# Patient Record
Sex: FEMALE | Race: WHITE | NOT HISPANIC OR LATINO | Employment: OTHER | URBAN - METROPOLITAN AREA
[De-identification: names, ages, dates, MRNs, and addresses within clinical notes are randomized per-mention and may not be internally consistent; named-entity substitution may affect disease eponyms.]

---

## 2017-01-06 ENCOUNTER — ONCOLOGY VISIT (OUTPATIENT)
Dept: ONCOLOGY | Facility: CLINIC | Age: 67
End: 2017-01-06
Attending: PHYSICIAN ASSISTANT
Payer: COMMERCIAL

## 2017-01-06 VITALS
BODY MASS INDEX: 40.37 KG/M2 | HEIGHT: 67 IN | SYSTOLIC BLOOD PRESSURE: 132 MMHG | OXYGEN SATURATION: 95 % | HEART RATE: 74 BPM | WEIGHT: 257.2 LBS | TEMPERATURE: 99.1 F | DIASTOLIC BLOOD PRESSURE: 76 MMHG | RESPIRATION RATE: 20 BRPM

## 2017-01-06 DIAGNOSIS — C50.411 BILATERAL MALIGNANT NEOPLASM OF UPPER OUTER QUADRANT OF BREAST IN FEMALE (H): Primary | ICD-10-CM

## 2017-01-06 DIAGNOSIS — C50.412 BILATERAL MALIGNANT NEOPLASM OF UPPER OUTER QUADRANT OF BREAST IN FEMALE (H): Primary | ICD-10-CM

## 2017-01-06 DIAGNOSIS — D3A.00 CARCINOID TUMOR (H): ICD-10-CM

## 2017-01-06 PROCEDURE — 99211 OFF/OP EST MAY X REQ PHY/QHP: CPT

## 2017-01-06 PROCEDURE — 99214 OFFICE O/P EST MOD 30 MIN: CPT | Performed by: INTERNAL MEDICINE

## 2017-01-06 RX ORDER — EXEMESTANE 25 MG/1
25 TABLET ORAL DAILY
Qty: 90 TABLET | Refills: 1 | Status: SHIPPED | OUTPATIENT
Start: 2017-01-06 | End: 2017-07-19

## 2017-01-06 ASSESSMENT — PAIN SCALES - GENERAL: PAINLEVEL: NO PAIN (0)

## 2017-01-06 NOTE — MR AVS SNAPSHOT
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      Your next 10 appointments already scheduled     Jan 09, 2017  9:45 AM   New Visit with Zhao La MD   [facility] ([facility])    [address] [facility] [address]   [phone]            Jan 18, 2017 12:00 PM   New Visit with Danica Vasquez GC   [facility] ([facility])    [address] [place] [id number] [phone]            Apr 01, 2017 10:00 AM   LAB with [place] [facility]   [facility] ([facility])    [address] [place] [address]   [phone]           Patient must bring picture ID.  Patient should be prepared to give a urine specimen  Please do not eat 10-12 hours before your appointment if you are coming in fasting for labs on lipids, cholesterol, or glucose (sugar).  Pregnant women should follow their Care Team instructions. Water with medications is okay. Do not drink coffee or other fluids.   If you have concerns about taking  your medications, please ask at office or if scheduling via TickPickt, send a message by clicking on Secure Messaging, Message Your Care Team.            Apr 07, 2017 11:30 AM   Return Visit with Hosea King MD   [facility] ([facility])    [facility] [address] [address]  "  [phone]              Future tests that were ordered for you today     Open Future Orders        Priority Expected Expires Ordered    CBC with platelets differential Routine 2017    Comprehensive metabolic panel Routine 2017    Ca27.29  breast tumor marker Routine 2017            Who to contact     If you have questions or need follow up information about today's clinic visit or your schedule please contact [facility] directly at [phone].  Normal or non-critical lab and imaging results will be communicated to you by Datadecisionhart, letter or phone within 4 business days after the clinic has received the results. If you do not hear from us within 7 days, please contact the clinic through Datadecisionhart or phone. If you have a critical or abnormal lab result, we will notify you by phone as soon as possible.  Submit refill requests through Sinnet or call your pharmacy and they will forward the refill request to us. Please allow 3 business days for your refill to be completed.          Additional Information About Your Visit        MyChart Information     Sinnet lets you send messages to your doctor, view your test results, renew your prescriptions, schedule appointments and more. To sign up, go to [email or web address] . Click on \"Log in\" on the left side of the screen, which will take you to the Welcome page. Then click on \"Sign up Now\" on the right side of the page.     You will be asked to enter the access code listed below, as well as some personal information. Please follow the directions to create your username and password.     Your access code is: [email or web address]  Expires: 2017  1:05 PM     Your access code will  in 90 days. If you need help or a new code, please call your [facility] or [phone].        Care EveryWhere ID     This is your Care EveryWhere ID. This could be used by other organizations to access your " "[place] medical records  [id number]        Your Vitals Were     Pulse Temperature Respirations    74 99.1  F (37.3  C) (Tympanic) 20    Height BMI (Body Mass Index) Pulse Oximetry    1.708 m (5' 7.25\") 39.99 kg/m2 95%    Breastfeeding?          No         Blood Pressure from Last 3 Encounters:   01/06/17 132/76   11/28/16 121/86   11/02/16 154/90    Weight from Last 3 Encounters:   01/06/17 116.665 kg (257 lb 3.2 oz)   11/28/16 117.754 kg (259 lb 9.6 oz)   11/02/16 117.935 kg (260 lb)                 Where to get your medicines      These medications [facility] to [facility] [address] [place] [address] - [address] [facility]  [address] [facility] [address]     Phone:  [phone]    - exemestane 25 MG tablet       Primary Care Provider Office Phone # Fax #    Bernice Howard [phone]       [address] [address]        Thank you!     Thank you for choosing [facility]  for your care. Our goal is always to provide you with excellent care. Hearing back from our patients is one way we can continue to improve our services. Please take a few minutes to complete the written survey that you may receive in the mail after your visit with us. Thank you!             Your Updated Medication List - Protect others around you: Learn how to safely use, store and throw away your medicines at www.disposemymeds.org.          This list is accurate as of: 1/6/17 11:29 AM.  Always use your most recent med list.                   Brand Name Dispense Instructions for use    albuterol 108 (90 BASE) MCG/ACT Inhaler    PROAIR HFA/PROVENTIL HFA/VENTOLIN HFA    1 Inhaler    Inhale 2 puffs into the lungs every 6 hours as needed for shortness of breath / dyspnea or wheezing       calcium carbonate 600 MG tablet   Generic drug:  calcium carbonate      Take 1 tablet by mouth 2 times daily (with meals)       exemestane 25 MG tablet    AROMASIN    90 " tablet    Take 1 tablet (25 mg) by mouth daily       gabapentin 600 MG tablet    NEURONTIN    180 tablet    Take 1 tablet (600 mg) by mouth 3 times daily       hydrochlorothiazide 25 MG tablet    HYDRODIURIL    90 tablet    Take 1 tablet (25 mg) by mouth daily       HYDROcodone-acetaminophen 5-325 MG per tablet    NORCO    30 tablet    Take 1-2 tablets by mouth every 4 hours as needed for other (Moderate to Severe Pain)       lisinopril 10 MG tablet    PRINIVIL/ZESTRIL    90 tablet    Take 1 tablet (10 mg) by mouth daily       Vitamin D3 3000 UNITS Tabs      Take 1 tablet by mouth daily Dose is 2000 IU daily

## 2017-01-06 NOTE — PROGRESS NOTES
Hematology/ Oncology Follow-up Visit:  Jan 6, 2017    Reason for Visit:   Chief Complaint   Patient presents with     Oncology Clinic Visit     3 month follow up Breast CA.        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 There was also a focal asymmetry in the lower left breast. Subsequently the patient went on to have bilateral diagnostic mammography with ultrasound on March 28, 2016.. On the right breast there was microcalcification is the main grouping measures 1.6 cm x 1 cm bilateral 2.9 cm located 2.9 cm from the nipple. An additional tiny area about 0.2 cm seen at the anterolateral margin of this main grouping about 1 cm from the main grouping. The left breast shows no masses or significant abnormalities. Subsequently the patient went on to have breast needle biopsy on March 30, 2016. 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 Angiolymphatic invasion was not identified. 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 Microcalcifications was also associated with ductal carcinoma in situ. His surgeon receptor was positive. Progesterone receptor was negative. She underwent bilateral sentinel lymph node biopsy and bilateral lumpectomies with prior seed placement.  the surgical pathology Showing left breast lumpectomy was a tumor size of 13 mm grade 2 of 3, angiolymphatic invasion was not identified the tumor was unifocal associated with ductal carcinoma in situ high-grade. Surgical margins where negative. [place] lymph node was negative for metastatic tumor. Stage is T1cN0 M0 On the right breast and there was invasive ductal carcinoma with a tumor size of 3 mm grade 3 of 3. [place] lymph nodes were negative for metastatic disease. The tumor stage is T1aN0 M0. The tumor on the left  was positive for estrogen and progesterone receptor. It did show no HER-2/praveen amplification. The tumor on the right is positive for estrogen receptor and negative for progesterone receptor and shows no HER-2/praveen amplification. Subsequently the patient concluded the radiation therapy. She is currently on hormonal therapy with Arimidex. She developed a skin rash and Arimidex was switched to Aromasin.      Interval History:  Patient is attended today for follow-up. She was seen recently by primary care physician. She has a chest CT which showed incidental finding of leftt hydronephrosis. She will see urology next week. Otherwise she's been feeling well without any new pain other than back pain. She denies any weight loss or night sweats or fever or chills. She denies any nausea vomiting or diarrhea.   .  Review Of Systems:  Constitutional: Negative for fever, chills, and night sweats.  Skin: negative.  Eyes: negative.  Ears/Nose/Throat: negative.  Respiratory: No shortness of breath, dyspnea on exertion, cough, or hemoptysis.  Cardiovascular: negative.  Gastrointestinal: negative.  Genitourinary: negative.  Musculoskeletal: negative.  Neurologic: negative.  Psychiatric: negative.  Hematologic/Lymphatic/Immunologic: negative.  Endocrine: negative.    All other ROS negative unless mentioned in interval history.    Past medical, social, surgical, and family histories reviewed.    Allergies:  Allergies as of 01/06/2017 - reviewed 01/06/2017   Allergen Reaction Noted     Nkda [no known drug allergies]  10/30/2009       Current Medications:  Current Outpatient Prescriptions   Medication Sig Dispense Refill     albuterol (PROAIR HFA, PROVENTIL HFA, VENTOLIN HFA) 108 (90 BASE) MCG/ACT inhaler Inhale 2 puffs into the lungs every 6 hours as needed for shortness of breath / dyspnea or wheezing 1 Inhaler 1     hydrochlorothiazide (HYDRODIURIL) 25 MG tablet Take 1 tablet (25 mg) by mouth daily 90 tablet 3     lisinopril  "(PRINIVIL,ZESTRIL) 10 MG tablet Take 1 tablet (10 mg) by mouth daily 90 tablet 1     gabapentin (NEURONTIN) 600 MG tablet Take 1 tablet (600 mg) by mouth 3 times daily 180 tablet 6     Cholecalciferol (VITAMIN D3) 3000 UNITS TABS Take 1 tablet by mouth daily Dose is 2000 IU daily       calcium carbonate (CALCIUM CARBONATE) 600 MG tablet Take 1 tablet by mouth 2 times daily (with meals)       HYDROcodone-acetaminophen (NORCO) 5-325 MG per tablet Take 1-2 tablets by mouth every 4 hours as needed for other (Moderate to Severe Pain) 30 tablet 0     exemestane (AROMASIN) 25 MG tablet Take 1 tablet (25 mg) by mouth daily 90 tablet 1        Physical Exam:  /76 mmHg  Pulse 74  Temp(Src) 99.1  F (37.3  C) (Tympanic)  Resp 20  Ht 1.708 m (5' 7.25\")  Wt 116.665 kg (257 lb 3.2 oz)  BMI 39.99 kg/m2  SpO2 95%  Breastfeeding? No  Wt Readings from Last 12 Encounters:   01/06/17 116.665 kg (257 lb 3.2 oz)   11/28/16 117.754 kg (259 lb 9.6 oz)   11/02/16 117.935 kg (260 lb)   10/10/16 117.028 kg (258 lb)   09/28/16 116.529 kg (256 lb 14.4 oz)   09/28/16 116.484 kg (256 lb 12.8 oz)   09/19/16 116.348 kg (256 lb 8 oz)   08/31/16 115.486 kg (254 lb 9.6 oz)   08/25/16 116.121 kg (256 lb)   08/17/16 114.669 kg (252 lb 12.8 oz)   08/10/16 114.579 kg (252 lb 9.6 oz)   08/05/16 113.036 kg (249 lb 3.2 oz)     ECOG performance status: 0  GENERAL APPEARANCE: Healthy, alert and in no acute distress.  HEENT: Sclerae anicteric. PERRLA. Oropharynx without ulcers, lesions, or thrush.  NECK: Supple. No asymmetry or masses.  LYMPHATICS: No palpable cervical, supraclavicular, axillary, or inguinal lymphadenopathy.  RESP: Lungs clear to auscultation bilaterally without rales, rhonchi or wheezes.  CARDIOVASCULAR: Regular rate and rhythm. Normal S1, S2; no S3 or S4. No murmur, gallop, or rub.  ABDOMEN: Soft, nontender. Bowel sounds normal. No palpable organomegaly or masses.  MUSCULOSKELETAL: Extremities without gross deformities noted. No " edema of bilateral lower extremities.  SKIN: No suspicious lesions or rashes.  NEURO: Alert and oriented x 3. Cranial nerves II-XII grossly intact.  PSYCHIATRIC: Mentation and affect appear normal.    Laboratory/Imaging Studies:  No visits with results within 2 Week(s) from this visit.  Latest known visit with results is:    Radiant Appointment on 12/15/2016   Component Date Value Ref Range Status     Radiologist flags 12/15/2016 Left hydronephrosis*  Final        Recent Results (from the past 744 hour(s))   CT Chest w/o Contrast   Result Value    Radiologist flags Left hydronephrosis (Urgent)    Narrative    CT CHEST WITHOUT CONTRAST December 15, 2016 1:17 PM      HISTORY: Shortness of breath. Dyspnea.    TECHNIQUE:  CT chest without intravenous contrast. Radiation dose for  this scan was reduced using automated exposure control, adjustment of  the mA and/or kV according to patient size, or iterative  reconstruction technique.      COMPARISON: None.    FINDINGS: There is mild emphysematous disease in the upper lungs. Few  areas of peripheral scarring and fibrosis bilaterally. There is a 0.2  cm subpleural nodule in the left lower lobe laterally on series 3  image 32. Mild bronchiectasis in the left upper lobe and right middle  lobe. 0.4 cm right middle lobe nodule on series 3 image 40. There is  no mediastinal, hilar or axillary lymph node enlargement. There are  few coronary artery atherosclerotic calcifications. Heart size is  normal. No pleural effusion. Images through the upper abdomen are  remarkable for marked left hydronephrosis. This is a change from the  previous abdomen CT of 9/29/2015. Degenerative disease in the spine.      Impression    IMPRESSION:  1. No acute chest abnormality.  2. Mild emphysema.  3. Few small indeterminate lung nodules. Comparison to any old exam or  follow-up recommended.  4. Marked left hydronephrosis, new since 9/29/2015.    For an indeterminate lung nodule < or equal to 4 mm  :  Low risk patients: No follow-up needed.  High risk patients: Follow-up CT at 12 months; if unchanged, no  further follow-up.    - Low Risk: Minimal or absent history of smoking and of other known  risk factors.  - Nonsolid (ground glass) or partly solid nodules may require longer  follow-up to exclude indolent adenocarcinoma.  - Fleischner Society Recommendations, Radiology 2005.      [Access Center: Left hydronephrosis]    This report will be copied to the [facility] to ensure a  provider is contacted. [facility] is available Monday through  Friday 8am-3:30 pm.     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 She also will continue on calcium and vitamin D supplements. i will see her again in 3 months or sooner if there is new development or concerns. Patient will be  Seen by urology next week. We will continue to follow.    (D3A.00) Carcinoid tumor  There is no clear evidence of disease recurrence.    The patient is ready to learn, no apparent learning barriers were identified.  Diagnosis and treatment plans were explained to the patient. The patient expressed understanding of the content. The patient asked appropriate questions. The patient questions were answered to her satisfaction.    Chart documentation with Dragon Voice recognition Software. Although reviewed after completion, some words and grammatical errors may remain.

## 2017-01-06 NOTE — ASSESSMENT & PLAN NOTE
Marissa Guadarrama was found on routine mammogram a group of microcalcification is in the upper outer right breast. There was also a focal asymmetry in the lower left breast. Subsequently the patient went on to have bilateral diagnostic mammography with ultrasound on March 28, 2016.. On the right breast there was microcalcification is the main grouping measures 1.6 cm x 1 cm bilateral 2.9 cm located 2.9 cm from the nipple. An additional tiny area about 0.2 cm seen at the anterolateral margin of this main grouping about 1 cm from the main grouping. The left breast shows no masses or significant abnormalities. Subsequently the patient went on to have breast needle biopsy on March 30, 2016. 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 Angiolymphatic invasion was not identified. 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 Microcalcifications was also associated with ductal carcinoma in situ. His surgeon receptor was positive. Progesterone receptor was negative. She underwent bilateral sentinel lymph node biopsy and bilateral lumpectomies with prior seed placement.  the surgical pathology Showing left breast lumpectomy was a tumor size of 13 mm grade 2 of 3, angiolymphatic invasion was not identified the tumor was unifocal associated with ductal carcinoma in situ high-grade. Surgical margins where negative. [place] lymph node was negative for metastatic tumor. Stage is T1cN0 M0 On the right breast and there was invasive ductal carcinoma with a tumor size of 3 mm grade 3 of 3. [place] lymph nodes were negative for metastatic disease. The tumor stage is T1aN0 M0. The tumor on the left was positive for estrogen and progesterone receptor. It did show no HER-2/praveen amplification. The tumor on the right is positive for estrogen receptor and negative for progesterone receptor and shows no HER-2/praveen amplification. Subsequently the patient concluded the radiation  therapy. She is currently on hormonal therapy with Arimidex. She developed a skin rash and Arimidex was switched to Aromasin.

## 2017-01-06 NOTE — NURSING NOTE
"Marissa Guadarrama is a 66 year old female who presents for:  Chief Complaint   Patient presents with     Oncology Clinic Visit     3 month follow up Breast CA.         Initial Vitals:  /76 mmHg  Pulse 74  Temp(Src) 99.1  F (37.3  C) (Tympanic)  Resp 20  Ht 1.708 m (5' 7.25\")  Wt 116.665 kg (257 lb 3.2 oz)  BMI 39.99 kg/m2  SpO2 95%  Breastfeeding? No Estimated body mass index is 39.99 kg/(m^2) as calculated from the following:    Height as of this encounter: 1.708 m (5' 7.25\").    Weight as of this encounter: 116.665 kg (257 lb 3.2 oz).. Body surface area is 2.35 meters squared. BP completed using cuff size: regular  No Pain (0) No LMP recorded. Patient has had a hysterectomy. Allergies and medications reviewed.     Medications: Medication refills not needed today.  Pharmacy name entered into [employer]: [id number] [address]    Comments: 3 month follow up Breast CA.     PCP:  Patient reports these concerns today:  Nausea / Vomiting - no  Constipation - no  Diarrhea  - no  Pain - no  Fever / Chills - no  Cough - yes started after taking her medication (Lisinopril) about three months ago.   Rash - no  Fatigue - yes, daily 6-7 months following radiation.   Other - shortness of breath over the last several months. Patient has been using an inhaler. Worse when exerting herself.            10 minutes for nursing intake (face to face time)   Katie Mills [facility]        "

## 2017-01-09 ENCOUNTER — OFFICE VISIT (OUTPATIENT)
Dept: UROLOGY | Facility: CLINIC | Age: 67
End: 2017-01-09
Payer: COMMERCIAL

## 2017-01-09 VITALS — DIASTOLIC BLOOD PRESSURE: 93 MMHG | HEART RATE: 61 BPM | SYSTOLIC BLOOD PRESSURE: 140 MMHG | RESPIRATION RATE: 12 BRPM

## 2017-01-09 DIAGNOSIS — N13.39 OTHER HYDRONEPHROSIS: Primary | ICD-10-CM

## 2017-01-09 PROCEDURE — 51798 US URINE CAPACITY MEASURE: CPT | Performed by: UROLOGY

## 2017-01-09 PROCEDURE — 99203 OFFICE O/P NEW LOW 30 MIN: CPT | Mod: 25 | Performed by: UROLOGY

## 2017-01-09 RX ORDER — CHOLECALCIFEROL (VITAMIN D3) 25 MCG
1 CAPSULE ORAL
COMMUNITY
End: 2017-07-19

## 2017-01-09 NOTE — PROGRESS NOTES
REASON FOR VISIT TODAY:  Left hydronephrosis.      HISTORY:  Ms. Marissa Guadarrama is a 66-year-old woman who comes to see us for recently noted left-sided hydronephrosis obtained on a chest CT that was performed on 12/15/2016 for shortness of breath.  The patient had a previous CT scan in 09/2015 in which no hydronephrosis was noted.  Therefore, this appears new in the last year or so.  The patient has no flank pain.  She has had no blood in the urine and she has no history of kidney stones.  The patient has noted some worsening incontinence over the last couple of years for which she has gone to wearing a pad.  It appears her incontinence is largely mixed with urgency and urge incontinence as well as stress incontinence and the patient also notes that she feels leaking at other times fairly continuously and in fact notes that she wears a pad at night because in the mornings when she wakes up the pad is usually soaked.  The patient denies any history of constipation.  She does have a history of several different cancers including colon cancer, cervical cancer and breast cancer for which she has undergone various therapies.  None of the therapies have been within the last year with the exception of breast cancer radiotherapy.      PAST MEDICAL HISTORY:  Significant for again significant for colon cancer was apparently carcinoid back in 2006 or 2007.  She has a history of cervical cancer, status post hysterectomy in 2003 and recently diagnosed breast cancer bilaterally.  The patient had lumpectomies bilaterally for this.  The patient also has a history of polio, mixed incontinence as noted above, arthritis, trigeminal neuralgia.      PAST SURGICAL HISTORY:  Includes the colon resection in roughly 2003, subsequent hernia repair in 2005, hysterectomy in 2006 and then the lumpectomies on 06/01/16.      MEDICATIONS:  Vitamin D3, exemestane, albuterol, hydrochlorothiazide, lisinopril, Norco, Neurontin.      ALLERGIES:  No  known drug allergies.      FAMILY HISTORY:  Negative for urologic malignancies.      SOCIAL HISTORY:  The patient did smoke about a pack and a half a day on and off for most of her life, but quit 10 years ago.  Drinks alcohol rarely.      REVIEW OF SYSTEMS:  Negative for fevers, chills, sweats, nausea, vomiting or unexplained weight changes.      PHYSICAL EXAMINATION:   VITAL SIGNS:  On exam today, the patient's blood pressure is 140/93, pulse is 61.   GENERAL:  She is in no acute distress.   ABDOMEN:  Soft, nontender.  No flank tenderness to percussion.      LABORATORY AND IMAGING:  The patient's CT scan from 12/15/16 demonstrates the marked left-sided hydronephrosis new since 09/29/2015.  Some mild emphysema noted in the lungs and a few small indeterminate lung nodules noted.  The patient's creatinine from 12/26/16 was 1.66.  Creatinine in 5/25/2016 of 1.01 and 0.69 on 09/28/2015.  The patient voided 125 cc and a postvoid residual was measured at 84.      ASSESSMENT AND PLAN:  Over half of today's 30-minute visit was spent counseling the patient regarding her hydronephrosis.  I suggested to Ms. Guadarrama that at this point the etiology of the hydronephrosis is unclear.  Certainly there could be scarring in the retroperitoneum from some of her previous surgeries, although this does not seem likely given the timing of her surgeries and when the hydronephrosis has been seen.  The patient also theoretically could have hydronephrosis from incomplete bladder emptying and a poorly compliant bladder or certainly obviously some other potential causes.  Toward this end we will get a CT urogram to try to discern the level of obstruction and what the possible etiology may be.  Depending on what that shows, we will make further recommendations.  Certainly we would like to try to fix this underlying issue, but we also discussed the possibility of temporizing things with stenting.  Ms. Guadarrama is in agreement with the plan.          STEVE RUVALCABA MD             D: 2017 11:31   T: 2017 12:48   MT: [id number]      Name:     ROSALINDA LANCASTER   MRN:      [id number]        Account:      [id number]   :      1950           Visit Date:   2017      Document: [id number]

## 2017-01-09 NOTE — NURSING NOTE
"Chief Complaint   Patient presents with     Consult     Hydronephrosis seen on CT scan       Initial /93 mmHg  Pulse 61  Resp 12 Estimated body mass index is 39.99 kg/(m^2) as calculated from the following:    Height as of 1/6/17: 1.708 m (5' 7.25\").    Weight as of 1/6/17: 116.665 kg (257 lb 3.2 oz).  BP completed using cuff size: wrist cuff    Maricruz Jordan MA      "

## 2017-01-12 NOTE — PATIENT INSTRUCTIONS
We would like to see you back in clinic with Dr. King in 3 months with labs prior.  Your prescription (aromasin) has been sent to:   [facility] [address] [place] [address] - [address]   [address] [facility] [address]  Phone: [phone] Fax: [phone]  When you are in need of a refill, please call your pharmacy and they will send us a request.  Copy of appointments, and after visit summary (AVS) given to patient.  If you have any questions during business hours (M-F 8 AM- 4PM), please call Codi Diaz RN, BSN, OCN Oncology Hematology /Breast Cancer Navigator at [facility] [phone].   For questions after business hours, or on holidays/weekends, please call our after hours Nurse Triage line [phone]. Thank you.

## 2017-01-24 RX ORDER — IOPAMIDOL 755 MG/ML
100 INJECTION, SOLUTION INTRAVASCULAR ONCE
Status: COMPLETED | OUTPATIENT
Start: 2017-01-25 | End: 2017-01-25

## 2017-01-25 ENCOUNTER — HOSPITAL ENCOUNTER (OUTPATIENT)
Dept: CT IMAGING | Facility: CLINIC | Age: 67
Discharge: HOME | End: 2017-01-25
Attending: UROLOGY | Admitting: UROLOGY
Payer: MEDICARE

## 2017-01-25 ENCOUNTER — OFFICE VISIT (OUTPATIENT)
Dept: UROLOGY | Facility: CLINIC | Age: 67
End: 2017-01-25
Payer: COMMERCIAL

## 2017-01-25 VITALS — DIASTOLIC BLOOD PRESSURE: 108 MMHG | RESPIRATION RATE: 12 BRPM | SYSTOLIC BLOOD PRESSURE: 207 MMHG | HEART RATE: 70 BPM

## 2017-01-25 DIAGNOSIS — N13.39 OTHER HYDRONEPHROSIS: ICD-10-CM

## 2017-01-25 DIAGNOSIS — N39.0 URINARY TRACT INFECTION, SITE UNSPECIFIED: Primary | ICD-10-CM

## 2017-01-25 LAB
CREAT BLD-MCNC: 1.6 MG/DL (ref 0.52–1.04)
GFR SERPL CREATININE-BSD FRML MDRD: 32 ML/MIN/1.7M2

## 2017-01-25 PROCEDURE — 87086 URINE CULTURE/COLONY COUNT: CPT | Performed by: UROLOGY

## 2017-01-25 PROCEDURE — 74178 CT ABD&PLV WO CNTR FLWD CNTR: CPT

## 2017-01-25 PROCEDURE — 99214 OFFICE O/P EST MOD 30 MIN: CPT | Performed by: UROLOGY

## 2017-01-25 PROCEDURE — 25500064 ZZH RX 255 OP 636: Performed by: RADIOLOGY

## 2017-01-25 PROCEDURE — 82565 ASSAY OF CREATININE: CPT | Performed by: UROLOGY

## 2017-01-25 PROCEDURE — 25000128 H RX IP 250 OP 636: Performed by: UROLOGY

## 2017-01-25 PROCEDURE — 36415 COLL VENOUS BLD VENIPUNCTURE: CPT | Performed by: UROLOGY

## 2017-01-25 PROCEDURE — 25000125 ZZHC RX 250: Performed by: RADIOLOGY

## 2017-01-25 RX ORDER — SODIUM CHLORIDE 9 MG/ML
INJECTION, SOLUTION INTRAVENOUS ONCE
Status: COMPLETED | OUTPATIENT
Start: 2017-01-25 | End: 2017-01-25

## 2017-01-25 RX ADMIN — IOPAMIDOL 100 ML: 755 INJECTION, SOLUTION INTRAVENOUS at 11:20

## 2017-01-25 RX ADMIN — SODIUM CHLORIDE: 9 INJECTION, SOLUTION INTRAVENOUS at 10:40

## 2017-01-25 RX ADMIN — SODIUM CHLORIDE 80 ML: 9 INJECTION, SOLUTION INTRAVENOUS at 11:20

## 2017-01-25 NOTE — NURSING NOTE
"Chief Complaint   Patient presents with     CT Results     done today 1/025/17       Initial /108 mmHg  Pulse 70  Resp 12 Estimated body mass index is 39.99 kg/(m^2) as calculated from the following:    Height as of 1/6/17: 5' 7.25\" (1.708 m).    Weight as of 1/6/17: 257 lb 3.2 oz (116.665 kg).  BP completed using cuff size: wrist cuff    Maricruz Jordan MA      "

## 2017-01-25 NOTE — Clinical Note
SURGERYPLANNING/SCHEDULING WORKSHEET                              [facility]  [address] [facility] [facility] [address]  [phone]                          Marissa Guadarrama                :  1950  MRN:  [id number]  Phone: [phone] (home)    Same Day Surgery [phone]   Surgeon: Zhao La MD  Diagnosis:  Left hydronephrosis  Allergies:  Nkda   A preoperative evaluation by the primary care provider has been requested to summarize and modify, where possible, medical risks to the proposed surgery.  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)     Invasive ductal carcinoma of breast (H)     Urinary incontinence     Bone injury     Vaginal dysplasia     CKD (chronic kidney disease) stage 3, GFR 30-59 ml/min     Advanced directives, counseling/discussion     OA (osteoarthritis) of knee     Knee pain     Hypertension goal BP (blood pressure) < 140/90     HYPERLIPIDEMIA LDL GOAL <130     Post-polio syndrome     Arthritis     Cervical cancer (H)     Trigeminal neuralgia     Malignant neoplasm of cervix (H)     Cervical dysplasia     Carcinoid tumor       ====================================================  Surgical Procedure:  Cystoscopy, left retrograde pyelogram, left ureteroscopy, possible stent placement  Length of Procedure: 1 hr Type of anesthesia:  General  The proposed surgical procedure is considered LOW risk.  Date of Procedure: 17   Time: 2:45pm      Informed Consent Obtained and Signed:  NO  ====================================================  Instructions to Same Day Surgery Staff  Pneumoboots  Special Equipment: C-Arm  Preop Antibiotic:  Ancef 2 gm IV  pre-op within one hour prior to incision For > 80 kg  Preop Pain Meds:  None  PreOp Orders:  Routine Standing  Orders.  ====================================================  Instructions to the patient:  Preop physical exam scheduled (within 30 days or 7 days prior) with:   ____________________  Clinic:  ____________________                                         Date______________Time_________________________  Come to the hospital at: TBD, The OR will call with time  HOME PREPARATION:   Shower with Hibiclens the night before and the morning of surgery, gently cleaning skin from neck to feet  Bathe and brush teeth the morning of surgery.  Take medications with a sip of water the morning of surgery:   Check with  if taking insulin.  May have  a light meal, toast and clear liquids, up to 6 hrs before surgery  May have clear liquids (liquids one can read through) up to 2 hrs before surgery  NOTHING after 2 hrs before surgery  Stop aspirin 7-10 days before surgery  Stop NSAIDS (Ibuproven, Naproxen, etc) 5 days before surgery  Stop Plavix 7-10 days before surgery      Zhao La MD    1/25/2017

## 2017-01-26 NOTE — PROGRESS NOTES
REASON FOR VISIT TODAY:  Left-sided hydronephrosis.      BRIEF COURSE:  Mr. Marissa Guadarrama is a 66-year-old woman followed in our clinic for history of left-sided hydronephrosis since 2015.  This is of unclear etiology.  The patient recently underwent a CT urogram today to try to delineate the etiology of the hydronephrosis.      PHYSICAL EXAMINATION:   VITAL SIGNS:  On exam today blood pressure 207/108, pulse is 70, patient is receiving IV fluids currently but in no acute distress.      DIAGNOSTICS:  The patient's CT scan from today again shows the left hydronephrosis with a transition point in the distal ureter.  There is a question of a possible soft tissue mass near the transition point but this was also noted previously when there was no hydronephrosis and therefore this is less likely to be a cancerous etiology.  No other etiology was seen including no evidence of stones.      ASSESSMENT AND PLAN:  Over half of today's 25-minute visit was spent counseling the patient regarding her hydronephrosis.  I suggested to Ms. Guadarrama that at this point we see where the transition point is in the distal ureter but the etiology of the transition remains unclear.  Our next steps will be to bring the patient to the operating room for cystoscopy, left retrograde pyelogram, left ureteroscopy and possible stent placement on the left side.  If we are able to drive up to the area we will be able to get a better sense of what the possible cause is and certainly if there is a mass in this area we would biopsy.  Another etiology could be stricture.  I did discuss with the patient that ideally following our diagnostic efforts we would leave a stent in place to decompress the kidney in an effort to see how much function the kidney would recover assuming there was no cancer noted in the ureter.  Depending on how much kidney function is recovered after the stent is in place for a month we would determine what reconstructive options  would be available if that is appropriate.  We also discussed that we may not be able to get a stent in due to the tortuosity of ureter and also if there is stricture in the distal portion of the ureter.  In this case, a nephrostomy tube would be required for decompression.  We will plan on getting Ms. Guadarrama to the operating room in the near future.  She left us urine today which we will send for culture in preparation.  The patient will be going back to the radiology department after this appointment to continue with her IV infusion and her vitals will be rechecked at that time.         STEVE RUVALCABA MD             D: 2017 17:47   T: 2017 06:29   MT: SARTHAK#[id number]      Name:     ROSALINDA GUADARRAMA   MRN:      [id number]        Account:      [id number]   :      1950           Visit Date:   2017      Document: [id number]

## 2017-01-27 LAB
BACTERIA SPEC CULT: NORMAL
MICRO REPORT STATUS: NORMAL
SPECIMEN SOURCE: NORMAL

## 2017-01-30 ENCOUNTER — ANESTHESIA EVENT (OUTPATIENT)
Dept: SURGERY | Facility: CLINIC | Age: 67
End: 2017-01-30
Payer: MEDICARE

## 2017-02-01 ENCOUNTER — OFFICE VISIT (OUTPATIENT)
Dept: FAMILY MEDICINE | Facility: CLINIC | Age: 67
End: 2017-02-01
Payer: COMMERCIAL

## 2017-02-01 VITALS
TEMPERATURE: 98.5 F | DIASTOLIC BLOOD PRESSURE: 76 MMHG | BODY MASS INDEX: 40.46 KG/M2 | HEIGHT: 67 IN | SYSTOLIC BLOOD PRESSURE: 140 MMHG | WEIGHT: 257.8 LBS | HEART RATE: 72 BPM | RESPIRATION RATE: 20 BRPM | OXYGEN SATURATION: 94 %

## 2017-02-01 DIAGNOSIS — M17.11 OSTEOARTHRITIS OF RIGHT KNEE, UNSPECIFIED OSTEOARTHRITIS TYPE: ICD-10-CM

## 2017-02-01 DIAGNOSIS — N13.30 HYDRONEPHROSIS, UNSPECIFIED HYDRONEPHROSIS TYPE: ICD-10-CM

## 2017-02-01 DIAGNOSIS — Z01.818 PREOP GENERAL PHYSICAL EXAM: Primary | ICD-10-CM

## 2017-02-01 LAB — HGB BLD-MCNC: 14.2 G/DL (ref 11.7–15.7)

## 2017-02-01 PROCEDURE — 80048 BASIC METABOLIC PNL TOTAL CA: CPT | Performed by: FAMILY MEDICINE

## 2017-02-01 PROCEDURE — 99214 OFFICE O/P EST MOD 30 MIN: CPT | Performed by: FAMILY MEDICINE

## 2017-02-01 PROCEDURE — 85018 HEMOGLOBIN: CPT | Performed by: FAMILY MEDICINE

## 2017-02-01 PROCEDURE — 36415 COLL VENOUS BLD VENIPUNCTURE: CPT | Performed by: FAMILY MEDICINE

## 2017-02-01 NOTE — NURSING NOTE
"Chief Complaint   Patient presents with     Pre-Op Exam     DOS: 2/2/17       Initial /76 mmHg  Pulse 72  Temp(Src) 98.5  F (36.9  C) (Oral)  Resp 20  Ht 5' 7.25\" (1.708 m)  Wt 257 lb 12.8 oz (116.937 kg)  BMI 40.08 kg/m2  SpO2 94% Estimated body mass index is 40.08 kg/(m^2) as calculated from the following:    Height as of this encounter: 5' 7.25\" (1.708 m).    Weight as of this encounter: 257 lb 12.8 oz (116.937 kg).  BP completed using cuff size: large    "

## 2017-02-01 NOTE — PROGRESS NOTES
[address] [id number] [phone]  Dept: [phone]    PRE-OP EVALUATION:  Today's date: 2017    Marissa Guadarrama (: 1950) presents for pre-operative evaluation assessment as requested by Dr. Zhao La.  He requires evaluation and anesthesia risk assessment prior to undergoing Proposed procedure: Cystoscopy, Left retrograde pyelogram, Left ureteroscopy, possible stent placement    Date of Surgery/ Procedure: 17  Time of Surgery/ Procedure: 2:30pm  Hospital/Surgical Facility: [facility]  Fax number for surgical facility:  Available electronically  Primary Physician: Bernice Howard  Type of Anesthesia Anticipated: General    Patient has a Health Care Directive or Living Will:  NO    1. NO - Do you have a history of heart attack, stroke, stent, bypass or surgery on an artery in the head, neck, heart or legs?  2. NO - Do you ever have any pain or discomfort in your chest?  3. NO - Do you have a history of heart Failure?  4. YES - ARE YOUR TROUBLED BY SHORTNESS OF BREATH WHEN WALKING ON THE LEVEL, UP A SLIGHT HILL OR AT NIGHT?  sometimes  5. NO - Do you currently have a cold, bronchitis or other respiratory infection?  6. NO - Do you have a cough, shortness of breath or wheezing?  7. NO - Do you sometimes get pains in the calves of your legs when you walk?  8. NO - Do you or anyone in your family have previous history of blood clots?  9. NO - Do you or does anyone in your family have a serious bleeding problem such as prolonged bleeding following surgeries or cuts?  10. NO - Have you ever had problems with anemia or been told to take iron pills?  11. NO - Have you had any abnormal blood loss such as black, tarry or bloody stools, or abnormal vaginal bleeding?  12. NO - Have you ever had a blood transfusion?  13. NO - Have you or any of your relatives ever had problems with anesthesia?  14. NO - Do you have sleep apnea,  excessive snoring or daytime drowsiness?  15. NO - Do you have any prosthetic heart valves?  16. NO - Do you have prosthetic joints?  17. NO - Is there any chance that you may be pregnant?      HPI:                                                      Brief HPI related to upcoming procedure: Hydronephrosis noted on CT when evaluating for shortness of breath and high blood pressure.    She is having less urination and having increase bloating.   Had breast cancer treatment in the past year.  Extensive cancer history.      See problem list for active medical problems.  Problems all longstanding and stable, except as noted/documented.  See ROS for pertinent symptoms related to these conditions.                                                                                                  .    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) 06/28/2016     Priority: High     Rt upper outer, L lower outer ER+MD+ Her 2-       Invasive ductal carcinoma of breast (H) 04/07/2016     Priority: High     Urinary incontinence 09/12/2014     Priority: Medium     Bone injury 07/29/2014     Priority: Medium     Vaginal dysplasia 03/10/2014     Priority: Medium     CKD (chronic kidney disease) stage 3, GFR 30-59 ml/min 09/23/2012     Priority: Medium     OA (osteoarthritis) of knee 10/05/2011     Priority: Medium     Knee pain 08/16/2011     Priority: Medium     Hypertension goal BP (blood pressure) < 140/90 11/01/2010     Priority: Medium     HYPERLIPIDEMIA LDL GOAL <130 10/31/2010     Priority: Medium     Post-polio syndrome      Priority: Medium     Left knee, diagnosed by ortho in 1976, had left leg/toe surgeries as child  (Problem list name updated by automated process. Provider to review and confirm.)       Arthritis      Priority: Medium     Knees.  Benign tumor under  "right knee by MRI (Dr. Dyson, ortho)  Use vicodin intermittently.       Cervical cancer (H)      Priority: Medium     5/2007.  Dr. Alonso, [facility] gyn/onc,  Now needs routine paps, patient not always compliant  3/26/09 pap NIL  9/24/09 pap ASCUS  11/1/13 pap ASC-H. Plan-- colposcopy   12/23/13 colposcopy scheduled. Reminder placed.  colpscopy 12/23/2013-Vaginal cuff (submitted as \"cervix\"), biopsy:  - High grade squamous intraepithelial lesion (vaginal  intraepithelial neoplasia grade III, VaIN III, severe dysplasia and  carcinoma in situ).  - No invasive malignancy identified.  - Cervical transformation zone not identified in biopsies.  - Please see comment.  Referral back to gynecology/oncology  2/5/14 gyn/onc appt   3/13/14 colposcopy and CO2 laser vagina, path:VAIN 1/2.  3/19/14 follow up in 4 months  7/30/14 vaginal biopsy: VAIN 1. Plan: repeat colp in 6 months.(9/17/14)   9/17/14 colp. No staining seen. No biopsy taken. Pap- ASCUS + HR HPV. Plan- repeat pap at next visit.  2/9/15 colposcopy. bx- LSIL/koilocytosis. Pap- NIL/+ HR HPV 16.  Plan: 3/16/15 treatment planning.   3/16/15 repeat colposcopy in 4 months (due 7/16/15)  7/6/15 scheduled. Tracking.             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  Patient has not been complaint with follow up.       Advanced directives, counseling/discussion 09/19/2012     Priority: Low     Advance Care Planning:   ACP Review and Resources Provided:  Reviewed chart for advance care plan.  Marissa Guadarrama has no plan or code status on file. Discussed available resources and provided with information. Confirmed code status reflects current choices pending further ACP discussions.  Confirmed/documented designated decision maker(s). See " permanent comments section of demographics in clinical tab. Added by Tiff Askew on 2/6/2015  Discussed advance care planning with patient; however, patient declined at this time. 9/19/2012               Past Medical History   Diagnosis Date     Benign breast biopsy      benign     Arthritis      knee     HTN      Cervical cancer (H)      High cholesterol      Post-polio syndromes      Carcinoid tumor 12/2003     Trigeminal neuralgias      Pap smear of vagina with ASC-H 11/1/13     H/O colposcopy with cervical biopsy 12/23/13     vaginal cuff biopsy- VAIN III. referred back to gyn/onc     Past Surgical History   Procedure Laterality Date     Appendectomy  1983     Surgical history of -        ovarian cystectomy     Surgical history of -   2003     right colon resection secondary to carcinoid tumor     Cl aff surgical pathology       Herniorrhaphy incisional (location)       Biopsy node sentinel Bilateral 6/1/2016     Procedure: BIOPSY NODE SENTINEL;  Surgeon: Brent Arana MD;  Location: [facility] OR     Lumpectomy breast with seed localization Bilateral 6/1/2016     Procedure: LUMPECTOMY BREAST WITH SEED LOCALIZATION;  Surgeon: Brent Arana MD;  Location: [facility] OR     Tubal ligation       C total abdom hysterectomy  5/2007     C bso, omentectomy w/almita  5/2007     Exam under anesthesia pelvic  3/13/2014     Procedure: EXAM UNDER ANESTHESIA PELVIC;  Exam Under Anestheisa, Colposcopy, Vaginal Biopsies, Co2 Laser of the Upper Vagina;  Surgeon: Lara Pack MD;  Location: [facility] OR     Colposcopy, biopsy, combined  3/13/2014     Procedure: COMBINED COLPOSCOPY, BIOPSY;;  Surgeon: Lara Pack MD;  Location: [facility] OR     Laser co2 vagina  3/13/2014     VAIN 1/2     Current Outpatient Prescriptions   Medication Sig Dispense Refill     VITAMIN E NATURAL PO Take 100 Units by mouth daily       Cholecalciferol (VITAMIN D-3) 1000 UNITS CAPS Take 1 capsule by mouth       exemestane (AROMASIN) 25 MG tablet  Take 1 tablet (25 mg) by mouth daily 90 tablet 1     albuterol (PROAIR HFA, PROVENTIL HFA, VENTOLIN HFA) 108 (90 BASE) MCG/ACT inhaler Inhale 2 puffs into the lungs every 6 hours as needed for shortness of breath / dyspnea or wheezing 1 Inhaler 1     hydrochlorothiazide (HYDRODIURIL) 25 MG tablet Take 1 tablet (25 mg) by mouth daily 90 tablet 3     lisinopril (PRINIVIL,ZESTRIL) 10 MG tablet Take 1 tablet (10 mg) by mouth daily 90 tablet 1     HYDROcodone-acetaminophen (NORCO) 5-325 MG per tablet Take 1-2 tablets by mouth every 4 hours as needed for other (Moderate to Severe Pain) 30 tablet 0     gabapentin (NEURONTIN) 600 MG tablet Take 1 tablet (600 mg) by mouth 3 times daily 180 tablet 6     OTC products: None, except as noted above    Allergies   Allergen Reactions     Nkda [No Known Drug Allergies]       Latex Allergy: NO    Social History   Substance Use Topics     Smoking status: Former Smoker -- 2.00 packs/day for 29 years     Types: Cigarettes     Quit date: 10/30/2006     Smokeless tobacco: Never Used     Alcohol Use: No     History   Drug Use No       REVIEW OF SYSTEMS:                                                    C: NEGATIVE for fever, chills, change in weight  I: NEGATIVE for worrisome rashes, moles or lesions  E: NEGATIVE for vision changes or irritation  E/M: NEGATIVE for ear, mouth and throat problems  RESP:has had shortness of breath for the past few months, no changes.  Does feel that albuterol has helped at times.  During work up for shortness of breath she was noted to have hydronephrosis  CV: NEGATIVE for chest pain, palpitations or peripheral edema  GI: NEGATIVE for nausea, abdominal pain, heartburn, or change in bowel habits   female: decreased urinary stream  M: NEGATIVE for significant arthralgias or myalgia  N: NEGATIVE for weakness, dizziness or paresthesias  E: NEGATIVE for temperature intolerance, skin/hair changes  H: NEGATIVE for bleeding problems  P: NEGATIVE for changes in  "mood or affect    EXAM:                                                    /76 mmHg  Pulse 72  Temp(Src) 98.5  F (36.9  C) (Oral)  Resp 20  Ht 5' 7.25\" (1.708 m)  Wt 257 lb 12.8 oz (116.937 kg)  BMI 40.08 kg/m2  SpO2 94%    GENERAL APPEARANCE: healthy, alert and no distress.  Difficulty getting up onto exam table due to poor mobility, so stayed in chair     HENT: ear canals and TM's normal and nose and mouth without ulcers or lesions     NECK: no adenopathy, no asymmetry, masses, or scars and thyroid normal to palpation     RESP: lungs clear to auscultation - no rales, rhonchi or wheezes     CV: regular rates and rhythm, normal S1 S2, no S3 or S4 and no murmur, click or rub -     ABDOMEN:  soft, nontender, no HSM or masses and bowel sounds normal     MS: extremities normal- no gross deformities noted, no evidence of inflammation in joints,     SKIN: no suspicious lesions or rashes     NEURO: Normal strength and tone, sensory exam grossly normal, mentation intact and speech normal     PSYCH: mentation appears normal. and affect normal/bright    DIAGNOSTICS:                                                      EKG: Not indicated due to non-vascular surgery and last ekg on 11/2/16 (within 30 days for CAD history or last year for cardiac risk factors)    Labs Resulted Today:   Results for orders placed or performed in visit on 02/01/17   Hemoglobin   Result Value Ref Range    Hemoglobin 14.2 11.7 - 15.7 g/dL     Labs Drawn and in Process:   Unresulted Labs Ordered in the Past 30 Days of this Admission     Date and Time Order Name Status Description    2/1/2017 1250 BASIC METABOLIC PANEL In process           Recent Labs   Lab Test  12/26/16   1317  11/28/16   1201  11/02/16   1047   HGB  14.3   --   13.7   PLT  172   --   182   NA  144  143  142   POTASSIUM  4.4  3.9  3.4   CR  1.66*  1.60*  1.45*        IMPRESSION:                                                    Reason for surgery/procedure: " hydronephrosis found on CT when evaluating shortness of breath.  Has decreased urination, some bloating  Diagnosis/reason for consult:   hypertension and decreased GFR the past few months-likely connected to the hydronephrosis  Left sided trigeminal neuralgia-  She prefers no tubing or intervention in left nose during anesthesia  Chronic right knee pain-she wants to see ortho again      The proposed surgical procedure is considered INTERMEDIATE risk.    REVISED CARDIAC RISK INDEX  The patient has the following serious cardiovascular risks for perioperative complications such as (MI, PE, VFib and 3  AV Block):  No serious cardiac risks  INTERPRETATION: 0 risks: Class I (very low risk - 0.4% complication rate)    The patient has the following additional risks for perioperative complications:  No identified additional risks      ICD-10-CM    1. Preop general physical exam Z01.818 Basic metabolic panel     Hemoglobin   2. Hydronephrosis, unspecified hydronephrosis type N13.30    3. Osteoarthritis of right knee, unspecified osteoarthritis type M17.9 ORTHO  REFERRAL       RECOMMENDATIONS:                                                      --Consult hospital rounder / IM to assist post-op medical management    Cardiovascular Risk  Performs 4 METs exercise without symptoms (Light housework (dusting, washing dishes)) .   Due to chronic lower extremity poor mobility and knee pain, she has a hard time with being active and doesn't do stairs.      --Patient is to take all scheduled medications on the day of surgery EXCEPT for modifications listed below.    ACE Inhibitor or Angiotensin Receptor Blocker (ARB) Use  Ace inhibitor or Angiotensin Receptor Blocker (ARB) and should HOLD this medication for the 24 hours prior to surgery.      APPROVAL GIVEN to proceed with proposed procedure, without further diagnostic evaluation       Signed Electronically by: Bernice Howard MD    Copy of this evaluation report is  provided to requesting physician.    [facility] Preop Guidelines

## 2017-02-01 NOTE — MR AVS SNAPSHOT
After Visit Summary   2/1/2017    Marissa Guadarrama    MRN: [id number]           Patient Information     Date Of Birth          1950        Visit Information        Provider Department      2/1/2017 12:40 PM Bernice Howard MD [facility]        Today's Diagnoses     Preop general physical exam    -  1     Hydronephrosis, unspecified hydronephrosis type         Osteoarthritis of right knee, unspecified osteoarthritis type           Care Instructions      Before Your Surgery      Call your surgeon if there is any change in your health. This includes signs of a cold or flu (such as a sore throat, runny nose, cough, rash or fever).    Do not smoke, drink alcohol or take over the counter medicine (unless your surgeon or primary care doctor tells you to) for the 24 hours before and after surgery.    If you take prescribed drugs: Follow your doctor s orders about which medicines to take and which to stop until after surgery.    Eating and drinking prior to surgery: follow the instructions from your surgeon    Take a shower or bath the night before surgery. Use the soap your surgeon gave you to gently clean your skin. If you do not have soap from your surgeon, use your regular soap. Do not shave or scrub the surgery site.  Wear clean pajamas and have clean sheets on your bed.         Follow-ups after your visit        Additional Services     ORTHO  REFERRAL       [facility] is referring you to the Orthopedic  Services at [facility].       The  Representative will assist you in the coordination of your Orthopedic and Musculoskeletal Care as prescribed by your physician.    The  Representative will call you within 1 business day to help schedule your appointment, or you may contact the  Representative at:    All areas ~ [phone]     Type of Referral : Surgical / Specialist, Dr. Downey       Timeframe requested: Routine    Coverage of these services is subject to the terms and limitations of your health insurance plan.  Please call member services at your health plan with any benefit or coverage questions.      If X-rays, CT or MRI's have been performed, please contact the facility where they were done to arrange for , prior to your scheduled appointment.  Please bring this referral request to your appointment and present it to your specialist.                  Your next 10 appointments already scheduled     Feb 02, 2017   Procedure with Zhao La MD   [facility] Per[facility]OP Services (--)    [address] [address]   [phone]           The medical center is located at [address] (between [address] in [place], [place] north of [place]).            Apr 01, 2017 10:00 AM   LAB with [facility]    [address] [address]   [phone]           Patient must bring picture ID.  Patient should be prepared to give a urine specimen  Please do not eat 10-12 hours before your appointment if you are coming in fasting for labs on lipids, cholesterol, or glucose (sugar).  Pregnant women should follow their Care Team instructions. Water with medications is okay. Do not drink coffee or other fluids.   If you have concerns about taking  your medications, please ask at office or if scheduling via [employer], send a message by clicking on Secure Messaging, Message Your Care Team.            Apr 07, 2017 11:30 AM   Return Visit with Hosea King MD   [facility] ([facility])    [facility]  [address]  [facility] [place] [address]   [phone]              Who to contact     If you have questions or need follow up information about today's clinic visit or your schedule please contact [facility] directly  "at [phone].  Normal or non-critical lab and imaging results will be communicated to you by [employer], letter or phone within 4 business days after the clinic has received the results. If you do not hear from us within 7 days, please contact the clinic through [employer] or phone. If you have a critical or abnormal lab result, we will notify you by phone as soon as possible.  Submit refill requests through [employer] or call your pharmacy and they will forward the refill request to us. Please allow 3 business days for your refill to be completed.          Additional Information About Your Visit        [employer] Information     [employer] lets you send messages to your doctor, view your test results, renew your prescriptions, schedule appointments and more. To sign up, go to [email or web address] . Click on \"Log in\" on the left side of the screen, which will take you to the Welcome page. Then click on \"Sign up Now\" on the right side of the page.     You will be asked to enter the access code listed below, as well as some personal information. Please follow the directions to create your username and password.     Your access code is: [email or web address]  Expires: 2017 12:55 PM     Your access code will  in 90 days. If you need help or a new code, please call your [facility] clinic or [phone].        Care EveryWhere ID     This is your Care EveryWhere ID. This could be used by other organizations to access your [facility] medical records  [id number]        Your Vitals Were     Pulse Temperature Respirations Height BMI (Body Mass Index) Pulse Oximetry    72 98.5  F (36.9  C) (Oral) 20 5' 7.25\" (1.708 m) 40.08 kg/m2 94%       Blood Pressure from Last 3 Encounters:   17 140/76   17 207/108   17 140/93    Weight from Last 3 Encounters:   17 257 lb 12.8 oz (116.937 kg)   17 257 lb 3.2 oz (116.665 kg)   16 259 lb 9.6 oz (117.754 kg)              We Performed the Following     Basic " metabolic panel     Hemoglobin     ORTHO [facility] REFERRAL        Primary Care Provider Office Phone # Fax #    Bernice Howard [phone]       [facility] [address] [place] [address]        Thank you!     Thank you for choosing [facility]  for your care. Our goal is always to provide you with excellent care. Hearing back from our patients is one way we can continue to improve our services. Please take a few minutes to complete the written survey that you may receive in the mail after your visit with us. Thank you!             Your Updated Medication List - Protect others around you: Learn how to safely use, store and throw away your medicines at www.disposemymeds.org.          This list is accurate as of: 2/1/17 12:55 PM.  Always use your most recent med list.                   Brand Name Dispense Instructions for use    albuterol 108 (90 BASE) MCG/ACT Inhaler    PROAIR HFA/PROVENTIL HFA/VENTOLIN HFA    1 Inhaler    Inhale 2 puffs into the lungs every 6 hours as needed for shortness of breath / dyspnea or wheezing       exemestane 25 MG tablet    AROMASIN    90 tablet    Take 1 tablet (25 mg) by mouth daily       gabapentin 600 MG tablet    NEURONTIN    180 tablet    Take 1 tablet (600 mg) by mouth 3 times daily       hydrochlorothiazide 25 MG tablet    HYDRODIURIL    90 tablet    Take 1 tablet (25 mg) by mouth daily       HYDROcodone-acetaminophen 5-325 MG per tablet    NORCO    30 tablet    Take 1-2 tablets by mouth every 4 hours as needed for other (Moderate to Severe Pain)       lisinopril 10 MG tablet    PRINIVIL/ZESTRIL    90 tablet    Take 1 tablet (10 mg) by mouth daily       Vitamin D-3 1000 UNITS Caps      Take 1 capsule by mouth       VITAMIN E NATURAL PO      Take 100 Units by mouth daily

## 2017-02-02 ENCOUNTER — ANESTHESIA (OUTPATIENT)
Dept: SURGERY | Facility: CLINIC | Age: 67
End: 2017-02-02
Payer: MEDICARE

## 2017-02-02 ENCOUNTER — HOSPITAL ENCOUNTER (OUTPATIENT)
Facility: CLINIC | Age: 67
Discharge: HOME | End: 2017-02-02
Attending: UROLOGY | Admitting: UROLOGY
Payer: MEDICARE

## 2017-02-02 ENCOUNTER — APPOINTMENT (OUTPATIENT)
Dept: GENERAL RADIOLOGY | Facility: CLINIC | Age: 67
End: 2017-02-02
Attending: UROLOGY
Payer: MEDICARE

## 2017-02-02 VITALS
HEART RATE: 77 BPM | HEIGHT: 68 IN | DIASTOLIC BLOOD PRESSURE: 78 MMHG | RESPIRATION RATE: 16 BRPM | BODY MASS INDEX: 39.25 KG/M2 | OXYGEN SATURATION: 96 % | WEIGHT: 259 LBS | SYSTOLIC BLOOD PRESSURE: 122 MMHG | TEMPERATURE: 98.6 F

## 2017-02-02 DIAGNOSIS — N13.39 OTHER HYDRONEPHROSIS: Primary | ICD-10-CM

## 2017-02-02 LAB
ANION GAP SERPL CALCULATED.3IONS-SCNC: 5 MMOL/L (ref 3–14)
BUN SERPL-MCNC: 28 MG/DL (ref 7–30)
CALCIUM SERPL-MCNC: 9.8 MG/DL (ref 8.5–10.1)
CHLORIDE SERPL-SCNC: 104 MMOL/L (ref 94–109)
CO2 SERPL-SCNC: 35 MMOL/L (ref 20–32)
CREAT SERPL-MCNC: 1.81 MG/DL (ref 0.52–1.04)
GFR SERPL CREATININE-BSD FRML MDRD: 28 ML/MIN/1.7M2
GLUCOSE SERPL-MCNC: 84 MG/DL (ref 70–99)
POTASSIUM SERPL-SCNC: 4.6 MMOL/L (ref 3.4–5.3)
SODIUM SERPL-SCNC: 144 MMOL/L (ref 133–144)

## 2017-02-02 PROCEDURE — 88360 TUMOR IMMUNOHISTOCHEM/MANUAL: CPT | Performed by: UROLOGY

## 2017-02-02 PROCEDURE — 87086 URINE CULTURE/COLONY COUNT: CPT | Performed by: UROLOGY

## 2017-02-02 PROCEDURE — 88342 IMHCHEM/IMCYTCHM 1ST ANTB: CPT | Mod: 26 | Performed by: UROLOGY

## 2017-02-02 PROCEDURE — 37000008 ZZH ANESTHESIA TECHNICAL FEE, 1ST 30 MIN: Performed by: UROLOGY

## 2017-02-02 PROCEDURE — 25000564 ZZH DESFLURANE, EA 15 MIN: Performed by: UROLOGY

## 2017-02-02 PROCEDURE — 88360 TUMOR IMMUNOHISTOCHEM/MANUAL: CPT | Mod: 26 | Performed by: UROLOGY

## 2017-02-02 PROCEDURE — 36000058 ZZH SURGERY LEVEL 3 EA 15 ADDTL MIN: Performed by: UROLOGY

## 2017-02-02 PROCEDURE — 88341 IMHCHEM/IMCYTCHM EA ADD ANTB: CPT | Performed by: UROLOGY

## 2017-02-02 PROCEDURE — 40000277 XR SURGERY CARM FLUORO LESS THAN 5 MIN W STILLS: Mod: TC

## 2017-02-02 PROCEDURE — 88305 TISSUE EXAM BY PATHOLOGIST: CPT | Performed by: UROLOGY

## 2017-02-02 PROCEDURE — C1769 GUIDE WIRE: HCPCS | Performed by: UROLOGY

## 2017-02-02 PROCEDURE — 40000306 ZZH STATISTIC PRE PROC ASSESS II: Performed by: UROLOGY

## 2017-02-02 PROCEDURE — 25800025 ZZH RX 258: Performed by: NURSE ANESTHETIST, CERTIFIED REGISTERED

## 2017-02-02 PROCEDURE — 36000060 ZZH SURGERY LEVEL 3 W FLUORO 1ST 30 MIN: Performed by: UROLOGY

## 2017-02-02 PROCEDURE — 25000128 H RX IP 250 OP 636: Performed by: UROLOGY

## 2017-02-02 PROCEDURE — 88305 TISSUE EXAM BY PATHOLOGIST: CPT | Mod: 26 | Performed by: UROLOGY

## 2017-02-02 PROCEDURE — 25500064 ZZH RX 255 OP 636: Performed by: UROLOGY

## 2017-02-02 PROCEDURE — 88342 IMHCHEM/IMCYTCHM 1ST ANTB: CPT | Performed by: UROLOGY

## 2017-02-02 PROCEDURE — 71000014 ZZH RECOVERY PHASE 1 LEVEL 2 FIRST HR: Performed by: UROLOGY

## 2017-02-02 PROCEDURE — 88112 CYTOPATH CELL ENHANCE TECH: CPT | Mod: 26 | Performed by: UROLOGY

## 2017-02-02 PROCEDURE — 88112 CYTOPATH CELL ENHANCE TECH: CPT | Performed by: UROLOGY

## 2017-02-02 PROCEDURE — 25000125 ZZHC RX 250: Performed by: NURSE ANESTHETIST, CERTIFIED REGISTERED

## 2017-02-02 PROCEDURE — 37000009 ZZH ANESTHESIA TECHNICAL FEE, EACH ADDTL 15 MIN: Performed by: UROLOGY

## 2017-02-02 PROCEDURE — 71000027 ZZH RECOVERY PHASE 2 EACH 15 MINS: Performed by: UROLOGY

## 2017-02-02 PROCEDURE — 25800025 ZZH RX 258: Performed by: UROLOGY

## 2017-02-02 PROCEDURE — 27210794 ZZH OR GENERAL SUPPLY STERILE: Performed by: UROLOGY

## 2017-02-02 PROCEDURE — 88341 IMHCHEM/IMCYTCHM EA ADD ANTB: CPT | Mod: 26 | Performed by: UROLOGY

## 2017-02-02 PROCEDURE — 25000128 H RX IP 250 OP 636: Performed by: NURSE ANESTHETIST, CERTIFIED REGISTERED

## 2017-02-02 PROCEDURE — C2617 STENT, NON-COR, TEM W/O DEL: HCPCS | Performed by: UROLOGY

## 2017-02-02 DEVICE — STENT URETERAL DBL PIGTAIL INLAY 6FRX28CM 778628
Type: IMPLANTABLE DEVICE | Site: URETER | Status: NON-FUNCTIONAL
Removed: 2017-09-07

## 2017-02-02 RX ORDER — FENTANYL CITRATE 50 UG/ML
INJECTION, SOLUTION INTRAMUSCULAR; INTRAVENOUS PRN
Status: DISCONTINUED | OUTPATIENT
Start: 2017-02-02 | End: 2017-02-02

## 2017-02-02 RX ORDER — SODIUM CHLORIDE, SODIUM LACTATE, POTASSIUM CHLORIDE, CALCIUM CHLORIDE 600; 310; 30; 20 MG/100ML; MG/100ML; MG/100ML; MG/100ML
INJECTION, SOLUTION INTRAVENOUS CONTINUOUS
Status: DISCONTINUED | OUTPATIENT
Start: 2017-02-02 | End: 2017-02-02 | Stop reason: HOSPADM

## 2017-02-02 RX ORDER — IOPAMIDOL 612 MG/ML
INJECTION, SOLUTION INTRAVASCULAR PRN
Status: DISCONTINUED | OUTPATIENT
Start: 2017-02-02 | End: 2017-02-02 | Stop reason: HOSPADM

## 2017-02-02 RX ORDER — OXYCODONE HYDROCHLORIDE 5 MG/1
5 TABLET ORAL EVERY 6 HOURS PRN
Qty: 20 TABLET | Refills: 0 | Status: SHIPPED | OUTPATIENT
Start: 2017-02-02 | End: 2017-07-19

## 2017-02-02 RX ORDER — GLYCOPYRROLATE 0.2 MG/ML
INJECTION, SOLUTION INTRAMUSCULAR; INTRAVENOUS PRN
Status: DISCONTINUED | OUTPATIENT
Start: 2017-02-02 | End: 2017-02-02

## 2017-02-02 RX ORDER — MEPERIDINE HYDROCHLORIDE 25 MG/ML
12.5 INJECTION INTRAMUSCULAR; INTRAVENOUS; SUBCUTANEOUS
Status: DISCONTINUED | OUTPATIENT
Start: 2017-02-02 | End: 2017-02-02 | Stop reason: HOSPADM

## 2017-02-02 RX ORDER — FENTANYL CITRATE 50 UG/ML
25-50 INJECTION, SOLUTION INTRAMUSCULAR; INTRAVENOUS
Status: DISCONTINUED | OUTPATIENT
Start: 2017-02-02 | End: 2017-02-02 | Stop reason: HOSPADM

## 2017-02-02 RX ORDER — ALBUTEROL SULFATE 0.83 MG/ML
2.5 SOLUTION RESPIRATORY (INHALATION)
Status: DISCONTINUED | OUTPATIENT
Start: 2017-02-02 | End: 2017-02-02 | Stop reason: HOSPADM

## 2017-02-02 RX ORDER — CEFAZOLIN SODIUM 2 G/100ML
2 INJECTION, SOLUTION INTRAVENOUS
Status: COMPLETED | OUTPATIENT
Start: 2017-02-02 | End: 2017-02-02

## 2017-02-02 RX ORDER — ONDANSETRON 4 MG/1
4 TABLET, ORALLY DISINTEGRATING ORAL EVERY 30 MIN PRN
Status: DISCONTINUED | OUTPATIENT
Start: 2017-02-02 | End: 2017-02-02 | Stop reason: HOSPADM

## 2017-02-02 RX ORDER — PROPOFOL 10 MG/ML
INJECTION, EMULSION INTRAVENOUS PRN
Status: DISCONTINUED | OUTPATIENT
Start: 2017-02-02 | End: 2017-02-02

## 2017-02-02 RX ORDER — CEFAZOLIN SODIUM 1 G/3ML
1 INJECTION, POWDER, FOR SOLUTION INTRAMUSCULAR; INTRAVENOUS SEE ADMIN INSTRUCTIONS
Status: DISCONTINUED | OUTPATIENT
Start: 2017-02-02 | End: 2017-02-02 | Stop reason: HOSPADM

## 2017-02-02 RX ORDER — ONDANSETRON 2 MG/ML
4 INJECTION INTRAMUSCULAR; INTRAVENOUS EVERY 30 MIN PRN
Status: DISCONTINUED | OUTPATIENT
Start: 2017-02-02 | End: 2017-02-02 | Stop reason: HOSPADM

## 2017-02-02 RX ORDER — LIDOCAINE 40 MG/G
CREAM TOPICAL
Status: DISCONTINUED | OUTPATIENT
Start: 2017-02-02 | End: 2017-02-02 | Stop reason: HOSPADM

## 2017-02-02 RX ORDER — ONDANSETRON 2 MG/ML
INJECTION INTRAMUSCULAR; INTRAVENOUS PRN
Status: DISCONTINUED | OUTPATIENT
Start: 2017-02-02 | End: 2017-02-02

## 2017-02-02 RX ORDER — HYDROMORPHONE HYDROCHLORIDE 1 MG/ML
.3-.5 INJECTION, SOLUTION INTRAMUSCULAR; INTRAVENOUS; SUBCUTANEOUS EVERY 10 MIN PRN
Status: DISCONTINUED | OUTPATIENT
Start: 2017-02-02 | End: 2017-02-02 | Stop reason: HOSPADM

## 2017-02-02 RX ORDER — OXYBUTYNIN CHLORIDE 5 MG/1
5 TABLET ORAL 2 TIMES DAILY PRN
Qty: 20 TABLET | Refills: 0 | Status: SHIPPED | OUTPATIENT
Start: 2017-02-02 | End: 2017-07-19

## 2017-02-02 RX ORDER — SODIUM CHLORIDE, SODIUM LACTATE, POTASSIUM CHLORIDE, CALCIUM CHLORIDE 600; 310; 30; 20 MG/100ML; MG/100ML; MG/100ML; MG/100ML
1000 INJECTION, SOLUTION INTRAVENOUS CONTINUOUS
Status: DISCONTINUED | OUTPATIENT
Start: 2017-02-02 | End: 2017-02-02 | Stop reason: HOSPADM

## 2017-02-02 RX ORDER — NALOXONE HYDROCHLORIDE 0.4 MG/ML
.1-.4 INJECTION, SOLUTION INTRAMUSCULAR; INTRAVENOUS; SUBCUTANEOUS
Status: DISCONTINUED | OUTPATIENT
Start: 2017-02-02 | End: 2017-02-02 | Stop reason: HOSPADM

## 2017-02-02 RX ADMIN — PROPOFOL 150 MG: 10 INJECTION, EMULSION INTRAVENOUS at 15:24

## 2017-02-02 RX ADMIN — LIDOCAINE HYDROCHLORIDE 50 MG: 10 INJECTION, SOLUTION INFILTRATION; PERINEURAL at 15:24

## 2017-02-02 RX ADMIN — PHENYLEPHRINE HYDROCHLORIDE 100 MCG: 10 INJECTION, SOLUTION INTRAMUSCULAR; INTRAVENOUS; SUBCUTANEOUS at 15:39

## 2017-02-02 RX ADMIN — ROCURONIUM BROMIDE 25 MG: 10 INJECTION INTRAVENOUS at 15:24

## 2017-02-02 RX ADMIN — CEFAZOLIN SODIUM 2 G: 2 INJECTION, SOLUTION INTRAVENOUS at 15:17

## 2017-02-02 RX ADMIN — ONDANSETRON 4 MG: 2 INJECTION INTRAMUSCULAR; INTRAVENOUS at 15:24

## 2017-02-02 RX ADMIN — PHENYLEPHRINE HYDROCHLORIDE 100 MCG: 10 INJECTION, SOLUTION INTRAMUSCULAR; INTRAVENOUS; SUBCUTANEOUS at 15:50

## 2017-02-02 RX ADMIN — MIDAZOLAM HYDROCHLORIDE 2 MG: 1 INJECTION, SOLUTION INTRAMUSCULAR; INTRAVENOUS at 15:20

## 2017-02-02 RX ADMIN — MIDAZOLAM HYDROCHLORIDE 2 MG: 1 INJECTION, SOLUTION INTRAMUSCULAR; INTRAVENOUS at 15:17

## 2017-02-02 RX ADMIN — PHENYLEPHRINE HYDROCHLORIDE 100 MCG: 10 INJECTION, SOLUTION INTRAMUSCULAR; INTRAVENOUS; SUBCUTANEOUS at 15:52

## 2017-02-02 RX ADMIN — SODIUM CHLORIDE, POTASSIUM CHLORIDE, SODIUM LACTATE AND CALCIUM CHLORIDE: 600; 310; 30; 20 INJECTION, SOLUTION INTRAVENOUS at 15:00

## 2017-02-02 RX ADMIN — SODIUM CHLORIDE, POTASSIUM CHLORIDE, SODIUM LACTATE AND CALCIUM CHLORIDE 1000 ML: 600; 310; 30; 20 INJECTION, SOLUTION INTRAVENOUS at 14:24

## 2017-02-02 RX ADMIN — MIDAZOLAM HYDROCHLORIDE 1 MG: 1 INJECTION, SOLUTION INTRAMUSCULAR; INTRAVENOUS at 15:24

## 2017-02-02 RX ADMIN — LIDOCAINE HYDROCHLORIDE 1 ML: 10 INJECTION, SOLUTION INFILTRATION; PERINEURAL at 14:24

## 2017-02-02 RX ADMIN — FENTANYL CITRATE 100 MCG: 50 INJECTION, SOLUTION INTRAMUSCULAR; INTRAVENOUS at 16:13

## 2017-02-02 RX ADMIN — GLYCOPYRROLATE 0.2 MG: 0.2 INJECTION, SOLUTION INTRAMUSCULAR; INTRAVENOUS at 15:24

## 2017-02-02 RX ADMIN — FENTANYL CITRATE 100 MCG: 50 INJECTION, SOLUTION INTRAMUSCULAR; INTRAVENOUS at 15:24

## 2017-02-02 ASSESSMENT — LIFESTYLE VARIABLES: TOBACCO_USE: 1

## 2017-02-02 NOTE — BRIEF OP NOTE
Pre-op Dx: left hydronephrosis  Post-op Dx:same  Procedure:cystoscopy, left retrograde pyelogram, ureteral biopsy, stent placement  EBL:1 cc  Specimens: left ureteral biopsy, left ureteral washing, urine culture  Drains: left 6 x 28 JJ stent  No complications.

## 2017-02-02 NOTE — IP AVS SNAPSHOT
MRN:[id number]                      After Visit Summary   2/2/2017    Marissa Guadarrama    MRN: [id number]           Thank you!     Thank you for choosing [facility] for your care. Our goal is always to provide you with excellent care. Hearing back from our patients is one way we can continue to improve our services. Please take a few minutes to complete the written survey that you may receive in the mail after you visit with us. Thank you!        Patient Information     Date Of Birth          1950        About your hospital stay     You were admitted on:  February 2, 2017 You last received care in the:  [facility] PreOP/Phase II    You were discharged on:  February 2, 2017       Who to Call     For medical emergencies, please call 911.  For non-urgent questions about your medical care, please call your primary care provider or clinic, [phone]  For questions related to your surgery, please call your surgery clinic        Attending Provider     Provider    Zhao La MD       Primary Care Provider Office Phone # Fax #    Bernice Howard [phone]       [facility] [address] [address]        Your next 10 appointments already scheduled     Apr 01, 2017 10:00 AM   LAB with [facility] ([facility])    [address] [address] [phone]           Patient must bring picture ID.  Patient should be prepared to give a urine specimen  Please do not eat 10-12 hours before your appointment if you are coming in fasting for labs on lipids, cholesterol, or glucose (sugar).  Pregnant women should follow their Care Team instructions. Water with medications is okay. Do not drink coffee or other fluids.   If you have concerns about taking  your medications, please ask at office or if scheduling via [employer], send a message by clicking on Secure Messaging, Message  Your Care Team.            Apr 07, 2017 11:30 AM   Return Visit with Hosea King MD   [facility] ([facility])    [facility]  [address]  [facility] [address] [phone]              Further instructions from your care team                           Same Day Surgery Discharge Instructions  Special Precautions After Surgery - Adult    1. It is not unusual to feel lightheaded or faint, up to 24 hours after surgery or while taking pain medication.  If you have these symptoms; sit for a few minutes before standing and have someone assist you when getting up.  2. You should rest and relax for the next 24 hours and must have someone stay with you for at least 24 hours after your discharge.  3. DO NOT DRIVE any vehicle or operate mechanical equipment for 24 hours following the end of your surgery.  DO NOT DRIVE while taking narcotic pain medications that have been prescribed by your physician.  If you had a limb operated on, you must be able to use it fully to drive.  4. DO NOT drink alcoholic beverages for 24 hours following surgery or while taking prescription pain medication.  5. Drink clear liquids (apple juice, ginger ale, broth, 7-Up, etc.).  Progress to your regular diet as you feel able.  6. Any questions call your physician and do not make important decisions for 24 hours.    ACTIVITY  ? Rest today.  No activity or diet restrictions.  ? Resume activity as tolerated.     INCISIONAL CARE  ? May bathe / shower after 24 hours.  ? Keep incision dry for 24 hours.  ? Be alert for signs of infection:  redness, swelling, heat, drainage of pus, and/or elevated temperature.  Contact your doctor if these occur.        Call for an appointment to return to the clinic with dr haas in the specialty clinic next week.     Medications:  ? oxybutinin as directed  ? Roxicodone as needed for pain. Stay ahead of the pain and take as needed especially at bedtime.  "  ? Follow the instructions on the bottle.     Additional discharge instructions: call with any questions and or concerns  __________________________________________________________________________________________________________________________________  IMPORTANT NUMBERS:    [facility] [place]:  [phone], [phone]  Pharmacy:  [phone]  Same Day Surgery:  [phone], Monday - Friday until 8:30 p.m.  Urgent Care:  [phone]  Emergency Room:  [phone]      [facility]:  [phone]                                                                             [facility]:  [phone] option 1  [facility]:  [phone]     [facility] Clinic:  [phone]   Surgery Specialty Clinic:  [phone]   Home Medical Equipment: [phone]  [facility]:  [phone]      Notify physician if fever/chills/sweats.  You may see blood in the urine while the stent is in place.  Follow up next week in urology clinic for stent removal.    Pending Results     Date and Time Order Name Status Description    2/2/2017 1604 Urine Culture Aerobic Bacterial In process             Admission Information        Provider Department Dept Phone    2/2/2017 Zhao La MD [facility] Preop/Phase [phone]      Your Vitals Were     Blood Pressure Pulse Temperature    124/79 mmHg 77 98.6  F (37  C) (Oral)    Respirations Height Weight    16 1.727 m (5' 8\") 117.482 kg (259 lb)    BMI (Body Mass Index) Pulse Oximetry       39.39 kg/m2 97%       MyChart Information     MyChart lets you send messages to your doctor, view your test results, renew your prescriptions, schedule appointments and more. To sign up, go to [email or web address] . Click on \"Log in\" on the left side of the screen, which will take you to the Welcome page. Then click on \"Sign up Now\" on the right side of the page.     You will be asked to enter the access code listed below, as well as some personal " information. Please follow the directions to create your username and password.     Your access code is: [email or web address]  Expires: 2017 12:55 PM     Your access code will  in 90 days. If you need help or a new code, please call your [facility] clinic or [phone].        Care EveryWhere ID     This is your Care EveryWhere ID. This could be used by other organizations to access your [facility] medical records  [id number]           Review of your medicines      START taking        Dose / Directions    oxybutynin 5 MG tablet   Commonly known as:  DITROPAN   Used for:  Other hydronephrosis        Dose:  5 mg   Take 1 tablet (5 mg) by mouth 2 times daily as needed for bladder spasms   Quantity:  20 tablet   Refills:  0       oxyCODONE 5 MG IR tablet   Commonly known as:  ROXICODONE   Used for:  Other hydronephrosis        Dose:  5 mg   Take 1 tablet (5 mg) by mouth every 6 hours as needed for pain maximum 4 tablet(s) per day   Quantity:  20 tablet   Refills:  0         CONTINUE these medicines which have NOT CHANGED        Dose / Directions    albuterol 108 (90 BASE) MCG/ACT Inhaler   Commonly known as:  PROAIR HFA/PROVENTIL HFA/VENTOLIN HFA   Used for:  SOB (shortness of breath)        Dose:  2 puff   Inhale 2 puffs into the lungs every 6 hours as needed for shortness of breath / dyspnea or wheezing   Quantity:  1 Inhaler   Refills:  1       exemestane 25 MG tablet   Commonly known as:  AROMASIN   Used for:  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)        Dose:  25 mg   Take 1 tablet (25 mg) by mouth daily   Quantity:  90 tablet   Refills:  1       gabapentin 600 MG tablet   Commonly known as:  NEURONTIN   Used for:  Trigeminal neuralgia        Dose:  600 mg   Take 1 tablet (600 mg) by mouth 3 times daily   Quantity:  180 tablet   Refills:  6       hydrochlorothiazide 25 MG tablet   Commonly known as:  HYDRODIURIL   Used for:  Essential hypertension with goal blood pressure  less than 140/90        Dose:  25 mg   Take 1 tablet (25 mg) by mouth daily   Quantity:  90 tablet   Refills:  3       HYDROcodone-acetaminophen 5-325 MG per tablet   Commonly known as:  NORCO   Used for:  Osteoarthritis of right knee, unspecified osteoarthritis type        Dose:  1-2 tablet   Take 1-2 tablets by mouth every 4 hours as needed for other (Moderate to Severe Pain)   Quantity:  30 tablet   Refills:  0       lisinopril 10 MG tablet   Commonly known as:  PRINIVIL/ZESTRIL   Used for:  Essential hypertension with goal blood pressure less than 140/90        Dose:  10 mg   Take 1 tablet (10 mg) by mouth daily   Quantity:  90 tablet   Refills:  1       Vitamin D-3 1000 UNITS Caps        Dose:  1 capsule   Take 1 capsule by mouth   Refills:  0       VITAMIN E NATURAL PO        Dose:  100 Units   Take 100 Units by mouth daily   Refills:  0            Where to get your medicines      These medications were sent to [facility] [place], [place] - [address]  [address] [address]     Phone:  [phone]    - oxybutynin 5 MG tablet      Some of these will need a paper prescription and others can be bought over the counter. Ask your nurse if you have questions.     Bring a paper prescription for each of these medications    - oxyCODONE 5 MG IR tablet             Protect others around you: Learn how to safely use, store and throw away your medicines at www.disposemymeds.org.             Medication List: This is a list of all your medications and when to take them. Check marks below indicate your daily home schedule. Keep this list as a reference.      Medications           Morning Afternoon Evening Bedtime As Needed    albuterol 108 (90 BASE) MCG/ACT Inhaler   Commonly known as:  PROAIR HFA/PROVENTIL HFA/VENTOLIN HFA   Inhale 2 puffs into the lungs every 6 hours as needed for shortness of breath / dyspnea or wheezing                                exemestane 25 MG tablet   Commonly  known as:  AROMASIN   Take 1 tablet (25 mg) by mouth daily                                gabapentin 600 MG tablet   Commonly known as:  NEURONTIN   Take 1 tablet (600 mg) by mouth 3 times daily                                hydrochlorothiazide 25 MG tablet   Commonly known as:  HYDRODIURIL   Take 1 tablet (25 mg) by mouth daily                                HYDROcodone-acetaminophen 5-325 MG per tablet   Commonly known as:  NORCO   Take 1-2 tablets by mouth every 4 hours as needed for other (Moderate to Severe Pain)                                lisinopril 10 MG tablet   Commonly known as:  PRINIVIL/ZESTRIL   Take 1 tablet (10 mg) by mouth daily                                oxybutynin 5 MG tablet   Commonly known as:  DITROPAN   Take 1 tablet (5 mg) by mouth 2 times daily as needed for bladder spasms                                oxyCODONE 5 MG IR tablet   Commonly known as:  ROXICODONE   Take 1 tablet (5 mg) by mouth every 6 hours as needed for pain maximum 4 tablet(s) per day                                Vitamin D-3 1000 UNITS Caps   Take 1 capsule by mouth                                VITAMIN E NATURAL PO   Take 100 Units by mouth daily

## 2017-02-02 NOTE — IP AVS SNAPSHOT
[facility] PreOP/Phase II    [address] [address]    Phone:  [phone]    Fax:  [phone]                                       After Visit Summary   2/2/2017    Marissa Guadarrama    MRN: [id number]           After Visit Summary Signature Page     I have received my discharge instructions, and my questions have been answered. I have discussed any challenges I see with this plan with the nurse or doctor.    ..........................................................................................................................................  Patient/Patient Representative Signature      ..........................................................................................................................................  Patient Representative Print Name and Relationship to Patient    ..................................................               ................................................  Date                                            Time    ..........................................................................................................................................  Reviewed by Signature/Title    ...................................................              ..............................................  Date                                                            Time

## 2017-02-02 NOTE — OR NURSING
To sds. Drowsy. C/o need to urinate. Voids on bedpan and and light cherry urine was voided. mauro well. Vss. Doing well. No pain.

## 2017-02-02 NOTE — ANESTHESIA POSTPROCEDURE EVALUATION
Patient: Marissa Guadarrama    Procedure(s):  Cystoscopy & Left Retrograde Pyelogram & Left Ureteroscopy & Left Ureteral Stent Placement & Left Ureteral Biopsy - Wound Class: II-Clean Contaminated    Diagnosis:left hydronephrosis  Diagnosis Additional Information: No value filed.    Anesthesia Type:  General, ETT    Note:  Anesthesia Post Evaluation    Patient location during evaluation: Phase 2  Patient participation: Able to fully participate in evaluation  Level of consciousness: awake  Pain management: adequate  Airway patency: patent  Cardiovascular status: acceptable  Respiratory status: acceptable  Hydration status: stable  PONV: none     Anesthetic complications: None          Last vitals:  Filed Vitals:    02/02/17 1346 02/02/17 1722 02/02/17 1736   BP: 144/73 127/70 93/71   Pulse: 77     Temp: 37.2  C (99  F) 36  C (96.8  F)    Resp: 18 16 16   SpO2: 95% 96% 96%         Electronically Signed By: KAREN Zimmerman CRNA  February 2, 2017  5:39 PM

## 2017-02-02 NOTE — ANESTHESIA CARE TRANSFER NOTE
Patient: Marissa Guadarrama    Procedure(s):  Cystoscopy & Left Retrograde Pyelogram & Left Ureteroscopy & Left Ureteral Stent Placement & Left Ureteral Biopsy - Wound Class: II-Clean Contaminated    Diagnosis: left hydronephrosis  Diagnosis Additional Information: No value filed.    Anesthesia Type:   General, ETT     Note:  Airway :Room Air  Patient transferred to:PACU        Vitals: (Last set prior to Anesthesia Care Transfer)    CRNA VITALS  2/2/2017 1645 - 2/2/2017 1730      2/2/2017             Pulse: 83    SpO2: 99 %    Resp Rate (observed): (!) 5                Electronically Signed By: KAREN Zimmerman CRNA  February 2, 2017  5:30 PM

## 2017-02-02 NOTE — ANESTHESIA PREPROCEDURE EVALUATION
Anesthesia Evaluation     . Pt has had prior anesthetic. Type: General and MAC      ROS/MED HX    ENT/Pulmonary:     (+)tobacco use, Past use , . .    Neurologic:     (+)other neuro trigeminal neuralgia    Cardiovascular:     (+) Dyslipidemia, hypertension----. : . . . :. . Previous cardiac testing Echodate:results:Interpretation Summary                    [facility], Echocardiography Laboratory  [address] [place] [id number]        Name: ROSALINDA LANCASTER  MRN: [id number]  : 1950  Study Date: 2016 04:03 PM  Age: 66 yrs  Gender: Female  Patient Location: [facility]  Reason For Study: Shortness of breath  Ordering Physician: SARA COOPER  Referring Physician: SARA COOPER  Performed By: Davidson Finn RDCS     BSA: 2.3 m2  Height: 67 in  Weight: 260 lb  HR: 75  BP: 148/72 mmHg  ______________________________________________________________________________        Procedure  Echocardiogram with two-dimensional, color and spectral Doppler performed.  ______________________________________________________________________________     Interpretation Summary     Global and regional left ventricular function is normal with an EF of 60-65%.  Global right ventricular function is normal.  No significant valvular dysfunction present.  Pulmonary artery systolic pressure is normal.  The inferior vena cava was normal in size with preserved respiratory  variability.  No pericardial effusion is present.  ______________________________________________________________________________           Left Ventricle  Left ventricular size is normal. Left ventricular wall thickness is normal.  Global and regional left ventricular function is normal with an EF of 60-65%.  Normal left ventricular filling for age.     Right Ventricle  The right ventricle is normal size. Global right ventricular function is  normal.  Atria  Both atria appear normal.     Mitral Valve  The mitral valve is normal.  Trace to mild mitral insufficiency is present.     Aortic Valve  Aortic valve is normal in structure and function.     Tricuspid Valve  The tricuspid valve is normal. Mild tricuspid insufficiency is present.  Estimated pulmonary artery systolic pressure is 32 mmHg plus right atrial  pressure. Pulmonary artery systolic pressure is normal.     Pulmonic Valve  The pulmonic valve is normal. Trace to mild pulmonic insufficiency is  present.     Vessels  The aorta root is normal. The inferior vena cava was normal in size with  preserved respiratory variability.  Pericardium  No pericardial effusion is present.     ______________________________________________________________________________  MMode/2D Measurements & Calculations  IVSd: 0.62 cm  LVIDd: 5.0 cm  LVIDs: 2.6 cm  LVPWd: 0.63 cm  FS: 48.6 %  EDV(Teich): 118.5 ml  ESV(Teich): 24.0 ml  LV mass(C)d: 100.2 grams  Ao root diam: 3.0 cm  LA dimension: 3.9 cm  asc Aorta Diam: 2.9 cm  LA/Ao: 1.3  LVOT diam: 2.1 cm  LVOT area: 3.5 cm2  LA Volume (BP): 64.0 ml     LA Volume Index (BP): 28.3 ml/m2        date: results: date: results: date: results:          METS/Exercise Tolerance:  >4 METS   Hematologic:         Musculoskeletal:   (+) arthritis, , , -       GI/Hepatic:  - neg GI/hepatic ROS       Renal/Genitourinary:     (+) Nephrolithiasis ,       Endo:     (+) Other Endocrine Disorder carcinoid tumor.      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    ASA Status:  3 .    NPO Status:  > 8 hours    Plan for General and ETT  with Intravenous and Propofol induction. Maintenance will be Inhalation.    PONV prophylaxis:  Ondansetron (or other 5HT-3) and Dexamethasone or Solumedrol  Additional equipment: Videolaryngoscope      Postoperative Care  Postoperative pain management:  IV analgesics and Oral pain medications.      Consents  Anesthetic plan, risks, benefits and alternatives discussed with:  Patient..                          .

## 2017-02-03 NOTE — DISCHARGE INSTRUCTIONS
Same Day Surgery Discharge Instructions  Special Precautions After Surgery - Adult    1. It is not unusual to feel lightheaded or faint, up to 24 hours after surgery or while taking pain medication.  If you have these symptoms; sit for a few minutes before standing and have someone assist you when getting up.  2. You should rest and relax for the next 24 hours and must have someone stay with you for at least 24 hours after your discharge.  3. DO NOT DRIVE any vehicle or operate mechanical equipment for 24 hours following the end of your surgery.  DO NOT DRIVE while taking narcotic pain medications that have been prescribed by your physician.  If you had a limb operated on, you must be able to use it fully to drive.  4. DO NOT drink alcoholic beverages for 24 hours following surgery or while taking prescription pain medication.  5. Drink clear liquids (apple juice, ginger ale, broth, 7-Up, etc.).  Progress to your regular diet as you feel able.  6. Any questions call your physician and do not make important decisions for 24 hours.    ACTIVITY  ? Rest today.  No activity or diet restrictions.  ? Resume activity as tolerated.     INCISIONAL CARE  ? May bathe / shower after 24 hours.  ? Keep incision dry for 24 hours.  ? Be alert for signs of infection:  redness, swelling, heat, drainage of pus, and/or elevated temperature.  Contact your doctor if these occur.        Call for an appointment to return to the clinic with dr haas in the specialty clinic next week.     Medications:  ? oxybutinin as directed  ? Roxicodone as needed for pain. Stay ahead of the pain and take as needed especially at bedtime.   ? Follow the instructions on the bottle.     Additional discharge instructions: call with any questions and or concerns  __________________________________________________________________________________________________________________________________  IMPORTANT NUMBERS:    [facility] Main Number:   [phone], [phone]  Pharmacy:  [phone]  Same Day Surgery:  [phone], Monday - Friday until 8:30 p.m.  Urgent Care:  [phone]  Emergency Room:  [phone]      [facility]:  [phone]                                                                             [facility]:  [phone] option 1  [facility]:  [phone]     [facility] Clinic:  [phone]   Surgery Specialty Clinic:  [phone]   Home Medical Equipment: [phone]  [facility]:  [phone]      Notify physician if fever/chills/sweats.  You may see blood in the urine while the stent is in place.  Follow up next week in urology clinic for stent removal.

## 2017-02-03 NOTE — OR NURSING
Sits up to eat and drink . mauro well. Vss. Iv patent. Denies pain. freq urination. Light cherry colored. No clots. Doing well and will dc when ready after she eats. Family in to see pt postop and family and pt talked to dr white.

## 2017-02-03 NOTE — OR NURSING
Up to chair and voids. No clots just light cherry colored. Ready for dc. Pads given for pt. Doing well no further c/o.

## 2017-02-03 NOTE — OP NOTE
DATE OF SERVICE:  02/02/2017.      PREOPERATIVE DIAGNOSIS:  Left hydronephrosis.      POSTOPERATIVE DIAGNOSIS:  Left hydronephrosis.      PROCEDURES:  Cystoscopy, left retrograde pyelogram, left ureteral biopsy and washings, left stent placement, fluoroscopy and interpretation of fluoroscopic images.      INDICATIONS:  Ms. Marissa Guadarrama is a 66-year-old woman found to have chronic hydronephrosis on the left side which is new within the last roughly year.  The etiology of this is unclear.  Therefore, after discussion of risks, benefits and alternatives, the patient presented today for cystoscopy, left retrograde pyelogram, ureteroscopy and stent placement.      DESCRIPTION OF PROCEDURE:  After informed consent was obtained, the patient was brought to the operating room where she was administered general endotracheal tube  anesthesia.  After suitable level of anesthesia was attained, she was placed in lithotomy position with all pressure points padded.  She was administered preoperative antibiotics.  She was prepped and draped in standard sterile fashion.  Next, a 22-Turkish cystoscope was introduced into the patient's bladder without difficulty.  Inspection of the bladder revealed orthotopic UOs effluxing clear urine.  There were no mucosal lesions, stones or foreign objects noted.  Next, a cone-tip catheter was used to perform a retrograde pyelogram on the left.  This demonstrated normal caliber ureter for the distalmost 4-5 cm and then what appeared to be a cup-shaped filling defect noted in the distal ureter at that level.  Proximal to this, there was some hydronephrosis and some tortuosity of the ureter noted.  The renal pelvis was markedly hydronephrotic.  Next, a sensor guidewire was used to intubate the UO and this was advanced past the area of obstruction under fluoroscopic guidance.  This was advanced into the proximal ureter under fluoroscopic guidance.  However, we could not get the wire all the way up  into the renal pelvis due to the tortuosity.  We therefore used a 5-Setswana open-ended catheter to pass it over the wire and this allowed us to easily advance the wire then into the renal pelvis.  After placement of the wire past the obstruction, there was efflux of urine from the obstructed kidney.  We subsequently collected the urine specimen after the kidney had drained and sent this for urine culture.  The urine did not look turbid.  Next, we used a semirigid ureteroscope to perform ureteroscopy up to the level of the previous obstruction.  At this level, we noted what appeared to be very smooth, rounded soft tissue protruding up, filling most of the lumen of the ureter in this location.  The mucosa overlying this mound of tissue was normal in appearance.  There was some suggestion, however, perhaps of a little stricturing of the ureter at the proximal and distal ends of the obstruction.  We were able to manipulate the scope beyond this, however, and the ureter was normal-appearing above this.  We then used ureteral biopsy forceps to try to obtain tissue to send for biopsy.  The biopsy forceps had trouble gaining purchase on the normal-appearing mucosa.  Several extremely small fragments were obtained and these were sent for pathologic analysis.  We also kamla a cytology at the level of the obstruction through the scope.  Subsequently, we then placed a 6 x 28 double-J stent over the wire.  The proximal curl was confirmed by fluoroscopy, distal curl confirmed by fluoroscopy and direct vision.  The patient's bladder was then drained.  She was awakened and brought to recovery room in stable condition.      SURGEON:  Steve La MD      ESTIMATED BLOOD LOSS:  1 mL.      COMPLICATIONS:  There were none immediately noted.      DRAINS:  Include left 6 x 28 double-J stent.      SPECIMENS:  Include left ureteral biopsy and left ureteral washing.         STEVE LA MD             D: 02/02/2017 17:10   T:  2017 09:42   MT: [id number]      Name:     ROSALINDA LANCASTER   MRN:      [id number]        Account:        [id number]   :      1950           Procedure Date: 2017      Document: [id number]       cc: Zhao La MD

## 2017-02-04 LAB
BACTERIA SPEC CULT: NORMAL
MICRO REPORT STATUS: NORMAL
SPECIMEN SOURCE: NORMAL

## 2017-02-07 LAB — COPATH REPORT: NORMAL

## 2017-02-08 ENCOUNTER — TELEPHONE (OUTPATIENT)
Dept: FAMILY MEDICINE | Facility: CLINIC | Age: 67
End: 2017-02-08

## 2017-02-08 NOTE — TELEPHONE ENCOUNTER
Reviewed. She can cut the HCTZ in half for the rest of this week, we can check in on Friday with how she's feeling. We might actually just be able to stop that all together. She's wise to take her time and move slowly, rest, stay hydrated.     Thank you.  Eliazar Wade, CRYSTALS, PA-C

## 2017-02-08 NOTE — TELEPHONE ENCOUNTER
Called patient and provided message below as per Peña Wade PA-C.  Patient verbalized understanding.  Reviewed warning signs and when to seek urgent medical attention.    Will keep encounter open and postpone x 2 days.  Please call on Friday for update on symptoms and blood pressure.    (If continuing on half hctz dose please update med list at that time. Thanks)    Rafael Villanueva RN

## 2017-02-08 NOTE — TELEPHONE ENCOUNTER
Patient calling with low blood pressure concerns.  Post-op day 6 today: s/p Cystoscopy, left retrograde pyelogram, left ureteral biopsy and washings, left stent placement, fluoroscopy and interpretation of fluoroscopic images.   She was advised to check blood pressure following surgery as she was told that the stent could affect her blood pressure.  She is checking 1-2 times daily and has been averaging 110's/70's.  116/72 today    She reports recovering very well from surgery and is seeing urology tomorrow for follow up.  She does report feeling tired. She changes positions slowly as she fells a little lightheaded at times but denies feeling dizzy.    Medication reconciliation completed.  She is taking lisinopril and hydrochlorothiazide as prescribed.  She is not taking pain medication at this time.    Will forward to PCP/covering provider for review and recommendations.    Rafael Villanueva RN

## 2017-02-08 NOTE — TELEPHONE ENCOUNTER
Reason for call:  Patient reporting a symptom    Symptom or request: Blood pressure is low 115/73 average     Duration (how long have symptoms been present): about a week     Have you been treated for this before? Yes    Additional comments: Patient said that she would like a new medication over the phone     Phone Number patient can be reached at:  Cell number on file:    Telephone Information:   Mobile [phone]       Best Time:  anytime    Can we leave a detailed message on this number:  YES    Call taken on 2/8/2017 at 8:39 AM by Karly Carlos

## 2017-02-09 ENCOUNTER — OFFICE VISIT (OUTPATIENT)
Dept: UROLOGY | Facility: CLINIC | Age: 67
End: 2017-02-09
Payer: COMMERCIAL

## 2017-02-09 VITALS — SYSTOLIC BLOOD PRESSURE: 110 MMHG | HEART RATE: 72 BPM | DIASTOLIC BLOOD PRESSURE: 70 MMHG | RESPIRATION RATE: 18 BRPM

## 2017-02-09 DIAGNOSIS — N13.39 OTHER HYDRONEPHROSIS: Primary | ICD-10-CM

## 2017-02-09 PROCEDURE — 99214 OFFICE O/P EST MOD 30 MIN: CPT | Performed by: UROLOGY

## 2017-02-09 NOTE — NURSING NOTE
"Chief Complaint   Patient presents with     Surgical Followup     Cystoscopy & Left Retrograde Pyelogram & Left Ureteroscopy & Left Ureteral Stent Placement & Left Ureteral Biopsy       Initial /70 mmHg  Pulse 72  Resp 18 Estimated body mass index is 39.21 kg/(m^2) as calculated from the following:    Height as of 2/2/17: 5' 8\" (1.727 m).    Weight as of 2/1/17: 257 lb 12.8 oz (116.937 kg).  BP completed using cuff size: garfield Juares CMA     "

## 2017-02-09 NOTE — PROGRESS NOTES
REASON FOR VISIT TODAY:  Follow-up for left-sided hydronephrosis.      HISTORY OF PRESENT ILLNESS:  Ms. Marissa Guadarrama is a 66-year-old woman followed in our clinic for history of a left-sided hydronephrosis of unclear etiology which was new since 2015.  The patient underwent ureteroscopy recently with some biopsies of the mass that was seen filling much of the lumen of the ureter.  This mass was covered by smooth mucosa and did not appear to be a transitional cell carcinoma.  We attempted biopsies of the area but we were only able to get scant amounts of tissue.  We did place a stent on that side.  The patient notes that since her procedure, which was just about a week ago that she has had improved urination and feels in terms of emptying her bladder out more adequately and some of the incontinence she was having has essentially gone away.  The patient is quite pleased with the improvement in her urination.  She is otherwise tolerating the stent without difficulty.      PHYSICAL EXAMINATION:   VITAL SIGNS:  Blood pressure is 110/70, pulse is 72.     GENERAL:  She is in no acute distress.        DIANGOSTICS:  The patient's urine cytology from the OR on 2/2/17 was negative for high-grade urothelial carcinoma.  The patient's pathology report from that date is still pending.      ASSESSMENT AND PLAN:  Over half of today's 25-minute visit was spent counseling the patient regarding her left-sided hydronephrosis and her ureteral mass and voiding.  I suggested to Ms. Guadarrama that she may have some degree of dysfunctional voiding and improvement in her voiding is from the relative stretching of the urethra with relaxation of the pelvic floor muscles from our procedure.  It will be interesting to see how long lived this improvement in her voiding is.  We discussed we can certainly send the patient to physical therapy for pelvic floor relaxation exercises to provide long-term benefit if there patient's urinary symptoms  recur in the short-term.  We will discuss this further after we know what the status of her ureter and kidney are on the left side.      With regard to the patient's pathology report again the pathology report pending when the patient was here in clinic today.  I did speak with the pathologist, Dr. Doherty, who notes that the cells do appear to be somewhat atypical but the scant amount of tissue certainly precludes the definitive diagnosis.  Dr. Doherty is attempting some other immunostains to further characterize the tissue.  We will update Ms. Lancaster on the findings of her pathology report when they become available.      In addition, with the pathology report we will need to see what the function of her left kidney is since it has been obstructed for significant period of time and it appears thin on imaging.  We will plan on performing a renal scan approximately a month after the stent has been in place.  We will want to perform that renal scan with a Cano catheter in place to prevent any reflux urine, or tracer up the stent confounding the measurement of the split function of the kidneys.  Ms. Lancaster understands this and will be sure to advocate for a Cano if she is not offered 1 at the time of the procedure.  We would otherwise then plan on seeing Ms. Lancaster back after her renal scan and after we get the final word on the pathology to make further plans.         STEVE RUVALCABA MD             D: 2017 15:41   T: 2017 15:55   MT: [id number]#[id number]      Name:     ROSALINDA LANCASTER   MRN:      [id number]        Account:      [id number]   :      1950           Visit Date:   2017      Document: [id number]

## 2017-02-09 NOTE — MR AVS SNAPSHOT
After Visit Summary   2/9/2017    Marissa Guadarrama    MRN: [id number]           Patient Information     Date Of Birth          1950        Visit Information        Provider Department      2/9/2017 9:15 AM Zhao La MD [facility]        Care Instructions    Per Physician's instructions          Follow-ups after your visit        Your next 10 appointments already scheduled     Apr 01, 2017 10:00 AM   LAB with [facility] ([facility])    [address]  [facility] [address] [phone]           Patient must bring picture ID.  Patient should be prepared to give a urine specimen  Please do not eat 10-12 hours before your appointment if you are coming in fasting for labs on lipids, cholesterol, or glucose (sugar).  Pregnant women should follow their Care Team instructions. Water with medications is okay. Do not drink coffee or other fluids.   If you have concerns about taking  your medications, please ask at office or if scheduling via Siterra, send a message by clicking on Secure Messaging, Message Your Care Team.            Apr 07, 2017 11:30 AM   Return Visit with Hosea King MD   [facility] ([facility])    [facility]  [address]  [facility] [address] [phone]              Who to contact     If you have questions or need follow up information about today's clinic visit or your schedule please contact [facility] directly at [phone].  Normal or non-critical lab and imaging results will be communicated to you by MyChart, letter or phone within 4 business days after the clinic has received the results. If you do not hear from us within 7 days, please contact the clinic through TonZof[employer] or phone. If you have a critical or abnormal lab result, we will notify you by phone as soon as possible.  Submit refill requests through [employer]  "or call your pharmacy and they will forward the refill request to us. Please allow 3 business days for your refill to be completed.          Additional Information About Your Visit        MyChart Information     [email or web address] lets you send messages to your doctor, view your test results, renew your prescriptions, schedule appointments and more. To sign up, go to [email or web address] . Click on \"Log in\" on the left side of the screen, which will take you to the Welcome page. Then click on \"Sign up Now\" on the right side of the page.     You will be asked to enter the access code listed below, as well as some personal information. Please follow the directions to create your username and password.     Your access code is: [email or web address]  Expires: 2017 12:55 PM     Your access code will  in 90 days. If you need help or a new code, please call your [facility] clinic or [phone].        Care EveryWhere ID     This is your Care EveryWhere ID. This could be used by other organizations to access your [facility] medical records  [id number]        Your Vitals Were     Pulse Respirations                72 18           Blood Pressure from Last 3 Encounters:   17 110/70   17 122/78   17 140/76    Weight from Last 3 Encounters:   17 259 lb (117.482 kg)   17 257 lb 12.8 oz (116.937 kg)   17 257 lb 3.2 oz (116.665 kg)              Today, you had the following     No orders found for display       Primary Care Provider Office Phone # Fax #    Bernice Howard [phone]       [facility] [address] [place] [address]        Thank you!     Thank you for choosing [facility]  for your care. Our goal is always to provide you with excellent care. Hearing back from our patients is one way we can continue to improve our services. Please take a few minutes to complete the written survey that you may receive in the mail after your " visit with us. Thank you!             Your Updated Medication List - Protect others around you: Learn how to safely use, store and throw away your medicines at www.disposemymeds.org.          This list is accurate as of: 2/9/17  9:24 AM.  Always use your most recent med list.                   Brand Name Dispense Instructions for use    albuterol 108 (90 BASE) MCG/ACT Inhaler    PROAIR HFA/PROVENTIL HFA/VENTOLIN HFA    1 Inhaler    Inhale 2 puffs into the lungs every 6 hours as needed for shortness of breath / dyspnea or wheezing       exemestane 25 MG tablet    AROMASIN    90 tablet    Take 1 tablet (25 mg) by mouth daily       gabapentin 600 MG tablet    NEURONTIN    180 tablet    Take 1 tablet (600 mg) by mouth 3 times daily       hydrochlorothiazide 25 MG tablet    HYDRODIURIL    90 tablet    Take 1 tablet (25 mg) by mouth daily       HYDROcodone-acetaminophen 5-325 MG per tablet    NORCO    30 tablet    Take 1-2 tablets by mouth every 4 hours as needed for other (Moderate to Severe Pain)       lisinopril 10 MG tablet    PRINIVIL/ZESTRIL    90 tablet    Take 1 tablet (10 mg) by mouth daily       oxybutynin 5 MG tablet    DITROPAN    20 tablet    Take 1 tablet (5 mg) by mouth 2 times daily as needed for bladder spasms       oxyCODONE 5 MG IR tablet    ROXICODONE    20 tablet    Take 1 tablet (5 mg) by mouth every 6 hours as needed for pain maximum 4 tablet(s) per day       Vitamin D-3 1000 UNITS Caps      Take 1 capsule by mouth       VITAMIN E NATURAL PO      Take 100 Units by mouth daily

## 2017-02-10 LAB — COPATH REPORT: NORMAL

## 2017-02-10 NOTE — TELEPHONE ENCOUNTER
116/70 & is feeling good, she will stay on 1/2 hctz, urologist says she's progressing faster than normal but the pathologist wasn't sure if she has cancer & run some more tests & she's unsure when she will hear back. Needs another US on kidney soon & she will call to schedule.  Marisol Lal RN

## 2017-02-16 ENCOUNTER — TELEPHONE (OUTPATIENT)
Dept: UROLOGY | Facility: CLINIC | Age: 67
End: 2017-02-16

## 2017-02-16 NOTE — TELEPHONE ENCOUNTER
Reason for Call:  Request for results:    Name of test or procedure: biopsy    Date of test of procedure: 2/2    Location of the test or procedure: [place]    OK to leave the result message on voice mail or with a family member? YES    Phone number Patient can be reached at:  Home number on file [phone] (home)    Additional comments: pt calling for her biopsy results form 2/2    Call taken on 2/16/2017 at 11:30 AM by Yaima Agarwal

## 2017-02-23 ENCOUNTER — TELEPHONE (OUTPATIENT)
Dept: FAMILY MEDICINE | Facility: CLINIC | Age: 67
End: 2017-02-23

## 2017-02-23 DIAGNOSIS — I10 ESSENTIAL HYPERTENSION WITH GOAL BLOOD PRESSURE LESS THAN 140/90: ICD-10-CM

## 2017-02-23 NOTE — TELEPHONE ENCOUNTER
Contact patient to follow up on blood pressures since her surgery.  Make sure med rec accurate as last note states hctz 1/2 tablet, please review encounter.  I would like her to have kidney function retested, future order in place. Non fasting.  Any [facility] location.    Bernice Howard MD

## 2017-02-24 DIAGNOSIS — N13.30 HYDRONEPHROSIS, UNSPECIFIED HYDRONEPHROSIS TYPE: ICD-10-CM

## 2017-02-24 PROCEDURE — 36415 COLL VENOUS BLD VENIPUNCTURE: CPT | Performed by: FAMILY MEDICINE

## 2017-02-24 PROCEDURE — 80048 BASIC METABOLIC PNL TOTAL CA: CPT | Performed by: FAMILY MEDICINE

## 2017-02-24 RX ORDER — HYDROCHLOROTHIAZIDE 25 MG/1
12.5 TABLET ORAL EVERY MORNING
COMMUNITY
Start: 2017-02-24 | End: 2018-01-09

## 2017-02-24 NOTE — LETTER
[facility]  [address] [place] [address] [phone]      March 1, 2017      Marissa Guadarrama  [address] [place] [address]              Dear Marissa,    Your kidney function is slightly better than it was a few weeks ago.  It will be important to have a lab and blood pressure check every month to make sure things are stable.   Results for orders placed or performed in visit on 02/24/17   Basic metabolic panel   Result Value Ref Range    Sodium 143 133 - 144 mmol/L    Potassium 3.7 3.4 - 5.3 mmol/L    Chloride 103 94 - 109 mmol/L    Carbon Dioxide 35 (H) 20 - 32 mmol/L    Anion Gap 5 3 - 14 mmol/L    Glucose 142 (H) 70 - 99 mg/dL    Urea Nitrogen 27 7 - 30 mg/dL    Creatinine 1.66 (H) 0.52 - 1.04 mg/dL    GFR Estimate 31 (L) >60 mL/min/1.7m2    GFR Estimate If Black 37 (L) >60 mL/min/1.7m2    Calcium 9.9 8.5 - 10.1 mg/dL           Sincerely,    Bernice Howard MD/kj

## 2017-02-24 NOTE — TELEPHONE ENCOUNTER
Please review note from Dr. La telephone encounters and ask his team to contact patient.  Bernice Howard MD

## 2017-02-24 NOTE — TELEPHONE ENCOUNTER
Called and spoke to  Marissa. She is only taking 1/2 tablet daily. RN updated med list. States her home BPs have been consistently in the 120's/70's and she is less dizzy than before. She is concerned however because she has not heard back from the surgeon regarding her biopsy despite having called several times. Wonders if Dr. Howard can look at it? Results are in Epic but not yet reviewed so RN was unable to discuss them with her.     Lab appt scheduled for today at 3pm to recheck kidneys.     Codie Dixon RN   February 24, 2017 11:18 AM  [facility] Triage   [phone]

## 2017-02-25 LAB
ANION GAP SERPL CALCULATED.3IONS-SCNC: 5 MMOL/L (ref 3–14)
BUN SERPL-MCNC: 27 MG/DL (ref 7–30)
CALCIUM SERPL-MCNC: 9.9 MG/DL (ref 8.5–10.1)
CHLORIDE SERPL-SCNC: 103 MMOL/L (ref 94–109)
CO2 SERPL-SCNC: 35 MMOL/L (ref 20–32)
CREAT SERPL-MCNC: 1.66 MG/DL (ref 0.52–1.04)
GFR SERPL CREATININE-BSD FRML MDRD: 31 ML/MIN/1.7M2
GLUCOSE SERPL-MCNC: 142 MG/DL (ref 70–99)
POTASSIUM SERPL-SCNC: 3.7 MMOL/L (ref 3.4–5.3)
SODIUM SERPL-SCNC: 143 MMOL/L (ref 133–144)

## 2017-02-28 NOTE — PROGRESS NOTES
Send results letter.    Ashley Ann,     Your kidney function is slightly better than it was a few weeks ago.  It will be important to have a lab and blood pressure check every month to make sure things are stable.  Send results letter.  Bernice Howard MD

## 2017-03-02 NOTE — TELEPHONE ENCOUNTER
MD hasn't opened message. Reviewed chart- two RNs called patient on 2/16 & 3/1 & it doesn't appear that they have heard back.   Will send to urology team members listed in another encounter to try again when able.  Marisol Lal RN

## 2017-03-03 NOTE — TELEPHONE ENCOUNTER
Patient advised Dr. La wanted to wait on the results until after the scan was done as well as he wants to look at both results to determine a further plan. Patient expressed understanding and has the scan scheduled for 3/9/2017 and appointment with Dr. La on 3/16 to discuss the results of everything.    Bernice Bledsoe RN

## 2017-03-09 ENCOUNTER — HOSPITAL ENCOUNTER (OUTPATIENT)
Dept: NUCLEAR MEDICINE | Facility: CLINIC | Age: 67
Setting detail: NUCLEAR MEDICINE
Discharge: HOME | End: 2017-03-09
Attending: UROLOGY | Admitting: UROLOGY
Payer: MEDICARE

## 2017-03-09 DIAGNOSIS — N13.39 OTHER HYDRONEPHROSIS: ICD-10-CM

## 2017-03-09 PROCEDURE — 78708 K FLOW/FUNCT IMAGE W/DRUG: CPT

## 2017-03-09 PROCEDURE — A9562 TC99M MERTIATIDE: HCPCS | Performed by: UROLOGY

## 2017-03-09 PROCEDURE — 34300033 ZZH RX 343: Performed by: UROLOGY

## 2017-03-09 PROCEDURE — 25000128 H RX IP 250 OP 636: Performed by: UROLOGY

## 2017-03-09 RX ORDER — FUROSEMIDE 10 MG/ML
40 INJECTION INTRAMUSCULAR; INTRAVENOUS ONCE
Status: COMPLETED | OUTPATIENT
Start: 2017-03-09 | End: 2017-03-09

## 2017-03-09 RX ADMIN — FUROSEMIDE 40 MG: 10 INJECTION, SOLUTION INTRAVENOUS at 08:40

## 2017-03-09 RX ADMIN — Medication 10.2 MILLICURIE: at 08:25

## 2017-03-09 NOTE — PROGRESS NOTES
#16 indwelling norris catheter inserted for nuclear medicine renal scan. Returned pale yellow in color.  Kaushal Tobias RN

## 2017-03-16 ENCOUNTER — OFFICE VISIT (OUTPATIENT)
Dept: UROLOGY | Facility: CLINIC | Age: 67
End: 2017-03-16
Payer: COMMERCIAL

## 2017-03-16 VITALS — DIASTOLIC BLOOD PRESSURE: 68 MMHG | RESPIRATION RATE: 16 BRPM | SYSTOLIC BLOOD PRESSURE: 130 MMHG | HEART RATE: 81 BPM

## 2017-03-16 DIAGNOSIS — N13.39 OTHER HYDRONEPHROSIS: Primary | ICD-10-CM

## 2017-03-16 PROCEDURE — 99214 OFFICE O/P EST MOD 30 MIN: CPT | Performed by: UROLOGY

## 2017-03-16 NOTE — NURSING NOTE
"Initial /68 (BP Location: Right arm, Patient Position: Chair, Cuff Size: Adult Large)  Pulse 81  Resp 16 Estimated body mass index is 39.38 kg/(m^2) as calculated from the following:    Height as of 2/2/17: 5' 8\" (1.727 m).    Weight as of 2/2/17: 259 lb (117.5 kg). .    Bernarda Gunn    "

## 2017-03-16 NOTE — MR AVS SNAPSHOT
After Visit Summary   3/16/2017    Marissa Guadarrama    MRN: [id number]           Patient Information     Date Of Birth          1950        Visit Information        Provider Department      3/16/2017 9:00 AM Zhao La MD [facility]        Today's Diagnoses     Other hydronephrosis    -  1      Care Instructions    Per physician instructions        Follow-ups after your visit        Your next 10 appointments already scheduled     Apr 01, 2017 10:00 AM CDT   LAB with [facility] ([facility])    [address] [address]   [phone]           Patient must bring picture ID.  Patient should be prepared to give a urine specimen  Please do not eat 10-12 hours before your appointment if you are coming in fasting for labs on lipids, cholesterol, or glucose (sugar).  Pregnant women should follow their Care Team instructions. Water with medications is okay. Do not drink coffee or other fluids.   If you have concerns about taking  your medications, please ask at office or if scheduling via [employer], send a message by clicking on Secure Messaging, Message Your Care Team.            Apr 06, 2017 11:45 AM CDT   Return Visit with Zhao La MD   [facility] ([facility])    [address] [address]   [phone]            Apr 07, 2017 11:30 AM CDT   Return Visit with Hosea King MD   [facility] ([facility])    [facility]  [address]  [facility] [address]   [phone]              Who to contact     If you have questions or need follow up information about today's clinic visit or your schedule please contact [facility] directly at [phone].  Normal or non-critical lab and imaging results will be communicated to you by MyChart, letter or phone within 4 business days after  "the clinic has received the results. If you do not hear from us within 7 days, please contact the clinic through [employer] or phone. If you have a critical or abnormal lab result, we will notify you by phone as soon as possible.  Submit refill requests through [employer] or call your pharmacy and they will forward the refill request to us. Please allow 3 business days for your refill to be completed.          Additional Information About Your Visit        [facility] Information     [employer] lets you send messages to your doctor, view your test results, renew your prescriptions, schedule appointments and more. To sign up, go to [email or web address] . Click on \"Log in\" on the left side of the screen, which will take you to the Welcome page. Then click on \"Sign up Now\" on the right side of the page.     You will be asked to enter the access code listed below, as well as some personal information. Please follow the directions to create your username and password.     Your access code is: [email or web address]  Expires: 2017  1:55 PM     Your access code will  in 90 days. If you need help or a new code, please call your [place] clinic or [phone].        Care EveryWhere ID     This is your Care EveryWhere ID. This could be used by other organizations to access your [place] medical records  [id number]        Your Vitals Were     Pulse Respirations                81 16           Blood Pressure from Last 3 Encounters:   17 130/68   17 110/70   17 122/78    Weight from Last 3 Encounters:   17 117.5 kg (259 lb)   17 116.9 kg (257 lb 12.8 oz)   17 116.7 kg (257 lb 3.2 oz)              Today, you had the following     No orders found for display       Primary Care Provider Office Phone # Fax #    Bernice Howard [phone]       [address] [address]        Thank you!     Thank you for choosing [facility] " [place]  for your care. Our goal is always to provide you with excellent care. Hearing back from our patients is one way we can continue to improve our services. Please take a few minutes to complete the written survey that you may receive in the mail after your visit with us. Thank you!             Your Updated Medication List - Protect others around you: Learn how to safely use, store and throw away your medicines at www.disposemymeds.org.          This list is accurate as of: 3/16/17  4:38 PM.  Always use your most recent med list.                   Brand Name Dispense Instructions for use    albuterol 108 (90 BASE) MCG/ACT Inhaler    PROAIR HFA/PROVENTIL HFA/VENTOLIN HFA    1 Inhaler    Inhale 2 puffs into the lungs every 6 hours as needed for shortness of breath / dyspnea or wheezing       exemestane 25 MG tablet    AROMASIN    90 tablet    Take 1 tablet (25 mg) by mouth daily       gabapentin 600 MG tablet    NEURONTIN    180 tablet    Take 1 tablet (600 mg) by mouth 3 times daily       hydrochlorothiazide 25 MG tablet    HYDRODIURIL     Take 0.5 tablets (12.5 mg) by mouth daily       HYDROcodone-acetaminophen 5-325 MG per tablet    NORCO    30 tablet    Take 1-2 tablets by mouth every 4 hours as needed for other (Moderate to Severe Pain)       lisinopril 10 MG tablet    PRINIVIL/ZESTRIL    90 tablet    Take 1 tablet (10 mg) by mouth daily       oxybutynin 5 MG tablet    DITROPAN    20 tablet    Take 1 tablet (5 mg) by mouth 2 times daily as needed for bladder spasms       oxyCODONE 5 MG IR tablet    ROXICODONE    20 tablet    Take 1 tablet (5 mg) by mouth every 6 hours as needed for pain maximum 4 tablet(s) per day       Vitamin D-3 1000 UNITS Caps      Take 1 capsule by mouth       VITAMIN E NATURAL PO      Take 100 Units by mouth daily

## 2017-03-17 NOTE — PROGRESS NOTES
REASON FOR VISIT TODAY:  Left hydronephrosis.      HISTORY:  Ms. Marissa Guadarrama is a 66-year-old woman followed in our clinic for left-sided hydronephrosis.  The patient underwent a CT scan of the chest several months ago where she was incidentally found to have left-sided hydronephrosis.  Further evaluation with CT at that time demonstrated marked hydronephrosis and hydroureter down to a level of the distal ureter where the ureter appears to overlie a small mass.  The patient was brought to the operating room a few weeks ago for ureteroscopy, and the mucosa of the ureter appeared normal in appearance, though there did appear to be an extrinsic compression of the ureter based on the appearance.  A stent was left in place, and the patient underwent a renal scan to determine the function of that kidney after having the stent in place for a month.  The patient comes in today to go over those results.      OBJECTIVE:  On exam today, her blood pressure is 130/68, pulse is 81.  She is in no acute distress.      DATA:  The patient's renal scan from 03/09/2017, demonstrates 21% function on the left, 79% function on the right.      ASSESSMENT AND PLAN:  Over half of today's 15-minute visit was spent counseling the patient regarding her hydronephrosis.  I suggested to Ms. Guadarrama that the 21% function noted on her renal scan is really marginal function of the left kidney.  We generally consider 15% to be the absolute lower limit of kidney function that is worth preserving.  21%, while a little bit above, is not dramatically higher.  I therefore discussed with the patient that options at this point would include an exploration of her abdomen with attempt to address the level of obstruction and the small mass that is in that location.  We would need to determine if this was cancerous or if the ureter were involved with the mass.  Depending on what we find, we may be able to simply dissect the ureter off of this mass, versus  a segmental resection of the ureter along with this mass, and a ureteroureteral anastomosis, or possibly even reimplantation, although the level of obstruction might be a little high for this.  However, we did discuss with Ms. Guadarrama that given her numerous previous abdominal surgeries including an abdominal hernia repair with mesh, going after this lesion would not be a small feat.  We likely would not be able to complete this exploration robotically, given her numerous previous surgeries, and therefore we again would likely be talking about an open surgery for her.  Given these concerns, further bearing in mind that all of this effort on the one hand what might be to preserve the kidney, the function is only 21%.  I therefore counseled the patient that there are two issues at hand, the first being what this small mass is, and whether there is significant concern for cancer, given her history of carcinoid tumor, etc.; and secondly, preserving the function of the left kidney.  In terms of the small mass, we discussed options including continued observation versus attempts to biopsy the lesion, though there does not appear to be an obvious biopsy window that is safe, versus going in again and excising this lesion.  I counseled the patient that I will contact one of my Radiology colleagues and go over the films carefully with them, as well as discuss the case with some of my other colleagues and get a consensus about the best way forward for Ms. Guadarrama, given the complexity of the situation.  Furthermore, I have asked Ms. Guadarrama to think about what she would be willing to undergo in the future if there is minimal concern regarding the lesion.  Again, the kidney function is 21%, and this needs to be considered when we decide how aggressive we need to be, given the fact that the kidney only functions at 21%, and again of course the concern for cancer of this small mass obstructing the ureter, notwithstanding.   In any case, we will plan on seeing the patient back over the next couple of weeks to finalize our plans after we have gathered this additional information.      Subsequent to the patient's visit, I was able to speak with one of our Radiology colleagues, Dr. Sadler, who upon looking at the images, noted that the small mass that seems to be associated with the ureter at the level of obstruction, has been present on previous scans, but has increased in size from just over 1 cm to just over 2 cm since , raising concern for this being a possible neoplastic mass.  We will definitely take this into account when considering our options.         STEVE RUVALCABA MD             D: 2017 16:37   T: 2017 07:21   MT: EM#[id number]      Name:     ROSALINDA LANCASTER   MRN:      [id number]        Account:      [id number]   :      1950           Visit Date:   2017      Document: [id number]       cc: Calixto Howard MD

## 2017-04-01 DIAGNOSIS — C50.412 BILATERAL MALIGNANT NEOPLASM OF UPPER OUTER QUADRANT OF BREAST IN FEMALE (H): ICD-10-CM

## 2017-04-01 DIAGNOSIS — C50.411 BILATERAL MALIGNANT NEOPLASM OF UPPER OUTER QUADRANT OF BREAST IN FEMALE (H): ICD-10-CM

## 2017-04-01 LAB
ALBUMIN SERPL-MCNC: 3.7 G/DL (ref 3.4–5)
ALP SERPL-CCNC: 82 U/L (ref 40–150)
ALT SERPL W P-5'-P-CCNC: 18 U/L (ref 0–50)
ANION GAP SERPL CALCULATED.3IONS-SCNC: 5 MMOL/L (ref 3–14)
AST SERPL W P-5'-P-CCNC: 19 U/L (ref 0–45)
BASOPHILS # BLD AUTO: 0.1 10E9/L (ref 0–0.2)
BASOPHILS NFR BLD AUTO: 0.6 %
BILIRUB SERPL-MCNC: 0.8 MG/DL (ref 0.2–1.3)
BUN SERPL-MCNC: 22 MG/DL (ref 7–30)
CALCIUM SERPL-MCNC: 10.4 MG/DL (ref 8.5–10.1)
CANCER AG27-29 SERPL-ACNC: 29 U/ML (ref 0–39)
CHLORIDE SERPL-SCNC: 102 MMOL/L (ref 94–109)
CO2 SERPL-SCNC: 36 MMOL/L (ref 20–32)
CREAT SERPL-MCNC: 1.7 MG/DL (ref 0.52–1.04)
DIFFERENTIAL METHOD BLD: NORMAL
EOSINOPHIL # BLD AUTO: 0.5 10E9/L (ref 0–0.7)
EOSINOPHIL NFR BLD AUTO: 6.7 %
ERYTHROCYTE [DISTWIDTH] IN BLOOD BY AUTOMATED COUNT: 13.6 % (ref 10–15)
GFR SERPL CREATININE-BSD FRML MDRD: 30 ML/MIN/1.7M2
GLUCOSE SERPL-MCNC: 97 MG/DL (ref 70–99)
HCT VFR BLD AUTO: 44.3 % (ref 35–47)
HGB BLD-MCNC: 14.2 G/DL (ref 11.7–15.7)
IMM GRANULOCYTES # BLD: 0 10E9/L (ref 0–0.4)
IMM GRANULOCYTES NFR BLD: 0.2 %
LYMPHOCYTES # BLD AUTO: 1.8 10E9/L (ref 0.8–5.3)
LYMPHOCYTES NFR BLD AUTO: 22.4 %
MCH RBC QN AUTO: 29.9 PG (ref 26.5–33)
MCHC RBC AUTO-ENTMCNC: 32.1 G/DL (ref 31.5–36.5)
MCV RBC AUTO: 93 FL (ref 78–100)
MONOCYTES # BLD AUTO: 0.5 10E9/L (ref 0–1.3)
MONOCYTES NFR BLD AUTO: 6.2 %
NEUTROPHILS # BLD AUTO: 5.2 10E9/L (ref 1.6–8.3)
NEUTROPHILS NFR BLD AUTO: 63.9 %
PLATELET # BLD AUTO: 208 10E9/L (ref 150–450)
POTASSIUM SERPL-SCNC: 4.2 MMOL/L (ref 3.4–5.3)
PROT SERPL-MCNC: 7.4 G/DL (ref 6.8–8.8)
RBC # BLD AUTO: 4.75 10E12/L (ref 3.8–5.2)
SODIUM SERPL-SCNC: 143 MMOL/L (ref 133–144)
WBC # BLD AUTO: 8.1 10E9/L (ref 4–11)

## 2017-04-01 PROCEDURE — 36415 COLL VENOUS BLD VENIPUNCTURE: CPT | Performed by: INTERNAL MEDICINE

## 2017-04-01 PROCEDURE — 80053 COMPREHEN METABOLIC PANEL: CPT | Performed by: INTERNAL MEDICINE

## 2017-04-01 PROCEDURE — 86300 IMMUNOASSAY TUMOR CA 15-3: CPT | Performed by: INTERNAL MEDICINE

## 2017-04-01 PROCEDURE — 85025 COMPLETE CBC W/AUTO DIFF WBC: CPT | Performed by: INTERNAL MEDICINE

## 2017-04-06 ENCOUNTER — OFFICE VISIT (OUTPATIENT)
Dept: UROLOGY | Facility: CLINIC | Age: 67
End: 2017-04-06
Payer: COMMERCIAL

## 2017-04-06 VITALS
HEIGHT: 68 IN | SYSTOLIC BLOOD PRESSURE: 145 MMHG | RESPIRATION RATE: 16 BRPM | DIASTOLIC BLOOD PRESSURE: 79 MMHG | HEART RATE: 65 BPM | WEIGHT: 259 LBS | BODY MASS INDEX: 39.25 KG/M2

## 2017-04-06 DIAGNOSIS — Z85.9 HISTORY OF MALIGNANT CARCINOID TUMOR: Primary | ICD-10-CM

## 2017-04-06 PROCEDURE — 99214 OFFICE O/P EST MOD 30 MIN: CPT | Performed by: UROLOGY

## 2017-04-06 NOTE — MR AVS SNAPSHOT
"              After Visit Summary   4/6/2017    Marissa Guadarrama    MRN: [id number]           Patient Information     Date Of Birth          1950        Visit Information        Provider Department      4/6/2017 11:45 AM Zhao La MD [facility]        Today's Diagnoses     History of malignant carcinoid tumor    -  1      Care Instructions    Per Physician's instructions          Follow-ups after your visit        Your next 10 appointments already scheduled     Apr 07, 2017 11:30 AM CDT   Return Visit with Hosea King MD   [facility] ([facility])    [facility]  [address]  [place] [address]   [phone]              Future tests that were ordered for you today     Open Future Orders        Priority Expected Expires Ordered    NM Octreoscan Whole Body Routine  4/6/2018 4/6/2017            Who to contact     If you have questions or need follow up information about today's clinic visit or your schedule please contact [facility] directly at [phone].  Normal or non-critical lab and imaging results will be communicated to you by MyChart, letter or phone within 4 business days after the clinic has received the results. If you do not hear from us within 7 days, please contact the clinic through [employer] or phone. If you have a critical or abnormal lab result, we will notify you by phone as soon as possible.  Submit refill requests through [facility] or call your pharmacy and they will forward the refill request to us. Please allow 3 business days for your refill to be completed.          Additional Information About Your Visit        MyChart Information     [facility] lets you send messages to your doctor, view your test results, renew your prescriptions, schedule appointments and more. To sign up, go to [email or web address] . Click on \"Log in\" on the left side of the screen, which will take you " "to the Welcome page. Then click on \"Sign up Now\" on the right side of the page.     You will be asked to enter the access code listed below, as well as some personal information. Please follow the directions to create your username and password.     Your access code is: [email or web address]  Expires: 2017  1:55 PM     Your access code will  in 90 days. If you need help or a new code, please call your [place] clinic or [phone].        Care EveryWhere ID     This is your Care EveryWhere ID. This could be used by other organizations to access your [place] medical records  [id number]        Your Vitals Were     Pulse Respirations Height BMI (Body Mass Index)          65 16 1.727 m (5' 8\") 39.38 kg/m2         Blood Pressure from Last 3 Encounters:   17 145/79   17 130/68   17 110/70    Weight from Last 3 Encounters:   17 117.5 kg (259 lb)   17 117.5 kg (259 lb)   17 116.9 kg (257 lb 12.8 oz)               Primary Care Provider Office Phone # Fax #    Bernice Howard [phone]       [address] [address]        Thank you!     Thank you for choosing [facility]  for your care. Our goal is always to provide you with excellent care. Hearing back from our patients is one way we can continue to improve our services. Please take a few minutes to complete the written survey that you may receive in the mail after your visit with us. Thank you!             Your Updated Medication List - Protect others around you: Learn how to safely use, store and throw away your medicines at www.disposemymeds.org.          This list is accurate as of: 17  1:55 PM.  Always use your most recent med list.                   Brand Name Dispense Instructions for use    albuterol 108 (90 BASE) MCG/ACT Inhaler    PROAIR HFA/PROVENTIL HFA/VENTOLIN HFA    1 Inhaler    Inhale 2 puffs into the lungs every 6 hours as needed " for shortness of breath / dyspnea or wheezing       exemestane 25 MG tablet    AROMASIN    90 tablet    Take 1 tablet (25 mg) by mouth daily       gabapentin 600 MG tablet    NEURONTIN    180 tablet    Take 1 tablet (600 mg) by mouth 3 times daily       hydrochlorothiazide 25 MG tablet    HYDRODIURIL     Take 0.5 tablets (12.5 mg) by mouth daily       HYDROcodone-acetaminophen 5-325 MG per tablet    NORCO    30 tablet    Take 1-2 tablets by mouth every 4 hours as needed for other (Moderate to Severe Pain)       lisinopril 10 MG tablet    PRINIVIL/ZESTRIL    90 tablet    Take 1 tablet (10 mg) by mouth daily       oxybutynin 5 MG tablet    DITROPAN    20 tablet    Take 1 tablet (5 mg) by mouth 2 times daily as needed for bladder spasms       oxyCODONE 5 MG IR tablet    ROXICODONE    20 tablet    Take 1 tablet (5 mg) by mouth every 6 hours as needed for pain maximum 4 tablet(s) per day       Vitamin D-3 1000 UNITS Caps      Take 1 capsule by mouth       VITAMIN E NATURAL PO      Take 100 Units by mouth daily

## 2017-04-06 NOTE — NURSING NOTE
"Chief Complaint   Patient presents with     RECHECK     hydronephrosis        Initial /79 (BP Location: Right arm, Patient Position: Chair, Cuff Size: Adult Regular)  Pulse 65  Resp 16  Ht 5' 8\" (1.727 m)  Wt 259 lb (117.5 kg)  BMI 39.38 kg/m2 Estimated body mass index is 39.38 kg/(m^2) as calculated from the following:    Height as of this encounter: 5' 8\" (1.727 m).    Weight as of this encounter: 259 lb (117.5 kg).  BP completed using cuff size: garfield Juares CMA     "

## 2017-04-06 NOTE — PROGRESS NOTES
REASON FOR VISIT TODAY:  Left-sided hydronephrosis.      BRIEF COURSE:  Ms. Marissa Guadarrama is a 66-year-old woman followed in our clinic for history of left-sided hydro.  The patient was noted incidentally to have left-sided hydronephrosis which appeared to be secondary to a small mass located against the left ureter in the mid to proximal distal portion of the ureter.  A stent was placed following a ureteroscopy which suggested that the obstruction was due to extrinsic compression.  Following 1 month of stenting a renal scan was performed which showed only 21% function on the left.  Review of old imaging demonstrates that this mass was likely present back in 2014 and that it has gotten larger between 2014 and 2017.  Of note, the patient does have a history of carcinoid tumor and is status post colon resection as well as hysterectomy and bilateral salpingo-oophorectomy for cervical cancer.  The patient also has a recent diagnosis of breast cancer.  Also of note, the patient has undergone an abdominal hernia repair in the past as well with mesh according to the patient.  The patient comes back today in followup.      PHYSICAL EXAMINATION:  On exam today, blood pressure is 145/79, pulse 65.  She is in no acute distress.      ASSESSMENT AND PLAN:  Over half of today's 25-minute visit was spent counseling the patient regarding her left-sided hydronephrosis and periureteral mass.  I suggested to Ms. Guadarrama that after discussions with one of our radiologists that we do feel there is a significant chance that this could be a tumor and in fact may be carcinoid given the fact that the tumor has grown slowly over the last several years and that the patient has a history of carcinoid tumor.  We therefore suggested we get an octreotide scan to see if the tumor lights up confirming our suspicions.  It would be nice to know what we had going in before considering surgical excision of the lesion.  Further, we discussed that  if this ends up being a tumor that depending on how closely it is associated with the ureter we may need to consider segmental ureteral resection with either reimplantation of the ureter versus a ureteroureteral anastomosis versus even creation of a psoas hitch or a Boari flap.  Ms. Guadarrama understands these concerns.  We did discuss that again given the fact that the kidney on the left only functions 21% certainly if it requires heroic efforts to maintain the kidney on that side it is questionable whether it is worth preserving that limited function.  We also discussed different options in terms of incision sites, again depending on what we thought we might have to do.  We will ask the patient to provide the operative report from her hernia repair and the hysterectomy she has previously had to try to further make plans for how we might approach this operatively.  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  We will make plans to get the octreotide scan.  We will get the patient's operative notes and then make further plans.  We also discussed whether there is any rationale to repeating the renal scan to see if the kidney function has improved which if it has would only strengthen our resolve to want to do what it takes to salvage the kidney.  Ms. Guadarrama is in agreement with the plan and they will contact us after the octreotide scan so we can make further plans at that time.         STEVE RUVALCABA MD             D: 2017 13:55   T: 2017 14:34   MT:       Name:     ROSALINDA GUADARRAMA   MRN:      [id number]        Account:      [id number]   :      1950           Visit Date:   2017      Document: [id number]

## 2017-04-07 ENCOUNTER — ONCOLOGY VISIT (OUTPATIENT)
Dept: ONCOLOGY | Facility: CLINIC | Age: 67
End: 2017-04-07
Attending: INTERNAL MEDICINE
Payer: COMMERCIAL

## 2017-04-07 ENCOUNTER — HOSPITAL ENCOUNTER (OUTPATIENT)
Dept: LAB | Facility: CLINIC | Age: 67
Discharge: HOME | End: 2017-04-07
Attending: INTERNAL MEDICINE | Admitting: INTERNAL MEDICINE
Payer: MEDICARE

## 2017-04-07 VITALS
TEMPERATURE: 98.1 F | WEIGHT: 257.5 LBS | OXYGEN SATURATION: 95 % | BODY MASS INDEX: 39.03 KG/M2 | SYSTOLIC BLOOD PRESSURE: 149 MMHG | HEART RATE: 71 BPM | DIASTOLIC BLOOD PRESSURE: 88 MMHG | RESPIRATION RATE: 18 BRPM | HEIGHT: 68 IN

## 2017-04-07 DIAGNOSIS — I10 HYPERTENSION GOAL BP (BLOOD PRESSURE) < 140/90: ICD-10-CM

## 2017-04-07 DIAGNOSIS — E83.52 SERUM CALCIUM ELEVATED: ICD-10-CM

## 2017-04-07 DIAGNOSIS — D3A.00 CARCINOID TUMOR (H): ICD-10-CM

## 2017-04-07 DIAGNOSIS — C50.412 BILATERAL MALIGNANT NEOPLASM OF UPPER OUTER QUADRANT OF BREAST IN FEMALE (H): Primary | ICD-10-CM

## 2017-04-07 DIAGNOSIS — E83.52 SERUM CALCIUM ELEVATED: Primary | ICD-10-CM

## 2017-04-07 DIAGNOSIS — C50.911 INVASIVE DUCTAL CARCINOMA OF BREAST, RIGHT (H): ICD-10-CM

## 2017-04-07 DIAGNOSIS — C50.411 BILATERAL MALIGNANT NEOPLASM OF UPPER OUTER QUADRANT OF BREAST IN FEMALE (H): Primary | ICD-10-CM

## 2017-04-07 LAB
CALCIUM SERPL-MCNC: 10.1 MG/DL (ref 8.5–10.1)
PTH-INTACT SERPL-MCNC: 90 PG/ML (ref 12–72)

## 2017-04-07 PROCEDURE — 36415 COLL VENOUS BLD VENIPUNCTURE: CPT | Performed by: INTERNAL MEDICINE

## 2017-04-07 PROCEDURE — 99214 OFFICE O/P EST MOD 30 MIN: CPT | Performed by: INTERNAL MEDICINE

## 2017-04-07 PROCEDURE — 82310 ASSAY OF CALCIUM: CPT | Performed by: INTERNAL MEDICINE

## 2017-04-07 PROCEDURE — 99211 OFF/OP EST MAY X REQ PHY/QHP: CPT

## 2017-04-07 PROCEDURE — 83970 ASSAY OF PARATHORMONE: CPT | Performed by: INTERNAL MEDICINE

## 2017-04-07 ASSESSMENT — PAIN SCALES - GENERAL: PAINLEVEL: MILD PAIN (2)

## 2017-04-07 NOTE — PROGRESS NOTES
Results and recommendations reviewed with patient.  Is agreeable to holding her calcium and vitamin D supplements but would like her calcium redrawn in 1 month so she can restart them if needed.  Denies further questions or concerns. Direct line provided. Order placed and call transferred to schedulers to set up appointment.

## 2017-04-07 NOTE — ASSESSMENT & PLAN NOTE
Marissa Guadarrama was found on routine mammogram a group of microcalcification is in the upper outer right breast. There was also a focal asymmetry in the lower left breast. Subsequently the patient went on to have bilateral diagnostic mammography with ultrasound on March 28, 2016.. On the right breast there was microcalcification is the main grouping measures 1.6 cm x 1 cm bilateral 2.9 cm located 2.9 cm from the nipple. An additional tiny area about 0.2 cm seen at the anterolateral margin of this main grouping about 1 cm from the main grouping. The left breast shows no masses or significant abnormalities. Subsequently the patient went on to have breast needle biopsy on March 30, 2016. 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 Angiolymphatic invasion was not identified. 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 Microcalcifications was also associated with ductal carcinoma in situ. His surgeon receptor was positive. Progesterone receptor was negative. She underwent bilateral sentinel lymph node biopsy and bilateral lumpectomies with prior seed placement.  the surgical pathology Showing left breast lumpectomy was a tumor size of 13 mm grade 2 of 3, angiolymphatic invasion was not identified the tumor was unifocal associated with ductal carcinoma in situ high-grade. Surgical margins where negative. [place] lymph node was negative for metastatic tumor. Stage is T1cN0 M0 On the right breast and there was invasive ductal carcinoma with a tumor size of 3 mm grade 3 of 3. [place] lymph nodes were negative for metastatic disease. The tumor stage is T1aN0 M0. The tumor on the left was positive for estrogen and progesterone receptor. It did show no HER-2/praveen amplification. The tumor on the right is positive for estrogen receptor and negative for progesterone receptor and shows no HER-2/praveen amplification. Subsequently the patient concluded the radiation  therapy. She is currently on hormonal therapy with Arimidex. She developed a skin rash and Arimidex was switched to Aromasin.

## 2017-04-07 NOTE — PATIENT INSTRUCTIONS
We would like to see you back in clinic with Dr. King in 3 months with labs prior.  You will need to have labs drawn today.  We will call you with the results. You are due for your mammogram in June 2017, per your request due to surgery, we will schedule this prior to your follow up appointment with Dr. King in July 2017. Copy of appointments, and after visit summary (AVS) given to patient.  If you have any questions during business hours (M-F 8 AM- 4PM), please call Codi Daiz RN, BSN, OCN Oncology Hematology /Breast Cancer Navigator at [facility] [phone].   For questions after business hours, or on holidays/weekends, please call our after hours Nurse Triage line [phone]. Thank you.

## 2017-04-07 NOTE — PROGRESS NOTES
Hematology/ Oncology Follow-up Visit:  Apr 7, 2017    Reason for Visit:   Chief Complaint   Patient presents with     Oncology Clinic Visit     3 month recheck Breast CA,review Lab results       Oncologic History:  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)  Marissa Guadarrama was found on routine mammogram a group of microcalcification is in the upper outer right breast. There was also a focal asymmetry in the lower left breast. Subsequently the patient went on to have bilateral diagnostic mammography with ultrasound on March 28, 2016.. On the right breast there was microcalcification is the main grouping measures 1.6 cm x 1 cm bilateral 2.9 cm located 2.9 cm from the nipple. An additional tiny area about 0.2 cm seen at the anterolateral margin of this main grouping about 1 cm from the main grouping. The left breast shows no masses or significant abnormalities. Subsequently the patient went on to have breast needle biopsy on March 30, 2016. 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 Angiolymphatic invasion was not identified. 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 Microcalcifications was also associated with ductal carcinoma in situ. His surgeon receptor was positive. Progesterone receptor was negative. She underwent bilateral sentinel lymph node biopsy and bilateral lumpectomies with prior seed placement.  the surgical pathology Showing left breast lumpectomy was a tumor size of 13 mm grade 2 of 3, angiolymphatic invasion was not identified the tumor was unifocal associated with ductal carcinoma in situ high-grade. Surgical margins where negative. [place] lymph node was negative for metastatic tumor. Stage is T1cN0 M0 On the right breast and there was invasive ductal carcinoma with a tumor size of 3 mm grade 3 of 3. [place] lymph nodes were negative for metastatic disease. The tumor stage is T1aN0 M0. The  tumor on the left was positive for estrogen and progesterone receptor. It did show no HER-2/praveen amplification. The tumor on the right is positive for estrogen receptor and negative for progesterone receptor and shows no HER-2/praveen amplification. Subsequently the patient concluded the radiation therapy. She is currently on hormonal therapy with Arimidex. She developed a skin rash and Arimidex was switched to Aromasin.      Interval History:  Patient is returning today for follow-up visit.  She has been feeling well without any recent complaints. She denies any bony aches or pains. She denies any nausea vomiting or diarrhea. She's been on Aromasin without any significant side effects. She has been evaluated currently by urology.  She is going to have an octreotide scan to assess I lesion that is associated with left hydronephrosis.    Review Of Systems:  Constitutional: Negative for fever, chills, and night sweats.  Skin: negative.  Eyes: negative.  Ears/Nose/Throat: negative.  Respiratory: No shortness of breath, dyspnea on exertion, cough, or hemoptysis.  Cardiovascular: negative.  Gastrointestinal: negative.  Genitourinary: negative.  Musculoskeletal: negative.  Neurologic: negative.  Psychiatric: negative.  Hematologic/Lymphatic/Immunologic: negative.  Endocrine: negative.    All other ROS negative unless mentioned in interval history.    Past medical, social, surgical, and family histories reviewed.    Allergies:  Allergies as of 04/07/2017 - Cade as Reviewed 04/07/2017   Allergen Reaction Noted     Nkda [no known drug allergies]  10/30/2009       Current Medications:  Current Outpatient Prescriptions   Medication Sig Dispense Refill     hydrochlorothiazide (HYDRODIURIL) 25 MG tablet Take 0.5 tablets (12.5 mg) by mouth daily       oxyCODONE (ROXICODONE) 5 MG IR tablet Take 1 tablet (5 mg) by mouth every 6 hours as needed for pain maximum 4 tablet(s) per day (Patient not taking: Reported on 4/6/2017) 20 tablet 0  "    oxybutynin (DITROPAN) 5 MG tablet Take 1 tablet (5 mg) by mouth 2 times daily as needed for bladder spasms 20 tablet 0     VITAMIN E NATURAL PO Take 100 Units by mouth daily       Cholecalciferol (VITAMIN D-3) 1000 UNITS CAPS Take 1 capsule by mouth       exemestane (AROMASIN) 25 MG tablet Take 1 tablet (25 mg) by mouth daily 90 tablet 1     albuterol (PROAIR HFA, PROVENTIL HFA, VENTOLIN HFA) 108 (90 BASE) MCG/ACT inhaler Inhale 2 puffs into the lungs every 6 hours as needed for shortness of breath / dyspnea or wheezing 1 Inhaler 1     lisinopril (PRINIVIL,ZESTRIL) 10 MG tablet Take 1 tablet (10 mg) by mouth daily 90 tablet 1     HYDROcodone-acetaminophen (NORCO) 5-325 MG per tablet Take 1-2 tablets by mouth every 4 hours as needed for other (Moderate to Severe Pain) 30 tablet 0     gabapentin (NEURONTIN) 600 MG tablet Take 1 tablet (600 mg) by mouth 3 times daily 180 tablet 6        Physical Exam:  /88 (BP Location: Other (Comment), Patient Position: Chair, Cuff Size: Adult Regular)  Pulse 71  Temp 98.1  F (36.7  C) (Tympanic)  Resp 18  Ht 1.715 m (5' 7.5\")  Wt 116.8 kg (257 lb 8 oz)  SpO2 95%  Breastfeeding? No  BMI 39.73 kg/m2  Wt Readings from Last 12 Encounters:   04/07/17 116.8 kg (257 lb 8 oz)   04/06/17 117.5 kg (259 lb)   02/02/17 117.5 kg (259 lb)   02/01/17 116.9 kg (257 lb 12.8 oz)   01/06/17 116.7 kg (257 lb 3.2 oz)   11/28/16 117.8 kg (259 lb 9.6 oz)   11/02/16 117.9 kg (260 lb)   10/10/16 117 kg (258 lb)   09/28/16 116.5 kg (256 lb 14.4 oz)   09/28/16 116.5 kg (256 lb 12.8 oz)   09/19/16 116.3 kg (256 lb 8 oz)   08/31/16 115.5 kg (254 lb 9.6 oz)     ECOG performance status: 0  GENERAL APPEARANCE: Healthy, alert and in no acute distress.  HEENT: Sclerae anicteric. PERRLA. Oropharynx without ulcers, lesions, or thrush.  NECK: Supple. No asymmetry or masses.  LYMPHATICS: No palpable cervical, supraclavicular, axillary, or inguinal lymphadenopathy.  RESP: Lungs clear to auscultation " "bilaterally without rales, rhonchi or wheezes.  BREAST: There is no dominant masses or axillary adenopathy bilaterally. \" \"CARDIOVASCULAR: Regular rate and rhythm. Normal S1, S2; no S3 or S4. No murmur, gallop, or rub.  ABDOMEN: Soft, nontender. Bowel sounds normal. No palpable organomegaly or masses.  MUSCULOSKELETAL: Extremities without gross deformities noted. No edema of bilateral lower extremities.  SKIN: No suspicious lesions or rashes.  NEURO: Alert and oriented x 3. Cranial nerves II-XII grossly intact.  PSYCHIATRIC: Mentation and affect appear normal.    Laboratory/Imaging Studies:  Orders Only on 04/01/2017   Component Date Value Ref Range Status     WBC 04/01/2017 8.1  4.0 - 11.0 10e9/L Final     RBC Count 04/01/2017 4.75  3.8 - 5.2 10e12/L Final     Hemoglobin 04/01/2017 14.2  11.7 - 15.7 g/dL Final     Hematocrit 04/01/2017 44.3  35.0 - 47.0 % Final     MCV 04/01/2017 93  78 - 100 fl Final     MCH 04/01/2017 29.9  26.5 - 33.0 pg Final     MCHC 04/01/2017 32.1  31.5 - 36.5 g/dL Final     RDW 04/01/2017 13.6  10.0 - 15.0 % Final     Platelet Count 04/01/2017 208  150 - 450 10e9/L Final     Diff Method 04/01/2017 Automated Method   Final     % Neutrophils 04/01/2017 63.9  % Final     % Lymphocytes 04/01/2017 22.4  % Final     % Monocytes 04/01/2017 6.2  % Final     % Eosinophils 04/01/2017 6.7  % Final     % Basophils 04/01/2017 0.6  % Final     % Immature Granulocytes 04/01/2017 0.2  % Final     Absolute Neutrophil 04/01/2017 5.2  1.6 - 8.3 10e9/L Final     Absolute Lymphocytes 04/01/2017 1.8  0.8 - 5.3 10e9/L Final     Absolute Monocytes 04/01/2017 0.5  0.0 - 1.3 10e9/L Final     Absolute Eosinophils 04/01/2017 0.5  0.0 - 0.7 10e9/L Final     Absolute Basophils 04/01/2017 0.1  0.0 - 0.2 10e9/L Final     Abs Immature Granulocytes 04/01/2017 0.0  0 - 0.4 10e9/L Final     Sodium 04/01/2017 143  133 - 144 mmol/L Final     Potassium 04/01/2017 4.2  3.4 - 5.3 mmol/L Final     Chloride 04/01/2017 102  94 - 109 " mmol/L Final     Carbon Dioxide 04/01/2017 36* 20 - 32 mmol/L Final     Anion Gap 04/01/2017 5  3 - 14 mmol/L Final     Glucose 04/01/2017 97  70 - 99 mg/dL Final     Urea Nitrogen 04/01/2017 22  7 - 30 mg/dL Final     Creatinine 04/01/2017 1.70* 0.52 - 1.04 mg/dL Final     GFR Estimate 04/01/2017 30* >60 mL/min/1.7m2 Final    Non  GFR Calc     GFR Estimate If Black 04/01/2017 36* >60 mL/min/1.7m2 Final    African American GFR Calc     Calcium 04/01/2017 10.4* 8.5 - 10.1 mg/dL Final     Bilirubin Total 04/01/2017 0.8  0.2 - 1.3 mg/dL Final     Albumin 04/01/2017 3.7  3.4 - 5.0 g/dL Final     Protein Total 04/01/2017 7.4  6.8 - 8.8 g/dL Final     Alkaline Phosphatase 04/01/2017 82  40 - 150 U/L Final     ALT 04/01/2017 18  0 - 50 U/L Final     AST 04/01/2017 19  0 - 45 U/L Final     CA 27-29 04/01/2017 29  0 - 39 U/mL Final    Assay Method:  Chemiluminescence using Siemens Centaur XP        Recent Results (from the past 744 hour(s))   NM Lasix renogram    Narrative    NUCLEAR MEDICINE RENOGRAM WITH LASIX March 9, 2017 9:32 AM    HISTORY: Hydronephrosis.    DOSE: 10.2 mCi technetium MAG3, Lasix 40 mg IV at 16 minutes.    FINDINGS: There is prompt perfusion to the kidneys which appears to be  asymmetrically decreased on the left compared to the right.  Differential activity is 79% on the right and 21% on the left.   Initial excretion curve appears within normal limits on the right  although there is no further washout following Lasix administration.  Left renal excretion curve appears blunted with mild washout following  Lasix administration.       Impression    IMPRESSION: Left renal markedly decreased perfusion and function  compared to the right. Mild washout of activity is seen following  Lasix administration.      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 There is no clinical evidence of disease recurrence. Patient is due for her mammography at the end of April. We will arrange for bilateral diagnostic mammography. I will see the patient again in 3 months or sooner if there is new developments or concerns. The patient will continue on adjuvant endocrine therapy as Aromasin.    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      (I10) Hypertension goal BP (blood pressure) < 140/90  Blood pressure has been controlled on lisinopril.    (E83.52) Serum calcium elevated  i asked the patient to hold calcium and vitamin D.. We will monitor the calcium level periodically.    The patient is ready to learn, no apparent learning barriers were identified.  Diagnosis and treatment plans were explained to the patient. The patient expressed understanding of the content. The patient asked appropriate questions. The patient questions were answered to her satisfaction.    Chart documentation with Dragon Voice recognition Software. Although reviewed after completion, some words and grammatical errors may remain.

## 2017-04-07 NOTE — NURSING NOTE
"Marissa Guadarrama is a 66 year old female who presents for:  Chief Complaint   Patient presents with     Oncology Clinic Visit     3 month recheck Breast CA,review Lab results        Initial Vitals:  /88 (BP Location: Other (Comment), Patient Position: Chair, Cuff Size: Adult Regular)  Pulse 71  Temp 98.1  F (36.7  C) (Tympanic)  Resp 18  Ht 1.715 m (5' 7.5\")  Wt 116.8 kg (257 lb 8 oz)  SpO2 95%  Breastfeeding? No  BMI 39.73 kg/m2 Estimated body mass index is 39.73 kg/(m^2) as calculated from the following:    Height as of this encounter: 1.715 m (5' 7.5\").    Weight as of this encounter: 116.8 kg (257 lb 8 oz).. Body surface area is 2.36 meters squared. BP completed using cuff size: regular ( BP taken on right forearm )   Mild Pain (2) No LMP recorded. Patient has had a hysterectomy. Allergies and medications reviewed.     Medications: Medication refills not needed today.  Pharmacy name entered into [employer]: [facility] [id number]    Comments: 3 month recheck Breast CA,review Lab results. Patient saw Dr La yesterday, she will be having a whole body Octreoscan and surgery for a  regarding her left-sided hydronephrosis and periureteral mass. Neither are scheduled yet.     7  minutes for nursing intake (face to face time)   Citlaly Larry CMA        "

## 2017-04-07 NOTE — MR AVS SNAPSHOT
After Visit Summary   4/7/2017    Marissa Guadarrama    MRN: [id number]           Patient Information     Date Of Birth          1950        Visit Information        Provider Department      4/7/2017 11:30 AM Hosea King MD [facility] ONCOLOGY      Today's Diagnoses     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)    -  1       Follow-ups after your visit        Follow-up notes from your care team     Return in about 3 months (around 7/7/2017) for Blood work before next appointment, Imaging ordered today.      Your next 10 appointments already scheduled     Jul 05, 2017 12:30 PM CDT   MA DIAGNOSTIC DIGITAL BILATERAL with [address] [facility] ([facility])    [address] [address]   [phone]           Do not use any powder, lotion or deodorant under your arms or on your breast. If you do, we will ask you to remove it before your exam.  Wear comfortable, two-piece clothing.  If you have any allergies, tell your care team.  Bring any previous mammograms from other facilities or have them mailed to the breast center.            Jul 05, 2017 12:50 PM CDT   LAB with [facility] ([facility])    [address] [address]   [phone]           Patient must bring picture ID.  Patient should be prepared to give a urine specimen  Please do not eat 10-12 hours before your appointment if you are coming in fasting for labs on lipids, cholesterol, or glucose (sugar).  Pregnant women should follow their Care Team instructions. Water with medications is okay. Do not drink coffee or other fluids.   If you have concerns about taking  your medications, please ask at office or if scheduling via [employer], send a message by clicking on Secure Messaging, Message Your Care Team.            Jul 07, 2017 11:30 AM CDT   Return Visit with Hosea King MD  "  [facility] ([facility])    [facility]  [address]  [facility] [place] [facility] [address]   [phone]              Future tests that were ordered for you today     Open Future Orders        Priority Expected Expires Ordered    CBC with platelets differential Routine 7/7/2017 4/7/2018 4/7/2017    Ca27.29  breast tumor marker Routine 7/7/2017 4/7/2018 4/7/2017    Comprehensive metabolic panel Routine 7/7/2017 4/7/2018 4/7/2017    MA Diagnostic Digital Bilateral Routine 5/2/2017 4/7/2018 4/7/2017    Parathyroid Hormone Intact Routine 4/7/2017 4/7/2018 4/7/2017    Calcium Routine 4/7/2017 4/7/2018 4/7/2017    NM Octreoscan Whole Body Routine  4/6/2018 4/6/2017            Who to contact     If you have questions or need follow up information about today's clinic visit or your schedule please contact [facility] directly at [phone].  Normal or non-critical lab and imaging results will be communicated to you by Cl[employer], letter or phone within 4 business days after the clinic has received the results. If you do not hear from us within 7 days, please contact the clinic through [employer] or phone. If you have a critical or abnormal lab result, we will notify you by phone as soon as possible.  Submit refill requests through [employer] or call your pharmacy and they will forward the refill request to us. Please allow 3 business days for your refill to be completed.          Additional Information About Your Visit        [employer] Information     [employer] lets you send messages to your doctor, view your test results, renew your prescriptions, schedule appointments and more. To sign up, go to [email or web address] . Click on \"Log in\" on the left side of the screen, which will take you to the Welcome page. Then click on \"Sign up Now\" on the right side of the page.     You will be asked to enter the access code listed below, as well as some personal information. Please follow " "the directions to create your username and password.     Your access code is: [email or web address]  Expires: 2017  1:55 PM     Your access code will  in 90 days. If you need help or a new code, please call your [place] clinic or [phone].        Care EveryWhere ID     This is your Care EveryWhere ID. This could be used by other organizations to access your [place] medical records  [id number]        Your Vitals Were     Pulse Temperature Respirations Height Pulse Oximetry Breastfeeding?    71 98.1  F (36.7  C) (Tympanic) 18 1.715 m (5' 7.5\") 95% No    BMI (Body Mass Index)                   39.73 kg/m2            Blood Pressure from Last 3 Encounters:   17 149/88   17 145/79   17 130/68    Weight from Last 3 Encounters:   17 116.8 kg (257 lb 8 oz)   17 117.5 kg (259 lb)   17 117.5 kg (259 lb)               Primary Care Provider Office Phone # Fax #    Bernice Howard [phone]       [address] [address]        Thank you!     Thank you for choosing [facility]  for your care. Our goal is always to provide you with excellent care. Hearing back from our patients is one way we can continue to improve our services. Please take a few minutes to complete the written survey that you may receive in the mail after your visit with us. Thank you!             Your Updated Medication List - Protect others around you: Learn how to safely use, store and throw away your medicines at www.disposemymeds.org.          This list is accurate as of: 17 11:36 AM.  Always use your most recent med list.                   Brand Name Dispense Instructions for use    albuterol 108 (90 BASE) MCG/ACT Inhaler    PROAIR HFA/PROVENTIL HFA/VENTOLIN HFA    1 Inhaler    Inhale 2 puffs into the lungs every 6 hours as needed for shortness of breath / dyspnea or wheezing       exemestane 25 MG tablet    AROMASIN    90 tablet    " Take 1 tablet (25 mg) by mouth daily       gabapentin 600 MG tablet    NEURONTIN    180 tablet    Take 1 tablet (600 mg) by mouth 3 times daily       hydrochlorothiazide 25 MG tablet    HYDRODIURIL     Take 0.5 tablets (12.5 mg) by mouth daily       HYDROcodone-acetaminophen 5-325 MG per tablet    NORCO    30 tablet    Take 1-2 tablets by mouth every 4 hours as needed for other (Moderate to Severe Pain)       lisinopril 10 MG tablet    PRINIVIL/ZESTRIL    90 tablet    Take 1 tablet (10 mg) by mouth daily       oxybutynin 5 MG tablet    DITROPAN    20 tablet    Take 1 tablet (5 mg) by mouth 2 times daily as needed for bladder spasms       oxyCODONE 5 MG IR tablet    ROXICODONE    20 tablet    Take 1 tablet (5 mg) by mouth every 6 hours as needed for pain maximum 4 tablet(s) per day       Vitamin D-3 1000 UNITS Caps      Take 1 capsule by mouth       VITAMIN E NATURAL PO      Take 100 Units by mouth daily

## 2017-05-01 DIAGNOSIS — I10 ESSENTIAL HYPERTENSION WITH GOAL BLOOD PRESSURE LESS THAN 140/90: ICD-10-CM

## 2017-05-02 NOTE — TELEPHONE ENCOUNTER
Medication Detail      Disp Refills Start End INDRA   lisinopril (PRINIVIL,ZESTRIL) 10 MG tablet 90 tablet 1 11/2/2016  --   Sig: Take 1 tablet (10 mg) by mouth daily   Class: E-Prescribe   Route: Oral   Order: [id number]       Last Office Visit with [facility], ANTHONY or [facility] prescribing provider: 2/1/2017  Next 5 appointments (look out 90 days)     Jul 07, 2017 11:30 AM CDT   Return Visit with Hosae King MD   [facility] ([facility])    [facility]  [address]  [facility] [address]   [phone]                   Potassium   Date Value Ref Range Status   04/01/2017 4.2 3.4 - 5.3 mmol/L Final     Creatinine   Date Value Ref Range Status   04/01/2017 1.70 (H) 0.52 - 1.04 mg/dL Final     BP Readings from Last 3 Encounters:   04/07/17 149/88   04/06/17 145/79   03/16/17 130/68

## 2017-05-03 ENCOUNTER — HOSPITAL ENCOUNTER (OUTPATIENT)
Dept: NUCLEAR MEDICINE | Facility: CLINIC | Age: 67
Setting detail: NUCLEAR MEDICINE
Discharge: HOME | End: 2017-05-04
Attending: UROLOGY | Admitting: UROLOGY
Payer: MEDICARE

## 2017-05-03 DIAGNOSIS — Z85.9 HISTORY OF MALIGNANT CARCINOID TUMOR: ICD-10-CM

## 2017-05-03 PROCEDURE — 34300033 ZZH RX 343: Performed by: UROLOGY

## 2017-05-03 PROCEDURE — A9572 INDIUM IN-111 PENTETREOTIDE: HCPCS | Performed by: UROLOGY

## 2017-05-03 RX ADMIN — INDIUM IN -111 PENTETREOTIDE 6.5 MILLICURIE: KIT at 09:41

## 2017-05-04 ENCOUNTER — HOSPITAL ENCOUNTER (OUTPATIENT)
Dept: NUCLEAR MEDICINE | Facility: CLINIC | Age: 67
Setting detail: NUCLEAR MEDICINE
End: 2017-05-04
Attending: UROLOGY
Payer: MEDICARE

## 2017-05-04 PROCEDURE — 78999 UNLISTED MISC PX DX NUC MED: CPT

## 2017-05-04 RX ORDER — LISINOPRIL 10 MG/1
TABLET ORAL
Qty: 90 TABLET | Refills: 0 | Status: SHIPPED | OUTPATIENT
Start: 2017-05-04 | End: 2017-07-19

## 2017-05-04 NOTE — TELEPHONE ENCOUNTER
Prescription approved per [facility] Refill Protocol.     Codie Dixon RN

## 2017-05-05 DIAGNOSIS — C50.412 BILATERAL MALIGNANT NEOPLASM OF UPPER OUTER QUADRANT OF BREAST IN FEMALE (H): ICD-10-CM

## 2017-05-05 DIAGNOSIS — E83.52 SERUM CALCIUM ELEVATED: ICD-10-CM

## 2017-05-05 DIAGNOSIS — C50.411 BILATERAL MALIGNANT NEOPLASM OF UPPER OUTER QUADRANT OF BREAST IN FEMALE (H): ICD-10-CM

## 2017-05-05 LAB
ALBUMIN SERPL-MCNC: 3.5 G/DL (ref 3.4–5)
ALP SERPL-CCNC: 69 U/L (ref 40–150)
ALT SERPL W P-5'-P-CCNC: 19 U/L (ref 0–50)
ANION GAP SERPL CALCULATED.3IONS-SCNC: 6 MMOL/L (ref 3–14)
AST SERPL W P-5'-P-CCNC: 17 U/L (ref 0–45)
BASOPHILS # BLD AUTO: 0 10E9/L (ref 0–0.2)
BASOPHILS NFR BLD AUTO: 0.6 %
BILIRUB SERPL-MCNC: 0.5 MG/DL (ref 0.2–1.3)
BUN SERPL-MCNC: 23 MG/DL (ref 7–30)
CALCIUM SERPL-MCNC: 9.9 MG/DL (ref 8.5–10.1)
CANCER AG27-29 SERPL-ACNC: 19 U/ML (ref 0–39)
CHLORIDE SERPL-SCNC: 101 MMOL/L (ref 94–109)
CO2 SERPL-SCNC: 34 MMOL/L (ref 20–32)
CREAT SERPL-MCNC: 1.54 MG/DL (ref 0.52–1.04)
DIFFERENTIAL METHOD BLD: NORMAL
EOSINOPHIL # BLD AUTO: 0.4 10E9/L (ref 0–0.7)
EOSINOPHIL NFR BLD AUTO: 5.9 %
ERYTHROCYTE [DISTWIDTH] IN BLOOD BY AUTOMATED COUNT: 13.5 % (ref 10–15)
GFR SERPL CREATININE-BSD FRML MDRD: 33 ML/MIN/1.7M2
GLUCOSE SERPL-MCNC: 118 MG/DL (ref 70–99)
HCT VFR BLD AUTO: 43.1 % (ref 35–47)
HGB BLD-MCNC: 14.6 G/DL (ref 11.7–15.7)
LYMPHOCYTES # BLD AUTO: 1.4 10E9/L (ref 0.8–5.3)
LYMPHOCYTES NFR BLD AUTO: 22 %
MCH RBC QN AUTO: 32 PG (ref 26.5–33)
MCHC RBC AUTO-ENTMCNC: 33.9 G/DL (ref 31.5–36.5)
MCV RBC AUTO: 95 FL (ref 78–100)
MONOCYTES # BLD AUTO: 0.4 10E9/L (ref 0–1.3)
MONOCYTES NFR BLD AUTO: 6.6 %
NEUTROPHILS # BLD AUTO: 4.2 10E9/L (ref 1.6–8.3)
NEUTROPHILS NFR BLD AUTO: 64.9 %
PLATELET # BLD AUTO: 171 10E9/L (ref 150–450)
POTASSIUM SERPL-SCNC: 3.8 MMOL/L (ref 3.4–5.3)
PROT SERPL-MCNC: 7.1 G/DL (ref 6.8–8.8)
RBC # BLD AUTO: 4.56 10E12/L (ref 3.8–5.2)
SODIUM SERPL-SCNC: 141 MMOL/L (ref 133–144)
WBC # BLD AUTO: 6.4 10E9/L (ref 4–11)

## 2017-05-05 PROCEDURE — 36415 COLL VENOUS BLD VENIPUNCTURE: CPT | Performed by: INTERNAL MEDICINE

## 2017-05-05 PROCEDURE — 85025 COMPLETE CBC W/AUTO DIFF WBC: CPT | Performed by: INTERNAL MEDICINE

## 2017-05-05 PROCEDURE — 86300 IMMUNOASSAY TUMOR CA 15-3: CPT | Performed by: INTERNAL MEDICINE

## 2017-05-05 PROCEDURE — 80053 COMPREHEN METABOLIC PANEL: CPT | Performed by: INTERNAL MEDICINE

## 2017-05-15 ENCOUNTER — TELEPHONE (OUTPATIENT)
Dept: UROLOGY | Facility: CLINIC | Age: 67
End: 2017-05-15

## 2017-05-15 NOTE — TELEPHONE ENCOUNTER
Please see note below and call pt with plan and results.  Edwina HEADLEY RN BSN PHN  Specialty Clinics

## 2017-05-15 NOTE — TELEPHONE ENCOUNTER
Reason for Call:  Patient is calling in states that she had test completed at [facility] on 5-4-17 and would like to know results    Detailed comments: see above    Phone Number Patient can be reached at: Home number on file [phone] (home)    Best Time: any    Can we leave a detailed message on this number? YES    Call taken on 5/15/2017 at 8:20 AM by Jaki Rain

## 2017-05-25 NOTE — TELEPHONE ENCOUNTER
I spoke to Marissa today and let her know that Dr La said that there was an area of the scan that light up and he felt that this was indication of recurrence of carcinoid. Dr La is working on a plan for her and will call her back in the next week. She will wait for his call. Sari BRIGHT RN

## 2017-05-30 ENCOUNTER — TELEPHONE (OUTPATIENT)
Dept: UROLOGY | Facility: CLINIC | Age: 67
End: 2017-05-30

## 2017-05-30 NOTE — TELEPHONE ENCOUNTER
Spoke with patient regarding her Octeotide scan which shows uptake in the ketty-ureteral mass and some vague uptake at the level of the ileocolonic anastamosis.  After discussing the case with Dr. Greco from colorectal surgery, the patient will need a colonoscopy before final surgical planning.  Dr. Greco's office will contact the patient to arrange.  The patient is in agreement with the plan.

## 2017-06-01 DIAGNOSIS — D3A.029 CARCINOID TUMOR OF COLON (H): Primary | ICD-10-CM

## 2017-06-02 ENCOUNTER — TELEPHONE (OUTPATIENT)
Dept: GASTROENTEROLOGY | Facility: CLINIC | Age: 67
End: 2017-06-02

## 2017-06-07 ENCOUNTER — RADIANT APPOINTMENT (OUTPATIENT)
Dept: GENERAL RADIOLOGY | Facility: CLINIC | Age: 67
End: 2017-06-07
Attending: ORTHOPAEDIC SURGERY
Payer: COMMERCIAL

## 2017-06-07 ENCOUNTER — OFFICE VISIT (OUTPATIENT)
Dept: ORTHOPEDICS | Facility: CLINIC | Age: 67
End: 2017-06-07
Payer: COMMERCIAL

## 2017-06-07 VITALS
BODY MASS INDEX: 38.7 KG/M2 | WEIGHT: 250.8 LBS | DIASTOLIC BLOOD PRESSURE: 93 MMHG | SYSTOLIC BLOOD PRESSURE: 157 MMHG | HEART RATE: 71 BPM | OXYGEN SATURATION: 91 %

## 2017-06-07 DIAGNOSIS — M17.11 PRIMARY OSTEOARTHRITIS OF RIGHT KNEE: Primary | ICD-10-CM

## 2017-06-07 DIAGNOSIS — M25.561 RIGHT KNEE PAIN: ICD-10-CM

## 2017-06-07 DIAGNOSIS — M25.561 CHRONIC PAIN OF RIGHT KNEE: ICD-10-CM

## 2017-06-07 DIAGNOSIS — G89.29 CHRONIC PAIN OF RIGHT KNEE: ICD-10-CM

## 2017-06-07 PROCEDURE — 73562 X-RAY EXAM OF KNEE 3: CPT | Mod: RT

## 2017-06-07 PROCEDURE — 72170 X-RAY EXAM OF PELVIS: CPT

## 2017-06-07 PROCEDURE — 20610 DRAIN/INJ JOINT/BURSA W/O US: CPT | Mod: RT | Performed by: ORTHOPAEDIC SURGERY

## 2017-06-07 PROCEDURE — 99203 OFFICE O/P NEW LOW 30 MIN: CPT | Mod: 25 | Performed by: ORTHOPAEDIC SURGERY

## 2017-06-07 ASSESSMENT — PAIN SCALES - GENERAL: PAINLEVEL: WORST PAIN (10)

## 2017-06-07 NOTE — NURSING NOTE
"A steroid and or Hylan G - F 20 injection was performed on 6/7/2017 at 5:14 PM. Risks,benefits and complications of the injection were discussed with the patient and the patient has elected to proceed after verbal consent. Using sterile technique, the area was prepped with betadine . A 22 Gauge 1.5\" was used to inject the medication(s) listed below.This was well tolerated. No apparent complications. . Did also discuss that if diabetic, recommend close monitoring of blood sugars over the next week as cortisone injections can temporarily elevate blood sugar.    The following medication(s) was given:     MEDICATION: Depo Medrol 80mg per mL  ROUTE: intraarticular  SITE:Right Knee   : [employer] (Depo-Medrol)  DOSE: 1 ml  LOT #: [id number]  EXPIRATION DATE:  8/2019    MEDICATION: Lidocaine HCL  1% per mL  : [employer] (Marcaine,Lidoncaine)  DOSE: 4 ml  LOT #: [id number]  EXPIRATION DATE:  1 jan 2019    John DEL VALLE    "

## 2017-06-07 NOTE — LETTER
6/7/2017       RE: Marissa Guadarrama  [address] [place] [id number]           Dear Colleague,    Thank you for referring your patient, Marissa Guadarrama, to the [facility]. Please see a copy of my visit note below.    HISTORY OF PRESENT ILLNESS    Marissa Guadarrama is a 67 year old female who is seen as self referral for evaluation of  right knee pain that started over 1 month ago. I have seen her before for her right knee back in 2011. We diagnosed moderate OA in the patellofemoral joint. We also suggested an MRI scan due to a large enchondroma. In 2014 she fell and was seen in the ER. She was felt to have a sprain of her right knee. No known injury.     Present symptoms: numbness and tingling on the lateral aspect of the thigh but not pain, numbness in anterior thigh. No locking, catching, or giving out. Symptoms occur at night, twisting the knee.  The symptoms occur are constant.  Pain location: anterior distal thigh, medial and lateral knee     Treatments tried to this point: none.     Patient presents to the clinic with a walker.     Orthopedic PMH: History of herniated disc in the past. 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     Other PMH:  has a past medical history of Arthritis; Benign breast biopsy; Carcinoid tumor (12/2003); Cervical cancer (H); H/O colposcopy with cervical biopsy (12/23/13); High cholesterol; HTN; Pap smear of vagina with ASC-H (11/1/13); Post-polio syndromes; and Trigeminal neuralgias.    Surgical:  has a past surgical history that includes appendectomy (1983); surgical history of -; surgical history of - (2003); SURGICAL PATHOLOGY; Herniorrhaphy incisional (location); Biopsy node sentinel (Bilateral, 6/1/2016); Lumpectomy Breast with Seed Localization (Bilateral, 6/1/2016); tubal ligation; TOTAL ABDOM HYSTERECTOMY (5/2007); BSO, OMENTECTOMY W/OSMAN (5/2007); Exam under anesthesia pelvic (3/13/2014); Colposcopy, biopsy, combined (3/13/2014); Laser CO2 vagina  (3/13/2014); and Combined Cystoscopy, Retrogrades, Ureteroscopy, Insert Stent (Left, 2/2/2017).    Family Hx:  family history includes Breast Cancer in her maternal aunt, mother, paternal aunt, and sister; CANCER in her brother, maternal grandfather, maternal grandmother, mother, paternal grandfather, paternal grandmother, and sister; Colon Cancer in her paternal aunt; Pancreatic Cancer in her nephew; Rectal Cancer in her brother.    Social Hx:  reports that she quit smoking about 10 years ago. Her smoking use included Cigarettes. She has a 58.00 pack-year smoking history. She has never used smokeless tobacco. She reports that she does not drink alcohol or use illicit drugs.    REVIEW OF SYSTEMS:    CONSTITUTIONAL:  NEGATIVE for fever, chills, change in weight  INTEGUMENTARY/SKIN:  NEGATIVE for worrisome rashes, moles or lesions  EYES:  NEGATIVE for vision changes or irritation  ENT/MOUTH:  NEGATIVE for ear, mouth and throat problems  RESP:  NEGATIVE for significant cough or SOB  BREAST:  NEGATIVE for masses, tenderness or discharge  CV:  NEGATIVE for chest pain, palpitations or peripheral edema  GI:  NEGATIVE for nausea, abdominal pain, heartburn, or change in bowel habits  :  Negative   MUSCULOSKELETAL:  See HPI above  NEURO:  NEGATIVE for weakness, dizziness or paresthesias  ENDOCRINE:  NEGATIVE for temperature intolerance, skin/hair changes  HEME/ALLERGY/IMMUNE:  NEGATIVE for bleeding problems  PSYCHIATRIC:  NEGATIVE for changes in mood or affect    PHYSICAL EXAM:  BP (!) 157/93 (BP Location: Left arm, Patient Position: Chair, Cuff Size: Adult Regular)  Pulse 71  Wt 113.8 kg (250 lb 12.8 oz)  SpO2 91%  BMI 38.7 kg/m2  GENERAL APPEARANCE: healthy, alert and no distress   SKIN: no suspicious lesions or rashes  NEURO: Normal strength and tone, mentation intact and speech normal  VASCULAR: good pulses, and cappillary refill   LYMPH: no lymphadenopathy   PSYCH:  mentation appears normal and affect  normal/bright    KNEE EXAM:   Gait: unsteady gait due to significant weakness in her left leg .    ROM: 0-100* degrees  Effusion: mild  Tender: diffuse  Patellofemoral joint: mild crepitations in the patellofemoral joint.    Seated SLR: negative  Hip ROM causes pain in the knee   Diminished sensation extending over the lateral aspect of the thigh   Non-tender: greater trochanter     film from abdominal CT 1/25/17: mild arthritic changes in the hips.     X-rays: Obtained today of the RIGHT KNEE: 3-views, reviewed in the office with the patient by myself today and compared to x-ray 2011 and show significant loss in medial joint space, it is close to bone on bone. Moderate degenerative disease in the patellofemoral joint which is similar to previous x-rays.     Obtained today of the PELVIS: 1/2 view, reviewed in the office with the patient by myself today and are difficult to interpret, there seems to be minimal arthritic changes in the right hip. No fractures noted. The lower lumbar spine seems to have singifcant degenerative disc disease.     Impression:   1. OA of the right knee  2. Probable lumbar origin of her thigh pain     Plan:    I discussed the findings and diagnosis with the patient. We talked about the treatment options: non-invasive treatments and right knee corticosteroid injection. We offered her physical therapy for her back but she does not have time now due to multiple procedures expected for her carcinoid syndrome. I told her that total knee arthroplasty would be needed in the future and no other procedures would be appropriate.   All questions were answered. The patient understands and has elected to proceed with right knee corticosteroid injection. If the corticosteroid injection does not relieve most of her pain, possible it is lumbar mediated. Consider scan of lumbar spine if the pain not helped by the corticosteroid injection, or radicular symptoms worsen.     With the patient's consent,  right knee(s) injected intra-articularly with 80mg of Depomedrol and 4cc of local anesthetic after sterile prep.    Return to clinic PRN.     VERNA Downey MD  Dept. Orthopedic Surgery  [facility]     This document serves as a record of the services and decisions personally performed and made by Dr. VERNA Downey MD. It was created on his behalf by Keshia Smiley, a trained medical scribe. The creation of this record is based on the provider's personal observations and the statements of the patient. This document has been checked and approved by the attending provider.   Keshia Smiley June 7, 2017 5:01 PM    Again, thank you for allowing me to participate in the care of your patient.        Sincerely,              Allen Downey MD

## 2017-06-07 NOTE — PROGRESS NOTES
HISTORY OF PRESENT ILLNESS    Marissa Guadarrama is a 67 year old female who is seen as self referral for evaluation of  right knee pain that started over 1 month ago. I have seen her before for her right knee back in 2011. We diagnosed moderate OA in the patellofemoral joint. We also suggested an MRI scan due to a large enchondroma. In 2014 she fell and was seen in the ER. She was felt to have a sprain of her right knee. No known injury.     Present symptoms: numbness and tingling on the lateral aspect of the thigh but not pain, numbness in anterior thigh. No locking, catching, or giving out. Symptoms occur at night, twisting the knee.  The symptoms occur are constant.  Pain location: anterior distal thigh, medial and lateral knee     Treatments tried to this point: none.     Patient presents to the clinic with a walker.     Orthopedic PMH: History of herniated disc in the past. 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     Other PMH:  has a past medical history of Arthritis; Benign breast biopsy; Carcinoid tumor (12/2003); Cervical cancer (H); H/O colposcopy with cervical biopsy (12/23/13); High cholesterol; HTN; Pap smear of vagina with ASC-H (11/1/13); Post-polio syndromes; and Trigeminal neuralgias.    Surgical:  has a past surgical history that includes appendectomy (1983); surgical history of -; surgical history of - (2003); SURGICAL PATHOLOGY; Herniorrhaphy incisional (location); Biopsy node sentinel (Bilateral, 6/1/2016); Lumpectomy Breast with Seed Localization (Bilateral, 6/1/2016); tubal ligation; TOTAL ABDOM HYSTERECTOMY (5/2007); BSO, OMENTECTOMY W/OSMAN (5/2007); Exam under anesthesia pelvic (3/13/2014); Colposcopy, biopsy, combined (3/13/2014); Laser CO2 vagina (3/13/2014); and Combined Cystoscopy, Retrogrades, Ureteroscopy, Insert Stent (Left, 2/2/2017).    Family Hx:  family history includes Breast Cancer in her maternal aunt, mother, paternal aunt, and sister; CANCER in her brother, maternal  grandfather, maternal grandmother, mother, paternal grandfather, paternal grandmother, and sister; Colon Cancer in her paternal aunt; Pancreatic Cancer in her nephew; Rectal Cancer in her brother.    Social Hx:  reports that she quit smoking about 10 years ago. Her smoking use included Cigarettes. She has a 58.00 pack-year smoking history. She has never used smokeless tobacco. She reports that she does not drink alcohol or use illicit drugs.    REVIEW OF SYSTEMS:    CONSTITUTIONAL:  NEGATIVE for fever, chills, change in weight  INTEGUMENTARY/SKIN:  NEGATIVE for worrisome rashes, moles or lesions  EYES:  NEGATIVE for vision changes or irritation  ENT/MOUTH:  NEGATIVE for ear, mouth and throat problems  RESP:  NEGATIVE for significant cough or SOB  BREAST:  NEGATIVE for masses, tenderness or discharge  CV:  NEGATIVE for chest pain, palpitations or peripheral edema  GI:  NEGATIVE for nausea, abdominal pain, heartburn, or change in bowel habits  :  Negative   MUSCULOSKELETAL:  See HPI above  NEURO:  NEGATIVE for weakness, dizziness or paresthesias  ENDOCRINE:  NEGATIVE for temperature intolerance, skin/hair changes  HEME/ALLERGY/IMMUNE:  NEGATIVE for bleeding problems  PSYCHIATRIC:  NEGATIVE for changes in mood or affect    PHYSICAL EXAM:  BP (!) 157/93 (BP Location: Left arm, Patient Position: Chair, Cuff Size: Adult Regular)  Pulse 71  Wt 113.8 kg (250 lb 12.8 oz)  SpO2 91%  BMI 38.7 kg/m2  GENERAL APPEARANCE: healthy, alert and no distress   SKIN: no suspicious lesions or rashes  NEURO: Normal strength and tone, mentation intact and speech normal  VASCULAR: good pulses, and cappillary refill   LYMPH: no lymphadenopathy   PSYCH:  mentation appears normal and affect normal/bright    KNEE EXAM:   Gait: unsteady gait due to significant weakness in her left leg .    ROM: 0-100* degrees  Effusion: mild  Tender: diffuse  Patellofemoral joint: mild crepitations in the patellofemoral joint.    Seated SLR: negative  Hip  ROM causes pain in the knee   Diminished sensation extending over the lateral aspect of the thigh   Non-tender: greater trochanter     film from abdominal CT 1/25/17: mild arthritic changes in the hips.     X-rays: Obtained today of the RIGHT KNEE: 3-views, reviewed in the office with the patient by myself today and compared to x-ray 2011 and show significant loss in medial joint space, it is close to bone on bone. Moderate degenerative disease in the patellofemoral joint which is similar to previous x-rays.     Obtained today of the PELVIS: 1/2 view, reviewed in the office with the patient by myself today and are difficult to interpret, there seems to be minimal arthritic changes in the right hip. No fractures noted. The lower lumbar spine seems to have singifcant degenerative disc disease.     Impression:   1. OA of the right knee  2. Probable lumbar origin of her thigh pain     Plan:    I discussed the findings and diagnosis with the patient. We talked about the treatment options: non-invasive treatments and right knee corticosteroid injection. We offered her physical therapy for her back but she does not have time now due to multiple procedures expected for her carcinoid syndrome. I told her that total knee arthroplasty would be needed in the future and no other procedures would be appropriate.   All questions were answered. The patient understands and has elected to proceed with right knee corticosteroid injection. If the corticosteroid injection does not relieve most of her pain, possible it is lumbar mediated. Consider scan of lumbar spine if the pain not helped by the corticosteroid injection, or radicular symptoms worsen.     With the patient's consent, right knee(s) injected intra-articularly with 80mg of Depomedrol and 4cc of local anesthetic after sterile prep.    Return to clinic BRADY.     VERNA Downey MD  Dept. Orthopedic Surgery  [facility]     This document serves as a record  of the services and decisions personally performed and made by Dr. VERNA Downey MD. It was created on his behalf by Keshia Smiley, a trained medical scribe. The creation of this record is based on the provider's personal observations and the statements of the patient. This document has been checked and approved by the attending provider.   Keshia Smiley June 7, 2017 5:01 PM

## 2017-06-07 NOTE — NURSING NOTE
"Chief Complaint   Patient presents with     Musculoskeletal Problem     Right knee pain. Onset: 6+ months.       Initial BP (!) 157/93 (BP Location: Left arm, Patient Position: Chair, Cuff Size: Adult Regular)  Pulse 71  Wt 113.8 kg (250 lb 12.8 oz)  SpO2 91%  BMI 38.7 kg/m2 Estimated body mass index is 38.7 kg/(m^2) as calculated from the following:    Height as of 4/7/17: 1.715 m (5' 7.5\").    Weight as of this encounter: 113.8 kg (250 lb 12.8 oz).  Medication Reconciliation: complete   Kathy Alejandre LPN      "

## 2017-06-07 NOTE — PATIENT INSTRUCTIONS
Please remember to call and schedule a follow up appointment if one was recommended at your earliest convenience.   Orthopedics CLINIC HOURS TELEPHONE NUMBER   Allen Downey M.D.      Kathy Alejandre,  LPN Tuesday 8 am -5 pm    1st & 3rd Wednesday  1-4pm [place] 2nd & 4th Wednesday  8 am -11 pm / [place]  1-4pm / [place] Thursday 8 am -5 pm   Specialty schedulers:   [phone] to make an appointment with any Specialty Provider.   Main Clinic:   [phone] to make an appointment with your primary provider   Urgent Care locations:    [facility] Monday-Friday 5 pm - 9 pm  Saturday-Sunday 9 am -5pm      Monday-Friday 11 am - 9 pm  Saturday 9 am - 5 pm [phone]     If SURGERY has been recommended, please call our Specialty Schedulers at [phone] to schedule your procedure.    If you need a medication refill, please contact your pharmacy. Please allow 3 business days for your refill to be completed.  Use [employer] (secure e-mail communication and access to your chart) to send a message or to make an appointment. Please ask how you can sign up for [employer].

## 2017-06-08 ENCOUNTER — TELEPHONE (OUTPATIENT)
Dept: SURGERY | Facility: CLINIC | Age: 67
End: 2017-06-08

## 2017-06-08 NOTE — TELEPHONE ENCOUNTER
----- Message from Talisha Greco MD sent at 6/2/2017  3:50 PM CDT -----  Regarding: RE: Appointment  Contact: [phone]  6/23 anytime after 9:30 am works for me.    I would like to see her as an office visit.      [phone]  ----- Message -----     From: Estefania Bryant CNA     Sent: 6/2/2017   3:29 PM       To: Talisha Greco MD, Josie Huertas  Subject: RE: Appointment                                  Hi,    I did speak with Marissa again and she stated that she is also available 6/22 and 6/23. ([facility] is available both days)    Thanks!  ----- Message -----     From: Talisha Greco MD     Sent: 6/2/2017   1:44 PM       To: Estefania Antony CNA  Subject: RE: Appointment                                  Hi,  If someone can help me here... i think the message is that the patient doesn't want colonoscopy on Monday.  Please could someone get more information about when the patient could come for a colonoscopy- like which days in the next 4 weeks.  Then can someone compare with the [facility] schedule to see if there are slots open for the preferred time?    If this is too hard, then Josie, get all the dates she is free and then send them to me. I will send back what dates could work for me, and then Josie will need to coordinate with [facility].    thx  ----- Message -----     From: Josie Huertas     Sent: 6/2/2017  10:31 AM       To: Talisha Greco MD, Estefania Bryant CNA  Subject: RE: Appointment                                  Hi Estefania,    I feel pretty confident saying that next week will not work for this. I think we need to look at other options. Did she mean that all Mondays will not work?    Josie Wright  ----- Message -----     From: Estefania Bryant CNA     Sent: 6/1/2017  11:12 AM       To: Talisha Greco MD, Josie Huertas  Subject: Appointment                                      Hi,     Marissa states that she is unavailable to come in for colonoscopy on Monday's, she would not have  transportation. Pt would like to know if [facility] is available to do her procedure 6/8 or 6/9, that is when her  returns from out of town(she says he leaves every two weeks).    Thank you,    Estefania

## 2017-06-08 NOTE — TELEPHONE ENCOUNTER
I spoke to Dr. Greco about this patient in person to find out when on 06/23/2017 she would like me to set up a clinic appointment and she told me that she would try to speak to the patient the day of the colonoscopy before the patient is sedated. Dr. Greco told me that she would let me know if this is going to happen. I called Marissa today to let her know of this plan. She did not answer so I left a message for her with this information and my number.

## 2017-06-16 ENCOUNTER — TELEPHONE (OUTPATIENT)
Dept: GASTROENTEROLOGY | Facility: CLINIC | Age: 67
End: 2017-06-16

## 2017-06-16 DIAGNOSIS — Z12.11 ENCOUNTER FOR SCREENING COLONOSCOPY: Primary | ICD-10-CM

## 2017-06-16 NOTE — TELEPHONE ENCOUNTER
Patient scheduled for Colonoscopy    Indication for procedure. Carcinoid tumor of colon    Referring Provider. Talisha Greco MD    ? Not Needed    Arrival time verified? Yes, 0900    Facility location verified? Yes, [address]    Instructions given regarding prep and procedure    Prep Type Golytely    Are you taking any anticoagulants or blood thinners? No    Instructions given? N/A    Electronic implanted devices? No    Pre procedure teaching completed? Yes    Transportation from procedure?     H&P / Pre op physical completed? N/A

## 2017-06-23 ENCOUNTER — HOSPITAL ENCOUNTER (OUTPATIENT)
Facility: CLINIC | Age: 67
Discharge: HOME | End: 2017-06-23
Attending: COLON & RECTAL SURGERY | Admitting: COLON & RECTAL SURGERY
Payer: MEDICARE

## 2017-06-23 ENCOUNTER — SURGERY (OUTPATIENT)
Age: 67
End: 2017-06-23

## 2017-06-23 VITALS
BODY MASS INDEX: 37.89 KG/M2 | SYSTOLIC BLOOD PRESSURE: 156 MMHG | HEIGHT: 68 IN | WEIGHT: 250 LBS | DIASTOLIC BLOOD PRESSURE: 84 MMHG | OXYGEN SATURATION: 95 % | RESPIRATION RATE: 13 BRPM

## 2017-06-23 LAB — COLONOSCOPY: NORMAL

## 2017-06-23 PROCEDURE — 88342 IMHCHEM/IMCYTCHM 1ST ANTB: CPT | Performed by: COLON & RECTAL SURGERY

## 2017-06-23 PROCEDURE — 88305 TISSUE EXAM BY PATHOLOGIST: CPT | Performed by: COLON & RECTAL SURGERY

## 2017-06-23 PROCEDURE — 88341 IMHCHEM/IMCYTCHM EA ADD ANTB: CPT | Performed by: COLON & RECTAL SURGERY

## 2017-06-23 PROCEDURE — 45380 COLONOSCOPY AND BIOPSY: CPT | Performed by: COLON & RECTAL SURGERY

## 2017-06-23 PROCEDURE — G0500 MOD SEDAT ENDO SERVICE >5YRS: HCPCS | Performed by: COLON & RECTAL SURGERY

## 2017-06-23 PROCEDURE — 99153 MOD SED SAME PHYS/QHP EA: CPT | Performed by: COLON & RECTAL SURGERY

## 2017-06-23 PROCEDURE — 25000128 H RX IP 250 OP 636: Performed by: COLON & RECTAL SURGERY

## 2017-06-23 RX ORDER — FENTANYL CITRATE 50 UG/ML
INJECTION, SOLUTION INTRAMUSCULAR; INTRAVENOUS PRN
Status: DISCONTINUED | OUTPATIENT
Start: 2017-06-23 | End: 2017-06-23 | Stop reason: HOSPADM

## 2017-06-23 RX ADMIN — MIDAZOLAM HYDROCHLORIDE 1 MG: 1 INJECTION, SOLUTION INTRAMUSCULAR; INTRAVENOUS at 10:26

## 2017-06-23 RX ADMIN — FENTANYL CITRATE 50 MCG: 50 INJECTION, SOLUTION INTRAMUSCULAR; INTRAVENOUS at 10:19

## 2017-06-23 RX ADMIN — MIDAZOLAM HYDROCHLORIDE 2 MG: 1 INJECTION, SOLUTION INTRAMUSCULAR; INTRAVENOUS at 10:12

## 2017-06-23 RX ADMIN — FENTANYL CITRATE 100 MCG: 50 INJECTION, SOLUTION INTRAMUSCULAR; INTRAVENOUS at 10:12

## 2017-06-23 NOTE — LETTER
July 6, 2017    Rosalinda Lancaster  [address] [facility] [address]    Dear Rosalinda,    The pathology from your recent colonoscopy showed Tubular Adenoma and hyperplastic polyp, these are both benign (non-cancerous) tissue.  Adenomatous polyps are entirely benign (non-cancerous); however, patients who have developed these polyps are at an increased risk for developing additional polyps in the future. If these are not eventually removed, there is a risk of developing colon cancer. We will advise more frequent examinations with you because of the risk associated with this type of polyp.These polyps rarely become cancerous.    Dr. Greco recommends that you undergo a repeat colonoscopy in 5 year(s) for surveillance. We will enter you into a recall system so you receive a reminder closer to the time that you are due for repeat examination.     Please remember that this recommendation is made with the understanding that you are not experiencing persistent changes in bowel function, bleeding per rectum, and/or significant abdominal pain. If you experience these symptoms, please contact your primary care provider for a further evaluation.     If you have any questions or concerns about the results of your colonoscopy or the appropriate follow-up, please contact the [facility] at the number listed below. Thank you.    Sincerely,    Moises Franklin LPN  [facility]  [phone] Option 3      Resulted Orders   Surgical pathology exam   Result Value Ref Range    Copath Report       Patient Name: ROSALINDA LANCASTER  MR#: [id number]  Specimen #: [id number]  Collected: 6/23/2017  Received: 6/23/2017  Reported: 6/29/2017 12:38  Ordering Phy(s): MICHAEL GRECO    For improved result formatting, select 'View Enhanced Report Format'  under Linked Documents section.    SPECIMEN(S):  A: Ileum biopsy, ileocolonic anastomosis  B: Colon polyps, transverse  C: Sigmoid colon polyps, distal    FINAL  "DIAGNOSIS:  A. SMALL INTESTINE, ILEOCOLONIC ANASTOMOSIS, BIOPSY:  - Colonic mucosa  with mild chronic inflammation and mild crypt architectural distortion  - No evidence of recurrent carcinoid tumor    B. LARGE INTESTINE, TRANSVERSE COLON, POLYPS X 2, POLYPECTOMIES:  -  Tubular adenoma  - Two fragments of colonic mucosa with no significant histologic  abnormality  - No evidence of high-grade dysplasia or malignancy    C. LARGE INTESTINE, DISTAL SIGMOID COLON, POLYPS X 2, POLYPECTOMIES:  - Hyperplastic polyps    I have personally reviewed all specimens and or slides, including the  l isted special stains, and used them with my medical judgement to  determine the final diagnosis.    Electronically signed out by:    Olamide Crockett M.D., Projectioneering    CLINICAL HISTORY:  The patient is a 67-year-old s/p right colon resection (2003).  She has  a history of recurrent carcinoid tumor of the retroperitoneum and  positive octreotide scan at the ileocolonic anastomosis.  Colonoscopy:  Patent side-to-side ileocolonic anastomosis with healthy-appearing  mucosa; two 2-3 mm polyps in transverse colon; two 4-7 mm polyps in  sigmoid colon.    GROSS:  A: The specimen is received in formalin with proper patient  identification, labeled \"ileocolonic anastomosis biopsies\".  The  specimen consists of six pink-red soft tissue fragments 0.1-0.3 cm in  greatest dimension.  The specimen is wrapped and entirely submitted  (wrapped) in cassette A1.    B: The specimen is received in formalin with proper patient  identification, labeled \"large intestine, transverse two polyps\".  The  speci men consists of three pink-tan soft tissue fragments averaging 0.2  cm in greatest dimension.  The specimen is wrapped and entirely  submitted (wrapped) in cassette B1.    C: The specimen is received in formalin with proper patient  identification, labeled \"two distal sigmoid polyps\".  The specimen  consists of six pink-tan soft tissue fragments 0.2-0.4 cm " in greatest  dimension.  The specimen is wrapped and entirely submitted (wrapped) in  cassette C1. (Dictated by: Naima Granados [facility] 6/23/2017 03:44 PM)    MICROSCOPIC:  Microscopic examination was performed.  Chromogranin and synaptophysin  performed with appropriate controls on specimen A.  Findings support the  diagnosis.    CPT Codes:  A: 48190-AN9, 96437-LFP, 91062-MOI  B: 34995-HL0  C: 97485-XL2    TESTING LAB LOCATION:  [address]   [address]  [phone]    COLLECTION SITE:  Client: [facility]   Location: [facility])

## 2017-06-23 NOTE — IP AVS SNAPSHOT
MRN:[id number]                      After Visit Summary   6/23/2017    Marissa Guadarrama    MRN: [id number]           Thank you!     Thank you for choosing [facility] for your care. Our goal is always to provide you with excellent care. Hearing back from our patients is one way we can continue to improve our services. Please take a few minutes to complete the written survey that you may receive in the mail after you visit with us. Thank you!        Patient Information     Date Of Birth          1950        About your hospital stay     You were admitted on:  June 23, 2017 You last received care in the:  [facility], Endoscopy    You were discharged on:  June 23, 2017       Who to Call     For medical emergencies, please call 911.  For non-urgent questions about your medical care, please call your primary care provider or clinic, [phone]  For questions related to your surgery, please call your surgery clinic        Attending Provider     Provider Specialty    Talisha Greco MD Colon and Rectal Surgery       Primary Care Provider Office Phone # Fax #    Bernice Howard [phone]      Your next 10 appointments already scheduled     Jul 05, 2017 12:30 PM CDT   MA DIAGNOSTIC DIGITAL BILATERAL with [address] [facility] ([facility])    [address] [facility] [address] [phone]           Do not use any powder, lotion or deodorant under your arms or on your breast. If you do, we will ask you to remove it before your exam.  Wear comfortable, two-piece clothing.  If you have any allergies, tell your care team.  Bring any previous mammograms from other facilities or have them mailed to the breast center.            Jul 05, 2017 12:50 PM CDT   LAB with [facility] ([facility])    [address] [facility] [address]   [phone]           Patient must bring picture ID.  Patient  "should be prepared to give a urine specimen  Please do not eat 10-12 hours before your appointment if you are coming in fasting for labs on lipids, cholesterol, or glucose (sugar).  Pregnant women should follow their Care Team instructions. Water with medications is okay. Do not drink coffee or other fluids.   If you have concerns about taking  your medications, please ask at office or if scheduling via My [facility]Exactly, send a message by clicking on Secure Messaging, Message Your Care Team.            2017  2:30 PM CDT   Return Visit with Hosea King MD   [facility] ([facility])    [facility]  [address] [place] [address]  [facility] [address]   [phone]              Pending Results     No orders found from 2017 to 2017.            Admission Information     Date & Time Provider Department Dept. Phone    2017 Talisha Greco MD [facility], [place], Endoscopy [phone]      Your Vitals Were     Blood Pressure Respirations Height Weight Pulse Oximetry BMI (Body Mass Index)    159/97 27 1.715 m (5' 7.5\") 113.4 kg (250 lb) 99% 38.58 kg/m2      MyChart Information     My [facility]Exactly lets you send messages to your doctor, view your test results, renew your prescriptions, schedule appointments and more. To sign up, go to [email or web address] . Click on \"Log in\" on the left side of the screen, which will take you to the Welcome page. Then click on \"Sign up Now\" on the right side of the page.     You will be asked to enter the access code listed below, as well as some personal information. Please follow the directions to create your username and password.     Your access code is: [email or web address]  Expires: 2017  8:39 AM     Your access code will  in 90 days. If you need help or a new code, please call your [place] clinic or [phone].        Care EveryWhere ID     This is your Care EveryWhere ID. This could be used by other organizations to access " your [facility] medical records  [id number]        Equal Access to Services     TASIA RANGEL : Hadii aad ku hadminervajoi Lopez, wamalihada yusra, qafelixta karosaadelita lr, niesha kahnelishapancho rodrigues. So [facility] [phone].    ATENCIÓN: Si habla español, tiene a allan disposición servicios gratuitos de asistencia lingüística. Llame al [phone].    We comply with applicable federal civil rights laws and [place] laws. We do not discriminate on the basis of race, color, national origin, age, disability sex, sexual orientation or gender identity.               Review of your medicines      UNREVIEWED medicines. 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)        Dose:  25 mg   Take 1 tablet (25 mg) by mouth daily   Quantity:  90 tablet   Refills:  1       gabapentin 600 MG tablet   Commonly known as:  NEURONTIN   Used for:  Trigeminal neuralgia        Dose:  600 mg   Take 1 tablet (600 mg) by mouth 3 times daily   Quantity:  180 tablet   Refills:  6       hydrochlorothiazide 25 MG tablet   Commonly known as:  HYDRODIURIL   Used for:  Essential hypertension with goal blood pressure less than 140/90        Dose:  12.5 mg   Take 0.5 tablets (12.5 mg) by mouth daily   Refills:  0       HYDROcodone-acetaminophen 5-325 MG per tablet   Commonly known as:  NORCO   Used for:  Osteoarthritis of right knee, unspecified osteoarthritis type        Dose:  1-2 tablet   Take 1-2 tablets by mouth every 4 hours as needed for other (Moderate to Severe Pain)    Quantity:  30 tablet   Refills:  0       lisinopril 10 MG tablet   Commonly known as:  PRINIVIL/ZESTRIL   Used for:  Essential hypertension with goal blood pressure less than 140/90        TAKE ONE TABLET (10 MG) BY MOUTH ONCE DAILY   Quantity:  90 tablet   Refills:  0       oxybutynin 5 MG tablet   Commonly known as:  DITROPAN   Used for:  Other hydronephrosis        Dose:  5 mg   Take 1 tablet (5 mg) by mouth 2 times daily as needed for bladder spasms   Quantity:  20 tablet   Refills:  0       oxyCODONE 5 MG IR tablet   Commonly known as:  ROXICODONE   Used for:  Other hydronephrosis        Dose:  5 mg   Take 1 tablet (5 mg) by mouth every 6 hours as needed for pain maximum 4 tablet(s) per day   Quantity:  20 tablet   Refills:  0       Vitamin D-3 1000 UNITS Caps        Dose:  1 capsule   Take 1 capsule by mouth   Refills:  0       VITAMIN E NATURAL PO        Dose:  100 Units   Take 100 Units by mouth daily   Refills:  0                Protect others around you: Learn how to safely use, store and throw away your medicines at www.disposemymeds.org.             Medication List: This is a list of all your medications and when to take them. Check marks below indicate your daily home schedule. Keep this list as a reference.      Medications           Morning Afternoon Evening Bedtime As Needed    albuterol 108 (90 BASE) MCG/ACT Inhaler   Commonly known as:  PROAIR HFA/PROVENTIL HFA/VENTOLIN HFA   Inhale 2 puffs into the lungs every 6 hours as needed for shortness of breath / dyspnea or wheezing                                exemestane 25 MG tablet   Commonly known as:  AROMASIN   Take 1 tablet (25 mg) by mouth daily                                gabapentin 600 MG tablet   Commonly known as:  NEURONTIN   Take 1 tablet (600 mg) by mouth 3 times daily                                hydrochlorothiazide 25 MG tablet   Commonly known as:  HYDRODIURIL   Take 0.5 tablets (12.5 mg) by mouth daily                                 HYDROcodone-acetaminophen 5-325 MG per tablet   Commonly known as:  NORCO   Take 1-2 tablets by mouth every 4 hours as needed for other (Moderate to Severe Pain)                                lisinopril 10 MG tablet   Commonly known as:  PRINIVIL/ZESTRIL   TAKE ONE TABLET (10 MG) BY MOUTH ONCE DAILY                                oxybutynin 5 MG tablet   Commonly known as:  DITROPAN   Take 1 tablet (5 mg) by mouth 2 times daily as needed for bladder spasms                                oxyCODONE 5 MG IR tablet   Commonly known as:  ROXICODONE   Take 1 tablet (5 mg) by mouth every 6 hours as needed for pain maximum 4 tablet(s) per day                                Vitamin D-3 1000 UNITS Caps   Take 1 capsule by mouth                                VITAMIN E NATURAL PO   Take 100 Units by mouth daily

## 2017-06-29 LAB — COPATH REPORT: NORMAL

## 2017-07-05 ENCOUNTER — HOSPITAL ENCOUNTER (OUTPATIENT)
Dept: LAB | Facility: CLINIC | Age: 67
End: 2017-07-05
Attending: INTERNAL MEDICINE
Payer: MEDICARE

## 2017-07-05 ENCOUNTER — HOSPITAL ENCOUNTER (OUTPATIENT)
Dept: MAMMOGRAPHY | Facility: CLINIC | Age: 67
Discharge: HOME | End: 2017-07-05
Attending: INTERNAL MEDICINE | Admitting: INTERNAL MEDICINE
Payer: MEDICARE

## 2017-07-05 DIAGNOSIS — C50.911 INVASIVE DUCTAL CARCINOMA OF BREAST, RIGHT (H): Primary | ICD-10-CM

## 2017-07-05 DIAGNOSIS — C50.411 BILATERAL MALIGNANT NEOPLASM OF UPPER OUTER QUADRANT OF BREAST IN FEMALE (H): ICD-10-CM

## 2017-07-05 DIAGNOSIS — C50.412 BILATERAL MALIGNANT NEOPLASM OF UPPER OUTER QUADRANT OF BREAST IN FEMALE (H): ICD-10-CM

## 2017-07-05 LAB
ALBUMIN SERPL-MCNC: 3.4 G/DL (ref 3.4–5)
ALP SERPL-CCNC: 76 U/L (ref 40–150)
ALT SERPL W P-5'-P-CCNC: 18 U/L (ref 0–50)
ANION GAP SERPL CALCULATED.3IONS-SCNC: 2 MMOL/L (ref 3–14)
AST SERPL W P-5'-P-CCNC: 13 U/L (ref 0–45)
BASOPHILS # BLD AUTO: 0 10E9/L (ref 0–0.2)
BASOPHILS NFR BLD AUTO: 0.4 %
BILIRUB SERPL-MCNC: 0.6 MG/DL (ref 0.2–1.3)
BUN SERPL-MCNC: 34 MG/DL (ref 7–30)
CALCIUM SERPL-MCNC: 9.6 MG/DL (ref 8.5–10.1)
CANCER AG27-29 SERPL-ACNC: 26 U/ML (ref 0–39)
CHLORIDE SERPL-SCNC: 104 MMOL/L (ref 94–109)
CO2 SERPL-SCNC: 34 MMOL/L (ref 20–32)
CREAT SERPL-MCNC: 1.47 MG/DL (ref 0.52–1.04)
DIFFERENTIAL METHOD BLD: NORMAL
EOSINOPHIL # BLD AUTO: 0.3 10E9/L (ref 0–0.7)
EOSINOPHIL NFR BLD AUTO: 4.2 %
ERYTHROCYTE [DISTWIDTH] IN BLOOD BY AUTOMATED COUNT: 13.3 % (ref 10–15)
GFR SERPL CREATININE-BSD FRML MDRD: 35 ML/MIN/1.7M2
GLUCOSE SERPL-MCNC: 93 MG/DL (ref 70–99)
HCT VFR BLD AUTO: 43.3 % (ref 35–47)
HGB BLD-MCNC: 14 G/DL (ref 11.7–15.7)
IMM GRANULOCYTES # BLD: 0 10E9/L (ref 0–0.4)
IMM GRANULOCYTES NFR BLD: 0.1 %
LYMPHOCYTES # BLD AUTO: 1.4 10E9/L (ref 0.8–5.3)
LYMPHOCYTES NFR BLD AUTO: 18 %
MCH RBC QN AUTO: 30.4 PG (ref 26.5–33)
MCHC RBC AUTO-ENTMCNC: 32.3 G/DL (ref 31.5–36.5)
MCV RBC AUTO: 94 FL (ref 78–100)
MONOCYTES # BLD AUTO: 0.5 10E9/L (ref 0–1.3)
MONOCYTES NFR BLD AUTO: 5.7 %
NEUTROPHILS # BLD AUTO: 5.7 10E9/L (ref 1.6–8.3)
NEUTROPHILS NFR BLD AUTO: 71.6 %
PLATELET # BLD AUTO: 172 10E9/L (ref 150–450)
POTASSIUM SERPL-SCNC: 4.9 MMOL/L (ref 3.4–5.3)
PROT SERPL-MCNC: 7 G/DL (ref 6.8–8.8)
RBC # BLD AUTO: 4.6 10E12/L (ref 3.8–5.2)
SODIUM SERPL-SCNC: 140 MMOL/L (ref 133–144)
WBC # BLD AUTO: 7.9 10E9/L (ref 4–11)

## 2017-07-05 PROCEDURE — 85025 COMPLETE CBC W/AUTO DIFF WBC: CPT | Performed by: INTERNAL MEDICINE

## 2017-07-05 PROCEDURE — 36415 COLL VENOUS BLD VENIPUNCTURE: CPT | Performed by: INTERNAL MEDICINE

## 2017-07-05 PROCEDURE — 86300 IMMUNOASSAY TUMOR CA 15-3: CPT | Performed by: INTERNAL MEDICINE

## 2017-07-05 PROCEDURE — 80053 COMPREHEN METABOLIC PANEL: CPT | Performed by: INTERNAL MEDICINE

## 2017-07-05 PROCEDURE — G0204 DX MAMMO INCL CAD BI: HCPCS

## 2017-07-06 NOTE — ADDENDUM NOTE
Encounter addended by: Anais Franklin LPN on: 7/6/2017  3:46 PM<BR>     Actions taken: Results reviewed in IB, Letter status changed

## 2017-07-12 DIAGNOSIS — D3A.029 CARCINOID TUMOR OF COLON (H): Primary | ICD-10-CM

## 2017-07-12 RX ORDER — CEFOTETAN DISODIUM 2 G/20ML
2 INJECTION, POWDER, FOR SOLUTION INTRAMUSCULAR; INTRAVENOUS SEE ADMIN INSTRUCTIONS
Status: CANCELLED | OUTPATIENT
Start: 2017-07-12

## 2017-07-12 RX ORDER — NEOMYCIN SULFATE 500 MG/1
1000 TABLET ORAL 3 TIMES DAILY
Qty: 6 TABLET | Refills: 0 | Status: SHIPPED | OUTPATIENT
Start: 2017-07-12 | End: 2017-07-13

## 2017-07-12 RX ORDER — METRONIDAZOLE 500 MG/1
500 TABLET ORAL 3 TIMES DAILY
Qty: 3 TABLET | Refills: 0 | Status: SHIPPED | OUTPATIENT
Start: 2017-07-12 | End: 2017-07-13

## 2017-07-12 RX ORDER — ACETAMINOPHEN 325 MG/1
975 TABLET ORAL ONCE
Status: CANCELLED | OUTPATIENT
Start: 2017-07-12 | End: 2017-07-12

## 2017-07-12 RX ORDER — CEFOTETAN DISODIUM 2 G/20ML
2 INJECTION, POWDER, FOR SOLUTION INTRAMUSCULAR; INTRAVENOUS
Status: CANCELLED | OUTPATIENT
Start: 2017-07-12

## 2017-07-12 NOTE — PROGRESS NOTES
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  She subsequently had a hysterectomy with iliac lymph node dissection for cervical cancer and at that time an implant of carcinoid tumor was noted on the bladder.  She also has as a history of breast cancer most recently from 2016.  She has had two cancers- one in each breast, and has had radiation to each breast after lumpectomy.    She has seen Dr. Segundo for JAYDEN I at the vaginal cuff  Last biopsy 9/2016.  I don't have operative reports or pathology report from 2003 surgery. Reportedly s/p ventral hernia repair with mesh  Patient now has left hydronephrosis from a lesion obstructing the left ureter.  On Octreoscan, this lights up, and it is presumed to be metastatic/recurrent carcinoid tumor.  Also, without lesion, there is activity at the ileocolic anastomosis.  i have met her and her  in endoscopy.  I have performed a colonoscpy which shows a healed anastomosis.  I examined her abdomen at that time, which showed a well healed midline incision.  Biopsy shows   A. SMALL INTESTINE, ILEOCOLONIC ANASTOMOSIS, BIOPSY:  - Colonic mucosa   with mild chronic inflammation and mild crypt architectural distortion   - No evidence of recurrent carcinoid tumor     B. LARGE INTESTINE, TRANSVERSE COLON, POLYPS X 2, POLYPECTOMIES:  -   Tubular adenoma   - Two fragments of colonic mucosa with no significant histologic   abnormality   - No evidence of high-grade dysplasia or malignancy     C. LARGE INTESTINE, DISTAL SIGMOID COLON, POLYPS X 2, POLYPECTOMIES:   - Hyperplastic polyps     Over the phone today we reviewed the operative plan- exploratory laparotomy, Dr. La to perform his procedure, and then i will perform a lysis of adhesions and examine the ileocolic anastomosis.  If there is suspicion for lesions there, the anastomosis will be resected and revised.  The risk includes anastomotic leak, enterotomy during VICTOR M, mesh infection, wound  infection that may take months to years to heal and may require reoperation, and possible stoma, particularly if she develops a leak.  Risk of bleeding and blood transfusion also discussed.  Patient has never had a blood transfusion per her report.    She does not wish to come to clinic to discuss further because she has transportation problems.  She wishes to have her primary care physician perform the preop.  We will ask Dr. Segundo if he could perform the vaginal exam in the operating room on the day of surgery.  We discussed the bowel prep, and oral abx, and this was prescribed to her.  She will call our office if she has further questions.      Pathology from hysterectomy:  D) UTERUS, BILATERAL FALLOPIAN TUBES AND OVARIES, RADICAL HYSTERECTOMY  AND SALPINGO-OOPHORECTOMY:     1. Invasive moderately differentiated non-keratinizing squamous        cell carcinoma of the cervix        a. Tumor size estimated at 1.0 x 0.9 x 0.7 cm     2. Ectocervical margin clear by at least 2 cm     3. Lymphovascular invasion present     4. Benign endometrial polyp     5. Multiple benign leiomyomas     6. Serosal adenomyosis     7. One benign parametrial lymph node     8. Bilateral fallopian tubes and ovaries without diagnostic        alterations     9. Metastatic carcinoid tumor involving uterine serosa    E) BLADDER, EXCISION OF NODULE:     1. Metastatic carcinoid tumor involving serosa     2. Organizing fat necrosis    Breast cancer ER+ AK+ HER 2-:  Warren LNs negative  - Invasive ductal carcinoma, Luda grade 1 (score 5; tubules 2,   nuclear pleomorphism 2, mitoses 1).   - Focal ductal carcinoma in situ, intermediate grade, solid and   cribriform pattern.   - No angiolymphatic invasion identified.   - Focal microcalcifications identified in small ducts favored for   invasive tumor.

## 2017-07-16 DIAGNOSIS — I10 ESSENTIAL HYPERTENSION WITH GOAL BLOOD PRESSURE LESS THAN 140/90: ICD-10-CM

## 2017-07-17 NOTE — TELEPHONE ENCOUNTER
lisinopril (PRINIVIL/ZESTRIL) 10 MG tablet    0 ordered  Edit     Summary: TAKE ONE TABLET (10 MG) BY MOUTH ONCE DAILY, Disp-90 tablet, R-0, E-Prescribe   Start: 5/4/2017  Ord/Sold: 5/4/2017 (O)  Report  Taking:   Long-term:   Pharmacy: [facility] [address] [place] [address] Dose History       Patient Sig: TAKE ONE TABLET (10 MG) BY MOUTH ONCE DAILY       Ordered on: 5/4/2017       Authorized by: SARA COOPER       Dispense: 90 tablet          Last Office Visit with [facility], [facility] or [facility] prescribing provider: 2-1-2017  Next 5 appointments (look out 90 days)     Jul 19, 2017  2:30 PM CDT   Return Visit with Hosea King MD   [facility] ([facility])    [facility] [employer] [facility]  [address]  [facility] [address]   [phone]                   Potassium   Date Value Ref Range Status   07/05/2017 4.9 3.4 - 5.3 mmol/L Final     Creatinine   Date Value Ref Range Status   07/05/2017 1.47 (H) 0.52 - 1.04 mg/dL Final     BP Readings from Last 3 Encounters:   06/23/17 156/84   06/07/17 (!) 157/93   04/07/17 149/88

## 2017-07-18 NOTE — TELEPHONE ENCOUNTER
Routing refill request to provider for review/approval because:  Labs out of range:  Creatinine. BP above goal.    Stefano Means RN

## 2017-07-18 NOTE — TELEPHONE ENCOUNTER
Called and spoke to  Marissa. She states she does check her BP at home, it has been 120's/60's - always normal at home. She says she will be coming in soon for a preop - has to have surgery for return of cancer. They are coordinating with two different surgeons at once so she doesn't have a date yet but as soon as she does she will call us to schedule. Would like this refilled for now. Will route to provider to advise.      Codie Dixon RN

## 2017-07-18 NOTE — TELEPHONE ENCOUNTER
Please contact patient and find out if she does home blood pressure monitoring.  She has many appointments with specialists.  If blood pressure not controlled, I need to connect with her for blood pressure management.  I could adjust medication over the phone, and then ask that she have blood work and blood pressure rechecked after 2 weeks.    Let me know the plan.  Bernice Howard MD

## 2017-07-19 ENCOUNTER — ONCOLOGY VISIT (OUTPATIENT)
Dept: ONCOLOGY | Facility: CLINIC | Age: 67
End: 2017-07-19
Attending: INTERNAL MEDICINE
Payer: COMMERCIAL

## 2017-07-19 VITALS
SYSTOLIC BLOOD PRESSURE: 133 MMHG | DIASTOLIC BLOOD PRESSURE: 75 MMHG | OXYGEN SATURATION: 95 % | HEIGHT: 67 IN | HEART RATE: 71 BPM | RESPIRATION RATE: 18 BRPM | WEIGHT: 243.7 LBS | BODY MASS INDEX: 38.25 KG/M2 | TEMPERATURE: 98.6 F

## 2017-07-19 DIAGNOSIS — M19.90 ARTHRITIS: ICD-10-CM

## 2017-07-19 DIAGNOSIS — D3A.00 CARCINOID TUMOR (H): ICD-10-CM

## 2017-07-19 DIAGNOSIS — E78.5 HYPERLIPIDEMIA LDL GOAL <130: ICD-10-CM

## 2017-07-19 DIAGNOSIS — E83.52 SERUM CALCIUM ELEVATED: ICD-10-CM

## 2017-07-19 DIAGNOSIS — C50.411 MALIGNANT NEOPLASM OF UPPER-OUTER QUADRANT OF BOTH BREASTS IN FEMALE, ESTROGEN RECEPTOR POSITIVE (H): Primary | ICD-10-CM

## 2017-07-19 DIAGNOSIS — Z17.0 MALIGNANT NEOPLASM OF UPPER-OUTER QUADRANT OF BOTH BREASTS IN FEMALE, ESTROGEN RECEPTOR POSITIVE (H): Primary | ICD-10-CM

## 2017-07-19 DIAGNOSIS — C50.412 MALIGNANT NEOPLASM OF UPPER-OUTER QUADRANT OF BOTH BREASTS IN FEMALE, ESTROGEN RECEPTOR POSITIVE (H): Primary | ICD-10-CM

## 2017-07-19 DIAGNOSIS — I10 HYPERTENSION GOAL BP (BLOOD PRESSURE) < 140/90: ICD-10-CM

## 2017-07-19 PROCEDURE — 99215 OFFICE O/P EST HI 40 MIN: CPT | Performed by: INTERNAL MEDICINE

## 2017-07-19 PROCEDURE — 99211 OFF/OP EST MAY X REQ PHY/QHP: CPT

## 2017-07-19 RX ORDER — EXEMESTANE 25 MG/1
25 TABLET ORAL DAILY
Qty: 90 TABLET | Refills: 1 | Status: SHIPPED | OUTPATIENT
Start: 2017-07-19 | End: 2017-11-15

## 2017-07-19 RX ORDER — LISINOPRIL 10 MG/1
TABLET ORAL
Qty: 90 TABLET | Refills: 3 | Status: SHIPPED | OUTPATIENT
Start: 2017-07-19 | End: 2017-09-05

## 2017-07-19 ASSESSMENT — PAIN SCALES - GENERAL: PAINLEVEL: MILD PAIN (3)

## 2017-07-19 NOTE — MR AVS SNAPSHOT
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 King in 3 months with labs prior.  Your prescription (Aromasin) has been sent to:   [facility] [address] [place] [address] - [address]   [address] [facility] [address]  Phone: [phone] Fax: [phone]  When you are in need of a refill, please call your pharmacy and they will send us a request.  Copy of appointments, and after visit summary (AVS) given to patient.  If you have any questions during business hours (M-F 8 AM- 4PM), please call Codi Diaz RN, BSN, OCN Oncology Hematology /Breast Cancer Navigator at [facility] [phone].   For questions after business hours, or on holidays/weekends, please call our after hours Nurse Triage line [phone]. Thank you.            Follow-ups after your visit        Follow-up notes from your care team     Return in about 3 months (around 10/19/2017) for Blood work before next appointment.      Your next 10 appointments already scheduled     Oct 12, 2017 10:10 AM CDT   LAB with [facility] ([facility])    [address] [facility] [address]   [phone]           Patient must bring picture ID.  Patient should be prepared to give a urine specimen  Please do not eat 10-12 hours before your appointment if you are coming in fasting for labs on lipids,  "cholesterol, or glucose (sugar).  Pregnant women should follow their Care Team instructions. Water with medications is okay. Do not drink coffee or other fluids.   If you have concerns about taking  your medications, please ask at office or if scheduling via [employer], send a message by clicking on Secure Messaging, Message Your Care Team.            Oct 19, 2017 10:30 AM CDT   Return Visit with Hosea King MD   [facility] ([facility])    [facility]  [address] [facility] [address]  [place] [address] [phone]              Future tests that were ordered for you today     Open Future Orders        Priority Expected Expires Ordered    CBC with platelets differential Routine 10/19/2017 7/19/2018 7/19/2017    Comprehensive metabolic panel Routine 10/19/2017 7/19/2018 7/19/2017    Ca27.29  breast tumor marker Routine 10/19/2017 7/19/2018 7/19/2017            Who to contact     If you have questions or need follow up information about today's clinic visit or your schedule please contact [facility] directly at [phone].  Normal or non-critical lab and imaging results will be communicated to you by [employer], letter or phone within 4 business days after the clinic has received the results. If you do not hear from us within 7 days, please contact the clinic through [employer] or phone. If you have a critical or abnormal lab result, we will notify you by phone as soon as possible.  Submit refill requests through [employer] or call your pharmacy and they will forward the refill request to us. Please allow 3 business days for your refill to be completed.          Additional Information About Your Visit        [employer] Information     [employer] lets you send messages to your doctor, view your test results, renew your prescriptions, schedule appointments and more. To sign up, go to [email or web address] . Click on \"Log in\" on the left side of the screen, which will take " "you to the Welcome page. Then click on \"Sign up Now\" on the right side of the page.     You will be asked to enter the access code listed below, as well as some personal information. Please follow the directions to create your username and password.     Your access code is: [email or web address]  Expires: 2017  8:39 AM     Your access code will  in 90 days. If you need help or a new code, please call your [facility] clinic or [phone].        Care EveryWhere ID     This is your Care EveryWhere ID. This could be used by other organizations to access your [facility] medical records  [id number]        Your Vitals Were     Pulse Temperature Respirations Height Pulse Oximetry Breastfeeding?    71 98.6  F (37  C) (Tympanic) 18 1.708 m (5' 7.25\") 95% No    BMI (Body Mass Index)                   37.89 kg/m2            Blood Pressure from Last 3 Encounters:   17 133/75   17 156/84   17 (!) 157/93    Weight from Last 3 Encounters:   17 110.5 kg (243 lb 11.2 oz)   17 113.4 kg (250 lb)   17 113.8 kg (250 lb 12.8 oz)                 Where to get your medicines      These medications were sent to [facility] [address] [place] [address]  [address] [place] [address]     Phone:  [phone]     exemestane 25 MG tablet          Primary Care Provider Office Phone # Fax #    Bernice Howard [phone]       [address] [address]        Equal Access to Services     TASIA RANGEL : Hadii karina Lopez, wamalihada luqadaha, qaybta kaalmaniesha tripp. So [facility] [phone].    ATENCIÓN: Si habla español, tiene a allan disposición servicios gratuitos de asistencia lingüística. Llame al [phone].    We comply with applicable federal civil rights laws and [place] laws. We do not discriminate on the basis of race, color, national origin, age, " disability sex, sexual orientation or gender identity.            Thank you!     Thank you for choosing [facility]  for your care. Our goal is always to provide you with excellent care. Hearing back from our patients is one way we can continue to improve our services. Please take a few minutes to complete the written survey that you may receive in the mail after your visit with us. Thank you!             Your Updated Medication List - Protect others around you: Learn how to safely use, store and throw away your medicines at www.disposemymeds.org.          This list is accurate as of: 7/19/17  2:54 PM.  Always use your most recent med list.                   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, estrogen receptor positive (H)       gabapentin 600 MG tablet    NEURONTIN    180 tablet    Take 1 tablet (600 mg) by mouth 3 times daily    Trigeminal neuralgia       hydrochlorothiazide 25 MG tablet    HYDRODIURIL     Take 0.5 tablets (12.5 mg) by mouth daily    Essential hypertension with goal blood pressure less than 140/90       HYDROcodone-acetaminophen 5-325 MG per tablet    NORCO    30 tablet    Take 1-2 tablets by mouth every 4 hours as needed for other (Moderate to Severe Pain)    Osteoarthritis of right knee, unspecified osteoarthritis type

## 2017-07-19 NOTE — NURSING NOTE
"Oncology Rooming Note    July 19, 2017 2:26 PM   Marissa Guadarrama is a 67 year old female who presents for:    Chief Complaint   Patient presents with     Oncology Clinic Visit     3 month recheck  Bilateral malignant neoplasm of the breast, review labs     Initial Vitals: /75 (BP Location: Right arm, Patient Position: Sitting, Cuff Size: Adult Large)  Pulse 71  Temp 98.6  F (37  C) (Tympanic)  Resp 18  Ht 1.708 m (5' 7.25\")  Wt 110.5 kg (243 lb 11.2 oz)  SpO2 95%  Breastfeeding? No  BMI 37.89 kg/m2 Estimated body mass index is 37.89 kg/(m^2) as calculated from the following:    Height as of this encounter: 1.708 m (5' 7.25\").    Weight as of this encounter: 110.5 kg (243 lb 11.2 oz). Body surface area is 2.29 meters squared.  Mild Pain (3) Comment: Right   No LMP recorded. Patient has had a hysterectomy.  Allergies reviewed: Yes  Medications reviewed: Yes    Medications: Medication refills not needed today.  Pharmacy name entered into RABBL: [id number] [address] [facility]    Clinical concerns: 3 month recheck  Bilateral malignant neoplasm of the breast, review labs.     7  minutes for nursing intake (face to face time)     Citlaly Larry CMA              "

## 2017-07-19 NOTE — PATIENT INSTRUCTIONS
We would like to see you back in clinic with Dr. King in 3 months with labs prior.  Your prescription (Aromasin) has been sent to:   [facility] [address] [place] [address] - [address]   [address] [facility] [address]  Phone: [phone] Fax: [phone]  When you are in need of a refill, please call your pharmacy and they will send us a request.  Copy of appointments, and after visit summary (AVS) given to patient.  If you have any questions during business hours (M-F 8 AM- 4PM), please call Codi Diaz RN, BSN, OCN Oncology Hematology /Breast Cancer Navigator at [facility] [phone].   For questions after business hours, or on holidays/weekends, please call our after hours Nurse Triage line [phone]. Thank you.

## 2017-07-19 NOTE — ASSESSMENT & PLAN NOTE
Marissa Guadarrama was found on routine mammogram a group of microcalcification is in the upper outer right breast. There was also a focal asymmetry in the lower left breast. Subsequently the patient went on to have bilateral diagnostic mammography with ultrasound on March 28, 2016.. On the right breast there was microcalcification is the main grouping measures 1.6 cm x 1 cm bilateral 2.9 cm located 2.9 cm from the nipple. An additional tiny area about 0.2 cm seen at the anterolateral margin of this main grouping about 1 cm from the main grouping. The left breast shows no masses or significant abnormalities. Subsequently the patient went on to have breast needle biopsy on March 30, 2016. 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 Angiolymphatic invasion was not identified. 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 Microcalcifications was also associated with ductal carcinoma in situ. His surgeon receptor was positive. Progesterone receptor was negative. She underwent bilateral sentinel lymph node biopsy and bilateral lumpectomies with prior seed placement.  the surgical pathology Showing left breast lumpectomy was a tumor size of 13 mm grade 2 of 3, angiolymphatic invasion was not identified the tumor was unifocal associated with ductal carcinoma in situ high-grade. Surgical margins where negative. [place] lymph node was negative for metastatic tumor. Stage is T1cN0 M0 On the right breast and there was invasive ductal carcinoma with a tumor size of 3 mm grade 3 of 3. [place] lymph nodes were negative for metastatic disease. The tumor stage is T1aN0 M0. The tumor on the left was positive for estrogen and progesterone receptor. It did show no HER-2/praveen amplification. The tumor on the right is positive for estrogen receptor and negative for progesterone receptor and shows no HER-2/praveen amplification. Subsequently the patient concluded the radiation  therapy. She is currently on hormonal therapy with Arimidex. She developed a skin rash and Arimidex was switched to Aromasin.

## 2017-07-20 NOTE — PROGRESS NOTES
Hematology/ Oncology Follow-up Visit:  Jul 19, 2017    Reason for Visit:   Chief Complaint   Patient presents with     Oncology Clinic Visit     3 month recheck  Bilateral malignant neoplasm of the breast, review labs       Oncologic History:  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)  Marissa Guadarrama was found on routine mammogram a group of microcalcification is in the upper outer right breast. There was also a focal asymmetry in the lower left breast. Subsequently the patient went on to have bilateral diagnostic mammography with ultrasound on March 28, 2016.. On the right breast there was microcalcification is the main grouping measures 1.6 cm x 1 cm bilateral 2.9 cm located 2.9 cm from the nipple. An additional tiny area about 0.2 cm seen at the anterolateral margin of this main grouping about 1 cm from the main grouping. The left breast shows no masses or significant abnormalities. Subsequently the patient went on to have breast needle biopsy on March 30, 2016. 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 Angiolymphatic invasion was not identified. 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 Microcalcifications was also associated with ductal carcinoma in situ. His surgeon receptor was positive. Progesterone receptor was negative. She underwent bilateral sentinel lymph node biopsy and bilateral lumpectomies with prior seed placement.  the surgical pathology Showing left breast lumpectomy was a tumor size of 13 mm grade 2 of 3, angiolymphatic invasion was not identified the tumor was unifocal associated with ductal carcinoma in situ high-grade. Surgical margins where negative. [place] lymph node was negative for metastatic tumor. Stage is T1cN0 M0 On the right breast and there was invasive ductal carcinoma with a tumor size of 3 mm grade 3 of 3. [place] lymph nodes were negative for metastatic disease. The  tumor stage is T1aN0 M0. The tumor on the left was positive for estrogen and progesterone receptor. It did show no HER-2/praveen amplification. The tumor on the right is positive for estrogen receptor and negative for progesterone receptor and shows no HER-2/praveen amplification. Subsequently the patient concluded the radiation therapy. She is currently on hormonal therapy with Arimidex. She developed a skin rash and Arimidex was switched to Aromasin.      Interval History:  Patient is here today for follow-up for breast cancer. She is currently in adjuvant therapy with Aromasin. She has been tolerating the drug very well without significant side effects. Unfortunately, she was diagnosed with a recurrence/progression of carcinoid tumor. She is scheduled to proceed with surgery for resection.    Review Of Systems:  Constitutional: Negative for fever, chills, and night sweats.  Skin: negative.  Eyes: negative.  Ears/Nose/Throat: negative.  Respiratory: No shortness of breath, dyspnea on exertion, cough, or hemoptysis.  Cardiovascular: negative.  Gastrointestinal: negative.  Genitourinary: negative.  Musculoskeletal: negative.  Neurologic: negative.  Psychiatric: negative.  Hematologic/Lymphatic/Immunologic: negative.  Endocrine: negative.    All other ROS negative unless mentioned in interval history.    Past medical, social, surgical, and family histories reviewed.    Allergies:  Allergies as of 07/19/2017 - Cade as Reviewed 07/19/2017   Allergen Reaction Noted     Nkda [no known drug allergies]  10/30/2009       Current Medications:  Current Outpatient Prescriptions   Medication Sig Dispense Refill     exemestane (AROMASIN) 25 MG tablet Take 1 tablet (25 mg) by mouth daily 90 tablet 1     hydrochlorothiazide (HYDRODIURIL) 25 MG tablet Take 0.5 tablets (12.5 mg) by mouth daily       albuterol (PROAIR HFA, PROVENTIL HFA, VENTOLIN HFA) 108 (90 BASE) MCG/ACT inhaler Inhale 2 puffs into the lungs every 6 hours as needed for  "shortness of breath / dyspnea or wheezing 1 Inhaler 1     HYDROcodone-acetaminophen (NORCO) 5-325 MG per tablet Take 1-2 tablets by mouth every 4 hours as needed for other (Moderate to Severe Pain) 30 tablet 0     gabapentin (NEURONTIN) 600 MG tablet Take 1 tablet (600 mg) by mouth 3 times daily 180 tablet 6     lisinopril (PRINIVIL/ZESTRIL) 10 MG tablet TAKE ONE TABLET BY MOUTH ONCE DAILY 90 tablet 3        Physical Exam:  /75 (BP Location: Right arm, Patient Position: Sitting, Cuff Size: Adult Large)  Pulse 71  Temp 98.6  F (37  C) (Tympanic)  Resp 18  Ht 1.708 m (5' 7.25\")  Wt 110.5 kg (243 lb 11.2 oz)  SpO2 95%  Breastfeeding? No  BMI 37.89 kg/m2  Wt Readings from Last 12 Encounters:   07/19/17 110.5 kg (243 lb 11.2 oz)   06/23/17 113.4 kg (250 lb)   06/07/17 113.8 kg (250 lb 12.8 oz)   04/07/17 116.8 kg (257 lb 8 oz)   04/06/17 117.5 kg (259 lb)   02/02/17 117.5 kg (259 lb)   02/01/17 116.9 kg (257 lb 12.8 oz)   01/06/17 116.7 kg (257 lb 3.2 oz)   11/28/16 117.8 kg (259 lb 9.6 oz)   11/02/16 117.9 kg (260 lb)   10/10/16 117 kg (258 lb)   09/28/16 116.5 kg (256 lb 14.4 oz)     ECOG performance status: 0  GENERAL APPEARANCE: Healthy, alert and in no acute distress.  HEENT: Sclerae anicteric. PERRLA. Oropharynx without ulcers, lesions, or thrush.  NECK: Supple. No asymmetry or masses.  LYMPHATICS: No palpable cervical, supraclavicular, axillary, or inguinal lymphadenopathy.  RESP: Lungs clear to auscultation bilaterally without rales, rhonchi or wheezes.  BREAST: Not done today. \"CARDIOVASCULAR: Regular rate and rhythm. Normal S1, S2; no S3 or S4. No murmur, gallop, or rub.  ABDOMEN: Soft, nontender. Bowel sounds normal. No palpable organomegaly or masses.  MUSCULOSKELETAL: Extremities without gross deformities noted. No edema of bilateral lower extremities.  SKIN: No suspicious lesions or rashes.  NEURO: Alert and oriented x 3. Cranial nerves II-XII grossly intact.  PSYCHIATRIC: Mentation and affect " appear normal.    Laboratory/Imaging Studies:  No visits with results within 2 Week(s) from this visit.  Latest known visit with results is:    Orders Only on 07/05/2017   Component Date Value Ref Range Status     WBC 07/05/2017 7.9  4.0 - 11.0 10e9/L Final     RBC Count 07/05/2017 4.60  3.8 - 5.2 10e12/L Final     Hemoglobin 07/05/2017 14.0  11.7 - 15.7 g/dL Final     Hematocrit 07/05/2017 43.3  35.0 - 47.0 % Final     MCV 07/05/2017 94  78 - 100 fl Final     MCH 07/05/2017 30.4  26.5 - 33.0 pg Final     MCHC 07/05/2017 32.3  31.5 - 36.5 g/dL Final     RDW 07/05/2017 13.3  10.0 - 15.0 % Final     Platelet Count 07/05/2017 172  150 - 450 10e9/L Final     Diff Method 07/05/2017 Automated Method   Final     % Neutrophils 07/05/2017 71.6  % Final     % Lymphocytes 07/05/2017 18.0  % Final     % Monocytes 07/05/2017 5.7  % Final     % Eosinophils 07/05/2017 4.2  % Final     % Basophils 07/05/2017 0.4  % Final     % Immature Granulocytes 07/05/2017 0.1  % Final     Absolute Neutrophil 07/05/2017 5.7  1.6 - 8.3 10e9/L Final     Absolute Lymphocytes 07/05/2017 1.4  0.8 - 5.3 10e9/L Final     Absolute Monocytes 07/05/2017 0.5  0.0 - 1.3 10e9/L Final     Absolute Eosinophils 07/05/2017 0.3  0.0 - 0.7 10e9/L Final     Absolute Basophils 07/05/2017 0.0  0.0 - 0.2 10e9/L Final     Abs Immature Granulocytes 07/05/2017 0.0  0 - 0.4 10e9/L Final     Sodium 07/05/2017 140  133 - 144 mmol/L Final     Potassium 07/05/2017 4.9  3.4 - 5.3 mmol/L Final     Chloride 07/05/2017 104  94 - 109 mmol/L Final     Carbon Dioxide 07/05/2017 34* 20 - 32 mmol/L Final     Anion Gap 07/05/2017 2* 3 - 14 mmol/L Final     Glucose 07/05/2017 93  70 - 99 mg/dL Final     Urea Nitrogen 07/05/2017 34* 7 - 30 mg/dL Final     Creatinine 07/05/2017 1.47* 0.52 - 1.04 mg/dL Final     GFR Estimate 07/05/2017 35* >60 mL/min/1.7m2 Final    Non  GFR Calc     GFR Estimate If Black 07/05/2017 43* >60 mL/min/1.7m2 Final    African American GFR Calc      Calcium 07/05/2017 9.6  8.5 - 10.1 mg/dL Final     Bilirubin Total 07/05/2017 0.6  0.2 - 1.3 mg/dL Final     Albumin 07/05/2017 3.4  3.4 - 5.0 g/dL Final     Protein Total 07/05/2017 7.0  6.8 - 8.8 g/dL Final     Alkaline Phosphatase 07/05/2017 76  40 - 150 U/L Final     ALT 07/05/2017 18  0 - 50 U/L Final     AST 07/05/2017 13  0 - 45 U/L Final     CA 27-29 07/05/2017 26  0 - 39 U/mL Final    Assay Method:  Chemiluminescence using Siemens Centaur XP        Recent Results (from the past 744 hour(s))   MA Diagnostic Bilateral w/Herrera    Narrative    7/5/2017 12:46 PM    HISTORY:  Prior bilateral breast malignancies and lumpectomies. No  current complaints.    COMPARISON:  3/17/2016.    TECHNIQUE:  Bilateral digital mammography with CAD is performed as  well as DBT.    BREAST DENSITY: Heterogeneously dense.    COMMENTS: Mild distortion is seen at the surgical site in each breast,  right greater than left. This is consistent with postoperative change.  No suspicious findings of malignancy are seen.      Impression    IMPRESSION: BI-RADS CATEGORY: 2 - Benign Finding(s).    RECOMMENDED FOLLOW-UP: Annual Mammography.    JJ JACOBS MD       Assessment and plan:  (C50.411,  Z17.0,  C50.412) 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, estrogen receptor positive (H)  (primary encounter diagnosis)  I reviewed with the patient most recent laboratory results and mammographic images. She will continue adjuvant therapy with Aromasin 25 mg orally daily. We will see the patient again in 3 months or sooner if there is new developments or concerns.    Plan: exemestane (AROMASIN) 25 MG tablet, CBC with platelets differential, Comprehensive metabolic panel, Ca27.29  breast tumor marker before next appointment    (D3A.00) Carcinoid tumor  Unfortunately the patient was recently diagnosed with progression of her carcinoid tumor. She scheduled to have surgery for resection. I will see the patient again and review  surgical pathology reports and discuss with her further medical management of her disease.    (M19.90) Arthritis  Patient's is planning to proceed with left knee replacement surgery in the near future.    (I10) Hypertension goal BP (blood pressure) < 140/90  Blood pressure is well controlled on hydrochlorothiazide 12.5 mg orally daily.    (E83.52) Serum calcium elevated  Calcium level has normalized.    The patient is ready to learn, no apparent learning barriers were identified.  Diagnosis and treatment plans were explained to the patient. The patient expressed understanding of the content. The patient asked appropriate questions. The patient questions were answered to her satisfaction.    Chart documentation with Dragon Voice recognition Software. Although reviewed after completion, some words and grammatical errors may remain.

## 2017-07-24 ENCOUNTER — CARE COORDINATION (OUTPATIENT)
Dept: SURGERY | Facility: CLINIC | Age: 67
End: 2017-07-24

## 2017-07-26 DIAGNOSIS — D3A.029 CARCINOID TUMOR OF COLON (H): Primary | ICD-10-CM

## 2017-07-26 NOTE — PROGRESS NOTES
Spoke with Dr. La.  Spoke then with patient. Dr. La and I recommend that she see Dr. Elfego Lawrence here at [facility].   I don't think it is urgent, and encouraged her to take time to find the right time for the appt so her  could drive her down here. I indicated that it's possible that she doesn't need her carcinoid tumor recurrence removed, but Dr. La and I do not know what the appropriate follow up would be in this situation.    Patient agrees to see Dr. Lawrence.  We will see if we can get her in with him.  Patient is hoping to see Dr. La regarding urinary incontinence and stent exchange- she was counseled to call urology if she doesn't hear from them soon.  I reviewed her VAIN diagnosis and reviewed that she was overdue for follow up with Dr. Segundo. Patient indicates she doesn't know how to get in touch with GYN ONC clinic- I indicated that I would ask their clinic to contact her.  I emphasized that follow up for this premalignant diagnosis is extremely important.

## 2017-07-27 ENCOUNTER — TELEPHONE (OUTPATIENT)
Dept: SURGERY | Facility: CLINIC | Age: 67
End: 2017-07-27

## 2017-07-27 NOTE — TELEPHONE ENCOUNTER
----- Message from Talisha Greco MD sent at 7/21/2017  1:19 PM CDT -----  Hi,  She had surgery at [facility] in 2003.  It would be great to get an operative report and a pathology report.    Can you help me obtain those? If it is challenging keep me posted so I can call whoever you need me call to inquire.    I know I haven't seen her in clinic.  If you need her to come to clinic in order to obtain these records I can negotiate with her on that.    Thanks!  eleazar

## 2017-07-27 NOTE — TELEPHONE ENCOUNTER
Per task, I called Marissa to let her know that she is scheduled to see Dr. Lawrence on 10/02/2017. I confirmed time, date, location and provider with her. She mentioned that she has signed the HIPOLITO and mailed it out yesterday. I will let Dr. Greco know that she completed the HIPOLITO.

## 2017-08-04 ENCOUNTER — OFFICE VISIT (OUTPATIENT)
Dept: FAMILY MEDICINE | Facility: CLINIC | Age: 67
End: 2017-08-04
Payer: COMMERCIAL

## 2017-08-04 ENCOUNTER — DOCUMENTATION ONLY (OUTPATIENT)
Dept: SURGERY | Facility: CLINIC | Age: 67
End: 2017-08-04

## 2017-08-04 ENCOUNTER — TELEPHONE (OUTPATIENT)
Dept: UROLOGY | Facility: CLINIC | Age: 67
End: 2017-08-04

## 2017-08-04 ENCOUNTER — TELEPHONE (OUTPATIENT)
Dept: FAMILY MEDICINE | Facility: CLINIC | Age: 67
End: 2017-08-04

## 2017-08-04 VITALS
OXYGEN SATURATION: 91 % | DIASTOLIC BLOOD PRESSURE: 85 MMHG | HEART RATE: 88 BPM | TEMPERATURE: 98.6 F | WEIGHT: 246.6 LBS | SYSTOLIC BLOOD PRESSURE: 162 MMHG | BODY MASS INDEX: 38.34 KG/M2

## 2017-08-04 DIAGNOSIS — N30.01 ACUTE CYSTITIS WITH HEMATURIA: Primary | ICD-10-CM

## 2017-08-04 LAB
ALBUMIN UR-MCNC: 30 MG/DL
APPEARANCE UR: CLEAR
BACTERIA #/AREA URNS HPF: ABNORMAL /HPF
BILIRUB UR QL STRIP: NEGATIVE
COLOR UR AUTO: YELLOW
GLUCOSE UR STRIP-MCNC: NEGATIVE MG/DL
HGB UR QL STRIP: ABNORMAL
KETONES UR STRIP-MCNC: NEGATIVE MG/DL
LEUKOCYTE ESTERASE UR QL STRIP: ABNORMAL
NITRATE UR QL: NEGATIVE
NON-SQ EPI CELLS #/AREA URNS LPF: ABNORMAL /LPF
PH UR STRIP: 5.5 PH (ref 5–7)
RBC #/AREA URNS AUTO: ABNORMAL /HPF (ref 0–2)
SP GR UR STRIP: 1.01 (ref 1–1.03)
TRANS CELLS #/AREA URNS HPF: ABNORMAL /HPF
URN SPEC COLLECT METH UR: ABNORMAL
UROBILINOGEN UR STRIP-ACNC: 0.2 EU/DL (ref 0.2–1)
WBC #/AREA URNS AUTO: ABNORMAL /HPF (ref 0–2)

## 2017-08-04 PROCEDURE — 87088 URINE BACTERIA CULTURE: CPT | Performed by: NURSE PRACTITIONER

## 2017-08-04 PROCEDURE — 99213 OFFICE O/P EST LOW 20 MIN: CPT | Performed by: NURSE PRACTITIONER

## 2017-08-04 PROCEDURE — 81001 URINALYSIS AUTO W/SCOPE: CPT | Performed by: NURSE PRACTITIONER

## 2017-08-04 PROCEDURE — 87086 URINE CULTURE/COLONY COUNT: CPT | Performed by: NURSE PRACTITIONER

## 2017-08-04 PROCEDURE — 87186 SC STD MICRODIL/AGAR DIL: CPT | Performed by: NURSE PRACTITIONER

## 2017-08-04 RX ORDER — TAMSULOSIN HYDROCHLORIDE 0.4 MG/1
0.4 CAPSULE ORAL DAILY
Qty: 30 CAPSULE | Status: CANCELLED | OUTPATIENT
Start: 2017-08-04

## 2017-08-04 RX ORDER — SULFAMETHOXAZOLE/TRIMETHOPRIM 800-160 MG
1 TABLET ORAL 2 TIMES DAILY
Qty: 6 TABLET | Refills: 0 | Status: CANCELLED | OUTPATIENT
Start: 2017-08-04 | End: 2017-08-07

## 2017-08-04 RX ORDER — CIPROFLOXACIN 250 MG/1
250 TABLET, FILM COATED ORAL 2 TIMES DAILY
Qty: 14 TABLET | Refills: 0 | Status: SHIPPED | OUTPATIENT
Start: 2017-08-04 | End: 2017-08-11

## 2017-08-04 NOTE — PROGRESS NOTES
HIPOLITO and medical records request faxed to [facility] medical records department.

## 2017-08-04 NOTE — PROGRESS NOTES
SUBJECTIVE:                                                    Marissa Guadarrama is a 67 year old female who presents to clinic today for the following health issues:      Urinary SYMPTOMS     Onset: 3 weeks    Description:   Painful urination (Dysuria): yes  Blood in urine (Hematuria): YES  Frequency/Urgency: YES  Abdominal/Pelvic/Flank Pain : no     Other vaginal symptoms: none and puss, back pain, slight confusion    Progression of Symptoms:  worsening    History:   History of frequent UTI's: YES- similar to current symptoms     Therapies Tried and outcome: cranberry juice, increase fluids       Problem list and histories reviewed & adjusted, as indicated.  Additional history: as documented    Patient Active Problem List   Diagnosis     Post-polio syndrome     Carcinoid tumor     Arthritis     Cervical cancer (H)     Trigeminal neuralgia     HYPERLIPIDEMIA LDL GOAL <130     Hypertension goal BP (blood pressure) < 140/90     OA (osteoarthritis) of knee     Advanced directives, counseling/discussion     CKD (chronic kidney disease) stage 3, GFR 30-59 ml/min     Vaginal dysplasia     Urinary incontinence     Invasive ductal carcinoma of breast (H)     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)     Bone injury     Cervical dysplasia     Knee pain     Malignant neoplasm of cervix (H)     Serum calcium elevated     Past Surgical History:   Procedure Laterality Date     APPENDECTOMY  1983     BIOPSY NODE SENTINEL Bilateral 6/1/2016    Procedure: BIOPSY NODE SENTINEL;  Surgeon: Brent Arana MD;  Location: [facility] OR     C BSO, OMENTECTOMY W/OSMAN  5/2007     C TOTAL ABDOM HYSTERECTOMY  5/2007     CL AFF SURGICAL PATHOLOGY       COLONOSCOPY N/A 6/23/2017    Procedure: COMBINED COLONOSCOPY, SINGLE OR MULTIPLE BIOPSY/POLYPECTOMY BY BIOPSY;  Colonoscopy Dx:Carcinoid tumor of colon prep mailed golytely;  Surgeon: Talisha Greco MD;  Location:  GI     COLPOSCOPY, BIOPSY, COMBINED   3/13/2014    Procedure: COMBINED COLPOSCOPY, BIOPSY;;  Surgeon: Lara Pack MD;  Location: [facility]     COMBINED CYSTOSCOPY, RETROGRADES, URETEROSCOPY, INSERT STENT Left 2/2/2017    Procedure: COMBINED CYSTOSCOPY, RETROGRADES, URETEROSCOPY, INSERT STENT;  Surgeon: Zhao La MD;  Location: [facility] OR     EXAM UNDER ANESTHESIA PELVIC  3/13/2014    Procedure: EXAM UNDER ANESTHESIA PELVIC;  Exam Under Anestheisa, Colposcopy, Vaginal Biopsies, Co2 Laser of the Upper Vagina;  Surgeon: Lara Pack MD;  Location: [facility]     HERNIORRHAPHY INCISIONAL (LOCATION)       LASER CO2 VAGINA  3/13/2014    VAIN 1/2     LUMPECTOMY BREAST WITH SEED LOCALIZATION Bilateral 6/1/2016    Procedure: LUMPECTOMY BREAST WITH SEED LOCALIZATION;  Surgeon: Brent Arana MD;  Location: [facility] OR     SURGICAL HISTORY OF -       ovarian cystectomy     SURGICAL HISTORY OF -   2003    right colon resection secondary to carcinoid tumor     TUBAL LIGATION         Social History   Substance Use Topics     Smoking status: Former Smoker     Packs/day: 2.00     Years: 29.00     Types: Cigarettes     Quit date: 10/30/2006     Smokeless tobacco: Never Used     Alcohol use No     Family History   Problem Relation Age of Onset     CANCER Mother      bone / liver     Breast Cancer Mother      CANCER Maternal Grandmother      CANCER Maternal Grandfather      CANCER Paternal Grandmother      CANCER Paternal Grandfather      CANCER Sister      vulvar ca, cervical ca, squamous cell cancer     CANCER Brother      Rectal- Stage 4     Rectal Cancer Brother      Breast Cancer Paternal Aunt      Colon Cancer Paternal Aunt      Breast Cancer Maternal Aunt      Pancreatic Cancer Nephew      Breast Cancer Sister          Current Outpatient Prescriptions   Medication Sig Dispense Refill     ciprofloxacin (CIPRO) 250 MG tablet Take 1 tablet (250 mg) by mouth 2 times daily for 7 days 14 tablet 0     lisinopril (PRINIVIL/ZESTRIL) 10 MG tablet TAKE ONE  TABLET BY MOUTH ONCE DAILY 90 tablet 3     exemestane (AROMASIN) 25 MG tablet Take 1 tablet (25 mg) by mouth daily 90 tablet 1     hydrochlorothiazide (HYDRODIURIL) 25 MG tablet Take 0.5 tablets (12.5 mg) by mouth daily       albuterol (PROAIR HFA, PROVENTIL HFA, VENTOLIN HFA) 108 (90 BASE) MCG/ACT inhaler Inhale 2 puffs into the lungs every 6 hours as needed for shortness of breath / dyspnea or wheezing 1 Inhaler 1     HYDROcodone-acetaminophen (NORCO) 5-325 MG per tablet Take 1-2 tablets by mouth every 4 hours as needed for other (Moderate to Severe Pain) 30 tablet 0     gabapentin (NEURONTIN) 600 MG tablet Take 1 tablet (600 mg) by mouth 3 times daily 180 tablet 6     Allergies   Allergen Reactions     Nkda [No Known Drug Allergies]      BP Readings from Last 3 Encounters:   08/04/17 162/85   07/19/17 133/75   06/23/17 156/84    Wt Readings from Last 3 Encounters:   08/04/17 246 lb 9.6 oz (111.9 kg)   07/19/17 243 lb 11.2 oz (110.5 kg)   06/23/17 250 lb (113.4 kg)            Labs reviewed in EPIC    Reviewed and updated as needed this visit by clinical staff  Tobacco  Allergies  Meds  Med Hx  Surg Hx  Fam Hx  Soc Hx      Reviewed and updated as needed this visit by Provider         ROS:  Constitutional, HEENT, cardiovascular, pulmonary, GI, , musculoskeletal, neuro, skin, endocrine and psych systems are negative, except as otherwise noted.      OBJECTIVE:   /85  Pulse 88  Temp 98.6  F (37  C) (Oral)  Wt 246 lb 9.6 oz (111.9 kg)  SpO2 91%  BMI 38.34 kg/m2  Body mass index is 38.34 kg/(m^2).  GENERAL: healthy, alert and no distress  RESP: lungs clear to auscultation - no rales, rhonchi or wheezes  CV: regular rate and rhythm, normal S1 S2, no S3 or S4, no murmur, click or rub, no peripheral edema and peripheral pulses strong  ABDOMEN: soft, nontender, no hepatosplenomegaly, no masses and bowel sounds normal  MS: no gross musculoskeletal defects noted, no edema, NO CVA tenderness  SKIN: no  suspicious lesions or rashes  PSYCH: mentation appears normal, affect normal/bright    Diagnostic Test Results:  See orders    ASSESSMENT/PLAN:         ICD-10-CM    1. Acute cystitis with hematuria N30.01 UA reflex to Microscopic and Culture     ciprofloxacin (CIPRO) 250 MG tablet     Urine Microscopic     Urine Culture Aerobic Bacterial       See Patient Instructions: Follow up if your symptoms persist or worsen.  Take full course of antibiotics, unless we call you to change medication based on urine culture.     Sheryl Gandhi, P  [facility]

## 2017-08-04 NOTE — TELEPHONE ENCOUNTER
I spoke to Marissa today. She says that she has an acute bladder infection. She is running to the bathroom every 15-20 minutes and has burning with urination. She is also feeling run down and has a low grade fever. She does have a stent but the symptoms are more resent. I let her know that there is no urologist in but this is not something that she should wait for. I suggested that she see her primary physician today to rule out UTI. She agrees with the plan. She does have an appointment with Dr La on 8-16-17. Sari BRIGHT RN

## 2017-08-04 NOTE — NURSING NOTE
"Chief Complaint   Patient presents with     UTI       Initial /85  Pulse 88  Temp 98.6  F (37  C) (Oral)  Wt 246 lb 9.6 oz (111.9 kg)  SpO2 91%  BMI 38.34 kg/m2 Estimated body mass index is 38.34 kg/(m^2) as calculated from the following:    Height as of 7/19/17: 5' 7.25\" (1.708 m).    Weight as of this encounter: 246 lb 9.6 oz (111.9 kg).  Medication Reconciliation: complete     Galina Lopez MA  "

## 2017-08-04 NOTE — PATIENT INSTRUCTIONS
Follow up if your symptoms persist or worsen.  Take full course of antibiotics, unless we call you to change medication based on urine culture.       Understanding Urinary Tract Infections (UTIs)  Most UTIs are caused by bacteria, although they may also be caused by viruses or fungi. Bacteria from the bowel are the most common source of infection. The infection may start because of any of the following:    Sexual activity. During sex, bacteria can travel from the penis, vagina, or rectum into the urethra.     Bacteria on the skin outside the rectum may travel into the urethra. This is more common in women since the rectum and urethra are closer to each other than in men. Wiping from front to back after using the toilet and keeping the area clean can help prevent germs from getting to the urethra.    Blockage of urine flow through the urinary tract. If urine sits too long, germs may start to grow out of control.      Parts of the urinary tract  The infection can occur in any part of the urinary tract.    The kidneys collect and store urine.    The ureters carry urine from the kidneys to the bladder.    The bladder holds urine until you are ready to let it out.    The urethra carries urine from the bladder out of the body. It is shorter in women, so bacteria can move through it more easily. The urethra is longer in men, so a UTI is less likely to reach the bladder or kidneys in men.  Date Last Reviewed: 1/1/2017 2000-2017 [employer] [address] [place] [address]. All rights reserved. This information is not intended as a substitute for professional medical care. Always follow your healthcare professional's instructions.

## 2017-08-04 NOTE — TELEPHONE ENCOUNTER
Reason for Call:  Same Day Appointment, Requested Provider:  Bernice Howard MD    PCP: Bernice Howard    Reason for visit: UTI, painful urination    Duration of symptoms: couple of weeks    Have you been treated for this in the past? No    Additional comments: patient wanting to know if she can be seen today, or if meds can be sent into pharmacy, pended.  Please call to advise.    Can we leave a detailed message on this number? YES    Phone number patient can be reached at: Home number on file [phone] (home)    Best Time: any    Call taken on 8/4/2017 at 11:12 AM by Nova Rodriguez

## 2017-08-04 NOTE — MR AVS SNAPSHOT
After Visit Summary   8/4/2017    Marissa Guadarrama    MRN: [id number]           Patient Information     Date Of Birth          1950        Visit Information        Provider Department      8/4/2017 4:20 PM Sheryl Gandhi NP [facility]        Today's Diagnoses     Urinary symptom or sign    -  1    Acute cystitis with hematuria          Care Instructions    Follow up if your symptoms persist or worsen.  Take full course of antibiotics, unless we call you to change medication based on urine culture.       Understanding Urinary Tract Infections (UTIs)  Most UTIs are caused by bacteria, although they may also be caused by viruses or fungi. Bacteria from the bowel are the most common source of infection. The infection may start because of any of the following:    Sexual activity. During sex, bacteria can travel from the penis, vagina, or rectum into the urethra.     Bacteria on the skin outside the rectum may travel into the urethra. This is more common in women since the rectum and urethra are closer to each other than in men. Wiping from front to back after using the toilet and keeping the area clean can help prevent germs from getting to the urethra.    Blockage of urine flow through the urinary tract. If urine sits too long, germs may start to grow out of control.      Parts of the urinary tract  The infection can occur in any part of the urinary tract.    The kidneys collect and store urine.    The ureters carry urine from the kidneys to the bladder.    The bladder holds urine until you are ready to let it out.    The urethra carries urine from the bladder out of the body. It is shorter in women, so bacteria can move through it more easily. The urethra is longer in men, so a UTI is less likely to reach the bladder or kidneys in men.  Date Last Reviewed: 1/1/2017 2000-2017 [employer]. [address] [place] [address], [place] [address]. All rights reserved. This information  is not intended as a substitute for professional medical care. Always follow your healthcare professional's instructions.                Follow-ups after your visit        Follow-up notes from your care team     Return if symptoms worsen or fail to improve.      Your next 10 appointments already scheduled     Aug 16, 2017  2:45 PM CDT   [facility] Visit with Zhao La MD   [facility]    [address] [address]   [phone]            Oct 02, 2017  9:15 AM CDT   (Arrive by 9:00 AM)   New Patient Visit with Jovani Lawrence MD   [facility] ([facility])    [address]  2nd Floor  [facility] [id number] [phone]            Oct 12, 2017 10:10 AM CDT   LAB with [facility] ([facility])    [address] [address]   [phone]           Patient must bring picture ID. Patient should be prepared to give a urine specimen  Please do not eat 10-12 hours before your appointment if you are coming in fasting for labs on lipids, cholesterol, or glucose (sugar). Pregnant women should follow their Care Team instructions. Water with medications is okay. Do not drink coffee or other fluids. If you have concerns about taking  your medications, please ask at office or if scheduling via [employer], send a message by clicking on Secure Messaging, Message Your Care Team.            Oct 19, 2017 10:30 AM CDT   Return Visit with Hosea King MD   [facility] ([facility])    [facility]  [address]  [facility] [place] [place] [address]   [phone]              Who to contact     Normal or non-critical lab and imaging results will be communicated to you by MyChart, letter or phone within 4 business days after the clinic has received the results. If you do not hear from us within 7 days,  please contact the clinic through [employer] or phone. If you have a critical or abnormal lab result, we will notify you by phone as soon as possible.  Submit refill requests through [employer] or call your pharmacy and they will forward the refill request to us. Please allow 3 business days for your refill to be completed.          If you need to speak with a  for additional information , please call: [phone]             Additional Information About Your Visit        Care EveryWhere ID     This is your Care EveryWhere ID. This could be used by other organizations to access your [facility] medical records  [id number]        Your Vitals Were     Pulse Temperature Pulse Oximetry BMI (Body Mass Index)          88 98.6  F (37  C) (Oral) 91% 38.34 kg/m2         Blood Pressure from Last 3 Encounters:   08/04/17 162/85   07/19/17 133/75   06/23/17 156/84    Weight from Last 3 Encounters:   08/04/17 246 lb 9.6 oz (111.9 kg)   07/19/17 243 lb 11.2 oz (110.5 kg)   06/23/17 250 lb (113.4 kg)              We Performed the Following     UA reflex to Microscopic and Culture     Urine Microscopic          Today's Medication Changes          These changes are accurate as of: 8/4/17  4:57 PM.  If you have any questions, ask your nurse or doctor.               Start taking these medicines.        Dose/Directions    ciprofloxacin 250 MG tablet   Commonly known as:  CIPRO   Used for:  Acute cystitis with hematuria   Started by:  Sheryl Gandhi NP        Dose:  250 mg   Take 1 tablet (250 mg) by mouth 2 times daily for 7 days   Quantity:  14 tablet   Refills:  0            Where to get your medicines      These medications were sent to [facility] [address] [place] [address] - [address] [place] [address]  [address] [address]     Phone:  [phone]     ciprofloxacin 250 MG tablet                Primary Care Provider Office Phone # Fax #    Bernice Howard [phone]       [facility]  [address] [address]        Equal Access to Services     ESDRASTERESA JUAN CARLOS : Hadii karina cruz [place], waaxda luqadaha, qaybta kaalmaadelita lr, niesha rodrigues. So [place] -[phone].    ATENCIÓN: Si habla español, tiene a allan disposición servicios gratuitos de asistencia lingüística. Llame al [phone].    We comply with applicable federal civil rights laws and [place] laws. We do not discriminate on the basis of race, color, national origin, age, disability sex, sexual orientation or gender identity.            Thank you!     Thank you for choosing [facility]  for your care. Our goal is always to provide you with excellent care. Hearing back from our patients is one way we can continue to improve our services. Please take a few minutes to complete the written survey that you may receive in the mail after your visit with us. Thank you!             Your Updated Medication List - Protect others around you: Learn how to safely use, store and throw away your medicines at www.disposemymeds.org.          This list is accurate as of: 8/4/17  4:57 PM.  Always use your most recent med list.                   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, estrogen receptor positive (H)       gabapentin 600 MG tablet    NEURONTIN    180 tablet    Take 1 tablet (600 mg) by mouth 3 times daily     Trigeminal neuralgia       hydrochlorothiazide 25 MG tablet    HYDRODIURIL     Take 0.5 tablets (12.5 mg) by mouth daily    Essential hypertension with goal blood pressure less than 140/90       HYDROcodone-acetaminophen 5-325 MG per tablet    NORCO    30 tablet    Take 1-2 tablets by mouth every 4 hours as needed for other (Moderate to Severe Pain)    Osteoarthritis of right knee, unspecified osteoarthritis type       lisinopril 10 MG tablet    PRINIVIL/ZESTRIL    90 tablet    TAKE ONE TABLET BY MOUTH ONCE DAILY    Essential hypertension with goal blood pressure less than 140/90

## 2017-08-04 NOTE — TELEPHONE ENCOUNTER
Reason for Call:  Other appointment    Detailed comments: pt calling LM at 523 pm 8/3 stating she thinks she has an acute infection. She has been urinating carla 15 to 20 mins, feeling tired and run down. Would like to get in to be seen     Phone Number Patient can be reached at: Home number on file [phone] (home)    Best Time: any     Can we leave a detailed message on this number? YES    Call taken on 8/4/2017 at 8:44 AM by Yaima Agarwal

## 2017-08-06 LAB
BACTERIA SPEC CULT: ABNORMAL
MICRO REPORT STATUS: ABNORMAL
MICROORGANISM SPEC CULT: ABNORMAL
SPECIMEN SOURCE: ABNORMAL

## 2017-08-12 ENCOUNTER — HEALTH MAINTENANCE LETTER (OUTPATIENT)
Age: 67
End: 2017-08-12

## 2017-08-16 ENCOUNTER — OFFICE VISIT (OUTPATIENT)
Dept: UROLOGY | Facility: CLINIC | Age: 67
End: 2017-08-16
Payer: COMMERCIAL

## 2017-08-16 ENCOUNTER — RADIANT APPOINTMENT (OUTPATIENT)
Dept: GENERAL RADIOLOGY | Facility: CLINIC | Age: 67
End: 2017-08-16
Attending: UROLOGY
Payer: COMMERCIAL

## 2017-08-16 VITALS — SYSTOLIC BLOOD PRESSURE: 143 MMHG | HEART RATE: 66 BPM | RESPIRATION RATE: 16 BRPM | DIASTOLIC BLOOD PRESSURE: 82 MMHG

## 2017-08-16 DIAGNOSIS — N39.41 URGENCY INCONTINENCE: Primary | ICD-10-CM

## 2017-08-16 PROCEDURE — 74010 XR KUB: CPT | Mod: 52

## 2017-08-16 PROCEDURE — 51798 US URINE CAPACITY MEASURE: CPT | Performed by: UROLOGY

## 2017-08-16 PROCEDURE — 99215 OFFICE O/P EST HI 40 MIN: CPT | Mod: 25 | Performed by: UROLOGY

## 2017-08-16 RX ORDER — OXYBUTYNIN CHLORIDE 5 MG/1
5 TABLET ORAL 2 TIMES DAILY PRN
Qty: 60 TABLET | Refills: 11 | Status: SHIPPED | OUTPATIENT
Start: 2017-08-16 | End: 2017-12-11

## 2017-08-16 NOTE — MR AVS SNAPSHOT
After Visit Summary   8/16/2017    Marissa Guadarrama    MRN: [id number]           Patient Information     Date Of Birth          1950        Visit Information        Provider Department      8/16/2017 2:45 PM Zhao La MD [facility]        Today's Diagnoses     Urgency incontinence    -  1    History of ureter stent           Follow-ups after your visit        Your next 10 appointments already scheduled     Sep 12, 2017 10:00 AM CDT   (Arrive by 9:45 AM)   COLPOSCOPY with  GYN ONC COLPOSCOPY PROVIDER   [facility] ([facility])    [address] [place] [id number]   [phone]            Oct 02, 2017  9:15 AM CDT   (Arrive by 9:00 AM)   New Patient Visit with Jovani Lawrence MD   [facility] ([facility])    [address] [place] [id number]   [phone]            Oct 12, 2017 10:10 AM CDT   LAB with [facility] ([facility])    [address] [facility] [facility] [address]   [phone]           Patient must bring picture ID. Patient should be prepared to give a urine specimen  Please do not eat 10-12 hours before your appointment if you are coming in fasting for labs on lipids, cholesterol, or glucose (sugar). Pregnant women should follow their Care Team instructions. Water with medications is okay. Do not drink coffee or other fluids. If you have concerns about taking  your medications, please ask at office or if scheduling via [employer], send a message by clicking on Secure Messaging, Message Your Care Team.            Oct 19, 2017 10:30 AM CDT   Return Visit with Hosea King MD   [facility] ([facility])    [facility]  [address]  [facility] [address]   [phone]              Who to contact     If you  have questions or need follow up information about today's clinic visit or your schedule please contact [facility] directly at [phone].  Normal or non-critical lab and imaging results will be communicated to you by MyChart, letter or phone within 4 business days after the clinic has received the results. If you do not hear from us within 7 days, please contact the clinic through MyChart or phone. If you have a critical or abnormal lab result, we will notify you by phone as soon as possible.  Submit refill requests through [employer] or call your pharmacy and they will forward the refill request to us. Please allow 3 business days for your refill to be completed.          Additional Information About Your Visit        Care EveryWhere ID     This is your Care EveryWhere ID. This could be used by other organizations to access your [facility] medical records  [id number]        Your Vitals Were     Pulse Respirations                66 16           Blood Pressure from Last 3 Encounters:   08/16/17 143/82   08/04/17 162/85   07/19/17 133/75    Weight from Last 3 Encounters:   08/04/17 111.9 kg (246 lb 9.6 oz)   07/19/17 110.5 kg (243 lb 11.2 oz)   06/23/17 113.4 kg (250 lb)              We Performed the Following     MEASURE POST-VOID RESIDUAL URINE/BLADDER CAPACITY, US NON-IMAGING          Today's Medication Changes          These changes are accurate as of: 8/16/17  6:37 PM.  If you have any questions, ask your nurse or doctor.               Start taking these medicines.        Dose/Directions    oxybutynin 5 MG tablet   Commonly known as:  DITROPAN   Used for:  Urgency incontinence   Started by:  Zaho La MD        Dose:  5 mg   Take 1 tablet (5 mg) by mouth 2 times daily as needed for bladder spasms   Quantity:  60 tablet   Refills:  11            Where to get your medicines      These medications were sent to [facility] [address] [place] [address] [address] [place] [address]  [address] [place] [address]  [facility] [employer] [address]     Phone:  [phone]     oxybutynin 5 MG tablet                Primary Care Provider Office Phone # Fax #    Bernice Howard [phone]       [facility] [address]        Equal Access to Services     TASIA RANGEL : Bernardino collins ku hadasho Soomaali, waaxda luqadaha, qaybta kaalmada adeegyada, waxay ari khanelihsapancho rodrigues. So [facility] [phone].    ATENCIÓN: Si habla español, tiene a allan disposición servicios gratuitos de asistencia lingüística. [facility] [phone].    We comply with applicable federal civil rights laws and [place] laws. We do not discriminate on the basis of race, color, national origin, age, disability sex, sexual orientation or gender identity.            Thank you!     Thank you for choosing [facility]  for your care. Our goal is always to provide you with excellent care. Hearing back from our patients is one way we can continue to improve our services. Please take a few minutes to complete the written survey that you may receive in the mail after your visit with us. Thank you!             Your Updated Medication List - Protect others around you: Learn how to safely use, store and throw away your medicines at www.disposemymeds.org.          This list is accurate as of: 8/16/17  6:37 PM.  Always use your most recent med list.                   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, estrogen receptor positive (H)       gabapentin 600 MG tablet    NEURONTIN    180 tablet    Take 1 tablet (600 mg)  by mouth 3 times daily    Trigeminal neuralgia       hydrochlorothiazide 25 MG tablet    HYDRODIURIL     Take 0.5 tablets (12.5 mg) by mouth daily    Essential hypertension with goal blood pressure less than 140/90       HYDROcodone-acetaminophen 5-325 MG per tablet    NORCO    30 tablet    Take 1-2 tablets by mouth every 4 hours as needed for other (Moderate to Severe Pain)    Osteoarthritis of right knee, unspecified osteoarthritis type       lisinopril 10 MG tablet    PRINIVIL/ZESTRIL    90 tablet    TAKE ONE TABLET BY MOUTH ONCE DAILY    Essential hypertension with goal blood pressure less than 140/90       oxybutynin 5 MG tablet    DITROPAN    60 tablet    Take 1 tablet (5 mg) by mouth 2 times daily as needed for bladder spasms    Urgency incontinence

## 2017-08-16 NOTE — NURSING NOTE
"Chief Complaint   Patient presents with     RECHECK       Initial /82 (BP Location: Right arm, Patient Position: Chair, Cuff Size: Adult Regular)  Pulse 66  Resp 16 Estimated body mass index is 38.34 kg/(m^2) as calculated from the following:    Height as of 7/19/17: 1.708 m (5' 7.25\").    Weight as of 8/4/17: 111.9 kg (246 lb 9.6 oz).  Medication Reconciliation: complete.  irena storm LPN      "

## 2017-08-17 NOTE — PROGRESS NOTES
UROLOGY SPECIALTY CLINIC NOTE       REASON FOR VISIT:  Ureteral obstruction.      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  The left kidney was found to have only 21% function following 1 month of stenting, and thus she has marginal useful function.  The patient was being considered for surgery to remove the carcinoid tumor with possible resection of a section of the ureter with exploration of the colonic anastomosis from the original resection of the carcinoid tumor.  This surgery was to be performed by Dr. Greco of Colorectal Surgery.  However, the patient was presented at the Colorectal Multidisciplinary Oncology Conference, and it was felt that it may not be necessary to remove the carcinoid tumor due to its slow rate of growth over the last several years.  The patient is meeting with Dr. Lawrence in 10/2017 to discuss this option further.      With regard to the patient's ureteral obstruction she currently has a stent in place which has been there since January 2017.  I discussed with the patient that we would need to change the stent at some point in the near future, and we need to decide whether we want to replace the stent or whether we will simply leave the stent out.  The patient comes in today to discuss these options further.      PHYSICAL EXAMINATION:   VITAL:  Blood pressure 143/82, pulse 66.   GENERAL:  She is in no acute distress.      LABORATORY DATA:  Creatinine from 07/05/2017 was 1.47 compared to a value of 1.66 on 12/26/2016 which was pre-stenting.      DISCUSSION:  Over one-half of today's 45-minute visit was spent counseling the patient regarding her ureteral stent.  I suggested to Ms. Guadarrama that we consider it good news that we may not need to  remove the carcinoid tumor.  Given the patient's  previous surgeries this would not be a small feat.        However, we do need to decide in the meantime what we are going to do about her kidney.  We discussed options including removing the stent and abandoning the kidney.  As long as the patient did not become symptomatic in terms of flank pain from obstruction or have recurrent kidney infections, etc., this would be a reasonable option given the fact that the kidney function is only marginal at best even after 1 month of stenting.  Alternatively we discussed replacing the stent and continuing with intermittent stent changes indefinitely to protect the kidney, although again it only has marginal function at this time.        After a thorough discussion of the risks, benefits and alternatives of these various approaches at this time we have decided to bring the patient to the operating room and exchange the stent on this side until the patient has had a chance to meet with Dr. Lawrence and make sure that an observation approach is how she wishes to proceed with regard to the carcinoid tumor.  If the patient ultimately decides to simply abandon this kidney we can simply remove the stent at a later date.      In addition Ms. Lancaster complains of urinary urgency and urge incontinence.  She was therefore given a prescription for oxybutynin 5 mg p.o. b.i.d. p.r.n.  The patient did void 400 cc today in clinic, and a postvoid residual was measured at zero.  We will ask Ms. Lancaster to get a KUB x-ray prior to bringing her to the operating room for the stent exchange to make sure there is no significant encrustation of the stent.  The patient is in agreement with this plan.         STEVE RUVALCABA MD             D: 2017 18:34   T: 2017 23:32   MT: [id number]      Name:     ROSALINDA LANCASTER   MRN:      [id number]        Account:      [id number]   :      1950           Visit Date:   2017      Document: [id number]       cc: Bernice Howard  MD Jovani Greco MD

## 2017-08-31 ENCOUNTER — TELEPHONE (OUTPATIENT)
Dept: UROLOGY | Facility: CLINIC | Age: 67
End: 2017-08-31

## 2017-08-31 DIAGNOSIS — N39.0 URINARY TRACT INFECTION: Primary | ICD-10-CM

## 2017-09-05 ENCOUNTER — OFFICE VISIT (OUTPATIENT)
Dept: FAMILY MEDICINE | Facility: CLINIC | Age: 67
End: 2017-09-05
Payer: COMMERCIAL

## 2017-09-05 VITALS
SYSTOLIC BLOOD PRESSURE: 138 MMHG | DIASTOLIC BLOOD PRESSURE: 78 MMHG | BODY MASS INDEX: 38.92 KG/M2 | HEIGHT: 67 IN | HEART RATE: 72 BPM | TEMPERATURE: 98.3 F | WEIGHT: 248 LBS

## 2017-09-05 DIAGNOSIS — R32 URINARY INCONTINENCE, UNSPECIFIED TYPE: ICD-10-CM

## 2017-09-05 DIAGNOSIS — Z01.818 PREOP GENERAL PHYSICAL EXAM: Primary | ICD-10-CM

## 2017-09-05 DIAGNOSIS — D3A.00 CARCINOID TUMOR (H): ICD-10-CM

## 2017-09-05 DIAGNOSIS — R60.9 NON-PITTING EDEMA: ICD-10-CM

## 2017-09-05 DIAGNOSIS — N18.30 CKD (CHRONIC KIDNEY DISEASE) STAGE 3, GFR 30-59 ML/MIN (H): ICD-10-CM

## 2017-09-05 DIAGNOSIS — N39.0 URINARY TRACT INFECTION: ICD-10-CM

## 2017-09-05 LAB
ALBUMIN UR-MCNC: NEGATIVE MG/DL
APPEARANCE UR: CLEAR
BACTERIA #/AREA URNS HPF: ABNORMAL /HPF
BILIRUB UR QL STRIP: NEGATIVE
COLOR UR AUTO: YELLOW
GLUCOSE UR STRIP-MCNC: NEGATIVE MG/DL
HGB BLD-MCNC: 13.2 G/DL (ref 11.7–15.7)
HGB UR QL STRIP: NEGATIVE
KETONES UR STRIP-MCNC: NEGATIVE MG/DL
LEUKOCYTE ESTERASE UR QL STRIP: ABNORMAL
NITRATE UR QL: NEGATIVE
NON-SQ EPI CELLS #/AREA URNS LPF: ABNORMAL /LPF
PH UR STRIP: 5.5 PH (ref 5–7)
RBC #/AREA URNS AUTO: ABNORMAL /HPF
SOURCE: ABNORMAL
SP GR UR STRIP: 1.01 (ref 1–1.03)
UROBILINOGEN UR STRIP-ACNC: 0.2 EU/DL (ref 0.2–1)
WBC #/AREA URNS AUTO: ABNORMAL /HPF

## 2017-09-05 PROCEDURE — 93000 ELECTROCARDIOGRAM COMPLETE: CPT | Performed by: FAMILY MEDICINE

## 2017-09-05 PROCEDURE — 87086 URINE CULTURE/COLONY COUNT: CPT | Performed by: UROLOGY

## 2017-09-05 PROCEDURE — 99214 OFFICE O/P EST MOD 30 MIN: CPT | Performed by: FAMILY MEDICINE

## 2017-09-05 PROCEDURE — 80048 BASIC METABOLIC PNL TOTAL CA: CPT | Performed by: FAMILY MEDICINE

## 2017-09-05 PROCEDURE — 36415 COLL VENOUS BLD VENIPUNCTURE: CPT | Performed by: FAMILY MEDICINE

## 2017-09-05 PROCEDURE — 85018 HEMOGLOBIN: CPT | Performed by: FAMILY MEDICINE

## 2017-09-05 PROCEDURE — 81001 URINALYSIS AUTO W/SCOPE: CPT | Performed by: UROLOGY

## 2017-09-05 NOTE — LETTER
September 7, 2017      Marissa Guadarrama  [address] [place] [id number]        Dear Marissa,     Kidney function is stable.  Sodium is slightly elevated, which can be due to slight dehydration at the time of the lab visit.  This should be rechecked at a future lab appointment or office visit to ensure it has returned to normal.  Results for orders placed or performed in visit on 09/05/17   Basic metabolic panel   Result Value Ref Range    Sodium 146 (H) 133 - 144 mmol/L    Potassium 3.8 3.4 - 5.3 mmol/L    Chloride 106 94 - 109 mmol/L    Carbon Dioxide 33 (H) 20 - 32 mmol/L    Anion Gap 7 3 - 14 mmol/L    Glucose 76 70 - 99 mg/dL    Urea Nitrogen 21 7 - 30 mg/dL    Creatinine 1.43 (H) 0.52 - 1.04 mg/dL    GFR Estimate 36 (L) >60 mL/min/1.7m2    GFR Estimate If Black 44 (L) >60 mL/min/1.7m2    Calcium 9.3 8.5 - 10.1 mg/dL   Hemoglobin   Result Value Ref Range    Hemoglobin 13.2 11.7 - 15.7 g/dL           Sincerely,        Magaly Lovell MD/diego

## 2017-09-05 NOTE — NURSING NOTE
"Chief Complaint   Patient presents with     Pre-Op Exam     DOS: 9/7/2017       Initial /78  Pulse 72  Temp 98.3  F (36.8  C) (Oral)  Ht 5' 7.25\" (1.708 m)  Wt 248 lb (112.5 kg)  Breastfeeding? No  BMI 38.55 kg/m2 Estimated body mass index is 38.55 kg/(m^2) as calculated from the following:    Height as of this encounter: 5' 7.25\" (1.708 m).    Weight as of this encounter: 248 lb (112.5 kg).  Medication Reconciliation: complete    "

## 2017-09-05 NOTE — PATIENT INSTRUCTIONS
You can take all of your prescription medications as prescribed the day of surgery.      Before Your Surgery      Call your surgeon if there is any change in your health. This includes signs of a cold or flu (such as a sore throat, runny nose, cough, rash or fever).    Do not smoke, drink alcohol or take over the counter medicine (unless your surgeon or primary care doctor tells you to) for the 24 hours before and after surgery.    If you take prescribed drugs: Follow your doctor s orders about which medicines to take and which to stop until after surgery.    Eating and drinking prior to surgery: follow the instructions from your surgeon    Take a shower or bath the night before surgery. Use the soap your surgeon gave you to gently clean your skin. If you do not have soap from your surgeon, use your regular soap. Do not shave or scrub the surgery site.  Wear clean pajamas and have clean sheets on your bed.

## 2017-09-05 NOTE — PROGRESS NOTES
[address] [address]  [phone]  Dept: [phone]    PRE-OP EVALUATION:  Today's date: 2017    Marissa Guadarrama (: 1950) presents for pre-operative evaluation assessment as requested by Dr. La.  She requires evaluation and anesthesia risk assessment prior to undergoing surgery/procedure for treatment of ureteral stent.  Proposed procedure: combined cystoscopy with stent removal and replacement    Date of Surgery/ Procedure: 2017  Time of Surgery/ Procedure: 2 pm  Hospital/Surgical Facility: [facility]   Primary Physician: Bernice Howard  Type of Anesthesia Anticipated: General    Patient has a Health Care Directive or Living Will:  NO    Preop Questions 2017   1.  Do you have a history of heart attack, stroke, stent, bypass or surgery on an artery in the head, neck, heart or legs? No   2.  Do you ever have any pain or discomfort in your chest? No   3.  Do you have a history of  Heart Failure? No   4.   Are you troubled by shortness of breath when:  walking on a level surface, or up a slight hill, or at night? No   5.  Do you currently have a cold, bronchitis or other respiratory infection? No   6.  Do you have a cough, shortness of breath, or wheezing? No   7.  Do you sometimes get pains in the calves of your legs when you walk? No   8. Do you or anyone in your family have previous history of blood clots? No   9.  Do you or does anyone in your family have a serious bleeding problem such as prolonged bleeding following surgeries or cuts? No   10. Have you ever had problems with anemia or been told to take iron pills? No   11. Have you had any abnormal blood loss such as black, tarry or bloody stools, or abnormal vaginal bleeding? No   12. Have you ever had a blood transfusion? No   13. Have you or any of your relatives ever had problems with anesthesia? No   14. Do you have sleep apnea, excessive snoring or  daytime drowsiness? No   15. Do you have any prosthetic heart valves? No   16. Do you have prosthetic joints? No   17. Is there any chance that you may be pregnant? No           HPI:                                                      Brief HPI related to upcoming procedure:  Patient has a stent in her kidney that is due for replacement.  Office notes from Dr. La's recent visits reviewed.    Patient very rarely takes her albuterol inhaler for SOB. She was a former smoker, but does not have asthma.       See problem list for active medical problems.  Problems all longstanding and stable, except as noted/documented.  See ROS for pertinent symptoms related to these conditions.                                                                                                  .  HYPERTENSION - Patient has longstanding history of mod-severe HTN , currently denies any symptoms referable to elevated blood pressure. Specifically denies chest pain, palpitations, dyspnea, orthopnea, PND or peripheral edema. Blood pressure readings have been in normal range. Current medication regimen is as listed below. Patient denies any side effects of medication.                                                                                                                                                                                          .  RENAL INSUFFICIENCY - Patient has a longstanding history of chronic renal insufficiency of moderate severity.  Last Cr 1.47 in 7/17.      Was up to 1.8 in 2/17                                                                                                                                                                        .    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  Marissa Guadarrama has no plan or code status on file. Discussed available resources and provided with information. Confirmed code status reflects current choices pending further ACP discussions.  Confirmed/documented designated decision maker(s). See permanent comments section of demographics in clinical tab. Added by Tiff Askew on 2/6/2015  Discussed advance care planning with patient; however, patient declined at this time. 9/19/2012              OA (osteoarthritis) of knee 10/05/2011     Priority: Medium     Knee pain 08/16/2011     Priority: Medium     Hypertension goal BP (blood pressure) < 140/90 11/01/2010     Priority: Medium     HYPERLIPIDEMIA LDL GOAL <130 10/31/2010     Priority: Medium     Post-polio syndrome      Priority: Medium     Left knee, diagnosed by ortho in 1976, had left leg/toe surgeries as child  (Problem list name updated by automated process. Provider to review and confirm.)       Arthritis      Priority: Medium     Knees.  Benign tumor under right knee by MRI (Dr. Dyson, ortho)  Use vicodin intermittently.       Cervical cancer (H)      Priority: Medium     5/2007.  Dr. Alonso, [facility] gyn/onc,  Now needs routine paps, patient not always compliant  3/26/09 pap NIL  9/24/09 pap ASCUS  11/1/13 pap ASC-H. Plan-- colposcopy   12/23/13 colposcopy scheduled. Reminder placed.  colpscopy 12/23/2013-Vaginal  "cuff (submitted as \"cervix\"), biopsy:  - High grade squamous intraepithelial lesion (vaginal  intraepithelial neoplasia grade III, VaIN III, severe dysplasia and  carcinoma in situ).  - No invasive malignancy identified.  - Cervical transformation zone not identified in biopsies.  - Please see comment.  Referral back to gynecology/oncology  2/5/14 gyn/onc appt   3/13/14 colposcopy and CO2 laser vagina, path:VAIN 1/2.  3/19/14 follow up in 4 months  7/30/14 vaginal biopsy: VAIN 1. Plan: repeat colp in 6 months.(9/17/14)   9/17/14 colp. No staining seen. No biopsy taken. Pap- ASCUS + HR HPV. Plan- repeat pap at next visit.  2/9/15 colposcopy. bx- LSIL/koilocytosis. Pap- NIL/+ HR HPV 16.  Plan: 3/16/15 treatment planning.   3/16/15 repeat colposcopy in 4 months (due 7/16/15)  7/6/15 scheduled. Tracking.             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  Patient has not been complaint with follow up.        Past Medical History:   Diagnosis Date     Arthritis     knee     Benign breast biopsy     benign     Carcinoid tumor 12/2003     Cervical cancer (H)      H/O colposcopy with cervical biopsy 12/23/13    vaginal cuff biopsy- VAIN III. referred back to gyn/onc     High cholesterol      HTN      Pap smear of vagina with ASC-H 11/1/13     Post-polio syndromes      Trigeminal neuralgias      Past Surgical History:   Procedure Laterality Date     APPENDECTOMY  1983     BIOPSY NODE SENTINEL Bilateral 6/1/2016    Procedure: BIOPSY NODE SENTINEL;  Surgeon: Brent Arana MD;  Location: [facility] OR     C BSO, OMENTECTOMY W/OSMAN  5/2007     C TOTAL ABDOM HYSTERECTOMY  5/2007     CL AFF SURGICAL PATHOLOGY       COLONOSCOPY N/A 6/23/2017    Procedure: COMBINED COLONOSCOPY, SINGLE OR MULTIPLE " BIOPSY/POLYPECTOMY BY BIOPSY;  Colonoscopy Dx:Carcinoid tumor of colon prep mailed golytely;  Surgeon: Talisha Greco MD;  Location: [facility]     COLPOSCOPY, BIOPSY, COMBINED  3/13/2014    Procedure: COMBINED COLPOSCOPY, BIOPSY;;  Surgeon: Lara Pack MD;  Location: [facility]     COMBINED CYSTOSCOPY, RETROGRADES, URETEROSCOPY, INSERT STENT Left 2/2/2017    Procedure: COMBINED CYSTOSCOPY, RETROGRADES, URETEROSCOPY, INSERT STENT;  Surgeon: Zhao La MD;  Location: [facility] OR     EXAM UNDER ANESTHESIA PELVIC  3/13/2014    Procedure: EXAM UNDER ANESTHESIA PELVIC;  Exam Under Anestheisa, Colposcopy, Vaginal Biopsies, Co2 Laser of the Upper Vagina;  Surgeon: Lara Pack MD;  Location: [facility]     HERNIORRHAPHY INCISIONAL (LOCATION)       LASER CO2 VAGINA  3/13/2014    VAIN 1/2     LUMPECTOMY BREAST WITH SEED LOCALIZATION Bilateral 6/1/2016    Procedure: LUMPECTOMY BREAST WITH SEED LOCALIZATION;  Surgeon: Brent Arana MD;  Location: [facility] OR     SURGICAL HISTORY OF -       ovarian cystectomy     SURGICAL HISTORY OF -   2003    right colon resection secondary to carcinoid tumor     TUBAL LIGATION       Current Outpatient Prescriptions   Medication Sig Dispense Refill     oxybutynin (DITROPAN) 5 MG tablet Take 1 tablet (5 mg) by mouth 2 times daily as needed for bladder spasms 60 tablet 11     exemestane (AROMASIN) 25 MG tablet Take 1 tablet (25 mg) by mouth daily 90 tablet 1     hydrochlorothiazide (HYDRODIURIL) 25 MG tablet Take 0.5 tablets (12.5 mg) by mouth daily       albuterol (PROAIR HFA, PROVENTIL HFA, VENTOLIN HFA) 108 (90 BASE) MCG/ACT inhaler Inhale 2 puffs into the lungs every 6 hours as needed for shortness of breath / dyspnea or wheezing 1 Inhaler 1     HYDROcodone-acetaminophen (NORCO) 5-325 MG per tablet Take 1-2 tablets by mouth every 4 hours as needed for other (Moderate to Severe Pain) 30 tablet 0     gabapentin (NEURONTIN) 600 MG tablet Take 1 tablet (600 mg) by mouth 3 times  "daily 180 tablet 6     lisinopril (PRINIVIL/ZESTRIL) 10 MG tablet TAKE ONE TABLET BY MOUTH ONCE DAILY (Patient not taking: Reported on 9/5/2017) 90 tablet 3     OTC products: None, except as noted above    Allergies   Allergen Reactions     Nkda [No Known Drug Allergies]       Latex Allergy: NO    Social History   Substance Use Topics     Smoking status: Former Smoker     Packs/day: 2.00     Years: 29.00     Types: Cigarettes     Quit date: 10/30/2006     Smokeless tobacco: Never Used     Alcohol use No     History   Drug Use No       REVIEW OF SYSTEMS:                                                    Constitutional, neuro, ENT, endocrine, pulmonary, cardiac, gastrointestinal, genitourinary, musculoskeletal, integument and psychiatric systems are negative, except as otherwise noted.      EXAM:                                                    /78  Pulse 72  Temp 98.3  F (36.8  C) (Oral)  Ht 5' 7.25\" (1.708 m)  Wt 248 lb (112.5 kg)  Breastfeeding? No  BMI 38.55 kg/m2    GENERAL APPEARANCE: healthy, alert and no distress     EYES: EOMI, PERRL     HENT: ear canals and TM's normal and nose and mouth without ulcers or lesions     NECK: no adenopathy, no asymmetry, masses, or scars and thyroid normal to palpation     RESP: lungs clear to auscultation - no rales, rhonchi or wheezes     CV: regular rates and rhythm, normal S1 S2, no S3 or S4 and no murmur, click or rub     ABDOMEN:  soft, nontender, no HSM or masses and bowel sounds normal     MS: 1+ nonpitting edema of lower left leg. extremities normal- no gross deformities noted, no evidence of inflammation in joints, FROM in all extremities.     SKIN: no suspicious lesions or rashes     NEURO: Normal strength and tone, sensory exam grossly normal, mentation intact and speech normal     PSYCH: mentation appears normal. and affect normal/bright     LYMPHATICS: No axillary, cervical, or supraclavicular nodes    DIAGNOSTICS:                                    "                 EKG: appears normal, NSR, normal axis, normal intervals, no acute ST/T changes c/w ischemia, no LVH by voltage criteria, nonspecific ST-T changes, unchanged from previous tracings  Hemoglobin (indicated for history of anemia or procedure with significant blood loss such as tonsillectomy, major intraperitoneal surgery, vascular surgery, major spine surgery, total joint replacement)  Serum Potassium  Serum Creatinine    Recent Labs   Lab Test  07/05/17   1325  05/05/17   1015   HGB  14.0  14.6   PLT  172  171   NA  140  141   POTASSIUM  4.9  3.8   CR  1.47*  1.54*        IMPRESSION:                                                    Reason for surgery/procedure: stent replacement with combined cystoscopy       Diagnosis/reason for consult: ureteral stent replacement.    The proposed surgical procedure is considered INTERMEDIATE risk.    REVISED CARDIAC RISK INDEX  The patient has the following serious cardiovascular risks for perioperative complications such as (MI, PE, VFib and 3  AV Block):  No serious cardiac risks  INTERPRETATION: 0 risks: Class I (very low risk - 0.4% complication rate)    The patient has the following additional risks for perioperative complications:  No identified additional risks      ICD-10-CM    1. Screening for malignant neoplasm of cervix Z12.4 Pap imaged thin layer screen with HPV - recommended age 30 - 65 years (select HPV order below)     HPV High Risk Types DNA Cervical   2. Preop general physical exam Z01.818        RECOMMENDATIONS:                                                      --Consult hospital rounder / IM to assist post-op medical management    --Patient is to take all scheduled medications on the day of surgery EXCEPT for modifications listed below.    APPROVAL GIVEN to proceed with proposed procedure, without further diagnostic evaluation    Non-pitting lower extremity edema of left ankle  Seems more consistent with Joint OA or postpolio  symptoms.    (Z96.0) History of ureter stent  Comment:   Plan: due for removal and replacement.    (R32) Urinary incontinence, unspecified type  Comment: doing well on oxybutinin  Plan: Continue current medication      (D3A.00) Carcinoid tumor  Comment:   Plan: was reviewed with tumor team and will be meeting with Dr Lawrence    (N18.3) CKD (chronic kidney disease) stage 3, GFR 30-59 ml/min  Comment:   Plan: Basic metabolic panel, Hemoglobin                   Signed Electronically by: Magaly Lovell MD    Copy of this evaluation report is provided to requesting physician.    [facility] Preop Guidelines

## 2017-09-05 NOTE — MR AVS SNAPSHOT
After Visit Summary   9/5/2017    Marissa Guadarrama    MRN: [id number]           Patient Information     Date Of Birth          1950        Visit Information        Provider Department      9/5/2017 10:00 AM Magaly Lovell MD [facility]        Today's Diagnoses     Preop general physical exam    -  1    History of ureter stent        Urinary incontinence, unspecified type        Carcinoid tumor        CKD (chronic kidney disease) stage 3, GFR 30-59 ml/min          Care Instructions    You can take all of your prescription medications as prescribed the day of surgery.      Before Your Surgery      Call your surgeon if there is any change in your health. This includes signs of a cold or flu (such as a sore throat, runny nose, cough, rash or fever).    Do not smoke, drink alcohol or take over the counter medicine (unless your surgeon or primary care doctor tells you to) for the 24 hours before and after surgery.    If you take prescribed drugs: Follow your doctor s orders about which medicines to take and which to stop until after surgery.    Eating and drinking prior to surgery: follow the instructions from your surgeon    Take a shower or bath the night before surgery. Use the soap your surgeon gave you to gently clean your skin. If you do not have soap from your surgeon, use your regular soap. Do not shave or scrub the surgery site.  Wear clean pajamas and have clean sheets on your bed.           Follow-ups after your visit        Your next 10 appointments already scheduled     Sep 07, 2017   Procedure with Zhao La MD   [facility] (--)    [address] [place] [id number] [phone]           The medical center is located at [facility]. (between [address] in [place], [place] miles north of [place]).            Sep 25, 2017 10:40 AM CDT   (Arrive by 10:25 AM)   COLPOSCOPY with Nic Segundo  MD   [facility] ([facility])    [address]  [address]nd [address]  [facility] [address]   [phone]            Oct 02, 2017  9:15 AM CDT   (Arrive by 9:00 AM)   New Patient Visit with Jovani Lawrence MD   [facility] ([facility])    [address]  [address]  [place] [address]   [phone]            Oct 12, 2017 10:10 AM CDT   LAB with [facility] ([facility])    [address] [place] [address] [phone]           Patient must bring picture ID. Patient should be prepared to give a urine specimen  Please do not eat 10-12 hours before your appointment if you are coming in fasting for labs on lipids, cholesterol, or glucose (sugar). Pregnant women should follow their Care Team instructions. Water with medications is okay. Do not drink coffee or other fluids. If you have concerns about taking  your medications, please ask at office or if scheduling via [employer], send a message by clicking on Secure Messaging, Message Your Care Team.            Oct 19, 2017 10:30 AM CDT   Return Visit with Hosea King MD   [facility] ([facility])    [facility]  [address]  [facility] [id number] [phone]              Who to contact     If you have questions or need follow up information about today's clinic visit or your schedule please contact [facility] directly at [phone].  Normal or non-critical lab and imaging results will be communicated to you by MyChart, letter or phone within 4 business days after the clinic has received the results. If you do not hear from us within 7 days, please contact the clinic through MyChart or phone. If you have a critical or abnormal lab result, we will notify you by phone as soon as possible.  Submit refill requests through [employer]  "or call your pharmacy and they will forward the refill request to us. Please allow 3 business days for your refill to be completed.          Additional Information About Your Visit        Care EveryWhere ID     This is your Care EveryWhere ID. This could be used by other organizations to access your [place] medical records  [id number]        Your Vitals Were     Pulse Temperature Height Breastfeeding? BMI (Body Mass Index)       72 98.3  F (36.8  C) (Oral) 5' 7.25\" (1.708 m) No 38.55 kg/m2        Blood Pressure from Last 3 Encounters:   09/05/17 138/78   08/16/17 143/82   08/04/17 162/85    Weight from Last 3 Encounters:   09/05/17 248 lb (112.5 kg)   08/04/17 246 lb 9.6 oz (111.9 kg)   07/19/17 243 lb 11.2 oz (110.5 kg)              We Performed the Following     Basic metabolic panel     EKG 12-lead complete w/read - Clinics     Hemoglobin        Primary Care Provider Office Phone # Fax #    Bernice Howard [phone]       [address] [facility] [address]        Equal Access to Services     [facility] ServicesLORENA : Hadii aad ku hadasho Soomaali, waaxda luqadaha, qaybta kaalmada adeegyada, niesha campuzanon gopi tang . So [facility] [phone].    ATENCIÓN: Si habla español, tiene a allan disposición servicios gratuitos de asistencia lingüística. Llame al [phone].    We comply with applicable federal civil rights laws and [place] laws. We do not discriminate on the basis of race, color, national origin, age, disability sex, sexual orientation or gender identity.            Thank you!     Thank you for choosing [facility]  for your care. Our goal is always to provide you with excellent care. Hearing back from our patients is one way we can continue to improve our services. Please take a few minutes to complete the written survey that you may receive in the mail after your visit with us. Thank you!             Your Updated Medication List - Protect others " around you: Learn how to safely use, store and throw away your medicines at www.disposemymeds.org.          This list is accurate as of: 9/5/17 11:07 AM.  Always use your most recent med list.                   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, estrogen receptor positive (H)       gabapentin 600 MG tablet    NEURONTIN    180 tablet    Take 1 tablet (600 mg) by mouth 3 times daily    Trigeminal neuralgia       hydrochlorothiazide 25 MG tablet    HYDRODIURIL     Take 0.5 tablets (12.5 mg) by mouth daily    Essential hypertension with goal blood pressure less than 140/90       HYDROcodone-acetaminophen 5-325 MG per tablet    NORCO    30 tablet    Take 1-2 tablets by mouth every 4 hours as needed for other (Moderate to Severe Pain)    Osteoarthritis of right knee, unspecified osteoarthritis type       oxybutynin 5 MG tablet    DITROPAN    60 tablet    Take 1 tablet (5 mg) by mouth 2 times daily as needed for bladder spasms    Urgency incontinence

## 2017-09-06 ENCOUNTER — ANESTHESIA EVENT (OUTPATIENT)
Dept: SURGERY | Facility: CLINIC | Age: 67
End: 2017-09-06
Payer: MEDICARE

## 2017-09-06 LAB
ANION GAP SERPL CALCULATED.3IONS-SCNC: 7 MMOL/L (ref 3–14)
BACTERIA SPEC CULT: NORMAL
BUN SERPL-MCNC: 21 MG/DL (ref 7–30)
CALCIUM SERPL-MCNC: 9.3 MG/DL (ref 8.5–10.1)
CHLORIDE SERPL-SCNC: 106 MMOL/L (ref 94–109)
CO2 SERPL-SCNC: 33 MMOL/L (ref 20–32)
CREAT SERPL-MCNC: 1.43 MG/DL (ref 0.52–1.04)
GFR SERPL CREATININE-BSD FRML MDRD: 36 ML/MIN/1.7M2
GLUCOSE SERPL-MCNC: 76 MG/DL (ref 70–99)
POTASSIUM SERPL-SCNC: 3.8 MMOL/L (ref 3.4–5.3)
SODIUM SERPL-SCNC: 146 MMOL/L (ref 133–144)
SPECIMEN SOURCE: NORMAL

## 2017-09-07 ENCOUNTER — HOSPITAL ENCOUNTER (OUTPATIENT)
Facility: CLINIC | Age: 67
Discharge: HOME | End: 2017-09-07
Attending: UROLOGY | Admitting: UROLOGY
Payer: MEDICARE

## 2017-09-07 ENCOUNTER — APPOINTMENT (OUTPATIENT)
Dept: GENERAL RADIOLOGY | Facility: CLINIC | Age: 67
End: 2017-09-07
Attending: UROLOGY
Payer: MEDICARE

## 2017-09-07 ENCOUNTER — ANESTHESIA (OUTPATIENT)
Dept: SURGERY | Facility: CLINIC | Age: 67
End: 2017-09-07
Payer: MEDICARE

## 2017-09-07 VITALS
DIASTOLIC BLOOD PRESSURE: 65 MMHG | HEIGHT: 68 IN | SYSTOLIC BLOOD PRESSURE: 159 MMHG | HEART RATE: 59 BPM | OXYGEN SATURATION: 98 % | RESPIRATION RATE: 16 BRPM | TEMPERATURE: 98.1 F | BODY MASS INDEX: 37.59 KG/M2 | WEIGHT: 248 LBS

## 2017-09-07 DIAGNOSIS — N13.5 URETERAL OBSTRUCTION: Primary | ICD-10-CM

## 2017-09-07 PROCEDURE — C1769 GUIDE WIRE: HCPCS | Performed by: UROLOGY

## 2017-09-07 PROCEDURE — 71000027 ZZH RECOVERY PHASE 2 EACH 15 MINS: Performed by: UROLOGY

## 2017-09-07 PROCEDURE — 25000128 H RX IP 250 OP 636: Performed by: NURSE ANESTHETIST, CERTIFIED REGISTERED

## 2017-09-07 PROCEDURE — 37000008 ZZH ANESTHESIA TECHNICAL FEE, 1ST 30 MIN: Performed by: UROLOGY

## 2017-09-07 PROCEDURE — 37000009 ZZH ANESTHESIA TECHNICAL FEE, EACH ADDTL 15 MIN: Performed by: UROLOGY

## 2017-09-07 PROCEDURE — 52332 CYSTOSCOPY AND TREATMENT: CPT | Mod: LT | Performed by: UROLOGY

## 2017-09-07 PROCEDURE — 40000277 XR SURGERY CARM FLUORO LESS THAN 5 MIN W STILLS

## 2017-09-07 PROCEDURE — 25000125 ZZHC RX 250: Performed by: NURSE ANESTHETIST, CERTIFIED REGISTERED

## 2017-09-07 PROCEDURE — 40000306 ZZH STATISTIC PRE PROC ASSESS II: Performed by: UROLOGY

## 2017-09-07 PROCEDURE — 27210794 ZZH OR GENERAL SUPPLY STERILE: Performed by: UROLOGY

## 2017-09-07 PROCEDURE — C2617 STENT, NON-COR, TEM W/O DEL: HCPCS | Performed by: UROLOGY

## 2017-09-07 PROCEDURE — 25000128 H RX IP 250 OP 636: Performed by: UROLOGY

## 2017-09-07 PROCEDURE — 36000052 ZZH SURGERY LEVEL 2 EA 15 ADDTL MIN: Performed by: UROLOGY

## 2017-09-07 PROCEDURE — 36000054 ZZH SURGERY LEVEL 2 W FLUORO 1ST 30 MIN: Performed by: UROLOGY

## 2017-09-07 DEVICE — STENT URETERAL DBL PIGTAIL INLAY 6FRX26CM 778626
Type: IMPLANTABLE DEVICE | Site: URETER | Status: NON-FUNCTIONAL
Removed: 2017-12-12

## 2017-09-07 RX ORDER — CEFAZOLIN SODIUM 1 G/3ML
1 INJECTION, POWDER, FOR SOLUTION INTRAMUSCULAR; INTRAVENOUS SEE ADMIN INSTRUCTIONS
Status: DISCONTINUED | OUTPATIENT
Start: 2017-09-07 | End: 2017-09-07 | Stop reason: HOSPADM

## 2017-09-07 RX ORDER — KETAMINE HYDROCHLORIDE 50 MG/ML
INJECTION, SOLUTION INTRAMUSCULAR; INTRAVENOUS PRN
Status: DISCONTINUED | OUTPATIENT
Start: 2017-09-07 | End: 2017-09-07

## 2017-09-07 RX ORDER — DEXAMETHASONE SODIUM PHOSPHATE 4 MG/ML
INJECTION, SOLUTION INTRA-ARTICULAR; INTRALESIONAL; INTRAMUSCULAR; INTRAVENOUS; SOFT TISSUE PRN
Status: DISCONTINUED | OUTPATIENT
Start: 2017-09-07 | End: 2017-09-07

## 2017-09-07 RX ORDER — CEFAZOLIN SODIUM 2 G/100ML
2 INJECTION, SOLUTION INTRAVENOUS
Status: COMPLETED | OUTPATIENT
Start: 2017-09-07 | End: 2017-09-07

## 2017-09-07 RX ORDER — FENTANYL CITRATE 50 UG/ML
INJECTION, SOLUTION INTRAMUSCULAR; INTRAVENOUS PRN
Status: DISCONTINUED | OUTPATIENT
Start: 2017-09-07 | End: 2017-09-07

## 2017-09-07 RX ORDER — ONDANSETRON 2 MG/ML
INJECTION INTRAMUSCULAR; INTRAVENOUS PRN
Status: DISCONTINUED | OUTPATIENT
Start: 2017-09-07 | End: 2017-09-07

## 2017-09-07 RX ORDER — LIDOCAINE 40 MG/G
CREAM TOPICAL
Status: DISCONTINUED | OUTPATIENT
Start: 2017-09-07 | End: 2017-09-07 | Stop reason: HOSPADM

## 2017-09-07 RX ORDER — PROPOFOL 10 MG/ML
INJECTION, EMULSION INTRAVENOUS PRN
Status: DISCONTINUED | OUTPATIENT
Start: 2017-09-07 | End: 2017-09-07

## 2017-09-07 RX ORDER — CHLORHEXIDINE GLUCONATE 40 MG/ML
SOLUTION TOPICAL ONCE
Status: DISCONTINUED | OUTPATIENT
Start: 2017-09-07 | End: 2017-09-07 | Stop reason: HOSPADM

## 2017-09-07 RX ORDER — SODIUM CHLORIDE, SODIUM LACTATE, POTASSIUM CHLORIDE, CALCIUM CHLORIDE 600; 310; 30; 20 MG/100ML; MG/100ML; MG/100ML; MG/100ML
INJECTION, SOLUTION INTRAVENOUS CONTINUOUS
Status: DISCONTINUED | OUTPATIENT
Start: 2017-09-07 | End: 2017-09-07 | Stop reason: HOSPADM

## 2017-09-07 RX ORDER — PROPOFOL 10 MG/ML
INJECTION, EMULSION INTRAVENOUS CONTINUOUS PRN
Status: DISCONTINUED | OUTPATIENT
Start: 2017-09-07 | End: 2017-09-07

## 2017-09-07 RX ORDER — IOPAMIDOL 612 MG/ML
INJECTION, SOLUTION INTRAVASCULAR PRN
Status: DISCONTINUED | OUTPATIENT
Start: 2017-09-07 | End: 2017-09-07 | Stop reason: HOSPADM

## 2017-09-07 RX ADMIN — LIDOCAINE HYDROCHLORIDE 1 ML: 10 INJECTION, SOLUTION EPIDURAL; INFILTRATION; INTRACAUDAL; PERINEURAL at 13:59

## 2017-09-07 RX ADMIN — CEFAZOLIN SODIUM 2 G: 2 INJECTION, SOLUTION INTRAVENOUS at 15:41

## 2017-09-07 RX ADMIN — DEXAMETHASONE SODIUM PHOSPHATE 4 MG: 4 INJECTION, SOLUTION INTRA-ARTICULAR; INTRALESIONAL; INTRAMUSCULAR; INTRAVENOUS; SOFT TISSUE at 16:40

## 2017-09-07 RX ADMIN — PROPOFOL 25 MCG/KG/MIN: 10 INJECTION, EMULSION INTRAVENOUS at 16:10

## 2017-09-07 RX ADMIN — MIDAZOLAM HYDROCHLORIDE 2 MG: 1 INJECTION, SOLUTION INTRAMUSCULAR; INTRAVENOUS at 15:48

## 2017-09-07 RX ADMIN — KETAMINE HYDROCHLORIDE 25 MG: 50 INJECTION, SOLUTION INTRAMUSCULAR; INTRAVENOUS at 16:17

## 2017-09-07 RX ADMIN — PROPOFOL 50 MG: 10 INJECTION, EMULSION INTRAVENOUS at 16:17

## 2017-09-07 RX ADMIN — PROPOFOL 10 MG: 10 INJECTION, EMULSION INTRAVENOUS at 15:45

## 2017-09-07 RX ADMIN — KETAMINE HYDROCHLORIDE 25 MG: 50 INJECTION, SOLUTION INTRAMUSCULAR; INTRAVENOUS at 16:04

## 2017-09-07 RX ADMIN — SODIUM CHLORIDE, POTASSIUM CHLORIDE, SODIUM LACTATE AND CALCIUM CHLORIDE: 600; 310; 30; 20 INJECTION, SOLUTION INTRAVENOUS at 14:00

## 2017-09-07 RX ADMIN — FENTANYL CITRATE 100 MCG: 50 INJECTION, SOLUTION INTRAMUSCULAR; INTRAVENOUS at 15:42

## 2017-09-07 RX ADMIN — FENTANYL CITRATE 100 MCG: 50 INJECTION, SOLUTION INTRAMUSCULAR; INTRAVENOUS at 16:27

## 2017-09-07 RX ADMIN — PROPOFOL 50 MG: 10 INJECTION, EMULSION INTRAVENOUS at 15:53

## 2017-09-07 RX ADMIN — ONDANSETRON 4 MG: 2 INJECTION INTRAMUSCULAR; INTRAVENOUS at 16:40

## 2017-09-07 RX ADMIN — PROPOFOL 40 MG: 10 INJECTION, EMULSION INTRAVENOUS at 15:49

## 2017-09-07 RX ADMIN — PROPOFOL 50 MG: 10 INJECTION, EMULSION INTRAVENOUS at 16:01

## 2017-09-07 ASSESSMENT — COPD QUESTIONNAIRES: COPD: 1

## 2017-09-07 ASSESSMENT — LIFESTYLE VARIABLES: TOBACCO_USE: 1

## 2017-09-07 NOTE — DISCHARGE INSTRUCTIONS
Same Day Surgery Discharge Instructions  Special Precautions After Surgery - Adult    1. It is not unusual to feel lightheaded or faint, up to 24 hours after surgery or while taking pain medication.  If you have these symptoms; sit for a few minutes before standing and have someone assist you when getting up.  2. You should rest and relax for the next 24 hours and must have someone stay with you for at least 24 hours after your discharge.  3. DO NOT DRIVE any vehicle or operate mechanical equipment for 24 hours following the end of your surgery.  DO NOT DRIVE while taking narcotic pain medications that have been prescribed by your physician.  If you had a limb operated on, you must be able to use it fully to drive.  4. DO NOT drink alcoholic beverages for 24 hours following surgery or while taking prescription pain medication.  5. Drink clear liquids (apple juice, ginger ale, broth, 7-Up, etc.).  Progress to your regular diet as you feel able.  6. Any questions call your physician and do not make important decisions for 24 hours.    Surgery Specialty Clinic:  [phone]     Same Day Surgery [phone], Monday thru Friday 6am-9pm.    Break through Bleeding  As instructed per Surgeon or Nurse.    Post Op Infection  Be alert for signs of infection: redness, swelling, heat, drainage of pus, and/or elevated temperature.  Contact your surgeon if these occur.    Nausea   If post op nausea occurs, at first rest your stomach for a few hours by eating nothing solid and sipping only clear liquids.  Call your Surgeon if nausea does not resolve in 24 hours.      Follow up in urology clinic after you have been seen in oncology.  We will make further decisions about the ureter at that time.

## 2017-09-07 NOTE — IP AVS SNAPSHOT
[facility] PreOP/Phase II    [address] [facility] [address]    Phone:  [phone]    Fax:  [phone]                                       After Visit Summary   9/7/2017    Marissa Guadarrama    MRN: [id number]           After Visit Summary Signature Page     I have received my discharge instructions, and my questions have been answered. I have discussed any challenges I see with this plan with the nurse or doctor.    ..........................................................................................................................................  Patient/Patient Representative Signature      ..........................................................................................................................................  Patient Representative Print Name and Relationship to Patient    ..................................................               ................................................  Date                                            Time    ..........................................................................................................................................  Reviewed by Signature/Title    ...................................................              ..............................................  Date                                                            Time

## 2017-09-07 NOTE — H&P (VIEW-ONLY)
[address] [address]  [phone]  Dept: [phone]    PRE-OP EVALUATION:  Today's date: 2017    Marissa Guadarrama (: 1950) presents for pre-operative evaluation assessment as requested by Dr. La.  She requires evaluation and anesthesia risk assessment prior to undergoing surgery/procedure for treatment of ureteral stent.  Proposed procedure: combined cystoscopy with stent removal and replacement    Date of Surgery/ Procedure: 2017  Time of Surgery/ Procedure: 2 pm  Hospital/Surgical Facility: [facility]   Primary Physician: Bernice Howard  Type of Anesthesia Anticipated: General    Patient has a Health Care Directive or Living Will:  NO    Preop Questions 2017   1.  Do you have a history of heart attack, stroke, stent, bypass or surgery on an artery in the head, neck, heart or legs? No   2.  Do you ever have any pain or discomfort in your chest? No   3.  Do you have a history of  Heart Failure? No   4.   Are you troubled by shortness of breath when:  walking on a level surface, or up a slight hill, or at night? No   5.  Do you currently have a cold, bronchitis or other respiratory infection? No   6.  Do you have a cough, shortness of breath, or wheezing? No   7.  Do you sometimes get pains in the calves of your legs when you walk? No   8. Do you or anyone in your family have previous history of blood clots? No   9.  Do you or does anyone in your family have a serious bleeding problem such as prolonged bleeding following surgeries or cuts? No   10. Have you ever had problems with anemia or been told to take iron pills? No   11. Have you had any abnormal blood loss such as black, tarry or bloody stools, or abnormal vaginal bleeding? No   12. Have you ever had a blood transfusion? No   13. Have you or any of your relatives ever had problems with anesthesia? No   14. Do you have sleep apnea, excessive snoring or  CERVICAL SPINE EVALUATION:   Referring Physician: Dr. Shanice Garcia  Diagnosis: Cervical radiculopathy (M54.12)  Neck pain on left side (M54.2)  Cervical disc disease (M50.90)  Foraminal stenosis of cervical region (M99.81)  Cervical spinal stenosis (M48.02) Da C4-5 and C5-6. Neuroforaminal narrowing is essentially left-sided between C3-4 and C6-7   and is most advanced at C4-5.  The degree of central canal and neuroforaminal narrowing as well as progressed at C4-5 and C5-6 since prior exam from 2006.      FOTO ou daytime drowsiness? No   15. Do you have any prosthetic heart valves? No   16. Do you have prosthetic joints? No   17. Is there any chance that you may be pregnant? No           HPI:                                                      Brief HPI related to upcoming procedure:  Patient has a stent in her kidney that is due for replacement.  Office notes from Dr. La's recent visits reviewed.    Patient very rarely takes her albuterol inhaler for SOB. She was a former smoker, but does not have asthma.       See problem list for active medical problems.  Problems all longstanding and stable, except as noted/documented.  See ROS for pertinent symptoms related to these conditions.                                                                                                  .  HYPERTENSION - Patient has longstanding history of mod-severe HTN , currently denies any symptoms referable to elevated blood pressure. Specifically denies chest pain, palpitations, dyspnea, orthopnea, PND or peripheral edema. Blood pressure readings have been in normal range. Current medication regimen is as listed below. Patient denies any side effects of medication.                                                                                                                                                                                          .  RENAL INSUFFICIENCY - Patient has a longstanding history of chronic renal insufficiency of moderate severity.  Last Cr 1.47 in 7/17.      Was up to 1.8 in 2/17                                                                                                                                                                        .    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  Marissa Guadarrama has no plan or code status on file. Discussed available resources and provided with information. Confirmed code status reflects current choices pending further ACP discussions.  Confirmed/documented designated decision maker(s). See permanent comments section of demographics in clinical tab. Added by Tiff Askew on 2/6/2015  Discussed advance care planning with patient; however, patient declined at this time. 9/19/2012              OA (osteoarthritis) of knee 10/05/2011     Priority: Medium     Knee pain 08/16/2011     Priority: Medium     Hypertension goal BP (blood pressure) < 140/90 11/01/2010     Priority: Medium     HYPERLIPIDEMIA LDL GOAL <130 10/31/2010     Priority: Medium     Post-polio syndrome      Priority: Medium     Left knee, diagnosed by ortho in 1976, had left leg/toe surgeries as child  (Problem list name updated by automated process. Provider to review and confirm.)       Arthritis      Priority: Medium     Knees.  Benign tumor under right knee by MRI (Dr. Dyson, ortho)  Use vicodin intermittently.       Cervical cancer (H)      Priority: Medium     5/2007.  Dr. Alonso, [facility] gyn/onc,  Now needs routine paps, patient not always compliant  3/26/09 pap NIL  9/24/09 pap ASCUS  11/1/13 pap ASC-H. Plan-- colposcopy   12/23/13 colposcopy scheduled. Reminder placed.  colpscopy 12/23/2013-Vaginal  depression and other: osteoporosis    Skilled intervention will include: Soft tissue mobilization, stretching, strengthening, neuromuscular re-education, manual joint mobilization, self care, education, HEP and ultrasound    Reccommended frequency/duration "cuff (submitted as \"cervix\"), biopsy:  - High grade squamous intraepithelial lesion (vaginal  intraepithelial neoplasia grade III, VaIN III, severe dysplasia and  carcinoma in situ).  - No invasive malignancy identified.  - Cervical transformation zone not identified in biopsies.  - Please see comment.  Referral back to gynecology/oncology  2/5/14 gyn/onc appt   3/13/14 colposcopy and CO2 laser vagina, path:VAIN 1/2.  3/19/14 follow up in 4 months  7/30/14 vaginal biopsy: VAIN 1. Plan: repeat colp in 6 months.(9/17/14)   9/17/14 colp. No staining seen. No biopsy taken. Pap- ASCUS + HR HPV. Plan- repeat pap at next visit.  2/9/15 colposcopy. bx- LSIL/koilocytosis. Pap- NIL/+ HR HPV 16.  Plan: 3/16/15 treatment planning.   3/16/15 repeat colposcopy in 4 months (due 7/16/15)  7/6/15 scheduled. Tracking.             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  Patient has not been complaint with follow up.        Past Medical History:   Diagnosis Date     Arthritis     knee     Benign breast biopsy     benign     Carcinoid tumor 12/2003     Cervical cancer (H)      H/O colposcopy with cervical biopsy 12/23/13    vaginal cuff biopsy- VAIN III. referred back to gyn/onc     High cholesterol      HTN      Pap smear of vagina with ASC-H 11/1/13     Post-polio syndromes      Trigeminal neuralgias      Past Surgical History:   Procedure Laterality Date     APPENDECTOMY  1983     BIOPSY NODE SENTINEL Bilateral 6/1/2016    Procedure: BIOPSY NODE SENTINEL;  Surgeon: Brent Arana MD;  Location: [facility] OR     C BSO, OMENTECTOMY W/OSMAN  5/2007     C TOTAL ABDOM HYSTERECTOMY  5/2007     CL [facility]FF SURGICAL PATHOLOGY       COLONOSCOPY N/A 6/23/2017    Procedure: COMBINED COLONOSCOPY, SINGLE OR MULTIPLE " Sidebend 45 deg    L Sidebend 60 deg    R Rotation 65 deg    L Rotation 75 deg                Accessory Joint Mobility Testing:  OA: hypomobile and pain to palpation of sub-occipitals  Central Spinous Process C1-T3: hypomobile and pain to palpation, L radi BIOPSY/POLYPECTOMY BY BIOPSY;  Colonoscopy Dx:Carcinoid tumor of colon prep mailed golytely;  Surgeon: Talisha Greco MD;  Location: [facility]     COLPOSCOPY, BIOPSY, COMBINED  3/13/2014    Procedure: COMBINED COLPOSCOPY, BIOPSY;;  Surgeon: Lara Pack MD;  Location: [facility]     COMBINED CYSTOSCOPY, RETROGRADES, URETEROSCOPY, INSERT STENT Left 2/2/2017    Procedure: COMBINED CYSTOSCOPY, RETROGRADES, URETEROSCOPY, INSERT STENT;  Surgeon: Zhao La MD;  Location: [facility]     EXAM UNDER ANESTHESIA PELVIC  3/13/2014    Procedure: EXAM UNDER ANESTHESIA PELVIC;  Exam Under Anestheisa, Colposcopy, Vaginal Biopsies, Co2 Laser of the Upper Vagina;  Surgeon: Lara Pack MD;  Location: [facility]     HERNIORRHAPHY INCISIONAL (LOCATION)       LASER CO2 VAGINA  3/13/2014    VAIN 1/2     LUMPECTOMY BREAST WITH SEED LOCALIZATION Bilateral 6/1/2016    Procedure: LUMPECTOMY BREAST WITH SEED LOCALIZATION;  Surgeon: Brent Arana MD;  Location: [facility]     SURGICAL HISTORY OF -       ovarian cystectomy     SURGICAL HISTORY OF -   2003    right colon resection secondary to carcinoid tumor     TUBAL LIGATION       Current Outpatient Prescriptions   Medication Sig Dispense Refill     oxybutynin (DITROPAN) 5 MG tablet Take 1 tablet (5 mg) by mouth 2 times daily as needed for bladder spasms 60 tablet 11     exemestane (AROMASIN) 25 MG tablet Take 1 tablet (25 mg) by mouth daily 90 tablet 1     hydrochlorothiazide (HYDRODIURIL) 25 MG tablet Take 0.5 tablets (12.5 mg) by mouth daily       albuterol (PROAIR HFA, PROVENTIL HFA, VENTOLIN HFA) 108 (90 BASE) MCG/ACT inhaler Inhale 2 puffs into the lungs every 6 hours as needed for shortness of breath / dyspnea or wheezing 1 Inhaler 1     HYDROcodone-acetaminophen (NORCO) 5-325 MG per tablet Take 1-2 tablets by mouth every 4 hours as needed for other (Moderate to Severe Pain) 30 tablet 0     gabapentin (NEURONTIN) 600 MG tablet Take 1 tablet (600 mg) by mouth 3 times  co-sign or sign and return this letter via fax as soon as possible to [phone]. If you have any questions, please contact me at Dept: [phone]    Sincerely,  Electronically signed by:    Therapist: Elene Mortimer, PT, DPT, OCS  4/24/2017 5:05 PM "daily 180 tablet 6     lisinopril (PRINIVIL/ZESTRIL) 10 MG tablet TAKE ONE TABLET BY MOUTH ONCE DAILY (Patient not taking: Reported on 9/5/2017) 90 tablet 3     OTC products: None, except as noted above    Allergies   Allergen Reactions     Nkda [No Known Drug Allergies]       Latex Allergy: NO    Social History   Substance Use Topics     Smoking status: Former Smoker     Packs/day: 2.00     Years: 29.00     Types: Cigarettes     Quit date: 10/30/2006     Smokeless tobacco: Never Used     Alcohol use No     History   Drug Use No       REVIEW OF SYSTEMS:                                                    Constitutional, neuro, ENT, endocrine, pulmonary, cardiac, gastrointestinal, genitourinary, musculoskeletal, integument and psychiatric systems are negative, except as otherwise noted.      EXAM:                                                    /78  Pulse 72  Temp 98.3  F (36.8  C) (Oral)  Ht 5' 7.25\" (1.708 m)  Wt 248 lb (112.5 kg)  Breastfeeding? No  BMI 38.55 kg/m2    GENERAL APPEARANCE: healthy, alert and no distress     EYES: EOMI, PERRL     HENT: ear canals and TM's normal and nose and mouth without ulcers or lesions     NECK: no adenopathy, no asymmetry, masses, or scars and thyroid normal to palpation     RESP: lungs clear to auscultation - no rales, rhonchi or wheezes     CV: regular rates and rhythm, normal S1 S2, no S3 or S4 and no murmur, click or rub     ABDOMEN:  soft, nontender, no HSM or masses and bowel sounds normal     MS: 1+ nonpitting edema of lower left leg. extremities normal- no gross deformities noted, no evidence of inflammation in joints, FROM in all extremities.     SKIN: no suspicious lesions or rashes     NEURO: Normal strength and tone, sensory exam grossly normal, mentation intact and speech normal     PSYCH: mentation appears normal. and affect normal/bright     LYMPHATICS: No axillary, cervical, or supraclavicular nodes    DIAGNOSTICS:                                    "                 EKG: appears normal, NSR, normal axis, normal intervals, no acute ST/T changes c/w ischemia, no LVH by voltage criteria, nonspecific ST-T changes, unchanged from previous tracings  Hemoglobin (indicated for history of anemia or procedure with significant blood loss such as tonsillectomy, major intraperitoneal surgery, vascular surgery, major spine surgery, total joint replacement)  Serum Potassium  Serum Creatinine    Recent Labs   Lab Test  07/05/17   1325  05/05/17   1015   HGB  14.0  14.6   PLT  172  171   NA  140  141   POTASSIUM  4.9  3.8   CR  1.47*  1.54*        IMPRESSION:                                                    Reason for surgery/procedure: stent replacement with combined cystoscopy       Diagnosis/reason for consult: ureteral stent replacement.    The proposed surgical procedure is considered INTERMEDIATE risk.    REVISED CARDIAC RISK INDEX  The patient has the following serious cardiovascular risks for perioperative complications such as (MI, PE, VFib and 3  AV Block):  No serious cardiac risks  INTERPRETATION: 0 risks: Class I (very low risk - 0.4% complication rate)    The patient has the following additional risks for perioperative complications:  No identified additional risks      ICD-10-CM    1. Screening for malignant neoplasm of cervix Z12.4 Pap imaged thin layer screen with HPV - recommended age 30 - 65 years (select HPV order below)     HPV High Risk Types DNA Cervical   2. Preop general physical exam Z01.818        RECOMMENDATIONS:                                                      --Consult hospital rounder / IM to assist post-op medical management    --Patient is to take all scheduled medications on the day of surgery EXCEPT for modifications listed below.    APPROVAL GIVEN to proceed with proposed procedure, without further diagnostic evaluation    Non-pitting lower extremity edema of left ankle  Seems more consistent with Joint OA or postpolio  symptoms.    (Z96.0) History of ureter stent  Comment:   Plan: due for removal and replacement.    (R32) Urinary incontinence, unspecified type  Comment: doing well on oxybutinin  Plan: Continue current medication      (D3A.00) Carcinoid tumor  Comment:   Plan: was reviewed with tumor team and will be meeting with Dr Lawrence    (N18.3) CKD (chronic kidney disease) stage 3, GFR 30-59 ml/min  Comment:   Plan: Basic metabolic panel, Hemoglobin                   Signed Electronically by: Magaly Lovell MD    Copy of this evaluation report is provided to requesting physician.    [facility] Preop Guidelines

## 2017-09-07 NOTE — ANESTHESIA POSTPROCEDURE EVALUATION
Patient: Marissa Guadarrama    Procedure(s):  Cystoscopy,Left Stent Exchange - Wound Class: II-Clean Contaminated    Diagnosis:left ureteral obstruction  Diagnosis Additional Information: No value filed.    Anesthesia Type:  No value filed.    Note:  Anesthesia Post Evaluation    Patient location during evaluation: Bedside  Patient participation: Able to fully participate in evaluation  Level of consciousness: awake and alert  Pain management: adequate  Airway patency: patent  Cardiovascular status: acceptable  Respiratory status: acceptable  Hydration status: acceptable  PONV: none     Anesthetic complications: None          Last vitals:  Vitals:    09/07/17 1655 09/07/17 1711 09/07/17 1730   BP: 114/56 151/70 159/65   Pulse:      Resp: 16 16 16   Temp:      SpO2: 99% 98% 98%         Electronically Signed By: KAREN Emery CRNA  September 7, 2017  5:33 PM

## 2017-09-07 NOTE — BRIEF OP NOTE
Pre-op Dx:obstructed left ureter  Post-op Dx:same  Procedure:cystoscopy, left stent exchange  EBL: 1 cc  Specimens:none  Drains: left 6 x 26 JJ stent  No complications.

## 2017-09-07 NOTE — IP AVS SNAPSHOT
MRN:[id number]                      After Visit Summary   9/7/2017    Marissa Guadarrama    MRN: [id number]           Thank you!     Thank you for choosing [facility] for your care. Our goal is always to provide you with excellent care. Hearing back from our patients is one way we can continue to improve our services. Please take a few minutes to complete the written survey that you may receive in the mail after you visit with us. Thank you!        Patient Information     Date Of Birth          1950        About your hospital stay     You were admitted on:  September 7, 2017 You last received care in the:  [facility] PreOP/Phase II    You were discharged on:  September 7, 2017       Who to Call     For medical emergencies, please call 911.  For non-urgent questions about your medical care, please call your primary care provider or clinic, [phone]  For questions related to your surgery, please call your surgery clinic        Attending Provider     Provider Zhao Bailey MD Urology       Primary Care Provider Office Phone # Fax #    Bernice Howard [phone]      Your next 10 appointments already scheduled     Sep 25, 2017 10:40 AM CDT   (Arrive by 10:25 AM)   COLPOSCOPY with Nic Segundo MD   [facility] ([facility])    [address] [place] [id number] [phone]            Oct 02, 2017  9:15 AM CDT   (Arrive by 9:00 AM)   New Patient Visit with Jovani Lawrence MD   [facility] ([facility])    [address] [place] [id number] [phone]            Oct 12, 2017 10:10 AM CDT   LAB with [facility] ([facility])    [address] [facility] [address] [phone]           Patient must bring picture ID. Patient should be prepared  to give a urine specimen  Please do not eat 10-12 hours before your appointment if you are coming in fasting for labs on lipids, cholesterol, or glucose (sugar). Pregnant women should follow their Care Team instructions. Water with medications is okay. Do not drink coffee or other fluids. If you have concerns about taking  your medications, please ask at office or if scheduling via [employer], send a message by clicking on Secure Messaging, Message Your Care Team.            Oct 19, 2017 10:30 AM CDT   Return Visit with Hosea King MD   [facility] ([facility])    [facility]  [address]  [facility] [address]   [phone]              Future tests that were ordered for you     CBC with platelets       Last Lab Result: Hemoglobin (g/dL)       Date                     Value                 09/05/2017               13.2             ----------                  Further instructions from your care team                         Same Day Surgery Discharge Instructions  Special Precautions After Surgery - Adult    1. It is not unusual to feel lightheaded or faint, up to 24 hours after surgery or while taking pain medication.  If you have these symptoms; sit for a few minutes before standing and have someone assist you when getting up.  2. You should rest and relax for the next 24 hours and must have someone stay with you for at least 24 hours after your discharge.  3. DO NOT DRIVE any vehicle or operate mechanical equipment for 24 hours following the end of your surgery.  DO NOT DRIVE while taking narcotic pain medications that have been prescribed by your physician.  If you had a limb operated on, you must be able to use it fully to drive.  4. DO NOT drink alcoholic beverages for 24 hours following surgery or while taking prescription pain medication.  5. Drink clear liquids (apple juice, ginger ale, broth, 7-Up, etc.).  Progress to your regular diet as you  "feel able.  6. Any questions call your physician and do not make important decisions for 24 hours.    [facility]:  [phone]     Same Day Surgery [phone], Monday thru Friday 6am-9pm.    Break through Bleeding  As instructed per Surgeon or Nurse.    Post Op Infection  Be alert for signs of infection: redness, swelling, heat, drainage of pus, and/or elevated temperature.  Contact your surgeon if these occur.    Nausea   If post op nausea occurs, at first rest your stomach for a few hours by eating nothing solid and sipping only clear liquids.  Call your Surgeon if nausea does not resolve in 24 hours.      Follow up in urology clinic after you have been seen in oncology.  We will make further decisions about the ureter at that time.        Pending Results     Date and Time Order Name Status Description    9/7/2017 0515 XR Surgery SANDRA Fluoro L/T 5 Min w Stills In process             Admission Information     Date & Time Provider Department Dept. Phone    9/7/2017 Zhao La MD [facility] PreOP/Phase [phone]      Your Vitals Were     Blood Pressure Pulse Temperature Respirations Height Weight    151/70 59 98.1  F (36.7  C) (Oral) 16 1.715 m (5' 7.5\") 112.5 kg (248 lb)    Pulse Oximetry BMI (Body Mass Index)                98% 38.27 kg/m2          Care EveryWhere ID     This is your Care EveryWhere ID. This could be used by other organizations to access your [facility] medical records  [id number]        Equal Access to Services     TASIA RANGEL : Hadii karina dailyo Sopuneetali, waaxda luqadaha, qaybta kaalmada tikieglove, niesha tang . So [facility] [phone].    ATENCIÓN: Si habla español, tiene a allan disposición servicios gratuitos de asistencia lingüística. Llame al [phone].    We comply with applicable federal civil rights laws and [place] laws. We do not discriminate on the basis of race, color, national origin, age, disability sex, " sexual orientation or gender identity.               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, estrogen receptor positive (H)        Dose:  25 mg   Take 1 tablet (25 mg) by mouth daily   Quantity:  90 tablet   Refills:  1       gabapentin 600 MG tablet   Commonly known as:  NEURONTIN   Used for:  Trigeminal neuralgia        Dose:  600 mg   Take 1 tablet (600 mg) by mouth 3 times daily   Quantity:  180 tablet   Refills:  6       hydrochlorothiazide 25 MG tablet   Commonly known as:  HYDRODIURIL   Used for:  Essential hypertension with goal blood pressure less than 140/90        Dose:  12.5 mg   Take 0.5 tablets (12.5 mg) by mouth daily   Refills:  0       HYDROcodone-acetaminophen 5-325 MG per tablet   Commonly known as:  NORCO   Used for:  Osteoarthritis of right knee, unspecified osteoarthritis type        Dose:  1-2 tablet   Take 1-2 tablets by mouth every 4 hours as needed for other (Moderate to Severe Pain)   Quantity:  30 tablet   Refills:  0       oxybutynin 5 MG tablet   Commonly known as:  DITROPAN   Used for:  Urgency incontinence        Dose:  5 mg   Take 1 tablet (5 mg) by mouth 2 times daily as needed for bladder spasms   Quantity:  60 tablet   Refills:  11                Protect others around you: Learn how to safely use, store and throw away your medicines at www.disposemymeds.org.             Medication List: This is a list of all your medications and when to take them. Check marks below indicate your  daily home schedule. Keep this list as a reference.      Medications           Morning Afternoon Evening Bedtime As Needed    albuterol 108 (90 BASE) MCG/ACT Inhaler   Commonly known as:  PROAIR HFA/PROVENTIL HFA/VENTOLIN HFA   Inhale 2 puffs into the lungs every 6 hours as needed for shortness of breath / dyspnea or wheezing                                exemestane 25 MG tablet   Commonly known as:  AROMASIN   Take 1 tablet (25 mg) by mouth daily                                gabapentin 600 MG tablet   Commonly known as:  NEURONTIN   Take 1 tablet (600 mg) by mouth 3 times daily                                hydrochlorothiazide 25 MG tablet   Commonly known as:  HYDRODIURIL   Take 0.5 tablets (12.5 mg) by mouth daily                                HYDROcodone-acetaminophen 5-325 MG per tablet   Commonly known as:  NORCO   Take 1-2 tablets by mouth every 4 hours as needed for other (Moderate to Severe Pain)                                oxybutynin 5 MG tablet   Commonly known as:  DITROPAN   Take 1 tablet (5 mg) by mouth 2 times daily as needed for bladder spasms

## 2017-09-07 NOTE — ANESTHESIA PREPROCEDURE EVALUATION
Anesthesia Evaluation     . Pt has had prior anesthetic.     No history of anesthetic complications          ROS/MED HX    ENT/Pulmonary:     (+)tobacco use, Current use COPD, , . .    Neurologic:  - neg neurologic ROS     Cardiovascular:     (+) Dyslipidemia, hypertension----. : . . . :. .       METS/Exercise Tolerance:  >4 METS   Hematologic:         Musculoskeletal:   (+) arthritis, , , -       GI/Hepatic:         Renal/Genitourinary:     (+) chronic renal disease, type: CRI,       Endo:     (+) Obesity, .      Psychiatric:         Infectious Disease:         Malignancy:         Other:                     Physical Exam  Normal systems: cardiovascular, pulmonary and dental    Airway   Mallampati: I  TM distance: >3 FB  Neck ROM: full    Dental     Cardiovascular   Rhythm and rate: regular and normal      Pulmonary    breath sounds clear to auscultation                    Anesthesia Plan      History & Physical Review  History and physical reviewed and following examination; no interval change.    ASA Status:  3 .    NPO Status:  > 6 hours    Plan for MAC   PONV prophylaxis:  Ondansetron (or other 5HT-3) and Dexamethasone or Solumedrol       Postoperative Care      Consents  Anesthetic plan, risks, benefits and alternatives discussed with:  Patient..                          .

## 2017-09-08 NOTE — OP NOTE
PROCEDURE/SERVICE DATE:  09/07/2017.      PREOPERATIVE DIAGNOSES:     1.  Obstructed left ureter.   2.  History of carcinoid tumor.      POSTOPERATIVE DIAGNOSES:     1.  Obstructed left ureter.   2.  History of carcinoid tumor.      PROCEDURES:     1.  Cystoscopy.   2.  Left retrograde pyelogram.   3.  Left stent exchange.   4.  Fluoroscopy,   5.  Interpretation of fluoroscopic images.      SURGEON:  Zhao La MD.      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  The patient had a stent in place to help drain the urine past this obstruction.  The stent had been in for nearly 6 months.  She presented today for a stent exchange.      DESCRIPTION OF PROCEDURE:  After informed consent was obtained, the patient was brought to the operating room, where she was administered MAC anesthesia.  After suitable level of anesthesia was obtained, she was placed in a lithotomy position with all pressure points padded.  She was administered preoperative antibiotics.  She was prepped and draped in a standard sterile fashion.  Next, a 22-Qatari cystoscope was introduced into the patient's bladder without difficulty.  The stent was seen emanating from the left UO.  This was secured with a grasper and brought to the urethral meatus.  We then cannulated the stent with a sensor wire and advanced this to the kidney under fluoroscopic guidance, and the stent was removed.  We put a 5-Qatari open-ended catheter over this into the renal pelvis, removed the wire and shot a retrograde pyelogram to outline the renal pelvis, which showed minimal hydronephrosis.  We then replaced the wire, backloaded it through the scope and placed a 6x26 double-J stent over the wire.  The proximal curl was confirmed by fluoroscopy and the distal curl confirmed by direct vision.  The patient's bladder was drained.  She was awakened  and brought to the Recovery Room in stable condition.      ESTIMATED BLOOD LOSS:  1 cc.      COMPLICATIONS:  None.      DRAINS:  Left 6x26 double-J stent.      SURGEON:  Steve La MD      ESTIMATED BLOOD LOSS:  1 mL.      COMPLICATIONS:  There were no complications.      DRAINS:  Left 6 x 26 double-J stent.  [facility] [phone].         STEVE LA MD             D: 2017 16:41   T: 2017 11:07   MT: [id number]#[id number]      Name:     ROSALINDA LANCASTER   MRN:      [id number]        Account:        [id number]   :      1950           Procedure Date: 2017      Document: [id number]

## 2017-09-13 ENCOUNTER — TELEPHONE (OUTPATIENT)
Dept: UROLOGY | Facility: CLINIC | Age: 67
End: 2017-09-13

## 2017-09-13 NOTE — TELEPHONE ENCOUNTER
"Called pt and she reports that since she had new stent placed on 09-07-17 the first day after procedure went well and then everything changed on the 2nd day and continues.  She states,  \"I am peeing more now than I did before\".  Pt denies any change in the amount of fluid she is consuming.  She does not have any warning and cannot even feel herself passing the urine.  She reports she is over flowing depends left and right, \"I can't even go shopping for fear of peeing my pants\".  She has been taking the Ditropan bid and decreased to once daily hoping this would make a change for the better but has not.  She denies having a fever, urine is clear yellow, she denies any burning but cannot feel urgency or frequency.  Please call and advise.  Pt reports she never had any problems like this in the past with her stents.  Advised pt that Dr. La out of the office today and will return on Thursday.      Priscila Harper  [facility] RN  "

## 2017-09-13 NOTE — TELEPHONE ENCOUNTER
"Reason for Call:  problems with urination \"peeing all over the place\" worse than before    Detailed comments: see above and advise    Phone Number Patient can be reached at: Home number on file [phone] (home)    Best Time: any    Can we leave a detailed message on this number? YES    Call taken on 9/13/2017 at 10:34 AM by Jaki Rain    "

## 2017-09-14 NOTE — TELEPHONE ENCOUNTER
I spoke to Marissa. She has a ureteral stent that has likely slipped down into the urethral and is causing her to leak urine. She was asked if she bears down/strains when she has a bowel movement. She was unsure. I suggested that she keep the stool from becoming hard and avoid straining with both urination and defecation. I also said that she could likely push the stent back up by feeling above the vaginal opening to the urethra. She will possibly feel the stent sticking out. She can push the stent back up and see if this makes a difference. If not she will need to come to clinic at some point to return the stent to its original position with a cystoscope. She agrees with the plan. Sari BRIGHT RN

## 2017-09-15 ENCOUNTER — TELEPHONE (OUTPATIENT)
Dept: UROLOGY | Facility: CLINIC | Age: 67
End: 2017-09-15

## 2017-09-15 NOTE — TELEPHONE ENCOUNTER
Reason for Call:  Patient is calling in states that she pushed stent back in and seemed to be ok until this am when its back out even farther than before    Detailed comments: please advise above    Phone Number Patient can be reached at: Home number on file [phone] (home)    Best Time: any    Can we leave a detailed message on this number? YES    Call taken on 9/15/2017 at 8:03 AM by Jaki Rain

## 2017-09-15 NOTE — TELEPHONE ENCOUNTER
I spoke to Marissa today. She said that the she had pushed the stent back up yesterday and it was working good but this morning the stent is actually sticking out. I suggested that she try laying on her back , relaxing the pelvic floor and then push the stent back up. I reminded her again to avoid valsalva manuvers., She may need to do this more than once to avoid the incontinence. If this continues into next Wednesday, she should call us back and we should discuss this with Dr La. This may require a cystoscope to push the stent up further. Sari BRIGHT RN

## 2017-09-17 DIAGNOSIS — I10 ESSENTIAL HYPERTENSION WITH GOAL BLOOD PRESSURE LESS THAN 140/90: ICD-10-CM

## 2017-09-18 NOTE — TELEPHONE ENCOUNTER
Edit       Summary: TAKE ONE TABLET (10 MG) BY MOUTH ONCE DAILY, Disp-90 tablet, R-0, E-Prescribe   Start: 5/4/2017  End: 7/19/2017  Ord/Sold: 5/4/2017 (O)  Report  Long-term:   Pharmacy: [facility] [address] [place] [address] [address] [place] [address]  Med Dose History        Patient Sig: TAKE ONE TABLET (10 MG) BY MOUTH ONCE DAILY        Ordered on: 5/4/2017        Authorized by: SARA COOPER        Dispense: 90 tablet        Discontinued On: 7/19/2017        DC Reason: Discontinued by another Health Care Provider          Last Office Visit with [facility] or [facility] prescribing provider: 9-5-2017  Future Office visit:    Next 5 appointments (look out 90 days)     Oct 19, 2017 10:30 AM CDT   Return Visit with Hosea King MD   [facility] ([facility])    [facility]  [address]  [facility] [place] [address]   [phone]                   Routing refill request to provider for review/approval because:  Drug not active on patient's medication list

## 2017-09-19 RX ORDER — LISINOPRIL 10 MG/1
TABLET ORAL
Qty: 90 TABLET | Refills: 0 | OUTPATIENT
Start: 2017-09-19

## 2017-09-25 ENCOUNTER — OFFICE VISIT (OUTPATIENT)
Dept: ONCOLOGY | Facility: CLINIC | Age: 67
End: 2017-09-25
Attending: OBSTETRICS & GYNECOLOGY
Payer: MEDICARE

## 2017-09-25 VITALS
OXYGEN SATURATION: 100 % | HEIGHT: 68 IN | DIASTOLIC BLOOD PRESSURE: 85 MMHG | SYSTOLIC BLOOD PRESSURE: 144 MMHG | BODY MASS INDEX: 37.69 KG/M2 | TEMPERATURE: 98 F | WEIGHT: 248.7 LBS | HEART RATE: 59 BPM | RESPIRATION RATE: 16 BRPM

## 2017-09-25 DIAGNOSIS — N89.3 VAGINAL DYSPLASIA: Primary | ICD-10-CM

## 2017-09-25 PROCEDURE — 99213 OFFICE O/P EST LOW 20 MIN: CPT | Mod: 25 | Performed by: OBSTETRICS & GYNECOLOGY

## 2017-09-25 PROCEDURE — 99212 OFFICE O/P EST SF 10 MIN: CPT | Mod: 25

## 2017-09-25 PROCEDURE — 88305 TISSUE EXAM BY PATHOLOGIST: CPT | Performed by: OBSTETRICS & GYNECOLOGY

## 2017-09-25 PROCEDURE — 57421 EXAM/BIOPSY OF VAG W/SCOPE: CPT

## 2017-09-25 PROCEDURE — 57421 EXAM/BIOPSY OF VAG W/SCOPE: CPT | Performed by: OBSTETRICS & GYNECOLOGY

## 2017-09-25 ASSESSMENT — PAIN SCALES - GENERAL: PAINLEVEL: NO PAIN (0)

## 2017-09-25 NOTE — MR AVS SNAPSHOT
After Visit Summary   9/25/2017    Marissa Guadarrama    MRN: [id number]           Patient Information     Date Of Birth          1950        Visit Information        Provider Department      9/25/2017 10:40 AM Nic Segundo MD [facility]        Today's Diagnoses     Vaginal dysplasia    -  1       Follow-ups after your visit        Your next 10 appointments already scheduled     Oct 12, 2017 10:10 AM CDT   LAB with [facility] ([facility])    [address] [facility] [address]   [phone]           Patient must bring picture ID. Patient should be prepared to give a urine specimen  Please do not eat 10-12 hours before your appointment if you are coming in fasting for labs on lipids, cholesterol, or glucose (sugar). Pregnant women should follow their Care Team instructions. Water with medications is okay. Do not drink coffee or other fluids. If you have concerns about taking  your medications, please ask at office or if scheduling via [employer], send a message by clicking on Secure Messaging, Message Your Care Team.            Oct 16, 2017  2:30 PM CDT   Return Visit with Rk Bell MD   [facility] ([facility])    [address] [place] [id number] [phone]            Oct 19, 2017 10:30 AM CDT   Return Visit with Hosea King MD   [facility] ([facility])    [facility]  [address] [address]   [phone]            Oct 30, 2017  5:00 PM CDT   (Arrive by 4:45 PM)   New Patient Visit with Jovani Lawrence MD   [facility] ([facility])    [address]  2nd [address]  [facility] [address] [phone]              Who to contact     If you have questions or need follow up information about today's clinic visit or your schedule  "please contact [facility] directly at [phone].  Normal or non-critical lab and imaging results will be communicated to you by MyChart, letter or phone within 4 business days after the clinic has received the results. If you do not hear from us within 7 days, please contact the clinic through MyChart or phone. If you have a critical or abnormal lab result, we will notify you by phone as soon as possible.  Submit refill requests through [employer] or call your pharmacy and they will forward the refill request to us. Please allow 3 business days for your refill to be completed.          Additional Information About Your Visit        Care EveryWhere ID     This is your Care EveryWhere ID. This could be used by other organizations to access your [facility] medical records  [id number]        Your Vitals Were     Pulse Temperature Respirations Height Pulse Oximetry Breastfeeding?    59 98  F (36.7  C) (Oral) 16 1.715 m (5' 7.5\") 100% No    BMI (Body Mass Index)                   38.38 kg/m2            Blood Pressure from Last 3 Encounters:   09/25/17 144/85   09/07/17 159/65   09/05/17 138/78    Weight from Last 3 Encounters:   09/25/17 112.8 kg (248 lb 11.2 oz)   09/07/17 112.5 kg (248 lb)   09/05/17 112.5 kg (248 lb)              We Performed the Following     Surgical Pathology Exam        Primary Care Provider Office Phone # Fax #    Bernice Howard [phone]       [address] [place] [id number]        Equal Access to Services     TERESA RANGEL : Hadii karina ku hadasho Soomaali, waaxda luqadaha, qaybta kaalmada adeegyada, niesha rodrigues. So [facility] [phone].    ATENCIÓN: Si habla español, tiene a allan disposición servicios gratuitos de asistencia lingüística. Llame al [phone].    We comply with applicable federal civil rights laws and [place] laws. We do not discriminate on the basis of race, color, national origin, age, disability, " sex, sexual orientation, or gender identity.            Thank you!     Thank you for choosing [facility]  for your care. Our goal is always to provide you with excellent care. Hearing back from our patients is one way we can continue to improve our services. Please take a few minutes to complete the written survey that you may receive in the mail after your visit with us. Thank you!             Your Updated Medication List - Protect others around you: Learn how to safely use, store and throw away your medicines at www.disposemymeds.org.          This list is accurate as of: 9/25/17 11:59 PM.  Always use your most recent med list.                   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, estrogen receptor positive (H)       gabapentin 600 MG tablet    NEURONTIN    180 tablet    Take 1 tablet (600 mg) by mouth 3 times daily    Trigeminal neuralgia       hydrochlorothiazide 25 MG tablet    HYDRODIURIL     Take 0.5 tablets (12.5 mg) by mouth daily    Essential hypertension with goal blood pressure less than 140/90       HYDROcodone-acetaminophen 5-325 MG per tablet    NORCO    30 tablet    Take 1-2 tablets by mouth every 4 hours as needed for other (Moderate to Severe Pain)    Osteoarthritis of right knee, unspecified osteoarthritis type       oxybutynin 5 MG tablet    DITROPAN    60 tablet    Take 1 tablet (5 mg) by mouth 2 times daily as needed for bladder spasms    Urgency incontinence

## 2017-09-25 NOTE — PROGRESS NOTES
Follow Up Notes on Referred Patient    Date: 2017       Dr. Bernice Howard MD  [address] [place] [address]       RE: Marissa Guadarrama  : 1950  FRITZ: 2017    Dear Dr. Bernice Howard:    Marissa Guadarrama is a 67 year old woman with a diagnosis of VAIN 1.      The patient presents today for followup.  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 La.  That she did feel the stent protruding out of her urethra.  She has been leaking quite a bit of urine through that.  She has otherwise normal bowel function.  There is no vaginal bleeding.  She is also scheduled to see Dr. Lawrence for treatment planning for carcinoid tumor.           Past Medical History:    Past Medical History:   Diagnosis Date     Arthritis     knee     Benign breast biopsy     benign     Carcinoid tumor 2003     Cervical cancer (H)      H/O colposcopy with cervical biopsy 13    vaginal cuff biopsy- VAIN III. referred back to gyn/onc     High cholesterol      HTN      Pap smear of vagina with ASC-H 13     Post-polio syndromes      Trigeminal neuralgias          Past Surgical History:    Past Surgical History:   Procedure Laterality Date     APPENDECTOMY       BIOPSY NODE SENTINEL Bilateral 2016    Procedure: BIOPSY NODE SENTINEL;  Surgeon: Brent Arana MD;  Location: [facility]     C BSO, OMENTECTOMY W/OSMAN  2007     C TOTAL ABDOM HYSTERECTOMY  2007     CL AFF SURGICAL PATHOLOGY       COLONOSCOPY N/A 2017    Procedure: COMBINED COLONOSCOPY, SINGLE OR MULTIPLE BIOPSY/POLYPECTOMY BY BIOPSY;  Colonoscopy Dx:Carcinoid tumor of colon prep mailed golytely;  Surgeon: Talisha Greco MD;  [facility]     COLPOSCOPY, BIOPSY, COMBINED  3/13/2014    Procedure: COMBINED COLPOSCOPY, BIOPSY;;  Surgeon: aLra Pack MD;  [facility]     COMBINED CYSTOSCOPY, RETROGRADES, URETEROSCOPY, INSERT STENT Left  2/2/2017    Procedure: COMBINED CYSTOSCOPY, RETROGRADES, URETEROSCOPY, INSERT STENT;  Surgeon: Zhao La MD;  [facility]     CYSTOSCOPY, RETROGRADES, INSERT STENT URETER(S), COMBINED Left 9/7/2017    Procedure: COMBINED CYSTOSCOPY, RETROGRADES, INSERT STENT URETER(S);  Cystoscopy,Left Stent Exchange;  Surgeon: Zhao La MD;  [facility]     EXAM UNDER ANESTHESIA PELVIC  3/13/2014    Procedure: EXAM UNDER ANESTHESIA PELVIC;  Exam Under Anestheisa, Colposcopy, Vaginal Biopsies, Co2 Laser of the Upper Vagina;  Surgeon: Lara Pack MD;  [facility]     HERNIORRHAPHY INCISIONAL (LOCATION)       LASER CO2 VAGINA  3/13/2014    VAIN 1/2     LUMPECTOMY BREAST WITH SEED LOCALIZATION Bilateral 6/1/2016    Procedure: LUMPECTOMY BREAST WITH SEED LOCALIZATION;  Surgeon: Brent Arana MD;  [facility]     SURGICAL HISTORY OF -       ovarian cystectomy     SURGICAL HISTORY OF -   2003    right colon resection secondary to carcinoid tumor     TUBAL LIGATION           Health Maintenance Due   Topic Date Due     MICROALBUMIN Q1 YEAR  02/05/2017     LIPID MONITORING Q1 YEAR  03/16/2017     PAP Q6 MOS DIAGNOSTIC  03/19/2017     INFLUENZA VACCINE (SYSTEM ASSIGNED)  09/01/2017       Current Medications:     Current Outpatient Prescriptions   Medication Sig Dispense Refill     oxybutynin (DITROPAN) 5 MG tablet Take 1 tablet (5 mg) by mouth 2 times daily as needed for bladder spasms 60 tablet 11     exemestane (AROMASIN) 25 MG tablet Take 1 tablet (25 mg) by mouth daily 90 tablet 1     hydrochlorothiazide (HYDRODIURIL) 25 MG tablet Take 0.5 tablets (12.5 mg) by mouth daily       albuterol (PROAIR HFA, PROVENTIL HFA, VENTOLIN HFA) 108 (90 BASE) MCG/ACT inhaler Inhale 2 puffs into the lungs every 6 hours as needed for shortness of breath / dyspnea or wheezing 1 Inhaler 1     HYDROcodone-acetaminophen (NORCO) 5-325 MG per tablet Take 1-2 tablets by mouth every 4 hours as  "needed for other (Moderate to Severe Pain) 30 tablet 0     gabapentin (NEURONTIN) 600 MG tablet Take 1 tablet (600 mg) by mouth 3 times daily 180 tablet 6         Allergies:        Allergies   Allergen Reactions     Nkda [No Known Drug Allergies]         Social History:     Social History   Substance Use Topics     Smoking status: Former Smoker     Packs/day: 2.00     Years: 29.00     Types: Cigarettes     Quit date: 10/30/2006     Smokeless tobacco: Never Used     Alcohol use No       History   Drug Use No         Family History:       Family History   Problem Relation Age of Onset     CANCER Mother      bone / liver     Breast Cancer Mother      CANCER Maternal Grandmother      CANCER Maternal Grandfather      CANCER Paternal Grandmother      CANCER Paternal Grandfather      CANCER Sister      vulvar ca, cervical ca, squamous cell cancer     CANCER Brother      Rectal- Stage 4     Rectal Cancer Brother      Breast Cancer Paternal Aunt      Colon Cancer Paternal Aunt      Breast Cancer Maternal Aunt      Pancreatic Cancer Nephew      Breast Cancer Sister          Physical Exam:     /85  Pulse 59  Temp 98  F (36.7  C) (Oral)  Resp 16  Ht 1.715 m (5' 7.5\")  Wt 112.8 kg (248 lb 11.2 oz)  SpO2 100%  Breastfeeding? No  BMI 38.38 kg/m2  Body mass index is 38.38 kg/(m^2).    General Appearance: healthy and alert, no distress     PELVIC EXAMINATION:  Normal-appearing external genitalia.  No adnexal masses or tenderness.  Rectovaginal confirms.      PROCEDURE:  The patient was consented for colposcopy.  5% acetic acid was applied to the external genitalia as well as vagina.  There are no acetowhite stains on the external genitalia or perineum.  At the vaginal cuff, the patient still has about 2 cm acetowhite stains at the 9 o'clock position at the vaginal cuff.  There are some mild punctations visible.  This area was biopsied with a forceps biopsy.  Good hemostasis was obtained with silver nitrate.    "   Patient was stable at the end of the procedure.           Assessment:    Marissa Guadarrama is a 67 year old woman with a diagnosis of VAIN 1.     A total of 20 minutes was spent with the patient, 15 minutes of which were spent in counseling the patient and/or treatment planning. Does not include time for procedure.    1.  JAYDEN I.   2.  Carcinoid tumor with ureteral obstruction.       Discussed with the patient we will await biopsy results.  Again still hopefully it is low-grade.  If this is still low-grade, we will follow up in 3-4 months.  I have discussed options of treatment if it is higher grade, including Aldara, laser ablation versus surgical excision, which would be difficult given the location.  She will follow up with Dr. La for ureteral stent which is back now in her bladder.  We reviewed the signs of infection.  She will follow up with Dr. Lawrence for her treatment planning for carcinoid tumor.  She agrees with the plan, is very appreciative of her care.  All questions were answered.  If she has high-grade dysplasia on her biopsy, we will see her back in the next couple of weeks, otherwise, again, within 4 months if low-grade.         Nic Segundo MD, MS    Department of Obstetrics and Gynecology   Division of Gynecologic Oncology   [facility]  Phone: [phone]        CC  Patient Care Team:  Bernice Howard MD as PCP - Maria C Noguera MD as MD (OB/Gyn)  Mita Koehler MD as MD (Radiation Oncology)  Hosea King MD as MD (Hematology & Oncology)  Zhao aL MD as MD (Urology)  Talisha Greco MD as MD (Colon and Rectal Surgery)  Allen Downey MD as MD (Orthopedics)  BERNICE HOWARD

## 2017-09-25 NOTE — LETTER
2017       RE: Marissa Guadarrama  [address]   [place] [address]     Dear Colleague,    Thank you for referring your patient, Marissa Guadarrama, to the [facility]. Please see a copy of my visit note below.                Follow Up Notes on Referred Patient    Date: 2017       Dr. Bernice Howard MD  [address] [place] [address]       RE: Marissa Guadarrama  : 1950  FRITZ: 2017    Dear Dr. Bernice Howard:    Marissa Guadarrama is a 67 year old woman with a diagnosis of VAIN 1.      The patient presents today for followup.  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 La.  That she did feel the stent protruding out of her urethra.  She has been leaking quite a bit of urine through that.  She has otherwise normal bowel function.  There is no vaginal bleeding.  She is also scheduled to see Dr. Lawrence for treatment planning for carcinoid tumor.           Past Medical History:    Past Medical History:   Diagnosis Date     Arthritis     knee     Benign breast biopsy     benign     Carcinoid tumor 2003     Cervical cancer (H)      H/O colposcopy with cervical biopsy 13    vaginal cuff biopsy- VAIN III. referred back to gyn/onc     High cholesterol      HTN      Pap smear of vagina with ASC-H 13     Post-polio syndromes      Trigeminal neuralgias          Past Surgical History:    Past Surgical History:   Procedure Laterality Date     APPENDECTOMY       BIOPSY NODE SENTINEL Bilateral 2016    Procedure: BIOPSY NODE SENTINEL;  Surgeon: Brent Arana MD;  [facility]     C BSO, OMENTECTOMY W/OSMAN  2007     C TOTAL ABDOM HYSTERECTOMY  2007     CL AFF SURGICAL PATHOLOGY       COLONOSCOPY N/A 2017    Procedure: COMBINED COLONOSCOPY, SINGLE OR MULTIPLE BIOPSY/POLYPECTOMY BY BIOPSY;  Colonoscopy Dx:Carcinoid tumor of colon prep mailed malvin;  Surgeon: Angus  Talisha Martin MD;  [facility]     COLPOSCOPY, BIOPSY, COMBINED  3/13/2014    Procedure: COMBINED COLPOSCOPY, BIOPSY;;  Surgeon: Lara Pack MD;  [facility]     COMBINED CYSTOSCOPY, RETROGRADES, URETEROSCOPY, INSERT STENT Left 2/2/2017    Procedure: COMBINED CYSTOSCOPY, RETROGRADES, URETEROSCOPY, INSERT STENT;  Surgeon: Zhao La MD;  [facility]     CYSTOSCOPY, RETROGRADES, INSERT STENT URETER(S), COMBINED Left 9/7/2017    Procedure: COMBINED CYSTOSCOPY, RETROGRADES, INSERT STENT URETER(S);  Cystoscopy,Left Stent Exchange;  Surgeon: Zhao La MD;  [facility]     EXAM UNDER ANESTHESIA PELVIC  3/13/2014    Procedure: EXAM UNDER ANESTHESIA PELVIC;  Exam Under Anestheisa, Colposcopy, Vaginal Biopsies, Co2 Laser of the Upper Vagina;  Surgeon: Lara Pack MD;  [facility]     HERNIORRHAPHY INCISIONAL (LOCATION)       LASER CO2 VAGINA  3/13/2014    VAIN 1/2     LUMPECTOMY BREAST WITH SEED LOCALIZATION Bilateral 6/1/2016    Procedure: LUMPECTOMY BREAST WITH SEED LOCALIZATION;  Surgeon: Brent Arana MD;  [facility]     SURGICAL HISTORY OF -       ovarian cystectomy     SURGICAL HISTORY OF -   2003    right colon resection secondary to carcinoid tumor     TUBAL LIGATION           Health Maintenance Due   Topic Date Due     MICROALBUMIN Q1 YEAR  02/05/2017     LIPID MONITORING Q1 YEAR  03/16/2017     PAP Q6 MOS DIAGNOSTIC  03/19/2017     INFLUENZA VACCINE (SYSTEM ASSIGNED)  09/01/2017       Current Medications:     Current Outpatient Prescriptions   Medication Sig Dispense Refill     oxybutynin (DITROPAN) 5 MG tablet Take 1 tablet (5 mg) by mouth 2 times daily as needed for bladder spasms 60 tablet 11     exemestane (AROMASIN) 25 MG tablet Take 1 tablet (25 mg) by mouth daily 90 tablet 1     hydrochlorothiazide (HYDRODIURIL) 25 MG tablet Take 0.5 tablets (12.5 mg) by mouth daily       albuterol (PROAIR HFA, PROVENTIL HFA, VENTOLIN HFA) 108 (90  "BASE) MCG/ACT inhaler Inhale 2 puffs into the lungs every 6 hours as needed for shortness of breath / dyspnea or wheezing 1 Inhaler 1     HYDROcodone-acetaminophen (NORCO) 5-325 MG per tablet Take 1-2 tablets by mouth every 4 hours as needed for other (Moderate to Severe Pain) 30 tablet 0     gabapentin (NEURONTIN) 600 MG tablet Take 1 tablet (600 mg) by mouth 3 times daily 180 tablet 6         Allergies:        Allergies   Allergen Reactions     Nkda [No Known Drug Allergies]         Social History:     Social History   Substance Use Topics     Smoking status: Former Smoker     Packs/day: 2.00     Years: 29.00     Types: Cigarettes     Quit date: 10/30/2006     Smokeless tobacco: Never Used     Alcohol use No       History   Drug Use No         Family History:       Family History   Problem Relation Age of Onset     CANCER Mother      bone / liver     Breast Cancer Mother      CANCER Maternal Grandmother      CANCER Maternal Grandfather      CANCER Paternal Grandmother      CANCER Paternal Grandfather      CANCER Sister      vulvar ca, cervical ca, squamous cell cancer     CANCER Brother      Rectal- Stage 4     Rectal Cancer Brother      Breast Cancer Paternal Aunt      Colon Cancer Paternal Aunt      Breast Cancer Maternal Aunt      Pancreatic Cancer Nephew      Breast Cancer Sister          Physical Exam:     /85  Pulse 59  Temp 98  F (36.7  C) (Oral)  Resp 16  Ht 1.715 m (5' 7.5\")  Wt 112.8 kg (248 lb 11.2 oz)  SpO2 100%  Breastfeeding? No  BMI 38.38 kg/m2  Body mass index is 38.38 kg/(m^2).    General Appearance: healthy and alert, no distress     PELVIC EXAMINATION:  Normal-appearing external genitalia.  No adnexal masses or tenderness.  Rectovaginal confirms.      PROCEDURE:  The patient was consented for colposcopy.  5% acetic acid was applied to the external genitalia as well as vagina.  There are no acetowhite stains on the external genitalia or perineum.  At the vaginal cuff, the patient " still has about 2 cm acetowhite stains at the 9 o'clock position at the vaginal cuff.  There are some mild punctations visible.  This area was biopsied with a forceps biopsy.  Good hemostasis was obtained with silver nitrate.      Patient was stable at the end of the procedure.           Assessment:    Marissa Guadarrama is a 67 year old woman with a diagnosis of VAIN 1.     A total of 20 minutes was spent with the patient, 15 minutes of which were spent in counseling the patient and/or treatment planning. Does not include time for procedure.    1.  JAYDEN I.   2.  Carcinoid tumor with ureteral obstruction.       Discussed with the patient we will await biopsy results.  Again still hopefully it is low-grade.  If this is still low-grade, we will follow up in 3-4 months.  I have discussed options of treatment if it is higher grade, including Aldara, laser ablation versus surgical excision, which would be difficult given the location.  She will follow up with Dr. La for ureteral stent which is back now in her bladder.  We reviewed the signs of infection.  She will follow up with Dr. Lawrence for her treatment planning for carcinoid tumor.  She agrees with the plan, is very appreciative of her care.  All questions were answered.  If she has high-grade dysplasia on her biopsy, we will see her back in the next couple of weeks, otherwise, again, within 4 months if low-grade.       Nic Segundo MD, MS    Department of Obstetrics and Gynecology   Division of Gynecologic Oncology   [facility]  Phone: [phone]      CC  Patient Care Team:  Bernice Howard MD as PCP - Maria C Noguera MD as MD (OB/Gyn)  Mita Koehler MD as MD (Radiation Oncology)  Hosea King MD as MD (Hematology & Oncology)  Zhao La MD as MD (Urology)  Talisha Greco MD as MD (Colon and Rectal Surgery)  Allen Downey MD as MD (Orthopedics)

## 2017-09-25 NOTE — NURSING NOTE
"Oncology Rooming Note    September 25, 2017 10:52 AM   Marissa Guadarrama is a 67 year old female who presents for:    Chief Complaint   Patient presents with     Oncology Clinic Visit     return patient visit for colposcopy related to Cervical cancer (H)     Initial Vitals: /85  Pulse 59  Temp 98  F (36.7  C) (Oral)  Resp 16  Ht 1.715 m (5' 7.5\")  Wt 112.8 kg (248 lb 11.2 oz)  SpO2 100%  Breastfeeding? No  BMI 38.38 kg/m2 Estimated body mass index is 38.38 kg/(m^2) as calculated from the following:    Height as of this encounter: 1.715 m (5' 7.5\").    Weight as of this encounter: 112.8 kg (248 lb 11.2 oz). Body surface area is 2.32 meters squared.  No Pain (0) Comment: Data Unavailable   No LMP recorded. Patient has had a hysterectomy.  Allergies reviewed: Yes  Medications reviewed: Yes    Medications: Medication refills not needed today.  [id number] [address]    Clinical concerns: no concerns dr. solomon was notified.    6 minutes for nursing intake (face to face time)     Eliana Conley CMA              "

## 2017-09-25 NOTE — NURSING NOTE
Procedure discussed. Consent signed. Time out completed. Both forms placed into scanning.    Prior to the start of the procedure and with procedural staff participation, I verbally confirmed the patient s identity using two indicators, relevant allergies, that the procedure was appropriate and matched the consent or emergent situation, and that the correct equipment/implants were available. Immediately prior to starting the procedure I conducted the Time Out with the procedural staff and re-confirmed the patient s name, procedure, and site/side. (The Joint Commission universal protocol was followed.)  Yes    Sedation (Moderate or Deep): None    Nicole Chew RN

## 2017-09-27 LAB — COPATH REPORT: NORMAL

## 2017-10-13 DIAGNOSIS — G50.0 TRIGEMINAL NEURALGIA: ICD-10-CM

## 2017-10-13 RX ORDER — GABAPENTIN 600 MG/1
600 TABLET ORAL 3 TIMES DAILY
Qty: 180 TABLET | Refills: 6 | Status: SHIPPED | OUTPATIENT
Start: 2017-10-13 | End: 2017-10-16

## 2017-10-13 NOTE — TELEPHONE ENCOUNTER
gabapentin (NEURONTIN) 600 MG tablet      Last Written Prescription Date: 10/10/16  Last Quantity: 180, # refills: 6  Last Office Visit with [facility], [facility] or [facility] prescribing provider: 09/05/17    Next 5 appointments (look out 90 days)     Oct 16, 2017  2:30 PM CDT   Return Visit with Rk Bell MD   [facility] ([address] [address]   [phone]            Oct 19, 2017 10:30 AM CDT   Return Visit with Hosea King MD   [facility] ([facility])    [facility]  [address]  [facility] [address]   [phone]                   Creatinine   Date Value Ref Range Status   09/05/2017 1.43 (H) 0.52 - 1.04 mg/dL Final     Lab Results   Component Value Date    AST 13 07/05/2017     Lab Results   Component Value Date    ALT 18 07/05/2017     BP Readings from Last 3 Encounters:   09/25/17 144/85   09/07/17 159/65   09/05/17 138/78

## 2017-10-16 ENCOUNTER — ONCOLOGY VISIT (OUTPATIENT)
Dept: ONCOLOGY | Facility: CLINIC | Age: 67
End: 2017-10-16
Attending: OBSTETRICS & GYNECOLOGY
Payer: MEDICARE

## 2017-10-16 ENCOUNTER — OFFICE VISIT (OUTPATIENT)
Dept: OTOLARYNGOLOGY | Facility: CLINIC | Age: 67
End: 2017-10-16
Payer: COMMERCIAL

## 2017-10-16 VITALS — WEIGHT: 248 LBS | HEIGHT: 68 IN | RESPIRATION RATE: 16 BRPM | BODY MASS INDEX: 37.59 KG/M2

## 2017-10-16 VITALS
WEIGHT: 248 LBS | TEMPERATURE: 98 F | HEIGHT: 67 IN | RESPIRATION RATE: 16 BRPM | OXYGEN SATURATION: 100 % | BODY MASS INDEX: 38.92 KG/M2 | HEART RATE: 62 BPM

## 2017-10-16 DIAGNOSIS — G50.0 TRIGEMINAL NEURALGIA: Primary | ICD-10-CM

## 2017-10-16 DIAGNOSIS — D07.2 VAIN III (VAGINAL INTRAEPITHELIAL NEOPLASIA GRADE III): Primary | ICD-10-CM

## 2017-10-16 PROCEDURE — 99211 OFF/OP EST MAY X REQ PHY/QHP: CPT | Mod: ZF

## 2017-10-16 PROCEDURE — 99212 OFFICE O/P EST SF 10 MIN: CPT

## 2017-10-16 PROCEDURE — 99214 OFFICE O/P EST MOD 30 MIN: CPT | Performed by: OTOLARYNGOLOGY

## 2017-10-16 PROCEDURE — 99213 OFFICE O/P EST LOW 20 MIN: CPT | Mod: ZP | Performed by: OBSTETRICS & GYNECOLOGY

## 2017-10-16 RX ORDER — GABAPENTIN 600 MG/1
600 TABLET ORAL 3 TIMES DAILY
Qty: 180 TABLET | Refills: 6 | Status: SHIPPED | OUTPATIENT
Start: 2017-10-16 | End: 2018-11-10

## 2017-10-16 RX ORDER — IMIQUIMOD 12.5 MG/.25G
CREAM TOPICAL
Qty: 12 PACKET | Refills: 3 | Status: SHIPPED | OUTPATIENT
Start: 2017-10-16 | End: 2017-11-15

## 2017-10-16 ASSESSMENT — PAIN SCALES - GENERAL: PAINLEVEL: NO PAIN (0)

## 2017-10-16 NOTE — PROGRESS NOTES
Follow Up Notes on Referred Patient    Date: 10/16/2017       Dr. Bernice Howard MD  [address] [place] [address]       RE: Marissa Guadarrama  : 1950  FRITZ: 10/16/2017    Dear Dr. 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   She is here today for follow up after vaginal cuff biopsy.     She reports that she is doing well. No vaginal bleeding or abnormal discharge. She is currently being seen by urology and GI for carcinoid tumors       Past Medical History:    Past Medical History:   Diagnosis Date     Arthritis     knee     Benign breast biopsy     benign     Carcinoid tumor 2003     Cervical cancer (H)      H/O colposcopy with cervical biopsy 13    vaginal cuff biopsy- VAIN III. referred back to gyn/onc     High cholesterol      HTN      Pap smear of vagina with ASC-H 13     Post-polio syndromes      Trigeminal neuralgias          Past Surgical History:    Past Surgical History:   Procedure Laterality Date     APPENDECTOMY       BIOPSY NODE SENTINEL Bilateral 2016    Procedure: BIOPSY NODE SENTINEL;  Surgeon: Brent Arana MD;  Location: [facility]     C BSO, OMENTECTOMY W/OSMAN  2007     C TOTAL ABDOM HYSTERECTOMY  2007     CL AFF SURGICAL PATHOLOGY       COLONOSCOPY N/A 2017    Procedure: COMBINED COLONOSCOPY, SINGLE OR MULTIPLE BIOPSY/POLYPECTOMY BY BIOPSY;  Colonoscopy Dx:Carcinoid tumor of colon prep mailed golytely;  Surgeon: Talisha Greco MD;  Location:  GI     COLPOSCOPY, BIOPSY, COMBINED  3/13/2014    Procedure: COMBINED COLPOSCOPY, BIOPSY;;  Surgeon: Lara Pack MD;  Location:  OR     COMBINED CYSTOSCOPY, RETROGRADES, URETEROSCOPY, INSERT STENT Left 2017    Procedure: COMBINED CYSTOSCOPY, RETROGRADES, URETEROSCOPY, INSERT STENT;  Surgeon: Zhao La MD;  [facility]     CYSTOSCOPY, RETROGRADES, INSERT STENT URETER(S), COMBINED Left 2017     Procedure: COMBINED CYSTOSCOPY, RETROGRADES, INSERT STENT URETER(S);  Cystoscopy,Left Stent Exchange;  Surgeon: Zhao La MD;  [facility]     EXAM UNDER ANESTHESIA PELVIC  3/13/2014    Procedure: EXAM UNDER ANESTHESIA PELVIC;  Exam Under Anestheisa, Colposcopy, Vaginal Biopsies, Co2 Laser of the Upper Vagina;  Surgeon: Lara Pack MD;  [facility]     HERNIORRHAPHY INCISIONAL (LOCATION)       LASER CO2 VAGINA  3/13/2014    VAIN 1/2     LUMPECTOMY BREAST WITH SEED LOCALIZATION Bilateral 6/1/2016    Procedure: LUMPECTOMY BREAST WITH SEED LOCALIZATION;  Surgeon: Brent Arana MD;  [facility]     SURGICAL HISTORY OF -       ovarian cystectomy     SURGICAL HISTORY OF -   2003    right colon resection secondary to carcinoid tumor     TUBAL LIGATION           Health Maintenance Due   Topic Date Due     MICROALBUMIN Q1 YEAR  02/05/2017     LIPID MONITORING Q1 YEAR  03/16/2017     PAP Q6 MOS DIAGNOSTIC  03/19/2017     INFLUENZA VACCINE (SYSTEM ASSIGNED)  09/01/2017     FALL RISK ASSESSMENT  11/02/2017       Current Medications:     Current Outpatient Prescriptions   Medication Sig Dispense Refill     gabapentin (NEURONTIN) 600 MG tablet Take 1 tablet (600 mg) by mouth 3 times daily 180 tablet 6     imiquimod (ALDARA) 5 % cream Apply a small sized amount to vagina three times weekly at bedtime.   Wash off after 8 hours.   May use for up to 12 weeks. 12 packet 3     oxybutynin (DITROPAN) 5 MG tablet Take 1 tablet (5 mg) by mouth 2 times daily as needed for bladder spasms 60 tablet 11     exemestane (AROMASIN) 25 MG tablet Take 1 tablet (25 mg) by mouth daily 90 tablet 1     hydrochlorothiazide (HYDRODIURIL) 25 MG tablet Take 0.5 tablets (12.5 mg) by mouth daily       albuterol (PROAIR HFA, PROVENTIL HFA, VENTOLIN HFA) 108 (90 BASE) MCG/ACT inhaler Inhale 2 puffs into the lungs every 6 hours as needed for shortness of breath / dyspnea or wheezing 1 Inhaler 1      "HYDROcodone-acetaminophen (NORCO) 5-325 MG per tablet Take 1-2 tablets by mouth every 4 hours as needed for other (Moderate to Severe Pain) 30 tablet 0     [DISCONTINUED] gabapentin (NEURONTIN) 600 MG tablet Take 1 tablet (600 mg) by mouth 3 times daily 180 tablet 6         Allergies:        Allergies   Allergen Reactions     Nkda [No Known Drug Allergies]         Social History:     Social History   Substance Use Topics     Smoking status: Former Smoker     Packs/day: 2.00     Years: 29.00     Types: Cigarettes     Quit date: 10/30/2006     Smokeless tobacco: Never Used     Alcohol use No       History   Drug Use No         Family History:   Family History   Problem Relation Age of Onset     CANCER Mother      bone / liver     Breast Cancer Mother      CANCER Maternal Grandmother      CANCER Maternal Grandfather      CANCER Paternal Grandmother      CANCER Paternal Grandfather      CANCER Sister      vulvar ca, cervical ca, squamous cell cancer     CANCER Brother      Rectal- Stage 4     Rectal Cancer Brother      Breast Cancer Paternal Aunt      Colon Cancer Paternal Aunt      Breast Cancer Maternal Aunt      Pancreatic Cancer Nephew      Breast Cancer Sister          Physical Exam:     Pulse 62  Temp 98  F (36.7  C) (Oral)  Resp 16  Ht 1.714 m (5' 7.48\")  Wt 112.5 kg (248 lb)  SpO2 100%  BMI 38.29 kg/m2  Body mass index is 38.29 kg/(m^2).    General Appearance: healthy and alert, no distress            Assessment:    Marissa Guadarrama is a 67 year old woman with a diagnosis of VAIN 3.     A total of 20 minutes was spent with the patient, 15 minutes of which were spent in counseling the patient and/or treatment planning.      1.  JAYDEN 3.   2.  Ovarian carcinoid tumor.   3.  Ureteral obstruction with uterus stent.       I discussed with the patient the treatment options for JAYDEN 3, including Aldara versus laser ablation.  The patient would like to proceed with Aldara.  She will do that 2-3 times a week " vaginally for 12 weeks, followed by 4 weeks off treatment and repeat a colposcopy.  If this is not covered by her insurance, we will alternatively proceed with an exam under anesthesia and laser ablation in the operating room with a CO2 laser.  We will see her back then on 04/30 once she has seen Dr. Lawrence for further treatment planning for her recurrent carcinoid tumor, which most likely will require an exploratory laparotomy and surgical removal by Dr. Greco and Dr. La.  The patient as well as her  agree with this plan.  They are very appreciative of her care.  All questions were answered.       Nic Segundo MD, MS    Department of Obstetrics and Gynecology   Division of Gynecologic Oncology   [facility]  Phone: [phone]        CC  Patient Care Team:  Bernice Howard MD as PCP - Maria C Noguera MD as MD (OB/Gyn)  Mita Koehler MD as MD (Radiation Oncology)  Hosea King MD as MD (Hematology & Oncology)  Zhao La MD as MD (Urology)  Talisha Greco MD as MD (Colon and Rectal Surgery)  Allen Downey MD as MD (Orthopedics)  BERNICE HOWARD

## 2017-10-16 NOTE — MR AVS SNAPSHOT
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 This does not apply to pregnant women. Water, hot tea and black coffee (with nothing added) are okay. Do not drink other fluids, diet soda or chew gum.            Nov 15, 2017  2:00 PM CST   Return Visit with Hosea King MD   [facility] ([facility])    [facility]  [address]  [facility] [address]   [phone]            Dec 11, 2017 11:00 AM CST   (Arrive by 10:45 AM)   PAC EVALUATION with Uc Pac Tommy 7   [place] [facility] [place] ([facility])    [address] [place] [id number] [phone]            Dec 11, 2017 12:00 PM CST   (Arrive by 11:45 AM)   PAC RN ASSESSMENT with Tristin Pac Rn   [place] [facility] [place] ([facility])    [address] [place] [address] [phone]            Dec 11, 2017 12:40 PM CST   (Arrive by 12:25 PM)   PAC Anesthesia Consult with Tristin Pac Anesthesiologist   [place] [facility] [place] ([facility])    [address] [place] [id number] [phone]             "Dec 11, 2017  1:00 PM CST   [facility] Draw with  [facility] DRAW   [facility] ([facility])    [address]  2nd Floor  [facility] [id number] [phone]            Dec 12, 2017   Procedure with Nic Segundo MD   [facility], [place], Same Day Surgery (--)    [address]  [place] [address]   [phone]            Jan 08, 2018  5:00 PM CST   (Arrive by 4:45 PM)   Post-Op with Nic Segundo MD   [facility] ([facility])    [address]  2nd Floor  [facility] [id number] [phone]              Who to contact     If you have questions or need follow up information about today's clinic visit or your schedule please contact [facility] directly at [phone].  Normal or non-critical lab and imaging results will be communicated to you by Kliqedhart, letter or phone within 4 business days after the clinic has received the results. If you do not hear from us within 7 days, please contact the clinic through Kliqedhart or phone. If you have a critical or abnormal lab result, we will notify you by phone as soon as possible.  Submit refill requests through [email or web address] Application or call your pharmacy and they will forward the refill request to us. Please allow 3 business days for your refill to be completed.          Additional Information About Your Visit        KliqedharSnapYeti Information     [email or web address] Application lets you send messages to your doctor, view your test results, renew your prescriptions, schedule appointments and more. To sign up, go to [email or web address] . Click on \"Log in\" on the left side of the screen, which will take you to the Welcome page. Then click on \"Sign up Now\" on the right side of the page.     You will be asked to enter the access code listed below, as well as some personal information. Please follow the directions to create your username and password.     Your access code is: " "[email or web address]  Expires: 2018  1:30 PM     Your access code will  in 90 days. If you need help or a new code, please call your [facility] or [phone].        Care EveryWhere ID     This is your Care EveryWhere ID. This could be used by other organizations to access your [place] medical records  [id number]        Your Vitals Were     Pulse Temperature Respirations Height Pulse Oximetry BMI (Body Mass Index)    62 98  F (36.7  C) (Oral) 16 1.714 m (5' 7.48\") 100% 38.29 kg/m2       Blood Pressure from Last 3 Encounters:   10/30/17 149/75   10/30/17 149/85   17 144/85    Weight from Last 3 Encounters:   10/30/17 113.2 kg (249 lb 8.6 oz)   10/30/17 113.9 kg (251 lb)   10/16/17 112.5 kg (248 lb)              Today, you had the following     No orders found for display         Today's Medication Changes          These changes are accurate as of: 10/16/17 11:59 PM.  If you have any questions, ask your nurse or doctor.               Start taking these medicines.        Dose/Directions    imiquimod 5 % cream   Commonly known as:  ALDARA   Used for:  VAIN III (vaginal intraepithelial neoplasia grade III)   Started by:  Nic Segundo MD        Apply a small sized amount to vagina three times weekly at bedtime.   Wash off after 8 hours.   May use for up to 12 weeks.   Quantity:  12 packet   Refills:  3            Where to get your medicines      These medications were sent to [facility] [address] [place] [address]  [address] [facility] [address]     Phone:  [phone]     gabapentin 600 MG tablet    imiquimod 5 % cream                Primary Care Provider Office Phone # Fax #    Bernice Howard [phone]       [address] [place] [address]        Equal Access to Services     TASIA RANGEL AH: Hadii aad ku hadasho Soomaali, waaxda luqadaha, qaybta kaalmada adeegyada, niesha tang ah. So [facility] " [phone].    ATENCIÓN: Si ingrid bear, tiene a allan disposición servicios gratuitos de asistencia lingüística. Beba bullard [phone].    We comply with applicable federal civil rights laws and [place] laws. We do not discriminate on the basis of race, color, national origin, age, disability, sex, sexual orientation, or gender identity.            Thank you!     Thank you for choosing [facility]  for your care. Our goal is always to provide you with excellent care. Hearing back from our patients is one way we can continue to improve our services. Please take a few minutes to complete the written survey that you may receive in the mail after your visit with us. Thank you!             Your Updated Medication List - Protect others around you: Learn how to safely use, store and throw away your medicines at www.disposemymeds.org.          This list is accurate as of: 10/16/17 11:59 PM.  Always use your most recent med list.                   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, estrogen receptor positive (H)       gabapentin 600 MG tablet    NEURONTIN    180 tablet    Take 1 tablet (600 mg) by mouth 3 times daily    Trigeminal neuralgia       hydrochlorothiazide 25 MG tablet    HYDRODIURIL     Take 0.5 tablets (12.5 mg) by mouth daily    Essential hypertension with goal blood pressure less than 140/90       HYDROcodone-acetaminophen 5-325 MG per tablet    NORCO    30 tablet    Take 1-2 tablets by mouth every 4 hours as needed for other (Moderate to Severe Pain)    Osteoarthritis of right knee,  unspecified osteoarthritis type       imiquimod 5 % cream    ALDARA    12 packet    Apply a small sized amount to vagina three times weekly at bedtime.   Wash off after 8 hours.   May use for up to 12 weeks.    VAIN III (vaginal intraepithelial neoplasia grade III)       oxybutynin 5 MG tablet    DITROPAN    60 tablet    Take 1 tablet (5 mg) by mouth 2 times daily as needed for bladder spasms    Urgency incontinence

## 2017-10-16 NOTE — LETTER
10/16/2017       RE: Marissa Guadarrama  [address]   [place] [address]     Dear Colleague,    Thank you for referring your patient, Marissa Guadarrama, to the [facility]. Please see a copy of my visit note below.                Follow Up Notes on Referred Patient    Date: 10/16/2017       Dr. Bernice Howard MD  [address] [place] [address]       RE: Marissa Guadarrama  : 1950  FRITZ: 10/16/2017    Dear Dr. 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   She is here today for follow up after vaginal cuff biopsy.     She reports that she is doing well. No vaginal bleeding or abnormal discharge. She is currently being seen by urology and GI for carcinoid tumors       Past Medical History:    Past Medical History:   Diagnosis Date     Arthritis     knee     Benign breast biopsy     benign     Carcinoid tumor 2003     Cervical cancer (H)      H/O colposcopy with cervical biopsy 13    vaginal cuff biopsy- VAIN III. referred back to gyn/onc     High cholesterol      HTN      Pap smear of vagina with ASC-H 13     Post-polio syndromes      Trigeminal neuralgias          Past Surgical History:    Past Surgical History:   Procedure Laterality Date     APPENDECTOMY       BIOPSY NODE SENTINEL Bilateral 2016    Procedure: BIOPSY NODE SENTINEL;  Surgeon: Brent Arana MD;  [facility]: [facility]     C BSO, OMENTECTOMY W/OSMAN  2007     C TOTAL ABDOM HYSTERECTOMY  2007     CL AFF SURGICAL PATHOLOGY       COLONOSCOPY N/A 2017    Procedure: COMBINED COLONOSCOPY, SINGLE OR MULTIPLE BIOPSY/POLYPECTOMY BY BIOPSY;  Colonoscopy Dx:Carcinoid tumor of colon prep mailed golytely;  Surgeon: Talisha Greco MD;  [facility]     COLPOSCOPY, BIOPSY, COMBINED  3/13/2014    Procedure: COMBINED COLPOSCOPY, BIOPSY;;  Surgeon: Lara Pack MD;  [facility]     COMBINED CYSTOSCOPY, RETROGRADES, URETEROSCOPY,  INSERT STENT Left 2/2/2017    Procedure: COMBINED CYSTOSCOPY, RETROGRADES, URETEROSCOPY, INSERT STENT;  Surgeon: Zhao La MD;  [facility]     CYSTOSCOPY, RETROGRADES, INSERT STENT URETER(S), COMBINED Left 9/7/2017    Procedure: COMBINED CYSTOSCOPY, RETROGRADES, INSERT STENT URETER(S);  Cystoscopy,Left Stent Exchange;  Surgeon: Zhao La MD;  [facility]     EXAM UNDER ANESTHESIA PELVIC  3/13/2014    Procedure: EXAM UNDER ANESTHESIA PELVIC;  Exam Under Anestheisa, Colposcopy, Vaginal Biopsies, Co2 Laser of the Upper Vagina;  Surgeon: Lara Pack MD;  [facility]     HERNIORRHAPHY INCISIONAL (LOCATION)       LASER CO2 VAGINA  3/13/2014    VAIN 1/2     LUMPECTOMY BREAST WITH SEED LOCALIZATION Bilateral 6/1/2016    Procedure: LUMPECTOMY BREAST WITH SEED LOCALIZATION;  Surgeon: Brent Arana MD;  [facility]     SURGICAL HISTORY OF -       ovarian cystectomy     SURGICAL HISTORY OF -   2003    right colon resection secondary to carcinoid tumor     TUBAL LIGATION           Health Maintenance Due   Topic Date Due     MICROALBUMIN Q1 YEAR  02/05/2017     LIPID MONITORING Q1 YEAR  03/16/2017     PAP Q6 MOS DIAGNOSTIC  03/19/2017     INFLUENZA VACCINE (SYSTEM ASSIGNED)  09/01/2017     FALL RISK ASSESSMENT  11/02/2017       Current Medications:     Current Outpatient Prescriptions   Medication Sig Dispense Refill     gabapentin (NEURONTIN) 600 MG tablet Take 1 tablet (600 mg) by mouth 3 times daily 180 tablet 6     imiquimod (ALDARA) 5 % cream Apply a small sized amount to vagina three times weekly at bedtime.   Wash off after 8 hours.   May use for up to 12 weeks. 12 packet 3     oxybutynin (DITROPAN) 5 MG tablet Take 1 tablet (5 mg) by mouth 2 times daily as needed for bladder spasms 60 tablet 11     exemestane (AROMASIN) 25 MG tablet Take 1 tablet (25 mg) by mouth daily 90 tablet 1     hydrochlorothiazide (HYDRODIURIL) 25 MG tablet Take 0.5 tablets (12.5  "mg) by mouth daily       albuterol (PROAIR HFA, PROVENTIL HFA, VENTOLIN HFA) 108 (90 BASE) MCG/ACT inhaler Inhale 2 puffs into the lungs every 6 hours as needed for shortness of breath / dyspnea or wheezing 1 Inhaler 1     HYDROcodone-acetaminophen (NORCO) 5-325 MG per tablet Take 1-2 tablets by mouth every 4 hours as needed for other (Moderate to Severe Pain) 30 tablet 0     [DISCONTINUED] gabapentin (NEURONTIN) 600 MG tablet Take 1 tablet (600 mg) by mouth 3 times daily 180 tablet 6         Allergies:        Allergies   Allergen Reactions     Nkda [No Known Drug Allergies]         Social History:     Social History   Substance Use Topics     Smoking status: Former Smoker     Packs/day: 2.00     Years: 29.00     Types: Cigarettes     Quit date: 10/30/2006     Smokeless tobacco: Never Used     Alcohol use No       History   Drug Use No         Family History:   Family History   Problem Relation Age of Onset     CANCER Mother      bone / liver     Breast Cancer Mother      CANCER Maternal Grandmother      CANCER Maternal Grandfather      CANCER Paternal Grandmother      CANCER Paternal Grandfather      CANCER Sister      vulvar ca, cervical ca, squamous cell cancer     CANCER Brother      Rectal- Stage 4     Rectal Cancer Brother      Breast Cancer Paternal Aunt      Colon Cancer Paternal Aunt      Breast Cancer Maternal Aunt      Pancreatic Cancer Nephew      Breast Cancer Sister          Physical Exam:     Pulse 62  Temp 98  F (36.7  C) (Oral)  Resp 16  Ht 1.714 m (5' 7.48\")  Wt 112.5 kg (248 lb)  SpO2 100%  BMI 38.29 kg/m2  Body mass index is 38.29 kg/(m^2).    General Appearance: healthy and alert, no distress            Assessment:    Marissa Guadarrama is a 67 year old woman with a diagnosis of VAIN 3.     A total of 20 minutes was spent with the patient, 15 minutes of which were spent in counseling the patient and/or treatment planning.      1.  JAYDEN 3.   2.  Ovarian carcinoid tumor.   3.  Ureteral " obstruction with uterus stent.       I discussed with the patient the treatment options for JAYDEN 3, including Aldara versus laser ablation.  The patient would like to proceed with Aldara.  She will do that 2-3 times a week vaginally for 12 weeks, followed by 4 weeks off treatment and repeat a colposcopy.  If this is not covered by her insurance, we will alternatively proceed with an exam under anesthesia and laser ablation in the operating room with a CO2 laser.  We will see her back then on 04/30 once she has seen Dr. Lawrence for further treatment planning for her recurrent carcinoid tumor, which most likely will require an exploratory laparotomy and surgical removal by Dr. Greco and Dr. La.  The patient as well as her  agree with this plan.  They are very appreciative of her care.  All questions were answered.       Nic Segundo MD, MS    Department of Obstetrics and Gynecology   Division of Gynecologic Oncology   [facility]  Phone: [phone]        CC  Patient Care Team:  Bernice Howard MD as PCP - [facility]  Maria C Alexandre MD as MD (OB/Gyn)  Mita Koehler MD as MD (Radiation Oncology)  Hosea King MD as MD (Hematology & Oncology)  Zhao La MD as MD (Urology)  Talisha Greco MD as MD (Colon and Rectal Surgery)  Allen Downey MD as MD (Orthopedics)

## 2017-10-16 NOTE — PROGRESS NOTES
"History of Present Illness - Marissa Guadarrama is a 67 year old female here for continued follow up for her LEFT V2 trigeminal neuralgia.  I last saw her 10/10/2016.      To review, in 2012 she stopped the meds because the pain went away, then it came back with a vengeance.  I previously had her up to 900mg three times daily.  Of note, I have previously ordered a brain and skull base MRI, which was normal.    There have definitely been some ups and downs over the years.  Including back in early 2015 when she sent a message that she was starting to have LEFT ear pain, \"that was different\" and wanted to be seen.  This started in part when she changed her pharmacy in about December 2014, and the different generic immediately did not seem to be working and she had a return of her left facial pain and ear pain.  \"Shearing and stabbing pain in the face.\"  She was on 600mg twice daily, now she increased to 600mg QID.  She also is using leftover vicodin, and it was not helping.    She has had a difficult year in 2016, and was diagnosed with bilateral Breast CA in April of 2016, and had mastectomies and radiation therapy.  It was very difficult. Through it all, he neuralgia was fine.  She is still doing 600mg three times daily and is tolerating it fine.  Her cancer seems to be in remission.    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)     Bone injury     Cervical dysplasia     Knee pain     Malignant neoplasm " of cervix (H)     Serum calcium elevated       Current Medications -   Current Outpatient Prescriptions:      gabapentin (NEURONTIN) 600 MG tablet, Take 1 tablet (600 mg) by mouth 3 times daily, Disp: 180 tablet, Rfl: 6     oxybutynin (DITROPAN) 5 MG tablet, Take 1 tablet (5 mg) by mouth 2 times daily as needed for bladder spasms, Disp: 60 tablet, Rfl: 11     exemestane (AROMASIN) 25 MG tablet, Take 1 tablet (25 mg) by mouth daily, Disp: 90 tablet, Rfl: 1     hydrochlorothiazide (HYDRODIURIL) 25 MG tablet, Take 0.5 tablets (12.5 mg) by mouth daily, Disp: , Rfl:      albuterol (PROAIR HFA, PROVENTIL HFA, VENTOLIN HFA) 108 (90 BASE) MCG/ACT inhaler, Inhale 2 puffs into the lungs every 6 hours as needed for shortness of breath / dyspnea or wheezing, Disp: 1 Inhaler, Rfl: 1     HYDROcodone-acetaminophen (NORCO) 5-325 MG per tablet, Take 1-2 tablets by mouth every 4 hours as needed for other (Moderate to Severe Pain), Disp: 30 tablet, Rfl: 0    Allergies -   Allergies   Allergen Reactions     Nkda [No Known Drug Allergies]        Social History -   History     Social History     Marital Status:      Spouse Name: N/A     Number of Children: N/A     Years of Education: N/A     Social History Main Topics     Smoking status: Former Smoker     Quit date: 10/30/2006     Smokeless tobacco: Never Used     Alcohol Use: No     Drug Use: No     Sexual Activity:     Partners: Male     Other Topics Concern      Service No     Blood Transfusions No     Caffeine Concern Yes     occasional coffee     Occupational Exposure No     Hobby Hazards Yes     [place],     Sleep Concern Yes     not sleeping well     Stress Concern Yes     Grandson in the      Weight Concern Yes     Special Diet No     Back Care No     Exercise No     Seat Belt Yes     Self-Exams Yes     Parent/Sibling W/ Cabg, Mi Or Angioplasty Before 65f 55m? No     Social History Narrative       Family History -   Family History   Problem Relation Age of  Onset     CANCER Mother      bone / liver     Breast Cancer Mother      CANCER Maternal Grandmother      CANCER Maternal Grandfather      CANCER Paternal Grandmother      CANCER Paternal Grandfather      CANCER Sister      vulvar ca, cervical ca, squamous cell cancer     CANCER Brother      Rectal- Stage 4     Rectal Cancer Brother      Breast Cancer Paternal Aunt      Colon Cancer Paternal Aunt      Breast Cancer Maternal Aunt      Pancreatic Cancer Nephew      Breast Cancer Sister        Review of Systems - As per HPI and PMHx, otherwise 7 system review of the head and neck negative.    Physical Exam  There were no vitals taken for this visit.    General - The patient is well nourished and well developed, and appears to have good nutritional status.  Alert and oriented to person and place, answers questions and cooperates with examination appropriately.   Head and Face - Normocephalic and atraumatic, with no gross asymmetry noted of the contour of the facial features.  The facial nerve is intact, with strong symmetric movements.  Voice and Breathing - The patient was breathing comfortably without the use of accessory muscles. There was no wheezing, stridor, or stertor.  The patients voice was clear and strong, and had appropriate pitch and quality.  Ears - The tympanic membranes are normal in appearance, bony landmarks are intact.  No retraction, perforation, or masses.  Normal mobility on valsalva maneuver, with no reports of dizziness on insuflation.  No fluid or purulence was seen in the external canal or the middle ear. No evidence of infection of the middle ear or external canal, cerumen was normal in appearance.  Eyes - Extraocular movements intact, and the pupils were reactive to light.  Sclera were not icteric or injected, conjunctiva were pink and moist.  Mouth - Examination of the oral cavity showed pink, healthy oral mucosa. No lesions or ulcerations noted.  The tongue was mobile and midline, and the  dentition were in good condition.    Throat - The walls of the oropharynx were smooth, pink, moist, symmetric, and had no lesions or ulcerations.  The tonsillar pillars and soft palate were symmetric.  The uvula was midline on elevation.    Neck - Normal midline excursion of the laryngotracheal complex during swallowing.  Full range of motion on passive movement.  Palpation of the occipital, submental, submandibular, internal jugular chain, and supraclavicular nodes did not demonstrate any abnormal lymph nodes or masses.  The carotid pulse was palpable bilaterally.  Palpation of the thyroid was soft and smooth, with no nodules or goiter appreciated.  The trachea was mobile and midline.  Nose - External contour is symmetric, no gross deflection or scars.  Nasal mucosa is pink and moist with no abnormal mucus.  The septum was midline and non-obstructive, turbinates of normal size and position.  No polyps, masses, or purulence noted on examination.  Neurological - Cranial nerves 2 through 12 grossly intact.        A/P - Marissa Guadarrama is a 67 year old female  (G50.0) Trigeminal neuralgia  (primary encounter diagnosis)  Comment:   Her trigeminal neuralgia on the V2 LEFT is stable and responding perfectly to 600mg Neurontin three times daily.  We will of course maintain this.  Continue follow up on a 1 year basis, sooner if there are issues.

## 2017-10-16 NOTE — NURSING NOTE
"Oncology Rooming Note    October 16, 2017 5:12 PM   Marissa Guadarrama is a 67 year old female who presents for:    Chief Complaint   Patient presents with     Oncology Clinic Visit     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      Initial Vitals: Pulse 62  Temp 98  F (36.7  C) (Oral)  Resp 16  Ht 1.714 m (5' 7.48\")  Wt 112.5 kg (248 lb)  SpO2 100%  BMI 38.29 kg/m2 Estimated body mass index is 38.29 kg/(m^2) as calculated from the following:    Height as of this encounter: 1.714 m (5' 7.48\").    Weight as of this encounter: 112.5 kg (248 lb). Body surface area is 2.31 meters squared.  No Pain (0) Comment: Data Unavailable   No LMP recorded. Patient has had a hysterectomy.  Allergies reviewed: Yes  Medications reviewed: Yes    Medications: Medication refills not needed today.  Pharmacy name entered into [employer]: [facility] [address] [place], [place] - [address]     Clinical concerns:  No new concerns  Provider was notified.    5 minutes for nursing intake (face to face time)     Lety Ibanez MA              "

## 2017-10-16 NOTE — MR AVS SNAPSHOT
After Visit Summary   10/16/2017    Marissa Guadarrama    MRN: [id number]           Patient Information     Date Of Birth          1950        Visit Information        Provider Department      10/16/2017 2:30 PM Rk Bell MD [facility]        Today's Diagnoses     Trigeminal neuralgia    -  1       Follow-ups after your visit        Your next 10 appointments already scheduled     Oct 16, 2017  5:20 PM CDT   (Arrive by 5:05 PM)   Return Visit with Nci Segundo MD   [facility] ([facility]    [address] [place] [address]   [phone]            Oct 19, 2017 10:30 AM CDT   Return Visit with Hosea King MD   [facility] ([facility])    [facility]  [address] [address]   [phone]            Oct 30, 2017  5:00 PM CDT   (Arrive by 4:45 PM)   New Patient Visit with Jovani Lawrence MD   [facility] ([facility])    [address] [place] [address]   [phone]              Who to contact     If you have questions or need follow up information about today's clinic visit or your schedule please contact [facility] directly at [phone].  Normal or non-critical lab and imaging results will be communicated to you by MyChart, letter or phone within 4 business days after the clinic has received the results. If you do not hear from us within 7 days, please contact the clinic through MyChart or phone. If you have a critical or abnormal lab result, we will notify you by phone as soon as possible.  Submit refill requests through [employer] or call your pharmacy and they will forward the refill request to us. Please allow 3 business days for your refill to be completed.          Additional Information About Your Visit        Care  "EveryWhere ID     This is your Care EveryWhere ID. This could be used by other organizations to access your [facility] medical records  [id number]        Your Vitals Were     Respirations Height BMI (Body Mass Index)             16 1.715 m (5' 7.5\") 38.27 kg/m2          Blood Pressure from Last 3 Encounters:   09/25/17 144/85   09/07/17 159/65   09/05/17 138/78    Weight from Last 3 Encounters:   10/16/17 112.5 kg (248 lb)   09/25/17 112.8 kg (248 lb 11.2 oz)   09/07/17 112.5 kg (248 lb)              Today, you had the following     No orders found for display         Where to get your medicines      These medications were sent to [facility] [address] [place] [address] - [address]   [address] [facility] [address]     Phone:  [phone]     gabapentin 600 MG tablet          Primary Care Provider Office Phone # Fax #    Bernice Howard [phone]       [address] [place] [address]        Equal Access to Services     [facility]: Hadii karina ku hadasho Soomaali, waaxda luqadaha, qaybta kaalmada adeliz, niesha rodrigues. So [facility] [phone].    ATENCIÓN: Si habla español, tiene a allan disposición servicios gratuitos de asistencia lingüística. Beba al [phone].    We comply with applicable federal civil rights laws and [place] laws. We do not discriminate on the basis of race, color, national origin, age, disability, sex, sexual orientation, or gender identity.            Thank you!     Thank you for choosing [facility]  for your care. Our goal is always to provide you with excellent care. Hearing back from our patients is one way we can continue to improve our services. Please take a few minutes to complete the written survey that you may receive in the mail after your visit with us. Thank you!             Your Updated Medication List - Protect others around you: Learn how to safely use, store and throw away your " medicines at www.disposemymeds.org.          This list is accurate as of: 10/16/17  3:04 PM.  Always use your most recent med list.                   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, estrogen receptor positive (H)       gabapentin 600 MG tablet    NEURONTIN    180 tablet    Take 1 tablet (600 mg) by mouth 3 times daily    Trigeminal neuralgia       hydrochlorothiazide 25 MG tablet    HYDRODIURIL     Take 0.5 tablets (12.5 mg) by mouth daily    Essential hypertension with goal blood pressure less than 140/90       HYDROcodone-acetaminophen 5-325 MG per tablet    NORCO    30 tablet    Take 1-2 tablets by mouth every 4 hours as needed for other (Moderate to Severe Pain)    Osteoarthritis of right knee, unspecified osteoarthritis type       oxybutynin 5 MG tablet    DITROPAN    60 tablet    Take 1 tablet (5 mg) by mouth 2 times daily as needed for bladder spasms    Urgency incontinence

## 2017-10-30 ENCOUNTER — ONCOLOGY VISIT (OUTPATIENT)
Dept: ONCOLOGY | Facility: CLINIC | Age: 67
End: 2017-10-30
Attending: INTERNAL MEDICINE
Payer: COMMERCIAL

## 2017-10-30 ENCOUNTER — ONCOLOGY VISIT (OUTPATIENT)
Dept: ONCOLOGY | Facility: CLINIC | Age: 67
End: 2017-10-30
Attending: OBSTETRICS & GYNECOLOGY
Payer: MEDICARE

## 2017-10-30 VITALS
SYSTOLIC BLOOD PRESSURE: 149 MMHG | HEART RATE: 70 BPM | WEIGHT: 249.54 LBS | BODY MASS INDEX: 38.53 KG/M2 | TEMPERATURE: 98.5 F | DIASTOLIC BLOOD PRESSURE: 75 MMHG | OXYGEN SATURATION: 98 % | RESPIRATION RATE: 18 BRPM

## 2017-10-30 VITALS
OXYGEN SATURATION: 95 % | WEIGHT: 251 LBS | DIASTOLIC BLOOD PRESSURE: 85 MMHG | SYSTOLIC BLOOD PRESSURE: 149 MMHG | TEMPERATURE: 98.5 F | HEART RATE: 70 BPM | BODY MASS INDEX: 38.75 KG/M2

## 2017-10-30 DIAGNOSIS — D3A.026 RECTAL CARCINOID TUMOR (H): Primary | ICD-10-CM

## 2017-10-30 DIAGNOSIS — C78.6 PERITONEAL METASTASES: ICD-10-CM

## 2017-10-30 DIAGNOSIS — D07.2 VAIN III (VAGINAL INTRAEPITHELIAL NEOPLASIA III): Primary | ICD-10-CM

## 2017-10-30 PROCEDURE — 99212 OFFICE O/P EST SF 10 MIN: CPT | Mod: ZF

## 2017-10-30 PROCEDURE — 99213 OFFICE O/P EST LOW 20 MIN: CPT | Performed by: OBSTETRICS & GYNECOLOGY

## 2017-10-30 PROCEDURE — 99212 OFFICE O/P EST SF 10 MIN: CPT | Mod: 27

## 2017-10-30 PROCEDURE — 99204 OFFICE O/P NEW MOD 45 MIN: CPT | Mod: ZP | Performed by: INTERNAL MEDICINE

## 2017-10-30 RX ORDER — HEPARIN SODIUM 5000 [USP'U]/.5ML
5000 INJECTION, SOLUTION INTRAVENOUS; SUBCUTANEOUS
Status: CANCELLED | OUTPATIENT
Start: 2017-10-30

## 2017-10-30 ASSESSMENT — PAIN SCALES - GENERAL
PAINLEVEL: NO PAIN (0)
PAINLEVEL: NO PAIN (0)

## 2017-10-30 NOTE — MR AVS SNAPSHOT
After Visit Summary   10/30/2017    Marissa Guadarrama    MRN: [id number]           Patient Information     Date Of Birth          1950        Visit Information        Provider Department      10/30/2017 2:40 PM Nic Segundo MD [facility]        Today's Diagnoses     VAIN III (vaginal intraepithelial neoplasia III)    -  1       Follow-ups after your visit        Your next 10 appointments already scheduled     Nov 13, 2017  8:30 AM CST   LAB with [facility] ([facility])    [address] [facility] [address]   [phone]           Please do not eat 10-12 hours before your appointment if you are coming in fasting for labs on lipids, cholesterol, or glucose (sugar). This does not apply to pregnant women. Water, hot tea and black coffee (with nothing added) are okay. Do not drink other fluids, diet soda or chew gum.            Nov 15, 2017  2:00 PM CST   Return Visit with Hosea King MD   [facility] ([facility])    [facility]  [address]  [facility] [address]   [phone]            Dec 11, 2017 11:00 AM CST   (Arrive by 10:45 AM)   PAC EVALUATION with Uc Pac Tommy 7   [facility] [place] ([facility])    [address] [place] [address] [phone]            Dec 11, 2017 12:00 PM CST   (Arrive by 11:45 AM)   PAC RN ASSESSMENT with Tristin Pac Rn   [facility] [place] ([facility])    [address] [place] [address]   [phone]            Dec 11, 2017 12:40 PM CST   (Arrive by 12:25 PM)   PAC Anesthesia Consult with Tristin Pac Anesthesiologist   [facility] [place] ([facility])    [address] [place] [address] [phone]            Dec 11,  "2017  1:00 PM CST   [facility] Lab Draw with  [facility] LAB DRAW   [facility] ([facility])    [address]  2nd Floor  [facility] [id number] [phone]            Dec 12, 2017   Procedure with Nic Segundo MD   [facility] (--)    [address]  [place] [address]   [phone]            Jan 08, 2018  5:00 PM CST   (Arrive by 4:45 PM)   Post-Op with Nic Segundo MD   [facility] ([facility])    [address]  2nd Floor  [facility] [id number] [phone]              Who to contact     If you have questions or need follow up information about today's clinic visit or your schedule please contact [facility] directly at [phone].  Normal or non-critical lab and imaging results will be communicated to you by MyChart, letter or phone within 4 business days after the clinic has received the results. If you do not hear from us within 7 days, please contact the clinic through [employer] or phone. If you have a critical or abnormal lab result, we will notify you by phone as soon as possible.  Submit refill requests through [employer] or call your pharmacy and they will forward the refill request to us. Please allow 3 business days for your refill to be completed.          Additional Information About Your Visit        [employer] Information     [employer] lets you send messages to your doctor, view your test results, renew your prescriptions, schedule appointments and more. To sign up, go to [email or web address] . Click on \"Log in\" on the left side of the screen, which will take you to the Welcome page. Then click on \"Sign up Now\" on the right side of the page.     You will be asked to enter the access code listed below, as well as some personal information. Please follow the directions to create your username and password.     Your access code is: " [email or web address]  Expires: 2018 12:30 PM     Your access code will  in 90 days. If you need help or a new code, please call your [place] [facility] clinic or [phone].        Care EveryWhere ID     This is your Care EveryWhere ID. This could be used by other organizations to access your [place] medical records  [id number]        Your Vitals Were     Pulse Temperature Pulse Oximetry BMI (Body Mass Index)          70 98.5  F (36.9  C) (Oral) 95% 38.75 kg/m2         Blood Pressure from Last 3 Encounters:   No data found for BP    Weight from Last 3 Encounters:   No data found for Wt              Today, you had the following     No orders found for display         Today's Medication Changes          These changes are accurate as of: 10/30/17 11:59 PM.  If you have any questions, ask your nurse or doctor.               Stop taking these medicines if you haven't already. Please contact your care team if you have questions.     HYDROcodone-acetaminophen 5-325 MG per tablet   Commonly known as:  NORCO   Stopped by:  Jovani Lawrence MD                    Primary Care Provider Office Phone # Fax #    Bernice Howard [phone]       [address] [place] [address]        Equal Access to Services     [facility]: Hadii karina baltazar hadasho Sopuneetali, waaxda luqadaha, qaybta kaalmada adeegyada, niesha rodrigues. So [facility] [phone].    ATENCIÓN: Si habla español, tiene a allan disposición servicios gratuitos de asistencia lingüística. Llame al [phone].    We comply with applicable federal civil rights laws and [place] laws. We do not discriminate on the basis of race, color, national origin, age, disability, sex, sexual orientation, or gender identity.            Thank you!     Thank you for choosing [facility]  for your care. Our goal is always to provide you with excellent care. Hearing back from our patients is one way we can continue to  improve our services. Please take a few minutes to complete the written survey that you may receive in the mail after your visit with us. Thank you!             Your Updated Medication List - Protect others around you: Learn how to safely use, store and throw away your medicines at www.disposemymeds.org.          This list is accurate as of: 10/30/17 11:59 PM.  Always use your most recent med list.                   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   Wash off after 8 hours.   May use for up to 12 weeks.    VAIN III (vaginal intraepithelial neoplasia grade III)       oxybutynin 5 MG tablet    DITROPAN    60 tablet    Take 1 tablet (5 mg) by mouth 2 times daily as needed for bladder spasms    Urgency incontinence

## 2017-10-30 NOTE — MR AVS SNAPSHOT
After Visit Summary   10/30/2017    Marissa Guadarrama    MRN: [id number]           Patient Information     Date Of Birth          1950        Visit Information        Provider Department      10/30/2017 5:00 PM Jovani Lawrence MD [facility]        Today's Diagnoses     Rectal carcinoid tumor    -  1    Peritoneal metastases (H)           Follow-ups after your visit        Follow-up notes from your care team     Return if symptoms worsen or fail to improve.      Your next 10 appointments already scheduled     Nov 13, 2017  8:30 AM CST   LAB with [facility] ([facility])    [address]  [facility] [address]   [phone]           Please do not eat 10-12 hours before your appointment if you are coming in fasting for labs on lipids, cholesterol, or glucose (sugar). This does not apply to pregnant women. Water, hot tea and black coffee (with nothing added) are okay. Do not drink other fluids, diet soda or chew gum.            Nov 15, 2017  2:00 PM CST   Return Visit with Hosea King MD   [facility] ([facility])    [facility]  [address]  [facility] [address]   [phone]              Future tests that were ordered for you today     Open Future Orders        Priority Expected Expires Ordered    CBC with platelets differential Routine  10/30/2018 10/30/2017    CMP - Comprehensive Metabolic Panel Routine  10/30/2018 10/30/2017            Who to contact     If you have questions or need follow up information about today's clinic visit or your schedule please contact [facility] directly at [phone].  Normal or non-critical lab and imaging results will be communicated to you by MyChart, letter or phone within 4 business days after the clinic has received the results. If you do not hear from us within 7 days, please contact the  clinic through [employer] or phone. If you have a critical or abnormal lab result, we will notify you by phone as soon as possible.  Submit refill requests through [employer] or call your pharmacy and they will forward the refill request to us. Please allow 3 business days for your refill to be completed.          Additional Information About Your Visit        Care EveryWhere ID     This is your Care EveryWhere ID. This could be used by other organizations to access your [place] medical records  [id number]        Your Vitals Were     Pulse Temperature Respirations Pulse Oximetry BMI (Body Mass Index)       70 98.5  F (36.9  C) (Oral) 18 98% 38.53 kg/m2        Blood Pressure from Last 3 Encounters:   10/30/17 149/75   10/30/17 149/85   09/25/17 144/85    Weight from Last 3 Encounters:   10/30/17 113.2 kg (249 lb 8.6 oz)   10/30/17 113.9 kg (251 lb)   10/16/17 112.5 kg (248 lb)              Today, you had the following     No orders found for display         Today's Medication Changes          These changes are accurate as of: 10/30/17  7:20 PM.  If you have any questions, ask your nurse or doctor.               Stop taking these medicines if you haven't already. Please contact your care team if you have questions.     HYDROcodone-acetaminophen 5-325 MG per tablet   Commonly known as:  NORCO   Stopped by:  Jovani Lawrence MD                    Primary Care Provider Office Phone # Fax #    Bernice Howard [phone]       [facility] [address]        Equal Access to Services     [facility] HospitalLORENA : Hadii karina baltazar hadminervao Sopuneetali, waaxda luqadaha, qaybta kaalmada be, niesha rodrigues. So [facility] [phone].    ATENCIÓN: Si habla español, tiene a allan disposición servicios gratuitos de asistencia lingüística. Llame al [phone].    We comply with applicable federal civil rights laws and [place] laws. We do not discriminate on the basis of race,  color, national origin, age, disability, sex, sexual orientation, or gender identity.            Thank you!     Thank you for choosing [facility]  for your care. Our goal is always to provide you with excellent care. Hearing back from our patients is one way we can continue to improve our services. Please take a few minutes to complete the written survey that you may receive in the mail after your visit with us. Thank you!             Your Updated Medication List - Protect others around you: Learn how to safely use, store and throw away your medicines at www.disposemymeds.org.          This list is accurate as of: 10/30/17  7:20 PM.  Always use your most recent med list.                   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   Wash off after 8 hours.   May use for up to 12 weeks.    VAIN III (vaginal intraepithelial neoplasia grade III)       oxybutynin 5 MG tablet    DITROPAN    60 tablet    Take 1 tablet (5 mg) by mouth 2 times daily as needed for bladder spasms    Urgency incontinence

## 2017-10-30 NOTE — NURSING NOTE
"Oncology Rooming Note    October 30, 2017 4:31 PM   Marissa Guadarrama is a 67 year old female who presents for:    Chief Complaint   Patient presents with     Oncology Clinic Visit     New for Tumor of Colon      Initial Vitals: /75  Pulse 70  Temp 98.5  F (36.9  C) (Oral)  Resp 18  Wt 113.2 kg (249 lb 8.6 oz)  SpO2 98%  BMI 38.53 kg/m2 Estimated body mass index is 38.53 kg/(m^2) as calculated from the following:    Height as of 10/16/17: 1.714 m (5' 7.48\").    Weight as of this encounter: 113.2 kg (249 lb 8.6 oz). Body surface area is 2.32 meters squared.  No Pain (0) Comment: Data Unavailable   No LMP recorded. Patient has had a hysterectomy.  Allergies reviewed: Yes  Medications reviewed: Yes    Medications: Medication refills not needed today.  Pharmacy [facility] entered into [employer] [address] [facility] [address] [place] [address] AVENUE     Clinical concerns: no  Greeno  was notified.    6 minutes for nursing intake (face to face time)     Leticia Barnes MA              "

## 2017-10-30 NOTE — NURSING NOTE
"Oncology Rooming Note    October 30, 2017 2:22 PM   Marissa Guadarrama is a 67 year old female who presents for:    Chief Complaint   Patient presents with     Oncology Clinic Visit     Cervical CA / Surgery consent      Initial Vitals: /85  Pulse 70  Temp 98.5  F (36.9  C) (Oral)  Wt 113.9 kg (251 lb)  SpO2 95%  BMI 38.75 kg/m2 Estimated body mass index is 38.75 kg/(m^2) as calculated from the following:    Height as of 10/16/17: 1.714 m (5' 7.48\").    Weight as of this encounter: 113.9 kg (251 lb). Body surface area is 2.33 meters squared.  No Pain (0) Comment: Data Unavailable   No LMP recorded. Patient has had a hysterectomy.  Allergies reviewed: Yes  Medications reviewed: Yes    Medications: Medication refills not needed today.  Pharmacy name entered into [employer]: [facility] [address] [facility] [address]     Clinical concerns: no clinical concerns  rosemariehugo was NOT notified.    7 minutes for nursing intake (face to face time)     Briana Weber              "

## 2017-10-30 NOTE — NURSING NOTE
Pre Op Nurse Teaching Template    Relevant Diagnosis: Vaginal dysplasia     Teaching Topic: EUA, Laser ablation, possible wide local excision     Person(s) involved in teaching :  Patient  &  Spouse / Partner    Motivation Level:  Asks Questions:    Yes      Eager to Learn:     Yes     Cooperative:          Yes    Receptive (willing. Able to accept information):    Yes      Patient and those who are listed above demonstrates understanding of the following:   Reason for the appointment, diagnosis and treatment plan:   Yes   Knowledge of proper use of medications and conditions for which they are ordered (with special attention to potential side effects or drug interactions): Yes   Which situations necessitate calling provider and whom to contact: Yes         Proper use and care of (medical equip, care aids, etc.):   N/A  Nutritional needs and diet plan:  Yes      Pain management techniques:     Yes, Pain Scale   Patient instructed on hand hygiene:  N/A  How and/when to access community resources:  Yes    Diet:   Yes, [facility] Diet Instructions      Teaching Concerns addressed: Yes      Infection Prevention:  Patient and those who are listed above demonstrates understanding of the following:  Surgical procedure site care taught:   Yes   Signs and symptoms of infection taught: Yes       Instructional Materials Used/Given:  [facility] Before Your Surgery Booklet  [facility] Skin Prep      Home Care after Vaginal Surgery  Map  Accommodations Brochure  Phone numbers for [facility] and Station [facility] Given to Patient      Comments:  NA       Time spent teaching with patient:  20 minutes  Nicole Chew RN

## 2017-10-30 NOTE — LETTER
10/30/2017      RE: Marissa Guadarrama  [address] [place] [address]       Esperanza Guadarrama is a new patient is seen consultation by Dr. Talisha Norris with regards to metastatic carcinoid.    Ms. Guadarrama was originally diagnosed with her carcinoid back in about 2003. I don't have those records but she tells me it was resected from her colon. She had noted a metastatic carcinoid lesion on the surface of her bladder at the time of a hysterectomy for cervix cancer several years ago. She has subsequently been noted to have a small mass in the left pelvis adjacent to her left ureter causing obstruction. She has had the left ureter stented. She has had an Octreoscan showing the only site of tumor to be in the small mass adjacent to the ureter. I could elicit no history from the patient to have any carcinoid type symptoms. She has a very complicated history of cancers, the most important being bilateral breast cancer, her cervix cancer, and currently JAYDEN for which she is undergoing further therapy with GYN/Onc.    On exam she actually appears quite well. I did not otherwise examine her.    I reviewed with the patient and her  her various imaging studies which were consistent with the description above.    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 For only symptom from this is due to the obstruction of her ureter. If it were easy to resect I would recommend that, but given her multiple prior surgeries and the location of the tumor was suspect this would be difficult, and might leave her with scarring that would be equally problematic. We can consider focal therapies such as radiation but again we may not solve the problem of obstruction. We should consider systemic therapy with octreotide, however the patient tells me when that was tried before she developed profound fatigue sleeping 23 hours a day after her second monthly dose. I discussed with her that that would be an unusual  side effect and that we could probably give it a try again, using short acting subcutaneous injections to see if she had significant toxicity. She felt that was not a viable choice. I discussed with her that other systemic therapies were probably not worth the potential toxicities for the small benefit she might gain. I recommended that going forward she have periodic scans to assess the volume of the identified nodule and growth of any other peritoneal nodules, with consideration to therapy if she becomes more symptomatic or has more dramatic evidence of disease progression. She expressed a good understanding of this, and we'll continue to follow up with her other oncologists.    Visit time today was 45 minutes, all spent on the above discussion.    Jovani Lawrence MD

## 2017-10-30 NOTE — LETTER
10/30/2017       RE: Marissa Guadarrama  [address]   [place] [address]     Dear Colleague,    Thank you for referring your patient, Marissa Guadarrama, to the [facility]. Please see a copy of my visit note below.                Follow Up Notes on Referred Patient    Date: 10/30/2017       Dr. Bernice Howard MD  [address] [place] [address]       RE: Marissa Guadarrama  : 1950  FRITZ: 10/30/2017    Dear Dr. Bernice Howard:    Marissa Guadarrama is a 67 year old woman with a diagnosis of VAIN 3.    No new symptoms since the last time I have seen her.           Past Medical History:    Past Medical History:   Diagnosis Date     Arthritis     knee     Benign breast biopsy     benign     Carcinoid tumor 2003     Cervical cancer (H)      H/O colposcopy with cervical biopsy 13    vaginal cuff biopsy- VAIN III. referred back to gyn/onc     High cholesterol      HTN      Pap smear of vagina with ASC-H 13     Post-polio syndromes      Trigeminal neuralgias          Past Surgical History:    Past Surgical History:   Procedure Laterality Date     APPENDECTOMY       BIOPSY NODE SENTINEL Bilateral 2016    Procedure: BIOPSY NODE SENTINEL;  Surgeon: Brent Arana MD;  Location: [facility]     C BSO, OMENTECTOMY W/OSMAN  2007     C TOTAL ABDOM HYSTERECTOMY  2007     CL AFF SURGICAL PATHOLOGY       COLONOSCOPY N/A 2017    Procedure: COMBINED COLONOSCOPY, SINGLE OR MULTIPLE BIOPSY/POLYPECTOMY BY BIOPSY;  Colonoscopy Dx:Carcinoid tumor of colon prep mailed golytely;  Surgeon: Talisha Greco MD;  Location: [facility]     COLPOSCOPY, BIOPSY, COMBINED  3/13/2014    Procedure: COMBINED COLPOSCOPY, BIOPSY;;  Surgeon: Lara Pack MD;  Location: [facility]     COMBINED CYSTOSCOPY, RETROGRADES, URETEROSCOPY, INSERT STENT Left 2017    Procedure: COMBINED CYSTOSCOPY, RETROGRADES, URETEROSCOPY, INSERT STENT;  Surgeon: Zhao La MD;   Location: [facility]     CYSTOSCOPY, RETROGRADES, INSERT STENT URETER(S), COMBINED Left 9/7/2017    Procedure: COMBINED CYSTOSCOPY, RETROGRADES, INSERT STENT URETER(S);  Cystoscopy,Left Stent Exchange;  Surgeon: Zhao La MD;  Location: [facility]     EXAM UNDER ANESTHESIA PELVIC  3/13/2014    Procedure: EXAM UNDER ANESTHESIA PELVIC;  Exam Under Anestheisa, Colposcopy, Vaginal Biopsies, Co2 Laser of the Upper Vagina;  Surgeon: Lara Pack MD;  Location: [facility]     HERNIORRHAPHY INCISIONAL (LOCATION)       LASER CO2 VAGINA  3/13/2014    VAIN 1/2     LUMPECTOMY BREAST WITH SEED LOCALIZATION Bilateral 6/1/2016    Procedure: LUMPECTOMY BREAST WITH SEED LOCALIZATION;  Surgeon: Brent Arana MD;  Location: [facility]     SURGICAL HISTORY OF -       ovarian cystectomy     SURGICAL HISTORY OF -   2003    right colon resection secondary to carcinoid tumor     TUBAL LIGATION           Health Maintenance Due   Topic Date Due     MICROALBUMIN Q1 YEAR  02/05/2017     LIPID MONITORING Q1 YEAR  03/16/2017     PAP Q6 MOS DIAGNOSTIC  03/19/2017     INFLUENZA VACCINE (SYSTEM ASSIGNED)  09/01/2017     FALL RISK ASSESSMENT  11/02/2017       Current Medications:     Current Outpatient Prescriptions   Medication Sig Dispense Refill     gabapentin (NEURONTIN) 600 MG tablet Take 1 tablet (600 mg) by mouth 3 times daily 180 tablet 6     imiquimod (ALDARA) 5 % cream Apply a small sized amount to vagina three times weekly at bedtime.   Wash off after 8 hours.   May use for up to 12 weeks. 12 packet 3     oxybutynin (DITROPAN) 5 MG tablet Take 1 tablet (5 mg) by mouth 2 times daily as needed for bladder spasms 60 tablet 11     exemestane (AROMASIN) 25 MG tablet Take 1 tablet (25 mg) by mouth daily 90 tablet 1     hydrochlorothiazide (HYDRODIURIL) 25 MG tablet Take 0.5 tablets (12.5 mg) by mouth daily       albuterol (PROAIR HFA, PROVENTIL HFA, VENTOLIN HFA) 108 (90 BASE) MCG/ACT inhaler Inhale 2 puffs into the lungs every 6  hours as needed for shortness of breath / dyspnea or wheezing 1 Inhaler 1     HYDROcodone-acetaminophen (NORCO) 5-325 MG per tablet Take 1-2 tablets by mouth every 4 hours as needed for other (Moderate to Severe Pain) 30 tablet 0         Allergies:        Allergies   Allergen Reactions     Nkda [No Known Drug Allergies]         Social History:     Social History   Substance Use Topics     Smoking status: Former Smoker     Packs/day: 2.00     Years: 29.00     Types: Cigarettes     Quit date: 10/30/2006     Smokeless tobacco: Never Used     Alcohol use No       History   Drug Use No         Family History:       Family History   Problem Relation Age of Onset     CANCER Mother      bone / liver     Breast Cancer Mother      CANCER Maternal Grandmother      CANCER Maternal Grandfather      CANCER Paternal Grandmother      CANCER Paternal Grandfather      CANCER Sister      vulvar ca, cervical ca, squamous cell cancer     CANCER Brother      Rectal- Stage 4     Rectal Cancer Brother      Breast Cancer Paternal Aunt      Colon Cancer Paternal Aunt      Breast Cancer Maternal Aunt      Pancreatic Cancer Nephew      Breast Cancer Sister          Physical Exam:     /85  Pulse 70  Temp 98.5  F (36.9  C) (Oral)  Wt 113.9 kg (251 lb)  SpO2 95%  BMI 38.75 kg/m2  Body mass index is 38.75 kg/(m^2).    General Appearance: healthy and alert, no distress         Assessment:    Marissa Guadarrama is a 67 year old woman with a diagnosis of VAIN 3.     A total of 20 minutes was spent with the patient, 15 minutes of which were spent in counseling the patient and/or treatment planning.      1.  JAYDEN 3.   2.  Recurrent carcinoid tumor.      Discussed with the patient we will proceed with exam under anesthesia, laser ablation and possible wide local excision, will plan to do that in mid December.  She will see my colleagues in Anesthesia for preoperative clearance.  She and her  agree with this plan. She will meet with   Melinda to plan possible surgery for her recurrent carcinoid tumor.  She was very appreciative of her care.       Risks, benefits and alternatives to proceed discussed in detail with the patient. Risks include but are not limited to bleeding, infection, possible injury to surrounding organs including bowel, bladder, ureter, need for second procedure/surgery related to complications from first procedure, postoperative medical complications such as cardiopulmonary events, lymphedema, lymphocyst, thromboembolic events.  Consent for surgery, blood transfusion signed.  Will arrange appropriate preoperative blood work, CXR, EKG. Patient also advised on need for postoperative surveillance and/or adjuvant therapy. Questions answered.    Nic Segundo MD, MS    Department of Obstetrics and Gynecology   Division of Gynecologic Oncology   [facility]  Phone: [phone]      CC  Patient Care Team:  Bernice Howard MD as PCP - Maria C Noguera MD as MD (OB/Gyn)  Mita Koehler MD as MD (Radiation Oncology)  Hosea King MD as MD (Hematology & Oncology)  Zhao La MD as MD (Urology)  Talisha Greco MD as MD (Colon and Rectal Surgery)  Allen Downey MD as MD (Orthopedics)

## 2017-10-30 NOTE — PROGRESS NOTES
Follow Up Notes on Referred Patient    Date: 10/30/2017       Dr. Bernice Howard MD  [address] [place] [address]       RE: Marissa Guadarrama  : 1950  FRITZ: 10/30/2017    Dear Dr. Bernice Howard:    Marissa Guadarrama is a 67 year old woman with a diagnosis of VAIN 3.    No new symptoms since the last time I have seen her.           Past Medical History:    Past Medical History:   Diagnosis Date     Arthritis     knee     Benign breast biopsy     benign     Carcinoid tumor 2003     Cervical cancer (H)      H/O colposcopy with cervical biopsy 13    vaginal cuff biopsy- VAIN III. referred back to gyn/onc     High cholesterol      HTN      Pap smear of vagina with ASC-H 13     Post-polio syndromes      Trigeminal neuralgias          Past Surgical History:    Past Surgical History:   Procedure Laterality Date     APPENDECTOMY       BIOPSY NODE SENTINEL Bilateral 2016    Procedure: BIOPSY NODE SENTINEL;  Surgeon: Brent Arana MD;  Location: [facility]     C BSO, OMENTECTOMY W/OSMAN  2007     C TOTAL ABDOM HYSTERECTOMY  2007     CL AFF SURGICAL PATHOLOGY       COLONOSCOPY N/A 2017    Procedure: COMBINED COLONOSCOPY, SINGLE OR MULTIPLE BIOPSY/POLYPECTOMY BY BIOPSY;  Colonoscopy Dx:Carcinoid tumor of colon prep [facility];  Surgeon: Talisha Greco MD;  Location: [facility]     COLPOSCOPY, BIOPSY, COMBINED  3/13/2014    Procedure: COMBINED COLPOSCOPY, BIOPSY;;  Surgeon: Lara Pack MD;  [facility]     COMBINED CYSTOSCOPY, RETROGRADES, URETEROSCOPY, INSERT STENT Left 2017    Procedure: COMBINED CYSTOSCOPY, RETROGRADES, URETEROSCOPY, INSERT STENT;  Surgeon: Zhao La MD;  Location: [facility]     CYSTOSCOPY, RETROGRADES, INSERT STENT URETER(S), COMBINED Left 2017    Procedure: COMBINED CYSTOSCOPY, RETROGRADES, INSERT STENT URETER(S);  Cystoscopy,Left Stent Exchange;  Surgeon: Zhao La MD;   Location: [facility]     EXAM UNDER ANESTHESIA PELVIC  3/13/2014    Procedure: EXAM UNDER ANESTHESIA PELVIC;  Exam Under Anestheisa, Colposcopy, Vaginal Biopsies, Co2 Laser of the Upper Vagina;  Surgeon: Lara Pack MD;  [facility]     HERNIORRHAPHY INCISIONAL (LOCATION)       LASER CO2 VAGINA  3/13/2014    VAIN 1/2     LUMPECTOMY BREAST WITH SEED LOCALIZATION Bilateral 6/1/2016    Procedure: LUMPECTOMY BREAST WITH SEED LOCALIZATION;  Surgeon: Brent Arana MD;  Location: [facility]     SURGICAL HISTORY OF -       ovarian cystectomy     SURGICAL HISTORY OF -   2003    right colon resection secondary to carcinoid tumor     TUBAL LIGATION           Health Maintenance Due   Topic Date Due     MICROALBUMIN Q1 YEAR  02/05/2017     LIPID MONITORING Q1 YEAR  03/16/2017     PAP Q6 MOS DIAGNOSTIC  03/19/2017     INFLUENZA VACCINE (SYSTEM ASSIGNED)  09/01/2017     FALL RISK ASSESSMENT  11/02/2017       Current Medications:     Current Outpatient Prescriptions   Medication Sig Dispense Refill     gabapentin (NEURONTIN) 600 MG tablet Take 1 tablet (600 mg) by mouth 3 times daily 180 tablet 6     imiquimod (ALDARA) 5 % cream Apply a small sized amount to vagina three times weekly at bedtime.   Wash off after 8 hours.   May use for up to 12 weeks. 12 packet 3     oxybutynin (DITROPAN) 5 MG tablet Take 1 tablet (5 mg) by mouth 2 times daily as needed for bladder spasms 60 tablet 11     exemestane (AROMASIN) 25 MG tablet Take 1 tablet (25 mg) by mouth daily 90 tablet 1     hydrochlorothiazide (HYDRODIURIL) 25 MG tablet Take 0.5 tablets (12.5 mg) by mouth daily       albuterol (PROAIR HFA, PROVENTIL HFA, VENTOLIN HFA) 108 (90 BASE) MCG/ACT inhaler Inhale 2 puffs into the lungs every 6 hours as needed for shortness of breath / dyspnea or wheezing 1 Inhaler 1     HYDROcodone-acetaminophen (NORCO) 5-325 MG per tablet Take 1-2 tablets by mouth every 4 hours as needed for other (Moderate to Severe Pain) 30 tablet 0          Allergies:        Allergies   Allergen Reactions     Nkda [No Known Drug Allergies]         Social History:     Social History   Substance Use Topics     Smoking status: Former Smoker     Packs/day: 2.00     Years: 29.00     Types: Cigarettes     Quit date: 10/30/2006     Smokeless tobacco: Never Used     Alcohol use No       History   Drug Use No         Family History:       Family History   Problem Relation Age of Onset     CANCER Mother      bone / liver     Breast Cancer Mother      CANCER Maternal Grandmother      CANCER Maternal Grandfather      CANCER Paternal Grandmother      CANCER Paternal Grandfather      CANCER Sister      vulvar ca, cervical ca, squamous cell cancer     CANCER Brother      Rectal- Stage 4     Rectal Cancer Brother      Breast Cancer Paternal Aunt      Colon Cancer Paternal Aunt      Breast Cancer Maternal Aunt      Pancreatic Cancer Nephew      Breast Cancer Sister          Physical Exam:     /85  Pulse 70  Temp 98.5  F (36.9  C) (Oral)  Wt 113.9 kg (251 lb)  SpO2 95%  BMI 38.75 kg/m2  Body mass index is 38.75 kg/(m^2).    General Appearance: healthy and alert, no distress         Assessment:    Marissa Guadarrama is a 67 year old woman with a diagnosis of VAIN 3.     A total of 20 minutes was spent with the patient, 15 minutes of which were spent in counseling the patient and/or treatment planning.      1.  JAYDEN 3.   2.  Recurrent carcinoid tumor.      Discussed with the patient we will proceed with exam under anesthesia, laser ablation and possible wide local excision, will plan to do that in mid December.  She will see my colleagues in Anesthesia for preoperative clearance.  She and her  agree with this plan. She will meet with Dr. Lawrence to plan possible surgery for her recurrent carcinoid tumor.  She was very appreciative of her care.       Risks, benefits and alternatives to proceed discussed in detail with the patient. Risks include but are not limited  to bleeding, infection, possible injury to surrounding organs including bowel, bladder, ureter, need for second procedure/surgery related to complications from first procedure, postoperative medical complications such as cardiopulmonary events, lymphedema, lymphocyst, thromboembolic events.  Consent for surgery, blood transfusion signed.  Will arrange appropriate preoperative blood work, CXR, EKG. Patient also advised on need for postoperative surveillance and/or adjuvant therapy. Questions answered.    Nic Segundo MD, MS    Department of Obstetrics and Gynecology   Division of Gynecologic Oncology   [facility]  Phone: [phone]        CC  Patient Care Team:  Bernice Howard MD as PCP - [facility]  Maria C Alexandre MD as MD (OB/Gyn)  Mita Koehler MD as MD (Radiation Oncology)  Hosea King MD as MD (Hematology & Oncology)  Zhao aL MD as MD (Urology)  Talisha Greco MD as MD (Colon and Rectal Surgery)  Allen Downey MD as MD (Orthopedics)  BERNICE HOWARD

## 2017-10-31 NOTE — PROGRESS NOTES
Esperanza Guadarrama is a new patient is seen consultation by Dr. Talisha Norris with regards to metastatic carcinoid.    Ms. Guadarrama was originally diagnosed with her carcinoid back in about 2003. I don't have those records but she tells me it was resected from her colon. She had noted a metastatic carcinoid lesion on the surface of her bladder at the time of a hysterectomy for cervix cancer several years ago. She has subsequently been noted to have a small mass in the left pelvis adjacent to her left ureter causing obstruction. She has had the left ureter stented. She has had an Octreoscan showing the only site of tumor to be in the small mass adjacent to the ureter. I could elicit no history from the patient to have any carcinoid type symptoms. She has a very complicated history of cancers, the most important being bilateral breast cancer, her cervix cancer, and currently JAYDEN for which she is undergoing further therapy with GYN/Onc.    On exam she actually appears quite well. I did not otherwise examine her.    I reviewed with the patient and her  her various imaging studies which were consistent with the description above.    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 For only symptom from this is due to the obstruction of her ureter. If it were easy to resect I would recommend that, but given her multiple prior surgeries and the location of the tumor was suspect this would be difficult, and might leave her with scarring that would be equally problematic. We can consider focal therapies such as radiation but again we may not solve the problem of obstruction. We should consider systemic therapy with octreotide, however the patient tells me when that was tried before she developed profound fatigue sleeping 23 hours a day after her second monthly dose. I discussed with her that that would be an unusual side effect and that we could probably give it a try again, using short acting  subcutaneous injections to see if she had significant toxicity. She felt that was not a viable choice. I discussed with her that other systemic therapies were probably not worth the potential toxicities for the small benefit she might gain. I recommended that going forward she have periodic scans to assess the volume of the identified nodule and growth of any other peritoneal nodules, with consideration to therapy if she becomes more symptomatic or has more dramatic evidence of disease progression. She expressed a good understanding of this, and we'll continue to follow up with her other oncologists.    Visit time today was 45 minutes, all spent on the above discussion.

## 2017-11-13 ENCOUNTER — HOSPITAL ENCOUNTER (OUTPATIENT)
Dept: LAB | Facility: CLINIC | Age: 67
Discharge: HOME | End: 2017-11-13
Attending: INTERNAL MEDICINE | Admitting: INTERNAL MEDICINE
Payer: MEDICARE

## 2017-11-13 DIAGNOSIS — C50.412 MALIGNANT NEOPLASM OF UPPER-OUTER QUADRANT OF BOTH BREASTS IN FEMALE, ESTROGEN RECEPTOR POSITIVE (H): ICD-10-CM

## 2017-11-13 DIAGNOSIS — C50.411 MALIGNANT NEOPLASM OF UPPER-OUTER QUADRANT OF BOTH BREASTS IN FEMALE, ESTROGEN RECEPTOR POSITIVE (H): ICD-10-CM

## 2017-11-13 DIAGNOSIS — Z17.0 MALIGNANT NEOPLASM OF UPPER-OUTER QUADRANT OF BOTH BREASTS IN FEMALE, ESTROGEN RECEPTOR POSITIVE (H): ICD-10-CM

## 2017-11-13 LAB
ALBUMIN SERPL-MCNC: 3.2 G/DL (ref 3.4–5)
ALP SERPL-CCNC: 78 U/L (ref 40–150)
ALT SERPL W P-5'-P-CCNC: 16 U/L (ref 0–50)
ANION GAP SERPL CALCULATED.3IONS-SCNC: 6 MMOL/L (ref 3–14)
AST SERPL W P-5'-P-CCNC: 14 U/L (ref 0–45)
BASOPHILS # BLD AUTO: 0 10E9/L (ref 0–0.2)
BASOPHILS NFR BLD AUTO: 0.6 %
BILIRUB SERPL-MCNC: 0.4 MG/DL (ref 0.2–1.3)
BUN SERPL-MCNC: 24 MG/DL (ref 7–30)
CALCIUM SERPL-MCNC: 9.7 MG/DL (ref 8.5–10.1)
CANCER AG27-29 SERPL-ACNC: 23 U/ML (ref 0–39)
CHLORIDE SERPL-SCNC: 105 MMOL/L (ref 94–109)
CO2 SERPL-SCNC: 33 MMOL/L (ref 20–32)
CREAT SERPL-MCNC: 1.61 MG/DL (ref 0.52–1.04)
DIFFERENTIAL METHOD BLD: NORMAL
EOSINOPHIL # BLD AUTO: 0.5 10E9/L (ref 0–0.7)
EOSINOPHIL NFR BLD AUTO: 7.1 %
ERYTHROCYTE [DISTWIDTH] IN BLOOD BY AUTOMATED COUNT: 13.2 % (ref 10–15)
GFR SERPL CREATININE-BSD FRML MDRD: 32 ML/MIN/1.7M2
GLUCOSE SERPL-MCNC: 104 MG/DL (ref 70–99)
HCT VFR BLD AUTO: 43.4 % (ref 35–47)
HGB BLD-MCNC: 14 G/DL (ref 11.7–15.7)
IMM GRANULOCYTES # BLD: 0 10E9/L (ref 0–0.4)
IMM GRANULOCYTES NFR BLD: 0.3 %
LYMPHOCYTES # BLD AUTO: 1.8 10E9/L (ref 0.8–5.3)
LYMPHOCYTES NFR BLD AUTO: 25.3 %
MCH RBC QN AUTO: 30.3 PG (ref 26.5–33)
MCHC RBC AUTO-ENTMCNC: 32.3 G/DL (ref 31.5–36.5)
MCV RBC AUTO: 94 FL (ref 78–100)
MONOCYTES # BLD AUTO: 0.5 10E9/L (ref 0–1.3)
MONOCYTES NFR BLD AUTO: 6.5 %
NEUTROPHILS # BLD AUTO: 4.4 10E9/L (ref 1.6–8.3)
NEUTROPHILS NFR BLD AUTO: 60.2 %
PLATELET # BLD AUTO: 199 10E9/L (ref 150–450)
POTASSIUM SERPL-SCNC: 3.8 MMOL/L (ref 3.4–5.3)
PROT SERPL-MCNC: 7.3 G/DL (ref 6.8–8.8)
RBC # BLD AUTO: 4.62 10E12/L (ref 3.8–5.2)
SODIUM SERPL-SCNC: 144 MMOL/L (ref 133–144)
WBC # BLD AUTO: 7.2 10E9/L (ref 4–11)

## 2017-11-13 PROCEDURE — 36415 COLL VENOUS BLD VENIPUNCTURE: CPT | Performed by: INTERNAL MEDICINE

## 2017-11-13 PROCEDURE — 85025 COMPLETE CBC W/AUTO DIFF WBC: CPT | Performed by: INTERNAL MEDICINE

## 2017-11-13 PROCEDURE — 80053 COMPREHEN METABOLIC PANEL: CPT | Performed by: INTERNAL MEDICINE

## 2017-11-13 PROCEDURE — 86300 IMMUNOASSAY TUMOR CA 15-3: CPT | Performed by: INTERNAL MEDICINE

## 2017-11-15 ENCOUNTER — RADIANT APPOINTMENT (OUTPATIENT)
Dept: GENERAL RADIOLOGY | Facility: CLINIC | Age: 67
End: 2017-11-15
Attending: UROLOGY
Payer: COMMERCIAL

## 2017-11-15 ENCOUNTER — ONCOLOGY VISIT (OUTPATIENT)
Dept: ONCOLOGY | Facility: CLINIC | Age: 67
End: 2017-11-15
Attending: INTERNAL MEDICINE
Payer: COMMERCIAL

## 2017-11-15 ENCOUNTER — OFFICE VISIT (OUTPATIENT)
Dept: UROLOGY | Facility: CLINIC | Age: 67
End: 2017-11-15
Payer: COMMERCIAL

## 2017-11-15 VITALS
OXYGEN SATURATION: 96 % | TEMPERATURE: 98.6 F | DIASTOLIC BLOOD PRESSURE: 73 MMHG | HEART RATE: 71 BPM | BODY MASS INDEX: 39.26 KG/M2 | RESPIRATION RATE: 16 BRPM | WEIGHT: 254.3 LBS | SYSTOLIC BLOOD PRESSURE: 144 MMHG

## 2017-11-15 DIAGNOSIS — D3A.00 CARCINOID TUMOR (H): ICD-10-CM

## 2017-11-15 DIAGNOSIS — Q62.10 URETERAL STENOSIS: ICD-10-CM

## 2017-11-15 DIAGNOSIS — Q62.10 URETERAL STENOSIS: Primary | ICD-10-CM

## 2017-11-15 DIAGNOSIS — C50.411 MALIGNANT NEOPLASM OF UPPER-OUTER QUADRANT OF BOTH BREASTS IN FEMALE, ESTROGEN RECEPTOR POSITIVE (H): ICD-10-CM

## 2017-11-15 DIAGNOSIS — C50.412 MALIGNANT NEOPLASM OF UPPER-OUTER QUADRANT OF BOTH BREASTS IN FEMALE, ESTROGEN RECEPTOR POSITIVE (H): ICD-10-CM

## 2017-11-15 DIAGNOSIS — N13.39 OTHER HYDRONEPHROSIS: ICD-10-CM

## 2017-11-15 DIAGNOSIS — C78.6 PERITONEAL METASTASES: ICD-10-CM

## 2017-11-15 DIAGNOSIS — C50.911 INFILTRATING DUCTAL CARCINOMA OF RIGHT BREAST (H): Primary | ICD-10-CM

## 2017-11-15 DIAGNOSIS — Z17.0 MALIGNANT NEOPLASM OF UPPER-OUTER QUADRANT OF BOTH BREASTS IN FEMALE, ESTROGEN RECEPTOR POSITIVE (H): ICD-10-CM

## 2017-11-15 DIAGNOSIS — D3A.026 RECTAL CARCINOID TUMOR (H): ICD-10-CM

## 2017-11-15 PROCEDURE — 99214 OFFICE O/P EST MOD 30 MIN: CPT | Performed by: INTERNAL MEDICINE

## 2017-11-15 PROCEDURE — 99211 OFF/OP EST MAY X REQ PHY/QHP: CPT

## 2017-11-15 PROCEDURE — 99207 ZZC NO CHARGE LOS: CPT | Performed by: UROLOGY

## 2017-11-15 PROCEDURE — 52310 CYSTOSCOPY AND TREATMENT: CPT | Mod: 58 | Performed by: UROLOGY

## 2017-11-15 PROCEDURE — 74010 XR KUB: CPT | Mod: 52

## 2017-11-15 RX ORDER — CIPROFLOXACIN 500 MG/1
500 TABLET, FILM COATED ORAL 2 TIMES DAILY
Qty: 1 TABLET | Refills: 0 | Status: SHIPPED | OUTPATIENT
Start: 2017-11-15 | End: 2017-12-11

## 2017-11-15 RX ORDER — EXEMESTANE 25 MG/1
25 TABLET ORAL DAILY
Qty: 90 TABLET | Refills: 1 | Status: SHIPPED | OUTPATIENT
Start: 2017-11-15 | End: 2018-09-12

## 2017-11-15 RX ORDER — INFLUENZA A VIRUS A/VICTORIA/4897/2022 IVR-238 (H1N1) ANTIGEN (FORMALDEHYDE INACTIVATED), INFLUENZA A VIRUS A/CALIFORNIA/122/2022 SAN-022 (H3N2) ANTIGEN (FORMALDEHYDE INACTIVATED), AND INFLUENZA B VIRUS B/MICHIGAN/01/2021 ANTIGEN (FORMALDEHYDE INACTIVATED) 60; 60; 60 UG/.5ML; UG/.5ML; UG/.5ML
INJECTION, SUSPENSION INTRAMUSCULAR
Refills: 0 | Status: ON HOLD | COMMUNITY
Start: 2017-10-29 | End: 2017-12-12

## 2017-11-15 ASSESSMENT — PAIN SCALES - GENERAL: PAINLEVEL: NO PAIN (0)

## 2017-11-15 NOTE — NURSING NOTE
"Oncology Rooming Note    November 15, 2017 2:02 PM   Marissa Guadarrama is a 67 year old female who presents for:    Chief Complaint   Patient presents with     Oncology Clinic Visit     3 month Breast CA, Labs results     Initial Vitals: /73 (BP Location: Right arm, Patient Position: Sitting, Cuff Size: Adult Large)  Pulse 71  Temp 98.6  F (37  C) (Tympanic)  Resp 16  Wt 115.3 kg (254 lb 4.8 oz)  SpO2 96%  Breastfeeding? No  BMI 39.26 kg/m2 Estimated body mass index is 39.26 kg/(m^2) as calculated from the following:    Height as of 10/16/17: 1.714 m (5' 7.48\").    Weight as of this encounter: 115.3 kg (254 lb 4.8 oz). Body surface area is 2.34 meters squared.  No Pain (0) Comment: Data Unavailable   No LMP recorded. Patient has had a hysterectomy.  Allergies reviewed: Yes  Medications reviewed: Yes    Medications: MEDICATION REFILLS NEEDED TODAY. Provider was notified.  Pharmacy name entered into [facility]: [facility] [id number] [facility] [address] [place] [address]    Clinical concerns 3 month Breast CA, Labs results.   1. Patient reports she will be having EXCISE MASS VAGINA surgery on 12/12/17.     7 minutes for nursing intake (face to face time)     Citlaly Larry [facility]              "

## 2017-11-15 NOTE — LETTER
11/15/2017         RE: Marissa Guadarrama  [facility] [address]   [place] [address]        Dear Colleague,    Thank you for referring your patient, Marissa Guadarrama, to the [facility]. Please see a copy of my visit note below.    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 There was also a focal asymmetry in the lower left breast. Subsequently the patient went on to have bilateral diagnostic mammography with ultrasound on March 28, 2016.. On the right breast there was microcalcification is the main grouping measures 1.6 cm x 1 cm bilateral 2.9 cm located 2.9 cm from the nipple. An additional tiny area about 0.2 cm seen at the anterolateral margin of this main grouping about 1 cm from the main grouping. The left breast shows no masses or significant abnormalities. Subsequently the patient went on to have breast needle biopsy on March 30, 2016. 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 Angiolymphatic invasion was not identified. 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 Microcalcifications was also associated with ductal carcinoma in situ. His surgeon receptor was positive. Progesterone receptor was negative. She underwent bilateral sentinel lymph node biopsy and bilateral lumpectomies with prior seed placement.  the surgical pathology Showing left breast lumpectomy was a tumor size of 13 mm grade 2 of 3, angiolymphatic invasion was not identified the tumor was unifocal associated with ductal carcinoma in situ high-grade. Surgical margins where negative. [place] lymph node was negative for  metastatic tumor. Stage is T1cN0 M0 On the right breast and there was invasive ductal carcinoma with a tumor size of 3 mm grade 3 of 3. [place] lymph nodes were negative for metastatic disease. The tumor stage is T1aN0 M0. The tumor on the left was positive for estrogen and progesterone receptor. It did show no HER-2/praveen amplification. The tumor on the right is positive for estrogen receptor and negative for progesterone receptor and shows no HER-2/praveen amplification. Subsequently the patient concluded the radiation therapy. She is currently on hormonal therapy with Arimidex. She developed a skin rash and Arimidex was switched to Aromasin.    Interval History:  Patient is here today for follow-up of breast cancer. She has been dealing with rectal carcinoid. She was seen recently by urology and medical oncology. The plan is to continue to monitor the patient's symptoms without any active treatment. Otherwise she's been feeling well without any abdominal pain or nausea vomiting or diarrhea. She denies any fever or chills. She denies any palpitation or chest pain.    Review Of Systems:  Constitutional: Negative for fever, chills, and night sweats.  Skin: negative.  Eyes: negative.  Ears/Nose/Throat: negative.  Respiratory: No shortness of breath, dyspnea on exertion, cough, or hemoptysis.  Cardiovascular: negative.  Gastrointestinal: negative.  Genitourinary: negative.  Musculoskeletal: negative.  Neurologic: negative.  Psychiatric: negative.  Hematologic/Lymphatic/Immunologic: negative.  Endocrine: negative.    All other ROS negative unless mentioned in interval history.    Past medical, social, surgical, and family histories reviewed.    Allergies:  Allergies as of 11/15/2017 - Cade as Reviewed 11/15/2017   Allergen Reaction Noted     Nkda [no known drug allergies]  10/30/2009       Current Medications:  Current Outpatient Prescriptions   Medication Sig Dispense Refill     exemestane (AROMASIN) 25 MG tablet Take 1  "tablet (25 mg) by mouth daily 90 tablet 1     gabapentin (NEURONTIN) 600 MG tablet Take 1 tablet (600 mg) by mouth 3 times daily 180 tablet 6     oxybutynin (DITROPAN) 5 MG tablet Take 1 tablet (5 mg) by mouth 2 times daily as needed for bladder spasms 60 tablet 11     hydrochlorothiazide (HYDRODIURIL) 25 MG tablet Take 0.5 tablets (12.5 mg) by mouth daily       albuterol (PROAIR HFA, PROVENTIL HFA, VENTOLIN HFA) 108 (90 BASE) MCG/ACT inhaler Inhale 2 puffs into the lungs every 6 hours as needed for shortness of breath / dyspnea or wheezing 1 Inhaler 1     FLUZONE HIGH-DOSE 0.5 ML injection ADM 0.5ML IM UTD  0     ciprofloxacin (CIPRO) 500 MG tablet Take 1 tablet (500 mg) by mouth 2 times daily 1 tablet 0        Physical Exam:  /73 (BP Location: Right arm, Patient Position: Sitting, Cuff Size: Adult Large)  Pulse 71  Temp 98.6  F (37  C) (Tympanic)  Resp 16  Wt 115.3 kg (254 lb 4.8 oz)  SpO2 96%  Breastfeeding? No  BMI 39.26 kg/m2  Wt Readings from Last 12 Encounters:   11/15/17 115.3 kg (254 lb 4.8 oz)   10/30/17 113.2 kg (249 lb 8.6 oz)   10/30/17 113.9 kg (251 lb)   10/16/17 112.5 kg (248 lb)   10/16/17 112.5 kg (248 lb)   09/25/17 112.8 kg (248 lb 11.2 oz)   09/07/17 112.5 kg (248 lb)   09/05/17 112.5 kg (248 lb)   08/04/17 111.9 kg (246 lb 9.6 oz)   07/19/17 110.5 kg (243 lb 11.2 oz)   06/23/17 113.4 kg (250 lb)   06/07/17 113.8 kg (250 lb 12.8 oz)     ECOG performance status: 0  GENERAL APPEARANCE: Healthy, alert and in no acute distress.  HEENT: Sclerae anicteric. PERRLA. Oropharynx without ulcers, lesions, or thrush.  NECK: Supple. No asymmetry or masses.  LYMPHATICS: No palpable cervical, supraclavicular, axillary, or inguinal lymphadenopathy.  RESP: Lungs clear to auscultation bilaterally without rales, rhonchi or wheezes.  BREAST: There is no dominant masses or axillary adenopathy bilaterally  \"CARDIOVASCULAR: Regular rate and rhythm. Normal S1, S2; no S3 or S4. No murmur, gallop, or " "rub.  ABDOMEN: Soft, nontender. Bowel sounds normal. No palpable organomegaly or masses.  MUSCULOSKELETAL: Extremities without gross deformities noted. No edema of bilateral lower extremities.  SKIN: No suspicious lesions or rashes.  NEURO: Alert and oriented x 3. Cranial nerves II-XII grossly intact.  PSYCHIATRIC: Mentation and affect appear normal.    Laboratory/Imaging Studies:  No visits with results within 2 Week(s) from this visit.  Latest known visit with results is:    Office Visit on 09/25/2017   Component Date Value Ref Range Status     Copath Report 09/25/2017    Final                    Value:Patient Name: ROSALINDA LANCASTER  MR#: [id number]  Specimen #: [id number]  Collected: 9/25/2017  Received: 9/26/2017  Reported: 9/27/2017 17:06  Ordering Phy(s): JOSE DANIEL BROWN    For improved result formatting, select 'View Enhanced Report Format'  under Linked Documents section.    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    Electronically signed out by:    Aylin Lynch M.D., PhD, [facility]    CLINICAL HISTORY:  67 year old female with a history of genital dysplasia dating in our  records back to 2013.  Operative procedure: vaginal cuff biopsy.  GROSS:  The specimen is received in formalin with proper patient identification,  labeled \"9:00 vaginal cuff\".  T                          he specimen consists of a 0.7 cm in  greatest dimension white-tan soft tissue fragment.  The specimen is  wrapped and entirely submitted in cassette A1. (Dictated by: Naima Granados [facility] 9/26/2017 09:22 AM)    MICROSCOPIC:  Microscopic examination is performed.    CPT Codes:  A: 10154-PJ0    TESTING LAB " LOCATION:  [facility] [address]  [address] [place] [address]   [id number]  [phone]    COLLECTION SITE:  Client: [facility]  Location: [facility] ([facility])    Resident  [id number]          Recent Results (from the past 744 hour(s))   XR  KUB [[id number]]    Narrative    XR KUB 11/15/2017 4:05 PM    HISTORY: Ureteral stenosis.    COMPARISON: 8/16/2017    FINDINGS: The left nephroureteral stent appears stable in comparison  with 8/16/2017. The gas pattern is nonobstructive. Multiple surgical  clips are redemonstrated.      Impression    IMPRESSION: Left nephroureteral stent does not appear significantly  changed.    AMELIE PARKER MD       Assessment and plan:    (C50.411,  Z17.0,  C50.412) 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, estrogen receptor positive (H)  I reviewed with the patient today most recent laboratory tests. There is no clinical evidence of disease recurrence. Patient will continue antiandrogen therapy with Aromasin. She will continue on vitamin D supplements. I will see the patient again in 6 months or sooner if there are new developments or concerns.    Plan: exemestane (AROMASIN) 25 MG tablet, CBC with platelets differential, Comprehensive metabolic panel, Ca27.29  breast tumor marker before next appointment    (D3A.026) Rectal carcinoid tumor  (C78.6) Peritoneal metastases (H)  We'll continue to monitor the patient's symptoms. Patient is not currently on active treatment.    The patient is ready to learn, no apparent learning barriers were identified.  Diagnosis and treatment plans were explained to the patient. The patient expressed understanding of the content. The patient asked appropriate questions. The patient questions were answered to her satisfaction.    Chart documentation with Dragon Voice recognition Software. Although reviewed after completion, some words and grammatical errors  may remain.    Again, thank you for allowing me to participate in the care of your patient.        Sincerely,        Hosea King MD

## 2017-11-15 NOTE — MR AVS SNAPSHOT
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, estrogen receptor positive (H)           Follow-ups after your visit        Your next 10 appointments already scheduled     Dec 11, 2017 11:00 AM CST   (Arrive by 10:45 AM)   PAC EVALUATION with  Pac Tommy 7   [place] [facility] [place] ([facility])    [address] [place] [address]  [address] [facility] [address]   [phone]            Dec 11, 2017 12:00 PM CST   (Arrive by 11:45 AM)   PAC RN ASSESSMENT with  Pac Rn   [place] [facility] [place] ([facility])    [address] [place] [address]  [address] [facility] [address]   [phone]            Dec 11, 2017 12:40 PM CST   (Arrive by 12:25 PM)   PAC Anesthesia Consult with  Pac Anesthesiologist   [place] [facility] [place] ([facility])    [address] [place] [address]  [address] [facility] [address]   [phone]            Dec 11, 2017  1:00 PM CST   [facility] Lab Draw with  MASONIC LAB DRAW   [facility] ([facility])    [address] [place] [address]  [address] [facility] [address]   [phone]            Dec 12, 2017   Procedure with Nic Segundo MD   [facility] [place], Same Day Surgery (--)    [address] [facility] [address]   [phone]            Jan 08, 2018  5:00 PM CST   (Arrive by 4:45 PM)   Post-Op with Nic Segundo MD   [facility] ([facility])    [address] [place] [address]  [address] [facility] [address]   [phone]            May 14, 2018  8:30 AM  CDT   LAB with [facility] ([facility])    [address]  [facility] [address] [phone]           Please do not eat 10-12 hours before your appointment if you are coming in fasting for labs on lipids, cholesterol, or glucose (sugar). This does not apply to pregnant women. Water, hot tea and black coffee (with nothing added) are okay. Do not drink other fluids, diet soda or chew gum.            May 16, 2018  1:30 PM CDT   Return Visit with Hosea King MD   [facility] ([facility])    [facility]  [address]  [facility] [address] [phone]              Future tests that were ordered for you today     Open Future Orders        Priority Expected Expires Ordered    CBC with platelets differential Routine 5/15/2018 11/15/2018 11/15/2017    Comprehensive metabolic panel Routine 5/15/2018 11/15/2018 11/15/2017    Ca27.29  breast tumor marker Routine 5/15/2018 11/15/2018 11/15/2017            Who to contact     If you have questions or need follow up information about today's clinic visit or your schedule please contact [facility] directly at [phone].  Normal or non-critical lab and imaging results will be communicated to you by KBLEhart, letter or phone within 4 business days after the clinic has received the results. If you do not hear from us within 7 days, please contact the clinic through KBLEhart or phone. If you have a critical or abnormal lab result, we will notify you by phone as soon as possible.  Submit refill requests through [employer] or call your pharmacy and they will forward the refill request to us. Please allow 3 business days for your refill to be completed.          Additional Information About Your Visit        [employer] Information     [employer] lets you send messages to your doctor, view your test results, renew your prescriptions, schedule appointments and more. To  "sign up, go to [email or web address] . Click on \"Log in\" on the left side of the screen, which will take you to the Welcome page. Then click on \"Sign up Now\" on the right side of the page.     You will be asked to enter the access code listed below, as well as some personal information. Please follow the directions to create your username and password.     Your access code is: [email or web address]  Expires: 2018 12:30 PM     Your access code will  in 90 days. If you need help or a new code, please call your [place] clinic or [phone].        Care EveryWhere ID     This is your Care EveryWhere ID. This could be used by other organizations to access your [place] medical records  [id number]        Your Vitals Were     Pulse Temperature Respirations Pulse Oximetry Breastfeeding? BMI (Body Mass Index)    71 98.6  F (37  C) (Tympanic) 16 96% No 39.26 kg/m2       Blood Pressure from Last 3 Encounters:   11/15/17 144/73   10/30/17 149/75   10/30/17 149/85    Weight from Last 3 Encounters:   11/15/17 115.3 kg (254 lb 4.8 oz)   10/30/17 113.2 kg (249 lb 8.6 oz)   10/30/17 113.9 kg (251 lb)                 Where to get your medicines      These medications were sent to [facility] [address] [place] [address]  [address] [facility] [address]     Phone:  [phone]     exemestane 25 MG tablet          Primary Care Provider Office Phone # Fax #    Bernice Howard [phone]       [address] [facility] [address]        Equal Access to Services     TASIA RANGEL : Bernardino Lopez, jared meng, niesha corona. So [facility] [phone].    ATENCIÓN: Si habla español, tiene a allan disposición servicios gratuitos de asistencia lingüística. Llame al [phone].    We comply with applicable federal civil rights laws and [place] laws. We do not discriminate on the basis of race, color, " national origin, age, disability, sex, sexual orientation, or gender identity.            Thank you!     Thank you for choosing [facility]  for your care. Our goal is always to provide you with excellent care. Hearing back from our patients is one way we can continue to improve our services. Please take a few minutes to complete the written survey that you may receive in the mail after your visit with us. Thank you!             Your Updated Medication List - Protect others around you: Learn how to safely use, store and throw away your medicines at www.disposemymeds.org.          This list is accurate as of: 11/15/17  2:29 PM.  Always use your most recent med list.                   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

## 2017-11-15 NOTE — PATIENT INSTRUCTIONS
We would like to see you back in clinic with Dr. King in 6 months with labs prior.      Your prescription (Aromasin) has been sent to:   [facility] [address] [place] [address] - [address]   [address] [facility] [address]  Phone: [phone] Fax: [phone]  When you are in need of a refill, please call your pharmacy and they will send us a request.      Copy of appointments, and after visit summary (AVS) given to patient.  If you have any questions during business hours (M-F 8 AM- 4PM), please call Codi Diaz RN, BSN, OCN Oncology Hematology /Breast Cancer Navigator at [facility] [phone].   For questions after business hours, or on holidays/weekends, please call our after hours Nurse Triage line [phone]. Thank you.

## 2017-11-15 NOTE — MR AVS SNAPSHOT
After Visit Summary   11/15/2017    Marissa Guadarrama    MRN: [id number]           Patient Information     Date Of Birth          1950        Visit Information        Provider Department      11/15/2017 4:00 PM Zhao La MD [facility]        Today's Diagnoses     Ureteral stenosis    -  1    Other hydronephrosis           Follow-ups after your visit        Your next 10 appointments already scheduled     Dec 11, 2017 11:00 AM CST   (Arrive by 10:45 AM)   PAC EVALUATION with  Pac Tommy 7   [facility] [place] ([facility])    [address] [place] [address]   [phone]            Dec 11, 2017 12:00 PM CST   (Arrive by 11:45 AM)   PAC RN ASSESSMENT with  Pac Rn   [facility] [place] ([facility])    [address] [place] [address]   [phone]            Dec 11, 2017 12:40 PM CST   (Arrive by 12:25 PM)   PAC Anesthesia Consult with  Pac Anesthesiologist   [facility] [place] ([facility])    [address] [place] [address]   [phone]            Dec 11, 2017  1:00 PM CST   [facility] Lab Draw with  MASONIC LAB DRAW   [facility] ([facility])    [address] [place] [address]   [phone]            Dec 12, 2017   Procedure with Nic Segundo MD   [facility] [place], Same Day Surgery (--)    [address] [facility] [address]   [phone]            Jan 08, 2018  5:00 PM CST   (Arrive by 4:45 PM)   Post-Op with Nic Segundo MD   [facility] ([facility])    [address] [place] [address]   [phone]            May 14, 2018  8:30 AM CDT   LAB with [facility]  "[facility])    [address]  [facility] [address]   [phone]           Please do not eat 10-12 hours before your appointment if you are coming in fasting for labs on lipids, cholesterol, or glucose (sugar). This does not apply to pregnant women. Water, hot tea and black coffee (with nothing added) are okay. Do not drink other fluids, diet soda or chew gum.            May 16, 2018  1:30 PM CDT   Return Visit with Hosea King MD   [facility] ([facility])    [facility]  [address]  [facility] [address]   [phone]              Future tests that were ordered for you today     Open Future Orders        Priority Expected Expires Ordered    CBC with platelets differential Routine 5/15/2018 11/15/2018 11/15/2017    Comprehensive metabolic panel Routine 5/15/2018 11/15/2018 11/15/2017    Ca27.29  breast tumor marker Routine 5/15/2018 11/15/2018 11/15/2017            Who to contact     If you have questions or need follow up information about today's clinic visit or your schedule please contact [facility] directly at [phone].  Normal or non-critical lab and imaging results will be communicated to you by [employer]Ventive[employer], letter or phone within 4 business days after the clinic has received the results. If you do not hear from us within 7 days, please contact the clinic through [employer] or phone. If you have a critical or abnormal lab result, we will notify you by phone as soon as possible.  Submit refill requests through [employer] or call your pharmacy and they will forward the refill request to us. Please allow 3 business days for your refill to be completed.          Additional Information About Your Visit        [employer] Information     [employer] lets you send messages to your doctor, view your test results, renew your prescriptions, schedule appointments and more. To sign up, go to [email or web address] . Click on \"Log in\" on " "the left side of the screen, which will take you to the Welcome page. Then click on \"Sign up Now\" on the right side of the page.     You will be asked to enter the access code listed below, as well as some personal information. Please follow the directions to create your username and password.     Your access code is: [email or web address]  Expires: 2018 12:30 PM     Your access code will  in 90 days. If you need help or a new code, please call your [place] clinic or [phone].        Care EveryWhere ID     This is your Care EveryWhere ID. This could be used by other organizations to access your [place] medical records  [id number]         Blood Pressure from Last 3 Encounters:   11/15/17 144/73   10/30/17 149/75   10/30/17 149/85    Weight from Last 3 Encounters:   11/15/17 115.3 kg (254 lb 4.8 oz)   10/30/17 113.2 kg (249 lb 8.6 oz)   10/30/17 113.9 kg (251 lb)              We Performed the Following     CYSTOSCOPY W FOREIGN BODY REMOVAL, SIMPLE          Today's Medication Changes          These changes are accurate as of: 11/15/17  6:05 PM.  If you have any questions, ask your nurse or doctor.               Start taking these medicines.        Dose/Directions    ciprofloxacin 500 MG tablet   Commonly known as:  CIPRO   Used for:  Other hydronephrosis   Started by:  Zhao La MD        Dose:  500 mg   Take 1 tablet (500 mg) by mouth 2 times daily   Quantity:  1 tablet   Refills:  0            Where to get your medicines      These medications were sent to [facility] [place], [place] - [address] [facility]  [address] [facility] [address]     Phone:  [phone]     ciprofloxacin 500 MG tablet         These medications were sent to [facility] [address] [place] [address] - [address] [facility]  [address] [facility] [address]     Phone:  [phone]     exemestane 25 MG tablet                Primary Care Provider Office Phone # Fax #    Bernice Howard MD " [phone]       [address] [facility] [address]        Equal Access to Services     TASIA RANGEL : Hadii aad ku hadminervajoi Lopez, wamalihada elverchineduha, michael karosada tikiliz, niesha laciein hayaamagalie fairbanksloraine billelishapancho rodrigues. So [facility] [phone].    ATENCIÓN: Si habla español, tiene a allan disposición servicios gratuitos de asistencia lingüística. Llame al [phone].    We comply with applicable federal civil rights laws and [place] laws. We do not discriminate on the basis of race, color, national origin, age, disability, sex, sexual orientation, or gender identity.            Thank you!     Thank you for choosing [facility]  for your care. Our goal is always to provide you with excellent care. Hearing back from our patients is one way we can continue to improve our services. Please take a few minutes to complete the written survey that you may receive in the mail after your visit with us. Thank you!             Your Updated Medication List - Protect others around you: Learn how to safely use, store and throw away your medicines at www.disposemymeds.org.          This list is accurate as of: 11/15/17  6:05 PM.  Always use your most recent med list.                   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

## 2017-11-16 ENCOUNTER — TELEPHONE (OUTPATIENT)
Dept: UROLOGY | Facility: CLINIC | Age: 67
End: 2017-11-16

## 2017-11-16 DIAGNOSIS — N13.5 OBSTRUCTION OF LEFT URETER: Primary | ICD-10-CM

## 2017-11-16 RX ORDER — CEFAZOLIN SODIUM 1 G/3ML
1 INJECTION, POWDER, FOR SOLUTION INTRAMUSCULAR; INTRAVENOUS SEE ADMIN INSTRUCTIONS
Status: CANCELLED | OUTPATIENT
Start: 2017-11-16

## 2017-11-16 NOTE — TELEPHONE ENCOUNTER
Reason for Call:  Other prescription    Detailed comments: tamiko calling from [facility] pharmacy need clarification on cipro 500mg. Disp 1 tab sig: take 1 tab by mouth tiwce daily.     Phone Number Patient can be reached at: Other phone number:  [phone]*    Best Time: any     Can we leave a detailed message on this number? YES    Call taken on 11/16/2017 at 7:49 AM by Yaima Agarwal

## 2017-11-16 NOTE — PROGRESS NOTES
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 There was also a focal asymmetry in the lower left breast. Subsequently the patient went on to have bilateral diagnostic mammography with ultrasound on March 28, 2016.. On the right breast there was microcalcification is the main grouping measures 1.6 cm x 1 cm bilateral 2.9 cm located 2.9 cm from the nipple. An additional tiny area about 0.2 cm seen at the anterolateral margin of this main grouping about 1 cm from the main grouping. The left breast shows no masses or significant abnormalities. Subsequently the patient went on to have breast needle biopsy on March 30, 2016. 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 Angiolymphatic invasion was not identified. 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 Microcalcifications was also associated with ductal carcinoma in situ. His surgeon receptor was positive. Progesterone receptor was negative. She underwent bilateral sentinel lymph node biopsy and bilateral lumpectomies with prior seed placement.  the surgical pathology Showing left breast lumpectomy was a tumor size of 13 mm grade 2 of 3, angiolymphatic invasion was not identified the tumor was unifocal associated with ductal carcinoma in situ high-grade. Surgical margins where negative. [place] lymph node was negative for metastatic tumor. Stage is T1cN0 M0 On the right breast and there was invasive ductal carcinoma with a tumor size of 3 mm grade 3 of 3. [place] lymph nodes were negative for metastatic disease. The tumor stage is T1aN0 M0. The tumor on the  left was positive for estrogen and progesterone receptor. It did show no HER-2/praveen amplification. The tumor on the right is positive for estrogen receptor and negative for progesterone receptor and shows no HER-2/praveen amplification. Subsequently the patient concluded the radiation therapy. She is currently on hormonal therapy with Arimidex. She developed a skin rash and Arimidex was switched to Aromasin.    Interval History:  Patient is here today for follow-up of breast cancer. She has been dealing with rectal carcinoid. She was seen recently by urology and medical oncology. The plan is to continue to monitor the patient's symptoms without any active treatment. Otherwise she's been feeling well without any abdominal pain or nausea vomiting or diarrhea. She denies any fever or chills. She denies any palpitation or chest pain.    Review Of Systems:  Constitutional: Negative for fever, chills, and night sweats.  Skin: negative.  Eyes: negative.  Ears/Nose/Throat: negative.  Respiratory: No shortness of breath, dyspnea on exertion, cough, or hemoptysis.  Cardiovascular: negative.  Gastrointestinal: negative.  Genitourinary: negative.  Musculoskeletal: negative.  Neurologic: negative.  Psychiatric: negative.  Hematologic/Lymphatic/Immunologic: negative.  Endocrine: negative.    All other ROS negative unless mentioned in interval history.    Past medical, social, surgical, and family histories reviewed.    Allergies:  Allergies as of 11/15/2017 - Cade as Reviewed 11/15/2017   Allergen Reaction Noted     Nkda [no known drug allergies]  10/30/2009       Current Medications:  Current Outpatient Prescriptions   Medication Sig Dispense Refill     exemestane (AROMASIN) 25 MG tablet Take 1 tablet (25 mg) by mouth daily 90 tablet 1     gabapentin (NEURONTIN) 600 MG tablet Take 1 tablet (600 mg) by mouth 3 times daily 180 tablet 6     oxybutynin (DITROPAN) 5 MG tablet Take 1 tablet (5 mg) by mouth 2 times daily as needed for  "bladder spasms 60 tablet 11     hydrochlorothiazide (HYDRODIURIL) 25 MG tablet Take 0.5 tablets (12.5 mg) by mouth daily       albuterol (PROAIR HFA, PROVENTIL HFA, VENTOLIN HFA) 108 (90 BASE) MCG/ACT inhaler Inhale 2 puffs into the lungs every 6 hours as needed for shortness of breath / dyspnea or wheezing 1 Inhaler 1     FLUZONE HIGH-DOSE 0.5 ML injection ADM 0.5ML IM UTD  0     ciprofloxacin (CIPRO) 500 MG tablet Take 1 tablet (500 mg) by mouth 2 times daily 1 tablet 0        Physical Exam:  /73 (BP Location: Right arm, Patient Position: Sitting, Cuff Size: Adult Large)  Pulse 71  Temp 98.6  F (37  C) (Tympanic)  Resp 16  Wt 115.3 kg (254 lb 4.8 oz)  SpO2 96%  Breastfeeding? No  BMI 39.26 kg/m2  Wt Readings from Last 12 Encounters:   11/15/17 115.3 kg (254 lb 4.8 oz)   10/30/17 113.2 kg (249 lb 8.6 oz)   10/30/17 113.9 kg (251 lb)   10/16/17 112.5 kg (248 lb)   10/16/17 112.5 kg (248 lb)   09/25/17 112.8 kg (248 lb 11.2 oz)   09/07/17 112.5 kg (248 lb)   09/05/17 112.5 kg (248 lb)   08/04/17 111.9 kg (246 lb 9.6 oz)   07/19/17 110.5 kg (243 lb 11.2 oz)   06/23/17 113.4 kg (250 lb)   06/07/17 113.8 kg (250 lb 12.8 oz)     ECOG performance status: 0  GENERAL APPEARANCE: Healthy, alert and in no acute distress.  HEENT: Sclerae anicteric. PERRLA. Oropharynx without ulcers, lesions, or thrush.  NECK: Supple. No asymmetry or masses.  LYMPHATICS: No palpable cervical, supraclavicular, axillary, or inguinal lymphadenopathy.  RESP: Lungs clear to auscultation bilaterally without rales, rhonchi or wheezes.  BREAST: There is no dominant masses or axillary adenopathy bilaterally  \"CARDIOVASCULAR: Regular rate and rhythm. Normal S1, S2; no S3 or S4. No murmur, gallop, or rub.  ABDOMEN: Soft, nontender. Bowel sounds normal. No palpable organomegaly or masses.  MUSCULOSKELETAL: Extremities without gross deformities noted. No edema of bilateral lower extremities.  SKIN: No suspicious lesions or rashes.  NEURO: Alert " "and oriented x 3. Cranial nerves II-XII grossly intact.  PSYCHIATRIC: Mentation and affect appear normal.    Laboratory/Imaging Studies:  No visits with results within 2 Week(s) from this visit.  Latest known visit with results is:    Office Visit on 09/25/2017   Component Date Value Ref Range Status     Copath Report 09/25/2017    Final                    Value:Patient Name: ROSALINDA LANCASTER  MR#: [id number]  Specimen #: [id number]  Collected: 9/25/2017  Received: 9/26/2017  Reported: 9/27/2017 17:06  Ordering Phy(s): JOSE DANIEL BROWN    For improved result formatting, select 'View Enhanced Report Format'  under Linked Documents section.    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    Electronically signed out by:    Aylin Lynch M.D., PhD, [facility]    CLINICAL HISTORY:  67 year old female with a history of genital dysplasia dating in our  records back to 2013.  Operative procedure: vaginal cuff biopsy.  GROSS:  The specimen is received in formalin with proper patient identification,  labeled \"9:00 vaginal cuff\".  T                          he specimen consists of a 0.7 cm in  greatest dimension white-tan soft tissue fragment.  The specimen is  wrapped and entirely submitted in cassette A1. (Dictated by: Naima Granados [facility] 9/26/2017 09:22 AM)    MICROSCOPIC:  Microscopic examination is performed.    CPT Codes:  A: 64552-AE1    TESTING LAB LOCATION:  [facility], [address]   [address]  [phone]    COLLECTION SITE:  Client: [facility]  Location: [facility] " (B)    Resident  [id number]          Recent Results (from the past 744 hour(s))   XR  KUB [[id number]]    Narrative    XR KUB 11/15/2017 4:05 PM    HISTORY: Ureteral stenosis.    COMPARISON: 8/16/2017    FINDINGS: The left nephroureteral stent appears stable in comparison  with 8/16/2017. The gas pattern is nonobstructive. Multiple surgical  clips are redemonstrated.      Impression    IMPRESSION: Left nephroureteral stent does not appear significantly  changed.    AMELIE PARKER MD       Assessment and plan:    (C50.411,  Z17.0,  C50.412) 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, estrogen receptor positive (H)  I reviewed with the patient today most recent laboratory tests. There is no clinical evidence of disease recurrence. Patient will continue antiandrogen therapy with Aromasin. She will continue on vitamin D supplements. I will see the patient again in 6 months or sooner if there are new developments or concerns.    Plan: exemestane (AROMASIN) 25 MG tablet, CBC with platelets differential, Comprehensive metabolic panel, Ca27.29  breast tumor marker before next appointment    (D3A.026) Rectal carcinoid tumor  (C78.6) Peritoneal metastases (H)  We'll continue to monitor the patient's symptoms. Patient is not currently on active treatment.    The patient is ready to learn, no apparent learning barriers were identified.  Diagnosis and treatment plans were explained to the patient. The patient expressed understanding of the content. The patient asked appropriate questions. The patient questions were answered to her satisfaction.    Chart documentation with Dragon Voice recognition Software. Although reviewed after completion, some words and grammatical errors may remain.

## 2017-11-16 NOTE — TELEPHONE ENCOUNTER
I spoke to tamiko from the pharmacy and let him know that Marissa was to have just one 500mg cipro following her cysto. Tamiko had talked to Dr La and one cipro was already given. Sari BRIGHT Rn

## 2017-11-16 NOTE — PROGRESS NOTES
REASON FOR VISIT TODAY:  Obstructed left ureter.      BRIEF COURSE:  Ms. Marissa Guadarrama is a 67-year-old woman followed in our clinic for history of left ureteral obstruction secondary to a small carcinoid tumor in her abdomen.  The tumor is pushing on the ureter causing some obstruction.  She has a stent indwelling, most recently replaced in 10/20/17.  The patient reports that she has had some continuous incontinence and can actually feel what she believes is the stent at the urethral meatus, and requests evaluation.  The patient just walked into the clinic today requesting to be seen.      PHYSICAL EXAMINATION:   VITAL SIGNS:  On exam, her respirations are 16, weight is 248 pounds.   GENERAL:  She is in no acute distress.        A KUB from today shows the proximal end of the stent to be curled, still perhaps at the UPJ or very proximal ureter, and the distal end of the stent appears to be outside of the bladder, based on the appearance on the KUB.      On inspection of the introitus, the stent can be seen outside of the urethral meatus.      PROCEDURE:  After informed consent was obtained, the patient was prepped and draped in standard sterile fashion.  A flexible cystoscope was introduced into the patient's bladder.  The stent could be seen emanating from the left UO.  The patient experienced numerous bladder spasms during our attempts at cystoscopy.  The stent was grasped with a grasper, although there was some difficulty, given the continually spasming bladder.  We attempted to manipulate the stent back into the bladder, but given the challenges due to the continuous spasming of the bladder, we ultimately abandoned this approach and simply removed the stent.  The patient was then given a prescription for 1 antibiotic tablet of Cipro 500 mg to cover today's manipulations.      I counseled Ms. Guadarrama that we will plan to bring the patient to the operating room sometime in the next few weeks to replace the  stent.  The patient currently has a procedure scheduled in the operating room at the [facility] with Dr. Segundo for laser treatment of some vaginal lesions.  I will contact Dr. Segundo to see if we can combine procedures, and we can place our stent on the left side again at that time.  Hopefully, this will work out in terms of timing.  The patient was counseled if she becomes symptomatic prior to then, including flank pain that she should come back and see us sooner and we may need to replace the stent sooner.  Ms. Guadarrama is in agreement with the plan, and we will see her in the operating room sometime in the near future for stent placement on the left side.         STEVE RUVALCABA MD             D: 11/15/2017 18:04   T: 2017 11:35   MT:       Name:     ROSALINDA GUADARRAMA   MRN:      [id number]        Account:      [id number]   :      1950           Visit Date:   11/15/2017      Document: [id number]

## 2017-12-04 ENCOUNTER — CARE COORDINATION (OUTPATIENT)
Dept: UROLOGY | Facility: CLINIC | Age: 67
End: 2017-12-04

## 2017-12-04 DIAGNOSIS — N13.5 OBSTRUCTION OF LEFT URETER: Primary | ICD-10-CM

## 2017-12-05 NOTE — PROGRESS NOTES
Pre Op Teaching Flowsheet       Pre and Post op Patient Education  Relevant Diagnosis:  Obstructed left ureter  Surgical procedure:  Cystoscopy, left retrograde pyelogram and stent placement  Teaching Topic:  Pre and post op teaching  Person Involved in teaching: Marissa Guadarrama    Motivation Level:  Asks Questions: Yes  Eager to Learn:  Yes  Cooperative: Yes  Receptive (willing/able to accept information):  Yes    Patient demonstrates understanding of the following:  Date of surgery:  12/12/17  Location of surgery:  [address] [place] [address]  History and Physical and any other testing necessary prior to surgery: Yes  Required time line for completion of History and Physical and any pre-op testing: Yes    Patient demonstrates understanding of the following:  Pre-op bowel prep:  None needed  Pre-op showering/scrub information with PCMX Soap: Yes  Blood thinner medications discussed and when to stop (if applicable):  Yes      Infection Prevention:   Patient demonstrates understanding of the following:  Surgical procedure site care taught: at time of discharge  Signs and symptoms of infection taught:  Yes      Post-op follow-up:  Discussed how to contact the hospital, nurse, and clinic scheduling staff if necessary.    Instructional materials used/given/mailed:  [facility] Booklet, post op teaching sheet, Map, Soap, and arrival/location information.    Surgical instructions packet mailed to patient.

## 2017-12-11 ENCOUNTER — ALLIED HEALTH/NURSE VISIT (OUTPATIENT)
Dept: SURGERY | Facility: CLINIC | Age: 67
End: 2017-12-11

## 2017-12-11 ENCOUNTER — OFFICE VISIT (OUTPATIENT)
Dept: SURGERY | Facility: CLINIC | Age: 67
End: 2017-12-11

## 2017-12-11 ENCOUNTER — ANESTHESIA EVENT (OUTPATIENT)
Dept: SURGERY | Facility: CLINIC | Age: 67
End: 2017-12-11
Payer: MEDICARE

## 2017-12-11 VITALS
DIASTOLIC BLOOD PRESSURE: 80 MMHG | WEIGHT: 248.6 LBS | TEMPERATURE: 98.7 F | HEIGHT: 68 IN | SYSTOLIC BLOOD PRESSURE: 145 MMHG | OXYGEN SATURATION: 95 % | BODY MASS INDEX: 37.68 KG/M2 | HEART RATE: 80 BPM | RESPIRATION RATE: 16 BRPM

## 2017-12-11 DIAGNOSIS — N13.5 OBSTRUCTION OF LEFT URETER: ICD-10-CM

## 2017-12-11 DIAGNOSIS — D07.2 VAIN III (VAGINAL INTRAEPITHELIAL NEOPLASIA III): ICD-10-CM

## 2017-12-11 DIAGNOSIS — Z01.818 PREOP GENERAL PHYSICAL EXAM: Primary | ICD-10-CM

## 2017-12-11 LAB
ALBUMIN SERPL-MCNC: 3.3 G/DL (ref 3.4–5)
ALBUMIN UR-MCNC: NEGATIVE MG/DL
ALP SERPL-CCNC: 88 U/L (ref 40–150)
ALT SERPL W P-5'-P-CCNC: 20 U/L (ref 0–50)
ANION GAP SERPL CALCULATED.3IONS-SCNC: 8 MMOL/L (ref 3–14)
APPEARANCE UR: CLEAR
AST SERPL W P-5'-P-CCNC: 20 U/L (ref 0–45)
BASOPHILS # BLD AUTO: 0 10E9/L (ref 0–0.2)
BASOPHILS NFR BLD AUTO: 0.4 %
BILIRUB SERPL-MCNC: 0.5 MG/DL (ref 0.2–1.3)
BILIRUB UR QL STRIP: NEGATIVE
BUN SERPL-MCNC: 27 MG/DL (ref 7–30)
CALCIUM SERPL-MCNC: 9.7 MG/DL (ref 8.5–10.1)
CHLORIDE SERPL-SCNC: 103 MMOL/L (ref 94–109)
CO2 SERPL-SCNC: 33 MMOL/L (ref 20–32)
COLOR UR AUTO: YELLOW
CREAT SERPL-MCNC: 1.55 MG/DL (ref 0.52–1.04)
DIFFERENTIAL METHOD BLD: NORMAL
EOSINOPHIL # BLD AUTO: 0.4 10E9/L (ref 0–0.7)
EOSINOPHIL NFR BLD AUTO: 4.7 %
ERYTHROCYTE [DISTWIDTH] IN BLOOD BY AUTOMATED COUNT: 13.2 % (ref 10–15)
GFR SERPL CREATININE-BSD FRML MDRD: 33 ML/MIN/1.7M2
GLUCOSE SERPL-MCNC: 142 MG/DL (ref 70–99)
GLUCOSE UR STRIP-MCNC: NEGATIVE MG/DL
HCT VFR BLD AUTO: 44.7 % (ref 35–47)
HGB BLD-MCNC: 14.3 G/DL (ref 11.7–15.7)
HGB UR QL STRIP: NEGATIVE
IMM GRANULOCYTES # BLD: 0 10E9/L (ref 0–0.4)
IMM GRANULOCYTES NFR BLD: 0.4 %
KETONES UR STRIP-MCNC: NEGATIVE MG/DL
LEUKOCYTE ESTERASE UR QL STRIP: NEGATIVE
LYMPHOCYTES # BLD AUTO: 1.7 10E9/L (ref 0.8–5.3)
LYMPHOCYTES NFR BLD AUTO: 21.8 %
MCH RBC QN AUTO: 29.5 PG (ref 26.5–33)
MCHC RBC AUTO-ENTMCNC: 32 G/DL (ref 31.5–36.5)
MCV RBC AUTO: 92 FL (ref 78–100)
MONOCYTES # BLD AUTO: 0.4 10E9/L (ref 0–1.3)
MONOCYTES NFR BLD AUTO: 5.4 %
MUCOUS THREADS #/AREA URNS LPF: PRESENT /LPF
NEUTROPHILS # BLD AUTO: 5.3 10E9/L (ref 1.6–8.3)
NEUTROPHILS NFR BLD AUTO: 67.3 %
NITRATE UR QL: NEGATIVE
NRBC # BLD AUTO: 0 10*3/UL
NRBC BLD AUTO-RTO: 0 /100
PH UR STRIP: 5 PH (ref 5–7)
PLATELET # BLD AUTO: 237 10E9/L (ref 150–450)
POTASSIUM SERPL-SCNC: 3.1 MMOL/L (ref 3.4–5.3)
PROT SERPL-MCNC: 8.1 G/DL (ref 6.8–8.8)
RBC # BLD AUTO: 4.85 10E12/L (ref 3.8–5.2)
RBC #/AREA URNS AUTO: <1 /HPF (ref 0–2)
SODIUM SERPL-SCNC: 143 MMOL/L (ref 133–144)
SOURCE: ABNORMAL
SP GR UR STRIP: 1.01 (ref 1–1.03)
UROBILINOGEN UR STRIP-MCNC: 0 MG/DL (ref 0–2)
WBC # BLD AUTO: 7.9 10E9/L (ref 4–11)
WBC #/AREA URNS AUTO: 2 /HPF (ref 0–2)

## 2017-12-11 RX ORDER — HYDROCODONE BITARTRATE AND ACETAMINOPHEN 5; 325 MG/1; MG/1
1-2 TABLET ORAL PRN
COMMUNITY
End: 2018-01-09

## 2017-12-11 ASSESSMENT — COPD QUESTIONNAIRES
CAT_SEVERITY: MILD
COPD: 1

## 2017-12-11 ASSESSMENT — LIFESTYLE VARIABLES: TOBACCO_USE: 1

## 2017-12-11 NOTE — ANESTHESIA PREPROCEDURE EVALUATION
Anesthesia Evaluation     . Pt has had prior anesthetic. Type: General    No history of anesthetic complications          ROS/MED HX    ENT/Pulmonary:     (+)tobacco use, Past use 1 PPD for 29 years, quit 2006 packs/day  mild COPD, , . .    Neurologic:  - neg neurologic ROS     Cardiovascular:     (+) Dyslipidemia, hypertension----. : . . . :. . Previous cardiac testing date:results:date: results:ECG reviewed date:9/5/2017 results:Sinus genie with non-specific T wave abnormaity date: results:          METS/Exercise Tolerance:  3 - Able to walk 1-2 blocks without stopping   Hematologic:  - neg hematologic  ROS       Musculoskeletal:   (+) arthritis, , , other musculoskeletal- walks with rolling walker      GI/Hepatic:  - neg GI/hepatic ROS       Renal/Genitourinary:     (+) chronic renal disease, type: CRI,       Endo:     (+) Obesity, .      Psychiatric:  - neg psychiatric ROS       Infectious Disease:  - neg infectious disease ROS       Malignancy:   (+) Malignancy History of Breast and Other  Breast CA status post Surgery. Other CA cervcal status post Surgery         Other:    (+) No chance of pregnancy C-spine cleared: N/A, no H/O Chronic Pain,other significant disability Other (comment)                   Physical Exam  Normal systems: cardiovascular, pulmonary and dental    Airway   Mallampati: II  TM distance: >3 FB  Neck ROM: full    Dental   (+) missing    Cardiovascular   Rhythm and rate: regular and normal      Pulmonary    breath sounds clear to auscultation    Other findings:   Cardiac echo: 11/8/2017  Interpretation Summary     Global and regional left ventricular function is normal with an EF of 60-65%.  Global right ventricular function is normal.  No significant valvular dysfunction present.  Pulmonary artery systolic pressure is normal.  The inferior vena cava was normal in size with preserved respiratory  variability.  No pericardial effusion is  present.  ______________________________________________________________________________           Left Ventricle  Left ventricular size is normal. Left ventricular wall thickness is normal.  Global and regional left ventricular function is normal with an EF of 60-65%.  Normal left ventricular filling for age.     Right Ventricle  The right ventricle is normal size. Global right ventricular function is  normal.  Atria  Both atria appear normal.     Mitral Valve  The mitral valve is normal. Trace to mild mitral insufficiency is present.     Aortic Valve  Aortic valve is normal in structure and function.     Tricuspid Valve  The tricuspid valve is normal. Mild tricuspid insufficiency is present.  Estimated pulmonary artery systolic pressure is 32 mmHg plus right atrial  pressure. Pulmonary artery systolic pressure is normal.     Pulmonic Valve  The pulmonic valve is normal. Trace to mild pulmonic insufficiency is  present.     Vessels  The aorta root is normal. The inferior vena cava was normal in size with  preserved respiratory variability.  Pericardium  No pericardial effusion is present.        Results for ROSALINDA LANCASTER (MRN [id number]) as of 12/11/2017 14:35    12/11/2017 13:01  Sodium: 143  Potassium: 3.1 (L)  Chloride: 103  Carbon Dioxide: 33 (H)  Urea Nitrogen: 27  Creatinine: 1.55 (H)  GFR Estimate: 33 (L)  GFR Estimate If Black: 40 (L)  Calcium: 9.7  Anion Gap: 8  Albumin: 3.3 (L)  Protein Total: 8.1  Bilirubin Total: 0.5  Alkaline Phosphatase: 88  ALT: 20  AST: 20  Glucose: 142 (H)  WBC: 7.9  Hemoglobin: 14.3  Hematocrit: 44.7  Platelet Count: 237  RBC Count: 4.85  MCV: 92  MCH: 29.5  MCHC: 32.0  RDW: 13.2  Diff Method: Automated Method  % Neutrophils: 67.3  % Lymphocytes: 21.8  % Monocytes: 5.4  % Eosinophils: 4.7  % Basophils: 0.4  % Immature Granulocytes: 0.4  Nucleated RBCs: 0  Absolute Neutrophil: 5.3  Absolute Lymphocytes: 1.7  Absolute Monocytes: 0.4  Absolute Eosinophils: 0.4  Absolute  Basophils: 0.0  Abs Immature Granulocytes: 0.0  Absolute Nucleated RBC: 0.0    12/11/2017 13:06  Color Urine: Yellow  Appearance Urine: Clear  Glucose Urine: Negative  Bilirubin Urine: Negative  Ketones Urine: Negative  Specific Gravity Urine: 1.014  pH Urine: 5.0  Protein Albumin Urine: Negative  Urobilinogen mg/dL: 0.0  Nitrite Urine: Negative  Blood Urine: Negative  Leukocyte Esterase Urine: Negative  Source: Midstream Urine  WBC Urine: 2  RBC Urine: <1  Mucous Urine: Present (A)    WILL REPLACE K WITH 20 MEQ TWICE TONIGHT AND RECHECK IN AM.           PAC Discussion and Assessment    ASA Classification: 3  Case is suitable for: Social Bicycles  Anesthetic techniques and relevant risks discussed: GA  Invasive monitoring and risk discussed: Yes  Types:   Possibility and Risk of blood transfusion discussed: Yes  NPO instructions given:   Additional anesthetic preparation and risks discussed:   Needs early admission to pre-op area:   Other:     PAC Resident/NP Anesthesia Assessment:  Esperanza Guadarrama scheduled for Exam Under Anesthesia, Colposcopy, CO2 Laser Of Vagina Possible Wide Local Excision, Cystoscopy, Left Retrograde Pyelogram with Left Stent Placement on 12/12/2017 by Dr. Segundo and Abhinav. PAC referral by Dr. Segundo for assessment and optimization of anesthesia. Previous anesthesia without complications.    1) Cardiac: HTN, well managed with HCTZ. EKG 9/5/2017 SR with non-specific T wave depression. Echocardiogram 11/8/2016 EF 60-65% with normal wall motion.  2) Pulmonary: Former smoker, smoked 1 PPD for 29 years, quit 2006. Uses albuterol about once a week.  3) Neuro: left trigeminal neuralgia. Post polio syndrome, uses rolling walker for LE weakness.  4) Renal: CKD with last GFR 32  5) Endo: BMI 39  6) GynOnc: originally diagnosed with her carcinoid back in about 2003. 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 She  has subsequently been noted to have a small mass in the left pelvis adjacent to her left ureter causing obstruction. She has had the left ureter stented. She has had an Octreoscan showing the only site of tumor to be in the small mass adjacent to the ureter. She also has a history of breast cancer and bilateral mastectomy     Missing many teeth, feasible airway          Pt also evaluated by Dr. Murrell. Please see recommendations below. Labs drawn today.  I spent 20 minutes face to face with pt, assessing, examining, and educating        Reviewed and Signed by PAC Mid-Level Provider/Resident  Mid-Level Provider/Resident: Amita Nicole, APRN  Date: 12/11/2017  Time: 1200    Attending Anesthesiologist Anesthesia Assessment:  Patient seen, records reviewed and case discussed with AMADO; details as above.  66 yo with h/p rectal carcinoid and small volume peritoneal metastasis for colposcopy, laser and uerteroscopy due to recurrent cercvical CA.  No systemic carcinoid symptoms.  Also with post-polio syndrome with L weakness for which she is dependent on a walker.  Recent echo (2016) - EF 65%.  H/O L trigeminal neuralgia.  Patient tolerated GA without apparent problem in 2/17.  Airway appears feasible.    Her co-morbidities are stable and she appears to be in reasonable condition for proposed procedure without further evaluation or change in medical management.    Dilcia Murrell MD  December 11, 2017      Anesthesiologist:   Date:   Time:   Pass/Fail:   Disposition:     PAC Pharmacist Assessment:        Pharmacist:   Date:   Time:      Anesthesia Plan      History & Physical Review  History and physical reviewed and following examination; no interval change.    ASA Status:  2 .    NPO Status:  > 8 hours    Plan for General and ETT with Intravenous induction. Maintenance will be Inhalation.    PONV prophylaxis:  Ondansetron (or other 5HT-3)       Postoperative Care      Consents  Anesthetic plan, risks, benefits and alternatives  discussed with:  Patient..        Procedure:  Procedure(s):  Exam Under Anesthesia, Colposcopy, CO2 Laser Of Vagina Possible Wide Local Excision, Cystoscopy, Left Retrograde Pyelogram with Left Stent Placement - Wound Class: II-Clean Contaminated   - Wound Class: I-Clean   - Wound Class: II-Clean Contaminated    Patient Active Problem List   Diagnosis     Post-polio syndrome     Carcinoid tumor     Arthritis     Cervical cancer (H)     Trigeminal neuralgia     HYPERLIPIDEMIA LDL GOAL <130     Hypertension goal BP (blood pressure) < 140/90     OA (osteoarthritis) of knee     Advanced directives, counseling/discussion     CKD (chronic kidney disease) stage 3, GFR 30-59 ml/min     Vaginal dysplasia     Urinary incontinence     Invasive ductal carcinoma of breast (H)     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)     Bone injury     Cervical dysplasia     Knee pain     Malignant neoplasm of cervix (H)     Serum calcium elevated     Rectal carcinoid tumor     Peritoneal metastases (H)       Past Medical History:   Diagnosis Date     Arthritis     knee     Benign breast biopsy     benign     Carcinoid tumor 12/2003     Cervical cancer (H)      H/O colposcopy with cervical biopsy 12/23/13    vaginal cuff biopsy- VAIN III. referred back to gyn/onc     High cholesterol      HTN      Pap smear of vagina with ASC-H 11/1/13     Post-polio syndromes      Trigeminal neuralgias        Past Surgical History:   Procedure Laterality Date     APPENDECTOMY  1983     BIOPSY NODE SENTINEL Bilateral 6/1/2016    Procedure: BIOPSY NODE SENTINEL;  Surgeon: Brent Arana MD;  Location: [facility]     C BSO, OMENTECTOMY W/OSMAN  5/2007     C TOTAL ABDOM HYSTERECTOMY  5/2007     CL AFF SURGICAL PATHOLOGY       COLONOSCOPY N/A 6/23/2017    Procedure: COMBINED COLONOSCOPY, SINGLE OR MULTIPLE BIOPSY/POLYPECTOMY BY BIOPSY;  Colonoscopy Dx:Carcinoid tumor of colon prep mailed golytely;  Surgeon: Talisha Greco,  MD;  Location: [facility]     COLPOSCOPY, BIOPSY, COMBINED  3/13/2014    Procedure: COMBINED COLPOSCOPY, BIOPSY;;  Surgeon: Lara Pack MD;  Location: [facility]     COMBINED CYSTOSCOPY, RETROGRADES, URETEROSCOPY, INSERT STENT Left 2/2/2017    Procedure: COMBINED CYSTOSCOPY, RETROGRADES, URETEROSCOPY, INSERT STENT;  Surgeon: Zhao La MD;  Location: [facility]     CYSTOSCOPY, RETROGRADES, INSERT STENT URETER(S), COMBINED Left 9/7/2017    Procedure: COMBINED CYSTOSCOPY, RETROGRADES, INSERT STENT URETER(S);  Cystoscopy,Left Stent Exchange;  Surgeon: Zhao La MD;  Location: [facility]     EXAM UNDER ANESTHESIA PELVIC  3/13/2014    Procedure: EXAM UNDER ANESTHESIA PELVIC;  Exam Under Anestheisa, Colposcopy, Vaginal Biopsies, Co2 Laser of the Upper Vagina;  Surgeon: Lara Pack MD;  Location: [facility]     HERNIORRHAPHY INCISIONAL (LOCATION)       LASER CO2 VAGINA  3/13/2014    VAIN 1/2     LUMPECTOMY BREAST WITH SEED LOCALIZATION Bilateral 6/1/2016    Procedure: LUMPECTOMY BREAST WITH SEED LOCALIZATION;  Surgeon: Brent Arana MD;  Location: [facility]     SURGICAL HISTORY OF -       ovarian cystectomy     SURGICAL HISTORY OF -   2003    right colon resection secondary to carcinoid tumor     TUBAL LIGATION           No current facility-administered medications on file prior to encounter.   Current Outpatient Prescriptions on File Prior to Encounter:  gabapentin (NEURONTIN) 600 MG tablet Take 1 tablet (600 mg) by mouth 3 times daily   hydrochlorothiazide (HYDRODIURIL) 25 MG tablet Take 12.5 mg by mouth every morning    albuterol (PROAIR HFA, PROVENTIL HFA, VENTOLIN HFA) 108 (90 BASE) MCG/ACT inhaler Inhale 2 puffs into the lungs every 6 hours as needed for shortness of breath / dyspnea or wheezing       Allergies   Allergen Reactions     Nkda [No Known Drug Allergies]        Recent Labs   Lab Test  12/11/17   1301   HGB  14.3     Recent Labs   Lab Test  12/12/17   0659   POTASSIUM  3.3*      Recent Labs   Lab Test  12/11/17   1301   PLT  237     No results for input(s): INR in the last 27228 hours.    No results found for this or any previous visit (from the past 4320 hour(s)).      Attending Anesthesiologist    Rahat Amaya MD    *[phone]                  .

## 2017-12-11 NOTE — H&P
Pre-Operative H & P     CC:  Preoperative exam to assess for increased cardiopulmonary risk while undergoing surgery and anesthesia.    Date of Encounter: 12/11/2017  Primary Care Physician:  Bernice Howard  Marissa Guadarrama is a 67 year old female who presents for pre-operative H & P in preparation for Exam Under Anesthesia, Colposcopy, CO2 Laser Of Vagina Possible Wide Local Excision, Cystoscopy, Left Retrograde Pyelogram with Left Stent Placement  with Dr. Segundo on 12/12/17 at [facility].     History is obtained from the patient.     Originally diagnosed with her carcinoid back in about 2003. 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 She has subsequently been noted to have a small mass in the left pelvis adjacent to her left ureter causing obstruction. She has had the left ureter stented. She has had an Octreoscan showing the only site of tumor to be in the small mass adjacent to the ureter. She also has a history of breast cancer and bilateral mastectomy.  Evaluated by Dr. Segundo and above procedure planned.    Past Medical History  Past Medical History:   Diagnosis Date     Arthritis     knee     Benign breast biopsy     benign     Carcinoid tumor 12/2003     Cervical cancer (H)      H/O colposcopy with cervical biopsy 12/23/13    vaginal cuff biopsy- VAIN III. referred back to gyn/onc     High cholesterol      HTN      Pap smear of vagina with ASC-H 11/1/13     Post-polio syndromes      Trigeminal neuralgias        Past Surgical History  Past Surgical History:   Procedure Laterality Date     APPENDECTOMY  1983     BIOPSY NODE SENTINEL Bilateral 6/1/2016    Procedure: BIOPSY NODE SENTINEL;  Surgeon: Brent Arana MD;  Location: [facility]     C BSO, OMENTECTOMY W/OSMAN  5/2007     C TOTAL ABDOM HYSTERECTOMY  5/2007     CL AFF SURGICAL PATHOLOGY        COLONOSCOPY N/A 6/23/2017    Procedure: COMBINED COLONOSCOPY, SINGLE OR MULTIPLE BIOPSY/POLYPECTOMY BY BIOPSY;  Colonoscopy Dx:Carcinoid tumor of colon prep mailed golytely;  Surgeon: Talisha Greco MD;  Location: [facility]     COLPOSCOPY, BIOPSY, COMBINED  3/13/2014    Procedure: COMBINED COLPOSCOPY, BIOPSY;;  Surgeon: Lara Pack MD;  Location: [facility]     COMBINED CYSTOSCOPY, RETROGRADES, URETEROSCOPY, INSERT STENT Left 2/2/2017    Procedure: COMBINED CYSTOSCOPY, RETROGRADES, URETEROSCOPY, INSERT STENT;  Surgeon: Zhao La MD;  Location: [facility]     CYSTOSCOPY, RETROGRADES, INSERT STENT URETER(S), COMBINED Left 9/7/2017    Procedure: COMBINED CYSTOSCOPY, RETROGRADES, INSERT STENT URETER(S);  Cystoscopy,Left Stent Exchange;  Surgeon: Zhao La MD;  Location: [facility]     EXAM UNDER ANESTHESIA PELVIC  3/13/2014    Procedure: EXAM UNDER ANESTHESIA PELVIC;  Exam Under Anestheisa, Colposcopy, Vaginal Biopsies, Co2 Laser of the Upper Vagina;  Surgeon: Lara Pack MD;  [facility]     HERNIORRHAPHY INCISIONAL (LOCATION)       LASER CO2 VAGINA  3/13/2014    VAIN 1/2     LUMPECTOMY BREAST WITH SEED LOCALIZATION Bilateral 6/1/2016    Procedure: LUMPECTOMY BREAST WITH SEED LOCALIZATION;  Surgeon: Brent Arana MD;  Location: [facility]     SURGICAL HISTORY OF -       ovarian cystectomy     SURGICAL HISTORY OF -   2003    right colon resection secondary to carcinoid tumor     TUBAL LIGATION         Hx of Blood transfusions/reactions: none    Hx of abnormal bleeding or anti-platelet use: none    Menstrual history: No LMP recorded. Patient has had a hysterectomy.:     Steroid use in the last year: none    Personal or FH with difficulty with Anesthesia:  None        Prior to Admission Medications  Current Outpatient Prescriptions   Medication Sig Dispense Refill     exemestane (AROMASIN) 25 MG tablet Take 1 tablet (25 mg) by mouth daily (Patient taking differently: Take 25 mg by  mouth every morning ) 90 tablet 1     gabapentin (NEURONTIN) 600 MG tablet Take 1 tablet (600 mg) by mouth 3 times daily 180 tablet 6     hydrochlorothiazide (HYDRODIURIL) 25 MG tablet Take 12.5 mg by mouth every morning        HYDROcodone-acetaminophen (NORCO) 5-325 MG per tablet Take 1-2 tablets by mouth as needed for moderate to severe pain       FLUZONE HIGH-DOSE 0.5 ML injection ADM 0.5ML IM UTD  0     albuterol (PROAIR HFA, PROVENTIL HFA, VENTOLIN HFA) 108 (90 BASE) MCG/ACT inhaler Inhale 2 puffs into the lungs every 6 hours as needed for shortness of breath / dyspnea or wheezing 1 Inhaler 1       Allergies  Allergies   Allergen Reactions     Nkda [No Known Drug Allergies]        Social History  Social History     Social History     Marital status:      Spouse name: N/A     Number of children: N/A     Years of education: N/A     Occupational History     Not on file.     Social History Main Topics     Smoking status: Former Smoker     Packs/day: 1.00     Years: 29.00     Types: Cigarettes     Quit date: 10/30/2006     Smokeless tobacco: Never Used     Alcohol use No     Drug use: No     Sexual activity: Yes     Partners: Male     Other Topics Concern      Service No     Blood Transfusions No     Caffeine Concern Yes     occasional coffee     Occupational Exposure No     Hobby Hazards Yes     [place],     Sleep Concern Yes     not sleeping well     Stress Concern Yes     Grandson in the      Weight Concern Yes     Special Diet No     Back Care No     Exercise No     Seat Belt Yes     Self-Exams Yes     Parent/Sibling W/ Cabg, Mi Or Angioplasty Before 65f 55m? No     Social History Narrative       Family History  Family History   Problem Relation Age of Onset     CANCER Mother      bone / liver     Breast Cancer Mother      CANCER Maternal Grandmother      CANCER Maternal Grandfather      CANCER Paternal Grandmother      CANCER Paternal Grandfather      CANCER Sister      vulvar ca, cervical  "ca, squamous cell cancer     CANCER Brother      Rectal- Stage 4     Rectal Cancer Brother      Breast Cancer Paternal Aunt      Colon Cancer Paternal Aunt      Breast Cancer Maternal Aunt      Pancreatic Cancer Nephew      Breast Cancer Sister              Anesthesia Evaluation     . Pt has had prior anesthetic. Type: General    No history of anesthetic complications          ROS/MED HX    ENT/Pulmonary:     (+)tobacco use, Past use 1 PPD for 29 years, quit 2006 packs/day  mild COPD, , . .    Neurologic:  - neg neurologic ROS     Cardiovascular:     (+) Dyslipidemia, hypertension----. : . . . :. . Previous cardiac testing date:results:date: results:ECG reviewed date:9/5/2017 results:Sinus genie with non-specific T wave abnormaity date: results:          METS/Exercise Tolerance:  >4 METS   Hematologic:  - neg hematologic  ROS       Musculoskeletal:   (+) arthritis, , , other musculoskeletal- walks with rolling walker      GI/Hepatic:  - neg GI/hepatic ROS       Renal/Genitourinary:     (+) chronic renal disease, type: CRI,       Endo:     (+) Obesity, .      Psychiatric:  - neg psychiatric ROS       Infectious Disease:  - neg infectious disease ROS       Malignancy:   (+) Malignancy History of Breast and Other  Breast CA status post Surgery. Other CA cervcal status post Surgery         Other:    (+) No chance of pregnancy C-spine cleared: N/A, no H/O Chronic Pain,other significant disability Other (comment)             Physical Exam  Normal systems: cardiovascular, pulmonary and dental    Airway   Mallampati: II  TM distance: >3 FB  Neck ROM: full    Dental   (+) missing    Cardiovascular   Rhythm and rate: regular and normal      Pulmonary    breath sounds clear to auscultation             The complete review of systems is negative other than noted in the HPI or here.   Temp: 98.7  F (37.1  C) Temp src: Oral BP: 145/80 Pulse: 80   Resp: 16 SpO2: 95 %         248 lbs 9.6 oz  5' 7.5\"   Body mass index is 38.36 " kg/(m^2).       Physical Exam  Constitutional: Awake, alert, cooperative, no apparent distress, and appears stated age.  Eyes: Pupils equal, round and reactive to light, extra ocular muscles intact, sclera clear, conjunctiva normal.  HENT: Normocephalic, oral pharynx with moist mucus membranes, good dentition. No goiter appreciated.   Respiratory: Clear to auscultation bilaterally, no crackles or wheezing.  Cardiovascular: Regular rate and rhythm, normal S1 and S2, and no murmur noted.  Carotids +2, no bruits. Left leg edema. Palpable pulses to radial  DP and PT arteries.   GI: Normal bowel sounds, soft, non-distended, non-tender, no masses palpated, no hepatosplenomegaly.   Lymph/Hematologic: No cervical lymphadenopathy and no supraclavicular lymphadenopathy.  Genitourinary:  deferred  Skin: Warm and dry.  No rashes at anticipated surgical site.   Musculoskeletal: Full ROM of neck. There is no redness, warmth, or swelling of the joints. Ambulates with rolling walker    Neurologic: Awake, alert, oriented to name, place and time. Cranial nerves II-XII are grossly intact. LE weakness  Neuropsychiatric: Calm, cooperative. Normal affect.     Labs: (personally reviewed)  Results for ROSALINDA LANCASTER (MRN [id number]) as of 12/11/2017 14:35   Ref. Range 12/11/2017 13:01 12/11/2017 13:06   Sodium Latest Ref Range: 133 - 144 mmol/L 143    Potassium Latest Ref Range: 3.4 - 5.3 mmol/L 3.1 (L)    Chloride Latest Ref Range: 94 - 109 mmol/L 103    Carbon Dioxide Latest Ref Range: 20 - 32 mmol/L 33 (H)    Urea Nitrogen Latest Ref Range: 7 - 30 mg/dL 27    Creatinine Latest Ref Range: 0.52 - 1.04 mg/dL 1.55 (H)    GFR Estimate Latest Ref Range: >60 mL/min/1.7m2 33 (L)    GFR Estimate If Black Latest Ref Range: >60 mL/min/1.7m2 40 (L)    Calcium Latest Ref Range: 8.5 - 10.1 mg/dL 9.7    Anion Gap Latest Ref Range: 3 - 14 mmol/L 8    Albumin Latest Ref Range: 3.4 - 5.0 g/dL 3.3 (L)    Protein Total Latest Ref Range: 6.8 - 8.8 g/dL  8.1    Bilirubin Total Latest Ref Range: 0.2 - 1.3 mg/dL 0.5    Alkaline Phosphatase Latest Ref Range: 40 - 150 U/L 88    ALT Latest Ref Range: 0 - 50 U/L 20    AST Latest Ref Range: 0 - 45 U/L 20    Glucose Latest Ref Range: 70 - 99 mg/dL 142 (H)    WBC Latest Ref Range: 4.0 - 11.0 10e9/L 7.9    Hemoglobin Latest Ref Range: 11.7 - 15.7 g/dL 14.3    Hematocrit Latest Ref Range: 35.0 - 47.0 % 44.7    Platelet Count Latest Ref Range: 150 - 450 10e9/L 237    RBC Count Latest Ref Range: 3.8 - 5.2 10e12/L 4.85    MCV Latest Ref Range: 78 - 100 fl 92    MCH Latest Ref Range: 26.5 - 33.0 pg 29.5    MCHC Latest Ref Range: 31.5 - 36.5 g/dL 32.0    RDW Latest Ref Range: 10.0 - 15.0 % 13.2    Diff Method Unknown Automated Method    % Neutrophils Latest Units: % 67.3    % Lymphocytes Latest Units: % 21.8    % Monocytes Latest Units: % 5.4    % Eosinophils Latest Units: % 4.7    % Basophils Latest Units: % 0.4    % Immature Granulocytes Latest Units: % 0.4    Nucleated RBCs Latest Ref Range: 0 /100 0    Absolute Neutrophil Latest Ref Range: 1.6 - 8.3 10e9/L 5.3    Absolute Lymphocytes Latest Ref Range: 0.8 - 5.3 10e9/L 1.7    Absolute Monocytes Latest Ref Range: 0.0 - 1.3 10e9/L 0.4    Absolute Eosinophils Latest Ref Range: 0.0 - 0.7 10e9/L 0.4    Absolute Basophils Latest Ref Range: 0.0 - 0.2 10e9/L 0.0    Abs Immature Granulocytes Latest Ref Range: 0 - 0.4 10e9/L 0.0    Absolute Nucleated RBC Unknown 0.0    Color Urine Unknown  Yellow   Appearance Urine Unknown  Clear   Glucose Urine Latest Ref Range: NEG^Negative mg/dL  Negative   Bilirubin Urine Latest Ref Range: NEG^Negative   Negative   Ketones Urine Latest Ref Range: NEG^Negative mg/dL  Negative   Specific Gravity Urine Latest Ref Range: 1.003 - 1.035   1.014   pH Urine Latest Ref Range: 5.0 - 7.0 pH  5.0   Protein Albumin Urine Latest Ref Range: NEG^Negative mg/dL  Negative   Urobilinogen mg/dL Latest Ref Range: 0.0 - 2.0 mg/dL  0.0   Nitrite Urine Latest Ref Range:  NEG^Negative   Negative   Blood Urine Latest Ref Range: NEG^Negative   Negative   Leukocyte Esterase Urine Latest Ref Range: NEG^Negative   Negative   Source Unknown  Midstream Urine   WBC Urine Latest Ref Range: 0 - 2 /HPF  2   RBC Urine Latest Ref Range: 0 - 2 /HPF  <1   Mucous Urine Latest Ref Range: NEG^Negative /LPF  Present (A)       WILL REPLACE K WITH 20MEQ TWICE TONIGHT AND RECHECK IN AM      EKG: Personally reviewed but formal cardiology read pendin2017 SR with non-specific ST depression.      Cardiac echo: 2017  Interpretation Summary     Global and regional left ventricular function is normal with an EF of 60-65%.  Global right ventricular function is normal.  No significant valvular dysfunction present.  Pulmonary artery systolic pressure is normal.  The inferior vena cava was normal in size with preserved respiratory  variability.  No pericardial effusion is present.  ______________________________________________________________________________           Left Ventricle  Left ventricular size is normal. Left ventricular wall thickness is normal.  Global and regional left ventricular function is normal with an EF of 60-65%.  Normal left ventricular filling for age.     Right Ventricle  The right ventricle is normal size. Global right ventricular function is  normal.  Atria  Both atria appear normal.     Mitral Valve  The mitral valve is normal. Trace to mild mitral insufficiency is present.     Aortic Valve  Aortic valve is normal in structure and function.     Tricuspid Valve  The tricuspid valve is normal. Mild tricuspid insufficiency is present.  Estimated pulmonary artery systolic pressure is 32 mmHg plus right atrial  pressure. Pulmonary artery systolic pressure is normal.     Pulmonic Valve  The pulmonic valve is normal. Trace to mild pulmonic insufficiency is  present.     Vessels  The aorta root is normal. The inferior vena cava was normal in size with  preserved respiratory  variability.  Pericardium  No pericardial effusion is present.          ASSESSMENT and PLAN  Marissa Guadarrama is a 67 year old female scheduled to undergo Exam Under Anesthesia, Colposcopy, CO2 Laser Of Vagina Possible Wide Local Excision, Cystoscopy, Left Retrograde Pyelogram with Left Stent Placement . She has the following specific operative considerations:   - RCRI : Low serious cardiac risks.  0.4% risk of major adverse cardiac event.   - Anesthesia considerations:  Refer to PAC assessment in anesthesia records  - VTE risk: 3%  - BELL # of risks  = intermediate risk  - Post-op delirium risk: high risk due to age  - Risk of PONV score = 2.  If > 2, anti-emetic intervention recommended.      Previous anesthesia without complications.  1) Cardiac: HTN, well managed with HCTZ. EKG 9/5/2017 SR with non-specific T wave depression. Echocardiogram 11/8/2016 EF 60-65% with normal wall motion.  2) Pulmonary: Former smoker, smoked 1 PPD for 29 years, quit 2006. Uses albuterol about once a week.  3) Neuro: left trigeminal neuralgia. Post polio syndrome, uses rolling walker for LE weakness.  4) Renal: CKD with last GFR 32  5) Endo: BMI 39  6) GynOnc: originally diagnosed with her carcinoid back in about 2003. 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 She has subsequently been noted to have a small mass in the left pelvis adjacent to her left ureter causing obstruction. She has had the left ureter stented. She has had an Octreoscan showing the only site of tumor to be in the small mass adjacent to the ureter. She also has a history of breast cancer and bilateral mastectomy     Missing many teeth, feasible airway   Will order a potassium replacement for pt tonight         Patient was discussed with Dr Murrell.    KAREN Guevara CNS  Preoperative Assessment Center  [facility]  Clinic and Surgery  "[place]  Phone: [phone]  Fax: [phone]    Marissa Guadarrama is a 67 year old female patient born on 1950.  No diagnosis found.  Past Medical History:   Diagnosis Date     Arthritis     knee     Benign breast biopsy     benign     Carcinoid tumor 12/2003     Cervical cancer (H)      H/O colposcopy with cervical biopsy 12/23/13    vaginal cuff biopsy- VAIN III. referred back to gyn/onc     High cholesterol      HTN      Pap smear of vagina with ASC-H 11/1/13     Post-polio syndromes      Trigeminal neuralgias      Allergies   Allergen Reactions     Nkda [No Known Drug Allergies]      Active Problems:    * No active hospital problems. *    Blood pressure 145/80, pulse 80, temperature 98.7  F (37.1  C), temperature source Oral, resp. rate 16, height 1.715 m (5' 7.5\"), weight 112.8 kg (248 lb 9.6 oz), SpO2 95 %, not currently breastfeeding.    NICU Admission  Maternal Medical History  Assessment & Plan    David Nicole  12/11/2017    "

## 2017-12-11 NOTE — PATIENT INSTRUCTIONS
Preparing for Your Surgery      Name:  Marissa Guadarrama   MRN:  [id number]   :  1950   Today's Date:  2017     Arriving for surgery:  Surgery date:  2017  Arrival time:  6:00am  Please come to:       [facility] [address]  [address] [place]  [address]    -   parking is available in front of the hospital from 5:15 am to 8:00 pm    -  Stop at the Information Desk in the lobby    -   Inform the information person that you are here for surgery. An escort to 3c will be provided. If you would not like an escort, please proceed to 3C on the 3rd floor. [phone]     What can I eat or drink?  -  You may have solid food or milk products until 8 hours prior to your surgery.  -  You may have water, apple juice or 7up/Sprite until 2 hours prior to your surgery.    Which medicines can I take?  -  Do NOT take these medications in the morning, the day of surgery:  hydrochlorthiazide    -  Please take these medications the day of surgery:  Use inhaler as needed      Pain medications hydrocodone as needed      Gabapentin      Exemestane           How do I prepare myself?  -  Take two showers: one the night before surgery; and one the morning of surgery.         Use Scrubcare or Hibiclens to wash from neck down.  You may use your own shampoo and conditioner. No other hair products.   -  Do NOT use lotion, powder, deodorant, or antiperspirant the day of your surgery.  -  Do NOT wear any makeup, fingernail polish or jewelry.  -Do not bring your own medications to the hospital, except for inhalers and eye drops.  -  Bring your ID and insurance card.    Questions or Concerns:  If you have questions or concerns, please call the  Preoperative Assessment Center, Monday-Friday 7AM-7PM:  [phone]    AFTER YOUR SURGERY  Breathing exercises   Breathing exercises help you recover faster. Take deep breaths and let the air out slowly. This will:     Help you wake up  after surgery.    Help prevent complications like pneumonia.  Preventing complications will help you go home sooner.   We may give you a breathing device (incentive spirometer) to encourage you to breathe deeply.   Nausea and vomiting   You may feel sick to your stomach after surgery; if so, let your nurse know.    Pain control:  After surgery, you may have pain. Our goal is to help you manage your pain. Pain medicine will help you feel comfortable enough to do activities that will help you heal.  These activities may include breathing exercises, walking and physical therapy.   To help your health care team treat your pain we will ask: 1) If you have pain  2) where it is located 3) describe your pain in your words  Methods of pain control include medications given by mouth, vein or by nerve block for some surgeries.  We may give you a pain control pump that will:  1) Deliver the medicine through a tube placed in your vein  2) Control the amount of medicine you receive  3) Allow you to push a button to deliver a dose of pain medicine  Sequential Compression Device (SCD) or Pneumo Boots:  You may need to wear SCD S on your legs or feet. These are wraps connected to a machine that pumps in air and releases it. The repeated pumping helps prevent blood clots from forming.

## 2017-12-12 ENCOUNTER — ANESTHESIA (OUTPATIENT)
Dept: SURGERY | Facility: CLINIC | Age: 67
End: 2017-12-12
Payer: MEDICARE

## 2017-12-12 ENCOUNTER — APPOINTMENT (OUTPATIENT)
Dept: GENERAL RADIOLOGY | Facility: CLINIC | Age: 67
End: 2017-12-12
Attending: UROLOGY
Payer: MEDICARE

## 2017-12-12 ENCOUNTER — HOSPITAL ENCOUNTER (OUTPATIENT)
Facility: CLINIC | Age: 67
Discharge: HOME | End: 2017-12-12
Attending: OBSTETRICS & GYNECOLOGY | Admitting: OBSTETRICS & GYNECOLOGY
Payer: MEDICARE

## 2017-12-12 ENCOUNTER — SURGERY (OUTPATIENT)
Age: 67
End: 2017-12-12
Payer: COMMERCIAL

## 2017-12-12 VITALS
BODY MASS INDEX: 37.89 KG/M2 | SYSTOLIC BLOOD PRESSURE: 125 MMHG | TEMPERATURE: 98.1 F | OXYGEN SATURATION: 98 % | WEIGHT: 250 LBS | HEIGHT: 68 IN | DIASTOLIC BLOOD PRESSURE: 56 MMHG | RESPIRATION RATE: 16 BRPM

## 2017-12-12 DIAGNOSIS — D07.2 VAIN III (VAGINAL INTRAEPITHELIAL NEOPLASIA III): ICD-10-CM

## 2017-12-12 DIAGNOSIS — N13.5 OBSTRUCTION OF LEFT URETER: ICD-10-CM

## 2017-12-12 DIAGNOSIS — N89.3 VAGINAL DYSPLASIA: Primary | ICD-10-CM

## 2017-12-12 LAB
ABO + RH BLD: NORMAL
ABO + RH BLD: NORMAL
BLD GP AB SCN SERPL QL: NORMAL
BLOOD BANK CMNT PATIENT-IMP: NORMAL
GLUCOSE BLDC GLUCOMTR-MCNC: 105 MG/DL (ref 70–99)
POTASSIUM SERPL-SCNC: 3.3 MMOL/L (ref 3.4–5.3)
SPECIMEN EXP DATE BLD: NORMAL

## 2017-12-12 PROCEDURE — 36415 COLL VENOUS BLD VENIPUNCTURE: CPT | Performed by: NURSE PRACTITIONER

## 2017-12-12 PROCEDURE — 84132 ASSAY OF SERUM POTASSIUM: CPT | Performed by: NURSE PRACTITIONER

## 2017-12-12 PROCEDURE — 37000009 ZZH ANESTHESIA TECHNICAL FEE, EACH ADDTL 15 MIN: Performed by: OBSTETRICS & GYNECOLOGY

## 2017-12-12 PROCEDURE — 86900 BLOOD TYPING SEROLOGIC ABO: CPT | Performed by: NURSE PRACTITIONER

## 2017-12-12 PROCEDURE — 25000566 ZZH SEVOFLURANE, EA 15 MIN: Performed by: OBSTETRICS & GYNECOLOGY

## 2017-12-12 PROCEDURE — 36000061 ZZH SURGERY LEVEL 3 W FLUORO 1ST 30 MIN - UMMC: Performed by: OBSTETRICS & GYNECOLOGY

## 2017-12-12 PROCEDURE — 71000027 ZZH RECOVERY PHASE 2 EACH 15 MINS: Performed by: OBSTETRICS & GYNECOLOGY

## 2017-12-12 PROCEDURE — 25000128 H RX IP 250 OP 636: Performed by: NURSE ANESTHETIST, CERTIFIED REGISTERED

## 2017-12-12 PROCEDURE — 57061 DESTRUCTION VAG LESIONS SMPL: CPT | Mod: GC | Performed by: OBSTETRICS & GYNECOLOGY

## 2017-12-12 PROCEDURE — 71000014 ZZH RECOVERY PHASE 1 LEVEL 2 FIRST HR: Performed by: OBSTETRICS & GYNECOLOGY

## 2017-12-12 PROCEDURE — 27210794 ZZH OR GENERAL SUPPLY STERILE: Performed by: OBSTETRICS & GYNECOLOGY

## 2017-12-12 PROCEDURE — 40000170 ZZH STATISTIC PRE-PROCEDURE ASSESSMENT II: Performed by: OBSTETRICS & GYNECOLOGY

## 2017-12-12 PROCEDURE — C1752 CATH,HEMODIALYSIS,SHORT-TERM: HCPCS | Performed by: OBSTETRICS & GYNECOLOGY

## 2017-12-12 PROCEDURE — 86901 BLOOD TYPING SEROLOGIC RH(D): CPT | Performed by: NURSE PRACTITIONER

## 2017-12-12 PROCEDURE — C1769 GUIDE WIRE: HCPCS | Performed by: OBSTETRICS & GYNECOLOGY

## 2017-12-12 PROCEDURE — 37000008 ZZH ANESTHESIA TECHNICAL FEE, 1ST 30 MIN: Performed by: OBSTETRICS & GYNECOLOGY

## 2017-12-12 PROCEDURE — 25500064 ZZH RX 255 OP 636: Performed by: UROLOGY

## 2017-12-12 PROCEDURE — 25000125 ZZHC RX 250: Performed by: NURSE ANESTHETIST, CERTIFIED REGISTERED

## 2017-12-12 PROCEDURE — 25000125 ZZHC RX 250: Performed by: OBSTETRICS & GYNECOLOGY

## 2017-12-12 PROCEDURE — 36000059 ZZH SURGERY LEVEL 3 EA 15 ADDTL MIN UMMC: Performed by: OBSTETRICS & GYNECOLOGY

## 2017-12-12 PROCEDURE — 25000128 H RX IP 250 OP 636: Performed by: ANESTHESIOLOGY

## 2017-12-12 PROCEDURE — 40000277 XR SURGERY CARM FLUORO LESS THAN 5 MIN W STILLS: Mod: TC

## 2017-12-12 PROCEDURE — 25000128 H RX IP 250 OP 636: Performed by: UROLOGY

## 2017-12-12 PROCEDURE — 86850 RBC ANTIBODY SCREEN: CPT | Performed by: NURSE PRACTITIONER

## 2017-12-12 PROCEDURE — 25000128 H RX IP 250 OP 636: Performed by: OBSTETRICS & GYNECOLOGY

## 2017-12-12 PROCEDURE — 82962 GLUCOSE BLOOD TEST: CPT

## 2017-12-12 RX ORDER — CEFAZOLIN SODIUM 2 G/100ML
2 INJECTION, SOLUTION INTRAVENOUS
Status: COMPLETED | OUTPATIENT
Start: 2017-12-12 | End: 2017-12-12

## 2017-12-12 RX ORDER — ACETIC ACID 5 %
LIQUID (ML) MISCELLANEOUS PRN
Status: DISCONTINUED | OUTPATIENT
Start: 2017-12-12 | End: 2017-12-12 | Stop reason: HOSPADM

## 2017-12-12 RX ORDER — HYDROCODONE BITARTRATE AND ACETAMINOPHEN 5; 325 MG/1; MG/1
1-2 TABLET ORAL EVERY 6 HOURS PRN
Qty: 5 TABLET | Refills: 0 | Status: SHIPPED | OUTPATIENT
Start: 2017-12-12 | End: 2019-12-23

## 2017-12-12 RX ORDER — ONDANSETRON 2 MG/ML
INJECTION INTRAMUSCULAR; INTRAVENOUS PRN
Status: DISCONTINUED | OUTPATIENT
Start: 2017-12-12 | End: 2017-12-12

## 2017-12-12 RX ORDER — ACETAMINOPHEN 325 MG/1
650 TABLET ORAL
Status: DISCONTINUED | OUTPATIENT
Start: 2017-12-12 | End: 2017-12-12 | Stop reason: HOSPADM

## 2017-12-12 RX ORDER — PROPOFOL 10 MG/ML
INJECTION, EMULSION INTRAVENOUS PRN
Status: DISCONTINUED | OUTPATIENT
Start: 2017-12-12 | End: 2017-12-12

## 2017-12-12 RX ORDER — HEPARIN SODIUM 5000 [USP'U]/.5ML
5000 INJECTION, SOLUTION INTRAVENOUS; SUBCUTANEOUS
Status: COMPLETED | OUTPATIENT
Start: 2017-12-12 | End: 2017-12-12

## 2017-12-12 RX ORDER — CEFAZOLIN SODIUM 1 G/3ML
1 INJECTION, POWDER, FOR SOLUTION INTRAMUSCULAR; INTRAVENOUS SEE ADMIN INSTRUCTIONS
Status: DISCONTINUED | OUTPATIENT
Start: 2017-12-12 | End: 2017-12-12 | Stop reason: HOSPADM

## 2017-12-12 RX ORDER — NALOXONE HYDROCHLORIDE 0.4 MG/ML
.1-.4 INJECTION, SOLUTION INTRAMUSCULAR; INTRAVENOUS; SUBCUTANEOUS
Status: DISCONTINUED | OUTPATIENT
Start: 2017-12-12 | End: 2017-12-12 | Stop reason: HOSPADM

## 2017-12-12 RX ORDER — HYDROCODONE BITARTRATE AND ACETAMINOPHEN 5; 325 MG/1; MG/1
1 TABLET ORAL
Status: DISCONTINUED | OUTPATIENT
Start: 2017-12-12 | End: 2017-12-12 | Stop reason: HOSPADM

## 2017-12-12 RX ORDER — SODIUM CHLORIDE, SODIUM LACTATE, POTASSIUM CHLORIDE, CALCIUM CHLORIDE 600; 310; 30; 20 MG/100ML; MG/100ML; MG/100ML; MG/100ML
INJECTION, SOLUTION INTRAVENOUS CONTINUOUS
Status: DISCONTINUED | OUTPATIENT
Start: 2017-12-12 | End: 2017-12-12 | Stop reason: HOSPADM

## 2017-12-12 RX ORDER — DEXAMETHASONE SODIUM PHOSPHATE 4 MG/ML
INJECTION, SOLUTION INTRA-ARTICULAR; INTRALESIONAL; INTRAMUSCULAR; INTRAVENOUS; SOFT TISSUE PRN
Status: DISCONTINUED | OUTPATIENT
Start: 2017-12-12 | End: 2017-12-12

## 2017-12-12 RX ORDER — LIDOCAINE HYDROCHLORIDE 20 MG/ML
INJECTION, SOLUTION INFILTRATION; PERINEURAL PRN
Status: DISCONTINUED | OUTPATIENT
Start: 2017-12-12 | End: 2017-12-12

## 2017-12-12 RX ORDER — HYDROMORPHONE HYDROCHLORIDE 1 MG/ML
.3-.5 INJECTION, SOLUTION INTRAMUSCULAR; INTRAVENOUS; SUBCUTANEOUS EVERY 5 MIN PRN
Status: DISCONTINUED | OUTPATIENT
Start: 2017-12-12 | End: 2017-12-12 | Stop reason: HOSPADM

## 2017-12-12 RX ORDER — EPHEDRINE SULFATE 50 MG/ML
INJECTION, SOLUTION INTRAMUSCULAR; INTRAVENOUS; SUBCUTANEOUS PRN
Status: DISCONTINUED | OUTPATIENT
Start: 2017-12-12 | End: 2017-12-12

## 2017-12-12 RX ORDER — ONDANSETRON 4 MG/1
4 TABLET, ORALLY DISINTEGRATING ORAL EVERY 30 MIN PRN
Status: DISCONTINUED | OUTPATIENT
Start: 2017-12-12 | End: 2017-12-12 | Stop reason: HOSPADM

## 2017-12-12 RX ORDER — FENTANYL CITRATE 50 UG/ML
25-50 INJECTION, SOLUTION INTRAMUSCULAR; INTRAVENOUS
Status: DISCONTINUED | OUTPATIENT
Start: 2017-12-12 | End: 2017-12-12 | Stop reason: HOSPADM

## 2017-12-12 RX ORDER — CEFAZOLIN SODIUM 2 G/100ML
2 INJECTION, SOLUTION INTRAVENOUS
Status: DISCONTINUED | OUTPATIENT
Start: 2017-12-12 | End: 2017-12-12 | Stop reason: HOSPADM

## 2017-12-12 RX ORDER — SODIUM CHLORIDE, SODIUM LACTATE, POTASSIUM CHLORIDE, CALCIUM CHLORIDE 600; 310; 30; 20 MG/100ML; MG/100ML; MG/100ML; MG/100ML
INJECTION, SOLUTION INTRAVENOUS CONTINUOUS PRN
Status: DISCONTINUED | OUTPATIENT
Start: 2017-12-12 | End: 2017-12-12

## 2017-12-12 RX ORDER — FENTANYL CITRATE 50 UG/ML
INJECTION, SOLUTION INTRAMUSCULAR; INTRAVENOUS PRN
Status: DISCONTINUED | OUTPATIENT
Start: 2017-12-12 | End: 2017-12-12

## 2017-12-12 RX ORDER — ONDANSETRON 2 MG/ML
4 INJECTION INTRAMUSCULAR; INTRAVENOUS EVERY 30 MIN PRN
Status: DISCONTINUED | OUTPATIENT
Start: 2017-12-12 | End: 2017-12-12 | Stop reason: HOSPADM

## 2017-12-12 RX ADMIN — PHENYLEPHRINE HYDROCHLORIDE 100 MCG: 10 INJECTION, SOLUTION INTRAMUSCULAR; INTRAVENOUS; SUBCUTANEOUS at 08:43

## 2017-12-12 RX ADMIN — CEFAZOLIN SODIUM 2 G: 2 INJECTION, SOLUTION INTRAVENOUS at 08:38

## 2017-12-12 RX ADMIN — PHENYLEPHRINE HYDROCHLORIDE 100 MCG: 10 INJECTION, SOLUTION INTRAMUSCULAR; INTRAVENOUS; SUBCUTANEOUS at 09:17

## 2017-12-12 RX ADMIN — Medication 10 MG: at 08:43

## 2017-12-12 RX ADMIN — PROPOFOL 50 MG: 10 INJECTION, EMULSION INTRAVENOUS at 08:31

## 2017-12-12 RX ADMIN — Medication 60 ML: at 09:42

## 2017-12-12 RX ADMIN — FENTANYL CITRATE 100 MCG: 50 INJECTION, SOLUTION INTRAMUSCULAR; INTRAVENOUS at 08:25

## 2017-12-12 RX ADMIN — SODIUM CHLORIDE, POTASSIUM CHLORIDE, SODIUM LACTATE AND CALCIUM CHLORIDE: 600; 310; 30; 20 INJECTION, SOLUTION INTRAVENOUS at 07:45

## 2017-12-12 RX ADMIN — PROPOFOL 150 MG: 10 INJECTION, EMULSION INTRAVENOUS at 08:25

## 2017-12-12 RX ADMIN — ONDANSETRON 4 MG: 2 INJECTION INTRAMUSCULAR; INTRAVENOUS at 08:46

## 2017-12-12 RX ADMIN — PHENYLEPHRINE HYDROCHLORIDE 100 MCG: 10 INJECTION, SOLUTION INTRAMUSCULAR; INTRAVENOUS; SUBCUTANEOUS at 09:03

## 2017-12-12 RX ADMIN — FENTANYL CITRATE 50 MCG: 50 INJECTION, SOLUTION INTRAMUSCULAR; INTRAVENOUS at 09:30

## 2017-12-12 RX ADMIN — PHENYLEPHRINE HYDROCHLORIDE 100 MCG: 10 INJECTION, SOLUTION INTRAMUSCULAR; INTRAVENOUS; SUBCUTANEOUS at 09:11

## 2017-12-12 RX ADMIN — HEPARIN SODIUM 5000 UNITS: 5000 INJECTION, SOLUTION INTRAVENOUS; SUBCUTANEOUS at 07:42

## 2017-12-12 RX ADMIN — LIDOCAINE HYDROCHLORIDE 100 MG: 20 INJECTION, SOLUTION INFILTRATION; PERINEURAL at 08:25

## 2017-12-12 RX ADMIN — SODIUM CHLORIDE, POTASSIUM CHLORIDE, SODIUM LACTATE AND CALCIUM CHLORIDE: 600; 310; 30; 20 INJECTION, SOLUTION INTRAVENOUS at 07:50

## 2017-12-12 RX ADMIN — IOTHALAMATE MEGLUMINE 18 ML: 600 INJECTION INTRAVASCULAR at 08:49

## 2017-12-12 RX ADMIN — SODIUM CHLORIDE, POTASSIUM CHLORIDE, SODIUM LACTATE AND CALCIUM CHLORIDE: 600; 310; 30; 20 INJECTION, SOLUTION INTRAVENOUS at 09:18

## 2017-12-12 RX ADMIN — PHENYLEPHRINE HYDROCHLORIDE 100 MCG: 10 INJECTION, SOLUTION INTRAMUSCULAR; INTRAVENOUS; SUBCUTANEOUS at 08:33

## 2017-12-12 RX ADMIN — MIDAZOLAM 2 MG: 1 INJECTION INTRAMUSCULAR; INTRAVENOUS at 08:18

## 2017-12-12 RX ADMIN — PHENYLEPHRINE HYDROCHLORIDE 100 MCG: 10 INJECTION, SOLUTION INTRAMUSCULAR; INTRAVENOUS; SUBCUTANEOUS at 08:56

## 2017-12-12 RX ADMIN — DEXAMETHASONE SODIUM PHOSPHATE 4 MG: 4 INJECTION, SOLUTION INTRA-ARTICULAR; INTRALESIONAL; INTRAMUSCULAR; INTRAVENOUS; SOFT TISSUE at 08:46

## 2017-12-12 NOTE — OP NOTE
[facility]  Operative Note    Patient: Marissa Guadarrama  : 1950  MRN: [id number]    Date of Service: 2017    Pre-operative diagnosis:  1. VAIN III  2. Obstructed left ureter    Post-operative diagnosis:  1. Same    Procedure:   EUA, CO2 laser to vagina  Cystoscopy, left retrograde pyelogram, left stent placement (Per Urology)    Surgeon: Nic Segundo MD  Assistants: Alfreda Jim, PGY3    Anesthesia: General    EBL: 2 mL  Urine: not measured  Fluids: 1000 mL cystalloid    Specimens: None  Complications: none    Findings: EUA with intact vaginal cuff. External examination without acetowhite changes noted.  Speculum exam with acetic acid applied to vagina.  Area approximately 1cm x1 cm on vaginal cuff at 9 o'clock with evidence of prior biopsy, reactive changes vs acetowhite.    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 Discussed risks, benefits, and alternatives to the procedure including risk of infection, bleeding, recurrence. The patient's questions were answered, understanding confirmed, and the patient signed written informed consent.    Technique: The patient was taken to the OR where she was placed in the dorsal lithotomy position with feet in yellow fin stirrups. MAC anesthesia was administered. The patient was prepped and draped in the usual sterile fashion. Urology performed cystoscopy, left retrograde pyelogram and left stent placement (see separate urology operative note for details of the procedure). EUA was performed. Acetic acid soaked gauze was placed in the vagina and on the vulva for approximately 1 minute. A coated speculum was placed in the vagina and the vaginal cuff visualized with findings noted above.  The carbon dioxide laser was prepared. The power was set at 10 lees. The laser was activated and the 1x1 cm 9 o'clock  area was systematically vaporized. After thorough inspection of the vaporized area, the laser was turned off, speculum removed and patient cleaned.    The patient tolerated the procedure well, was awoken in the OR, and transferred to the PACU in stable condition.      Nic Segundo MD, MS    Department of Obstetrics and Gynecology   Division of Gynecologic Oncology   [facility]  Phone: [phone]

## 2017-12-12 NOTE — DISCHARGE INSTRUCTIONS
[facility]  Same-Day Surgery   Adult Discharge Orders & Instructions     For 24 hours after surgery    1. Get plenty of rest.  A responsible adult must stay with you for at least 24 hours after you leave the hospital.   2. Do not drive or use heavy equipment.  If you have weakness or tingling, don't drive or use heavy equipment until this feeling goes away.  3. Do not drink alcohol.  4. Avoid strenuous or risky activities.  Ask for help when climbing stairs.   5. You may feel lightheaded.  IF so, sit for a few minutes before standing.  Have someone help you get up.   6. If you have nausea (feel sick to your stomach): Drink only clear liquids such as apple juice, ginger ale, broth or 7-Up.  Rest may also help.  Be sure to drink enough fluids.  Move to a regular diet as you feel able.  7. You may have a slight fever. Call the doctor if your fever is over 100 F (37.7 C) (taken under the tongue) or lasts longer than 24 hours.  8. You may have a dry mouth, a sore throat, muscle aches or trouble sleeping.  These should go away after 24 hours.  9. Do not make important or legal decisions.   Call your doctor for any of the followin.  Signs of infection (fever, growing tenderness at the surgery site, a large amount of drainage or bleeding, severe pain, foul-smelling drainage, redness, swelling).    2. It has been over 8 to 10 hours since surgery and you are still not able to urinate (pass water).    3.  Headache for over 24 hours.    To contact a doctor, call Dr Segundo's office at [phone] or:        [phone] and ask for the resident on call for gynecology (answered 24 hours a day)      Emergency Department:    [facility]: [phone]       (TTY for hearing impaired: [phone])

## 2017-12-12 NOTE — PROGRESS NOTES
"Post-Op Check      Marissa Guadarrama is a 67 year old  F, POD#0 s/p EUA, CO2 laser to vagina, cystoscopy, and left retrograde pyelogram with left stent placement per urology     S: Pt doing well with minimal pain well controlled with tylenol and norco. She denies any nausea, shortness of breath and chest pain. Pt is ambulating and tolerating full diet.     O:    /84  Temp 98.1  F (36.7  C) (Oral)  Resp 16  Ht 1.715 m (5' 7.5\")  Wt 113.4 kg (250 lb)  SpO2 94%  BMI 38.58 kg/m2       General:  A&Ox3, NAD  CV:  RRR, no m/r/g  Pulm:  Breathing comfortably on room air, CTAB  Abd: soft, appropriately tender to palpation, nondistended.  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 Doing well and ready for discharge.    P:   Dz: Pre-operative diagnosis was VAIN III and obstructed left ureter.   FEN: tolerating regular diet  Pain: discharge home with norco  Heme: 14.3> EBL 2 mL  CV: No issues.  Pulm: No issues. Pt maintaining O2 sats >94% on RA and encouraged to use spirometry.   GI: tolerating regular diet without nausea or emesis and able to maintain hydration without IVF.   : No issues  ID: Afebrile   Endo: No issues  Psych/Neuro: No issues  PPX: ambulating without issues  Dispo: Discharge home today. Pt has postop follow up scheduled with Dr. Segundo on .    Marques Wing MD  OB/GYN Resident, PGY-2  2017 12:10 PM         "

## 2017-12-12 NOTE — BRIEF OP NOTE
Pre-op Dx: obstructed left ureter  Post-op Dx:same  Procedure:cystoscopy, left retrograde pyelogram, left stent placement  EBL:1cc  Specimens:none  Drains: left 6 x 26 JJ stent  Findings: tortuous left ureter, distal ureteral stricture, possible UPJ obstruction, possible proximal ureteral narrowing  No complications.

## 2017-12-12 NOTE — ANESTHESIA CARE TRANSFER NOTE
Patient: Marissa Guadarrama    Procedure(s):  Exam Under Anesthesia, CO2 Laser Ablation Of Vagina, Cystoscopy, Left Retrograde Pyelogram with Left Stent Placement - Wound Class: II-Clean Contaminated   - Wound Class: II-Clean Contaminated    Diagnosis: Vaginal Intraepithelial Neoplasia III, Obstructed Ureter  Diagnosis Additional Information: No value filed.    Anesthesia Type:   General, ETT     Note:  Airway :Face Mask  Patient transferred to:PACU  Comments: Anesthesia Care Transfer Note      Transfer to:  PACU    Patient Vital Signs:  Stable    Airway:  None    Patient transported to PACU with supplemental O2.  Patient alert and breathing comfortably.  VSS.  Care transferred with report to PACU JOAN.    Joseph Barry CRNAHandoff Report: Identifed the Patient, Identified the Reponsible Provider, Reviewed the pertinent medical history, Discussed the surgical course, Reviewed Intra-OP anesthesia mangement and issues during anesthesia, Set expectations for post-procedure period and Allowed opportunity for questions and acknowledgement of understanding      Vitals: (Last set prior to Anesthesia Care Transfer)    CRNA VITALS  12/12/2017 0911 - 12/12/2017 0946      12/12/2017             Pulse: 93    SpO2: 97 %                Electronically Signed By: KAREN Nicholson CRNA  December 12, 2017  9:46 AM

## 2017-12-12 NOTE — IP AVS SNAPSHOT
Same Day Surgery [address] [address]    Phone:  [phone]                                       After Visit Summary   12/12/2017    Marissa Guadarrama    MRN: [id number]           After Visit Summary Signature Page     I have received my discharge instructions, and my questions have been answered. I have discussed any challenges I see with this plan with the nurse or doctor.    ..........................................................................................................................................  Patient/Patient Representative Signature      ..........................................................................................................................................  Patient Representative Print Name and Relationship to Patient    ..................................................               ................................................  Date                                            Time    ..........................................................................................................................................  Reviewed by Signature/Title    ...................................................              ..............................................  Date                                                            Time

## 2017-12-12 NOTE — ANESTHESIA POSTPROCEDURE EVALUATION
Patient: Marissa Guadarrama    Procedure(s):  Exam Under Anesthesia, CO2 Laser Ablation Of Vagina, Cystoscopy, Left Retrograde Pyelogram with Left Stent Placement - Wound Class: II-Clean Contaminated   - Wound Class: II-Clean Contaminated    Diagnosis:Vaginal Intraepithelial Neoplasia III, Obstructed Ureter  Diagnosis Additional Information: No value filed.    Anesthesia Type:  General, ETT    Note:  Anesthesia Post Evaluation    Patient location during evaluation: PACU  Patient participation: Able to fully participate in evaluation  Level of consciousness: awake and alert  Pain management: adequate  Airway patency: patent  Cardiovascular status: acceptable  Respiratory status: acceptable  Hydration status: acceptable  PONV: none     Anesthetic complications: None          Last vitals:  Vitals:    12/12/17 0945 12/12/17 1000 12/12/17 1015   BP: 129/83 127/78 132/77   Resp: 18 16 16   Temp: 36.6  C (97.8  F)  36.6  C (97.9  F)   SpO2: 94% 94%          Electronically Signed By: Rahat Amaya MD  December 12, 2017  10:17 AM

## 2017-12-12 NOTE — OR NURSING
Limited movement in Bilateral legs L>R (Polio affecting left leg). 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

## 2017-12-12 NOTE — OP NOTE
DATE OF SERVICE:  12/12/2017      PREOPERATIVE DIAGNOSIS:  Obstructed left ureter.      POSTOPERATIVE DIAGNOSIS:  Obstructed left ureter.      PROCEDURE:  Cystoscopy, left retrograde pyelogram, left stent placement.      INDICATIONS:  Ms. 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  The patient has previously undergone stenting for renal obstruction, but the patient's stent ultimately migrated out of the urethral meatus and the stent was subsequently removed.  This had been a 6x26 Mozambican stent.  The patient presented today for a cystoscopy and replacement of the stent on the left side.  The patient was also undergoing an exam under anesthesia and a laser fulguration of vaginal lesions with Dr. Segundo, OB/GYN.      DESCRIPTION OF PROCEDURE:  After informed consent was obtained, the patient was brought to the operating room where she was administered general anesthesia with an LMA.  After suitable level of anesthesia was obtained, she was placed in lithotomy position with all pressure points padded.  She was administered preoperative antibiotics.  She was prepped and draped in standard sterile fashion.  Next, a 22-Mozambican cystoscope was introduced into the patient's bladder.  The UOs were noted to be orthotopic.  There were no obvious mucosal lesions, stones or foreign objects in the bladder.  Next, the left UO was identified and intubated with a 5-Mozambican open-ended catheter.  A retrograde pyelogram was performed which demonstrated an area of narrowing of the distal ureter, perhaps 2 cm in length consistent with the known level of the carcinoid tumor.  Contrast did get through this, however, and demonstrated a hydroureter up to the kidney, a markedly hydronephrotic kidney.  Of note, the ureter was tortuous, particularly at the proximal portion with a question of both a possible UPJ obstruction as well as a proximal ureteral  area of narrowing, although this could also again have been tortuosity of the ureter and the ureter moving out of frame of the C-arm.  In either case, a sensor guidewire was placed through the 5-Thai open-ended catheter and advanced up to the kidney under fluoroscopic guidance.  We then advanced the 5-Thai over the wire in an attempt to get the ureter to straighten out.  With some manipulation we ultimately did get the ureter to straighten.  We then placed a super-stiff guidewire through the 5-Thai open-ended catheter up to the kidney under fluoroscopic guidance.  We then placed a 6x26 double-J stent over the Super Stiff wire.  The proximal curl was confirmed by fluoroscopy, distal curl confirmed by direct vision.  The patient's bladder was drained and at this point Dr. Segundo came in the operating room for his part of the procedure.  For description of that, please see his separate dictated note.  Surgeon for the stent this is Steve La MD      ESTIMATED 1 mL      COMPLICATIONS:  None immediate noted.      DRAINS:  Left 6 x 26 double-J stent.         STEVE LA MD             D: 2017 09:12   T: 2017 09:28   MT: MILTON      Name:     ROSALINDA LANCASTER   MRN:      [id number]        Account:        [id number]   :      1950           Procedure Date: 2017      Document: [id number]

## 2017-12-12 NOTE — IP AVS SNAPSHOT
MRN:[id number]                      After Visit Summary   12/12/2017    Marissa Guadarrama    MRN: [id number]           Thank you!     Thank you for choosing [facility] for your care. Our goal is always to provide you with excellent care. Hearing back from our patients is one way we can continue to improve our services. Please take a few minutes to complete the written survey that you may receive in the mail after you visit with us. Thank you!        Patient Information     Date Of Birth          1950        About your hospital stay     You were admitted on:  December 12, 2017 You last received care in the:  Same Day Surgery [facility]    You were discharged on:  December 12, 2017       Who to Call     For medical emergencies, please call 911.  For non-urgent questions about your medical care, please call your primary care provider or clinic, [phone]  For questions related to your surgery, please call your surgery clinic        Attending Provider     Provider Specialty    Nic Segundo MD Obstetrics & Gynecology, Maternal & Fetal Medicine       Primary Care Provider Office Phone # Fax #    Bernice Christin Howard [phone]      After Care Instructions     Discharge Instructions       Resume pre procedure diet            Discharge Instructions       Pelvic Rest. No tampons, douching or intercourse for 2 weeks.            Discharge Instructions       Follow-up with Dr. Segundo as scheduled on 1/8/18            Discharge Instructions       Follow-up with Urology in 3 months for stent exchange.            No alcohol       NO ALCOHOL for 24 hours post procedure            No driving or operating machinery       No driving or operating machinery until day after procedure                  Your next 10 appointments already scheduled     Jan 08, 2018  5:00 PM CST   (Arrive by 4:45 PM)   Post-Op with Nic Segundo MD   [facility] ([facility]    [address]  2nd [address]  [facility] [address] [phone]            May 14, 2018  8:30 AM CDT   LAB with [facility] ([facility])    [address] [place]  [facility] [address]   [phone]           Please do not eat 10-12 hours before your appointment if you are coming in fasting for labs on lipids, cholesterol, or glucose (sugar). This does not apply to pregnant women. Water, hot tea and black coffee (with nothing added) are okay. Do not drink other fluids, diet soda or chew gum.            May 16, 2018  1:30 PM CDT   Return Visit with Hosea King MD   [facility] ([facility])    [facility]  [address]  [facility] [address]   [phone]              Further instructions from your care team       [facility]  Same-Day Surgery   Adult Discharge Orders & Instructions     For 24 hours after surgery    1. Get plenty of rest.  A responsible adult must stay with you for at least 24 hours after you leave the hospital.   2. Do not drive or use heavy equipment.  If you have weakness or tingling, don't drive or use heavy equipment until this feeling goes away.  3. Do not drink alcohol.  4. Avoid strenuous or risky activities.  Ask for help when climbing stairs.   5. You may feel lightheaded.  IF so, sit for a few minutes before standing.  Have someone help you get up.   6. If you have nausea (feel sick to your stomach): Drink only clear liquids such as apple juice, ginger ale, broth or 7-Up.  Rest may also help.  Be sure to drink enough fluids.  Move to a regular diet as you feel able.  7. You may have a slight fever. Call the doctor if your fever is over 100 F (37.7 C) (taken under the tongue) or lasts longer than 24 hours.  8. You may have a dry mouth, a sore throat, muscle aches or trouble sleeping.  These should go away  "after 24 hours.  9. Do not make important or legal decisions.   Call your doctor for any of the followin.  Signs of infection (fever, growing tenderness at the surgery site, a large amount of drainage or bleeding, severe pain, foul-smelling drainage, redness, swelling).    2. It has been over 8 to 10 hours since surgery and you are still not able to urinate (pass water).    3.  Headache for over 24 hours.    To contact a doctor, call Dr Seugndo's office at [phone] or:        [phone] and ask for the resident on call for gynecology (answered 24 hours a day)      Emergency Department:    [facility]: [phone]       (TTY for hearing impaired: [phone])            Pending Results     No orders found from 12/10/2017 to 2017.            Admission Information     Date & Time Provider Department Dept. Phone    2017 Nic Segundo MD Same Day Surgery [facility] [phone]      Your Vitals Were     Blood Pressure Temperature Respirations Height Weight Pulse Oximetry    123/79 98.1  F (36.7  C) (Oral) 16 1.715 m (5' 7.5\") 113.4 kg (250 lb) 93%    BMI (Body Mass Index)                   38.58 kg/m2           MyChart Information     [employer] lets you send messages to your doctor, view your test results, renew your prescriptions, schedule appointments and more. To sign up, go to [email or web address] . Click on \"Log in\" on the left side of the screen, which will take you to the Welcome page. Then click on \"Sign up Now\" on the right side of the page.     You will be asked to enter the access code listed below, as well as some personal information. Please follow the directions to create your username and password.     Your access code is: [email or web address]  Expires: 2018 12:30 PM     Your access code will  in 90 days. If you need help or a new code, please call your [place] clinic or [phone].        Care EveryWhere ID     This is your Care EveryWhere ID. This " could be used by other organizations to access your [facility] medical records  [id number]        Equal Access to Services     TASIA RANGEL : Hadii karina Lopez, wadestiney meng, qafelixta ismarosaadelita lr, niesha khanelishapancho rodrigues. So [facility] [phone].    ATENCIÓN: Si habla español, tiene a allan disposición servicios gratuitos de asistencia lingüística. Llame al [phone].    We comply with applicable federal civil rights laws and [place] laws. We do not discriminate on the basis of race, color, national origin, age, disability, sex, sexual orientation, or gender identity.               Review of your medicines      CONTINUE these medicines which may have CHANGED, or have new prescriptions. If we are uncertain of the size of tablets/capsules you have at home, strength may be listed as something that might have changed.        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 Make sure you understand how and when to take each.        Dose:  1-2 tablet   Take 1-2 tablets by mouth as needed for moderate to severe pain   Refills:  0       * HYDROcodone-acetaminophen 5-325 MG per tablet   Commonly known as:  NORCO   This may have changed:  You were already taking a medication with the same name, and this prescription was added. Make sure you understand how and when to take each.   Used for:  Vaginal dysplasia        Dose:  1-2 tablet   Take 1-2 tablets by mouth every 6 hours as needed for other (Moderate to Severe Pain)   Quantity:  5 tablet   Refills:  0        * Notice:  This list has 2 medication(s) that are the same as other medications prescribed for you. Read the directions carefully, and ask your doctor or other care provider to review them with you.      CONTINUE these medicines which have NOT CHANGED        Dose / Directions    albuterol 108 (90 BASE) MCG/ACT Inhaler   Commonly known as:  PROAIR HFA/PROVENTIL HFA/VENTOLIN HFA   Used for:  SOB (shortness of breath)        Dose:  2 puff   Inhale 2 puffs into the lungs every 6 hours as needed for shortness of breath / dyspnea or wheezing   Quantity:  1 Inhaler   Refills:  1       gabapentin 600 MG tablet   Commonly known as:  NEURONTIN   Used for:  Trigeminal neuralgia        Dose:  600 mg   Take 1 tablet (600 mg) by mouth 3 times daily   Quantity:  180 tablet   Refills:  6       hydrochlorothiazide 25 MG tablet   Commonly known as:  HYDRODIURIL   Used for:  Essential hypertension with goal blood pressure less than 140/90        Dose:  12.5 mg   Take 12.5 mg by mouth every morning   Refills:  0            Where to get your medicines      Some of these will need a paper prescription and others can be bought over the counter. Ask your nurse if you have questions.     Bring a paper prescription for each of these medications     HYDROcodone-acetaminophen 5-325 MG per tablet                Protect others around you: Learn how to safely use, store and throw away your medicines at www.disposemymeds.org.             Medication List: This is a list of all your medications and when to take them. Check marks below indicate your daily home schedule. Keep this list as a reference.      Medications           Morning Afternoon Evening Bedtime As Needed    albuterol 108 (90 BASE) MCG/ACT Inhaler   Commonly known as:  PROAIR HFA/PROVENTIL HFA/VENTOLIN HFA   Inhale 2 puffs into the lungs every 6 hours as needed for shortness of breath / dyspnea or wheezing                                exemestane 25 MG tablet   Commonly known as:   AROMASIN   Take 1 tablet (25 mg) by mouth daily                                gabapentin 600 MG tablet   Commonly known as:  NEURONTIN   Take 1 tablet (600 mg) by mouth 3 times daily                                hydrochlorothiazide 25 MG tablet   Commonly known as:  HYDRODIURIL   Take 12.5 mg by mouth every morning                                * HYDROcodone-acetaminophen 5-325 MG per tablet   Commonly known as:  NORCO   Take 1-2 tablets by mouth as needed for moderate to severe pain                                * HYDROcodone-acetaminophen 5-325 MG per tablet   Commonly known as:  NORCO   Take 1-2 tablets by mouth every 6 hours as needed for other (Moderate to Severe Pain)                                * Notice:  This list has 2 medication(s) that are the same as other medications prescribed for you. Read the directions carefully, and ask your doctor or other care provider to review them with you.

## 2017-12-12 NOTE — OR NURSING
Serum Potassium 3.3 and result called to Dr. Leonardo (anesthesiologist)- no intervention ordered.

## 2017-12-14 ENCOUNTER — CARE COORDINATION (OUTPATIENT)
Dept: ONCOLOGY | Facility: CLINIC | Age: 67
End: 2017-12-14

## 2017-12-14 NOTE — PROGRESS NOTES
RN attempted to reach patient for post hospital call. Patient unavailable. Voicemail and call back number left.     Nicole Chew RN

## 2018-01-08 ENCOUNTER — OFFICE VISIT (OUTPATIENT)
Dept: ONCOLOGY | Facility: CLINIC | Age: 68
End: 2018-01-08
Attending: OBSTETRICS & GYNECOLOGY
Payer: MEDICARE

## 2018-01-08 VITALS
BODY MASS INDEX: 37.59 KG/M2 | OXYGEN SATURATION: 93 % | DIASTOLIC BLOOD PRESSURE: 80 MMHG | WEIGHT: 248 LBS | HEIGHT: 68 IN | RESPIRATION RATE: 16 BRPM | HEART RATE: 72 BPM | SYSTOLIC BLOOD PRESSURE: 142 MMHG | TEMPERATURE: 99.5 F

## 2018-01-08 DIAGNOSIS — D07.1 VIN III (VULVAR INTRAEPITHELIAL NEOPLASIA III): Primary | ICD-10-CM

## 2018-01-08 PROCEDURE — G0463 HOSPITAL OUTPT CLINIC VISIT: HCPCS | Mod: ZF

## 2018-01-08 PROCEDURE — 99213 OFFICE O/P EST LOW 20 MIN: CPT | Mod: ZP | Performed by: OBSTETRICS & GYNECOLOGY

## 2018-01-08 ASSESSMENT — PAIN SCALES - GENERAL: PAINLEVEL: NO PAIN (0)

## 2018-01-08 NOTE — NURSING NOTE
"Oncology Rooming Note    January 8, 2018 4:32 PM   Marissa Guadarrama is a 67 year old female who presents for:    Chief Complaint   Patient presents with     Oncology Clinic Visit     Visit related to Post op     Initial Vitals: /80 (BP Location: Left arm, Patient Position: Sitting, Cuff Size: Adult Large)  Pulse 72  Temp 99.5  F (37.5  C) (Tympanic)  Resp 16  Ht 1.715 m (5' 7.52\")  Wt 112.5 kg (248 lb)  SpO2 93%  BMI 38.25 kg/m2 Estimated body mass index is 38.25 kg/(m^2) as calculated from the following:    Height as of this encounter: 1.715 m (5' 7.52\").    Weight as of this encounter: 112.5 kg (248 lb). Body surface area is 2.32 meters squared.  No Pain (0) Comment: Data Unavailable   No LMP recorded. Patient has had a hysterectomy.  Allergies reviewed: Yes  Medications reviewed: Yes    Medications: Medication refills not needed today.  [facility] [address] [facility]    Clinical concerns: No new concerns. Provider was notified.    10 minutes for nursing intake (face to face time)     Zraa Krishnamurthy LPN            "

## 2018-01-08 NOTE — LETTER
2018     RE: Marissa Guadarrama  [address] [facility] [facility]   [place] [address]     Dear Colleague,    Thank you for referring your patient, Marissa Guadarrama, to the [facility]. Please see a copy of my visit note below.                Follow Up Notes on Referred Patient    Date: 2018       Dr. Bernice Howard MD  [address] [place] [address]       RE: Marissa Guadarrama  : 1950  FRITZ: 2018    Dear Dr. 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      Patient presents today for followup.  She has been doing well.  No nausea, vomiting, fevers or chills. Normal urinary and bowel function. No B symptoms.           Past Medical History:    Past Medical History:   Diagnosis Date     Arthritis     knee     Benign breast biopsy     benign     Carcinoid tumor 2003     Cervical cancer (H)      H/O colposcopy with cervical biopsy 13    vaginal cuff biopsy- VAIN III. referred back to gyn/onc     High cholesterol      HTN      Pap smear of vagina with ASC-H 13     Post-polio syndromes      Trigeminal neuralgias          Past Surgical History:    Past Surgical History:   Procedure Laterality Date     APPENDECTOMY       BIOPSY NODE SENTINEL Bilateral 2016    Procedure: BIOPSY NODE SENTINEL;  Surgeon: Brent Arana MD;  [facility]     C BSO, OMENTECTOMY W/OSMAN  2007     C TOTAL ABDOM HYSTERECTOMY  2007     CL AFF SURGICAL PATHOLOGY       COLONOSCOPY N/A 2017    Procedure: COMBINED COLONOSCOPY, SINGLE OR MULTIPLE BIOPSY/POLYPECTOMY BY BIOPSY;  Colonoscopy Dx:Carcinoid tumor of colon prep mailed golytely;  Surgeon: Talisha Greco MD;  [facility]     COLPOSCOPY, BIOPSY, COMBINED  3/13/2014    Procedure: COMBINED COLPOSCOPY, BIOPSY;;  Surgeon: Lara Pack MD;  [facility]     COMBINED CYSTOSCOPY, RETROGRADES, URETEROSCOPY, INSERT STENT Left 2017    Procedure: COMBINED  CYSTOSCOPY, RETROGRADES, URETEROSCOPY, INSERT STENT;  Surgeon: Zhao La MD;  [facility]     CYSTOSCOPY, RETROGRADES, INSERT STENT URETER(S), COMBINED Left 9/7/2017    Procedure: COMBINED CYSTOSCOPY, RETROGRADES, INSERT STENT URETER(S);  Cystoscopy,Left Stent Exchange;  Surgeon: Zhao La MD;  [facility]     CYSTOSCOPY, RETROGRADES, INSERT STENT URETER(S), COMBINED  12/12/2017    Procedure: COMBINED CYSTOSCOPY, RETROGRADES, INSERT STENT URETER(S);;  Surgeon: Zhao La MD;  [facility]     EXAM UNDER ANESTHESIA PELVIC  3/13/2014    Procedure: EXAM UNDER ANESTHESIA PELVIC;  Exam Under Anestheisa, Colposcopy, Vaginal Biopsies, Co2 Laser of the Upper Vagina;  Surgeon: Lara Pack MD;  [facility]     HERNIORRHAPHY INCISIONAL (LOCATION)       LASER CO2 VAGINA  3/13/2014    VAIN 1/2     LASER CO2 VAGINA N/A 12/12/2017    Procedure: LASER CO2 VAGINA;  Exam Under Anesthesia, CO2 Laser Ablation Of Vagina, Cystoscopy, Left Retrograde Pyelogram with Left Stent Placement;  Surgeon: Nic Segundo MD;  [facility]     LUMPECTOMY BREAST WITH SEED LOCALIZATION Bilateral 6/1/2016    Procedure: LUMPECTOMY BREAST WITH SEED LOCALIZATION;  Surgeon: Brent Arana MD;  [facility]     SURGICAL HISTORY OF -       ovarian cystectomy     SURGICAL HISTORY OF -   2003    right colon resection secondary to carcinoid tumor     TUBAL LIGATION           Health Maintenance Due   Topic Date Due     MICROALBUMIN Q1 YEAR  02/05/2017     LIPID MONITORING Q1 YEAR  03/16/2017     PAP Q6 MOS DIAGNOSTIC  03/19/2017     FALL RISK ASSESSMENT  11/02/2017       Current Medications:     Current Outpatient Prescriptions   Medication Sig Dispense Refill     exemestane (AROMASIN) 25 MG tablet Take 1 tablet (25 mg) by mouth daily (Patient taking differently: Take 25 mg by mouth every morning ) 90 tablet 1     gabapentin (NEURONTIN) 600 MG tablet Take 1 tablet (600  "mg) by mouth 3 times daily 180 tablet 6     hydrochlorothiazide (HYDRODIURIL) 25 MG tablet Take 12.5 mg by mouth every morning        HYDROcodone-acetaminophen (NORCO) 5-325 MG per tablet Take 1-2 tablets by mouth every 6 hours as needed for other (Moderate to Severe Pain) (Patient not taking: Reported on 1/8/2018) 5 tablet 0     HYDROcodone-acetaminophen (NORCO) 5-325 MG per tablet Take 1-2 tablets by mouth as needed for moderate to severe pain       albuterol (PROAIR HFA, PROVENTIL HFA, VENTOLIN HFA) 108 (90 BASE) MCG/ACT inhaler Inhale 2 puffs into the lungs every 6 hours as needed for shortness of breath / dyspnea or wheezing (Patient not taking: Reported on 1/8/2018) 1 Inhaler 1         Allergies:        Allergies   Allergen Reactions     Nkda [No Known Drug Allergies]         Social History:     Social History   Substance Use Topics     Smoking status: Former Smoker     Packs/day: 1.00     Years: 29.00     Types: Cigarettes     Quit date: 10/30/2006     Smokeless tobacco: Never Used     Alcohol use No       History   Drug Use No         Family History:       Family History   Problem Relation Age of Onset     CANCER Mother      bone / liver     Breast Cancer Mother      CANCER Maternal Grandmother      CANCER Maternal Grandfather      CANCER Paternal Grandmother      CANCER Paternal Grandfather      CANCER Sister      vulvar ca, cervical ca, squamous cell cancer     CANCER Brother      Rectal- Stage 4     Rectal Cancer Brother      Breast Cancer Paternal Aunt      Colon Cancer Paternal Aunt      Breast Cancer Maternal Aunt      Pancreatic Cancer Nephew      Breast Cancer Sister          Physical Exam:     /80 (BP Location: Left arm, Patient Position: Sitting, Cuff Size: Adult Large)  Pulse 72  Temp 99.5  F (37.5  C) (Tympanic)  Resp 16  Ht 1.715 m (5' 7.52\")  Wt 112.5 kg (248 lb)  SpO2 93%  BMI 38.25 kg/m2  Body mass index is 38.25 kg/(m^2).    General Appearance: healthy and alert, no distress   "   PELVIC:  Normal external genitalia.  Well-healing laser sites in the vaginal mucosa.           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     A total of 20 minutes was spent with the patient, 15 minutes of which were spent in counseling the patient and/or treatment planning.      1.  JAYDEN 3.   2.  AIN 3.   3.  Carcinoid tumor.      Discussed with patient we will see her back in 3-4 months for another colposcopy for any evidence of dysplasia.  She is recovering well from surgery.  The patient agrees with the plan.  She is very appreciative of her care.  All questions were answered.       Nic Segundo MD, MS    Department of Obstetrics and Gynecology   Division of Gynecologic Oncology   [facility]  Phone: [phone]    CC  Patient Care Team:  Bernice Howard MD as PCP - Maria C Noguera MD as MD (OB/Gyn)  Mita Koehler MD as MD (Radiation Oncology)  Hosea King MD as MD (Hematology & Oncology)  Zhao La MD as MD (Urology)  Talisha Greco MD as MD (Colon and Rectal Surgery)  Allen Downey MD as MD (Orthopedics)  BERNICE HOWARD

## 2018-01-08 NOTE — MR AVS SNAPSHOT
After Visit Summary   1/8/2018    Marissa Guadarrama    MRN: [id number]           Patient Information     Date Of Birth          1950        Visit Information        Provider Department      1/8/2018 5:00 PM Nic Segundo MD [facility]         Follow-ups after your visit        Your next 10 appointments already scheduled     Jan 09, 2018 10:40 AM CST   SHORT with Zaida Saldana DO   [facility]    [address] [place] [id number] [phone]            Apr 03, 2018 10:00 AM CDT   (Arrive by 9:45 AM)   COLPOSCOPY with  GYN ONC COLPOSCOPY PROVIDER   [facility] ([facility])    [address] [place] [address]  [address]  [facility] [id number] [phone]            May 14, 2018  8:30 AM CDT   LAB with [facility] ([facility])    [address] [facility] [id number] [phone]           Please do not eat 10-12 hours before your appointment if you are coming in fasting for labs on lipids, cholesterol, or glucose (sugar). This does not apply to pregnant women. Water, hot tea and black coffee (with nothing added) are okay. Do not drink other fluids, diet soda or chew gum.            May 16, 2018  1:30 PM CDT   Return Visit with Hosea King MD   [facility] ([facility])    [facility]  [address] [facility] [address]  [facility] [address]   [phone]              Who to contact     If you have questions or need follow up information about today's clinic visit or your schedule please contact [facility] directly at [phone].  Normal or non-critical lab and imaging results will be communicated to you by MyChart, letter or phone within 4 business days after the clinic has received the results. If you do not hear from us within 7 days, please contact  "the clinic through Fusion-iot or phone. If you have a critical or abnormal lab result, we will notify you by phone as soon as possible.  Submit refill requests through Allmyapps or call your pharmacy and they will forward the refill request to us. Please allow 3 business days for your refill to be completed.          Additional Information About Your Visit        [employer] Information     Allmyapps lets you send messages to your doctor, view your test results, renew your prescriptions, schedule appointments and more. To sign up, go to [email or web address] . Click on \"Log in\" on the left side of the screen, which will take you to the Welcome page. Then click on \"Sign up Now\" on the right side of the page.     You will be asked to enter the access code listed below, as well as some personal information. Please follow the directions to create your username and password.     Your access code is: [email or web address]  Expires: 2018 12:30 PM     Your access code will  in 90 days. If you need help or a new code, please call your [place] clinic or [phone].        Care EveryWhere ID     This is your Care EveryWhere ID. This could be used by other organizations to access your [place] medical records  [id number]        Your Vitals Were     Pulse Temperature Respirations Height Pulse Oximetry BMI (Body Mass Index)    72 99.5  F (37.5  C) (Tympanic) 16 1.715 m (5' 7.52\") 93% 38.25 kg/m2       Blood Pressure from Last 3 Encounters:   18 142/80   17 125/56   17 145/80    Weight from Last 3 Encounters:   18 112.5 kg (248 lb)   17 113.4 kg (250 lb)   17 112.8 kg (248 lb 9.6 oz)              Today, you had the following     No orders found for display         Today's Medication Changes          These changes are accurate as of: 18  6:04 PM.  If you have any questions, ask your nurse or doctor.               These medicines have changed or have updated prescriptions.        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 So [facility] [phone].    ATENCIÓN: Si habla español, tiene a allan disposición servicios gratuitos de asistencia lingüística. [facility] [phone].    We comply with applicable federal civil rights laws and [place] laws. We do not discriminate on the basis of race, color, national origin, age, disability, sex, sexual orientation, or gender identity.            Thank you!     Thank you for choosing [facility]  for your care. Our goal is always to provide you with excellent care. Hearing back from our patients is one way we can continue to improve our services. Please take a few minutes to complete the written survey that you may receive in the mail after your visit with us. Thank you!             Your Updated Medication List - Protect others around you: Learn how to safely use, store and throw away your medicines at www.disposemymeds.org.          This list is accurate as of: 1/8/18  6:04 PM.  Always use your most recent med list.                   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, estrogen receptor positive (H)       gabapentin 600 MG tablet    NEURONTIN    180 tablet    Take 1 tablet (600 mg) by mouth 3 times daily    Trigeminal neuralgia       hydrochlorothiazide 25 MG tablet    HYDRODIURIL     Take 12.5 mg by mouth every morning    Essential hypertension with goal blood pressure less than 140/90       * HYDROcodone-acetaminophen 5-325 MG per tablet    NORCO     Take 1-2 tablets by mouth as needed for moderate to severe pain        * HYDROcodone-acetaminophen 5-325 MG per tablet    NORCO    5 tablet    Take 1-2 tablets by mouth every 6 hours as needed for other (Moderate to Severe Pain)    Vaginal dysplasia       * Notice:  This list has 2 medication(s) that are the same as other medications prescribed for you. Read the directions carefully, and ask your doctor or other care provider to review them with you.

## 2018-01-09 ENCOUNTER — OFFICE VISIT (OUTPATIENT)
Dept: FAMILY MEDICINE | Facility: CLINIC | Age: 68
End: 2018-01-09
Payer: COMMERCIAL

## 2018-01-09 VITALS
DIASTOLIC BLOOD PRESSURE: 72 MMHG | SYSTOLIC BLOOD PRESSURE: 134 MMHG | HEART RATE: 65 BPM | TEMPERATURE: 98 F | WEIGHT: 250 LBS | OXYGEN SATURATION: 96 % | HEIGHT: 68 IN | BODY MASS INDEX: 37.89 KG/M2

## 2018-01-09 DIAGNOSIS — C53.9 MALIGNANT NEOPLASM OF CERVIX, UNSPECIFIED SITE (H): ICD-10-CM

## 2018-01-09 DIAGNOSIS — C50.412 MALIGNANT NEOPLASM OF UPPER-OUTER QUADRANT OF BOTH BREASTS IN FEMALE, ESTROGEN RECEPTOR POSITIVE (H): ICD-10-CM

## 2018-01-09 DIAGNOSIS — G50.0 TRIGEMINAL NEURALGIA: ICD-10-CM

## 2018-01-09 DIAGNOSIS — N89.3 VAGINAL DYSPLASIA: ICD-10-CM

## 2018-01-09 DIAGNOSIS — M17.11 OSTEOARTHRITIS OF RIGHT KNEE, UNSPECIFIED OSTEOARTHRITIS TYPE: ICD-10-CM

## 2018-01-09 DIAGNOSIS — D3A.00 CARCINOID TUMOR (H): ICD-10-CM

## 2018-01-09 DIAGNOSIS — I10 ESSENTIAL HYPERTENSION WITH GOAL BLOOD PRESSURE LESS THAN 140/90: Primary | ICD-10-CM

## 2018-01-09 DIAGNOSIS — E78.5 HYPERLIPIDEMIA LDL GOAL <130: ICD-10-CM

## 2018-01-09 DIAGNOSIS — C50.411 MALIGNANT NEOPLASM OF UPPER-OUTER QUADRANT OF BOTH BREASTS IN FEMALE, ESTROGEN RECEPTOR POSITIVE (H): ICD-10-CM

## 2018-01-09 DIAGNOSIS — Z17.0 MALIGNANT NEOPLASM OF UPPER-OUTER QUADRANT OF BOTH BREASTS IN FEMALE, ESTROGEN RECEPTOR POSITIVE (H): ICD-10-CM

## 2018-01-09 PROBLEM — C78.6 PERITONEAL METASTASES: Status: ACTIVE | Noted: 2017-10-30

## 2018-01-09 PROBLEM — D3A.026 RECTAL CARCINOID TUMOR (H): Status: ACTIVE | Noted: 2017-10-30

## 2018-01-09 PROBLEM — D3A.026 RECTAL CARCINOID TUMOR (H): Status: RESOLVED | Noted: 2017-10-30 | Resolved: 2018-01-09

## 2018-01-09 PROCEDURE — 80061 LIPID PANEL: CPT | Performed by: FAMILY MEDICINE

## 2018-01-09 PROCEDURE — 99214 OFFICE O/P EST MOD 30 MIN: CPT | Performed by: FAMILY MEDICINE

## 2018-01-09 PROCEDURE — 36415 COLL VENOUS BLD VENIPUNCTURE: CPT | Performed by: FAMILY MEDICINE

## 2018-01-09 RX ORDER — HYDROCHLOROTHIAZIDE 25 MG/1
12.5 TABLET ORAL EVERY MORNING
Qty: 45 TABLET | Refills: 1 | Status: SHIPPED | OUTPATIENT
Start: 2018-01-09 | End: 2018-07-01

## 2018-01-09 NOTE — PROGRESS NOTES
Follow Up Notes on Referred Patient    Date: 2018       Dr. Bernice Howard MD  [address] [place] [address]       RE: Marissa Guadarrama  : 1950  FRITZ: 2018    Dear Dr. 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      Patient presents today for followup.  She has been doing well.  No nausea, vomiting, fevers or chills. Normal urinary and bowel function. No B symptoms.           Past Medical History:    Past Medical History:   Diagnosis Date     Arthritis     knee     Benign breast biopsy     benign     Carcinoid tumor 2003     Cervical cancer (H)      H/O colposcopy with cervical biopsy 13    vaginal cuff biopsy- VAIN III. referred back to gyn/onc     High cholesterol      HTN      Pap smear of vagina with ASC-H 13     Post-polio syndromes      Trigeminal neuralgias          Past Surgical History:    Past Surgical History:   Procedure Laterality Date     APPENDECTOMY       BIOPSY NODE SENTINEL Bilateral 2016    Procedure: BIOPSY NODE SENTINEL;  Surgeon: Brent Arana MD;  [facility]     C BSO, OMENTECTOMY W/OSMAN  2007     C TOTAL ABDOM HYSTERECTOMY  2007     CL AFF SURGICAL PATHOLOGY       COLONOSCOPY N/A 2017    Procedure: COMBINED COLONOSCOPY, SINGLE OR MULTIPLE BIOPSY/POLYPECTOMY BY BIOPSY;  Colonoscopy Dx:Carcinoid tumor of colon prep mailed golytely;  Surgeon: Talisha Greco MD;  [facility]     COLPOSCOPY, BIOPSY, COMBINED  3/13/2014    Procedure: COMBINED COLPOSCOPY, BIOPSY;;  Surgeon: Lara Pack MD;  [facility]     COMBINED CYSTOSCOPY, RETROGRADES, URETEROSCOPY, INSERT STENT Left 2017    Procedure: COMBINED CYSTOSCOPY, RETROGRADES, URETEROSCOPY, INSERT STENT;  Surgeon: Zhao La MD;  [facility]     CYSTOSCOPY, RETROGRADES, INSERT STENT URETER(S), COMBINED Left 2017    Procedure: COMBINED CYSTOSCOPY, RETROGRADES, INSERT  STENT URETER(S);  Cystoscopy,Left Stent Exchange;  Surgeon: Zhao La MD;  [facility]     CYSTOSCOPY, RETROGRADES, INSERT STENT URETER(S), COMBINED  12/12/2017    Procedure: COMBINED CYSTOSCOPY, RETROGRADES, INSERT STENT URETER(S);;  Surgeon: Zhao La MD;  [facility]     EXAM UNDER ANESTHESIA PELVIC  3/13/2014    Procedure: EXAM UNDER ANESTHESIA PELVIC;  Exam Under Anestheisa, Colposcopy, Vaginal Biopsies, Co2 Laser of the Upper Vagina;  Surgeon: Lara Pack MD;  [facility]     HERNIORRHAPHY INCISIONAL (LOCATION)       LASER CO2 VAGINA  3/13/2014    VAIN 1/2     LASER CO2 VAGINA N/A 12/12/2017    Procedure: LASER CO2 VAGINA;  Exam Under Anesthesia, CO2 Laser Ablation Of Vagina, Cystoscopy, Left Retrograde Pyelogram with Left Stent Placement;  Surgeon: Nic Segundo MD;  [facility]     LUMPECTOMY BREAST WITH SEED LOCALIZATION Bilateral 6/1/2016    Procedure: LUMPECTOMY BREAST WITH SEED LOCALIZATION;  Surgeon: Brent Arana MD;  [facility]     SURGICAL HISTORY OF -       ovarian cystectomy     SURGICAL HISTORY OF -   2003    right colon resection secondary to carcinoid tumor     TUBAL LIGATION           Health Maintenance Due   Topic Date Due     MICROALBUMIN Q1 YEAR  02/05/2017     LIPID MONITORING Q1 YEAR  03/16/2017     PAP Q6 MOS DIAGNOSTIC  03/19/2017     FALL RISK ASSESSMENT  11/02/2017       Current Medications:     Current Outpatient Prescriptions   Medication Sig Dispense Refill     exemestane (AROMASIN) 25 MG tablet Take 1 tablet (25 mg) by mouth daily (Patient taking differently: Take 25 mg by mouth every morning ) 90 tablet 1     gabapentin (NEURONTIN) 600 MG tablet Take 1 tablet (600 mg) by mouth 3 times daily 180 tablet 6     hydrochlorothiazide (HYDRODIURIL) 25 MG tablet Take 12.5 mg by mouth every morning        HYDROcodone-acetaminophen (NORCO) 5-325 MG per tablet Take 1-2 tablets by mouth every 6 hours as needed for  "other (Moderate to Severe Pain) (Patient not taking: Reported on 1/8/2018) 5 tablet 0     HYDROcodone-acetaminophen (NORCO) 5-325 MG per tablet Take 1-2 tablets by mouth as needed for moderate to severe pain       albuterol (PROAIR HFA, PROVENTIL HFA, VENTOLIN HFA) 108 (90 BASE) MCG/ACT inhaler Inhale 2 puffs into the lungs every 6 hours as needed for shortness of breath / dyspnea or wheezing (Patient not taking: Reported on 1/8/2018) 1 Inhaler 1         Allergies:        Allergies   Allergen Reactions     Nkda [No Known Drug Allergies]         Social History:     Social History   Substance Use Topics     Smoking status: Former Smoker     Packs/day: 1.00     Years: 29.00     Types: Cigarettes     Quit date: 10/30/2006     Smokeless tobacco: Never Used     Alcohol use No       History   Drug Use No         Family History:       Family History   Problem Relation Age of Onset     CANCER Mother      bone / liver     Breast Cancer Mother      CANCER Maternal Grandmother      CANCER Maternal Grandfather      CANCER Paternal Grandmother      CANCER Paternal Grandfather      CANCER Sister      vulvar ca, cervical ca, squamous cell cancer     CANCER Brother      Rectal- Stage 4     Rectal Cancer Brother      Breast Cancer Paternal Aunt      Colon Cancer Paternal Aunt      Breast Cancer Maternal Aunt      Pancreatic Cancer Nephew      Breast Cancer Sister          Physical Exam:     /80 (BP Location: Left arm, Patient Position: Sitting, Cuff Size: Adult Large)  Pulse 72  Temp 99.5  F (37.5  C) (Tympanic)  Resp 16  Ht 1.715 m (5' 7.52\")  Wt 112.5 kg (248 lb)  SpO2 93%  BMI 38.25 kg/m2  Body mass index is 38.25 kg/(m^2).    General Appearance: healthy and alert, no distress     PELVIC:  Normal external genitalia.  Well-healing laser sites in the vaginal mucosa.           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     A total of 20 minutes was spent with the patient, 15 " minutes of which were spent in counseling the patient and/or treatment planning.      1.  JAYDEN 3.   2.  AIN 3.   3.  Carcinoid tumor.      Discussed with patient we will see her back in 3-4 months for another colposcopy for any evidence of dysplasia.  She is recovering well from surgery.  The patient agrees with the plan.  She is very appreciative of her care.  All questions were answered.       Nic Segundo MD, MS    Department of Obstetrics and Gynecology   Division of Gynecologic Oncology   [facility]  Phone: [phone]        CC  Patient Care Team:  Bernice Howard MD as PCP - Maria C Noguera MD as MD (OB/Gyn)  Mita Koehler MD as MD (Radiation Oncology)  Hosea King MD as MD (Hematology & Oncology)  Zhao La MD as MD (Urology)  Talisha Greco MD as MD (Colon and Rectal Surgery)  Allen Downey MD as MD (Orthopedics)  BERNICE HOWARD

## 2018-01-09 NOTE — LETTER
[facility]  [address] [place]  [address]  [phone]        January 11, 2018    Marissa Guadarrama  [address] [place] [id number]        Dear Marissa,    Thank you for getting your labs done.  Your labs showed your triglycerides are still elevated.  You can take fish oil 2000 mg daily and reduce the sugar in your diet to reduce these.  Feel free to email or call if you have any questions.     Results for orders placed or performed in visit on 01/09/18   Lipid panel reflex to direct LDL Non-fasting   Result Value Ref Range    Cholesterol 191 <200 mg/dL    Triglycerides 226 (H) <150 mg/dL    HDL Cholesterol 53 >49 mg/dL    LDL Cholesterol Calculated 93 <100 mg/dL    Non HDL Cholesterol 138 (H) <130 mg/dL       If you have any questions please call the clinic at [phone].    Sincerely,    Zaida Saldana DO  SK[facility]

## 2018-01-09 NOTE — MR AVS SNAPSHOT
After Visit Summary   1/9/2018    Marissa Guadarrama    MRN: [id number]           Patient Information     Date Of Birth          1950        Visit Information        Provider Department      1/9/2018 10:40 AM Zaida Saldana DO [facility]        Today's Diagnoses     Essential hypertension with goal blood pressure less than 140/90    -  1    Osteoarthritis of right knee, unspecified osteoarthritis type        Hyperlipidemia LDL goal <130        Trigeminal neuralgia           Follow-ups after your visit        Your next 10 appointments already scheduled     Apr 03, 2018 10:00 AM CDT   (Arrive by 9:45 AM)   COLPOSCOPY with  GYN ONC COLPOSCOPY PROVIDER   [facility] ([facility])    [address]  [address]  [facility] [address] [phone]            May 14, 2018  8:30 AM CDT   LAB with [facility] ([facility])    [address]  [facility] [id number] [phone]           Please do not eat 10-12 hours before your appointment if you are coming in fasting for labs on lipids, cholesterol, or glucose (sugar). This does not apply to pregnant women. Water, hot tea and black coffee (with nothing added) are okay. Do not drink other fluids, diet soda or chew gum.            May 16, 2018  1:30 PM CDT   Return Visit with Hosea King MD   [facility] ([facility])    [facility]  [address]  [facility] [id number] [phone]              Who to contact     If you have questions or need follow up information about today's clinic visit or your schedule please contact [facility] directly at [phone].  Normal or non-critical lab and imaging results will be communicated to you by MyChart, letter or phone within 4 business days after the clinic has received the results. If you do not hear from us  "within 7 days, please contact the clinic through [employer]ched[employer] or phone. If you have a critical or abnormal lab result, we will notify you by phone as soon as possible.  Submit refill requests through [employer] or call your pharmacy and they will forward the refill request to us. Please allow 3 business days for your refill to be completed.          Additional Information About Your Visit        HitchedPic Information     HitchedPic lets you send messages to your doctor, view your test results, renew your prescriptions, schedule appointments and more. To sign up, go to [email or web address] . Click on \"Log in\" on the left side of the screen, which will take you to the Welcome page. Then click on \"Sign up Now\" on the right side of the page.     You will be asked to enter the access code listed below, as well as some personal information. Please follow the directions to create your username and password.     Your access code is: [email or web address]  Expires: 2018 12:30 PM     Your access code will  in 90 days. If you need help or a new code, please call your [facility] clinic or [phone].        Care EveryWhere ID     This is your Care EveryWhere ID. This could be used by other organizations to access your [facility] medical records  [id number]        Your Vitals Were     Pulse Temperature Height Pulse Oximetry BMI (Body Mass Index)       65 98  F (36.7  C) (Oral) 5' 7.52\" (1.715 m) 96% 38.55 kg/m2        Blood Pressure from Last 3 Encounters:   18 134/72   18 142/80   17 125/56    Weight from Last 3 Encounters:   18 250 lb (113.4 kg)   18 248 lb (112.5 kg)   17 250 lb (113.4 kg)              We Performed the Following     Lipid panel reflex to direct LDL Non-fasting          Today's Medication Changes          These changes are accurate as of: 18 11:28 AM.  If you have any questions, ask your nurse or doctor.               These medicines have changed or have updated prescriptions.  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 Continue taking this medication, and follow the directions you see here.   Used for:  Vaginal dysplasia        Dose:  1-2 tablet   Take 1-2 tablets by mouth every 6 hours as needed for other (Moderate to Severe Pain)   Quantity:  5 tablet   Refills:  0            Where to get your medicines      These medications were sent to [facility] [address] [place] [address] [address] [place] [address]  [address] [place] [address]     Phone:  [phone]     hydrochlorothiazide 25 MG tablet                Primary Care Provider Office Phone # Fax #    Zaida ShieldsDO crystal [phone]       [address] [place] [address]        Equal Access to Services     [facility] CenterLORENA : Hadii karina dailyo So[place], waaxda luqadaha, qaybta kaalmada adeegyaadelita, niesha tang . So [facility] [phone].    ATENCIÓN: Si habla español, tiene a allan disposición servicios gratuitos de asistencia lingüística. Llame al [phone].    We comply with applicable federal civil rights laws and [place] laws. We do not discriminate on the basis of race, color, national origin, age, disability, sex, sexual orientation, or gender identity.            Thank you!     Thank you for choosing [facility]  for your care. Our goal is always to provide you with excellent care. Hearing back from our patients is one way we can continue to improve our services. Please take a few minutes to complete the written survey  that you may receive in the mail after your visit with us. Thank you!             Your Updated Medication List - Protect others around you: Learn how to safely use, store and throw away your medicines at www.disposemymeds.org.          This list is accurate as of: 1/9/18 11:28 AM.  Always use your most recent med list.                   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

## 2018-01-09 NOTE — PROGRESS NOTES
SUBJECTIVE:   Marissa Guadarrama is a 67 year old female who presents to clinic today for the following health issues:      Hypertension Follow-up      Outpatient blood pressures are being checked at home.      Low Salt Diet: low salt    Hctz 12.5 mg daily         Amount of exercise or physical activity: None    Problems taking medications regularly: No    Medication side effects: none    Diet: low salt    She has had several cancers and tumors.  She is doing well.  She had a ureter procedure recently.  She is not diagnosed with a cancer syndrome.  She has not had the carcinoid tumor removed yet because it is slow growing.  It is close to her urethra.  Currently all the cancers are in remission.  She is not on chemo now.  Her oncologist has her on aromain for life.      She has trigeminal neuralgia.  She is taking neurontin.  She has to take name brand neurontin because of side effects with the gabapentin.  She is interested in the nerve block.      She has severe arthritis in her right knee.  She takes norco for this.  She had knee injections in the past and this was helpful.      She was a smoker and quit about 12 years ago.  She smoked about 30 plus years on and off.  She uses albuterol occasional for sob.      She has CKD and this is monitored closely by her nephrologist.   Her last creatinine was 1.55 on 12/11/17.    Problem list and histories reviewed & adjusted, as indicated.  Additional history: as documented    BP Readings from Last 3 Encounters:   01/09/18 134/72   01/08/18 142/80   12/12/17 125/56    Wt Readings from Last 3 Encounters:   01/09/18 250 lb (113.4 kg)   01/08/18 248 lb (112.5 kg)   12/12/17 250 lb (113.4 kg)                      Reviewed and updated as needed this visit by clinical staffTobacco  Allergies  Med Hx  Surg Hx  Fam Hx  Soc Hx      Reviewed and updated as needed this visit by Provider       ROS:  Constitutional, HEENT, cardiovascular, pulmonary, GI, , musculoskeletal,  "neuro, skin, endocrine and psych systems are negative, except as otherwise noted.      OBJECTIVE:   /72 (BP Location: Right arm, Cuff Size: Adult Large)  Pulse 65  Temp 98  F (36.7  C) (Oral)  Ht 5' 7.52\" (1.715 m)  Wt 250 lb (113.4 kg)  SpO2 96%  BMI 38.55 kg/m2  Body mass index is 38.55 kg/(m^2).  GENERAL: healthy, alert and no distress  EYES: Eyes grossly normal to inspection, PERRL and conjunctivae and sclerae normal  HENT: normal cephalic/atraumatic, oropharynx clear and oral mucous membranes moist  NECK: no adenopathy, no asymmetry, masses, or scars and thyroid normal to palpation  RESP: lungs clear to auscultation - no rales, rhonchi or wheezes  CV: regular rate and rhythm, normal S1 S2, no S3 or S4, no murmur, click or rub, no peripheral edema and peripheral pulses strong  MS: arthritis of the right knee with effusion    Diagnostic Test Results:  none     ASSESSMENT/PLAN:     Hypertension; controlled   Associated with the following complications:    None   Plan:  No changes in the patient's current treatment plan      ASSESSMENT/PLAN:      ICD-10-CM    1. Essential hypertension with goal blood pressure less than 140/90 I10 hydrochlorothiazide (HYDRODIURIL) 25 MG tablet   2. Osteoarthritis of right knee, unspecified osteoarthritis type M17.11    3. Hyperlipidemia LDL goal <130 E78.5 Lipid panel reflex to direct LDL Non-fasting   4. Trigeminal neuralgia G50.0    5. Malignant neoplasm of cervix, unspecified site (H) C53.9    6. Carcinoid tumor D3A.00    7. 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 Vaginal dysplasia N89.3      She was informed she could return for steroid injection for arthritis.  She is planning on getting a procedure for her trigeminal neuralgia.  Her cancers are followed closely by oncology     FUTURE APPOINTMENTS:       - Follow-up visit in 6 months for HTN sooner if knee injection "     Zaida Saldana,   [facility]

## 2018-01-10 LAB
CHOLEST SERPL-MCNC: 191 MG/DL
HDLC SERPL-MCNC: 53 MG/DL
LDLC SERPL CALC-MCNC: 93 MG/DL
NONHDLC SERPL-MCNC: 138 MG/DL
TRIGL SERPL-MCNC: 226 MG/DL

## 2018-01-25 ENCOUNTER — TELEPHONE (OUTPATIENT)
Dept: UROLOGY | Facility: CLINIC | Age: 68
End: 2018-01-25

## 2018-01-25 DIAGNOSIS — R30.0 DYSURIA: Primary | ICD-10-CM

## 2018-01-25 RX ORDER — SULFAMETHOXAZOLE/TRIMETHOPRIM 800-160 MG
1 TABLET ORAL 2 TIMES DAILY
Qty: 14 TABLET | Refills: 0 | Status: SHIPPED | OUTPATIENT
Start: 2018-01-25 | End: 2018-05-16

## 2018-01-25 NOTE — TELEPHONE ENCOUNTER
Reason for Call:  Other call back    Detailed comments: pt calling stating she has UTI symptoms, cloudy urine, pain w/ urination x 1 weeks. Would like to get a medicaiton or would she need to come in for an appt?    Phone Number Patient can be reached at: Home number on file [phone] (home)    Best Time: any     Can we leave a detailed message on this number? YES    Call taken on 1/25/2018 at 2:17 PM by Yaima Agarwal

## 2018-01-25 NOTE — TELEPHONE ENCOUNTER
Still has STENT from Dec Procedure.    We then placed a 6x26 double-J stent over the Super Stiff wire.  The proximal curl was confirmed by fluoroscopy, distal curl confirmed by direct vision.  The patient's bladder was drained and at this point Dr. Segundo came in the operating room for his part of the procedure.  For description of that, please see his separate dictated note.  Surgeon for the stent this is Zhao La MD       EUA was performed. Acetic acid soaked gauze was placed in the vagina and on the vulva for approximately 1 minute. A coated speculum was placed in the vagina and the vaginal cuff visualized with findings noted above.  The carbon dioxide laser was prepared. The power was set at 10 lees. The laser was activated and the 1x1 cm 9 o'clock area was systematically vaporized. After thorough inspection of the vaporized area, the laser was turned off, speculum removed and patient cleaned.    Dr aL,  OK for lab only Urine?    Would you like to consider starting treat right away after Urine left at lab?  Or await full CUlture result to verify this is indeed infection?    Pauline Farr RN  [facility]

## 2018-01-26 DIAGNOSIS — N13.5 URETERAL OBSTRUCTION: ICD-10-CM

## 2018-01-26 DIAGNOSIS — R30.0 DYSURIA: ICD-10-CM

## 2018-01-26 LAB
ALBUMIN UR-MCNC: ABNORMAL MG/DL
APPEARANCE UR: ABNORMAL
BACTERIA #/AREA URNS HPF: ABNORMAL /HPF
BILIRUB UR QL STRIP: NEGATIVE
COLOR UR AUTO: YELLOW
ERYTHROCYTE [DISTWIDTH] IN BLOOD BY AUTOMATED COUNT: 13.9 % (ref 10–15)
GLUCOSE UR STRIP-MCNC: NEGATIVE MG/DL
HCT VFR BLD AUTO: 46 % (ref 35–47)
HGB BLD-MCNC: 14.7 G/DL (ref 11.7–15.7)
HGB UR QL STRIP: ABNORMAL
KETONES UR STRIP-MCNC: NEGATIVE MG/DL
LEUKOCYTE ESTERASE UR QL STRIP: ABNORMAL
MCH RBC QN AUTO: 30.6 PG (ref 26.5–33)
MCHC RBC AUTO-ENTMCNC: 32 G/DL (ref 31.5–36.5)
MCV RBC AUTO: 96 FL (ref 78–100)
NITRATE UR QL: POSITIVE
PH UR STRIP: 6.5 PH (ref 5–7)
PLATELET # BLD AUTO: 192 10E9/L (ref 150–450)
RBC # BLD AUTO: 4.81 10E12/L (ref 3.8–5.2)
RBC #/AREA URNS AUTO: ABNORMAL /HPF
SOURCE: ABNORMAL
SP GR UR STRIP: 1.01 (ref 1–1.03)
UROBILINOGEN UR STRIP-ACNC: 0.2 EU/DL (ref 0.2–1)
WBC # BLD AUTO: 7.8 10E9/L (ref 4–11)
WBC #/AREA URNS AUTO: ABNORMAL /HPF

## 2018-01-26 PROCEDURE — 87186 SC STD MICRODIL/AGAR DIL: CPT | Performed by: UROLOGY

## 2018-01-26 PROCEDURE — 87088 URINE BACTERIA CULTURE: CPT | Performed by: UROLOGY

## 2018-01-26 PROCEDURE — 81001 URINALYSIS AUTO W/SCOPE: CPT | Performed by: UROLOGY

## 2018-01-26 PROCEDURE — 85027 COMPLETE CBC AUTOMATED: CPT | Performed by: UROLOGY

## 2018-01-26 PROCEDURE — 36415 COLL VENOUS BLD VENIPUNCTURE: CPT | Performed by: UROLOGY

## 2018-01-26 PROCEDURE — 87086 URINE CULTURE/COLONY COUNT: CPT | Performed by: UROLOGY

## 2018-01-28 LAB
BACTERIA SPEC CULT: ABNORMAL
Lab: ABNORMAL
SPECIMEN SOURCE: ABNORMAL

## 2018-02-18 ENCOUNTER — HEALTH MAINTENANCE LETTER (OUTPATIENT)
Age: 68
End: 2018-02-18

## 2018-04-03 ENCOUNTER — OFFICE VISIT (OUTPATIENT)
Dept: ONCOLOGY | Facility: CLINIC | Age: 68
End: 2018-04-03
Attending: OBSTETRICS & GYNECOLOGY
Payer: MEDICARE

## 2018-04-03 VITALS
OXYGEN SATURATION: 92 % | SYSTOLIC BLOOD PRESSURE: 168 MMHG | DIASTOLIC BLOOD PRESSURE: 80 MMHG | WEIGHT: 248.9 LBS | HEART RATE: 66 BPM | BODY MASS INDEX: 38.39 KG/M2 | RESPIRATION RATE: 18 BRPM | TEMPERATURE: 99 F

## 2018-04-03 DIAGNOSIS — N89.3 VAGINAL DYSPLASIA: Primary | ICD-10-CM

## 2018-04-03 PROCEDURE — 57420 EXAM OF VAGINA W/SCOPE: CPT | Mod: ZF

## 2018-04-03 PROCEDURE — 99213 OFFICE O/P EST LOW 20 MIN: CPT | Mod: 25 | Performed by: OBSTETRICS & GYNECOLOGY

## 2018-04-03 PROCEDURE — 57420 EXAM OF VAGINA W/SCOPE: CPT | Mod: ZP | Performed by: OBSTETRICS & GYNECOLOGY

## 2018-04-03 PROCEDURE — G0463 HOSPITAL OUTPT CLINIC VISIT: HCPCS | Mod: ZF

## 2018-04-03 ASSESSMENT — PAIN SCALES - GENERAL: PAINLEVEL: NO PAIN (0)

## 2018-04-03 NOTE — NURSING NOTE
"Oncology Rooming Note    April 3, 2018 9:54 AM   Marissa Guadarrama is a 67 year old female who presents for:    Chief Complaint   Patient presents with     Oncology Clinic Visit     Return for Colpo procedure      Initial Vitals: /80  Pulse 66  Temp 99  F (37.2  C) (Oral)  Resp 18  Wt 112.9 kg (248 lb 14.4 oz)  SpO2 92%  BMI 38.39 kg/m2 Estimated body mass index is 38.39 kg/(m^2) as calculated from the following:    Height as of 1/9/18: 1.715 m (5' 7.52\").    Weight as of this encounter: 112.9 kg (248 lb 14.4 oz). Body surface area is 2.32 meters squared.  No Pain (0) Comment: Data Unavailable   No LMP recorded. Patient has had a hysterectomy.  Allergies reviewed: Yes  Medications reviewed: Yes    Medications: Medication refills not needed today.  Pharmacy name entered into [employer]: [facility] [address]     Clinical concerns: results    was notified.    8 minutes for nursing intake (face to face time)     Leticia Barnes MA              "

## 2018-04-03 NOTE — LETTER
"4/3/2018       RE: Marissa Guadarrama  [address] [address]     Dear Colleague,    Thank you for referring your patient, Marissa Guadarrama, to the [facility]. Please see a copy of my visit note below.                Follow Up Notes on Referred Patient    Date: 4/3/2018     RE: Marissa Guadarrama  : 1950    Marissa Guadarrama is a 67 year old woman with a diagnosis of VAIN3.  Notably, she has history of cervical cancer treated in  at [facility], a history of colon cancer and recurrent carcinoid tumor and breast cancer.   She is here today for colposcopy.     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 Depth of invasion 7 mm out of 1.9 and horizontal spread of 1 cm. 48 negative nodes.   : ASCUS pap  13 Vaginal Pap returned ASC-H.   13 Colposcopy/Vaginal cuff (submitted as \"cervix\"), biopsy:VAIN III    14: Patient established care with Dr. Pack and desired surgical management    3/13/14: Colposcopy, Vaginal Biopsies, Co2 Laser of the Upper Vagina on , which showed low-grade squamous intraepithelial lesion (VAIN 1) at 12:00 and 6:00 and high-grade squamous intraepithelial lesion VAIN 29:00 vaginal cuff. Vaginal cuff, 3:00 (biopsy): Mostly denuded squamous mucosa with focal features suggestive of low-grade squamous intraepithelial lesion (VAIN 1)    14: biopsy of right upper vagina showed VAINI, negative p16    2/9/15: upper vaginal biopsy \"LSIL\"    17:  VAIN III    17: CO2 laser and biopsies of vaginal dysplasia    Today, she denies pelvic or abdominal pain, vaginal bleeding or discharge.  No vulvar symptoms. She denies weight loss, fevers, nightsweats.  She reports leaking urine related to a urethral stent, but otherwise has no complaints.      Past Medical History:    Past Medical History:   Diagnosis Date     Arthritis     knee     Benign breast biopsy  "    benign     Carcinoid tumor 12/2003     Cervical cancer (H)      H/O colposcopy with cervical biopsy 12/23/13    vaginal cuff biopsy- VAIN III. referred back to gyn/onc     High cholesterol      HTN      Pap smear of vagina with ASC-H 11/1/13     Post-polio syndromes      Trigeminal neuralgias      Past Surgical History:    Past Surgical History:   Procedure Laterality Date     APPENDECTOMY  1983     BIOPSY NODE SENTINEL Bilateral 6/1/2016    Procedure: BIOPSY NODE SENTINEL;  Surgeon: Brent Arana MD;  Location: [facility]     C BSO, OMENTECTOMY W/OSMAN  5/2007     C TOTAL ABDOM HYSTERECTOMY  5/2007     CL AFF SURGICAL PATHOLOGY       COLONOSCOPY N/A 6/23/2017    Procedure: COMBINED COLONOSCOPY, SINGLE OR MULTIPLE BIOPSY/POLYPECTOMY BY BIOPSY;  Colonoscopy Dx:Carcinoid tumor of colon prep mailed golytely;  Surgeon: Talisha Greco MD;  Location: [facility]     COLPOSCOPY, BIOPSY, COMBINED  3/13/2014    Procedure: COMBINED COLPOSCOPY, BIOPSY;;  Surgeon: Lara Pack MD;  Location: [facility]     COMBINED CYSTOSCOPY, RETROGRADES, URETEROSCOPY, INSERT STENT Left 2/2/2017    Procedure: COMBINED CYSTOSCOPY, RETROGRADES, URETEROSCOPY, INSERT STENT;  Surgeon: Zhao La MD;  Location: [facility]     CYSTOSCOPY, RETROGRADES, INSERT STENT URETER(S), COMBINED Left 9/7/2017    Procedure: COMBINED CYSTOSCOPY, RETROGRADES, INSERT STENT URETER(S);  Cystoscopy,Left Stent Exchange;  Surgeon: Zhao La MD;  Location: [facility]     CYSTOSCOPY, RETROGRADES, INSERT STENT URETER(S), COMBINED  12/12/2017    Procedure: COMBINED CYSTOSCOPY, RETROGRADES, INSERT STENT URETER(S);;  Surgeon: Zhao La MD;  [facility]     EXAM UNDER ANESTHESIA PELVIC  3/13/2014    Procedure: EXAM UNDER ANESTHESIA PELVIC;  Exam Under Anestheisa, Colposcopy, Vaginal Biopsies, Co2 Laser of the Upper Vagina;  Surgeon: Lara Pack MD;  Location: [facility]     HERNIORRHAPHY INCISIONAL (LOCATION)       LASER CO2  VAGINA  3/13/2014    VAIN 1/2     LASER CO2 VAGINA N/A 12/12/2017    Procedure: LASER CO2 VAGINA;  Exam Under Anesthesia, CO2 Laser Ablation Of Vagina, Cystoscopy, Left Retrograde Pyelogram with Left Stent Placement;  Surgeon: Nic Segundo MD;  Location: [facility]     LUMPECTOMY BREAST WITH SEED LOCALIZATION Bilateral 6/1/2016    Procedure: LUMPECTOMY BREAST WITH SEED LOCALIZATION;  Surgeon: Brent Arana MD;  Location: [facility]     SURGICAL HISTORY OF -       ovarian cystectomy     SURGICAL HISTORY OF -   2003    right colon resection secondary to carcinoid tumor     TUBAL LIGATION       Health Maintenance Due   Topic Date Due     MICROALBUMIN Q1 YEAR  02/05/2017     PAP Q6 MOS DIAGNOSTIC  03/19/2017     Current Medications:   Current Outpatient Prescriptions   Medication Sig Dispense Refill     hydrochlorothiazide (HYDRODIURIL) 25 MG tablet Take 0.5 tablets (12.5 mg) by mouth every morning 45 tablet 1     HYDROcodone-acetaminophen (NORCO) 5-325 MG per tablet Take 1-2 tablets by mouth every 6 hours as needed for other (Moderate to Severe Pain) 5 tablet 0     exemestane (AROMASIN) 25 MG tablet Take 1 tablet (25 mg) by mouth daily (Patient taking differently: Take 25 mg by mouth every morning ) 90 tablet 1     gabapentin (NEURONTIN) 600 MG tablet Take 1 tablet (600 mg) by mouth 3 times daily 180 tablet 6     albuterol (PROAIR HFA, PROVENTIL HFA, VENTOLIN HFA) 108 (90 BASE) MCG/ACT inhaler Inhale 2 puffs into the lungs every 6 hours as needed for shortness of breath / dyspnea or wheezing 1 Inhaler 1     sulfamethoxazole-trimethoprim (BACTRIM DS/SEPTRA DS) 800-160 MG per tablet Take 1 tablet by mouth 2 times daily (Patient not taking: Reported on 4/3/2018) 14 tablet 0     Allergies:    Allergies   Allergen Reactions     Nkda [No Known Drug Allergies]       Social History:  Social History   Substance Use Topics     Smoking status: Former Smoker     Packs/day: 1.00     Years: 29.00     Types: Cigarettes      Quit date: 10/30/2006     Smokeless tobacco: Never Used     Alcohol use No       History   Drug Use No     Family History:     The patient's family history is notable for a sister with vulvar and cervical cancer.    Family History   Problem Relation Age of Onset     CANCER Mother      bone / liver     Breast Cancer Mother      CANCER Maternal Grandmother      CANCER Maternal Grandfather      CANCER Paternal Grandmother      CANCER Paternal Grandfather      CANCER Sister      vulvar ca, cervical ca, squamous cell cancer     CANCER Brother      Rectal- Stage 4     Rectal Cancer Brother      Breast Cancer Paternal Aunt      Colon Cancer Paternal Aunt      Breast Cancer Maternal Aunt      Pancreatic Cancer Nephew      Breast Cancer Sister          Physical Exam:     /80  Pulse 66  Temp 99  F (37.2  C) (Oral)  Resp 18  Wt 112.9 kg (248 lb 14.4 oz)  SpO2 92%  BMI 38.39 kg/m2  Body mass index is 38.39 kg/(m^2).    General Appearance: healthy and alert, no distress       Cardiovascular: regular rate     Respiratory: Breathing comfortably on room air    Musculoskeletal: extremities non tender, bilateral lower    Skin: no lesions or rashes     Psychiatric: appropriate mood and affect             Genitourinary: External genitalia and urethral meatus appears normal.  Vagina is smooth without nodularity or masses.    Bimanual exam reveal no masses, nodularity or fullness. Vulvar irritation noted.      Colposcopy Note:    Written and verbal informed consent obtained.    Prior to the start of the procedure and with procedural staff participation, I verbally confirmed the patient s identity using two indicators, relevant allergies, that the procedure was appropriate and matched the consent or emergent situation, and that the correct equipment/implants were available. Immediately prior to starting the procedure I conducted the Time Out with the procedural staff and re-confirmed the patient s name, procedure, and  site/side. (The Joint Commission universal protocol was followed.)  Yes    Sedation (Moderate or Deep): None      Colposcopy of vagina:  The entire vaginal apex and vaginal walls were visualized.  Acetic acid solution was placed on the vagina.  Colposcopy of entire vagina and apex viewed under colposcopic enhancement. Scarring from prior procedure, no discrete lesions or abnormal vascularity.  No biopsies performed. Adequate visualization of vagina.  Patient tolerated well.     Assessment:    Marissa Guadarrama is a 67 year old woman with a diagnosis of persistent VAIN3.  She also has history of multiple cancers including metastatic carcinoid tumor and bilateral breast cancer.    A total of 40 minutes was spent with the patient, 15 minutes of which were spent in counseling the patient and/or treatment planning, 25 minutes procedure time.     Plan:   History of VAINIII:  Stable exam today.  Reviewed risk for recurrence, need for regular follow-up. Recommend exams every six months x five years, pap smears annually.  Return visit in six months for colposcopy and exam, pap.    Questions answered, patient expressed understanding of plan of care.    Karli Gao MD  Gynecologic Oncology  [facility]    CC  Patient Care Team:  Zaida Saldana DO as PCP - General (Family Practice)  Maria C Alexandre MD as MD (OB/Gyn)  Mita Koehler MD as MD (Radiation Oncology)  Hosea King MD as MD (Hematology & Oncology)  Zhao La MD as MD (Urology)  Talisha Greco MD as MD (Colon and Rectal Surgery)  Allen Downey MD as MD (Orthopedics)  SARA COOPER    Again, thank you for allowing me to participate in the care of your patient.      Sincerely,     GYN ONC Colposcopy Proivder

## 2018-04-03 NOTE — NURSING NOTE
Procedure discussed. Consent signed. Time out completed. Both forms placed into scanning.    Prior to the start of the procedure and with procedural staff participation, I verbally confirmed the patient s identity using two indicators, relevant allergies, that the procedure was appropriate and matched the consent or emergent situation, and that the correct equipment/implants were available. Immediately prior to starting the procedure I conducted the Time Out with the procedural staff and re-confirmed the patient s name, procedure, and site/side. (The Joint Commission universal protocol was followed.)  Yes    Sedation (Moderate or Deep): None    Post procedure pain: 0    Nicole Chew RN

## 2018-04-03 NOTE — PATIENT INSTRUCTIONS
Colposcopy, pap in six months.    Karli Gao MD  Gynecologic Oncology  [facility]

## 2018-04-03 NOTE — MR AVS SNAPSHOT
After Visit Summary   4/3/2018    Marissa Guadarrama    MRN: [id number]           Patient Information     Date Of Birth          1950        Visit Information        Provider Department      4/3/2018 10:00 AM  GYN ONC COLPOSCOPY PROVIDER [facility]        Today's Diagnoses     Vaginal dysplasia    -  1       Follow-ups after your visit        Your next 10 appointments already scheduled     May 14, 2018  8:30 AM CDT   LAB with [facility] ([facility])    [address]  [facility] [address]   [phone]           Please do not eat 10-12 hours before your appointment if you are coming in fasting for labs on lipids, cholesterol, or glucose (sugar). This does not apply to pregnant women. Water, hot tea and black coffee (with nothing added) are okay. Do not drink other fluids, diet soda or chew gum.            May 16, 2018  1:30 PM CDT   Return Visit with Hosea King MD   [facility] ([facility])    [facility]  [address]  [facility] [address]   [phone]            Oct 02, 2018 10:00 AM CDT   (Arrive by 9:45 AM)   COLPOSCOPY with  GYN ONC COLPOSCOPY PROVIDER   [facility] ([facility])    [address] [place] [address]  [address] [place] [address] [phone]              Who to contact     If you have questions or need follow up information about today's clinic visit or your schedule please contact [facility] directly at [phone].  Normal or non-critical lab and imaging results will be communicated to you by MyChart, letter or phone within 4 business days after the clinic has received the results. If you do not hear from us within 7 days, please contact the clinic through MyChart or phone. If you have a critical or abnormal lab result, we will notify you by phone as soon as  "possible.  Submit refill requests through Twiigg or call your pharmacy and they will forward the refill request to us. Please allow 3 business days for your refill to be completed.          Additional Information About Your Visit        Twiigg Information     Twiigg lets you send messages to your doctor, view your test results, renew your prescriptions, schedule appointments and more. To sign up, go to [email or web address] . Click on \"Log in\" on the left side of the screen, which will take you to the Welcome page. Then click on \"Sign up Now\" on the right side of the page.     You will be asked to enter the access code listed below, as well as some personal information. Please follow the directions to create your username and password.     Your access code is: [email or web address]  Expires: 2018  6:30 AM     Your access code will  in 90 days. If you need help or a new code, please call your [place] clinic or [phone].        Care EveryWhere ID     This is your Care EveryWhere ID. This could be used by other organizations to access your [place] medical records  [id number]        Your Vitals Were     Pulse Temperature Respirations Pulse Oximetry BMI (Body Mass Index)       66 99  F (37.2  C) (Oral) 18 92% 38.39 kg/m2        Blood Pressure from Last 3 Encounters:   18 168/80   18 134/72   18 142/80    Weight from Last 3 Encounters:   18 112.9 kg (248 lb 14.4 oz)   18 113.4 kg (250 lb)   18 112.5 kg (248 lb)              Today, you had the following     No orders found for display         Today's Medication Changes          These changes are accurate as of 4/3/18  3:23 PM.  If you have any questions, ask your nurse or doctor.               These medicines have changed or have updated prescriptions.        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 So [facility] [phone].    ATENCIÓN: Si habla esprenetta, tiene a allan disposición servicios gratuitos de asistencia lingüística. Llame al [phone].    We comply with applicable federal civil rights laws and [place] laws. We do not discriminate on the basis of race, color, national origin, age, disability, sex, sexual orientation, or gender identity.            Thank you!     Thank you for choosing [facility]  for your care. Our goal is always to provide you with excellent care. Hearing back from our patients is one way we can continue to improve our services. Please take a few minutes to complete the written survey that you may receive in the mail after your visit with us. Thank you!             Your Updated Medication List - Protect others around you: Learn how to safely use, store and throw away your medicines at www.disposemymeds.org.          This list is accurate as of 4/3/18  3:23 PM.  Always use your most recent med list.                   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, estrogen receptor positive (H)        gabapentin 600 MG tablet    NEURONTIN    180 tablet    Take 1 tablet (600 mg) by mouth 3 times daily    Trigeminal neuralgia       hydrochlorothiazide 25 MG tablet    HYDRODIURIL    45 tablet    Take 0.5 tablets (12.5 mg) by mouth every morning    Essential hypertension with goal blood pressure less than 140/90       HYDROcodone-acetaminophen 5-325 MG per tablet    NORCO    5 tablet    Take 1-2 tablets by mouth every 6 hours as needed for other (Moderate to Severe Pain)    Vaginal dysplasia       sulfamethoxazole-trimethoprim 800-160 MG per tablet    BACTRIM DS/SEPTRA DS    14 tablet    Take 1 tablet by mouth 2 times daily    Dysuria

## 2018-04-03 NOTE — PROGRESS NOTES
"            Follow Up Notes on Referred Patient    Date: 4/3/2018     RE: Marissa Guadarrama  : 1950    Marissa Guadarrama is a 67 year old woman with a diagnosis of VAIN3.  Notably, she has history of cervical cancer treated in  at [facility], a history of colon cancer and recurrent carcinoid tumor and breast cancer.   She is here today for colposcopy.     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 Depth of invasion 7 mm out of 1.9 and horizontal spread of 1 cm. 48 negative nodes.   : ASCUS pap  13 Vaginal Pap returned ASC-H.   13 Colposcopy/Vaginal cuff (submitted as \"cervix\"), biopsy:VAIN III    14: Patient established care with Dr. Pack and desired surgical management    3/13/14: Colposcopy, Vaginal Biopsies, Co2 Laser of the Upper Vagina on , which showed low-grade squamous intraepithelial lesion (VAIN 1) at 12:00 and 6:00 and high-grade squamous intraepithelial lesion VAIN 29:00 vaginal cuff. Vaginal cuff, 3:00 (biopsy): Mostly denuded squamous mucosa with focal features suggestive of low-grade squamous intraepithelial lesion (VAIN 1)    14: biopsy of right upper vagina showed VAINI, negative p16    2/9/15: upper vaginal biopsy \"LSIL\"    17:  VAIN III    17: CO2 laser and biopsies of vaginal dysplasia    Today, she denies pelvic or abdominal pain, vaginal bleeding or discharge.  No vulvar symptoms. She denies weight loss, fevers, nightsweats.  She reports leaking urine related to a urethral stent, but otherwise has no complaints.      Past Medical History:    Past Medical History:   Diagnosis Date     Arthritis     knee     Benign breast biopsy     benign     Carcinoid tumor 2003     Cervical cancer (H)      H/O colposcopy with cervical biopsy 13    vaginal cuff biopsy- VAIN III. referred back to gyn/onc     High cholesterol      HTN      Pap smear of vagina with ASC-H " 11/1/13     Post-polio syndromes      Trigeminal neuralgias      Past Surgical History:    Past Surgical History:   Procedure Laterality Date     APPENDECTOMY  1983     BIOPSY NODE SENTINEL Bilateral 6/1/2016    Procedure: BIOPSY NODE SENTINEL;  Surgeon: Brent Arana MD;  Location: [facility]     C BSO, OMENTECTOMY W/OSMAN  5/2007     C TOTAL ABDOM HYSTERECTOMY  5/2007     CL AFF SURGICAL PATHOLOGY       COLONOSCOPY N/A 6/23/2017    Procedure: COMBINED COLONOSCOPY, SINGLE OR MULTIPLE BIOPSY/POLYPECTOMY BY BIOPSY;  Colonoscopy Dx:Carcinoid tumor of colon prep mailed golytely;  Surgeon: Talisha Greco MD;  [facility]     COLPOSCOPY, BIOPSY, COMBINED  3/13/2014    Procedure: COMBINED COLPOSCOPY, BIOPSY;;  Surgeon: Lara Pack MD;  [facility]     COMBINED CYSTOSCOPY, RETROGRADES, URETEROSCOPY, INSERT STENT Left 2/2/2017    Procedure: COMBINED CYSTOSCOPY, RETROGRADES, URETEROSCOPY, INSERT STENT;  Surgeon: Zhao La MD;  Location: [facility]     CYSTOSCOPY, RETROGRADES, INSERT STENT URETER(S), COMBINED Left 9/7/2017    Procedure: COMBINED CYSTOSCOPY, RETROGRADES, INSERT STENT URETER(S);  Cystoscopy,Left Stent Exchange;  Surgeon: Zhao La MD;  Location: [facility]     CYSTOSCOPY, RETROGRADES, INSERT STENT URETER(S), COMBINED  12/12/2017    Procedure: COMBINED CYSTOSCOPY, RETROGRADES, INSERT STENT URETER(S);;  Surgeon: Zhao La MD;  [facility]     EXAM UNDER ANESTHESIA PELVIC  3/13/2014    Procedure: EXAM UNDER ANESTHESIA PELVIC;  Exam Under Anestheisa, Colposcopy, Vaginal Biopsies, Co2 Laser of the Upper Vagina;  Surgeon: Lara Pack MD;  [facility]     HERNIORRHAPHY INCISIONAL (LOCATION)       LASER CO2 VAGINA  3/13/2014    VAIN 1/2     LASER CO2 VAGINA N/A 12/12/2017    Procedure: LASER CO2 VAGINA;  Exam Under Anesthesia, CO2 Laser Ablation Of Vagina, Cystoscopy, Left Retrograde Pyelogram with Left Stent Placement;  Surgeon: Ratna  Nic SOTO MD;  [facility]     LUMPECTOMY BREAST WITH SEED LOCALIZATION Bilateral 6/1/2016    Procedure: LUMPECTOMY BREAST WITH SEED LOCALIZATION;  Surgeon: Brent Arana MD;  Location: [facility]     SURGICAL HISTORY OF -       ovarian cystectomy     SURGICAL HISTORY OF -   2003    right colon resection secondary to carcinoid tumor     TUBAL LIGATION       Health Maintenance Due   Topic Date Due     MICROALBUMIN Q1 YEAR  02/05/2017     PAP Q6 MOS DIAGNOSTIC  03/19/2017     Current Medications:   Current Outpatient Prescriptions   Medication Sig Dispense Refill     hydrochlorothiazide (HYDRODIURIL) 25 MG tablet Take 0.5 tablets (12.5 mg) by mouth every morning 45 tablet 1     HYDROcodone-acetaminophen (NORCO) 5-325 MG per tablet Take 1-2 tablets by mouth every 6 hours as needed for other (Moderate to Severe Pain) 5 tablet 0     exemestane (AROMASIN) 25 MG tablet Take 1 tablet (25 mg) by mouth daily (Patient taking differently: Take 25 mg by mouth every morning ) 90 tablet 1     gabapentin (NEURONTIN) 600 MG tablet Take 1 tablet (600 mg) by mouth 3 times daily 180 tablet 6     albuterol (PROAIR HFA, PROVENTIL HFA, VENTOLIN HFA) 108 (90 BASE) MCG/ACT inhaler Inhale 2 puffs into the lungs every 6 hours as needed for shortness of breath / dyspnea or wheezing 1 Inhaler 1     sulfamethoxazole-trimethoprim (BACTRIM DS/SEPTRA DS) 800-160 MG per tablet Take 1 tablet by mouth 2 times daily (Patient not taking: Reported on 4/3/2018) 14 tablet 0     Allergies:    Allergies   Allergen Reactions     Nkda [No Known Drug Allergies]       Social History:  Social History   Substance Use Topics     Smoking status: Former Smoker     Packs/day: 1.00     Years: 29.00     Types: Cigarettes     Quit date: 10/30/2006     Smokeless tobacco: Never Used     Alcohol use No       History   Drug Use No     Family History:     The patient's family history is notable for a sister with vulvar and cervical cancer.    Family History   Problem  Relation Age of Onset     CANCER Mother      bone / liver     Breast Cancer Mother      CANCER Maternal Grandmother      CANCER Maternal Grandfather      CANCER Paternal Grandmother      CANCER Paternal Grandfather      CANCER Sister      vulvar ca, cervical ca, squamous cell cancer     CANCER Brother      Rectal- Stage 4     Rectal Cancer Brother      Breast Cancer Paternal Aunt      Colon Cancer Paternal Aunt      Breast Cancer Maternal Aunt      Pancreatic Cancer Nephew      Breast Cancer Sister          Physical Exam:     /80  Pulse 66  Temp 99  F (37.2  C) (Oral)  Resp 18  Wt 112.9 kg (248 lb 14.4 oz)  SpO2 92%  BMI 38.39 kg/m2  Body mass index is 38.39 kg/(m^2).    General Appearance: healthy and alert, no distress       Cardiovascular: regular rate     Respiratory: Breathing comfortably on room air    Musculoskeletal: extremities non tender, bilateral lower    Skin: no lesions or rashes     Psychiatric: appropriate mood and affect             Genitourinary: External genitalia and urethral meatus appears normal.  Vagina is smooth without nodularity or masses.    Bimanual exam reveal no masses, nodularity or fullness. Vulvar irritation noted.      Colposcopy Note:    Written and verbal informed consent obtained.    Prior to the start of the procedure and with procedural staff participation, I verbally confirmed the patient s identity using two indicators, relevant allergies, that the procedure was appropriate and matched the consent or emergent situation, and that the correct equipment/implants were available. Immediately prior to starting the procedure I conducted the Time Out with the procedural staff and re-confirmed the patient s name, procedure, and site/side. (The Joint Commission universal protocol was followed.)  Yes    Sedation (Moderate or Deep): None      Colposcopy of vagina:  The entire vaginal apex and vaginal walls were visualized.  Acetic acid solution was placed on the vagina.   Colposcopy of entire vagina and apex viewed under colposcopic enhancement. Scarring from prior procedure, no discrete lesions or abnormal vascularity.  No biopsies performed. Adequate visualization of vagina.  Patient tolerated well.     Assessment:    Marissa Guadarrama is a 67 year old woman with a diagnosis of persistent VAIN3.  She also has history of multiple cancers including metastatic carcinoid tumor and bilateral breast cancer.    A total of 40 minutes was spent with the patient, 15 minutes of which were spent in counseling the patient and/or treatment planning, 25 minutes procedure time.     Plan:   History of VAINIII:  Stable exam today.  Reviewed risk for recurrence, need for regular follow-up. Recommend exams every six months x five years, pap smears annually.  Return visit in six months for colposcopy and exam, pap.    Questions answered, patient expressed understanding of plan of care.    Karli Gao MD  Gynecologic Oncology  [facility]    CC  Patient Care Team:  Zaida Saldana DO as PCP - General (Family Practice)  Maria C Alexandre MD as MD (OB/Gyn)  Mita Koehler MD as MD (Radiation Oncology)  Hosea King MD as MD (Hematology & Oncology)  Zaho La MD as MD (Urology)  Talisha Greco MD as MD (Colon and Rectal Surgery)  Allen Downey MD as MD (Orthopedics)  SARA COOPER

## 2018-05-14 ENCOUNTER — HOSPITAL ENCOUNTER (OUTPATIENT)
Dept: LAB | Facility: CLINIC | Age: 68
Discharge: HOME | End: 2018-05-14
Attending: INTERNAL MEDICINE | Admitting: INTERNAL MEDICINE
Payer: MEDICARE

## 2018-05-14 DIAGNOSIS — C50.411 MALIGNANT NEOPLASM OF UPPER-OUTER QUADRANT OF BOTH BREASTS IN FEMALE, ESTROGEN RECEPTOR POSITIVE (H): ICD-10-CM

## 2018-05-14 DIAGNOSIS — Z17.0 MALIGNANT NEOPLASM OF UPPER-OUTER QUADRANT OF BOTH BREASTS IN FEMALE, ESTROGEN RECEPTOR POSITIVE (H): ICD-10-CM

## 2018-05-14 DIAGNOSIS — C50.412 MALIGNANT NEOPLASM OF UPPER-OUTER QUADRANT OF BOTH BREASTS IN FEMALE, ESTROGEN RECEPTOR POSITIVE (H): ICD-10-CM

## 2018-05-14 LAB
ALBUMIN SERPL-MCNC: 3.4 G/DL (ref 3.4–5)
ALP SERPL-CCNC: 95 U/L (ref 40–150)
ALT SERPL W P-5'-P-CCNC: 17 U/L (ref 0–50)
ANION GAP SERPL CALCULATED.3IONS-SCNC: 6 MMOL/L (ref 3–14)
AST SERPL W P-5'-P-CCNC: 17 U/L (ref 0–45)
BASOPHILS # BLD AUTO: 0 10E9/L (ref 0–0.2)
BASOPHILS NFR BLD AUTO: 0.3 %
BILIRUB SERPL-MCNC: 0.7 MG/DL (ref 0.2–1.3)
BUN SERPL-MCNC: 20 MG/DL (ref 7–30)
CALCIUM SERPL-MCNC: 9.4 MG/DL (ref 8.5–10.1)
CANCER AG27-29 SERPL-ACNC: 23 U/ML (ref 0–39)
CHLORIDE SERPL-SCNC: 106 MMOL/L (ref 94–109)
CO2 SERPL-SCNC: 32 MMOL/L (ref 20–32)
CREAT SERPL-MCNC: 1.23 MG/DL (ref 0.52–1.04)
DIFFERENTIAL METHOD BLD: NORMAL
EOSINOPHIL # BLD AUTO: 0.4 10E9/L (ref 0–0.7)
EOSINOPHIL NFR BLD AUTO: 6.7 %
ERYTHROCYTE [DISTWIDTH] IN BLOOD BY AUTOMATED COUNT: 13.4 % (ref 10–15)
GFR SERPL CREATININE-BSD FRML MDRD: 43 ML/MIN/1.7M2
GLUCOSE SERPL-MCNC: 137 MG/DL (ref 70–99)
HCT VFR BLD AUTO: 42.9 % (ref 35–47)
HGB BLD-MCNC: 14.2 G/DL (ref 11.7–15.7)
IMM GRANULOCYTES # BLD: 0 10E9/L (ref 0–0.4)
IMM GRANULOCYTES NFR BLD: 0.2 %
LYMPHOCYTES # BLD AUTO: 1.6 10E9/L (ref 0.8–5.3)
LYMPHOCYTES NFR BLD AUTO: 25.1 %
MCH RBC QN AUTO: 30.3 PG (ref 26.5–33)
MCHC RBC AUTO-ENTMCNC: 33.1 G/DL (ref 31.5–36.5)
MCV RBC AUTO: 92 FL (ref 78–100)
MONOCYTES # BLD AUTO: 0.5 10E9/L (ref 0–1.3)
MONOCYTES NFR BLD AUTO: 6.9 %
NEUTROPHILS # BLD AUTO: 4 10E9/L (ref 1.6–8.3)
NEUTROPHILS NFR BLD AUTO: 60.8 %
PLATELET # BLD AUTO: 207 10E9/L (ref 150–450)
POTASSIUM SERPL-SCNC: 3.3 MMOL/L (ref 3.4–5.3)
PROT SERPL-MCNC: 7.5 G/DL (ref 6.8–8.8)
RBC # BLD AUTO: 4.68 10E12/L (ref 3.8–5.2)
SODIUM SERPL-SCNC: 144 MMOL/L (ref 133–144)
WBC # BLD AUTO: 6.5 10E9/L (ref 4–11)

## 2018-05-14 PROCEDURE — 80053 COMPREHEN METABOLIC PANEL: CPT | Performed by: INTERNAL MEDICINE

## 2018-05-14 PROCEDURE — 85025 COMPLETE CBC W/AUTO DIFF WBC: CPT | Performed by: INTERNAL MEDICINE

## 2018-05-14 PROCEDURE — 36415 COLL VENOUS BLD VENIPUNCTURE: CPT | Performed by: INTERNAL MEDICINE

## 2018-05-14 PROCEDURE — 86300 IMMUNOASSAY TUMOR CA 15-3: CPT | Performed by: INTERNAL MEDICINE

## 2018-05-16 ENCOUNTER — RADIANT APPOINTMENT (OUTPATIENT)
Dept: GENERAL RADIOLOGY | Facility: CLINIC | Age: 68
End: 2018-05-16
Attending: UROLOGY
Payer: COMMERCIAL

## 2018-05-16 ENCOUNTER — ONCOLOGY VISIT (OUTPATIENT)
Dept: ONCOLOGY | Facility: CLINIC | Age: 68
End: 2018-05-16
Attending: INTERNAL MEDICINE
Payer: COMMERCIAL

## 2018-05-16 ENCOUNTER — TELEPHONE (OUTPATIENT)
Dept: UROLOGY | Facility: CLINIC | Age: 68
End: 2018-05-16

## 2018-05-16 ENCOUNTER — OFFICE VISIT (OUTPATIENT)
Dept: UROLOGY | Facility: CLINIC | Age: 68
End: 2018-05-16
Payer: COMMERCIAL

## 2018-05-16 VITALS
SYSTOLIC BLOOD PRESSURE: 165 MMHG | TEMPERATURE: 99.1 F | OXYGEN SATURATION: 93 % | BODY MASS INDEX: 38.88 KG/M2 | HEART RATE: 76 BPM | WEIGHT: 247.7 LBS | HEIGHT: 67 IN | DIASTOLIC BLOOD PRESSURE: 72 MMHG

## 2018-05-16 VITALS
DIASTOLIC BLOOD PRESSURE: 78 MMHG | WEIGHT: 247 LBS | HEIGHT: 67 IN | BODY MASS INDEX: 38.77 KG/M2 | SYSTOLIC BLOOD PRESSURE: 169 MMHG | HEART RATE: 65 BPM | TEMPERATURE: 98 F

## 2018-05-16 DIAGNOSIS — Z17.0 MALIGNANT NEOPLASM OF UPPER-OUTER QUADRANT OF BOTH BREASTS IN FEMALE, ESTROGEN RECEPTOR POSITIVE (H): Primary | ICD-10-CM

## 2018-05-16 DIAGNOSIS — N18.30 CKD (CHRONIC KIDNEY DISEASE) STAGE 3, GFR 30-59 ML/MIN (H): ICD-10-CM

## 2018-05-16 DIAGNOSIS — E78.5 HYPERLIPIDEMIA LDL GOAL <130: ICD-10-CM

## 2018-05-16 DIAGNOSIS — C50.412 MALIGNANT NEOPLASM OF UPPER-OUTER QUADRANT OF BOTH BREASTS IN FEMALE, ESTROGEN RECEPTOR POSITIVE (H): Primary | ICD-10-CM

## 2018-05-16 DIAGNOSIS — C78.6 PERITONEAL METASTASES: ICD-10-CM

## 2018-05-16 DIAGNOSIS — I10 HYPERTENSION GOAL BP (BLOOD PRESSURE) < 140/90: ICD-10-CM

## 2018-05-16 DIAGNOSIS — C50.411 MALIGNANT NEOPLASM OF UPPER-OUTER QUADRANT OF BOTH BREASTS IN FEMALE, ESTROGEN RECEPTOR POSITIVE (H): Primary | ICD-10-CM

## 2018-05-16 DIAGNOSIS — D3A.00 CARCINOID TUMOR (H): Primary | ICD-10-CM

## 2018-05-16 PROCEDURE — 99215 OFFICE O/P EST HI 40 MIN: CPT | Performed by: UROLOGY

## 2018-05-16 PROCEDURE — G0463 HOSPITAL OUTPT CLINIC VISIT: HCPCS

## 2018-05-16 PROCEDURE — 99215 OFFICE O/P EST HI 40 MIN: CPT | Performed by: INTERNAL MEDICINE

## 2018-05-16 PROCEDURE — 74019 RADEX ABDOMEN 2 VIEWS: CPT | Mod: FY

## 2018-05-16 ASSESSMENT — PAIN SCALES - GENERAL: PAINLEVEL: NO PAIN (0)

## 2018-05-16 NOTE — NURSING NOTE
"Oncology Rooming Note    May 16, 2018 1:35 PM   Marissa Guadarrama is a 67 year old female who presents for:    Chief Complaint   Patient presents with     Oncology Clinic Visit     6 month recheck Breast CA, review labs     Initial Vitals: /72 (BP Location: Right arm, Patient Position: Sitting, Cuff Size: Adult Regular)  Pulse 76  Temp 99.1  F (37.3  C) (Tympanic)  Ht 1.708 m (5' 7.25\")  Wt 112.4 kg (247 lb 11.2 oz)  SpO2 93%  Breastfeeding? No  BMI 38.51 kg/m2 Estimated body mass index is 38.51 kg/(m^2) as calculated from the following:    Height as of this encounter: 1.708 m (5' 7.25\").    Weight as of this encounter: 112.4 kg (247 lb 11.2 oz). Body surface area is 2.31 meters squared.  No Pain (0) Comment: Data Unavailable   No LMP recorded. Patient has had a hysterectomy.  Allergies reviewed: Yes  Medications reviewed: Yes    Medications: Medication refills not needed today.  Pharmacy name entered into GoToTags: [facility] [address] [facility] [address] [facility]    Clinical concerns:  6 month recheck Breast CA, review labs.     7  minutes for nursing intake (face to face time)     Citlaly Larry CMA              "

## 2018-05-16 NOTE — PROGRESS NOTES
Visit Date:   05/16/2018      REASON FOR VISIT TODAY:  Left ureteral obstruction.      HISTORY OF PRESENT ILLNESS:  Ms. Guadarrama is a 67-year-old woman followed in our clinic for history of left ureteral obstruction secondary to a carcinoid tumor that is pushing on her mid ureter.  The patient has a history of rectal carcinoid and has evidence of an abdominal recurrence that again is causing some obstruction of the left ureter.  The kidney has been stented, with the last stent being placed roughly 5-6 months ago.  The patient comes in today for discussion of further management.  We had previously discussed options of including removing the carcinoid tumor, but the patient has had numerous previous abdominal surgeries and, thus, going after this small relatively slow growing carcinoid tumor would be a relatively large surgery and we have an alternative, including stent exchanges, which at least in the short intermediate term may provide a good option for the patient.  The patient does tolerate her stents without significant difficulty.  The patient comes in today noting no significant changes in her health.  She does also have a history of breast cancer and is currently undergoing additional workup for possible recurrence of the breast cancer as well.      PHYSICAL EXAMINATION:   VITAL SIGNS:  On exam today, the patient's blood pressure is 169/78, pulse 65.   GENERAL:  She is in no acute distress.      LABORATORY DATA:  The patient has a creatinine from 05/14/18 which is 1.23.  The patient's renal scan from 03/09/2017 showed 21% function on the left side.      ASSESSMENT AND PLAN:  Over half of today's 40-minute visit was spent counseling the patient regarding her obstructed left ureter.  I suggested to Ms. Guadarrama that, given the fact that the stent has been in 5-6 months, it is certainly time for stent exchange at this time.  We will ask the patient to go on and get a KUB today to see if we can detect if there  is significant encrustation of the stent or not.  Hopefully, there will not be and that we will simply be able to perform a simple stent exchange.  The patient and her  today report that they are thinking that they may want this carcinoid tumor resected once and for all, but are thinking about doing it next winter.  We again discussed that this would be a significant surgery and may also require segmental resection of the ureter with reconstruction.  Further, given the patient's previous abdominal surgeries, she may have significant adhesions which would require lysis of adhesions with risk of injury to the bowel, which could ultimately result in resection of bowel as well, again significantly complicating the procedure.  However, certainly if the patient's carcinoid tumor is continuing to grow and/or if it becomes increasingly difficult to change the patient's stents, then we may be forced to remove the tumor in any case.  We will go ahead and get a CT of the abdomen and pelvis with contrast here in the near future as it has been perhaps a year since the patient's last imaging and we will get her on the schedule for stent exchange in the meantime.  We did discuss that if the patient wanted to move forward with resecting the tumor and possibly the segment of the ureter, we would want to repeat a renal scan to make sure that the kidney continues to have function that is worth saving as it was marginal previously.  The patient is in agreement with the plan.  We will start with just a stent exchange and make further plans following that.         STEVE RUVALCABA MD             D: 2018   T: 2018   MT: NIA      Name:     ROSALINDA LANCASTER   MRN:      [id number]        Account:      [id number]   :      1950           Visit Date:   2018      Document: [id number]

## 2018-05-16 NOTE — ASSESSMENT & PLAN NOTE
Marissa Guadarrama was found on routine mammogram a group of microcalcification is in the upper outer right breast. There was also a focal asymmetry in the lower left breast. Subsequently the patient went on to have bilateral diagnostic mammography with ultrasound on March 28, 2016.. On the right breast there was microcalcification is the main grouping measures 1.6 cm x 1 cm bilateral 2.9 cm located 2.9 cm from the nipple. An additional tiny area about 0.2 cm seen at the anterolateral margin of this main grouping about 1 cm from the main grouping. The left breast shows no masses or significant abnormalities. Subsequently the patient went on to have breast needle biopsy on March 30, 2016. 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 Angiolymphatic invasion was not identified. 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 Microcalcifications was also associated with ductal carcinoma in situ. His surgeon receptor was positive. Progesterone receptor was negative. She underwent bilateral sentinel lymph node biopsy and bilateral lumpectomies with prior seed placement.  the surgical pathology Showing left breast lumpectomy was a tumor size of 13 mm grade 2 of 3, angiolymphatic invasion was not identified the tumor was unifocal associated with ductal carcinoma in situ high-grade. Surgical margins where negative. [place] lymph node was negative for metastatic tumor. Stage is T1cN0 M0 On the right breast and there was invasive ductal carcinoma with a tumor size of 3 mm grade 3 of 3. [place] lymph nodes were negative for metastatic disease. The tumor stage is T1aN0 M0. The tumor on the left was positive for estrogen and progesterone receptor. It did show no HER-2/praveen amplification. The tumor on the right is positive for estrogen receptor and negative for progesterone receptor and shows no HER-2/praveen amplification. Subsequently the patient concluded the radiation  therapy. She is currently on hormonal therapy with Arimidex. She developed a skin rash and Arimidex was switched to Aromasin.

## 2018-05-16 NOTE — MR AVS SNAPSHOT
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 King is recommending a mammogram and ultrasound of your right breast today if possible.  We will call you these results, though you may need to come to clinic to review them. Otherwise we would like to see you back in clinic with Dr. King in 6 months with labs prior.      When you are in need of a refill, please call your pharmacy and they will send us a request.      Copy of appointments, and after visit summary (AVS) given to patient.      If you have any questions during business hours (M-F 8 AM- 4PM), please call Codi Diaz RN, BSN, OCN Oncology Hematology /Breast Cancer Navigator at [facility] [phone].       For questions after business hours, or on holidays/weekends, please call our after hours Nurse Triage line [phone]. Thank you.            Follow-ups after your visit        Follow-up notes from your care team     Return in about 6 months (around 11/16/2018) for Imaging ordered today, Blood work before next appointment.      Your next 10 appointments already scheduled     May 16, 2018  3:15 PM CDT   [facility] Visit with Zhao La MD   [facility]    [address] [address]   [phone]            Jun 11, 2018  1:00 PM CDT   MA DIAGNOSTIC DIGITAL  "RIGHT with [id number]   [facility] ([facility])    [address] [address] [phone]           Do not use any powder, lotion or deodorant under your arms or on your breast. If you do, we will ask you to remove it before your exam.  Wear comfortable, two-piece clothing.  If you have any allergies, tell your care team.  Bring any previous mammograms from other facilities or have them mailed to the breast center.   Three-dimensional (3D) mammograms are available at [place] locations in [facility], [facility], and [place]. [facility] locations include [facility] and [facility] in [place]. Benefits of 3D mammograms include: - Improved rate of cancer detection - Decreases your chance of having to go back for more tests, which means fewer: - \"False-positive\" results (This means that there is an abnormal area but it isn't cancer.) - Invasive testing procedures, such as a biopsy or surgery - Can provide clearer images of the breast if you have dense breast tissue. 3D mammography is an optional exam that anyone can have with a 2D mammogram. It doesn't replace or take the place of a 2D mammogram. 2D mammograms remain an effective screening test for all women.  Not all insurance companies cover the cost of a 3D mammogram. Check with your insurance.            Jun 11, 2018  1:30 PM CDT   US BREAST RIGHT LIMITED 1-3 QUAD with WYUS1   [facility] ([facility])    [address] [address] [phone]           Please bring a list of your medicines (including vitamins, minerals and over-the-counter drugs). Also, tell your doctor about any allergies you may have. Wear comfortable clothes and leave your valuables at home.  You do not need to do anything special to prepare for your exam.  Please call the Imaging Department at your exam site with any questions.         "    Oct 02, 2018 10:00 AM CDT   (Arrive by 9:45 AM)   COLPOSCOPY with  GYN ONC COLPOSCOPY PROVIDER   [facility] ([facility])    [address]  [address]  [place] [id number] [phone]            Nov 12, 2018 11:10 AM CST   LAB with [facility] ([facility])    [address]  [facility] [place] [facility] [address]   [phone]           Please do not eat 10-12 hours before your appointment if you are coming in fasting for labs on lipids, cholesterol, or glucose (sugar). This does not apply to pregnant women. Water, hot tea and black coffee (with nothing added) are okay. Do not drink other fluids, diet soda or chew gum.            Nov 14, 2018 11:15 AM CST   Return Visit with Hosea King MD   [facility] ([facility])    [facility]  [address]  [place] [address]   [phone]              Future tests that were ordered for you today     Open Future Orders        Priority Expected Expires Ordered    CBC with platelets differential Routine 11/16/2018 5/16/2019 5/16/2018    Comprehensive metabolic panel Routine 11/16/2018 5/16/2019 5/16/2018    Ca27.29  breast tumor marker Routine 11/16/2018 5/16/2019 5/16/2018    MA Diagnostic Digital Right Routine  11/16/2018 5/16/2018    US Breast Right Limited 1-3 Quadrants Routine  6/30/2018 5/16/2018            Who to contact     If you have questions or need follow up information about today's clinic visit or your schedule please contact [facility] directly at [phone].  Normal or non-critical lab and imaging results will be communicated to you by MyChart, letter or phone within 4 business days after the clinic has received the results. If you do not hear from us within 7 days, please contact the clinic through MyChart or phone. If you have a critical or abnormal lab result, we will notify you by phone  "as soon as possible.  Submit refill requests through [employer] or call your pharmacy and they will forward the refill request to us. Please allow 3 business days for your refill to be completed.          Additional Information About Your Visit        [employer] Information     [employer] lets you send messages to your doctor, view your test results, renew your prescriptions, schedule appointments and more. To sign up, go to [email or web address] . Click on \"Log in\" on the left side of the screen, which will take you to the Welcome page. Then click on \"Sign up Now\" on the right side of the page.     You will be asked to enter the access code listed below, as well as some personal information. Please follow the directions to create your username and password.     Your access code is: [email or web address]  Expires: 2018  6:30 AM     Your access code will  in 90 days. If you need help or a new code, please call your [place] clinic or [phone].        Care EveryWhere ID     This is your Care EveryWhere ID. This could be used by other organizations to access your [place] medical records  [id number]        Your Vitals Were     Pulse Temperature Height Pulse Oximetry Breastfeeding? BMI (Body Mass Index)    76 99.1  F (37.3  C) (Tympanic) 1.708 m (5' 7.25\") 93% No 38.51 kg/m2       Blood Pressure from Last 3 Encounters:   18 165/72   18 168/80   18 134/72    Weight from Last 3 Encounters:   18 112.4 kg (247 lb 11.2 oz)   18 112.9 kg (248 lb 14.4 oz)   18 113.4 kg (250 lb)                 Today's Medication Changes          These changes are accurate as of 18  2:06 PM.  If you have any questions, ask your nurse or doctor.               These medicines have changed or have updated prescriptions.        Dose/Directions    exemestane 25 MG tablet   Commonly known as:  AROMASIN   This may have changed:  when to take this   Used for:  Malignant neoplasm of upper-outer quadrant of both " breasts in female, estrogen receptor positive (H)        Dose:  25 mg   Take 1 tablet (25 mg) by mouth daily   Quantity:  90 tablet   Refills:  1                Primary Care Provider Office Phone # Fax #    Zaida Saldana [phone] [address] [address]        Equal Access to Services     TASIA RANGEL : Hadii karina baltazar hadasho Soomaali, waaxda luqadaha, qaybta kaalmada adeliz, niesha khanelishapancho tang . So [facility] [phone].    ATENCIÓN: Si habla español, tiene a allan disposición servicios gratuitos de asistencia lingüística. Llame al [phone].    We comply with applicable federal civil rights laws and [place] laws. We do not discriminate on the basis of race, color, national origin, age, disability, sex, sexual orientation, or gender identity.            Thank you!     Thank you for choosing [facility]  for your care. Our goal is always to provide you with excellent care. Hearing back from our patients is one way we can continue to improve our services. Please take a few minutes to complete the written survey that you may receive in the mail after your visit with us. Thank you!             Your Updated Medication List - Protect others around you: Learn how to safely use, store and throw away your medicines at www.disposemymeds.org.          This list is accurate as of 5/16/18  2:06 PM.  Always use your most recent med list.                   Brand Name Dispense Instructions for use Diagnosis    albuterol 108 (90 Base) MCG/ACT Inhaler    PROAIR HFA/PROVENTIL HFA/VENTOLIN HFA    1 Inhaler    Inhale 2 puffs into the lungs every 6 hours as needed for shortness of breath / dyspnea or wheezing    SOB (shortness of breath)       [employer] DAILY MULTIPLE FOR WOMEN PO      Take by mouth daily        exemestane 25 MG tablet    AROMASIN    90 tablet    Take 1 tablet (25 mg) by mouth daily    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,  estrogen receptor positive (H)       gabapentin 600 MG tablet    NEURONTIN    180 tablet    Take 1 tablet (600 mg) by mouth 3 times daily    Trigeminal neuralgia       hydrochlorothiazide 25 MG tablet    HYDRODIURIL    45 tablet    Take 0.5 tablets (12.5 mg) by mouth every morning    Essential hypertension with goal blood pressure less than 140/90       HYDROcodone-acetaminophen 5-325 MG per tablet    NORCO    5 tablet    Take 1-2 tablets by mouth every 6 hours as needed for other (Moderate to Severe Pain)    Vaginal dysplasia

## 2018-05-16 NOTE — LETTER
5/16/2018         RE: Marissa Guadarrama  [address]   [place] [address]        Dear Colleague,    Thank you for referring your patient, Marissa Guadarrama, to the [facility]. Please see a copy of my visit note below.    Hematology/ Oncology Follow-up Visit:  May 16, 2018    Reason for Visit:   Chief Complaint   Patient presents with     Oncology Clinic Visit     6 month recheck Breast CA, review labs       Oncologic History:  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)  Marissa Guadarrama was found on routine mammogram a group of microcalcification is in the upper outer right breast. There was also a focal asymmetry in the lower left breast. Subsequently the patient went on to have bilateral diagnostic mammography with ultrasound on March 28, 2016.. On the right breast there was microcalcification is the main grouping measures 1.6 cm x 1 cm bilateral 2.9 cm located 2.9 cm from the nipple. An additional tiny area about 0.2 cm seen at the anterolateral margin of this main grouping about 1 cm from the main grouping. The left breast shows no masses or significant abnormalities. Subsequently the patient went on to have breast needle biopsy on March 30, 2016. 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 Angiolymphatic invasion was not identified. 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 Microcalcifications was also associated with ductal carcinoma in situ. His surgeon receptor was positive. Progesterone receptor was negative. She underwent bilateral sentinel lymph node biopsy and bilateral lumpectomies with prior seed placement.  the surgical pathology Showing left breast lumpectomy was a tumor size of 13 mm grade 2 of 3, angiolymphatic invasion was not identified the tumor was unifocal associated with ductal carcinoma in situ high-grade. Surgical margins where negative. [place] lymph node was negative  for metastatic tumor. Stage is T1cN0 M0 On the right breast and there was invasive ductal carcinoma with a tumor size of 3 mm grade 3 of 3. [place] lymph nodes were negative for metastatic disease. The tumor stage is T1aN0 M0. The tumor on the left was positive for estrogen and progesterone receptor. It did show no HER-2/praveen amplification. The tumor on the right is positive for estrogen receptor and negative for progesterone receptor and shows no HER-2/praveen amplification. Subsequently the patient concluded the radiation therapy. She is currently on hormonal therapy with Arimidex. She developed a skin rash and Arimidex was switched to Aromasin.      Interval History:  Patient returning today for follow-up visit.  She has been feeling well without any recent complaints of weight loss or night sweats fever or chills.  Denies any nausea vomiting or diarrhea.  The patient also has a history of low-grade carcinoid tumor she has been followed by surgery as well.  Patient currently on adjuvant endocrine therapy with Aromasin 25 mg orally daily.  She has been tolerating the drug very well without any complaints.    Review Of Systems:  Constitutional: Negative for fever, chills, and night sweats.  Skin: negative.  Eyes: negative.  Ears/Nose/Throat: negative.  Respiratory: No shortness of breath, dyspnea on exertion, cough, or hemoptysis.  Cardiovascular: negative.  Gastrointestinal: negative.  Genitourinary: negative.  Musculoskeletal: negative.  Neurologic: negative.  Psychiatric: negative.  Hematologic/Lymphatic/Immunologic: negative.  Endocrine: negative.    All other ROS negative unless mentioned in interval history.    Past medical, social, surgical, and family histories reviewed.    Allergies:  Allergies as of 05/16/2018 - [place] as Reviewed 05/16/2018   Allergen Reaction Noted     Nkda [no known drug allergies]  10/30/2009       Current Medications:  Current Outpatient Prescriptions   Medication Sig Dispense Refill      "albuterol (PROAIR HFA, PROVENTIL HFA, VENTOLIN HFA) 108 (90 BASE) MCG/ACT inhaler Inhale 2 puffs into the lungs every 6 hours as needed for shortness of breath / dyspnea or wheezing 1 Inhaler 1     exemestane (AROMASIN) 25 MG tablet Take 1 tablet (25 mg) by mouth daily (Patient taking differently: Take 25 mg by mouth every morning ) 90 tablet 1     gabapentin (NEURONTIN) 600 MG tablet Take 1 tablet (600 mg) by mouth 3 times daily 180 tablet 6     hydrochlorothiazide (HYDRODIURIL) 25 MG tablet Take 0.5 tablets (12.5 mg) by mouth every morning 45 tablet 1     HYDROcodone-acetaminophen (NORCO) 5-325 MG per tablet Take 1-2 tablets by mouth every 6 hours as needed for other (Moderate to Severe Pain) 5 tablet 0     Multiple Vitamins-Minerals (CVS DAILY MULTIPLE FOR WOMEN PO) Take by mouth daily          Physical Exam:  /72 (BP Location: Right arm, Patient Position: Sitting, Cuff Size: Adult Regular)  Pulse 76  Temp 99.1  F (37.3  C) (Tympanic)  Ht 1.708 m (5' 7.25\")  Wt 112.4 kg (247 lb 11.2 oz)  SpO2 93%  Breastfeeding? No  BMI 38.51 kg/m2  Wt Readings from Last 12 Encounters:   05/16/18 112.4 kg (247 lb 11.2 oz)   04/03/18 112.9 kg (248 lb 14.4 oz)   01/09/18 113.4 kg (250 lb)   01/08/18 112.5 kg (248 lb)   12/12/17 113.4 kg (250 lb)   12/11/17 112.8 kg (248 lb 9.6 oz)   11/15/17 115.3 kg (254 lb 4.8 oz)   10/30/17 113.2 kg (249 lb 8.6 oz)   10/30/17 113.9 kg (251 lb)   10/16/17 112.5 kg (248 lb)   10/16/17 112.5 kg (248 lb)   09/25/17 112.8 kg (248 lb 11.2 oz)     ECOG performance status: 0  GENERAL APPEARANCE: Healthy, alert and in no acute distress.  HEENT: Sclerae anicteric. PERRLA. Oropharynx without ulcers, lesions, or thrush.  NECK: Supple. No asymmetry or masses.  LYMPHATICS: No palpable cervical, supraclavicular, axillary, or inguinal lymphadenopathy.  RESP: Lungs clear to auscultation bilaterally without rales, rhonchi or wheezes.\",  BREAST: Right-there is 1 cm nodular lesion above the lumpectomy " "site on the right.  There is no axillary adenopathy.  Left- No mass, nipple discharge or axillary adenopathy \"CARDIOVASCULAR: Regular rate and rhythm. Normal S1, S2; no S3 or S4. No murmur, gallop, or rub.  ABDOMEN: Soft, nontender. Bowel sounds normal. No palpable organomegaly or masses.  MUSCULOSKELETAL: Extremities without gross deformities noted. No edema of bilateral lower extremities.  SKIN: No suspicious lesions or rashes.  NEURO: Alert and oriented x 3. Cranial nerves II-XII grossly intact.  PSYCHIATRIC: Mentation and affect appear normal.    Laboratory/Imaging Studies:  No visits with results within 2 Week(s) from this visit.  Latest known visit with results is:    Orders Only on 01/26/2018   Component Date Value Ref Range Status     Color Urine 01/26/2018 Yellow   Final     Appearance Urine 01/26/2018 Turbid   Final     Glucose Urine 01/26/2018 Negative  NEG^Negative mg/dL Final     Bilirubin Urine 01/26/2018 Negative  NEG^Negative Final     Ketones Urine 01/26/2018 Negative  NEG^Negative mg/dL Final     Specific Gravity Urine 01/26/2018 1.010  1.003 - 1.035 Final     pH Urine 01/26/2018 6.5  5.0 - 7.0 pH Final     Protein Albumin Urine 01/26/2018 Trace* NEG^Negative mg/dL Final     Urobilinogen Urine 01/26/2018 0.2  0.2 - 1.0 EU/dL Final     Nitrite Urine 01/26/2018 Positive* NEG^Negative Final     Blood Urine 01/26/2018 Trace* NEG^Negative Final     Leukocyte Esterase Urine 01/26/2018 Large* NEG^Negative Final     Source 01/26/2018 Midstream Urine   Final     WBC Urine 01/26/2018 25-50* OTO2^O - 2 /HPF Final     RBC Urine 01/26/2018 2-5* OTO2^O - 2 /HPF Final     Bacteria Urine 01/26/2018 Many* NEG^Negative /HPF Final     Specimen Description 01/26/2018 Midstream Urine   Final     Special Requests 01/26/2018 Specimen received in preservative   Final     Culture Micro 01/26/2018 *  Final                    Value:>100,000 colonies/mL  Escherichia coli       WBC 01/26/2018 7.8  4.0 - 11.0 10e9/L Final     " RBC Count 01/26/2018 4.81  3.8 - 5.2 10e12/L Final     Hemoglobin 01/26/2018 14.7  11.7 - 15.7 g/dL Final     Hematocrit 01/26/2018 46.0  35.0 - 47.0 % Final     MCV 01/26/2018 96  78 - 100 fl Final     MCH 01/26/2018 30.6  26.5 - 33.0 pg Final     MCHC 01/26/2018 32.0  31.5 - 36.5 g/dL Final     RDW 01/26/2018 13.9  10.0 - 15.0 % Final     Platelet Count 01/26/2018 192  150 - 450 10e9/L Final          Assessment and plan:    (C50.411,  Z17.0,  C50.412) 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  Clinical examination reveals 1 cm nodule at 2 o'clock position in the right breast.  I will arrange for diagnostic mammogram and ultrasound to evaluate.  I asked the patient to continue on adjuvant endocrine therapy with exemestane according to treatment plan.  We will also ask the patient to continue on calcium and vitamin D supplements.  I will update the patient with the results of the aging studies.  Otherwise we will continue to the patient every 6 months sooner if there are new developments or concerns.    Plan: MA Diagnostic Digital Right, US Breast Right Limited 1-3 Quadrants    (C78.6) Peritoneal metastases (H)  Patient currently is not symptomatic.    (N18.3) CKD (chronic kidney disease) stage 3, GFR 30-59 ml/min  Creatinine has been stable.    (I10) Hypertension goal BP (blood pressure) < 140/90  Blood pressure is currently well-controlled on hydrochlorothiazide 25 mg orally daily.    The patient is ready to learn, no apparent learning barriers were identified.  Diagnosis and treatment plans were explained to the patient. The patient expressed understanding of the content. The patient asked appropriate questions. The patient questions were answered to her satisfaction.    Chart documentation with Dragon Voice recognition Software. Although reviewed after  completion, some words and grammatical errors may remain.    Again, thank you for allowing me to participate in the care of your patient.        Sincerely,        Hosea King MD

## 2018-05-16 NOTE — NURSING NOTE
"Chief Complaint   Patient presents with     RECHECK     still has stent in and hurts in the front and back on the left side - and discuss surgery       Initial /78 (BP Location: Left arm, Patient Position: Chair, Cuff Size: Adult Large)  Pulse 65  Temp 98  F (36.7  C) (Tympanic)  Ht 1.708 m (5' 7.25\")  Wt 112 kg (247 lb)  BMI 38.4 kg/m2 Estimated body mass index is 38.4 kg/(m^2) as calculated from the following:    Height as of this encounter: 1.708 m (5' 7.25\").    Weight as of this encounter: 112 kg (247 lb).  Medications and allergies reviewed.    Bernarda HERRERA, CMA    "

## 2018-05-16 NOTE — PATIENT INSTRUCTIONS
Dr. King is recommending a mammogram and ultrasound of your right breast today if possible.  We will call you these results, though you may need to come to clinic to review them. Otherwise we would like to see you back in clinic with Dr. King in 6 months with labs prior.      When you are in need of a refill, please call your pharmacy and they will send us a request.      Copy of appointments, and after visit summary (AVS) given to patient.      If you have any questions during business hours (M-F 8 AM- 4PM), please call Codi Diaz RN, BSN, OCN Oncology Hematology /Breast Cancer Navigator at [facility] [phone].       For questions after business hours, or on holidays/weekends, please call our after hours Nurse Triage line [phone]. Thank you.

## 2018-05-16 NOTE — PROGRESS NOTES
Hematology/ Oncology Follow-up Visit:  May 16, 2018    Reason for Visit:   Chief Complaint   Patient presents with     Oncology Clinic Visit     6 month recheck Breast CA, review labs       Oncologic History:  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)  Marissa Guadarrama was found on routine mammogram a group of microcalcification is in the upper outer right breast. There was also a focal asymmetry in the lower left breast. Subsequently the patient went on to have bilateral diagnostic mammography with ultrasound on March 28, 2016.. On the right breast there was microcalcification is the main grouping measures 1.6 cm x 1 cm bilateral 2.9 cm located 2.9 cm from the nipple. An additional tiny area about 0.2 cm seen at the anterolateral margin of this main grouping about 1 cm from the main grouping. The left breast shows no masses or significant abnormalities. Subsequently the patient went on to have breast needle biopsy on March 30, 2016. 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 Angiolymphatic invasion was not identified. 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 Microcalcifications was also associated with ductal carcinoma in situ. His surgeon receptor was positive. Progesterone receptor was negative. She underwent bilateral sentinel lymph node biopsy and bilateral lumpectomies with prior seed placement.  the surgical pathology Showing left breast lumpectomy was a tumor size of 13 mm grade 2 of 3, angiolymphatic invasion was not identified the tumor was unifocal associated with ductal carcinoma in situ high-grade. Surgical margins where negative. Bruni lymph node was negative for metastatic tumor. Stage is T1cN0 M0 On the right breast and there was invasive ductal carcinoma with a tumor size of 3 mm grade 3 of 3. Bruni lymph nodes were negative for metastatic disease. The tumor stage is T1aN0 M0. The tumor  on the left was positive for estrogen and progesterone receptor. It did show no HER-2/praveen amplification. The tumor on the right is positive for estrogen receptor and negative for progesterone receptor and shows no HER-2/praveen amplification. Subsequently the patient concluded the radiation therapy. She is currently on hormonal therapy with Arimidex. She developed a skin rash and Arimidex was switched to Aromasin.      Interval History:  Patient returning today for follow-up visit.  She has been feeling well without any recent complaints of weight loss or night sweats fever or chills.  Denies any nausea vomiting or diarrhea.  The patient also has a history of low-grade carcinoid tumor she has been followed by surgery as well.  Patient currently on adjuvant endocrine therapy with Aromasin 25 mg orally daily.  She has been tolerating the drug very well without any complaints.    Review Of Systems:  Constitutional: Negative for fever, chills, and night sweats.  Skin: negative.  Eyes: negative.  Ears/Nose/Throat: negative.  Respiratory: No shortness of breath, dyspnea on exertion, cough, or hemoptysis.  Cardiovascular: negative.  Gastrointestinal: negative.  Genitourinary: negative.  Musculoskeletal: negative.  Neurologic: negative.  Psychiatric: negative.  Hematologic/Lymphatic/Immunologic: negative.  Endocrine: negative.    All other ROS negative unless mentioned in interval history.    Past medical, social, surgical, and family histories reviewed.    Allergies:  Allergies as of 05/16/2018 - Cade as Reviewed 05/16/2018   Allergen Reaction Noted     Nkda [no known drug allergies]  10/30/2009       Current Medications:  Current Outpatient Prescriptions   Medication Sig Dispense Refill     albuterol (PROAIR HFA, PROVENTIL HFA, VENTOLIN HFA) 108 (90 BASE) MCG/ACT inhaler Inhale 2 puffs into the lungs every 6 hours as needed for shortness of breath / dyspnea or wheezing 1 Inhaler 1     exemestane (AROMASIN) 25 MG tablet Take 1  "tablet (25 mg) by mouth daily (Patient taking differently: Take 25 mg by mouth every morning ) 90 tablet 1     gabapentin (NEURONTIN) 600 MG tablet Take 1 tablet (600 mg) by mouth 3 times daily 180 tablet 6     hydrochlorothiazide (HYDRODIURIL) 25 MG tablet Take 0.5 tablets (12.5 mg) by mouth every morning 45 tablet 1     HYDROcodone-acetaminophen (NORCO) 5-325 MG per tablet Take 1-2 tablets by mouth every 6 hours as needed for other (Moderate to Severe Pain) 5 tablet 0     Multiple Vitamins-Minerals (CVS DAILY MULTIPLE FOR WOMEN PO) Take by mouth daily          Physical Exam:  /72 (BP Location: Right arm, Patient Position: Sitting, Cuff Size: Adult Regular)  Pulse 76  Temp 99.1  F (37.3  C) (Tympanic)  Ht 1.708 m (5' 7.25\")  Wt 112.4 kg (247 lb 11.2 oz)  SpO2 93%  Breastfeeding? No  BMI 38.51 kg/m2  Wt Readings from Last 12 Encounters:   05/16/18 112.4 kg (247 lb 11.2 oz)   04/03/18 112.9 kg (248 lb 14.4 oz)   01/09/18 113.4 kg (250 lb)   01/08/18 112.5 kg (248 lb)   12/12/17 113.4 kg (250 lb)   12/11/17 112.8 kg (248 lb 9.6 oz)   11/15/17 115.3 kg (254 lb 4.8 oz)   10/30/17 113.2 kg (249 lb 8.6 oz)   10/30/17 113.9 kg (251 lb)   10/16/17 112.5 kg (248 lb)   10/16/17 112.5 kg (248 lb)   09/25/17 112.8 kg (248 lb 11.2 oz)     ECOG performance status: 0  GENERAL APPEARANCE: Healthy, alert and in no acute distress.  HEENT: Sclerae anicteric. PERRLA. Oropharynx without ulcers, lesions, or thrush.  NECK: Supple. No asymmetry or masses.  LYMPHATICS: No palpable cervical, supraclavicular, axillary, or inguinal lymphadenopathy.  RESP: Lungs clear to auscultation bilaterally without rales, rhonchi or wheezes.\",  BREAST: Right-there is 1 cm nodular lesion above the lumpectomy site on the right.  There is no axillary adenopathy.  Left- No mass, nipple discharge or axillary adenopathy \"CARDIOVASCULAR: Regular rate and rhythm. Normal S1, S2; no S3 or S4. No murmur, gallop, or rub.  ABDOMEN: Soft, nontender. Bowel " sounds normal. No palpable organomegaly or masses.  MUSCULOSKELETAL: Extremities without gross deformities noted. No edema of bilateral lower extremities.  SKIN: No suspicious lesions or rashes.  NEURO: Alert and oriented x 3. Cranial nerves II-XII grossly intact.  PSYCHIATRIC: Mentation and affect appear normal.    Laboratory/Imaging Studies:  No visits with results within 2 Week(s) from this visit.  Latest known visit with results is:    Orders Only on 01/26/2018   Component Date Value Ref Range Status     Color Urine 01/26/2018 Yellow   Final     Appearance Urine 01/26/2018 Turbid   Final     Glucose Urine 01/26/2018 Negative  NEG^Negative mg/dL Final     Bilirubin Urine 01/26/2018 Negative  NEG^Negative Final     Ketones Urine 01/26/2018 Negative  NEG^Negative mg/dL Final     Specific Gravity Urine 01/26/2018 1.010  1.003 - 1.035 Final     pH Urine 01/26/2018 6.5  5.0 - 7.0 pH Final     Protein Albumin Urine 01/26/2018 Trace* NEG^Negative mg/dL Final     Urobilinogen Urine 01/26/2018 0.2  0.2 - 1.0 EU/dL Final     Nitrite Urine 01/26/2018 Positive* NEG^Negative Final     Blood Urine 01/26/2018 Trace* NEG^Negative Final     Leukocyte Esterase Urine 01/26/2018 Large* NEG^Negative Final     Source 01/26/2018 Midstream Urine   Final     WBC Urine 01/26/2018 25-50* OTO2^O - 2 /HPF Final     RBC Urine 01/26/2018 2-5* OTO2^O - 2 /HPF Final     Bacteria Urine 01/26/2018 Many* NEG^Negative /HPF Final     Specimen Description 01/26/2018 Midstream Urine   Final     Special Requests 01/26/2018 Specimen received in preservative   Final     Culture Micro 01/26/2018 *  Final                    Value:>100,000 colonies/mL  Escherichia coli       WBC 01/26/2018 7.8  4.0 - 11.0 10e9/L Final     RBC Count 01/26/2018 4.81  3.8 - 5.2 10e12/L Final     Hemoglobin 01/26/2018 14.7  11.7 - 15.7 g/dL Final     Hematocrit 01/26/2018 46.0  35.0 - 47.0 % Final     MCV 01/26/2018 96  78 - 100 fl Final     MCH 01/26/2018 30.6  26.5 - 33.0 pg  Final     MCHC 01/26/2018 32.0  31.5 - 36.5 g/dL Final     RDW 01/26/2018 13.9  10.0 - 15.0 % Final     Platelet Count 01/26/2018 192  150 - 450 10e9/L Final          Assessment and plan:    (C50.411,  Z17.0,  C50.412) 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  Clinical examination reveals 1 cm nodule at 2 o'clock position in the right breast.  I will arrange for diagnostic mammogram and ultrasound to evaluate.  I asked the patient to continue on adjuvant endocrine therapy with exemestane according to treatment plan.  We will also ask the patient to continue on calcium and vitamin D supplements.  I will update the patient with the results of the aging studies.  Otherwise we will continue to the patient every 6 months sooner if there are new developments or concerns.    Plan: MA Diagnostic Digital Right, US Breast Right Limited 1-3 Quadrants    (C78.6) Peritoneal metastases (H)  Patient currently is not symptomatic.    (N18.3) CKD (chronic kidney disease) stage 3, GFR 30-59 ml/min  Creatinine has been stable.    (I10) Hypertension goal BP (blood pressure) < 140/90  Blood pressure is currently well-controlled on hydrochlorothiazide 25 mg orally daily.    The patient is ready to learn, no apparent learning barriers were identified.  Diagnosis and treatment plans were explained to the patient. The patient expressed understanding of the content. The patient asked appropriate questions. The patient questions were answered to her satisfaction.    Chart documentation with Dragon Voice recognition Software. Although reviewed after completion, some words and grammatical errors may remain.

## 2018-05-16 NOTE — MR AVS SNAPSHOT
"              After Visit Summary   5/16/2018    Marissa Guadarrama    MRN: [id number]           Patient Information     Date Of Birth          1950        Visit Information        Provider Department      5/16/2018 3:15 PM Zhao La MD [facility]        Today's Diagnoses     Carcinoid tumor    -  1       Follow-ups after your visit        Your next 10 appointments already scheduled     Jun 11, 2018  1:00 PM CDT   MA DIAGNOSTIC DIGITAL RIGHT with [id number]   [facility] ([facility])    [address] [address]   [phone]           Do not use any powder, lotion or deodorant under your arms or on your breast. If you do, we will ask you to remove it before your exam.  Wear comfortable, two-piece clothing.  If you have any allergies, tell your care team.  Bring any previous mammograms from other facilities or have them mailed to the breast center.   Three-dimensional (3D) mammograms are available at [place] locations in [facility], [facility], [facility], and [place]. [facility] locations include [place] and [facility] in [place]. Benefits of 3D mammograms include: - Improved rate of cancer detection - Decreases your chance of having to go back for more tests, which means fewer: - \"False-positive\" results (This means that there is an abnormal area but it isn't cancer.) - Invasive testing procedures, such as a biopsy or surgery - Can provide clearer images of the breast if you have dense breast tissue. 3D mammography is an optional exam that anyone can have with a 2D mammogram. It doesn't replace or take the place of a 2D mammogram. 2D mammograms remain an effective screening test for all women.  Not all insurance companies cover the cost of a 3D mammogram. Check with your insurance.            Jun 11, 2018  1:30 PM CDT   US BREAST RIGHT LIMITED 1-3 QUAD with [email or web address]   [facility] [place] [facility] ([facility])    [address] [address]   [phone]           Please bring a list of your medicines (including vitamins, minerals and over-the-counter drugs). Also, tell your doctor about any allergies you may have. Wear comfortable clothes and leave your valuables at home.  You do not need to do anything special to prepare for your exam.  Please call the Imaging Department at your exam site with any questions.            Oct 02, 2018 10:00 AM CDT   (Arrive by 9:45 AM)   COLPOSCOPY with  GYN ONC COLPOSCOPY PROVIDER   [facility] ([facility])    [address]  [address]  [facility] [address] [phone]            Nov 12, 2018 11:10 AM CST   LAB with [facility] ([facility])    [address] [address]   [phone]           Please do not eat 10-12 hours before your appointment if you are coming in fasting for labs on lipids, cholesterol, or glucose (sugar). This does not apply to pregnant women. Water, hot tea and black coffee (with nothing added) are okay. Do not drink other fluids, diet soda or chew gum.            Nov 14, 2018 11:15 AM CST   Return Visit with Hosea King MD   [facility] ([facility])    [facility]  [address]  [facility] [address]   [phone]              Future tests that were ordered for you today     Open Future Orders        Priority Expected Expires Ordered    CT Abdomen Pelvis w/Contrast Routine  5/16/2019 5/17/2018    CBC with platelets differential Routine 11/16/2018 5/16/2019 5/16/2018    Comprehensive metabolic panel Routine 11/16/2018 5/16/2019 5/16/2018    Ca27.29  breast tumor marker Routine 11/16/2018 5/16/2019 5/16/2018    MA Diagnostic Digital Right Routine  11/16/2018 5/16/2018    US Breast Right Limited 1-3 Quadrants Routine   "2018            Who to contact     If you have questions or need follow up information about today's clinic visit or your schedule please contact [facility] directly at [phone].  Normal or non-critical lab and imaging results will be communicated to you by MyChart, letter or phone within 4 business days after the clinic has received the results. If you do not hear from us within 7 days, please contact the clinic through MyChart or phone. If you have a critical or abnormal lab result, we will notify you by phone as soon as possible.  Submit refill requests through Gatheredtable or call your pharmacy and they will forward the refill request to us. Please allow 3 business days for your refill to be completed.          Additional Information About Your Visit        [employer] Information     Gatheredtable lets you send messages to your doctor, view your test results, renew your prescriptions, schedule appointments and more. To sign up, go to [email or web address] . Click on \"Log in\" on the left side of the screen, which will take you to the Welcome page. Then click on \"Sign up Now\" on the right side of the page.     You will be asked to enter the access code listed below, as well as some personal information. Please follow the directions to create your username and password.     Your access code is: [email or web address]  Expires: 2018  6:30 AM     Your access code will  in 90 days. If you need help or a new code, please call your [facility] or [phone].        Care EveryWhere ID     This is your Care EveryWhere ID. This could be used by other organizations to access your [facility] medical records  [id number]        Your Vitals Were     Pulse Temperature Height BMI (Body Mass Index)          65 98  F (36.7  C) (Tympanic) 1.708 m (5' 7.25\") 38.4 kg/m2         Blood Pressure from Last 3 Encounters:   18 169/78   18 165/72   18 168/80    Weight from Last 3 Encounters: "   05/16/18 112 kg (247 lb)   05/16/18 112.4 kg (247 lb 11.2 oz)   04/03/18 112.9 kg (248 lb 14.4 oz)              We Performed the Following     XR  KUB [[id number]]          Today's Medication Changes          These changes are accurate as of 5/16/18 11:59 PM.  If you have any questions, ask your nurse or doctor.               These medicines have changed or have updated prescriptions.        Dose/Directions    exemestane 25 MG tablet   Commonly known as:  AROMASIN   This may have changed:  when to take this   Used for:  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, estrogen receptor positive (H)        Dose:  25 mg   Take 1 tablet (25 mg) by mouth daily   Quantity:  90 tablet   Refills:  1                Primary Care Provider Office Phone # Fax #    Zaida Saldana [phone]       [address] [place] [address]        Equal Access to Services     TASIA RANGEL : Hadii karina cruz [place], waaxda luqadaha, qaybta kaalmada adeloraineyada, niesha tang . So [facility] [phone].    ATENCIÓN: Si habla español, tiene a allan disposición servicios gratuitos de asistencia lingüística. Llame al [phone].    We comply with applicable federal civil rights laws and [place] laws. We do not discriminate on the basis of race, color, national origin, age, disability, sex, sexual orientation, or gender identity.            Thank you!     Thank you for choosing [facility]  for your care. Our goal is always to provide you with excellent care. Hearing back from our patients is one way we can continue to improve our services. Please take a few minutes to complete the written survey that you may receive in the mail after your visit with us. Thank you!             Your Updated Medication List - Protect others around you: Learn how to safely use, store and throw away your medicines at www.disposemymeds.org.          This list is accurate as of  5/16/18 11:59 PM.  Always use your most recent med list.                   Brand Name Dispense Instructions for use Diagnosis    albuterol 108 (90 Base) MCG/ACT Inhaler    PROAIR HFA/PROVENTIL HFA/VENTOLIN HFA    1 Inhaler    Inhale 2 puffs into the lungs every 6 hours as needed for shortness of breath / dyspnea or wheezing    SOB (shortness of breath)       CVS DAILY MULTIPLE FOR WOMEN PO      Take by mouth daily        exemestane 25 MG tablet    AROMASIN    90 tablet    Take 1 tablet (25 mg) by mouth daily    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, estrogen receptor positive (H)       gabapentin 600 MG tablet    NEURONTIN    180 tablet    Take 1 tablet (600 mg) by mouth 3 times daily    Trigeminal neuralgia       hydrochlorothiazide 25 MG tablet    HYDRODIURIL    45 tablet    Take 0.5 tablets (12.5 mg) by mouth every morning    Essential hypertension with goal blood pressure less than 140/90       HYDROcodone-acetaminophen 5-325 MG per tablet    NORCO    5 tablet    Take 1-2 tablets by mouth every 6 hours as needed for other (Moderate to Severe Pain)    Vaginal dysplasia

## 2018-05-17 ENCOUNTER — TELEPHONE (OUTPATIENT)
Dept: ONCOLOGY | Facility: CLINIC | Age: 68
End: 2018-05-17

## 2018-05-17 DIAGNOSIS — N13.39 OTHER HYDRONEPHROSIS: Primary | ICD-10-CM

## 2018-05-17 NOTE — TELEPHONE ENCOUNTER
----- Message from Viki Bazzi sent at 5/17/2018  3:58 PM CDT -----  Regarding: RE: sooner appts  I spoke to patient today. She does not have transportation until 6/11;  when scheduled her  is out of town for 3 weeks.    She said that was soonest should could come for.    Viki  ----- Message -----     From: Codi Diaz RN     Sent: 5/17/2018  10:49 AM       To: Viki Bazzi, [facility] Oncology   Subject: sooner appts                                     Aleksey Drew,     This patient was seen by Dr. King in clinic yesterday.  He found a new nodule in her breast and recommended a mammogram and us soon.  This was scheduled for 06.11.18.    I do not know if this was patient preference or imaging availability.    Please call and try to get patient sooner (next week would be best).    Codi Wright

## 2018-05-18 ENCOUNTER — TELEPHONE (OUTPATIENT)
Dept: UROLOGY | Facility: CLINIC | Age: 68
End: 2018-05-18

## 2018-05-18 NOTE — TELEPHONE ENCOUNTER
Spoke with the pt today to try and assist her with scheduling her stent exchange with Dr. La.  The pt stated she didn't have her calender and that her  will be out of town for the next 3 weeks.  She stated she would call the clinic when more convenient to schedule.      Kaelyn MCBRIDE CMA

## 2018-05-22 NOTE — TELEPHONE ENCOUNTER
Pt calling stating she would like June 14th if possible.   Please call to schedule    Please call    Yaima Agarwal  Specialty CSS

## 2018-05-22 NOTE — TELEPHONE ENCOUNTER
Type of surgery: cystoscopy, left stent exchange  [place] [facility] [place]  Date and time of surgery: 7/5/18 @ 0900  Surgeon: Dr. La  Pre-Op Appt Date: patient will call to schedule  Post-Op Appt Date: patient will call to schedule   Packet sent out: Yes  Pre-cert/Authorization completed:  Not Applicable  Date: 5/22/18    Maricruz BRUNSON MA

## 2018-06-12 ENCOUNTER — OFFICE VISIT (OUTPATIENT)
Dept: FAMILY MEDICINE | Facility: CLINIC | Age: 68
End: 2018-06-12
Payer: COMMERCIAL

## 2018-06-12 VITALS
TEMPERATURE: 97.9 F | HEART RATE: 72 BPM | BODY MASS INDEX: 39.21 KG/M2 | OXYGEN SATURATION: 97 % | DIASTOLIC BLOOD PRESSURE: 72 MMHG | WEIGHT: 249.8 LBS | SYSTOLIC BLOOD PRESSURE: 132 MMHG | HEIGHT: 67 IN

## 2018-06-12 DIAGNOSIS — I10 HYPERTENSION GOAL BP (BLOOD PRESSURE) < 140/90: ICD-10-CM

## 2018-06-12 DIAGNOSIS — Z01.818 PREOP GENERAL PHYSICAL EXAM: Primary | ICD-10-CM

## 2018-06-12 DIAGNOSIS — G50.0 TRIGEMINAL NEURALGIA: ICD-10-CM

## 2018-06-12 DIAGNOSIS — D3A.00 CARCINOID TUMOR (H): ICD-10-CM

## 2018-06-12 DIAGNOSIS — N18.30 CKD (CHRONIC KIDNEY DISEASE) STAGE 3, GFR 30-59 ML/MIN (H): ICD-10-CM

## 2018-06-12 PROCEDURE — 99214 OFFICE O/P EST MOD 30 MIN: CPT | Performed by: FAMILY MEDICINE

## 2018-06-12 NOTE — PROGRESS NOTES
[address] [address] [phone]  Dept: [phone]    PRE-OP EVALUATION:  Today's date: 2018    Marissa Guadarrama (: 1950) presents for pre-operative evaluation assessment as requested by Dr. Zhao La.  He requires evaluation and anesthesia risk assessment prior to undergoing surgery/procedure for treatment of Cystoscopy, Left Stent Exchange .    Fax number for surgical facility:  Available Electronically  Primary Physician: Zaida Saldana  Type of Anesthesia Anticipated: to be determined- Monitor Anesthesia Care    Patient has a Health Care Directive or Living Will:  NO    Preop Questions 2018   Who is doing your surgery? dr La   What are you having done? stent replacement   Date of Surgery/Procedure: 18   Facility or Hospital where procedure/surgery will be performed: [facility]   1.  Do you have a history of Heart attack, stroke, stent, coronary bypass surgery, or other heart surgery? No- just bladder stent   2.  Do you ever have any pain or discomfort in your chest? No   3.  Do you have a history of  Heart Failure? No   4.   Are you troubled by shortness of breath when:  walking on a level surface, or up a slight hill, or at night? YES - Related to Knees and Polio   5.  Do you currently have a cold, bronchitis or other respiratory infection? No   6.  Do you have a cough, shortness of breath, or wheezing? No   7.  Do you sometimes get pains in the calves of your legs when you walk? YES -  Related to Polio   8. Do you or anyone in your family have previous history of blood clots? No   9.  Do you or does anyone in your family have a serious bleeding problem such as prolonged bleeding following surgeries or cuts? No   10. Have you ever had problems with anemia or been told to take iron pills? No   11. Have you had any abnormal blood loss such as black, tarry or bloody stools, or abnormal vaginal bleeding? No    12. Have you ever had a blood transfusion? No   13. Have you or any of your relatives ever had problems with anesthesia? No   14. Do you have sleep apnea, excessive snoring or daytime drowsiness? No   15. Do you have any prosthetic heart valves? No   16. Do you have prosthetic joints? No   17. Is there any chance that you may be pregnant? No         HPI:     HPI related to upcoming procedure:     NEW TO THIS provider  Ms. 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  The tumor is pushing on the ureter causing some obstruction.  She has a stent indwelling, she is having a stent removal and replacement  She has had these procedures every 6 months and has tolerated procedures well in the past  No URi sx, no recent fever, no chest pain, no sob    HYPERTENSION - Patient has longstanding history of mod-severe HTN , currently denies any symptoms referable to elevated blood pressure. Specifically denies chest pain, palpitations, dyspnea, orthopnea, PND or peripheral edema. Blood pressure readings have been in normal range. Current medication regimen is as listed below. Patient denies any side effects of medication.       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  Marissa Guadarrama has no plan or code status on file. Discussed available resources and provided with information. Confirmed code status reflects current choices pending further ACP discussions.  Confirmed/documented designated decision maker(s). See permanent comments section of demographics in clinical tab. Added by Tiff Askew on 2/6/2015  Discussed advance care planning with patient; however, patient declined at this time. 9/19/2012              OA (osteoarthritis) of knee 10/05/2011     Priority: Medium     Hypertension goal BP (blood pressure) < 140/90 11/01/2010     Priority: Medium     HYPERLIPIDEMIA LDL GOAL <130 10/31/2010     Priority: Medium     Post-polio syndrome      Priority: Medium     Left knee, diagnosed by ortho in 1976, had left leg/toe surgeries as child  (Problem list name updated by automated process. Provider to review and confirm.)       Cervical cancer (H)      Priority: Medium     5/2007.  Dr. Alonso, [facility] gyn/onc,  Now needs routine paps, patient not always compliant  3/26/09 pap NIL  9/24/09 pap ASCUS  11/1/13 pap ASC-H. Plan-- colposcopy   12/23/13 colposcopy scheduled. Reminder placed.  colpscopy 12/23/2013-Vaginal cuff (submitted as \"cervix\"), biopsy:  - High grade squamous intraepithelial lesion (vaginal  intraepithelial neoplasia grade III, VaIN III, severe dysplasia and  carcinoma in situ).  - No invasive malignancy identified.  - Cervical transformation zone not identified in biopsies.  - Please see comment.  Referral back to gynecology/oncology  2/5/14 gyn/onc appt   3/13/14 colposcopy and CO2 laser vagina, path:VAIN 1/2.  3/19/14 follow up in 4 months  7/30/14 vaginal biopsy: VAIN 1. Plan: repeat colp in 6 months.(9/17/14)   9/17/14 colp. No staining seen. No biopsy taken. Pap- ASCUS + HR HPV. Plan- repeat pap at next visit.  2/9/15 colposcopy. bx- LSIL/koilocytosis. Pap- NIL/+ HR HPV 16.  Plan: 3/16/15 treatment planning. "   3/16/15 repeat colposcopy in 4 months (due 7/16/15)  7/6/15 scheduled. Tracking.             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  Patient has not been complaint with follow up.        Past Medical History:   Diagnosis Date     Arthritis     knee     Benign breast biopsy     benign     Carcinoid tumor 12/2003     Cervical cancer (H)      H/O colposcopy with cervical biopsy 12/23/13    vaginal cuff biopsy- VAIN III. referred back to gyn/onc     High cholesterol      HTN      Pap smear of vagina with ASC-H 11/1/13     Post-polio syndromes      Trigeminal neuralgias      Past Surgical History:   Procedure Laterality Date     APPENDECTOMY  1983     BIOPSY NODE SENTINEL Bilateral 6/1/2016    Procedure: BIOPSY NODE SENTINEL;  Surgeon: Brent Arana MD;  Location: [facility]     C BSO, OMENTECTOMY W/OSMAN  5/2007     C TOTAL ABDOM HYSTERECTOMY  5/2007     CL AFF SURGICAL PATHOLOGY       COLONOSCOPY N/A 6/23/2017    Procedure: COMBINED COLONOSCOPY, SINGLE OR MULTIPLE BIOPSY/POLYPECTOMY BY BIOPSY;  Colonoscopy Dx:Carcinoid tumor of colon prep mailed golytely;  Surgeon: Talisha Greco MD;  [facility]     COLPOSCOPY, BIOPSY, COMBINED  3/13/2014    Procedure: COMBINED COLPOSCOPY, BIOPSY;;  Surgeon: Lara Pack MD;  [facility]     COMBINED CYSTOSCOPY, RETROGRADES, URETEROSCOPY, INSERT STENT Left 2/2/2017    Procedure: COMBINED CYSTOSCOPY, RETROGRADES, URETEROSCOPY, INSERT STENT;  Surgeon: Zhao La MD;  Location: [facility]     CYSTOSCOPY, RETROGRADES, INSERT STENT URETER(S), COMBINED Left 9/7/2017    Procedure: COMBINED CYSTOSCOPY, RETROGRADES, INSERT STENT URETER(S);  Cystoscopy,Left Stent Exchange;  Surgeon: Zhao La MD;  Location: [facility]     CYSTOSCOPY, RETROGRADES, INSERT STENT URETER(S), COMBINED  12/12/2017    Procedure: COMBINED CYSTOSCOPY,  RETROGRADES, INSERT STENT URETER(S);;  Surgeon: Zhao La MD;  [facility]     EXAM UNDER ANESTHESIA PELVIC  3/13/2014    Procedure: EXAM UNDER ANESTHESIA PELVIC;  Exam Under Anestheisa, Colposcopy, Vaginal Biopsies, Co2 Laser of the Upper Vagina;  Surgeon: Lara Pack MD;  [facility]     HERNIORRHAPHY INCISIONAL (LOCATION)       LASER CO2 VAGINA  3/13/2014    VAIN 1/2     LASER CO2 VAGINA N/A 12/12/2017    Procedure: LASER CO2 VAGINA;  Exam Under Anesthesia, CO2 Laser Ablation Of Vagina, Cystoscopy, Left Retrograde Pyelogram with Left Stent Placement;  Surgeon: Nic Segundo MD;  [facility]     LUMPECTOMY BREAST WITH SEED LOCALIZATION Bilateral 6/1/2016    Procedure: LUMPECTOMY BREAST WITH SEED LOCALIZATION;  Surgeon: Brent Arana MD;  Location: [facility]     SURGICAL HISTORY OF -       ovarian cystectomy     SURGICAL HISTORY OF -   2003    right colon resection secondary to carcinoid tumor     TUBAL LIGATION       Current Outpatient Prescriptions   Medication Sig Dispense Refill     albuterol (PROAIR HFA, PROVENTIL HFA, VENTOLIN HFA) 108 (90 BASE) MCG/ACT inhaler Inhale 2 puffs into the lungs every 6 hours as needed for shortness of breath / dyspnea or wheezing 1 Inhaler 1     exemestane (AROMASIN) 25 MG tablet Take 1 tablet (25 mg) by mouth daily (Patient taking differently: Take 25 mg by mouth every morning ) 90 tablet 1     gabapentin (NEURONTIN) 600 MG tablet Take 1 tablet (600 mg) by mouth 3 times daily 180 tablet 6     hydrochlorothiazide (HYDRODIURIL) 25 MG tablet Take 0.5 tablets (12.5 mg) by mouth every morning 45 tablet 1     HYDROcodone-acetaminophen (NORCO) 5-325 MG per tablet Take 1-2 tablets by mouth every 6 hours as needed for other (Moderate to Severe Pain) 5 tablet 0     Multiple Vitamins-Minerals ([facility] DAILY MULTIPLE FOR WOMEN PO) Take by mouth daily       OTC products: None, except as noted above    Allergies   Allergen Reactions     Nkda [No  "Known Drug Allergies]       Latex Allergy: NO    Social History   Substance Use Topics     Smoking status: Former Smoker     Packs/day: 1.00     Years: 29.00     Types: Cigarettes     Quit date: 10/30/2006     Smokeless tobacco: Never Used     Alcohol use No     History   Drug Use No       REVIEW OF SYSTEMS:   Constitutional, neuro, ENT, endocrine, pulmonary, cardiac, gastrointestinal, genitourinary, musculoskeletal, integument and psychiatric systems are negative, except as otherwise noted.    EXAM:   /72  Pulse 72  Temp 97.9  F (36.6  C) (Oral)  Ht 5' 7.25\" (1.708 m)  Wt 249 lb 12.8 oz (113.3 kg)  SpO2 97%  BMI 38.83 kg/m2    GENERAL APPEARANCE: healthy, alert and no distress     EYES: EOMI, PERRL     HENT: ear canals and TM's normal and nose and mouth without ulcers or lesions     RESP: lungs clear to auscultation - no rales, rhonchi or wheezes     CV: regular rates and rhythm, normal S1 S2, no S3 or S4 and no murmur, click or rub     ABDOMEN:  soft, nontender, no HSM or masses and bowel sounds normal     MS: extremities normal- no gross deformities noted, no evidence of inflammation in joints, FROM in all extremities.     NEURO: Normal strength and tone, sensory exam grossly normal, mentation intact and speech normal     PSYCH: mentation appears normal. and affect normal/bright    DIAGNOSTICS:   EKG: Not indicated due to non-vascular surgery and last ekg on 9/17 (within 30 days for CAD history or last year for cardiac risk factors)    Recent Labs   Lab Test  05/14/18   0856  01/26/18   0559  12/12/17   0659  12/11/17   1301   HGB  14.2  14.7   --   14.3   PLT  207  192   --   237   NA  144   --    --   143   POTASSIUM  3.3*   --   3.3*  3.1*   CR  1.23*   --    --   1.55*        IMPRESSION:   Reason for surgery/procedure: stent replacement  Diagnosis/reason for consult: preopeative visit      The proposed surgical procedure is considered INTERMEDIATE risk.    REVISED CARDIAC RISK INDEX  The patient " has the following serious cardiovascular risks for perioperative complications such as (MI, PE, VFib and 3  AV Block):  No serious cardiac risks  INTERPRETATION: 0 risks: Class I (very low risk - 0.4% complication rate)    The patient has the following additional risks for perioperative complications:  No identified additional risks      ICD-10-CM    1. Preop general physical exam Z01.818    2. Carcinoid tumor D3A.00    3. Hypertension goal BP (blood pressure) < 140/90 I10    4. CKD (chronic kidney disease) stage 3, GFR 30-59 ml/min N18.3    5. Trigeminal neuralgia G50.0    new to this provider-reviewed chart    (D3A.00) Carcinoid tumor  Comment:   Plan: was reviewed with tumor team and will be meeting with Dr Lawrence     (N18.3) CKD (chronic kidney disease) stage 3, GFR 30-59 ml/min  Comment:   Plan:stable    (Z96.0) History of ureter stent  Comment:   Plan: due for removal and replacement.     hypertension-stable, slightly low K, advised potasium rich foods and recheck with provider in 4 weeks    (R32) Urinary incontinence, unspecified type  Comment: doing well on oxybutinin  Plan: Continue current medication     trigeminal neuralgia-needs to see ENT to talk about surgical intervention that was mentioned to her at her last visit           RECOMMENDATIONS:     --Consult hospital rounder / IM to assist post-op medical management    --Patient is to take all scheduled medications on the day of surgery EXCEPT for modifications listed below.    APPROVAL GIVEN to proceed with proposed procedure, without further diagnostic evaluation       Signed Electronically by: Jeannette Durán DO    Copy of this evaluation report is provided to requesting physician.    [facility] Preop Guidelines    Revised Cardiac Risk Index

## 2018-06-12 NOTE — MR AVS SNAPSHOT
After Visit Summary   6/12/2018    Marissa Guadarrama    MRN: [id number]           Patient Information     Date Of Birth          1950        Visit Information        Provider Department      6/12/2018 9:40 AM Jeannette Durán DO [facility]        Today's Diagnoses     Preop general physical exam    -  1    Carcinoid tumor        Hypertension goal BP (blood pressure) < 140/90        CKD (chronic kidney disease) stage 3, GFR 30-59 ml/min        Trigeminal neuralgia          Care Instructions    Follow up with provider for recheck of bp and check and potasium  Follow up with all other specialist as planned  Let me know if you want me to contact DR nery han  Good luck with the procedure      Before Your Surgery      Call your surgeon if there is any change in your health. This includes signs of a cold or flu (such as a sore throat, runny nose, cough, rash or fever).    Do not smoke, drink alcohol or take over the counter medicine (unless your surgeon or primary care doctor tells you to) for the 24 hours before and after surgery.    If you take prescribed drugs: Follow your doctor s orders about which medicines to take and which to stop until after surgery.    Eating and drinking prior to surgery: follow the instructions from your surgeon    Take a shower or bath the night before surgery. Use the soap your surgeon gave you to gently clean your skin. If you do not have soap from your surgeon, use your regular soap. Do not shave or scrub the surgery site.  Wear clean pajamas and have clean sheets on your bed.           Follow-ups after your visit        Your next 10 appointments already scheduled     Jun 14, 2018  8:00 AM CDT   MA DIAGNOSTIC DIGITAL RIGHT with [email or web address]   [facility] ([facility])    [address] [address]   [phone]           Do not use any powder, lotion or deodorant under your arms or on your breast.  "If you do, we will ask you to remove it before your exam.  Wear comfortable, two-piece clothing.  If you have any allergies, tell your care team.  Bring any previous mammograms from other facilities or have them mailed to the breast center.   Three-dimensional (3D) mammograms are available at [place] locations in [facility], [facility], [facility], [place], [place], and [place]. [facility] locations include [place] and [facility] in [place]. Benefits of 3D mammograms include: - Improved rate of cancer detection - Decreases your chance of having to go back for more tests, which means fewer: - \"False-positive\" results (This means that there is an abnormal area but it isn't cancer.) - Invasive testing procedures, such as a biopsy or surgery - Can provide clearer images of the breast if you have dense breast tissue. 3D mammography is an optional exam that anyone can have with a 2D mammogram. It doesn't replace or take the place of a 2D mammogram. 2D mammograms remain an effective screening test for all women.  Not all insurance companies cover the cost of a 3D mammogram. Check with your insurance.            Jun 14, 2018  8:30 AM CDT   US BREAST RIGHT LIMITED 1-3 QUAD with [email or web address]   [facility] ([facility])    [address], [place] [address] [phone]           Please bring a list of your medicines (including vitamins, minerals and over-the-counter drugs). Also, tell your doctor about any allergies you may have. Wear comfortable clothes and leave your valuables at home.  You do not need to do anything special to prepare for your exam.  Please call the Imaging Department at your exam site with any questions.            Jul 05, 2018   Procedure with Zhao La MD   [facility] PeriOP Services (--)    [address] [address] [phone]           The medical center is located at [address] [place] [address] (between [address] in [place], [place] miles north of [place]).            Oct 02, 2018 10:00 AM CDT   (Arrive by 9:45 AM)   COLPOSCOPY with  GYN ONC COLPOSCOPY PROVIDER   [facility] ([facility])    [address]  [address]  [facility] [place] [address]   [phone]            Nov 12, 2018 11:10 AM CST   LAB with [facility] ([facility])    [address] [place] [address]   [phone]           Please do not eat 10-12 hours before your appointment if you are coming in fasting for labs on lipids, cholesterol, or glucose (sugar). This does not apply to pregnant women. Water, hot tea and black coffee (with nothing added) are okay. Do not drink other fluids, diet soda or chew gum.            Nov 14, 2018 11:15 AM CST   Return Visit with Hosea King MD   [facility] ([facility])    [facility]  [address]  [facility] [address]   [phone]              Who to contact     If you have questions or need follow up information about today's clinic visit or your schedule please contact [facility] directly at [phone].  Normal or non-critical lab and imaging results will be communicated to you by MyChart, letter or phone within 4 business days after the clinic has received the results. If you do not hear from us within 7 days, please contact the clinic through MyChart or phone. If you have a critical or abnormal lab result, we will notify you by phone as soon as possible.  Submit refill requests through [employer] or call your pharmacy and they will forward the refill request to us. Please allow 3 business days for your refill to be completed.          Additional Information About Your Visit        Care EveryWhere ID     This is your Care EveryWhere ID. This could be used by other organizations to access your  "[place] medical records  [id number]        Your Vitals Were     Pulse Temperature Height Pulse Oximetry BMI (Body Mass Index)       72 97.9  F (36.6  C) (Oral) 5' 7.25\" (1.708 m) 97% 38.83 kg/m2        Blood Pressure from Last 3 Encounters:   06/12/18 132/72   05/16/18 169/78   05/16/18 165/72    Weight from Last 3 Encounters:   06/12/18 249 lb 12.8 oz (113.3 kg)   05/16/18 247 lb (112 kg)   05/16/18 247 lb 11.2 oz (112.4 kg)              Today, you had the following     No orders found for display         Today's Medication Changes          These changes are accurate as of 6/12/18 10:33 AM.  If you have any questions, ask your nurse or doctor.               These medicines have changed or have updated prescriptions.        Dose/Directions    exemestane 25 MG tablet   Commonly known as:  AROMASIN   This may have changed:  when to take this   Used for:  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, estrogen receptor positive (H)        Dose:  25 mg   Take 1 tablet (25 mg) by mouth daily   Quantity:  90 tablet   Refills:  1                Primary Care Provider Office Phone # Fax #    Zaida ShieldsDO crystal [phone]       [facility] [facility] [address]        Equal Access to Services     TASIA RANGEL AH: Bernardino baltazar hadasho Soomaali, waaxda luqadaha, qaybta kaalmada adeegyada, niesha rodrigues. So [facility] [phone].    ATENCIÓN: Si habla español, tiene a allan disposición servicios gratuitos de asistencia lingüística. Llame al [phone].    We comply with applicable federal civil rights laws and [place] laws. We do not discriminate on the basis of race, color, national origin, age, disability, sex, sexual orientation, or gender identity.            Thank you!     Thank you for choosing [facility]  for your care. Our goal is always to provide you with excellent care. Hearing back from our patients is one way we can continue to " improve our services. Please take a few minutes to complete the written survey that you may receive in the mail after your visit with us. Thank you!             Your Updated Medication List - Protect others around you: Learn how to safely use, store and throw away your medicines at www.disposemymeds.org.          This list is accurate as of 6/12/18 10:33 AM.  Always use your most recent med list.                   Brand Name Dispense Instructions for use Diagnosis    albuterol 108 (90 Base) MCG/ACT Inhaler    PROAIR HFA/PROVENTIL HFA/VENTOLIN HFA    1 Inhaler    Inhale 2 puffs into the lungs every 6 hours as needed for shortness of breath / dyspnea or wheezing    SOB (shortness of breath)       CVS DAILY MULTIPLE FOR WOMEN PO      Take by mouth daily        exemestane 25 MG tablet    AROMASIN    90 tablet    Take 1 tablet (25 mg) by mouth daily    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, estrogen receptor positive (H)       gabapentin 600 MG tablet    NEURONTIN    180 tablet    Take 1 tablet (600 mg) by mouth 3 times daily    Trigeminal neuralgia       hydrochlorothiazide 25 MG tablet    HYDRODIURIL    45 tablet    Take 0.5 tablets (12.5 mg) by mouth every morning    Essential hypertension with goal blood pressure less than 140/90       HYDROcodone-acetaminophen 5-325 MG per tablet    NORCO    5 tablet    Take 1-2 tablets by mouth every 6 hours as needed for other (Moderate to Severe Pain)    Vaginal dysplasia

## 2018-06-12 NOTE — PATIENT INSTRUCTIONS
Follow up with provider for recheck of bp and check and potasium  Follow up with all other specialist as planned  Let me know if you want me to contact DR nery Ferris care  Good luck with the procedure      Before Your Surgery      Call your surgeon if there is any change in your health. This includes signs of a cold or flu (such as a sore throat, runny nose, cough, rash or fever).    Do not smoke, drink alcohol or take over the counter medicine (unless your surgeon or primary care doctor tells you to) for the 24 hours before and after surgery.    If you take prescribed drugs: Follow your doctor s orders about which medicines to take and which to stop until after surgery.    Eating and drinking prior to surgery: follow the instructions from your surgeon    Take a shower or bath the night before surgery. Use the soap your surgeon gave you to gently clean your skin. If you do not have soap from your surgeon, use your regular soap. Do not shave or scrub the surgery site.  Wear clean pajamas and have clean sheets on your bed.

## 2018-06-14 ENCOUNTER — HOSPITAL ENCOUNTER (OUTPATIENT)
Dept: MAMMOGRAPHY | Facility: CLINIC | Age: 68
Discharge: HOME | End: 2018-06-14
Attending: INTERNAL MEDICINE | Admitting: INTERNAL MEDICINE
Payer: MEDICARE

## 2018-06-14 DIAGNOSIS — Z17.0 MALIGNANT NEOPLASM OF UPPER-OUTER QUADRANT OF BOTH BREASTS IN FEMALE, ESTROGEN RECEPTOR POSITIVE (H): ICD-10-CM

## 2018-06-14 DIAGNOSIS — C50.411 MALIGNANT NEOPLASM OF UPPER-OUTER QUADRANT OF BOTH BREASTS IN FEMALE, ESTROGEN RECEPTOR POSITIVE (H): ICD-10-CM

## 2018-06-14 DIAGNOSIS — C50.412 MALIGNANT NEOPLASM OF UPPER-OUTER QUADRANT OF BOTH BREASTS IN FEMALE, ESTROGEN RECEPTOR POSITIVE (H): ICD-10-CM

## 2018-06-14 PROCEDURE — G0279 TOMOSYNTHESIS, MAMMO: HCPCS

## 2018-06-15 ENCOUNTER — TELEPHONE (OUTPATIENT)
Dept: ONCOLOGY | Facility: CLINIC | Age: 68
End: 2018-06-15

## 2018-06-15 NOTE — TELEPHONE ENCOUNTER
Called and reviewed Mammogram results with the pt. She verbalized understating and has a f/u set up with Dr. King in November.

## 2018-07-01 DIAGNOSIS — I10 ESSENTIAL HYPERTENSION WITH GOAL BLOOD PRESSURE LESS THAN 140/90: ICD-10-CM

## 2018-07-02 ENCOUNTER — ANESTHESIA EVENT (OUTPATIENT)
Dept: SURGERY | Facility: CLINIC | Age: 68
End: 2018-07-02
Payer: MEDICARE

## 2018-07-02 RX ORDER — HYDROCHLOROTHIAZIDE 25 MG/1
TABLET ORAL
Qty: 15 TABLET | Refills: 0 | Status: SHIPPED | OUTPATIENT
Start: 2018-07-02 | End: 2018-09-08

## 2018-07-02 NOTE — TELEPHONE ENCOUNTER
"Requested Prescriptions   Pending Prescriptions Disp Refills     hydrochlorothiazide (HYDRODIURIL) 25 MG tablet [Pharmacy Med Name: HYDROCHLOROT 25MG   TAB]  Last Written Prescription Date:  1/9/2018  Last Fill Quantity: 45 tablet,  # refills: 1   Last office visit: 6/12/2018 with prescribing provider:  DEANGELO Durán   Future Office Visit:     45 tablet 1     Sig: TAKE ONE-HALF TABLET (12.5MG) BY MOUTH ONCE DAILY IN THE MORNING    Diuretics (Including Combos) Protocol Failed    7/1/2018  8:05 AM       Failed - Normal serum creatinine on file in past 12 months    Recent Labs   Lab Test  05/14/18   0856   CR  1.23*          Failed - Normal serum potassium on file in past 12 months    Recent Labs   Lab Test  05/14/18   0856   POTASSIUM  3.3*          Passed - Blood pressure under 140/90 in past 12 months    BP Readings from Last 3 Encounters:   06/12/18 132/72   05/16/18 169/78   05/16/18 165/72          Passed - Recent (12 mo) or future (30 days) visit within the authorizing provider's specialty    Patient had office visit in the last 12 months or has a visit in the next 30 days with authorizing provider or within the authorizing provider's specialty.  See \"Patient Info\" tab in inbasket, or \"Choose Columns\" in Meds & Orders section of the refill encounter.           Passed - Patient is age 18 or older       Passed - No active pregancy on record       Passed - Normal serum sodium on file in past 12 months    Recent Labs   Lab Test  05/14/18   0856   NA  144             Passed - No positive pregnancy test in past 12 months          "

## 2018-07-02 NOTE — TELEPHONE ENCOUNTER
As her potassium was low and creatinine was elevated I would like to see her in the clinic for follow up.  Okay to refill 30 day.     Zaida Saldana D.O.

## 2018-07-03 ENCOUNTER — NURSE TRIAGE (OUTPATIENT)
Dept: NURSING | Facility: CLINIC | Age: 68
End: 2018-07-03

## 2018-07-03 DIAGNOSIS — N39.0 URINARY TRACT INFECTION: Primary | ICD-10-CM

## 2018-07-04 DIAGNOSIS — N39.0 URINARY TRACT INFECTION: ICD-10-CM

## 2018-07-04 LAB
ALBUMIN UR-MCNC: NEGATIVE MG/DL
APPEARANCE UR: CLEAR
BACTERIA #/AREA URNS HPF: ABNORMAL /HPF
BILIRUB UR QL STRIP: NEGATIVE
COLOR UR AUTO: YELLOW
GLUCOSE UR STRIP-MCNC: NEGATIVE MG/DL
HGB UR QL STRIP: NEGATIVE
KETONES UR STRIP-MCNC: NEGATIVE MG/DL
LEUKOCYTE ESTERASE UR QL STRIP: ABNORMAL
NITRATE UR QL: NEGATIVE
PH UR STRIP: 6 PH (ref 5–7)
RBC #/AREA URNS AUTO: ABNORMAL /HPF
SOURCE: ABNORMAL
SP GR UR STRIP: 1.01 (ref 1–1.03)
TRANS CELLS #/AREA URNS HPF: ABNORMAL /HPF
UROBILINOGEN UR STRIP-ACNC: 0.2 EU/DL (ref 0.2–1)
WBC #/AREA URNS AUTO: ABNORMAL /HPF

## 2018-07-04 PROCEDURE — 87086 URINE CULTURE/COLONY COUNT: CPT | Performed by: UROLOGY

## 2018-07-04 PROCEDURE — 81001 URINALYSIS AUTO W/SCOPE: CPT | Performed by: UROLOGY

## 2018-07-05 ENCOUNTER — APPOINTMENT (OUTPATIENT)
Dept: GENERAL RADIOLOGY | Facility: CLINIC | Age: 68
End: 2018-07-05
Attending: UROLOGY
Payer: MEDICARE

## 2018-07-05 ENCOUNTER — HOSPITAL ENCOUNTER (OUTPATIENT)
Facility: CLINIC | Age: 68
Discharge: HOME | End: 2018-07-05
Attending: UROLOGY | Admitting: UROLOGY
Payer: MEDICARE

## 2018-07-05 ENCOUNTER — ANESTHESIA (OUTPATIENT)
Dept: SURGERY | Facility: CLINIC | Age: 68
End: 2018-07-05
Payer: MEDICARE

## 2018-07-05 VITALS
BODY MASS INDEX: 39.08 KG/M2 | SYSTOLIC BLOOD PRESSURE: 180 MMHG | OXYGEN SATURATION: 95 % | RESPIRATION RATE: 16 BRPM | DIASTOLIC BLOOD PRESSURE: 71 MMHG | WEIGHT: 249 LBS | TEMPERATURE: 98.4 F | HEIGHT: 67 IN

## 2018-07-05 LAB
BACTERIA SPEC CULT: NORMAL
SPECIMEN SOURCE: NORMAL

## 2018-07-05 PROCEDURE — 37000009 ZZH ANESTHESIA TECHNICAL FEE, EACH ADDTL 15 MIN: Performed by: UROLOGY

## 2018-07-05 PROCEDURE — 25000128 H RX IP 250 OP 636: Performed by: UROLOGY

## 2018-07-05 PROCEDURE — 25000128 H RX IP 250 OP 636: Performed by: NURSE ANESTHETIST, CERTIFIED REGISTERED

## 2018-07-05 PROCEDURE — 25000125 ZZHC RX 250: Performed by: NURSE ANESTHETIST, CERTIFIED REGISTERED

## 2018-07-05 PROCEDURE — 71000027 ZZH RECOVERY PHASE 2 EACH 15 MINS: Performed by: UROLOGY

## 2018-07-05 PROCEDURE — 52332 CYSTOSCOPY AND TREATMENT: CPT | Mod: LT | Performed by: UROLOGY

## 2018-07-05 PROCEDURE — 37000008 ZZH ANESTHESIA TECHNICAL FEE, 1ST 30 MIN: Performed by: UROLOGY

## 2018-07-05 PROCEDURE — 40000306 ZZH STATISTIC PRE PROC ASSESS II: Performed by: UROLOGY

## 2018-07-05 PROCEDURE — C1769 GUIDE WIRE: HCPCS | Performed by: UROLOGY

## 2018-07-05 PROCEDURE — C1758 CATHETER, URETERAL: HCPCS | Performed by: UROLOGY

## 2018-07-05 PROCEDURE — 36000060 ZZH SURGERY LEVEL 3 W FLUORO 1ST 30 MIN: Performed by: UROLOGY

## 2018-07-05 PROCEDURE — 40000277 XR SURGERY CARM FLUORO LESS THAN 5 MIN W STILLS

## 2018-07-05 PROCEDURE — 36000058 ZZH SURGERY LEVEL 3 EA 15 ADDTL MIN: Performed by: UROLOGY

## 2018-07-05 PROCEDURE — 27210794 ZZH OR GENERAL SUPPLY STERILE: Performed by: UROLOGY

## 2018-07-05 PROCEDURE — C2617 STENT, NON-COR, TEM W/O DEL: HCPCS | Performed by: UROLOGY

## 2018-07-05 DEVICE — STENT URETERAL PERCUFLEX PLUS 6FRX24CM M0061752620
Type: IMPLANTABLE DEVICE | Site: URETER | Status: NON-FUNCTIONAL
Removed: 2019-02-07

## 2018-07-05 RX ORDER — ONDANSETRON 4 MG/1
4 TABLET, ORALLY DISINTEGRATING ORAL EVERY 30 MIN PRN
Status: DISCONTINUED | OUTPATIENT
Start: 2018-07-05 | End: 2018-07-05 | Stop reason: HOSPADM

## 2018-07-05 RX ORDER — LIDOCAINE HYDROCHLORIDE 10 MG/ML
INJECTION, SOLUTION INFILTRATION; PERINEURAL PRN
Status: DISCONTINUED | OUTPATIENT
Start: 2018-07-05 | End: 2018-07-05

## 2018-07-05 RX ORDER — SODIUM CHLORIDE, SODIUM LACTATE, POTASSIUM CHLORIDE, CALCIUM CHLORIDE 600; 310; 30; 20 MG/100ML; MG/100ML; MG/100ML; MG/100ML
INJECTION, SOLUTION INTRAVENOUS CONTINUOUS
Status: DISCONTINUED | OUTPATIENT
Start: 2018-07-05 | End: 2018-07-05 | Stop reason: HOSPADM

## 2018-07-05 RX ORDER — DEXAMETHASONE SODIUM PHOSPHATE 4 MG/ML
INJECTION, SOLUTION INTRA-ARTICULAR; INTRALESIONAL; INTRAMUSCULAR; INTRAVENOUS; SOFT TISSUE PRN
Status: DISCONTINUED | OUTPATIENT
Start: 2018-07-05 | End: 2018-07-05

## 2018-07-05 RX ORDER — NALOXONE HYDROCHLORIDE 0.4 MG/ML
.1-.4 INJECTION, SOLUTION INTRAMUSCULAR; INTRAVENOUS; SUBCUTANEOUS
Status: DISCONTINUED | OUTPATIENT
Start: 2018-07-05 | End: 2018-07-05 | Stop reason: HOSPADM

## 2018-07-05 RX ORDER — FENTANYL CITRATE 50 UG/ML
INJECTION, SOLUTION INTRAMUSCULAR; INTRAVENOUS PRN
Status: DISCONTINUED | OUTPATIENT
Start: 2018-07-05 | End: 2018-07-05

## 2018-07-05 RX ORDER — CEFAZOLIN SODIUM 2 G/100ML
2 INJECTION, SOLUTION INTRAVENOUS
Status: COMPLETED | OUTPATIENT
Start: 2018-07-05 | End: 2018-07-05

## 2018-07-05 RX ORDER — HYDROMORPHONE HYDROCHLORIDE 1 MG/ML
.3-.5 INJECTION, SOLUTION INTRAMUSCULAR; INTRAVENOUS; SUBCUTANEOUS EVERY 10 MIN PRN
Status: DISCONTINUED | OUTPATIENT
Start: 2018-07-05 | End: 2018-07-05 | Stop reason: HOSPADM

## 2018-07-05 RX ORDER — MEPERIDINE HYDROCHLORIDE 50 MG/ML
12.5 INJECTION INTRAMUSCULAR; INTRAVENOUS; SUBCUTANEOUS
Status: DISCONTINUED | OUTPATIENT
Start: 2018-07-05 | End: 2018-07-05 | Stop reason: HOSPADM

## 2018-07-05 RX ORDER — ONDANSETRON 2 MG/ML
4 INJECTION INTRAMUSCULAR; INTRAVENOUS EVERY 30 MIN PRN
Status: DISCONTINUED | OUTPATIENT
Start: 2018-07-05 | End: 2018-07-05 | Stop reason: HOSPADM

## 2018-07-05 RX ORDER — PROPOFOL 10 MG/ML
INJECTION, EMULSION INTRAVENOUS CONTINUOUS PRN
Status: DISCONTINUED | OUTPATIENT
Start: 2018-07-05 | End: 2018-07-05

## 2018-07-05 RX ORDER — LIDOCAINE 40 MG/G
CREAM TOPICAL
Status: DISCONTINUED | OUTPATIENT
Start: 2018-07-05 | End: 2018-07-05 | Stop reason: HOSPADM

## 2018-07-05 RX ORDER — FENTANYL CITRATE 50 UG/ML
25-50 INJECTION, SOLUTION INTRAMUSCULAR; INTRAVENOUS
Status: DISCONTINUED | OUTPATIENT
Start: 2018-07-05 | End: 2018-07-05 | Stop reason: HOSPADM

## 2018-07-05 RX ORDER — IOPAMIDOL 612 MG/ML
INJECTION, SOLUTION INTRAVASCULAR PRN
Status: DISCONTINUED | OUTPATIENT
Start: 2018-07-05 | End: 2018-07-05 | Stop reason: HOSPADM

## 2018-07-05 RX ORDER — CEFAZOLIN SODIUM 1 G/50ML
1 INJECTION, SOLUTION INTRAVENOUS SEE ADMIN INSTRUCTIONS
Status: DISCONTINUED | OUTPATIENT
Start: 2018-07-05 | End: 2018-07-05 | Stop reason: HOSPADM

## 2018-07-05 RX ORDER — ONDANSETRON 2 MG/ML
INJECTION INTRAMUSCULAR; INTRAVENOUS PRN
Status: DISCONTINUED | OUTPATIENT
Start: 2018-07-05 | End: 2018-07-05

## 2018-07-05 RX ORDER — FENTANYL CITRATE 50 UG/ML
25-50 INJECTION, SOLUTION INTRAMUSCULAR; INTRAVENOUS EVERY 5 MIN PRN
Status: DISCONTINUED | OUTPATIENT
Start: 2018-07-05 | End: 2018-07-05 | Stop reason: HOSPADM

## 2018-07-05 RX ADMIN — SODIUM CHLORIDE, POTASSIUM CHLORIDE, SODIUM LACTATE AND CALCIUM CHLORIDE: 600; 310; 30; 20 INJECTION, SOLUTION INTRAVENOUS at 07:41

## 2018-07-05 RX ADMIN — PROPOFOL 75 MCG/KG/MIN: 10 INJECTION, EMULSION INTRAVENOUS at 09:31

## 2018-07-05 RX ADMIN — LIDOCAINE HYDROCHLORIDE 1 ML: 10 INJECTION, SOLUTION EPIDURAL; INFILTRATION; INTRACAUDAL; PERINEURAL at 07:41

## 2018-07-05 RX ADMIN — FENTANYL CITRATE 25 MCG: 50 INJECTION, SOLUTION INTRAMUSCULAR; INTRAVENOUS at 10:17

## 2018-07-05 RX ADMIN — ONDANSETRON 4 MG: 2 INJECTION INTRAMUSCULAR; INTRAVENOUS at 09:32

## 2018-07-05 RX ADMIN — DEXAMETHASONE SODIUM PHOSPHATE 8 MG: 4 INJECTION, SOLUTION INTRA-ARTICULAR; INTRALESIONAL; INTRAMUSCULAR; INTRAVENOUS; SOFT TISSUE at 09:32

## 2018-07-05 RX ADMIN — CEFAZOLIN SODIUM 2 G: 2 INJECTION, SOLUTION INTRAVENOUS at 09:25

## 2018-07-05 RX ADMIN — LIDOCAINE HYDROCHLORIDE 50 MG: 10 INJECTION, SOLUTION INFILTRATION; PERINEURAL at 09:32

## 2018-07-05 RX ADMIN — FENTANYL CITRATE 25 MCG: 50 INJECTION, SOLUTION INTRAMUSCULAR; INTRAVENOUS at 09:38

## 2018-07-05 RX ADMIN — FENTANYL CITRATE 50 MCG: 50 INJECTION, SOLUTION INTRAMUSCULAR; INTRAVENOUS at 09:25

## 2018-07-05 RX ADMIN — MIDAZOLAM 1 MG: 1 INJECTION INTRAMUSCULAR; INTRAVENOUS at 09:25

## 2018-07-05 ASSESSMENT — LIFESTYLE VARIABLES: TOBACCO_USE: 1

## 2018-07-05 NOTE — ANESTHESIA CARE TRANSFER NOTE
Patient: Marissa Guadarrama    Procedure(s):  Cystoscopy, Left Stent Exchange - Wound Class: II-Clean Contaminated    Diagnosis: obstructed left ureter  Diagnosis Additional Information: No value filed.    Anesthesia Type:   MAC     Note:  Airway :Face Mask  Patient transferred to:Phase II  Comments: Patient's VSS. Spontaneous respirations. Patient awake and oriented. IV patent. Report to RN.Handoff Report: Identifed the Patient, Identified the Reponsible Provider, Reviewed the pertinent medical history, Discussed the surgical course, Reviewed Intra-OP anesthesia mangement and issues during anesthesia, Set expectations for post-procedure period and Allowed opportunity for questions and acknowledgement of understanding      Vitals: (Last set prior to Anesthesia Care Transfer)    CRNA VITALS  7/5/2018 0953 - 7/5/2018 1023      7/5/2018             Pulse: 68    SpO2: 99 %                Electronically Signed By: KAREN Ma CRNA  July 5, 2018  10:23 AM

## 2018-07-05 NOTE — H&P (VIEW-ONLY)
[address] [id number] [phone]  Dept: [phone]    PRE-OP EVALUATION:  Today's date: 2018    Marissa Guadarrama (: 1950) presents for pre-operative evaluation assessment as requested by Dr. Zhao La.  He requires evaluation and anesthesia risk assessment prior to undergoing surgery/procedure for treatment of Cystoscopy, Left Stent Exchange .    Fax number for surgical facility:  Available Electronically  Primary Physician: Zaida Saldana  Type of Anesthesia Anticipated: to be determined- Monitor Anesthesia Care    Patient has a Health Care Directive or Living Will:  NO    Preop Questions 2018   Who is doing your surgery? dr La   What are you having done? stent replacement   Date of Surgery/Procedure: 18   Facility or Hospital where procedure/surgery will be performed: [facility]   1.  Do you have a history of Heart attack, stroke, stent, coronary bypass surgery, or other heart surgery? No- just bladder stent   2.  Do you ever have any pain or discomfort in your chest? No   3.  Do you have a history of  Heart Failure? No   4.   Are you troubled by shortness of breath when:  walking on a level surface, or up a slight hill, or at night? YES - Related to Knees and Polio   5.  Do you currently have a cold, bronchitis or other respiratory infection? No   6.  Do you have a cough, shortness of breath, or wheezing? No   7.  Do you sometimes get pains in the calves of your legs when you walk? YES -  Related to Polio   8. Do you or anyone in your family have previous history of blood clots? No   9.  Do you or does anyone in your family have a serious bleeding problem such as prolonged bleeding following surgeries or cuts? No   10. Have you ever had problems with anemia or been told to take iron pills? No   11. Have you had any abnormal blood loss such as black, tarry or bloody stools, or abnormal vaginal bleeding? No    12. Have you ever had a blood transfusion? No   13. Have you or any of your relatives ever had problems with anesthesia? No   14. Do you have sleep apnea, excessive snoring or daytime drowsiness? No   15. Do you have any prosthetic heart valves? No   16. Do you have prosthetic joints? No   17. Is there any chance that you may be pregnant? No         HPI:     HPI related to upcoming procedure:     NEW TO THIS provider  Ms. 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  The tumor is pushing on the ureter causing some obstruction.  She has a stent indwelling, she is having a stent removal and replacement  She has had these procedures every 6 months and has tolerated procedures well in the past  No URi sx, no recent fever, no chest pain, no sob    HYPERTENSION - Patient has longstanding history of mod-severe HTN , currently denies any symptoms referable to elevated blood pressure. Specifically denies chest pain, palpitations, dyspnea, orthopnea, PND or peripheral edema. Blood pressure readings have been in normal range. Current medication regimen is as listed below. Patient denies any side effects of medication.       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  Marissa Guadarrama has no plan or code status on file. Discussed available resources and provided with information. Confirmed code status reflects current choices pending further ACP discussions.  Confirmed/documented designated decision maker(s). See permanent comments section of demographics in clinical tab. Added by Tiff Askew on 2/6/2015  Discussed advance care planning with patient; however, patient declined at this time. 9/19/2012              OA (osteoarthritis) of knee 10/05/2011     Priority: Medium     Hypertension goal BP (blood pressure) < 140/90 11/01/2010     Priority: Medium     HYPERLIPIDEMIA LDL GOAL <130 10/31/2010     Priority: Medium     Post-polio syndrome      Priority: Medium     Left knee, diagnosed by ortho in 1976, had left leg/toe surgeries as child  (Problem list name updated by automated process. Provider to review and confirm.)       Cervical cancer (H)      Priority: Medium     5/2007.  Dr. Alonso, [facility] gyn/onc,  Now needs routine paps, patient not always compliant  3/26/09 pap NIL  9/24/09 pap ASCUS  11/1/13 pap ASC-H. Plan-- colposcopy   12/23/13 colposcopy scheduled. Reminder placed.  colpscopy 12/23/2013-Vaginal cuff (submitted as \"cervix\"), biopsy:  - High grade squamous intraepithelial lesion (vaginal  intraepithelial neoplasia grade III, VaIN III, severe dysplasia and  carcinoma in situ).  - No invasive malignancy identified.  - Cervical transformation zone not identified in biopsies.  - Please see comment.  Referral back to gynecology/oncology  2/5/14 gyn/onc appt   3/13/14 colposcopy and CO2 laser vagina, path:VAIN 1/2.  3/19/14 follow up in 4 months  7/30/14 vaginal biopsy: VAIN 1. Plan: repeat colp in 6 months.(9/17/14)   9/17/14 colp. No staining seen. No biopsy taken. Pap- ASCUS + HR HPV. Plan- repeat pap at next visit.  2/9/15 colposcopy. bx- LSIL/koilocytosis. Pap- NIL/+ HR HPV 16.  Plan: 3/16/15 treatment planning. "   3/16/15 repeat colposcopy in 4 months (due 7/16/15)  7/6/15 scheduled. Tracking.             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  Patient has not been complaint with follow up.        Past Medical History:   Diagnosis Date     Arthritis     knee     Benign breast biopsy     benign     Carcinoid tumor 12/2003     Cervical cancer (H)      H/O colposcopy with cervical biopsy 12/23/13    vaginal cuff biopsy- VAIN III. referred back to gyn/onc     High cholesterol      HTN      Pap smear of vagina with ASC-H 11/1/13     Post-polio syndromes      Trigeminal neuralgias      Past Surgical History:   Procedure Laterality Date     APPENDECTOMY  1983     BIOPSY NODE SENTINEL Bilateral 6/1/2016    Procedure: BIOPSY NODE SENTINEL;  Surgeon: Brent Arana MD;  Location: [facility]     C BSO, OMENTECTOMY W/SOMAN  5/2007     C TOTAL ABDOM HYSTERECTOMY  5/2007     CL AFF SURGICAL PATHOLOGY       COLONOSCOPY N/A 6/23/2017    Procedure: COMBINED COLONOSCOPY, SINGLE OR MULTIPLE BIOPSY/POLYPECTOMY BY BIOPSY;  Colonoscopy Dx:Carcinoid tumor of colon prep mailed golytely;  Surgeon: Talisha Greco MD;  Location: [facility]     COLPOSCOPY, BIOPSY, COMBINED  3/13/2014    Procedure: COMBINED COLPOSCOPY, BIOPSY;;  Surgeon: Lara Pack MD;  Location: [facility]     COMBINED CYSTOSCOPY, RETROGRADES, URETEROSCOPY, INSERT STENT Left 2/2/2017    Procedure: COMBINED CYSTOSCOPY, RETROGRADES, URETEROSCOPY, INSERT STENT;  Surgeon: Zhao La MD;  Location: [facility]     CYSTOSCOPY, RETROGRADES, INSERT STENT URETER(S), COMBINED Left 9/7/2017    Procedure: COMBINED CYSTOSCOPY, RETROGRADES, INSERT STENT URETER(S);  Cystoscopy,Left Stent Exchange;  Surgeon: Zhao La MD;  Location: [facility]     CYSTOSCOPY, RETROGRADES, INSERT STENT URETER(S), COMBINED  12/12/2017    Procedure: COMBINED CYSTOSCOPY,  RETROGRADES, INSERT STENT URETER(S);;  Surgeon: Zhao La MD;  [facility]     EXAM UNDER ANESTHESIA PELVIC  3/13/2014    Procedure: EXAM UNDER ANESTHESIA PELVIC;  Exam Under Anestheisa, Colposcopy, Vaginal Biopsies, Co2 Laser of the Upper Vagina;  Surgeon: Lara Pack MD;  Location: [facility]     HERNIORRHAPHY INCISIONAL (LOCATION)       LASER CO2 VAGINA  3/13/2014    VAIN 1/2     LASER CO2 VAGINA N/A 12/12/2017    Procedure: LASER CO2 VAGINA;  Exam Under Anesthesia, CO2 Laser Ablation Of Vagina, Cystoscopy, Left Retrograde Pyelogram with Left Stent Placement;  Surgeon: Nic Segundo MD;  Location: [facility]     LUMPECTOMY BREAST WITH SEED LOCALIZATION Bilateral 6/1/2016    Procedure: LUMPECTOMY BREAST WITH SEED LOCALIZATION;  Surgeon: Brent Arana MD;  Location: [facility]     SURGICAL HISTORY OF -       ovarian cystectomy     SURGICAL HISTORY OF -   2003    right colon resection secondary to carcinoid tumor     TUBAL LIGATION       Current Outpatient Prescriptions   Medication Sig Dispense Refill     albuterol (PROAIR HFA, PROVENTIL HFA, VENTOLIN HFA) 108 (90 BASE) MCG/ACT inhaler Inhale 2 puffs into the lungs every 6 hours as needed for shortness of breath / dyspnea or wheezing 1 Inhaler 1     exemestane (AROMASIN) 25 MG tablet Take 1 tablet (25 mg) by mouth daily (Patient taking differently: Take 25 mg by mouth every morning ) 90 tablet 1     gabapentin (NEURONTIN) 600 MG tablet Take 1 tablet (600 mg) by mouth 3 times daily 180 tablet 6     hydrochlorothiazide (HYDRODIURIL) 25 MG tablet Take 0.5 tablets (12.5 mg) by mouth every morning 45 tablet 1     HYDROcodone-acetaminophen (NORCO) 5-325 MG per tablet Take 1-2 tablets by mouth every 6 hours as needed for other (Moderate to Severe Pain) 5 tablet 0     Multiple Vitamins-Minerals ([employer] DAILY MULTIPLE FOR WOMEN PO) Take by mouth daily       OTC products: None, except as noted above    Allergies   Allergen Reactions     Nkda [No  "Known Drug Allergies]       Latex Allergy: NO    Social History   Substance Use Topics     Smoking status: Former Smoker     Packs/day: 1.00     Years: 29.00     Types: Cigarettes     Quit date: 10/30/2006     Smokeless tobacco: Never Used     Alcohol use No     History   Drug Use No       REVIEW OF SYSTEMS:   Constitutional, neuro, ENT, endocrine, pulmonary, cardiac, gastrointestinal, genitourinary, musculoskeletal, integument and psychiatric systems are negative, except as otherwise noted.    EXAM:   /72  Pulse 72  Temp 97.9  F (36.6  C) (Oral)  Ht 5' 7.25\" (1.708 m)  Wt 249 lb 12.8 oz (113.3 kg)  SpO2 97%  BMI 38.83 kg/m2    GENERAL APPEARANCE: healthy, alert and no distress     EYES: EOMI, PERRL     HENT: ear canals and TM's normal and nose and mouth without ulcers or lesions     RESP: lungs clear to auscultation - no rales, rhonchi or wheezes     CV: regular rates and rhythm, normal S1 S2, no S3 or S4 and no murmur, click or rub     ABDOMEN:  soft, nontender, no HSM or masses and bowel sounds normal     MS: extremities normal- no gross deformities noted, no evidence of inflammation in joints, FROM in all extremities.     NEURO: Normal strength and tone, sensory exam grossly normal, mentation intact and speech normal     PSYCH: mentation appears normal. and affect normal/bright    DIAGNOSTICS:   EKG: Not indicated due to non-vascular surgery and last ekg on 9/17 (within 30 days for CAD history or last year for cardiac risk factors)    Recent Labs   Lab Test  05/14/18   0856  01/26/18   0559  12/12/17   0659  12/11/17   1301   HGB  14.2  14.7   --   14.3   PLT  207  192   --   237   NA  144   --    --   143   POTASSIUM  3.3*   --   3.3*  3.1*   CR  1.23*   --    --   1.55*        IMPRESSION:   Reason for surgery/procedure: stent replacement  Diagnosis/reason for consult: preopeative visit      The proposed surgical procedure is considered INTERMEDIATE risk.    REVISED CARDIAC RISK INDEX  The patient " has the following serious cardiovascular risks for perioperative complications such as (MI, PE, VFib and 3  AV Block):  No serious cardiac risks  INTERPRETATION: 0 risks: Class I (very low risk - 0.4% complication rate)    The patient has the following additional risks for perioperative complications:  No identified additional risks      ICD-10-CM    1. Preop general physical exam Z01.818    2. Carcinoid tumor D3A.00    3. Hypertension goal BP (blood pressure) < 140/90 I10    4. CKD (chronic kidney disease) stage 3, GFR 30-59 ml/min N18.3    5. Trigeminal neuralgia G50.0    new to this provider-reviewed chart    (D3A.00) Carcinoid tumor  Comment:   Plan: was reviewed with tumor team and will be meeting with Dr Lawrence     (N18.3) CKD (chronic kidney disease) stage 3, GFR 30-59 ml/min  Comment:   Plan:stable    (Z96.0) History of ureter stent  Comment:   Plan: due for removal and replacement.     hypertension-stable, slightly low K, advised potasium rich foods and recheck with provider in 4 weeks    (R32) Urinary incontinence, unspecified type  Comment: doing well on oxybutinin  Plan: Continue current medication     trigeminal neuralgia-needs to see ENT to talk about surgical intervention that was mentioned to her at her last visit           RECOMMENDATIONS:     --Consult hospital rounder / IM to assist post-op medical management    --Patient is to take all scheduled medications on the day of surgery EXCEPT for modifications listed below.    APPROVAL GIVEN to proceed with proposed procedure, without further diagnostic evaluation       Signed Electronically by: Jeannette Durán DO    Copy of this evaluation report is provided to requesting physician.    [facility] Preop Guidelines    Revised Cardiac Risk Index

## 2018-07-05 NOTE — BRIEF OP NOTE
Pre-op Dx: obstructed left ureter  Post-op Dx:same  Procedure:cysto, left stent exchange  EBL:1cc  Specimens:none  Drains: left 6 x 24 JJ stent  No complications.

## 2018-07-05 NOTE — ANESTHESIA POSTPROCEDURE EVALUATION
Patient: Marissa Guadarrama    Procedure(s):  Cystoscopy, Left Stent Exchange - Wound Class: II-Clean Contaminated    Diagnosis:obstructed left ureter  Diagnosis Additional Information: No value filed.    Anesthesia Type:  MAC    Note:  Anesthesia Post Evaluation    Patient location during evaluation: Phase 2 and Bedside  Patient participation: Able to fully participate in evaluation  Level of consciousness: awake and alert  Pain management: adequate  Airway patency: patent  Cardiovascular status: acceptable  Respiratory status: acceptable  Hydration status: acceptable  PONV: none     Anesthetic complications: None          Last vitals:  Vitals:    07/05/18 0715   BP: 165/71   Resp: 16   Temp: 36.9  C (98.4  F)   SpO2: 95%         Electronically Signed By: KAREN Ma CRNA  July 5, 2018  10:29 AM

## 2018-07-05 NOTE — ANESTHESIA PREPROCEDURE EVALUATION
Anesthesia Evaluation     . Pt has had prior anesthetic. Type: General, MAC and Regional    No history of anesthetic complications          ROS/MED HX    ENT/Pulmonary:     (+)tobacco use, Past use , . .    Neurologic: Comment: Post-polio syndromes  Trigeminal neuralgias       Cardiovascular:     (+) Dyslipidemia, hypertension----. : . . . :. . Previous cardiac testing Echodate:11/8/16results:Interpretation Summary     Global and regional left ventricular function is normal with an EF of 60-65%.  Global right ventricular function is normal.  No significant valvular dysfunction present.  Pulmonary artery systolic pressure is normal.  The inferior vena cava was normal in size with preserved respiratory  variability.  No pericardial effusion is present.  ______________________________________________________________________________           Left Ventricle  Left ventricular size is normal. Left ventricular wall thickness is normal.  Global and regional left ventricular function is normal with an EF of 60-65%.  Normal left ventricular filling for age.     Right Ventricle  The right ventricle is normal size. Global right ventricular function is  normal.  Atria  Both atria appear normal.     Mitral Valve  The mitral valve is normal. Trace to mild mitral insufficiency is present.     Aortic Valve  Aortic valve is normal in structure and function.     Tricuspid Valve  The tricuspid valve is normal. Mild tricuspid insufficiency is present.  Estimated pulmonary artery systolic pressure is 32 mmHg plus right atrial  pressure. Pulmonary artery systolic pressure is normal.     Pulmonic Valve  The pulmonic valve is normal. Trace to mild pulmonic insufficiency is  present.     Vessels  The aorta root is normal. The inferior vena cava was normal in size with  preserved respiratory variability.  Pericardium  No pericardial effusion is present.date: results:ECG reviewed date:9/5/17 results:Sinus Bradycardia    -Old anterior  infarct.    -Nonspecific ST depression date: results:          METS/Exercise Tolerance:  1 - Eating, dressing   Hematologic:  - neg hematologic  ROS       Musculoskeletal:   (+) arthritis, , , -       GI/Hepatic:  - neg GI/hepatic ROS       Renal/Genitourinary:     (+) chronic renal disease (stage 3), type: CRI,       Endo:  - neg endo ROS       Psychiatric:  - neg psychiatric ROS       Infectious Disease:  - neg infectious disease ROS       Malignancy:   (+) Malignancy History of Breast, GI and Other  Breast CA status post Surgery. GI CA status post Surgery, Other CA cervical status post Surgery Cecal carcinoid cancer; peritoneal metastasis         Other:                     Physical Exam  Normal systems: cardiovascular, pulmonary and dental    Airway   Mallampati: I  TM distance: >3 FB  Neck ROM: full    Dental     Cardiovascular       Pulmonary                     Anesthesia Plan      History & Physical Review  History and physical reviewed and following examination; no interval change.    ASA Status:  3 .    NPO Status:  > 8 hours    Plan for MAC Reason for MAC:  Deep or markedly invasive procedure (G8)  PONV prophylaxis:  Ondansetron (or other 5HT-3) and Dexamethasone or Solumedrol       Postoperative Care  Postoperative pain management:  IV analgesics and Oral pain medications.      Consents  Anesthetic plan, risks, benefits and alternatives discussed with:  Patient..                          .

## 2018-07-05 NOTE — OR NURSING
Up to bathroom with assist of walker.  Voided large amount of pink-tinged urine.  Denies pain or discomfort.

## 2018-07-05 NOTE — IP AVS SNAPSHOT
MRN:[id number]                      After Visit Summary   7/5/2018    Marissa Guadarrama    MRN: [id number]           Thank you!     Thank you for choosing [facility] for your care. Our goal is always to provide you with excellent care. Hearing back from our patients is one way we can continue to improve our services. Please take a few minutes to complete the written survey that you may receive in the mail after you visit with us. Thank you!        Patient Information     Date Of Birth          1950        About your hospital stay     You were admitted on:  July 5, 2018 You last received care in the:  [facility] PACU    You were discharged on:  July 5, 2018       Who to Call     For medical emergencies, please call 911.  For non-urgent questions about your medical care, please call your primary care provider or clinic, [phone]  For questions related to your surgery, please call your surgery clinic        Attending Provider     Provider Zhao Bailey MD Urology       Primary Care Provider Office Phone # Fax #    Zaida SaldanaDO [phone]      Your next 10 appointments already scheduled     Oct 02, 2018 10:00 AM CDT   (Arrive by 9:45 AM)   COLPOSCOPY with  GYN ONC COLPOSCOPY PROVIDER   [facility] ([facility])    [address] [place] [address]  [address] [place] [address] [phone]            Nov 12, 2018 11:10 AM CST   LAB with [facility] ([facility])    [address] [place] [id number] [phone]           Please do not eat 10-12 hours before your appointment if you are coming in fasting for labs on lipids, cholesterol, or glucose (sugar). This does not apply to pregnant women. Water, hot tea and black coffee (with nothing added) are okay. Do not drink other fluids, diet soda or chew gum.            Nov 14, 2018 11:15 AM CST   Return  Visit with Hosea King MD   [facility] ([facility])    [facility]  [address]  [place] [address]   [phone]            Nov 28, 2018  1:20 PM CST   New Visit with Paul Hamm MD   [facility] ([facility])    [address]  [place] [id number] [phone]              Further instructions from your care team                        Same Day Surgery Discharge Instructions  Special Precautions After Surgery - Adult    1. It is not unusual to feel lightheaded or faint, up to 24 hours after surgery or while taking pain medication.  If you have these symptoms; sit for a few minutes before standing and have someone assist you when getting up.  2. You should rest and relax for the next 24 hours and must have someone stay with you for at least 24 hours after your discharge.  3. DO NOT DRIVE any vehicle or operate mechanical equipment for 24 hours following the end of your surgery.  DO NOT DRIVE while taking narcotic pain medications that have been prescribed by your physician.  If you had a limb operated on, you must be able to use it fully to drive.  4. DO NOT drink alcoholic beverages for 24 hours following surgery or while taking prescription pain medication.  5. Drink clear liquids (apple juice, ginger ale, broth, 7-Up, etc.).  Progress to your regular diet as you feel able.  6. Any questions call your physician and do not make important decisions for 24 hours.    ____________________________________________________________________  IMPORTANT NUMBERS:    [facility] Main Number:  [phone], [phone]  Pharmacy:  [phone]  Same Day Surgery:  [phone], Monday - Friday until 8:30 p.m.  Urgent Care:  [phone]  Emergency Room:  [phone]      [facility]:  [phone]                                                                             [facility]:   [phone] option 1  [facility]:  [phone]     OB Clinic:  [phone]   Surgery Specialty Clinic:  [phone]   Home Medical Equipment: [phone]  [facility]:  [phone]    Nausea and Vomiting  What are nausea and vomiting?   Nausea is the queasy feeling you usually have before you vomit. Vomiting is the forceful emptying (throwing up) of the stomach's contents through the mouth.   What causes nausea and vomiting?   Nausea and vomiting are symptoms that may occur with many conditions, such as:   Anesthesia medications   side effect of narcotic medicines  exposure to unpleasant odors or sights   stress and anxiety     How is it treated?   At first you should rest your stomach for a few hours by eating nothing solid and sipping only clear liquids. A little later you can eat soft bland foods that are easy to digest.   It is important to drink small amounts (1 to 4 ounces) often so that you do not become dehydrated. Gradually drink larger amounts of the clear fluids. If you vomit, wait an hour, then start over with a smaller amount of fluid.   Eat slowly and avoid foods that are acidic, spicy, fatty, or fibrous (such as meats, coarse grains, and raw vegetables). Also avoid extremely hot or cold food. In addition, avoid dairy products if you have diarrhea. You may start eating your normal diet again in 3 days or so, when all signs of illness have passed.   Rest as much as possible. Sit or lie down with your head propped up. Do not lie flat for at least 2 hours after eating. Nausea and vomiting usually last only a short period of time. If you have cramping or pain in your belly you can try putting a heating pad set at low or a covered hot water bottle on your belly. Never set a heating pad on high because you could get burned.   If you have been vomiting for more than a day or have had diarrhea for over 3 days, you may need to have an exam by your provider, including a check for  "dehydration. If you are very dehydrated, you may need to be given fluids intravenously (IV). In children and older adults dehydration can quickly become life threatening.   When should I call my healthcare provider?   Talk with your provider if you are unable to keep fluids down for more than 12 hours or if you have any of the following symptoms with nausea and vomiting:   severe headache or neck ache, or stiff neck   severe abdominal pain   diarrhea and vomiting that last more than 24 hours   blood in the vomited material that may look red, brown, or black, or like coffee grounds   bloody diarrhea   very forceful vomiting   signs of dehydration such as dry mouth, excessive thirst, little or no urination, severe weakness, dizziness, or lightheadedness.   If you have nausea and pain in the jaw, arm, shoulder, chest, or back; sweating; shortness of breath; or lightheadedness; call 911 for emergency care.   Notify physician if fever/chills/sweats.  You may see blood in the urine while the stent is in place.  Follow up in September in urology clinic to discuss next steps.    Pending Results     Date and Time Order Name Status Description    7/5/2018 0215 XR Surgery SANDRA Fluoro L/T 5 Min w Stills In process     7/4/2018 1237 URINE CULTURE AEROBIC BACTERIAL In process             Admission Information     Date & Time Provider Department Dept. Phone    7/5/2018 Zhao La MD [facility] PACU [phone]      Your Vitals Were     Blood Pressure Temperature Respirations Height Weight Pulse Oximetry    132/59 (Cuff Size: Adult Large) 98.4  F (36.9  C) (Oral) 14 1.708 m (5' 7.25\") 112.9 kg (249 lb) 99%    BMI (Body Mass Index)                   38.71 kg/m2           Care EveryWhere ID     This is your Care EveryWhere ID. This could be used by other organizations to access your [facility] medical records  [id number]        Equal Access to Services     TASIA ROSE: jared Nielsen " yusra jerrytigist ashbywilliam tikiniesha hill ah. So [facility] [phone].    ATENCIÓN: Si ingrid bear, tiene a allan disposición servicios gratuitos de asistencia lingüística. Llhaydee al [phone].    We comply with applicable federal civil rights laws and [place] laws. We do not discriminate on the basis of race, color, national origin, age, disability, sex, sexual orientation, or gender identity.               Review of your medicines      CONTINUE these medicines which may have CHANGED, or have new prescriptions. If we are uncertain of the size of tablets/capsules you have at home, strength may be listed as something that might have changed.        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, estrogen receptor positive (H)        Dose:  25 mg   Take 1 tablet (25 mg) by mouth daily   Quantity:  90 tablet   Refills:  1         CONTINUE these medicines which have NOT CHANGED        Dose / Directions    albuterol 108 (90 Base) MCG/ACT Inhaler   Commonly known as:  PROAIR HFA/PROVENTIL HFA/VENTOLIN HFA   Used for:  SOB (shortness of breath)        Dose:  2 puff   Inhale 2 puffs into the lungs every 6 hours as needed for shortness of breath / dyspnea or wheezing   Quantity:  1 Inhaler   Refills:  1       [facility] DAILY MULTIPLE FOR WOMEN PO        Take by mouth daily   Refills:  0       gabapentin 600 MG tablet   Commonly known as:  NEURONTIN   Used for:  Trigeminal neuralgia        Dose:  600 mg   Take 1 tablet (600 mg) by mouth 3 times daily   Quantity:  180 tablet   Refills:  6       hydrochlorothiazide 25 MG tablet   Commonly known as:  HYDRODIURIL   Used for:  Essential hypertension with goal blood pressure less than 140/90        TAKE ONE-HALF TABLET (12.5MG) BY MOUTH ONCE DAILY IN THE MORNING   Quantity:  15 tablet   Refills:  0        HYDROcodone-acetaminophen 5-325 MG per tablet   Commonly known as:  NORCO   Used for:  Vaginal dysplasia        Dose:  1-2 tablet   Take 1-2 tablets by mouth every 6 hours as needed for other (Moderate to Severe Pain)   Quantity:  5 tablet   Refills:  0                Protect others around you: Learn how to safely use, store and throw away your medicines at www.disposemymeds.org.             Medication List: This is a list of all your medications and when to take them. Check marks below indicate your daily home schedule. Keep this list as a reference.      Medications           Morning Afternoon Evening Bedtime As Needed    albuterol 108 (90 Base) MCG/ACT Inhaler   Commonly known as:  PROAIR HFA/PROVENTIL HFA/VENTOLIN HFA   Inhale 2 puffs into the lungs every 6 hours as needed for shortness of breath / dyspnea or wheezing                                [facility]S DAILY MULTIPLE FOR WOMEN PO   Take by mouth daily                                exemestane 25 MG tablet   Commonly known as:  AROMASIN   Take 1 tablet (25 mg) by mouth daily                                gabapentin 600 MG tablet   Commonly known as:  NEURONTIN   Take 1 tablet (600 mg) by mouth 3 times daily                                hydrochlorothiazide 25 MG tablet   Commonly known as:  HYDRODIURIL   TAKE ONE-HALF TABLET (12.5MG) BY MOUTH ONCE DAILY IN THE MORNING                                HYDROcodone-acetaminophen 5-325 MG per tablet   Commonly known as:  NORCO   Take 1-2 tablets by mouth every 6 hours as needed for other (Moderate to Severe Pain)

## 2018-07-05 NOTE — DISCHARGE INSTRUCTIONS
Same Day Surgery Discharge Instructions  Special Precautions After Surgery - Adult    1. It is not unusual to feel lightheaded or faint, up to 24 hours after surgery or while taking pain medication.  If you have these symptoms; sit for a few minutes before standing and have someone assist you when getting up.  2. You should rest and relax for the next 24 hours and must have someone stay with you for at least 24 hours after your discharge.  3. DO NOT DRIVE any vehicle or operate mechanical equipment for 24 hours following the end of your surgery.  DO NOT DRIVE while taking narcotic pain medications that have been prescribed by your physician.  If you had a limb operated on, you must be able to use it fully to drive.  4. DO NOT drink alcoholic beverages for 24 hours following surgery or while taking prescription pain medication.  5. Drink clear liquids (apple juice, ginger ale, broth, 7-Up, etc.).  Progress to your regular diet as you feel able.  6. Any questions call your physician and do not make important decisions for 24 hours.    ____________________________________________________________________  IMPORTANT NUMBERS:    [facility] Main Number:  [phone], [phone]  Pharmacy:  [phone]  Same Day Surgery:  [phone], Monday - Friday until 8:30 p.m.  Urgent Care:  [phone]  Emergency Room:  [phone]      [facility]:  [phone]                                                                             [facility]:  [phone] option 1  [facility]:  [phone]     OB Clinic:  [phone]   Surgery Specialty Clinic:  [phone]   Home Medical Equipment: [phone]  [facility]:  [phone]    Nausea and Vomiting  What are nausea and vomiting?   Nausea is the queasy feeling you usually have before you vomit. Vomiting is the forceful emptying (throwing up) of the stomach's contents through the mouth.   What causes  nausea and vomiting?   Nausea and vomiting are symptoms that may occur with many conditions, such as:   Anesthesia medications   side effect of narcotic medicines  exposure to unpleasant odors or sights   stress and anxiety     How is it treated?   At first you should rest your stomach for a few hours by eating nothing solid and sipping only clear liquids. A little later you can eat soft bland foods that are easy to digest.   It is important to drink small amounts (1 to 4 ounces) often so that you do not become dehydrated. Gradually drink larger amounts of the clear fluids. If you vomit, wait an hour, then start over with a smaller amount of fluid.   Eat slowly and avoid foods that are acidic, spicy, fatty, or fibrous (such as meats, coarse grains, and raw vegetables). Also avoid extremely hot or cold food. In addition, avoid dairy products if you have diarrhea. You may start eating your normal diet again in 3 days or so, when all signs of illness have passed.   Rest as much as possible. Sit or lie down with your head propped up. Do not lie flat for at least 2 hours after eating. Nausea and vomiting usually last only a short period of time. If you have cramping or pain in your belly you can try putting a heating pad set at low or a covered hot water bottle on your belly. Never set a heating pad on high because you could get burned.   If you have been vomiting for more than a day or have had diarrhea for over 3 days, you may need to have an exam by your provider, including a check for dehydration. If you are very dehydrated, you may need to be given fluids intravenously (IV). In children and older adults dehydration can quickly become life threatening.   When should I call my healthcare provider?   Talk with your provider if you are unable to keep fluids down for more than 12 hours or if you have any of the following symptoms with nausea and vomiting:   severe headache or neck ache, or stiff neck   severe abdominal  pain   diarrhea and vomiting that last more than 24 hours   blood in the vomited material that may look red, brown, or black, or like coffee grounds   bloody diarrhea   very forceful vomiting   signs of dehydration such as dry mouth, excessive thirst, little or no urination, severe weakness, dizziness, or lightheadedness.   If you have nausea and pain in the jaw, arm, shoulder, chest, or back; sweating; shortness of breath; or lightheadedness; call 911 for emergency care.   Notify physician if fever/chills/sweats.  You may see blood in the urine while the stent is in place.  Follow up in September in urology clinic to discuss next steps.

## 2018-07-05 NOTE — OP NOTE
Procedure Date: 07/05/2018      DATE OF PROCEDURE:  07/05/2018      PREOPERATIVE DIAGNOSIS:  Obstructed left ureter.      POSTOPERATIVE DIAGNOSIS:  Obstructed left ureter.      PROCEDURE:  Cystoscopy, left retrograde pyelogram, left stent exchange, fluoroscopy, interpretation of fluoroscopic images.      INDICATIONS:  Ms. 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  The patient has had a chronic indwelling stent on the left side to improve drainage of the left kidney.  After discussion of risks, benefits and alternatives, the patient presented today for stent exchange.      DESCRIPTION OF PROCEDURE:  After informed consent was obtained, the patient was brought to the operating room where she was administered MAC anesthesia.  After suitable level of anesthesia was attained, she was placed in lithotomy position with all pressure points padded.  She was administered preoperative antibiotics.  She was prepped and draped in standard sterile fashion.  Next, a 22-Tanzanian cystoscope was introduced into the patient's bladder without difficulty.  Stent could be seen emanating from the left UO.  This was secured with a grasper and brought to the urethral meatus.  We then placed a sensor wire through the stent and advanced this up to the kidney under fluoroscopic guidance.  The stent was then removed.  We then used a dual-lumen catheter to shoot a retrograde pyelogram that showed persistence of some narrowing in the distal to mid ureter consistent with her known obstruction.  Next, a 6 x 24 double-J stent was then placed on the left side through the scope.  The proximal curl was confirmed by fluoroscopy, distal curl confirmed by direct vision.  The patient's bladder was drained.  She was awakened and brought to recovery room in stable condition.      SURGEON:  Zhao La MD      ESTIMATED BLOOD LOSS:  1 mL.      COMPLICATIONS:   There were no immediate complications noted.      DRAINS:  Include left 6 x 24 double-J stent.         STEVE RUVALCABA MD             D: 2018   T: 2018   MT: VIVIANA      Name:     ROSALINDA LANCASTER   MRN:      [id number]        Account:        [id number]   :      1950           Procedure Date: 2018      Document: [id number]

## 2018-07-05 NOTE — IP AVS SNAPSHOT
[facility]    [address] [address]    Phone:  [phone]                                       After Visit Summary   7/5/2018    Marissa Guadarrama    MRN: [id number]           After Visit Summary Signature Page     I have received my discharge instructions, and my questions have been answered. I have discussed any challenges I see with this plan with the nurse or doctor.    ..........................................................................................................................................  Patient/Patient Representative Signature      ..........................................................................................................................................  Patient Representative Print Name and Relationship to Patient    ..................................................               ................................................  Date                                            Time    ..........................................................................................................................................  Reviewed by Signature/Title    ...................................................              ..............................................  Date                                                            Time

## 2018-08-02 ENCOUNTER — OFFICE VISIT (OUTPATIENT)
Dept: OTOLARYNGOLOGY | Facility: CLINIC | Age: 68
End: 2018-08-02
Payer: COMMERCIAL

## 2018-08-02 VITALS
BODY MASS INDEX: 39.24 KG/M2 | HEIGHT: 67 IN | SYSTOLIC BLOOD PRESSURE: 153 MMHG | HEART RATE: 72 BPM | OXYGEN SATURATION: 94 % | WEIGHT: 250 LBS | RESPIRATION RATE: 12 BRPM | DIASTOLIC BLOOD PRESSURE: 87 MMHG

## 2018-08-02 DIAGNOSIS — G50.0 TRIGEMINAL NEURALGIA: Primary | ICD-10-CM

## 2018-08-02 PROCEDURE — 99214 OFFICE O/P EST MOD 30 MIN: CPT | Performed by: OTOLARYNGOLOGY

## 2018-08-02 NOTE — PATIENT INSTRUCTIONS
Scheduling Information  To schedule your CT/MRI scan, please contact [facility] at [phone] OR [facility] at [phone]    To schedule your Surgery, please contact our Specialty Schedulers at [phone]      ENT Clinic Locations Clinic Hours Telephone Number     [facility]  [address] [place] [address]   Monday:           1:00pm -- 5:00pm    Friday:              8:00am - 12:00pm   To schedule/reschedule an appointment with   Dr. Bell,   please contact our   Specialty Scheduling Department at:     [phone]       [facility]  [address] [place] [address] Tuesday:          8:00am -- 2:00pm         Urgent Care Locations Clinic Hours Telephone Numbers     [facility]  [address] [place] [address]     Monday-Friday:     11:00am - 9:00pm    Saturday-Sunday:  9:00am - 5:00pm   [phone]     [facility] [place]  [address] [place] [address]     Monday-Friday:      5:00pm - 9:00pm     Saturday-Sunday:  9:00am - 5:00pm   [phone]

## 2018-08-02 NOTE — MR AVS SNAPSHOT
After Visit Summary   8/2/2018    Marissa Guadarrama    MRN: [id number]           Patient Information     Date Of Birth          1950        Visit Information        Provider Department      8/2/2018 1:15 PM Rk Bell MD [facility]        Today's Diagnoses     Trigeminal neuralgia    -  1      Care Instructions    Scheduling Information  To schedule your CT/MRI scan, please contact [facility] at [phone] OR [facility] at [phone]    To schedule your Surgery, please contact our Specialty Schedulers at [phone]      ENT Clinic Locations Clinic Hours Telephone Number     [facility]  [address]  [place] [address]   Monday:           1:00pm -- 5:00pm    Friday:              8:00am - 12:00pm   To schedule/reschedule an appointment with   Dr. Bell,   please contact our   Specialty Scheduling Department at:     [phone]       [facility]  [address]  [place] [address] Tuesday:          8:00am -- 2:00pm         Urgent Care Locations Clinic Hours Telephone Numbers     [facility]  [address]  [place] [address]     Monday-Friday:     11:00am - 9:00pm    Saturday-Sunday:  9:00am - 5:00pm   [phone]     [facility]  [address] [place] [address]     Monday-Friday:      5:00pm - 9:00pm     Saturday-Sunday:  9:00am - 5:00pm   [phone]                 Follow-ups after your visit        Your next 10 appointments already scheduled     Sep 12, 2018  9:45 AM CDT   Return Visit with Zhao La MD   [facility]    [address] [address]   [phone]            Oct 02, 2018 10:00 AM CDT   (Arrive by 9:45 AM)   COLPOSCOPY with  GYN ONC COLPOSCOPY PROVIDER   [facility] ([facility])    [address] [place] [address]  [address] [place] [address]    [phone]            Nov 12, 2018 11:10 AM CST   LAB with [facility] ([facility])    [address] [place]  [facility] [address]   [phone]           Please do not eat 10-12 hours before your appointment if you are coming in fasting for labs on lipids, cholesterol, or glucose (sugar). This does not apply to pregnant women. Water, hot tea and black coffee (with nothing added) are okay. Do not drink other fluids, diet soda or chew gum.            Nov 14, 2018 11:15 AM CST   Return Visit with Hosea King MD   [facility] ([facility])    [facility]  [address] [place]  [facility] [address]   [phone]            Nov 28, 2018  1:20 PM CST   New Visit with Paul Hmam MD   [facility]    [address] [facility] [id number] [phone]              Who to contact     If you have questions or need follow up information about today's clinic visit or your schedule please contact [facility] directly at [phone].  Normal or non-critical lab and imaging results will be communicated to you by MyChart, letter or phone within 4 business days after the clinic has received the results. If you do not hear from us within 7 days, please contact the clinic through MyChart or phone. If you have a critical or abnormal lab result, we will notify you by phone as soon as possible.  Submit refill requests through [employer] or call your pharmacy and they will forward the refill request to us. Please allow 3 business days for your refill to be completed.          Additional Information About Your Visit        Care EveryWhere ID     This is your Care EveryWhere ID. This could be used by other organizations to access your [place] medical records  [id number]        Your Vitals Were     Pulse Respirations Height Pulse Oximetry BMI (Body Mass Index)       72 12  "1.702 m (5' 7\") 94% 39.16 kg/m2        Blood Pressure from Last 3 Encounters:   08/02/18 153/87   07/05/18 180/71   06/12/18 132/72    Weight from Last 3 Encounters:   08/02/18 113.4 kg (250 lb)   07/05/18 112.9 kg (249 lb)   06/12/18 113.3 kg (249 lb 12.8 oz)              Today, you had the following     No orders found for display         Today's Medication Changes          These changes are accurate as of 8/2/18  1:15 PM.  If you have any questions, ask your nurse or doctor.               These medicines have changed or have updated prescriptions.        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 [place] [id number]        Equal Access to Services     TERESA RANGEL AH: Hadii karina Lopez, wamalihada yusra, qaybta kaalmada adeliz, niesha tang . So [facility] [phone].    ATENCIÓN: Si habla español, tiene a allan disposición servicios gratuitos de asistencia lingüística. Llame al [phone].    We comply with applicable federal civil rights laws and [place] laws. We do not discriminate on the basis of race, color, national origin, age, disability, sex, sexual orientation, or gender identity.            Thank you!     Thank you for choosing [facility]  for your care. Our goal is always to provide you with excellent care. Hearing back from our patients is one way we can continue to improve our services. Please take a few minutes to complete the written survey that you may receive in the mail after your visit with us. Thank you!             Your Updated " Medication List - Protect others around you: Learn how to safely use, store and throw away your medicines at www.disposemymeds.org.          This list is accurate as of 8/2/18  1:15 PM.  Always use your most recent med list.                   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

## 2018-08-02 NOTE — LETTER
"    8/2/2018         RE: Marissa Guadarrama  [facility] [address] [address]        Dear Colleague,    Thank you for referring your patient, Marissa Guadarrama, to the [facility]. Please see a copy of my visit note below.    History of Present Illness - Marissa Guadarrama is a 67 year old female here for continued follow up for her LEFT V2 trigeminal neuralgia.  I last saw her 10/16/2017. She is here again with her  Gaudencio.    To review, in 2012 she stopped the meds because the pain went away, then it came back with a vengeance.  I previously had her up to 900mg three times daily.  Of note, I have previously ordered a brain and skull base MRI, which was normal.    There have definitely been some ups and downs over the years.  Including back in early 2015 when she sent a message that she was starting to have LEFT ear pain, \"that was different\" and wanted to be seen.  This started in part when she changed her pharmacy in about December 2014, and the different generic immediately did not seem to be working and she had a return of her left facial pain and ear pain.  \"Shearing and stabbing pain in the face.\"  She was on 600mg twice daily, now she increased to 600mg QID.  She also is using leftover vicodin, and it was not helping.    She has had a difficult year in 2016, and was diagnosed with bilateral Breast CA in April of 2016, and had mastectomies and radiation therapy.  It was very difficult. Through it all, he neuralgia was fine.  She is still doing 600mg three times daily and is tolerating it fine.  Her cancer seems to be in remission.    She also tells me that other than her annual check with me, she also wanted to discuss other options.    Past Medical History -   Patient Active Problem List   Diagnosis     Post-polio syndrome     Carcinoid tumor     Cervical cancer (H)     Trigeminal neuralgia     HYPERLIPIDEMIA LDL GOAL <130     Hypertension goal BP (blood pressure) < 140/90     OA " (osteoarthritis) of knee     Advanced directives, counseling/discussion     CKD (chronic kidney disease) stage 3, GFR 30-59 ml/min     Vaginal dysplasia     Urinary incontinence     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)     Serum calcium elevated     Peritoneal metastases (H)       Current Medications -   Current Outpatient Prescriptions:      albuterol (PROAIR HFA, PROVENTIL HFA, VENTOLIN HFA) 108 (90 BASE) MCG/ACT inhaler, Inhale 2 puffs into the lungs every 6 hours as needed for shortness of breath / dyspnea or wheezing, Disp: 1 Inhaler, Rfl: 1     exemestane (AROMASIN) 25 MG tablet, Take 1 tablet (25 mg) by mouth daily (Patient taking differently: Take 25 mg by mouth every morning ), Disp: 90 tablet, Rfl: 1     gabapentin (NEURONTIN) 600 MG tablet, Take 1 tablet (600 mg) by mouth 3 times daily, Disp: 180 tablet, Rfl: 6     hydrochlorothiazide (HYDRODIURIL) 25 MG tablet, TAKE ONE-HALF TABLET (12.5MG) BY MOUTH ONCE DAILY IN THE MORNING, Disp: 15 tablet, Rfl: 0     HYDROcodone-acetaminophen (NORCO) 5-325 MG per tablet, Take 1-2 tablets by mouth every 6 hours as needed for other (Moderate to Severe Pain), Disp: 5 tablet, Rfl: 0     Multiple Vitamins-Minerals (CVS DAILY MULTIPLE FOR WOMEN PO), Take by mouth daily, Disp: , Rfl:     Allergies -   Allergies   Allergen Reactions     Nkda [No Known Drug Allergies]        Social History -   History     Social History     Marital Status:      Spouse Name: N/A     Number of Children: N/A     Years of Education: N/A     Social History Main Topics     Smoking status: Former Smoker     Quit date: 10/30/2006     Smokeless tobacco: Never Used     Alcohol Use: No     Drug Use: No     Sexual Activity:     Partners: Male     Other Topics Concern      Service No     Blood Transfusions No     Caffeine Concern Yes     occasional coffee     Occupational Exposure No     Hobby Hazards Yes     [place],     Sleep Concern Yes     not  "sleeping well     Stress Concern Yes     Grandson in the      Weight Concern Yes     Special Diet No     Back Care No     Exercise No     Seat Belt Yes     Self-Exams Yes     Parent/Sibling W/ Cabg, Mi Or Angioplasty Before 65f 55m? No     Social History Narrative       Family History -   Family History   Problem Relation Age of Onset     Cancer Mother      bone / liver     Breast Cancer Mother      Cancer Maternal Grandmother      Cancer Maternal Grandfather      Cancer Paternal Grandmother      Cancer Paternal Grandfather      Cancer Sister      vulvar ca, cervical ca, squamous cell cancer     Cancer Brother      Rectal- Stage 4     Rectal Cancer Brother      Breast Cancer Paternal Aunt      Colon Cancer Paternal Aunt      Breast Cancer Maternal Aunt      Pancreatic Cancer Nephew      Breast Cancer Sister        Review of Systems - As per HPI and PMHx, otherwise 7 system review of the head and neck negative.    Physical Exam    /87  Pulse 72  Resp 12  Ht 1.702 m (5' 7\")  Wt 113.4 kg (250 lb)  SpO2 94%  BMI 39.16 kg/m2    General - The patient is well nourished and well developed, and appears to have good nutritional status.  Alert and oriented to person and place, answers questions and cooperates with examination appropriately.   Head and Face - Normocephalic and atraumatic, with no gross asymmetry noted of the contour of the facial features.  The facial nerve is intact, with strong symmetric movements.  Voice and Breathing - The patient was breathing comfortably without the use of accessory muscles. There was no wheezing, stridor, or stertor.  The patients voice was clear and strong, and had appropriate pitch and quality.  Ears - The tympanic membranes are normal in appearance, bony landmarks are intact.  No retraction, perforation, or masses.  Normal mobility on valsalva maneuver, with no reports of dizziness on insuflation.  No fluid or purulence was seen in the external canal or the middle " ear. No evidence of infection of the middle ear or external canal, cerumen was normal in appearance.  Eyes - Extraocular movements intact, and the pupils were reactive to light.  Sclera were not icteric or injected, conjunctiva were pink and moist.  Mouth - Examination of the oral cavity showed pink, healthy oral mucosa. No lesions or ulcerations noted.  The tongue was mobile and midline, and the dentition were in good condition.    Throat - The walls of the oropharynx were smooth, pink, moist, symmetric, and had no lesions or ulcerations.  The tonsillar pillars and soft palate were symmetric.  The uvula was midline on elevation.    Neck - Normal midline excursion of the laryngotracheal complex during swallowing.  Full range of motion on passive movement.  Palpation of the occipital, submental, submandibular, internal jugular chain, and supraclavicular nodes did not demonstrate any abnormal lymph nodes or masses.  The carotid pulse was palpable bilaterally.  Palpation of the thyroid was soft and smooth, with no nodules or goiter appreciated.  The trachea was mobile and midline.  Nose - External contour is symmetric, no gross deflection or scars.  Nasal mucosa is pink and moist with no abnormal mucus.  The septum was midline and non-obstructive, turbinates of normal size and position.  No polyps, masses, or purulence noted on examination.  Neurological - Cranial nerves 2 through 12 grossly intact.        A/P - Marissa Guadarrama is a 68 year old female  (G50.0) Trigeminal neuralgia  (primary encounter diagnosis)  Comment:   Her trigeminal neuralgia on the V2 LEFT is stable and responding perfectly to 600mg Neurontin three times daily.  Unfortunately the cost of the medication is incredible, sometimes as high as $900 per month!!    We discussed other options such as stereo tactical ablation, and I explained to her the basics of that.  They would like to think about it.  I am willing to refer to Neurosurgery for  consultation if she would like more information.  They will think about it, and let me know if they want that referral.    She is looking at having some dental implants done.  I would recommend 900mg three times daily for a week prior and for a week afterwards, then going back to 600mg three times daily after that.      Again, thank you for allowing me to participate in the care of your patient.        Sincerely,        Rk Bell MD

## 2018-08-02 NOTE — PROGRESS NOTES
"History of Present Illness - Marissa Guadarrama is a 67 year old female here for continued follow up for her LEFT V2 trigeminal neuralgia.  I last saw her 10/16/2017. She is here again with her  Gaudencio.    To review, in 2012 she stopped the meds because the pain went away, then it came back with a vengeance.  I previously had her up to 900mg three times daily.  Of note, I have previously ordered a brain and skull base MRI, which was normal.    There have definitely been some ups and downs over the years.  Including back in early 2015 when she sent a message that she was starting to have LEFT ear pain, \"that was different\" and wanted to be seen.  This started in part when she changed her pharmacy in about December 2014, and the different generic immediately did not seem to be working and she had a return of her left facial pain and ear pain.  \"Shearing and stabbing pain in the face.\"  She was on 600mg twice daily, now she increased to 600mg QID.  She also is using leftover vicodin, and it was not helping.    She has had a difficult year in 2016, and was diagnosed with bilateral Breast CA in April of 2016, and had mastectomies and radiation therapy.  It was very difficult. Through it all, he neuralgia was fine.  She is still doing 600mg three times daily and is tolerating it fine.  Her cancer seems to be in remission.    She also tells me that other than her annual check with me, she also wanted to discuss other options.    Past Medical History -   Patient Active Problem List   Diagnosis     Post-polio syndrome     Carcinoid tumor     Cervical cancer (H)     Trigeminal neuralgia     HYPERLIPIDEMIA LDL GOAL <130     Hypertension goal BP (blood pressure) < 140/90     OA (osteoarthritis) of knee     Advanced directives, counseling/discussion     CKD (chronic kidney disease) stage 3, GFR 30-59 ml/min     Vaginal dysplasia     Urinary incontinence     Bilateral malignant neoplasm of upper outer quadrant of breast in " female (H)     Serum calcium elevated     Peritoneal metastases (H)       Current Medications -   Current Outpatient Prescriptions:      albuterol (PROAIR HFA, PROVENTIL HFA, VENTOLIN HFA) 108 (90 BASE) MCG/ACT inhaler, Inhale 2 puffs into the lungs every 6 hours as needed for shortness of breath / dyspnea or wheezing, Disp: 1 Inhaler, Rfl: 1     exemestane (AROMASIN) 25 MG tablet, Take 1 tablet (25 mg) by mouth daily (Patient taking differently: Take 25 mg by mouth every morning ), Disp: 90 tablet, Rfl: 1     gabapentin (NEURONTIN) 600 MG tablet, Take 1 tablet (600 mg) by mouth 3 times daily, Disp: 180 tablet, Rfl: 6     hydrochlorothiazide (HYDRODIURIL) 25 MG tablet, TAKE ONE-HALF TABLET (12.5MG) BY MOUTH ONCE DAILY IN THE MORNING, Disp: 15 tablet, Rfl: 0     HYDROcodone-acetaminophen (NORCO) 5-325 MG per tablet, Take 1-2 tablets by mouth every 6 hours as needed for other (Moderate to Severe Pain), Disp: 5 tablet, Rfl: 0     Multiple Vitamins-Minerals (CVS DAILY MULTIPLE FOR WOMEN PO), Take by mouth daily, Disp: , Rfl:     Allergies -   Allergies   Allergen Reactions     Nkda [No Known Drug Allergies]        Social History -   History     Social History     Marital Status:      Spouse Name: N/A     Number of Children: N/A     Years of Education: N/A     Social History Main Topics     Smoking status: Former Smoker     Quit date: 10/30/2006     Smokeless tobacco: Never Used     Alcohol Use: No     Drug Use: No     Sexual Activity:     Partners: Male     Other Topics Concern      Service No     Blood Transfusions No     Caffeine Concern Yes     occasional coffee     Occupational Exposure No     Hobby Hazards Yes     [place],     Sleep Concern Yes     not sleeping well     Stress Concern Yes     Grandson in the      Weight Concern Yes     Special Diet No     Back Care No     Exercise No     Seat Belt Yes     Self-Exams Yes     Parent/Sibling W/ Cabg, Mi Or Angioplasty Before 65f 55m? No     Social  "History Narrative       Family History -   Family History   Problem Relation Age of Onset     Cancer Mother      bone / liver     Breast Cancer Mother      Cancer Maternal Grandmother      Cancer Maternal Grandfather      Cancer Paternal Grandmother      Cancer Paternal Grandfather      Cancer Sister      vulvar ca, cervical ca, squamous cell cancer     Cancer Brother      Rectal- Stage 4     Rectal Cancer Brother      Breast Cancer Paternal Aunt      Colon Cancer Paternal Aunt      Breast Cancer Maternal Aunt      Pancreatic Cancer Nephew      Breast Cancer Sister        Review of Systems - As per HPI and PMHx, otherwise 7 system review of the head and neck negative.    Physical Exam    /87  Pulse 72  Resp 12  Ht 1.702 m (5' 7\")  Wt 113.4 kg (250 lb)  SpO2 94%  BMI 39.16 kg/m2    General - The patient is well nourished and well developed, and appears to have good nutritional status.  Alert and oriented to person and place, answers questions and cooperates with examination appropriately.   Head and Face - Normocephalic and atraumatic, with no gross asymmetry noted of the contour of the facial features.  The facial nerve is intact, with strong symmetric movements.  Voice and Breathing - The patient was breathing comfortably without the use of accessory muscles. There was no wheezing, stridor, or stertor.  The patients voice was clear and strong, and had appropriate pitch and quality.  Ears - The tympanic membranes are normal in appearance, bony landmarks are intact.  No retraction, perforation, or masses.  Normal mobility on valsalva maneuver, with no reports of dizziness on insuflation.  No fluid or purulence was seen in the external canal or the middle ear. No evidence of infection of the middle ear or external canal, cerumen was normal in appearance.  Eyes - Extraocular movements intact, and the pupils were reactive to light.  Sclera were not icteric or injected, conjunctiva were pink and moist.  Mouth " - Examination of the oral cavity showed pink, healthy oral mucosa. No lesions or ulcerations noted.  The tongue was mobile and midline, and the dentition were in good condition.    Throat - The walls of the oropharynx were smooth, pink, moist, symmetric, and had no lesions or ulcerations.  The tonsillar pillars and soft palate were symmetric.  The uvula was midline on elevation.    Neck - Normal midline excursion of the laryngotracheal complex during swallowing.  Full range of motion on passive movement.  Palpation of the occipital, submental, submandibular, internal jugular chain, and supraclavicular nodes did not demonstrate any abnormal lymph nodes or masses.  The carotid pulse was palpable bilaterally.  Palpation of the thyroid was soft and smooth, with no nodules or goiter appreciated.  The trachea was mobile and midline.  Nose - External contour is symmetric, no gross deflection or scars.  Nasal mucosa is pink and moist with no abnormal mucus.  The septum was midline and non-obstructive, turbinates of normal size and position.  No polyps, masses, or purulence noted on examination.  Neurological - Cranial nerves 2 through 12 grossly intact.        A/P - Marissa Guadarrama is a 68 year old female  (G50.0) Trigeminal neuralgia  (primary encounter diagnosis)  Comment:   Her trigeminal neuralgia on the V2 LEFT is stable and responding perfectly to 600mg Neurontin three times daily.  Unfortunately the cost of the medication is incredible, sometimes as high as $900 per month!!    We discussed other options such as stereo tactical ablation, and I explained to her the basics of that.  They would like to think about it.  I am willing to refer to Neurosurgery for consultation if she would like more information.  They will think about it, and let me know if they want that referral.    She is looking at having some dental implants done.  I would recommend 900mg three times daily for a week prior and for a week afterwards,  then going back to 600mg three times daily after that.

## 2018-09-08 DIAGNOSIS — I10 ESSENTIAL HYPERTENSION WITH GOAL BLOOD PRESSURE LESS THAN 140/90: ICD-10-CM

## 2018-09-10 RX ORDER — HYDROCHLOROTHIAZIDE 25 MG/1
TABLET ORAL
Qty: 15 TABLET | Refills: 0 | Status: SHIPPED | OUTPATIENT
Start: 2018-09-10 | End: 2018-10-08

## 2018-09-10 NOTE — TELEPHONE ENCOUNTER
"Requested Prescriptions   Pending Prescriptions Disp Refills     hydrochlorothiazide (HYDRODIURIL) 25 MG tablet [Pharmacy Med Name: HYDROCHLOROT 25MG   TAB]  Last Written Prescription Date:  7/2/2018  Last Fill Quantity: 15 tablet,  # refills: 0   Last office visit: 1/9/2018 with prescribing provider:  NICOLE Saldana   Future Office Visit:   Next 5 appointments (look out 90 days)     Sep 12, 2018  9:45 AM CDT   [facility]   [facility]    [address] [facility] [address]   [phone]            Sep 28, 2018 10:40 AM CDT   [facility]    [address] [id number] [phone]            Nov 14, 2018 11:15 AM CST   Return Visit with Hosea King MD   [facility] ([facility])    [facility]  [address]  [facility] [address]   [phone]               15 tablet 0     Sig: TAKE 1/2 (ONE-HALF) TABLET BY MOUTH ONCE DAILY IN THE MORNING    Diuretics (Including Combos) Protocol Failed    9/8/2018  8:55 AM       Failed - Blood pressure under 140/90 in past 12 months    BP Readings from Last 3 Encounters:   08/02/18 153/87   07/05/18 180/71   06/12/18 132/72          Failed - Normal serum creatinine on file in past 12 months    Recent Labs   Lab Test  05/14/18   0856   CR  1.23*          Failed - Normal serum potassium on file in past 12 months    Recent Labs   Lab Test  05/14/18   0856   POTASSIUM  3.3*          Passed - Recent (12 mo) or future (30 days) visit within the authorizing provider's specialty    Patient had office visit in the last 12 months or has a visit in the next 30 days with authorizing provider or within the authorizing provider's specialty.  See \"Patient Info\" tab in inbasket, or \"Choose Columns\" in Meds & Orders section of the refill " encounter.           Passed - Patient is age 18 or older       Passed - No active pregancy on record       Passed - Normal serum sodium on file in past 12 months    Recent Labs   Lab Test  05/14/18   0856   NA  144          Passed - No positive pregnancy test in past 12 months

## 2018-09-12 ENCOUNTER — OFFICE VISIT (OUTPATIENT)
Dept: UROLOGY | Facility: CLINIC | Age: 68
End: 2018-09-12
Payer: COMMERCIAL

## 2018-09-12 VITALS — HEART RATE: 67 BPM | RESPIRATION RATE: 16 BRPM | DIASTOLIC BLOOD PRESSURE: 94 MMHG | SYSTOLIC BLOOD PRESSURE: 175 MMHG

## 2018-09-12 DIAGNOSIS — Z17.0 MALIGNANT NEOPLASM OF UPPER-OUTER QUADRANT OF BOTH BREASTS IN FEMALE, ESTROGEN RECEPTOR POSITIVE (H): ICD-10-CM

## 2018-09-12 DIAGNOSIS — C50.411 MALIGNANT NEOPLASM OF UPPER-OUTER QUADRANT OF BOTH BREASTS IN FEMALE, ESTROGEN RECEPTOR POSITIVE (H): ICD-10-CM

## 2018-09-12 DIAGNOSIS — N13.39 OTHER HYDRONEPHROSIS: Primary | ICD-10-CM

## 2018-09-12 DIAGNOSIS — C50.412 MALIGNANT NEOPLASM OF UPPER-OUTER QUADRANT OF BOTH BREASTS IN FEMALE, ESTROGEN RECEPTOR POSITIVE (H): ICD-10-CM

## 2018-09-12 PROCEDURE — 99213 OFFICE O/P EST LOW 20 MIN: CPT | Performed by: UROLOGY

## 2018-09-12 RX ORDER — EXEMESTANE 25 MG/1
25 TABLET ORAL EVERY MORNING
Qty: 90 TABLET | Refills: 3 | Status: SHIPPED | OUTPATIENT
Start: 2018-09-12 | End: 2019-05-15
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
## 2018-09-12 NOTE — MR AVS SNAPSHOT
After Visit Summary   9/12/2018    Marissa Guadarrama    MRN: [id number]           Patient Information     Date Of Birth          1950        Visit Information        Provider Department      9/12/2018 9:45 AM Zhao La MD [facility]        Today's Diagnoses     Other hydronephrosis    -  1       Follow-ups after your visit        Your next 10 appointments already scheduled     Nov 01, 2018  3:15 PM CDT   Return Visit with Zhao La MD   [facility]    [address] [address]   [phone]            Nov 12, 2018 11:10 AM CST   LAB with [facility] ([facility])    [address] [address]   [phone]           Please do not eat 10-12 hours before your appointment if you are coming in fasting for labs on lipids, cholesterol, or glucose (sugar). This does not apply to pregnant women. Water, hot tea and black coffee (with nothing added) are okay. Do not drink other fluids, diet soda or chew gum.            Nov 13, 2018 12:00 PM CST   [facility] with [facility] DO   [facility]    [address] [place] [id number] [phone]            Nov 14, 2018 11:20 AM CST   (Arrive by 11:15 AM)   Return Visit with Hosea King MD   [facility] ([facility])    [facility]  [address]  [facility] [address]   [phone]            Nov 28, 2018  1:20 PM CST   New Visit with Paul Hamm MD   [facility]    [address]  [place] [address]   [phone]            Apr 09, 2019  1:20 PM CDT   (Arrive by 1:05 PM)   COLPOSCOPY with  GYN ONC COLPOSCOPY PROVIDER   [facility] ([facility])    [address] [place] [address]    [address]  [facility] [address] [phone]              Who to contact     If you have questions or need follow up information about today's clinic visit or your schedule please contact [facility] directly at [phone].  Normal or non-critical lab and imaging results will be communicated to you by MyChart, letter or phone within 4 business days after the clinic has received the results. If you do not hear from us within 7 days, please contact the clinic through MyChart or phone. If you have a critical or abnormal lab result, we will notify you by phone as soon as possible.  Submit refill requests through [email or web address] or call your pharmacy and they will forward the refill request to us. Please allow 3 business days for your refill to be completed.          Additional Information About Your Visit        Care EveryWhere ID     This is your Care EveryWhere ID. This could be used by other organizations to access your [place] medical records  [id number]        Your Vitals Were     Pulse Respirations                67 16           Blood Pressure from Last 3 Encounters:   10/09/18 151/84   09/28/18 144/84   09/12/18 (!) 175/94    Weight from Last 3 Encounters:   10/09/18 112.5 kg (248 lb)   09/28/18 111.1 kg (245 lb)   08/02/18 113.4 kg (250 lb)              Today, you had the following     No orders found for display         Where to get your medicines      These medications were sent to [facility] [address] [place] [address] [address]  [address] [address]     Phone:  [phone]     exemestane 25 MG tablet          Primary Care Provider Office Phone # Fax #    Zaida Shieldscrystal  [phone]       [address] [facility] [address]        Equal Access to Services     TASIA RANGEL : Bernardino Lopez, jared meng, niesha corona. So [facility] [phone].    ATENCIÓN: Petr quintero  español, tiene a allan disposición servicios gratuitos de asistencia lingüística. Beba bullard [phone].    We comply with applicable federal civil rights laws and [place] laws. We do not discriminate on the basis of race, color, national origin, age, disability, sex, sexual orientation, or gender identity.            Thank you!     Thank you for choosing [facility]  for your care. Our goal is always to provide you with excellent care. Hearing back from our patients is one way we can continue to improve our services. Please take a few minutes to complete the written survey that you may receive in the mail after your visit with us. Thank you!             Your Updated Medication List - Protect others around you: Learn how to safely use, store and throw away your medicines at www.disposemymeds.org.          This list is accurate as of 9/12/18 11:59 PM.  Always use your most recent med list.                   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

## 2018-09-12 NOTE — PROGRESS NOTES
Patient walked into clinic. She said that she is unable to get refills.   It looks like the original order was for #90, 1 refill. Resent script for # 90 with 3 refills.  Veronica Giordano RN

## 2018-09-12 NOTE — NURSING NOTE
"Initial BP (!) 175/94 (BP Location: Right arm, Patient Position: Chair, Cuff Size: Adult Regular)  Pulse 67  Resp 16 Estimated body mass index is 39.16 kg/(m^2) as calculated from the following:    Height as of 8/2/18: 1.702 m (5' 7\").    Weight as of 8/2/18: 113.4 kg (250 lb). .    Patient is here for a follow up.  irena storm LPN    "

## 2018-09-28 ENCOUNTER — OFFICE VISIT (OUTPATIENT)
Dept: FAMILY MEDICINE | Facility: CLINIC | Age: 68
End: 2018-09-28
Payer: COMMERCIAL

## 2018-09-28 ENCOUNTER — HOSPITAL ENCOUNTER (OUTPATIENT)
Dept: CT IMAGING | Facility: CLINIC | Age: 68
Discharge: HOME | End: 2018-09-28
Attending: UROLOGY | Admitting: UROLOGY
Payer: MEDICARE

## 2018-09-28 VITALS
HEART RATE: 68 BPM | SYSTOLIC BLOOD PRESSURE: 144 MMHG | TEMPERATURE: 98.5 F | HEIGHT: 67 IN | BODY MASS INDEX: 38.45 KG/M2 | WEIGHT: 245 LBS | DIASTOLIC BLOOD PRESSURE: 84 MMHG

## 2018-09-28 DIAGNOSIS — M17.11 PRIMARY OSTEOARTHRITIS OF RIGHT KNEE: Primary | ICD-10-CM

## 2018-09-28 DIAGNOSIS — M65.30 TRIGGER FINGER, ACQUIRED: ICD-10-CM

## 2018-09-28 DIAGNOSIS — D3A.00 CARCINOID TUMOR (H): ICD-10-CM

## 2018-09-28 DIAGNOSIS — E66.01 MORBID OBESITY (H): ICD-10-CM

## 2018-09-28 LAB
CREAT BLD-MCNC: 1.5 MG/DL (ref 0.52–1.04)
GFR SERPL CREATININE-BSD FRML MDRD: 34 ML/MIN/1.7M2

## 2018-09-28 PROCEDURE — 82565 ASSAY OF CREATININE: CPT

## 2018-09-28 PROCEDURE — 74177 CT ABD & PELVIS W/CONTRAST: CPT

## 2018-09-28 PROCEDURE — 25000125 ZZHC RX 250: Performed by: RADIOLOGY

## 2018-09-28 PROCEDURE — 25000128 H RX IP 250 OP 636: Performed by: RADIOLOGY

## 2018-09-28 PROCEDURE — 20610 DRAIN/INJ JOINT/BURSA W/O US: CPT | Performed by: FAMILY MEDICINE

## 2018-09-28 PROCEDURE — 99213 OFFICE O/P EST LOW 20 MIN: CPT | Mod: 25 | Performed by: FAMILY MEDICINE

## 2018-09-28 RX ORDER — TRIAMCINOLONE ACETONIDE 40 MG/ML
40 INJECTION, SUSPENSION INTRA-ARTICULAR; INTRAMUSCULAR ONCE
Qty: 1 ML | Refills: 0 | OUTPATIENT
Start: 2018-09-28 | End: 2019-01-30

## 2018-09-28 RX ORDER — IOPAMIDOL 755 MG/ML
100 INJECTION, SOLUTION INTRAVASCULAR ONCE
Status: COMPLETED | OUTPATIENT
Start: 2018-09-28 | End: 2018-09-28

## 2018-09-28 RX ADMIN — IOPAMIDOL 100 ML: 755 INJECTION, SOLUTION INTRAVENOUS at 14:17

## 2018-09-28 RX ADMIN — SODIUM CHLORIDE 70 ML: 9 INJECTION, SOLUTION INTRAVENOUS at 14:17

## 2018-09-28 NOTE — MR AVS SNAPSHOT
After Visit Summary   9/28/2018    Marissa Guadarrama    MRN: [id number]           Patient Information     Date Of Birth          1950        Visit Information        Provider Department      9/28/2018 10:40 AM Zaida Saldana,  [facility]        Today's Diagnoses     Primary osteoarthritis of right knee    -  1    Trigger finger, acquired        Morbid obesity (H)           Follow-ups after your visit        Additional Services     RHEUMATOLOGY REFERRAL       Your provider has referred you to: [facility]: [facility] [phone]   [email or web address]    Please be aware that coverage of these services is subject to the terms and limitations of your health insurance plan.  Call member services at your health plan with any benefit or coverage questions.      Please bring the following with you to your appointment:    (1) Any X-Rays, CTs or MRIs which have been performed.  Contact the facility where they were done to arrange for  prior to your scheduled appointment.    (2) List of current medications   (3) This referral request   (4) Any documents/labs given to you for this referral                  Your next 10 appointments already scheduled     Sep 28, 2018  2:00 PM CDT   CT ABDOMEN PELVIS W CONTRAST with [facility]   [facility] CT ([facility])    [address] [address] [phone]           How do I prepare for my exam? (Food and drink instructions) To prepare: Do not eat or drink for 2 hours before your exam. If you need to take medicine, you may take it with small sips of water. (We may ask you to take liquid medicine as well.)  How do I prepare for my exam? (Other instructions) Please arrive 30 minutes early for your CT.  Once in the department you might be asked to drink water 15-20 minutes prior to your exam.  If indicated you may be asked to drink an oral contrast in advance of your CT.   If this is the case, the imaging team will let you know or be in contact with you prior to your appointment  Patients over 70 or patients with diabetes or kidney problems: If you haven t had a blood test (creatinine test) within the last 30 days, the Cardiologist/Radiologist may require you to get this test prior to your exam.  If you have diabetes:  Continue to take your metformin medication on the day of your exam  What should I wear: Please wear loose clothing, such as a sweat suit or jogging clothes. Avoid snaps, zippers and other metal. We may ask you to undress and put on a hospital gown.  How long does the exam take: Most scans take less than 20 minutes.  What should I bring: Please bring any scans or X-rays taken at other hospitals, if similar tests were done. Also bring a list of your medicines, including vitamins, minerals and over-the-counter drugs. It is safest to leave personal items at home.  Do I need a : No  is needed.  What do I need to tell my doctor? Be sure to tell your doctor: * If you have any allergies. * If there s any chance you are pregnant. * If you are breastfeeding.  What should I do after the exam: No restrictions, You may resume normal activities.  What is this test: A CT (computed tomography) scan is a series of pictures that allows us to look inside your body. The scanner creates images of the body in cross sections, much like slices of bread. This helps us see any problems more clearly. You may receive contrast (X-ray dye) before or during your scan. You will be asked to drink the contrast.  Who should I call with questions: If you have any questions, please call the Imaging Department where you will have your exam. Directions, parking instructions, and other information is available on our website, [email or web address].            Oct 09, 2018 10:00 AM CDT   (Arrive by 9:45 AM)   COLPOSCOPY with  GYN ONC COLPOSCOPY PROVIDER   [facility]    [address]  [address]  [facility] [address]   [phone]            Nov 01, 2018  1:00 PM CDT   Return Visit with Zhao La MD   [facility]    [address] [address]   [phone]            Nov 12, 2018 11:10 AM CST   LAB with [facility] ([facility])    [address] [address]   [phone]           Please do not eat 10-12 hours before your appointment if you are coming in fasting for labs on lipids, cholesterol, or glucose (sugar). This does not apply to pregnant women. Water, hot tea and black coffee (with nothing added) are okay. Do not drink other fluids, diet soda or chew gum.            Nov 14, 2018 11:15 AM CST   Return Visit with Hosea King MD   [facility])    [facility]  [address]  [facility] [address]   [phone]            Nov 28, 2018  1:20 PM CST   [facility]    [address] [place] [address] [phone]              Who to contact     If you have questions or need follow up information about today's clinic visit or your schedule please contact [facility] directly at [phone].  Normal or non-critical lab and imaging results will be communicated to you by MyChart, letter or phone within 4 business days after the clinic has received the results. If you do not hear from us within 7 days, please contact the clinic through MyChart or phone. If you have a critical or abnormal lab result, we will notify you by phone as soon as possible.  Submit refill requests through [employer] or call your pharmacy and they will forward the refill request to us. Please allow 3 business days for your refill to be completed.           "Additional Information About Your Visit        Care EveryWhere ID     This is your Care EveryWhere ID. This could be used by other organizations to access your [place] medical records  [id number]        Your Vitals Were     Pulse Temperature Height BMI (Body Mass Index)          68 98.5  F (36.9  C) (Oral) 5' 7\" (1.702 m) 38.37 kg/m2         Blood Pressure from Last 3 Encounters:   09/28/18 144/84   09/12/18 (!) 175/94   08/02/18 153/87    Weight from Last 3 Encounters:   09/28/18 245 lb (111.1 kg)   08/02/18 250 lb (113.4 kg)   07/05/18 249 lb (112.9 kg)              We Performed the Following     DRAIN/INJECT LARGE JOINT/BURSA     RHEUMATOLOGY REFERRAL          Today's Medication Changes          These changes are accurate as of 9/28/18 11:52 AM.  If you have any questions, ask your nurse or doctor.               Start taking these medicines.        Dose/Directions    triamcinolone acetonide 40 MG/ML injection   Commonly known as:  KENALOG-40   Used for:  Primary osteoarthritis of right knee   Started by:  Zaida Saldana DO        Dose:  40 mg   1 mL (40 mg) by INTRA-ARTICULAR route once for 1 dose   Quantity:  1 mL   Refills:  0            Where to get your medicines      Some of these will need a paper prescription and others can be bought over the counter.  Ask your nurse if you have questions.     You don't need a prescription for these medications     triamcinolone acetonide 40 MG/ML injection                Primary Care Provider Office Phone # Fax #    Zaida Saldana [phone]       [facility] [address]        Equal Access to Services     [facility] CenterLORENA AH: Hadii karina cruz [place], waaxda luqadaha, qaybta kaalmada niesha lr. So [facility] [phone].    ATENCIÓN: Si habla español, tiene a allan disposición servicios gratuitos de asistencia lingüística. Llame al [phone].    We comply with applicable federal civil rights laws " and [place] laws. We do not discriminate on the basis of race, color, national origin, age, disability, sex, sexual orientation, or gender identity.            Thank you!     Thank you for choosing [facility]  for your care. Our goal is always to provide you with excellent care. Hearing back from our patients is one way we can continue to improve our services. Please take a few minutes to complete the written survey that you may receive in the mail after your visit with us. Thank you!             Your Updated Medication List - Protect others around you: Learn how to safely use, store and throw away your medicines at www.disposemymeds.org.          This list is accurate as of 9/28/18 11:52 AM.  Always use your most recent med list.                   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, estrogen receptor positive (H)       gabapentin 600 MG tablet    NEURONTIN    180 tablet    Take 1 tablet (600 mg) by mouth 3 times daily    Trigeminal neuralgia       hydrochlorothiazide 25 MG tablet    HYDRODIURIL    15 tablet    TAKE 1/2 (ONE-HALF) TABLET BY MOUTH ONCE DAILY IN THE MORNING    Essential hypertension with goal blood pressure less than 140/90       HYDROcodone-acetaminophen 5-325 MG per tablet    NORCO    5 tablet    Take 1-2 tablets by mouth every 6 hours as needed for other (Moderate to Severe Pain)    Vaginal dysplasia       triamcinolone acetonide 40 MG/ML injection    KENALOG-40    1 mL    1 mL (40 mg) by  INTRA-ARTICULAR route once for 1 dose    Primary osteoarthritis of right knee

## 2018-09-28 NOTE — PROGRESS NOTES
"  SUBJECTIVE:   Marissa Guadarrama is a 68 year old female who presents to clinic today for the following health issues:      Patient is here for a cortisone injection in her right knee.  She has osteoarthritis.  She has had injections in the past.        Referral for Dr. Hamm in Rheumatology.  She is complaining of trigger finger in her thumbs and index fingers.  Her  saw Dr Hamm.  She is refusing to see an orthopedist.        Problem list and histories reviewed & adjusted, as indicated.  Additional history: as documented    BP Readings from Last 3 Encounters:   09/28/18 144/84   09/12/18 (!) 175/94   08/02/18 153/87    Wt Readings from Last 3 Encounters:   09/28/18 245 lb (111.1 kg)   08/02/18 250 lb (113.4 kg)   07/05/18 249 lb (112.9 kg)                    Reviewed and updated as needed this visit by clinical staff  Tobacco  Allergies  Meds  Med Hx  Surg Hx  Fam Hx  Soc Hx      Reviewed and updated as needed this visit by Provider         ROS:  Constitutional, HEENT, cardiovascular, pulmonary, gi and gu systems are negative, except as otherwise noted.    This document serves as a record of the services and decisions personally performed by LANEY NUNES. It was created on his/her behalf by Sneha Mittal, a trained medical scribe. The creation of this document is based on the provider's statements to the medical scribe. Sneha Mittal, September 28, 2018 2:03 PM    OBJECTIVE:     /84 (Cuff Size: Adult Large)  Pulse 68  Temp 98.5  F (36.9  C) (Oral)  Ht 5' 7\" (1.702 m)  Wt 245 lb (111.1 kg)  BMI 38.37 kg/m2  Body mass index is 38.37 kg/(m^2).  GENERAL: healthy, alert and no distress  MS: tenderness in palmar aspects of fingers.    SKIN: no suspicious lesions or rashes  NEURO: Normal strength and tone, mentation intact and speech normal  PSYCH: mentation appears normal, affect normal/bright    Procedure:  Aspiration/Intracortico-steroid injection right knee     Written consent    Sterile " technique, patient cleansed with alcohol     Lateral approach    0 Fluid removed    1 cc Kenalog 40 mg and 4 cc 1% Lidocaine injected into the joint space lot number     A band-aid was placed over the injection site    The patient tolerated the procedure well    Diagnostic Test Results:  none     ASSESSMENT/PLAN:         ICD-10-CM    1. Primary osteoarthritis of right knee M17.11 DRAIN/INJECT LARGE JOINT/BURSA     triamcinolone acetonide (KENALOG-40) 40 MG/ML injection   2. Trigger finger, acquired M65.30 RHEUMATOLOGY REFERRAL   3. Morbid obesity (H) E66.01      Patient received a steroid injection in their right knee in clinic today. They can follow up in 3 months for repeat injection if desired. I let the patient know that trigger finger is usually treated with an injection by the orthopedist. She is insistent that she would like to see rheumatology for this. I told her that she would most likely be referred to ortho by the rheumatologist, but she would still like to see them as she has an appointment.     There are no Patient Instructions on file for this visit.    Zaida Saldana, DO  [facility]     The information in this document, created by the medical scribe Sneha Mittal for me, accurately reflects the services I personally performed and the decisions made by me. I have reviewed and approved this document for accuracy prior to leaving the patient care area.

## 2018-10-08 DIAGNOSIS — I10 ESSENTIAL HYPERTENSION WITH GOAL BLOOD PRESSURE LESS THAN 140/90: ICD-10-CM

## 2018-10-08 NOTE — TELEPHONE ENCOUNTER
Routing refill request to provider for review/approval because:  Labs out of range:  See below    Stefano Means RN

## 2018-10-08 NOTE — TELEPHONE ENCOUNTER
"Requested Prescriptions   Pending Prescriptions Disp Refills     hydrochlorothiazide (HYDRODIURIL) 25 MG tablet  Last Written Prescription Date:  9/10/2018  Last Fill Quantity: 15 tabs,  # refills: 0   Last office visit: 9/28/2018 with prescribing provider:  Shana   Future Office Visit:   Next 5 appointments (look out 90 days)     Nov 01, 2018  1:00 PM CDT   [facility]   [facility]    [address]  [facility] [address]   [phone]            Nov 14, 2018 11:20 AM CST   (Arrive by 11:15 AM)   Return Visit with Hosea King MD   [facility] ([facility])    [facility]  [address]  [facility] [address]   [phone]                  15 tablet 0    Diuretics (Including Combos) Protocol Failed    10/8/2018  5:04 PM       Failed - Blood pressure under 140/90 in past 12 months    BP Readings from Last 3 Encounters:   09/28/18 144/84   09/12/18 (!) 175/94   08/02/18 153/87                Failed - Normal serum creatinine on file in past 12 months    Recent Labs   Lab Test  05/14/18   0856   CR  1.23*             Failed - Normal serum potassium on file in past 12 months    Recent Labs   Lab Test  05/14/18   0856   POTASSIUM  3.3*                   Passed - Recent (12 mo) or future (30 days) visit within the authorizing provider's specialty    Patient had office visit in the last 12 months or has a visit in the next 30 days with authorizing provider or within the authorizing provider's specialty.  See \"Patient Info\" tab in inbasket, or \"Choose Columns\" in Meds & Orders section of the refill encounter.           Passed - Patient is age 18 or older       Passed - No active pregancy on record       Passed - Normal serum sodium on file in past 12 months    Recent Labs   Lab Test  05/14/18   0856   NA  144             Passed - No positive pregnancy test in past 12 months    "

## 2018-10-09 ENCOUNTER — OFFICE VISIT (OUTPATIENT)
Dept: ONCOLOGY | Facility: CLINIC | Age: 68
End: 2018-10-09
Attending: OBSTETRICS & GYNECOLOGY
Payer: MEDICARE

## 2018-10-09 VITALS
OXYGEN SATURATION: 95 % | BODY MASS INDEX: 38.92 KG/M2 | HEART RATE: 60 BPM | HEIGHT: 67 IN | SYSTOLIC BLOOD PRESSURE: 151 MMHG | TEMPERATURE: 98 F | WEIGHT: 248 LBS | RESPIRATION RATE: 16 BRPM | DIASTOLIC BLOOD PRESSURE: 84 MMHG

## 2018-10-09 DIAGNOSIS — N89.3 VAGINAL DYSPLASIA: Primary | ICD-10-CM

## 2018-10-09 PROCEDURE — 88142 CYTOPATH C/V THIN LAYER: CPT | Performed by: OBSTETRICS & GYNECOLOGY

## 2018-10-09 PROCEDURE — G0476 HPV COMBO ASSAY CA SCREEN: HCPCS | Performed by: OBSTETRICS & GYNECOLOGY

## 2018-10-09 PROCEDURE — 88175 CYTOPATH C/V AUTO FLUID REDO: CPT | Performed by: OBSTETRICS & GYNECOLOGY

## 2018-10-09 PROCEDURE — G0463 HOSPITAL OUTPT CLINIC VISIT: HCPCS

## 2018-10-09 PROCEDURE — 87624 HPV HI-RISK TYP POOLED RSLT: CPT | Performed by: OBSTETRICS & GYNECOLOGY

## 2018-10-09 RX ORDER — HYDROCHLOROTHIAZIDE 25 MG/1
TABLET ORAL
Qty: 15 TABLET | Refills: 0 | Status: SHIPPED | OUTPATIENT
Start: 2018-10-09 | End: 2018-11-29

## 2018-10-09 ASSESSMENT — PAIN SCALES - GENERAL: PAINLEVEL: NO PAIN (0)

## 2018-10-09 NOTE — LETTER
10/9/2018       RE: Marissa Guadarrama  [address] [address]     Dear Colleague,    Thank you for referring your patient, Marissa Guadarrama, to the [facility]. Please see a copy of my visit note below.    No notes on file    Again, thank you for allowing me to participate in the care of your patient.      Sincerely,     GYN ONC Colposcopy Proivder

## 2018-10-09 NOTE — NURSING NOTE
Procedure discussed. Consent signed. Time out completed.     NO biopsies completed/needed.     Nicole Chew RN

## 2018-10-09 NOTE — LETTER
10/9/2018      RE: Marissa Guadarrama  [address] [place] [address]       No notes on file    [facility] ON[facility] Colposcopy Proivder

## 2018-10-09 NOTE — NURSING NOTE
"Oncology Rooming Note    October 9, 2018 9:56 AM   Marissa Guadarrama is a 68 year old female who presents for:    Chief Complaint   Patient presents with     Oncology Clinic Visit     Return Cervical Ca; COLPO     Initial Vitals: BP (!) 167/94  Pulse 60  Temp 98  F (36.7  C) (Oral)  Resp 16  Ht 1.702 m (5' 7\")  Wt 112.5 kg (248 lb)  SpO2 95%  BMI 38.84 kg/m2 Estimated body mass index is 38.84 kg/(m^2) as calculated from the following:    Height as of this encounter: 1.702 m (5' 7\").    Weight as of this encounter: 112.5 kg (248 lb). Body surface area is 2.31 meters squared.  No Pain (0) Comment: Data Unavailable   No LMP recorded. Patient has had a hysterectomy.  Allergies reviewed: Yes  Medications reviewed: Yes    Medications: Medication refills not needed today.  Pharmacy name entered into [employer]: [id number] - [place], [place] - [address] [place] [address]    Clinical concerns: Colpo     6 minutes for nursing intake (face to face time)     Megan Elias [facility]              "

## 2018-10-09 NOTE — TELEPHONE ENCOUNTER
Relayed to patient via phone and assisted with scheduling f/u for 11/13/18, which is soonest that would work for patient, as her  brings her and needs to work with his schedule.    Mikaela Marina RN

## 2018-10-12 LAB
COPATH REPORT: ABNORMAL
PAP: ABNORMAL

## 2018-10-15 LAB
FINAL DIAGNOSIS: ABNORMAL
HPV HR 12 DNA CVX QL NAA+PROBE: NEGATIVE
HPV16 DNA SPEC QL NAA+PROBE: POSITIVE
HPV18 DNA SPEC QL NAA+PROBE: NEGATIVE
SPECIMEN DESCRIPTION: ABNORMAL
SPECIMEN SOURCE CVX/VAG CYTO: ABNORMAL

## 2018-10-16 ENCOUNTER — TELEPHONE (OUTPATIENT)
Dept: ONCOLOGY | Facility: CLINIC | Age: 68
End: 2018-10-16

## 2018-10-16 NOTE — TELEPHONE ENCOUNTER
Patient called with PAP result and recommendation.   Per Dr Gao: Patient had a mildly abnormal pap smear, similar to prior.  She should be seen in six months for repeat pap and colpo.     Request for follow up sent.     Patient appreciative of the call.     Nicole Chew RN

## 2018-11-01 ENCOUNTER — OFFICE VISIT (OUTPATIENT)
Dept: UROLOGY | Facility: CLINIC | Age: 68
End: 2018-11-01
Payer: COMMERCIAL

## 2018-11-01 VITALS — HEART RATE: 60 BPM | DIASTOLIC BLOOD PRESSURE: 80 MMHG | RESPIRATION RATE: 18 BRPM | SYSTOLIC BLOOD PRESSURE: 143 MMHG

## 2018-11-01 DIAGNOSIS — R30.0 DYSURIA: Primary | ICD-10-CM

## 2018-11-01 PROCEDURE — 99214 OFFICE O/P EST MOD 30 MIN: CPT | Performed by: UROLOGY

## 2018-11-01 RX ORDER — CEFAZOLIN SODIUM 1 G/50ML
1 INJECTION, SOLUTION INTRAVENOUS SEE ADMIN INSTRUCTIONS
Status: CANCELLED | OUTPATIENT
Start: 2018-11-01

## 2018-11-01 RX ORDER — CEFAZOLIN SODIUM 2 G/100ML
2 INJECTION, SOLUTION INTRAVENOUS
Status: CANCELLED | OUTPATIENT
Start: 2018-11-01

## 2018-11-01 NOTE — NURSING NOTE
"Initial /80 (BP Location: Left arm, Patient Position: Chair, Cuff Size: Adult Regular)  Pulse 60  Resp 18 Estimated body mass index is 38.84 kg/(m^2) as calculated from the following:    Height as of 10/9/18: 1.702 m (5' 7\").    Weight as of 10/9/18: 112.5 kg (248 lb). .    Patient is here for  Results.  irena storm LPN    "

## 2018-11-01 NOTE — MR AVS SNAPSHOT
After Visit Summary   11/1/2018    Marissa Guadarrama    MRN: [id number]           Patient Information     Date Of Birth          1950        Visit Information        Provider Department      11/1/2018 3:15 PM Zhao La MD [facility]        Today's Diagnoses     Dysuria    -  1       Follow-ups after your visit        Your next 10 appointments already scheduled     Nov 28, 2018  1:20 PM CST   New Visit with Paul Hamm MD   [facility] ([facility]    [address] [address]   [phone]            Nov 29, 2018  8:40 AM CST   SHORT with Zaida Saldnaa DO   [facility]    [address] [address] [phone]            Dec 21, 2018 11:00 AM CST   Office Visit with Eliazar Menendez MD   [facility] ([facility])    [address] [address] [phone]           Bring a current list of meds and any records pertaining to this visit. For Physicals, please bring immunization records and any forms needing to be filled out. Please arrive 10 minutes early to complete paperwork.            Apr 09, 2019  1:20 PM CDT   (Arrive by 1:05 PM)   COLPOSCOPY with  GYN ONC COLPOSCOPY PROVIDER   [facility] ([facility])    [address] [place] [address]  [address] [place] [address] [phone]            May 13, 2019 10:50 AM CDT   LAB with [facility] ([facility])    [address] [address]   [phone]           Please do not eat 10-12 hours before your appointment if you are coming in fasting for labs on lipids, cholesterol, or glucose (sugar). This does not apply to pregnant women. Water, hot tea and black coffee (with nothing added) are okay. Do not drink other fluids, diet soda or chew gum.             May 15, 2019 11:00 AM CDT   Return Visit with Hosea King MD   [facility] ([facility])    [facility]  [address]  [facility] [address] [phone]              Future tests that were ordered for you today     Open Future Orders        Priority Expected Expires Ordered    CBC with platelets differential Routine 5/14/2019 11/14/2019 11/14/2018    Comprehensive metabolic panel Routine 5/14/2019 11/14/2019 11/14/2018    Ca27.29  breast tumor marker Routine 5/14/2019 11/14/2019 11/14/2018            Who to contact     If you have questions or need follow up information about today's clinic visit or your schedule please contact [facility] directly at [phone].  Normal or non-critical lab and imaging results will be communicated to you by MyChart, letter or phone within 4 business days after the clinic has received the results. If you do not hear from us within 7 days, please contact the clinic through MyChart or phone. If you have a critical or abnormal lab result, we will notify you by phone as soon as possible.  Submit refill requests through [employer] or call your pharmacy and they will forward the refill request to us. Please allow 3 business days for your refill to be completed.          Additional Information About Your Visit        Care EveryWhere ID     This is your Care EveryWhere ID. This could be used by other organizations to access your [facility] medical records  [id number]        Your Vitals Were     Pulse Respirations                60 18           Blood Pressure from Last 3 Encounters:   11/14/18 163/62   11/13/18 134/74   11/01/18 143/80    Weight from Last 3 Encounters:   11/14/18 112.5 kg (248 lb 1.6 oz)   11/13/18 112.9 kg (249 lb)   10/09/18 112.5 kg (248 lb)               Primary Care Provider Office Phone # Fax #    Zaida Saladna [phone] [address]  [id number]        Equal Access to Services     [facility] JUAN CARLOS : Hadii aad ku hadminervajoi Cyndiali, wamalihada lumoseschineduha, qafelixta ismarosaniesha tripp. So [facility] [phone].    ATENCIÓN: Si habla español, tiene a allan disposición servicios gratuitos de asistencia lingüística. Beba al [phone].    We comply with applicable federal civil rights laws and [place] laws. We do not discriminate on the basis of race, color, national origin, age, disability, sex, sexual orientation, or gender identity.            Thank you!     Thank you for choosing [facility]  for your care. Our goal is always to provide you with excellent care. Hearing back from our patients is one way we can continue to improve our services. Please take a few minutes to complete the written survey that you may receive in the mail after your visit with us. Thank you!             Your Updated Medication List - Protect others around you: Learn how to safely use, store and throw away your medicines at www.disposemymeds.org.          This list is accurate as of 11/1/18 11:59 PM.  Always use your most recent med list.                   

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
## 2018-11-10 DIAGNOSIS — G50.0 TRIGEMINAL NEURALGIA: ICD-10-CM

## 2018-11-10 NOTE — TELEPHONE ENCOUNTER
Requested Prescriptions   Pending Prescriptions Disp Refills     gabapentin (NEURONTIN) 600 MG tablet    Last Written Prescription Date:  10/16/17  Last Fill Quantity: 180,  # refills: 6   Last Office Visit with [facility], CARLIE or [facility] prescribing provider:  9/28/18   Future Office Visit:    Next 5 appointments (look out 90 days)     Nov 13, 2018 12:00 PM CST   SHORT with Zaida Saldana DO   [facility]    [address] [place] [address] [phone]            Nov 14, 2018 11:20 AM CST   (Arrive by 11:15 AM)   Return Visit with Hosea King MD   [facility] ([facility])    [facility] [place] [id number]  [facility] [address]   [phone]                  180 tablet 6     Sig: Take 1 tablet (600 mg) by mouth 3 times daily    There is no refill protocol information for this order

## 2018-11-11 DIAGNOSIS — I10 ESSENTIAL HYPERTENSION WITH GOAL BLOOD PRESSURE LESS THAN 140/90: ICD-10-CM

## 2018-11-12 ENCOUNTER — HOSPITAL ENCOUNTER (OUTPATIENT)
Dept: LAB | Facility: CLINIC | Age: 68
Discharge: HOME | End: 2018-11-12
Attending: INTERNAL MEDICINE | Admitting: INTERNAL MEDICINE
Payer: MEDICARE

## 2018-11-12 DIAGNOSIS — C50.411 MALIGNANT NEOPLASM OF UPPER-OUTER QUADRANT OF BOTH BREASTS IN FEMALE, ESTROGEN RECEPTOR POSITIVE (H): ICD-10-CM

## 2018-11-12 DIAGNOSIS — Z17.0 MALIGNANT NEOPLASM OF UPPER-OUTER QUADRANT OF BOTH BREASTS IN FEMALE, ESTROGEN RECEPTOR POSITIVE (H): ICD-10-CM

## 2018-11-12 DIAGNOSIS — C50.412 MALIGNANT NEOPLASM OF UPPER-OUTER QUADRANT OF BOTH BREASTS IN FEMALE, ESTROGEN RECEPTOR POSITIVE (H): ICD-10-CM

## 2018-11-12 LAB
ALBUMIN SERPL-MCNC: 3.2 G/DL (ref 3.4–5)
ALP SERPL-CCNC: 88 U/L (ref 40–150)
ALT SERPL W P-5'-P-CCNC: 18 U/L (ref 0–50)
ANION GAP SERPL CALCULATED.3IONS-SCNC: 5 MMOL/L (ref 3–14)
AST SERPL W P-5'-P-CCNC: 17 U/L (ref 0–45)
BASOPHILS # BLD AUTO: 0 10E9/L (ref 0–0.2)
BASOPHILS NFR BLD AUTO: 0.3 %
BILIRUB SERPL-MCNC: 0.5 MG/DL (ref 0.2–1.3)
BUN SERPL-MCNC: 23 MG/DL (ref 7–30)
CALCIUM SERPL-MCNC: 9.1 MG/DL (ref 8.5–10.1)
CHLORIDE SERPL-SCNC: 106 MMOL/L (ref 94–109)
CO2 SERPL-SCNC: 36 MMOL/L (ref 20–32)
CREAT SERPL-MCNC: 1.45 MG/DL (ref 0.52–1.04)
DIFFERENTIAL METHOD BLD: NORMAL
EOSINOPHIL # BLD AUTO: 0.4 10E9/L (ref 0–0.7)
EOSINOPHIL NFR BLD AUTO: 5.4 %
ERYTHROCYTE [DISTWIDTH] IN BLOOD BY AUTOMATED COUNT: 13.2 % (ref 10–15)
GFR SERPL CREATININE-BSD FRML MDRD: 36 ML/MIN/1.7M2
GLUCOSE SERPL-MCNC: 118 MG/DL (ref 70–99)
HCT VFR BLD AUTO: 44.9 % (ref 35–47)
HGB BLD-MCNC: 14.4 G/DL (ref 11.7–15.7)
IMM GRANULOCYTES # BLD: 0 10E9/L (ref 0–0.4)
IMM GRANULOCYTES NFR BLD: 0.4 %
LYMPHOCYTES # BLD AUTO: 1.5 10E9/L (ref 0.8–5.3)
LYMPHOCYTES NFR BLD AUTO: 20.9 %
MCH RBC QN AUTO: 31 PG (ref 26.5–33)
MCHC RBC AUTO-ENTMCNC: 32.1 G/DL (ref 31.5–36.5)
MCV RBC AUTO: 97 FL (ref 78–100)
MONOCYTES # BLD AUTO: 0.3 10E9/L (ref 0–1.3)
MONOCYTES NFR BLD AUTO: 4.7 %
NEUTROPHILS # BLD AUTO: 4.8 10E9/L (ref 1.6–8.3)
NEUTROPHILS NFR BLD AUTO: 68.3 %
NRBC # BLD AUTO: 0 10*3/UL
NRBC BLD AUTO-RTO: 0 /100
PLATELET # BLD AUTO: 193 10E9/L (ref 150–450)
POTASSIUM SERPL-SCNC: 3.5 MMOL/L (ref 3.4–5.3)
PROT SERPL-MCNC: 7.2 G/DL (ref 6.8–8.8)
RBC # BLD AUTO: 4.64 10E12/L (ref 3.8–5.2)
SODIUM SERPL-SCNC: 147 MMOL/L (ref 133–144)
WBC # BLD AUTO: 7 10E9/L (ref 4–11)

## 2018-11-12 PROCEDURE — 85025 COMPLETE CBC W/AUTO DIFF WBC: CPT | Performed by: INTERNAL MEDICINE

## 2018-11-12 PROCEDURE — 80053 COMPREHEN METABOLIC PANEL: CPT | Performed by: INTERNAL MEDICINE

## 2018-11-12 PROCEDURE — 86300 IMMUNOASSAY TUMOR CA 15-3: CPT | Performed by: INTERNAL MEDICINE

## 2018-11-12 PROCEDURE — 36415 COLL VENOUS BLD VENIPUNCTURE: CPT | Performed by: INTERNAL MEDICINE

## 2018-11-12 RX ORDER — GABAPENTIN 600 MG/1
600 TABLET ORAL 3 TIMES DAILY
Qty: 180 TABLET | Refills: 6 | Status: SHIPPED | OUTPATIENT
Start: 2018-11-12 | End: 2019-12-13

## 2018-11-12 NOTE — TELEPHONE ENCOUNTER
"Requested Prescriptions   Pending Prescriptions Disp Refills     hydrochlorothiazide (HYDRODIURIL) 25 MG tablet [Pharmacy Med Name: HydroCHLOROthiazide Oral Tablet 25 MG]  Last Written Prescription Date:  10/9/2018  Last Fill Quantity: 15 tablet,  # refills: 0   Last office visit: 9/28/2018 with prescribing provider:  NICOLE Saldana   Future Office Visit:   Next 5 appointments (look out 90 days)     Nov 13, 2018 12:00 PM CST   SHORT with Zaida Saldana DO   [facility]    [address] [address] [phone]            Nov 14, 2018 11:20 AM CST   (Arrive by 11:15 AM)   Return Visit with Hosea King MD   [facility] ([facility])    [facility]  [address]  [facility] [address]   [phone]               15 tablet 0     Sig: TAKE ONE-HALF TABLET BY MOUTH ONCE DAILY IN THE MORNING    Diuretics (Including Combos) Protocol Failed    11/11/2018  1:05 PM       Failed - Blood pressure under 140/90 in past 12 months    BP Readings from Last 3 Encounters:   11/01/18 143/80   10/09/18 151/84   09/28/18 144/84          Failed - Normal serum creatinine on file in past 12 months    Recent Labs   Lab Test  05/14/18   0856   CR  1.23*           Failed - Normal serum potassium on file in past 12 months    Recent Labs   Lab Test  05/14/18   0856   POTASSIUM  3.3*           Passed - Recent (12 mo) or future (30 days) visit within the authorizing provider's specialty    Patient had office visit in the last 12 months or has a visit in the next 30 days with authorizing provider or within the authorizing provider's specialty.  See \"Patient Info\" tab in inbasket, or \"Choose Columns\" in Meds & Orders section of the refill encounter.             Passed - Patient is age 18 or older       Passed - No active pregancy on record       Passed - Normal serum sodium on file in past 12 months    Recent Labs   Lab " Test  05/14/18   0856   NA  144             Passed - No positive pregnancy test in past 12 months

## 2018-11-13 ENCOUNTER — OFFICE VISIT (OUTPATIENT)
Dept: FAMILY MEDICINE | Facility: CLINIC | Age: 68
End: 2018-11-13
Payer: COMMERCIAL

## 2018-11-13 ENCOUNTER — PATIENT OUTREACH (OUTPATIENT)
Dept: CARE COORDINATION | Facility: CLINIC | Age: 68
End: 2018-11-13

## 2018-11-13 VITALS
HEART RATE: 76 BPM | DIASTOLIC BLOOD PRESSURE: 74 MMHG | HEIGHT: 67 IN | TEMPERATURE: 98.5 F | WEIGHT: 249 LBS | SYSTOLIC BLOOD PRESSURE: 134 MMHG | BODY MASS INDEX: 39.08 KG/M2

## 2018-11-13 DIAGNOSIS — D3A.00 CARCINOID TUMOR (H): ICD-10-CM

## 2018-11-13 DIAGNOSIS — N18.30 CKD (CHRONIC KIDNEY DISEASE) STAGE 3, GFR 30-59 ML/MIN (H): ICD-10-CM

## 2018-11-13 DIAGNOSIS — I10 HYPERTENSION GOAL BP (BLOOD PRESSURE) < 140/90: Primary | ICD-10-CM

## 2018-11-13 DIAGNOSIS — E87.0 SERUM SODIUM ELEVATED: ICD-10-CM

## 2018-11-13 LAB — CANCER AG27-29 SERPL-ACNC: 19 U/ML (ref 0–39)

## 2018-11-13 PROCEDURE — 99214 OFFICE O/P EST MOD 30 MIN: CPT | Performed by: FAMILY MEDICINE

## 2018-11-13 RX ORDER — METOPROLOL SUCCINATE 25 MG/1
12.5 TABLET, EXTENDED RELEASE ORAL DAILY
Qty: 15 TABLET | Refills: 1 | Status: SHIPPED | OUTPATIENT
Start: 2018-11-13 | End: 2018-12-21

## 2018-11-13 ASSESSMENT — ACTIVITIES OF DAILY LIVING (ADL): DEPENDENT_IADLS:: INDEPENDENT

## 2018-11-13 NOTE — LETTER
[facility] CARE COORDINATION    November 13, 2018    Marissa Guadarrama  [address] [place] [address] [place] [address]      Dear Marissa,    Thank you for meeting with me today.  I have found some information regarding your recommended 2 week clinic visit:    "[employer] is a low cost transportation resource.  [facility] is nearby, you could schedule an appointment for a lab draw only.  That clinic # is [phone].  "[employer] is a transit service but there is option for a private transport where the rider pays a mileage fee.  There is an option to schedule a phone visit with Dr. Saldana or to be seen at nearby clinic for an appointment.      The clinic care coordinator is a registered nurse and/or  who understand the health care system. The goal of clinic care coordination is to help you manage your health and improve access to the [facility] system in the most efficient manner. The registered nurse can assist you in meeting your health care goals by providing education, coordinating services, and strengthening the communication among your providers. The  can assist you with financial, behavioral, psychosocial, chemical dependency, counseling, and/or psychiatric resources.    Please feel free to contact me at [phone] with any questions or concerns. We at [facility] are focused on providing you with the highest-quality healthcare experience possible and that all starts with you.     Sincerely,     CHICHO Lee, MSW    Clinical Care Coordination  [facility]  [phone]    Enclosed: I have enclosed a copy of a 24 Hour Access Plan. This has helpful phone numbers for you to call when needed. Please keep this in an easy to access place to use as needed.
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
## 2018-11-13 NOTE — PROGRESS NOTES
"  SUBJECTIVE:   Marissa Guadarrama is a 68 year old female who presents to clinic today for the following health issues:      Hypertension Follow-up      Outpatient blood pressures are being checked at home. In 120's at home.     Low Salt Diet: no added salt    hydrochlorothiazide 12.5 mg daily      Amount of exercise or physical activity: None    Problems taking medications regularly: No    Medication side effects: none    Diet: low salt    Her kidney function is reduced.  She has a ureteral stent.  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   She has not had the surgery to remove this yet.  She is resistant due to having a lot of scar tissue from other surgeries.  She was on hydrochlorothiazide for swelling and BP.        Problem list and histories reviewed & adjusted, as indicated.  Additional history: as documented    BP Readings from Last 3 Encounters:   11/13/18 134/74   11/01/18 143/80   10/09/18 151/84    Wt Readings from Last 3 Encounters:   11/13/18 249 lb (112.9 kg)   10/09/18 248 lb (112.5 kg)   09/28/18 245 lb (111.1 kg)                    Reviewed and updated as needed this visit by clinical staff       Reviewed and updated as needed this visit by Provider         ROS:  Constitutional, HEENT, cardiovascular, pulmonary, GI, , musculoskeletal, neuro, skin, endocrine and psych systems are negative, except as otherwise noted.    OBJECTIVE:     /74  Pulse 76  Temp 98.5  F (36.9  C) (Oral)  Ht 5' 7\" (1.702 m)  Wt 249 lb (112.9 kg)  BMI 39 kg/m2  Body mass index is 39 kg/(m^2).  GENERAL: healthy, alert and no distress  HENT: normal cephalic/atraumatic, oropharynx clear and oral mucous membranes moist  NECK: no adenopathy, no asymmetry, masses, or scars and thyroid normal to palpation  RESP: lungs clear to auscultation - no rales, rhonchi or wheezes  CV: regular rate and rhythm, normal S1 S2, no S3 or S4, no murmur, click or rub, no peripheral edema and peripheral pulses " strong  MS: no gross musculoskeletal defects noted, no edema  PSYCH: mentation appears normal, affect normal/bright    Diagnostic Test Results:  none     ASSESSMENT/PLAN:     Hypertension; controlled   Associated with the following complications:    None   Plan:  No changes in the patient's current treatment plan      ASSESSMENT/PLAN:      ICD-10-CM    1. Hypertension goal BP (blood pressure) < 140/90 I10 metoprolol succinate (TOPROL-XL) 25 MG 24 hr tablet   2. CKD (chronic kidney disease) stage 3, GFR 30-59 ml/min (H) N18.3    3. Carcinoid tumor D3A.00      Will stop the hydrochlorothiazide due to her renal function and elevated sodium.  She is seeing a specialist for the carcinoid tumor    Patient Instructions   Stop the hydrochlorathiazide    Take metoprolol 12.5 mg instead     Follow up to see me in 2 weeks will do labs at that time     Zaida Saldana D.O.            Zaida Saldana DO  [facility]

## 2018-11-13 NOTE — PATIENT INSTRUCTIONS
Stop the hydrochlorathiazide    Take metoprolol 12.5 mg instead     Follow up to see me in 2 weeks will do labs at that time     Zaida Saldana D.O.

## 2018-11-13 NOTE — PROGRESS NOTES
Lab orders placed.   But I want to see her for BP recheck in a month.  She may want to establish care in [facility] as it is so much closer to her.     Zaida Saldana D.O.

## 2018-11-13 NOTE — PROGRESS NOTES
Her kidney function is reduced.  She has a ureteral stent.  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   She has not had the surgery to remove this yet.  She is resistant due to having a lot of scar tissue from other surgeries.  She was on hydrochlorothiazide for swelling and BP.

## 2018-11-13 NOTE — LETTER
Health Care Home - Access Care Plan    About Me  Patient Name:  Marissa Guadarrama    YOB: 1950  Age:                             68 year old   [place] MRN:            [id number] Telephone Information:     Home Phone [phone]   Mobile [phone]       Address:    [address] [place] [address]   [place] [address] Email address:  [email or web address]      Emergency Contact(s)  Name Relationship Lgl Grd Work Phone Home Phone Mobile Phone   1. ANISHA,ADAIR* Spouse  none [phone]   2. SHAHLA ROSE Son  melony [phone] none             Health Maintenance: Routine Health maintenance Reviewed: Due/Overdue  (Microalbumin, flu vaccine )    My Access Plan  Medical Emergency 911   Questions or concerns during clinic hours Primary Clinic Line, I will call the clinic directly: [facility] - [phone]   24 Hour Appointment Line [phone] or  [phone]) (toll free)   24 Hour Nurse Line [phone] (toll free)   Questions or concerns outside clinic hours 24 Hour Appointment Line, I will call the after-hours on-call line:   [facility] [phone] or [phone]) (toll-free)   Preferred Urgent Care Other   Preferred Hospital Other   Preferred Pharmacy [id number] - [place], [place] - [address] [place] [address]     Behavioral Health Crisis Line The National Suicide Prevention Lifeline at 1-151.886.3438 or 911     My Care Team Members  Patient Care Team       Relationship Specialty Notifications Start End    Zaida Saldana DO PCP - General Family Practice  1/9/18     Phone: [phone] Fax: [phone]         [address] [facility] [address]    Maria C Alexandre MD MD OB/Gyn  2/6/14     Comment:  Referring Physician    Phone: [phone] Fax: [phone] [address] [facility] [place] [place] [address]    Mita Koehler MD MD Radiation Oncology  6/20/16     Phone: [phone] Fax: [phone] [address] [facility]   [address] [facility] [address]    Hosea King MD MD Hematology & Oncology  6/20/16     Phone: [phone] Fax: [phone]         [address] [facility] [address]    Zhao La MD MD Urology  7/19/17     Phone: [phone] Fax: [phone]         [address] [facility] [address] [facility] [address]    Talisha Greco MD MD Colon and Rectal Surgery  7/19/17     Phone: [phone] Fax: [phone]         [address] [facility] [address] [facility] [address]    Allen Downey MD MD Orthopedics  7/19/17     Phone: [phone] Fax: [phone] [address] [facility] [address]    Carlene Dixon BSW Clinic Care Coordinator Primary Care - CC  11/13/18     Phone: [phone] Fax: [phone]               My Medical and Care Information  Problem List   Patient Active Problem List   Diagnosis     Post-polio syndrome     Carcinoid tumor     Cervical cancer (H)     Trigeminal neuralgia     HYPERLIPIDEMIA LDL GOAL <130     Hypertension goal BP (blood pressure) < 140/90     OA (osteoarthritis) of knee     Advanced directives, counseling/discussion     CKD (chronic kidney disease) stage 3, GFR 30-59 ml/min (H)     Vaginal dysplasia     Urinary incontinence     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

## 2018-11-14 ENCOUNTER — ONCOLOGY VISIT (OUTPATIENT)
Dept: ONCOLOGY | Facility: CLINIC | Age: 68
End: 2018-11-14
Attending: INTERNAL MEDICINE
Payer: COMMERCIAL

## 2018-11-14 VITALS
OXYGEN SATURATION: 95 % | HEIGHT: 68 IN | SYSTOLIC BLOOD PRESSURE: 163 MMHG | RESPIRATION RATE: 20 BRPM | HEART RATE: 61 BPM | TEMPERATURE: 97.6 F | WEIGHT: 248.1 LBS | BODY MASS INDEX: 37.6 KG/M2 | DIASTOLIC BLOOD PRESSURE: 62 MMHG

## 2018-11-14 DIAGNOSIS — I10 HYPERTENSION GOAL BP (BLOOD PRESSURE) < 140/90: ICD-10-CM

## 2018-11-14 DIAGNOSIS — E78.5 HYPERLIPIDEMIA LDL GOAL <130: ICD-10-CM

## 2018-11-14 DIAGNOSIS — N18.30 CKD (CHRONIC KIDNEY DISEASE) STAGE 3, GFR 30-59 ML/MIN (H): ICD-10-CM

## 2018-11-14 DIAGNOSIS — Z17.0 MALIGNANT NEOPLASM OF UPPER-OUTER QUADRANT OF BOTH BREASTS IN FEMALE, ESTROGEN RECEPTOR POSITIVE (H): Primary | ICD-10-CM

## 2018-11-14 DIAGNOSIS — C50.412 MALIGNANT NEOPLASM OF UPPER-OUTER QUADRANT OF BOTH BREASTS IN FEMALE, ESTROGEN RECEPTOR POSITIVE (H): Primary | ICD-10-CM

## 2018-11-14 DIAGNOSIS — D3A.00 CARCINOID TUMOR (H): ICD-10-CM

## 2018-11-14 DIAGNOSIS — C50.411 MALIGNANT NEOPLASM OF UPPER-OUTER QUADRANT OF BOTH BREASTS IN FEMALE, ESTROGEN RECEPTOR POSITIVE (H): Primary | ICD-10-CM

## 2018-11-14 PROCEDURE — 99214 OFFICE O/P EST MOD 30 MIN: CPT | Performed by: INTERNAL MEDICINE

## 2018-11-14 PROCEDURE — G0463 HOSPITAL OUTPT CLINIC VISIT: HCPCS

## 2018-11-14 ASSESSMENT — PAIN SCALES - GENERAL: PAINLEVEL: NO PAIN (0)

## 2018-11-14 NOTE — PATIENT INSTRUCTIONS
Dr. King would like to see you back in 6 months for a follow up appointment with labs prior.      When you are in need of a refill, please call your pharmacy and they will send us a request.      Copy of appointments, and after visit summary (AVS) given to patient.      If you have any questions during business hours (M-F 8 AM- 4PM), please call Veronica Giordano RN Oncology Hematology  at [facility] [phone].       For questions after business hours, or on holidays/weekends, please call our after hours Nurse Triage line [phone]. Thank you.

## 2018-11-14 NOTE — NURSING NOTE
"Oncology Rooming Note    November 14, 2018 11:25 AM   Marissa Guadarrama is a 68 year old female who presents for:    Chief Complaint   Patient presents with     Oncology Clinic Visit     6 mo f/up 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, estrogen receptor positive, review labs     Initial Vitals: /62 (BP Location: Right arm, Patient Position: Sitting, Cuff Size: Adult Large)  Pulse 61  Temp 97.6  F (36.4  C) (Tympanic)  Resp 20  Ht 1.715 m (5' 7.5\")  Wt 112.5 kg (248 lb 1.6 oz)  SpO2 95%  BMI 38.28 kg/m2 Estimated body mass index is 38.28 kg/(m^2) as calculated from the following:    Height as of this encounter: 1.715 m (5' 7.5\").    Weight as of this encounter: 112.5 kg (248 lb 1.6 oz). Body surface area is 2.31 meters squared.  No Pain (0) Comment: Data Unavailable   No LMP recorded. Patient has had a hysterectomy.  Allergies reviewed: Yes  Medications reviewed: Yes    Medications: Medication refills not needed today.  Pharmacy name entered into [employer]: [id number] - [address]    Clinical concerns: 6 mo f/up 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, estrogen receptor positive, review labs    7 minutes for nursing intake (face to face time)     Barbara Martinez LPN              "

## 2018-11-14 NOTE — TELEPHONE ENCOUNTER
Spoke with patient, she states that she is not interested in establishing care in [facility]. She will see Dr. Saldana on 11/29.    Thanks!  Stefano Corrigan

## 2018-11-14 NOTE — ASSESSMENT & PLAN NOTE
Marissa Guadarrama was found on routine mammogram a group of microcalcification is in the upper outer right breast. There was also a focal asymmetry in the lower left breast. Subsequently the patient went on to have bilateral diagnostic mammography with ultrasound on March 28, 2016.. On the right breast there was microcalcification is the main grouping measures 1.6 cm x 1 cm bilateral 2.9 cm located 2.9 cm from the nipple. An additional tiny area about 0.2 cm seen at the anterolateral margin of this main grouping about 1 cm from the main grouping. The left breast shows no masses or significant abnormalities. Subsequently the patient went on to have breast needle biopsy on March 30, 2016. 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 Angiolymphatic invasion was not identified. 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 Microcalcifications was also associated with ductal carcinoma in situ. His surgeon receptor was positive. Progesterone receptor was negative. She underwent bilateral sentinel lymph node biopsy and bilateral lumpectomies with prior seed placement.  the surgical pathology Showing left breast lumpectomy was a tumor size of 13 mm grade 2 of 3, angiolymphatic invasion was not identified the tumor was unifocal associated with ductal carcinoma in situ high-grade. Surgical margins where negative. [place] lymph node was negative for metastatic tumor. Stage is T1cN0 M0 On the right breast and there was invasive ductal carcinoma with a tumor size of 3 mm grade 3 of 3. [place] lymph nodes were negative for metastatic disease. The tumor stage is T1aN0 M0. The tumor on the left was positive for estrogen and progesterone receptor. It did show no HER-2/praveen amplification. The tumor on the right is positive for estrogen receptor and negative for progesterone receptor and shows no HER-2/praveen amplification. Subsequently the patient concluded the radiation  therapy. She is currently on hormonal therapy with Arimidex. She developed a skin rash and Arimidex was switched to Aromasin.

## 2018-11-14 NOTE — PROGRESS NOTES
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 There was also a focal asymmetry in the lower left breast. Subsequently the patient went on to have bilateral diagnostic mammography with ultrasound on March 28, 2016.. On the right breast there was microcalcification is the main grouping measures 1.6 cm x 1 cm bilateral 2.9 cm located 2.9 cm from the nipple. An additional tiny area about 0.2 cm seen at the anterolateral margin of this main grouping about 1 cm from the main grouping. The left breast shows no masses or significant abnormalities. Subsequently the patient went on to have breast needle biopsy on March 30, 2016. 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 Angiolymphatic invasion was not identified. 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 Microcalcifications was also associated with ductal carcinoma in situ. His surgeon receptor was positive. Progesterone receptor was negative. She underwent bilateral sentinel lymph node biopsy and bilateral lumpectomies with prior seed placement.  the surgical pathology Showing left breast lumpectomy was a tumor size of 13 mm grade 2 of 3, angiolymphatic invasion was not identified the tumor was unifocal associated with ductal carcinoma in situ high-grade. Surgical margins where negative. [place] lymph node was negative for metastatic tumor. Stage is T1cN0 M0 On the right breast and there was invasive ductal carcinoma with a tumor size of 3 mm grade 3 of 3. [place] lymph  nodes were negative for metastatic disease. The tumor stage is T1aN0 M0. The tumor on the left was positive for estrogen and progesterone receptor. It did show no HER-2/praveen amplification. The tumor on the right is positive for estrogen receptor and negative for progesterone receptor and shows no HER-2/praveen amplification. Subsequently the patient concluded the radiation therapy. She is currently on hormonal therapy with Arimidex. She developed a skin rash and Arimidex was switched to Aromasin.      Interval History:  She is seen today for follow-up.  She has been feeling well without any recent complaints of weight loss or night sweats or fever or chills.  She denies any nausea vomiting or diarrhea.  She denies any joint aches or pains.  She is currently on adjuvant endocrine therapy with Aromasin.  She has been tolerating treatment without significant side effects.    Review Of Systems:  Constitutional: Negative for fever, chills, and night sweats.  Skin: negative.  Eyes: negative.  Ears/Nose/Throat: negative.  Respiratory: No shortness of breath, dyspnea on exertion, cough, or hemoptysis.  Cardiovascular: negative.  Gastrointestinal: negative.  Genitourinary: negative.  Musculoskeletal: negative.  Neurologic: negative.  Psychiatric: negative.  Hematologic/Lymphatic/Immunologic: negative.  Endocrine: negative.    All other ROS negative unless mentioned in interval history.    Past medical, social, surgical, and family histories reviewed.    Allergies:  Allergies as of 11/14/2018 - Cdae as Reviewed 11/14/2018   Allergen Reaction Noted     Nkda [no known drug allergies]  10/30/2009       Current Medications:  Current Outpatient Prescriptions   Medication Sig Dispense Refill     albuterol (PROAIR HFA, PROVENTIL HFA, VENTOLIN HFA) 108 (90 BASE) MCG/ACT inhaler Inhale 2 puffs into the lungs every 6 hours as needed for shortness of breath / dyspnea or wheezing 1 Inhaler 1     exemestane (AROMASIN) 25 MG tablet Take 1  "tablet (25 mg) by mouth every morning 90 tablet 3     gabapentin (NEURONTIN) 600 MG tablet Take 1 tablet (600 mg) by mouth 3 times daily 180 tablet 6     HYDROcodone-acetaminophen (NORCO) 5-325 MG per tablet Take 1-2 tablets by mouth every 6 hours as needed for other (Moderate to Severe Pain) 5 tablet 0     hydrochlorothiazide (HYDRODIURIL) 25 MG tablet TAKE 1/2 (ONE-HALF) TABLET BY MOUTH ONCE DAILY IN THE MORNING 15 tablet 0     metoprolol succinate (TOPROL-XL) 25 MG 24 hr tablet Take 0.5 tablets (12.5 mg) by mouth daily 15 tablet 1        Physical Exam:  /62 (BP Location: Right arm, Patient Position: Sitting, Cuff Size: Adult Large)  Pulse 61  Temp 97.6  F (36.4  C) (Tympanic)  Resp 20  Ht 1.715 m (5' 7.5\")  Wt 112.5 kg (248 lb 1.6 oz)  SpO2 95%  BMI 38.28 kg/m2  Wt Readings from Last 12 Encounters:   11/14/18 112.5 kg (248 lb 1.6 oz)   11/13/18 112.9 kg (249 lb)   10/09/18 112.5 kg (248 lb)   09/28/18 111.1 kg (245 lb)   08/02/18 113.4 kg (250 lb)   07/05/18 112.9 kg (249 lb)   06/12/18 113.3 kg (249 lb 12.8 oz)   05/16/18 112 kg (247 lb)   05/16/18 112.4 kg (247 lb 11.2 oz)   04/03/18 112.9 kg (248 lb 14.4 oz)   01/09/18 113.4 kg (250 lb)   01/08/18 112.5 kg (248 lb)     ECOG performance status: 0  GENERAL APPEARANCE: Healthy, alert and in no acute distress.  HEENT: Sclerae anicteric. PERRLA. Oropharynx without ulcers, lesions, or thrush.  NECK: Supple. No asymmetry or masses.  LYMPHATICS: No palpable cervical, supraclavicular, axillary, or inguinal lymphadenopathy.  RESP: Lungs clear to auscultation bilaterally without rales, rhonchi or wheezes.  CARDIOVASCULAR: Regular rate and rhythm. Normal S1, S2; no S3 or S4. No murmur, gallop, or rub.  BREAST:   ABDOMEN: Soft, nontender. Bowel sounds normal. No palpable organomegaly or masses.  MUSCULOSKELETAL: Extremities without gross deformities noted. No edema of bilateral lower extremities.  SKIN: No suspicious lesions or rashes.  NEURO: Alert and " oriented x 3. Cranial nerves II-XII grossly intact.  PSYCHIATRIC: Mentation and affect appear normal.    Laboratory/Imaging Studies:  No visits with results within 2 Week(s) from this visit.  Latest known visit with results is:    Office Visit on 10/09/2018   Component Date Value Ref Range Status     HPV Source 10/09/2018 [facility]   Final     HPV 16 DNA 10/09/2018 Positive* NEG^Negative Final     HPV 18 DNA 10/09/2018 Negative  NEG^Negative Final     Other HR HPV 10/09/2018 Negative  NEG^Negative Final     Final Diagnosis 10/09/2018 This patient's sample is positive for HPV 16 DNA.   Final    Comment: This test was developed and its performance characteristics determined by the   [facility], [facility] Diagn[facility] Laboratory. It   has not been cleared or approved by the FDA. The laboratory is regulated under   CLIA as qualified to perform high-complexity testing. This test is used for   clinical purposes. It should not be regarded as investigational or for   research.  (Note)  METHODOLOGY:  The Roche cristiano 4800 system uses automated extraction,   simultaneous amplification of HPV (L1 region) and beta-globin,    followed by  real time detection of fluorescent labeled HPV and beta   globin using specific oligonucleotide probes . The test specifically   identifies types HPV 16 DNA and HPV 18 DNA while concurrently   detecting the rest of the high risk types (31, 33, 35, 39, 45, 51,   52, 56, 58, 59, 66 or 68).  COMMENTS:  This test is not intended for use as a screening device   for women under age 30 with normal cervical cytology.  Results should   be correl                           ated with cytologic and histologic findings. Close clinical   followup is recommended.       Specimen Description 10/09/2018 Cervical Cells   Final    C18 07469     PAP 10/09/2018 LSIL*  Final     Copath Report 10/09/2018    Final                    Value:  Patient Name: ROSALINDA LANCASTER  MR#:  [id number]  Specimen #: [id number]  Collected: 10/9/2018  Received: 10/10/2018  Reported: 10/12/2018 10:21  Ordering Phy(s): MAR ROSS    For improved result formatting, select 'View Enhanced Report Format' under   Linked Documents section.    SPECIMEN/STAIN PROCESS:  Pap thin layer prep diagnostic (Sure[facility])       Pap-Cyto x 1, HPV ordered x 1    SOURCE: Vaginal  ----------------------------------------------------------------   Pap thin layer prep diagnostic (SurePath)  SPECIMEN ADEQUACY:  Satisfactory for evaluation.  Specimen was unable to be imaged on the   [facility] Slide .  -Transformation zone component absent.    CYTOLOGIC INTERPRETATION:    Epithelial cell abnormality:  squamous cell:  low-grade squamous   intraepithelial lesion (LSIL)    I have personally reviewed all specimens and/or slides, including the   listed special stains, and used them  with my medical judgment to determine the final diagnosis.    Electronically                           signed out by:  Olamide Crockett M.D., Physicians    Processed and screened at [facility]    CLINICAL HISTORY:    Hysterectomy, Previous ASC-US  Date of Last Pap: 7/6/2015  Dysplasia: Vaginal dysplasia, Laser therapy,    Papanicolaou Test Limitations:  Cervical cytology is a screening test with   limited sensitivity; regular  screening is critical for cancer prevention; Pap tests are primarily   effective for the diagnosis/prevention of  squamous cell carcinoma, not adenocarcinomas or other cancers.    TESTING LAB LOCATION:  [facility], [address]  [address]  [phone]    COLLECTION SITE:  Client:  [facility]  Location: [facility] [place], [place] (CRIST[facility])              Assessment and plan:    (C50.411,  Z17.0,  C50.412) 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,  estrogen receptor positive (H)  (primary encounter diagnosis)  Patient will continue on adjuvant endocrine therapy with Aromasin 25 mg orally daily.  I discussed with the patient today most recent laboratory test and mammographic images.  There is no evidence of disease progression.  We will continue to monitor the patient.  I will see the patient again in 6 months or sooner if there are new developments or concerns.    (D3A.00) Carcinoid tumor  Most recent CT scan shows no evidence of disease progression.  Concern about liver metastases.  Biopsy of the liver lesion was discussed with the patient.    (N18.3) CKD (chronic kidney disease) stage 3, GFR 30-59 ml/min (H)  Creatinine has been stable at 1.45.    (I10) Hypertension goal BP (blood pressure) < 140/90  Patient currently on metoprolol 25 mg orally daily.    The patient is ready to learn, no apparent learning barriers were identified.  Diagnosis and treatment plans were explained to the patient. The patient expressed understanding of the content. The patient asked appropriate questions. The patient questions were answered to her satisfaction.    Chart documentation with Dragon Voice recognition Software. Although reviewed after completion, some words and grammatical errors may remain.

## 2018-11-14 NOTE — LETTER
11/14/2018         RE: Marissa Guadarrama  [address] [place] [address]   [place] [address]        Dear Colleague,    Thank you for referring your patient, Marissa Guadarrama, to the [facility]. Please see a copy of my visit note below.    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 There was also a focal asymmetry in the lower left breast. Subsequently the patient went on to have bilateral diagnostic mammography with ultrasound on March 28, 2016.. On the right breast there was microcalcification is the main grouping measures 1.6 cm x 1 cm bilateral 2.9 cm located 2.9 cm from the nipple. An additional tiny area about 0.2 cm seen at the anterolateral margin of this main grouping about 1 cm from the main grouping. The left breast shows no masses or significant abnormalities. Subsequently the patient went on to have breast needle biopsy on March 30, 2016. 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 Angiolymphatic invasion was not identified. 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 Microcalcifications was also associated with ductal carcinoma in situ. His surgeon receptor was positive. Progesterone receptor was negative. She underwent bilateral sentinel lymph node biopsy and bilateral lumpectomies with prior seed placement.  the surgical pathology Showing left breast lumpectomy was a tumor size of 13 mm grade 2 of 3, angiolymphatic invasion was not identified the tumor was unifocal associated with ductal carcinoma in  situ high-grade. Surgical margins where negative. [place] lymph node was negative for metastatic tumor. Stage is T1cN0 M0 On the right breast and there was invasive ductal carcinoma with a tumor size of 3 mm grade 3 of 3. [place] lymph nodes were negative for metastatic disease. The tumor stage is T1aN0 M0. The tumor on the left was positive for estrogen and progesterone receptor. It did show no HER-2/praveen amplification. The tumor on the right is positive for estrogen receptor and negative for progesterone receptor and shows no HER-2/praveen amplification. Subsequently the patient concluded the radiation therapy. She is currently on hormonal therapy with Arimidex. She developed a skin rash and Arimidex was switched to Aromasin.      Interval History:  She is seen today for follow-up.  She has been feeling well without any recent complaints of weight loss or night sweats or fever or chills.  She denies any nausea vomiting or diarrhea.  She denies any joint aches or pains.  She is currently on adjuvant endocrine therapy with Aromasin.  She has been tolerating treatment without significant side effects.    Review Of Systems:  Constitutional: Negative for fever, chills, and night sweats.  Skin: negative.  Eyes: negative.  Ears/Nose/Throat: negative.  Respiratory: No shortness of breath, dyspnea on exertion, cough, or hemoptysis.  Cardiovascular: negative.  Gastrointestinal: negative.  Genitourinary: negative.  Musculoskeletal: negative.  Neurologic: negative.  Psychiatric: negative.  Hematologic/Lymphatic/Immunologic: negative.  Endocrine: negative.    All other ROS negative unless mentioned in interval history.    Past medical, social, surgical, and family histories reviewed.    Allergies:  Allergies as of 11/14/2018 - Cade as Reviewed 11/14/2018   Allergen Reaction Noted     Nkda [no known drug allergies]  10/30/2009       Current Medications:  Current Outpatient Prescriptions   Medication Sig Dispense Refill      "albuterol (PROAIR HFA, PROVENTIL HFA, VENTOLIN HFA) 108 (90 BASE) MCG/ACT inhaler Inhale 2 puffs into the lungs every 6 hours as needed for shortness of breath / dyspnea or wheezing 1 Inhaler 1     exemestane (AROMASIN) 25 MG tablet Take 1 tablet (25 mg) by mouth every morning 90 tablet 3     gabapentin (NEURONTIN) 600 MG tablet Take 1 tablet (600 mg) by mouth 3 times daily 180 tablet 6     HYDROcodone-acetaminophen (NORCO) 5-325 MG per tablet Take 1-2 tablets by mouth every 6 hours as needed for other (Moderate to Severe Pain) 5 tablet 0     hydrochlorothiazide (HYDRODIURIL) 25 MG tablet TAKE 1/2 (ONE-HALF) TABLET BY MOUTH ONCE DAILY IN THE MORNING 15 tablet 0     metoprolol succinate (TOPROL-XL) 25 MG 24 hr tablet Take 0.5 tablets (12.5 mg) by mouth daily 15 tablet 1        Physical Exam:  /62 (BP Location: Right arm, Patient Position: Sitting, Cuff Size: Adult Large)  Pulse 61  Temp 97.6  F (36.4  C) (Tympanic)  Resp 20  Ht 1.715 m (5' 7.5\")  Wt 112.5 kg (248 lb 1.6 oz)  SpO2 95%  BMI 38.28 kg/m2  Wt Readings from Last 12 Encounters:   11/14/18 112.5 kg (248 lb 1.6 oz)   11/13/18 112.9 kg (249 lb)   10/09/18 112.5 kg (248 lb)   09/28/18 111.1 kg (245 lb)   08/02/18 113.4 kg (250 lb)   07/05/18 112.9 kg (249 lb)   06/12/18 113.3 kg (249 lb 12.8 oz)   05/16/18 112 kg (247 lb)   05/16/18 112.4 kg (247 lb 11.2 oz)   04/03/18 112.9 kg (248 lb 14.4 oz)   01/09/18 113.4 kg (250 lb)   01/08/18 112.5 kg (248 lb)     ECOG performance status: 0  GENERAL APPEARANCE: Healthy, alert and in no acute distress.  HEENT: Sclerae anicteric. PERRLA. Oropharynx without ulcers, lesions, or thrush.  NECK: Supple. No asymmetry or masses.  LYMPHATICS: No palpable cervical, supraclavicular, axillary, or inguinal lymphadenopathy.  RESP: Lungs clear to auscultation bilaterally without rales, rhonchi or wheezes.  CARDIOVASCULAR: Regular rate and rhythm. Normal S1, S2; no S3 or S4. No murmur, gallop, or rub.  BREAST:   ABDOMEN: " Soft, nontender. Bowel sounds normal. No palpable organomegaly or masses.  MUSCULOSKELETAL: Extremities without gross deformities noted. No edema of bilateral lower extremities.  SKIN: No suspicious lesions or rashes.  NEURO: Alert and oriented x 3. Cranial nerves II-XII grossly intact.  PSYCHIATRIC: Mentation and affect appear normal.    Laboratory/Imaging Studies:  No visits with results within 2 Week(s) from this visit.  Latest known visit with results is:    Office Visit on 10/09/2018   Component Date Value Ref Range Status     HPV Source 10/09/2018 [facility]   Final     HPV 16 DNA 10/09/2018 Positive* NEG^Negative Final     HPV 18 DNA 10/09/2018 Negative  NEG^Negative Final     Other HR HPV 10/09/2018 Negative  NEG^Negative Final     Final Diagnosis 10/09/2018 This patient's sample is positive for HPV 16 DNA.   Final    Comment: This test was developed and its performance characteristics determined by the   [facility], [facility] Diagnostics Laboratory. It   has not been cleared or approved by the FDA. The laboratory is regulated under   CLIA as qualified to perform high-complexity testing. This test is used for   clinical purposes. It should not be regarded as investigational or for   research.  (Note)  METHODOLOGY:  The Roche cristiano 4800 system uses automated extraction,   simultaneous amplification of HPV (L1 region) and beta-globin,    followed by  real time detection of fluorescent labeled HPV and beta   globin using specific oligonucleotide probes . The test specifically   identifies types HPV 16 DNA and HPV 18 DNA while concurrently   detecting the rest of the high risk types (31, 33, 35, 39, 45, 51,   52, 56, 58, 59, 66 or 68).  COMMENTS:  This test is not intended for use as a screening device   for women under age 30 with normal cervical cytology.  Results should   be correl                           ated with cytologic and histologic findings. Close clinical   followup is  recommended.       Specimen Description 10/09/2018 Cervical Cells   Final    C18 [id number]     PAP 10/09/2018 LSIL*  Final     Copath Report 10/09/2018    Final                    Value:  Patient Name: ROSALINDA LANCASTER  MR#: [id number]  Specimen #: [id number]  Collected: 10/9/2018  Received: 10/10/2018  Reported: 10/12/2018 10:21  Ordering Phy(s): MAR ROSS    For improved result formatting, select 'View Enhanced Report Format' under   Linked Documents section.    SPECIMEN/STAIN PROCESS:  Pap thin layer prep diagnostic (Sure[facility])       Pap-Cyto x 1, HPV ordered x 1    SOURCE: Vaginal  ----------------------------------------------------------------   Pap thin layer prep diagnostic (SurePath)  SPECIMEN ADEQUACY:  Satisfactory for evaluation.  Specimen was unable to be imaged on the   [facility] Slide .  -Transformation zone component absent.    CYTOLOGIC INTERPRETATION:    Epithelial cell abnormality:  squamous cell:  low-grade squamous   intraepithelial lesion (LSIL)    I have personally reviewed all specimens and/or slides, including the   listed special stains, and used them  with my medical judgment to determine the final diagnosis.    Electronically                           signed out by:  Olamide Crockett M.D., Physicians    Processed and screened at [facility]    CLINICAL HISTORY:    Hysterectomy, Previous ASC-US  Date of Last Pap: 7/6/2015  Dysplasia: Vaginal dysplasia, Laser therapy,    Papanicolaou Test Limitations:  Cervical cytology is a screening test with   limited sensitivity; regular  screening is critical for cancer prevention; Pap tests are primarily   effective for the diagnosis/prevention of  squamous cell carcinoma, not adenocarcinomas or other cancers.    TESTING LAB LOCATION:  [facility], [address]  [address] [phone]    COLLECTION  SITE:  Client:  [facility] [place]  Location: [place] (CR[facility])              Assessment and plan:    (C50.411,  Z17.0,  C50.412) 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, estrogen receptor positive (H)  (primary encounter diagnosis)  Patient will continue on adjuvant endocrine therapy with Aromasin 25 mg orally daily.  I discussed with the patient today most recent laboratory test and mammographic images.  There is no evidence of disease progression.  We will continue to monitor the patient.  I will see the patient again in 6 months or sooner if there are new developments or concerns.    (D3A.00) Carcinoid tumor  Most recent CT scan shows no evidence of disease progression.  Concern about liver metastases.  Biopsy of the liver lesion was discussed with the patient.    (N18.3) CKD (chronic kidney disease) stage 3, GFR 30-59 ml/min (H)  Creatinine has been stable at 1.45.    (I10) Hypertension goal BP (blood pressure) < 140/90  Patient currently on metoprolol 25 mg orally daily.    The patient is ready to learn, no apparent learning barriers were identified.  Diagnosis and treatment plans were explained to the patient. The patient expressed understanding of the content. The patient asked appropriate questions. The patient questions were answered to her satisfaction.    Chart documentation with Dragon Voice recognition Software. Although reviewed after completion, some words and grammatical errors may remain.    Again, thank you for allowing me to participate in the care of your patient.        Sincerely,        Hosea King MD

## 2018-11-14 NOTE — MR AVS SNAPSHOT
After Visit Summary   11/14/2018    Marissa Guadarrama    MRN: [id number]           Patient Information     Date Of Birth          1950        Visit Information        Provider Department      11/14/2018 11:20 AM Hosea King MD [facility] ONCOLOGY      Today's Diagnoses     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, estrogen receptor positive (H)    -  1    Carcinoid tumor        CKD (chronic kidney disease) stage 3, GFR 30-59 ml/min (H)        Hyperlipidemia LDL goal <130        Hypertension goal BP (blood pressure) < 140/90          Care Instructions    Dr. King would like to see you back in 6 months for a follow up appointment with labs prior.      When you are in need of a refill, please call your pharmacy and they will send us a request.      Copy of appointments, and after visit summary (AVS) given to patient.      If you have any questions during business hours (M-F 8 AM- 4PM), please call Veronica Giordano RN Oncology Hematology  at [facility] [phone].       For questions after business hours, or on holidays/weekends, please call our after hours Nurse Triage line [phone]. Thank you.            Follow-ups after your visit        Follow-up notes from your care team     Return in about 6 months (around 5/14/2019) for Blood work before next appointment.      Your next 10 appointments already scheduled     Nov 28, 2018  1:20 PM CST   New Visit with Paul Hamm MD   [facility] ([facility])    [address] [place] [address]  [place] [address]   [phone]            Nov 29, 2018  8:40 AM CST   SHORT with Zaida Saldana DO   [facility] ([facility])    [address] [address] [phone]            Apr 09, 2019  1:20 PM CDT   (Arrive by 1:05 PM)   COLPOSCOPY with  GYN ONC COLPOSCOPY PROVIDER   [facility] ([facility])    [address]  [address]  [facility] [address] [phone]            May 13, 2019 10:50 AM CDT   LAB with [facility] ([facility])    [address]  [facility] [place] [address]   [phone]           Please do not eat 10-12 hours before your appointment if you are coming in fasting for labs on lipids, cholesterol, or glucose (sugar). This does not apply to pregnant women. Water, hot tea and black coffee (with nothing added) are okay. Do not drink other fluids, diet soda or chew gum.            May 15, 2019 11:00 AM CDT   Return Visit with Hosea King MD   [facility] ([facility])    [facility]  [address]  [facility] [place] [facility] [place] [address]   [phone]              Future tests that were ordered for you today     Open Future Orders        Priority Expected Expires Ordered    CBC with platelets differential Routine 5/14/2019 11/14/2019 11/14/2018    Comprehensive metabolic panel Routine 5/14/2019 11/14/2019 11/14/2018    Ca27.29  breast tumor marker Routine 5/14/2019 11/14/2019 11/14/2018            Who to contact     If you have questions or need follow up information about today's clinic visit or your schedule please contact [facility] directly at [phone].  Normal or non-critical lab and imaging results will be communicated to you by MyChart, letter or phone within 4 business days after the clinic has received the results. If you do not hear from us within 7 days, please contact the clinic through MyChart or phone. If you have a critical or abnormal lab result, we will notify you by phone as soon as possible.  Submit refill requests through [employer] or call your pharmacy and they will forward the refill request to us. Please allow 3 business days for your refill to be completed.          Additional Information About Your  "Visit        Care EveryWhere ID     This is your Care EveryWhere ID. This could be used by other organizations to access your [place] medical records  [id number]        Your Vitals Were     Pulse Temperature Respirations Height Pulse Oximetry BMI (Body Mass Index)    61 97.6  F (36.4  C) (Tympanic) 20 1.715 m (5' 7.5\") 95% 38.28 kg/m2       Blood Pressure from Last 3 Encounters:   11/14/18 163/62   11/13/18 134/74   11/01/18 143/80    Weight from Last 3 Encounters:   11/14/18 112.5 kg (248 lb 1.6 oz)   11/13/18 112.9 kg (249 lb)   10/09/18 112.5 kg (248 lb)               Primary Care Provider Office Phone # Fax #    Zaida Saldana  [phone] [address] [address]        Equal Access to Services     TASIA RANGEL : Hadii karina dailyo [place], waaxda luqadaha, qaybta kaalmada adeloraineyada, niesha tang . So [facility] [phone].    ATENCIÓN: Si ingrid bear, tiene a allan disposición servicios gratuitos de asistencia lingüística. Llame al [phone].    We comply with applicable federal civil rights laws and [place] laws. We do not discriminate on the basis of race, color, national origin, age, disability, sex, sexual orientation, or gender identity.            Thank you!     Thank you for choosing [facility]  for your care. Our goal is always to provide you with excellent care. Hearing back from our patients is one way we can continue to improve our services. Please take a few minutes to complete the written survey that you may receive in the mail after your visit with us. Thank you!             Your Updated Medication List - Protect others around you: Learn how to safely use, store and throw away your medicines at www.disposemymeds.org.          This list is accurate as of 11/14/18 11:47 AM.  Always use your most recent med list.                   Brand Name Dispense Instructions for use Diagnosis    albuterol 108 (90 Base) MCG/ACT " inhaler    PROAIR HFA/PROVENTIL HFA/VENTOLIN HFA    1 Inhaler    Inhale 2 puffs into the lungs every 6 hours as needed for shortness of breath / dyspnea or wheezing    SOB (shortness of breath)       exemestane 25 MG tablet    AROMASIN    90 tablet    Take 1 tablet (25 mg) by mouth every morning    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, estrogen receptor positive (H)       gabapentin 600 MG tablet    NEURONTIN    180 tablet    Take 1 tablet (600 mg) by mouth 3 times daily    Trigeminal neuralgia       hydrochlorothiazide 25 MG tablet    HYDRODIURIL    15 tablet    TAKE 1/2 (ONE-HALF) TABLET BY MOUTH ONCE DAILY IN THE MORNING    Essential hypertension with goal blood pressure less than 140/90       HYDROcodone-acetaminophen 5-325 MG per tablet    NORCO    5 tablet    Take 1-2 tablets by mouth every 6 hours as needed for other (Moderate to Severe Pain)    Vaginal dysplasia       metoprolol succinate 25 MG 24 hr tablet    TOPROL-XL    15 tablet    Take 0.5 tablets (12.5 mg) by mouth daily    Hypertension goal BP (blood pressure) < 140/90

## 2018-11-15 DIAGNOSIS — N13.5 URETERAL OBSTRUCTION: Primary | ICD-10-CM

## 2018-11-15 RX ORDER — CEFAZOLIN SODIUM 1 G/50ML
1 INJECTION, SOLUTION INTRAVENOUS SEE ADMIN INSTRUCTIONS
Status: CANCELLED | OUTPATIENT
Start: 2018-11-15

## 2018-11-15 RX ORDER — CEFAZOLIN SODIUM 2 G/50ML
2 SOLUTION INTRAVENOUS
Status: CANCELLED | OUTPATIENT
Start: 2018-11-15

## 2018-11-20 ENCOUNTER — TELEPHONE (OUTPATIENT)
Dept: UROLOGY | Facility: CLINIC | Age: 68
End: 2018-11-20

## 2018-11-20 ENCOUNTER — TELEPHONE (OUTPATIENT)
Dept: NUTRITION | Facility: CLINIC | Age: 68
End: 2018-11-20

## 2018-11-20 DIAGNOSIS — N39.0 URINARY TRACT INFECTION: Primary | ICD-10-CM

## 2018-11-20 PROBLEM — N13.5 URETERAL OBSTRUCTION: Status: ACTIVE | Noted: 2018-11-20

## 2018-11-20 NOTE — TELEPHONE ENCOUNTER
Pre Op Teaching Flowsheet       Pre and Post op Patient Education  Relevant Diagnosis:  Stent exchange  Teaching Topic:  Pre and post op teaching  Person Involved in teaching:  pt    Motivation Level:  Asks Questions: Yes  Eager to Learn:  Yes  Cooperative: Yes  Receptive (willing/able to accept information):  Yes  Patient demonstrates understanding of the following:  Date and time of surgery:  1/3/19  [place] of [place]  History and Physical and any other testing necessary prior to surgery: Yes  Required time line for completion of History and Physical and any pre-op testing: Yes    NPO Guidelines: NPO after midnight    Patient demonstrates understanding of the following:  Pre-op bowel prep:  N/A  Pre-op showering/scrub information with PCMX Soap: Yes  Medications to take the day of surgery:  Per PCP  Blood thinner medications discussed and when to stop (if applicable):  Yes  Diabetes medication management (if applicable):  Patient to discuss with Primary Care Provider  Discussed pain control after surgery: pain scale  Infection Prevention: Patient demonstrates understanding of the following:  Patient instructed on hand hygiene:  Yes  Surgical procedure site care taught: Yes  Signs and symptoms of infection taught:  Yes  Wound care will be taught at the time of discharge.  Central venous catheter care will be taught at the time of discharge (if applicable).    Post-op follow-up:  Discussed how to contact the hospital, nurse, and clinic scheduling staff if necessary.    Instructional materials used/given/mailed:  [facility] Booklet, post op teaching sheet, Map, Soap, and arrival/location information.    Surgical instructions mailed to patient Yes.I also spoke to patient on the phone. irena storm LPN

## 2018-11-20 NOTE — TELEPHONE ENCOUNTER
Patient was contacted by phone due to a positive screen on the Oncology Distress Screening tool. Spoke with patient regarding her concerns about her weight. Registered Dietitian contact information provided to patient if further questions arise.    Toya Oh RD,LD  Clinical Dietitian

## 2018-11-26 RX ORDER — HYDROCHLOROTHIAZIDE 25 MG/1
TABLET ORAL
Qty: 15 TABLET | Refills: 0 | OUTPATIENT
Start: 2018-11-26

## 2018-11-26 NOTE — TELEPHONE ENCOUNTER
"Patient was seen 11/13/18, advised to stop the hydrochlorothiazide and started metoprolol.    She is scheduled for follow up as advised.    \"Refusal\" routed back to pharmacy with note.    Megan Talamantes RN  [facility]      "

## 2018-11-28 ENCOUNTER — RADIANT APPOINTMENT (OUTPATIENT)
Dept: GENERAL RADIOLOGY | Facility: CLINIC | Age: 68
End: 2018-11-28
Attending: INTERNAL MEDICINE
Payer: MEDICARE

## 2018-11-28 ENCOUNTER — OFFICE VISIT (OUTPATIENT)
Dept: RHEUMATOLOGY | Facility: CLINIC | Age: 68
End: 2018-11-28
Payer: COMMERCIAL

## 2018-11-28 VITALS
OXYGEN SATURATION: 93 % | BODY MASS INDEX: 38.73 KG/M2 | DIASTOLIC BLOOD PRESSURE: 72 MMHG | WEIGHT: 251 LBS | SYSTOLIC BLOOD PRESSURE: 178 MMHG | HEART RATE: 59 BPM

## 2018-11-28 DIAGNOSIS — M65.311 TRIGGER FINGER OF RIGHT THUMB: Primary | ICD-10-CM

## 2018-11-28 DIAGNOSIS — M25.50 MULTIPLE JOINT PAIN: ICD-10-CM

## 2018-11-28 DIAGNOSIS — M18.0 PRIMARY OSTEOARTHRITIS OF BOTH FIRST CARPOMETACARPAL JOINTS: ICD-10-CM

## 2018-11-28 PROCEDURE — 99203 OFFICE O/P NEW LOW 30 MIN: CPT | Performed by: INTERNAL MEDICINE

## 2018-11-28 PROCEDURE — 86431 RHEUMATOID FACTOR QUANT: CPT | Performed by: INTERNAL MEDICINE

## 2018-11-28 PROCEDURE — 86200 CCP ANTIBODY: CPT | Performed by: INTERNAL MEDICINE

## 2018-11-28 PROCEDURE — 36415 COLL VENOUS BLD VENIPUNCTURE: CPT | Performed by: INTERNAL MEDICINE

## 2018-11-28 PROCEDURE — 73130 X-RAY EXAM OF HAND: CPT | Mod: LT

## 2018-11-28 NOTE — PROGRESS NOTES
Rheumatology Clinic Visit      Marissa Guadarrama MRN# [id number]   YOB: 1950 Age: 68 year old      Date of visit: 11/28/18   Referring provider: Dr. Zaida Saldana  PCP: Dr. Zaida Saldana    Chief Complaint   Patient presents with:  Joint Swelling      Assessment and Plan     1. 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  CCP unknown.  No synovitis on exam. History consistent with OA and trigger finger, not an inflammatory arthritis.  We discussed the significance of the positive RF.  Will check CCP and hand x-rays for further evaluation.  Regarding the trigger finger and 1st CMC OA, I recommended hand therapy and if no improvement then to consider steroid injection; she prefers to monitor for now without further intervention. If studies are suggestive of another etiology than what we discussed then will notify patient.    Addendum: CCP negative. RF positive.  Hand x-rays with degenerative changes seen; no joint space narrowing at the PIPs or MCPs; no clear periarticular osteoporosis; and no erosions. Therefore, no indication for immunosuppressive/immunomodulatory tx at this time.    2. Elevated blood pressure:  Patient to follow up with Primary Care provider regarding elevated blood pressure.     Ms. Guadarrama verbalized agreement with and understanding of the rational for the diagnosis and treatment plan.  All questions were answered to best of my ability and the patient's satisfaction. Ms. Guadarrama was advised to contact the clinic with any questions that may arise after the clinic visit.      Thank you for involving me in the care of the patient    Return to clinic: no return at this time; may changed based on studies      HPI   Marissa Guadarrama is a 68 year old female with a past medical history significant for hyperlipidemia, history of ureteral obstruction, peritoneal metastasis, carcinoid tumor, cervical cancer, post polio syndrome,  hypertension, knee osteoarthritis, chronic kidney disease who is seen in consultation at the request of Dr. Zaida Saldana for evaluation of trigger finger.    Today, Ms. Guadarrama reports right thumb trigger finger.  Morning stiffness for <20 minutes, but only present if significant activity on the day before.  Bony hypertrophy at the PIPs and DIPs.      Denies fevers, chills, nausea, vomiting, constipation, diarrhea. No abdominal pain. No chest pain/pressure, palpitations, or shortness of breath. No LE swelling. No neck pain. No oral or nasal sores.  No rash. No sicca symptoms.     Tobacco: quit smoking in 2006  EtOH: none  Drugs: none  Occupation: none    ROS   GEN: No fevers or chills  SKIN: No itching, rashes, sores  HEENT:  No oral or nasal ulcers.  CV: No chest pain, pressure, palpitations, or dyspnea on exertion.  PULM: No SOB, wheeze, cough.  GI: No nausea, vomiting, constipation, diarrhea. No blood in stool. No abdominal pain.  : No blood in urine.  MSK: See HPI.  NEURO: No weakness.  ENDO: No heat/cold intolerance.  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 referred back to gyn/onc     High cholesterol      HTN      Pap smear of vagina with ASC-H 11/1/13     Post-polio syndromes      Trigeminal neuralgias      Past Surgical History     Past Surgical History:   Procedure Laterality Date     APPENDECTOMY  1983     BIOPSY NODE SENTINEL Bilateral 6/1/2016    Procedure: BIOPSY NODE SENTINEL;  Surgeon: Brent Arana MD;  Location: [facility]     C BSO, OMENTECTOMY W/OSMAN  5/2007     C TOTAL ABDOM HYSTERECTOMY  5/2007     CL AFF SURGICAL PATHOLOGY       COLONOSCOPY N/A 6/23/2017    Procedure: COMBINED COLONOSCOPY, SINGLE OR MULTIPLE BIOPSY/POLYPECTOMY BY BIOPSY;  Colonoscopy Dx:Carcinoid tumor of colon prep mailed golytely;  Surgeon: Talisha Greco MD;  [facility]: [facility]     COLPOSCOPY, BIOPSY, COMBINED  3/13/2014    Procedure: COMBINED COLPOSCOPY, BIOPSY;;  Surgeon: Lara Pack MD;  [facility]: [facility]     COMBINED CYSTOSCOPY, RETROGRADES, URETEROSCOPY, INSERT STENT Left 2/2/2017    Procedure: COMBINED CYSTOSCOPY, RETROGRADES, URETEROSCOPY, INSERT STENT;  Surgeon: Zhao La MD;  Location: [facility]     CYSTOSCOPY, RETROGRADES, INSERT STENT URETER(S), COMBINED Left 9/7/2017    Procedure: COMBINED CYSTOSCOPY, RETROGRADES, INSERT STENT URETER(S);  Cystoscopy,Left Stent Exchange;  Surgeon: Zhao La MD;  Location: [facility]     CYSTOSCOPY, RETROGRADES, INSERT STENT URETER(S), COMBINED  12/12/2017    Procedure: COMBINED CYSTOSCOPY, RETROGRADES, INSERT STENT URETER(S);;  Surgeon: Zhao La MD;  [facility]     CYSTOSCOPY, RETROGRADES, INSERT STENT URETER(S), COMBINED Left 7/5/2018    Procedure: COMBINED CYSTOSCOPY, RETROGRADES, INSERT STENT URETER(S);  Cystoscopy, Left Stent Exchange;  Surgeon: Zhao La MD;  Location: [facility]     EXAM UNDER ANESTHESIA PELVIC  3/13/2014    Procedure: EXAM UNDER ANESTHESIA PELVIC;  Exam Under Anestheisa, Colposcopy, Vaginal Biopsies,  Co2 Laser of the Upper Vagina;  Surgeon: Lara Pack MD;  [facility]: [facility]     HERNIORRHAPHY INCISIONAL (LOCATION)       LASER CO2 VAGINA  3/13/2014    VAIN 1/2     LASER CO2 VAGINA N/A 12/12/2017    Procedure: LASER CO2 VAGINA;  Exam Under Anesthesia, CO2 Laser Ablation Of Vagina, Cystoscopy, Left Retrograde Pyelogram with Left Stent Placement;  Surgeon: Nic Segundo MD;  Location: [facility]     LUMPECTOMY BREAST WITH SEED LOCALIZATION Bilateral 6/1/2016    Procedure: LUMPECTOMY BREAST WITH SEED LOCALIZATION;  Surgeon: Brent Arana MD;  Location: [facility]     SURGICAL HISTORY OF -       ovarian cystectomy     SURGICAL HISTORY OF -   2003    right colon resection secondary to carcinoid tumor     TUBAL LIGATION       Allergy     Allergies   Allergen Reactions     Nkda [No Known Drug Allergies]      Current Medication List     Current Outpatient Prescriptions   Medication Sig     albuterol (PROAIR HFA, PROVENTIL HFA, VENTOLIN HFA) 108 (90 BASE) MCG/ACT inhaler Inhale 2 puffs into the lungs every 6 hours as needed for shortness of breath / dyspnea or wheezing     exemestane (AROMASIN) 25 MG tablet Take 1 tablet (25 mg) by mouth every morning     gabapentin (NEURONTIN) 600 MG tablet Take 1 tablet (600 mg) by mouth 3 times daily     hydrochlorothiazide (HYDRODIURIL) 25 MG tablet TAKE 1/2 (ONE-HALF) TABLET BY MOUTH ONCE DAILY IN THE MORNING     HYDROcodone-acetaminophen (NORCO) 5-325 MG per tablet Take 1-2 tablets by mouth every 6 hours as needed for other (Moderate to Severe Pain)     metoprolol succinate (TOPROL-XL) 25 MG 24 hr tablet Take 0.5 tablets (12.5 mg) by mouth daily     No current facility-administered medications for this visit.          Social History   See HPI    Family History     Family History   Problem Relation Age of Onset     Cancer Mother      bone / liver     Breast Cancer Mother      Cancer Maternal Grandmother      Cancer Maternal Grandfather      Cancer Paternal Grandmother       "Cancer Paternal Grandfather      Cancer Sister      vulvar ca, cervical ca, squamous cell cancer     Cancer Brother      Rectal- Stage 4     Rectal Cancer Brother      Breast Cancer Paternal Aunt      Colon Cancer Paternal Aunt      Breast Cancer Maternal Aunt      Pancreatic Cancer Nephew      Breast Cancer Sister      Physical Exam     Temp Readings from Last 3 Encounters:   11/14/18 97.6  F (36.4  C) (Tympanic)   11/13/18 98.5  F (36.9  C) (Oral)   10/09/18 98  F (36.7  C) (Oral)     BP Readings from Last 5 Encounters:   11/28/18 (!) 196/91   11/14/18 163/62   11/13/18 134/74   11/01/18 143/80   10/09/18 151/84     Pulse Readings from Last 1 Encounters:   11/28/18 59     Resp Readings from Last 1 Encounters:   11/14/18 20     Estimated body mass index is 38.73 kg/(m^2) as calculated from the following:    Height as of 11/14/18: 1.715 m (5' 7.5\").    Weight as of this encounter: 113.9 kg (251 lb).    GEN: NAD  HEENT: MMM. No oral lesions.  Anicteric, noninjected sclera  CV: S1, S2. RRR. No m/r/g.  PULM: CTA bilaterally. No w/c.  ABD: +BS.   MSK: Right thumb trigger finger with palpable bump near the A1 pulley of the right thumb.  Heberden's and Nannette's nodes.  No synovial swelling or tenderness to palpation at the MCPs or PIPs. Squaring and tenderness to palpation at the bilateral 1st CMC joints. Wrists, elbows, shoulders, knees, ankles, and MTPs without swelling or tenderness to palpation.  Hips nontender to palpation.     NEURO: UE and LE strengths 5/5 and equal bilaterally.   SKIN: No rash  EXT: No LE edema  PSYCH: Alert. Appropriate.    Labs / Imaging (select studies)     CBC  Recent Labs   Lab Test  11/12/18   1126  05/14/18   0856  01/26/18   0559  12/11/17   1301   WBC  7.0  6.5  7.8  7.9   RBC  4.64  4.68  4.81  4.85   HGB  14.4  14.2  14.7  14.3   HCT  44.9  42.9  46.0  44.7   MCV  97  92  96  92   RDW  13.2  13.4  13.9  13.2   PLT  193  207  192  237   MCH  31.0  30.3  30.6  29.5   MCHC  32.1  33.1  " 32.0  32.0   NEUTROPHIL  68.3  60.8   --   67.3   LYMPH  20.9  25.1   --   21.8   MONOCYTE  4.7  6.9   --   5.4   EOSINOPHIL  5.4  6.7   --   4.7   BASOPHIL  0.3  0.3   --   0.4   ANEU  4.8  4.0   --   5.3   ALYM  1.5  1.6   --   1.7   JONO  0.3  0.5   --   0.4   AEOS  0.4  0.4   --   0.4   ABAS  0.0  0.0   --   0.0     CMP  Recent Labs   Lab Test  11/12/18   1126  09/28/18   1416  05/14/18   0856  12/12/17   0659  12/11/17   1301  11/13/17   0843  09/05/17   1109  07/05/17   1325   NA  147*   --   144   --   143  144  146*  140   POTASSIUM  3.5   --   3.3*  3.3*  3.1*  3.8  3.8  4.9   CHLORIDE  106   --   106   --   103  105  106  104   CO2  36*   --   32   --   33*  33*  33*  34*   ANIONGAP  5   --   6   --   8  6  7  2*   GLC  118*   --   137*   --   142*  104*  76  93   BUN  23   --   20   --   27  24  21  34*   CR  1.45*   --   1.23*   --   1.55*  1.61*  1.43*  1.47*   GFRESTIMATED  36*  34*  43*   --   33*  32*  36*  35*   GFRESTBLACK  43*  42*  52*   --   40*  38*  44*  43*   MARILIA  9.1   --   9.4   --   9.7  9.7  9.3  9.6   BILITOTAL  0.5   --   0.7   --   0.5  0.4   --   0.6   ALBUMIN  3.2*   --   3.4   --   3.3*  3.2*   --   3.4   PROTTOTAL  7.2   --   7.5   --   8.1  7.3   --   7.0   ALKPHOS  88   --   95   --   88  78   --   76   AST  17   --   17   --   20  14   --   13   ALT  18   --   17   --   20  16   --   18     Calcium/VitaminD  Recent Labs   Lab Test  11/12/18   1126  05/14/18   0856  12/11/17   1301   MARILIA  9.1  9.4  9.7     TSH/T4  Recent Labs   Lab Test  09/28/15   1735   TSH  2.54     Hepatitis C  Recent Labs   Lab Test  11/02/16   1047   HCVAB  Nonreactive   Assay performance characteristics have not been established for newborns,   infants, and children         Immunization History     Immunization History   Administered Date(s) Administered     Influenza (High Dose) 3 valent vaccine 09/28/2015, 11/02/2016, 10/29/2017, 10/09/2018     Influenza (IIV3) PF 10/24/2008, 10/30/2009, 10/08/2010,  10/05/2011, 09/19/2012     Influenza Vaccine IM 3yrs+ 4 Valent IIV4 11/01/2013, 09/16/2014     Pneumo Conj 13-V (2010&after) 02/06/2015     Pneumococcal 23 valent 11/01/2013     TD (ADULT, 7+) 01/01/2003     TDAP Vaccine (Boostrix) 11/01/2013          Chart documentation done in part with Dragon Voice recognition Software. Although reviewed after completion, some word and grammatical error may remain.    Paul Hamm MD

## 2018-11-28 NOTE — PROGRESS NOTES
"Chief Complaint   Patient presents with     Joint Swelling       Initial BP (!) 196/91  Pulse 59  Wt 113.9 kg (251 lb)  SpO2 93%  BMI 38.73 kg/m2 Estimated body mass index is 38.73 kg/(m^2) as calculated from the following:    Height as of 11/14/18: 1.715 m (5' 7.5\").    Weight as of this encounter: 113.9 kg (251 lb).  BP completed using cuff size: regular         RAPID3 (0-30) Cumulative Score  12.7          RAPID3 Weighted Score (divide #4 by 3 and that is the weighted score)  4.23         "

## 2018-11-28 NOTE — MR AVS SNAPSHOT
After Visit Summary   11/28/2018    Marissa Guadarrama    MRN: [id number]           Patient Information     Date Of Birth          1950        Visit Information        Provider Department      11/28/2018 1:20 PM Paul Hamm MD [facility]        Today's Diagnoses     Trigger finger of right thumb    -  1    Primary osteoarthritis of both first carpometacarpal joints        Multiple joint pain           Follow-ups after your visit        Your next 10 appointments already scheduled     Nov 29, 2018  8:40 AM CST   SHORT with Zaida Saldana DO   [facility] ([facility])    [address] [place] [id number]   [phone]            Dec 21, 2018 11:00 AM CST   Office Visit with Eliazar Menendez MD   [facility] ([facility])    [address] [place] [id number] [phone]           Bring a current list of meds and any records pertaining to this visit. For Physicals, please bring immunization records and any forms needing to be filled out. Please arrive 10 minutes early to complete paperwork.            Dec 26, 2018 12:20 PM CST   Pre-Op physical with Jeannette Durán DO   [facility] ([facility])    [address] [place] [address]   [phone]            Jan 03, 2019   Procedure with Zhao La MD   [facility] (--)    [address] [place] [id number] [phone]           The medical center is located at [address] [place] [address]. (between [address] in [place], [place] north of [place]).            Apr 09, 2019  1:20 PM CDT   (Arrive by 1:05 PM)   COLPOSCOPY with  GYN ONC COLPOSCOPY PROVIDER   [facility] ([facility])    [address]  [facility] [id number] [phone]            May 13, 2019 10:50 AM  CDT   LAB with [facility] ([facility])    [address]  [facility] [address] [phone]           Please do not eat 10-12 hours before your appointment if you are coming in fasting for labs on lipids, cholesterol, or glucose (sugar). This does not apply to pregnant women. Water, hot tea and black coffee (with nothing added) are okay. Do not drink other fluids, diet soda or chew gum.            May 15, 2019 11:00 AM CDT   Return Visit with Hosea King MD   [facility] ([facility])    [facility]  [address]  [facility] [address] [phone]              Future tests that were ordered for you today     Open Future Orders        Priority Expected Expires Ordered    XR Hand Bilateral G/E 3 Views Routine 11/28/2018 11/28/2019 11/28/2018            Who to contact     If you have questions or need follow up information about today's clinic visit or your schedule please contact [facility] directly at [phone].  Normal or non-critical lab and imaging results will be communicated to you by MyChart, letter or phone within 4 business days after the clinic has received the results. If you do not hear from us within 7 days, please contact the clinic through MyChart or phone. If you have a critical or abnormal lab result, we will notify you by phone as soon as possible.  Submit refill requests through [employer] or call your pharmacy and they will forward the refill request to us. Please allow 3 business days for your refill to be completed.          Additional Information About Your Visit        Care EveryWhere ID     This is your Care EveryWhere ID. This could be used by other organizations to access your [place] medical records  [id number]        Your Vitals Were     Pulse Pulse Oximetry BMI (Body Mass Index)             59 93% 38.73 kg/m2          Blood Pressure from Last 3  Encounters:   11/28/18 178/72   11/14/18 163/62   11/13/18 134/74    Weight from Last 3 Encounters:   11/28/18 113.9 kg (251 lb)   11/14/18 112.5 kg (248 lb 1.6 oz)   11/13/18 112.9 kg (249 lb)              We Performed the Following     Cyclic Citrullinated Peptide Antibody IgG     Rheumatoid factor        Primary Care Provider Office Phone # Fax #    Zaida Saldana [phone]       [facility] [address]        Equal Access to Services     TERESA RANGEL : Hadii karina cruz [place], waaxda luqadaha, qaybta kaalmada be, niesha tang . So [facility] [phone].    ATENCIÓN: Si habla español, tiene a allan disposición servicios gratuitos de asistencia lingüística. [facility] [phone].    We comply with applicable federal civil rights laws and [place] laws. We do not discriminate on the basis of race, color, national origin, age, disability, sex, sexual orientation, or gender identity.            Thank you!     Thank you for choosing [facility]  for your care. Our goal is always to provide you with excellent care. Hearing back from our patients is one way we can continue to improve our services. Please take a few minutes to complete the written survey that you may receive in the mail after your visit with us. Thank you!             Your Updated Medication List - Protect others around you: Learn how to safely use, store and throw away your medicines at www.disposemymeds.org.          This list is accurate as of 11/28/18  1:55 PM.  Always use your most recent med list.                   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, estrogen receptor positive (H)       gabapentin 600 MG tablet    NEURONTIN    180 tablet    Take 1 tablet (600 mg) by mouth 3 times daily    Trigeminal neuralgia       hydrochlorothiazide 25 MG tablet    HYDRODIURIL    15 tablet    TAKE 1/2 (ONE-HALF) TABLET BY MOUTH ONCE DAILY IN THE MORNING    Essential hypertension with goal blood pressure less than 140/90       HYDROcodone-acetaminophen 5-325 MG tablet    NORCO    5 tablet    Take 1-2 tablets by mouth every 6 hours as needed for other (Moderate to Severe Pain)    Vaginal dysplasia       metoprolol succinate 25 MG 24 hr tablet    TOPROL-XL    15 tablet    Take 0.5 tablets (12.5 mg) by mouth daily    Hypertension goal BP (blood pressure) < 140/90

## 2018-11-29 ENCOUNTER — OFFICE VISIT (OUTPATIENT)
Dept: FAMILY MEDICINE | Facility: CLINIC | Age: 68
End: 2018-11-29
Payer: COMMERCIAL

## 2018-11-29 VITALS
BODY MASS INDEX: 38.19 KG/M2 | DIASTOLIC BLOOD PRESSURE: 84 MMHG | HEART RATE: 56 BPM | TEMPERATURE: 98.3 F | WEIGHT: 252 LBS | SYSTOLIC BLOOD PRESSURE: 164 MMHG | HEIGHT: 68 IN

## 2018-11-29 DIAGNOSIS — N18.30 CKD (CHRONIC KIDNEY DISEASE) STAGE 3, GFR 30-59 ML/MIN (H): ICD-10-CM

## 2018-11-29 DIAGNOSIS — I10 ESSENTIAL HYPERTENSION WITH GOAL BLOOD PRESSURE LESS THAN 140/90: ICD-10-CM

## 2018-11-29 DIAGNOSIS — D3A.00 CARCINOID TUMOR (H): ICD-10-CM

## 2018-11-29 DIAGNOSIS — N13.5 URETERAL OBSTRUCTION: ICD-10-CM

## 2018-11-29 DIAGNOSIS — I10 HYPERTENSION GOAL BP (BLOOD PRESSURE) < 140/90: Primary | ICD-10-CM

## 2018-11-29 LAB
CCP AB SER IA-ACNC: 1 U/ML
RHEUMATOID FACT SER NEPH-ACNC: 74 IU/ML (ref 0–20)

## 2018-11-29 PROCEDURE — 36415 COLL VENOUS BLD VENIPUNCTURE: CPT | Performed by: FAMILY MEDICINE

## 2018-11-29 PROCEDURE — 80048 BASIC METABOLIC PNL TOTAL CA: CPT | Performed by: FAMILY MEDICINE

## 2018-11-29 PROCEDURE — 99214 OFFICE O/P EST MOD 30 MIN: CPT | Performed by: FAMILY MEDICINE

## 2018-11-29 RX ORDER — HYDROCHLOROTHIAZIDE 25 MG/1
TABLET ORAL
Qty: 15 TABLET | Refills: 0 | Status: SHIPPED | OUTPATIENT
Start: 2018-11-29 | End: 2018-12-22

## 2018-11-29 NOTE — PROGRESS NOTES
"  SUBJECTIVE:   Marissa Guadarrama is a 68 year old female who presents to clinic today for the following health issues:      Hypertension Follow-up      Outpatient blood pressures are being checked at home.  Results are 123 for top number a couple of days ago. 123-130 top number on average using a wrist cuff.    Low Salt Diet: low salt    Patient was seen 2 weeks ago and at that time her hydrochlorothiazide was stopped at that time due to her sodium level of 147. Metoprolol 12.5 was started. Patient reports that she has had increased swelling.     Additional Comments:  Patient was diagnosed with rheumatoid arthritis.       Amount of exercise or physical activity: very little    Problems taking medications regularly: No    Medication side effects: none    Diet: regular (no restrictions)            Problem list and histories reviewed & adjusted, as indicated.  Additional history: as documented    BP Readings from Last 3 Encounters:   11/29/18 164/84   11/28/18 178/72   11/14/18 163/62    Wt Readings from Last 3 Encounters:   11/29/18 252 lb (114.3 kg)   11/28/18 251 lb (113.9 kg)   11/14/18 248 lb 1.6 oz (112.5 kg)                    Reviewed and updated as needed this visit by clinical staff  Tobacco  Allergies  Meds  Med Hx  Surg Hx  Fam Hx  Soc Hx      Reviewed and updated as needed this visit by Provider         ROS:  Constitutional, HEENT, cardiovascular, pulmonary, gi and gu systems are negative, except as otherwise noted.    This document serves as a record of the services and decisions personally performed by LANEY NUNES. It was created on his/her behalf by Sneha Mittal, a trained medical scribe. The creation of this document is based on the provider's statements to the medical scribe. Sneha Mittal, November 29, 2018 8:59 AM    OBJECTIVE:     /84 (Cuff Size: Adult Large)  Pulse 56  Temp 98.3  F (36.8  C) (Oral)  Ht 5' 7.5\" (1.715 m)  Wt 252 lb (114.3 kg)  BMI 38.89 kg/m2  Body mass index is " 38.89 kg/(m^2).  GENERAL: healthy, alert and no distress  HENT: ear canals and TM's normal, nose and mouth without ulcers or lesions  NECK: no adenopathy, no asymmetry, masses, or scars and thyroid normal to palpation  RESP: lungs clear to auscultation - no rales, rhonchi or wheezes  CV: 1+ peripheral edema. regular rate and rhythm, normal S1 S2, no S3 or S4, no murmur, click or rub, and peripheral pulses strong  PSYCH: mentation appears normal, affect normal/bright    Diagnostic Test Results:  Results for orders placed or performed in visit on 11/28/18 (from the past 24 hour(s))   XR Hand Bilateral G/E 3 Views    Narrative    HAND BILATERAL THREE OR MORE VIEWS  11/28/2018 2:46 PM     HISTORY:  Multiple joint pain.    COMPARISON: None.      Impression    IMPRESSION:   1. Left: Probable mild DIP degenerative changes. Mild to moderate  first carpometacarpal degenerative changes. PIP joint spaces and MCP  joint spaces appear fairly well maintained. Intercarpal joint spaces  and radiocarpal joint space is well maintained. No definite soft  tissue swelling, erosions, or significant periarticular osteoporosis.  2. Right: Probable mild DIP degenerative changes. Mild to moderate  first carpometacarpal degenerative changes. PIP joint spaces and MCP  joint spaces appear well maintained. No intercarpal or radiocarpal  joint space narrowing. No erosive changes. No soft tissue swelling or  definite periarticular osteoporosis.    AMELIE MCKAY MD       ASSESSMENT/PLAN:     Hypertension; slightly worsened   Associated with the following complications:    CKD   Plan:  Medications:     Beta-blocker - Continue metoprolol 12.5 mg  Diuretic - Restart hydrochlorothiazide 12.5 mg         ICD-10-CM    1. Hypertension goal BP (blood pressure) < 140/90 I10    2. Ureteral obstruction N13.5    3. Carcinoid tumor D3A.00    4. Essential hypertension with goal blood pressure less than 140/90 I10 Basic metabolic panel  (Ca, Cl, CO2, Creat, Gluc,  K, Na, BUN)     NEPHROLOGY ADULT REFERRAL     hydrochlorothiazide (HYDRODIURIL) 25 MG tablet   5. CKD (chronic kidney disease) stage 3, GFR 30-59 ml/min (H) N18.3 Albumin Random Urine Quantitative with Creat Ratio     Basic metabolic panel  (Ca, Cl, CO2, Creat, Gluc, K, Na, BUN)     NEPHROLOGY ADULT REFERRAL     We will recheck the patient's sodium today. I restarted the patient's hydrochlorothiazide 12.5 mg daily to control her BP and peripheral edema. She will continue metoprolol 12.5 mg.I advised her to continue to monitor her BP at home. I referred the patient to nephrology for HTN. Patient will follow up in 1 month for BP recheck.     Patient Instructions   See the nephrologist.     Restart hydrochlorothiazide 12.5 mg.     Continue metoprolol 12.5 mg.     Continue to wear compression stockings.     Follow up with Dr. Durán in 1 month     If you can, it is helpful if you fill out a survey about your clinic visit if it is mailed to your house in a few weeks.      Zaida Saldana,   [facility]    The information in this document, created by the medical scribe Sneha Mittal for me, accurately reflects the services I personally performed and the decisions made by me. I have reviewed and approved this document for accuracy prior to leaving the patient care area.

## 2018-11-29 NOTE — PATIENT INSTRUCTIONS
See the nephrologist.     Restart hydrochlorothiazide 12.5 mg.     Continue metoprolol 12.5 mg.     Continue to wear compression stockings.     Follow up with Dr. Durán in 1 month     If you can, it is helpful if you fill out a survey about your clinic visit if it is mailed to your house in a few weeks.

## 2018-11-30 ENCOUNTER — TELEPHONE (OUTPATIENT)
Dept: FAMILY MEDICINE | Facility: CLINIC | Age: 68
End: 2018-11-30

## 2018-11-30 LAB
ANION GAP SERPL CALCULATED.3IONS-SCNC: 5 MMOL/L (ref 3–14)
BUN SERPL-MCNC: 20 MG/DL (ref 7–30)
CALCIUM SERPL-MCNC: 9.3 MG/DL (ref 8.5–10.1)
CHLORIDE SERPL-SCNC: 109 MMOL/L (ref 94–109)
CO2 SERPL-SCNC: 29 MMOL/L (ref 20–32)
CREAT SERPL-MCNC: 1.36 MG/DL (ref 0.52–1.04)
GFR SERPL CREATININE-BSD FRML MDRD: 38 ML/MIN/1.7M2
GLUCOSE SERPL-MCNC: 89 MG/DL (ref 70–99)
POTASSIUM SERPL-SCNC: 3.9 MMOL/L (ref 3.4–5.3)
SODIUM SERPL-SCNC: 143 MMOL/L (ref 133–144)

## 2018-11-30 NOTE — TELEPHONE ENCOUNTER
Please let this patient know that her kidney function is somewhat improved.  But I still want her to see the nephrologist like we discussed.     Zaida Saldana D.O.

## 2018-12-07 ENCOUNTER — PATIENT OUTREACH (OUTPATIENT)
Dept: CARE COORDINATION | Facility: CLINIC | Age: 68
End: 2018-12-07

## 2018-12-07 NOTE — PROGRESS NOTES
Clinic Care Coordination Contact--Social Work Chart Review     Situation: Patient chart reviewed by care coordinator.    Background: PCP request for SW to assist Patient with transportation to future appointment, recommended to be seen in 2 weeks.  Patient is to have labs at that visit.  Patient's spouse usually transports but will not be in town.  TIMI introduced self and clinic role to Patient.  Patient reports she works on her spouse's schedule and he transports to appointments.  She reports he is a  and he will not be home until the end of the month.  Patient states this is their only income, she reported also the recent concerns (and likely expenses) in their home due to a roof leak and now, mold.  [employer] is not appropriate as Patient lives in [place] and it is unlikely they will travel that far.  SW discussed transportation, however this may be extremely expensive from [place].  SW discussed other options for medical care closer to Patient's home, Patient states there is a hospital in [place] and one other clinic, but they do not accept her insurance.  Patient stated she cannot attend in 2 weeks due to the expense, Patient is willing to be seen when her  is able to bring her, but must discuss his schedule with him to coordinate the appointment.  Patient does not qualify for home (lab) services as is not home bound.  TIMI discussed with PCP.    Assessment: Per chart review, Patient is not interested in being seen at other clinic, she prefers to stay with PCP.  Patient was seen 11/29/2018 for HTN follow up.  Per that office visit note, Patient was seen 2 weeks ago and at that time her hydrochlorothiazide was stopped at that time due to her sodium level of 147. Metoprolol 12.5 was started. Patient reports that she has had increased swelling.  Patient was diagnosed with rheumatoid arthritis.  Hydrochlorothiazide restarted.  Referral to nephrology for HTN.  Patient will follow up in 1 month for  BP recheck. Pateint is to wear compression stockings.  Patient is scheduled to se other provider 12/26/2018 for pre-op for stent replacement.      Plan/Recommendations:  SW will call Patient in 2 weeks for update and needs assessment     CHICHO Lee, MSW   [facility]   [phone]  12/7/2018 4:05 PM

## 2018-12-21 ENCOUNTER — OFFICE VISIT (OUTPATIENT)
Dept: FAMILY MEDICINE | Facility: CLINIC | Age: 68
End: 2018-12-21
Payer: COMMERCIAL

## 2018-12-21 ENCOUNTER — PATIENT OUTREACH (OUTPATIENT)
Dept: CARE COORDINATION | Facility: CLINIC | Age: 68
End: 2018-12-21

## 2018-12-21 VITALS
OXYGEN SATURATION: 95 % | HEIGHT: 68 IN | TEMPERATURE: 98.1 F | WEIGHT: 244 LBS | RESPIRATION RATE: 12 BRPM | SYSTOLIC BLOOD PRESSURE: 124 MMHG | HEART RATE: 69 BPM | DIASTOLIC BLOOD PRESSURE: 80 MMHG | BODY MASS INDEX: 36.98 KG/M2

## 2018-12-21 DIAGNOSIS — N18.30 CKD (CHRONIC KIDNEY DISEASE) STAGE 3, GFR 30-59 ML/MIN (H): ICD-10-CM

## 2018-12-21 DIAGNOSIS — E28.39 OVARIAN FAILURE: ICD-10-CM

## 2018-12-21 DIAGNOSIS — Z91.89 AT RISK FOR BONE DENSITY LOSS: ICD-10-CM

## 2018-12-21 DIAGNOSIS — M05.9 RHEUMATOID ARTHRITIS WITH POSITIVE RHEUMATOID FACTOR, INVOLVING UNSPECIFIED SITE (H): Primary | ICD-10-CM

## 2018-12-21 PROCEDURE — 99213 OFFICE O/P EST LOW 20 MIN: CPT | Performed by: FAMILY MEDICINE

## 2018-12-21 ASSESSMENT — MIFFLIN-ST. JEOR: SCORE: 1667.34

## 2018-12-21 NOTE — PATIENT INSTRUCTIONS
Follow up in the future for an annual wellness visit.    I will refill your hydrochlorothiazide when you need it refilled.    Please get a bone density scan done.      Thank you for choosing [facility].  You may be receiving a survey in the mail from Lola Santiago regarding your visit today.  Please take a few minutes to complete and return the survey to let us know how we are doing.      If you have questions or concerns, please contact us via [email or web address] or you can contact your care team at [phone].    Our Clinic hours are:  Monday 6:40 am  to 7:00 pm  Tuesday -Friday 6:40 am to 5:00 pm    The [place] outpatient lab hours are:  Monday - Friday 6:10 am to 4:45 pm  Saturdays 7:00 am to 11:00 am  Appointments are required, call [phone]    If you have clinical questions after hours or would like to schedule an appointment,  call the clinic at [phone].

## 2018-12-21 NOTE — PROGRESS NOTES
"  SUBJECTIVE:   Marissa Guadarrama is a 68 year old female who presents to clinic today for the following health issues:      Chief Complaint   Patient presents with     Establish Care     Pt is here to discuss establishing care. No specific questions or concerns. Her previous PCP left and she is in need of a new primary care provider.         She want to establish care and discussed her medical problems.  Needs no meds at this time.    She does want to have dexa scan done due to potential bone loss.      Problem list and histories reviewed & adjusted, as indicated.  Additional history: as documented        Reviewed and updated as needed this visit by clinical staff       Reviewed and updated as needed this visit by Provider         ROS:  CONSTITUTIONAL:NEGATIVE for fever, chills, change in weight  INTEGUMENTARY/SKIN: NEGATIVE for worrisome rashes, moles or lesions    OBJECTIVE:                                                    /80   Pulse 69   Temp 98.1  F (36.7  C) (Tympanic)   Resp 12   Ht 1.715 m (5' 7.5\")   Wt 110.7 kg (244 lb)   SpO2 95%   BMI 37.65 kg/m     Body mass index is 37.65 kg/m .  GENERAL APPEARANCE: alert, no distress and cooperative  MS: extremities normal- no gross deformities noted  SKIN: no suspicious lesions or rashes  NEURO: Normal strength and tone, mentation intact and speech normal  PSYCH: mentation appears normal and affect normal/bright         ASSESSMENT/PLAN:                                                    1. Rheumatoid arthritis with positive rheumatoid factor, involving unspecified site (H)  Noted she has this diagnosis and under specialist care    2. CKD (chronic kidney disease) stage 3, GFR 30-59 ml/min (H)  Will continue to monitor    3. At risk for bone density loss  Will get scan  - DX Hip/Pelvis/Spine; Future    4. Ovarian failure  noted  - DX Hip/Pelvis/Spine; Future      See Patient Instructions    Eliazar Menendez MD  [facility]  "

## 2018-12-21 NOTE — PROGRESS NOTES
Clinic Care Coordination Contact--Social Work Follow Up Call/Assessment  UTC/Voicemail    Clinical Data: Care Coordinator Outreach.  PCP request for SW to assist Patient with transportation to future appointment, we discussed potential transfer to clinic closer to her home.      Outreach attempted x 1.  Left message on voicemail with call back information and requested return call.    Plan: Care Coordinator will call Patient in 2 weeks for update and needs assessment, SW will be out of the office until first week of January     CHICHO Lee, MALISSA   [facility]   [phone]  12/21/2018 11:08 AM    Clinic Care Coordination Contact--Social Work Follow Up Call/Assessment  UTC/Voicemail    Clinical Data: Care Coordinator Outreach.  PCP request for SW to assist Patient with transportation to future appointment, we discussed potential transfer to clinic closer to her home.  Per chart review, Patient attempts to schedule appointments when spouse in town.  He works as a .      Outreach attempted x 2.  Left message on voicemail with call back information and requested return call.    Plan: No further contact will be made to reach Patient, will close to Care Coordination and update PCP    CHICHO Lee, MALISSA   [facility]   [phone]  1/28/2019 3:15 PM

## 2018-12-22 DIAGNOSIS — I10 ESSENTIAL HYPERTENSION WITH GOAL BLOOD PRESSURE LESS THAN 140/90: ICD-10-CM

## 2018-12-24 RX ORDER — HYDROCHLOROTHIAZIDE 25 MG/1
TABLET ORAL
Qty: 15 TABLET | Refills: 0 | Status: SHIPPED | OUTPATIENT
Start: 2018-12-24 | End: 2019-01-15

## 2018-12-24 NOTE — TELEPHONE ENCOUNTER
Reviewed 11/29 OV. BP on 12/21 was 124/80, 69.  Scheduled 12/26 with Dr. Julio Cesar singh. Creatinine is baseline. Patient is not yet scheduled with nephrology.   Marisol Lal RN

## 2018-12-24 NOTE — TELEPHONE ENCOUNTER
"Requested Prescriptions   Pending Prescriptions Disp Refills     hydrochlorothiazide (HYDRODIURIL) 25 MG tablet [Pharmacy Med Name: HydroCHLOROthiazide Oral Tablet 25 MG]  Last Written Prescription Date:  11/29/2018  Last Fill Quantity: 15 tablet,  # refills: 0   Last office visit: 11/29/2018 with prescribing provider:  11/29/2018   Future Office Visit:   Next 5 appointments (look out 90 days)    Dec 26, 2018 12:20 PM CST  Pre-Op physical with Jeannette Durán DO  [facility] [address] [place] [id number] [phone]          15 tablet 0     Sig: TAKE ONE-HALF TABLET BY MOUTH ONCE DAILY IN THE MORNING    Diuretics (Including Combos) Protocol Failed - 12/22/2018  9:20 AM       Failed - Normal serum creatinine on file in past 12 months    Recent Labs   Lab Test 11/29/18  0931   CR 1.36*             Passed - Blood pressure under 140/90 in past 12 months    BP Readings from Last 3 Encounters:   12/21/18 124/80   11/29/18 164/84   11/28/18 178/72          Passed - Recent (12 mo) or future (30 days) visit within the authorizing provider's specialty    Patient had office visit in the last 12 months or has a visit in the next 30 days with authorizing provider or within the authorizing provider's specialty.  See \"Patient Info\" tab in inbasket, or \"Choose Columns\" in Meds & Orders section of the refill encounter.           Passed - Patient is age 18 or older       Passed - No active pregancy on record       Passed - Normal serum potassium on file in past 12 months    Recent Labs   Lab Test 11/29/18  0931   POTASSIUM 3.9           Passed - Normal serum sodium on file in past 12 months    Recent Labs   Lab Test 11/29/18  0931                Passed - No positive pregnancy test in past 12 months          "

## 2018-12-26 ENCOUNTER — TELEPHONE (OUTPATIENT)
Dept: UROLOGY | Facility: CLINIC | Age: 68
End: 2018-12-26

## 2018-12-26 ENCOUNTER — OFFICE VISIT (OUTPATIENT)
Dept: FAMILY MEDICINE | Facility: CLINIC | Age: 68
End: 2018-12-26
Payer: COMMERCIAL

## 2018-12-26 VITALS
OXYGEN SATURATION: 93 % | TEMPERATURE: 97.6 F | HEIGHT: 68 IN | DIASTOLIC BLOOD PRESSURE: 82 MMHG | HEART RATE: 72 BPM | BODY MASS INDEX: 37.89 KG/M2 | WEIGHT: 250 LBS | SYSTOLIC BLOOD PRESSURE: 130 MMHG

## 2018-12-26 DIAGNOSIS — I10 ESSENTIAL HYPERTENSION WITH GOAL BLOOD PRESSURE LESS THAN 140/90: ICD-10-CM

## 2018-12-26 DIAGNOSIS — R82.90 ABNORMAL FINDING IN URINE: Primary | ICD-10-CM

## 2018-12-26 DIAGNOSIS — R82.81 PYURIA: ICD-10-CM

## 2018-12-26 DIAGNOSIS — D3A.00 CARCINOID TUMOR (H): ICD-10-CM

## 2018-12-26 DIAGNOSIS — Z01.818 PREOP GENERAL PHYSICAL EXAM: ICD-10-CM

## 2018-12-26 DIAGNOSIS — I10 HYPERTENSION GOAL BP (BLOOD PRESSURE) < 140/90: ICD-10-CM

## 2018-12-26 DIAGNOSIS — N13.5 URETERAL OBSTRUCTION: ICD-10-CM

## 2018-12-26 LAB
ALBUMIN UR-MCNC: ABNORMAL MG/DL
APPEARANCE UR: CLEAR
BACTERIA #/AREA URNS HPF: ABNORMAL /HPF
BILIRUB UR QL STRIP: NEGATIVE
COLOR UR AUTO: YELLOW
GLUCOSE UR STRIP-MCNC: NEGATIVE MG/DL
HGB UR QL STRIP: ABNORMAL
KETONES UR STRIP-MCNC: NEGATIVE MG/DL
LEUKOCYTE ESTERASE UR QL STRIP: ABNORMAL
NITRATE UR QL: NEGATIVE
NON-SQ EPI CELLS #/AREA URNS LPF: ABNORMAL /LPF
PH UR STRIP: 6 PH (ref 5–7)
RBC #/AREA URNS AUTO: ABNORMAL /HPF
SOURCE: ABNORMAL
SP GR UR STRIP: 1.01 (ref 1–1.03)
UROBILINOGEN UR STRIP-ACNC: 0.2 EU/DL (ref 0.2–1)
WBC #/AREA URNS AUTO: ABNORMAL /HPF

## 2018-12-26 PROCEDURE — 87086 URINE CULTURE/COLONY COUNT: CPT | Performed by: FAMILY MEDICINE

## 2018-12-26 PROCEDURE — 93000 ELECTROCARDIOGRAM COMPLETE: CPT | Performed by: FAMILY MEDICINE

## 2018-12-26 PROCEDURE — 99215 OFFICE O/P EST HI 40 MIN: CPT | Performed by: FAMILY MEDICINE

## 2018-12-26 PROCEDURE — 82043 UR ALBUMIN QUANTITATIVE: CPT | Performed by: FAMILY MEDICINE

## 2018-12-26 PROCEDURE — 81001 URINALYSIS AUTO W/SCOPE: CPT | Performed by: FAMILY MEDICINE

## 2018-12-26 RX ORDER — HYDROCHLOROTHIAZIDE 25 MG/1
TABLET ORAL
Qty: 15 TABLET | Refills: 0 | Status: CANCELLED | OUTPATIENT
Start: 2018-12-26

## 2018-12-26 RX ORDER — CIPROFLOXACIN 250 MG/1
250 TABLET, FILM COATED ORAL 2 TIMES DAILY
Qty: 14 TABLET | Refills: 0 | Status: SHIPPED | OUTPATIENT
Start: 2018-12-26 | End: 2019-01-30

## 2018-12-26 ASSESSMENT — MIFFLIN-ST. JEOR: SCORE: 1694.55

## 2018-12-26 NOTE — TELEPHONE ENCOUNTER
Pt advised to begin the Abx and to just be aware of sx to report if begins to have them.  Advised that due to the need to be 3 weeks without infection.  (therefore any chance of continue surgery as planned, is dependent upon her starting Abx right away and if it is correct Abx on sensitivities.   Pt will take Abx and await further instructions.  Pauline Farr RN  [facility]

## 2018-12-26 NOTE — TELEPHONE ENCOUNTER
"Reason for Call:  Other call back    Detailed comments: Surgery for stent replacement scheduled 1-3-19.  Pre-op found out she has a Bacterial infection in urine.  RX given but pt not sure it is one that she should be on or not - Ciprofloxacin  mg.  Side effects - \"I'm not real wild about taking this\" Wondering if there is something \"less damaging\"  Doesn't like the side effects listed.    [facility]: [facility]    Phone Number Patient can be reached at: Home number on file [phone] (home)    Best Time: any    Can we leave a detailed message on this number? YES    Call taken on 12/26/2018 at 4:12 PM by Shahrzad Quintana      "

## 2018-12-26 NOTE — PATIENT INSTRUCTIONS
Contact your surgeon regarding UTI. I will notify you of final culture results when received.   Continue with current medications as directed.   Start antibiotics as directed.     Before Your Surgery      Call your surgeon if there is any change in your health. This includes signs of a cold or flu (such as a sore throat, runny nose, cough, rash or fever).    Do not smoke, drink alcohol or take over the counter medicine (unless your surgeon or primary care doctor tells you to) for the 24 hours before and after surgery.    If you take prescribed drugs: Follow your doctor s orders about which medicines to take and which to stop until after surgery.    Eating and drinking prior to surgery: follow the instructions from your surgeon    Take a shower or bath the night before surgery. Use the soap your surgeon gave you to gently clean your skin. If you do not have soap from your surgeon, use your regular soap. Do not shave or scrub the surgery site.  Wear clean pajamas and have clean sheets on your bed.

## 2018-12-26 NOTE — PROGRESS NOTES
[address] [address]  [phone]  Dept: [phone]    PRE-OP EVALUATION:  Today's date: 2018    Marissa Guadarrama (: 1950) presents for pre-operative evaluation assessment as requested by Dr. La.  She requires evaluation and anesthesia risk assessment prior to undergoing surgery/procedure for treatment of stent ureter .    Fax number for surgical facility: [facility]  Primary Physician: Zaida Saldana  Type of Anesthesia Anticipated: General    Patient has a Health Care Directive or Living Will:  YES     Preop Questions 2018   Who is doing your surgery? Travis   What are you having done? Stent replacement   Date of Surgery/Procedure: 2019   Facility or Hospital where procedure/surgery will be performed: [place]   1.  Do you have a history of Heart attack, stroke, stent, coronary bypass surgery, or other heart surgery? No   2.  Do you ever have any pain or discomfort in your chest? No   3.  Do you have a history of  Heart Failure? No   4.   Are you troubled by shortness of breath when:  walking on a level surface, or up a slight hill, or at night? No   5.  Do you currently have a cold, bronchitis or other respiratory infection? No   6.  Do you have a cough, shortness of breath, or wheezing? No   7.  Do you sometimes get pains in the calves of your legs when you walk? No   8. Do you or anyone in your family have previous history of blood clots? No   9.  Do you or does anyone in your family have a serious bleeding problem such as prolonged bleeding following surgeries or cuts? No   10. Have you ever had problems with anemia or been told to take iron pills? No   11. Have you had any abnormal blood loss such as black, tarry or bloody stools, or abnormal vaginal bleeding? No   12. Have you ever had a blood transfusion? No   13. Have you or any of your relatives ever had problems with anesthesia? No   14. Do you have  sleep apnea, excessive snoring or daytime drowsiness? No   15. Do you have any prosthetic heart valves? No   16. Do you have prosthetic joints? No   17. Is there any chance that you may be pregnant? 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       She denies urinary symptoms or UTI symptoms.   Denies chest pain or dizziness. Denies cold like symptoms or recent illness. Denies abdominal pain, nausea, or emesis.       See problem list for active medical problems.  Problems all longstanding and stable, except as noted/documented.  See ROS for pertinent symptoms related to these conditions.                                                                                                                                                          .    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  Marissa Guadarrama has no plan or code status on file. Discussed available  "resources and provided with information. Confirmed code status reflects current choices pending further ACP discussions.  Confirmed/documented designated decision maker(s). See permanent comments section of demographics in clinical tab. Added by Tiff Askew on 2/6/2015  Discussed advance care planning with patient; however, patient declined at this time. 9/19/2012              OA (osteoarthritis) of knee 10/05/2011     Priority: Medium     Hypertension goal BP (blood pressure) < 140/90 11/01/2010     Priority: Medium     HYPERLIPIDEMIA LDL GOAL <130 10/31/2010     Priority: Medium     Post-polio syndrome      Priority: Medium     Left knee, diagnosed by ortho in 1976, had left leg/toe surgeries as child  (Problem list name updated by automated process. Provider to review and confirm.)       Cervical cancer (H)      Priority: Medium     5/2007.  Dr. Alonso, [facility] gyn/onc,  Now needs routine paps, patient not always compliant  3/26/09 pap NIL  9/24/09 pap ASCUS  11/1/13 pap ASC-H. Plan-- colposcopy   12/23/13 colposcopy scheduled. Reminder placed.  colpscopy 12/23/2013-Vaginal cuff (submitted as \"cervix\"), biopsy:  - High grade squamous intraepithelial lesion (vaginal  intraepithelial neoplasia grade III, VaIN III, severe dysplasia and  carcinoma in situ).  - No invasive malignancy identified.  - Cervical transformation zone not identified in biopsies.  - Please see comment.  Referral back to gynecology/oncology  2/5/14 gyn/onc appt   3/13/14 colposcopy and CO2 laser vagina, path:VAIN 1/2.  3/19/14 follow up in 4 months  7/30/14 vaginal biopsy: VAIN 1. Plan: repeat colp in 6 months.(9/17/14)   9/17/14 colp. No staining seen. No biopsy taken. Pap- ASCUS + HR HPV. Plan- repeat pap at next visit.  2/9/15 colposcopy. bx- LSIL/koilocytosis. Pap- NIL/+ HR HPV 16.  Plan: 3/16/15 treatment planning.   3/16/15 repeat colposcopy in 4 months (due 7/16/15)  7/6/15 scheduled. Tracking.             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  Patient has not been complaint with follow up.        Past Medical History:   Diagnosis Date     Arthritis     knee     Benign breast biopsy     benign     Carcinoid tumor 12/2003     Cervical cancer (H)      H/O colposcopy with cervical biopsy 12/23/13    vaginal cuff biopsy- VAIN III. referred back to gyn/onc     High cholesterol      HTN      Pap smear of vagina with ASC-H 11/1/13     Post-polio syndromes      Trigeminal neuralgias      Past Surgical History:   Procedure Laterality Date     APPENDECTOMY  1983     BIOPSY NODE SENTINEL Bilateral 6/1/2016    Procedure: BIOPSY NODE SENTINEL;  Surgeon: Brent Arana MD;  Location: [facility]     C BSO, OMENTECTOMY W/OSMAN  5/2007     C TOTAL ABDOM HYSTERECTOMY  5/2007     CL AFF SURGICAL PATHOLOGY       COLONOSCOPY N/A 6/23/2017    Procedure: COMBINED COLONOSCOPY, SINGLE OR MULTIPLE BIOPSY/POLYPECTOMY BY BIOPSY;  Colonoscopy Dx:Carcinoid tumor of colon prep mailed golytely;  Surgeon: Talisha Greco MD;  [facility]     COLPOSCOPY, BIOPSY, COMBINED  3/13/2014    Procedure: COMBINED COLPOSCOPY, BIOPSY;;  Surgeon: Lara Pack MD;  [facility]     COMBINED CYSTOSCOPY, RETROGRADES, URETEROSCOPY, INSERT STENT Left 2/2/2017    Procedure: COMBINED CYSTOSCOPY, RETROGRADES, URETEROSCOPY, INSERT STENT;  Surgeon: Zhao La MD;  Location: [facility]     CYSTOSCOPY, RETROGRADES, INSERT STENT URETER(S), COMBINED Left 9/7/2017    Procedure: COMBINED CYSTOSCOPY, RETROGRADES, INSERT STENT URETER(S);  Cystoscopy,Left Stent Exchange;  Surgeon: Zhao La MD;  Location: [facility]     CYSTOSCOPY, RETROGRADES, INSERT STENT URETER(S), COMBINED  12/12/2017    Procedure: COMBINED CYSTOSCOPY, RETROGRADES, INSERT STENT URETER(S);;  Surgeon: Zhao La MD;  [facility]     CYSTOSCOPY, RETROGRADES,  INSERT STENT URETER(S), COMBINED Left 7/5/2018    Procedure: COMBINED CYSTOSCOPY, RETROGRADES, INSERT STENT URETER(S);  Cystoscopy, Left Stent Exchange;  Surgeon: Zhao La MD;  Location: [facility]     EXAM UNDER ANESTHESIA PELVIC  3/13/2014    Procedure: EXAM UNDER ANESTHESIA PELVIC;  Exam Under Anestheisa, Colposcopy, Vaginal Biopsies, Co2 Laser of the Upper Vagina;  Surgeon: Lara Pack MD;  [facility]     HERNIORRHAPHY INCISIONAL (LOCATION)       LASER CO2 VAGINA  3/13/2014    VAIN 1/2     LASER CO2 VAGINA N/A 12/12/2017    Procedure: LASER CO2 VAGINA;  Exam Under Anesthesia, CO2 Laser Ablation Of Vagina, Cystoscopy, Left Retrograde Pyelogram with Left Stent Placement;  Surgeon: Nic Segundo MD;  [facility]     LUMPECTOMY BREAST WITH SEED LOCALIZATION Bilateral 6/1/2016    Procedure: LUMPECTOMY BREAST WITH SEED LOCALIZATION;  Surgeon: Brent Arana MD;  Location: [facility]     SURGICAL HISTORY OF -       ovarian cystectomy     SURGICAL HISTORY OF -   2003    right colon resection secondary to carcinoid tumor     TUBAL LIGATION       Current Outpatient Medications   Medication Sig Dispense Refill     albuterol (PROAIR HFA, PROVENTIL HFA, VENTOLIN HFA) 108 (90 BASE) MCG/ACT inhaler Inhale 2 puffs into the lungs every 6 hours as needed for shortness of breath / dyspnea or wheezing 1 Inhaler 1     ciprofloxacin (CIPRO) 250 MG tablet Take 1 tablet (250 mg) by mouth 2 times daily for 7 days 14 tablet 0     exemestane (AROMASIN) 25 MG tablet Take 1 tablet (25 mg) by mouth every morning 90 tablet 3     gabapentin (NEURONTIN) 600 MG tablet Take 1 tablet (600 mg) by mouth 3 times daily 180 tablet 6     hydrochlorothiazide (HYDRODIURIL) 25 MG tablet TAKE ONE-HALF TABLET BY MOUTH ONCE DAILY IN THE MORNING 15 tablet 0     HYDROcodone-acetaminophen (NORCO) 5-325 MG per tablet Take 1-2 tablets by mouth every 6 hours as needed for other (Moderate to Severe Pain) 5 tablet 0     OTC  "products: None, except as noted above    Allergies   Allergen Reactions     Nkda [No Known Drug Allergies]       Latex Allergy: NO    Social History     Tobacco Use     Smoking status: Former Smoker     Packs/day: 1.00     Years: 29.00     Pack years: 29.00     Types: Cigarettes     Last attempt to quit: 10/30/2006     Years since quittin.1     Smokeless tobacco: Never Used   Substance Use Topics     Alcohol use: No     Alcohol/week: 0.0 oz     History   Drug Use No       REVIEW OF SYSTEMS:   10 point ROS of systems including Constitutional, Eyes, Respiratory, Cardiovascular, Gastroenterology, Genitourinary, Integumentary, Muscularskeletal, Psychiatric were all negative except for pertinent positives noted in my HPI.    Hypertension   Reports that blood pressure are always elevated when using a blood pressure cuff. She reports that she monitors blood pressure at home regularly and highest it has been was 129. She takes hydrochlorothiazide 25mg.       This document serves as a record of the services and decisions personally performed and made by Jeannette Durán D.O. It was created on her behalf by Carlyle Daily, a trained medical scribe. The creation of this document is based on the provider's statements to the medical scribe.  Carlyle Daily 2018 12:37 PM     EXAM:   /82 (BP Location: Right arm, Patient Position: Sitting)   Pulse 72   Temp 97.6  F (36.4  C) (Oral)   Ht 1.715 m (5' 7.5\")   Wt 113.4 kg (250 lb)   SpO2 93%   BMI 38.58 kg/m      GENERAL APPEARANCE: alert, active, no distress and obese     EYES: EOMI, PERRL     HENT: ear canals and TM's normal and nose and mouth without ulcers or lesions     NECK: no adenopathy, no asymmetry, masses, or scars and thyroid normal to palpation     RESP: lungs clear to auscultation - no rales, rhonchi or wheezes     CV: regular rates and rhythm, normal S1 S2, no S3 or S4 and no murmur, click or rub     ABDOMEN:  soft, nontender, no HSM or masses " and bowel sounds normal     MS: extremities normal- no gross deformities noted, no evidence of inflammation in joints, FROM in all extremities.     SKIN: no suspicious lesions or rashes     NEURO: Normal strength and tone, sensory exam grossly normal, mentation intact and speech normal     PSYCH: mentation appears normal. and affect normal/bright     LYMPHATICS: No cervical adenopathy    DIAGNOSTICS:     EKG: appears normal, NSR, Normal Sinus Rhythm, normal axis, normal intervals, no acute ST/T changes c/w ischemia, no LVH by voltage criteria, nonspecific ST-T changes, unchanged from previous tracings  Labs Resulted Today:   Results for orders placed or performed in visit on 12/26/18   *UA reflex to Microscopic   Result Value Ref Range    Color Urine Yellow     Appearance Urine Clear     Glucose Urine Negative NEG^Negative mg/dL    Bilirubin Urine Negative NEG^Negative    Ketones Urine Negative NEG^Negative mg/dL    Specific Gravity Urine 1.015 1.003 - 1.035    Blood Urine Moderate (A) NEG^Negative    pH Urine 6.0 5.0 - 7.0 pH    Protein Albumin Urine Trace (A) NEG^Negative mg/dL    Urobilinogen Urine 0.2 0.2 - 1.0 EU/dL    Nitrite Urine Negative NEG^Negative    Leukocyte Esterase Urine Moderate (A) NEG^Negative    Source Midstream Urine    Urine Microscopic   Result Value Ref Range    WBC Urine 10-25 (A) OTO5^0 - 5 /HPF    RBC Urine 2-5 (A) OTO2^O - 2 /HPF    Squamous Epithelial /LPF Urine Few FEW^Few /LPF    Bacteria Urine Moderate (A) NEG^Negative /HPF       Recent Labs   Lab Test 11/29/18  0931 11/12/18  1126 05/14/18  0856   HGB  --  14.4 14.2   PLT  --  193 207    147* 144   POTASSIUM 3.9 3.5 3.3*   CR 1.36* 1.45* 1.23*        IMPRESSION:   Reason for surgery/procedure: Ureteral obstruction    Diagnosis/reason for consult: procedure clearance     The proposed surgical procedure is considered LOW risk.    REVISED CARDIAC RISK INDEX  The patient has the following serious cardiovascular risks for  perioperative complications such as (MI, PE, VFib and 3  AV Block):  No serious cardiac risks  INTERPRETATION: 0 risks: Class I (very low risk - 0.4% complication rate)    The patient has the following additional risks for perioperative complications:  The 10-year ASCVD risk score (Carlo CAMP Jr., et al., 2013) is: 9.7%    Values used to calculate the score:      Age: 68 years      Sex: Female      Is Non- : No      Diabetic: No      Tobacco smoker: No      Systolic Blood Pressure: 130 mmHg      Is BP treated: No      HDL Cholesterol: 53 mg/dL      Total Cholesterol: 191 mg/dL      ICD-10-CM    1. Abnormal finding in urine R82.90 Urine Culture Aerobic Bacterial   2. Preop general physical exam Z01.818 EKG 12-lead complete w/read - Clinics     *UA reflex to Microscopic     Urine Microscopic   3. Essential hypertension with goal blood pressure less than 140/90 I10 Albumin Random Urine Quantitative with Creat Ratio   4. Hypertension goal BP (blood pressure) < 140/90 I10    5. Carcinoid tumor D3A.00    6. Ureteral obstruction N13.5    7. Pyuria N39.0 ciprofloxacin (CIPRO) 250 MG tablet     CANCELED: Urine Culture Aerobic Bacterial     Proceed with surgery as planned. 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    Surgeon required urine labs prior to procedure.     Hypertension-CKD stable- at goal with hydrochlorothiazide 25mg, she will continue taking as directed.   She will start antibiotics for UTI. Awaiting culture results.         RECOMMENDATIONS:       Cardiovascular Risk  Performs 4 METs exercise without symptoms (Light housework (dusting, washing dishes) and Climb a flight of stairs) .       --Patient is to take all scheduled medications on the day of surgery EXCEPT for modifications listed below.    APPROVAL GIVEN to proceed with proposed procedure, labs show that patient has a UTI.  She is given cipro today.  She is to notify her surgeon. Awaiting final urine  culture results.      The information in this document, created by the medical scribe for me, accurately reflects the services I personally performed and the decisions made by me. I have reviewed and approved this document for accuracy prior to leaving the patient care area.  December 26, 2018 1:21 PM   Signed Electronically by: Jeannette Durán DO    Copy of this evaluation report is provided to requesting physician.    [facility] Preop Guidelines    Revised Cardiac Risk Index

## 2018-12-27 LAB
BACTERIA SPEC CULT: NORMAL
CREAT UR-MCNC: 82 MG/DL
MICROALBUMIN UR-MCNC: 52 MG/L
MICROALBUMIN/CREAT UR: 64.29 MG/G CR (ref 0–25)
SPECIMEN SOURCE: NORMAL

## 2018-12-27 NOTE — TELEPHONE ENCOUNTER
UC came back    Culture Micro 12/26/2018  1:11    >100,000 colonies/mL   mixed urogenital escobar   Susceptibility testing not routinely done      Dr. Lazo nurse notified and pt to continue medication.  Edwina HEADLEY RN BSN PHN  [facility]

## 2018-12-27 NOTE — RESULT ENCOUNTER NOTE
Your urine culture did not show any single bacteria.  Please contact your surgery team and take antibiotics for now.  Take care,  Jeannette Durán D.O.

## 2018-12-28 ENCOUNTER — TELEPHONE (OUTPATIENT)
Dept: UROLOGY | Facility: CLINIC | Age: 68
End: 2018-12-28

## 2018-12-28 NOTE — TELEPHONE ENCOUNTER
Called and left detailed voicemail for provider.    RN previously discussed with Pt when the rx was prescribed that ALL Abx have similar precautions and she had agreed at that time to take the Cipro in order to not Delay Her Surgery.    Dr La, please advise because the UC returned as Mixed Sanjuana.    Pauline Farr RN  [facility] [place]

## 2018-12-28 NOTE — TELEPHONE ENCOUNTER
Reason for Call:  Other prescription    Detailed comments: pt calling stating she has UTI and is to be taking Cipro, she will not take this medication due to the side effects that she read. Would like to know if there is something else she can take? She is scheduled for surgery on 1/3    Phone Number Patient can be reached at: Home number on file [phone] (home)    Best Time: any     Can we leave a detailed message on this number? YES    Call taken on 12/28/2018 at 3:38 PM by Yaima Agarwal

## 2018-12-31 ENCOUNTER — TELEPHONE (OUTPATIENT)
Dept: UROLOGY | Facility: CLINIC | Age: 68
End: 2018-12-31

## 2018-12-31 DIAGNOSIS — N39.0 URINARY TRACT INFECTION: Primary | ICD-10-CM

## 2018-12-31 PROBLEM — M05.9 RHEUMATOID ARTHRITIS WITH POSITIVE RHEUMATOID FACTOR, INVOLVING UNSPECIFIED SITE (H): Status: ACTIVE | Noted: 2018-12-31

## 2018-12-31 NOTE — TELEPHONE ENCOUNTER
Talisha from same day surgery states pt surgery for stent is canceled , please call pt to reschedule at [phone]

## 2019-01-02 NOTE — TELEPHONE ENCOUNTER
Pt's surgery canceled per POLLY Donahue; stated pt called SDS.  Will need to call pt to reschedule surgery.    Kaelyn MCBRIDE, CMA

## 2019-01-15 ENCOUNTER — TELEPHONE (OUTPATIENT)
Dept: FAMILY MEDICINE | Facility: CLINIC | Age: 69
End: 2019-01-15

## 2019-01-15 DIAGNOSIS — I10 ESSENTIAL HYPERTENSION WITH GOAL BLOOD PRESSURE LESS THAN 140/90: ICD-10-CM

## 2019-01-15 NOTE — TELEPHONE ENCOUNTER
"Requested Prescriptions   Pending Prescriptions Disp Refills     hydrochlorothiazide (HYDRODIURIL) 25 MG tablet [Pharmacy Med Name: HydroCHLOROthiazide Oral Tablet 25 MG]  Last Written Prescription Date:  12/24/2018  Last Fill Quantity: 15 tablet,  # refills: 0   Last office visit: 11/29/2018 with prescribing provider:  NICOLE Saldana   Future Office Visit:     15 tablet 0     Sig: TAKE ONE-HALF TABLET BY MOUTH ONCE DAILY IN THE MORNING    Diuretics (Including Combos) Protocol Failed - 1/15/2019 11:19 AM       Failed - Normal serum creatinine on file in past 12 months    Recent Labs   Lab Test 11/29/18  0931   CR 1.36*             Passed - Blood pressure under 140/90 in past 12 months    BP Readings from Last 3 Encounters:   12/26/18 130/82   12/21/18 124/80   11/29/18 164/84          Passed - Recent (12 mo) or future (30 days) visit within the authorizing provider's specialty    Patient had office visit in the last 12 months or has a visit in the next 30 days with authorizing provider or within the authorizing provider's specialty.  See \"Patient Info\" tab in inbasket, or \"Choose Columns\" in Meds & Orders section of the refill encounter.           Passed - Medication is active on med list       Passed - Patient is age 18 or older       Passed - No active pregancy on record       Passed - Normal serum potassium on file in past 12 months    Recent Labs   Lab Test 11/29/18  0931   POTASSIUM 3.9           Passed - Normal serum sodium on file in past 12 months    Recent Labs   Lab Test 11/29/18  0931                Passed - No positive pregnancy test in past 12 months          "

## 2019-01-16 ENCOUNTER — TELEPHONE (OUTPATIENT)
Dept: UROLOGY | Facility: CLINIC | Age: 69
End: 2019-01-16

## 2019-01-16 NOTE — TELEPHONE ENCOUNTER
Routing refill request to provider for review/approval because:  Labs out of range:  Creatinine    Rafael Villanueva RN

## 2019-01-16 NOTE — TELEPHONE ENCOUNTER
Pt is getting Preop Px (expected sometime the week of 1/28)    Please ensure we have Ua UC result (and if needs Abx Treat) by Feb 1st.    Pt procedure scheduled for 2/7/19.    Pauline Farr RN  [facility] - [place]

## 2019-01-17 ENCOUNTER — HOSPITAL ENCOUNTER (OUTPATIENT)
Dept: BONE DENSITY | Facility: CLINIC | Age: 69
Discharge: HOME | End: 2019-01-17
Attending: FAMILY MEDICINE | Admitting: FAMILY MEDICINE
Payer: MEDICARE

## 2019-01-17 DIAGNOSIS — Z91.89 AT RISK FOR BONE DENSITY LOSS: ICD-10-CM

## 2019-01-17 DIAGNOSIS — E28.39 OVARIAN FAILURE: ICD-10-CM

## 2019-01-17 PROCEDURE — 77080 DXA BONE DENSITY AXIAL: CPT

## 2019-01-17 RX ORDER — HYDROCHLOROTHIAZIDE 25 MG/1
TABLET ORAL
Qty: 45 TABLET | Refills: 1 | Status: SHIPPED | OUTPATIENT
Start: 2019-01-17 | End: 2019-08-10

## 2019-01-17 RX ORDER — HYDROCHLOROTHIAZIDE 25 MG/1
TABLET ORAL
Qty: 15 TABLET | Refills: 0 | Status: SHIPPED | OUTPATIENT
Start: 2019-01-17 | End: 2019-01-17

## 2019-01-17 NOTE — TELEPHONE ENCOUNTER
I sent in a 90 days supply.  Is she going to see me as her pcp or shawn?      Zaida Saldana D.O.

## 2019-01-18 NOTE — TELEPHONE ENCOUNTER
We can call and ask, but before doing that, does she need to follow up in 90 days? I want to be able to give her some sort of direction after letting her know that we refilled 90 days.     Stefano Means RN

## 2019-01-18 NOTE — TELEPHONE ENCOUNTER
Patient/family was instructed to return call to [facility] RN directly on the RN Call back line at [phone].     Stefano Means RN

## 2019-01-25 NOTE — TELEPHONE ENCOUNTER
Noted Preop is 1/30/19 @  11:00.  Called pt and discussed if wait until 30th for Urine and it does need a culture- that can take 48-72 hours and might miss the treatment window.  Pt will be here with Spouse Monday or Tuesday  And states she will leave the Urine on either of those two days.    Please watch urine result to see if needs Abx by Feb 1st.  Pauline Farr RN  [place] [facility]

## 2019-01-28 DIAGNOSIS — N39.0 URINARY TRACT INFECTION: ICD-10-CM

## 2019-01-28 LAB
ALBUMIN UR-MCNC: NEGATIVE MG/DL
APPEARANCE UR: ABNORMAL
BILIRUB UR QL STRIP: NEGATIVE
COLOR UR AUTO: YELLOW
GLUCOSE UR STRIP-MCNC: NEGATIVE MG/DL
HGB UR QL STRIP: ABNORMAL
KETONES UR STRIP-MCNC: NEGATIVE MG/DL
LEUKOCYTE ESTERASE UR QL STRIP: ABNORMAL
NITRATE UR QL: NEGATIVE
NON-SQ EPI CELLS #/AREA URNS LPF: ABNORMAL /LPF
PH UR STRIP: 6 PH (ref 5–7)
RBC #/AREA URNS AUTO: ABNORMAL /HPF
SOURCE: ABNORMAL
SP GR UR STRIP: 1.02 (ref 1–1.03)
UROBILINOGEN UR STRIP-ACNC: 0.2 EU/DL (ref 0.2–1)
WBC #/AREA URNS AUTO: ABNORMAL /HPF

## 2019-01-28 PROCEDURE — 87086 URINE CULTURE/COLONY COUNT: CPT | Performed by: UROLOGY

## 2019-01-28 PROCEDURE — 81001 URINALYSIS AUTO W/SCOPE: CPT | Performed by: UROLOGY

## 2019-01-29 LAB
BACTERIA SPEC CULT: NORMAL
Lab: NORMAL
SPECIMEN SOURCE: NORMAL

## 2019-01-30 ENCOUNTER — OFFICE VISIT (OUTPATIENT)
Dept: FAMILY MEDICINE | Facility: CLINIC | Age: 69
End: 2019-01-30
Payer: COMMERCIAL

## 2019-01-30 VITALS
TEMPERATURE: 99.4 F | BODY MASS INDEX: 37.44 KG/M2 | RESPIRATION RATE: 14 BRPM | SYSTOLIC BLOOD PRESSURE: 128 MMHG | DIASTOLIC BLOOD PRESSURE: 80 MMHG | OXYGEN SATURATION: 95 % | HEIGHT: 68 IN | WEIGHT: 247 LBS | HEART RATE: 66 BPM

## 2019-01-30 DIAGNOSIS — Z01.818 PREOP GENERAL PHYSICAL EXAM: Primary | ICD-10-CM

## 2019-01-30 DIAGNOSIS — E66.01 MORBID OBESITY (H): ICD-10-CM

## 2019-01-30 DIAGNOSIS — N13.5 URETERAL OBSTRUCTION: ICD-10-CM

## 2019-01-30 DIAGNOSIS — D3A.00 CARCINOID TUMOR (H): ICD-10-CM

## 2019-01-30 DIAGNOSIS — I10 HYPERTENSION GOAL BP (BLOOD PRESSURE) < 140/90: ICD-10-CM

## 2019-01-30 PROBLEM — C18.2 MALIGNANT NEOPLASM OF ASCENDING COLON (H): Status: ACTIVE | Noted: 2019-01-30

## 2019-01-30 LAB
ANION GAP SERPL CALCULATED.3IONS-SCNC: 3 MMOL/L (ref 3–14)
BUN SERPL-MCNC: 26 MG/DL (ref 7–30)
CALCIUM SERPL-MCNC: 10.5 MG/DL (ref 8.5–10.1)
CHLORIDE SERPL-SCNC: 100 MMOL/L (ref 94–109)
CO2 SERPL-SCNC: 34 MMOL/L (ref 20–32)
CREAT SERPL-MCNC: 1.24 MG/DL (ref 0.52–1.04)
ERYTHROCYTE [DISTWIDTH] IN BLOOD BY AUTOMATED COUNT: 13.1 % (ref 10–15)
GFR SERPL CREATININE-BSD FRML MDRD: 44 ML/MIN/{1.73_M2}
GLUCOSE SERPL-MCNC: 101 MG/DL (ref 70–99)
HCT VFR BLD AUTO: 44.5 % (ref 35–47)
HGB BLD-MCNC: 14.7 G/DL (ref 11.7–15.7)
MCH RBC QN AUTO: 30.4 PG (ref 26.5–33)
MCHC RBC AUTO-ENTMCNC: 33 G/DL (ref 31.5–36.5)
MCV RBC AUTO: 92 FL (ref 78–100)
PLATELET # BLD AUTO: 174 10E9/L (ref 150–450)
POTASSIUM SERPL-SCNC: 3.7 MMOL/L (ref 3.4–5.3)
RBC # BLD AUTO: 4.83 10E12/L (ref 3.8–5.2)
SODIUM SERPL-SCNC: 137 MMOL/L (ref 133–144)
WBC # BLD AUTO: 8.1 10E9/L (ref 4–11)

## 2019-01-30 PROCEDURE — 80048 BASIC METABOLIC PNL TOTAL CA: CPT | Performed by: NURSE PRACTITIONER

## 2019-01-30 PROCEDURE — 36415 COLL VENOUS BLD VENIPUNCTURE: CPT | Performed by: NURSE PRACTITIONER

## 2019-01-30 PROCEDURE — 85027 COMPLETE CBC AUTOMATED: CPT | Performed by: NURSE PRACTITIONER

## 2019-01-30 PROCEDURE — 99214 OFFICE O/P EST MOD 30 MIN: CPT | Performed by: NURSE PRACTITIONER

## 2019-01-30 ASSESSMENT — MIFFLIN-ST. JEOR: SCORE: 1680.94

## 2019-01-30 NOTE — TELEPHONE ENCOUNTER
Pt UC shows Mixed and no sensitivities done.    Please ensure Dr La is advised and see if wishes to treat despite non specific UC.  Due to Pt has upcoming procedure. 2/7/19.    Pauline Farr RN  [facility] [place]

## 2019-01-30 NOTE — LETTER
January 31, 2019      Marissa Guadarrama  [address] [address]        Dear ,    We are writing to inform you of your test results.  Pre-op labs - at baseline.  Resulted Orders   CBC with platelets   Result Value Ref Range    WBC 8.1 4.0 - 11.0 10e9/L    RBC Count 4.83 3.8 - 5.2 10e12/L    Hemoglobin 14.7 11.7 - 15.7 g/dL    Hematocrit 44.5 35.0 - 47.0 %    MCV 92 78 - 100 fl    MCH 30.4 26.5 - 33.0 pg    MCHC 33.0 31.5 - 36.5 g/dL    RDW 13.1 10.0 - 15.0 %    Platelet Count 174 150 - 450 10e9/L   Basic metabolic panel   Result Value Ref Range    Sodium 137 133 - 144 mmol/L    Potassium 3.7 3.4 - 5.3 mmol/L    Chloride 100 94 - 109 mmol/L    Carbon Dioxide 34 (H) 20 - 32 mmol/L    Anion Gap 3 3 - 14 mmol/L    Glucose 101 (H) 70 - 99 mg/dL    Urea Nitrogen 26 7 - 30 mg/dL    Creatinine 1.24 (H) 0.52 - 1.04 mg/dL    GFR Estimate 44 (L) >60 mL/min/[1.73_m2]      Comment:      Non  GFR Calc  Starting 12/18/2018, serum creatinine based estimated GFR (eGFR) will be   calculated using the Chronic Kidney Disease Epidemiology Collaboration   (CKD-EPI) equation.      GFR Estimate If Black 51 (L) >60 mL/min/[1.73_m2]      Comment:       GFR Calc  Starting 12/18/2018, serum creatinine based estimated GFR (eGFR) will be   calculated using the Chronic Kidney Disease Epidemiology Collaboration   (CKD-EPI) equation.      Calcium 10.5 (H) 8.5 - 10.1 mg/dL       If you have any questions or concerns, please call the clinic at the number listed above.       Sincerely,        KAREN Choi CNP

## 2019-01-30 NOTE — H&P (VIEW-ONLY)
[facility]  [address] [address]  [phone]  Dept: [phone]    PRE-OP EVALUATION:  Today's date: 2019    Marissa Guadarrama (: 1950) presents for pre-operative evaluation assessment as requested by Dr. La.  She requires evaluation and anesthesia risk assessment prior to undergoing surgery/procedure for treatment of Ureteral obstruction - exchange stent.    Proposed Surgery/ Procedure: COMBINED CYSTOSCOPY, RETROGRADES, EXCHANGE STENT URETER(S)  Date of Surgery/ Procedure: 2019  Time of Surgery/ Procedure: 9:30am  Hospital/Surgical Facility: [facility]    Primary Physician: Zaida Saldana  Type of Anesthesia Anticipated: General    Patient has a Health Care Directive or Living Will:  NO    1. NO - Do you have a history of heart attack, stroke, stent, bypass or surgery on an artery in the head, neck, heart or legs?  2. NO - Do you ever have any pain or discomfort in your chest?  3. NO - Do you have a history of  Heart Failure?  4. NO - Are you troubled by shortness of breath when: walking on the level, up a slight hill or at night?  5. NO - Do you currently have a cold, bronchitis or other respiratory infection?  6. NO - Do you have a cough, shortness of breath or wheezing?  7. YES - DO YOU SOMETIMES GET PAINS IN THE CALVES OF YOUR LEGS WHEN YOU WALK? Related to h/o polio  8. NO - Do you or anyone in your family have previous history of blood clots?  9. NO - Do you or does anyone in your family have a serious bleeding problem such as prolonged bleeding following surgeries or cuts?  10. NO - Have you ever had problems with anemia or been told to take iron pills?  11. NO - Have you had any abnormal blood loss such as black, tarry or bloody stools, or abnormal vaginal bleeding?  12. NO - Have you ever had a blood transfusion?  13. NO - Have you or any of your relatives ever had problems with anesthesia?  14. NO - Do you have sleep apnea, excessive snoring or  daytime drowsiness?  15. NO - Do you have any prosthetic heart valves?  16. NO - Do you have prosthetic joints?  17. NO - Is there any chance that you may be pregnant?      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 Specifically denies chest pain, palpitations, dyspnea, orthopnea, PND or peripheral edema. Blood pressure readings have been in normal range. Current medication regimen is as listed below. Patient denies any side effects of medication.                                                                                                                                                                                          .    MEDICAL HISTORY:     Patient Active Problem List    Diagnosis Date Noted     Malignant neoplasm of ascending colon (H) 01/30/2019     Priority: Medium     2003       Rheumatoid arthritis with positive rheumatoid factor, involving unspecified site (H) 12/31/2018     Priority: Medium     Ureteral obstruction 11/20/2018     Priority: Medium     Added automatically from request for surgery [id number]       Obesity (BMI 35.0-39.9) with comorbidity (H) 09/28/2018     Priority: Medium     Peritoneal metastases (H) 10/30/2017     Priority: Medium     Serum calcium elevated 04/07/2017     Priority: Medium     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  Marissa Guadarrama has no plan or code status on file. Discussed available resources and provided with information. Confirmed code status reflects current choices pending further ACP discussions.  Confirmed/documented designated decision maker(s). See permanent comments section of demographics in clinical tab. Added by Tiff Askew on 2/6/2015  Discussed advance care planning with patient; however, patient declined at this time. 9/19/2012              OA (osteoarthritis) of knee 10/05/2011     Priority: Medium     Hypertension goal BP (blood pressure) < 140/90 11/01/2010     Priority: Medium     HYPERLIPIDEMIA LDL GOAL <130 10/31/2010     Priority: Medium     Post-polio syndrome      Priority: Medium     Left knee, diagnosed by ortho in 1976, had left leg/toe surgeries as child  (Problem list name updated by automated process. Provider to review and confirm.)       Cervical cancer (H)      Priority: Medium     5/2007.  Dr. Alonso, [facility] gyn/onc,  Now needs routine paps, patient not always compliant  3/26/09 pap NIL  9/24/09 pap ASCUS  11/1/13 pap ASC-H. Plan-- colposcopy   12/23/13 colposcopy scheduled. Reminder placed.  colpscopy 12/23/2013-Vaginal cuff (submitted as \"cervix\"), biopsy:  - High grade squamous intraepithelial lesion (vaginal  intraepithelial neoplasia grade III, VaIN III, severe dysplasia and  carcinoma in situ).  - No invasive malignancy identified.  - Cervical transformation zone not identified in biopsies.  - Please see comment.  Referral back to gynecology/oncology  2/5/14 gyn/onc appt   3/13/14 colposcopy and CO2 laser vagina, path:VAIN 1/2.  3/19/14 follow up in 4 months  7/30/14 vaginal biopsy: VAIN 1. Plan: repeat colp in 6 months.(9/17/14)   9/17/14 colp. No staining seen. No biopsy taken. Pap- ASCUS + HR HPV. Plan- repeat pap at next visit.  2/9/15 colposcopy. bx- LSIL/koilocytosis. Pap- NIL/+ HR HPV 16.  Plan: 3/16/15 treatment planning. "   3/16/15 repeat colposcopy in 4 months (due 7/16/15)  7/6/15 scheduled. Tracking.             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  Patient has not been complaint with follow up.        Past Medical History:   Diagnosis Date     Arthritis     knee     Benign breast biopsy     benign     Carcinoid tumor 12/2003     Cervical cancer (H)      H/O colposcopy with cervical biopsy 12/23/13    vaginal cuff biopsy- VAIN III. referred back to gyn/onc     High cholesterol      HTN      Pap smear of vagina with ASC-H 11/1/13     Post-polio syndromes      Trigeminal neuralgias      Past Surgical History:   Procedure Laterality Date     APPENDECTOMY  1983     BIOPSY NODE SENTINEL Bilateral 6/1/2016    Procedure: BIOPSY NODE SENTINEL;  Surgeon: Brent Arana MD;  Location: [facility]     C BSO, OMENTECTOMY W/OSMAN  5/2007     C TOTAL ABDOM HYSTERECTOMY  5/2007     CL AFF SURGICAL PATHOLOGY       COLONOSCOPY N/A 6/23/2017    Procedure: COMBINED COLONOSCOPY, SINGLE OR MULTIPLE BIOPSY/POLYPECTOMY BY BIOPSY;  Colonoscopy Dx:Carcinoid tumor of colon prep mailed golytely;  Surgeon: Talisha Greco MD;  Location: [facility]     COLPOSCOPY, BIOPSY, COMBINED  3/13/2014    Procedure: COMBINED COLPOSCOPY, BIOPSY;;  Surgeon: Lara Pack MD;  Location: [facility]     COMBINED CYSTOSCOPY, RETROGRADES, URETEROSCOPY, INSERT STENT Left 2/2/2017    Procedure: COMBINED CYSTOSCOPY, RETROGRADES, URETEROSCOPY, INSERT STENT;  Surgeon: Zhao La MD;  Location: [facility]     CYSTOSCOPY, RETROGRADES, INSERT STENT URETER(S), COMBINED Left 9/7/2017    Procedure: COMBINED CYSTOSCOPY, RETROGRADES, INSERT STENT URETER(S);  Cystoscopy,Left Stent Exchange;  Surgeon: Zhao La MD;  Location: [facility]     CYSTOSCOPY, RETROGRADES, INSERT STENT URETER(S), COMBINED  12/12/2017    Procedure: COMBINED CYSTOSCOPY,  RETROGRADES, INSERT STENT URETER(S);;  Surgeon: Zhao La MD;  [facility]     CYSTOSCOPY, RETROGRADES, INSERT STENT URETER(S), COMBINED Left 7/5/2018    Procedure: COMBINED CYSTOSCOPY, RETROGRADES, INSERT STENT URETER(S);  Cystoscopy, Left Stent Exchange;  Surgeon: Zhao La MD;  Location: [facility]     EXAM UNDER ANESTHESIA PELVIC  3/13/2014    Procedure: EXAM UNDER ANESTHESIA PELVIC;  Exam Under Anestheisa, Colposcopy, Vaginal Biopsies, Co2 Laser of the Upper Vagina;  Surgeon: Lara Pack MD;  [facility]     HERNIORRHAPHY INCISIONAL (LOCATION)       LASER CO2 VAGINA  3/13/2014    VAIN 1/2     LASER CO2 VAGINA N/A 12/12/2017    Procedure: LASER CO2 VAGINA;  Exam Under Anesthesia, CO2 Laser Ablation Of Vagina, Cystoscopy, Left Retrograde Pyelogram with Left Stent Placement;  Surgeon: Nic Segundo MD;  [facility]     LUMPECTOMY BREAST WITH SEED LOCALIZATION Bilateral 6/1/2016    Procedure: LUMPECTOMY BREAST WITH SEED LOCALIZATION;  Surgeon: Brent Arana MD;  Location: [facility]     SURGICAL HISTORY OF -       ovarian cystectomy     SURGICAL HISTORY OF -   2003    right colon resection secondary to carcinoid tumor     TUBAL LIGATION       Current Outpatient Medications   Medication Sig Dispense Refill     albuterol (PROAIR HFA, PROVENTIL HFA, VENTOLIN HFA) 108 (90 BASE) MCG/ACT inhaler Inhale 2 puffs into the lungs every 6 hours as needed for shortness of breath / dyspnea or wheezing 1 Inhaler 1     exemestane (AROMASIN) 25 MG tablet Take 1 tablet (25 mg) by mouth every morning 90 tablet 3     gabapentin (NEURONTIN) 600 MG tablet Take 1 tablet (600 mg) by mouth 3 times daily 180 tablet 6     hydrochlorothiazide (HYDRODIURIL) 25 MG tablet TAKE ONE-HALF TABLET BY MOUTH ONCE DAILY IN THE MORNING 45 tablet 1     HYDROcodone-acetaminophen (NORCO) 5-325 MG per tablet Take 1-2 tablets by mouth every 6 hours as needed for other (Moderate to Severe Pain) 5  "tablet 0     OTC products: None, except as noted above    Allergies   Allergen Reactions     Nkda [No Known Drug Allergies]       Latex Allergy: NO    Social History     Tobacco Use     Smoking status: Former Smoker     Packs/day: 1.00     Years: 29.00     Pack years: 29.00     Types: Cigarettes     Last attempt to quit: 10/30/2006     Years since quittin.2     Smokeless tobacco: Never Used   Substance Use Topics     Alcohol use: No     Alcohol/week: 0.0 oz     History   Drug Use No       REVIEW OF SYSTEMS:   Constitutional, neuro, ENT, endocrine, pulmonary, cardiac, gastrointestinal, genitourinary, musculoskeletal, integument and psychiatric systems are negative, except as otherwise noted.    EXAM:   /80   Pulse 66   Temp 99.4  F (37.4  C) (Tympanic)   Resp 14   Ht 1.715 m (5' 7.5\")   Wt 112 kg (247 lb)   SpO2 95%   Breastfeeding? No   BMI 38.11 kg/m      GENERAL APPEARANCE: healthy, alert and no distress     EYES: EOMI, PERRL     HENT: ear canals and TM's normal and nose and mouth without ulcers or lesions     NECK: no adenopathy, no asymmetry, masses, or scars and thyroid normal to palpation     RESP: lungs clear to auscultation - no rales, rhonchi or wheezes     CV: regular rates and rhythm, normal S1 S2, no S3 or S4 and no murmur, click or rub     ABDOMEN:  soft, nontender, no HSM or masses and bowel sounds normal     SKIN: no suspicious lesions or rashes     PSYCH: mentation appears normal. and affect normal/bright     LYMPHATICS: No cervical adenopathy    DIAGNOSTICS:     Labs Resulted Today:   Results for orders placed or performed in visit on 19   CBC with platelets   Result Value Ref Range    WBC 8.1 4.0 - 11.0 10e9/L    RBC Count 4.83 3.8 - 5.2 10e12/L    Hemoglobin 14.7 11.7 - 15.7 g/dL    Hematocrit 44.5 35.0 - 47.0 %    MCV 92 78 - 100 fl    MCH 30.4 26.5 - 33.0 pg    MCHC 33.0 31.5 - 36.5 g/dL    RDW 13.1 10.0 - 15.0 %    Platelet Count 174 150 - 450 10e9/L   Basic metabolic " panel   Result Value Ref Range    Sodium 137 133 - 144 mmol/L    Potassium 3.7 3.4 - 5.3 mmol/L    Chloride 100 94 - 109 mmol/L    Carbon Dioxide 34 (H) 20 - 32 mmol/L    Anion Gap 3 3 - 14 mmol/L    Glucose 101 (H) 70 - 99 mg/dL    Urea Nitrogen 26 7 - 30 mg/dL    Creatinine 1.24 (H) 0.52 - 1.04 mg/dL    GFR Estimate 44 (L) >60 mL/min/[1.73_m2]    GFR Estimate If Black 51 (L) >60 mL/min/[1.73_m2]    Calcium 10.5 (H) 8.5 - 10.1 mg/dL       Recent Labs   Lab Test 11/29/18  0931 11/12/18  1126 05/14/18  0856   HGB  --  14.4 14.2   PLT  --  193 207    147* 144   POTASSIUM 3.9 3.5 3.3*   CR 1.36* 1.45* 1.23*     Recent EKG 12/2018     IMPRESSION:   Reason for surgery/procedure: 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    REVISED CARDIAC RISK INDEX  The patient has the following serious cardiovascular risks for perioperative complications such as (MI, PE, VFib and 3  AV Block):  No serious cardiac risks  INTERPRETATION: 0 risks: Class I (very low risk - 0.4% complication rate)    The patient has the following additional risks for perioperative complications:  Morbid obesity  HTN      ICD-10-CM    1. Preop general physical exam Z01.818 CBC with platelets     Basic metabolic panel   2. Ureteral obstruction N13.5 CBC with platelets     Basic metabolic panel   3. Carcinoid tumor D3A.00 CBC with platelets     Basic metabolic panel   4. Hypertension goal BP (blood pressure) < 140/90 I10 CBC with platelets     Basic metabolic panel   5. Morbid obesity (H) E66.01        RECOMMENDATIONS:       Cardiovascular Risk  Performs 4 METs exercise without symptoms (Light housework (dusting, washing dishes), Climb a flight of stairs, Walk on level ground at 15 minutes per mile (4 miles/hour) and Bicycling at 8.5 minutes per mile (7 miles/hour)) .       --Patient is to take all scheduled medications on the day  of surgery       APPROVAL GIVEN to proceed with proposed procedure, without further diagnostic evaluation       Signed Electronically by: KAREN Choi CNP    Copy of this evaluation report is provided to requesting physician.    [facility] Preop Guidelines    Revised Cardiac Risk Index

## 2019-01-30 NOTE — PATIENT INSTRUCTIONS
Before Your Surgery      Call your surgeon if there is any change in your health. This includes signs of a cold or flu (such as a sore throat, runny nose, cough, rash or fever).    Do not smoke, drink alcohol or take over the counter medicine (unless your surgeon or primary care doctor tells you to) for the 24 hours before and after surgery.    If you take prescribed drugs: Follow your doctor s orders about which medicines to take and which to stop until after surgery.    Eating and drinking prior to surgery: follow the instructions from your surgeon    Take a shower or bath the night before surgery. Use the soap your surgeon gave you to gently clean your skin. If you do not have soap from your surgeon, use your regular soap. Do not shave or scrub the surgery site.  Wear clean pajamas and have clean sheets on your bed.    PROCEDURE NOTE: Eylea 2mg (2 of 2) #13 OS. Diagnosis: Neovascular AMD with Active CNV. Prior to injection, risks/benefits/alternatives discussed including corneal abrasion, infection, loss of vision, hemorrhage, cataract, glaucoma, retinal tears or detachment. A written consent is on file, and the need for today’s injection was discussed and the patient is understanding and wishes to proceed. The entire vial of Eylea was drawn up into a syringe. The opened vial, remaining drug, and filtered needle were disposed of in a certified biohazard container. Betadine prep was performed. Topical anesthesia was induced with Alcaine. 4% lidocaine pledge. A lid speculum was used. A short 30g needle on a 1cc syringe was used with product that that had previously been prepared under sterile conditions. Injection site: 3-4 mm from the limbus. The used syringe/needle was transferred to a biohazard container. Lid speculum removed. Mask worn during procedure. Patient tolerated procedure well. Count fingers vision was verified. There were no complications. Patient was given the standard instruction sheet. Patient given office phone number/answering service number and advised to call immediately should there be loss of vision or pain, or should they have any other questions or concerns. Stephanie Witt

## 2019-01-30 NOTE — PROGRESS NOTES
[facility]  [address] [facility] [address]  [phone]  Dept: [phone]    PRE-OP EVALUATION:  Today's date: 2019    Marissa Guadarrama (: 1950) presents for pre-operative evaluation assessment as requested by Dr. La.  She requires evaluation and anesthesia risk assessment prior to undergoing surgery/procedure for treatment of Ureteral obstruction - exchange stent.    Proposed Surgery/ Procedure: COMBINED CYSTOSCOPY, RETROGRADES, EXCHANGE STENT URETER(S)  Date of Surgery/ Procedure: 2019  Time of Surgery/ Procedure: 9:30am  Hospital/Surgical Facility: [facility]    Primary Physician: Zaida Saldana  Type of Anesthesia Anticipated: General    Patient has a Health Care Directive or Living Will:  NO    1. NO - Do you have a history of heart attack, stroke, stent, bypass or surgery on an artery in the head, neck, heart or legs?  2. NO - Do you ever have any pain or discomfort in your chest?  3. NO - Do you have a history of  Heart Failure?  4. NO - Are you troubled by shortness of breath when: walking on the level, up a slight hill or at night?  5. NO - Do you currently have a cold, bronchitis or other respiratory infection?  6. NO - Do you have a cough, shortness of breath or wheezing?  7. YES - DO YOU SOMETIMES GET PAINS IN THE CALVES OF YOUR LEGS WHEN YOU WALK? Related to h/o polio  8. NO - Do you or anyone in your family have previous history of blood clots?  9. NO - Do you or does anyone in your family have a serious bleeding problem such as prolonged bleeding following surgeries or cuts?  10. NO - Have you ever had problems with anemia or been told to take iron pills?  11. NO - Have you had any abnormal blood loss such as black, tarry or bloody stools, or abnormal vaginal bleeding?  12. NO - Have you ever had a blood transfusion?  13. NO - Have you or any of your relatives ever had problems with anesthesia?  14. NO - Do you have sleep apnea, excessive snoring or  daytime drowsiness?  15. NO - Do you have any prosthetic heart valves?  16. NO - Do you have prosthetic joints?  17. NO - Is there any chance that you may be pregnant?      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 Specifically denies chest pain, palpitations, dyspnea, orthopnea, PND or peripheral edema. Blood pressure readings have been in normal range. Current medication regimen is as listed below. Patient denies any side effects of medication.                                                                                                                                                                                          .    MEDICAL HISTORY:     Patient Active Problem List    Diagnosis Date Noted     Malignant neoplasm of ascending colon (H) 01/30/2019     Priority: Medium     2003       Rheumatoid arthritis with positive rheumatoid factor, involving unspecified site (H) 12/31/2018     Priority: Medium     Ureteral obstruction 11/20/2018     Priority: Medium     Added automatically from request for surgery [id number]       Obesity (BMI 35.0-39.9) with comorbidity (H) 09/28/2018     Priority: Medium     Peritoneal metastases (H) 10/30/2017     Priority: Medium     Serum calcium elevated 04/07/2017     Priority: Medium     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  Marissa Guadarrama has no plan or code status on file. Discussed available resources and provided with information. Confirmed code status reflects current choices pending further ACP discussions.  Confirmed/documented designated decision maker(s). See permanent comments section of demographics in clinical tab. Added by Tiff Askew on 2/6/2015  Discussed advance care planning with patient; however, patient declined at this time. 9/19/2012              OA (osteoarthritis) of knee 10/05/2011     Priority: Medium     Hypertension goal BP (blood pressure) < 140/90 11/01/2010     Priority: Medium     HYPERLIPIDEMIA LDL GOAL <130 10/31/2010     Priority: Medium     Post-polio syndrome      Priority: Medium     Left knee, diagnosed by ortho in 1976, had left leg/toe surgeries as child  (Problem list name updated by automated process. Provider to review and confirm.)       Cervical cancer (H)      Priority: Medium     5/2007.  Dr. Alonso, [facility] gyn/onc,  Now needs routine paps, patient not always compliant  3/26/09 pap NIL  9/24/09 pap ASCUS  11/1/13 pap ASC-H. Plan-- colposcopy   12/23/13 colposcopy scheduled. Reminder placed.  colpscopy 12/23/2013-Vaginal cuff (submitted as \"cervix\"), biopsy:  - High grade squamous intraepithelial lesion (vaginal  intraepithelial neoplasia grade III, VaIN III, severe dysplasia and  carcinoma in situ).  - No invasive malignancy identified.  - Cervical transformation zone not identified in biopsies.  - Please see comment.  Referral back to gynecology/oncology  2/5/14 gyn/onc appt   3/13/14 colposcopy and CO2 laser vagina, path:VAIN 1/2.  3/19/14 follow up in 4 months  7/30/14 vaginal biopsy: VAIN 1. Plan: repeat colp in 6 months.(9/17/14)   9/17/14 colp. No staining seen. No biopsy taken. Pap- ASCUS + HR HPV. Plan- repeat pap at next visit.  2/9/15 colposcopy. bx- LSIL/koilocytosis. Pap- NIL/+ HR HPV 16.  Plan: 3/16/15 treatment planning. "   3/16/15 repeat colposcopy in 4 months (due 7/16/15)  7/6/15 scheduled. Tracking.             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  Patient has not been complaint with follow up.        Past Medical History:   Diagnosis Date     Arthritis     knee     Benign breast biopsy     benign     Carcinoid tumor 12/2003     Cervical cancer (H)      H/O colposcopy with cervical biopsy 12/23/13    vaginal cuff biopsy- VAIN III. referred back to gyn/onc     High cholesterol      HTN      Pap smear of vagina with ASC-H 11/1/13     Post-polio syndromes      Trigeminal neuralgias      Past Surgical History:   Procedure Laterality Date     APPENDECTOMY  1983     BIOPSY NODE SENTINEL Bilateral 6/1/2016    Procedure: BIOPSY NODE SENTINEL;  Surgeon: Brent Arana MD;  Location: [facility]     C BSO, OMENTECTOMY W/OSMAN  5/2007     C TOTAL ABDOM HYSTERECTOMY  5/2007     CL AFF SURGICAL PATHOLOGY       COLONOSCOPY N/A 6/23/2017    Procedure: COMBINED COLONOSCOPY, SINGLE OR MULTIPLE BIOPSY/POLYPECTOMY BY BIOPSY;  Colonoscopy Dx:Carcinoid tumor of colon prep mailed golytely;  Surgeon: Talisha Greco MD;  [facility]     COLPOSCOPY, BIOPSY, COMBINED  3/13/2014    Procedure: COMBINED COLPOSCOPY, BIOPSY;;  Surgeon: Lara Pack MD;  [facility]     COMBINED CYSTOSCOPY, RETROGRADES, URETEROSCOPY, INSERT STENT Left 2/2/2017    Procedure: COMBINED CYSTOSCOPY, RETROGRADES, URETEROSCOPY, INSERT STENT;  Surgeon: Zhao La MD;  Location: [facility]     CYSTOSCOPY, RETROGRADES, INSERT STENT URETER(S), COMBINED Left 9/7/2017    Procedure: COMBINED CYSTOSCOPY, RETROGRADES, INSERT STENT URETER(S);  Cystoscopy,Left Stent Exchange;  Surgeon: Zhao La MD;  Location: [facility]     CYSTOSCOPY, RETROGRADES, INSERT STENT URETER(S), COMBINED  12/12/2017    Procedure: COMBINED CYSTOSCOPY,  RETROGRADES, INSERT STENT URETER(S);;  Surgeon: Zhao La MD;  [facility]     CYSTOSCOPY, RETROGRADES, INSERT STENT URETER(S), COMBINED Left 7/5/2018    Procedure: COMBINED CYSTOSCOPY, RETROGRADES, INSERT STENT URETER(S);  Cystoscopy, Left Stent Exchange;  Surgeon: Zhao La MD;  Location: [facility]     EXAM UNDER ANESTHESIA PELVIC  3/13/2014    Procedure: EXAM UNDER ANESTHESIA PELVIC;  Exam Under Anestheisa, Colposcopy, Vaginal Biopsies, Co2 Laser of the Upper Vagina;  Surgeon: Lara Pack MD;  [facility]     HERNIORRHAPHY INCISIONAL (LOCATION)       LASER CO2 VAGINA  3/13/2014    VAIN 1/2     LASER CO2 VAGINA N/A 12/12/2017    Procedure: LASER CO2 VAGINA;  Exam Under Anesthesia, CO2 Laser Ablation Of Vagina, Cystoscopy, Left Retrograde Pyelogram with Left Stent Placement;  Surgeon: Nic Segundo MD;  [facility]     LUMPECTOMY BREAST WITH SEED LOCALIZATION Bilateral 6/1/2016    Procedure: LUMPECTOMY BREAST WITH SEED LOCALIZATION;  Surgeon: Brent Arana MD;  Location: [facility]     SURGICAL HISTORY OF -       ovarian cystectomy     SURGICAL HISTORY OF -   2003    right colon resection secondary to carcinoid tumor     TUBAL LIGATION       Current Outpatient Medications   Medication Sig Dispense Refill     albuterol (PROAIR HFA, PROVENTIL HFA, VENTOLIN HFA) 108 (90 BASE) MCG/ACT inhaler Inhale 2 puffs into the lungs every 6 hours as needed for shortness of breath / dyspnea or wheezing 1 Inhaler 1     exemestane (AROMASIN) 25 MG tablet Take 1 tablet (25 mg) by mouth every morning 90 tablet 3     gabapentin (NEURONTIN) 600 MG tablet Take 1 tablet (600 mg) by mouth 3 times daily 180 tablet 6     hydrochlorothiazide (HYDRODIURIL) 25 MG tablet TAKE ONE-HALF TABLET BY MOUTH ONCE DAILY IN THE MORNING 45 tablet 1     HYDROcodone-acetaminophen (NORCO) 5-325 MG per tablet Take 1-2 tablets by mouth every 6 hours as needed for other (Moderate to Severe Pain) 5  "tablet 0     OTC products: None, except as noted above    Allergies   Allergen Reactions     Nkda [No Known Drug Allergies]       Latex Allergy: NO    Social History     Tobacco Use     Smoking status: Former Smoker     Packs/day: 1.00     Years: 29.00     Pack years: 29.00     Types: Cigarettes     Last attempt to quit: 10/30/2006     Years since quittin.2     Smokeless tobacco: Never Used   Substance Use Topics     Alcohol use: No     Alcohol/week: 0.0 oz     History   Drug Use No       REVIEW OF SYSTEMS:   Constitutional, neuro, ENT, endocrine, pulmonary, cardiac, gastrointestinal, genitourinary, musculoskeletal, integument and psychiatric systems are negative, except as otherwise noted.    EXAM:   /80   Pulse 66   Temp 99.4  F (37.4  C) (Tympanic)   Resp 14   Ht 1.715 m (5' 7.5\")   Wt 112 kg (247 lb)   SpO2 95%   Breastfeeding? No   BMI 38.11 kg/m      GENERAL APPEARANCE: healthy, alert and no distress     EYES: EOMI, PERRL     HENT: ear canals and TM's normal and nose and mouth without ulcers or lesions     NECK: no adenopathy, no asymmetry, masses, or scars and thyroid normal to palpation     RESP: lungs clear to auscultation - no rales, rhonchi or wheezes     CV: regular rates and rhythm, normal S1 S2, no S3 or S4 and no murmur, click or rub     ABDOMEN:  soft, nontender, no HSM or masses and bowel sounds normal     SKIN: no suspicious lesions or rashes     PSYCH: mentation appears normal. and affect normal/bright     LYMPHATICS: No cervical adenopathy    DIAGNOSTICS:     Labs Resulted Today:   Results for orders placed or performed in visit on 19   CBC with platelets   Result Value Ref Range    WBC 8.1 4.0 - 11.0 10e9/L    RBC Count 4.83 3.8 - 5.2 10e12/L    Hemoglobin 14.7 11.7 - 15.7 g/dL    Hematocrit 44.5 35.0 - 47.0 %    MCV 92 78 - 100 fl    MCH 30.4 26.5 - 33.0 pg    MCHC 33.0 31.5 - 36.5 g/dL    RDW 13.1 10.0 - 15.0 %    Platelet Count 174 150 - 450 10e9/L   Basic metabolic " panel   Result Value Ref Range    Sodium 137 133 - 144 mmol/L    Potassium 3.7 3.4 - 5.3 mmol/L    Chloride 100 94 - 109 mmol/L    Carbon Dioxide 34 (H) 20 - 32 mmol/L    Anion Gap 3 3 - 14 mmol/L    Glucose 101 (H) 70 - 99 mg/dL    Urea Nitrogen 26 7 - 30 mg/dL    Creatinine 1.24 (H) 0.52 - 1.04 mg/dL    GFR Estimate 44 (L) >60 mL/min/[1.73_m2]    GFR Estimate If Black 51 (L) >60 mL/min/[1.73_m2]    Calcium 10.5 (H) 8.5 - 10.1 mg/dL       Recent Labs   Lab Test 11/29/18  0931 11/12/18  1126 05/14/18  0856   HGB  --  14.4 14.2   PLT  --  193 207    147* 144   POTASSIUM 3.9 3.5 3.3*   CR 1.36* 1.45* 1.23*     Recent EKG 12/2018     IMPRESSION:   Reason for surgery/procedure: 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    REVISED CARDIAC RISK INDEX  The patient has the following serious cardiovascular risks for perioperative complications such as (MI, PE, VFib and 3  AV Block):  No serious cardiac risks  INTERPRETATION: 0 risks: Class I (very low risk - 0.4% complication rate)    The patient has the following additional risks for perioperative complications:  Morbid obesity  HTN      ICD-10-CM    1. Preop general physical exam Z01.818 CBC with platelets     Basic metabolic panel   2. Ureteral obstruction N13.5 CBC with platelets     Basic metabolic panel   3. Carcinoid tumor D3A.00 CBC with platelets     Basic metabolic panel   4. Hypertension goal BP (blood pressure) < 140/90 I10 CBC with platelets     Basic metabolic panel   5. Morbid obesity (H) E66.01        RECOMMENDATIONS:       Cardiovascular Risk  Performs 4 METs exercise without symptoms (Light housework (dusting, washing dishes), Climb a flight of stairs, Walk on level ground at 15 minutes per mile (4 miles/hour) and Bicycling at 8.5 minutes per mile (7 miles/hour)) .       --Patient is to take all scheduled medications on the day  of surgery       APPROVAL GIVEN to proceed with proposed procedure, without further diagnostic evaluation       Signed Electronically by: KAREN Choi CNP    Copy of this evaluation report is provided to requesting physician.    [facility] Preop Guidelines    Revised Cardiac Risk Index

## 2019-01-31 ENCOUNTER — ANESTHESIA EVENT (OUTPATIENT)
Dept: SURGERY | Facility: CLINIC | Age: 69
End: 2019-01-31
Payer: MEDICARE

## 2019-01-31 NOTE — TELEPHONE ENCOUNTER
LUIS ALFREDO La:  No need to treat  The Mixed UC result.    Pt advised.  Pauline Farr RN  [facility]

## 2019-02-05 NOTE — TELEPHONE ENCOUNTER
Called patient.  Left message to return phone call to triage [phone].  Li Sage RN [facility] Triage.

## 2019-02-07 ENCOUNTER — HOSPITAL ENCOUNTER (OUTPATIENT)
Facility: CLINIC | Age: 69
Discharge: HOME | End: 2019-02-07
Attending: UROLOGY | Admitting: UROLOGY
Payer: MEDICARE

## 2019-02-07 ENCOUNTER — APPOINTMENT (OUTPATIENT)
Dept: GENERAL RADIOLOGY | Facility: CLINIC | Age: 69
End: 2019-02-07
Attending: UROLOGY
Payer: MEDICARE

## 2019-02-07 ENCOUNTER — ANESTHESIA (OUTPATIENT)
Dept: SURGERY | Facility: CLINIC | Age: 69
End: 2019-02-07
Payer: MEDICARE

## 2019-02-07 VITALS
DIASTOLIC BLOOD PRESSURE: 70 MMHG | WEIGHT: 247 LBS | OXYGEN SATURATION: 95 % | SYSTOLIC BLOOD PRESSURE: 115 MMHG | BODY MASS INDEX: 37.44 KG/M2 | RESPIRATION RATE: 14 BRPM | TEMPERATURE: 97.8 F | HEIGHT: 68 IN

## 2019-02-07 DIAGNOSIS — N13.5 URETERAL OBSTRUCTION: ICD-10-CM

## 2019-02-07 DIAGNOSIS — K76.9 LIVER LESION: Primary | ICD-10-CM

## 2019-02-07 PROCEDURE — 27210794 ZZH OR GENERAL SUPPLY STERILE: Performed by: UROLOGY

## 2019-02-07 PROCEDURE — 36000060 ZZH SURGERY LEVEL 3 W FLUORO 1ST 30 MIN: Performed by: UROLOGY

## 2019-02-07 PROCEDURE — 37000008 ZZH ANESTHESIA TECHNICAL FEE, 1ST 30 MIN: Performed by: UROLOGY

## 2019-02-07 PROCEDURE — 71000027 ZZH RECOVERY PHASE 2 EACH 15 MINS: Performed by: UROLOGY

## 2019-02-07 PROCEDURE — 71000012 ZZH RECOVERY PHASE 1 LEVEL 1 FIRST HR: Performed by: UROLOGY

## 2019-02-07 PROCEDURE — 25000125 ZZHC RX 250: Performed by: NURSE ANESTHETIST, CERTIFIED REGISTERED

## 2019-02-07 PROCEDURE — 25000128 H RX IP 250 OP 636: Performed by: NURSE ANESTHETIST, CERTIFIED REGISTERED

## 2019-02-07 PROCEDURE — 40000277 XR SURGERY CARM FLUORO LESS THAN 5 MIN W STILLS

## 2019-02-07 PROCEDURE — 52332 CYSTOSCOPY AND TREATMENT: CPT | Mod: LT | Performed by: UROLOGY

## 2019-02-07 PROCEDURE — 37000009 ZZH ANESTHESIA TECHNICAL FEE, EACH ADDTL 15 MIN: Performed by: UROLOGY

## 2019-02-07 PROCEDURE — 25000128 H RX IP 250 OP 636: Performed by: UROLOGY

## 2019-02-07 PROCEDURE — C1769 GUIDE WIRE: HCPCS | Performed by: UROLOGY

## 2019-02-07 PROCEDURE — 40000306 ZZH STATISTIC PRE PROC ASSESS II: Performed by: UROLOGY

## 2019-02-07 PROCEDURE — C1758 CATHETER, URETERAL: HCPCS | Performed by: UROLOGY

## 2019-02-07 PROCEDURE — 74420 UROGRAPHY RTRGR +-KUB: CPT | Mod: 26 | Performed by: UROLOGY

## 2019-02-07 PROCEDURE — C2617 STENT, NON-COR, TEM W/O DEL: HCPCS | Performed by: UROLOGY

## 2019-02-07 PROCEDURE — 36000058 ZZH SURGERY LEVEL 3 EA 15 ADDTL MIN: Performed by: UROLOGY

## 2019-02-07 PROCEDURE — 25500064 ZZH RX 255 OP 636: Performed by: UROLOGY

## 2019-02-07 DEVICE — STENT URETERAL PERCUFLEX PLUS 6FRX24CM M0061752620
Type: IMPLANTABLE DEVICE | Site: URETER | Status: NON-FUNCTIONAL
Removed: 2020-02-20

## 2019-02-07 RX ORDER — ALBUTEROL SULFATE 0.83 MG/ML
2.5 SOLUTION RESPIRATORY (INHALATION) EVERY 4 HOURS PRN
Status: DISCONTINUED | OUTPATIENT
Start: 2019-02-07 | End: 2019-02-07 | Stop reason: HOSPADM

## 2019-02-07 RX ORDER — IOPAMIDOL 612 MG/ML
INJECTION, SOLUTION INTRAVASCULAR PRN
Status: DISCONTINUED | OUTPATIENT
Start: 2019-02-07 | End: 2019-02-07 | Stop reason: HOSPADM

## 2019-02-07 RX ORDER — LIDOCAINE HYDROCHLORIDE 10 MG/ML
INJECTION, SOLUTION INFILTRATION; PERINEURAL PRN
Status: DISCONTINUED | OUTPATIENT
Start: 2019-02-07 | End: 2019-02-07

## 2019-02-07 RX ORDER — MEPERIDINE HYDROCHLORIDE 25 MG/ML
12.5 INJECTION INTRAMUSCULAR; INTRAVENOUS; SUBCUTANEOUS
Status: DISCONTINUED | OUTPATIENT
Start: 2019-02-07 | End: 2019-02-07 | Stop reason: HOSPADM

## 2019-02-07 RX ORDER — LIDOCAINE 40 MG/G
CREAM TOPICAL
Status: DISCONTINUED | OUTPATIENT
Start: 2019-02-07 | End: 2019-02-07 | Stop reason: HOSPADM

## 2019-02-07 RX ORDER — SODIUM CHLORIDE, SODIUM LACTATE, POTASSIUM CHLORIDE, CALCIUM CHLORIDE 600; 310; 30; 20 MG/100ML; MG/100ML; MG/100ML; MG/100ML
INJECTION, SOLUTION INTRAVENOUS CONTINUOUS
Status: DISCONTINUED | OUTPATIENT
Start: 2019-02-07 | End: 2019-02-07 | Stop reason: HOSPADM

## 2019-02-07 RX ORDER — HYDROMORPHONE HYDROCHLORIDE 1 MG/ML
.3-.5 INJECTION, SOLUTION INTRAMUSCULAR; INTRAVENOUS; SUBCUTANEOUS EVERY 10 MIN PRN
Status: DISCONTINUED | OUTPATIENT
Start: 2019-02-07 | End: 2019-02-07 | Stop reason: HOSPADM

## 2019-02-07 RX ORDER — ONDANSETRON 4 MG/1
4 TABLET, ORALLY DISINTEGRATING ORAL EVERY 30 MIN PRN
Status: DISCONTINUED | OUTPATIENT
Start: 2019-02-07 | End: 2019-02-07 | Stop reason: HOSPADM

## 2019-02-07 RX ORDER — PROPOFOL 10 MG/ML
INJECTION, EMULSION INTRAVENOUS PRN
Status: DISCONTINUED | OUTPATIENT
Start: 2019-02-07 | End: 2019-02-07

## 2019-02-07 RX ORDER — FENTANYL CITRATE 50 UG/ML
25-50 INJECTION, SOLUTION INTRAMUSCULAR; INTRAVENOUS
Status: CANCELLED | OUTPATIENT
Start: 2019-02-07

## 2019-02-07 RX ORDER — FENTANYL CITRATE 50 UG/ML
25-50 INJECTION, SOLUTION INTRAMUSCULAR; INTRAVENOUS
Status: DISCONTINUED | OUTPATIENT
Start: 2019-02-07 | End: 2019-02-07 | Stop reason: HOSPADM

## 2019-02-07 RX ORDER — ONDANSETRON 2 MG/ML
4 INJECTION INTRAMUSCULAR; INTRAVENOUS EVERY 30 MIN PRN
Status: DISCONTINUED | OUTPATIENT
Start: 2019-02-07 | End: 2019-02-07 | Stop reason: HOSPADM

## 2019-02-07 RX ORDER — CEFAZOLIN SODIUM 2 G/100ML
2 INJECTION, SOLUTION INTRAVENOUS
Status: COMPLETED | OUTPATIENT
Start: 2019-02-07 | End: 2019-02-07

## 2019-02-07 RX ORDER — NALOXONE HYDROCHLORIDE 0.4 MG/ML
.1-.4 INJECTION, SOLUTION INTRAMUSCULAR; INTRAVENOUS; SUBCUTANEOUS
Status: DISCONTINUED | OUTPATIENT
Start: 2019-02-07 | End: 2019-02-07 | Stop reason: HOSPADM

## 2019-02-07 RX ORDER — FENTANYL CITRATE 50 UG/ML
INJECTION, SOLUTION INTRAMUSCULAR; INTRAVENOUS PRN
Status: DISCONTINUED | OUTPATIENT
Start: 2019-02-07 | End: 2019-02-07

## 2019-02-07 RX ORDER — CEFAZOLIN SODIUM 1 G/50ML
1 INJECTION, SOLUTION INTRAVENOUS SEE ADMIN INSTRUCTIONS
Status: DISCONTINUED | OUTPATIENT
Start: 2019-02-07 | End: 2019-02-07 | Stop reason: HOSPADM

## 2019-02-07 RX ADMIN — FENTANYL CITRATE 50 MCG: 50 INJECTION, SOLUTION INTRAMUSCULAR; INTRAVENOUS at 10:31

## 2019-02-07 RX ADMIN — Medication 1 ML: at 09:22

## 2019-02-07 RX ADMIN — FENTANYL CITRATE 25 MCG: 50 INJECTION, SOLUTION INTRAMUSCULAR; INTRAVENOUS at 10:29

## 2019-02-07 RX ADMIN — CEFAZOLIN SODIUM 2 G: 2 INJECTION, SOLUTION INTRAVENOUS at 09:46

## 2019-02-07 RX ADMIN — LIDOCAINE HYDROCHLORIDE 50 MG: 10 INJECTION, SOLUTION INFILTRATION; PERINEURAL at 09:51

## 2019-02-07 RX ADMIN — FENTANYL CITRATE 50 MCG: 50 INJECTION, SOLUTION INTRAMUSCULAR; INTRAVENOUS at 09:46

## 2019-02-07 RX ADMIN — PROPOFOL 200 MG: 10 INJECTION, EMULSION INTRAVENOUS at 09:51

## 2019-02-07 RX ADMIN — FENTANYL CITRATE 50 MCG: 50 INJECTION, SOLUTION INTRAMUSCULAR; INTRAVENOUS at 10:18

## 2019-02-07 RX ADMIN — SODIUM CHLORIDE, POTASSIUM CHLORIDE, SODIUM LACTATE AND CALCIUM CHLORIDE 1000 ML: 600; 310; 30; 20 INJECTION, SOLUTION INTRAVENOUS at 09:21

## 2019-02-07 RX ADMIN — MIDAZOLAM 2 MG: 1 INJECTION INTRAMUSCULAR; INTRAVENOUS at 09:46

## 2019-02-07 RX ADMIN — FENTANYL CITRATE 25 MCG: 50 INJECTION, SOLUTION INTRAMUSCULAR; INTRAVENOUS at 10:28

## 2019-02-07 RX ADMIN — FENTANYL CITRATE 50 MCG: 50 INJECTION, SOLUTION INTRAMUSCULAR; INTRAVENOUS at 10:32

## 2019-02-07 ASSESSMENT — MIFFLIN-ST. JEOR: SCORE: 1680.94

## 2019-02-07 ASSESSMENT — LIFESTYLE VARIABLES: TOBACCO_USE: 1

## 2019-02-07 NOTE — OP NOTE
Procedure Date: 02/07/2019      PREOPERATIVE DIAGNOSIS:  Obstructed left ureter secondary to carcinoid tumor.      POSTOPERATIVE DIAGNOSIS:  Obstructed left ureter secondary to carcinoid tumor.      PROCEDURE:  Cystoscopy, left retrograde pyelogram, left stent exchange.      INDICATIONS:  Ms. 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  The patient is currently on observation for this.  She has been managing with chronic left stent exchanges.  The patient presents today for stent exchange again.      DESCRIPTION OF PROCEDURE:  After informed consent was obtained, the patient was brought to the operating room where she was administered general anesthesia with an LMA.  After suitable level of anesthesia was attained, she was placed in the lithotomy position with all pressure points padded.  She was administered preoperative antibiotics.  She was prepped and draped in standard sterile fashion.        Next, a 22-Spanish cystoscope was introduced into the patient's bladder.  Inspection of the bladder revealed orthotopic UOs effluxing clear urine.  The stent could be seen emanating from the left UO.  This was secured with a grasper and brought to the urethral meatus.  We then threaded a sensor wire through the stent under fluoroscopic guidance and advanced to the kidney.  We then removed the stent without difficulty.  We then used a dual lumen catheter to shoot a retrograde pyelogram.  From the distal ureter it was difficult to get any opacification of the ureter, again from the distal ureter.  We therefore advanced the dual lumen over the catheter up to the very proximal ureter under fluoroscopic guidance.  We shot a retrograde from this location which showed a collecting system without defect.  There did appear to be, however, some obstruction at the level of the UPJ consistent with likely  edema in this location presumably.  The calices were otherwise reasonably sharp and again without filling defect.  As we pulled the dual lumen catheter back through the ureter, the proximal and mid ureter were of normal to mildly hydronephrotic caliber consistent with chronic stent.  Upon getting down to the distal ureter, however, again we had a hard time opacifying the distal ureter which was in the general location of the carcinoid tumor.  However, of note, the catheter did pass through this area without difficulty and pulled back in an antegrade fashion past this area without difficulty as well.  Next, the scope was backloaded over the remaining wire.  A 6 x 24 double-J stent was then advanced through the scope under fluoroscopic guidance to the kidney.  The proximal curl was confirmed by fluoroscopy, distal curl confirmed by direct vision.  The efflux of contrast was seen emanating from the stent freely.  The patient's bladder was drained.  She was awakened and brought to recovery room in stable condition.      SURGEON:  Steve La MD.      ESTIMATED BLOOD LOSS:  1 mL.      COMPLICATIONS:  There were no immediate complications noted.      DRAINS:  Include left 6 x 24 double-J stent.         STEVE LA MD             D: 2019   T: 2019   MT: ZAY      Name:     ROSALINDA LANCASTER   MRN:      [id number]        Account:        [id number]   :      1950           Procedure Date: 2019      Document: [id number]

## 2019-02-07 NOTE — ANESTHESIA CARE TRANSFER NOTE
Patient: Marissa Guadarrama    Procedure(s):  COMBINED CYSTOSCOPY, RETROGRADES, EXCHANGE STENT URETER--left    Diagnosis: ureteral obstruction  Diagnosis Additional Information: No value filed.    Anesthesia Type:   General, LMA     Note:  Airway :Nasal Cannula  Patient transferred to:PACU  Handoff Report: Identifed the Patient, Identified the Reponsible Provider, Reviewed the pertinent medical history, Discussed the surgical course, Reviewed Intra-OP anesthesia mangement and issues during anesthesia, Set expectations for post-procedure period and Allowed opportunity for questions and acknowledgement of understanding      Vitals: (Last set prior to Anesthesia Care Transfer)    CRNA VITALS  2/7/2019 1009 - 2/7/2019 1039      2/7/2019             Pulse:  74    SpO2:  100 %                Electronically Signed By: Ayaka Kay CRNA, APRN CRNA  February 7, 2019  10:39 AM

## 2019-02-07 NOTE — DISCHARGE INSTRUCTIONS
Notify physician if fever/chills/sweats.  You may see blood in the urine while the stent is in place.  Follow up in April or May in urology clinic for discussion of next steps.  May take Tylenol for pain.                        Same Day Surgery Discharge Instructions  Special Precautions After Surgery - Adult    1. It is not unusual to feel lightheaded or faint, up to 24 hours after surgery or while taking pain medication.  If you have these symptoms; sit for a few minutes before standing and have someone assist you when getting up.  2. You should rest and relax for the next 24 hours and must have someone stay with you for at least 24 hours after your discharge.  3. DO NOT DRIVE any vehicle or operate mechanical equipment for 24 hours following the end of your surgery.  DO NOT DRIVE while taking narcotic pain medications that have been prescribed by your physician.  If you had a limb operated on, you must be able to use it fully to drive.  4. DO NOT drink alcoholic beverages for 24 hours following surgery or while taking prescription pain medication.  5. Drink clear liquids (apple juice, ginger ale, broth, 7-Up, etc.).  Progress to your regular diet as you feel able.  6. Any questions call your physician and do not make important decisions for 24 hours.    ACTIVITY  ? Rest today.  No activity or diet restrictions.  ? Resume activity as tolerated.     INCISIONAL CARE  ? Watch for fever or any concerns     You have a  appointment to return on April 25th at 11:15. Please have your MRI done before-hand , at your convience    Medications:  ? Acetaminophen (Tylenol):  Next dose: as needed.  ? Follow the instructions on the bottle.    Additional discharge instructions: None  __________________________________________________________________________________________________________________________________  IMPORTANT NUMBERS:    [facility] Main Number:  [phone], [phone]  Pharmacy:  [phone]  Same Day Surgery:   [phone], Monday - Friday until 8:30 p.m.  Urgent Care:  [phone]  Emergency Room:  [phone]      Surgery Specialty Clinic:  [phone]

## 2019-02-07 NOTE — BRIEF OP NOTE
[facility] Brief Operative Note    Pre-operative diagnosis: ureteral obstruction   Post-operative diagnosis same   Procedure: Procedure(s):  COMBINED CYSTOSCOPY, RETROGRADES, EXCHANGE STENT URETER--left   Surgeon(s): Surgeon(s) and Role:     * Zhao La MD - Primary   Estimated blood loss: 1 cc    Specimens: None   Findings: Distal left ureteral obstruction, narrowing at UPJ.

## 2019-02-07 NOTE — ANESTHESIA POSTPROCEDURE EVALUATION
Patient: Marissa Guadarrama    Procedure(s):  COMBINED CYSTOSCOPY, RETROGRADES, EXCHANGE STENT URETER--left    Diagnosis:ureteral obstruction  Diagnosis Additional Information: No value filed.    Anesthesia Type:  General, LMA    Note:  Anesthesia Post Evaluation    Patient location during evaluation: Bedside  Patient participation: Able to fully participate in evaluation  Level of consciousness: awake and alert  Pain management: adequate  Airway patency: patent  Cardiovascular status: acceptable  Respiratory status: acceptable  Hydration status: acceptable  PONV: none     Anesthetic complications: None          Last vitals:  Vitals:    02/07/19 0840 02/07/19 1038   BP: 168/73 125/72   Resp: 20 16   Temp: 37.1  C (98.8  F) 36.6  C (97.8  F)   SpO2: 94% 100%         Electronically Signed By: Ayaka Kay CRNA, APRN CRNA  February 7, 2019  10:40 AM

## 2019-02-07 NOTE — ANESTHESIA PREPROCEDURE EVALUATION
Anesthesia Pre-Procedure Evaluation    Patient: Marissa Guadarrama   MRN: [id number] : 1950          Preoperative Diagnosis: ureteral obstruction    Procedure(s):  COMBINED CYSTOSCOPY, RETROGRADES, EXCHANGE STENT URETER(S)    Past Medical History:   Diagnosis Date     Arthritis     knee     Benign breast biopsy     benign     Carcinoid tumor 2003     Cervical cancer (H)      H/O colposcopy with cervical biopsy 13    vaginal cuff biopsy- VAIN III. referred back to gyn/onc     High cholesterol      HTN      Pap smear of vagina with ASC-H 13     Post-polio syndromes      Trigeminal neuralgias      Past Surgical History:   Procedure Laterality Date     APPENDECTOMY       BIOPSY NODE SENTINEL Bilateral 2016    Procedure: BIOPSY NODE SENTINEL;  Surgeon: Brent Arana MD;  [facility]     C BSO, OMENTECTOMY W/OSMAN  2007     C TOTAL ABDOM HYSTERECTOMY  2007     CL AFF SURGICAL PATHOLOGY       COLONOSCOPY N/A 2017    Procedure: COMBINED COLONOSCOPY, SINGLE OR MULTIPLE BIOPSY/POLYPECTOMY BY BIOPSY;  Colonoscopy Dx:Carcinoid tumor of colon prep mailed [facility];  Surgeon: Talisha Greco MD;  [facility]     COLPOSCOPY, BIOPSY, COMBINED  3/13/2014    Procedure: COMBINED COLPOSCOPY, BIOPSY;;  Surgeon: Lara Pack MD;  [facility]     COMBINED CYSTOSCOPY, RETROGRADES, URETEROSCOPY, INSERT STENT Left 2017    Procedure: COMBINED CYSTOSCOPY, RETROGRADES, URETEROSCOPY, INSERT STENT;  Surgeon: Zhao La MD;  [facility]     CYSTOSCOPY, RETROGRADES, INSERT STENT URETER(S), COMBINED Left 2017    Procedure: COMBINED CYSTOSCOPY, RETROGRADES, INSERT STENT URETER(S);  Cystoscopy,Left Stent Exchange;  Surgeon: Zhao La MD;  [facility]     CYSTOSCOPY, RETROGRADES, INSERT STENT URETER(S), COMBINED  2017    Procedure: COMBINED CYSTOSCOPY, RETROGRADES, INSERT STENT URETER(S);;  Surgeon: Zhao La MD;   [facility]     CYSTOSCOPY, RETROGRADES, INSERT STENT URETER(S), COMBINED Left 7/5/2018    Procedure: COMBINED CYSTOSCOPY, RETROGRADES, INSERT STENT URETER(S);  Cystoscopy, Left Stent Exchange;  Surgeon: Zaho La MD;  [facility]     EXAM UNDER ANESTHESIA PELVIC  3/13/2014    Procedure: EXAM UNDER ANESTHESIA PELVIC;  Exam Under Anestheisa, Colposcopy, Vaginal Biopsies, Co2 Laser of the Upper Vagina;  Surgeon: Lara Pack MD;  [facility]     HERNIORRHAPHY INCISIONAL (LOCATION)       LASER CO2 VAGINA  3/13/2014    VAIN 1/2     LASER CO2 VAGINA N/A 12/12/2017    Procedure: LASER CO2 VAGINA;  Exam Under Anesthesia, CO2 Laser Ablation Of Vagina, Cystoscopy, Left Retrograde Pyelogram with Left Stent Placement;  Surgeon: Nic Segundo MD;  [facility]     LUMPECTOMY BREAST WITH SEED LOCALIZATION Bilateral 6/1/2016    Procedure: LUMPECTOMY BREAST WITH SEED LOCALIZATION;  Surgeon: Brent Arana MD;  [facility]     SURGICAL HISTORY OF -       ovarian cystectomy     SURGICAL HISTORY OF -   2003    right colon resection secondary to carcinoid tumor     TUBAL LIGATION         Anesthesia Evaluation     . Pt has had prior anesthetic.            ROS/MED HX    ENT/Pulmonary:     (+)tobacco use, , . .    Neurologic:       Cardiovascular:     (+) Dyslipidemia, hypertension----. : . . . :. .       METS/Exercise Tolerance:     Hematologic:         Musculoskeletal:   (+) arthritis, , , -       GI/Hepatic:         Renal/Genitourinary:     (+) chronic renal disease,       Endo:     (+) Obesity, .      Psychiatric:         Infectious Disease:         Malignancy:   (+) Malignancy History of Other          Other:                          Physical Exam  Normal systems: cardiovascular, pulmonary and dental    Airway   Mallampati: II  TM distance: >3 FB  Neck ROM: full    Dental     Cardiovascular       Pulmonary             Lab Results   Component Value Date    WBC 8.1 01/30/2019     "HGB 14.7 01/30/2019    HCT 44.5 01/30/2019     01/30/2019     01/30/2019    POTASSIUM 3.7 01/30/2019    CHLORIDE 100 01/30/2019    CO2 34 (H) 01/30/2019    BUN 26 01/30/2019    CR 1.24 (H) 01/30/2019     (H) 01/30/2019    MARILIA 10.5 (H) 01/30/2019    ALBUMIN 3.2 (L) 11/12/2018    PROTTOTAL 7.2 11/12/2018    ALT 18 11/12/2018    AST 17 11/12/2018    ALKPHOS 88 11/12/2018    BILITOTAL 0.5 11/12/2018    TSH 2.54 09/28/2015       Preop Vitals  BP Readings from Last 3 Encounters:   02/07/19 168/73   01/30/19 128/80   12/26/18 130/82    Pulse Readings from Last 3 Encounters:   01/30/19 66   12/26/18 72   12/21/18 69      Resp Readings from Last 3 Encounters:   02/07/19 20   01/30/19 14   12/21/18 12    SpO2 Readings from Last 3 Encounters:   02/07/19 94%   01/30/19 95%   12/26/18 93%      Temp Readings from Last 1 Encounters:   02/07/19 37.1  C (98.8  F) (Oral)    Ht Readings from Last 1 Encounters:   02/07/19 1.715 m (5' 7.5\")      Wt Readings from Last 1 Encounters:   02/07/19 112 kg (247 lb)    Estimated body mass index is 38.11 kg/m  as calculated from the following:    Height as of this encounter: 1.715 m (5' 7.5\").    Weight as of this encounter: 112 kg (247 lb).       Anesthesia Plan      History & Physical Review  History and physical reviewed and following examination; no interval change.    ASA Status:  3 .    NPO Status:  > 6 hours    Plan for General and LMA with Intravenous induction.          Postoperative Care      Consents  Anesthetic plan, risks, benefits and alternatives discussed with:  Patient..                 Ayaka Kay CRNA, APRN CRNA  "

## 2019-02-07 NOTE — TELEPHONE ENCOUNTER
Closing encounter unable to reach after trying for 2 weeks.  Elina Herrera,Clinic Rn  [facility]ros

## 2019-02-07 NOTE — TELEPHONE ENCOUNTER
is caller. He is with the patient and she is aware that he is placing the call. Said patient is having surgery done on 7-5-18. The nurse from Dr La's office called her today to say that she needs to have a UA done before the surgery. Caller asks where they should go to have this done tomorrow. Was told that the [facility] at [place], [facility] and [place] are open tomorrow and she should go to one of those facilities. Was told that Dr La's order for the UA is in the patient's record.   Dilcia Pang RN/FNA     154.94

## 2019-02-07 NOTE — TELEPHONE ENCOUNTER
Patient/family was instructed to return call to [facility] RN directly on the RN Call back line at [phone].     Stefano Means RN

## 2019-04-09 ENCOUNTER — OFFICE VISIT (OUTPATIENT)
Dept: ONCOLOGY | Facility: CLINIC | Age: 69
End: 2019-04-09
Attending: OBSTETRICS & GYNECOLOGY
Payer: MEDICARE

## 2019-04-09 VITALS
RESPIRATION RATE: 16 BRPM | WEIGHT: 244 LBS | SYSTOLIC BLOOD PRESSURE: 135 MMHG | DIASTOLIC BLOOD PRESSURE: 72 MMHG | TEMPERATURE: 98.7 F | HEART RATE: 74 BPM | OXYGEN SATURATION: 91 % | BODY MASS INDEX: 36.98 KG/M2 | HEIGHT: 68 IN

## 2019-04-09 DIAGNOSIS — N89.3 VAGINAL DYSPLASIA: Primary | ICD-10-CM

## 2019-04-09 PROCEDURE — G0463 HOSPITAL OUTPT CLINIC VISIT: HCPCS | Mod: ZF

## 2019-04-09 PROCEDURE — 87624 HPV HI-RISK TYP POOLED RSLT: CPT | Performed by: OBSTETRICS & GYNECOLOGY

## 2019-04-09 PROCEDURE — 99207 ZZC NO DOCUMENTATION ON VISIT: CPT | Mod: ZP

## 2019-04-09 PROCEDURE — G0476 HPV COMBO ASSAY CA SCREEN: HCPCS | Performed by: OBSTETRICS & GYNECOLOGY

## 2019-04-09 PROCEDURE — 88175 CYTOPATH C/V AUTO FLUID REDO: CPT | Performed by: OBSTETRICS & GYNECOLOGY

## 2019-04-09 ASSESSMENT — MIFFLIN-ST. JEOR: SCORE: 1667.34

## 2019-04-09 ASSESSMENT — PAIN SCALES - GENERAL: PAINLEVEL: NO PAIN (0)

## 2019-04-09 NOTE — LETTER
4/9/2019       RE: Marissa Guadarrama  [address] [place] [phone]     Dear Colleague,    Thank you for referring your patient, Marissa Guadarrama, to the [facility] at [facility]. Please see a copy of my visit note below.    .    Again, thank you for allowing me to participate in the care of your patient.      Sincerely,     GYN ONC Colposcopy Proivder

## 2019-04-09 NOTE — LETTER
4/9/2019       RE: Marissa Guadarrama  [address] [place] [phone]     Dear Colleague,    Thank you for referring your patient, Marissa Guadarrama, to the [facility]. Please see a copy of my visit note below.    .    Again, thank you for allowing me to participate in the care of your patient.      Sincerely,     GYN ONC Colposcopy Proivder

## 2019-04-09 NOTE — LETTER
4/9/2019      RE: Marissa Guadarrama  [address] [place] [phone]       .     GYN ONC Colposcopy Proivder

## 2019-04-09 NOTE — NURSING NOTE
"Oncology Rooming Note    April 9, 2019 1:13 PM   Marissa Guadarrama is a 68 year old female who presents for:    Chief Complaint   Patient presents with     Oncology Clinic Visit     Return Cervical Ca; Colpo     Initial Vitals: /72   Pulse 74   Temp 98.7  F (37.1  C) (Oral)   Resp 16   Ht 1.715 m (5' 7.5\")   Wt 110.7 kg (244 lb)   SpO2 91%   BMI 37.65 kg/m   Estimated body mass index is 37.65 kg/m  as calculated from the following:    Height as of this encounter: 1.715 m (5' 7.5\").    Weight as of this encounter: 110.7 kg (244 lb). Body surface area is 2.3 meters squared.  No Pain (0) Comment: Data Unavailable   No LMP recorded. Patient has had a hysterectomy.  Allergies reviewed: Yes  Medications reviewed: Yes    Medications: Medication refills not needed today.  Pharmacy name entered into [facility]: [id number] - [place], [place] - [address] [place] [address]    Clinical concerns: Colpo       Megan Elias CMA              "

## 2019-04-09 NOTE — PATIENT INSTRUCTIONS
Pap smear done, you will be contacted with results and recs for follow-up    Karli Gao MD  Gynecologic Oncology  [facility]

## 2019-04-10 ENCOUNTER — HOSPITAL ENCOUNTER (OUTPATIENT)
Dept: MRI IMAGING | Facility: CLINIC | Age: 69
Discharge: HOME | End: 2019-04-10
Attending: UROLOGY | Admitting: UROLOGY
Payer: MEDICARE

## 2019-04-10 DIAGNOSIS — K76.9 LIVER LESION: ICD-10-CM

## 2019-04-10 LAB
CREAT BLD-MCNC: 1.4 MG/DL (ref 0.52–1.04)
GFR SERPL CREATININE-BSD FRML MDRD: 37 ML/MIN/{1.73_M2}

## 2019-04-10 PROCEDURE — 74183 MRI ABD W/O CNTR FLWD CNTR: CPT

## 2019-04-10 PROCEDURE — 25800030 ZZH RX IP 258 OP 636: Performed by: UROLOGY

## 2019-04-10 PROCEDURE — 82565 ASSAY OF CREATININE: CPT

## 2019-04-10 PROCEDURE — A9585 GADOBUTROL INJECTION: HCPCS | Performed by: UROLOGY

## 2019-04-10 PROCEDURE — 25500064 ZZH RX 255 OP 636: Performed by: UROLOGY

## 2019-04-10 RX ORDER — SODIUM CHLORIDE 9 MG/ML
60 INJECTION, SOLUTION INTRAVENOUS ONCE
Status: COMPLETED | OUTPATIENT
Start: 2019-04-10 | End: 2019-04-10

## 2019-04-10 RX ORDER — GADOBUTROL 604.72 MG/ML
10 INJECTION INTRAVENOUS ONCE
Status: COMPLETED | OUTPATIENT
Start: 2019-04-10 | End: 2019-04-10

## 2019-04-10 RX ADMIN — GADOBUTROL 10 ML: 604.72 INJECTION INTRAVENOUS at 09:38

## 2019-04-10 RX ADMIN — SODIUM CHLORIDE 50 ML: 9 INJECTION, SOLUTION INTRAVENOUS at 09:38

## 2019-04-12 LAB
COPATH REPORT: ABNORMAL
PAP: ABNORMAL

## 2019-04-25 ENCOUNTER — OFFICE VISIT (OUTPATIENT)
Dept: UROLOGY | Facility: CLINIC | Age: 69
End: 2019-04-25
Payer: COMMERCIAL

## 2019-04-25 VITALS — RESPIRATION RATE: 16 BRPM | HEART RATE: 58 BPM | DIASTOLIC BLOOD PRESSURE: 79 MMHG | SYSTOLIC BLOOD PRESSURE: 144 MMHG

## 2019-04-25 DIAGNOSIS — N39.498 OTHER URINARY INCONTINENCE: ICD-10-CM

## 2019-04-25 DIAGNOSIS — K76.9 LIVER LESION: Primary | ICD-10-CM

## 2019-04-25 PROCEDURE — 99215 OFFICE O/P EST HI 40 MIN: CPT | Mod: 25 | Performed by: UROLOGY

## 2019-04-25 PROCEDURE — 51798 US URINE CAPACITY MEASURE: CPT | Performed by: UROLOGY

## 2019-04-25 NOTE — NURSING NOTE
"Initial /79 (BP Location: Right arm, Patient Position: Chair, Cuff Size: Adult Regular)   Pulse 58   Resp 16  Estimated body mass index is 37.65 kg/m  as calculated from the following:    Height as of 4/9/19: 1.715 m (5' 7.5\").    Weight as of 4/9/19: 110.7 kg (244 lb). .    Patient is here for a discussion for kidney cancer.  irena storm LPN    "

## 2019-04-26 NOTE — PROGRESS NOTES
Visit Date:   04/25/2019      REASON FOR VISIT TODAY:  History of obstructed left ureter.      CLINICAL HISTORY:  Ms. 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  The patient has been undergoing stent exchanges for the last year or so to deal with the obstructed ureter.  The kidney on the left side is known to be atrophic with a previously measured function of approximately 20% on renal scan.  The patient's carcinoid recurrence measured approximately 2 cm and has been perhaps slowly increasing over time.  The patient also is known to have a 4.2 x 2.1 lesion in the liver which is stable on retrospect perhaps back to 2016.  The patient has been counseled previously on options for managing the retroperitoneal carcinoid disease with resection of the mass and resection of the ureter in this location.  It would then require some form of reconstruction including possibly a psoas hitch and/or Boari flap.  The patient has had previous abdominal surgeries with histories of scarring; thus, an open abdominal procedure would be a big operation.  We have previously discussed risks and benefits of proceeding with such a procedure, but the patient is currently strongly considering doing so.      PHYSICAL EXAMINATION:   EXAM:  On exam, her blood pressure is 144/79, pulse 58.   GENERAL:  She is in no acute distress.      DIAGNOSTICS:  The patient's MRI from 04/10/2019 demonstrates the 3.7 x 2.2 cm mass in the lesion that seems largely stable compared to the scan on 09/28/2018.      The patient was able to void 250 mL today with a postvoid residual of 50 mL.      ASSESSMENT AND PLAN:  Over half of today's 45-minute visit was spent counseling the patient and her  regarding her carcinoid tumor.  I suggested to Ms. Guadarrama that again, going after the carcinoid tumor in the area of obstruction in the ureter  would be a big operation.  We again discussed options of continuing with stent changes versus consideration of abandonment of the kidney to avoid the stent exchanges given that there has been marginal function of the kidney in the past.  Further, we discussed that the etiology of the liver lesion is unclear, but this may need to be clarified prior to moving forward.  Therefore, since the MRI the patient has of the liver lesion was not definitive, we will go ahead and arrange a biopsy of this lesion in the near future.  In addition, we will ask the patient to repeat a Lasix renal scan with a Cano catheter in place to measure the split function of the kidneys currently as we certainly would not want to consider a big operation if the function of the kidney is only marginal.  We will then reconvene after these 2 pieces of information has been gathered and start to make further decisions.         STEVE RUVALCABA MD             D: 2019   T: 2019   MT:       Name:     ROSALINDA LANCASTER   MRN:      [id number]        Account:      [id number]   :      1950           Visit Date:   2019      Document: [id number]

## 2019-05-09 ENCOUNTER — TELEPHONE (OUTPATIENT)
Dept: ONCOLOGY | Facility: CLINIC | Age: 69
End: 2019-05-09

## 2019-05-09 NOTE — PROGRESS NOTES
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     Her history is as follows:  Notably, she has history of cervical cancer treated in 2007 at [facility], a history of colon cancer and recurrent carcinoid tumor and breast cancer.        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 Depth of invasion 7 mm out of 1.9 and horizontal spread of 1 cm. 48 negative nodes.   2009: ASCUS pap  11/1/13 Vaginal Pap returned ASC-H.   12/23/13 Colposcopy/Vaginal cuff (submitted as \"cervix\"), biopsy:VAIN III    2/5/14: Patient established care with Dr. Pack and desired surgical management     3/13/14: Colposcopy, Vaginal Biopsies, Co2 Laser of the Upper Vagina on , which showed low-grade squamous intraepithelial lesion (VAIN 1) at 12:00 and 6:00 and high-grade squamous intraepithelial lesion VAIN 29:00 vaginal cuff. Vaginal cuff, 3:00 (biopsy): Mostly denuded squamous mucosa with focal features suggestive of low-grade squamous intraepithelial lesion (VAIN 1)     7/30/14: biopsy of right upper vagina showed VAINI, negative p16     2/9/15: upper vaginal biopsy \"LSIL\"     9/25/17:  VAIN III     12/12/17: CO2 laser and biopsies of vaginal dysplasia     4/3/18:  Stable exam.     10/9/18:  Colpo, no biopsies. Pap LSIL, HR HPV 16+      The patient returns today for follow-up.  She denies any new symptoms. No vaginal bleeding         Past Medical History:    Past Medical History:   Diagnosis Date     Arthritis     knee     Benign breast biopsy     benign     Carcinoid tumor 12/2003     Cervical cancer (H) 2007     H/O colposcopy with cervical biopsy 12/23/13    vaginal cuff biopsy- VAIN III. referred back to gyn/onc     HTN      Hyperlipidemia      Pap smear of vagina with ASC-H 11/1/13     Post-polio syndromes      Trigeminal neuralgias          Past " Surgical History:    Past Surgical History:   Procedure Laterality Date     APPENDECTOMY  1983     BIOPSY NODE SENTINEL Bilateral 6/1/2016    Procedure: BIOPSY NODE SENTINEL;  Surgeon: Brent Arana MD;  [facility]     C BSO, OMENTECTOMY W/OSMAN  5/2007     C TOTAL ABDOM HYSTERECTOMY  5/2007     CL AFF SURGICAL PATHOLOGY       COLONOSCOPY N/A 6/23/2017    Procedure: COMBINED COLONOSCOPY, SINGLE OR MULTIPLE BIOPSY/POLYPECTOMY BY BIOPSY;  Colonoscopy Dx:Carcinoid tumor of colon prep mailed [facility];  Surgeon: Talisha Greco MD;  [facility]     COLPOSCOPY, BIOPSY, COMBINED  3/13/2014    Procedure: COMBINED COLPOSCOPY, BIOPSY;;  Surgeon: Lara Pack MD;  [facility]     COMBINED CYSTOSCOPY, RETROGRADES, EXCHANGE STENT URETER(S) Left 2/7/2019    Procedure: COMBINED CYSTOSCOPY, RETROGRADES, EXCHANGE STENT URETER--left;  Surgeon: Zhao La MD;  [facility]     COMBINED CYSTOSCOPY, RETROGRADES, EXCHANGE STENT URETER(S) Left 2/20/2020    Procedure: CYSTOSCOPY, WITH RETROGRADE PYELOGRAM AND Left URETERAL STENT exchange;  Surgeon: Zhao La MD;  [facility]     COMBINED CYSTOSCOPY, RETROGRADES, URETEROSCOPY, INSERT STENT Left 2/2/2017    Procedure: COMBINED CYSTOSCOPY, RETROGRADES, URETEROSCOPY, INSERT STENT;  Surgeon: Zhao La MD;  [facility]     CYSTOSCOPY, RETROGRADES, INSERT STENT URETER(S), COMBINED Left 9/7/2017    Procedure: COMBINED CYSTOSCOPY, RETROGRADES, INSERT STENT URETER(S);  Cystoscopy,Left Stent Exchange;  Surgeon: Zhao La MD;  [facility]     CYSTOSCOPY, RETROGRADES, INSERT STENT URETER(S), COMBINED  12/12/2017    Procedure: COMBINED CYSTOSCOPY, RETROGRADES, INSERT STENT URETER(S);;  Surgeon: Zhao La MD;  [facility]     CYSTOSCOPY, RETROGRADES, INSERT STENT URETER(S), COMBINED Left 7/5/2018    Procedure: COMBINED CYSTOSCOPY, RETROGRADES, INSERT STENT URETER(S);   Cystoscopy, Left Stent Exchange;  Surgeon: Zhao La MD;  [facility]     EXAM UNDER ANESTHESIA PELVIC  3/13/2014    Procedure: EXAM UNDER ANESTHESIA PELVIC;  Exam Under Anestheisa, Colposcopy, Vaginal Biopsies, Co2 Laser of the Upper Vagina;  Surgeon: Lara Pack MD;  [facility]     HERNIORRHAPHY INCISIONAL (LOCATION)       LASER CO2 VAGINA  3/13/2014    VAIN 1/2     LASER CO2 VAGINA N/A 2017    Procedure: LASER CO2 VAGINA;  Exam Under Anesthesia, CO2 Laser Ablation Of Vagina, Cystoscopy, Left Retrograde Pyelogram with Left Stent Placement;  Surgeon: Nic Segundo MD;  [facility]     LUMPECTOMY BREAST WITH SEED LOCALIZATION Bilateral 2016    Procedure: LUMPECTOMY BREAST WITH SEED LOCALIZATION;  Surgeon: Brent Arana MD;  [facility]     SURGICAL HISTORY OF -       ovarian cystectomy     SURGICAL HISTORY OF -       right colon resection secondary to carcinoid tumor     TUBAL LIGATION           Health Maintenance:  Health Maintenance Due   Topic Date Due     ZOSTER IMMUNIZATION (1 of 2) 1998     LIPID  2019     MICROALBUMIN  2019         Current Medications:     has a current medication list which includes the following prescription(s): albuterol, amoxicillin, carbamazepine, exemestane, gabapentin, gabapentin, hydrochlorothiazide, and potassium chloride er.       Allergies:     [unfilled]        Social History:     Social History     Tobacco Use     Smoking status: Former Smoker     Packs/day: 1.00     Years: 29.00     Pack years: 29.00     Types: Cigarettes     Last attempt to quit: 10/30/2006     Years since quittin.5     Smokeless tobacco: Never Used   Substance Use Topics     Alcohol use: No     Alcohol/week: 0.0 standard drinks       History   Drug Use No           Family History:     The patient's family history is notable for:    Family History   Problem Relation Age of Onset     Cancer Mother         bone / liver  "    Breast Cancer Mother      Cancer Maternal Grandmother      Cancer Maternal Grandfather      Cancer Paternal Grandmother      Cancer Paternal Grandfather      Cancer Sister         vulvar ca, cervical ca, squamous cell cancer     Cancer Brother         Rectal- Stage 4     Rectal Cancer Brother      Breast Cancer Paternal Aunt      Colon Cancer Paternal Aunt      Breast Cancer Maternal Aunt      Pancreatic Cancer Nephew      Breast Cancer Sister          Physical Exam:     /72   Pulse 74   Temp 98.7  F (37.1  C) (Oral)   Resp 16   Ht 1.715 m (5' 7.5\")   Wt 110.7 kg (244 lb)   SpO2 91%   BMI 37.65 kg/m    Body mass index is 37.65 kg/m .    General Appearance: healthy and alert, no distress       Musculoskeletal: extremities non tender and without edema    Skin: no lesions or rashes     Neurological: normal gait, no gross defects     Psychiatric: appropriate mood and affect                               Hematological: normal cervical, supraclavicular and inguinal lymph nodes     Gastrointestinal:       abdomen soft, non-tender, non-distended, no organomegaly or masses     :  External genitalia normal, BUS negative.  Vagina pale, atropic.  [place] with mild scarring.  Moderate discomfort to patient during exam.  No gross lesions.  Pap smear obtained of apex.     Assessment:     Marissa Guadarrama is a 68 year old woman with a diagnosis of HR HPV, persistent abnromal pap and VAIN3.       A total of 30 minutes was spent with the patient, 25 minutes of which were spent in counseling the patient and/or treatment planning.      Plan:   History of VAINIII:  Pap smear obtained.  She will be contacted with results and recs for follow-up. Reviewed risk for recurrence, need for regular follow-up. She will be contacted with results and recs for follow-up.       Questions answered, patient expressed understanding of plan of care.     Karli Gao MD  Gynecologic Oncology  [facility]    CC  Patient Care Team:  Eliazar Menendez MD as PCP - General (Family Practice)  Hosea King MD as MD (Hematology & Oncology)  Zhao La MD as MD (Urology)  Talisha Greco MD as MD (Colon and Rectal Surgery)  Allen Downey MD as MD (Orthopedics)  Eliazar Menendez MD as Assigned PCP  LANEY NUNES  .

## 2019-05-09 NOTE — TELEPHONE ENCOUNTER
RN reached out to patient for PAP results and plan.     Patient will return to clinic with Dr Gao on May 14, 2019    Patient appreciative of the call.    Nicole Chew RN

## 2019-05-13 ENCOUNTER — HOSPITAL ENCOUNTER (OUTPATIENT)
Dept: LAB | Facility: CLINIC | Age: 69
Discharge: HOME | End: 2019-05-13
Attending: INTERNAL MEDICINE | Admitting: INTERNAL MEDICINE
Payer: MEDICARE

## 2019-05-13 DIAGNOSIS — C50.412 MALIGNANT NEOPLASM OF UPPER-OUTER QUADRANT OF BOTH BREASTS IN FEMALE, ESTROGEN RECEPTOR POSITIVE (H): ICD-10-CM

## 2019-05-13 DIAGNOSIS — C50.411 MALIGNANT NEOPLASM OF UPPER-OUTER QUADRANT OF BOTH BREASTS IN FEMALE, ESTROGEN RECEPTOR POSITIVE (H): ICD-10-CM

## 2019-05-13 DIAGNOSIS — Z17.0 MALIGNANT NEOPLASM OF UPPER-OUTER QUADRANT OF BOTH BREASTS IN FEMALE, ESTROGEN RECEPTOR POSITIVE (H): ICD-10-CM

## 2019-05-13 LAB
ALBUMIN SERPL-MCNC: 3.4 G/DL (ref 3.4–5)
ALP SERPL-CCNC: 81 U/L (ref 40–150)
ALT SERPL W P-5'-P-CCNC: 19 U/L (ref 0–50)
ANION GAP SERPL CALCULATED.3IONS-SCNC: <1 MMOL/L (ref 3–14)
AST SERPL W P-5'-P-CCNC: 14 U/L (ref 0–45)
BASOPHILS # BLD AUTO: 0 10E9/L (ref 0–0.2)
BASOPHILS NFR BLD AUTO: 0.4 %
BILIRUB SERPL-MCNC: 0.4 MG/DL (ref 0.2–1.3)
BUN SERPL-MCNC: 23 MG/DL (ref 7–30)
CALCIUM SERPL-MCNC: 9.8 MG/DL (ref 8.5–10.1)
CANCER AG27-29 SERPL-ACNC: 27 U/ML (ref 0–39)
CHLORIDE SERPL-SCNC: 107 MMOL/L (ref 94–109)
CO2 SERPL-SCNC: 36 MMOL/L (ref 20–32)
CREAT SERPL-MCNC: 1.34 MG/DL (ref 0.52–1.04)
DIFFERENTIAL METHOD BLD: ABNORMAL
EOSINOPHIL # BLD AUTO: 0.4 10E9/L (ref 0–0.7)
EOSINOPHIL NFR BLD AUTO: 6 %
ERYTHROCYTE [DISTWIDTH] IN BLOOD BY AUTOMATED COUNT: 13.1 % (ref 10–15)
GFR SERPL CREATININE-BSD FRML MDRD: 40 ML/MIN/{1.73_M2}
GLUCOSE SERPL-MCNC: 159 MG/DL (ref 70–99)
HCT VFR BLD AUTO: 45.6 % (ref 35–47)
HGB BLD-MCNC: 14.2 G/DL (ref 11.7–15.7)
IMM GRANULOCYTES # BLD: 0 10E9/L (ref 0–0.4)
IMM GRANULOCYTES NFR BLD: 0.4 %
LYMPHOCYTES # BLD AUTO: 1.4 10E9/L (ref 0.8–5.3)
LYMPHOCYTES NFR BLD AUTO: 21 %
MCH RBC QN AUTO: 29.7 PG (ref 26.5–33)
MCHC RBC AUTO-ENTMCNC: 31.1 G/DL (ref 31.5–36.5)
MCV RBC AUTO: 95 FL (ref 78–100)
MONOCYTES # BLD AUTO: 0.4 10E9/L (ref 0–1.3)
MONOCYTES NFR BLD AUTO: 5.8 %
NEUTROPHILS # BLD AUTO: 4.4 10E9/L (ref 1.6–8.3)
NEUTROPHILS NFR BLD AUTO: 66.4 %
NRBC # BLD AUTO: 0 10*3/UL
NRBC BLD AUTO-RTO: 0 /100
PLATELET # BLD AUTO: 188 10E9/L (ref 150–450)
POTASSIUM SERPL-SCNC: 3.6 MMOL/L (ref 3.4–5.3)
PROT SERPL-MCNC: 7.2 G/DL (ref 6.8–8.8)
RBC # BLD AUTO: 4.78 10E12/L (ref 3.8–5.2)
SODIUM SERPL-SCNC: 143 MMOL/L (ref 133–144)
WBC # BLD AUTO: 6.7 10E9/L (ref 4–11)

## 2019-05-13 PROCEDURE — 86300 IMMUNOASSAY TUMOR CA 15-3: CPT | Performed by: INTERNAL MEDICINE

## 2019-05-13 PROCEDURE — 80053 COMPREHEN METABOLIC PANEL: CPT | Performed by: INTERNAL MEDICINE

## 2019-05-13 PROCEDURE — 85025 COMPLETE CBC W/AUTO DIFF WBC: CPT | Performed by: INTERNAL MEDICINE

## 2019-05-13 PROCEDURE — 36415 COLL VENOUS BLD VENIPUNCTURE: CPT | Performed by: INTERNAL MEDICINE

## 2019-05-14 ENCOUNTER — OFFICE VISIT (OUTPATIENT)
Dept: ONCOLOGY | Facility: CLINIC | Age: 69
End: 2019-05-14
Attending: OBSTETRICS & GYNECOLOGY
Payer: MEDICARE

## 2019-05-14 ENCOUNTER — DOCUMENTATION ONLY (OUTPATIENT)
Dept: CARE COORDINATION | Facility: CLINIC | Age: 69
End: 2019-05-14

## 2019-05-14 VITALS
SYSTOLIC BLOOD PRESSURE: 153 MMHG | WEIGHT: 249.6 LBS | DIASTOLIC BLOOD PRESSURE: 84 MMHG | TEMPERATURE: 98.1 F | HEART RATE: 62 BPM | BODY MASS INDEX: 38.52 KG/M2 | OXYGEN SATURATION: 94 %

## 2019-05-14 DIAGNOSIS — N89.3 VAGINAL DYSPLASIA: Primary | ICD-10-CM

## 2019-05-14 PROCEDURE — 57100 BIOPSY VAGINAL MUCOSA SIMPLE: CPT | Mod: ZP

## 2019-05-14 PROCEDURE — 57454 BX/CURETT OF CERVIX W/SCOPE: CPT

## 2019-05-14 PROCEDURE — 99214 OFFICE O/P EST MOD 30 MIN: CPT | Mod: 25

## 2019-05-14 PROCEDURE — 88305 TISSUE EXAM BY PATHOLOGIST: CPT | Performed by: OBSTETRICS & GYNECOLOGY

## 2019-05-14 PROCEDURE — G0463 HOSPITAL OUTPT CLINIC VISIT: HCPCS | Mod: ZF

## 2019-05-14 RX ORDER — ACETAMINOPHEN 325 MG/1
325-650 TABLET ORAL EVERY 6 HOURS PRN
COMMUNITY
End: 2020-01-09

## 2019-05-14 ASSESSMENT — PAIN SCALES - GENERAL: PAINLEVEL: SEVERE PAIN (7)

## 2019-05-14 NOTE — PROGRESS NOTES
"[facility]  Colposcopy Procedure Note    68 year old  female presents for colposcopy due to pap smear on 4/9/19 showing ASC-H and HPV HR positive.     Today, she has no complaints. She has not had any vaginal bleeding. No dysuria, but has urinary incontinence at baseline. No changes in bowel function. She has no changes to PMH, SurgHx. No chest pain, SOB.    Pap Smear History:  Lab Results   Component Value Date    PAP ASC-H 04/09/2019    PAP LSIL 10/09/2018    PAP ASC-US 07/06/2015     Last   Lab Results   Component Value Date    HPV16 Positive 04/09/2019     Last   Lab Results   Component Value Date    HPV18 Negative 04/09/2019     Last   Lab Results   Component Value Date    HRHPV Negative 04/09/2019       /84   Pulse 62   Temp 98.1  F (36.7  C) (Oral)   Wt 113.2 kg (249 lb 9.6 oz)   SpO2 94%   BMI 38.52 kg/m      General: pleasant older woman, comfortably seated on exam bed     Musculoskeletal: extremities non tender and without edema    Neurological: normal gait, no gross defects     Psychiatric: appropriate mood and affect                               Hematological: normal cervical, supraclavicular and inguinal lymph nodes     Gastrointestinal: abdomen soft, non-tender, non-distended, no organomegaly or masses    Genitourinary: External genitalia and urethral meatus appears normal.  Vagina atropic, scarred at apex    Time Out - \"Pause for the Cause\"  Just before the procedure began the following was verified:    Initials   Patient Name       Patient Date of Birth    Procedure to be performed     Procedure for colposcopy and biopsy has been explained to the patient. Consent:  Written consent signed and scanned into medical record.    Procedure:  Speculum placed in vagina and excellent visualization vagina and apex obtained. 5% acetic acid solution applied entire vagina and viewed under colposcopic enhancement.  No gross lesions.  Mild acetowhite enhancement right vaginal apex which was " biopsied.  Patient tolerated procedure.  Minimal bleeding.      A total of 45 minutes was spent with the patient, 20 minutes of which were spent in counseling the patient and/or treatment planning, 25 minutes procedure time.     Assessment/Plan:   Ms. Marissa Guadarrama is a 68 year old  with PAP with ASC-H and HPV HR positive on 19 who presents for colposcopy.     -Specimens sent to pathology  -Will base further treatment on pathology findings.  -Post biopsy instructions given to patient    Karli Gao MD  Gynecologic Oncology  [facility]

## 2019-05-14 NOTE — NURSING NOTE
Prior to the start of the procedure and with procedural staff participation, I verbally confirmed the patient s identity using two indicators, relevant allergies, that the procedure was appropriate and matched the consent or emergent situation, and that the correct equipment/implants were available. Immediately prior to starting the procedure I conducted the Time Out with the procedural staff and re-confirmed the patient s name, procedure, and site/side. (The Joint Commission universal protocol was followed.)  Yes    Sedation (Moderate or Deep): None    Post procedure pain: 0    Nicole Chew RN

## 2019-05-14 NOTE — NURSING NOTE
"Oncology Rooming Note    May 14, 2019 10:16 AM   Marissa Guadarrama is a 68 year old female who presents for:    Chief Complaint   Patient presents with     Oncology Clinic Visit     Return; Colposcopy procedure     Initial Vitals: /84   Pulse 62   Temp 98.1  F (36.7  C) (Oral)   Wt 113.2 kg (249 lb 9.6 oz)   SpO2 94%   BMI 38.52 kg/m   Estimated body mass index is 38.52 kg/m  as calculated from the following:    Height as of 4/9/19: 1.715 m (5' 7.5\").    Weight as of this encounter: 113.2 kg (249 lb 9.6 oz). Body surface area is 2.32 meters squared.  Severe Pain (7) Comment: Data Unavailable   No LMP recorded. Patient has had a hysterectomy.  Allergies reviewed: Yes  Medications reviewed: Yes    Medications: Medication refills not needed today.  Pharmacy name entered into [employer]: [id number] - [place], [place] - [address] [place] [address]    Clinical concerns: No Concerns [place]] was NOT notified.      Alanna Rogel CMA              "

## 2019-05-14 NOTE — LETTER
"5/14/2019       RE: Marissa Guadarrama  [address] [place] [phone]     Dear Colleague,    Thank you for referring your patient, Marissa Guadarrama, to the [facility]. Please see a copy of my visit note below.    [facility]  Colposcopy Procedure Note    68 year old  female presents for colposcopy due to pap smear on 4/9/19 showing ASC-H and HPV HR positive.     Today, she has no complaints. She has not had any vaginal bleeding. No dysuria, but has urinary incontinence at baseline. No changes in bowel function. She has no changes to PMH, SurgHx. No chest pain, SOB.    Pap Smear History:  Lab Results   Component Value Date    PAP ASC-H 04/09/2019    PAP LSIL 10/09/2018    PAP ASC-US 07/06/2015     Last   Lab Results   Component Value Date    HPV16 Positive 04/09/2019     Last   Lab Results   Component Value Date    HPV18 Negative 04/09/2019     Last   Lab Results   Component Value Date    HRHPV Negative 04/09/2019       /84   Pulse 62   Temp 98.1  F (36.7  C) (Oral)   Wt 113.2 kg (249 lb 9.6 oz)   SpO2 94%   BMI 38.52 kg/m       General: pleasant older woman, comfortably seated on exam bed     Musculoskeletal: extremities non tender and without edema    Neurological: normal gait, no gross defects     Psychiatric: appropriate mood and affect                               Hematological: normal cervical, supraclavicular and inguinal lymph nodes     Gastrointestinal: abdomen soft, non-tender, non-distended, no organomegaly or masses    Genitourinary: External genitalia and urethral meatus appears normal.  Vagina atropic, scarred at apex    Time Out - \"Pause for the Cause\"  Just before the procedure began the following was verified:    Initials   Patient Name       Patient Date of Birth    Procedure to be performed     Procedure for colposcopy and biopsy has been explained to the patient. Consent:  Written consent signed and scanned into medical " record.    Procedure:  Speculum placed in vagina and excellent visualization vagina and apex obtained. 5% acetic acid solution applied entire vagina and viewed under colposcopic enhancement.  No gross lesions.  Mild acetowhite enhancement right vaginal apex which was biopsied.  Patient tolerated procedure.  Minimal bleeding.      A total of 45 minutes was spent with the patient, 20 minutes of which were spent in counseling the patient and/or treatment planning, 25 minutes procedure time.     Assessment/Plan:   Ms. Marissa Guadarrama is a 68 year old  with PAP with ASC-H and HPV HR positive on 19 who presents for colposcopy.     -Specimens sent to pathology  -Will base further treatment on pathology findings.  -Post biopsy instructions given to patient    Karli Gao MD  Gynecologic Oncology  [facility]      Again, thank you for allowing me to participate in the care of your patient.      Sincerely,     GYN ONC Colposcopy Proivder

## 2019-05-14 NOTE — PATIENT INSTRUCTIONS
Vaginal biopsy.  You will be contacted with results and recs for follow-up    Karli Gao MD  Gynecologic Oncology  [facility]

## 2019-05-15 ENCOUNTER — ONCOLOGY VISIT (OUTPATIENT)
Dept: ONCOLOGY | Facility: CLINIC | Age: 69
End: 2019-05-15
Attending: INTERNAL MEDICINE
Payer: COMMERCIAL

## 2019-05-15 VITALS
HEART RATE: 64 BPM | TEMPERATURE: 97.8 F | HEIGHT: 68 IN | SYSTOLIC BLOOD PRESSURE: 145 MMHG | WEIGHT: 248.9 LBS | OXYGEN SATURATION: 93 % | DIASTOLIC BLOOD PRESSURE: 71 MMHG | BODY MASS INDEX: 37.72 KG/M2 | RESPIRATION RATE: 16 BRPM

## 2019-05-15 DIAGNOSIS — M05.9 RHEUMATOID ARTHRITIS WITH POSITIVE RHEUMATOID FACTOR, INVOLVING UNSPECIFIED SITE (H): ICD-10-CM

## 2019-05-15 DIAGNOSIS — Z12.31 VISIT FOR SCREENING MAMMOGRAM: ICD-10-CM

## 2019-05-15 DIAGNOSIS — C50.411 MALIGNANT NEOPLASM OF UPPER-OUTER QUADRANT OF BOTH BREASTS IN FEMALE, ESTROGEN RECEPTOR POSITIVE (H): Primary | ICD-10-CM

## 2019-05-15 DIAGNOSIS — E78.5 HYPERLIPIDEMIA LDL GOAL <130: ICD-10-CM

## 2019-05-15 DIAGNOSIS — C50.412 MALIGNANT NEOPLASM OF UPPER-OUTER QUADRANT OF BOTH BREASTS IN FEMALE, ESTROGEN RECEPTOR POSITIVE (H): Primary | ICD-10-CM

## 2019-05-15 DIAGNOSIS — D3A.00 CARCINOID TUMOR (H): ICD-10-CM

## 2019-05-15 DIAGNOSIS — C18.2 MALIGNANT NEOPLASM OF ASCENDING COLON (H): ICD-10-CM

## 2019-05-15 DIAGNOSIS — I10 HYPERTENSION GOAL BP (BLOOD PRESSURE) < 140/90: ICD-10-CM

## 2019-05-15 DIAGNOSIS — Z79.899 NEED FOR PROPHYLACTIC CHEMOTHERAPY: ICD-10-CM

## 2019-05-15 DIAGNOSIS — M85.852 OSTEOPENIA OF LEFT HIP: ICD-10-CM

## 2019-05-15 DIAGNOSIS — Z17.0 MALIGNANT NEOPLASM OF UPPER-OUTER QUADRANT OF BOTH BREASTS IN FEMALE, ESTROGEN RECEPTOR POSITIVE (H): Primary | ICD-10-CM

## 2019-05-15 DIAGNOSIS — C78.6 PERITONEAL METASTASES: ICD-10-CM

## 2019-05-15 PROBLEM — M85.859 OSTEOPENIA OF HIP: Status: ACTIVE | Noted: 2019-05-15

## 2019-05-15 PROBLEM — Z79.69 NEED FOR PROPHYLACTIC CHEMOTHERAPY: Status: ACTIVE | Noted: 2019-05-15

## 2019-05-15 PROCEDURE — G0463 HOSPITAL OUTPT CLINIC VISIT: HCPCS

## 2019-05-15 PROCEDURE — 99214 OFFICE O/P EST MOD 30 MIN: CPT | Performed by: INTERNAL MEDICINE

## 2019-05-15 RX ORDER — EXEMESTANE 25 MG/1
25 TABLET ORAL EVERY MORNING
Qty: 90 TABLET | Refills: 3 | Status: SHIPPED | OUTPATIENT
Start: 2019-05-15 | End: 2020-06-01

## 2019-05-15 ASSESSMENT — PAIN SCALES - GENERAL: PAINLEVEL: NO PAIN (0)

## 2019-05-15 ASSESSMENT — MIFFLIN-ST. JEOR: SCORE: 1689.56

## 2019-05-15 NOTE — PROGRESS NOTES
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    Weight as of this encounter: 112.9 kg (248 lb 14.4 oz). Body surface area is 2.32 meters squared.  No Pain (0) Comment: Data Unavailable   No LMP recorded. Patient has had a hysterectomy.  Allergies reviewed: Yes  Medications reviewed: Yes    Medications: MEDICATION REFILLS NEEDED TODAY. Provider was notified.  Pharmacy name entered into [employer]: [facility][id number] - [place], [place] - [address] [place] [address]    Clinical concerns: 6 month recheck 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, estrogen receptor positive, labs & Mammogram      Citlaly Larry CMA              "

## 2019-05-15 NOTE — ASSESSMENT & PLAN NOTE
Marissa Guadarrama was found on routine mammogram a group of microcalcification is in the upper outer right breast. There was also a focal asymmetry in the lower left breast. Subsequently the patient went on to have bilateral diagnostic mammography with ultrasound on March 28, 2016.. On the right breast there was microcalcification is the main grouping measures 1.6 cm x 1 cm bilateral 2.9 cm located 2.9 cm from the nipple. An additional tiny area about 0.2 cm seen at the anterolateral margin of this main grouping about 1 cm from the main grouping. The left breast shows no masses or significant abnormalities. Subsequently the patient went on to have breast needle biopsy on March 30, 2016. 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 Angiolymphatic invasion was not identified. 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 Microcalcifications was also associated with ductal carcinoma in situ. His surgeon receptor was positive. Progesterone receptor was negative. She underwent bilateral sentinel lymph node biopsy and bilateral lumpectomies with prior seed placement.  the surgical pathology Showing left breast lumpectomy was a tumor size of 13 mm grade 2 of 3, angiolymphatic invasion was not identified the tumor was unifocal associated with ductal carcinoma in situ high-grade. Surgical margins where negative. [place] lymph node was negative for metastatic tumor. Stage is T1cN0 M0 On the right breast and there was invasive ductal carcinoma with a tumor size of 3 mm grade 3 of 3. [place] lymph nodes were negative for metastatic disease. The tumor stage is T1aN0 M0. The tumor on the left was positive for estrogen and progesterone receptor. It did show no HER-2/praveen amplification. The tumor on the right is positive for estrogen receptor and negative for progesterone receptor and shows no HER-2/praveen amplification. Subsequently the patient concluded the radiation  therapy. She is currently on hormonal therapy with Arimidex. She developed a skin rash and Arimidex was switched to Aromasin.

## 2019-05-15 NOTE — PATIENT INSTRUCTIONS
Information about Prolia and start Prolia if approved  Annual screening mammogram in June 2019  Follow-up in 6 months with a repeat laboratory tests.

## 2019-05-15 NOTE — LETTER
"    5/15/2019         RE: Marissa Guadarrama  [address] [place] [phone]        Dear Colleague,    Thank you for referring your patient, Marissa Guadarrama, to the [facility]. Please see a copy of my visit note below.    Oncology Rooming Note    May 15, 2019 10:55 AM   Marissa Guadarrama is a 68 year old female who presents for:    Chief Complaint   Patient presents with     Oncology Clinic Visit     6 month recheck 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, estrogen receptor positive, labs & Mammogram     Initial Vitals: /71 (BP Location: Right arm, Patient Position: Sitting, Cuff Size: Adult Large)   Pulse 64   Temp 97.8  F (36.6  C) (Tympanic)   Resp 16   Ht 1.715 m (5' 7.5\")   Wt 112.9 kg (248 lb 14.4 oz)   SpO2 93%   BMI 38.41 kg/m    Estimated body mass index is 38.41 kg/m  as calculated from the following:    Height as of this encounter: 1.715 m (5' 7.5\").    Weight as of this encounter: 112.9 kg (248 lb 14.4 oz). Body surface area is 2.32 meters squared.  No Pain (0) Comment: Data Unavailable   No LMP recorded. Patient has had a hysterectomy.  Allergies reviewed: Yes  Medications reviewed: Yes    Medications: MEDICATION REFILLS NEEDED TODAY. Provider was notified.  Pharmacy name entered into [employer]: [facility] #[id number] - [address]    Clinical concerns: 6 month recheck 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, estrogen receptor positive, labs & Mammogram      Citlaly Larry CMA                Hematology/ Oncology Follow-up Visit:  May 15, 2019    Reason for Visit:   Chief Complaint   Patient presents with     Oncology Clinic Visit     6 month recheck 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, estrogen receptor positive, labs & Mammogram       Oncologic History:    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)  Marissa Guadarrama was found on " routine mammogram a group of microcalcification is in the upper outer right breast. There was also a focal asymmetry in the lower left breast. Subsequently the patient went on to have bilateral diagnostic mammography with ultrasound on March 28, 2016.. On the right breast there was microcalcification is the main grouping measures 1.6 cm x 1 cm bilateral 2.9 cm located 2.9 cm from the nipple. An additional tiny area about 0.2 cm seen at the anterolateral margin of this main grouping about 1 cm from the main grouping. The left breast shows no masses or significant abnormalities. Subsequently the patient went on to have breast needle biopsy on March 30, 2016. 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 Angiolymphatic invasion was not identified. 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 Microcalcifications was also associated with ductal carcinoma in situ. His surgeon receptor was positive. Progesterone receptor was negative. She underwent bilateral sentinel lymph node biopsy and bilateral lumpectomies with prior seed placement.  the surgical pathology Showing left breast lumpectomy was a tumor size of 13 mm grade 2 of 3, angiolymphatic invasion was not identified the tumor was unifocal associated with ductal carcinoma in situ high-grade. Surgical margins where negative. [place] lymph node was negative for metastatic tumor. Stage is T1cN0 M0 On the right breast and there was invasive ductal carcinoma with a tumor size of 3 mm grade 3 of 3. [place] lymph nodes were negative for metastatic disease. The tumor stage is T1aN0 M0. The tumor on the left was positive for estrogen and progesterone receptor. It did show no HER-2/praveen amplification. The tumor on the right is positive for estrogen receptor and negative for progesterone receptor and shows no HER-2/praveen amplification. Subsequently the patient concluded the radiation therapy. She is currently on  hormonal therapy with Arimidex. She developed a skin rash and Arimidex was switched to Aromasin.      Interval History:  Patient returning today for follow-up.  She denies any nausea or vomiting or diarrhea or fever or chills.  She noted some tenderness in the right breast around the scar area.  She is currently on endocrine therapy with exemestane 25 mg orally daily.  She has been also followed by urology and she is scheduled for liver biopsy for her carcinoid lesions.    Review Of Systems:  Constitutional: Negative for fever, chills, and night sweats.  Skin: negative.  Eyes: negative.  Ears/Nose/Throat: negative.  Respiratory: No shortness of breath, dyspnea on exertion, cough, or hemoptysis.  Cardiovascular: negative.  Gastrointestinal: negative.  Genitourinary: negative.  Musculoskeletal: negative.  Neurologic: negative.  Psychiatric: negative.  Hematologic/Lymphatic/Immunologic: negative.  Endocrine: negative.    All other ROS negative unless mentioned in interval history.    Past medical, social, surgical, and family histories reviewed.    Allergies:  Allergies as of 05/15/2019     (No Known Allergies)       Current Medications:  Current Outpatient Medications   Medication Sig Dispense Refill     acetaminophen (TYLENOL) 325 MG tablet Take 325-650 mg by mouth every 6 hours as needed for mild pain       albuterol (PROAIR HFA, PROVENTIL HFA, VENTOLIN HFA) 108 (90 BASE) MCG/ACT inhaler Inhale 2 puffs into the lungs every 6 hours as needed for shortness of breath / dyspnea or wheezing 1 Inhaler 1     exemestane (AROMASIN) 25 MG tablet Take 1 tablet (25 mg) by mouth every morning 90 tablet 3     gabapentin (NEURONTIN) 600 MG tablet Take 1 tablet (600 mg) by mouth 3 times daily 180 tablet 6     hydrochlorothiazide (HYDRODIURIL) 25 MG tablet TAKE ONE-HALF TABLET BY MOUTH ONCE DAILY IN THE MORNING 45 tablet 1     HYDROcodone-acetaminophen (NORCO) 5-325 MG per tablet Take 1-2 tablets by mouth every 6 hours as needed for  "other (Moderate to Severe Pain) 5 tablet 0        Physical Exam:  /71 (BP Location: Right arm, Patient Position: Sitting, Cuff Size: Adult Large)   Pulse 64   Temp 97.8  F (36.6  C) (Tympanic)   Resp 16   Ht 1.715 m (5' 7.5\")   Wt 112.9 kg (248 lb 14.4 oz)   SpO2 93%   BMI 38.41 kg/m     Wt Readings from Last 12 Encounters:   05/15/19 112.9 kg (248 lb 14.4 oz)   05/14/19 113.2 kg (249 lb 9.6 oz)   04/09/19 110.7 kg (244 lb)   02/07/19 112 kg (247 lb)   01/30/19 112 kg (247 lb)   12/26/18 113.4 kg (250 lb)   12/21/18 110.7 kg (244 lb)   11/29/18 114.3 kg (252 lb)   11/28/18 113.9 kg (251 lb)   11/14/18 112.5 kg (248 lb 1.6 oz)   11/13/18 112.9 kg (249 lb)   10/09/18 112.5 kg (248 lb)     GENERAL APPEARANCE: Healthy, alert and in no acute distress.  HEENT: Sclerae anicteric. PERRLA. Oropharynx without ulcers, lesions, or thrush.  NECK: Supple. No asymmetry or masses.  LYMPHATICS: No palpable cervical, supraclavicular, axillary, or inguinal lymphadenopathy.  RESP: Lungs clear to auscultation bilaterally without rales, rhonchi or wheezes.\",BREAST: Right- No mass, nipple discharge or axillary adenopathy. Left- No mass, nipple discharge or axillary adenopathy \"CARDIOVASCULAR: Regular rate and rhythm. Normal S1, S2; no S3 or S4. No murmur, gallop, or rub.  ABDOMEN: Soft, nontender. Bowel sounds normal. No palpable organomegaly or masses.  MUSCULOSKELETAL: Extremities without gross deformities noted. No edema of bilateral lower extremities.  SKIN: No suspicious lesions or rashes.  NEURO: Alert and oriented x 3. Cranial nerves II-XII grossly intact.  PSYCHIATRIC: Mentation and affect appear normal.    Laboratory/Imaging Studies:  No visits with results within 2 Week(s) from this visit.   Latest known visit with results is:   Office Visit on 04/09/2019   Component Date Value Ref Range Status     PAP 04/09/2019 ASC-H*  Final     Copath Report 04/09/2019    Final                    Value:  Patient Name: ANISHA, " ROSALINDA KUMAR  MR#: [id number]  Specimen #: [id number]  Collected: 4/9/2019  Received: 4/10/2019  Reported: 4/12/2019 16:43  Ordering Phy(s): MAR ROSS    For improved result formatting, select 'View Enhanced Report Format' under   Linked Documents section.    SPECIMEN/STAIN PROCESS:  Pap Imaged thin layer prep diagnostic (SurePath, FocalPoint with guided   screening)       Pap-Cyto x 1, HPV ordered x 1    SOURCE: Vaginal  ----------------------------------------------------------------   Pap Imaged thin layer prep diagnostic (SurePath, FocalPoint with guided   screening)  SPECIMEN ADEQUACY:  Satisfactory for evaluation.  -Transitional zone component could not be determined due to atrophy.    CYTOLOGIC INTERPRETATION:    Epithelial cell abnormality:  squamous cell: atypical squamous   cells-cannot exclude  high-grade squamous  intraepithelial lesion.    I have personally reviewed all specimens and/or slides, including the   listed special stains, and used them                            with my medical judgment to determine the final diagnosis.    Electronically signed out by:    Nevaeh Lockhart M.D., [facility]    Processed and screened at [facility]    CLINICAL HISTORY:    Hysterectomy, Previous LGSIL  Date of Last Pap: 10/9/2018  Dysplasia: vaginal,    Papanicolaou Test Limitations:  Cervical cytology is a screening test with   limited sensitivity; regular  screening is critical for cancer prevention; Pap tests are primarily   effective for the diagnosis/prevention of  squamous cell carcinoma, not adenocarcinomas or other cancers.    TESTING LAB LOCATION:  [address]  [id number] [phone]    COLLECTION SITE:  Client:  [facility]  Location: [facility] ([facility])         HPV Source 04/09/2019 SurePath   Final     HPV 16 DNA 04/09/2019  Positive* NEG^Negative Final     HPV 18 DNA 04/09/2019 Negative  NEG^Negative Final     Other HR HPV 04/09/2019 Negative  NEG^Negative Final     Final Diagnosis 04/09/2019 This patient's sample is positive for HPV 16 DNA.   Final    Comment: This test was developed and its performance characteristics determined by the   [facility], [facility]. It   has not been cleared or approved by the FDA. The laboratory is regulated under   CLIA as qualified to perform high-complexity testing. This test is used for   clinical purposes. It should not be regarded as investigational or for   research.  (Note)  METHODOLOGY:  The Roche cristiano 4800 system uses automated extraction,   simultaneous amplification of HPV (L1 region) and beta-globin,    followed by  real time detection of fluorescent labeled HPV and beta   globin using specific oligonucleotide probes . The test specifically   identifies types HPV 16 DNA and HPV 18 DNA while concurrently   detecting the rest of the high risk types (31, 33, 35, 39, 45, 51,   52, 56, 58, 59, 66 or 68).  COMMENTS:  This test is not intended for use as a screening device   for women under age 30 with normal cervical cytology.  Results should   be correl                           ated with cytologic and histologic findings. Close clinical   followup is recommended.       Specimen Description 04/09/2019 Cervical Cells   Final    [id number]        Assessment and plan:    (C50.411,  Z17.0,  C50.412) 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, estrogen receptor positive (H)  (primary encounter diagnosis)  I reviewed with the patient today most recent laboratory tests.  There is no clinical evidence of breast cancer recurrence.  She will continue on adjuvant endocrine therapy with exemestane 25 mg orally daily.  We will schedule the patient for her annual mammogram next month.  I will see the patient again in 6 months time or  sooner if there are new developments or concerns.    (D3A.00) Carcinoid tumor  (C78.6) Peritoneal metastases (H)  Patient currently scheduled for liver biopsy.    (I10) Hypertension goal BP (blood pressure) < 140/90  Patient currently on hydrochlorothiazide 25 mg orally daily.    (M05.9) Rheumatoid arthritis with positive rheumatoid factor, involving unspecified site (H)  Patient rheumatoid has been in remission    (Z12.31) Visit for screening mammogram  Plan: [place] Screen Bilateral w/Herrera    (M85.852) Osteopenia of left hip  Reviewed with the patient today the results from bone density scan.  I would recommend at this time to proceed with Prolia every 6 months.    The patient is ready to learn, no apparent learning barriers were identified.  Diagnosis and treatment plans were explained to the patient. The patient expressed understanding of the content. The patient asked appropriate questions. The patient questions were answered to her satisfaction.    Chart documentation with Dragon Voice recognition Software. Although reviewed after completion, some words and grammatical errors may remain.    Again, thank you for allowing me to participate in the care of your patient.        Sincerely,        Hosea King MD

## 2019-05-16 ENCOUNTER — HOSPITAL ENCOUNTER (OUTPATIENT)
Dept: GENERAL RADIOLOGY | Facility: CLINIC | Age: 69
Discharge: HOME | End: 2019-05-16
Attending: UROLOGY | Admitting: UROLOGY
Payer: MEDICARE

## 2019-05-16 ENCOUNTER — HOSPITAL ENCOUNTER (OUTPATIENT)
Dept: NUCLEAR MEDICINE | Facility: CLINIC | Age: 69
Setting detail: NUCLEAR MEDICINE
Discharge: HOME | End: 2019-05-16
Attending: UROLOGY | Admitting: UROLOGY
Payer: MEDICARE

## 2019-05-16 DIAGNOSIS — K76.9 LIVER LESION: ICD-10-CM

## 2019-05-16 PROCEDURE — 34300033 ZZH RX 343: Performed by: UROLOGY

## 2019-05-16 PROCEDURE — 78708 K FLOW/FUNCT IMAGE W/DRUG: CPT

## 2019-05-16 PROCEDURE — 25000128 H RX IP 250 OP 636: Performed by: UROLOGY

## 2019-05-16 PROCEDURE — A9562 TC99M MERTIATIDE: HCPCS | Performed by: UROLOGY

## 2019-05-16 RX ORDER — FUROSEMIDE 10 MG/ML
40 INJECTION INTRAMUSCULAR; INTRAVENOUS ONCE
Status: COMPLETED | OUTPATIENT
Start: 2019-05-16 | End: 2019-05-16

## 2019-05-16 RX ADMIN — Medication 10.4 MILLICURIE: at 08:30

## 2019-05-16 RX ADMIN — FUROSEMIDE 40 MG: 10 INJECTION, SOLUTION INTRAMUSCULAR; INTRAVENOUS at 08:44

## 2019-05-16 NOTE — PROGRESS NOTES
Hematology/ Oncology Follow-up Visit:  May 15, 2019    Reason for Visit:   Chief Complaint   Patient presents with     Oncology Clinic Visit     6 month recheck 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, estrogen receptor positive, labs & Mammogram       Oncologic History:    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)  Marissa Guadarrama was found on routine mammogram a group of microcalcification is in the upper outer right breast. There was also a focal asymmetry in the lower left breast. Subsequently the patient went on to have bilateral diagnostic mammography with ultrasound on March 28, 2016.. On the right breast there was microcalcification is the main grouping measures 1.6 cm x 1 cm bilateral 2.9 cm located 2.9 cm from the nipple. An additional tiny area about 0.2 cm seen at the anterolateral margin of this main grouping about 1 cm from the main grouping. The left breast shows no masses or significant abnormalities. Subsequently the patient went on to have breast needle biopsy on March 30, 2016. 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 Angiolymphatic invasion was not identified. 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 Microcalcifications was also associated with ductal carcinoma in situ. His surgeon receptor was positive. Progesterone receptor was negative. She underwent bilateral sentinel lymph node biopsy and bilateral lumpectomies with prior seed placement.  the surgical pathology Showing left breast lumpectomy was a tumor size of 13 mm grade 2 of 3, angiolymphatic invasion was not identified the tumor was unifocal associated with ductal carcinoma in situ high-grade. Surgical margins where negative. [place] lymph node was negative for metastatic tumor. Stage is T1cN0 M0 On the right breast and there was invasive ductal carcinoma with a tumor size of 3 mm grade 3 of 3.  [place] lymph nodes were negative for metastatic disease. The tumor stage is T1aN0 M0. The tumor on the left was positive for estrogen and progesterone receptor. It did show no HER-2/praveen amplification. The tumor on the right is positive for estrogen receptor and negative for progesterone receptor and shows no HER-2/praveen amplification. Subsequently the patient concluded the radiation therapy. She is currently on hormonal therapy with Arimidex. She developed a skin rash and Arimidex was switched to Aromasin.      Interval History:  Patient returning today for follow-up.  She denies any nausea or vomiting or diarrhea or fever or chills.  She noted some tenderness in the right breast around the scar area.  She is currently on endocrine therapy with exemestane 25 mg orally daily.  She has been also followed by urology and she is scheduled for liver biopsy for her carcinoid lesions.    Review Of Systems:  Constitutional: Negative for fever, chills, and night sweats.  Skin: negative.  Eyes: negative.  Ears/Nose/Throat: negative.  Respiratory: No shortness of breath, dyspnea on exertion, cough, or hemoptysis.  Cardiovascular: negative.  Gastrointestinal: negative.  Genitourinary: negative.  Musculoskeletal: negative.  Neurologic: negative.  Psychiatric: negative.  Hematologic/Lymphatic/Immunologic: negative.  Endocrine: negative.    All other ROS negative unless mentioned in interval history.    Past medical, social, surgical, and family histories reviewed.    Allergies:  Allergies as of 05/15/2019     (No Known Allergies)       Current Medications:  Current Outpatient Medications   Medication Sig Dispense Refill     acetaminophen (TYLENOL) 325 MG tablet Take 325-650 mg by mouth every 6 hours as needed for mild pain       albuterol (PROAIR HFA, PROVENTIL HFA, VENTOLIN HFA) 108 (90 BASE) MCG/ACT inhaler Inhale 2 puffs into the lungs every 6 hours as needed for shortness of breath / dyspnea or wheezing 1 Inhaler 1      "exemestane (AROMASIN) 25 MG tablet Take 1 tablet (25 mg) by mouth every morning 90 tablet 3     gabapentin (NEURONTIN) 600 MG tablet Take 1 tablet (600 mg) by mouth 3 times daily 180 tablet 6     hydrochlorothiazide (HYDRODIURIL) 25 MG tablet TAKE ONE-HALF TABLET BY MOUTH ONCE DAILY IN THE MORNING 45 tablet 1     HYDROcodone-acetaminophen (NORCO) 5-325 MG per tablet Take 1-2 tablets by mouth every 6 hours as needed for other (Moderate to Severe Pain) 5 tablet 0        Physical Exam:  /71 (BP Location: Right arm, Patient Position: Sitting, Cuff Size: Adult Large)   Pulse 64   Temp 97.8  F (36.6  C) (Tympanic)   Resp 16   Ht 1.715 m (5' 7.5\")   Wt 112.9 kg (248 lb 14.4 oz)   SpO2 93%   BMI 38.41 kg/m    Wt Readings from Last 12 Encounters:   05/15/19 112.9 kg (248 lb 14.4 oz)   05/14/19 113.2 kg (249 lb 9.6 oz)   04/09/19 110.7 kg (244 lb)   02/07/19 112 kg (247 lb)   01/30/19 112 kg (247 lb)   12/26/18 113.4 kg (250 lb)   12/21/18 110.7 kg (244 lb)   11/29/18 114.3 kg (252 lb)   11/28/18 113.9 kg (251 lb)   11/14/18 112.5 kg (248 lb 1.6 oz)   11/13/18 112.9 kg (249 lb)   10/09/18 112.5 kg (248 lb)     GENERAL APPEARANCE: Healthy, alert and in no acute distress.  HEENT: Sclerae anicteric. PERRLA. Oropharynx without ulcers, lesions, or thrush.  NECK: Supple. No asymmetry or masses.  LYMPHATICS: No palpable cervical, supraclavicular, axillary, or inguinal lymphadenopathy.  RESP: Lungs clear to auscultation bilaterally without rales, rhonchi or wheezes.\",BREAST: Right- No mass, nipple discharge or axillary adenopathy. Left- No mass, nipple discharge or axillary adenopathy \"CARDIOVASCULAR: Regular rate and rhythm. Normal S1, S2; no S3 or S4. No murmur, gallop, or rub.  ABDOMEN: Soft, nontender. Bowel sounds normal. No palpable organomegaly or masses.  MUSCULOSKELETAL: Extremities without gross deformities noted. No edema of bilateral lower extremities.  SKIN: No suspicious lesions or rashes.  NEURO: Alert and " oriented x 3. Cranial nerves II-XII grossly intact.  PSYCHIATRIC: Mentation and affect appear normal.    Laboratory/Imaging Studies:  No visits with results within 2 Week(s) from this visit.   Latest known visit with results is:   Office Visit on 04/09/2019   Component Date Value Ref Range Status     PAP 04/09/2019 [facility]*  Final     Copath Report 04/09/2019    Final                    Value:  Patient Name: ROSALINDA LANCASTER  MR#: [id number]  Specimen #: [id number]  Collected: 4/9/2019  Received: 4/10/2019  Reported: 4/12/2019 16:43  Ordering Phy(s): MAR ROSS    For improved result formatting, select 'View Enhanced Report Format' under   Linked Documents section.    SPECIMEN/STAIN PROCESS:  Pap Imaged thin layer prep diagnostic (SurePath, FocalPoint with guided   screening)       Pap-Cyto x 1, HPV ordered x 1    SOURCE: Vaginal  ----------------------------------------------------------------   Pap Imaged thin layer prep diagnostic (SurePath, FocalPoint with guided   screening)  SPECIMEN ADEQUACY:  Satisfactory for evaluation.  -Transitional zone component could not be determined due to atrophy.    CYTOLOGIC INTERPRETATION:    Epithelial cell abnormality:  squamous cell: atypical squamous   cells-cannot exclude  high-grade squamous  intraepithelial lesion.    I have personally reviewed all specimens and/or slides, including the   listed special stains, and used them                            with my medical judgment to determine the final diagnosis.    Electronically signed out by:    Nevaeh Lockhart M.D., [facility]    Processed and screened at [facility]    CLINICAL HISTORY:    Hysterectomy, Previous LGSIL  Date of Last Pap: 10/9/2018  Dysplasia: vaginal,    Papanicolaou Test Limitations:  Cervical cytology is a screening test with   limited sensitivity; regular  screening is critical for cancer prevention; Pap tests are primarily   effective for the  diagnosis/prevention of  squamous cell carcinoma, not adenocarcinomas or other cancers.    TESTING LAB LOCATION:  [facility] [address] [place] [address]  [id number] [phone]    COLLECTION SITE:  Client:  [facility]  Location: [facility] (B)         HPV Source 04/09/2019 [facility]   Final     HPV 16 DNA 04/09/2019 Positive* NEG^Negative Final     HPV 18 DNA 04/09/2019 Negative  NEG^Negative Final     Other HR HPV 04/09/2019 Negative  NEG^Negative Final     Final Diagnosis 04/09/2019 This patient's sample is positive for HPV 16 DNA.   Final    Comment: This test was developed and its performance characteristics determined by the   [facility]. It   has not been cleared or approved by the FDA. The laboratory is regulated under   CLIA as qualified to perform high-complexity testing. This test is used for   clinical purposes. It should not be regarded as investigational or for   research.  (Note)  METHODOLOGY:  The Roche cristiano 4800 system uses automated extraction,   simultaneous amplification of HPV (L1 region) and beta-globin,    followed by  real time detection of fluorescent labeled HPV and beta   globin using specific oligonucleotide probes . The test specifically   identifies types HPV 16 DNA and HPV 18 DNA while concurrently   detecting the rest of the high risk types (31, 33, 35, 39, 45, 51,   52, 56, 58, 59, 66 or 68).  COMMENTS:  This test is not intended for use as a screening device   for women under age 30 with normal cervical cytology.  Results should   be correl                           ated with cytologic and histologic findings. Close clinical   followup is recommended.       Specimen Description 04/09/2019 Cervical Cells   Final    [id number]        Assessment and plan:    (C50.411,  Z17.0,  C50.412) Malignant neoplasm of upper-outer  quadrant of both breasts in female, estrogen receptor positive (H)  (primary encounter diagnosis)  I reviewed with the patient today most recent laboratory tests.  There is no clinical evidence of breast cancer recurrence.  She will continue on adjuvant endocrine therapy with exemestane 25 mg orally daily.  We will schedule the patient for her annual mammogram next month.  I will see the patient again in 6 months time or sooner if there are new developments or concerns.    (D3A.00) Carcinoid tumor  (C78.6) Peritoneal metastases (H)  Patient currently scheduled for liver biopsy.    (I10) Hypertension goal BP (blood pressure) < 140/90  Patient currently on hydrochlorothiazide 25 mg orally daily.    (M05.9) Rheumatoid arthritis with positive rheumatoid factor, involving unspecified site (H)  Patient rheumatoid has been in remission    (Z12.31) Visit for screening mammogram  Plan: MA Screen Bilateral w/Herrera    (M85.852) Osteopenia of left hip  Reviewed with the patient today the results from bone density scan.  I would recommend at this time to proceed with Prolia every 6 months.    The patient is ready to learn, no apparent learning barriers were identified.  Diagnosis and treatment plans were explained to the patient. The patient expressed understanding of the content. The patient asked appropriate questions. The patient questions were answered to her satisfaction.    Chart documentation with Dragon Voice recognition Software. Although reviewed after completion, some words and grammatical errors may remain.

## 2019-05-21 LAB — COPATH REPORT: NORMAL

## 2019-05-28 ENCOUNTER — OFFICE VISIT (OUTPATIENT)
Dept: INTERVENTIONAL RADIOLOGY/VASCULAR | Facility: CLINIC | Age: 69
End: 2019-05-28
Attending: UROLOGY
Payer: COMMERCIAL

## 2019-05-28 VITALS
HEART RATE: 68 BPM | BODY MASS INDEX: 38.33 KG/M2 | DIASTOLIC BLOOD PRESSURE: 65 MMHG | SYSTOLIC BLOOD PRESSURE: 139 MMHG | WEIGHT: 248.4 LBS | OXYGEN SATURATION: 95 %

## 2019-05-28 DIAGNOSIS — K76.9 LIVER LESION: Primary | ICD-10-CM

## 2019-05-28 DIAGNOSIS — Z17.0 MALIGNANT NEOPLASM OF UPPER-OUTER QUADRANT OF BOTH BREASTS IN FEMALE, ESTROGEN RECEPTOR POSITIVE (H): ICD-10-CM

## 2019-05-28 DIAGNOSIS — M85.852 OSTEOPENIA OF LEFT HIP: ICD-10-CM

## 2019-05-28 DIAGNOSIS — C50.412 MALIGNANT NEOPLASM OF UPPER-OUTER QUADRANT OF BOTH BREASTS IN FEMALE, ESTROGEN RECEPTOR POSITIVE (H): ICD-10-CM

## 2019-05-28 DIAGNOSIS — C50.411 MALIGNANT NEOPLASM OF UPPER-OUTER QUADRANT OF BOTH BREASTS IN FEMALE, ESTROGEN RECEPTOR POSITIVE (H): ICD-10-CM

## 2019-05-28 DIAGNOSIS — Z79.899 NEED FOR PROPHYLACTIC CHEMOTHERAPY: ICD-10-CM

## 2019-05-28 RX ORDER — EPINEPHRINE 0.3 MG/.3ML
0.3 INJECTION SUBCUTANEOUS EVERY 5 MIN PRN
Status: CANCELLED | OUTPATIENT
Start: 2019-05-28

## 2019-05-28 RX ORDER — METHYLPREDNISOLONE SODIUM SUCCINATE 125 MG/2ML
125 INJECTION, POWDER, LYOPHILIZED, FOR SOLUTION INTRAMUSCULAR; INTRAVENOUS
Status: CANCELLED
Start: 2019-05-28

## 2019-05-28 RX ORDER — DIPHENHYDRAMINE HYDROCHLORIDE 50 MG/ML
50 INJECTION INTRAMUSCULAR; INTRAVENOUS
Status: CANCELLED
Start: 2019-05-28

## 2019-05-28 RX ORDER — MEPERIDINE HYDROCHLORIDE 25 MG/ML
25 INJECTION INTRAMUSCULAR; INTRAVENOUS; SUBCUTANEOUS EVERY 30 MIN PRN
Status: CANCELLED | OUTPATIENT
Start: 2019-05-28

## 2019-05-28 RX ORDER — ALBUTEROL SULFATE 0.83 MG/ML
2.5 SOLUTION RESPIRATORY (INHALATION)
Status: CANCELLED | OUTPATIENT
Start: 2019-05-28

## 2019-05-28 RX ORDER — EPINEPHRINE 1 MG/ML
0.3 INJECTION, SOLUTION, CONCENTRATE INTRAVENOUS EVERY 5 MIN PRN
Status: CANCELLED | OUTPATIENT
Start: 2019-05-28

## 2019-05-28 RX ORDER — ALBUTEROL SULFATE 90 UG/1
1-2 AEROSOL, METERED RESPIRATORY (INHALATION)
Status: CANCELLED
Start: 2019-05-28

## 2019-05-28 RX ORDER — SODIUM CHLORIDE 9 MG/ML
1000 INJECTION, SOLUTION INTRAVENOUS CONTINUOUS PRN
Status: CANCELLED
Start: 2019-05-28

## 2019-05-28 ASSESSMENT — ENCOUNTER SYMPTOMS
ARTHRALGIAS: 1
MUSCLE CRAMPS: 1
SLEEP DISTURBANCES DUE TO BREATHING: 0
NECK PAIN: 0
HYPOTENSION: 0
EYE IRRITATION: 1
LIGHT-HEADEDNESS: 0
SINUS CONGESTION: 0
STIFFNESS: 1
NECK MASS: 0
SMELL DISTURBANCE: 0
JOINT SWELLING: 1
MYALGIAS: 1
HYPERTENSION: 0
EYE WATERING: 1
DYSURIA: 0
POOR WOUND HEALING: 0
BACK PAIN: 1
SORE THROAT: 0
NAIL CHANGES: 0
EXERCISE INTOLERANCE: 1
DOUBLE VISION: 0
DIFFICULTY URINATING: 0
TROUBLE SWALLOWING: 0
EYE REDNESS: 0
FLANK PAIN: 0
PALPITATIONS: 0
SKIN CHANGES: 0
EYE PAIN: 0
ORTHOPNEA: 0
HEMATURIA: 0
MUSCLE WEAKNESS: 1
LEG PAIN: 1
TASTE DISTURBANCE: 0
HOARSE VOICE: 0
SINUS PAIN: 0
SYNCOPE: 0

## 2019-05-28 NOTE — PROGRESS NOTES
First Name: Marissa   Age: 69 year old   Referring Physician: Dr. La   REASON FOR REFERRAL: Consult for liver lesion biopsy    Assessment:  Esperanza is a 69 year old with a PMH of cecal carcinoid tumor diagnosed in 2003, s/p surgical resection.  Cervical cancer in 2007 and breast cancer in 2016.  In February 2017 she had left ureteral stent placed for a left hydro, due to a tumor pushing on the left distal ureter.  When working the patient up for possible surgery for removal of the peritoneal tumor, the decision was made the indeterminate liver tumor should be biopsied first to determine what it is.      Plan:  Image guided liver lesion biopsy  Will need an INR on the day of the biopsy    HPI: This is is a patient with a PMH of a cecal carcinoid tumor removal in 2003.  She also has a hx of cervical cancer diagnosed in 2007 and was diagnosed with bilateral breast cancer in 2016, she underwent bilateral lumpectomies and sentinel lymph node biopsies.  Right side is invasive ductal carcinoma, ER+,DE-,HER2 negative.  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  She has been on Aromasin.  She received radiation therapy from June to August 2016.  In December 2016 the patient developed SOB and had a CT scan of the chest and was incidentally found to have left sided hydronephrosis.  The patient was referred to Dr. La, who did further work-up as well as placing a left ureteral stent in February 2017.  There is a mass pushing on the left distal ureter.  A renogram in March 2017 showed that only 21% of the function was coming from the left kidney.  Review of old imaging demonstrates that this mass was likely present back in 2014 and that it has gotten larger between 2014 and 2017. Because of the patient significant past surgical history, Dr. La has had to review the past surgical reports, she has had a hernia repair with mesh and has counseled Esperanza and her  extensively about  the risks of surgical removal of the tumor near the left ureter. The stent is changed on a regular basis.   In May 2018 they discussed removing the tumor in the winter of 2019, so a CT scan was performed in September 2018.  There is subcapsular mass involving the posterior segment of the right lobe of the liver on image #23. This causes lobulation of  the outer margin of the liver. This measures 4.2 x 2.1 cm. This is better seen today but shows little change or is only slightly larger compared to prior studies dating back to 12/15/2016.  An MRI was performed which and the liver lesion remained indeterminate.  Dr. La is requesting a biopsy prior to proceeding with his planned surgery.  Dr. Liu and Dr. Barnett from Interventional Radiology both reviewed the imaging and approved the biopsy.  This will be a higher risk biopsy because of the location.  This patient also has a PMH of hypertension, hyperlipidemia, obesity, and arthritis    PAST MEDICAL HISTORY:   Past Medical History:   Diagnosis Date     Arthritis     knee     Benign breast biopsy     benign     Carcinoid tumor 12/2003     Cervical cancer (H) 2007     H/O colposcopy with cervical biopsy 12/23/13    vaginal cuff biopsy- VAIN III. referred back to gyn/onc     HTN      Hyperlipidemia      Pap smear of vagina with ASC-H 11/1/13     Post-polio syndromes      Trigeminal neuralgias      PAST SURGICAL HISTORY:   Past Surgical History:   Procedure Laterality Date     APPENDECTOMY  1983     BIOPSY NODE SENTINEL Bilateral 6/1/2016    Procedure: BIOPSY NODE SENTINEL;  Surgeon: Brent Arana MD;  Location: [facility]     C BSO, OMENTECTOMY W/OSMAN  5/2007     C TOTAL ABDOM HYSTERECTOMY  5/2007     CL AFF SURGICAL PATHOLOGY       COLONOSCOPY N/A 6/23/2017    Procedure: COMBINED COLONOSCOPY, SINGLE OR MULTIPLE BIOPSY/POLYPECTOMY BY BIOPSY;  Colonoscopy Dx:Carcinoid tumor of colon prep mailed golytely;  Surgeon: Talisha Greco MD;  Location:  GI      COLPOSCOPY, BIOPSY, COMBINED  3/13/2014    Procedure: COMBINED COLPOSCOPY, BIOPSY;;  Surgeon: Lara Pack MD;  Location: [facility]     COMBINED CYSTOSCOPY, RETROGRADES, EXCHANGE STENT URETER(S) Left 2/7/2019    Procedure: COMBINED CYSTOSCOPY, RETROGRADES, EXCHANGE STENT URETER--left;  Surgeon: Zhao La MD;  Location: [facility]     COMBINED CYSTOSCOPY, RETROGRADES, URETEROSCOPY, INSERT STENT Left 2/2/2017    Procedure: COMBINED CYSTOSCOPY, RETROGRADES, URETEROSCOPY, INSERT STENT;  Surgeon: Zhao La MD;  Location: [facility]     CYSTOSCOPY, RETROGRADES, INSERT STENT URETER(S), COMBINED Left 9/7/2017    Procedure: COMBINED CYSTOSCOPY, RETROGRADES, INSERT STENT URETER(S);  Cystoscopy,Left Stent Exchange;  Surgeon: Zhao La MD;  Location: [facility]     CYSTOSCOPY, RETROGRADES, INSERT STENT URETER(S), COMBINED  12/12/2017    Procedure: COMBINED CYSTOSCOPY, RETROGRADES, INSERT STENT URETER(S);;  Surgeon: Zhao La MD;  [facility]     CYSTOSCOPY, RETROGRADES, INSERT STENT URETER(S), COMBINED Left 7/5/2018    Procedure: COMBINED CYSTOSCOPY, RETROGRADES, INSERT STENT URETER(S);  Cystoscopy, Left Stent Exchange;  Surgeon: Zhao La MD;  Location: [facility]     EXAM UNDER ANESTHESIA PELVIC  3/13/2014    Procedure: EXAM UNDER ANESTHESIA PELVIC;  Exam Under Anestheisa, Colposcopy, Vaginal Biopsies, Co2 Laser of the Upper Vagina;  Surgeon: Lara Pack MD;  [facility]     HERNIORRHAPHY INCISIONAL (LOCATION)       LASER CO2 VAGINA  3/13/2014    VAIN 1/2     LASER CO2 VAGINA N/A 12/12/2017    Procedure: LASER CO2 VAGINA;  Exam Under Anesthesia, CO2 Laser Ablation Of Vagina, Cystoscopy, Left Retrograde Pyelogram with Left Stent Placement;  Surgeon: Nic Segundo MD;  Location: [facility]     LUMPECTOMY BREAST WITH SEED LOCALIZATION Bilateral 6/1/2016    Procedure: LUMPECTOMY BREAST WITH SEED LOCALIZATION;  Surgeon: Brent Arana MD;   Location: [facility]     SURGICAL HISTORY OF -       ovarian cystectomy     SURGICAL HISTORY OF -       right colon resection secondary to carcinoid tumor     TUBAL LIGATION       FAMILY HISTORY:   Family History   Problem Relation Age of Onset     Cancer Mother         bone / liver     Breast Cancer Mother      Cancer Maternal Grandmother      Cancer Maternal Grandfather      Cancer Paternal Grandmother      Cancer Paternal Grandfather      Cancer Sister         vulvar ca, cervical ca, squamous cell cancer     Cancer Brother         Rectal- Stage 4     Rectal Cancer Brother      Breast Cancer Paternal Aunt      Colon Cancer Paternal Aunt      Breast Cancer Maternal Aunt      Pancreatic Cancer Nephew      Breast Cancer Sister      SOCIAL HISTORY:   Social History     Tobacco Use     Smoking status: Former Smoker     Packs/day: 1.00     Years: 29.00     Pack years: 29.00     Types: Cigarettes     Last attempt to quit: 10/30/2006     Years since quittin.6     Smokeless tobacco: Never Used   Substance Use Topics     Alcohol use: No     Alcohol/week: 0.0 oz     PROBLEM LIST:   Patient Active Problem List    Diagnosis Date Noted     Osteopenia of hip 05/15/2019     Priority: Medium     Need for prophylactic chemotherapy 05/15/2019     Priority: Medium     Rheumatoid arthritis with positive rheumatoid factor, involving unspecified site (H) 2018     Priority: Medium     Ureteral obstruction 2018     Priority: Medium     Added automatically from request for surgery [id number]       Obesity (BMI 35.0-39.9) with comorbidity (H) 2018     Priority: Medium     Peritoneal metastases (H) 10/30/2017     Priority: Medium     Serum calcium elevated 2017     Priority: Medium     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) 2016     Priority: Medium     Rt upper outer, L lower outer ER+NE+ Her 2-       Urinary incontinence 2014     Priority: Medium     Vaginal  "dysplasia 03/10/2014     Priority: Medium     CKD (chronic kidney disease) stage 3, GFR 30-59 ml/min (H) 09/23/2012     Priority: Medium     Advanced directives, counseling/discussion 09/19/2012     Priority: Medium     Advance Care Planning:   ACP Review and Resources Provided:  Reviewed chart for advance care plan.  Marissa Guadarrama has no plan or code status on file. Discussed available resources and provided with information. Confirmed code status reflects current choices pending further ACP discussions.  Confirmed/documented designated decision maker(s). See permanent comments section of demographics in clinical tab. Added by Tiff Askew on 2/6/2015  Discussed advance care planning with patient; however, patient declined at this time. 9/19/2012              OA (osteoarthritis) of knee 10/05/2011     Priority: Medium     Hypertension goal BP (blood pressure) < 140/90 11/01/2010     Priority: Medium     HYPERLIPIDEMIA LDL GOAL <130 10/31/2010     Priority: Medium     Post-polio syndrome      Priority: Medium     Left knee, diagnosed by ortho in 1976, had left leg/toe surgeries as child  (Problem list name updated by automated process. Provider to review and confirm.)       Cervical cancer (H)      Priority: Medium     5/2007.  Dr. Alonso, [facility] gyn/onc,  Now needs routine paps, patient not always compliant  3/26/09 pap NIL  9/24/09 pap ASCUS  11/1/13 pap ASC-H. Plan-- colposcopy   12/23/13 colposcopy scheduled. Reminder placed.  colpscopy 12/23/2013-Vaginal cuff (submitted as \"cervix\"), biopsy:  - High grade squamous intraepithelial lesion (vaginal  intraepithelial neoplasia grade III, VaIN III, severe dysplasia and  carcinoma in situ).  - No invasive malignancy identified.  - Cervical transformation zone not identified in biopsies.  - Please see comment.  Referral back to gynecology/oncology  2/5/14 gyn/onc appt   3/13/14 colposcopy and CO2 laser vagina, path:VAIN 1/2.  3/19/14 follow up in 4 " months  7/30/14 vaginal biopsy: VAIN 1. Plan: repeat colp in 6 months.(9/17/14)   9/17/14 colp. No staining seen. No biopsy taken. Pap- ASCUS + HR HPV. Plan- repeat pap at next visit.  2/9/15 colposcopy. bx- LSIL/koilocytosis. Pap- NIL/+ HR HPV 16.  Plan: 3/16/15 treatment planning.   3/16/15 repeat colposcopy in 4 months (due 7/16/15)  7/6/15 scheduled. Tracking.             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  Patient has not been complaint with follow up.       MEDICATIONS:   Prescription Medications as of 6/4/2019       Rx Number Disp Refills Start End Last Dispensed Date Next Fill Date Owning Pharmacy    acetaminophen (TYLENOL) 325 MG tablet        [facility] [address] [place] [address], [place] [address]    Sig: Take 325-650 mg by mouth every 6 hours as needed for mild pain    Class: Historical    Route: Oral    albuterol (PROAIR HFA, PROVENTIL HFA, VENTOLIN HFA) 108 (90 BASE) MCG/ACT inhaler  1 Inhaler 1 11/28/2016    [facility] [address] [facility] [address]     Sig: Inhale 2 puffs into the lungs every 6 hours as needed for shortness of breath / dyspnea or wheezing    Class: E-Prescribe    Route: Inhalation    exemestane (AROMASIN) 25 MG tablet  90 tablet 3 5/15/2019    [facility] [address] [place] [address] [address]    Sig: Take 1 tablet (25 mg) by mouth every morning    Class: E-Prescribe    Route: Oral    gabapentin (NEURONTIN) 600 MG tablet  180 tablet 6 11/12/2018    [facility] [address] [place] [address], [place] [address]    Sig: Take 1 tablet (600 mg) by mouth 3 times daily    Class: E-Prescribe    Route: Oral    hydrochlorothiazide (HYDRODIURIL) 25 MG tablet  45 tablet 1 1/17/2019    [facility] [address] [place] [address], [place] - [address]    Sig: TAKE ONE-HALF TABLET BY MOUTH ONCE DAILY IN THE MORNING    Class: E-Prescribe     HYDROcodone-acetaminophen (NORCO) 5-325 MG per tablet  5 tablet 0 12/12/2017    [facility] [place], [place] - [address]    Sig: Take 1-2 tablets by mouth every 6 hours as needed for other (Moderate to Severe Pain)    Class: Local Print    Earliest Fill Date: 12/12/2017    Route: Oral        ALLERGIES: Patient has no known allergies.  VITALS: /65   Pulse 68   Wt 112.7 kg (248 lb 6.4 oz)   SpO2 95%   BMI 38.33 kg/m      ROS:  Answers for HPI/ROS submitted by the patient on 5/28/2019   General Symptoms: No  Skin Symptoms: Yes  HENT Symptoms: Yes  EYE SYMPTOMS: Yes  HEART SYMPTOMS: Yes  LUNG SYMPTOMS: No  INTESTINAL SYMPTOMS: No  URINARY SYMPTOMS: Yes  GYNECOLOGIC SYMPTOMS: No  BREAST SYMPTOMS: No  SKELETAL SYMPTOMS: Yes  BLOOD SYMPTOMS: No  NERVOUS SYSTEM SYMPTOMS: No  MENTAL HEALTH SYMPTOMS: No  Changes in hair: Yes  Changes in moles/birth marks: No  Itching: No  Rashes: No  Changes in nails: No  Acne: No  Hair in places you don't want it: No  Change in facial hair: No  Warts: No  Non-healing sores: No  Scarring: No  Flaking of skin: No  Color changes of hands/feet in cold : Yes  Sun sensitivity: Yes  Skin thickening: No  Ear pain: No  Ear discharge: No  Hearing loss: No  Tinnitus: Yes  Nosebleeds: No  Congestion: No  Sinus pain: No  Trouble swallowing: No   Voice hoarseness: No  Mouth sores: No  Sore throat: No  Tooth pain: No  Gum tenderness: No  Bleeding gums: No  Change in taste: No  Change in sense of smell: No  Dry mouth: Yes  Hearing aid used: No  Neck lump: No  Eye pain: No  Vision loss: No  Dry eyes: Yes  Watery eyes: Yes  Eye bulging: No  Double vision: No  Flashing of lights: No  Spots: No  Floaters: No  Redness: No  Crossed eyes: No  Tunnel Vision: No  Yellowing of eyes: No  Eye irritation: Yes  Chest pain or pressure: No  Fast or irregular heartbeat: No  Pain in legs with walking: Yes  Trouble breathing while lying down: No  Fingers or toes appear blue: No  High  blood pressure: No  Low blood pressure: No  Fainting: No  Murmurs: No  Pacemaker: No  Varicose veins: Yes  Edema or swelling: Yes  Wake up at night with shortness of breath: No  Light-headedness: No  Exercise intolerance: Yes  Trouble holding urine or incontinence: Yes  Pain or burning: No  Trouble starting or stopping: Yes  Increased frequency of urination: No  Blood in urine: No  Decreased frequency of urination: No  Frequent nighttime urination: No  Flank pain: No  Difficulty emptying bladder: No  Back pain: Yes  Muscle aches: Yes  Neck pain: No  Swollen joints: Yes  Joint pain: Yes  Bone pain: Yes  Muscle cramps: Yes  Muscle weakness: Yes  Joint stiffness: Yes  Bone fracture: No    Physical Examination: Vital signs are reviewed and they are stable  Constitutional: Pleasant, older woman, in no acute physical distress, came with her , Check to the appt  Cardiovascular: negative, RRR  Respiratory: negative findings: lungs clear to auscultation  Musculoskeletal: extremities normal- no gross deformities noted, gait normal and normal muscle tone  Neurologic: negative  Psychiatric: affect normal/bright and mentation appears normal.    BMP RESULTS:  Lab Results   Component Value Date     05/13/2019    POTASSIUM 3.6 05/13/2019    CHLORIDE 107 05/13/2019    CO2 36 (H) 05/13/2019    ANIONGAP <1 (L) 05/13/2019     (H) 05/13/2019    BUN 23 05/13/2019    CR 1.25 (H) 05/29/2019    GFRESTIMATED 43 (L) 05/29/2019    GFRESTBLACK 50 (L) 05/29/2019    MARILIA 10.3 (H) 05/29/2019        CBC RESULTS:  Lab Results   Component Value Date    WBC 6.7 05/13/2019    RBC 4.78 05/13/2019    HGB 14.2 05/13/2019    HCT 45.6 05/13/2019    MCV 95 05/13/2019    MCH 29.7 05/13/2019    MCHC 31.1 (L) 05/13/2019    RDW 13.1 05/13/2019     05/13/2019       INR/PTT:  No results found for: INR, PTT    Diagnostic studies: see MRI     PROVIDER NOTE:  I explained the procedure to Esperanza and her , Gaudencio.  This included:   Preparing for the procedure, the actual procedure and recovery.  I explained the risks of the liver biopsy:  Bleeding, infection, potential for puncturing the lung, and death related to the procedure.  I explained that usually the results return after three to four business days.    I explained that he/she would be contacted by someone from Dr. La's  office following this to determine a future plan.  Thank you for involving us in the care of your patient.    30 minutes was spent with Esperanza and her , Gaudencio.  25 minutes was spent in counseling.  Yanely Mendenhall MS, APRN, CNS, CRN  Clinical Nurse Specialist  Interventional Radiology  [phone] (voice mail)  [phone] (pager)    CC  Patient Care Team:  Christin Menendez, KAREN CNP as PCP - General (Nurse Practitioner)  Hosea King MD as MD (Hematology & Oncology)  Zhao La MD as MD (Urology)  Talisha Greco MD as MD (Colon and Rectal Surgery)  Allen Downey MD as MD (Orthopedics)  Zaida Saldana DO as Assigned PCP  ZHAO LA

## 2019-05-28 NOTE — LETTER
5/28/2019       RE: Marissa Guadarrama  [address] [place] [id number]     Dear Colleague,    Thank you for referring your patient, Marissa Guadarrama, to the [facility] INTERVENTIONAL RADIOLOGY at [facility]. Please see a copy of my visit note below.    First Name: Marissa   Age: 69 year old   Referring Physician: Dr. La   REASON FOR REFERRAL: Consult for liver lesion biopsy    Assessment:  Esperanza is a 69 year old with a PMH of cecal carcinoid tumor diagnosed in 2003, s/p surgical resection.  Cervical cancer in 2007 and breast cancer in 2016.  In February 2017 she had left ureteral stent placed for a left hydro, due to a tumor pushing on the left distal ureter.  When working the patient up for possible surgery for removal of the peritoneal tumor, the decision was made the indeterminate liver tumor should be biopsied first to determine what it is.      Plan:  Image guided liver lesion biopsy  Will need an INR on the day of the biopsy    HPI: This is is a patient with a PMH of a cecal carcinoid tumor removal in 2003.  She also has a hx of cervical cancer diagnosed in 2007 and was diagnosed with bilateral breast cancer in 2016, she underwent bilateral lumpectomies and sentinel lymph node biopsies.  Right side is invasive ductal carcinoma, ER+,AR-,HER2 negative.  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  She has been on Aromasin.  She received radiation therapy from June to August 2016.  In December 2016 the patient developed SOB and had a CT scan of the chest and was incidentally found to have left sided hydronephrosis.  The patient was referred to Dr. La, who did further work-up as well as placing a left ureteral stent in February 2017.  There is a mass pushing on the left distal ureter.  A renogram in March 2017 showed that only 21% of the function was coming from the left kidney.  Review of old imaging demonstrates that this mass was  likely present back in 2014 and that it has gotten larger between 2014 and 2017. Because of the patient significant past surgical history, Dr. La has had to review the past surgical reports, she has had a hernia repair with mesh and has counseled Esperanza and her  extensively about the risks of surgical removal of the tumor near the left ureter. The stent is changed on a regular basis.   In May 2018 they discussed removing the tumor in the winter of 2019, so a CT scan was performed in September 2018.  There is subcapsular mass involving the posterior segment of the right lobe of the liver on image #23. This causes lobulation of  the outer margin of the liver. This measures 4.2 x 2.1 cm. This is better seen today but shows little change or is only slightly larger compared to prior studies dating back to 12/15/2016.  An MRI was performed which and the liver lesion remained indeterminate.  Dr. La is requesting a biopsy prior to proceeding with his planned surgery.  Dr. Liu and Dr. Barnett from Interventional Radiology both reviewed the imaging and approved the biopsy.  This will be a higher risk biopsy because of the location.  This patient also has a PMH of hypertension, hyperlipidemia, obesity, and arthritis    PAST MEDICAL HISTORY:   Past Medical History:   Diagnosis Date     Arthritis     knee     Benign breast biopsy     benign     Carcinoid tumor 12/2003     Cervical cancer (H) 2007     H/O colposcopy with cervical biopsy 12/23/13    vaginal cuff biopsy- VAIN III. referred back to gyn/onc     HTN      Hyperlipidemia      Pap smear of vagina with ASC-H 11/1/13     Post-polio syndromes      Trigeminal neuralgias      PAST SURGICAL HISTORY:   Past Surgical History:   Procedure Laterality Date     APPENDECTOMY  1983     BIOPSY NODE SENTINEL Bilateral 6/1/2016    Procedure: BIOPSY NODE SENTINEL;  Surgeon: Brent Arana MD;  Location: [facility]     C BSO, OMENTECTOMY W/OSMAN  5/2007     C  TOTAL ABDOM HYSTERECTOMY  5/2007     CL AFF SURGICAL PATHOLOGY       COLONOSCOPY N/A 6/23/2017    Procedure: COMBINED COLONOSCOPY, SINGLE OR MULTIPLE BIOPSY/POLYPECTOMY BY BIOPSY;  Colonoscopy Dx:Carcinoid tumor of colon prep mailed golytely;  Surgeon: Talisha Greco MD;  Location: [facility]     COLPOSCOPY, BIOPSY, COMBINED  3/13/2014    Procedure: COMBINED COLPOSCOPY, BIOPSY;;  Surgeon: Lara Pack MD;  [facility]     COMBINED CYSTOSCOPY, RETROGRADES, EXCHANGE STENT URETER(S) Left 2/7/2019    Procedure: COMBINED CYSTOSCOPY, RETROGRADES, EXCHANGE STENT URETER--left;  Surgeon: Zhao La MD;  Location: [facility]     COMBINED CYSTOSCOPY, RETROGRADES, URETEROSCOPY, INSERT STENT Left 2/2/2017    Procedure: COMBINED CYSTOSCOPY, RETROGRADES, URETEROSCOPY, INSERT STENT;  Surgeon: Zhao La MD;  Location: [facility]     CYSTOSCOPY, RETROGRADES, INSERT STENT URETER(S), COMBINED Left 9/7/2017    Procedure: COMBINED CYSTOSCOPY, RETROGRADES, INSERT STENT URETER(S);  Cystoscopy,Left Stent Exchange;  Surgeon: Zhao La MD;  Location: [facility]     CYSTOSCOPY, RETROGRADES, INSERT STENT URETER(S), COMBINED  12/12/2017    Procedure: COMBINED CYSTOSCOPY, RETROGRADES, INSERT STENT URETER(S);;  Surgeon: Zhao La MD;  [facility]     CYSTOSCOPY, RETROGRADES, INSERT STENT URETER(S), COMBINED Left 7/5/2018    Procedure: COMBINED CYSTOSCOPY, RETROGRADES, INSERT STENT URETER(S);  Cystoscopy, Left Stent Exchange;  Surgeon: Zhao La MD;  Location: [facility]     EXAM UNDER ANESTHESIA PELVIC  3/13/2014    Procedure: EXAM UNDER ANESTHESIA PELVIC;  Exam Under Anestheisa, Colposcopy, Vaginal Biopsies, Co2 Laser of the Upper Vagina;  Surgeon: Lara Pack MD;  [facility]     HERNIORRHAPHY INCISIONAL (LOCATION)       LASER CO2 VAGINA  3/13/2014    VAIN 1/2     LASER CO2 VAGINA N/A 12/12/2017    Procedure: LASER CO2 VAGINA;  Exam Under Anesthesia, CO2  Laser Ablation Of Vagina, Cystoscopy, Left Retrograde Pyelogram with Left Stent Placement;  Surgeon: Nic Segundo MD;  [facility]     LUMPECTOMY BREAST WITH SEED LOCALIZATION Bilateral 2016    Procedure: LUMPECTOMY BREAST WITH SEED LOCALIZATION;  Surgeon: Brent Arana MD;  Location: [facility]     SURGICAL HISTORY OF -       ovarian cystectomy     SURGICAL HISTORY OF -       right colon resection secondary to carcinoid tumor     TUBAL LIGATION       FAMILY HISTORY:   Family History   Problem Relation Age of Onset     Cancer Mother         bone / liver     Breast Cancer Mother      Cancer Maternal Grandmother      Cancer Maternal Grandfather      Cancer Paternal Grandmother      Cancer Paternal Grandfather      Cancer Sister         vulvar ca, cervical ca, squamous cell cancer     Cancer Brother         Rectal- Stage 4     Rectal Cancer Brother      Breast Cancer Paternal Aunt      Colon Cancer Paternal Aunt      Breast Cancer Maternal Aunt      Pancreatic Cancer Nephew      Breast Cancer Sister      SOCIAL HISTORY:   Social History     Tobacco Use     Smoking status: Former Smoker     Packs/day: 1.00     Years: 29.00     Pack years: 29.00     Types: Cigarettes     Last attempt to quit: 10/30/2006     Years since quittin.6     Smokeless tobacco: Never Used   Substance Use Topics     Alcohol use: No     Alcohol/week: 0.0 oz     PROBLEM LIST:   Patient Active Problem List    Diagnosis Date Noted     Osteopenia of hip 05/15/2019     Priority: Medium     Need for prophylactic chemotherapy 05/15/2019     Priority: Medium     Rheumatoid arthritis with positive rheumatoid factor, involving unspecified site (H) 2018     Priority: Medium     Ureteral obstruction 2018     Priority: Medium     Added automatically from request for surgery [id number]       Obesity (BMI 35.0-39.9) with comorbidity (H) 2018     Priority: Medium     Peritoneal metastases (H) 10/30/2017     Priority:  "Medium     Serum calcium elevated 04/07/2017     Priority: Medium     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) 06/28/2016     Priority: Medium     Rt upper outer, L lower outer ER+NM+ Her 2-       Urinary incontinence 09/12/2014     Priority: Medium     Vaginal dysplasia 03/10/2014     Priority: Medium     CKD (chronic kidney disease) stage 3, GFR 30-59 ml/min (H) 09/23/2012     Priority: Medium     Advanced directives, counseling/discussion 09/19/2012     Priority: Medium     Advance Care Planning:   ACP Review and Resources Provided:  Reviewed chart for advance care plan.  Marissa Guadarrama has no plan or code status on file. Discussed available resources and provided with information. Confirmed code status reflects current choices pending further ACP discussions.  Confirmed/documented designated decision maker(s). See permanent comments section of demographics in clinical tab. Added by Tiff Askew on 2/6/2015  Discussed advance care planning with patient; however, patient declined at this time. 9/19/2012              OA (osteoarthritis) of knee 10/05/2011     Priority: Medium     Hypertension goal BP (blood pressure) < 140/90 11/01/2010     Priority: Medium     HYPERLIPIDEMIA LDL GOAL <130 10/31/2010     Priority: Medium     Post-polio syndrome      Priority: Medium     Left knee, diagnosed by ortho in 1976, had left leg/toe surgeries as child  (Problem list name updated by automated process. Provider to review and confirm.)       Cervical cancer (H)      Priority: Medium     5/2007.  Dr. Alonso, [facility] gyn/onc,  Now needs routine paps, patient not always compliant  3/26/09 pap NIL  9/24/09 pap ASCUS  11/1/13 pap ASC-H. Plan-- colposcopy   12/23/13 colposcopy scheduled. Reminder placed.  colpscopy 12/23/2013-Vaginal cuff (submitted as \"cervix\"), biopsy:  - High grade squamous intraepithelial lesion (vaginal  intraepithelial neoplasia grade III, VaIN III, severe " dysplasia and  carcinoma in situ).  - No invasive malignancy identified.  - Cervical transformation zone not identified in biopsies.  - Please see comment.  Referral back to gynecology/oncology  2/5/14 gyn/onc appt   3/13/14 colposcopy and CO2 laser vagina, path:VAIN 1/2.  3/19/14 follow up in 4 months  7/30/14 vaginal biopsy: VAIN 1. Plan: repeat colp in 6 months.(9/17/14)   9/17/14 colp. No staining seen. No biopsy taken. Pap- ASCUS + HR HPV. Plan- repeat pap at next visit.  2/9/15 colposcopy. bx- LSIL/koilocytosis. Pap- NIL/+ HR HPV 16.  Plan: 3/16/15 treatment planning.   3/16/15 repeat colposcopy in 4 months (due 7/16/15)  7/6/15 scheduled. Tracking.             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  Patient has not been complaint with follow up.       MEDICATIONS:   Prescription Medications as of 6/4/2019       Rx Number Disp Refills Start End Last Dispensed Date Next Fill Date Owning Pharmacy    acetaminophen (TYLENOL) 325 MG tablet        [facility] [address] [place] [address]    Sig: Take 325-650 mg by mouth every 6 hours as needed for mild pain    Class: Historical    Route: Oral    albuterol (PROAIR HFA, PROVENTIL HFA, VENTOLIN HFA) 108 (90 BASE) MCG/ACT inhaler  1 Inhaler 1 11/28/2016    [facility] [address] [facility] [address] [place] [address]    Sig: Inhale 2 puffs into the lungs every 6 hours as needed for shortness of breath / dyspnea or wheezing    Class: E-Prescribe    Route: Inhalation    exemestane (AROMASIN) 25 MG tablet  90 tablet 3 5/15/2019    [facility] [address] [place] [address] [place]    Sig: Take 1 tablet (25 mg) by mouth every morning    Class: E-Prescribe    Route: Oral    gabapentin (NEURONTIN) 600 MG tablet  180 tablet 6 11/12/2018    [facility] [address] [place] [address] [address]    Sig: Take 1 tablet (600 mg)  by mouth 3 times daily    Class: E-Prescribe    Route: Oral    hydrochlorothiazide (HYDRODIURIL) 25 MG tablet  45 tablet 1 1/17/2019    [id number] - [place], [place] - [address]    Sig: TAKE ONE-HALF TABLET BY MOUTH ONCE DAILY IN THE MORNING    Class: E-Prescribe    HYDROcodone-acetaminophen (NORCO) 5-325 MG per tablet  5 tablet 0 12/12/2017    [facility] - [place], [place] - [address]     Sig: Take 1-2 tablets by mouth every 6 hours as needed for other (Moderate to Severe Pain)    Class: Local Print    Earliest Fill Date: 12/12/2017    Route: Oral        ALLERGIES: Patient has no known allergies.  VITALS: /65   Pulse 68   Wt 112.7 kg (248 lb 6.4 oz)   SpO2 95%   BMI 38.33 kg/m       ROS:  Answers for HPI/ROS submitted by the patient on 5/28/2019   General Symptoms: No  Skin Symptoms: Yes  HENT Symptoms: Yes  EYE SYMPTOMS: Yes  HEART SYMPTOMS: Yes  LUNG SYMPTOMS: No  INTESTINAL SYMPTOMS: No  URINARY SYMPTOMS: Yes  GYNECOLOGIC SYMPTOMS: No  BREAST SYMPTOMS: No  SKELETAL SYMPTOMS: Yes  BLOOD SYMPTOMS: No  NERVOUS SYSTEM SYMPTOMS: No  MENTAL HEALTH SYMPTOMS: No  Changes in hair: Yes  Changes in moles/birth marks: No  Itching: No  Rashes: No  Changes in nails: No  Acne: No  Hair in places you don't want it: No  Change in facial hair: No  Warts: No  Non-healing sores: No  Scarring: No  Flaking of skin: No  Color changes of hands/feet in cold : Yes  Sun sensitivity: Yes  Skin thickening: No  Ear pain: No  Ear discharge: No  Hearing loss: No  Tinnitus: Yes  Nosebleeds: No  Congestion: No  Sinus pain: No  Trouble swallowing: No   Voice hoarseness: No  Mouth sores: No  Sore throat: No  Tooth pain: No  Gum tenderness: No  Bleeding gums: No  Change in taste: No  Change in sense of smell: No  Dry mouth: Yes  Hearing aid used: No  Neck lump: No  Eye pain: No  Vision loss: No  Dry eyes: Yes  Watery eyes: Yes  Eye bulging: No  Double vision: No  Flashing of lights: No  Spots:  No  Floaters: No  Redness: No  Crossed eyes: No  Tunnel Vision: No  Yellowing of eyes: No  Eye irritation: Yes  Chest pain or pressure: No  Fast or irregular heartbeat: No  Pain in legs with walking: Yes  Trouble breathing while lying down: No  Fingers or toes appear blue: No  High blood pressure: No  Low blood pressure: No  Fainting: No  Murmurs: No  Pacemaker: No  Varicose veins: Yes  Edema or swelling: Yes  Wake up at night with shortness of breath: No  Light-headedness: No  Exercise intolerance: Yes  Trouble holding urine or incontinence: Yes  Pain or burning: No  Trouble starting or stopping: Yes  Increased frequency of urination: No  Blood in urine: No  Decreased frequency of urination: No  Frequent nighttime urination: No  Flank pain: No  Difficulty emptying bladder: No  Back pain: Yes  Muscle aches: Yes  Neck pain: No  Swollen joints: Yes  Joint pain: Yes  Bone pain: Yes  Muscle cramps: Yes  Muscle weakness: Yes  Joint stiffness: Yes  Bone fracture: No    Physical Examination: Vital signs are reviewed and they are stable  Constitutional: Pleasant, older woman, in no acute physical distress, came with her , Check to the appt  Cardiovascular: negative, RRR  Respiratory: negative findings: lungs clear to auscultation  Musculoskeletal: extremities normal- no gross deformities noted, gait normal and normal muscle tone  Neurologic: negative  Psychiatric: affect normal/bright and mentation appears normal.    BMP RESULTS:  Lab Results   Component Value Date     05/13/2019    POTASSIUM 3.6 05/13/2019    CHLORIDE 107 05/13/2019    CO2 36 (H) 05/13/2019    ANIONGAP <1 (L) 05/13/2019     (H) 05/13/2019    BUN 23 05/13/2019    CR 1.25 (H) 05/29/2019    GFRESTIMATED 43 (L) 05/29/2019    GFRESTBLACK 50 (L) 05/29/2019    MARILIA 10.3 (H) 05/29/2019        CBC RESULTS:  Lab Results   Component Value Date    WBC 6.7 05/13/2019    RBC 4.78 05/13/2019    HGB 14.2 05/13/2019    HCT 45.6 05/13/2019    MCV 95  05/13/2019    MCH 29.7 05/13/2019    MCHC 31.1 (L) 05/13/2019    RDW 13.1 05/13/2019     05/13/2019       INR/PTT:  No results found for: INR, PTT    Diagnostic studies: see MRI     PROVIDER NOTE:  I explained the procedure to Esperanza and her , Gaudencio.  This included:  Preparing for the procedure, the actual procedure and recovery.  I explained the risks of the liver biopsy:  Bleeding, infection, potential for puncturing the lung, and death related to the procedure.  I explained that usually the results return after three to four business days.    I explained that he/she would be contacted by someone from Dr. La's  office following this to determine a future plan.  Thank you for involving us in the care of your patient.    30 minutes was spent with Esperanza and her , Gaudencio.  25 minutes was spent in counseling.  Yanely Mendenhall MS, APRN, CNS, CRN  Clinical Nurse Specialist  Interventional Radiology  [phone] (voice mail)  [phone] (pager)    CC  Patient Care Team:  Christin Menendez APRN CNP as PCP - General (Nurse Practitioner)  Hosea King MD as MD (Hematology & Oncology)  Zhao La MD as MD (Urology)  Talisha Greco MD as MD (Colon and Rectal Surgery)  Allen Downey MD as MD (Orthopedics)  Zaida Saldana DO as Assigned PCP  [facility] [place]    Again, thank you for allowing me to participate in the care of your patient.      Sincerely,    KAREN Lara CNS

## 2019-05-28 NOTE — LETTER
5/28/2019      RE: Marissa KUMAR [address] [address]       First Name: Marissa   Age: 69 year old   Referring Physician: Dr. La   REASON FOR REFERRAL: Consult for liver lesion biopsy    Assessment:  Esperanza is a 69 year old with a PMH of cecal carcinoid tumor diagnosed in 2003, s/p surgical resection.  Cervical cancer in 2007 and breast cancer in 2016.  In February 2017 she had left ureteral stent placed for a left hydro, due to a tumor pushing on the left distal ureter.  When working the patient up for possible surgery for removal of the peritoneal tumor, the decision was made the indeterminate liver tumor should be biopsied first to determine what it is.      Plan:  Image guided liver lesion biopsy  Will need an INR on the day of the biopsy    HPI: This is is a patient with a PMH of a cecal carcinoid tumor removal in 2003.  She also has a hx of cervical cancer diagnosed in 2007 and was diagnosed with bilateral breast cancer in 2016, she underwent bilateral lumpectomies and sentinel lymph node biopsies.  Right side is invasive ductal carcinoma, ER+,TX-,HER2 negative.  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  She has been on Aromasin.  She received radiation therapy from June to August 2016.  In December 2016 the patient developed SOB and had a CT scan of the chest and was incidentally found to have left sided hydronephrosis.  The patient was referred to Dr. La, who did further work-up as well as placing a left ureteral stent in February 2017.  There is a mass pushing on the left distal ureter.  A renogram in March 2017 showed that only 21% of the function was coming from the left kidney.  Review of old imaging demonstrates that this mass was likely present back in 2014 and that it has gotten larger between 2014 and 2017. Because of the patient significant past surgical history, Dr. La has had to review the past surgical reports, she has had a  hernia repair with mesh and has counseled Esperanza and her  extensively about the risks of surgical removal of the tumor near the left ureter. The stent is changed on a regular basis.   In May 2018 they discussed removing the tumor in the winter of 2019, so a CT scan was performed in September 2018.  There is subcapsular mass involving the posterior segment of the right lobe of the liver on image #23. This causes lobulation of  the outer margin of the liver. This measures 4.2 x 2.1 cm. This is better seen today but shows little change or is only slightly larger compared to prior studies dating back to 12/15/2016.  An MRI was performed which and the liver lesion remained indeterminate.  Dr. La is requesting a biopsy prior to proceeding with his planned surgery.  Dr. Liu and Dr. Barnett from Interventional Radiology both reviewed the imaging and approved the biopsy.  This will be a higher risk biopsy because of the location.  This patient also has a PMH of hypertension, hyperlipidemia, obesity, and arthritis    PAST MEDICAL HISTORY:   Past Medical History:   Diagnosis Date     Arthritis     knee     Benign breast biopsy     benign     Carcinoid tumor 12/2003     Cervical cancer (H) 2007     H/O colposcopy with cervical biopsy 12/23/13    vaginal cuff biopsy- VAIN III. referred back to gyn/onc     HTN      Hyperlipidemia      Pap smear of vagina with ASC-H 11/1/13     Post-polio syndromes      Trigeminal neuralgias      PAST SURGICAL HISTORY:   Past Surgical History:   Procedure Laterality Date     APPENDECTOMY  1983     BIOPSY NODE SENTINEL Bilateral 6/1/2016    Procedure: BIOPSY NODE SENTINEL;  Surgeon: Brent Arana MD;  Location: [facility]     C BSO, OMENTECTOMY W/OSMAN  5/2007     C TOTAL ABDOM HYSTERECTOMY  5/2007     CL AFF SURGICAL PATHOLOGY       COLONOSCOPY N/A 6/23/2017    Procedure: COMBINED COLONOSCOPY, SINGLE OR MULTIPLE BIOPSY/POLYPECTOMY BY BIOPSY;  Colonoscopy Dx:Carcinoid tumor of  colon prep mailed golytely;  Surgeon: Talisha Greco MD;  [facility]     COLPOSCOPY, BIOPSY, COMBINED  3/13/2014    Procedure: COMBINED COLPOSCOPY, BIOPSY;;  Surgeon: Lara Pack MD;  [facility]     COMBINED CYSTOSCOPY, RETROGRADES, EXCHANGE STENT URETER(S) Left 2/7/2019    Procedure: COMBINED CYSTOSCOPY, RETROGRADES, EXCHANGE STENT URETER--left;  Surgeon: Zhao La MD;  Location: [facility]     COMBINED CYSTOSCOPY, RETROGRADES, URETEROSCOPY, INSERT STENT Left 2/2/2017    Procedure: COMBINED CYSTOSCOPY, RETROGRADES, URETEROSCOPY, INSERT STENT;  Surgeon: Zhao La MD;  Location: [facility]     CYSTOSCOPY, RETROGRADES, INSERT STENT URETER(S), COMBINED Left 9/7/2017    Procedure: COMBINED CYSTOSCOPY, RETROGRADES, INSERT STENT URETER(S);  Cystoscopy,Left Stent Exchange;  Surgeon: Zhao La MD;  Location: [facility]     CYSTOSCOPY, RETROGRADES, INSERT STENT URETER(S), COMBINED  12/12/2017    Procedure: COMBINED CYSTOSCOPY, RETROGRADES, INSERT STENT URETER(S);;  Surgeon: Zhao La MD;  [facility]     CYSTOSCOPY, RETROGRADES, INSERT STENT URETER(S), COMBINED Left 7/5/2018    Procedure: COMBINED CYSTOSCOPY, RETROGRADES, INSERT STENT URETER(S);  Cystoscopy, Left Stent Exchange;  Surgeon: Zhao La MD;  Location: [facility]     EXAM UNDER ANESTHESIA PELVIC  3/13/2014    Procedure: EXAM UNDER ANESTHESIA PELVIC;  Exam Under Anestheisa, Colposcopy, Vaginal Biopsies, Co2 Laser of the Upper Vagina;  Surgeon: Lara Pack MD;  [facility]     HERNIORRHAPHY INCISIONAL (LOCATION)       LASER CO2 VAGINA  3/13/2014    VAIN 1/2     LASER CO2 VAGINA N/A 12/12/2017    Procedure: LASER CO2 VAGINA;  Exam Under Anesthesia, CO2 Laser Ablation Of Vagina, Cystoscopy, Left Retrograde Pyelogram with Left Stent Placement;  Surgeon: Nic Segundo MD;  [facility]     LUMPECTOMY BREAST WITH SEED LOCALIZATION Bilateral 6/1/2016     Procedure: LUMPECTOMY BREAST WITH SEED LOCALIZATION;  Surgeon: Brent Arana MD;  Location: [facility]     SURGICAL HISTORY OF -       ovarian cystectomy     SURGICAL HISTORY OF -       right colon resection secondary to carcinoid tumor     TUBAL LIGATION       FAMILY HISTORY:   Family History   Problem Relation Age of Onset     Cancer Mother         bone / liver     Breast Cancer Mother      Cancer Maternal Grandmother      Cancer Maternal Grandfather      Cancer Paternal Grandmother      Cancer Paternal Grandfather      Cancer Sister         vulvar ca, cervical ca, squamous cell cancer     Cancer Brother         Rectal- Stage 4     Rectal Cancer Brother      Breast Cancer Paternal Aunt      Colon Cancer Paternal Aunt      Breast Cancer Maternal Aunt      Pancreatic Cancer Nephew      Breast Cancer Sister      SOCIAL HISTORY:   Social History     Tobacco Use     Smoking status: Former Smoker     Packs/day: 1.00     Years: 29.00     Pack years: 29.00     Types: Cigarettes     Last attempt to quit: 10/30/2006     Years since quittin.6     Smokeless tobacco: Never Used   Substance Use Topics     Alcohol use: No     Alcohol/week: 0.0 oz     PROBLEM LIST:   Patient Active Problem List    Diagnosis Date Noted     Osteopenia of hip 05/15/2019     Priority: Medium     Need for prophylactic chemotherapy 05/15/2019     Priority: Medium     Rheumatoid arthritis with positive rheumatoid factor, involving unspecified site (H) 2018     Priority: Medium     Ureteral obstruction 2018     Priority: Medium     Added automatically from request for surgery [id number]       Obesity (BMI 35.0-39.9) with comorbidity (H) 2018     Priority: Medium     Peritoneal metastases (H) 10/30/2017     Priority: Medium     Serum calcium elevated 2017     Priority: Medium     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) 2016     Priority: Medium     Rt upper outer, L lower outer  "ER+TX+ Her 2-       Urinary incontinence 09/12/2014     Priority: Medium     Vaginal dysplasia 03/10/2014     Priority: Medium     CKD (chronic kidney disease) stage 3, GFR 30-59 ml/min (H) 09/23/2012     Priority: Medium     Advanced directives, counseling/discussion 09/19/2012     Priority: Medium     Advance Care Planning:   ACP Review and Resources Provided:  Reviewed chart for advance care plan.  Marissa Guadarrama has no plan or code status on file. Discussed available resources and provided with information. Confirmed code status reflects current choices pending further ACP discussions.  Confirmed/documented designated decision maker(s). See permanent comments section of demographics in clinical tab. Added by Tiff Askew on 2/6/2015  Discussed advance care planning with patient; however, patient declined at this time. 9/19/2012              OA (osteoarthritis) of knee 10/05/2011     Priority: Medium     Hypertension goal BP (blood pressure) < 140/90 11/01/2010     Priority: Medium     HYPERLIPIDEMIA LDL GOAL <130 10/31/2010     Priority: Medium     Post-polio syndrome      Priority: Medium     Left knee, diagnosed by ortho in 1976, had left leg/toe surgeries as child  (Problem list name updated by automated process. Provider to review and confirm.)       Cervical cancer (H)      Priority: Medium     5/2007.  Dr. Alonso, [facility] gyn/onc,  Now needs routine paps, patient not always compliant  3/26/09 pap NIL  9/24/09 pap ASCUS  11/1/13 pap ASC-H. Plan-- colposcopy   12/23/13 colposcopy scheduled. Reminder placed.  colpscopy 12/23/2013-Vaginal cuff (submitted as \"cervix\"), biopsy:  - High grade squamous intraepithelial lesion (vaginal  intraepithelial neoplasia grade III, VaIN III, severe dysplasia and  carcinoma in situ).  - No invasive malignancy identified.  - Cervical transformation zone not identified in biopsies.  - Please see comment.  Referral back to gynecology/oncology  2/5/14 gyn/onc appt "   3/13/14 colposcopy and CO2 laser vagina, path:VAIN 1/2.  3/19/14 follow up in 4 months  7/30/14 vaginal biopsy: VAIN 1. Plan: repeat colp in 6 months.(9/17/14)   9/17/14 colp. No staining seen. No biopsy taken. Pap- ASCUS + HR HPV. Plan- repeat pap at next visit.  2/9/15 colposcopy. bx- LSIL/koilocytosis. Pap- NIL/+ HR HPV 16.  Plan: 3/16/15 treatment planning.   3/16/15 repeat colposcopy in 4 months (due 7/16/15)  7/6/15 scheduled. Tracking.             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  Patient has not been complaint with follow up.       MEDICATIONS:   Prescription Medications as of 6/4/2019       Rx Number Disp Refills Start End Last Dispensed Date Next Fill Date Owning Pharmacy    acetaminophen (TYLENOL) 325 MG tablet        [facility] [address] [place] [address] [address]    Sig: Take 325-650 mg by mouth every 6 hours as needed for mild pain    Class: Historical    Route: Oral    albuterol (PROAIR HFA, PROVENTIL HFA, VENTOLIN HFA) 108 (90 BASE) MCG/ACT inhaler  1 Inhaler 1 11/28/2016    [facility] [address] [place] [address] [address] [place] [address] [address]    Sig: Inhale 2 puffs into the lungs every 6 hours as needed for shortness of breath / dyspnea or wheezing    Class: E-Prescribe    Route: Inhalation    exemestane (AROMASIN) 25 MG tablet  90 tablet 3 5/15/2019    [facility] [address] [place] [address] [address]    Sig: Take 1 tablet (25 mg) by mouth every morning    Class: E-Prescribe    Route: Oral    gabapentin (NEURONTIN) 600 MG tablet  180 tablet 6 11/12/2018    [facility] [address] [place] [address] [address]    Sig: Take 1 tablet (600 mg) by mouth 3 times daily    Class: E-Prescribe    Route: Oral    hydrochlorothiazide (HYDRODIURIL) 25 MG tablet  45 tablet 1 1/17/2019    [facility] [address] [place] [address] [address]    Sig: TAKE  ONE-HALF TABLET BY MOUTH ONCE DAILY IN THE MORNING    Class: E-Prescribe    HYDROcodone-acetaminophen (NORCO) 5-325 MG per tablet  5 tablet 0 12/12/2017    [facility] [place], [place] - [address] [facility]    Sig: Take 1-2 tablets by mouth every 6 hours as needed for other (Moderate to Severe Pain)    Class: Local Print    Earliest Fill Date: 12/12/2017    Route: Oral        ALLERGIES: Patient has no known allergies.  VITALS: /65   Pulse 68   Wt 112.7 kg (248 lb 6.4 oz)   SpO2 95%   BMI 38.33 kg/m       ROS:  Answers for HPI/ROS submitted by the patient on 5/28/2019   General Symptoms: No  Skin Symptoms: Yes  HENT Symptoms: Yes  EYE SYMPTOMS: Yes  HEART SYMPTOMS: Yes  LUNG SYMPTOMS: No  INTESTINAL SYMPTOMS: No  URINARY SYMPTOMS: Yes  GYNECOLOGIC SYMPTOMS: No  BREAST SYMPTOMS: No  SKELETAL SYMPTOMS: Yes  BLOOD SYMPTOMS: No  NERVOUS SYSTEM SYMPTOMS: No  MENTAL HEALTH SYMPTOMS: No  Changes in hair: Yes  Changes in moles/birth marks: No  Itching: No  Rashes: No  Changes in nails: No  Acne: No  Hair in places you don't want it: No  Change in facial hair: No  Warts: No  Non-healing sores: No  Scarring: No  Flaking of skin: No  Color changes of hands/feet in cold : Yes  Sun sensitivity: Yes  Skin thickening: No  Ear pain: No  Ear discharge: No  Hearing loss: No  Tinnitus: Yes  Nosebleeds: No  Congestion: No  Sinus pain: No  Trouble swallowing: No   Voice hoarseness: No  Mouth sores: No  Sore throat: No  Tooth pain: No  Gum tenderness: No  Bleeding gums: No  Change in taste: No  Change in sense of smell: No  Dry mouth: Yes  Hearing aid used: No  Neck lump: No  Eye pain: No  Vision loss: No  Dry eyes: Yes  Watery eyes: Yes  Eye bulging: No  Double vision: No  Flashing of lights: No  Spots: No  Floaters: No  Redness: No  Crossed eyes: No  Tunnel Vision: No  Yellowing of eyes: No  Eye irritation: Yes  Chest pain or pressure: No  Fast or irregular heartbeat: No  Pain in legs with walking:  Yes  Trouble breathing while lying down: No  Fingers or toes appear blue: No  High blood pressure: No  Low blood pressure: No  Fainting: No  Murmurs: No  Pacemaker: No  Varicose veins: Yes  Edema or swelling: Yes  Wake up at night with shortness of breath: No  Light-headedness: No  Exercise intolerance: Yes  Trouble holding urine or incontinence: Yes  Pain or burning: No  Trouble starting or stopping: Yes  Increased frequency of urination: No  Blood in urine: No  Decreased frequency of urination: No  Frequent nighttime urination: No  Flank pain: No  Difficulty emptying bladder: No  Back pain: Yes  Muscle aches: Yes  Neck pain: No  Swollen joints: Yes  Joint pain: Yes  Bone pain: Yes  Muscle cramps: Yes  Muscle weakness: Yes  Joint stiffness: Yes  Bone fracture: No    Physical Examination: Vital signs are reviewed and they are stable  Constitutional: Pleasant, older woman, in no acute physical distress, came with her , Check to the appt  Cardiovascular: negative, RRR  Respiratory: negative findings: lungs clear to auscultation  Musculoskeletal: extremities normal- no gross deformities noted, gait normal and normal muscle tone  Neurologic: negative  Psychiatric: affect normal/bright and mentation appears normal.    BMP RESULTS:  Lab Results   Component Value Date     05/13/2019    POTASSIUM 3.6 05/13/2019    CHLORIDE 107 05/13/2019    CO2 36 (H) 05/13/2019    ANIONGAP <1 (L) 05/13/2019     (H) 05/13/2019    BUN 23 05/13/2019    CR 1.25 (H) 05/29/2019    GFRESTIMATED 43 (L) 05/29/2019    GFRESTBLACK 50 (L) 05/29/2019    MARILIA 10.3 (H) 05/29/2019        CBC RESULTS:  Lab Results   Component Value Date    WBC 6.7 05/13/2019    RBC 4.78 05/13/2019    HGB 14.2 05/13/2019    HCT 45.6 05/13/2019    MCV 95 05/13/2019    MCH 29.7 05/13/2019    MCHC 31.1 (L) 05/13/2019    RDW 13.1 05/13/2019     05/13/2019       INR/PTT:  No results found for: INR, PTT    Diagnostic studies: see MRI     PROVIDER  NOTE:  I explained the procedure to Esperanza and her , Gaudencio.  This included:  Preparing for the procedure, the actual procedure and recovery.  I explained the risks of the liver biopsy:  Bleeding, infection, potential for puncturing the lung, and death related to the procedure.  I explained that usually the results return after three to four business days.    I explained that he/she would be contacted by someone from Dr. La's  office following this to determine a future plan.  Thank you for involving us in the care of your patient.    30 minutes was spent with Esperanza and her , Gaudencio.  25 minutes was spent in counseling.  Yanely Mendenhall MS, APRN, CNS, CRN  Clinical Nurse Specialist  Interventional Radiology  [phone] (voice mail)  [phone] (pager)    CC  Patient Care Team:  Christin Menendez, KAREN CNP as PCP - General (Nurse Practitioner)  Hosea King MD as MD (Hematology & Oncology)  Zhao La MD as MD (Urology)  Talisha Greco MD as MD (Colon and Rectal Surgery)  Allen Downey MD as MD (Orthopedics)  Zaida Saldana DO as Assigned PCP  ZHAO LA APRN CNS

## 2019-05-29 ENCOUNTER — HOSPITAL ENCOUNTER (OUTPATIENT)
Dept: LAB | Facility: CLINIC | Age: 69
Discharge: HOME | End: 2019-05-29
Attending: INTERNAL MEDICINE | Admitting: INTERNAL MEDICINE
Payer: MEDICARE

## 2019-05-29 ENCOUNTER — INFUSION THERAPY VISIT (OUTPATIENT)
Dept: INFUSION THERAPY | Facility: CLINIC | Age: 69
End: 2019-05-29
Attending: INTERNAL MEDICINE
Payer: MEDICARE

## 2019-05-29 ENCOUNTER — TELEPHONE (OUTPATIENT)
Dept: ONCOLOGY | Facility: CLINIC | Age: 69
End: 2019-05-29

## 2019-05-29 DIAGNOSIS — M85.852 OSTEOPENIA OF LEFT HIP: Primary | ICD-10-CM

## 2019-05-29 DIAGNOSIS — C50.412 MALIGNANT NEOPLASM OF UPPER-OUTER QUADRANT OF BOTH BREASTS IN FEMALE, ESTROGEN RECEPTOR POSITIVE (H): ICD-10-CM

## 2019-05-29 DIAGNOSIS — Z17.0 MALIGNANT NEOPLASM OF UPPER-OUTER QUADRANT OF BOTH BREASTS IN FEMALE, ESTROGEN RECEPTOR POSITIVE (H): ICD-10-CM

## 2019-05-29 DIAGNOSIS — Z79.899 NEED FOR PROPHYLACTIC CHEMOTHERAPY: ICD-10-CM

## 2019-05-29 DIAGNOSIS — C50.411 MALIGNANT NEOPLASM OF UPPER-OUTER QUADRANT OF BOTH BREASTS IN FEMALE, ESTROGEN RECEPTOR POSITIVE (H): ICD-10-CM

## 2019-05-29 LAB
CALCIUM SERPL-MCNC: 10.3 MG/DL (ref 8.5–10.1)
CREAT SERPL-MCNC: 1.25 MG/DL (ref 0.52–1.04)
GFR SERPL CREATININE-BSD FRML MDRD: 43 ML/MIN/{1.73_M2}

## 2019-05-29 PROCEDURE — 96372 THER/PROPH/DIAG INJ SC/IM: CPT

## 2019-05-29 PROCEDURE — 36415 COLL VENOUS BLD VENIPUNCTURE: CPT | Performed by: INTERNAL MEDICINE

## 2019-05-29 PROCEDURE — 82565 ASSAY OF CREATININE: CPT | Performed by: INTERNAL MEDICINE

## 2019-05-29 PROCEDURE — 82310 ASSAY OF CALCIUM: CPT | Performed by: INTERNAL MEDICINE

## 2019-05-29 PROCEDURE — 25000128 H RX IP 250 OP 636: Performed by: INTERNAL MEDICINE

## 2019-05-29 RX ADMIN — DENOSUMAB 60 MG: 60 INJECTION SUBCUTANEOUS at 11:49

## 2019-05-29 NOTE — TELEPHONE ENCOUNTER
Patient called result and follow up plan.     Repeat Colposcopy in 6 months.     Patient unavailable. Voicemail left.    Nicole Chew RN

## 2019-05-29 NOTE — PROGRESS NOTES
Infusion Nursing Note:  Marissa Guadrarama presents today for Prolia injection.    Patient seen by provider today: No   present during visit today: Not Applicable.    Note: N/A.    Intravenous Access:  No Intravenous access at this visit.    Treatment Conditions:  Results reviewed, labs MET treatment parameters, ok to proceed with treatment.      Post Infusion Assessment:  Patient tolerated injection without incident.   Written information on the potential medication side effects provided to patient.    Discharge Plan:   Patient discharged in stable condition accompanied by: self.  Departure Mode: Ambulatory.    Nicole Franco RN

## 2019-05-30 ENCOUNTER — DOCUMENTATION ONLY (OUTPATIENT)
Dept: ONCOLOGY | Facility: CLINIC | Age: 69
End: 2019-05-30

## 2019-05-30 NOTE — PROGRESS NOTES
Status Check:    Pt is a 69 year old female, recently in clinic for Prolia injection 05.29.19.  Call placed for status check.    Primary care provider is: Christin Menendez    Diagnosis: Breast cancer with osteopenia     Oncology provider is:  Dr. KIM King    How are you doing/feeling?: Fine. No problems at all.  Any further issues?: none  Next Follow up/Recommendations: Advised patient to utilize PRN medications as needed, eat nutritious meals, drink plenty of fluids, and keep scheduled appointments. If symptoms or other concerns develop after hours, encouraged patient to call our after hours number: [phone].  Patient instructed to call with any questions or concerns.  Patient states understanding and is in agreement with this plan.    Patient instructed to call with any questions or concerns.  Patient states understanding and is in agreement with this plan.    Approximately 4 minutes spent on telephone with patient reviewing assessment and symptom(s) and providing symptom management.    Codi Diaz, RN, BSN, OCN  Oncology Hematology   Breast Cancer Navigator  [facility]  [email or web address]  [phone]

## 2019-06-04 NOTE — PATIENT INSTRUCTIONS
You are scheduled for your biopsy on Wednesday, June 12, 2019  Report to the [facility] Waiting room at 9:30 AM  The [facility] Waiting Room is located on the 2nd floor (street level) of the [address]  Your procedure is scheduled to start at approximately 11:00 AM    No solid foods or milk products for 6 hours prior on the day of the procedure 5:00 AM  You may have clear liquids until 2 hours prior on the day of the procedure.(water, apple juice, broth, coffee or tea without milk or sugar, jell-o, white grape juice)  9:00 AM    You may take your usual morning medications    You will need a     If you have any questions you may call the Radiology Nurse Line [phone]

## 2019-06-10 ENCOUNTER — TELEPHONE (OUTPATIENT)
Dept: INTERVENTIONAL RADIOLOGY/VASCULAR | Facility: CLINIC | Age: 69
End: 2019-06-10

## 2019-06-12 ENCOUNTER — APPOINTMENT (OUTPATIENT)
Dept: MEDSURG UNIT | Facility: CLINIC | Age: 69
End: 2019-06-12
Attending: RADIOLOGY
Payer: MEDICARE

## 2019-06-12 ENCOUNTER — HOSPITAL ENCOUNTER (OUTPATIENT)
Facility: CLINIC | Age: 69
Discharge: HOME | End: 2019-06-12
Attending: RADIOLOGY | Admitting: RADIOLOGY
Payer: MEDICARE

## 2019-06-12 ENCOUNTER — APPOINTMENT (OUTPATIENT)
Dept: INTERVENTIONAL RADIOLOGY/VASCULAR | Facility: CLINIC | Age: 69
End: 2019-06-12
Attending: CLINICAL NURSE SPECIALIST
Payer: MEDICARE

## 2019-06-12 VITALS
DIASTOLIC BLOOD PRESSURE: 78 MMHG | BODY MASS INDEX: 38.3 KG/M2 | WEIGHT: 244 LBS | OXYGEN SATURATION: 94 % | SYSTOLIC BLOOD PRESSURE: 141 MMHG | HEART RATE: 61 BPM | HEIGHT: 67 IN | RESPIRATION RATE: 16 BRPM

## 2019-06-12 DIAGNOSIS — K76.9 LIVER LESION: ICD-10-CM

## 2019-06-12 LAB — INR PPP: 1.05 (ref 0.86–1.14)

## 2019-06-12 PROCEDURE — 88307 TISSUE EXAM BY PATHOLOGIST: CPT | Performed by: UROLOGY

## 2019-06-12 PROCEDURE — 99153 MOD SED SAME PHYS/QHP EA: CPT

## 2019-06-12 PROCEDURE — 27210909 ZZH NEEDLE CR5

## 2019-06-12 PROCEDURE — 88342 IMHCHEM/IMCYTCHM 1ST ANTB: CPT | Performed by: UROLOGY

## 2019-06-12 PROCEDURE — 85610 PROTHROMBIN TIME: CPT | Performed by: RADIOLOGY

## 2019-06-12 PROCEDURE — 25800030 ZZH RX IP 258 OP 636: Performed by: PHYSICIAN ASSISTANT

## 2019-06-12 PROCEDURE — 27210903 ZZH KIT CR5

## 2019-06-12 PROCEDURE — 25000128 H RX IP 250 OP 636

## 2019-06-12 PROCEDURE — 25000125 ZZHC RX 250

## 2019-06-12 PROCEDURE — 88333 PATH CONSLTJ SURG CYTO XM 1: CPT | Performed by: UROLOGY

## 2019-06-12 PROCEDURE — 40000168 ZZH STATISTIC PP CARE STAGE 3

## 2019-06-12 PROCEDURE — 77012 CT SCAN FOR NEEDLE BIOPSY: CPT

## 2019-06-12 PROCEDURE — 88341 IMHCHEM/IMCYTCHM EA ADD ANTB: CPT | Performed by: UROLOGY

## 2019-06-12 RX ORDER — SODIUM CHLORIDE 9 MG/ML
INJECTION, SOLUTION INTRAVENOUS CONTINUOUS
Status: DISCONTINUED | OUTPATIENT
Start: 2019-06-12 | End: 2019-06-12 | Stop reason: HOSPADM

## 2019-06-12 RX ORDER — LIDOCAINE 40 MG/G
CREAM TOPICAL
Status: DISCONTINUED | OUTPATIENT
Start: 2019-06-12 | End: 2019-06-12 | Stop reason: HOSPADM

## 2019-06-12 RX ORDER — NICOTINE POLACRILEX 4 MG
15-30 LOZENGE BUCCAL
Status: DISCONTINUED | OUTPATIENT
Start: 2019-06-12 | End: 2019-06-12 | Stop reason: HOSPADM

## 2019-06-12 RX ORDER — FLUMAZENIL 0.1 MG/ML
0.2 INJECTION, SOLUTION INTRAVENOUS
Status: DISCONTINUED | OUTPATIENT
Start: 2019-06-12 | End: 2019-06-12 | Stop reason: HOSPADM

## 2019-06-12 RX ORDER — NALOXONE HYDROCHLORIDE 0.4 MG/ML
.1-.4 INJECTION, SOLUTION INTRAMUSCULAR; INTRAVENOUS; SUBCUTANEOUS
Status: DISCONTINUED | OUTPATIENT
Start: 2019-06-12 | End: 2019-06-12 | Stop reason: HOSPADM

## 2019-06-12 RX ORDER — DEXTROSE MONOHYDRATE 25 G/50ML
25-50 INJECTION, SOLUTION INTRAVENOUS
Status: DISCONTINUED | OUTPATIENT
Start: 2019-06-12 | End: 2019-06-12 | Stop reason: HOSPADM

## 2019-06-12 RX ORDER — FENTANYL CITRATE 50 UG/ML
25-50 INJECTION, SOLUTION INTRAMUSCULAR; INTRAVENOUS EVERY 5 MIN PRN
Status: DISCONTINUED | OUTPATIENT
Start: 2019-06-12 | End: 2019-06-12 | Stop reason: HOSPADM

## 2019-06-12 RX ADMIN — FENTANYL CITRATE 100 MCG: 50 INJECTION, SOLUTION INTRAMUSCULAR; INTRAVENOUS at 12:16

## 2019-06-12 RX ADMIN — SODIUM CHLORIDE: 9 INJECTION, SOLUTION INTRAVENOUS at 09:59

## 2019-06-12 RX ADMIN — MIDAZOLAM 2 MG: 1 INJECTION INTRAMUSCULAR; INTRAVENOUS at 12:17

## 2019-06-12 RX ADMIN — LIDOCAINE HYDROCHLORIDE 10 ML: 10 INJECTION, SOLUTION EPIDURAL; INFILTRATION; INTRACAUDAL; PERINEURAL at 12:17

## 2019-06-12 ASSESSMENT — MIFFLIN-ST. JEOR: SCORE: 1654.41

## 2019-06-12 NOTE — DISCHARGE INSTRUCTIONS
[facility]    Interventional Radiology  Patient Instructions Following Liver Biopsy    AFTER YOU GO HOME  ? If you were given sedation DO NOT drive or operate machinery at home or at work for at least 24 hours  ? DO relax and take it easy for 48 hours, no strenuous activity for 24 hours  ? DO drink plenty of fluids  ? DO resume your regular diet, unless otherwise instructed by your Primary Physician  ? Keep the dressing dry and in place for 24 hours.  ? DO NOT SMOKE FOR AT LEAST 24 HOURS, if you have been given any medications that were to help you relax or sedate you during your procedure  ? DO NOT drink alcoholic beverages the day of your procedure  ? DO NOT do any strenuous exercise or lifting (> 10 lbs) for at least 7 days following your procedure  ? DO NOT take a bath or shower for at least 12 hours following your procedure  ? Remove dressing after shower the next day. Replace with Band aid for 2 days.  Never leave a wet dressing in place.  ? DO NOT make any important or legal decisions for 24 hours following your procedure  ? There should be minimum drainage from the biopsy site    CALL THE PHYSICIAN IF:  ? You start bleeding from the procedure site.  If you do start to bleed from that site, lie down flat and hold pressure on the site for a minimum of 10 minutes.  Your physician will tell you if you need to return to the hospital  ? You develop nausea or vomiting  ? You have excessive swelling, redness, or tenderness at the site  ? You have drainage that looks like it is infected.  ? You experience severe pain  ? You develop hives or a rash or unexplained itching  ? You develop shortness of breath  ? You develop a temperature of 101 degrees F or greater  ? You develop bloody clots or red urine after you are discharged  ? You develop chest pain or cough up blood, lightheadedness or fainting    [facility] INTERVENTIONAL RADIOLOGY DEPARTMENT  Procedure Physician:          Dr. Ball        Date of  procedure: June 12, 2019  Telephone Numbers: [phone] Monday-Friday 8:00 am to 4:30 pm  [phone] After 4:30 pm Monday-Friday, Weekends & Holidays.   Ask for the Interventional Radiologist on call.  Someone is on call 24 hrs/day  [facility] toll free number: [phone] Monday-Friday 8:00 am to 4:30 pm  [facility] Emergency Dept: [phone]

## 2019-06-12 NOTE — IP AVS SNAPSHOT
MRN:[id number]                      After Visit Summary   6/12/2019    Marissa Guadarrama    MRN: [id number]           Visit Information        Department      6/12/2019  8:48 AM [address] [facility]          Review of your medicines      UNREVIEWED medicines. Ask your doctor about these medicines       Dose / Directions   acetaminophen 325 MG tablet  Commonly known as:  TYLENOL      Dose:  325-650 mg  Take 325-650 mg by mouth every 6 hours as needed for mild pain  Refills:  0     albuterol 108 (90 Base) MCG/ACT inhaler  Commonly known as:  PROAIR HFA/PROVENTIL HFA/VENTOLIN HFA  Used for:  SOB (shortness of breath)      Dose:  2 puff  Inhale 2 puffs into the lungs every 6 hours as needed for shortness of breath / dyspnea or wheezing  Quantity:  1 Inhaler  Refills:  1     CALCIUM 1200 PO      Refills:  0     exemestane 25 MG tablet  Commonly known as:  AROMASIN  Used for:  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, estrogen receptor positive (H)      Dose:  25 mg  Take 1 tablet (25 mg) by mouth every morning  Quantity:  90 tablet  Refills:  3     gabapentin 600 MG tablet  Commonly known as:  NEURONTIN  Used for:  Trigeminal neuralgia      Dose:  600 mg  Take 1 tablet (600 mg) by mouth 3 times daily  Quantity:  180 tablet  Refills:  6     hydrochlorothiazide 25 MG tablet  Commonly known as:  HYDRODIURIL  Used for:  Essential hypertension with goal blood pressure less than 140/90      TAKE ONE-HALF TABLET BY MOUTH ONCE DAILY IN THE MORNING  Quantity:  45 tablet  Refills:  1     HYDROcodone-acetaminophen 5-325 MG tablet  Commonly known as:  NORCO  Used for:  Vaginal dysplasia      Dose:  1-2 tablet  Take 1-2 tablets by mouth every 6 hours as needed for other (Moderate to Severe Pain)  Quantity:  5 tablet  Refills:  0              Protect others around you: Learn how to safely use, store and throw away your medicines at www.disposemymeds.org.       Follow-ups after your  "visit       Your next 10 appointments already scheduled    Jun 17, 2019 10:15 AM CDT  MA SCREENING BILATERAL W/ [place] with [facility]  [facility] ([facility]) [address]  [facility] [address]  [phone]   How do I prepare for my exam? (Food and drink instructions)  No Food and Drink Restrictions.    How do I prepare for my exam? (Other instructions)  Do not use any powder, lotion or deodorant under your arms or on your breast. If you do, we will ask you to remove it before your exam.    What should I wear: Wear comfortable, two-piece clothing.    How long does the exam take: Most scans will take 15 minutes.    What should I bring: Bring any previous mammograms from other facilities or have them mailed to the breast center.    Do I need a : No  is needed.    What do I need to tell my doctor: If you have any allergies, tell your care team.    What should I do after the exam: No restrictions, you may resume normal activities.    What is this test: This test is an x-ray of the breast to look for breast disease. The breast is pressed between two plates to flatten and spread the tissue. An X-ray is taken of the breast from different angles.    Who should I call with questions: If you have any questions, please call the Imaging Department where you will have your exam. Directions, parking instructions, and other information are available on our website, [email or web address].    Other information about my exam  Three-dimensional (3D) mammograms are available at [facility] locations in [facility], [place], [place], [facility], [place], [facility] and [place].  Health locations include [place] and the [facility] [place] in [place].    Benefits of 3D mammograms include:  * Improved rate of cancer detection  * Decreases your chance of having to go back for more tests, which means fewer:  * \"False-positive\" results (This means that there is an " abnormal area but it isn't cancer.)  * Invasive testing procedures, such as a biopsy or surgery  * Can provide clearer images of the breast if you have dense breast tissue.    *3D mammography is an optional exam that anyone can have with a 2D mammogram. It doesn't replace or take the place of a 2D mammogram. 2D mammograms remain an effective screening test for all women. Not all insurance companies cover the cost of a 3D mammogram. Check with your insurance.     Nov 12, 2019 12:40 PM CST  (Arrive by 12:25 PM)  COLPOSCOPY with  GYN ONC COLPOSCOPY PROVIDER  [facility] [place] ([facility] [place]) [address]  [address]  [place] [address] [phone]      Nov 18, 2019 11:00 AM CST  LAB with [facility] ([facility]) [address] [address]  [phone]   Please do not eat 10-12 hours before your appointment if you are coming in fasting for labs on lipids, cholesterol, or glucose (sugar). Does not apply to pregnant women. Water, tea and black coffee (with nothing added) is okay. Do not drink other fluids, diet soda or gum. If you have concerns about taking your medications, please send a message by clicking on Secure Messaging, Message Your Care Team.     Nov 20, 2019 11:00 AM CST  Return Visit with Hosea King MD  [facility] ([facility]) [facility]  [address]  [facility] [address]  [phone]         Care Instructions       Further instructions from your care team       [facility]    Interventional Radiology  Patient Instructions Following Liver Biopsy    AFTER YOU GO HOME  ? If you were given sedation DO NOT drive or operate machinery at home or at work for at least 24 hours  ? DO relax and take it easy for 48 hours, no strenuous activity for 24 hours  ? DO drink plenty of fluids  ? DO resume your regular diet, unless  otherwise instructed by your Primary Physician  ? Keep the dressing dry and in place for 24 hours.  ? DO NOT SMOKE FOR AT LEAST 24 HOURS, if you have been given any medications that were to help you relax or sedate you during your procedure  ? DO NOT drink alcoholic beverages the day of your procedure  ? DO NOT do any strenuous exercise or lifting (> 10 lbs) for at least 7 days following your procedure  ? DO NOT take a bath or shower for at least 12 hours following your procedure  ? Remove dressing after shower the next day. Replace with Band aid for 2 days.  Never leave a wet dressing in place.  ? DO NOT make any important or legal decisions for 24 hours following your procedure  ? There should be minimum drainage from the biopsy site    CALL THE PHYSICIAN IF:  ? You start bleeding from the procedure site.  If you do start to bleed from that site, lie down flat and hold pressure on the site for a minimum of 10 minutes.  Your physician will tell you if you need to return to the hospital  ? You develop nausea or vomiting  ? You have excessive swelling, redness, or tenderness at the site  ? You have drainage that looks like it is infected.  ? You experience severe pain  ? You develop hives or a rash or unexplained itching  ? You develop shortness of breath  ? You develop a temperature of 101 degrees F or greater  ? You develop bloody clots or red urine after you are discharged  ? You develop chest pain or cough up blood, lightheadedness or fainting    [facility] INTERVENTIONAL RADIOLOGY DEPARTMENT  Procedure Physician:          Dr. Ball        Date of procedure: June 12, 2019  Telephone Numbers: [phone] Monday-Friday 8:00 am to 4:30 pm  [phone] After 4:30 pm Monday-Friday, Weekends & Holidays.   Ask for the Interventional Radiologist on call.  Someone is on call 24 hrs/day  [facility] toll free number: [phone] Monday-Friday 8:00 am to 4:30 pm  [facility] Emergency Dept: [phone]          Additional Information  "About Your Visit       Care EveryWhere ID    This is your Care EveryWhere ID. This could be used by other organizations to access your [facility] medical records  [id number]       Your Vitals Were     Blood Pressure   166/86 (BP Location: Right arm)          Pulse   61          Respirations   16          Height   1.702 m (5' 7\")          Weight   110.7 kg (244 lb)             Pulse Oximetry   95%    BMI (Body Mass Index)   38.22 kg/m           Primary Care Provider Office Phone # Fax #    Christin GUZMAN Menendez, APRN [phone]      Equal Access to Services    [facility]: Hadii aad ku hadasho Soomaali, waaxda luqadaha, qaybta kaalmada adeegyada, niesha tang . So [facility] [phone].    ATENCIÓN: Si habla español, tiene a allan disposición servicios gratuitos de asistencia lingüística. [facility] [phone].    We comply with applicable federal civil rights laws and [place] laws. We do not discriminate on the basis of race, color, national origin, age, disability, sex, sexual orientation, or gender identity.           Thank you!    Thank you for choosing [place] for your care. Our goal is always to provide you with excellent care. Hearing back from our patients is one way we can continue to improve our services. Please take a few minutes to complete the written survey that you may receive in the mail after you visit with us. Thank you!            Medication List      ASK your doctor about these medications          Morning Afternoon Evening Bedtime As Needed    acetaminophen 325 MG tablet  Also known as:  TYLENOL  INSTRUCTIONS:  Take 325-650 mg by mouth every 6 hours as needed for mild pain                     albuterol 108 (90 Base) MCG/ACT inhaler  Also known as:  PROAIR HFA/PROVENTIL HFA/VENTOLIN HFA  INSTRUCTIONS:  Inhale 2 puffs into the lungs every 6 hours as needed for shortness of breath / dyspnea or wheezing                     CALCIUM 1200 PO                     " exemestane 25 MG tablet  Also known as:  AROMASIN  INSTRUCTIONS:  Take 1 tablet (25 mg) by mouth every morning                     gabapentin 600 MG tablet  Also known as:  NEURONTIN  INSTRUCTIONS:  Take 1 tablet (600 mg) by mouth 3 times daily                     hydrochlorothiazide 25 MG tablet  Also known as:  HYDRODIURIL  INSTRUCTIONS:  TAKE ONE-HALF TABLET BY MOUTH ONCE DAILY IN THE MORNING                     HYDROcodone-acetaminophen 5-325 MG tablet  Also known as:  NORCO  INSTRUCTIONS:  Take 1-2 tablets by mouth every 6 hours as needed for other (Moderate to Severe Pain)

## 2019-06-12 NOTE — PROGRESS NOTES
Patient Name: Marissa Guadarrama  Medical Record Number: [id number]  Today's Date: 6/12/2019    Procedure: Image guided liver biopsy.   Proceduralist: MD Quinn.    Sedation start time: 1144  Sedation end time: 1208  Sedation medications administered: Fentanyl:100 mcg Versed:2mg   Total sedation time: 24 minutes       Procedure start time: 1144  Puncture time: 1148  Procedure end time: 1208    Report given to: JOAN Nuñez  : N/A    Other Notes: Pt arrived to IR room CT 2 from . Consent reviewed. Pt denies any questions or concerns regarding procedure. Pt positioned sidelying and monitored per protocol. Pathology present and specimens sent to lab. Pt tolerated procedure without any noted complications. Pt transferred back to .    Nova Dick RN

## 2019-06-12 NOTE — PROGRESS NOTES
This RN went through discharge instructions at this time with both pt and pt's  with all questions answered. Pt taken via wheelchair to lobby. Right back site appears dry, clean, soft, and non-tender.

## 2019-06-12 NOTE — PROCEDURES
Interventional Radiology Pre-Procedure Sedation Assessment   Time of Assessment: 9:33 AM    Expected Level: Moderate Sedation    Indication: Sedation is required for the following type of Procedure: Biopsy    Sedation and procedural consent: Risks, benefits and alternatives were discussed with Patient    PO Intake: Appropriately NPO for procedure    ASA Class: Class 2 - MILD SYSTEMIC DISEASE, NO ACUTE PROBLEMS, NO FUNCTIONAL LIMITATIONS.    Mallampati: Grade 2:  Soft palate, base of uvula, tonsillar pillars, and portion of posterior pharyngeal wall visible    Lungs: Lungs Clear with good breath sounds bilaterally    Heart: Normal heart sounds and rate    History and physical reviewed and no updates needed. I have reviewed the lab findings, diagnostic data, medications, and the plan for sedation. I have determined this patient to be an appropriate candidate for the planned sedation and procedure and have reassessed the patient IMMEDIATELY PRIOR to sedation and procedure.    Diogenes Johnson MD

## 2019-06-12 NOTE — IP AVS SNAPSHOT
[address] [address]                                    After Visit Summary   6/12/2019    Marissa Guadarrama    MRN: [id number]           After Visit Summary Signature Page    I have received my discharge instructions, and my questions have been answered. I have discussed any challenges I see with this plan with the nurse or doctor.    ..........................................................................................................................................  Patient/Patient Representative Signature      ..........................................................................................................................................  Patient Representative Print Name and Relationship to Patient    ..................................................               ................................................  Date                                   Time    ..........................................................................................................................................  Reviewed by Signature/Title    ...................................................              ..............................................  Date                                               Time          22EPIC Rev 08/18

## 2019-06-12 NOTE — PROGRESS NOTES
Pt ambulated well with walker and to bathroom and voided. Pt steady on feet and denies pain. Pt takes PO food and water well. Right back site appears dry, clean, soft, and non-tender.

## 2019-06-12 NOTE — PROGRESS NOTES
Pre-procedure complete for liver biopsy. Consent signed with  at bedside, all questions answered. IV in place, INR pending.

## 2019-06-12 NOTE — PROCEDURES
Interventional Radiology Brief Post Procedure Note    Procedure: CT LIVER BIOPSY    Proceduralist: Thuan Ball MD    Assistant: None    Time Out: Prior to the start of the procedure and with procedural staff participation, I verbally confirmed the patient s identity using two indicators, relevant allergies, that the procedure was appropriate and matched the consent or emergent situation, and that the correct equipment/implants were available. Immediately prior to starting the procedure I conducted the Time Out with the procedural staff and re-confirmed the patient s name, procedure, and site/side. (The Joint Commission universal protocol was followed.)  Yes    Medications   Medication Event Details Admin User Admin Time       Sedation: IR Nurse Monitored Care   Post Procedure Summary:  Prior to the start of the procedure and with procedural staff participation, I verbally confirmed the patient s identity using two indicators, relevant allergies, that the procedure was appropriate and matched the consent or emergent situation, and that the correct equipment/implants were available. Immediately prior to starting the procedure I conducted the Time Out with the procedural staff and re-confirmed the patient s name, procedure, and site/side. (The Joint Commission universal protocol was followed.)  Yes       Sedatives: Fentanyl and Midazolam (Versed)    Vital signs, airway and pulse oximetry were monitored and remained stable throughout the procedure and sedation was maintained until the procedure was complete.  The patient was monitored by staff until sedation discharge criteria were met.    Patient tolerance: Patient tolerated the procedure well with no immediate complications.    Time of sedation in minutes: 30 Minutes minutes from beginning to end of physician one to one monitoring.          Findings: Routine CT guided bx of liver mass.     Estimated Blood Loss: None    Fluoroscopy Time:  minute(s)    SPECIMENS: Core  needle biopsy specimens sent for pathological analysis    Complications: 1. None     Condition: Stable    Plan:   -To 2A for 4 hour monitoring post procedure.       Comments: See dictated procedure note for full details.    Diogenes Johnson MD

## 2019-06-14 LAB — COPATH REPORT: NORMAL

## 2019-06-16 NOTE — TELEPHONE ENCOUNTER
Okay for samantha to 9/28 appt? 7/1 encounter says patient needs f/u and 15 tablets were given, but patient wasn't called to schedule.   Marisol Lal RN    English

## 2019-07-05 ENCOUNTER — HOSPITAL ENCOUNTER (OUTPATIENT)
Dept: MAMMOGRAPHY | Facility: CLINIC | Age: 69
Discharge: HOME | End: 2019-07-05
Attending: INTERNAL MEDICINE | Admitting: INTERNAL MEDICINE
Payer: MEDICARE

## 2019-07-05 DIAGNOSIS — Z12.31 VISIT FOR SCREENING MAMMOGRAM: ICD-10-CM

## 2019-07-05 PROCEDURE — 77063 BREAST TOMOSYNTHESIS BI: CPT

## 2019-07-21 ENCOUNTER — NURSE TRIAGE (OUTPATIENT)
Dept: NURSING | Facility: CLINIC | Age: 69
End: 2019-07-21

## 2019-07-21 NOTE — TELEPHONE ENCOUNTER
Wife has increased facial pain and  is out of town. Just wanting pain management over the phone. Informed I would need to speak with her to be able to triage her needs. On call providers don't manage pain over the phone. I did offer to page who ever is on call for Dr Bell as he is the one who has her on the gabapentin. Thinks increased pain may be dental cause. Advised to have her seen in UC/ED today for full evaluation and tx as needed.     Princess Ramirez RN, [facility]      Reason for Disposition    Caller has URGENT medication question about med that PCP prescribed and triager unable to answer question    Additional Information    Negative: Drug overdose and nurse unable to answer question    Negative: Caller requesting information not related to medicine    Negative: Caller requesting a prescription for Strep throat and has a positive culture result    Negative: Rash while taking a medication or within 3 days of stopping it    Negative: Immunization reaction suspected    Negative: [1] Asthma and [2] having symptoms of asthma (cough, wheezing, etc)    Negative: MORE THAN A DOUBLE DOSE of a prescription or over-the-counter (OTC) drug    Negative: [1] DOUBLE DOSE (an extra dose or lesser amount) of over-the-counter (OTC) drug AND [2] any symptoms (e.g., dizziness, nausea, pain, sleepiness)    Negative: [1] DOUBLE DOSE (an extra dose or lesser amount) of prescription drug AND [2] any symptoms (e.g., dizziness, nausea, pain, sleepiness)    Negative: Took another person's prescription drug    Negative: Pharmacy calling with prescription questions and triager unable to answer question    Protocols used: MEDICATION QUESTION CALL-A-

## 2019-07-22 ENCOUNTER — TELEPHONE (OUTPATIENT)
Dept: OTOLARYNGOLOGY | Facility: CLINIC | Age: 69
End: 2019-07-22

## 2019-07-22 NOTE — TELEPHONE ENCOUNTER
Spouse calling states patient is having acute facial pain, has been seen for this before. Wondering if Dr Bell would work her in. Offered opening today in [place] but not able to go today. Please call to discuss.

## 2019-07-22 NOTE — TELEPHONE ENCOUNTER
Rk Bell MD Marxer, Renee, RN   Caller: Unspecified (Today,  8:08 AM)             She can come in early before clinic starts, 7:30. Please let the staff at [place] know so that there is an MA here for room her, thanks     DY      Routing to Medical Staff to ensure they can provide coverage and schedule if able.    Pauline Farr RN  [place] [facility]

## 2019-07-22 NOTE — TELEPHONE ENCOUNTER
Pt's  notified pt scheduled for 07/26 @ 7:30 am ([facility] staff notified).    Denise Behrendt  Specialty CSS

## 2019-07-22 NOTE — TELEPHONE ENCOUNTER
Pt spouse is calling, wants pt to be seen on Friday 07/26 for severe facial pain, please call [phone] or call spouse at [phone]

## 2019-07-26 ENCOUNTER — OFFICE VISIT (OUTPATIENT)
Dept: OTOLARYNGOLOGY | Facility: CLINIC | Age: 69
End: 2019-07-26
Payer: COMMERCIAL

## 2019-07-26 VITALS
DIASTOLIC BLOOD PRESSURE: 84 MMHG | RESPIRATION RATE: 20 BRPM | SYSTOLIC BLOOD PRESSURE: 144 MMHG | TEMPERATURE: 97.4 F | HEART RATE: 61 BPM

## 2019-07-26 DIAGNOSIS — G50.0 TRIGEMINAL NEURALGIA: Primary | ICD-10-CM

## 2019-07-26 PROCEDURE — 99214 OFFICE O/P EST MOD 30 MIN: CPT | Performed by: OTOLARYNGOLOGY

## 2019-07-26 RX ORDER — BACLOFEN 10 MG/1
TABLET ORAL
Qty: 108 TABLET | Refills: 0 | Status: SHIPPED | OUTPATIENT
Start: 2019-07-26 | End: 2020-02-17

## 2019-07-26 RX ORDER — LIDOCAINE HYDROCHLORIDE 20 MG/ML
15 SOLUTION OROPHARYNGEAL
Qty: 300 ML | Refills: 3 | Status: SHIPPED | OUTPATIENT
Start: 2019-07-26 | End: 2019-12-23

## 2019-07-26 NOTE — PROGRESS NOTES
"History of Present Illness - Marissa Guadarrama is a 67 year old female here for continued follow up for her LEFT V2 trigeminal neuralgia.  I last saw her 10/16/2017. She is here again with her  Gaudencio.    To review, in 2012 she stopped the meds because the pain went away, then it came back with a vengeance.  I previously had her up to 900mg three times daily.  Of note, I have previously ordered a brain and skull base MRI, which was normal.    There have definitely been some ups and downs over the years.  Including back in early 2015 when she sent a message that she was starting to have LEFT ear pain, \"that was different\" and wanted to be seen.  This started in part when she changed her pharmacy in about December 2014, and the different generic immediately did not seem to be working and she had a return of her left facial pain and ear pain.  \"Shearing and stabbing pain in the face.\"  She was on 600mg twice daily, now she increased to 600mg QID.  She also is using leftover vicodin, and it was not helping.    She has had a difficult year in 2016, and was diagnosed with bilateral Breast CA in April of 2016, and had mastectomies and radiation therapy.  It was very difficult. Through it all, he neuralgia was fine.  And at her most recent follow up on 8/2/2018 she was being maintained on Neurontin 600mg three times daily     She called and asked to be added on urgently due to acute flare up of neuropathic pain.  She tells me that the only change has been that her teeth have been in particularly bad condition.  Likely due to her history of radiation therapy that stopped in 2015.  She actually has a dental appointment coming up August 8. Her usual sites are \"in burning sharp pain.\"  The LEFT cheek under the eye and on to the side of the LEFT nostril and side of the nose. So she increased her Neurontin up to 1200mg three times daily and she reports that is not really helping.    Past Medical History -   Patient Active " Problem List   Diagnosis     Post-polio syndrome     Carcinoid tumor of cecum     Cervical cancer (H)     Trigeminal neuralgia     HYPERLIPIDEMIA LDL GOAL <130     Hypertension goal BP (blood pressure) < 140/90     OA (osteoarthritis) of knee     Advanced directives, counseling/discussion     CKD (chronic kidney disease) stage 3, GFR 30-59 ml/min (H)     Vaginal dysplasia     Urinary incontinence     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     Ureteral obstruction     Rheumatoid arthritis with positive rheumatoid factor, involving unspecified site (H)     Osteopenia of hip     Need for prophylactic chemotherapy       Current Medications -   Current Outpatient Medications:      acetaminophen (TYLENOL) 325 MG tablet, Take 325-650 mg by mouth every 6 hours as needed for mild pain, Disp: , Rfl:      albuterol (PROAIR HFA, PROVENTIL HFA, VENTOLIN HFA) 108 (90 BASE) MCG/ACT inhaler, Inhale 2 puffs into the lungs every 6 hours as needed for shortness of breath / dyspnea or wheezing, Disp: 1 Inhaler, Rfl: 1     Calcium Carbonate-Vit D-Min (CALCIUM 1200 PO), , Disp: , Rfl:      exemestane (AROMASIN) 25 MG tablet, Take 1 tablet (25 mg) by mouth every morning, Disp: 90 tablet, Rfl: 3     gabapentin (NEURONTIN) 600 MG tablet, Take 1 tablet (600 mg) by mouth 3 times daily, Disp: 180 tablet, Rfl: 6     hydrochlorothiazide (HYDRODIURIL) 25 MG tablet, TAKE ONE-HALF TABLET BY MOUTH ONCE DAILY IN THE MORNING, Disp: 45 tablet, Rfl: 1     HYDROcodone-acetaminophen (NORCO) 5-325 MG per tablet, Take 1-2 tablets by mouth every 6 hours as needed for other (Moderate to Severe Pain), Disp: 5 tablet, Rfl: 0    Allergies -   No Known Allergies    Social History -   History     Social History     Marital Status:      Spouse Name: N/A     Number of Children: N/A     Years of Education: N/A      Social History Main Topics     Smoking status: Former Smoker     Quit date: 10/30/2006     Smokeless tobacco: Never Used     Alcohol Use: No     Drug Use: No     Sexual Activity:     Partners: Male     Other Topics Concern      Service No     Blood Transfusions No     Caffeine Concern Yes     occasional coffee     Occupational Exposure No     Hobby Hazards Yes     [place],     Sleep Concern Yes     not sleeping well     Stress Concern Yes     Grandson in the      Weight Concern Yes     Special Diet No     Back Care No     Exercise No     Seat Belt Yes     Self-Exams Yes     Parent/Sibling W/ Cabg, Mi Or Angioplasty Before 65f 55m? No     Social History Narrative       Family History -   Family History   Problem Relation Age of Onset     Cancer Mother         bone / liver     Breast Cancer Mother      Cancer Maternal Grandmother      Cancer Maternal Grandfather      Cancer Paternal Grandmother      Cancer Paternal Grandfather      Cancer Sister         vulvar ca, cervical ca, squamous cell cancer     Cancer Brother         Rectal- Stage 4     Rectal Cancer Brother      Breast Cancer Paternal Aunt      Colon Cancer Paternal Aunt      Breast Cancer Maternal Aunt      Pancreatic Cancer Nephew      Breast Cancer Sister        Review of Systems - As per HPI and PMHx, otherwise 7 system review of the head and neck negative.    Physical Exam  /84 (BP Location: Right arm, Patient Position: Chair, Cuff Size: Adult Large)   Pulse 61   Temp 97.4  F (36.3  C) (Tympanic)   Resp 20     General - The patient is well nourished and well developed, and appears to have good nutritional status.  Alert and oriented to person and place, answers questions and cooperates with examination appropriately.   Head and Face - Normocephalic and atraumatic, with no gross asymmetry noted of the contour of the facial features.  The facial nerve is intact, with strong symmetric movements.  Voice and Breathing - The patient  was breathing comfortably without the use of accessory muscles. There was no wheezing, stridor, or stertor.  The patients voice was clear and strong, and had appropriate pitch and quality.  Ears - The tympanic membranes are normal in appearance, bony landmarks are intact.  No retraction, perforation, or masses.  Normal mobility on valsalva maneuver, with no reports of dizziness on insuflation.  No fluid or purulence was seen in the external canal or the middle ear. No evidence of infection of the middle ear or external canal, cerumen was normal in appearance.  Eyes - Extraocular movements intact, and the pupils were reactive to light.  Sclera were not icteric or injected, conjunctiva were pink and moist.  Mouth - Examination of the oral cavity showed pink, healthy oral mucosa. No lesions or ulcerations noted.  The tongue was mobile and midline, and the dentition were in good condition.    Throat - The walls of the oropharynx were smooth, pink, moist, symmetric, and had no lesions or ulcerations.  The tonsillar pillars and soft palate were symmetric.  The uvula was midline on elevation.    Neck - Normal midline excursion of the laryngotracheal complex during swallowing.  Full range of motion on passive movement.  Palpation of the occipital, submental, submandibular, internal jugular chain, and supraclavicular nodes did not demonstrate any abnormal lymph nodes or masses.  The carotid pulse was palpable bilaterally.  Palpation of the thyroid was soft and smooth, with no nodules or goiter appreciated.  The trachea was mobile and midline.  Nose - External contour is symmetric, no gross deflection or scars.  Nasal mucosa is pink and moist with no abnormal mucus.  The septum was midline and non-obstructive, turbinates of normal size and position.  No polyps, masses, or purulence noted on examination.  Neurological - Cranial nerves 2 through 12 grossly intact.        A/P - Marissa Guadarrama is a 69 year old female  (G50.0)  Trigeminal neuralgia  (primary encounter diagnosis)  Comment:     Her trigeminal neuralgia has acutely flared, and I will treat with 3 measures.  I will have to decrease her Neuontin 900-900-900.  I will also add Baclofen Titration, starting at 5mg three times daily, working up to 15mg three times daily for 30 days, then follow up.  IN addition, I will try topical 2% viscous lidocaine applied with a cotton tipped applicator to the area of pain, as that can sometimes help, and headache little to no chance of any side effects and is worth a try.    Follow up in one month.

## 2019-07-26 NOTE — PATIENT INSTRUCTIONS
Per physician instructions.    If you have questions or concerns on any instructions given to you by your provider today or if you need to schedule an appointment, you can reach us at [phone].    Thank you!

## 2019-07-26 NOTE — LETTER
"    7/26/2019         RE: Marissa Guadarrama  [address] [place] [id number]        Dear Colleague,    Thank you for referring your patient, Marissa Guadarrama, to the [facility]. Please see a copy of my visit note below.    History of Present Illness - Marissa Guadarrama is a 67 year old female here for continued follow up for her LEFT V2 trigeminal neuralgia.  I last saw her 10/16/2017. She is here again with her  aGudencio.    To review, in 2012 she stopped the meds because the pain went away, then it came back with a vengeance.  I previously had her up to 900mg three times daily.  Of note, I have previously ordered a brain and skull base MRI, which was normal.    There have definitely been some ups and downs over the years.  Including back in early 2015 when she sent a message that she was starting to have LEFT ear pain, \"that was different\" and wanted to be seen.  This started in part when she changed her pharmacy in about December 2014, and the different generic immediately did not seem to be working and she had a return of her left facial pain and ear pain.  \"Shearing and stabbing pain in the face.\"  She was on 600mg twice daily, now she increased to 600mg QID.  She also is using leftover vicodin, and it was not helping.    She has had a difficult year in 2016, and was diagnosed with bilateral Breast CA in April of 2016, and had mastectomies and radiation therapy.  It was very difficult. Through it all, he neuralgia was fine.  And at her most recent follow up on 8/2/2018 she was being maintained on Neurontin 600mg three times daily     She called and asked to be added on urgently due to acute flare up of neuropathic pain.  She tells me that the only change has been that her teeth have been in particularly bad condition.  Likely due to her history of radiation therapy that stopped in 2015.  She actually has a dental appointment coming up August 8. Her usual sites are \"in burning sharp " "pain.\"  The LEFT cheek under the eye and on to the side of the LEFT nostril and side of the nose. So she increased her Neurontin up to 1200mg three times daily and she reports that is not really helping.    Past Medical History -   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     Ureteral obstruction     Rheumatoid arthritis with positive rheumatoid factor, involving unspecified site (H)     Osteopenia of hip     Need for prophylactic chemotherapy       Current Medications -   Current Outpatient Medications:      acetaminophen (TYLENOL) 325 MG tablet, Take 325-650 mg by mouth every 6 hours as needed for mild pain, Disp: , Rfl:      albuterol (PROAIR HFA, PROVENTIL HFA, VENTOLIN HFA) 108 (90 BASE) MCG/ACT inhaler, Inhale 2 puffs into the lungs every 6 hours as needed for shortness of breath / dyspnea or wheezing, Disp: 1 Inhaler, Rfl: 1     Calcium Carbonate-Vit D-Min (CALCIUM 1200 PO), , Disp: , Rfl:      exemestane (AROMASIN) 25 MG tablet, Take 1 tablet (25 mg) by mouth every morning, Disp: 90 tablet, Rfl: 3     gabapentin (NEURONTIN) 600 MG tablet, Take 1 tablet (600 mg) by mouth 3 times daily, Disp: 180 tablet, Rfl: 6     hydrochlorothiazide (HYDRODIURIL) 25 MG tablet, TAKE ONE-HALF TABLET BY MOUTH ONCE DAILY IN THE MORNING, Disp: 45 tablet, Rfl: 1     HYDROcodone-acetaminophen (NORCO) 5-325 MG per tablet, Take 1-2 tablets by mouth every 6 hours as needed for other " (Moderate to Severe Pain), Disp: 5 tablet, Rfl: 0    Allergies -   No Known Allergies    Social History -   History     Social History     Marital Status:      Spouse Name: N/A     Number of Children: N/A     Years of Education: N/A     Social History Main Topics     Smoking status: Former Smoker     Quit date: 10/30/2006     Smokeless tobacco: Never Used     Alcohol Use: No     Drug Use: No     Sexual Activity:     Partners: Male     Other Topics Concern      Service No     Blood Transfusions No     Caffeine Concern Yes     occasional coffee     Occupational Exposure No     Hobby Hazards Yes     [place],     Sleep Concern Yes     not sleeping well     Stress Concern Yes     Grandson in the      Weight Concern Yes     Special Diet No     Back Care No     Exercise No     Seat Belt Yes     Self-Exams Yes     Parent/Sibling W/ Cabg, Mi Or Angioplasty Before 65f 55m? No     Social History Narrative       Family History -   Family History   Problem Relation Age of Onset     Cancer Mother         bone / liver     Breast Cancer Mother      Cancer Maternal Grandmother      Cancer Maternal Grandfather      Cancer Paternal Grandmother      Cancer Paternal Grandfather      Cancer Sister         vulvar ca, cervical ca, squamous cell cancer     Cancer Brother         Rectal- Stage 4     Rectal Cancer Brother      Breast Cancer Paternal Aunt      Colon Cancer Paternal Aunt      Breast Cancer Maternal Aunt      Pancreatic Cancer Nephew      Breast Cancer Sister        Review of Systems - As per HPI and PMHx, otherwise 7 system review of the head and neck negative.    Physical Exam  /84 (BP Location: Right arm, Patient Position: Chair, Cuff Size: Adult Large)   Pulse 61   Temp 97.4  F (36.3  C) (Tympanic)   Resp 20     General - The patient is well nourished and well developed, and appears to have good nutritional status.  Alert and oriented to person and place, answers questions and cooperates with  examination appropriately.   Head and Face - Normocephalic and atraumatic, with no gross asymmetry noted of the contour of the facial features.  The facial nerve is intact, with strong symmetric movements.  Voice and Breathing - The patient was breathing comfortably without the use of accessory muscles. There was no wheezing, stridor, or stertor.  The patients voice was clear and strong, and had appropriate pitch and quality.  Ears - The tympanic membranes are normal in appearance, bony landmarks are intact.  No retraction, perforation, or masses.  Normal mobility on valsalva maneuver, with no reports of dizziness on insuflation.  No fluid or purulence was seen in the external canal or the middle ear. No evidence of infection of the middle ear or external canal, cerumen was normal in appearance.  Eyes - Extraocular movements intact, and the pupils were reactive to light.  Sclera were not icteric or injected, conjunctiva were pink and moist.  Mouth - Examination of the oral cavity showed pink, healthy oral mucosa. No lesions or ulcerations noted.  The tongue was mobile and midline, and the dentition were in good condition.    Throat - The walls of the oropharynx were smooth, pink, moist, symmetric, and had no lesions or ulcerations.  The tonsillar pillars and soft palate were symmetric.  The uvula was midline on elevation.    Neck - Normal midline excursion of the laryngotracheal complex during swallowing.  Full range of motion on passive movement.  Palpation of the occipital, submental, submandibular, internal jugular chain, and supraclavicular nodes did not demonstrate any abnormal lymph nodes or masses.  The carotid pulse was palpable bilaterally.  Palpation of the thyroid was soft and smooth, with no nodules or goiter appreciated.  The trachea was mobile and midline.  Nose - External contour is symmetric, no gross deflection or scars.  Nasal mucosa is pink and moist with no abnormal mucus.  The septum was midline  and non-obstructive, turbinates of normal size and position.  No polyps, masses, or purulence noted on examination.  Neurological - Cranial nerves 2 through 12 grossly intact.        A/P - Marissa Guadarrama is a 69 year old female  (G50.0) Trigeminal neuralgia  (primary encounter diagnosis)  Comment:     Her trigeminal neuralgia has acutely flared, and I will treat with 3 measures.  I will have to decrease her Neuontin 900-900-900.  I will also add Baclofen Titration, starting at 5mg three times daily, working up to 15mg three times daily for 30 days, then follow up.  IN addition, I will try topical 2% viscous lidocaine applied with a cotton tipped applicator to the area of pain, as that can sometimes help, and headache little to no chance of any side effects and is worth a try.    Follow up in one month.      Again, thank you for allowing me to participate in the care of your patient.        Sincerely,        Rk Bell MD

## 2019-07-26 NOTE — NURSING NOTE
"Chief Complaint   Patient presents with     RECHECK     Trigeminal neuralgia       Initial /84 (BP Location: Right arm, Patient Position: Chair, Cuff Size: Adult Large)   Pulse 61   Temp 97.4  F (36.3  C) (Tympanic)   Resp 20  Estimated body mass index is 38.22 kg/m  as calculated from the following:    Height as of 6/12/19: 1.702 m (5' 7\").    Weight as of 6/12/19: 110.7 kg (244 lb).  BP completed using cuff size: large   Medications and allergies reviewed.      Kaelyn MCBRIDE CMA     "

## 2019-08-10 ENCOUNTER — TELEPHONE (OUTPATIENT)
Dept: FAMILY MEDICINE | Facility: CLINIC | Age: 69
End: 2019-08-10

## 2019-08-10 DIAGNOSIS — I10 ESSENTIAL HYPERTENSION WITH GOAL BLOOD PRESSURE LESS THAN 140/90: ICD-10-CM

## 2019-08-10 NOTE — LETTER
[address] [address]  Phone: [phone]        August 21, 2019      Marissa Guadarrama                                                                                                           [address] [place] [phone]        Ashley Ann,        We have attempted to reach you regarding your refill request, but have been unsuccessful.    We have received a refill request for one of your medications. We have sent a refill of your medication to your pharmacy, however your provider has noted that you will need to be seen for a Physical/ Blood Pressure check in order to continue this medication.    Please contact us at [phone] to schedule an appointment with your provider before your next refill is due.      Thank you,      Your Health Care Team at the [facility]

## 2019-08-12 NOTE — TELEPHONE ENCOUNTER
"Requested Prescriptions   Pending Prescriptions Disp Refills     hydrochlorothiazide (HYDRODIURIL) 25 MG tablet [Pharmacy Med Name: hydroCHLOROthiazide Oral Tablet 25 MG]  Last Written Prescription Date:  1/17/2019  Last Fill Quantity: 45 tablet,  # refills: 1   Last office visit: 11/29/2018 with prescribing provider:  NICOLE Saldana   Future Office Visit:   Next 5 appointments (look out 90 days)    Oct 25, 2019  8:45 AM CDT  [facility] Visit with Rk Bell MD  [facility] [address] [facility] [address]  [phone]          45 tablet 0     Sig: TAKE A HALF TABLET BY MOUTH ONCE DAILY IN THE MORNING       Diuretics (Including Combos) Protocol Failed - 8/10/2019 11:43 AM        Failed - Blood pressure under 140/90 in past 12 months     BP Readings from Last 3 Encounters:   07/26/19 144/84   06/12/19 141/78   05/28/19 139/65             Failed - Normal serum creatinine on file in past 12 months     Recent Labs   Lab Test 05/29/19  1054   CR 1.25*              Passed - Recent (12 mo) or future (30 days) visit within the authorizing provider's specialty     Patient had office visit in the last 12 months or has a visit in the next 30 days with authorizing provider or within the authorizing provider's specialty.  See \"Patient Info\" tab in inbasket, or \"Choose Columns\" in Meds & Orders section of the refill encounter.              Passed - Medication is active on med list        Passed - Patient is age 18 or older        Passed - No active pregancy on record        Passed - Normal serum potassium on file in past 12 months     Recent Labs   Lab Test 05/13/19  1128   POTASSIUM 3.6              Passed - Normal serum sodium on file in past 12 months     Recent Labs   Lab Test 05/13/19  1128                 Passed - No positive pregnancy test in past 12 months          "

## 2019-08-14 RX ORDER — HYDROCHLOROTHIAZIDE 25 MG/1
TABLET ORAL
Qty: 45 TABLET | Refills: 0 | Status: SHIPPED | OUTPATIENT
Start: 2019-08-14 | End: 2019-10-22

## 2019-08-14 NOTE — TELEPHONE ENCOUNTER
Refill provided.  Please reach out to patient to schedule Physical/BP check with PCP.    Emma Vang, ALICE, APRN, CNP

## 2019-08-16 NOTE — TELEPHONE ENCOUNTER
"Spoke to patient in regards to msg from provider, all patient could say was \"I don't know what to do, can you call my ?\"    Reached out to  Gaudencio per patient's request and left voicemail in regards to msg from provider.    Thank you,  Elida LINCOLN    [facility] Team [place]     "

## 2019-08-23 ENCOUNTER — ALLIED HEALTH/NURSE VISIT (OUTPATIENT)
Dept: FAMILY MEDICINE | Facility: CLINIC | Age: 69
End: 2019-08-23
Payer: COMMERCIAL

## 2019-08-23 ENCOUNTER — OFFICE VISIT (OUTPATIENT)
Dept: OTOLARYNGOLOGY | Facility: CLINIC | Age: 69
End: 2019-08-23
Payer: COMMERCIAL

## 2019-08-23 VITALS
HEART RATE: 56 BPM | SYSTOLIC BLOOD PRESSURE: 146 MMHG | HEIGHT: 67 IN | BODY MASS INDEX: 38.3 KG/M2 | RESPIRATION RATE: 16 BRPM | WEIGHT: 244 LBS | DIASTOLIC BLOOD PRESSURE: 77 MMHG

## 2019-08-23 DIAGNOSIS — G50.0 TRIGEMINAL NEURALGIA: Primary | ICD-10-CM

## 2019-08-23 DIAGNOSIS — Z23 NEED FOR VACCINATION: Primary | ICD-10-CM

## 2019-08-23 PROCEDURE — 99207 ZZC NO CHARGE NURSE ONLY: CPT

## 2019-08-23 PROCEDURE — 99214 OFFICE O/P EST MOD 30 MIN: CPT | Performed by: OTOLARYNGOLOGY

## 2019-08-23 PROCEDURE — 90471 IMMUNIZATION ADMIN: CPT

## 2019-08-23 PROCEDURE — 90632 HEPA VACCINE ADULT IM: CPT

## 2019-08-23 RX ORDER — BACLOFEN 5 MG/1
TABLET ORAL
Qty: 45 TABLET | Refills: 0 | Status: SHIPPED | OUTPATIENT
Start: 2019-08-23 | End: 2020-02-17

## 2019-08-23 ASSESSMENT — MIFFLIN-ST. JEOR: SCORE: 1654.41

## 2019-08-23 NOTE — NURSING NOTE
"Chief Complaint   Patient presents with     RECHECK     trigeminal neuralgia       Initial BP (!) 146/77 (BP Location: Right arm, Patient Position: Chair, Cuff Size: Adult Regular)   Pulse 56   Resp 16   Ht 1.702 m (5' 7\")   Wt 110.7 kg (244 lb)   BMI 38.22 kg/m   Estimated body mass index is 38.22 kg/m  as calculated from the following:    Height as of this encounter: 1.702 m (5' 7\").    Weight as of this encounter: 110.7 kg (244 lb).  BP completed using cuff size: regular  Medications and allergies reviewed.      Maricruz BRUNSON MA    "

## 2019-08-23 NOTE — PROGRESS NOTES
"History of Present Illness - Marissa Guadarrama is a 67 year old female here for continued follow up for her LEFT V2 trigeminal neuralgia. She is here in close follow up from 7/26/19. She is here again with her  Gaudencio.    To review, in 2012 she stopped the meds because the pain went away, then it came back with a vengeance.  I previously had her up to 900mg three times daily.  Of note, I have previously ordered a brain and skull base MRI, which was normal.    There have definitely been some ups and downs over the years.  Including back in early 2015 when she sent a message that she was starting to have LEFT ear pain, \"that was different\" and wanted to be seen.  This started in part when she changed her pharmacy in about December 2014, and the different generic immediately did not seem to be working and she had a return of her left facial pain and ear pain.  \"Shearing and stabbing pain in the face.\"  She was on 600mg twice daily, now she increased to 600mg QID.  She also is using leftover vicodin, and it was not helping.    She has had a difficult year in 2016, and was diagnosed with bilateral Breast CA in April of 2016, and had mastectomies and radiation therapy.  It was very difficult. Through it all, he neuralgia was fine.  And at her most recent follow up on 8/2/2018 she was being maintained on Neurontin 600mg three times daily     Most recently on 7/26/19, she called and asked to be added on urgently due to acute flare up of neuropathic pain.  She tells me that the only change has been that her teeth have been in particularly bad condition.  Likely due to her history of radiation therapy that stopped in 2015.  She actually has a dental appointment coming up August 8. Her usual sites are \"in burning sharp pain.\"  The LEFT cheek under the eye and on to the side of the LEFT nostril and side of the nose. So she increased her Neurontin up to 1200mg three times daily and she reports that is not really helping. " At the end of that visit, to help this acute flare, I started a baclofen Titration, starting at 5mg three times daily, working up to 15mg three times daily for 30 days, and in addition, I did a topical 2% viscous lidocaine applied with a cotton tipped applicator to the area of pain.    She is happy to tell me that she is doing great, no pain at all anymore.  But her memory is not doing well.    Past Medical History -   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     Ureteral obstruction     Rheumatoid arthritis with positive rheumatoid factor, involving unspecified site (H)     Osteopenia of hip     Need for prophylactic chemotherapy       Current Medications -   Current Outpatient Medications:      acetaminophen (TYLENOL) 325 MG tablet, Take 325-650 mg by mouth every 6 hours as needed for mild pain, Disp: , Rfl:      albuterol (PROAIR HFA, PROVENTIL HFA, VENTOLIN HFA) 108 (90 BASE) MCG/ACT inhaler, Inhale 2 puffs into the lungs every 6 hours as needed for shortness of breath / dyspnea or wheezing, Disp: 1 Inhaler, Rfl: 1     baclofen (LIORESAL) 10 MG tablet, Take 0.5 tablets (5 mg) by mouth 3 times daily for 7 days, THEN 1 tablet (10 mg) 3 times daily for 7 days, THEN 1.5 tablets (15 mg) 3 times daily., Disp: 108 tablet, Rfl: 0     Calcium Carbonate-Vit D-Min (CALCIUM 1200 PO), , Disp: , Rfl:      exemestane (AROMASIN) 25 MG tablet, Take 1 tablet (25 mg) by mouth  every morning, Disp: 90 tablet, Rfl: 3     gabapentin (NEURONTIN) 600 MG tablet, Take 1 tablet (600 mg) by mouth 3 times daily, Disp: 180 tablet, Rfl: 6     hydrochlorothiazide (HYDRODIURIL) 25 MG tablet, TAKE A HALF TABLET BY MOUTH ONCE DAILY IN THE MORNING, Disp: 45 tablet, Rfl: 0     HYDROcodone-acetaminophen (NORCO) 5-325 MG per tablet, Take 1-2 tablets by mouth every 6 hours as needed for other (Moderate to Severe Pain), Disp: 5 tablet, Rfl: 0     lidocaine HCl (XYLOCAINE) 2 % solution, Swish and spit 15 mLs in mouth every 3 hours as needed for moderate pain ; Max 8 doses/24 hour period., Disp: 300 mL, Rfl: 3    Allergies -   No Known Allergies    Social History -   History     Social History     Marital Status:      Spouse Name: N/A     Number of Children: N/A     Years of Education: N/A     Social History Main Topics     Smoking status: Former Smoker     Quit date: 10/30/2006     Smokeless tobacco: Never Used     Alcohol Use: No     Drug Use: No     Sexual Activity:     Partners: Male     Other Topics Concern      Service No     Blood Transfusions No     Caffeine Concern Yes     occasional coffee     Occupational Exposure No     Hobby Hazards Yes     [place],     Sleep Concern Yes     not sleeping well     Stress Concern Yes     Grandson in the      Weight Concern Yes     Special Diet No     Back Care No     Exercise No     Seat Belt Yes     Self-Exams Yes     Parent/Sibling W/ Cabg, Mi Or Angioplasty Before 65f 55m? No     Social History Narrative       Family History -   Family History   Problem Relation Age of Onset     Cancer Mother         bone / liver     Breast Cancer Mother      Cancer Maternal Grandmother      Cancer Maternal Grandfather      Cancer Paternal Grandmother      Cancer Paternal Grandfather      Cancer Sister         vulvar ca, cervical ca, squamous cell cancer     Cancer Brother         Rectal- Stage 4     Rectal Cancer Brother      Breast Cancer Paternal Aunt       "Colon Cancer Paternal Aunt      Breast Cancer Maternal Aunt      Pancreatic Cancer Nephew      Breast Cancer Sister        Review of Systems - As per HPI and PMHx, otherwise 7 system review of the head and neck negative.    Physical Exam  BP (!) 146/77 (BP Location: Right arm, Patient Position: Chair, Cuff Size: Adult Regular)   Pulse 56   Resp 16   Ht 1.702 m (5' 7\")   Wt 110.7 kg (244 lb)   BMI 38.22 kg/m      General - The patient is well nourished and well developed, and appears to have good nutritional status.  Alert and oriented to person and place, answers questions and cooperates with examination appropriately.   Head and Face - Normocephalic and atraumatic, with no gross asymmetry noted of the contour of the facial features.  The facial nerve is intact, with strong symmetric movements.  Voice and Breathing - The patient was breathing comfortably without the use of accessory muscles. There was no wheezing, stridor, or stertor.  The patients voice was clear and strong, and had appropriate pitch and quality.  Ears - The tympanic membranes are normal in appearance, bony landmarks are intact.  No retraction, perforation, or masses.  Normal mobility on valsalva maneuver, with no reports of dizziness on insuflation.  No fluid or purulence was seen in the external canal or the middle ear. No evidence of infection of the middle ear or external canal, cerumen was normal in appearance.  Eyes - Extraocular movements intact, and the pupils were reactive to light.  Sclera were not icteric or injected, conjunctiva were pink and moist.  Mouth - Examination of the oral cavity showed pink, healthy oral mucosa. No lesions or ulcerations noted.  The tongue was mobile and midline, and the dentition were in good condition.          A/P - Marissa Guadarrama is a 69 year old female  (G50.0) Trigeminal neuralgia  (primary encounter diagnosis)  Comment:     Cut down the Baclofen to 15 mg twice daily for 5 days, then 15mg daily " for 5 days, then off.    She is already on her original dose of Neurontin of 600mg three times daily, and stay on that.    If everything settles back to normal then follow up in  One year as normal.

## 2019-08-23 NOTE — PROGRESS NOTES
Screening Questionnaire for Adult Immunization    Are you sick today?   No   Do you have allergies to medications, food, a vaccine component or latex?   No   Have you ever had a serious reaction after receiving a vaccination?   No   Do you have a long-term health problem with heart disease, lung disease, asthma, kidney disease, metabolic disease (e.g. diabetes), anemia, or other blood disorder?   No   Do you have cancer, leukemia, HIV/AIDS, or any other immune system problem?   Yes   In the past 3 months, have you taken medications that affect  your immune system, such as prednisone, other steroids, or anticancer drugs; drugs for the treatment of rheumatoid arthritis, Crohn s disease, or psoriasis; or have you had radiation treatments?   Yes   Have you had a seizure, or a brain or other nervous system problem?   No   During the past year, have you received a transfusion of blood or blood     products, or been given immune (gamma) globulin or antiviral drug?   No   For women: Are you pregnant or is there a chance you could become        pregnant during the next month?   No   Have you received any vaccinations in the past 4 weeks?   No     Immunization questionnaire was positive for at least one answer.  Notified Provider Sheryl Zhang. Because immunization was not a live virus it was ok to give.        Per orders of Sheryl Zhang NP, injection of Hepatitis A given by Cyndy Suggs CMA. Patient instructed to remain in clinic for 15 minutes afterwards, and to report any adverse reaction to me immediately.       Screening performed by Cyndy Suggs CMA on 8/23/2019 at 10:10 AM.

## 2019-08-23 NOTE — LETTER
"    8/23/2019         RE: Marissa Guadarrama  [address] [place] [id number]        Dear Colleague,    Thank you for referring your patient, Marissa Guadarrama, to the [facility]. Please see a copy of my visit note below.    History of Present Illness - Marissa Guadarrama is a 67 year old female here for continued follow up for her LEFT V2 trigeminal neuralgia. She is here in close follow up from 7/26/19. She is here again with her  Gaudencio.    To review, in 2012 she stopped the meds because the pain went away, then it came back with a vengeance.  I previously had her up to 900mg three times daily.  Of note, I have previously ordered a brain and skull base MRI, which was normal.    There have definitely been some ups and downs over the years.  Including back in early 2015 when she sent a message that she was starting to have LEFT ear pain, \"that was different\" and wanted to be seen.  This started in part when she changed her pharmacy in about December 2014, and the different generic immediately did not seem to be working and she had a return of her left facial pain and ear pain.  \"Shearing and stabbing pain in the face.\"  She was on 600mg twice daily, now she increased to 600mg QID.  She also is using leftover vicodin, and it was not helping.    She has had a difficult year in 2016, and was diagnosed with bilateral Breast CA in April of 2016, and had mastectomies and radiation therapy.  It was very difficult. Through it all, he neuralgia was fine.  And at her most recent follow up on 8/2/2018 she was being maintained on Neurontin 600mg three times daily     Most recently on 7/26/19, she called and asked to be added on urgently due to acute flare up of neuropathic pain.  She tells me that the only change has been that her teeth have been in particularly bad condition.  Likely due to her history of radiation therapy that stopped in 2015.  She actually has a dental appointment coming up August 8. " "Her usual sites are \"in burning sharp pain.\"  The LEFT cheek under the eye and on to the side of the LEFT nostril and side of the nose. So she increased her Neurontin up to 1200mg three times daily and she reports that is not really helping. At the end of that visit, to help this acute flare, I started a baclofen Titration, starting at 5mg three times daily, working up to 15mg three times daily for 30 days, and in addition, I did a topical 2% viscous lidocaine applied with a cotton tipped applicator to the area of pain.    She is happy to tell me that she is doing great, no pain at all anymore.  But her memory is not doing well.    Past Medical History -   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     Ureteral obstruction     Rheumatoid arthritis with positive rheumatoid factor, involving unspecified site (H)     Osteopenia of hip     Need for prophylactic chemotherapy       Current Medications -   Current Outpatient Medications:      acetaminophen (TYLENOL) 325 MG tablet, Take 325-650 mg by mouth every 6 hours as needed for mild pain, Disp: , Rfl:      albuterol (PROAIR HFA, PROVENTIL HFA, VENTOLIN HFA) 108 (90 BASE) MCG/ACT inhaler, Inhale 2 puffs into the lungs every 6 hours as needed for shortness of breath / dyspnea or wheezing, Disp: 1 Inhaler, Rfl: 1     baclofen (LIORESAL) 10 MG tablet, Take 0.5 tablets (5 mg) by mouth 3 times daily for 7 days, THEN 1 tablet " (10 mg) 3 times daily for 7 days, THEN 1.5 tablets (15 mg) 3 times daily., Disp: 108 tablet, Rfl: 0     Calcium Carbonate-Vit D-Min (CALCIUM 1200 PO), , Disp: , Rfl:      exemestane (AROMASIN) 25 MG tablet, Take 1 tablet (25 mg) by mouth every morning, Disp: 90 tablet, Rfl: 3     gabapentin (NEURONTIN) 600 MG tablet, Take 1 tablet (600 mg) by mouth 3 times daily, Disp: 180 tablet, Rfl: 6     hydrochlorothiazide (HYDRODIURIL) 25 MG tablet, TAKE A HALF TABLET BY MOUTH ONCE DAILY IN THE MORNING, Disp: 45 tablet, Rfl: 0     HYDROcodone-acetaminophen (NORCO) 5-325 MG per tablet, Take 1-2 tablets by mouth every 6 hours as needed for other (Moderate to Severe Pain), Disp: 5 tablet, Rfl: 0     lidocaine HCl (XYLOCAINE) 2 % solution, Swish and spit 15 mLs in mouth every 3 hours as needed for moderate pain ; Max 8 doses/24 hour period., Disp: 300 mL, Rfl: 3    Allergies -   No Known Allergies    Social History -   History     Social History     Marital Status:      Spouse Name: N/A     Number of Children: N/A     Years of Education: N/A     Social History Main Topics     Smoking status: Former Smoker     Quit date: 10/30/2006     Smokeless tobacco: Never Used     Alcohol Use: No     Drug Use: No     Sexual Activity:     Partners: Male     Other Topics Concern      Service No     Blood Transfusions No     Caffeine Concern Yes     occasional coffee     Occupational Exposure No     Hobby Hazards Yes     [place],     Sleep Concern Yes     not sleeping well     Stress Concern Yes     Grandson in the      Weight Concern Yes     Special Diet No     Back Care No     Exercise No     Seat Belt Yes     Self-Exams Yes     Parent/Sibling W/ Cabg, Mi Or Angioplasty Before 65f 55m? No     Social History Narrative       Family History -   Family History   Problem Relation Age of Onset     Cancer Mother         bone / liver     Breast Cancer Mother      Cancer Maternal Grandmother      Cancer Maternal Grandfather       "Cancer Paternal Grandmother      Cancer Paternal Grandfather      Cancer Sister         vulvar ca, cervical ca, squamous cell cancer     Cancer Brother         Rectal- Stage 4     Rectal Cancer Brother      Breast Cancer Paternal Aunt      Colon Cancer Paternal Aunt      Breast Cancer Maternal Aunt      Pancreatic Cancer Nephew      Breast Cancer Sister        Review of Systems - As per HPI and PMHx, otherwise 7 system review of the head and neck negative.    Physical Exam  BP (!) 146/77 (BP Location: Right arm, Patient Position: Chair, Cuff Size: Adult Regular)   Pulse 56   Resp 16   Ht 1.702 m (5' 7\")   Wt 110.7 kg (244 lb)   BMI 38.22 kg/m       General - The patient is well nourished and well developed, and appears to have good nutritional status.  Alert and oriented to person and place, answers questions and cooperates with examination appropriately.   Head and Face - Normocephalic and atraumatic, with no gross asymmetry noted of the contour of the facial features.  The facial nerve is intact, with strong symmetric movements.  Voice and Breathing - The patient was breathing comfortably without the use of accessory muscles. There was no wheezing, stridor, or stertor.  The patients voice was clear and strong, and had appropriate pitch and quality.  Ears - The tympanic membranes are normal in appearance, bony landmarks are intact.  No retraction, perforation, or masses.  Normal mobility on valsalva maneuver, with no reports of dizziness on insuflation.  No fluid or purulence was seen in the external canal or the middle ear. No evidence of infection of the middle ear or external canal, cerumen was normal in appearance.  Eyes - Extraocular movements intact, and the pupils were reactive to light.  Sclera were not icteric or injected, conjunctiva were pink and moist.  Mouth - Examination of the oral cavity showed pink, healthy oral mucosa. No lesions or ulcerations noted.  The tongue was mobile and midline, and " the dentition were in good condition.          A/P - Marissa Guadarrama is a 69 year old female  (G50.0) Trigeminal neuralgia  (primary encounter diagnosis)  Comment:     Cut down the Baclofen to 15 mg twice daily for 5 days, then 15mg daily for 5 days, then off.    She is already on her original dose of Neurontin of 600mg three times daily, and stay on that.    If everything settles back to normal then follow up in  One year as normal.      Again, thank you for allowing me to participate in the care of your patient.        Sincerely,        Rk Bell MD

## 2019-10-17 DIAGNOSIS — I10 ESSENTIAL HYPERTENSION WITH GOAL BLOOD PRESSURE LESS THAN 140/90: ICD-10-CM

## 2019-10-17 NOTE — TELEPHONE ENCOUNTER
"Requested Prescriptions   Pending Prescriptions Disp Refills     hydrochlorothiazide (HYDRODIURIL) 25 MG tablet  Last Written Prescription Date:  08/14/19  Last Fill Quantity: 45,  # refills: 0   Last office visit: 8/23/2019 with prescribing provider:  Eliazar Menendez   Future Office Visit:   Next 5 appointments (look out 90 days)    Oct 25, 2019  8:45 AM CDT  [facility] Rk Bell [facility]  [facility] [address] [facility] [address]  [phone]   Oct 31, 2019  2:45 PM CDT  Return Visit with Zhao La MD  [facility] [address] [address]  [phone]   Nov 20, 2019 11:00 AM CST  Return Visit with Hosea King MD  [facility] ([facility]  [address]  [facility] [address]  [phone]          45 tablet 0     Sig: TAKE A HALF TABLET BY MOUTH ONCE DAILY IN THE MORNING       Diuretics (Including Combos) Protocol Failed - 10/17/2019  3:43 PM        Failed - Blood pressure under 140/90 in past 12 months     BP Readings from Last 3 Encounters:   08/23/19 (!) 146/77   07/26/19 144/84   06/12/19 141/78           Failed - Normal serum creatinine on file in past 12 months     Recent Labs   Lab Test 05/29/19  1054   CR 1.25*           Passed - Recent (12 mo) or future (30 days) visit within the authorizing provider's specialty     Patient has had an office visit with the authorizing provider or a provider within the authorizing providers department within the previous 12 mos or has a future within next 30 days. See \"Patient Info\" tab in inbasket, or \"Choose Columns\" in Meds & Orders section of the refill encounter.          Passed - Medication is active on med list        Passed - Patient is age 18 or older        Passed - No active pregancy on record        Passed - Normal serum potassium on file " in past 12 months     Recent Labs   Lab Test 05/13/19  1128   POTASSIUM 3.6            Passed - Normal serum sodium on file in past 12 months     Recent Labs   Lab Test 05/13/19  1128              Passed - No positive pregnancy test in past 12 months

## 2019-10-18 NOTE — TELEPHONE ENCOUNTER
S:  Refill request for hydrochlorothiazide 25mg tab take 1/2 tab daily     B:  LOV 1/30/19  Hydrochlorothiazide 25mg tab take 1/2 tab daily daily last written 8/14/19 for 90 day supply    A:  Fails [facility] refill protocol due to recent BP and recent creatinine lab:   BP Readings from Last 3 Encounters:   08/23/19 (!) 146/77   07/26/19 144/84   06/12/19 141/78        Lab Test 05/29/19  1054   CR 1.25*     R:  Routed to provider:  Can patient have refill of medication as pended?    David Marina RN

## 2019-10-22 RX ORDER — HYDROCHLOROTHIAZIDE 25 MG/1
TABLET ORAL
Qty: 45 TABLET | Refills: 0 | Status: SHIPPED | OUTPATIENT
Start: 2019-10-22 | End: 2019-12-23

## 2019-10-25 ENCOUNTER — OFFICE VISIT (OUTPATIENT)
Dept: OTOLARYNGOLOGY | Facility: CLINIC | Age: 69
End: 2019-10-25
Payer: COMMERCIAL

## 2019-10-25 VITALS
WEIGHT: 245 LBS | BODY MASS INDEX: 38.45 KG/M2 | DIASTOLIC BLOOD PRESSURE: 89 MMHG | SYSTOLIC BLOOD PRESSURE: 149 MMHG | HEIGHT: 67 IN | TEMPERATURE: 97.4 F | HEART RATE: 64 BPM

## 2019-10-25 DIAGNOSIS — G50.0 TRIGEMINAL NEURALGIA: Primary | ICD-10-CM

## 2019-10-25 PROCEDURE — 99214 OFFICE O/P EST MOD 30 MIN: CPT | Performed by: OTOLARYNGOLOGY

## 2019-10-25 ASSESSMENT — MIFFLIN-ST. JEOR: SCORE: 1658.94

## 2019-10-25 NOTE — NURSING NOTE
"Initial BP (!) 149/89 (BP Location: Right arm, Patient Position: Sitting, Cuff Size: Adult Large)   Pulse 64   Temp 97.4  F (36.3  C) (Tympanic)   Ht 1.702 m (5' 7\")   Wt 111.1 kg (245 lb)   BMI 38.37 kg/m   Estimated body mass index is 38.37 kg/m  as calculated from the following:    Height as of this encounter: 1.702 m (5' 7\").    Weight as of this encounter: 111.1 kg (245 lb). .    Cherise Bedoya MA    "

## 2019-10-25 NOTE — PROGRESS NOTES
"History of Present Illness - Marissa Guadarrama is a 67 year old female here for continued follow up for her LEFT V2 trigeminal neuralgia. She is here in close follow up from 8/23/19. She is here again with her  Gaudencio.    To review, in 2012 she stopped the meds because the pain went away, then it came back with a vengeance.  I previously had her up to 900mg three times daily.  Of note, I have previously ordered a brain and skull base MRI, which was normal.    There have definitely been some ups and downs over the years.  Including back in early 2015 when she sent a message that she was starting to have LEFT ear pain, \"that was different\" and wanted to be seen.  This started in part when she changed her pharmacy in about December 2014, and the different generic immediately did not seem to be working and she had a return of her left facial pain and ear pain.  \"Shearing and stabbing pain in the face.\"  She was on 600mg twice daily, now she increased to 600mg QID.  She also is using leftover vicodin, and it was not helping.    She has had a difficult year in 2016, and was diagnosed with bilateral Breast CA in April of 2016, and had mastectomies and radiation therapy.  It was very difficult. Through it all, he neuralgia was fine.  And at her most recent follow up on 8/2/2018 she was being maintained on Neurontin 600mg three times daily     Most recently on 7/26/19, she called and asked to be added on urgently due to acute flare up of neuropathic pain.  She tells me that the only change has been that her teeth have been in particularly bad condition.  Likely due to her history of radiation therapy that stopped in 2015.  She actually has a dental appointment coming up August 8. Her usual sites are \"in burning sharp pain.\"  The LEFT cheek under the eye and on to the side of the LEFT nostril and side of the nose. So she increased her Neurontin up to 1200mg three times daily and she reports that is not really helping. " At the end of that visit, to help this acute flare, I started a baclofen Titration, starting at 5mg three times daily, working up to 15mg three times daily for 30 days, and in addition, I did a topical 2% viscous lidocaine applied with a cotton tipped applicator to the area of pain.    At the 8/23/19 visit, she was happy to tell me that she is doing great, no pain at all anymore.  But unfortunately there had been some cognitive changes with the changes in dosing, and her memory seemed effected.  We changed her back to her previous long term dosing and took and watchful waiting approach to see if things would normalize and she she is back for close follow up.    Past Medical History -   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     Ureteral obstruction     Rheumatoid arthritis with positive rheumatoid factor, involving unspecified site (H)     Osteopenia of hip     Need for prophylactic chemotherapy       Current Medications -   Current Outpatient Medications:      acetaminophen (TYLENOL) 325 MG tablet, Take 325-650 mg by mouth every 6 hours as needed for mild pain, Disp: , Rfl:      albuterol (PROAIR HFA, PROVENTIL HFA, VENTOLIN HFA) 108 (90 BASE) MCG/ACT inhaler, Inhale 2 puffs into the lungs every 6 hours as needed for shortness of breath / dyspnea or wheezing, Disp: 1 Inhaler, Rfl: 1     Calcium Carbonate-Vit D-Min (CALCIUM 1200 PO), , Disp: , Rfl:      exemestane  (AROMASIN) 25 MG tablet, Take 1 tablet (25 mg) by mouth every morning, Disp: 90 tablet, Rfl: 3     gabapentin (NEURONTIN) 600 MG tablet, Take 1 tablet (600 mg) by mouth 3 times daily, Disp: 180 tablet, Rfl: 6     hydrochlorothiazide (HYDRODIURIL) 25 MG tablet, TAKE A HALF TABLET BY MOUTH ONCE DAILY IN THE MORNING, Disp: 45 tablet, Rfl: 0     HYDROcodone-acetaminophen (NORCO) 5-325 MG per tablet, Take 1-2 tablets by mouth every 6 hours as needed for other (Moderate to Severe Pain), Disp: 5 tablet, Rfl: 0     lidocaine HCl (XYLOCAINE) 2 % solution, Swish and spit 15 mLs in mouth every 3 hours as needed for moderate pain ; Max 8 doses/24 hour period., Disp: 300 mL, Rfl: 3    Allergies -   No Known Allergies    Social History -   History     Social History     Marital Status:      Spouse Name: N/A     Number of Children: N/A     Years of Education: N/A     Social History Main Topics     Smoking status: Former Smoker     Quit date: 10/30/2006     Smokeless tobacco: Never Used     Alcohol Use: No     Drug Use: No     Sexual Activity:     Partners: Male     Other Topics Concern      Service No     Blood Transfusions No     Caffeine Concern Yes     occasional coffee     Occupational Exposure No     Hobby Hazards Yes     [place],     Sleep Concern Yes     not sleeping well     Stress Concern Yes     Grandson in the      Weight Concern Yes     Special Diet No     Back Care No     Exercise No     Seat Belt Yes     Self-Exams Yes     Parent/Sibling W/ Cabg, Mi Or Angioplasty Before 65f 55m? No     Social History Narrative       Family History -   Family History   Problem Relation Age of Onset     Cancer Mother         bone / liver     Breast Cancer Mother      Cancer Maternal Grandmother      Cancer Maternal Grandfather      Cancer Paternal Grandmother      Cancer Paternal Grandfather      Cancer Sister         vulvar ca, cervical ca, squamous cell cancer     Cancer Brother         Rectal- Stage 4      Rectal Cancer Brother      Breast Cancer Paternal Aunt      Colon Cancer Paternal Aunt      Breast Cancer Maternal Aunt      Pancreatic Cancer Nephew      Breast Cancer Sister        Review of Systems - As per HPI and PMHx, otherwise 7 system review of the head and neck negative.    Physical Exam  There were no vitals taken for this visit.    General - The patient is well nourished and well developed, and appears to have good nutritional status.  Alert and oriented to person and place, answers questions and cooperates with examination appropriately.   Head and Face - Normocephalic and atraumatic, with no gross asymmetry noted of the contour of the facial features.  The facial nerve is intact, with strong symmetric movements.  Voice and Breathing - The patient was breathing comfortably without the use of accessory muscles. There was no wheezing, stridor, or stertor.  The patients voice was clear and strong, and had appropriate pitch and quality.  Ears - The tympanic membranes are normal in appearance, bony landmarks are intact.  No retraction, perforation, or masses.  Normal mobility on valsalva maneuver, with no reports of dizziness on insuflation.  No fluid or purulence was seen in the external canal or the middle ear. No evidence of infection of the middle ear or external canal, cerumen was normal in appearance.  Eyes - Extraocular movements intact, and the pupils were reactive to light.  Sclera were not icteric or injected, conjunctiva were pink and moist.  Mouth - Examination of the oral cavity showed pink, healthy oral mucosa. No lesions or ulcerations noted.  The tongue was mobile and midline, and the dentition were in good condition.          A/P - Marissa Guadarrama is a 69 year old female  (G50.0) Trigeminal neuralgia  (primary encounter diagnosis)  Comment:     Thankfully everything is back to normal.  Continue the 600mg dosing and if nothing else changes, follow up in one year.

## 2019-10-25 NOTE — LETTER
"    10/25/2019         RE: Marissa Guadarrama  [address] [place] [id number]        Dear Colleague,    Thank you for referring your patient, Marissa Guadarrama, to the [facility]. Please see a copy of my visit note below.    History of Present Illness - Marissa Guadarrama is a 67 year old female here for continued follow up for her LEFT V2 trigeminal neuralgia. She is here in close follow up from 8/23/19. She is here again with her  Gaudencio.    To review, in 2012 she stopped the meds because the pain went away, then it came back with a vengeance.  I previously had her up to 900mg three times daily.  Of note, I have previously ordered a brain and skull base MRI, which was normal.    There have definitely been some ups and downs over the years.  Including back in early 2015 when she sent a message that she was starting to have LEFT ear pain, \"that was different\" and wanted to be seen.  This started in part when she changed her pharmacy in about December 2014, and the different generic immediately did not seem to be working and she had a return of her left facial pain and ear pain.  \"Shearing and stabbing pain in the face.\"  She was on 600mg twice daily, now she increased to 600mg QID.  She also is using leftover vicodin, and it was not helping.    She has had a difficult year in 2016, and was diagnosed with bilateral Breast CA in April of 2016, and had mastectomies and radiation therapy.  It was very difficult. Through it all, he neuralgia was fine.  And at her most recent follow up on 8/2/2018 she was being maintained on Neurontin 600mg three times daily     Most recently on 7/26/19, she called and asked to be added on urgently due to acute flare up of neuropathic pain.  She tells me that the only change has been that her teeth have been in particularly bad condition.  Likely due to her history of radiation therapy that stopped in 2015.  She actually has a dental appointment coming up August " "8. Her usual sites are \"in burning sharp pain.\"  The LEFT cheek under the eye and on to the side of the LEFT nostril and side of the nose. So she increased her Neurontin up to 1200mg three times daily and she reports that is not really helping. At the end of that visit, to help this acute flare, I started a baclofen Titration, starting at 5mg three times daily, working up to 15mg three times daily for 30 days, and in addition, I did a topical 2% viscous lidocaine applied with a cotton tipped applicator to the area of pain.    At the 8/23/19 visit, she was happy to tell me that she is doing great, no pain at all anymore.  But unfortunately there had been some cognitive changes with the changes in dosing, and her memory seemed effected.  We changed her back to her previous long term dosing and took and watchful waiting approach to see if things would normalize and she she is back for close follow up.    Past Medical History -   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     Ureteral obstruction     Rheumatoid arthritis with positive rheumatoid factor, involving unspecified site (H)     Osteopenia of hip     Need for prophylactic chemotherapy       Current Medications -   Current Outpatient Medications:      acetaminophen (TYLENOL) 325 MG tablet, Take 325-650 mg by mouth every 6 hours as needed for mild pain, Disp: , Rfl:      albuterol (PROAIR HFA, PROVENTIL HFA, " VENTOLIN HFA) 108 (90 BASE) MCG/ACT inhaler, Inhale 2 puffs into the lungs every 6 hours as needed for shortness of breath / dyspnea or wheezing, Disp: 1 Inhaler, Rfl: 1     Calcium Carbonate-Vit D-Min (CALCIUM 1200 PO), , Disp: , Rfl:      exemestane (AROMASIN) 25 MG tablet, Take 1 tablet (25 mg) by mouth every morning, Disp: 90 tablet, Rfl: 3     gabapentin (NEURONTIN) 600 MG tablet, Take 1 tablet (600 mg) by mouth 3 times daily, Disp: 180 tablet, Rfl: 6     hydrochlorothiazide (HYDRODIURIL) 25 MG tablet, TAKE A HALF TABLET BY MOUTH ONCE DAILY IN THE MORNING, Disp: 45 tablet, Rfl: 0     HYDROcodone-acetaminophen (NORCO) 5-325 MG per tablet, Take 1-2 tablets by mouth every 6 hours as needed for other (Moderate to Severe Pain), Disp: 5 tablet, Rfl: 0     lidocaine HCl (XYLOCAINE) 2 % solution, Swish and spit 15 mLs in mouth every 3 hours as needed for moderate pain ; Max 8 doses/24 hour period., Disp: 300 mL, Rfl: 3    Allergies -   No Known Allergies    Social History -   History     Social History     Marital Status:      Spouse Name: N/A     Number of Children: N/A     Years of Education: N/A     Social History Main Topics     Smoking status: Former Smoker     Quit date: 10/30/2006     Smokeless tobacco: Never Used     Alcohol Use: No     Drug Use: No     Sexual Activity:     Partners: Male     Other Topics Concern      Service No     Blood Transfusions No     Caffeine Concern Yes     occasional coffee     Occupational Exposure No     Hobby Hazards Yes     [place],     Sleep Concern Yes     not sleeping well     Stress Concern Yes     Grandson in the      Weight Concern Yes     Special Diet No     Back Care No     Exercise No     Seat Belt Yes     Self-Exams Yes     Parent/Sibling W/ Cabg, Mi Or Angioplasty Before 65f 55m? No     Social History Narrative       Family History -   Family History   Problem Relation Age of Onset     Cancer Mother         bone / liver     Breast Cancer Mother       Cancer Maternal Grandmother      Cancer Maternal Grandfather      Cancer Paternal Grandmother      Cancer Paternal Grandfather      Cancer Sister         vulvar ca, cervical ca, squamous cell cancer     Cancer Brother         Rectal- Stage 4     Rectal Cancer Brother      Breast Cancer Paternal Aunt      Colon Cancer Paternal Aunt      Breast Cancer Maternal Aunt      Pancreatic Cancer Nephew      Breast Cancer Sister        Review of Systems - As per HPI and PMHx, otherwise 7 system review of the head and neck negative.    Physical Exam  There were no vitals taken for this visit.    General - The patient is well nourished and well developed, and appears to have good nutritional status.  Alert and oriented to person and place, answers questions and cooperates with examination appropriately.   Head and Face - Normocephalic and atraumatic, with no gross asymmetry noted of the contour of the facial features.  The facial nerve is intact, with strong symmetric movements.  Voice and Breathing - The patient was breathing comfortably without the use of accessory muscles. There was no wheezing, stridor, or stertor.  The patients voice was clear and strong, and had appropriate pitch and quality.  Ears - The tympanic membranes are normal in appearance, bony landmarks are intact.  No retraction, perforation, or masses.  Normal mobility on valsalva maneuver, with no reports of dizziness on insuflation.  No fluid or purulence was seen in the external canal or the middle ear. No evidence of infection of the middle ear or external canal, cerumen was normal in appearance.  Eyes - Extraocular movements intact, and the pupils were reactive to light.  Sclera were not icteric or injected, conjunctiva were pink and moist.  Mouth - Examination of the oral cavity showed pink, healthy oral mucosa. No lesions or ulcerations noted.  The tongue was mobile and midline, and the dentition were in good condition.          A/P - Marissa Guadarrama  is a 69 year old female  (G50.0) Trigeminal neuralgia  (primary encounter diagnosis)  Comment:     Thankfully everything is back to normal.  Continue the 600mg dosing and if nothing else changes, follow up in one year.      Again, thank you for allowing me to participate in the care of your patient.        Sincerely,        Rk Bell MD

## 2019-10-30 ENCOUNTER — TELEPHONE (OUTPATIENT)
Dept: OTOLARYNGOLOGY | Facility: CLINIC | Age: 69
End: 2019-10-30

## 2019-10-30 NOTE — TELEPHONE ENCOUNTER
Reason for Call:  Other call back    Detailed comments: pt  calling stating pt has been having facial pain for the last week or so. Would like to know if she can get something for the pain.     Phone Number Patient can be reached at: Other phone number:  [phone]*    Best Time: any     Can we leave a detailed message on this number? YES    Call taken on 10/30/2019 at 9:54 AM by Yaima Agarwal

## 2019-10-30 NOTE — TELEPHONE ENCOUNTER
"Called  and reviewed sx.  States slight swelling and face area is hot to touch.  Advised to seek treatment today if infection suspected.    Reviewed possible adverse reportable symptoms of Post PROLIA Injections (\"jaw Pain and possible infection\") and suggested they alert Dr. King of the symptoms as well as I will route update to Dr Bell to consider if this is another break through flare of Trigeminal Neuralgia and let Provider know they are also ready to consider the surgery to treat it.    Pauline LINCOLN   [facility] RN    "

## 2019-10-31 ENCOUNTER — OFFICE VISIT (OUTPATIENT)
Dept: UROLOGY | Facility: CLINIC | Age: 69
End: 2019-10-31
Payer: COMMERCIAL

## 2019-10-31 VITALS — HEART RATE: 67 BPM | DIASTOLIC BLOOD PRESSURE: 83 MMHG | SYSTOLIC BLOOD PRESSURE: 169 MMHG | RESPIRATION RATE: 16 BRPM

## 2019-10-31 DIAGNOSIS — N13.5 OBSTRUCTION OF LEFT URETER: Primary | ICD-10-CM

## 2019-10-31 PROCEDURE — 99214 OFFICE O/P EST MOD 30 MIN: CPT | Performed by: UROLOGY

## 2019-10-31 NOTE — NURSING NOTE
"Initial BP (!) 169/83 (BP Location: Right arm, Patient Position: Chair, Cuff Size: Adult Regular)   Pulse 67   Resp 16  Estimated body mass index is 38.37 kg/m  as calculated from the following:    Height as of 10/25/19: 1.702 m (5' 7\").    Weight as of 10/25/19: 111.1 kg (245 lb). .    Patient is here to schedule  Surg.  irena storm LPN    "

## 2019-11-01 NOTE — PROGRESS NOTES
Visit Date:   10/31/2019      REASON FOR VISIT TODAY:  Obstructed left ureter and carcinoid tumor.      HISTORY:  Ms. 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  The kidney function on that side was measured to be 20% previously.  The patient is also known to have a lesion on her liver of approximately 4 cm that in retrospect was stable since 2016.  The patient did undergo biopsy of this lesion earlier this year but has been lost to followup.  She also underwent a repeat Lasix renal scan in the interim as well.  Otherwise, the patient notes that she is currently dealing with trigeminal neuralgia, but otherwise no major changes in her health.      PHYSICAL EXAMINATION:   VITAL:  On exam, her blood pressure is 169/83, pulse 67.   GENERAL:  She is in no acute distress.      LABORATORY DATA:  The patient's liver biopsy from 06/12/2019 shows metastatic low-grade neuroendocrine tumor.      The patient's renal scan from 05/16/2019 shows split function of 17% on the left and 83% on the right.      ASSESSMENT AND PLAN:  Over half of today's 25-minute visit was spent counseling the patient regarding her carcinoid tumor and her obstructed left ureter.  I suggested to Ms. Guadarrama that while the lesion in the liver is metastatic carcinoid tumor, that it does appear to be largely stable since 2016.  However, this means the patient does have at least 2 locations of tumor at this time.  I therefore suggested that the patient follow up again with Dr. Elfego Lawrence, who had seen her previously for her carcinoid tumor to determine if the presence of the liver lesions changes anything in terms of wanting to be more aggressive with treatment.  Given the fact that the patient is doing well and that the lesion in the liver as well as in the retroperitoneum have been enlarging very, very slowly thus far, I  suspect that continued observation may be warranted, but this will be a decision for the patient and Dr. Lawrence.  With regard to the obstructed left ureter, I did  the patient that given the minimal function on this left side of only 17% on the most recent renal scan that it may be reasonable to consider abandoning the kidney and taking the stent out that is currently in place.  If, however, the plan is to move forward with chemo for the patient's carcinoid tumor and in particular if the chemo would be renal toxic, then we would plan on exchanging the patient's stent and keeping it in place during treatment, but again if the plan is to observe things then we will likely remove the stent and abandon the kidney as long as the patient remains asymptomatic.  The patient will give us a call after she has seen Dr. Lawrence and they determine a plan in terms of the carcinoid and then again we will either make plans to take the stent out in clinic or to exchange in the operating room.  Ms. Lancaster understands the plan and is in agreement.         STEVE RUVALCABA MD             D: 10/31/2019   T: 10/31/2019   MT: JOSE      Name:     ROSALINDA LANCASTER   MRN:      [id number]        Account:      [id number]   :      1950           Visit Date:   10/31/2019      Document: [id number]

## 2019-11-07 NOTE — TELEPHONE ENCOUNTER
Called and managed to speak with Marissa.  For no apparent reason the LEFT trigeminal area flared several times last week, but now has spontaneously calmed down and everything back to normal.    I have counseled her to maintain her usual doses, avoid stimulation such as cold and wind, and maintain good hydration and nutrition.    Call with questions.

## 2019-11-12 ENCOUNTER — OFFICE VISIT (OUTPATIENT)
Dept: ONCOLOGY | Facility: CLINIC | Age: 69
End: 2019-11-12
Attending: OBSTETRICS & GYNECOLOGY
Payer: MEDICARE

## 2019-11-12 VITALS
DIASTOLIC BLOOD PRESSURE: 82 MMHG | SYSTOLIC BLOOD PRESSURE: 143 MMHG | BODY MASS INDEX: 38.69 KG/M2 | HEART RATE: 65 BPM | OXYGEN SATURATION: 95 % | RESPIRATION RATE: 18 BRPM | WEIGHT: 247 LBS | TEMPERATURE: 98.4 F

## 2019-11-12 DIAGNOSIS — N89.3 VAGINAL DYSPLASIA: Primary | ICD-10-CM

## 2019-11-12 PROCEDURE — G0463 HOSPITAL OUTPT CLINIC VISIT: HCPCS | Mod: ZF

## 2019-11-12 PROCEDURE — 87624 HPV HI-RISK TYP POOLED RSLT: CPT | Performed by: OBSTETRICS & GYNECOLOGY

## 2019-11-12 PROCEDURE — 99214 OFFICE O/P EST MOD 30 MIN: CPT | Mod: ZP

## 2019-11-12 PROCEDURE — 88175 CYTOPATH C/V AUTO FLUID REDO: CPT | Performed by: OBSTETRICS & GYNECOLOGY

## 2019-11-12 PROCEDURE — G0476 HPV COMBO ASSAY CA SCREEN: HCPCS | Performed by: OBSTETRICS & GYNECOLOGY

## 2019-11-12 ASSESSMENT — PAIN SCALES - GENERAL: PAINLEVEL: NO PAIN (0)

## 2019-11-12 NOTE — LETTER
"11/12/2019       RE: Marissa Guadarrama  [address] [place] [id number]     Dear Colleague,    Thank you for referring your patient, Marissa Guadarrama, to the [facility]. Please see a copy of my visit note below.                            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    Her history is as follows:  Notably, she has history of cervical cancer treated in 2007 at [facility], a history of colon cancer and recurrent carcinoid tumor and breast cancer.        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 Depth of invasion 7 mm out of 1.9 and horizontal spread of 1 cm. 48 negative nodes.   2009: ASCUS pap  11/1/13 Vaginal Pap returned ASC-H.   12/23/13 Colposcopy/Vaginal cuff (submitted as \"cervix\"), biopsy:VAIN III    2/5/14: Patient established care with Dr. Pack and desired surgical management     3/13/14: Colposcopy, Vaginal Biopsies, Co2 Laser of the Upper Vagina on , which showed low-grade squamous intraepithelial lesion (VAIN 1) at 12:00 and 6:00 and high-grade squamous intraepithelial lesion VAIN 29:00 vaginal cuff. Vaginal cuff, 3:00 (biopsy): Mostly denuded squamous mucosa with focal features suggestive of low-grade squamous intraepithelial lesion (VAIN 1)     7/30/14: biopsy of right upper vagina showed VAINI, negative p16     2/9/15: upper vaginal biopsy \"LSIL\"     9/25/17:  VAIN III     12/12/17: CO2 laser and biopsies of vaginal dysplasia    4/3/18:  Stable exam.    10/9/18:  Colpo, no biopsies. Pap LSIL, HR HPV 16+    4/9/19:  Pap ASC-H, HR HPV 16+    5/14/19:  Colpo with biopsies.  Biopsies benign.    11/12/19:  Pap HSIL, HR HPV+    The patient returns today for follow-up.  She continues to undergo evaluation related to her ureteral obstruction and carcinoid tumor.  She is awaiting consultation with " Dr. Lawrence.  She denies any new symptoms.       Past Medical History:    Past Medical History:   Diagnosis Date     Arthritis     knee     Benign breast biopsy     benign     Carcinoid tumor 12/2003     Cervical cancer (H) 2007     H/O colposcopy with cervical biopsy 12/23/13    vaginal cuff biopsy- VAIN III. referred back to gyn/onc     HTN      Hyperlipidemia      Pap smear of vagina with ASC-H 11/1/13     Post-polio syndromes      Trigeminal neuralgias          Past Surgical History:    Past Surgical History:   Procedure Laterality Date     APPENDECTOMY  1983     BIOPSY NODE SENTINEL Bilateral 6/1/2016    Procedure: BIOPSY NODE SENTINEL;  Surgeon: Brent Arana MD;  Location: [facility]     C BSO, OMENTECTOMY W/OSMAN  5/2007     C TOTAL ABDOM HYSTERECTOMY  5/2007     CL AFF SURGICAL PATHOLOGY       COLONOSCOPY N/A 6/23/2017    Procedure: COMBINED COLONOSCOPY, SINGLE OR MULTIPLE BIOPSY/POLYPECTOMY BY BIOPSY;  Colonoscopy Dx:Carcinoid tumor of colon prep mailed golytely;  Surgeon: Talisha Greco MD;  [facility]     COLPOSCOPY, BIOPSY, COMBINED  3/13/2014    Procedure: COMBINED COLPOSCOPY, BIOPSY;;  Surgeon: Lara Pack MD;  [facility]     COMBINED CYSTOSCOPY, RETROGRADES, EXCHANGE STENT URETER(S) Left 2/7/2019    Procedure: COMBINED CYSTOSCOPY, RETROGRADES, EXCHANGE STENT URETER--left;  Surgeon: Zhao La MD;  Location: [facility]     COMBINED CYSTOSCOPY, RETROGRADES, URETEROSCOPY, INSERT STENT Left 2/2/2017    Procedure: COMBINED CYSTOSCOPY, RETROGRADES, URETEROSCOPY, INSERT STENT;  Surgeon: Zhao La MD;  Location: [facility]     CYSTOSCOPY, RETROGRADES, INSERT STENT URETER(S), COMBINED Left 9/7/2017    Procedure: COMBINED CYSTOSCOPY, RETROGRADES, INSERT STENT URETER(S);  Cystoscopy,Left Stent Exchange;  Surgeon: Zhao La MD;  Location: [facility]     CYSTOSCOPY, RETROGRADES, INSERT STENT URETER(S), COMBINED  12/12/2017    Procedure: COMBINED  CYSTOSCOPY, RETROGRADES, INSERT STENT URETER(S);;  Surgeon: Zhao La MD;  [facility]     CYSTOSCOPY, RETROGRADES, INSERT STENT URETER(S), COMBINED Left 7/5/2018    Procedure: COMBINED CYSTOSCOPY, RETROGRADES, INSERT STENT URETER(S);  Cystoscopy, Left Stent Exchange;  Surgeon: Zhao La MD;  Location: [facility]     EXAM UNDER ANESTHESIA PELVIC  3/13/2014    Procedure: EXAM UNDER ANESTHESIA PELVIC;  Exam Under Anestheisa, Colposcopy, Vaginal Biopsies, Co2 Laser of the Upper Vagina;  Surgeon: Lara Pack MD;  [facility]     HERNIORRHAPHY INCISIONAL (LOCATION)       LASER CO2 VAGINA  3/13/2014    VAIN 1/2     LASER CO2 VAGINA N/A 12/12/2017    Procedure: LASER CO2 VAGINA;  Exam Under Anesthesia, CO2 Laser Ablation Of Vagina, Cystoscopy, Left Retrograde Pyelogram with Left Stent Placement;  Surgeon: Nic Segundo MD;  [facility]     LUMPECTOMY BREAST WITH SEED LOCALIZATION Bilateral 6/1/2016    Procedure: LUMPECTOMY BREAST WITH SEED LOCALIZATION;  Surgeon: Brent Arana MD;  Location: [facility]     SURGICAL HISTORY OF -       ovarian cystectomy     SURGICAL HISTORY OF -   2003    right colon resection secondary to carcinoid tumor     TUBAL LIGATION           Health Maintenance:  Health Maintenance Due   Topic Date Due     ZOSTER IMMUNIZATION (1 of 2) 05/17/1998     MEDICARE ANNUAL WELLNESS VISIT  11/02/2017     PHQ-2  01/01/2019     FALL RISK ASSESSMENT  01/08/2019     LIPID  01/09/2019     MICROALBUMIN  12/26/2019       Current Medications:     has a current medication list which includes the following prescription(s): acetaminophen, exemestane, gabapentin, hydrochlorothiazide, albuterol, calcium carbonate-vit d-min, hydrocodone-acetaminophen, and lidocaine.       Allergies:     [unfilled]        Social History:     Social History     Tobacco Use     Smoking status: Former Smoker     Packs/day: 1.00     Years: 29.00     Pack years: 29.00     Types:  Cigarettes     Last attempt to quit: 10/30/2006     Years since quittin.1     Smokeless tobacco: Never Used   Substance Use Topics     Alcohol use: No     Alcohol/week: 0.0 standard drinks       History   Drug Use No           Family History:     The patient's family history is notable for:    Family History   Problem Relation Age of Onset     Cancer Mother         bone / liver     Breast Cancer Mother      Cancer Maternal Grandmother      Cancer Maternal Grandfather      Cancer Paternal Grandmother      Cancer Paternal Grandfather      Cancer Sister         vulvar ca, cervical ca, squamous cell cancer     Cancer Brother         Rectal- Stage 4     Rectal Cancer Brother      Breast Cancer Paternal Aunt      Colon Cancer Paternal Aunt      Breast Cancer Maternal Aunt      Pancreatic Cancer Nephew      Breast Cancer Sister          Physical Exam:     BP (!) 143/82   Pulse 65   Temp 98.4  F (36.9  C) (Oral)   Resp 18   Wt 112 kg (247 lb)   SpO2 95%   BMI 38.69 kg/m     Body mass index is 38.69 kg/m .    General Appearance: healthy and alert, no distress     Musculoskeletal: extremities non tender and without edema    Skin: no lesions or rashes     Neurological: normal gait, no gross defects     Psychiatric: appropriate mood and affect                               Hematological: normal cervical, supraclavicular and inguinal lymph nodes     Gastrointestinal:       abdomen soft, non-tender, non-distended, no organomegaly or masses    :  External genitalia normal, BUS negative.  Vagina pale, atropic.  [place] with mild scarring.  Moderate discomfort to patient during exam.  No gross lesions.  Pap smear obtained of apex.    Assessment:     Marissa Guadarrama is a 69 year old woman with a diagnosis of HR HPV, persistent abnromal pap and VAIN3.  She also has history of multiple cancers including metastatic carcinoid tumor and bilateral breast cancer.     A total of  30 minutes was spent with the patient,  25  minutes of which were spent in counseling the patient and/or treatment planning.      Plan:   History of VAINIII:   Pap smear obtained.  She will be contacted with results and recs for follow-up. Will need further biopsies if still high grade.  Reviewed risk for recurrence, need for regular follow-up. Recommend exams every six months x five years, pap smears annually.      Questions answered, patient expressed understanding of plan of care.     Karli Gao MD  Gynecologic Oncology  [facility]     CC  Patient Care Team:  Eliazar Menendez MD as PCP - General (Family Practice)  Hosea King MD as MD (Hematology & Oncology)  Zhao La MD as MD (Urology)  Talisha Greco MD as MD (Colon and Rectal Surgery)  Allen Downey MD as MD (Orthopedics)

## 2019-11-12 NOTE — NURSING NOTE
"Oncology Rooming Note    November 12, 2019 12:31 PM   Marissa Guadarrama is a 69 year old female who presents for:    Chief Complaint   Patient presents with     Oncology Clinic Visit     Colpo clinic     Initial Vitals: Resp 18   Wt 112 kg (247 lb)   BMI 38.69 kg/m   Estimated body mass index is 38.69 kg/m  as calculated from the following:    Height as of 10/25/19: 1.702 m (5' 7\").    Weight as of this encounter: 112 kg (247 lb). Body surface area is 2.3 meters squared.  No Pain (0) Comment: Data Unavailable   No LMP recorded. Patient has had a hysterectomy.  Allergies reviewed: Yes  Medications reviewed: Yes    Medications: Medication refills not needed today.  Pharmacy name entered into [employer]: [facility] #[id number] [address]    Clinical concerns: Patient denies pelvic and vaginal pain at today's visit.         Kathy Angel CMA              "

## 2019-11-18 ENCOUNTER — HOSPITAL ENCOUNTER (OUTPATIENT)
Dept: LAB | Facility: CLINIC | Age: 69
Discharge: HOME | End: 2019-11-18
Attending: INTERNAL MEDICINE | Admitting: INTERNAL MEDICINE
Payer: MEDICARE

## 2019-11-18 DIAGNOSIS — C50.412 MALIGNANT NEOPLASM OF UPPER-OUTER QUADRANT OF BOTH BREASTS IN FEMALE, ESTROGEN RECEPTOR POSITIVE (H): ICD-10-CM

## 2019-11-18 DIAGNOSIS — C50.411 MALIGNANT NEOPLASM OF UPPER-OUTER QUADRANT OF BOTH BREASTS IN FEMALE, ESTROGEN RECEPTOR POSITIVE (H): ICD-10-CM

## 2019-11-18 DIAGNOSIS — Z17.0 MALIGNANT NEOPLASM OF UPPER-OUTER QUADRANT OF BOTH BREASTS IN FEMALE, ESTROGEN RECEPTOR POSITIVE (H): ICD-10-CM

## 2019-11-18 LAB
ALBUMIN SERPL-MCNC: 3.2 G/DL (ref 3.4–5)
ALP SERPL-CCNC: 68 U/L (ref 40–150)
ALT SERPL W P-5'-P-CCNC: 21 U/L (ref 0–50)
ANION GAP SERPL CALCULATED.3IONS-SCNC: 3 MMOL/L (ref 3–14)
AST SERPL W P-5'-P-CCNC: 17 U/L (ref 0–45)
BASOPHILS # BLD AUTO: 0 10E9/L (ref 0–0.2)
BASOPHILS NFR BLD AUTO: 0.4 %
BILIRUB SERPL-MCNC: 0.5 MG/DL (ref 0.2–1.3)
BUN SERPL-MCNC: 25 MG/DL (ref 7–30)
CALCIUM SERPL-MCNC: 9.7 MG/DL (ref 8.5–10.1)
CANCER AG27-29 SERPL-ACNC: 25 U/ML (ref 0–39)
CHLORIDE SERPL-SCNC: 106 MMOL/L (ref 94–109)
CO2 SERPL-SCNC: 33 MMOL/L (ref 20–32)
CREAT SERPL-MCNC: 1.21 MG/DL (ref 0.52–1.04)
DIFFERENTIAL METHOD BLD: NORMAL
EOSINOPHIL # BLD AUTO: 0.4 10E9/L (ref 0–0.7)
EOSINOPHIL NFR BLD AUTO: 4.8 %
ERYTHROCYTE [DISTWIDTH] IN BLOOD BY AUTOMATED COUNT: 13.1 % (ref 10–15)
GFR SERPL CREATININE-BSD FRML MDRD: 45 ML/MIN/{1.73_M2}
GLUCOSE SERPL-MCNC: 112 MG/DL (ref 70–99)
HCT VFR BLD AUTO: 45.5 % (ref 35–47)
HGB BLD-MCNC: 14.6 G/DL (ref 11.7–15.7)
IMM GRANULOCYTES # BLD: 0 10E9/L (ref 0–0.4)
IMM GRANULOCYTES NFR BLD: 0.3 %
LYMPHOCYTES # BLD AUTO: 1.6 10E9/L (ref 0.8–5.3)
LYMPHOCYTES NFR BLD AUTO: 22.6 %
MCH RBC QN AUTO: 29.7 PG (ref 26.5–33)
MCHC RBC AUTO-ENTMCNC: 32.1 G/DL (ref 31.5–36.5)
MCV RBC AUTO: 93 FL (ref 78–100)
MONOCYTES # BLD AUTO: 0.4 10E9/L (ref 0–1.3)
MONOCYTES NFR BLD AUTO: 5.9 %
NEUTROPHILS # BLD AUTO: 4.8 10E9/L (ref 1.6–8.3)
NEUTROPHILS NFR BLD AUTO: 66 %
NRBC # BLD AUTO: 0 10*3/UL
NRBC BLD AUTO-RTO: 0 /100
PLATELET # BLD AUTO: 190 10E9/L (ref 150–450)
POTASSIUM SERPL-SCNC: 3.5 MMOL/L (ref 3.4–5.3)
PROT SERPL-MCNC: 7.3 G/DL (ref 6.8–8.8)
RBC # BLD AUTO: 4.91 10E12/L (ref 3.8–5.2)
SODIUM SERPL-SCNC: 142 MMOL/L (ref 133–144)
WBC # BLD AUTO: 7.3 10E9/L (ref 4–11)

## 2019-11-18 PROCEDURE — 86300 IMMUNOASSAY TUMOR CA 15-3: CPT | Performed by: INTERNAL MEDICINE

## 2019-11-18 PROCEDURE — 85025 COMPLETE CBC W/AUTO DIFF WBC: CPT | Performed by: INTERNAL MEDICINE

## 2019-11-18 PROCEDURE — 36415 COLL VENOUS BLD VENIPUNCTURE: CPT | Performed by: INTERNAL MEDICINE

## 2019-11-18 PROCEDURE — 80053 COMPREHEN METABOLIC PANEL: CPT | Performed by: INTERNAL MEDICINE

## 2019-11-19 LAB
COPATH REPORT: ABNORMAL
PAP: ABNORMAL

## 2019-11-20 ENCOUNTER — ONCOLOGY VISIT (OUTPATIENT)
Dept: ONCOLOGY | Facility: CLINIC | Age: 69
End: 2019-11-20
Attending: INTERNAL MEDICINE
Payer: COMMERCIAL

## 2019-11-20 VITALS
RESPIRATION RATE: 18 BRPM | SYSTOLIC BLOOD PRESSURE: 169 MMHG | DIASTOLIC BLOOD PRESSURE: 70 MMHG | TEMPERATURE: 98.1 F | OXYGEN SATURATION: 94 % | HEIGHT: 67 IN | HEART RATE: 61 BPM | WEIGHT: 252.4 LBS | BODY MASS INDEX: 39.62 KG/M2

## 2019-11-20 DIAGNOSIS — C50.411 MALIGNANT NEOPLASM OF UPPER-OUTER QUADRANT OF BOTH BREASTS IN FEMALE, ESTROGEN RECEPTOR POSITIVE (H): Primary | ICD-10-CM

## 2019-11-20 DIAGNOSIS — E78.5 HYPERLIPIDEMIA LDL GOAL <130: ICD-10-CM

## 2019-11-20 DIAGNOSIS — C50.412 MALIGNANT NEOPLASM OF UPPER-OUTER QUADRANT OF BOTH BREASTS IN FEMALE, ESTROGEN RECEPTOR POSITIVE (H): Primary | ICD-10-CM

## 2019-11-20 DIAGNOSIS — I10 HYPERTENSION GOAL BP (BLOOD PRESSURE) < 140/90: ICD-10-CM

## 2019-11-20 DIAGNOSIS — Z17.0 MALIGNANT NEOPLASM OF UPPER-OUTER QUADRANT OF BOTH BREASTS IN FEMALE, ESTROGEN RECEPTOR POSITIVE (H): Primary | ICD-10-CM

## 2019-11-20 DIAGNOSIS — C7A.021 MALIGNANT CARCINOID TUMOR OF CECUM (H): ICD-10-CM

## 2019-11-20 PROCEDURE — 99214 OFFICE O/P EST MOD 30 MIN: CPT | Performed by: INTERNAL MEDICINE

## 2019-11-20 PROCEDURE — G0463 HOSPITAL OUTPT CLINIC VISIT: HCPCS

## 2019-11-20 ASSESSMENT — PAIN SCALES - GENERAL: PAINLEVEL: MODERATE PAIN (4)

## 2019-11-20 ASSESSMENT — MIFFLIN-ST. JEOR: SCORE: 1692.51

## 2019-11-20 NOTE — LETTER
"    11/20/2019         RE: Marissa Guadarrama  [address] [place] [id number]        Dear Colleague,    Thank you for referring your patient, Marissa Guadarrama, to the [facility]. Please see a copy of my visit note below.    Oncology Rooming Note    November 20, 2019 11:15 AM   Marissa Guadarrama is a 69 year old female who presents for:    Chief Complaint   Patient presents with     Oncology Clinic Visit     6 month recheck Breast CA, review Labs      Initial Vitals: BP (!) 169/70 (BP Location: Right arm, Patient Position: Sitting, Cuff Size: Adult Large)   Pulse 61   Temp 98.1  F (36.7  C) (Tympanic)   Resp 18   Ht 1.702 m (5' 7\")   Wt 114.5 kg (252 lb 6.4 oz)   SpO2 94%   Breastfeeding No   BMI 39.53 kg/m    Estimated body mass index is 39.53 kg/m  as calculated from the following:    Height as of this encounter: 1.702 m (5' 7\").    Weight as of this encounter: 114.5 kg (252 lb 6.4 oz). Body surface area is 2.33 meters squared.  Moderate Pain (4) Comment: Data Unavailable   No LMP recorded. Patient has had a hysterectomy.  Allergies reviewed: Yes  Medications reviewed: Yes    Medications: Medication refills not needed today.  Pharmacy name entered into [employer]: [facility] #[id number] - [place], [place] - [address] [place] [address]    Clinical concerns: 6 month recheck Breast CA, review Labs. C/o 4/10 right foot pains.       Katie Mills CMA              Per radiology, for breast lesion, a diagnostic mammogram must be ordered in addition to the US. Codi Diaz RN on 11/20/2019 at 12:00 PM      Hematology/ Oncology Follow-up Visit:  Nov 20, 2019    Reason for Visit:   Chief Complaint   Patient presents with     Oncology Clinic Visit     6 month recheck Breast CA, review Labs        Oncologic History:    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)  Marissa Guadarrama was found on routine mammogram a group of microcalcification is in the upper outer right breast. " There was also a focal asymmetry in the lower left breast. Subsequently the patient went on to have bilateral diagnostic mammography with ultrasound on March 28, 2016.. On the right breast there was microcalcification is the main grouping measures 1.6 cm x 1 cm bilateral 2.9 cm located 2.9 cm from the nipple. An additional tiny area about 0.2 cm seen at the anterolateral margin of this main grouping about 1 cm from the main grouping. The left breast shows no masses or significant abnormalities. Subsequently the patient went on to have breast needle biopsy on March 30, 2016. 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 Angiolymphatic invasion was not identified. 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 Microcalcifications was also associated with ductal carcinoma in situ. His surgeon receptor was positive. Progesterone receptor was negative. She underwent bilateral sentinel lymph node biopsy and bilateral lumpectomies with prior seed placement.  the surgical pathology Showing left breast lumpectomy was a tumor size of 13 mm grade 2 of 3, angiolymphatic invasion was not identified the tumor was unifocal associated with ductal carcinoma in situ high-grade. Surgical margins where negative. [place] lymph node was negative for metastatic tumor. Stage is T1cN0 M0 On the right breast and there was invasive ductal carcinoma with a tumor size of 3 mm grade 3 of 3. [place] lymph nodes were negative for metastatic disease. The tumor stage is T1aN0 M0. The tumor on the left was positive for estrogen and progesterone receptor. It did show no HER-2/praveen amplification. The tumor on the right is positive for estrogen receptor and negative for progesterone receptor and shows no HER-2/praveen amplification. Subsequently the patient concluded the radiation therapy. She is currently on hormonal therapy with Arimidex. She developed a skin rash and Arimidex was switched to  Aromasin.      Interval History:  She is returning today for follow-up visit.  She has been feeling well without recent new complaints.  She is currently on Aromasin without significant side effects.  Most recent mammogram shows no abnormalities.  She has a liver biopsy done in June which came back positive for malignant carcinoid.  Patient is scheduled to see Dr. Lawrence in January for further discussion and management.    Review Of Systems:  Constitutional: Negative for fever, chills, and night sweats.  Skin: negative.  Eyes: negative.  Ears/Nose/Throat: negative.  Respiratory: No shortness of breath, dyspnea on exertion, cough, or hemoptysis.  Cardiovascular: negative.  Gastrointestinal: negative.  Genitourinary: negative.  Musculoskeletal: negative.  Neurologic: negative.  Psychiatric: negative.  Hematologic/Lymphatic/Immunologic: negative.  Endocrine: negative.    All other ROS negative unless mentioned in interval history.    Past medical, social, surgical, and family histories reviewed.    Allergies:  Allergies as of 11/20/2019     (No Known Allergies)       Current Medications:  Current Outpatient Medications   Medication Sig Dispense Refill     exemestane (AROMASIN) 25 MG tablet Take 1 tablet (25 mg) by mouth every morning 90 tablet 3     gabapentin (NEURONTIN) 600 MG tablet Take 1 tablet (600 mg) by mouth 3 times daily 180 tablet 6     hydrochlorothiazide (HYDRODIURIL) 25 MG tablet TAKE A HALF TABLET BY MOUTH ONCE DAILY IN THE MORNING 45 tablet 0     acetaminophen (TYLENOL) 325 MG tablet Take 325-650 mg by mouth every 6 hours as needed for mild pain       albuterol (PROAIR HFA, PROVENTIL HFA, VENTOLIN HFA) 108 (90 BASE) MCG/ACT inhaler Inhale 2 puffs into the lungs every 6 hours as needed for shortness of breath / dyspnea or wheezing (Patient not taking: Reported on 11/12/2019) 1 Inhaler 1     Calcium Carbonate-Vit D-Min (CALCIUM 1200 PO)        HYDROcodone-acetaminophen (NORCO) 5-325 MG per tablet Take 1-2  "tablets by mouth every 6 hours as needed for other (Moderate to Severe Pain) (Patient not taking: Reported on 11/12/2019) 5 tablet 0     lidocaine HCl (XYLOCAINE) 2 % solution Swish and spit 15 mLs in mouth every 3 hours as needed for moderate pain ; Max 8 doses/24 hour period. (Patient not taking: Reported on 11/12/2019) 300 mL 3        Physical Exam:  BP (!) 169/70 (BP Location: Right arm, Patient Position: Sitting, Cuff Size: Adult Large)   Pulse 61   Temp 98.1  F (36.7  C) (Tympanic)   Resp 18   Ht 1.702 m (5' 7\")   Wt 114.5 kg (252 lb 6.4 oz)   SpO2 94%   Breastfeeding No   BMI 39.53 kg/m     Wt Readings from Last 12 Encounters:   11/20/19 114.5 kg (252 lb 6.4 oz)   11/12/19 112 kg (247 lb)   10/25/19 111.1 kg (245 lb)   08/23/19 110.7 kg (244 lb)   06/12/19 110.7 kg (244 lb)   05/28/19 112.7 kg (248 lb 6.4 oz)   05/15/19 112.9 kg (248 lb 14.4 oz)   05/14/19 113.2 kg (249 lb 9.6 oz)   04/09/19 110.7 kg (244 lb)   02/07/19 112 kg (247 lb)   01/30/19 112 kg (247 lb)   12/26/18 113.4 kg (250 lb)     ECOG performance status:0  GENERAL APPEARANCE: Healthy, alert and in no acute distress.  HEENT: Sclerae anicteric. PERRLA. Oropharynx without ulcers, lesions, or thrush.  NECK: Supple. No asymmetry or masses.  LYMPHATICS: No palpable cervical, supraclavicular, axillary, or inguinal lymphadenopathy.  RESP: Lungs clear to auscultation bilaterally without rales, rhonchi or wheezes.\", 0 BREAST: Right-there is tender nodular area deep to the lumpectomy scar, there is no nipple discharge or axillary adenopathy. Left- No mass, nipple discharge or axillary adenopathy \"CARDIOVASCULAR: Regular rate and rhythm. Normal S1, S2; no S3 or S4. No murmur, gallop, or rub.  ABDOMEN: Soft, nontender. Bowel sounds normal. No palpable organomegaly or masses.  MUSCULOSKELETAL: Extremities without gross deformities noted. No edema of bilateral lower extremities.  SKIN: No suspicious lesions or rashes.  NEURO: Alert and oriented x 3. " Cranial nerves II-XII grossly intact.  PSYCHIATRIC: Mentation and affect appear normal.    Laboratory/Imaging Studies:  Office Visit on 11/12/2019   Component Date Value Ref Range Status     PAP 11/12/2019 HSIL*  Final     Copath Report 11/12/2019    Final                    Value:  Patient Name: ROSALINDA LANCASTER  MR#: [id number]  Specimen #: [id number]  Collected: 11/12/2019  Received: 11/13/2019  Reported: 11/19/2019 14:28  Ordering Phy(s): MAR ROSS    For improved result formatting, select 'View Enhanced Report Format' under   Linked Documents section.    SPECIMEN/STAIN PROCESS:  Pap Imaged thin layer prep diagnostic (SurePath, FocalPoint with guided   screening)       Pap-Cyto x 1, HPV ordered x 1    SOURCE: Vaginal  ----------------------------------------------------------------   Pap Imaged thin layer prep diagnostic (SurePath, FocalPoint with guided   screening)  SPECIMEN ADEQUACY:  Satisfactory for evaluation.  -Transformation zone component absent.    CYTOLOGIC INTERPRETATION:    Epithelial cell abnormality:  Squamous Cell:  High-grade squamous   intraepithelial lesion (HSIL)    COMMENT:  This case was seen in consultation with Dr. BELA Lockhart who concurs with the   diagnosis.    I have personally reviewed all specimens and/or slides, including the                             listed special stains, and used them  with my medical judgment to determine the final diagnosis.    Electronically signed out by:  Olamide Crockett M.D., [facility]    CLINICAL HISTORY:    History of vaginal dysplasia.    Hysterectomy  Exam Note:: vain, Previous ASC-H  Date of Last Pap: 04/09/2019  Dysplasia: genital, History of positive HPV,    Papanicolaou Test Limitations:  Cervical cytology is a screening test with   limited sensitivity; regular  screening is critical for cancer prevention; Pap tests are primarily   effective for the diagnosis/prevention of  squamous cell carcinoma, not adenocarcinomas or other  cancers.    The technical component of this testing was completed at the [facility], with the professional component performed   at the [facility], [address] [place] [address],   [place] [address] ([phone])    COLLECTION SITE:  Client[facility]  [facility]  [facility]    Resident  [id number]       HPV Source 11/12/2019 SurePath   Final     HPV 16 DNA 11/12/2019 Positive* NEG^Negative Final     HPV 18 DNA 11/12/2019 Negative  NEG^Negative Final     Other HR HPV 11/12/2019 Negative  NEG^Negative Final     Final Diagnosis 11/12/2019 This patient's sample is positive for HPV 16 DNA.   Final    Comment: This test was developed and its performance characteristics determined by the   [facility]. It   has not been cleared or approved by the FDA. The laboratory is regulated under   CLIA as qualified to perform high-complexity testing. This test is used for   clinical purposes. It should not be regarded as investigational or for   research.  (Note)  METHODOLOGY:  The Roche cristiano 4800 system uses automated extraction,   simultaneous amplification of HPV (L1 region) and beta-globin,    followed by  real time detection of fluorescent labeled HPV and beta   globin using specific oligonucleotide probes . The test specifically   identifies types HPV 16 DNA and HPV 18 DNA while concurrently   detecting the rest of the high risk types (31, 33, 35, 39, 45, 51,   52, 56, 58, 59, 66 or 68).  COMMENTS:  This test is not intended for use as a screening device   for women under age 30 with normal cervical cytology.  Results should   be correl                           ated with cytologic and histologic findings. Close clinical   followup is recommended.       Specimen Description 11/12/2019 Cervical  Cells   Final    C19 10423        No results found for this or any previous visit (from the past 744 hour(s)).    Assessment and plan:  (C50.411,  Z17.0,  C50.412) 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, estrogen receptor positive (H)  (primary encounter diagnosis)  Because of the new finding on breast examination I will arrange for diagnostic mammography as well as breast ultrasound to rule out disease recurrence.  Meanwhile the patient will continue on Aromasin 25 mg orally daily.  The patient is not on calcium and vitamin D supplements because of renal impairment..    (C7A.021) Malignant carcinoid tumor of cecum (H)  Patient has been followed by urology.  She is due to see Dr. Lawrence to discuss further management plan    (I10) Hypertension goal BP (blood pressure) < 140/90  Blood pressure is currently well controlled.  She is currently on hydrochlorothiazide 25 mg orally daily.    The patient is ready to learn, no apparent learning barriers were identified.  Diagnosis and treatment plans were explained to the patient. The patient expressed understanding of the content. The patient asked appropriate questions. The patient questions were answered to her satisfaction.    Chart documentation with Dragon Voice recognition Software. Although reviewed after completion, some words and grammatical errors may remain.      Again, thank you for allowing me to participate in the care of your patient.        Sincerely,        Hosea King MD

## 2019-11-20 NOTE — PROGRESS NOTES
"Oncology Rooming Note    November 20, 2019 11:15 AM   Marissa Guadarrama is a 69 year old female who presents for:    Chief Complaint   Patient presents with     Oncology Clinic Visit     6 month recheck Breast CA, review Labs      Initial Vitals: BP (!) 169/70 (BP Location: Right arm, Patient Position: Sitting, Cuff Size: Adult Large)   Pulse 61   Temp 98.1  F (36.7  C) (Tympanic)   Resp 18   Ht 1.702 m (5' 7\")   Wt 114.5 kg (252 lb 6.4 oz)   SpO2 94%   Breastfeeding No   BMI 39.53 kg/m   Estimated body mass index is 39.53 kg/m  as calculated from the following:    Height as of this encounter: 1.702 m (5' 7\").    Weight as of this encounter: 114.5 kg (252 lb 6.4 oz). Body surface area is 2.33 meters squared.  Moderate Pain (4) Comment: Data Unavailable   No LMP recorded. Patient has had a hysterectomy.  Allergies reviewed: Yes  Medications reviewed: Yes    Medications: Medication refills not needed today.  Pharmacy name entered into [employer]: [facility][id number] - [place], [place] - [address] [place] [address]    Clinical concerns: 6 month recheck Breast CA, review Labs. C/o 4/10 right foot pains.       Katie Mills, [facility]            "

## 2019-11-20 NOTE — PATIENT INSTRUCTIONS
Arrange for ultrasound of the right breast reference tender lesion deep to lumpectomy scar.  Follow-up in 6 months with a repeat laboratory tests.

## 2019-11-21 NOTE — ASSESSMENT & PLAN NOTE
Marissa Guadarrama was found on routine mammogram a group of microcalcification is in the upper outer right breast. There was also a focal asymmetry in the lower left breast. Subsequently the patient went on to have bilateral diagnostic mammography with ultrasound on March 28, 2016.. On the right breast there was microcalcification is the main grouping measures 1.6 cm x 1 cm bilateral 2.9 cm located 2.9 cm from the nipple. An additional tiny area about 0.2 cm seen at the anterolateral margin of this main grouping about 1 cm from the main grouping. The left breast shows no masses or significant abnormalities. Subsequently the patient went on to have breast needle biopsy on March 30, 2016. 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 Angiolymphatic invasion was not identified. 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 Microcalcifications was also associated with ductal carcinoma in situ. His surgeon receptor was positive. Progesterone receptor was negative. She underwent bilateral sentinel lymph node biopsy and bilateral lumpectomies with prior seed placement.  the surgical pathology Showing left breast lumpectomy was a tumor size of 13 mm grade 2 of 3, angiolymphatic invasion was not identified the tumor was unifocal associated with ductal carcinoma in situ high-grade. Surgical margins where negative. [place] lymph node was negative for metastatic tumor. Stage is T1cN0 M0 On the right breast and there was invasive ductal carcinoma with a tumor size of 3 mm grade 3 of 3. [place] lymph nodes were negative for metastatic disease. The tumor stage is T1aN0 M0. The tumor on the left was positive for estrogen and progesterone receptor. It did show no HER-2/praveen amplification. The tumor on the right is positive for estrogen receptor and negative for progesterone receptor and shows no HER-2/praveen amplification. Subsequently the patient concluded the radiation  therapy. She is currently on hormonal therapy with Arimidex. She developed a skin rash and Arimidex was switched to Aromasin.

## 2019-11-21 NOTE — PROGRESS NOTES
Hematology/ Oncology Follow-up Visit:  Nov 20, 2019    Reason for Visit:   Chief Complaint   Patient presents with     Oncology Clinic Visit     6 month recheck Breast CA, review Labs        Oncologic History:    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)  Marissa Guadarrama was found on routine mammogram a group of microcalcification is in the upper outer right breast. There was also a focal asymmetry in the lower left breast. Subsequently the patient went on to have bilateral diagnostic mammography with ultrasound on March 28, 2016.. On the right breast there was microcalcification is the main grouping measures 1.6 cm x 1 cm bilateral 2.9 cm located 2.9 cm from the nipple. An additional tiny area about 0.2 cm seen at the anterolateral margin of this main grouping about 1 cm from the main grouping. The left breast shows no masses or significant abnormalities. Subsequently the patient went on to have breast needle biopsy on March 30, 2016. 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 Angiolymphatic invasion was not identified. 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 Microcalcifications was also associated with ductal carcinoma in situ. His surgeon receptor was positive. Progesterone receptor was negative. She underwent bilateral sentinel lymph node biopsy and bilateral lumpectomies with prior seed placement.  the surgical pathology Showing left breast lumpectomy was a tumor size of 13 mm grade 2 of 3, angiolymphatic invasion was not identified the tumor was unifocal associated with ductal carcinoma in situ high-grade. Surgical margins where negative. [place] lymph node was negative for metastatic tumor. Stage is T1cN0 M0 On the right breast and there was invasive ductal carcinoma with a tumor size of 3 mm grade 3 of 3. [place] lymph nodes were negative for metastatic disease. The tumor stage is T1aN0 M0. The  tumor on the left was positive for estrogen and progesterone receptor. It did show no HER-2/praveen amplification. The tumor on the right is positive for estrogen receptor and negative for progesterone receptor and shows no HER-2/praveen amplification. Subsequently the patient concluded the radiation therapy. She is currently on hormonal therapy with Arimidex. She developed a skin rash and Arimidex was switched to Aromasin.      Interval History:  She is returning today for follow-up visit.  She has been feeling well without recent new complaints.  She is currently on Aromasin without significant side effects.  Most recent mammogram shows no abnormalities.  She has a liver biopsy done in June which came back positive for malignant carcinoid.  Patient is scheduled to see Dr. Lawrence in January for further discussion and management.    Review Of Systems:  Constitutional: Negative for fever, chills, and night sweats.  Skin: negative.  Eyes: negative.  Ears/Nose/Throat: negative.  Respiratory: No shortness of breath, dyspnea on exertion, cough, or hemoptysis.  Cardiovascular: negative.  Gastrointestinal: negative.  Genitourinary: negative.  Musculoskeletal: negative.  Neurologic: negative.  Psychiatric: negative.  Hematologic/Lymphatic/Immunologic: negative.  Endocrine: negative.    All other ROS negative unless mentioned in interval history.    Past medical, social, surgical, and family histories reviewed.    Allergies:  Allergies as of 11/20/2019     (No Known Allergies)       Current Medications:  Current Outpatient Medications   Medication Sig Dispense Refill     exemestane (AROMASIN) 25 MG tablet Take 1 tablet (25 mg) by mouth every morning 90 tablet 3     gabapentin (NEURONTIN) 600 MG tablet Take 1 tablet (600 mg) by mouth 3 times daily 180 tablet 6     hydrochlorothiazide (HYDRODIURIL) 25 MG tablet TAKE A HALF TABLET BY MOUTH ONCE DAILY IN THE MORNING 45 tablet 0     acetaminophen (TYLENOL) 325 MG tablet Take 325-650 mg  "by mouth every 6 hours as needed for mild pain       albuterol (PROAIR HFA, PROVENTIL HFA, VENTOLIN HFA) 108 (90 BASE) MCG/ACT inhaler Inhale 2 puffs into the lungs every 6 hours as needed for shortness of breath / dyspnea or wheezing (Patient not taking: Reported on 11/12/2019) 1 Inhaler 1     Calcium Carbonate-Vit D-Min (CALCIUM 1200 PO)        HYDROcodone-acetaminophen (NORCO) 5-325 MG per tablet Take 1-2 tablets by mouth every 6 hours as needed for other (Moderate to Severe Pain) (Patient not taking: Reported on 11/12/2019) 5 tablet 0     lidocaine HCl (XYLOCAINE) 2 % solution Swish and spit 15 mLs in mouth every 3 hours as needed for moderate pain ; Max 8 doses/24 hour period. (Patient not taking: Reported on 11/12/2019) 300 mL 3        Physical Exam:  BP (!) 169/70 (BP Location: Right arm, Patient Position: Sitting, Cuff Size: Adult Large)   Pulse 61   Temp 98.1  F (36.7  C) (Tympanic)   Resp 18   Ht 1.702 m (5' 7\")   Wt 114.5 kg (252 lb 6.4 oz)   SpO2 94%   Breastfeeding No   BMI 39.53 kg/m    Wt Readings from Last 12 Encounters:   11/20/19 114.5 kg (252 lb 6.4 oz)   11/12/19 112 kg (247 lb)   10/25/19 111.1 kg (245 lb)   08/23/19 110.7 kg (244 lb)   06/12/19 110.7 kg (244 lb)   05/28/19 112.7 kg (248 lb 6.4 oz)   05/15/19 112.9 kg (248 lb 14.4 oz)   05/14/19 113.2 kg (249 lb 9.6 oz)   04/09/19 110.7 kg (244 lb)   02/07/19 112 kg (247 lb)   01/30/19 112 kg (247 lb)   12/26/18 113.4 kg (250 lb)     ECOG performance status:0  GENERAL APPEARANCE: Healthy, alert and in no acute distress.  HEENT: Sclerae anicteric. PERRLA. Oropharynx without ulcers, lesions, or thrush.  NECK: Supple. No asymmetry or masses.  LYMPHATICS: No palpable cervical, supraclavicular, axillary, or inguinal lymphadenopathy.  RESP: Lungs clear to auscultation bilaterally without rales, rhonchi or wheezes.\", 0 BREAST: Right-there is tender nodular area deep to the lumpectomy scar, there is no nipple discharge or axillary adenopathy. " "Left- No mass, nipple discharge or axillary adenopathy \"CARDIOVASCULAR: Regular rate and rhythm. Normal S1, S2; no S3 or S4. No murmur, gallop, or rub.  ABDOMEN: Soft, nontender. Bowel sounds normal. No palpable organomegaly or masses.  MUSCULOSKELETAL: Extremities without gross deformities noted. No edema of bilateral lower extremities.  SKIN: No suspicious lesions or rashes.  NEURO: Alert and oriented x 3. Cranial nerves II-XII grossly intact.  PSYCHIATRIC: Mentation and affect appear normal.    Laboratory/Imaging Studies:  Office Visit on 11/12/2019   Component Date Value Ref Range Status     PAP 11/12/2019 HSIL*  Final     Copath Report 11/12/2019    Final                    Value:  Patient Name: ROSALINDA LANCASTER  MR#: [id number]  Specimen #: [id number]  Collected: 11/12/2019  Received: 11/13/2019  Reported: 11/19/2019 14:28  Ordering Phy(s): MAR ROSS    For improved result formatting, select 'View Enhanced Report Format' under   Linked Documents section.    SPECIMEN/STAIN PROCESS:  Pap Imaged thin layer prep diagnostic (SurePath, FocalPoint with guided   screening)       Pap-Cyto x 1, HPV ordered x 1    SOURCE: Vaginal  ----------------------------------------------------------------   Pap Imaged thin layer prep diagnostic (SurePath, FocalPoint with guided   screening)  SPECIMEN ADEQUACY:  Satisfactory for evaluation.  -Transformation zone component absent.    CYTOLOGIC INTERPRETATION:    Epithelial cell abnormality:  Squamous Cell:  High-grade squamous   intraepithelial lesion (HSIL)    COMMENT:  This case was seen in consultation with Dr. BELA Lockhart who concurs with the   diagnosis.    I have personally reviewed all specimens and/or slides, including the                             listed special stains, and used them  with my medical judgment to determine the final diagnosis.    Electronically signed out by:  Olamide Crockett M.D., [facility]    CLINICAL HISTORY:    History of vaginal " dysplasia.    Hysterectomy  Exam Note:: vain, Previous ASC-H  Date of Last Pap: 04/09/2019  Dysplasia: genital, History of positive HPV,    Papanicolaou Test Limitations:  Cervical cytology is a screening test with   limited sensitivity; regular  screening is critical for cancer prevention; Pap tests are primarily   effective for the diagnosis/prevention of  squamous cell carcinoma, not adenocarcinomas or other cancers.    The technical component of this testing was completed at the [facility], with the professional component performed   at the [facility], [address] [place] [phone] ([phone])    COLLECTION SITE:  Client[facility]  [facility]  [facility]    Resident  [id number]       HPV Source 11/12/2019 SurePath   Final     HPV 16 DNA 11/12/2019 Positive* NEG^Negative Final     HPV 18 DNA 11/12/2019 Negative  NEG^Negative Final     Other HR HPV 11/12/2019 Negative  NEG^Negative Final     Final Diagnosis 11/12/2019 This patient's sample is positive for HPV 16 DNA.   Final    Comment: This test was developed and its performance characteristics determined by the   [facility], [facility]. It   has not been cleared or approved by the FDA. The laboratory is regulated under   CLIA as qualified to perform high-complexity testing. This test is used for   clinical purposes. It should not be regarded as investigational or for   research.  (Note)  METHODOLOGY:  The Roche cristiano 4800 system uses automated extraction,   simultaneous amplification of HPV (L1 region) and beta-globin,    followed by  real time detection of fluorescent labeled HPV and beta   globin using specific oligonucleotide probes . The test specifically   identifies types HPV 16 DNA and HPV 18 DNA while concurrently    detecting the rest of the high risk types (31, 33, 35, 39, 45, 51,   52, 56, 58, 59, 66 or 68).  COMMENTS:  This test is not intended for use as a screening device   for women under age 30 with normal cervical cytology.  Results should   be correl                           ated with cytologic and histologic findings. Close clinical   followup is recommended.       Specimen Description 11/12/2019 Cervical Cells   Final    [id number]        No results found for this or any previous visit (from the past 744 hour(s)).    Assessment and plan:  (C50.411,  Z17.0,  C50.412) 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, estrogen receptor positive (H)  (primary encounter diagnosis)  Because of the new finding on breast examination I will arrange for diagnostic mammography as well as breast ultrasound to rule out disease recurrence.  Meanwhile the patient will continue on Aromasin 25 mg orally daily.  The patient is not on calcium and vitamin D supplements because of renal impairment..    (C7A.021) Malignant carcinoid tumor of cecum (H)  Patient has been followed by urology.  She is due to see Dr. Lawrence to discuss further management plan    (I10) Hypertension goal BP (blood pressure) < 140/90  Blood pressure is currently well controlled.  She is currently on hydrochlorothiazide 25 mg orally daily.    The patient is ready to learn, no apparent learning barriers were identified.  Diagnosis and treatment plans were explained to the patient. The patient expressed understanding of the content. The patient asked appropriate questions. The patient questions were answered to her satisfaction.    Chart documentation with Dragon Voice recognition Software. Although reviewed after completion, some words and grammatical errors may remain.

## 2019-12-10 ENCOUNTER — OFFICE VISIT (OUTPATIENT)
Dept: ONCOLOGY | Facility: CLINIC | Age: 69
End: 2019-12-10
Attending: OBSTETRICS & GYNECOLOGY
Payer: MEDICARE

## 2019-12-10 VITALS
BODY MASS INDEX: 38.96 KG/M2 | HEART RATE: 68 BPM | OXYGEN SATURATION: 95 % | WEIGHT: 248.2 LBS | SYSTOLIC BLOOD PRESSURE: 136 MMHG | RESPIRATION RATE: 14 BRPM | HEIGHT: 67 IN | TEMPERATURE: 97.9 F | DIASTOLIC BLOOD PRESSURE: 82 MMHG

## 2019-12-10 DIAGNOSIS — R87.613 HSIL (HIGH GRADE SQUAMOUS INTRAEPITHELIAL LESION) ON PAP SMEAR OF CERVIX: Primary | ICD-10-CM

## 2019-12-10 PROCEDURE — 88305 TISSUE EXAM BY PATHOLOGIST: CPT | Performed by: OBSTETRICS & GYNECOLOGY

## 2019-12-10 PROCEDURE — 57421 EXAM/BIOPSY OF VAG W/SCOPE: CPT

## 2019-12-10 PROCEDURE — G0463 HOSPITAL OUTPT CLINIC VISIT: HCPCS | Mod: ZF

## 2019-12-10 PROCEDURE — 99214 OFFICE O/P EST MOD 30 MIN: CPT | Mod: 25

## 2019-12-10 PROCEDURE — 88341 IMHCHEM/IMCYTCHM EA ADD ANTB: CPT | Performed by: OBSTETRICS & GYNECOLOGY

## 2019-12-10 PROCEDURE — 88342 IMHCHEM/IMCYTCHM 1ST ANTB: CPT | Performed by: OBSTETRICS & GYNECOLOGY

## 2019-12-10 ASSESSMENT — PAIN SCALES - GENERAL: PAINLEVEL: NO PAIN (0)

## 2019-12-10 ASSESSMENT — MIFFLIN-ST. JEOR: SCORE: 1673.58

## 2019-12-10 NOTE — LETTER
"12/10/2019       RE: Marissa Guadarrama  [address] [place] [id number]     Dear Colleague,    Thank you for referring your patient, Marissa Guadarrama, to the [facility]. Please see a copy of my visit note below.       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     Her history is as follows:  Notably, she has history of cervical cancer treated in 2007 at [facility], a history of colon cancer and recurrent carcinoid tumor and breast cancer.        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 Depth of invasion 7 mm out of 1.9 and horizontal spread of 1 cm. 48 negative nodes.   2009: ASCUS pap  11/1/13 Vaginal Pap returned ASC-H.   12/23/13 Colposcopy/Vaginal cuff (submitted as \"cervix\"), biopsy:VAIN III    2/5/14: Patient established care with Dr. Pack and desired surgical management     3/13/14: Colposcopy, Vaginal Biopsies, Co2 Laser of the Upper Vagina on , which showed low-grade squamous intraepithelial lesion (VAIN 1) at 12:00 and 6:00 and high-grade squamous intraepithelial lesion VAIN 29:00 vaginal cuff. Vaginal cuff, 3:00 (biopsy): Mostly denuded squamous mucosa with focal features suggestive of low-grade squamous intraepithelial lesion (VAIN 1)     7/30/14: biopsy of right upper vagina showed VAINI, negative p16     2/9/15: upper vaginal biopsy \"LSIL\"     9/25/17:  VAIN III     12/12/17: CO2 laser and biopsies of vaginal dysplasia     4/3/18:  Stable exam.     10/9/18:  Colpo, no biopsies. Pap LSIL, HR HPV 16+     4/9/19:  Pap ASC-H, HR HPV 16+     5/14/19:  Colpo with biopsies.  Biopsies benign.     11/12/19:  Pap HSIL, HR HPV+     The patient returns today for follow-up.  She continues to undergo evaluation related to her ureteral obstruction and carcinoid tumor.  She is awaiting consultation with Dr. Lawrence.  She denies any new symptoms.        Again, thank you for " allowing me to participate in the care of your patient.      Sincerely,     GYN ONC Colposcopy Provider

## 2019-12-10 NOTE — PATIENT INSTRUCTIONS
Vaginal biopsy.  You will be contacted with results and recs for follow-up    Karli Gao MD  Gynecologic Oncology  [facility]

## 2019-12-12 NOTE — PROGRESS NOTES
"                        Consult Notes on Referred Patient    Date: 11/12/2019       69 year old  female for follow-up related to persistent HPV and VAIN 3.    Her history is as follows:  Notably, she has history of cervical cancer treated in 2007 at [facility], a history of colon cancer and recurrent carcinoid tumor and breast cancer.        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 Depth of invasion 7 mm out of 1.9 and horizontal spread of 1 cm. 48 negative nodes.   2009: ASCUS pap  11/1/13 Vaginal Pap returned ASC-H.   12/23/13 Colposcopy/Vaginal cuff (submitted as \"cervix\"), biopsy:VAIN III    2/5/14: Patient established care with Dr. Pack and desired surgical management     3/13/14: Colposcopy, Vaginal Biopsies, Co2 Laser of the Upper Vagina on , which showed low-grade squamous intraepithelial lesion (VAIN 1) at 12:00 and 6:00 and high-grade squamous intraepithelial lesion VAIN 29:00 vaginal cuff. Vaginal cuff, 3:00 (biopsy): Mostly denuded squamous mucosa with focal features suggestive of low-grade squamous intraepithelial lesion (VAIN 1)     7/30/14: biopsy of right upper vagina showed VAINI, negative p16     2/9/15: upper vaginal biopsy \"LSIL\"     9/25/17:  VAIN III     12/12/17: CO2 laser and biopsies of vaginal dysplasia    4/3/18:  Stable exam.    10/9/18:  Colpo, no biopsies. Pap LSIL, HR HPV 16+    4/9/19:  Pap ASC-H, HR HPV 16+    5/14/19:  Colpo with biopsies.  Biopsies benign.    11/12/19:  Pap HSIL, HR HPV+    The patient returns today for follow-up.  She continues to undergo evaluation related to her ureteral obstruction and carcinoid tumor.  She is awaiting consultation with Dr. Lawrence.  She denies any new symptoms.       Past Medical History:    Past Medical History:   Diagnosis Date     Arthritis     knee     Benign breast biopsy     benign     Carcinoid tumor 12/2003     Cervical cancer (H) 2007     H/O " colposcopy with cervical biopsy 12/23/13    vaginal cuff biopsy- VAIN III. referred back to gyn/onc     HTN      Hyperlipidemia      Pap smear of vagina with ASC-H 11/1/13     Post-polio syndromes      Trigeminal neuralgias          Past Surgical History:    Past Surgical History:   Procedure Laterality Date     APPENDECTOMY  1983     BIOPSY NODE SENTINEL Bilateral 6/1/2016    Procedure: BIOPSY NODE SENTINEL;  Surgeon: Brent Arana MD;  Location: [facility]     C BSO, OMENTECTOMY W/OSMAN  5/2007     C TOTAL ABDOM HYSTERECTOMY  5/2007     CL AFF SURGICAL PATHOLOGY       COLONOSCOPY N/A 6/23/2017    Procedure: COMBINED COLONOSCOPY, SINGLE OR MULTIPLE BIOPSY/POLYPECTOMY BY BIOPSY;  Colonoscopy [facility];  Surgeon: Talisha Greco MD;  [facility]: [facility]     COLPOSCOPY, BIOPSY, COMBINED  3/13/2014    Procedure: COMBINED COLPOSCOPY, BIOPSY;;  Surgeon: Lara Pack MD;  [facility]     COMBINED CYSTOSCOPY, RETROGRADES, EXCHANGE STENT URETER(S) Left 2/7/2019    Procedure: COMBINED CYSTOSCOPY, RETROGRADES, EXCHANGE STENT URETER--left;  Surgeon: Zhao La MD;  Location: [facility]     COMBINED CYSTOSCOPY, RETROGRADES, URETEROSCOPY, INSERT STENT Left 2/2/2017    Procedure: COMBINED CYSTOSCOPY, RETROGRADES, URETEROSCOPY, INSERT STENT;  Surgeon: Zhao La MD;  Location: [facility]     CYSTOSCOPY, RETROGRADES, INSERT STENT URETER(S), COMBINED Left 9/7/2017    Procedure: COMBINED CYSTOSCOPY, RETROGRADES, INSERT STENT URETER(S);  Cystoscopy,Left Stent Exchange;  Surgeon: Zhao La MD;  Location: [facility]     CYSTOSCOPY, RETROGRADES, INSERT STENT URETER(S), COMBINED  12/12/2017    Procedure: COMBINED CYSTOSCOPY, RETROGRADES, INSERT STENT URETER(S);;  Surgeon: Zhao La MD;  [facility]     CYSTOSCOPY, RETROGRADES, INSERT STENT URETER(S), COMBINED Left 7/5/2018    Procedure: COMBINED CYSTOSCOPY, RETROGRADES, INSERT STENT  URETER(S);  Cystoscopy, Left Stent Exchange;  Surgeon: Zhao aL MD;  Location: [facility]     EXAM UNDER ANESTHESIA PELVIC  3/13/2014    Procedure: EXAM UNDER ANESTHESIA PELVIC;  Exam Under Anestheisa, Colposcopy, Vaginal Biopsies, Co2 Laser of the Upper Vagina;  Surgeon: Lara Pack MD;  [facility]     HERNIORRHAPHY INCISIONAL (LOCATION)       LASER CO2 VAGINA  3/13/2014    VAIN 1/2     LASER CO2 VAGINA N/A 2017    Procedure: LASER CO2 VAGINA;  Exam Under Anesthesia, CO2 Laser Ablation Of Vagina, Cystoscopy, Left Retrograde Pyelogram with Left Stent Placement;  Surgeon: Nic Segundo MD;  [facility]     LUMPECTOMY BREAST WITH SEED LOCALIZATION Bilateral 2016    Procedure: LUMPECTOMY BREAST WITH SEED LOCALIZATION;  Surgeon: Brent Arana MD;  Location: [facility]     SURGICAL HISTORY OF -       ovarian cystectomy     SURGICAL HISTORY OF -       right colon resection secondary to carcinoid tumor     TUBAL LIGATION           Health Maintenance:  Health Maintenance Due   Topic Date Due     ZOSTER IMMUNIZATION (1 of 2) 1998     MEDICARE ANNUAL WELLNESS VISIT  2017     PHQ-2  2019     FALL RISK ASSESSMENT  2019     LIPID  2019     MICROALBUMIN  2019       Current Medications:     has a current medication list which includes the following prescription(s): acetaminophen, exemestane, gabapentin, hydrochlorothiazide, albuterol, calcium carbonate-vit d-min, hydrocodone-acetaminophen, and lidocaine.       Allergies:     [unfilled]        Social History:     Social History     Tobacco Use     Smoking status: Former Smoker     Packs/day: 1.00     Years: 29.00     Pack years: 29.00     Types: Cigarettes     Last attempt to quit: 10/30/2006     Years since quittin.1     Smokeless tobacco: Never Used   Substance Use Topics     Alcohol use: No     Alcohol/week: 0.0 standard drinks       History   Drug Use No           Family History:      The patient's family history is notable for:    Family History   Problem Relation Age of Onset     Cancer Mother         bone / liver     Breast Cancer Mother      Cancer Maternal Grandmother      Cancer Maternal Grandfather      Cancer Paternal Grandmother      Cancer Paternal Grandfather      Cancer Sister         vulvar ca, cervical ca, squamous cell cancer     Cancer Brother         Rectal- Stage 4     Rectal Cancer Brother      Breast Cancer Paternal Aunt      Colon Cancer Paternal Aunt      Breast Cancer Maternal Aunt      Pancreatic Cancer Nephew      Breast Cancer Sister          Physical Exam:     BP (!) 143/82   Pulse 65   Temp 98.4  F (36.9  C) (Oral)   Resp 18   Wt 112 kg (247 lb)   SpO2 95%   BMI 38.69 kg/m    Body mass index is 38.69 kg/m .    General Appearance: healthy and alert, no distress     Musculoskeletal: extremities non tender and without edema    Skin: no lesions or rashes     Neurological: normal gait, no gross defects     Psychiatric: appropriate mood and affect                               Hematological: normal cervical, supraclavicular and inguinal lymph nodes     Gastrointestinal:       abdomen soft, non-tender, non-distended, no organomegaly or masses    :  External genitalia normal, BUS negative.  Vagina pale, atropic.  [place] with mild scarring.  Moderate discomfort to patient during exam.  No gross lesions.  Pap smear obtained of apex.    Assessment:     Marissa Guadarrama is a 69 year old woman with a diagnosis of HR HPV, persistent abnromal pap and VAIN3.  She also has history of multiple cancers including metastatic carcinoid tumor and bilateral breast cancer.     A total of 30 minutes was spent with the patient, 25 minutes of which were spent in counseling the patient and/or treatment planning.      Plan:   History of VAINIII:  Pap smear obtained.  She will be contacted with results and recs for follow-up. Will need further biopsies if still high grade.  Reviewed risk  for recurrence, need for regular follow-up. Recommend exams every six months x five years, pap smears annually.      Questions answered, patient expressed understanding of plan of care.     Karli Gao MD  Gynecologic Oncology  [facility]    CC  Patient Care Team:  Eliazar Menendez MD as PCP - General (Family Practice)  Hosea King MD as MD (Hematology & Oncology)  Zhao La MD as MD (Urology)  Talisha Greco MD as MD (Colon and Rectal Surgery)  Allen Downey MD as MD (Orthopedics)  Eliazar Menendez MD as Assigned PCP  SELF, REFERRED

## 2019-12-13 ENCOUNTER — TELEPHONE (OUTPATIENT)
Dept: OTOLARYNGOLOGY | Facility: CLINIC | Age: 69
End: 2019-12-13

## 2019-12-13 ENCOUNTER — HOSPITAL ENCOUNTER (OUTPATIENT)
Dept: MAMMOGRAPHY | Facility: CLINIC | Age: 69
Discharge: HOME | End: 2019-12-13
Attending: INTERNAL MEDICINE | Admitting: INTERNAL MEDICINE
Payer: MEDICARE

## 2019-12-13 DIAGNOSIS — Z17.0 MALIGNANT NEOPLASM OF UPPER-OUTER QUADRANT OF BOTH BREASTS IN FEMALE, ESTROGEN RECEPTOR POSITIVE (H): ICD-10-CM

## 2019-12-13 DIAGNOSIS — G50.0 TRIGEMINAL NEURALGIA: ICD-10-CM

## 2019-12-13 DIAGNOSIS — C50.412 MALIGNANT NEOPLASM OF UPPER-OUTER QUADRANT OF BOTH BREASTS IN FEMALE, ESTROGEN RECEPTOR POSITIVE (H): ICD-10-CM

## 2019-12-13 DIAGNOSIS — C50.411 MALIGNANT NEOPLASM OF UPPER-OUTER QUADRANT OF BOTH BREASTS IN FEMALE, ESTROGEN RECEPTOR POSITIVE (H): ICD-10-CM

## 2019-12-13 PROCEDURE — G0279 TOMOSYNTHESIS, MAMMO: HCPCS

## 2019-12-13 RX ORDER — GABAPENTIN 600 MG/1
600 TABLET ORAL 3 TIMES DAILY
Qty: 180 TABLET | Refills: 6 | Status: SHIPPED | OUTPATIENT
Start: 2019-12-13 | End: 2020-06-01

## 2019-12-13 NOTE — PROGRESS NOTES
Mammogram results reviewed with patient. Denies questions or concerns. No change to plan of care, patient will keep appts as scheduled in May 2020. Codi Diaz RN, BSN, OCN on 12/13/2019 at 4:43 PM

## 2019-12-13 NOTE — TELEPHONE ENCOUNTER
Called pharmacy and they stated sending requests in E-scribe to Chase Bell.  (advised no requests are visible in the system as them going thru.)  They stated they even hand faxed a request and confirmed to them,  it is not a [place] fax #.    Dr Bell:  Please consider request.    Pauline LINCOLN   [facility]

## 2019-12-13 NOTE — TELEPHONE ENCOUNTER
[place] patient from 10/25/19 needs refill asap as she is leaving town tomorrow and apparently has sent in numerous requests

## 2019-12-13 NOTE — TELEPHONE ENCOUNTER
Prior Authorization Approval    Authorization Effective Date: 1/1/2020  Authorization Expiration Date: 1/1/2021  Medication: gabapentin (NEURONTIN) 600 MG tablet  Approved Dose/Quantity:    Reference #:     Insurance Company: [employer] [place] - Phone [phone] Fax [phone]  Expected CoPay:       CoPay Card Available:      Foundation Assistance Needed:    Which Pharmacy is filling the prescription (Not needed for infusion/clinic administered): [id number] - [place], [place] - [address] [place] [address]  Pharmacy Notified: Yes  Patient Notified: Yes

## 2019-12-13 NOTE — TELEPHONE ENCOUNTER
Central Prior Authorization Team   Phone: [phone]      PA Initiation    Medication: gabapentin (NEURONTIN) 600 MG tablet  Insurance Company: [employer] [place] - Phone [phone] Fax [phone]  Pharmacy Filling the Rx: [id number] - [place], [place] - [address]  Filling Pharmacy Phone: [phone]  Filling Pharmacy Fax:    Start Date: 12/13/2019

## 2019-12-13 NOTE — TELEPHONE ENCOUNTER
Reason for Call:  Other prescription    Detailed comments: Called in for prescription refill to pharmacy 4 days ago and has not been addressed yet.  Upset as is leaving to go out of town tomorrow and will be all out of medication.  States the pharmacy has contacted our clinic 3 times - I do not see any pending requests.  Refill request is for Neurontin 600 mg to be sent to [facility] in [place].    Phone Number Patient can be reached at: Home number on file [phone] pt's  Teres[facility] - RAOC on file.    Best Time: soon    Can we leave a detailed message on this number? YES    Call taken on 12/13/2019 at 10:19 AM by Shahrzad Quintana

## 2019-12-13 NOTE — TELEPHONE ENCOUNTER
Received letter stating PA is going to  2020    Prior Authorization Retail Medication Request    Medication/Dose: gabapentin (NEURONTIN) 600 MG tablet  ICD code (if different than what is on RX):  Trigeminal neuralgia [G50.0]  Previously Tried and Failed:    Rationale:      Insurance Name:  [facility]  Insurance ID:  [id number]       Pharmacy Information (if different than what is on RX)  Name:  [facility]  Phone:

## 2019-12-17 LAB — COPATH REPORT: NORMAL

## 2019-12-23 ENCOUNTER — OFFICE VISIT (OUTPATIENT)
Dept: FAMILY MEDICINE | Facility: CLINIC | Age: 69
End: 2019-12-23
Payer: COMMERCIAL

## 2019-12-23 VITALS
SYSTOLIC BLOOD PRESSURE: 136 MMHG | DIASTOLIC BLOOD PRESSURE: 70 MMHG | TEMPERATURE: 98.2 F | WEIGHT: 247 LBS | OXYGEN SATURATION: 95 % | BODY MASS INDEX: 37.44 KG/M2 | RESPIRATION RATE: 12 BRPM | HEIGHT: 68 IN | HEART RATE: 61 BPM

## 2019-12-23 DIAGNOSIS — R60.0 PEDAL EDEMA: ICD-10-CM

## 2019-12-23 DIAGNOSIS — G14 POST-POLIO SYNDROME (H): ICD-10-CM

## 2019-12-23 DIAGNOSIS — R06.02 SOB (SHORTNESS OF BREATH): ICD-10-CM

## 2019-12-23 DIAGNOSIS — C78.6 PERITONEAL METASTASES: ICD-10-CM

## 2019-12-23 DIAGNOSIS — E78.1 HYPERTRIGLYCERIDEMIA: ICD-10-CM

## 2019-12-23 DIAGNOSIS — G50.0 TRIGEMINAL NEURALGIA: ICD-10-CM

## 2019-12-23 DIAGNOSIS — I10 ESSENTIAL HYPERTENSION WITH GOAL BLOOD PRESSURE LESS THAN 140/90: ICD-10-CM

## 2019-12-23 DIAGNOSIS — C50.411 BILATERAL MALIGNANT NEOPLASM OF UPPER OUTER QUADRANT OF BREAST IN FEMALE, UNSPECIFIED ESTROGEN RECEPTOR STATUS (H): ICD-10-CM

## 2019-12-23 DIAGNOSIS — Z00.00 ENCOUNTER FOR MEDICARE ANNUAL WELLNESS EXAM: Primary | ICD-10-CM

## 2019-12-23 DIAGNOSIS — N18.30 CKD (CHRONIC KIDNEY DISEASE) STAGE 3, GFR 30-59 ML/MIN (H): ICD-10-CM

## 2019-12-23 DIAGNOSIS — C50.412 BILATERAL MALIGNANT NEOPLASM OF UPPER OUTER QUADRANT OF BREAST IN FEMALE, UNSPECIFIED ESTROGEN RECEPTOR STATUS (H): ICD-10-CM

## 2019-12-23 DIAGNOSIS — C53.9 MALIGNANT NEOPLASM OF CERVIX, UNSPECIFIED SITE (H): ICD-10-CM

## 2019-12-23 PROCEDURE — 99213 OFFICE O/P EST LOW 20 MIN: CPT | Mod: 25 | Performed by: FAMILY MEDICINE

## 2019-12-23 PROCEDURE — 99397 PER PM REEVAL EST PAT 65+ YR: CPT | Performed by: FAMILY MEDICINE

## 2019-12-23 RX ORDER — ALBUTEROL SULFATE 90 UG/1
2 AEROSOL, METERED RESPIRATORY (INHALATION) EVERY 6 HOURS PRN
Qty: 1 INHALER | Refills: 1 | Status: SHIPPED | OUTPATIENT
Start: 2019-12-23 | End: 2021-09-14

## 2019-12-23 RX ORDER — HYDROCHLOROTHIAZIDE 25 MG/1
TABLET ORAL
Qty: 45 TABLET | Refills: 3 | Status: SHIPPED | OUTPATIENT
Start: 2019-12-23 | End: 2021-01-14

## 2019-12-23 ASSESSMENT — MIFFLIN-ST. JEOR: SCORE: 1675.94

## 2019-12-23 NOTE — PATIENT INSTRUCTIONS
I complete your parking sticker, please take this to the license bureau.    I did refill your albuterol inhaler incase you need it.    I did refill your hydrochlorothiazide medication.    For your edema wear support stockings and also elevate your legs when at home or at night.  I do think that not walking a lot and using the muscles is contributing to the swelling. Gravity is bringing the fluid down.    I would consider rechecking your triglycerides in the future.            Patient Education   Personalized Prevention Plan  You are due for the preventive services outlined below.  Your care team is available to assist you in scheduling these services.  If you have already completed any of these items, please share that information with your care team to update in your medical record.  Health Maintenance Due   Topic Date Due     Zoster (Shingles) Vaccine (1 of 2) 05/17/1998     Cholesterol Lab  01/09/2019     Kidney Microalbumin Urine Test  12/26/2019     Your Health Risk Assessment indicates you feel you are not in good health    A healthy lifestyle helps keep the body fit and the mind alert. It helps protect you from disease, helps you fight disease, and helps prevent chronic disease (disease that doesn't go away) from getting worse. This is important as you get older and begin to notice twinges in muscles and joints and a decline in the strength and stamina you once took for granted. A healthy lifestyle includes good healthcare, good nutrition, weight control, recreation, and regular exercise. Avoid harmful substances and do what you can to keep safe. Another part of a healthy lifestyle is stay mentally active and socially involved.    Good healthcare     Have a wellness visit every year.     If you have new symptoms, let us know right away. Don't wait until the next checkup.     Take medicines exactly as prescribed and keep your medicines in a safe place. Tell us if your medicine causes problems.   Healthy diet  and weight control     Eat 3 or 4 small, nutritious, low-fat, high-fiber meals a day. Include a variety of fruits, vegetables, and whole-grain foods.     Make sure you get enough calcium in your diet. Calcium, vitamin D, and exercise help prevent osteoporosis (bone thinning).     If you live alone, try eating with others when you can. That way you get a good meal and have company while you eat it.     Try to keep a healthy weight. If you eat more calories than your body uses for energy, it will be stored as fat and you will gain weight.     Recreation   Recreation is not limited to sports and team events. It includes any activity that provides relaxation, interest, enjoyment, and exercise. Recreation provides an outlet for physical, mental, and social energy. It can give a sense of worth and achievement. It can help you stay healthy.    Mental Exercise and Social Involvement  Mental and emotional health is as important as physical health. Keep in touch with friends and family. Stay as active as possible. Continue to learn and challenge yourself.   Things you can do to stay mentally active are:    Learn something new, like a foreign language or musical instrument.     Play SCRABBLE or do crossword puzzles. If you cannot find people to play these games with you at home, you can play them with others on your computer through the Internet.     Join a games club--anything from card games to chess or checkers or lawn bowling.     Start a new hobby.     Go back to school.     Volunteer.     Read.   Keep up with world events.    Exercise for a Healthier Heart     Exercise with a friend. When activity is fun, you're more likely to stick with it.   You may wonder how you can improve the health of your heart. If you re thinking about exercise, you re on the right track. You don t need to become an athlete, but you do need a certain amount of brisk exercise to help strengthen your heart. If you have been diagnosed with a heart  condition, your doctor may recommend exercise to help stabilize your condition. To help make exercise a habit, choose safe, fun activities.  Be sure to check with your healthcare provider before starting an exercise program.   Why exercise?  Exercising regularly offers many healthy rewards. It can help you do all of the following:    Improve your blood cholesterol level to help prevent further heart trouble    Lower your blood pressure to help prevent a stroke or heart attack    Control diabetes, or reduce your risk of getting this disease    Improve your heart and lung function    Reach and maintain a healthy weight    Make your muscles stronger and more limber so you can stay active    Prevent falls and fractures by slowing the loss of bone mass (osteoporosis)    Manage stress better    Reduce your blood pressure    Improve your sense of self and your body image  Exercise tips  Ease into your routine. Set small goals. Then build on them.  Exercise on most days. Aim for a total of 150 or more minutes of moderate to  vigorous intensity activity each week. Consider 40 minutes, 3 to 4 times a week. For best results, activity should last for 40 minutes on average. It is OK to work up to the 40 minute period over time. Examples of moderate-intensity activity is walking 1 mile in 15 minutes or 30 to 45 minutes of yard work.  Step up your daily activity level. Along with your exercise program, try being more active throughout the day. Walk instead of drive. Do more household tasks or yard work.  Choose one or more activities you enjoy. Walking is one of the easiest things you can do. You can also try swimming, riding a bike, dancing, or taking an exercise class.  Stop exercising and call your doctor if you:    Have chest pain or feel dizzy or lightheaded    Feel burning, tightness, pressure, or heaviness in your chest, neck, shoulders, back, or arms    Have unusual shortness of breath    Have increased joint or muscle  pain    Have palpitations or an irregular heartbeat   Date Last Reviewed: 5/1/2016 2000-2018 [employer]. [address] [place] [address], [place] [address]. All rights reserved. This information is not intended as a substitute for professional medical care. Always follow your healthcare professional's instructions.

## 2019-12-23 NOTE — PROGRESS NOTES
"SUBJECTIVE:   Marissa Guadarrama is a 69 year old female who presents for Preventive Visit.      Are you in the first 12 months of your Medicare coverage?  No    Healthy Habits:    In general, how would you rate your overall health?  Fair    Frequency of exercise:  None    Do you usually eat at least 4 servings of fruit and vegetables a day, include whole grains    & fiber and avoid regularly eating high fat or \"junk\" foods?  Yes    Taking medications regularly:  Yes    Barriers to taking medications:  None    Medication side effects:  None    Home Safety:  Lack of grab bars in the bathroom    Hearing Impairment:  No hearing concerns    In the past 6 months, have you been bothered by leaking of urine?  No    In general, how would you rate your overall mental or emotional health?  Very good      PHQ-2 Total Score:    Additional concerns today:  Yes    Do you feel safe in your environment? Yes    Have you ever done Advance Care Planning? (For example, a Health Directive, POLST, or a discussion with a medical provider or your loved ones about your wishes): No, advance care planning information given to patient to review.  Patient plans to discuss their wishes with loved ones or provider.      She needs to have medications refilled.  She is using the albuterol rarely.  She does want to keep it on hand.  She uses a wheel walker, scooter to get around.  She has post polio syndrome. It has affected her left lower extremity.  She has some edema, gets better by the morning after being in bed.  Has some support stockings.  Is currently on mild water pill.  She also has to be careful of her kidney function.  Specialists are looking at letting one kidney die due to complications per her report.  She would then be left with one kidney.      She has multiple cancers on going.  She is seeing her specialist.  The latest is likely mets to her liver. Seeing a specialist next month.      She needs parking sticker.  Rarely drives but " "can.  Has license. Reason for post polio syndrome affecting her mobility.       Fall risk  Fallen 2 or more times in the past year?: No  Any fall with injury in the past year?: No    Cognitive Screening   1) Repeat 3 items (Leader, Season, Table)    2) Clock draw: NORMAL  3) 3 item recall: Recalls 2 objects   Results: NORMAL clock, 1-2 items recalled: COGNITIVE IMPAIRMENT LESS LIKELY    Mini-CogTM Copyright CATRACHITO Dowd. Licensed by the author for use in [facility]; reprinted with permission (carlos@[facility]). All rights reserved.      Do you have sleep apnea, excessive snoring or daytime drowsiness?: no    Reviewed and updated as needed this visit by clinical staff  Tobacco  Allergies  Meds  Med Hx  Surg Hx  Fam Hx  Soc Hx        Reviewed and updated as needed this visit by Provider        Social History     Tobacco Use     Smoking status: Former Smoker     Packs/day: 1.00     Years: 29.00     Pack years: 29.00     Types: Cigarettes     Last attempt to quit: 10/30/2006     Years since quittin.1     Smokeless tobacco: Never Used   Substance Use Topics     Alcohol use: No     Alcohol/week: 0.0 standard drinks     If you drink alcohol do you typically have >3 drinks per day or >7 drinks per week? No    Alcohol Use 2019   Prescreen: >3 drinks/day or >7 drinks/week? No           Hyperlipidemia Follow-Up      Are you regularly taking any medication or supplement to lower your cholesterol?   No    Are you having muscle aches or other side effects that you think could be caused by your cholesterol lowering medication?  No    Hypertension Follow-up      Do you check your blood pressure regularly outside of the clinic? Yes     Are you following a low salt diet? Yes    Are your blood pressures ever more than 140 on the top number (systolic) OR more   than 90 on the bottom number (diastolic), for example 140/90? No, runs 135/90's (\"low 90's\")    C/O leg edema \"for a while\" which has been worsening for " "over a year.    Wants renewal on [employer] Parking pass      Current providers sharing in care for this patient include:   Patient Care Team:  Eliazar Menendez MD as PCP - General (Family Practice)  Hosea King MD as MD (Hematology & Oncology)  Zhao La MD as MD (Urology)  Talisha Greco MD as MD (Colon and Rectal Surgery)  Allen Downey MD as MD (Orthopedics)  Eliazar Menendez MD as Assigned PCP    The following health maintenance items are reviewed in Epic and correct as of today:  Health Maintenance   Topic Date Due     ZOSTER IMMUNIZATION (1 of 2) 05/17/1998     LIPID  01/09/2019     MICROALBUMIN  12/26/2019     ADVANCE CARE PLANNING  02/06/2020     BMP  05/18/2020     MAMMO SCREENING  12/13/2020     MEDICARE ANNUAL WELLNESS VISIT  12/23/2020     FALL RISK ASSESSMENT  12/23/2020     COLONOSCOPY  06/23/2022     DTAP/TDAP/TD IMMUNIZATION (4 - Td) 11/01/2023     DEXA  Completed     HEPATITIS C SCREENING  Completed     PHQ-2  Completed     INFLUENZA VACCINE  Completed     PNEUMOCOCCAL IMMUNIZATION 65+ HIGH/HIGHEST RISK  Completed     IPV IMMUNIZATION  Aged Out     MENINGITIS IMMUNIZATION  Aged Out           Review of Systems  Review Of Systems  Skin: negative  Eyes: negative  Ears/Nose/Throat: negative  Respiratory: No shortness of breath, dyspnea on exertion, cough, or hemoptysis  Cardiovascular: negative  Gastrointestinal: negative  Genitourinary: negative  Musculoskeletal: dealing with post polio sydrome  Neurologic: has some neuro issue and taking the Neurontin medication  Psychiatric: negative  Hematologic/Lymphatic/Immunologic: negative  Endocrine: negative      OBJECTIVE:   /70   Pulse 61   Temp 98.2  F (36.8  C) (Tympanic)   Resp 12   Ht 1.715 m (5' 7.5\")   Wt 112 kg (247 lb)   SpO2 95%   BMI 38.11 kg/m   Estimated body mass index is 38.11 kg/m  as calculated from the following:    Height as of this encounter: 1.715 m (5' 7.5\").    Weight as of this " encounter: 112 kg (247 lb).  Physical Exam  GENERAL APPEARANCE: alert, no distress, cooperative and sitting in a chair.  Cannot get to the exam table.    HENT: ear canals and TM's normal and nose and mouth without ulcers or lesions  NECK: no adenopathy, no asymmetry, masses, or scars and thyroid normal to palpation  RESP: lungs clear to auscultation - no rales, rhonchi or wheezes  CV: regular rates and rhythm, normal S1 S2, no S3 or S4 and no murmur, click or rub  ABDOMEN: soft abdomen  MS: left leg is smaller, trace edema, right is normal and no edema  SKIN: no suspicious lesions or rashes  NEURO: has generalized weakness, mentation intact and speech normal  PSYCH: mentation appears normal and affect normal/bright        ASSESSMENT / PLAN:   (Z00.00) Encounter for Medicare annual wellness exam  (primary encounter diagnosis)  Comment: Discussed healthy lifestyle and preventative cares.    Plan:     (N18.3) CKD (chronic kidney disease) stage 3, GFR 30-59 ml/min (H)  Comment: continue to monitor  Plan: OFFICE/OUTPT VISIT,EST,LEVL III            (R60.0) Pedal edema  Comment: discussed elevation. Recommend to avoid meds at this time.  Discussed support willard  Plan: OFFICE/OUTPT VISIT,EST,LEVL III            (E78.1) Hypertriglyceridemia  Comment: will get a future lab  Plan: Lipid panel reflex to direct LDL Fasting,         OFFICE/OUTPT VISIT,EST,LEVL III            (R06.02) SOB (shortness of breath)  Comment: refilled albuterol incase she needs it  Plan: albuterol (PROAIR HFA/PROVENTIL HFA/VENTOLIN         HFA) 108 (90 Base) MCG/ACT inhaler,         OFFICE/OUTPT VISIT,EST,LEVL III            (I10) Essential hypertension with goal blood pressure less than 140/90  Comment: controlled  Plan: hydrochlorothiazide (HYDRODIURIL) 25 MG tablet,        OFFICE/OUTPT VISIT,EST,LEVL III            (G14) Post-polio syndrome  Comment: noted and completed parking sticker information  Plan: OFFICE/OUTPT VISIT,EST,LEVL III         "    (G50.0) Trigeminal neuralgia  Comment: seeing specialist  Plan:     (C78.6) Peritoneal metastases (H)  Comment: seeing specialist  Plan:     (C53.9) Malignant neoplasm of cervix, unspecified site (H)  Comment: seeing specialist  Plan:     (C50.411,  C50.412) 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, unspecified estrogen receptor status (H)  Comment: seeing specialist  Plan:     COUNSELING:  Reviewed preventive health counseling, as reflected in patient instructions       Healthy diet/nutrition       Vision screening       Hearing screening       Dental care    Estimated body mass index is 38.11 kg/m  as calculated from the following:    Height as of this encounter: 1.715 m (5' 7.5\").    Weight as of this encounter: 112 kg (247 lb).    Weight management plan: diet     reports that she quit smoking about 13 years ago. Her smoking use included cigarettes. She has a 29.00 pack-year smoking history. She has never used smokeless tobacco.      Appropriate preventive services were discussed with this patient, including applicable screening as appropriate for cardiovascular disease, diabetes, osteopenia/osteoporosis, and glaucoma.  As appropriate for age/gender, discussed screening for colorectal cancer, prostate cancer, breast cancer, and cervical cancer. Checklist reviewing preventive services available has been given to the patient.    Reviewed patients plan of care and provided an AVS. The Basic Care Plan (routine screening as documented in Health Maintenance) for Marissa meets the Care Plan requirement. This Care Plan has been established and reviewed with the Patient.    Counseling Resources:  ATP IV Guidelines  Pooled Cohorts Equation Calculator  Breast Cancer Risk Calculator  FRAX Risk Assessment  ICSI Preventive Guidelines  Dietary Guidelines for Americans, 2010  USDA's MyPlate  ASA Prophylaxis  Lung CA Screening    Eliazar Menendez MD  [facility]    Identified " Health Risks:    The patient was provided with suggestions to help her develop a healthy physical lifestyle.  She is at risk for lack of exercise and has been provided with information to increase physical activity for the benefit of her well-being.

## 2020-01-07 ENCOUNTER — PRE VISIT (OUTPATIENT)
Dept: NEUROLOGY | Facility: CLINIC | Age: 70
End: 2020-01-07

## 2020-01-07 NOTE — TELEPHONE ENCOUNTER
FUTURE VISIT INFORMATION        FUTURE VISIT INFORMATION:    Date: 1/9/20    Time:  1000    Location: [facility]   REFERRAL INFORMATION:    Referring provider:  self    Referring providers clinic:      Reason for visit/diagnosis:  Trigeminal Neuralgia        NOTES (FOR ALL VISITS) STATUS DETAILS   OFFICE NOTE from referring provider     OFFICE NOTE from other specialist Printed  Dr. Bell (ENT) 8/25/10, 9/22/10, 10/27/10, 12/10/10, 5/11/11, 2/20/12, 9/18/12, 2/25/13, 4/22/13, 8/4/14, 3/16/15, 10/10/16, 10/16/17, 8/2/18, 7/26/19, 8/23/19, 10/25/19  Dr. Fang ([facility]) 3/13/15   DISCHARGE SUMMARY from hospital      DISCHARGE REPORT from the ER     MEDICATION LIST In Epic     IMAGING  (FOR ALL VISITS)       EMG      EEG      MRI (HEAD, NECK, SPINE)    MRI brain 9/30/10 (requested images from [facility])   CARDIAC STUDIES      FORMAL COGNITIVE TESTING      OTHER

## 2020-01-09 ENCOUNTER — OFFICE VISIT (OUTPATIENT)
Dept: NEUROLOGY | Facility: CLINIC | Age: 70
End: 2020-01-09
Payer: COMMERCIAL

## 2020-01-09 ENCOUNTER — TELEPHONE (OUTPATIENT)
Dept: ONCOLOGY | Facility: CLINIC | Age: 70
End: 2020-01-09

## 2020-01-09 VITALS
RESPIRATION RATE: 16 BRPM | HEART RATE: 64 BPM | SYSTOLIC BLOOD PRESSURE: 134 MMHG | TEMPERATURE: 98.4 F | DIASTOLIC BLOOD PRESSURE: 87 MMHG

## 2020-01-09 DIAGNOSIS — G50.0 TRIGEMINAL NEURALGIA: Primary | ICD-10-CM

## 2020-01-09 PROCEDURE — 99204 OFFICE O/P NEW MOD 45 MIN: CPT | Performed by: PSYCHIATRY & NEUROLOGY

## 2020-01-09 NOTE — PROGRESS NOTES
"INITIAL NEUROLOGY CONSULTATION    DATE OF VISIT: 1/9/2020  MRN: [id number]  PATIENT NAME: Marissa Guadarrama  YOB: 1950    REFERRING PROVIDER: Referred Self    Chief Complaint   Patient presents with     New Patient     Trigeminal Neuralgia.  Self-referral.       SUBJECTIVE:                                                      HPI:   Marissa Guadarrama is a 69 year old female self-referred for Trigeminal Neuralgia. She has been following with Dr. Bell in ENT for the same since at least 2010, per chart review. His notes indicate the patient's symptoms have fluctuated over the years. She has Left V2 TN. Brain and skull base MRI was normal. They have tried increasing doses of Neurontin without great relief over the past several months. They also added baclofen with a titrating dose and topical lidocaine. She reported pain improvement with these measures in August of last year but she noticed some memory problems with the increased med dosing. She is currently taking gabapentin only, 600mg TID.    The patient tells me that the facial pain started around 3744-1811. Wind blowing on her face and \"twinging\" pain were what she noted initially and then symptoms flared from there. She describes stabbing, severe pain with flares. As reflected in Arabella Jeronimo's recent notes, she says that she has been feeling good on the gabapentin at the current dose 600mg TID. She mentions that she is sensitive to changes in formulation; She has occasional flares when the pharmacy makes changes, the most recent being perhaps a couple of months ago. She denies Right-sided facial pain. No facial muscle weakness.  mentions that the baclofen made her \"wacky.\" She does not think she tried Tegretol in the past and I do not see mention of this in the ENT notes. It seems that she for the most part has responded to the Gabapentin. It looks like perhaps briefly she tried Norco too, through family practice.     She is interested in " "potentially having surgery done. I am not able to find discussion of this in the ENT notes. Patient says that Dr. Bell told her the next step would be to consult with Neurology about this, but I suspect he told her Neurosurgery instead.     The patient has additional history of LLE weakness due to post-polio syndrome, breast cancer, HLD, HTN and carcinoid tumor for which she also sees Dr. La because of the need for stenting due to ureteral obstruction. She endorses some urinary incontinence as a result. She also reports Right knee pain but says she \"will not\" have the recommended surgery. She ambulates with a walker.     Past Medical History:   Diagnosis Date     Arthritis     knee     Benign breast biopsy     benign     Carcinoid tumor 12/2003     Cervical cancer (H) 2007     H/O colposcopy with cervical biopsy 12/23/13    vaginal cuff biopsy- VAIN III. referred back to gyn/onc     HTN      Hyperlipidemia      Pap smear of vagina with ASC-H 11/1/13     Post-polio syndromes      Trigeminal neuralgias      Past Surgical History:   Procedure Laterality Date     APPENDECTOMY  1983     BIOPSY NODE SENTINEL Bilateral 6/1/2016    Procedure: BIOPSY NODE SENTINEL;  Surgeon: Brent Arana MD;  Location: [facility]     C BSO, OMENTECTOMY W/OSMAN  5/2007     C TOTAL ABDOM HYSTERECTOMY  5/2007     CL AFF SURGICAL PATHOLOGY       COLONOSCOPY N/A 6/23/2017    Procedure: COMBINED COLONOSCOPY, SINGLE OR MULTIPLE BIOPSY/POLYPECTOMY BY BIOPSY;  Colonoscopy Dx:Carcinoid tumor of colon prep mailed golytely;  Surgeon: Talisha Greco MD;  Location:  GI     COLPOSCOPY, BIOPSY, COMBINED  3/13/2014    Procedure: COMBINED COLPOSCOPY, BIOPSY;;  Surgeon: Lara Pack MD;  Location:  OR     COMBINED CYSTOSCOPY, RETROGRADES, EXCHANGE STENT URETER(S) Left 2/7/2019    Procedure: COMBINED CYSTOSCOPY, RETROGRADES, EXCHANGE STENT URETER--left;  Surgeon: Zhao La MD;  Location: [facility] OR     COMBINED CYSTOSCOPY, " RETROGRADES, URETEROSCOPY, INSERT STENT Left 2/2/2017    Procedure: COMBINED CYSTOSCOPY, RETROGRADES, URETEROSCOPY, INSERT STENT;  Surgeon: Zhao La MD;  Location: [facility] OR     CYSTOSCOPY, RETROGRADES, INSERT STENT URETER(S), COMBINED Left 9/7/2017    Procedure: COMBINED CYSTOSCOPY, RETROGRADES, INSERT STENT URETER(S);  Cystoscopy,Left Stent Exchange;  Surgeon: Zhao La MD;  Location: [facility]     CYSTOSCOPY, RETROGRADES, INSERT STENT URETER(S), COMBINED  12/12/2017    Procedure: COMBINED CYSTOSCOPY, RETROGRADES, INSERT STENT URETER(S);;  Surgeon: Zhao La MD;  [facility]: [facility]     CYSTOSCOPY, RETROGRADES, INSERT STENT URETER(S), COMBINED Left 7/5/2018    Procedure: COMBINED CYSTOSCOPY, RETROGRADES, INSERT STENT URETER(S);  Cystoscopy, Left Stent Exchange;  Surgeon: Zhao La MD;  Location: [facility]     EXAM UNDER ANESTHESIA PELVIC  3/13/2014    Procedure: EXAM UNDER ANESTHESIA PELVIC;  Exam Under Anestheisa, Colposcopy, Vaginal Biopsies, Co2 Laser of the Upper Vagina;  Surgeon: Lara Pack MD;  [facility]: [facility]     HERNIORRHAPHY INCISIONAL (LOCATION)       LASER CO2 VAGINA  3/13/2014    VAIN 1/2     LASER CO2 VAGINA N/A 12/12/2017    Procedure: LASER CO2 VAGINA;  Exam Under Anesthesia, CO2 Laser Ablation Of Vagina, Cystoscopy, Left Retrograde Pyelogram with Left Stent Placement;  Surgeon: Nic Segundo MD;  Location: [facility]     LUMPECTOMY BREAST WITH SEED LOCALIZATION Bilateral 6/1/2016    Procedure: LUMPECTOMY BREAST WITH SEED LOCALIZATION;  Surgeon: Brent Arana MD;  Location: [facility]     SURGICAL HISTORY OF -       ovarian cystectomy     SURGICAL HISTORY OF -   2003    right colon resection secondary to carcinoid tumor     TUBAL LIGATION         albuterol (PROAIR HFA/PROVENTIL HFA/VENTOLIN HFA) 108 (90 Base) MCG/ACT inhaler, Inhale 2 puffs into the lungs every 6 hours as needed for shortness of breath / dyspnea or wheezing Hold  on file until needed.  exemestane (AROMASIN) 25 MG tablet, Take 1 tablet (25 mg) by mouth every morning  gabapentin (NEURONTIN) 600 MG tablet, Take 1 tablet (600 mg) by mouth 3 times daily  hydrochlorothiazide (HYDRODIURIL) 25 MG tablet, TAKE A HALF TABLET BY MOUTH ONCE DAILY IN THE MORNING. Hold on file until needed.  [] baclofen (LIORESAL) 10 MG tablet, Take 0.5 tablets (5 mg) by mouth 3 times daily for 7 days, THEN 1 tablet (10 mg) 3 times daily for 7 days, THEN 1.5 tablets (15 mg) 3 times daily. (Patient not taking: Reported on 2019)  [] Baclofen 5 MG TABS, Take 15 mg by mouth 2 times daily for 5 days, THEN 15 mg daily for 5 days. (Patient not taking: Reported on 2019)    No current facility-administered medications on file prior to visit.     No Known Allergies     Problem (# of Occurrences) Relation (Name,Age of Onset)    Breast Cancer (4) Mother, Paternal Aunt, Maternal Aunt, Sister    Cancer (7) Mother: bone / liver, Maternal Grandmother, Maternal Grandfather, Paternal Grandmother, Paternal Grandfather, Sister (Keenan Netta): vulvar ca, cervical ca, squamous cell cancer, Brother (Chris Todd): Rectal- Stage 4    Colon Cancer (1) Paternal Aunt    Pancreatic Cancer (1) Nephew    Rectal Cancer (1) Brother (Chris Todd)        Social History     Tobacco Use     Smoking status: Former Smoker     Packs/day: 1.00     Years: 29.00     Pack years: 29.00     Types: Cigarettes     Last attempt to quit: 10/30/2006     Years since quittin.2     Smokeless tobacco: Never Used   Substance Use Topics     Alcohol use: No     Alcohol/week: 0.0 standard drinks     Drug use: No       REVIEW OF SYSTEMS:                                                      10-point review of systems is negative except as mentioned above in HPI.     EXAM:                                                      Physical Exam:   Vitals: /87 (BP Location: Right arm, Patient Position: Sitting, Cuff Size: Adult Large)    Pulse 64   Temp 98.4  F (36.9  C) (Tympanic)   Resp 16   BMI= There is no height or weight on file to calculate BMI.  GENERAL: NAD.  HEENT: NC/AT. No TTP of cheeks, face. Tympanic membranes appear normal.   CV: Distant heart sounds. RRR. S1, S2.   NECK: No bruits.  Neurologic:  MENTAL STATUS: Alert, attentive. Speech is fluent. Normal comprehension. Normal concentration. Adequate fund of knowledge.   CRANIAL NERVES: Discs technically difficult to visualize. Visual fields intact to confrontation. Pupils equally, round and reactive to light. Facial sensation and movement normal. EOM full. Hearing intact to conversation. Trapezius strength intact. Palate moves symmetrically. Tongue midline.  MOTOR: Weakness: 3-/5 in multiple proximal and distal muscle groups of the LLE. Otherwise strength is 5/5 in proximal and distal muscle groups of upper and lower extremities. Tone and bulk normal.   DTRs: Intact and symmetric in UEs. Patient unable to tolerate patellar reflexes. Cannot elicit ankle jerks (edema).  Babinski equivocal bilaterally.   SENSATION: Normal light touch and pinprick. Intact proprioception. Vibration: Diminished at both ankles.   COORDINATION: Normal finger nose finger. Finger tapping normal.  Knee heel shin normal (though requires great effort on Left - no clear dysmetria.  STATION AND GAIT: Romberg: Not tested for safety. Gait is antalgic, favors Right leg, requires assistance. Tandem deferred for safety.     ASSESSMENT and PLAN:                                                      Assessment and Plan:   No diagnosis found.     Ms. Guadarrama is a pleasant 70 yo woman with several year history of Trigeminal Neuralgia, managed well by Dr. Bell, who is here as a self-referral for Neurosurgery consult. Her TN symptoms are under great control currently but the patient and her  have concerns about the ongoing cost of the medication which they tell me is costing them >$600 per month. She wants to look  into surgical options.    I explained to the patient that I do not perform surgeries, and referred her on to Neurosurgery clinic. They may want updated imaging, but will defer this to them so that the correct studies are ordered. I am not certain that they will be inclined to do decompression/rhizotomy given her other medical comorbidities, but will leave that for her to discuss with the experts. In the meantime, I do not think that Dr. Bell is aware about the patient's concern about the medication cost, so I will copy my note to him. Tegretol could be considered as an alternative, but I would be hesitant to make any medication changes since she is doing so well right now and generally gabapentin is easier to tolerate.     Follow-up in Neurology clinic as needed, if new concerns arise.      Patient Instructions:  Continue the current Gabapentin dosing as prescribed by Dr. Bell.  Referral to neurosurgery clinic for surgical evaluation.   Follow-up with ENT as needed for additional medication management.     Total Time: 45 minutes were spent with the patient and in chart review/documentation. More than 50% of the time spent on counseling (as described above in Assessment and Plan/Instructions) /coordinating the care.    Delmy Dodd MD  Neurology    CC: Rk Bell MD

## 2020-01-09 NOTE — NURSING NOTE
"Initial /87 (BP Location: Right arm, Patient Position: Sitting, Cuff Size: Adult Large)   Pulse 64   Temp 98.4  F (36.9  C) (Tympanic)   Resp 16  Estimated body mass index is 38.11 kg/m  as calculated from the following:    Height as of 12/23/19: 1.715 m (5' 7.5\").    Weight as of 12/23/19: 112 kg (247 lb). .    For communications regarding this visit patient prefers to be contacted by: [phone]  Okay to leave detailed message on voicemail:   yes    Kim HANSENJOSÉ      "

## 2020-01-09 NOTE — PROGRESS NOTES
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     Her history is as follows:  Notably, she has history of cervical cancer treated in 2007 at [facility], a history of colon cancer and recurrent carcinoid tumor and breast cancer.        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 Depth of invasion 7 mm out of 1.9 and horizontal spread of 1 cm. 48 negative nodes.   2009: ASCUS pap  11/1/13 Vaginal Pap returned ASC-H.   12/23/13 Colposcopy/Vaginal cuff (submitted as \"cervix\"), biopsy:VAIN III    2/5/14: Patient established care with Dr. Pack and desired surgical management     3/13/14: Colposcopy, Vaginal Biopsies, Co2 Laser of the Upper Vagina on , which showed low-grade squamous intraepithelial lesion (VAIN 1) at 12:00 and 6:00 and high-grade squamous intraepithelial lesion VAIN 29:00 vaginal cuff. Vaginal cuff, 3:00 (biopsy): Mostly denuded squamous mucosa with focal features suggestive of low-grade squamous intraepithelial lesion (VAIN 1)     7/30/14: biopsy of right upper vagina showed VAINI, negative p16     2/9/15: upper vaginal biopsy \"LSIL\"     9/25/17:  VAIN III     12/12/17: CO2 laser and biopsies of vaginal dysplasia     4/3/18:  Stable exam.     10/9/18:  Colpo, no biopsies. Pap LSIL, HR HPV 16+     4/9/19:  Pap ASC-H, HR HPV 16+     5/14/19:  Colpo with biopsies.  Biopsies benign.     11/12/19:  Pap HSIL, HR HPV+     The patient returns today for follow-up.  She continues to undergo evaluation related to her ureteral obstruction and carcinoid tumor.  She is awaiting consultation with Dr. Lawrence.  She denies any new symptoms.                                Past Medical History:    Past Medical History:   Diagnosis Date     Arthritis     knee     Benign breast biopsy     benign     Carcinoid tumor 12/2003     Cervical cancer (H) 2007     H/O colposcopy with cervical " biopsy 12/23/13    vaginal cuff biopsy- VAIN III. referred back to gyn/onc     HTN      Hyperlipidemia      Pap smear of vagina with ASC-H 11/1/13     Post-polio syndromes      Trigeminal neuralgias          Past Surgical History:    Past Surgical History:   Procedure Laterality Date     APPENDECTOMY  1983     BIOPSY NODE SENTINEL Bilateral 6/1/2016    Procedure: BIOPSY NODE SENTINEL;  Surgeon: Brent Arana MD;  Location: [facility]     C BSO, OMENTECTOMY W/OSMAN  5/2007     C TOTAL ABDOM HYSTERECTOMY  5/2007     CL [facility]FF SURGICAL PATHOLOGY       COLONOSCOPY N/A 6/23/2017    Procedure: COMBINED COLONOSCOPY, SINGLE OR MULTIPLE BIOPSY/POLYPECTOMY BY BIOPSY;  Colonoscopy Dx:Carcinoid tumor of colon prep mailed golytely;  Surgeon: Talisha Greco MD;  [facility]     COLPOSCOPY, BIOPSY, COMBINED  3/13/2014    Procedure: COMBINED COLPOSCOPY, BIOPSY;;  Surgeon: Lara Pack MD;  [facility]     COMBINED CYSTOSCOPY, RETROGRADES, EXCHANGE STENT URETER(S) Left 2/7/2019    Procedure: COMBINED CYSTOSCOPY, RETROGRADES, EXCHANGE STENT URETER--left;  Surgeon: Zhao La MD;  Location: [facility]     COMBINED CYSTOSCOPY, RETROGRADES, URETEROSCOPY, INSERT STENT Left 2/2/2017    Procedure: COMBINED CYSTOSCOPY, RETROGRADES, URETEROSCOPY, INSERT STENT;  Surgeon: Zhao La MD;  [facility]     CYSTOSCOPY, RETROGRADES, INSERT STENT URETER(S), COMBINED Left 9/7/2017    Procedure: COMBINED CYSTOSCOPY, RETROGRADES, INSERT STENT URETER(S);  Cystoscopy,Left Stent Exchange;  Surgeon: Zhao La MD;  [facility]     CYSTOSCOPY, RETROGRADES, INSERT STENT URETER(S), COMBINED  12/12/2017    Procedure: COMBINED CYSTOSCOPY, RETROGRADES, INSERT STENT URETER(S);;  Surgeon: Zhao La MD;  [facility]     CYSTOSCOPY, RETROGRADES, INSERT STENT URETER(S), COMBINED Left 7/5/2018    Procedure: COMBINED CYSTOSCOPY, RETROGRADES, INSERT STENT URETER(S);  Cystoscopy,  Left Stent Exchange;  Surgeon: Zhao La MD;  [facility]     EXAM UNDER ANESTHESIA PELVIC  3/13/2014    Procedure: EXAM UNDER ANESTHESIA PELVIC;  Exam Under Anestheisa, Colposcopy, Vaginal Biopsies, Co2 Laser of the Upper Vagina;  Surgeon: Lara Pack MD;  [facility]     HERNIORRHAPHY INCISIONAL (LOCATION)       LASER CO2 VAGINA  3/13/2014    VAIN 1/2     LASER CO2 VAGINA N/A 2017    Procedure: LASER CO2 VAGINA;  Exam Under Anesthesia, CO2 Laser Ablation Of Vagina, Cystoscopy, Left Retrograde Pyelogram with Left Stent Placement;  Surgeon: Nic Segundo MD;  [facility]     LUMPECTOMY BREAST WITH SEED LOCALIZATION Bilateral 2016    Procedure: LUMPECTOMY BREAST WITH SEED LOCALIZATION;  Surgeon: Brent Arana MD;  Location: [facility]     SURGICAL HISTORY OF -       ovarian cystectomy     SURGICAL HISTORY OF -       right colon resection secondary to carcinoid tumor     TUBAL LIGATION           Health Maintenance:  Health Maintenance Due   Topic Date Due     ZOSTER IMMUNIZATION (1 of 2) 1998     LIPID  2019     MICROALBUMIN  2019     PHQ-2  2020         Current Medications:     has a current medication list which includes the following prescription(s): exemestane, albuterol, gabapentin, and hydrochlorothiazide.       Allergies:     [unfilled]        Social History:     Social History     Tobacco Use     Smoking status: Former Smoker     Packs/day: 1.00     Years: 29.00     Pack years: 29.00     Types: Cigarettes     Last attempt to quit: 10/30/2006     Years since quittin.2     Smokeless tobacco: Never Used   Substance Use Topics     Alcohol use: No     Alcohol/week: 0.0 standard drinks       History   Drug Use No           Family History:     The patient's family history is notable for :    Family History   Problem Relation Age of Onset     Cancer Mother         bone / liver     Breast Cancer Mother      Cancer Maternal  "Grandmother      Cancer Maternal Grandfather      Cancer Paternal Grandmother      Cancer Paternal Grandfather      Cancer Sister         vulvar ca, cervical ca, squamous cell cancer     Cancer Brother         Rectal- Stage 4     Rectal Cancer Brother      Breast Cancer Paternal Aunt      Colon Cancer Paternal Aunt      Breast Cancer Maternal Aunt      Pancreatic Cancer Nephew      Breast Cancer Sister          Physical Exam:     /82   Pulse 68   Temp 97.9  F (36.6  C) (Oral)   Resp 14   Ht 1.702 m (5' 7.01\")   Wt 112.6 kg (248 lb 3.2 oz)   SpO2 95%   BMI 38.86 kg/m    Body mass index is 38.86 kg/m .    General Appearance: healthy and alert, no distress     Musculoskeletal: extremities non tender and without edema    Skin: no lesions or rashes     Neurological: normal gait, no gross defects     Psychiatric: appropriate mood and affect                               Hematological: normal cervical, supraclavicular and inguinal lymph nodes     Gastrointestinal:       abdomen soft, non-tender, non-distended, no organomegaly or masses    Colposcopy Note:    Written and verbal informed consent obtained.    Prior to the start of the procedure and with procedural staff participation, I verbally confirmed the patient s identity using two indicators, relevant allergies, that the procedure was appropriate and matched the consent or emergent situation, and that the correct equipment/implants were available. Immediately prior to starting the procedure I conducted the Time Out with the procedural staff and re-confirmed the patient s name, procedure, and site/side. (The Joint Commission universal protocol was followed.)  Yes    Sedation (Moderate or Deep): None    Genitourinary: External genitalia and urethral meatus appears normal, no gross lesions.  Upon placement of speculum and full visualization of vagina and apex, vagina noted to be shortened and scarred.  [place] with stable scarring, no gross lesions.Vagain " cleaned with saline and green filter applied with no abnormal vascularity noted.  5% acetic acid solution applied to entire vagina and visualized under colposcopic enhancement.  Mild acetowhite changes throughout, nothing severe, biopsy of vaginal cuff taken.  Hemostasis with silver nitrate.    Assessment:     Marissa Guadarrama is a 69 year old woman with a diagnosis of HR HPV, persistent abnromal pap and VAIN3.  She also has history of multiple cancers including metastatic carcinoid tumor and bilateral breast cancer.     A total of 30 minutes was spent with the patient, 25 minutes of which were spent in counseling the patient and/or treatment planning.      Plan:   History of VAINIII with recent HSIL pap:  Biopsy obtained today, I see nothing to suggest high grade dysplasia on exam but she continues to have high grade paps.  Will continue to follow closely.  She will be contacted with results and recs for follow-up.  Recommend exams every six months x five years, pap smears annually.       Questions answered, patient expressed understanding of plan of care.     Karli Gao MD  Gynecologic Oncology  [facility]     CC  Patient Care Team:  Eliazar Menendez MD as PCP - General (Family Practice)  Hosea King MD as MD (Hematology & Oncology)  Zhao La MD as MD (Urology)  Talisha Greco MD as MD (Colon and Rectal Surgery)  Allen Downey MD as MD (Orthopedics)  Phyllis Prince APRN CNP as Assigned PCP  SELF, REFERRED

## 2020-01-09 NOTE — TELEPHONE ENCOUNTER
Patient called with result and follow up plan.     Per MD: biopsy is negative and I need to see her in six months for colpo     Scheduling request sent.     Patient appreciative of the RN time    Nicole Chew RN

## 2020-01-09 NOTE — PATIENT INSTRUCTIONS
Plan:    Continue the current Gabapentin dosing as prescribed by Dr. Bell.  Referral to neurosurgery clinic for surgical evaluation.   Follow-up with ENT as needed for additional medication management.

## 2020-01-09 NOTE — LETTER
"    1/9/2020         RE: Marissa Guadarrama  [address] [place] [id number]        Dear Colleague,    Thank you for referring your patient, Marissa Guadarrama, to the [facility]. Please see a copy of my visit note below.    INITIAL NEUROLOGY CONSULTATION    DATE OF VISIT: 1/9/2020  MRN: [id number]  PATIENT NAME: Marissa Guadarrama  YOB: 1950    REFERRING PROVIDER: Referred Self    Chief Complaint   Patient presents with     New Patient     Trigeminal Neuralgia.  Self-referral.       SUBJECTIVE:                                                      HPI:   Marissa Guadarrama is a 69 year old female self-referred for Trigeminal Neuralgia. She has been following with Dr. Bell in ENT for the same since at least 2010, per chart review. His notes indicate the patient's symptoms have fluctuated over the years. She has Left V2 TN. Brain and skull base MRI was normal. They have tried increasing doses of Neurontin without great relief over the past several months. They also added baclofen with a titrating dose and topical lidocaine. She reported pain improvement with these measures in August of last year but she noticed some memory problems with the increased med dosing. She is currently taking gabapentin only, 600mg TID.    The patient tells me that the facial pain started around 4627-1306. Wind blowing on her face and \"twinging\" pain were what she noted initially and then symptoms flared from there. She describes stabbing, severe pain with flares. As reflected in Arabella Jeronimo's recent notes, she says that she has been feeling good on the gabapentin at the current dose 600mg TID. She mentions that she is sensitive to changes in formulation; She has occasional flares when the pharmacy makes changes, the most recent being perhaps a couple of months ago. She denies Right-sided facial pain. No facial muscle weakness.  mentions that the baclofen made her \"wacky.\" She does not think she tried " "Tegretol in the past and I do not see mention of this in the ENT notes. It seems that she for the most part has responded to the Gabapentin. It looks like perhaps briefly she tried Norco too, through family practice.     She is interested in potentially having surgery done. I am not able to find discussion of this in the ENT notes. Patient says that Dr. Bell told her the next step would be to consult with Neurology about this, but I suspect he told her Neurosurgery instead.     The patient has additional history of LLE weakness due to post-polio syndrome, breast cancer, HLD, HTN and carcinoid tumor for which she also sees Dr. La because of the need for stenting due to ureteral obstruction. She endorses some urinary incontinence as a result. She also reports Right knee pain but says she \"will not\" have the recommended surgery. She ambulates with a walker.     Past Medical History:   Diagnosis Date     Arthritis     knee     Benign breast biopsy     benign     Carcinoid tumor 12/2003     Cervical cancer (H) 2007     H/O colposcopy with cervical biopsy 12/23/13    vaginal cuff biopsy- VAIN III. referred back to gyn/onc     HTN      Hyperlipidemia      Pap smear of vagina with ASC-H 11/1/13     Post-polio syndromes      Trigeminal neuralgias      Past Surgical History:   Procedure Laterality Date     APPENDECTOMY  1983     BIOPSY NODE SENTINEL Bilateral 6/1/2016    Procedure: BIOPSY NODE SENTINEL;  Surgeon: Brent Arana MD;  Location: [facility]      BSO, OMENTECTOMY W/OSMAN  5/2007     C TOTAL ABDOM HYSTERECTOMY  5/2007     CL AFF SURGICAL PATHOLOGY       COLONOSCOPY N/A 6/23/2017    Procedure: COMBINED COLONOSCOPY, SINGLE OR MULTIPLE BIOPSY/POLYPECTOMY BY BIOPSY;  Colonoscopy Dx:Carcinoid tumor of colon prep mailed golytely;  Surgeon: Talisha Greco MD;  Location:  GI     COLPOSCOPY, BIOPSY, COMBINED  3/13/2014    Procedure: COMBINED COLPOSCOPY, BIOPSY;;  Surgeon: Lara Pack MD;  Location:  OR "     COMBINED CYSTOSCOPY, RETROGRADES, EXCHANGE STENT URETER(S) Left 2/7/2019    Procedure: COMBINED CYSTOSCOPY, RETROGRADES, EXCHANGE STENT URETER--left;  Surgeon: Zhao La MD;  Location: [facility]     COMBINED CYSTOSCOPY, RETROGRADES, URETEROSCOPY, INSERT STENT Left 2/2/2017    Procedure: COMBINED CYSTOSCOPY, RETROGRADES, URETEROSCOPY, INSERT STENT;  Surgeon: Zhao La MD;  [facility]     CYSTOSCOPY, RETROGRADES, INSERT STENT URETER(S), COMBINED Left 9/7/2017    Procedure: COMBINED CYSTOSCOPY, RETROGRADES, INSERT STENT URETER(S);  Cystoscopy,Left Stent Exchange;  Surgeon: Zhao La MD;  [facility]: [facility]     CYSTOSCOPY, RETROGRADES, INSERT STENT URETER(S), COMBINED  12/12/2017    Procedure: COMBINED CYSTOSCOPY, RETROGRADES, INSERT STENT URETER(S);;  Surgeon: Zhao La MD;  [facility]     CYSTOSCOPY, RETROGRADES, INSERT STENT URETER(S), COMBINED Left 7/5/2018    Procedure: COMBINED CYSTOSCOPY, RETROGRADES, INSERT STENT URETER(S);  Cystoscopy, Left Stent Exchange;  Surgeon: Zhao La MD;  Location: [facility]     EXAM UNDER ANESTHESIA PELVIC  3/13/2014    Procedure: EXAM UNDER ANESTHESIA PELVIC;  Exam Under Anestheisa, Colposcopy, Vaginal Biopsies, Co2 Laser of the Upper Vagina;  Surgeon: Lara Pack MD;  [facility]     HERNIORRHAPHY INCISIONAL (LOCATION)       LASER CO2 VAGINA  3/13/2014    VAIN 1/2     LASER CO2 VAGINA N/A 12/12/2017    Procedure: LASER CO2 VAGINA;  Exam Under Anesthesia, CO2 Laser Ablation Of Vagina, Cystoscopy, Left Retrograde Pyelogram with Left Stent Placement;  Surgeon: Nic Segundo MD;  [facility]     LUMPECTOMY BREAST WITH SEED LOCALIZATION Bilateral 6/1/2016    Procedure: LUMPECTOMY BREAST WITH SEED LOCALIZATION;  Surgeon: Brent Arana MD;  Location: [facility]     SURGICAL HISTORY OF -       ovarian cystectomy     SURGICAL HISTORY OF -   2003    right colon resection  secondary to carcinoid tumor     TUBAL LIGATION         albuterol (PROAIR HFA/PROVENTIL HFA/VENTOLIN HFA) 108 (90 Base) MCG/ACT inhaler, Inhale 2 puffs into the lungs every 6 hours as needed for shortness of breath / dyspnea or wheezing Hold on file until needed.  exemestane (AROMASIN) 25 MG tablet, Take 1 tablet (25 mg) by mouth every morning  gabapentin (NEURONTIN) 600 MG tablet, Take 1 tablet (600 mg) by mouth 3 times daily  hydrochlorothiazide (HYDRODIURIL) 25 MG tablet, TAKE A HALF TABLET BY MOUTH ONCE DAILY IN THE MORNING. Hold on file until needed.  [] baclofen (LIORESAL) 10 MG tablet, Take 0.5 tablets (5 mg) by mouth 3 times daily for 7 days, THEN 1 tablet (10 mg) 3 times daily for 7 days, THEN 1.5 tablets (15 mg) 3 times daily. (Patient not taking: Reported on 2019)  [] Baclofen 5 MG TABS, Take 15 mg by mouth 2 times daily for 5 days, THEN 15 mg daily for 5 days. (Patient not taking: Reported on 2019)    No current facility-administered medications on file prior to visit.     No Known Allergies     Problem (# of Occurrences) Relation (Name,Age of Onset)    Breast Cancer (4) Mother, Paternal Aunt, Maternal Aunt, Sister    Cancer (7) Mother: bone / liver, Maternal Grandmother, Maternal Grandfather, Paternal Grandmother, Paternal Grandfather, Sister (Keenan Chadwick): vulvar ca, cervical ca, squamous cell cancer, Brother (Chris Todd): Rectal- Stage 4    Colon Cancer (1) Paternal Aunt    Pancreatic Cancer (1) Nephew    Rectal Cancer (1) Brother (Chris Todd)        Social History     Tobacco Use     Smoking status: Former Smoker     Packs/day: 1.00     Years: 29.00     Pack years: 29.00     Types: Cigarettes     Last attempt to quit: 10/30/2006     Years since quittin.2     Smokeless tobacco: Never Used   Substance Use Topics     Alcohol use: No     Alcohol/week: 0.0 standard drinks     Drug use: No       REVIEW OF SYSTEMS:                                                      10-point  review of systems is negative except as mentioned above in HPI.     EXAM:                                                      Physical Exam:   Vitals: /87 (BP Location: Right arm, Patient Position: Sitting, Cuff Size: Adult Large)   Pulse 64   Temp 98.4  F (36.9  C) (Tympanic)   Resp 16   BMI= There is no height or weight on file to calculate BMI.  GENERAL: NAD.  HEENT: NC/AT. No TTP of cheeks, face. Tympanic membranes appear normal.   CV: Distant heart sounds. RRR. S1, S2.   NECK: No bruits.  Neurologic:  MENTAL STATUS: Alert, attentive. Speech is fluent. Normal comprehension. Normal concentration. Adequate fund of knowledge.   CRANIAL NERVES: Discs technically difficult to visualize. Visual fields intact to confrontation. Pupils equally, round and reactive to light. Facial sensation and movement normal. EOM full. Hearing intact to conversation. Trapezius strength intact. Palate moves symmetrically. Tongue midline.  MOTOR: Weakness: 3-/5 in multiple proximal and distal muscle groups of the LLE. Otherwise strength is 5/5 in proximal and distal muscle groups of upper and lower extremities. Tone and bulk normal.   DTRs: Intact and symmetric in UEs. Patient unable to tolerate patellar reflexes. Cannot elicit ankle jerks (edema).  Babinski equivocal bilaterally.   SENSATION: Normal light touch and pinprick. Intact proprioception. Vibration: Diminished at both ankles.   COORDINATION: Normal finger nose finger. Finger tapping normal.  Knee heel shin normal (though requires great effort on Left - no clear dysmetria.  STATION AND GAIT: Romberg: Not tested for safety. Gait is antalgic, favors Right leg, requires assistance. Tandem deferred for safety.     ASSESSMENT and PLAN:                                                      Assessment and Plan:   No diagnosis found.     Ms. Guadarrama is a pleasant 70 yo woman with several year history of Trigeminal Neuralgia, managed well by Dr. Bell, who is here as a  self-referral for Neurosurgery consult. Her TN symptoms are under great control currently but the patient and her  have concerns about the ongoing cost of the medication which they tell me is costing them >$600 per month. She wants to look into surgical options.    I explained to the patient that I do not perform surgeries, and referred her on to Neurosurgery clinic. They may want updated imaging, but will defer this to them so that the correct studies are ordered. I am not certain that they will be inclined to do decompression/rhizotomy given her other medical comorbidities, but will leave that for her to discuss with the experts. In the meantime, I do not think that Dr. Bell is aware about the patient's concern about the medication cost, so I will copy my note to him. Tegretol could be considered as an alternative, but I would be hesitant to make any medication changes since she is doing so well right now and generally gabapentin is easier to tolerate.     Follow-up in Neurology clinic as needed, if new concerns arise.      Patient Instructions:  Continue the current Gabapentin dosing as prescribed by Dr. Bell.  Referral to neurosurgery clinic for surgical evaluation.   Follow-up with ENT as needed for additional medication management.     Total Time: 45 minutes were spent with the patient and in chart review/documentation. More than 50% of the time spent on counseling (as described above in Assessment and Plan/Instructions) /coordinating the care.    Delmy Dodd MD  Neurology    CC: Rk Bell MD      Again, thank you for allowing me to participate in the care of your patient.        Sincerely,        Delmy Dodd MD

## 2020-02-03 ENCOUNTER — ONCOLOGY VISIT (OUTPATIENT)
Dept: ONCOLOGY | Facility: CLINIC | Age: 70
End: 2020-02-03
Attending: INTERNAL MEDICINE
Payer: COMMERCIAL

## 2020-02-03 VITALS
WEIGHT: 249.7 LBS | HEART RATE: 65 BPM | BODY MASS INDEX: 37.84 KG/M2 | RESPIRATION RATE: 18 BRPM | TEMPERATURE: 98.1 F | HEIGHT: 68 IN | SYSTOLIC BLOOD PRESSURE: 168 MMHG | OXYGEN SATURATION: 94 % | DIASTOLIC BLOOD PRESSURE: 85 MMHG

## 2020-02-03 DIAGNOSIS — C7A.021 MALIGNANT CARCINOID TUMOR OF CECUM (H): Primary | ICD-10-CM

## 2020-02-03 PROCEDURE — G0463 HOSPITAL OUTPT CLINIC VISIT: HCPCS | Mod: ZF

## 2020-02-03 PROCEDURE — 99215 OFFICE O/P EST HI 40 MIN: CPT | Mod: ZP | Performed by: INTERNAL MEDICINE

## 2020-02-03 ASSESSMENT — MIFFLIN-ST. JEOR: SCORE: 1688.19

## 2020-02-03 ASSESSMENT — PAIN SCALES - GENERAL: PAINLEVEL: NO PAIN (0)

## 2020-02-03 NOTE — PROGRESS NOTES
Esperanza Guadarrama is here today in follow-up of a metastatic carcinoid.    She is 69 years old and had a resected cecal carcinoma several years ago and has evidence of peritoneal metastases obstructing her left ureter.  She has been getting regular stent exchanges to manage the obstructed ureter as she was not felt to be a good candidate for surgical correction.  She also has had bilateral early stage breast cancer for which she is on hormonal therapy.  This summer she had a biopsy of a longstanding liver lesion confirming that that represents her carcinoid as well.  She is here today to discuss whether or not she requires further treatment.  She tells me overall she is feeling fairly well.  She is having some trouble with new pain in her joints of her hands.  Her trigeminal neuralgia is not bothering her actively currently.  Her chronic leg weakness from her post polio syndrome is stable.  She has no problems with diarrhea or flushing.  She has no respiratory symptoms.  She is noted no adenopathy.  The remainder of a 10 point complete review of systems is otherwise negative.    On physical exam Ms. Guadarrama appears well with unremarkable vital signs.  She has no icterus.  She has no palpable adenopathy in her neck or supraclavicular spaces.  Her thyroid is unremarkable.  Her lungs are clear to auscultation without dullness to percussion.  Her heart rate and rhythm are regular without murmur gallop.  Her abdomen is obese without palpable mass organomegaly.  She has an atrophic left leg with mild edema and a trace of edema on the right.  She has no cyanosis or clubbing of her digits.  Her speech is fluent and her cranial nerves are grossly intact.  She has her usual gait abnormality related to her markedly diminished strength in the left leg.    I reviewed with her her imaging studies over the last couple of years.  She has about a 4 cm lesion in the right lobe of her liver which has not changed in size over several  years.  The nodularity in the left retroperitoneum associated with her ureter has been stable over the same timeframe.  Her most recent imaging was in April.    Assessment/plan:  1.  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  At present her only known sites of disease are minimal retroperitoneal disease and a large liver metastasis.  Her disease is stable over the last several years and asymptomatic other than from the obstruction of her ureter.  At present I do think she requires any treatment for this.  In the future if she does have symptomatic progression we could consider another attempt at treatment with a somatostatin analog.  Given her history of having had severe fatigue from Sandostatin (which would be unusual) if we were trying to treat her I would use short acting octreotide as an initial step.  I discussed with her that there are numerous other treatments we could try in the future but there is no reason to pursue those for now as long as her disease was stable and asymptomatic.  2.  Ureteral obstruction.  She wanted to discuss at length today whether or not she should continue having that stented.  Her most recent evaluation shows that only about 15 to 20% of her kidney function is coming from the left kidney and so allowing that to be completely obstructed and losing its function would not be a great loss.  On the other hand she does not find the periodic stent exchanges to be much of a burden at all and did not see any reason to discontinue them.  From the perspective of treating her cancer in the future, I do not believe there is much impact one way or the other in terms of preserving her left kidney.  3.  Bilateral early stage breast cancer currently on hormonal therapy.  She will continue to follow-up with Dr. Hicks.    I told her with regards her neuroendocrine carcinoma she just needs imaging every 1 to 2 years and on an as-needed basis if she has symptoms.  I do  not think she needs routine follow-up with me here as she will have frequent visits with her oncologist in [place].  I went ahead and ordered a CT scan and chromogranin A level to correspond with her next planned visit up there at the end of May.    Total visit time today was approximately 40 minutes, all spent on the above discussions.

## 2020-02-03 NOTE — NURSING NOTE
"Oncology Rooming Note    February 3, 2020 9:53 AM   Marissa Guadarrama is a 69 year old female who presents for:    Chief Complaint   Patient presents with     RECHECK     Return: 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     Initial Vitals: BP (!) 168/85 (BP Location: Right arm, Patient Position: Sitting, Cuff Size: Adult Regular)   Pulse 65   Temp 98.1  F (36.7  C) (Oral)   Resp 18   Ht 1.715 m (5' 7.5\")   Wt 113.3 kg (249 lb 11.2 oz)   SpO2 94%   BMI 38.53 kg/m   Estimated body mass index is 38.53 kg/m  as calculated from the following:    Height as of this encounter: 1.715 m (5' 7.5\").    Weight as of this encounter: 113.3 kg (249 lb 11.2 oz). Body surface area is 2.32 meters squared.  No Pain (0) Comment: Data Unavailable   No LMP recorded. Patient has had a hysterectomy.  Allergies reviewed: Yes  Medications reviewed: Yes    Medications: Medication refills not needed today.  Pharmacy name entered into [employer]: [id number] - [place], [place] - [address] [place] [address]    Clinical concerns: MAGGIE Sagastume CMA              "

## 2020-02-03 NOTE — LETTER
2/3/2020      RE: Marissa Guadarrama  [address] [place] [phone]       Esperanza Guadarrama is here today in follow-up of a metastatic carcinoid.    She is 69 years old and had a resected cecal carcinoma several years ago and has evidence of peritoneal metastases obstructing her left ureter.  She has been getting regular stent exchanges to manage the obstructed ureter as she was not felt to be a good candidate for surgical correction.  She also has had bilateral early stage breast cancer for which she is on hormonal therapy.  This summer she had a biopsy of a longstanding liver lesion confirming that that represents her carcinoid as well.  She is here today to discuss whether or not she requires further treatment.  She tells me overall she is feeling fairly well.  She is having some trouble with new pain in her joints of her hands.  Her trigeminal neuralgia is not bothering her actively currently.  Her chronic leg weakness from her post polio syndrome is stable.  She has no problems with diarrhea or flushing.  She has no respiratory symptoms.  She is noted no adenopathy.  The remainder of a 10 point complete review of systems is otherwise negative.    On physical exam Ms. Guadarrama appears well with unremarkable vital signs.  She has no icterus.  She has no palpable adenopathy in her neck or supraclavicular spaces.  Her thyroid is unremarkable.  Her lungs are clear to auscultation without dullness to percussion.  Her heart rate and rhythm are regular without murmur gallop.  Her abdomen is obese without palpable mass organomegaly.  She has an atrophic left leg with mild edema and a trace of edema on the right.  She has no cyanosis or clubbing of her digits.  Her speech is fluent and her cranial nerves are grossly intact.  She has her usual gait abnormality related to her markedly diminished strength in the left leg.    I reviewed with her her imaging studies over the last couple of years.  She has about a 4 cm  lesion in the right lobe of her liver which has not changed in size over several years.  The nodularity in the left retroperitoneum associated with her ureter has been stable over the same timeframe.  Her most recent imaging was in April.    Assessment/plan:  1.  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  At present her only known sites of disease are minimal retroperitoneal disease and a large liver metastasis.  Her disease is stable over the last several years and asymptomatic other than from the obstruction of her ureter.  At present I do think she requires any treatment for this.  In the future if she does have symptomatic progression we could consider another attempt at treatment with a somatostatin analog.  Given her history of having had severe fatigue from Sandostatin (which would be unusual) if we were trying to treat her I would use short acting octreotide as an initial step.  I discussed with her that there are numerous other treatments we could try in the future but there is no reason to pursue those for now as long as her disease was stable and asymptomatic.  2.  Ureteral obstruction.  She wanted to discuss at length today whether or not she should continue having that stented.  Her most recent evaluation shows that only about 15 to 20% of her kidney function is coming from the left kidney and so allowing that to be completely obstructed and losing its function would not be a great loss.  On the other hand she does not find the periodic stent exchanges to be much of a burden at all and did not see any reason to discontinue them.  From the perspective of treating her cancer in the future, I do not believe there is much impact one way or the other in terms of preserving her left kidney.  3.  Bilateral early stage breast cancer currently on hormonal therapy.  She will continue to follow-up with Dr. Hicks.    I told her with regards her neuroendocrine carcinoma she just needs  imaging every 1 to 2 years and on an as-needed basis if she has symptoms.  I do not think she needs routine follow-up with me here as she will have frequent visits with her oncologist in [place].  I went ahead and ordered a CT scan and chromogranin A level to correspond with her next planned visit up there at the end of May.    Total visit time today was approximately 40 minutes, all spent on the above discussions.    Jovani Lawrence MD

## 2020-02-06 ENCOUNTER — PREP FOR PROCEDURE (OUTPATIENT)
Dept: UROLOGY | Facility: CLINIC | Age: 70
End: 2020-02-06

## 2020-02-06 DIAGNOSIS — N13.5 OBSTRUCTION OF LEFT URETER: Primary | ICD-10-CM

## 2020-02-06 RX ORDER — CEFAZOLIN SODIUM 2 G/50ML
2 SOLUTION INTRAVENOUS
Status: CANCELLED | OUTPATIENT
Start: 2020-02-06

## 2020-02-06 RX ORDER — CEFAZOLIN SODIUM 1 G/50ML
1 INJECTION, SOLUTION INTRAVENOUS SEE ADMIN INSTRUCTIONS
Status: CANCELLED | OUTPATIENT
Start: 2020-02-06

## 2020-02-11 ENCOUNTER — TELEPHONE (OUTPATIENT)
Dept: UROLOGY | Facility: CLINIC | Age: 70
End: 2020-02-11

## 2020-02-11 DIAGNOSIS — N39.0 URINARY TRACT INFECTION: Primary | ICD-10-CM

## 2020-02-11 PROBLEM — N13.5 OBSTRUCTION OF LEFT URETER: Status: ACTIVE | Noted: 2020-02-11

## 2020-02-11 NOTE — TELEPHONE ENCOUNTER
Pre Op Teaching Flowsheet       Pre and Post op Patient Education  Relevant Diagnosis:  Obstructed ureter  Teaching Topic:  Pre and post op teaching  Person Involved in teaching:  pt    Motivation Level:  Asks Questions: Yes  Eager to Learn:  Yes  Cooperative: Yes  Receptive (willing/able to accept information):  Yes  Patient demonstrates understanding of the following:  Date and time of surgery:  2/20/20 @ 0950  Location of surgery:  [place]  History and Physical and any other testing necessary prior to surgery: Yes  Required time line for completion of History and Physical and any pre-op testing: Yes    NPO Guidelines: NPO after midnight    Patient demonstrates understanding of the following:  Pre-op bowel prep:  N/A      Pre-op showering/scrub information with PCMX Soap: Yes  Medications to take the day of surgery:  Per PCP  Blood thinner medications discussed and when to stop (if applicable):  Yes  Diabetes medication management (if applicable):  Patient to discuss with Primary Care Provider  Discussed pain control after surgery: pain scale  Infection Prevention: Patient demonstrates understanding of the following:  Patient instructed on hand hygiene:  Yes  Surgical procedure site care taught: Yes  Signs and symptoms of infection taught:  Yes  Wound care will be taught at the time of discharge.  Central venous catheter care will be taught at the time of discharge (if applicable).    Post-op follow-up:  Discussed how to contact the hospital, nurse, and clinic scheduling staff if necessary.    Instructional materials used/given/mailed:  [facility] Booklet, post op teaching sheet, Map, Soap, and arrival/location information.    Surgical instructions mailed to patient Yes.I spoke to patient on the phone.  irena storm LPN

## 2020-02-17 ENCOUNTER — OFFICE VISIT (OUTPATIENT)
Dept: FAMILY MEDICINE | Facility: CLINIC | Age: 70
End: 2020-02-17
Payer: COMMERCIAL

## 2020-02-17 VITALS
BODY MASS INDEX: 37.44 KG/M2 | HEART RATE: 69 BPM | SYSTOLIC BLOOD PRESSURE: 128 MMHG | RESPIRATION RATE: 16 BRPM | OXYGEN SATURATION: 95 % | DIASTOLIC BLOOD PRESSURE: 82 MMHG | WEIGHT: 247 LBS | HEIGHT: 68 IN | TEMPERATURE: 98.3 F

## 2020-02-17 DIAGNOSIS — I10 BENIGN ESSENTIAL HYPERTENSION: ICD-10-CM

## 2020-02-17 DIAGNOSIS — Z01.818 PREOP GENERAL PHYSICAL EXAM: Primary | ICD-10-CM

## 2020-02-17 DIAGNOSIS — E78.2 MIXED HYPERLIPIDEMIA: ICD-10-CM

## 2020-02-17 DIAGNOSIS — E87.6 HYPOKALEMIA: ICD-10-CM

## 2020-02-17 DIAGNOSIS — N13.5 OBSTRUCTION OF LEFT URETER: ICD-10-CM

## 2020-02-17 DIAGNOSIS — N39.0 URINARY TRACT INFECTION WITHOUT HEMATURIA, SITE UNSPECIFIED: ICD-10-CM

## 2020-02-17 DIAGNOSIS — N18.30 CKD (CHRONIC KIDNEY DISEASE) STAGE 3, GFR 30-59 ML/MIN (H): ICD-10-CM

## 2020-02-17 LAB
ALBUMIN UR-MCNC: NEGATIVE MG/DL
ANION GAP SERPL CALCULATED.3IONS-SCNC: <1 MMOL/L (ref 3–14)
APPEARANCE UR: ABNORMAL
BACTERIA #/AREA URNS HPF: ABNORMAL /HPF
BILIRUB UR QL STRIP: NEGATIVE
BUN SERPL-MCNC: 24 MG/DL (ref 7–30)
CALCIUM SERPL-MCNC: 10.1 MG/DL (ref 8.5–10.1)
CHLORIDE SERPL-SCNC: 102 MMOL/L (ref 94–109)
CO2 SERPL-SCNC: 37 MMOL/L (ref 20–32)
COLOR UR AUTO: YELLOW
CREAT SERPL-MCNC: 1.42 MG/DL (ref 0.52–1.04)
GFR SERPL CREATININE-BSD FRML MDRD: 37 ML/MIN/{1.73_M2}
GLUCOSE SERPL-MCNC: 96 MG/DL (ref 70–99)
GLUCOSE UR STRIP-MCNC: NEGATIVE MG/DL
HGB UR QL STRIP: ABNORMAL
KETONES UR STRIP-MCNC: NEGATIVE MG/DL
LEUKOCYTE ESTERASE UR QL STRIP: ABNORMAL
NITRATE UR QL: POSITIVE
NON-SQ EPI CELLS #/AREA URNS LPF: ABNORMAL /LPF
PH UR STRIP: 6.5 PH (ref 5–7)
POTASSIUM SERPL-SCNC: 3.2 MMOL/L (ref 3.4–5.3)
RBC #/AREA URNS AUTO: ABNORMAL /HPF
SODIUM SERPL-SCNC: 139 MMOL/L (ref 133–144)
SOURCE: ABNORMAL
SP GR UR STRIP: 1.01 (ref 1–1.03)
UROBILINOGEN UR STRIP-ACNC: 0.2 EU/DL (ref 0.2–1)
WBC #/AREA URNS AUTO: >100 /HPF
WBC CLUMPS #/AREA URNS HPF: PRESENT /HPF

## 2020-02-17 PROCEDURE — 87086 URINE CULTURE/COLONY COUNT: CPT | Performed by: UROLOGY

## 2020-02-17 PROCEDURE — 87088 URINE BACTERIA CULTURE: CPT | Performed by: FAMILY MEDICINE

## 2020-02-17 PROCEDURE — 81001 URINALYSIS AUTO W/SCOPE: CPT | Performed by: UROLOGY

## 2020-02-17 PROCEDURE — 36415 COLL VENOUS BLD VENIPUNCTURE: CPT | Performed by: FAMILY MEDICINE

## 2020-02-17 PROCEDURE — 99214 OFFICE O/P EST MOD 30 MIN: CPT | Performed by: FAMILY MEDICINE

## 2020-02-17 PROCEDURE — 93000 ELECTROCARDIOGRAM COMPLETE: CPT | Performed by: FAMILY MEDICINE

## 2020-02-17 PROCEDURE — 80048 BASIC METABOLIC PNL TOTAL CA: CPT | Performed by: FAMILY MEDICINE

## 2020-02-17 RX ORDER — CIPROFLOXACIN 500 MG/1
500 TABLET, FILM COATED ORAL 2 TIMES DAILY
Qty: 14 TABLET | Refills: 0 | Status: SHIPPED | OUTPATIENT
Start: 2020-02-17 | End: 2020-06-01

## 2020-02-17 RX ORDER — POTASSIUM CHLORIDE 1500 MG/1
20 TABLET, EXTENDED RELEASE ORAL DAILY
Qty: 7 TABLET | Refills: 0 | Status: SHIPPED | OUTPATIENT
Start: 2020-02-17 | End: 2020-06-01

## 2020-02-17 RX ORDER — POTASSIUM CHLORIDE 1500 MG/1
20 TABLET, EXTENDED RELEASE ORAL 2 TIMES DAILY
Qty: 7 TABLET | Refills: 0 | Status: SHIPPED | OUTPATIENT
Start: 2020-02-17 | End: 2020-02-17

## 2020-02-17 ASSESSMENT — MIFFLIN-ST. JEOR: SCORE: 1675.94

## 2020-02-17 NOTE — PROGRESS NOTES
[facility]  [address] [address]  [phone]  Dept: [phone]    PRE-OP EVALUATION:  Today's date: 2020    Marissa Guadarrama (: 1950) presents for pre-operative evaluation assessment as requested by Dr. La.  She requires evaluation and anesthesia risk assessment prior to undergoing surgery/procedure for treatment of stent replacement in bladder. .    Proposed Surgery/ Procedure: CYSTOSCOPY, WITH RETROGRADE PYELOGRAM AND URETERAL STENT REPLACEMENT  Date of Surgery/ Procedure: 20  Time of Surgery/ Procedure: 10:50  Hospital/Surgical Facility: [facility]  Fax number for surgical facility: [facility]  Primary Physician: Eliazar Menendez  Type of Anesthesia Anticipated: Monitor Anesthesia Care    Patient has a Health Care Directive or Living Will:  NO    1. NO - Do you have a history of heart attack, stroke, stent, bypass or surgery on an artery in the head, neck, heart or legs?  2. NO - Do you ever have any pain or discomfort in your chest?  3. NO - Do you have a history of  Heart Failure?  4. NO - Are you troubled by shortness of breath when: walking on the level, up a slight hill or at night?  5. NO - Do you currently have a cold, bronchitis or other respiratory infection?  6. NO - Do you have a cough, shortness of breath or wheezing?  7. NO - Do you sometimes get pains in the calves of your legs when you walk?  8. NO - Do you or anyone in your family have previous history of blood clots?  9. NO - Do you or does anyone in your family have a serious bleeding problem such as prolonged bleeding following surgeries or cuts?  10. NO - Have you ever had problems with anemia or been told to take iron pills?  11. NO - Have you had any abnormal blood loss such as black, tarry or bloody stools, or abnormal vaginal bleeding?  12. NO - Have you ever had a blood transfusion?  13. NO - Have you or any of your relatives ever had problems with anesthesia?  14. NO -  Do you have sleep apnea, excessive snoring or daytime drowsiness?  15. NO - Do you have any prosthetic heart valves?  16. NO - Do you have prosthetic joints?  17. NO - Is there any chance that you may be pregnant?      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 Hence, planned surgery. Reviewed above with patient.      See problem list for active medical problems.  Problems all longstanding and stable, except as noted/documented.  See ROS for pertinent symptoms related to these conditions.    HYPERLIPIDEMIA - Patient has a long history of significant Hyperlipidemia requiring medication for treatment with recent good control. Patient reports no problems or side effects with the medication.     HYPERTENSION - Patient has longstanding history of HTN , currently denies any symptoms referable to elevated blood pressure. Specifically denies chest pain, palpitations, dyspnea, orthopnea, PND or peripheral edema. Blood pressure readings have been in normal range. Current medication regimen is as listed below. Patient denies any side effects of medication.     RENAL INSUFFICIENCY - Patient has a longstanding history of moderate-severe chronic renal insufficiency. Last Cr within baseline.       MEDICAL HISTORY:     Patient Active Problem List    Diagnosis Date Noted     Obstruction of left ureter 02/11/2020     Priority: Medium     Added automatically from request for surgery [id number]       Osteopenia of hip 05/15/2019     Priority: Medium     Need for prophylactic chemotherapy 05/15/2019     Priority: Medium     Rheumatoid arthritis with positive rheumatoid factor, involving unspecified site (H) 12/31/2018     Priority: Medium     Ureteral obstruction 11/20/2018     Priority: Medium     Added automatically from request for surgery [id number]       Obesity (BMI 35.0-39.9) with comorbidity (H) 09/28/2018     Priority: Medium     Peritoneal  "metastases (H) 10/30/2017     Priority: Medium     Serum calcium elevated 04/07/2017     Priority: Medium     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  Marissa Guadarrama has no plan or code status on file. Discussed available resources and provided with information. Confirmed code status reflects current choices pending further ACP discussions.  Confirmed/documented designated decision maker(s). See permanent comments section of demographics in clinical tab. Added by Tiff Askew on 2/6/2015  Discussed advance care planning with patient; however, patient declined at this time. 9/19/2012              OA (osteoarthritis) of knee 10/05/2011     Priority: Medium     Hypertension goal BP (blood pressure) < 140/90 11/01/2010     Priority: Medium     HYPERLIPIDEMIA LDL GOAL <130 10/31/2010     Priority: Medium     Post-polio syndrome      Priority: Medium     Left knee, diagnosed by ortho in 1976, had left leg/toe surgeries as child  (Problem list name updated by automated process. Provider to review and confirm.)       Cervical cancer (H)      Priority: Medium     5/2007.  Dr. Alonso, [facility] gyn/onc,  Now needs routine paps, patient not always compliant  3/26/09 pap NIL  9/24/09 pap ASCUS  11/1/13 pap ASC-H. Plan-- colposcopy   12/23/13 colposcopy scheduled. Reminder placed.  colpscopy 12/23/2013-Vaginal cuff (submitted as \"cervix\"), biopsy:  - High grade squamous intraepithelial lesion (vaginal  intraepithelial " neoplasia grade III, VaIN III, severe dysplasia and  carcinoma in situ).  - No invasive malignancy identified.  - Cervical transformation zone not identified in biopsies.  - Please see comment.  Referral back to gynecology/oncology  2/5/14 gyn/onc appt   3/13/14 colposcopy and CO2 laser vagina, path:VAIN 1/2.  3/19/14 follow up in 4 months  7/30/14 vaginal biopsy: VAIN 1. Plan: repeat colp in 6 months.(9/17/14)   9/17/14 colp. No staining seen. No biopsy taken. Pap- ASCUS + HR HPV. Plan- repeat pap at next visit.  2/9/15 colposcopy. bx- LSIL/koilocytosis. Pap- NIL/+ HR HPV 16.  Plan: 3/16/15 treatment planning.   3/16/15 repeat colposcopy in 4 months (due 7/16/15)  7/6/15 scheduled. Tracking.             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  Patient has not been complaint with follow up.        Past Medical History:   Diagnosis Date     Arthritis     knee     Benign breast biopsy     benign     Carcinoid tumor 12/2003     Cervical cancer (H) 2007     H/O colposcopy with cervical biopsy 12/23/13    vaginal cuff biopsy- VAIN III. referred back to gyn/onc     HTN      Hyperlipidemia      Pap smear of vagina with ASC-H 11/1/13     Post-polio syndromes      Trigeminal neuralgias      Past Surgical History:   Procedure Laterality Date     APPENDECTOMY  1983     BIOPSY NODE SENTINEL Bilateral 6/1/2016    Procedure: BIOPSY NODE SENTINEL;  Surgeon: Brent Arana MD;  Location: [facility]     C BSO, OMENTECTOMY W/OSMAN  5/2007     C TOTAL ABDOM HYSTERECTOMY  5/2007     CL AFF SURGICAL PATHOLOGY       COLONOSCOPY N/A 6/23/2017    Procedure: COMBINED COLONOSCOPY, SINGLE OR MULTIPLE BIOPSY/POLYPECTOMY BY BIOPSY;  Colonoscopy Dx:Carcinoid tumor of colon prep mailed golytely;  Surgeon: Talisha Greco MD;  Location:  GI     COLPOSCOPY, BIOPSY, COMBINED  3/13/2014    Procedure: COMBINED COLPOSCOPY,  BIOPSY;;  Surgeon: Lara Pack MD;  Location: [facility]     COMBINED CYSTOSCOPY, RETROGRADES, EXCHANGE STENT URETER(S) Left 2/7/2019    Procedure: COMBINED CYSTOSCOPY, RETROGRADES, EXCHANGE STENT URETER--left;  Surgeon: Zhao La MD;  Location: [facility]     COMBINED CYSTOSCOPY, RETROGRADES, URETEROSCOPY, INSERT STENT Left 2/2/2017    Procedure: COMBINED CYSTOSCOPY, RETROGRADES, URETEROSCOPY, INSERT STENT;  Surgeon: Zhao La MD;  Location: [facility]     CYSTOSCOPY, RETROGRADES, INSERT STENT URETER(S), COMBINED Left 9/7/2017    Procedure: COMBINED CYSTOSCOPY, RETROGRADES, INSERT STENT URETER(S);  Cystoscopy,Left Stent Exchange;  Surgeon: Zhao La MD;  Location: [facility]     CYSTOSCOPY, RETROGRADES, INSERT STENT URETER(S), COMBINED  12/12/2017    Procedure: COMBINED CYSTOSCOPY, RETROGRADES, INSERT STENT URETER(S);;  Surgeon: Zhao La MD;  [facility]     CYSTOSCOPY, RETROGRADES, INSERT STENT URETER(S), COMBINED Left 7/5/2018    Procedure: COMBINED CYSTOSCOPY, RETROGRADES, INSERT STENT URETER(S);  Cystoscopy, Left Stent Exchange;  Surgeon: Zhao La MD;  Location: [facility]     EXAM UNDER ANESTHESIA PELVIC  3/13/2014    Procedure: EXAM UNDER ANESTHESIA PELVIC;  Exam Under Anestheisa, Colposcopy, Vaginal Biopsies, Co2 Laser of the Upper Vagina;  Surgeon: Lara Pack MD;  [facility]     HERNIORRHAPHY INCISIONAL (LOCATION)       LASER CO2 VAGINA  3/13/2014    VAIN 1/2     LASER CO2 VAGINA N/A 12/12/2017    Procedure: LASER CO2 VAGINA;  Exam Under Anesthesia, CO2 Laser Ablation Of Vagina, Cystoscopy, Left Retrograde Pyelogram with Left Stent Placement;  Surgeon: Nic Segundo MD;  Location: [facility]     LUMPECTOMY BREAST WITH SEED LOCALIZATION Bilateral 6/1/2016    Procedure: LUMPECTOMY BREAST WITH SEED LOCALIZATION;  Surgeon: Brent Arana MD;  Location: [facility]     SURGICAL HISTORY OF -       ovarian cystectomy      SURGICAL HISTORY OF -       right colon resection secondary to carcinoid tumor     TUBAL LIGATION       Current Outpatient Medications   Medication Sig Dispense Refill     albuterol (PROAIR HFA/PROVENTIL HFA/VENTOLIN HFA) 108 (90 Base) MCG/ACT inhaler Inhale 2 puffs into the lungs every 6 hours as needed for shortness of breath / dyspnea or wheezing Hold on file until needed. 1 Inhaler 1     ciprofloxacin (CIPRO) 500 MG tablet Take 1 tablet (500 mg) by mouth 2 times daily for 7 days 14 tablet 0     exemestane (AROMASIN) 25 MG tablet Take 1 tablet (25 mg) by mouth every morning 90 tablet 3     gabapentin (NEURONTIN) 600 MG tablet Take 1 tablet (600 mg) by mouth 3 times daily 180 tablet 6     hydrochlorothiazide (HYDRODIURIL) 25 MG tablet TAKE A HALF TABLET BY MOUTH ONCE DAILY IN THE MORNING. Hold on file until needed. 45 tablet 3     potassium chloride ER (KLOR-CON M) 20 MEQ CR tablet Take 1 tablet (20 mEq) by mouth daily 7 tablet 0     OTC products: none    No Known Allergies   Latex Allergy: NO    Social History     Tobacco Use     Smoking status: Former Smoker     Packs/day: 1.00     Years: 29.00     Pack years: 29.00     Types: Cigarettes     Last attempt to quit: 10/30/2006     Years since quittin.3     Smokeless tobacco: Never Used   Substance Use Topics     Alcohol use: No     Alcohol/week: 0.0 standard drinks     History   Drug Use No       REVIEW OF SYSTEMS:   CONSTITUTIONAL: NEGATIVE for fever, chills, change in weight  INTEGUMENTARY/SKIN: NEGATIVE for worrisome rashes, moles or lesions  EYES: NEGATIVE for vision changes or irritation  ENT/MOUTH: NEGATIVE for ear, mouth and throat problems  RESP: NEGATIVE for significant cough or SOB  CV: NEGATIVE for chest pain, palpitations or peripheral edema  GI: NEGATIVE for nausea, abdominal pain, heartburn, or change in bowel habits  : NEGATIVE for frequency, dysuria, or hematuria  MUSCULOSKELETAL: NEGATIVE for significant arthralgias or myalgia  NEURO:  "NEGATIVE for weakness, dizziness or paresthesias  ENDOCRINE: NEGATIVE for temperature intolerance, skin/hair changes  HEME: NEGATIVE for bleeding problems  PSYCHIATRIC: NEGATIVE for changes in mood or affect    EXAM:   /82   Pulse 69   Temp 98.3  F (36.8  C) (Tympanic)   Resp 16   Ht 1.715 m (5' 7.5\")   Wt 112 kg (247 lb)   SpO2 95%   BMI 38.11 kg/m    GENERAL APPEARANCE:  alert and no distress; ambulatory w/o assist  EYES: pink conj, no icterus, PERRL, EOMI  HENT: ear canals and TM's normal, nose and mouth without ulcers or lesions, oropharynx clear and oral mucous membranes moist  NECK: no adenopathy, no asymmetry, masses, or scars and thyroid normal to palpation  RESP: lungs clear to auscultation - no rales, rhonchi or wheezes  CV: regular rates and rhythm, normal S1 S2, no S3 or S4, no murmur, click or rub, no peripheral edema and peripheral pulses strong  ABDOMEN: soft, nontender, no hepatosplenomegaly, no masses and bowel sounds normal  MS: no musculoskeletal defects are noted and gait is age appropriate without ataxia  SKIN: good turgor, no rash/jaundice/ecchymosis  NEURO: Normal strength and tone, sensory exam grossly normal, mentation intact and speech normal    DIAGNOSTICS:   EKG: appears normal, NSR, normal axis, normal intervals, no acute ST/T changes c/w ischemia, no LVH by voltage criteria, unchanged from previous tracings  Labs Resulted Today:   Results for orders placed or performed in visit on 02/17/20   *UA reflex to Microscopic     Status: Abnormal   Result Value Ref Range    Color Urine Yellow     Appearance Urine Cloudy     Glucose Urine Negative NEG^Negative mg/dL    Bilirubin Urine Negative NEG^Negative    Ketones Urine Negative NEG^Negative mg/dL    Specific Gravity Urine 1.015 1.003 - 1.035    Blood Urine Moderate (A) NEG^Negative    pH Urine 6.5 5.0 - 7.0 pH    Protein Albumin Urine Negative NEG^Negative mg/dL    Urobilinogen Urine 0.2 0.2 - 1.0 EU/dL    Nitrite Urine Positive " (A) NEG^Negative    Leukocyte Esterase Urine Large (A) NEG^Negative    Source Midstream Urine    Basic metabolic panel     Status: Abnormal   Result Value Ref Range    Sodium 139 133 - 144 mmol/L    Potassium 3.2 (L) 3.4 - 5.3 mmol/L    Chloride 102 94 - 109 mmol/L    Carbon Dioxide 37 (H) 20 - 32 mmol/L    Anion Gap <1 (L) 3 - 14 mmol/L    Glucose 96 70 - 99 mg/dL    Urea Nitrogen 24 7 - 30 mg/dL    Creatinine 1.42 (H) 0.52 - 1.04 mg/dL    GFR Estimate 37 (L) >60 mL/min/[1.73_m2]    GFR Estimate If Black 43 (L) >60 mL/min/[1.73_m2]    Calcium 10.1 8.5 - 10.1 mg/dL   Urine Microscopic     Status: Abnormal   Result Value Ref Range    WBC Urine >100 (A) OTO5^0 - 5 /HPF    RBC Urine 10-25 (A) OTO2^O - 2 /HPF    WBC Clumps Present (A) NEG^Negative /HPF    Squamous Epithelial /LPF Urine Few FEW^Few /LPF    Bacteria Urine Few (A) NEG^Negative /HPF       Recent Labs   Lab Test 11/18/19  1051 06/12/19  0929 05/29/19  1054 05/13/19  1128   HGB 14.6  --   --  14.2     --   --  188   INR  --  1.05  --   --      --   --  143   POTASSIUM 3.5  --   --  3.6   CR 1.21*  --  1.25* 1.34*        IMPRESSION:   Reason for surgery/procedure: ureteral obstruction  Diagnosis/reason for consult: preop evaluation; multiple stable medical conditions.    The proposed surgical procedure is considered INTERMEDIATE risk.    REVISED CARDIAC RISK INDEX  The patient has the following serious cardiovascular risks for perioperative complications such as (MI, PE, VFib and 3  AV Block):  No serious cardiac risks  INTERPRETATION: 0 risks: Class I (very low risk - 0.4% complication rate)    The patient has the following additional risks for perioperative complications:  The 10-year ASCVD risk score (Carlo CAMP Jr., et al., 2013) is: 10.5%    Values used to calculate the score:      Age: 69 years      Sex: Female      Is Non- : No      Diabetic: No      Tobacco smoker: No      Systolic Blood Pressure: 128 mmHg      Is BP  treated: No      HDL Cholesterol: 53 mg/dL      Total Cholesterol: 191 mg/dL      ICD-10-CM    1. Preop general physical exam Z01.818 Urine Culture Aerobic Bacterial     *UA reflex to Microscopic     EKG 12-lead complete w/read - Clinics     Basic metabolic panel     Urine Microscopic   2. Obstruction of left ureter N13.5 CANCELED: UA with Microscopic ([place]; [facility])   3. Benign essential hypertension I10 EKG 12-lead complete w/read - Clinics     Basic metabolic panel   4. CKD (chronic kidney disease) stage 3, GFR 30-59 ml/min (H) N18.3 EKG 12-lead complete w/read - Clinics     Basic metabolic panel   5. Mixed hyperlipidemia E78.2 EKG 12-lead complete w/read - Clinics   6. Urinary tract infection without hematuria, site unspecified N39.0 ciprofloxacin (CIPRO) 500 MG tablet   7. Hypokalemia E87.6 potassium chloride ER (KLOR-CON M) 20 MEQ CR tablet     DISCONTINUED: potassium chloride ER (KLOR-CON M) 20 MEQ CR tablet       RECOMMENDATIONS:     --Consult hospital rounder / IM to assist post-op medical management as appropriate  Routine DVT precautions recommended.    Cardiovascular Risk  Performs 4 METs exercise without symptoms (Light housework (dusting, washing dishes) and Walk on level ground at a good pace (patient has post-polio synddrome) .   EKG obtained as last EKG was >1 yr ago. Unchanged compared to previous. Patient will continue hydrochlorothiazide.        --Patient is to take all scheduled medications on the day of surgery EXCEPT for modifications listed below.    Take hydrochlorothiazide on day of surgery.  All other scheduled meds may be held on day of surgery and resumed when patient is allowed to eat.    Anticoagulant or Antiplatelet Medication Use  NSAIDS: avoid use      Start Ciprofloxacin for UTI based on today's urinalysis. Urine culture pending.  Start potassium replacement. Recheck potassium after a week.      APPROVAL GIVEN to proceed with proposed procedure, without further  diagnostic evaluation, as long as her urinary infection has been treated and has been verified resolved, and her potassium level has returned to normal or that it has not decreased further.             Signed Electronically by: Arnoldo Solis MD    Copy of this evaluation report is provided to requesting physician.    [facility] Preop Guidelines    Revised Cardiac Risk Index

## 2020-02-17 NOTE — PATIENT INSTRUCTIONS
You may take hydrochlorothiazide  on morning of surgery if they are scheduled to be taken then. Take only with a small sip of water.  All other scheduled medication may be held on morning of surgery, and resumed when you are allowed to eat.     Do not take Ibuprofen, Aspirin or Naproxen from now until after procedure.  If you need to take something for pain, take Acetaminophen 325 mg orally 1-2 tabs every 4-6 hrs as needed for pain    Before Your Surgery      Call your surgeon if there is any change in your health. This includes signs of a cold or flu (such as a sore throat, runny nose, cough, rash or fever).    Do not smoke, drink alcohol or take over the counter medicine (unless your surgeon or primary care doctor tells you to) for the 24 hours before and after surgery.    If you take prescribed drugs: Follow your doctor s orders about which medicines to take and which to stop until after surgery.    Eating and drinking prior to surgery: follow the instructions from your surgeon    Take a shower or bath the night before surgery. Use the soap your surgeon gave you to gently clean your skin. If you do not have soap from your surgeon, use your regular soap. Do not shave or scrub the surgery site.  Wear clean pajamas and have clean sheets on your bed.

## 2020-02-17 NOTE — H&P (VIEW-ONLY)
[facility]  [address] [address]  [phone]  Dept: [phone]    PRE-OP EVALUATION:  Today's date: 2020    Marissa Guadarrama (: 1950) presents for pre-operative evaluation assessment as requested by Dr. La.  She requires evaluation and anesthesia risk assessment prior to undergoing surgery/procedure for treatment of stent replacement in bladder. .    Proposed Surgery/ Procedure: CYSTOSCOPY, WITH RETROGRADE PYELOGRAM AND URETERAL STENT REPLACEMENT  Date of Surgery/ Procedure: 20  Time of Surgery/ Procedure: 10:50  Hospital/Surgical Facility: [facility]  Fax number for surgical facility: [facility]  Primary Physician: Eliazar Menendez  Type of Anesthesia Anticipated: Monitor Anesthesia Care    Patient has a Health Care Directive or Living Will:  NO    1. NO - Do you have a history of heart attack, stroke, stent, bypass or surgery on an artery in the head, neck, heart or legs?  2. NO - Do you ever have any pain or discomfort in your chest?  3. NO - Do you have a history of  Heart Failure?  4. NO - Are you troubled by shortness of breath when: walking on the level, up a slight hill or at night?  5. NO - Do you currently have a cold, bronchitis or other respiratory infection?  6. NO - Do you have a cough, shortness of breath or wheezing?  7. NO - Do you sometimes get pains in the calves of your legs when you walk?  8. NO - Do you or anyone in your family have previous history of blood clots?  9. NO - Do you or does anyone in your family have a serious bleeding problem such as prolonged bleeding following surgeries or cuts?  10. NO - Have you ever had problems with anemia or been told to take iron pills?  11. NO - Have you had any abnormal blood loss such as black, tarry or bloody stools, or abnormal vaginal bleeding?  12. NO - Have you ever had a blood transfusion?  13. NO - Have you or any of your relatives ever had problems with anesthesia?  14. NO -  Do you have sleep apnea, excessive snoring or daytime drowsiness?  15. NO - Do you have any prosthetic heart valves?  16. NO - Do you have prosthetic joints?  17. NO - Is there any chance that you may be pregnant?      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 Hence, planned surgery. Reviewed above with patient.      See problem list for active medical problems.  Problems all longstanding and stable, except as noted/documented.  See ROS for pertinent symptoms related to these conditions.    HYPERLIPIDEMIA - Patient has a long history of significant Hyperlipidemia requiring medication for treatment with recent good control. Patient reports no problems or side effects with the medication.     HYPERTENSION - Patient has longstanding history of HTN , currently denies any symptoms referable to elevated blood pressure. Specifically denies chest pain, palpitations, dyspnea, orthopnea, PND or peripheral edema. Blood pressure readings have been in normal range. Current medication regimen is as listed below. Patient denies any side effects of medication.     RENAL INSUFFICIENCY - Patient has a longstanding history of moderate-severe chronic renal insufficiency. Last Cr within baseline.       MEDICAL HISTORY:     Patient Active Problem List    Diagnosis Date Noted     Obstruction of left ureter 02/11/2020     Priority: Medium     Added automatically from request for surgery [id number]       Osteopenia of hip 05/15/2019     Priority: Medium     Need for prophylactic chemotherapy 05/15/2019     Priority: Medium     Rheumatoid arthritis with positive rheumatoid factor, involving unspecified site (H) 12/31/2018     Priority: Medium     Ureteral obstruction 11/20/2018     Priority: Medium     Added automatically from request for surgery [id number]       Obesity (BMI 35.0-39.9) with comorbidity (H) 09/28/2018     Priority: Medium     Peritoneal  "metastases (H) 10/30/2017     Priority: Medium     Serum calcium elevated 04/07/2017     Priority: Medium     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  Marissa Guadarrama has no plan or code status on file. Discussed available resources and provided with information. Confirmed code status reflects current choices pending further ACP discussions.  Confirmed/documented designated decision maker(s). See permanent comments section of demographics in clinical tab. Added by Tiff Askew on 2/6/2015  Discussed advance care planning with patient; however, patient declined at this time. 9/19/2012              OA (osteoarthritis) of knee 10/05/2011     Priority: Medium     Hypertension goal BP (blood pressure) < 140/90 11/01/2010     Priority: Medium     HYPERLIPIDEMIA LDL GOAL <130 10/31/2010     Priority: Medium     Post-polio syndrome      Priority: Medium     Left knee, diagnosed by ortho in 1976, had left leg/toe surgeries as child  (Problem list name updated by automated process. Provider to review and confirm.)       Cervical cancer (H)      Priority: Medium     5/2007.  Dr. Alonso, [facility] gyn/onc,  Now needs routine paps, patient not always compliant  3/26/09 pap NIL  9/24/09 pap ASCUS  11/1/13 pap ASC-H. Plan-- colposcopy   12/23/13 colposcopy scheduled. Reminder placed.  colpscopy 12/23/2013-Vaginal cuff (submitted as \"cervix\"), biopsy:  - High grade squamous intraepithelial lesion (vaginal  intraepithelial " neoplasia grade III, VaIN III, severe dysplasia and  carcinoma in situ).  - No invasive malignancy identified.  - Cervical transformation zone not identified in biopsies.  - Please see comment.  Referral back to gynecology/oncology  2/5/14 gyn/onc appt   3/13/14 colposcopy and CO2 laser vagina, path:VAIN 1/2.  3/19/14 follow up in 4 months  7/30/14 vaginal biopsy: VAIN 1. Plan: repeat colp in 6 months.(9/17/14)   9/17/14 colp. No staining seen. No biopsy taken. Pap- ASCUS + HR HPV. Plan- repeat pap at next visit.  2/9/15 colposcopy. bx- LSIL/koilocytosis. Pap- NIL/+ HR HPV 16.  Plan: 3/16/15 treatment planning.   3/16/15 repeat colposcopy in 4 months (due 7/16/15)  7/6/15 scheduled. Tracking.             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  Patient has not been complaint with follow up.        Past Medical History:   Diagnosis Date     Arthritis     knee     Benign breast biopsy     benign     Carcinoid tumor 12/2003     Cervical cancer (H) 2007     H/O colposcopy with cervical biopsy 12/23/13    vaginal cuff biopsy- VAIN III. referred back to gyn/onc     HTN      Hyperlipidemia      Pap smear of vagina with ASC-H 11/1/13     Post-polio syndromes      Trigeminal neuralgias      Past Surgical History:   Procedure Laterality Date     APPENDECTOMY  1983     BIOPSY NODE SENTINEL Bilateral 6/1/2016    Procedure: BIOPSY NODE SENTINEL;  Surgeon: Brent Arana MD;  Location: [facility]     C BSO, OMENTECTOMY W/OSMAN  5/2007     C TOTAL ABDOM HYSTERECTOMY  5/2007     CL AFF SURGICAL PATHOLOGY       COLONOSCOPY N/A 6/23/2017    Procedure: COMBINED COLONOSCOPY, SINGLE OR MULTIPLE BIOPSY/POLYPECTOMY BY BIOPSY;  Colonoscopy Dx:Carcinoid tumor of colon prep mailed golytely;  Surgeon: Talisha Greco MD;  Location:  GI     COLPOSCOPY, BIOPSY, COMBINED  3/13/2014    Procedure: COMBINED COLPOSCOPY,  BIOPSY;;  Surgeon: Lara Pack MD;  Location: [facility]     COMBINED CYSTOSCOPY, RETROGRADES, EXCHANGE STENT URETER(S) Left 2/7/2019    Procedure: COMBINED CYSTOSCOPY, RETROGRADES, EXCHANGE STENT URETER--left;  Surgeon: Zhao La MD;  Location: [facility]     COMBINED CYSTOSCOPY, RETROGRADES, URETEROSCOPY, INSERT STENT Left 2/2/2017    Procedure: COMBINED CYSTOSCOPY, RETROGRADES, URETEROSCOPY, INSERT STENT;  Surgeon: hZao La MD;  Location: [facility]     CYSTOSCOPY, RETROGRADES, INSERT STENT URETER(S), COMBINED Left 9/7/2017    Procedure: COMBINED CYSTOSCOPY, RETROGRADES, INSERT STENT URETER(S);  Cystoscopy,Left Stent Exchange;  Surgeon: Zhao La MD;  Location: [facility]     CYSTOSCOPY, RETROGRADES, INSERT STENT URETER(S), COMBINED  12/12/2017    Procedure: COMBINED CYSTOSCOPY, RETROGRADES, INSERT STENT URETER(S);;  Surgeon: Zhao La MD;  [facility]     CYSTOSCOPY, RETROGRADES, INSERT STENT URETER(S), COMBINED Left 7/5/2018    Procedure: COMBINED CYSTOSCOPY, RETROGRADES, INSERT STENT URETER(S);  Cystoscopy, Left Stent Exchange;  Surgeon: Zhao La MD;  Location: [facility]     EXAM UNDER ANESTHESIA PELVIC  3/13/2014    Procedure: EXAM UNDER ANESTHESIA PELVIC;  Exam Under Anestheisa, Colposcopy, Vaginal Biopsies, Co2 Laser of the Upper Vagina;  Surgeon: Lara Pack MD;  [facility]     HERNIORRHAPHY INCISIONAL (LOCATION)       LASER CO2 VAGINA  3/13/2014    VAIN 1/2     LASER CO2 VAGINA N/A 12/12/2017    Procedure: LASER CO2 VAGINA;  Exam Under Anesthesia, CO2 Laser Ablation Of Vagina, Cystoscopy, Left Retrograde Pyelogram with Left Stent Placement;  Surgeon: Nic Segundo MD;  Location: [facility]     LUMPECTOMY BREAST WITH SEED LOCALIZATION Bilateral 6/1/2016    Procedure: LUMPECTOMY BREAST WITH SEED LOCALIZATION;  Surgeon: Brent Arana MD;  Location: [facility]     SURGICAL HISTORY OF -       ovarian cystectomy      SURGICAL HISTORY OF -       right colon resection secondary to carcinoid tumor     TUBAL LIGATION       Current Outpatient Medications   Medication Sig Dispense Refill     albuterol (PROAIR HFA/PROVENTIL HFA/VENTOLIN HFA) 108 (90 Base) MCG/ACT inhaler Inhale 2 puffs into the lungs every 6 hours as needed for shortness of breath / dyspnea or wheezing Hold on file until needed. 1 Inhaler 1     ciprofloxacin (CIPRO) 500 MG tablet Take 1 tablet (500 mg) by mouth 2 times daily for 7 days 14 tablet 0     exemestane (AROMASIN) 25 MG tablet Take 1 tablet (25 mg) by mouth every morning 90 tablet 3     gabapentin (NEURONTIN) 600 MG tablet Take 1 tablet (600 mg) by mouth 3 times daily 180 tablet 6     hydrochlorothiazide (HYDRODIURIL) 25 MG tablet TAKE A HALF TABLET BY MOUTH ONCE DAILY IN THE MORNING. Hold on file until needed. 45 tablet 3     potassium chloride ER (KLOR-CON M) 20 MEQ CR tablet Take 1 tablet (20 mEq) by mouth daily 7 tablet 0     OTC products: none    No Known Allergies   Latex Allergy: NO    Social History     Tobacco Use     Smoking status: Former Smoker     Packs/day: 1.00     Years: 29.00     Pack years: 29.00     Types: Cigarettes     Last attempt to quit: 10/30/2006     Years since quittin.3     Smokeless tobacco: Never Used   Substance Use Topics     Alcohol use: No     Alcohol/week: 0.0 standard drinks     History   Drug Use No       REVIEW OF SYSTEMS:   CONSTITUTIONAL: NEGATIVE for fever, chills, change in weight  INTEGUMENTARY/SKIN: NEGATIVE for worrisome rashes, moles or lesions  EYES: NEGATIVE for vision changes or irritation  ENT/MOUTH: NEGATIVE for ear, mouth and throat problems  RESP: NEGATIVE for significant cough or SOB  CV: NEGATIVE for chest pain, palpitations or peripheral edema  GI: NEGATIVE for nausea, abdominal pain, heartburn, or change in bowel habits  : NEGATIVE for frequency, dysuria, or hematuria  MUSCULOSKELETAL: NEGATIVE for significant arthralgias or myalgia  NEURO:  "NEGATIVE for weakness, dizziness or paresthesias  ENDOCRINE: NEGATIVE for temperature intolerance, skin/hair changes  HEME: NEGATIVE for bleeding problems  PSYCHIATRIC: NEGATIVE for changes in mood or affect    EXAM:   /82   Pulse 69   Temp 98.3  F (36.8  C) (Tympanic)   Resp 16   Ht 1.715 m (5' 7.5\")   Wt 112 kg (247 lb)   SpO2 95%   BMI 38.11 kg/m    GENERAL APPEARANCE:  alert and no distress; ambulatory w/o assist  EYES: pink conj, no icterus, PERRL, EOMI  HENT: ear canals and TM's normal, nose and mouth without ulcers or lesions, oropharynx clear and oral mucous membranes moist  NECK: no adenopathy, no asymmetry, masses, or scars and thyroid normal to palpation  RESP: lungs clear to auscultation - no rales, rhonchi or wheezes  CV: regular rates and rhythm, normal S1 S2, no S3 or S4, no murmur, click or rub, no peripheral edema and peripheral pulses strong  ABDOMEN: soft, nontender, no hepatosplenomegaly, no masses and bowel sounds normal  MS: no musculoskeletal defects are noted and gait is age appropriate without ataxia  SKIN: good turgor, no rash/jaundice/ecchymosis  NEURO: Normal strength and tone, sensory exam grossly normal, mentation intact and speech normal    DIAGNOSTICS:   EKG: appears normal, NSR, normal axis, normal intervals, no acute ST/T changes c/w ischemia, no LVH by voltage criteria, unchanged from previous tracings  Labs Resulted Today:   Results for orders placed or performed in visit on 02/17/20   *UA reflex to Microscopic     Status: Abnormal   Result Value Ref Range    Color Urine Yellow     Appearance Urine Cloudy     Glucose Urine Negative NEG^Negative mg/dL    Bilirubin Urine Negative NEG^Negative    Ketones Urine Negative NEG^Negative mg/dL    Specific Gravity Urine 1.015 1.003 - 1.035    Blood Urine Moderate (A) NEG^Negative    pH Urine 6.5 5.0 - 7.0 pH    Protein Albumin Urine Negative NEG^Negative mg/dL    Urobilinogen Urine 0.2 0.2 - 1.0 EU/dL    Nitrite Urine Positive " (A) NEG^Negative    Leukocyte Esterase Urine Large (A) NEG^Negative    Source Midstream Urine    Basic metabolic panel     Status: Abnormal   Result Value Ref Range    Sodium 139 133 - 144 mmol/L    Potassium 3.2 (L) 3.4 - 5.3 mmol/L    Chloride 102 94 - 109 mmol/L    Carbon Dioxide 37 (H) 20 - 32 mmol/L    Anion Gap <1 (L) 3 - 14 mmol/L    Glucose 96 70 - 99 mg/dL    Urea Nitrogen 24 7 - 30 mg/dL    Creatinine 1.42 (H) 0.52 - 1.04 mg/dL    GFR Estimate 37 (L) >60 mL/min/[1.73_m2]    GFR Estimate If Black 43 (L) >60 mL/min/[1.73_m2]    Calcium 10.1 8.5 - 10.1 mg/dL   Urine Microscopic     Status: Abnormal   Result Value Ref Range    WBC Urine >100 (A) OTO5^0 - 5 /HPF    RBC Urine 10-25 (A) OTO2^O - 2 /HPF    WBC Clumps Present (A) NEG^Negative /HPF    Squamous Epithelial /LPF Urine Few FEW^Few /LPF    Bacteria Urine Few (A) NEG^Negative /HPF       Recent Labs   Lab Test 11/18/19  1051 06/12/19  0929 05/29/19  1054 05/13/19  1128   HGB 14.6  --   --  14.2     --   --  188   INR  --  1.05  --   --      --   --  143   POTASSIUM 3.5  --   --  3.6   CR 1.21*  --  1.25* 1.34*        IMPRESSION:   Reason for surgery/procedure: ureteral obstruction  Diagnosis/reason for consult: preop evaluation; multiple stable medical conditions.    The proposed surgical procedure is considered INTERMEDIATE risk.    REVISED CARDIAC RISK INDEX  The patient has the following serious cardiovascular risks for perioperative complications such as (MI, PE, VFib and 3  AV Block):  No serious cardiac risks  INTERPRETATION: 0 risks: Class I (very low risk - 0.4% complication rate)    The patient has the following additional risks for perioperative complications:  The 10-year ASCVD risk score (Carlo CAMP Jr., et al., 2013) is: 10.5%    Values used to calculate the score:      Age: 69 years      Sex: Female      Is Non- : No      Diabetic: No      Tobacco smoker: No      Systolic Blood Pressure: 128 mmHg      Is BP  treated: No      HDL Cholesterol: 53 mg/dL      Total Cholesterol: 191 mg/dL      ICD-10-CM    1. Preop general physical exam Z01.818 Urine Culture Aerobic Bacterial     *UA reflex to Microscopic     EKG 12-lead complete w/read - Clinics     Basic metabolic panel     Urine Microscopic   2. Obstruction of left ureter N13.5 CANCELED: UA with Microscopic ([place]; [facility])   3. Benign essential hypertension I10 EKG 12-lead complete w/read - Clinics     Basic metabolic panel   4. CKD (chronic kidney disease) stage 3, GFR 30-59 ml/min (H) N18.3 EKG 12-lead complete w/read - Clinics     Basic metabolic panel   5. Mixed hyperlipidemia E78.2 EKG 12-lead complete w/read - Clinics   6. Urinary tract infection without hematuria, site unspecified N39.0 ciprofloxacin (CIPRO) 500 MG tablet   7. Hypokalemia E87.6 potassium chloride ER (KLOR-CON M) 20 MEQ CR tablet     DISCONTINUED: potassium chloride ER (KLOR-CON M) 20 MEQ CR tablet       RECOMMENDATIONS:     --Consult hospital rounder / IM to assist post-op medical management as appropriate  Routine DVT precautions recommended.    Cardiovascular Risk  Performs 4 METs exercise without symptoms (Light housework (dusting, washing dishes) and Walk on level ground at a good pace (patient has post-polio synddrome) .   EKG obtained as last EKG was >1 yr ago. Unchanged compared to previous. Patient will continue hydrochlorothiazide.        --Patient is to take all scheduled medications on the day of surgery EXCEPT for modifications listed below.    Take hydrochlorothiazide on day of surgery.  All other scheduled meds may be held on day of surgery and resumed when patient is allowed to eat.    Anticoagulant or Antiplatelet Medication Use  NSAIDS: avoid use      Start Ciprofloxacin for UTI based on today's urinalysis. Urine culture pending.  Start potassium replacement. Recheck potassium after a week.      APPROVAL GIVEN to proceed with proposed procedure, without further  diagnostic evaluation, as long as her urinary infection has been treated and has been verified resolved, and her potassium level has returned to normal or that it has not decreased further.             Signed Electronically by: Arnoldo Solis MD    Copy of this evaluation report is provided to requesting physician.    [facility] Preop Guidelines    Revised Cardiac Risk Index

## 2020-02-18 ENCOUNTER — ANESTHESIA EVENT (OUTPATIENT)
Dept: SURGERY | Facility: CLINIC | Age: 70
End: 2020-02-18
Payer: MEDICARE

## 2020-02-18 LAB
BACTERIA SPEC CULT: ABNORMAL
Lab: ABNORMAL
SPECIMEN SOURCE: ABNORMAL

## 2020-02-19 ENCOUNTER — TELEPHONE (OUTPATIENT)
Dept: UROLOGY | Facility: CLINIC | Age: 70
End: 2020-02-19

## 2020-02-19 DIAGNOSIS — N39.0 URINARY TRACT INFECTION: Primary | ICD-10-CM

## 2020-02-19 RX ORDER — AMOXICILLIN 500 MG/1
500 CAPSULE ORAL 2 TIMES DAILY
Qty: 10 CAPSULE | Refills: 0 | Status: SHIPPED | OUTPATIENT
Start: 2020-02-19 | End: 2020-06-01

## 2020-02-19 ASSESSMENT — LIFESTYLE VARIABLES: TOBACCO_USE: 1

## 2020-02-19 NOTE — TELEPHONE ENCOUNTER
LUIS ALFREDO to order Amoxicillin 500 mg BID for 5 days per Dr. La. Sent to [facility] pharmacy in [place].  Edwina HEADLEY RN BSN PHN  Specialty Clinics

## 2020-02-19 NOTE — ANESTHESIA PREPROCEDURE EVALUATION
Anesthesia Pre-Procedure Evaluation    Patient: Marissa Guadarrama   MRN: [id number] : 1950          Preoperative Diagnosis: Obstruction of left ureter [N13.5]    Procedure(s):  CYSTOSCOPY, WITH RETROGRADE PYELOGRAM AND URETERAL STENT REPLACEMENT    Past Medical History:   Diagnosis Date     Arthritis     knee     Benign breast biopsy     benign     Carcinoid tumor 2003     Cervical cancer (H)      H/O colposcopy with cervical biopsy 13    vaginal cuff biopsy- VAIN III. referred back to gyn/onc     HTN      Hyperlipidemia      Pap smear of vagina with ASC-H 13     Post-polio syndromes      Trigeminal neuralgias      Past Surgical History:   Procedure Laterality Date     APPENDECTOMY       BIOPSY NODE SENTINEL Bilateral 2016    Procedure: BIOPSY NODE SENTINEL;  Surgeon: Brent Arana MD;  Location: [facility]     C BSO, OMENTECTOMY W/OSMAN  2007     C TOTAL ABDOM HYSTERECTOMY  2007     CL AFF SURGICAL PATHOLOGY       COLONOSCOPY N/A 2017    Procedure: COMBINED COLONOSCOPY, SINGLE OR MULTIPLE BIOPSY/POLYPECTOMY BY BIOPSY;  Colonoscopy Dx:Carcinoid tumor of colon prep mailed [facility];  Surgeon: Talisha Greco MD;  [facility]: [facility]     COLPOSCOPY, BIOPSY, COMBINED  3/13/2014    Procedure: COMBINED COLPOSCOPY, BIOPSY;;  Surgeon: Lara Pack MD;  [facility]     COMBINED CYSTOSCOPY, RETROGRADES, EXCHANGE STENT URETER(S) Left 2019    Procedure: COMBINED CYSTOSCOPY, RETROGRADES, EXCHANGE STENT URETER--left;  Surgeon: Zhao La MD;  Location: [facility]     COMBINED CYSTOSCOPY, RETROGRADES, URETEROSCOPY, INSERT STENT Left 2017    Procedure: COMBINED CYSTOSCOPY, RETROGRADES, URETEROSCOPY, INSERT STENT;  Surgeon: Zhao La MD;  Location: [facility]     CYSTOSCOPY, RETROGRADES, INSERT STENT URETER(S), COMBINED Left 2017    Procedure: COMBINED CYSTOSCOPY, RETROGRADES, INSERT STENT URETER(S);  Cystoscopy,Left Stent Exchange;  Surgeon:  Zhao La MD;  Location: [facility]     CYSTOSCOPY, RETROGRADES, INSERT STENT URETER(S), COMBINED  12/12/2017    Procedure: COMBINED CYSTOSCOPY, RETROGRADES, INSERT STENT URETER(S);;  Surgeon: Zhao La MD;  [facility]     CYSTOSCOPY, RETROGRADES, INSERT STENT URETER(S), COMBINED Left 7/5/2018    Procedure: COMBINED CYSTOSCOPY, RETROGRADES, INSERT STENT URETER(S);  Cystoscopy, Left Stent Exchange;  Surgeon: Zhao La MD;  Location: [facility]     EXAM UNDER ANESTHESIA PELVIC  3/13/2014    Procedure: EXAM UNDER ANESTHESIA PELVIC;  Exam Under Anestheisa, Colposcopy, Vaginal Biopsies, Co2 Laser of the Upper Vagina;  Surgeon: Lara Pack MD;  [facility]     HERNIORRHAPHY INCISIONAL (LOCATION)       LASER CO2 VAGINA  3/13/2014    VAIN 1/2     LASER CO2 VAGINA N/A 12/12/2017    Procedure: LASER CO2 VAGINA;  Exam Under Anesthesia, CO2 Laser Ablation Of Vagina, Cystoscopy, Left Retrograde Pyelogram with Left Stent Placement;  Surgeon: Nic Segundo MD;  [facility]     LUMPECTOMY BREAST WITH SEED LOCALIZATION Bilateral 6/1/2016    Procedure: LUMPECTOMY BREAST WITH SEED LOCALIZATION;  Surgeon: Brent Arana MD;  Location: [facility]     SURGICAL HISTORY OF -       ovarian cystectomy     SURGICAL HISTORY OF -   2003    right colon resection secondary to carcinoid tumor     TUBAL LIGATION         Anesthesia Evaluation     . Pt has had prior anesthetic. Type: General           ROS/MED HX    ENT/Pulmonary:     (+)tobacco use, Past use , . .    Neurologic:     (+)other neuro trigeminal neuralgia    Cardiovascular:     (+) Dyslipidemia, hypertension----. : . . . :. . Previous cardiac testing date:results:date: results:ECG reviewed date:2/17/20 results:SR date: results:          METS/Exercise Tolerance:  >4 METS   Hematologic:  - neg hematologic  ROS       Musculoskeletal: Comment: Post-polio syndrome  (+) arthritis,  -       GI/Hepatic:  - neg GI/hepatic  "ROS       Renal/Genitourinary: Comment: Urinary incontinence    (+) chronic renal disease, Other Renal/ Genitourinary, left ureteral obstruction      Endo:     (+) Obesity, .      Psychiatric:  - neg psychiatric ROS       Infectious Disease:  - neg infectious disease ROS       Malignancy:   (+) Malignancy History of Other and Breast  Other CA cervical status post Peritoneal mets        Other:    - neg other ROS                      Physical Exam  Normal systems: cardiovascular, pulmonary and dental    Airway   Mallampati: II  TM distance: >3 FB  Neck ROM: full    Dental     Cardiovascular       Pulmonary             Lab Results   Component Value Date    WBC 7.3 11/18/2019    HGB 14.6 11/18/2019    HCT 45.5 11/18/2019     11/18/2019     02/17/2020    POTASSIUM 3.2 (L) 02/17/2020    CHLORIDE 102 02/17/2020    CO2 37 (H) 02/17/2020    BUN 24 02/17/2020    CR 1.42 (H) 02/17/2020    GLC 96 02/17/2020    MARILIA 10.1 02/17/2020    ALBUMIN 3.2 (L) 11/18/2019    PROTTOTAL 7.3 11/18/2019    ALT 21 11/18/2019    AST 17 11/18/2019    ALKPHOS 68 11/18/2019    BILITOTAL 0.5 11/18/2019    INR 1.05 06/12/2019    TSH 2.54 09/28/2015       Preop Vitals  BP Readings from Last 3 Encounters:   02/17/20 128/82   02/03/20 (!) 168/85   01/09/20 134/87    Pulse Readings from Last 3 Encounters:   02/17/20 69   02/03/20 65   01/09/20 64      Resp Readings from Last 3 Encounters:   02/17/20 16   02/03/20 18   01/09/20 16    SpO2 Readings from Last 3 Encounters:   02/17/20 95%   02/03/20 94%   12/23/19 95%      Temp Readings from Last 1 Encounters:   02/17/20 36.8  C (98.3  F) (Tympanic)    Ht Readings from Last 1 Encounters:   02/17/20 1.715 m (5' 7.5\")      Wt Readings from Last 1 Encounters:   02/17/20 112 kg (247 lb)    Estimated body mass index is 38.11 kg/m  as calculated from the following:    Height as of 2/17/20: 1.715 m (5' 7.5\").    Weight as of 2/17/20: 112 kg (247 lb).       Anesthesia Plan      History & Physical " Review  History and physical reviewed and following examination; no interval change.    ASA Status:  2 .    NPO Status:  > 8 hours    Plan for General, MAC, ETT and LMA with Intravenous and Propofol induction. Maintenance will be Balanced.  Reason for MAC:  Other - see comments  PONV prophylaxis:  Ondansetron (or other 5HT-3) and Dexamethasone or Solumedrol       Postoperative Care  Postoperative pain management:  IV analgesics and Oral pain medications.      Consents  Anesthetic plan, risks, benefits and alternatives discussed with:  Patient..                 KAREN Dozier CRNA

## 2020-02-20 ENCOUNTER — HOSPITAL ENCOUNTER (OUTPATIENT)
Facility: CLINIC | Age: 70
Discharge: HOME | End: 2020-02-20
Attending: UROLOGY | Admitting: UROLOGY
Payer: MEDICARE

## 2020-02-20 ENCOUNTER — APPOINTMENT (OUTPATIENT)
Dept: GENERAL RADIOLOGY | Facility: CLINIC | Age: 70
End: 2020-02-20
Attending: UROLOGY
Payer: MEDICARE

## 2020-02-20 ENCOUNTER — ANESTHESIA (OUTPATIENT)
Dept: SURGERY | Facility: CLINIC | Age: 70
End: 2020-02-20
Payer: MEDICARE

## 2020-02-20 VITALS
DIASTOLIC BLOOD PRESSURE: 87 MMHG | WEIGHT: 250 LBS | SYSTOLIC BLOOD PRESSURE: 160 MMHG | BODY MASS INDEX: 39.24 KG/M2 | TEMPERATURE: 98.2 F | HEIGHT: 67 IN | RESPIRATION RATE: 16 BRPM | OXYGEN SATURATION: 96 %

## 2020-02-20 DIAGNOSIS — E87.6 LOW BLOOD POTASSIUM: Primary | ICD-10-CM

## 2020-02-20 DIAGNOSIS — N13.5 OBSTRUCTION OF LEFT URETER: ICD-10-CM

## 2020-02-20 LAB — POTASSIUM SERPL-SCNC: 3.7 MMOL/L (ref 3.4–5.3)

## 2020-02-20 PROCEDURE — 37000008 ZZH ANESTHESIA TECHNICAL FEE, 1ST 30 MIN: Performed by: UROLOGY

## 2020-02-20 PROCEDURE — 84132 ASSAY OF SERUM POTASSIUM: CPT | Performed by: UROLOGY

## 2020-02-20 PROCEDURE — 25000125 ZZHC RX 250: Performed by: NURSE ANESTHETIST, CERTIFIED REGISTERED

## 2020-02-20 PROCEDURE — 36000058 ZZH SURGERY LEVEL 3 EA 15 ADDTL MIN: Performed by: UROLOGY

## 2020-02-20 PROCEDURE — 25000128 H RX IP 250 OP 636: Performed by: UROLOGY

## 2020-02-20 PROCEDURE — 52332 CYSTOSCOPY AND TREATMENT: CPT | Mod: LT | Performed by: UROLOGY

## 2020-02-20 PROCEDURE — 40000277 XR SURGERY CARM FLUORO LESS THAN 5 MIN W STILLS: Mod: TC

## 2020-02-20 PROCEDURE — 36000060 ZZH SURGERY LEVEL 3 W FLUORO 1ST 30 MIN: Performed by: UROLOGY

## 2020-02-20 PROCEDURE — 71000027 ZZH RECOVERY PHASE 2 EACH 15 MINS: Performed by: UROLOGY

## 2020-02-20 PROCEDURE — 25800030 ZZH RX IP 258 OP 636: Performed by: NURSE ANESTHETIST, CERTIFIED REGISTERED

## 2020-02-20 PROCEDURE — 36415 COLL VENOUS BLD VENIPUNCTURE: CPT | Performed by: UROLOGY

## 2020-02-20 PROCEDURE — 25000128 H RX IP 250 OP 636: Performed by: NURSE ANESTHETIST, CERTIFIED REGISTERED

## 2020-02-20 PROCEDURE — C2617 STENT, NON-COR, TEM W/O DEL: HCPCS | Performed by: UROLOGY

## 2020-02-20 PROCEDURE — 37000009 ZZH ANESTHESIA TECHNICAL FEE, EACH ADDTL 15 MIN: Performed by: UROLOGY

## 2020-02-20 PROCEDURE — 25000132 ZZH RX MED GY IP 250 OP 250 PS 637: Mod: GY | Performed by: NURSE ANESTHETIST, CERTIFIED REGISTERED

## 2020-02-20 PROCEDURE — 74420 UROGRAPHY RTRGR +-KUB: CPT | Mod: 26 | Performed by: UROLOGY

## 2020-02-20 PROCEDURE — 27210794 ZZH OR GENERAL SUPPLY STERILE: Performed by: UROLOGY

## 2020-02-20 PROCEDURE — 40000305 ZZH STATISTIC PRE PROC ASSESS I: Performed by: UROLOGY

## 2020-02-20 DEVICE — STENT URETERAL PERCUFLEX PLUS 6FRX24CM M0061752620
Type: IMPLANTABLE DEVICE | Site: URETER | Status: NON-FUNCTIONAL
Removed: 2021-09-16

## 2020-02-20 RX ORDER — FENTANYL CITRATE 50 UG/ML
25-50 INJECTION, SOLUTION INTRAMUSCULAR; INTRAVENOUS
Status: DISCONTINUED | OUTPATIENT
Start: 2020-02-20 | End: 2020-02-20 | Stop reason: HOSPADM

## 2020-02-20 RX ORDER — HYDROXYZINE HYDROCHLORIDE 25 MG/1
25 TABLET, FILM COATED ORAL EVERY 6 HOURS PRN
Status: DISCONTINUED | OUTPATIENT
Start: 2020-02-20 | End: 2020-02-20 | Stop reason: HOSPADM

## 2020-02-20 RX ORDER — HYDROXYZINE HYDROCHLORIDE 50 MG/1
50 TABLET, FILM COATED ORAL EVERY 6 HOURS PRN
Status: DISCONTINUED | OUTPATIENT
Start: 2020-02-20 | End: 2020-02-20 | Stop reason: HOSPADM

## 2020-02-20 RX ORDER — HYDROMORPHONE HYDROCHLORIDE 1 MG/ML
.3-.5 INJECTION, SOLUTION INTRAMUSCULAR; INTRAVENOUS; SUBCUTANEOUS EVERY 10 MIN PRN
Status: DISCONTINUED | OUTPATIENT
Start: 2020-02-20 | End: 2020-02-20 | Stop reason: HOSPADM

## 2020-02-20 RX ORDER — SODIUM CHLORIDE, SODIUM LACTATE, POTASSIUM CHLORIDE, CALCIUM CHLORIDE 600; 310; 30; 20 MG/100ML; MG/100ML; MG/100ML; MG/100ML
INJECTION, SOLUTION INTRAVENOUS CONTINUOUS
Status: DISCONTINUED | OUTPATIENT
Start: 2020-02-20 | End: 2020-02-20 | Stop reason: HOSPADM

## 2020-02-20 RX ORDER — PROPOFOL 10 MG/ML
INJECTION, EMULSION INTRAVENOUS PRN
Status: DISCONTINUED | OUTPATIENT
Start: 2020-02-20 | End: 2020-02-20

## 2020-02-20 RX ORDER — DEXAMETHASONE SODIUM PHOSPHATE 4 MG/ML
4 INJECTION, SOLUTION INTRA-ARTICULAR; INTRALESIONAL; INTRAMUSCULAR; INTRAVENOUS; SOFT TISSUE EVERY 10 MIN PRN
Status: DISCONTINUED | OUTPATIENT
Start: 2020-02-20 | End: 2020-02-20 | Stop reason: HOSPADM

## 2020-02-20 RX ORDER — ACETAMINOPHEN 325 MG/1
975 TABLET ORAL ONCE
Status: COMPLETED | OUTPATIENT
Start: 2020-02-20 | End: 2020-02-20

## 2020-02-20 RX ORDER — ONDANSETRON 4 MG/1
4 TABLET, ORALLY DISINTEGRATING ORAL EVERY 30 MIN PRN
Status: DISCONTINUED | OUTPATIENT
Start: 2020-02-20 | End: 2020-02-20 | Stop reason: HOSPADM

## 2020-02-20 RX ORDER — LIDOCAINE 40 MG/G
CREAM TOPICAL
Status: DISCONTINUED | OUTPATIENT
Start: 2020-02-20 | End: 2020-02-20 | Stop reason: HOSPADM

## 2020-02-20 RX ORDER — ONDANSETRON 2 MG/ML
4 INJECTION INTRAMUSCULAR; INTRAVENOUS EVERY 30 MIN PRN
Status: DISCONTINUED | OUTPATIENT
Start: 2020-02-20 | End: 2020-02-20 | Stop reason: HOSPADM

## 2020-02-20 RX ORDER — PROPOFOL 10 MG/ML
INJECTION, EMULSION INTRAVENOUS CONTINUOUS PRN
Status: DISCONTINUED | OUTPATIENT
Start: 2020-02-20 | End: 2020-02-20

## 2020-02-20 RX ORDER — CEFAZOLIN SODIUM 2 G/100ML
2 INJECTION, SOLUTION INTRAVENOUS
Status: COMPLETED | OUTPATIENT
Start: 2020-02-20 | End: 2020-02-20

## 2020-02-20 RX ORDER — CEFAZOLIN SODIUM 1 G/50ML
1 INJECTION, SOLUTION INTRAVENOUS SEE ADMIN INSTRUCTIONS
Status: DISCONTINUED | OUTPATIENT
Start: 2020-02-20 | End: 2020-02-20 | Stop reason: HOSPADM

## 2020-02-20 RX ORDER — METOPROLOL TARTRATE 1 MG/ML
1-2 INJECTION, SOLUTION INTRAVENOUS EVERY 5 MIN PRN
Status: DISCONTINUED | OUTPATIENT
Start: 2020-02-20 | End: 2020-02-20 | Stop reason: HOSPADM

## 2020-02-20 RX ORDER — LIDOCAINE HYDROCHLORIDE 10 MG/ML
INJECTION, SOLUTION INFILTRATION; PERINEURAL PRN
Status: DISCONTINUED | OUTPATIENT
Start: 2020-02-20 | End: 2020-02-20

## 2020-02-20 RX ORDER — GABAPENTIN 300 MG/1
300 CAPSULE ORAL ONCE
Status: DISCONTINUED | OUTPATIENT
Start: 2020-02-20 | End: 2020-02-20 | Stop reason: HOSPADM

## 2020-02-20 RX ORDER — NALOXONE HYDROCHLORIDE 0.4 MG/ML
.1-.4 INJECTION, SOLUTION INTRAMUSCULAR; INTRAVENOUS; SUBCUTANEOUS
Status: DISCONTINUED | OUTPATIENT
Start: 2020-02-20 | End: 2020-02-20 | Stop reason: HOSPADM

## 2020-02-20 RX ORDER — KETAMINE HYDROCHLORIDE 10 MG/ML
INJECTION, SOLUTION INTRAMUSCULAR; INTRAVENOUS PRN
Status: DISCONTINUED | OUTPATIENT
Start: 2020-02-20 | End: 2020-02-20

## 2020-02-20 RX ORDER — ALBUTEROL SULFATE 0.83 MG/ML
2.5 SOLUTION RESPIRATORY (INHALATION) EVERY 4 HOURS PRN
Status: DISCONTINUED | OUTPATIENT
Start: 2020-02-20 | End: 2020-02-20 | Stop reason: HOSPADM

## 2020-02-20 RX ADMIN — CEFAZOLIN SODIUM 2 G: 2 INJECTION, SOLUTION INTRAVENOUS at 12:30

## 2020-02-20 RX ADMIN — PROPOFOL 75 MCG/KG/MIN: 10 INJECTION, EMULSION INTRAVENOUS at 12:34

## 2020-02-20 RX ADMIN — SODIUM CHLORIDE, POTASSIUM CHLORIDE, SODIUM LACTATE AND CALCIUM CHLORIDE: 600; 310; 30; 20 INJECTION, SOLUTION INTRAVENOUS at 11:00

## 2020-02-20 RX ADMIN — ACETAMINOPHEN 975 MG: 325 TABLET, FILM COATED ORAL at 10:26

## 2020-02-20 RX ADMIN — PROPOFOL 30 MG: 10 INJECTION, EMULSION INTRAVENOUS at 12:34

## 2020-02-20 RX ADMIN — LIDOCAINE HYDROCHLORIDE 50 MG: 10 INJECTION, SOLUTION INFILTRATION; PERINEURAL at 12:30

## 2020-02-20 RX ADMIN — LIDOCAINE HYDROCHLORIDE 0.1 ML: 10 INJECTION, SOLUTION EPIDURAL; INFILTRATION; INTRACAUDAL; PERINEURAL at 11:00

## 2020-02-20 RX ADMIN — PROPOFOL 50 MG: 10 INJECTION, EMULSION INTRAVENOUS at 12:30

## 2020-02-20 RX ADMIN — KETAMINE HYDROCHLORIDE 20 MG: 10 INJECTION INTRAMUSCULAR; INTRAVENOUS at 12:31

## 2020-02-20 RX ADMIN — KETAMINE HYDROCHLORIDE 10 MG: 10 INJECTION INTRAMUSCULAR; INTRAVENOUS at 12:47

## 2020-02-20 RX ADMIN — KETAMINE HYDROCHLORIDE 20 MG: 10 INJECTION INTRAMUSCULAR; INTRAVENOUS at 12:35

## 2020-02-20 ASSESSMENT — MIFFLIN-ST. JEOR: SCORE: 1681.62

## 2020-02-20 NOTE — ANESTHESIA CARE TRANSFER NOTE
Patient: Marissa Guadarrama    Procedure(s):  CYSTOSCOPY, WITH RETROGRADE PYELOGRAM AND Left URETERAL STENT exchange    Diagnosis: Obstruction of left ureter [N13.5]  Diagnosis Additional Information: No value filed.    Anesthesia Type:   General, MAC, ETT, LMA     Note:  Airway :Room Air  Patient transferred to:Phase II  Handoff Report: Identifed the Patient, Identified the Reponsible Provider, Reviewed the pertinent medical history, Discussed the surgical course, Reviewed Intra-OP anesthesia mangement and issues during anesthesia, Set expectations for post-procedure period and Allowed opportunity for questions and acknowledgement of understanding      Vitals: (Last set prior to Anesthesia Care Transfer)    CRNA VITALS  2/20/2020 1245 - 2/20/2020 1321      2/20/2020             EKG:  NSR                Electronically Signed By: KAREN Dozier CRNA  February 20, 2020  1:21 PM

## 2020-02-20 NOTE — PROGRESS NOTES
Pt incontinent of urine in the bed, ambulated to the BR and voided only 50mls more. Sitting up in the chair.  at bedside.

## 2020-02-20 NOTE — OP NOTE
Procedure Date: 02/20/2020      PREOPERATIVE DIAGNOSIS:  Obstructed left ureter.      POSTOPERATIVE DIAGNOSIS:  Obstructed left ureter.      PROCEDURE:  Cystoscopy, left retrograde pyelogram, left stent exchange.      INDICATIONS:  Ms. 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  The patient has been on chronic stent exchanges for this for several years.  She presents today for her next stent exchange.      DESCRIPTION OF PROCEDURE:  After informed consent was obtained, the patient was brought to the operating room where she was administered MAC anesthesia.  After suitable level of anesthesia was attained, she was placed in lithotomy position with all pressure points padded.  She was administered preoperative antibiotics.  She was prepped and draped in standard sterile fashion.  Next, a 22-Danish cystoscope was introduced into the patient's bladder without difficulty.  The stent could be seen emanating from the left UO.  It had minimal encrustation on it.  We used a sensor guidewire and intubated the left UO alongside the stent.  We were only able to pass the wire a distance of 3-4 cm before resistance was felt, consistent with the level of the known obstruction.  A 5-Danish open-ended catheter was placed over the wire up to this level and again, we were unable to advance the wire further.  We shot a retrograde pyelogram from this location, showing a sharp cutoff of the ureter in this location.  At this point, the stent was grasped with a grasper and brought to the urethral meatus under fluoroscopic guidance.  The proximal curl uncurled nicely.  We then intubated the stent with a sensor wire and advanced this up to the kidney under fluoroscopic guidance.  We then passed a 5-Danish open-ended catheter over the wire, which went through the area of obstruction quite easily up into the renal pelvis.  We shot a  retrograde pyelogram to confirm the location of the renal pelvis.  We then backloaded the sensor wire through the scope, and a 6 x 24 Uzbek double-J stent was then placed over the wire.  The proximal curl was confirmed by fluoroscopy, distal curl confirmed by direct vision.  The patient's bladder was drained.  She was awakened and brought to recovery room in stable condition.      SURGEON:  Steve La MD      ESTIMATED BLOOD LOSS:  1 mL      COMPLICATIONS:  There were no immediate complications noted.      DRAINS:  Left 6 x 24 double-J stents.      COMPLICATIONS:  None.         STEVE LA MD             D: 2020   T: 2020   MT:       Name:     ROSALINDA LANCASTER   MRN:      [id number]        Account:        [id number]   :      1950           Procedure Date: 2020      Document: [id number]

## 2020-02-20 NOTE — OR NURSING
Pt ambulated to the BR and had a BM and also voided in the toilet. Unable to measure. Pt states no discomfort.

## 2020-02-20 NOTE — DISCHARGE INSTRUCTIONS
Same Day Surgery Discharge Instructions  Special Precautions After Surgery - Adult    1. It is not unusual to feel lightheaded or faint, up to 24 hours after surgery or while taking pain medication.  If you have these symptoms; sit for a few minutes before standing and have someone assist you when getting up.  2. You should rest and relax for the next 24 hours and must have someone stay with you for at least 24 hours after your discharge.  3. DO NOT DRIVE any vehicle or operate mechanical equipment for 24 hours following the end of your surgery.  DO NOT DRIVE while taking narcotic pain medications that have been prescribed by your physician.  If you had a limb operated on, you must be able to use it fully to drive.  4. DO NOT drink alcoholic beverages for 24 hours following surgery or while taking prescription pain medication.  5. Drink clear liquids (apple juice, ginger ale, broth, 7-Up, etc.).  Progress to your regular diet as you feel able.  6. Any questions call your physician and do not make important decisions for 24 hours.    __________________________________________________________________________________________________________________________________  IMPORTANT NUMBERS:    [facility] Main Number:  [phone], [phone]  Pharmacy:  [phone]  Same Day Surgery:  [phone], Monday - Friday until 8:30 p.m.  Urgent Care:  [phone]  Emergency Room:  [phone]                                                                              [facility]:  [phone]       Notify physician if fever/chills/sweats.  You may see blood in the urine while the stent is in place.  Follow up in 6 months to discuss next stent exchange.  May use tylenol or ibuprofen for pain

## 2020-02-20 NOTE — ANESTHESIA POSTPROCEDURE EVALUATION
Patient: Marissa Guadarrama    Procedure(s):  CYSTOSCOPY, WITH RETROGRADE PYELOGRAM AND Left URETERAL STENT exchange    Diagnosis:Obstruction of left ureter [N13.5]  Diagnosis Additional Information: No value filed.    Anesthesia Type:  General, MAC, ETT, LMA    Note:  Anesthesia Post Evaluation    Patient location during evaluation: Bedside  Patient participation: Able to fully participate in evaluation  Level of consciousness: awake  Pain management: adequate  Airway patency: patent  Cardiovascular status: stable  Respiratory status: room air and spontaneous ventilation  Hydration status: stable  PONV: none     Anesthetic complications: None          Last vitals:  Vitals:    02/20/20 0953   BP: (!) 135/99   Resp: 18   Temp: 36.8  C (98.2  F)   SpO2: 95%         Electronically Signed By: KAREN Dozier CRNA  February 20, 2020  1:21 PM

## 2020-02-20 NOTE — BRIEF OP NOTE
[facility] Brief Operative Note    Pre-operative diagnosis: Obstruction of left ureter [N13.5]   Post-operative diagnosis same   Procedure: Procedure(s):  CYSTOSCOPY, WITH RETROGRADE PYELOGRAM AND Left URETERAL STENT exchange   Surgeon(s): Surgeon(s) and Role:     * Zaho aL MD - Primary   Estimated blood loss: 1 cc    Specimens: none   Findings:  Drains:     No complications Minimal encrustation despite stent being in approx a year  left 6 x 24 JJ stent

## 2020-02-21 ENCOUNTER — TELEPHONE (OUTPATIENT)
Dept: OTOLARYNGOLOGY | Facility: CLINIC | Age: 70
End: 2020-02-21

## 2020-02-21 DIAGNOSIS — G50.0 TRIGEMINAL NEURALGIA: Primary | ICD-10-CM

## 2020-02-21 RX ORDER — GABAPENTIN 600 MG/1
600 TABLET ORAL 3 TIMES DAILY
Qty: 90 TABLET | Refills: 3 | Status: SHIPPED | OUTPATIENT
Start: 2020-02-21 | End: 2020-07-16

## 2020-02-21 NOTE — TELEPHONE ENCOUNTER
Reason for Call:  Other prescription    Detailed comments: pt  calling stating pharmacy cannot get NEURONTIN from anywhere. Would like to know what other medication she can go on in the meantime. She only has enough for 1 week and is looking to get something filled today has  is leaving out of town.     Phone Number Patient can be reached at: Other phone number:  [phone]*    Best Time: any     Can we leave a detailed message on this number? YES    Call taken on 2/21/2020 at 10:34 AM by Yaima Agarwal

## 2020-02-21 NOTE — TELEPHONE ENCOUNTER
Prior Authorization Not Needed per Insurance    Medication: neurontin 600  Insurance Company: [employer] [place] - Phone [phone] Fax [phone]  Expected CoPay:      Pharmacy Filling the Rx: [facility] - [phone]  Pharmacy Notified: Yes  Patient Notified: Yes    PA was already approved in encounter from 12/13/2019. Called pharmacy and they received rejection that medication is too soon to be filled until 2/24/20. They will fill the medication that day and notify patient when it is ready.

## 2020-02-21 NOTE — TELEPHONE ENCOUNTER
Prior Authorization Retail Medication Request    Medication/Dose: neurontin 600  ICD code (if different than what is on RX):  G50.0  Previously Tried and Failed:  N      Insurance Name: [facility]  Insurance ID:[id number]  Phone number:[phone]      Pharmacy Information (if different than what is on RX)  Name: [facility]  Phone: [phone]    Patient is requesting to do the PA on the brand name since the generic doesn't work for her.    Thank you   Brigitte Truong. Pharmacy Technician   [facility]

## 2020-03-16 NOTE — NURSING NOTE
"Chief Complaint   Patient presents with     RECHECK     One year follow up facial neurologia       Initial Resp 16  Ht 1.715 m (5' 7.5\")  Wt 112.5 kg (248 lb)  BMI 38.27 kg/m2 Estimated body mass index is 38.27 kg/(m^2) as calculated from the following:    Height as of this encounter: 1.715 m (5' 7.5\").    Weight as of this encounter: 112.5 kg (248 lb).  Medication Reconciliation: complete   Liliana Orourke CMA 10/16/2017 2:37 PM      "
no

## 2020-03-19 ENCOUNTER — PRE VISIT (OUTPATIENT)
Dept: NEUROSURGERY | Facility: CLINIC | Age: 70
End: 2020-03-19

## 2020-03-19 ENCOUNTER — TELEPHONE (OUTPATIENT)
Dept: NEUROSURGERY | Facility: CLINIC | Age: 70
End: 2020-03-19

## 2020-03-19 NOTE — TELEPHONE ENCOUNTER
Left detailed message:  We are taking every precaution to prevent the spread of COVID -19.  Out top priority is to protect and care for our patients.  After discussing your medical history with your provider he has recommended that we reschedule your visit as a Telephone visit, Dr Angulo will call you around 10am on 3/24. Please do not come into clinic.    If you have questions between now and when we connect with you, pleade do not hesitate to call.  [phone]

## 2020-03-19 NOTE — TELEPHONE ENCOUNTER
FUTURE VISIT INFORMATION      FUTURE VISIT INFORMATION:    Date: 3/24/2020    Time: 10AM     Location: [facility]  REFERRAL INFORMATION:    Referring provider:  Dr. Bushra Dodd     Referring providers clinic:  [facility]     Reason for visit/diagnosis  Trigeminal Neuralgia     RECORDS REQUESTED FROM:       Clinic name Comments Records Status Imaging Status   Internal Dr. Malgorzata Dodd-1/9/2020 [facility] N/A

## 2020-03-24 ENCOUNTER — VIRTUAL VISIT (OUTPATIENT)
Dept: NEUROSURGERY | Facility: CLINIC | Age: 70
End: 2020-03-24
Payer: COMMERCIAL

## 2020-03-24 DIAGNOSIS — G50.0 TRIGEMINAL NEURALGIA: Primary | ICD-10-CM

## 2020-03-24 RX ORDER — CARBAMAZEPINE 100 MG/1
100 TABLET, EXTENDED RELEASE ORAL 2 TIMES DAILY
Qty: 60 TABLET | Refills: 2 | Status: SHIPPED | OUTPATIENT
Start: 2020-03-24 | End: 2020-06-01 | Stop reason: ALTCHOICE

## 2020-03-24 ASSESSMENT — PAIN SCALES - GENERAL: PAINLEVEL: NO PAIN (0)

## 2020-03-24 NOTE — PROGRESS NOTES
"Marissa Guadarrama is a 69 year old female who is being evaluated via a billable telephone visit.      The patient has been notified of following:     \"This telephone visit will be conducted via a call between you and your physician/provider. We have found that certain health care needs can be provided without the need for a physical exam.  This service lets us provide the care you need with a short phone conversation.  If a prescription is necessary we can send it directly to your pharmacy.  If lab work is needed we can place an order for that and you can then stop by our lab to have the test done at a later time.    If during the course of the call the physician/provider feels a telephone visit is not appropriate, you will not be charged for this service.\"     Marissa Guadarrama complains of    Chief Complaint   Patient presents with     Consult     [facility] TELEPHONE VISIT        I have reviewed and updated the patient's allergies and Medication List.    ALLERGIES  Patient has no known allergies.      Phone call duration: [phone] minutes    Kee Vizcarra, EMT     "

## 2020-03-24 NOTE — PATIENT INSTRUCTIONS
Start Tegretol 100mg BID  Continue Gabapentin 600mg BID alternating with the Tegretol   Call Christin FRANCO one week after starting new medication     Follow in 3 months with Dr Angulo.with MRI Brain Thin cut at [facility]    Call Christin FRANCO for questions/concerns     Thank you for using [employer]

## 2020-03-25 NOTE — PROGRESS NOTES
TELEPHONE NOTE    March 24, 2020    I had the pleasure to speak with Marissa today over the phone to talk to her about trigeminal neuralgia.    Briefly, Marissa is a 69-year-old woman who has got facial pain.  She has had this now for several years and has been diagnosed as trigeminal neuralgia.  This is being medicated with gabapentin with mild to moderate relief of pain.    She is referred for further evaluation for possible surgical intervention.  It sounds like she has got V2 facial pain.  It is a classic trigeminal type pain.    She is only trialed gabapentin and Neurontin in the past.  Interestingly enough, the gabapentin did not have an impact, but when she tried specifically Neurontin, not the generic form, that actually did have an impact on her pain.  She has not trialed any other medications.    I think it would be good to trial Tegretol and I am going to put her on Tegretol, in addition to the gabapentin, before considering any surgical intervention.    She does not have an MRI of the brain.  I am also going to order a thin cut MRI of the brain to look for any vascular conflict.  This will help me as we move forward and discuss different surgical options in the future.    On the telephone today, I also did describe to her different surgical interventions including the microvascular decompression and radiofrequency rhizotomy.  I described the steps of each of these procedures and then the pros and cons of them as well.    Overall, I spent approximately 20 minutes on the phone with Marissa, with the majority of this time spent in consultation and developing a treatment plan.  At the moment, we will proceed with starting her on Tegretol and then we will also get 3 Martina high-resolution MRI and then I will see her back in 3 months.      Juan Angulo MD

## 2020-05-18 ENCOUNTER — HOSPITAL ENCOUNTER (OUTPATIENT)
Dept: LAB | Facility: CLINIC | Age: 70
Discharge: HOME | End: 2020-05-18
Attending: INTERNAL MEDICINE | Admitting: INTERNAL MEDICINE
Payer: MEDICARE

## 2020-05-18 DIAGNOSIS — C50.412 MALIGNANT NEOPLASM OF UPPER-OUTER QUADRANT OF BOTH BREASTS IN FEMALE, ESTROGEN RECEPTOR POSITIVE (H): ICD-10-CM

## 2020-05-18 DIAGNOSIS — C50.411 MALIGNANT NEOPLASM OF UPPER-OUTER QUADRANT OF BOTH BREASTS IN FEMALE, ESTROGEN RECEPTOR POSITIVE (H): ICD-10-CM

## 2020-05-18 DIAGNOSIS — Z17.0 MALIGNANT NEOPLASM OF UPPER-OUTER QUADRANT OF BOTH BREASTS IN FEMALE, ESTROGEN RECEPTOR POSITIVE (H): ICD-10-CM

## 2020-05-18 LAB
ALBUMIN SERPL-MCNC: 3.4 G/DL (ref 3.4–5)
ALP SERPL-CCNC: 100 U/L (ref 40–150)
ALT SERPL W P-5'-P-CCNC: 38 U/L (ref 0–50)
ANION GAP SERPL CALCULATED.3IONS-SCNC: 6 MMOL/L (ref 3–14)
AST SERPL W P-5'-P-CCNC: 25 U/L (ref 0–45)
BASOPHILS # BLD AUTO: 0 10E9/L (ref 0–0.2)
BASOPHILS NFR BLD AUTO: 0.4 %
BILIRUB SERPL-MCNC: 0.3 MG/DL (ref 0.2–1.3)
BUN SERPL-MCNC: 27 MG/DL (ref 7–30)
CALCIUM SERPL-MCNC: 10 MG/DL (ref 8.5–10.1)
CANCER AG27-29 SERPL-ACNC: 34 U/ML (ref 0–39)
CHLORIDE SERPL-SCNC: 104 MMOL/L (ref 94–109)
CO2 SERPL-SCNC: 30 MMOL/L (ref 20–32)
CREAT SERPL-MCNC: 1.24 MG/DL (ref 0.52–1.04)
DIFFERENTIAL METHOD BLD: NORMAL
EOSINOPHIL # BLD AUTO: 0.5 10E9/L (ref 0–0.7)
EOSINOPHIL NFR BLD AUTO: 6.5 %
ERYTHROCYTE [DISTWIDTH] IN BLOOD BY AUTOMATED COUNT: 13.2 % (ref 10–15)
GFR SERPL CREATININE-BSD FRML MDRD: 43 ML/MIN/{1.73_M2}
GLUCOSE SERPL-MCNC: 98 MG/DL (ref 70–99)
HCT VFR BLD AUTO: 46.4 % (ref 35–47)
HGB BLD-MCNC: 15 G/DL (ref 11.7–15.7)
IMM GRANULOCYTES # BLD: 0 10E9/L (ref 0–0.4)
IMM GRANULOCYTES NFR BLD: 0.3 %
LYMPHOCYTES # BLD AUTO: 1.9 10E9/L (ref 0.8–5.3)
LYMPHOCYTES NFR BLD AUTO: 27.4 %
MCH RBC QN AUTO: 29.7 PG (ref 26.5–33)
MCHC RBC AUTO-ENTMCNC: 32.3 G/DL (ref 31.5–36.5)
MCV RBC AUTO: 92 FL (ref 78–100)
MONOCYTES # BLD AUTO: 0.4 10E9/L (ref 0–1.3)
MONOCYTES NFR BLD AUTO: 5.6 %
NEUTROPHILS # BLD AUTO: 4.1 10E9/L (ref 1.6–8.3)
NEUTROPHILS NFR BLD AUTO: 59.8 %
NRBC # BLD AUTO: 0 10*3/UL
NRBC BLD AUTO-RTO: 0 /100
PLATELET # BLD AUTO: 176 10E9/L (ref 150–450)
POTASSIUM SERPL-SCNC: 4.6 MMOL/L (ref 3.4–5.3)
PROT SERPL-MCNC: 7.6 G/DL (ref 6.8–8.8)
RBC # BLD AUTO: 5.05 10E12/L (ref 3.8–5.2)
SODIUM SERPL-SCNC: 140 MMOL/L (ref 133–144)
WBC # BLD AUTO: 6.9 10E9/L (ref 4–11)

## 2020-05-18 PROCEDURE — 85025 COMPLETE CBC W/AUTO DIFF WBC: CPT | Performed by: INTERNAL MEDICINE

## 2020-05-18 PROCEDURE — 80053 COMPREHEN METABOLIC PANEL: CPT | Performed by: INTERNAL MEDICINE

## 2020-05-18 PROCEDURE — 86300 IMMUNOASSAY TUMOR CA 15-3: CPT | Performed by: INTERNAL MEDICINE

## 2020-05-18 PROCEDURE — 36415 COLL VENOUS BLD VENIPUNCTURE: CPT | Performed by: INTERNAL MEDICINE

## 2020-06-01 ENCOUNTER — OFFICE VISIT (OUTPATIENT)
Dept: ONCOLOGY | Facility: CLINIC | Age: 70
End: 2020-06-01
Attending: OBSTETRICS & GYNECOLOGY
Payer: MEDICARE

## 2020-06-01 VITALS
DIASTOLIC BLOOD PRESSURE: 83 MMHG | BODY MASS INDEX: 38.22 KG/M2 | HEART RATE: 64 BPM | WEIGHT: 244 LBS | RESPIRATION RATE: 14 BRPM | OXYGEN SATURATION: 95 % | TEMPERATURE: 98.3 F | SYSTOLIC BLOOD PRESSURE: 134 MMHG

## 2020-06-01 DIAGNOSIS — R87.613 HSIL (HIGH GRADE SQUAMOUS INTRAEPITHELIAL LESION) ON PAP SMEAR OF CERVIX: Primary | ICD-10-CM

## 2020-06-01 DIAGNOSIS — C50.411 MALIGNANT NEOPLASM OF UPPER-OUTER QUADRANT OF BOTH BREASTS IN FEMALE, ESTROGEN RECEPTOR POSITIVE (H): ICD-10-CM

## 2020-06-01 DIAGNOSIS — Z17.0 MALIGNANT NEOPLASM OF UPPER-OUTER QUADRANT OF BOTH BREASTS IN FEMALE, ESTROGEN RECEPTOR POSITIVE (H): ICD-10-CM

## 2020-06-01 DIAGNOSIS — C50.412 MALIGNANT NEOPLASM OF UPPER-OUTER QUADRANT OF BOTH BREASTS IN FEMALE, ESTROGEN RECEPTOR POSITIVE (H): ICD-10-CM

## 2020-06-01 PROCEDURE — 88175 CYTOPATH C/V AUTO FLUID REDO: CPT | Performed by: OBSTETRICS & GYNECOLOGY

## 2020-06-01 PROCEDURE — G0476 HPV COMBO ASSAY CA SCREEN: HCPCS | Performed by: OBSTETRICS & GYNECOLOGY

## 2020-06-01 PROCEDURE — G0463 HOSPITAL OUTPT CLINIC VISIT: HCPCS | Mod: ZF

## 2020-06-01 PROCEDURE — 99214 OFFICE O/P EST MOD 30 MIN: CPT | Mod: ZP | Performed by: OBSTETRICS & GYNECOLOGY

## 2020-06-01 PROCEDURE — 87624 HPV HI-RISK TYP POOLED RSLT: CPT | Performed by: OBSTETRICS & GYNECOLOGY

## 2020-06-01 RX ORDER — EXEMESTANE 25 MG/1
25 TABLET ORAL EVERY MORNING
Qty: 90 TABLET | Refills: 3 | Status: SHIPPED | OUTPATIENT
Start: 2020-06-01 | End: 2021-06-14

## 2020-06-01 ASSESSMENT — PAIN SCALES - GENERAL: PAINLEVEL: NO PAIN (0)

## 2020-06-01 NOTE — PROGRESS NOTES
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     Her history is as follows:  Notably, she has history of cervical cancer treated in 2007 at [facility], a history of colon cancer and recurrent carcinoid tumor and breast cancer.        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 Depth of invasion 7 mm out of 1.9 and horizontal spread of 1 cm. 48 negative nodes.   2009: ASCUS pap  11/1/13 Vaginal Pap returned ASC-H.   12/23/13 Colposcopy/Vaginal cuff (submitted as \"cervix\"), biopsy:VAIN III    2/5/14: Patient established care with Dr. Pack and desired surgical management     3/13/14: Colposcopy, Vaginal Biopsies, Co2 Laser of the Upper Vagina on , which showed low-grade squamous intraepithelial lesion (VAIN 1) at 12:00 and 6:00 and high-grade squamous intraepithelial lesion VAIN 29:00 vaginal cuff. Vaginal cuff, 3:00 (biopsy): Mostly denuded squamous mucosa with focal features suggestive of low-grade squamous intraepithelial lesion (VAIN 1)     7/30/14: biopsy of right upper vagina showed VAINI, negative p16     2/9/15: upper vaginal biopsy \"LSIL\"     9/25/17:  VAIN III     12/12/17: CO2 laser and biopsies of vaginal dysplasia     4/3/18:  Stable exam.     10/9/18:  Colpo, no biopsies. Pap LSIL, HR HPV 16+     4/9/19:  Pap ASC-H, HR HPV 16+     5/14/19:  Colpo with biopsies.  Biopsies benign.     11/12/19:  Pap HSIL, HR HPV+     12/10/19:  Colpo with biopsies: Benign     Patient returns today for follow-up.  She is overall doing well. She continues to follow-up with Dr. Lawrence and Dr. La related to her metastatic carcinoid. No specific complaints.           Past Medical History:    Past Medical History:   Diagnosis Date     Arthritis     knee     Benign breast biopsy     benign     Carcinoid tumor 12/2003     Cervical " cancer (H) 2007     H/O colposcopy with cervical biopsy 12/23/13    vaginal cuff biopsy- VAIN III. referred back to gyn/onc     HTN      Hyperlipidemia      Pap smear of vagina with ASC-H 11/1/13     Post-polio syndromes      Trigeminal neuralgias          Past Surgical History:    Past Surgical History:   Procedure Laterality Date     APPENDECTOMY  1983     BIOPSY NODE SENTINEL Bilateral 6/1/2016    Procedure: BIOPSY NODE SENTINEL;  Surgeon: Brent Arana MD;  [facility]     C BSO, OMENTECTOMY W/OSMAN  5/2007     C TOTAL ABDOM HYSTERECTOMY  5/2007     CL AFF SURGICAL PATHOLOGY       COLONOSCOPY N/A 6/23/2017    Procedure: COMBINED COLONOSCOPY, SINGLE OR MULTIPLE BIOPSY/POLYPECTOMY BY BIOPSY;  Colonoscopy Dx:Carcinoid tumor of colon prep [facility];  Surgeon: Talisha Greco MD;  [facility]     COLPOSCOPY, BIOPSY, COMBINED  3/13/2014    Procedure: COMBINED COLPOSCOPY, BIOPSY;;  Surgeon: Lara Pack MD;  [facility]     COMBINED CYSTOSCOPY, RETROGRADES, EXCHANGE STENT URETER(S) Left 2/7/2019    Procedure: COMBINED CYSTOSCOPY, RETROGRADES, EXCHANGE STENT URETER--left;  Surgeon: Zhao La MD;  [facility]     COMBINED CYSTOSCOPY, RETROGRADES, EXCHANGE STENT URETER(S) Left 2/20/2020    Procedure: CYSTOSCOPY, WITH RETROGRADE PYELOGRAM AND Left URETERAL STENT exchange;  Surgeon: Zhao La MD;  [facility]     COMBINED CYSTOSCOPY, RETROGRADES, URETEROSCOPY, INSERT STENT Left 2/2/2017    Procedure: COMBINED CYSTOSCOPY, RETROGRADES, URETEROSCOPY, INSERT STENT;  Surgeon: Zhao La MD;  [facility]     CYSTOSCOPY, RETROGRADES, INSERT STENT URETER(S), COMBINED Left 9/7/2017    Procedure: COMBINED CYSTOSCOPY, RETROGRADES, INSERT STENT URETER(S);  Cystoscopy,Left Stent Exchange;  Surgeon: Zhao La MD;  [facility]     CYSTOSCOPY, RETROGRADES, INSERT STENT URETER(S), COMBINED  12/12/2017    Procedure: COMBINED  CYSTOSCOPY, RETROGRADES, INSERT STENT URETER(S);;  Surgeon: Zhao La MD;  [facility]     CYSTOSCOPY, RETROGRADES, INSERT STENT URETER(S), COMBINED Left 2018    Procedure: COMBINED CYSTOSCOPY, RETROGRADES, INSERT STENT URETER(S);  Cystoscopy, Left Stent Exchange;  Surgeon: Zhao La MD;  [facility]     EXAM UNDER ANESTHESIA PELVIC  3/13/2014    Procedure: EXAM UNDER ANESTHESIA PELVIC;  Exam Under Anestheisa, Colposcopy, Vaginal Biopsies, Co2 Laser of the Upper Vagina;  Surgeon: Lara Pack MD;  [facility]     HERNIORRHAPHY INCISIONAL (LOCATION)       LASER CO2 VAGINA  3/13/2014    VAIN 1/2     LASER CO2 VAGINA N/A 2017    Procedure: LASER CO2 VAGINA;  Exam Under Anesthesia, CO2 Laser Ablation Of Vagina, Cystoscopy, Left Retrograde Pyelogram with Left Stent Placement;  Surgeon: Nic Segundo MD;  [facility]     LUMPECTOMY BREAST WITH SEED LOCALIZATION Bilateral 2016    Procedure: LUMPECTOMY BREAST WITH SEED LOCALIZATION;  Surgeon: Brent Arana MD;  [facility]     SURGICAL HISTORY OF -       ovarian cystectomy     SURGICAL HISTORY OF -       right colon resection secondary to carcinoid tumor     TUBAL LIGATION           Health Maintenance:  Health Maintenance Due   Topic Date Due     ZOSTER IMMUNIZATION (1 of 2) 1998     LIPID  2019     MICROALBUMIN  2019       Current Medications:     has a current medication list which includes the following prescription(s): albuterol, exemestane, gabapentin, and hydrochlorothiazide.       Allergies:     [unfilled]        Social History:     Social History     Tobacco Use     Smoking status: Former Smoker     Packs/day: 1.00     Years: 29.00     Pack years: 29.00     Types: Cigarettes     Last attempt to quit: 10/30/2006     Years since quittin.5     Smokeless tobacco: Never Used   Substance Use Topics     Alcohol use: No     Alcohol/week: 0.0 standard drinks        History   Drug Use No           Family History:     The patient's family history is notable for :    Family History   Problem Relation Age of Onset     Cancer Mother         bone / liver     Breast Cancer Mother      Cancer Maternal Grandmother      Cancer Maternal Grandfather      Cancer Paternal Grandmother      Cancer Paternal Grandfather      Cancer Sister         vulvar ca, cervical ca, squamous cell cancer     Cancer Brother         Rectal- Stage 4     Rectal Cancer Brother      Breast Cancer Paternal Aunt      Colon Cancer Paternal Aunt      Breast Cancer Maternal Aunt      Pancreatic Cancer Nephew      Breast Cancer Sister          Physical Exam:     /83   Pulse 64   Temp 98.3  F (36.8  C) (Oral)   Resp 14   Wt 110.7 kg (244 lb)   SpO2 95%   BMI 38.22 kg/m    Body mass index is 38.22 kg/m .    General Appearance: healthy and alert, no distress     Musculoskeletal:         extremities non tender and without edema     Skin:    no lesions or rashes      Neurological:   normal gait, no gross defects                Psychiatric:      appropriate mood and affect                               Hematological:            normal cervical, supraclavicular and inguinal lymph nodes                Gastrointestinal:          abdomen soft, non-tender, non-distended, no organomegaly or masses      Genitourinary: External genitalia and urethral meatus appears normal, no gross lesions.  Upon placement of speculum and full visualization of vagina and apex, vagina noted to be shortened and scarred.  Pinch with stable scarring, no gross lesions. Pap smear obtained.     Assessment:     Marissa Guadarrama is a 69 year old woman with a diagnosis of HR HPV, persistent abnromal pap and VAIN3.  She also has history of multiple cancers including metastatic carcinoid tumor and bilateral breast cancer.     A total of 30 minutes was spent with the patient, 25 minutes of which were spent in counseling the patient and/or  treatment planning.      Plan:     History of VAINIII with recent HSIL pap in 11/2019.  Colpo with biopsies negative. Pap smear pending today.  Suspect she will need EUA and biopsies given persistent high grade pap smears with normal biopsies.  She is challenging to examine in the office and perform biopsies due to discomfort.    I will contact patient with results and recs for follow-up.    Questions answered, patient expressed understanding of plan of care.     Karli Gao MD  Gynecologic Oncology  [facility]    CC  Patient Care Team:  Eliazar Menendez MD as PCP - General (Family Practice)  Hosea King MD as MD (Hematology & Oncology)  Zhao La MD as MD (Urology)  Talisha Greco MD as MD (Colon and Rectal Surgery)  Allen Downey MD as MD (Orthopedics)  Eliazar Menendez MD as Assigned PCP  SELF, REFERRED

## 2020-06-01 NOTE — LETTER
"    6/1/2020         RE: Marissa Guadarrama  [address] [place] [id number]        Dear Colleague,    Thank you for referring your patient, Marissa Guadarrama, to the [facility]. Please see a copy of my visit note below.                            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     Her history is as follows:  Notably, she has history of cervical cancer treated in 2007 at [facility], a history of colon cancer and recurrent carcinoid tumor and breast cancer.        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 Depth of invasion 7 mm out of 1.9 and horizontal spread of 1 cm. 48 negative nodes.   2009: ASCUS pap  11/1/13 Vaginal Pap returned ASC-H.   12/23/13 Colposcopy/Vaginal cuff (submitted as \"cervix\"), biopsy:VAIN III    2/5/14: Patient established care with Dr. Pack and desired surgical management     3/13/14: Colposcopy, Vaginal Biopsies, Co2 Laser of the Upper Vagina on , which showed low-grade squamous intraepithelial lesion (VAIN 1) at 12:00 and 6:00 and high-grade squamous intraepithelial lesion VAIN 29:00 vaginal cuff. Vaginal cuff, 3:00 (biopsy): Mostly denuded squamous mucosa with focal features suggestive of low-grade squamous intraepithelial lesion (VAIN 1)     7/30/14: biopsy of right upper vagina showed VAINI, negative p16     2/9/15: upper vaginal biopsy \"LSIL\"     9/25/17:  VAIN III     12/12/17: CO2 laser and biopsies of vaginal dysplasia     4/3/18:  Stable exam.     10/9/18:  Colpo, no biopsies. Pap LSIL, HR HPV 16+     4/9/19:  Pap ASC-H, HR HPV 16+     5/14/19:  Colpo with biopsies.  Biopsies benign.     11/12/19:  Pap HSIL, HR HPV+     12/10/19:  Colpo with biopsies: Benign     Patient returns today for follow-up.  She is overall doing well. She continues to follow-up with Dr. Lawrence and " Dr. La related to her metastatic carcinoid. No specific complaints.           Past Medical History:    Past Medical History:   Diagnosis Date     Arthritis     knee     Benign breast biopsy     benign     Carcinoid tumor 12/2003     Cervical cancer (H) 2007     H/O colposcopy with cervical biopsy 12/23/13    vaginal cuff biopsy- VAIN III. referred back to gyn/onc     HTN      Hyperlipidemia      Pap smear of vagina with ASC-H 11/1/13     Post-polio syndromes      Trigeminal neuralgias          Past Surgical History:    Past Surgical History:   Procedure Laterality Date     APPENDECTOMY  1983     BIOPSY NODE SENTINEL Bilateral 6/1/2016    Procedure: BIOPSY NODE SENTINEL;  Surgeon: Brent Arana MD;  [facility]     C BSO, OMENTECTOMY W/OSMAN  5/2007     C TOTAL ABDOM HYSTERECTOMY  5/2007     CL AFF SURGICAL PATHOLOGY       COLONOSCOPY N/A 6/23/2017    Procedure: COMBINED COLONOSCOPY, SINGLE OR MULTIPLE BIOPSY/POLYPECTOMY BY BIOPSY;  Colonoscopy Dx:Carcinoid tumor of colon prep mailed [facility]ely;  Surgeon: Talisha Greco MD;  [facility]     COLPOSCOPY, BIOPSY, COMBINED  3/13/2014    Procedure: COMBINED COLPOSCOPY, BIOPSY;;  Surgeon: Lara Pack MD;  [facility]     COMBINED CYSTOSCOPY, RETROGRADES, EXCHANGE STENT URETER(S) Left 2/7/2019    Procedure: COMBINED CYSTOSCOPY, RETROGRADES, EXCHANGE STENT URETER--left;  Surgeon: Zhao La MD;  [facility]     COMBINED CYSTOSCOPY, RETROGRADES, EXCHANGE STENT URETER(S) Left 2/20/2020    Procedure: CYSTOSCOPY, WITH RETROGRADE PYELOGRAM AND Left URETERAL STENT exchange;  Surgeon: Zhao La MD;  [facility]     COMBINED CYSTOSCOPY, RETROGRADES, URETEROSCOPY, INSERT STENT Left 2/2/2017    Procedure: COMBINED CYSTOSCOPY, RETROGRADES, URETEROSCOPY, INSERT STENT;  Surgeon: Zhao La MD;  [facility]     CYSTOSCOPY, RETROGRADES, INSERT STENT URETER(S), COMBINED Left 9/7/2017    Procedure:  COMBINED CYSTOSCOPY, RETROGRADES, INSERT STENT URETER(S);  Cystoscopy,Left Stent Exchange;  Surgeon: Zhao La MD;  [facility]     CYSTOSCOPY, RETROGRADES, INSERT STENT URETER(S), COMBINED  12/12/2017    Procedure: COMBINED CYSTOSCOPY, RETROGRADES, INSERT STENT URETER(S);;  Surgeon: Zhao La MD;  [facility]     CYSTOSCOPY, RETROGRADES, INSERT STENT URETER(S), COMBINED Left 7/5/2018    Procedure: COMBINED CYSTOSCOPY, RETROGRADES, INSERT STENT URETER(S);  Cystoscopy, Left Stent Exchange;  Surgeon: Zhao La MD;  [facility]     EXAM UNDER ANESTHESIA PELVIC  3/13/2014    Procedure: EXAM UNDER ANESTHESIA PELVIC;  Exam Under Anestheisa, Colposcopy, Vaginal Biopsies, Co2 Laser of the Upper Vagina;  Surgeon: Lara Pack MD;  [facility]     HERNIORRHAPHY INCISIONAL (LOCATION)       LASER CO2 VAGINA  3/13/2014    VAIN 1/2     LASER CO2 VAGINA N/A 12/12/2017    Procedure: LASER CO2 VAGINA;  Exam Under Anesthesia, CO2 Laser Ablation Of Vagina, Cystoscopy, Left Retrograde Pyelogram with Left Stent Placement;  Surgeon: Nic Segundo MD;  [facility]     LUMPECTOMY BREAST WITH SEED LOCALIZATION Bilateral 6/1/2016    Procedure: LUMPECTOMY BREAST WITH SEED LOCALIZATION;  Surgeon: Brent Arana MD;  [facility]     SURGICAL HISTORY OF -       ovarian cystectomy     SURGICAL HISTORY OF -   2003    right colon resection secondary to carcinoid tumor     TUBAL LIGATION           Health Maintenance:  Health Maintenance Due   Topic Date Due     ZOSTER IMMUNIZATION (1 of 2) 05/17/1998     LIPID  01/09/2019     MICROALBUMIN  12/26/2019       Current Medications:     has a current medication list which includes the following prescription(s): albuterol, exemestane, gabapentin, and hydrochlorothiazide.       Allergies:     [unfilled]        Social History:     Social History     Tobacco Use     Smoking status: Former Smoker     Packs/day:  1.00     Years: 29.00     Pack years: 29.00     Types: Cigarettes     Last attempt to quit: 10/30/2006     Years since quittin.5     Smokeless tobacco: Never Used   Substance Use Topics     Alcohol use: No     Alcohol/week: 0.0 standard drinks       History   Drug Use No           Family History:     The patient's family history is notable for :    Family History   Problem Relation Age of Onset     Cancer Mother         bone / liver     Breast Cancer Mother      Cancer Maternal Grandmother      Cancer Maternal Grandfather      Cancer Paternal Grandmother      Cancer Paternal Grandfather      Cancer Sister         vulvar ca, cervical ca, squamous cell cancer     Cancer Brother         Rectal- Stage 4     Rectal Cancer Brother      Breast Cancer Paternal Aunt      Colon Cancer Paternal Aunt      Breast Cancer Maternal Aunt      Pancreatic Cancer Nephew      Breast Cancer Sister          Physical Exam:     /83   Pulse 64   Temp 98.3  F (36.8  C) (Oral)   Resp 14   Wt 110.7 kg (244 lb)   SpO2 95%   BMI 38.22 kg/m    Body mass index is 38.22 kg/m .    General Appearance: healthy and alert, no distress     Musculoskeletal:         extremities non tender and without edema     Skin:    no lesions or rashes      Neurological:   normal gait, no gross defects                Psychiatric:      appropriate mood and affect                               Hematological:            normal cervical, supraclavicular and inguinal lymph nodes                Gastrointestinal:          abdomen soft, non-tender, non-distended, no organomegaly or masses      Genitourinary: External genitalia and urethral meatus appears normal, no gross lesions.  Upon placement of speculum and full visualization of vagina and apex, vagina noted to be shortened and scarred.  Sarepta with stable scarring, no gross lesions. Pap smear obtained.     Assessment:     Marissa Guadarrama is a 69 year old woman with a diagnosis of HR HPV, persistent  abnromal pap and VAIN3.  She also has history of multiple cancers including metastatic carcinoid tumor and bilateral breast cancer.     A total of 30 minutes was spent with the patient, 25 minutes of which were spent in counseling the patient and/or treatment planning.      Plan:     History of VAINIII with recent HSIL pap in 11/2019.  Colpo with biopsies negative. Pap smear pending today.  Suspect she will need EUA and biopsies given persistent high grade pap smears with normal biopsies.  She is challenging to examine in the office and perform biopsies due to discomfort.    I will contact patient with results and recs for follow-up.    Questions answered, patient expressed understanding of plan of care.     Karli Gao MD  Gynecologic Oncology  [facility]    CC  Patient Care Team:  Eliazar Menendez MD as PCP - General (Family Practice)  Hosea King MD as MD (Hematology & Oncology)  Zhao La MD as MD (Urology)  Talisha Greco MD as MD (Colon and Rectal Surgery)  Allen Downey MD as MD (Orthopedics)

## 2020-06-01 NOTE — PATIENT INSTRUCTIONS
Pap smear obtained.  You will be contacted with results and recs for follow-up      Karli Gao MD  Gynecologic Oncology  [facility]

## 2020-06-01 NOTE — NURSING NOTE
"Oncology Rooming Note    June 1, 2020 11:05 AM   Marissa Guadarrama is a 70 year old female who presents for:    Chief Complaint   Patient presents with     Oncology Clinic Visit     Return Cervical cancer     Initial Vitals: /83   Pulse 64   Temp 98.3  F (36.8  C) (Oral)   Resp 14   Wt 110.7 kg (244 lb)   SpO2 95%   BMI 38.22 kg/m   Estimated body mass index is 38.22 kg/m  as calculated from the following:    Height as of 2/20/20: 1.702 m (5' 7\").    Weight as of this encounter: 110.7 kg (244 lb). Body surface area is 2.29 meters squared.  No Pain (0) Comment: Data Unavailable   No LMP recorded. Patient has had a hysterectomy.  Allergies reviewed: Yes  Medications reviewed: Yes    Medications: Medication refills not needed today.  Pharmacy name entered into [facility]: [id number] - [place], [place] - [address] [place] [address]    Clinical concerns: Colpo today       Megan Elias CMA              "

## 2020-06-05 LAB
COPATH REPORT: ABNORMAL
PAP: ABNORMAL

## 2020-06-09 ENCOUNTER — PATIENT OUTREACH (OUTPATIENT)
Dept: ONCOLOGY | Facility: CLINIC | Age: 70
End: 2020-06-09

## 2020-06-09 DIAGNOSIS — R87.613 HSIL (HIGH GRADE SQUAMOUS INTRAEPITHELIAL LESION) ON PAP SMEAR OF CERVIX: Primary | ICD-10-CM

## 2020-06-15 ENCOUNTER — TELEPHONE (OUTPATIENT)
Dept: ONCOLOGY | Facility: CLINIC | Age: 70
End: 2020-06-15

## 2020-06-15 ENCOUNTER — PREP FOR PROCEDURE (OUTPATIENT)
Dept: ONCOLOGY | Facility: CLINIC | Age: 70
End: 2020-06-15

## 2020-06-15 DIAGNOSIS — R87.613 HSIL (HIGH GRADE SQUAMOUS INTRAEPITHELIAL LESION) ON PAP SMEAR OF CERVIX: Primary | ICD-10-CM

## 2020-06-15 DIAGNOSIS — Z11.59 ENCOUNTER FOR SCREENING FOR OTHER VIRAL DISEASES: Primary | ICD-10-CM

## 2020-06-15 NOTE — TELEPHONE ENCOUNTER
FUTURE VISIT INFORMATION        SURGERY INFORMATION:    Date: 20    [facility]    Surgeon:  aKrli Gao MD     Anesthesia Type:  choice    Procedure: Examination under anesthesia, vaginal biopsies     Consult: ov      RECORDS REQUESTED FROM:         [facility]     Pertinent Medical History: hypertension     Most recent EKG+ Tracin20     Most recent ECHO: 16

## 2020-06-15 NOTE — TELEPHONE ENCOUNTER
Surgery is scheduled with Dr. Gao on 6/22 at [facility].  Scheduled per MD.    H&P: to be completed by PAC.  PAC: 6/17  POST-OP: 7/28        Pt is aware that they will be contacted to schedule a COVID-19 test within 3 days of surgery.    They are aware that they will get their finalized times at their appointment with PAC.    I contacted the patient and left a voicemail to confirm the scheduled dates.

## 2020-06-15 NOTE — PROGRESS NOTES
Orders placed for [place] [facility] [place]    Procedure: Examination under anesthesia, vaginal biopsies    Indication: High grade dysplasia    Needs PAC, COVID testing, consent day of surgery    Karli Gao MD  Gynecologic Oncology  [facility]

## 2020-06-15 NOTE — TELEPHONE ENCOUNTER
FUTURE VISIT INFORMATION      SURGERY INFORMATION:    Date: 20    [facility]    Surgeon:  Karli Gao MD     Anesthesia Type:  choice    Procedure: Examination under anesthesia, vaginal biopsies     Consult: ov     RECORDS REQUESTED FROM:       [facility]    Pertinent Medical History: hypertension    Most recent EKG+ Tracin20    Most recent ECHO: 16

## 2020-06-15 NOTE — TELEPHONE ENCOUNTER
Received a staff message that pt cannot make PAC appointment on 6/17 and is asking about scheduling 7/1.    I called back and left a detailed VM asking whether she wants the PAC appointment on 7/1 or the surgery on 7/1.    I explained that if she wants surgery on 6/22, we will need to get her into PAC this week. I stated that if she wants surgery 7/1, we need to get her into PAC next week. Explained that we schedule PAC 1 week prior to surgery.    I asked her to call me and leave me a detailed VM with what dates work for her as I have to work with Dr. Gao as well.    I canceled PAC.      Called patient and she asked for surgery between 6/30-7/4. Updated team.

## 2020-06-17 ENCOUNTER — PRE VISIT (OUTPATIENT)
Dept: SURGERY | Facility: CLINIC | Age: 70
End: 2020-06-17

## 2020-06-17 ENCOUNTER — TELEPHONE (OUTPATIENT)
Dept: ONCOLOGY | Facility: CLINIC | Age: 70
End: 2020-06-17

## 2020-06-17 NOTE — TELEPHONE ENCOUNTER
Surgery is scheduled with Dr. Gao on 7/1 at the [facility].  Scheduled per patient.    H&P: to be completed by PAC, approved to be virtual by Dr. Harding.  PAC: 6/24  POST-OP: 7/28    The RN completed the education regarding the surgery.     The surgery packet was provided that contains surgical instructions and a map.     Pt is aware that they will be contacted to schedule a COVID-19 test within 3 days of surgery.    They are aware that they will get their finalized times at their appointment with PAC.    I contacted the patient and was able to confirm the scheduled dates.

## 2020-06-18 NOTE — TELEPHONE ENCOUNTER
FUTURE VISIT INFORMATION        SURGERY INFORMATION:    Date: 20    [facility]    Surgeon:  Karli Gao MD     Anesthesia Type:  choice    Procedure: Examination under anesthesia, vaginal biopsies     Consult: ov      RECORDS REQUESTED FROM:         [facility]     Pertinent Medical History: hypertension     Most recent EKG+ Tracin20     Most recent ECHO: 16

## 2020-06-24 ENCOUNTER — PRE VISIT (OUTPATIENT)
Dept: SURGERY | Facility: CLINIC | Age: 70
End: 2020-06-24

## 2020-06-24 ENCOUNTER — TELEPHONE (OUTPATIENT)
Dept: SURGERY | Facility: CLINIC | Age: 70
End: 2020-06-24

## 2020-06-24 ENCOUNTER — ANESTHESIA EVENT (OUTPATIENT)
Dept: SURGERY | Facility: AMBULATORY SURGERY CENTER | Age: 70
End: 2020-06-24

## 2020-06-24 ENCOUNTER — VIRTUAL VISIT (OUTPATIENT)
Dept: SURGERY | Facility: CLINIC | Age: 70
End: 2020-06-24
Attending: OBSTETRICS & GYNECOLOGY
Payer: COMMERCIAL

## 2020-06-24 VITALS — BODY MASS INDEX: 37.13 KG/M2 | WEIGHT: 245 LBS | HEIGHT: 68 IN

## 2020-06-24 DIAGNOSIS — Z01.818 PRE-OP EVALUATION: Primary | ICD-10-CM

## 2020-06-24 DIAGNOSIS — M79.89 LEFT LEG SWELLING: ICD-10-CM

## 2020-06-24 DIAGNOSIS — R87.613 HSIL (HIGH GRADE SQUAMOUS INTRAEPITHELIAL LESION) ON PAP SMEAR OF CERVIX: ICD-10-CM

## 2020-06-24 ASSESSMENT — PAIN SCALES - GENERAL: PAINLEVEL: NO PAIN (0)

## 2020-06-24 ASSESSMENT — MIFFLIN-ST. JEOR: SCORE: 1661.87

## 2020-06-24 ASSESSMENT — LIFESTYLE VARIABLES: TOBACCO_USE: 1

## 2020-06-24 NOTE — ANESTHESIA PREPROCEDURE EVALUATION
"Anesthesia Pre-Procedure Evaluation    Patient: Marissa Guadarrama   MRN:     [id number] Gender:   female   Age:    70 year old :      1950        Preoperative Diagnosis: HSIL (high grade squamous intraepithelial lesion) on Pap smear of cervix [R87.613]   Procedure(s):  Examination under anesthesia, vaginal biopsies     LABS:  CBC:   Lab Results   Component Value Date    WBC 6.9 2020    WBC 7.3 2019    HGB 15.0 2020    HGB 14.6 2019    HCT 46.4 2020    HCT 45.5 2019     2020     2019     BMP:   Lab Results   Component Value Date     2020     2020    POTASSIUM 4.6 2020    POTASSIUM 3.7 2020    CHLORIDE 104 2020    CHLORIDE 102 2020    CO2 30 2020    CO2 37 (H) 2020    BUN 27 2020    BUN 24 2020    CR 1.24 (H) 2020    CR 1.42 (H) 2020    GLC 98 2020    GLC 96 2020     COAGS:   Lab Results   Component Value Date    INR 1.05 2019     POC:   Lab Results   Component Value Date     (H) 2017     OTHER:   Lab Results   Component Value Date    MARILIA 10.0 2020    ALBUMIN 3.4 2020    PROTTOTAL 7.6 2020    ALT 38 2020    AST 25 2020    ALKPHOS 100 2020    BILITOTAL 0.3 2020    TSH 2.54 2015        Preop Vitals    BP Readings from Last 3 Encounters:   20 134/83   20 (!) 160/87   20 128/82    Pulse Readings from Last 3 Encounters:   20 64   20 69   20 65      Resp Readings from Last 3 Encounters:   20 14   20 16   20 16    SpO2 Readings from Last 3 Encounters:   20 95%   20 96%   20 95%      Temp Readings from Last 1 Encounters:   20 98.3  F (36.8  C) (Oral)    Ht Readings from Last 1 Encounters:   20 1.702 m (5' 7\")      Wt Readings from Last 1 Encounters:   20 110.7 kg (244 lb)    Estimated body mass index is " "38.22 kg/m  as calculated from the following:    Height as of 2/20/20: 1.702 m (5' 7\").    Weight as of 6/1/20: 110.7 kg (244 lb).     LDA:  Peripheral IV 02/20/20 Right Wrist (Active)   Number of days: 125       Ureteral Drain/Stent Left ureter 6 fr (Active)   Number of days: 125        Past Medical History:   Diagnosis Date     Arthritis     knee     Benign breast biopsy     benign     Carcinoid tumor 12/2003     Cervical cancer (H) 2007     H/O colposcopy with cervical biopsy 12/23/13    vaginal cuff biopsy- VAIN III. referred back to gyn/onc     HTN      Hyperlipidemia      Pap smear of vagina with ASC-H 11/1/13     Post-polio syndromes      Trigeminal neuralgias       Past Surgical History:   Procedure Laterality Date     APPENDECTOMY  1983     BIOPSY NODE SENTINEL Bilateral 6/1/2016    Procedure: BIOPSY NODE SENTINEL;  Surgeon: Brent Arana MD;  Location: [facility]     C BSO, OMENTECTOMY W/OSMAN  5/2007     C TOTAL ABDOM HYSTERECTOMY  5/2007     CL AFF SURGICAL PATHOLOGY       COLONOSCOPY N/A 6/23/2017    Procedure: COMBINED COLONOSCOPY, SINGLE OR MULTIPLE BIOPSY/POLYPECTOMY BY BIOPSY;  Colonoscopy Dx:Carcinoid tumor of colon prep mailed golytely;  Surgeon: Talisha Greco MD;  Location: [facility]     COLPOSCOPY, BIOPSY, COMBINED  3/13/2014    Procedure: COMBINED COLPOSCOPY, BIOPSY;;  Surgeon: Lara Pack MD;  Location: [facility]     COMBINED CYSTOSCOPY, RETROGRADES, EXCHANGE STENT URETER(S) Left 2/7/2019    Procedure: COMBINED CYSTOSCOPY, RETROGRADES, EXCHANGE STENT URETER--left;  Surgeon: Zhao La MD;  Location: [facility]     COMBINED CYSTOSCOPY, RETROGRADES, EXCHANGE STENT URETER(S) Left 2/20/2020    Procedure: CYSTOSCOPY, WITH RETROGRADE PYELOGRAM AND Left URETERAL STENT exchange;  Surgeon: Zhao La MD;  Location: [facility]     COMBINED CYSTOSCOPY, RETROGRADES, URETEROSCOPY, INSERT STENT Left 2/2/2017    Procedure: COMBINED CYSTOSCOPY, RETROGRADES, URETEROSCOPY, INSERT " STENT;  Surgeon: Zhao La MD;  Location: [facility]     CYSTOSCOPY, RETROGRADES, INSERT STENT URETER(S), COMBINED Left 9/7/2017    Procedure: COMBINED CYSTOSCOPY, RETROGRADES, INSERT STENT URETER(S);  Cystoscopy,Left Stent Exchange;  Surgeon: Zhao La MD;  Location: [facility]     CYSTOSCOPY, RETROGRADES, INSERT STENT URETER(S), COMBINED  12/12/2017    Procedure: COMBINED CYSTOSCOPY, RETROGRADES, INSERT STENT URETER(S);;  Surgeon: Zhao La MD;  [facility]: [facility]     CYSTOSCOPY, RETROGRADES, INSERT STENT URETER(S), COMBINED Left 7/5/2018    Procedure: COMBINED CYSTOSCOPY, RETROGRADES, INSERT STENT URETER(S);  Cystoscopy, Left Stent Exchange;  Surgeon: Zhao La MD;  Location: [facility]     EXAM UNDER ANESTHESIA PELVIC  3/13/2014    Procedure: EXAM UNDER ANESTHESIA PELVIC;  Exam Under Anestheisa, Colposcopy, Vaginal Biopsies, Co2 Laser of the Upper Vagina;  Surgeon: Lara Pack MD;  Location: [facility]     HERNIORRHAPHY INCISIONAL (LOCATION)       LASER CO2 VAGINA  3/13/2014    VAIN 1/2     LASER CO2 VAGINA N/A 12/12/2017    Procedure: LASER CO2 VAGINA;  Exam Under Anesthesia, CO2 Laser Ablation Of Vagina, Cystoscopy, Left Retrograde Pyelogram with Left Stent Placement;  Surgeon: Nic Segundo MD;  Location: [facility]     LUMPECTOMY BREAST WITH SEED LOCALIZATION Bilateral 6/1/2016    Procedure: LUMPECTOMY BREAST WITH SEED LOCALIZATION;  Surgeon: Brent Arana MD;  Location: [facility]     SURGICAL HISTORY OF -       ovarian cystectomy     SURGICAL HISTORY OF -   2003    right colon resection secondary to carcinoid tumor     TUBAL LIGATION        No Known Allergies     Anesthesia Evaluation     . Pt has had prior anesthetic.     No history of anesthetic complications          ROS/MED HX    ENT/Pulmonary:  - neg pulmonary ROS   (+)tobacco use (distant use), Past use , . .    Neurologic: Comment: Trigeminal neuralgia      Cardiovascular:     (+)  Dyslipidemia, hypertension----. : . . . :. . Previous cardiac testing Echodate:11/2016results:Interpretation Summary  Global and regional left ventricular function is normal with an EF of 60-65%.  Global right ventricular function is normal.  No significant valvular dysfunction present.  Pulmonary artery systolic pressure is normal.  The inferior vena cava was normal in size with preserved respiratory  variability.  No pericardial effusion is present  date: results:ECG reviewed date:2/17/20 results:NSR, normal EKG date: results:         (-) taking anticoagulants/antiplatelets   METS/Exercise Tolerance: Comment: [place], does housework. H/o polio, so limited activity due to left leg weakness.     Hematologic:  - neg hematologic  ROS      (-) History of Transfusion   Musculoskeletal:   (+) arthritis,  -       GI/Hepatic:  - neg GI/hepatic ROS       Renal/Genitourinary:     (+) chronic renal disease, type: CRI,       Endo:     (+) Obesity, .      Psychiatric:  - neg psychiatric ROS       Infectious Disease:  - neg infectious disease ROS       Malignancy:   (+) Malignancy   Carcinoid tumor of cecum- stable.  Recent cystoscopy with replacement of ureteral stent.    Early stage breast cancer on exemestane    H/o cervical cancer in 2007; current HSIL        Other:                     [facility] AN PHYSICAL EXAM    Assessment:   ASA SCORE: 3    H&P: History and physical reviewed and following examination; no interval change.   Smoking Status:  Non-Smoker/Unknown   NPO Status: NPO Appropriate     Plan:   Anes. Type:  MAC   Pre-Medication: None   Induction:  N/a   Airway: Native Airway   Access/Monitoring: PIV   Maintenance: N/a     Postop Plan:   Postop Pain: None  Postop Sedation/Airway: Not planned     PONV Management:   Adult Risk Factors: Female, Non-Smoker   Prevention:, No Volatiles     CONSENT: Direct conversation   Plan and risks discussed with: Patient                   PAC Discussion and Assessment    ASA  Classification: 3  Case is suitable for: ASC  Anesthetic techniques and relevant risks discussed: PAC Recommendations anesthetic techniques: choice.  Invasive monitoring and risk discussed:   Types:   Possibility and Risk of blood transfusion discussed:   NPO instructions given:   Additional anesthetic preparation and risks discussed:   Needs early admission to pre-op area:   Other:     PAC Resident/NP Anesthesia Assessment:  Marissa Guadarrama is a 70 year old female scheduled for Examination under anesthesia, vaginal biopsies on 7/1/20 by Dr. Gao in treatment of HSIL.  PAC referral for risk assessment and optimization for anesthesia with comorbid conditions of hypertension, dyslipidemia, trigeminal neuralgia, obesity, carcinoid tumor of the cecum, breast cancer, h/o cervical cancer:    Pre-operative considerations:  1.  Cardiac:  Functional status- METS 3. No reported cardiac history. Denies cardiac symptoms.  Hypertension using hydrochlorothiazide- reports she monitors BP at home- reports 123/ 60s today. low risk surgery with 0.4% (RCRI #) risk of major adverse cardiac event.   2.  Pulm:  Airway feasible.  BELL risk: intermediate. Denies pulmonary symptoms. COVID testing ordered per surgery team.  3.  GI:  Risk of PONV score = 2.  If > 2, anti-emetic intervention recommended.  4.  Neuro: trigeminal neuralgia- currently using gabapentin TID and undergoing workup for surgery.   5. Heme: Patient reports swelling of her left lower leg over the past month.  Denies pain, redness or warmth of the leg. No history of DVT. She reports with her history of polio, she has had swelling of the left leg in the past and gone through her PCP to get her leg wrapped with success.  I will order US to rule out DVT- patient reports she does not have transportation to complete this until next Monday.  Warning signs were explained and she agreed to be seen prior to Monday if she develops worsening swelling, redness, warmth or pain. I  will watch for US results.  She will also follow up with PCP for referral to lymphedema clinic closer to home.   6. 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 Early stage breast cancer using exemestane- followed by Dr. King.  H/o cervical cancer in 2007.  Current HSIL on PAP with above procedure planned.   7. Access: difficult IV access      VTE risk: 3%    Patient is optimized and is acceptable candidate for the proposed procedure    **Physical exam and vital signs not completed today as this visit was scheduled as a virtual visit during Covid 19 pandemic. Physical exam should be completed the DOS in pre-op**      Patient discussed with Dr. Harding    Addendum: LE US completed today- IMPRESSION: No evidence of deep venous thrombosis.    **For further details of assessment and testing please see H and P completed on same date.      Lisa Rose PA-C        Mid-Level Provider/Resident:   Date:   Time:     Attending Anesthesiologist Anesthesia Assessment:        Anesthesiologist:   Date:   Time:   Pass/Fail:   Disposition:     PAC Pharmacist Assessment:        Pharmacist:   Date:   Time:    Lisa Rose PA-C

## 2020-06-24 NOTE — PROGRESS NOTES
"Marissa Gaudarrama is a 70 year old female who is being evaluated via a billable video visit.      The patient has been notified of following:     \"This video visit will be conducted via a call between you and your physician/provider. We have found that certain health care needs can be provided without the need for an in-person physical exam.  This service lets us provide the care you need with a video conversation.  If a prescription is necessary we can send it directly to your pharmacy.  If lab work is needed we can place an order for that and you can then stop by our lab to have the test done at a later time.    Video visits are billed at different rates depending on your insurance coverage.  Please reach out to your insurance provider with any questions.    If during the course of the call the physician/provider feels a video visit is not appropriate, you will not be charged for this service.\"    Patient has given verbal consent for Video visit? Yes    How would you like to obtain your AVS? Mail a copy  Patient would like the video invitation sent by: Text to cell phone: [phone]    Will anyone else be joining your video visit? No        Guanako Garcia      "

## 2020-06-24 NOTE — H&P
Pre-Operative H & P         Video-Visit Details    Type of service:  Video Visit- converted to telephone visit due technical difficulty    Patient verbally consented to telephone service today: YES      Video Start Time: 0851  Video End Time (time video stopped): 0916    Originating Location (pt. Location): Home    Distant Location (provider location):  [facility] PREOPERATIVE [facility]     Mode of Communication:  Video Conference via [employer]        CC:  Preoperative exam to assess for increased cardiopulmonary risk while undergoing surgery and anesthesia.    Date of Encounter: 6/24/2020  Primary Care Physician:  Eliazar Menendez  Associated diagnosis: HSIL on PAP of cervix    HPI  Marissa Guadarrama is a 70 year old female who presents for pre-operative H & P in preparation for Examination under anesthesia, vaginal biopsies with Dr. Gao on 7/1/20 at [facility]. Patient is being evaluated for comorbid conditions of hypertension, dyslipidemia, trigeminal neuralgia, obesity, arthritis, carcinoid tumor, h/o cervical cancer     Ms. Guadarrmaa has a history of cervical cancer treated in 2007.  She additionally has a history of carcinoid tumor followed by Dr. La and Dr. Lawrence- most recent cystoscopy with left stent exchange 2/20/20 without complication. Early stage breast cancer using exemestane- followed by Dr. Hicks. She was seen by Dr. Gao 6/1/20 for VAINIII and HSIL pap 11/2019.  Above procedure is planned.    History is obtained from the patient and chart review.      Past Medical History  Past Medical History:   Diagnosis Date     Arthritis     knee     Benign breast biopsy     benign     Carcinoid tumor 12/2003     Cervical cancer (H) 2007     H/O colposcopy with cervical biopsy 12/23/13    vaginal cuff biopsy- VAIN III. referred back to gyn/onc     HTN      Hyperlipidemia      Pap smear of vagina with ASC-H 11/1/13     Post-polio syndromes      Trigeminal  neuralgias        Past Surgical History  Past Surgical History:   Procedure Laterality Date     APPENDECTOMY  1983     BIOPSY NODE SENTINEL Bilateral 6/1/2016    Procedure: BIOPSY NODE SENTINEL;  Surgeon: Brent Arana MD;  Location: [facility]     C BSO, OMENTECTOMY W/OSMAN  5/2007     C TOTAL ABDOM HYSTERECTOMY  5/2007     CL AFF SURGICAL PATHOLOGY       COLONOSCOPY N/A 6/23/2017    Procedure: COMBINED COLONOSCOPY, SINGLE OR MULTIPLE BIOPSY/POLYPECTOMY BY BIOPSY;  Colonoscopy Dx:Carcinoid tumor of colon prep mailed golytely;  Surgeon: Talisha Greco MD;  [facility]     COLPOSCOPY, BIOPSY, COMBINED  3/13/2014    Procedure: COMBINED COLPOSCOPY, BIOPSY;;  Surgeon: Lara Pack MD;  [facility]     COMBINED CYSTOSCOPY, RETROGRADES, EXCHANGE STENT URETER(S) Left 2/7/2019    Procedure: COMBINED CYSTOSCOPY, RETROGRADES, EXCHANGE STENT URETER--left;  Surgeon: Zhao La MD;  Location: [facility]     COMBINED CYSTOSCOPY, RETROGRADES, EXCHANGE STENT URETER(S) Left 2/20/2020    Procedure: CYSTOSCOPY, WITH RETROGRADE PYELOGRAM AND Left URETERAL STENT exchange;  Surgeon: Zhao La MD;  Location: [facility]     COMBINED CYSTOSCOPY, RETROGRADES, URETEROSCOPY, INSERT STENT Left 2/2/2017    Procedure: COMBINED CYSTOSCOPY, RETROGRADES, URETEROSCOPY, INSERT STENT;  Surgeon: Zhao La MD;  Location: [facility]     CYSTOSCOPY, RETROGRADES, INSERT STENT URETER(S), COMBINED Left 9/7/2017    Procedure: COMBINED CYSTOSCOPY, RETROGRADES, INSERT STENT URETER(S);  Cystoscopy,Left Stent Exchange;  Surgeon: Zhao La MD;  Location: [facility]     CYSTOSCOPY, RETROGRADES, INSERT STENT URETER(S), COMBINED  12/12/2017    Procedure: COMBINED CYSTOSCOPY, RETROGRADES, INSERT STENT URETER(S);;  Surgeon: Zhao La MD;  [facility]     CYSTOSCOPY, RETROGRADES, INSERT STENT URETER(S), COMBINED Left 7/5/2018    Procedure: COMBINED CYSTOSCOPY, RETROGRADES, INSERT  STENT URETER(S);  Cystoscopy, Left Stent Exchange;  Surgeon: Zhao La MD;  Location: [facility]     EXAM UNDER ANESTHESIA PELVIC  3/13/2014    Procedure: EXAM UNDER ANESTHESIA PELVIC;  Exam Under Anestheisa, Colposcopy, Vaginal Biopsies, Co2 Laser of the Upper Vagina;  Surgeon: Lara Pack MD;  [facility]     HERNIORRHAPHY INCISIONAL (LOCATION)       LASER CO2 VAGINA  3/13/2014    VAIN 1/2     LASER CO2 VAGINA N/A 12/12/2017    Procedure: LASER CO2 VAGINA;  Exam Under Anesthesia, CO2 Laser Ablation Of Vagina, Cystoscopy, Left Retrograde Pyelogram with Left Stent Placement;  Surgeon: Nic Segundo MD;  [facility]     LUMPECTOMY BREAST WITH SEED LOCALIZATION Bilateral 6/1/2016    Procedure: LUMPECTOMY BREAST WITH SEED LOCALIZATION;  Surgeon: Brent Arana MD;  Location: [facility]     SURGICAL HISTORY OF -       ovarian cystectomy     SURGICAL HISTORY OF -   2003    right colon resection secondary to carcinoid tumor     TUBAL LIGATION         Hx of Blood transfusions/reactions: denies     Hx of abnormal bleeding or anti-platelet use: denies    Menstrual history: No LMP recorded. Patient has had a hysterectomy.    Steroid use in the last year: denies    Personal or FH with difficulty with Anesthesia:  denies    Prior to Admission Medications  Current Outpatient Medications   Medication Sig Dispense Refill     exemestane (AROMASIN) 25 MG tablet Take 1 tablet (25 mg) by mouth every morning 90 tablet 3     gabapentin 600 MG PO tablet Take 1 tablet (600 mg) by mouth 3 times daily 90 tablet 3     hydrochlorothiazide (HYDRODIURIL) 25 MG tablet TAKE A HALF TABLET BY MOUTH ONCE DAILY IN THE MORNING. Hold on file until needed. (Patient taking differently: Take 12.5 mg by mouth every morning TAKE A HALF TABLET BY MOUTH ONCE DAILY IN THE MORNING. Hold on file until needed.) 45 tablet 3     albuterol (PROAIR HFA/PROVENTIL HFA/VENTOLIN HFA) 108 (90 Base) MCG/ACT inhaler Inhale 2 puffs into  the lungs every 6 hours as needed for shortness of breath / dyspnea or wheezing Hold on file until needed. 1 Inhaler 1       Allergies  No Known Allergies    Social History  Social History     Socioeconomic History     Marital status:      Spouse name: Not on file     Number of children: Not on file     Years of education: Not on file     Highest education level: Not on file   Occupational History     Not on file   Social Needs     Financial resource strain: Not on file     Food insecurity     Worry: Not on file     Inability: Not on file     Transportation needs     Medical: Not on file     Non-medical: Not on file   Tobacco Use     Smoking status: Former Smoker     Packs/day: 1.00     Years: 29.00     Pack years: 29.00     Types: Cigarettes     Last attempt to quit: 10/30/2006     Years since quittin.6     Smokeless tobacco: Never Used   Substance and Sexual Activity     Alcohol use: No     Alcohol/week: 0.0 standard drinks     Comment: 1 drink per year     Drug use: No     Sexual activity: Yes     Partners: Male   Lifestyle     Physical activity     Days per week: Not on file     Minutes per session: Not on file     Stress: Not on file   Relationships     Social connections     Talks on phone: Not on file     Gets together: Not on file     Attends Hoahaoism service: Not on file     Active member of club or organization: Not on file     Attends meetings of clubs or organizations: Not on file     Relationship status: Not on file     Intimate partner violence     Fear of current or ex partner: Not on file     Emotionally abused: Not on file     Physically abused: Not on file     Forced sexual activity: Not on file   Other Topics Concern      Service No     Blood Transfusions No     Caffeine Concern Yes     Comment: occasional coffee     Occupational Exposure No     Hobby Hazards Yes     Comment: [place],     Sleep Concern Yes     Comment: not sleeping well     Stress Concern Yes     Comment:  Grandson in the      Weight Concern Yes     Special Diet No     Back Care No     Exercise No     Bike Helmet Not Asked     Seat Belt Yes     Self-Exams Yes     Parent/sibling w/ CABG, MI or angioplasty before 65F 55M? No   Social History Narrative     Not on file       Family History  Family History   Problem Relation Age of Onset     Cancer Mother         bone / liver     Breast Cancer Mother      Cancer Maternal Grandmother      Cancer Maternal Grandfather      Cancer Paternal Grandmother      Cancer Paternal Grandfather      Cancer Sister         vulvar ca, cervical ca, squamous cell cancer     Cancer Brother         Rectal- Stage 4     Rectal Cancer Brother      Breast Cancer Paternal Aunt      Colon Cancer Paternal Aunt      Breast Cancer Maternal Aunt      Pancreatic Cancer Nephew      Breast Cancer Sister      Anesthesia Reaction No family hx of        ROS/MED HX    ENT/Pulmonary:  - neg pulmonary ROS   (+)tobacco use (distant use), Past use , . .    Neurologic: Comment: Trigeminal neuralgia      Cardiovascular:     (+) Dyslipidemia, hypertension----. : . . . :. . Previous cardiac testing Echodate:11/2016results:Interpretation Summary  Global and regional left ventricular function is normal with an EF of 60-65%.  Global right ventricular function is normal.  No significant valvular dysfunction present.  Pulmonary artery systolic pressure is normal.  The inferior vena cava was normal in size with preserved respiratory  variability.  No pericardial effusion is present  date: results:ECG reviewed date:2/17/20 results:NSR, normal EKG date: results:         (-) taking anticoagulants/antiplatelets   METS/Exercise Tolerance: Comment: [place], does housework. H/o polio, so limited activity due to left leg weakness.     Hematologic:  - neg hematologic  ROS      (-) History of Transfusion   Musculoskeletal:   (+) arthritis,  -       GI/Hepatic:  - neg GI/hepatic ROS       Renal/Genitourinary:     (+) chronic  "renal disease, type: CRI,       Endo:     (+) Obesity, .      Psychiatric:  - neg psychiatric ROS       Infectious Disease:  - neg infectious disease ROS       Malignancy:   (+) Malignancy   Carcinoid tumor of cecum- stable.  Recent cystoscopy with replacement of ureteral stent.    Early stage breast cancer on exemestane    H/o cervical cancer in 2007; current HSIL        Other:             The complete review of systems is negative other than noted in the HPI or here.      245 lbs 0 oz  5' 7.5\"   Body mass index is 37.81 kg/m .       Physical Exam    Please refer to the physical examination documented by the anesthesiologist in the anesthesia record on the day of surgery    Labs: (personally reviewed)  Component      Latest Ref Rng & Units 5/18/2020   WBC      4.0 - 11.0 10e9/L 6.9   RBC Count      3.8 - 5.2 10e12/L 5.05   Hemoglobin      11.7 - 15.7 g/dL 15.0   Hematocrit      35.0 - 47.0 % 46.4   MCV      78 - 100 fl 92   MCH      26.5 - 33.0 pg 29.7   MCHC      31.5 - 36.5 g/dL 32.3   RDW      10.0 - 15.0 % 13.2   Platelet Count      150 - 450 10e9/L 176   Diff Method       Automated Method   % Neutrophils      % 59.8   % Lymphocytes      % 27.4   % Monocytes      % 5.6   % Eosinophils      % 6.5   % Basophils      % 0.4   % Immature Granulocytes      % 0.3   Nucleated RBCs      0 /100 0   Absolute Neutrophil      1.6 - 8.3 10e9/L 4.1   Absolute Lymphocytes      0.8 - 5.3 10e9/L 1.9   Absolute Monocytes      0.0 - 1.3 10e9/L 0.4   Absolute Eosinophils      0.0 - 0.7 10e9/L 0.5   Absolute Basophils      0.0 - 0.2 10e9/L 0.0   Abs Immature Granulocytes      0 - 0.4 10e9/L 0.0   Absolute Nucleated RBC       0.0   Sodium      133 - 144 mmol/L 140   Potassium      3.4 - 5.3 mmol/L 4.6   Chloride      94 - 109 mmol/L 104   Carbon Dioxide      20 - 32 mmol/L 30   Anion Gap      3 - 14 mmol/L 6   Glucose      70 - 99 mg/dL 98   Urea Nitrogen      7 - 30 mg/dL 27   Creatinine      0.52 - 1.04 mg/dL 1.24 (H)   GFR " Estimate      >60 mL/min/1.73:m2 43 (L)   GFR Estimate If Black      >60 mL/min/1.73:m2 50 (L)   Calcium      8.5 - 10.1 mg/dL 10.0   Bilirubin Total      0.2 - 1.3 mg/dL 0.3   Albumin      3.4 - 5.0 g/dL 3.4   Protein Total      6.8 - 8.8 g/dL 7.6   Alkaline Phosphatase      40 - 150 U/L 100   ALT      0 - 50 U/L 38   AST      0 - 45 U/L 25     EKG 2/17/20  NSR, normal     ECHO 11/2016  Interpretation Summary  Global and regional left ventricular function is normal with an EF of 60-65%.  Global right ventricular function is normal.  No significant valvular dysfunction present.  Pulmonary artery systolic pressure is normal.  The inferior vena cava was normal in size with preserved respiratory  variability.  No pericardial effusion is present    Outside records reviewed from: care everywhere    ASSESSMENT and PLAN  Marissa Guadarrama is a 70 year old female scheduled for Examination under anesthesia, vaginal biopsies on 7/1/20 by Dr. Gao in treatment of HSIL.  PAC referral for risk assessment and optimization for anesthesia with comorbid conditions of hypertension, dyslipidemia, trigeminal neuralgia, obesity, carcinoid tumor of the cecum, breast cancer, h/o cervical cancer:    Pre-operative considerations:  1.  Cardiac:  Functional status- METS 3. No reported cardiac history. Denies cardiac symptoms.  Hypertension using hydrochlorothiazide- reports she monitors BP at home- reports 123/ 60s today. low risk surgery with 0.4% (RCRI #) risk of major adverse cardiac event.   2.  Pulm:  Airway feasible.  BELL risk: intermediate. Denies pulmonary symptoms. COVID testing ordered per surgery team.  3.  GI:  Risk of PONV score = 2.  If > 2, anti-emetic intervention recommended.  4.  Neuro: trigeminal neuralgia- currently using gabapentin TID and undergoing workup for surgery.   5. Heme: Patient reports swelling of her left lower leg over the past month.  Denies pain, redness or warmth of the leg. No history of DVT. She  reports with her history of polio, she has had swelling of the left leg in the past and gone through her PCP to get her leg wrapped with success.  I will order US to rule out DVT- patient reports she does not have transportation to complete this until next Monday.  Warning signs were explained and she agreed to be seen prior to Monday if she develops worsening swelling, redness, warmth or pain. I will watch for US results.  She will also follow up with PCP for referral to lymphedema clinic closer to home.   6. 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 Early stage breast cancer using exemestane- followed by Dr. King.  H/o cervical cancer in 2007.  Current HSIL on PAP with above procedure planned.   7. Access: difficult IV access      VTE risk: 3%    Patient is optimized and is acceptable candidate for the proposed procedure    **Physical exam and vital signs not completed today as this visit was scheduled as a virtual visit during Covid 19 pandemic. Physical exam should be completed the DOS in pre-op**      Patient discussed with Dr. Harding    Addendum: LE US completed today- IMPRESSION: No evidence of deep venous thrombosis.        Patient discussed with Dr. Mehdi Rose PA-C  Preoperative Assessment Center  [facility]  Phone: [phone]  Fax: [phone]

## 2020-06-24 NOTE — PATIENT INSTRUCTIONS
Preparing for Your Surgery      Name:  Marissa Guadarrama   MRN:  [id number]   :  1950   Today's Date:  2020     Arriving for surgery:  Surgery date: 2020   Arrival time:  5:45 am    Restrictions due to COVID 19:  No visitors at the Surgery Center   parking is not available     Please come to:  [facility]  [address] [place] [id number]     Parking has been suspended due to the Covid 19 Crisis    Please arrive at the [facility] Entrance  And follow instructions for entering the building  You will be escorted to the 5th floor by a staff member     What can I eat or drink?  -  You may have solid food or milk products until 8 hours prior to your surgery.(20 at 11 pm)  -  You may have water, apple juice or 7up/Sprite until 4 hours prior to your surgery.(3 am)    Which medicines can I take?        Hold Aspirin, vitamins and supplements one week prior to surgery.      Hold Ibuprofen for 24 hours and/or Naproxen for 48 hours prior to surgery.     -  Do NOT take these medications in the morning, the day of surgery:  Hydrochlorothiazide       -  Please take these medications the day of surgery:  Exemestane (Aromasin)  Gabapentin  Inhaler as usual and bring to surgery center       How do I prepare myself?  -  Take two showers: one the night before surgery; and one the morning of surgery.         Use Scrubcare or Hibiclens to wash from neck down, leave soap on your skin for up to one minute.  Do not get soap in your eyes or ears.  You may use your own shampoo and conditioner; no other hair products.   -  Do NOT use lotion, powder, deodorant, or antiperspirant the day of your surgery.  -  Do NOT wear any makeup, fingernail polish or jewelry.  - Do not bring your own medications to the hospital, except for inhalers and eye   drops.  -  Bring your ID and insurance card.    -Please schedule your ultrasound at the [facility]--[phone]      -If you are scheduled to go home the Same Day as surgery you must have a responsible adult as a  and to stay with you overnight the first 24 hours after surgery.     Questions or Concerns:     -Please contact the Ambulatory Surgery Center at [phone].    -If you have health changes between today and your surgery please call your surgeon.     For questions after surgery please call your surgeons office.

## 2020-06-28 DIAGNOSIS — Z11.59 ENCOUNTER FOR SCREENING FOR OTHER VIRAL DISEASES: ICD-10-CM

## 2020-06-28 PROCEDURE — U0003 INFECTIOUS AGENT DETECTION BY NUCLEIC ACID (DNA OR RNA); SEVERE ACUTE RESPIRATORY SYNDROME CORONAVIRUS 2 (SARS-COV-2) (CORONAVIRUS DISEASE [COVID-19]), AMPLIFIED PROBE TECHNIQUE, MAKING USE OF HIGH THROUGHPUT TECHNOLOGIES AS DESCRIBED BY CMS-2020-01-R: HCPCS | Performed by: OBSTETRICS & GYNECOLOGY

## 2020-06-29 ENCOUNTER — HOSPITAL ENCOUNTER (OUTPATIENT)
Dept: ULTRASOUND IMAGING | Facility: CLINIC | Age: 70
End: 2020-06-29
Attending: PHYSICIAN ASSISTANT
Payer: MEDICARE

## 2020-06-29 ENCOUNTER — HOSPITAL ENCOUNTER (OUTPATIENT)
Dept: MRI IMAGING | Facility: CLINIC | Age: 70
End: 2020-06-29
Attending: NEUROLOGICAL SURGERY
Payer: MEDICARE

## 2020-06-29 DIAGNOSIS — G50.0 TRIGEMINAL NEURALGIA: ICD-10-CM

## 2020-06-29 DIAGNOSIS — Z01.818 PRE-OP EVALUATION: ICD-10-CM

## 2020-06-29 DIAGNOSIS — M79.89 LEFT LEG SWELLING: ICD-10-CM

## 2020-06-29 LAB
CREAT BLD-MCNC: 1.3 MG/DL (ref 0.52–1.04)
GFR SERPL CREATININE-BSD FRML MDRD: 40 ML/MIN/{1.73_M2}
SARS-COV-2 RNA SPEC QL NAA+PROBE: NOT DETECTED
SPECIMEN SOURCE: NORMAL

## 2020-06-29 PROCEDURE — 93971 EXTREMITY STUDY: CPT | Mod: LT

## 2020-06-29 PROCEDURE — A9585 GADOBUTROL INJECTION: HCPCS | Performed by: NEUROLOGICAL SURGERY

## 2020-06-29 PROCEDURE — 70553 MRI BRAIN STEM W/O & W/DYE: CPT

## 2020-06-29 PROCEDURE — 25500064 ZZH RX 255 OP 636: Performed by: NEUROLOGICAL SURGERY

## 2020-06-29 PROCEDURE — 82565 ASSAY OF CREATININE: CPT

## 2020-06-29 RX ORDER — GADOBUTROL 604.72 MG/ML
10 INJECTION INTRAVENOUS ONCE
Status: COMPLETED | OUTPATIENT
Start: 2020-06-29 | End: 2020-06-29

## 2020-06-29 RX ADMIN — GADOBUTROL 10 ML: 604.72 INJECTION INTRAVENOUS at 11:49

## 2020-07-01 ENCOUNTER — HOSPITAL ENCOUNTER (OUTPATIENT)
Facility: AMBULATORY SURGERY CENTER | Age: 70
End: 2020-07-01
Attending: OBSTETRICS & GYNECOLOGY | Admitting: OBSTETRICS & GYNECOLOGY
Payer: COMMERCIAL

## 2020-07-01 ENCOUNTER — ANESTHESIA (OUTPATIENT)
Dept: SURGERY | Facility: AMBULATORY SURGERY CENTER | Age: 70
End: 2020-07-01

## 2020-07-01 VITALS
BODY MASS INDEX: 36.98 KG/M2 | SYSTOLIC BLOOD PRESSURE: 141 MMHG | TEMPERATURE: 97.2 F | HEIGHT: 68 IN | RESPIRATION RATE: 16 BRPM | HEART RATE: 52 BPM | OXYGEN SATURATION: 97 % | WEIGHT: 244 LBS | DIASTOLIC BLOOD PRESSURE: 70 MMHG

## 2020-07-01 DIAGNOSIS — R87.613 HSIL (HIGH GRADE SQUAMOUS INTRAEPITHELIAL LESION) ON PAP SMEAR OF CERVIX: ICD-10-CM

## 2020-07-01 RX ORDER — FENTANYL CITRATE 50 UG/ML
25-50 INJECTION, SOLUTION INTRAMUSCULAR; INTRAVENOUS
Status: DISCONTINUED | OUTPATIENT
Start: 2020-07-01 | End: 2020-07-01 | Stop reason: HOSPADM

## 2020-07-01 RX ORDER — ONDANSETRON 4 MG/1
4 TABLET, ORALLY DISINTEGRATING ORAL EVERY 30 MIN PRN
Status: DISCONTINUED | OUTPATIENT
Start: 2020-07-01 | End: 2020-07-02 | Stop reason: HOSPADM

## 2020-07-01 RX ORDER — LIDOCAINE HYDROCHLORIDE 20 MG/ML
INJECTION, SOLUTION INFILTRATION; PERINEURAL PRN
Status: DISCONTINUED | OUTPATIENT
Start: 2020-07-01 | End: 2020-07-01

## 2020-07-01 RX ORDER — ONDANSETRON 2 MG/ML
INJECTION INTRAMUSCULAR; INTRAVENOUS PRN
Status: DISCONTINUED | OUTPATIENT
Start: 2020-07-01 | End: 2020-07-01

## 2020-07-01 RX ORDER — ACETAMINOPHEN 325 MG/1
975 TABLET ORAL ONCE
Status: COMPLETED | OUTPATIENT
Start: 2020-07-01 | End: 2020-07-01

## 2020-07-01 RX ORDER — FERRIC SUBSULFATE 0.21 G/G
LIQUID TOPICAL PRN
Status: DISCONTINUED | OUTPATIENT
Start: 2020-07-01 | End: 2020-07-01 | Stop reason: HOSPADM

## 2020-07-01 RX ORDER — SODIUM CHLORIDE, SODIUM LACTATE, POTASSIUM CHLORIDE, CALCIUM CHLORIDE 600; 310; 30; 20 MG/100ML; MG/100ML; MG/100ML; MG/100ML
INJECTION, SOLUTION INTRAVENOUS CONTINUOUS
Status: DISCONTINUED | OUTPATIENT
Start: 2020-07-01 | End: 2020-07-02 | Stop reason: HOSPADM

## 2020-07-01 RX ORDER — GABAPENTIN 100 MG/1
100 CAPSULE ORAL ONCE
Status: DISCONTINUED | OUTPATIENT
Start: 2020-07-01 | End: 2020-07-01 | Stop reason: HOSPADM

## 2020-07-01 RX ORDER — SODIUM CHLORIDE, SODIUM LACTATE, POTASSIUM CHLORIDE, CALCIUM CHLORIDE 600; 310; 30; 20 MG/100ML; MG/100ML; MG/100ML; MG/100ML
INJECTION, SOLUTION INTRAVENOUS CONTINUOUS
Status: DISCONTINUED | OUTPATIENT
Start: 2020-07-01 | End: 2020-07-01 | Stop reason: HOSPADM

## 2020-07-01 RX ORDER — NALOXONE HYDROCHLORIDE 0.4 MG/ML
.1-.4 INJECTION, SOLUTION INTRAMUSCULAR; INTRAVENOUS; SUBCUTANEOUS
Status: DISCONTINUED | OUTPATIENT
Start: 2020-07-01 | End: 2020-07-02 | Stop reason: HOSPADM

## 2020-07-01 RX ORDER — FENTANYL CITRATE 50 UG/ML
INJECTION, SOLUTION INTRAMUSCULAR; INTRAVENOUS PRN
Status: DISCONTINUED | OUTPATIENT
Start: 2020-07-01 | End: 2020-07-01

## 2020-07-01 RX ORDER — PROPOFOL 10 MG/ML
INJECTION, EMULSION INTRAVENOUS PRN
Status: DISCONTINUED | OUTPATIENT
Start: 2020-07-01 | End: 2020-07-01

## 2020-07-01 RX ORDER — LIDOCAINE 40 MG/G
CREAM TOPICAL
Status: DISCONTINUED | OUTPATIENT
Start: 2020-07-01 | End: 2020-07-01 | Stop reason: HOSPADM

## 2020-07-01 RX ORDER — MEPERIDINE HYDROCHLORIDE 25 MG/ML
12.5 INJECTION INTRAMUSCULAR; INTRAVENOUS; SUBCUTANEOUS
Status: DISCONTINUED | OUTPATIENT
Start: 2020-07-01 | End: 2020-07-02 | Stop reason: HOSPADM

## 2020-07-01 RX ORDER — ONDANSETRON 2 MG/ML
4 INJECTION INTRAMUSCULAR; INTRAVENOUS EVERY 30 MIN PRN
Status: DISCONTINUED | OUTPATIENT
Start: 2020-07-01 | End: 2020-07-02 | Stop reason: HOSPADM

## 2020-07-01 RX ORDER — DEXAMETHASONE SODIUM PHOSPHATE 4 MG/ML
INJECTION, SOLUTION INTRA-ARTICULAR; INTRALESIONAL; INTRAMUSCULAR; INTRAVENOUS; SOFT TISSUE PRN
Status: DISCONTINUED | OUTPATIENT
Start: 2020-07-01 | End: 2020-07-01

## 2020-07-01 RX ORDER — OXYCODONE HYDROCHLORIDE 5 MG/1
5 TABLET ORAL EVERY 4 HOURS PRN
Status: DISCONTINUED | OUTPATIENT
Start: 2020-07-01 | End: 2020-07-02 | Stop reason: HOSPADM

## 2020-07-01 RX ORDER — PROPOFOL 10 MG/ML
INJECTION, EMULSION INTRAVENOUS CONTINUOUS PRN
Status: DISCONTINUED | OUTPATIENT
Start: 2020-07-01 | End: 2020-07-01

## 2020-07-01 RX ORDER — ACETAMINOPHEN 325 MG/1
325-650 TABLET ORAL EVERY 6 HOURS PRN
COMMUNITY
Start: 2020-07-01 | End: 2020-08-12

## 2020-07-01 RX ADMIN — ONDANSETRON 4 MG: 2 INJECTION INTRAMUSCULAR; INTRAVENOUS at 07:09

## 2020-07-01 RX ADMIN — PROPOFOL 100 MCG/KG/MIN: 10 INJECTION, EMULSION INTRAVENOUS at 07:18

## 2020-07-01 RX ADMIN — ACETAMINOPHEN 975 MG: 325 TABLET ORAL at 06:16

## 2020-07-01 RX ADMIN — FENTANYL CITRATE 50 MCG: 50 INJECTION, SOLUTION INTRAMUSCULAR; INTRAVENOUS at 07:15

## 2020-07-01 RX ADMIN — DEXAMETHASONE SODIUM PHOSPHATE 4 MG: 4 INJECTION, SOLUTION INTRA-ARTICULAR; INTRALESIONAL; INTRAMUSCULAR; INTRAVENOUS; SOFT TISSUE at 07:15

## 2020-07-01 RX ADMIN — SODIUM CHLORIDE, SODIUM LACTATE, POTASSIUM CHLORIDE, CALCIUM CHLORIDE 25 ML/HR: 600; 310; 30; 20 INJECTION, SOLUTION INTRAVENOUS at 06:52

## 2020-07-01 RX ADMIN — PROPOFOL 20 MG: 10 INJECTION, EMULSION INTRAVENOUS at 07:23

## 2020-07-01 RX ADMIN — LIDOCAINE HYDROCHLORIDE 40 MG: 20 INJECTION, SOLUTION INFILTRATION; PERINEURAL at 07:18

## 2020-07-01 ASSESSMENT — MIFFLIN-ST. JEOR: SCORE: 1657.34

## 2020-07-01 NOTE — ANESTHESIA CARE TRANSFER NOTE
Patient: Marissa Guadarrama    Procedure(s):  Examination under anesthesia, vaginal biopsies    Diagnosis: HSIL (high grade squamous intraepithelial lesion) on Pap smear of cervix [R87.613]  Diagnosis Additional Information: No value filed.    Anesthesia Type:   MAC     Note:  Airway :Room Air  Patient transferred to:Phase II  Comments: Uneventful transport - phase 2 - room air  Report to RN - Hiliary  Exchanging well; color natl  Pt responds appropriately to command  IV patent  Lips/teeth/dentition as preop status  Questions answered  /63  HR 64 nsr  TAT 97.1  RR 16  Sat 96%  Handoff Report: Identifed the Patient, Identified the Reponsible Provider, Reviewed the pertinent medical history, Discussed the surgical course, Reviewed Intra-OP anesthesia mangement and issues during anesthesia, Set expectations for post-procedure period and Allowed opportunity for questions and acknowledgement of understanding      Vitals: (Last set prior to Anesthesia Care Transfer)    CRNA VITALS  7/1/2020 0715 - 7/1/2020 0750      7/1/2020             Resp Rate (set):  10                Electronically Signed By: KAREN PEARSON CRNA  July 1, 2020  7:50 AM

## 2020-07-01 NOTE — ANESTHESIA POSTPROCEDURE EVALUATION
Anesthesia POST Procedure Evaluation    Patient: Marissa Guadarrama   MRN:     [id number] Gender:   female   Age:    70 year old :      1950        Preoperative Diagnosis: HSIL (high grade squamous intraepithelial lesion) on Pap smear of cervix [R87.613]   Procedure(s):  Examination under anesthesia, vaginal biopsies   Postop Comments: No value filed.     Anesthesia Type: MAC       Disposition: Outpatient   Postop Pain Control: Uneventful            Sign Out: Well controlled pain   PONV: No   Neuro/Psych: Uneventful            Sign Out: Acceptable/Baseline neuro status   Airway/Respiratory: Uneventful            Sign Out: Acceptable/Baseline resp. status   CV/Hemodynamics: Uneventful            Sign Out: Acceptable CV status   Other NRE: NONE   DID A NON-ROUTINE EVENT OCCUR? No         Last Anesthesia Record Vitals:  CRNA VITALS  2020 0715 - 2020 0815      2020             Resp Rate (observed):  16    Resp Rate (set):  10          Last PACU Vitals:  Vitals Value Taken Time   /62 2020  7:47 AM   Temp 36.2  C (97.1  F) 2020  7:47 AM   Pulse 65 2020  7:47 AM   Resp 16 2020  7:47 AM   SpO2 96 % 2020  7:47 AM   Temp src     NIBP 91/46 2020  7:40 AM   Pulse 67 2020  7:44 AM   SpO2 99 % 2020  7:44 AM   Resp     Temp 33.5  C (92.3  F) 2020  7:43 AM   Ht Rate 68 2020  7:43 AM   Temp 2 0  C (32  F) 2020  7:43 AM         Electronically Signed By: Robert Duarte MD, 2020, 11:50 AM

## 2020-07-01 NOTE — DISCHARGE INSTRUCTIONS
[facility]  Home Care Following Anesthesia  For 24 hours after surgery:  1. Get plenty of rest.  A responsible adult must stay with you for at least 24 hours after you leave the surgery center.  2. Do not drive or use heavy equipment.  If you have weakness or tingling, don't drive or use heavy equipment until this feeling goes away.   3. Do not drink alcohol.   4. Avoid strenuous or risky activities.  Ask for help when climbing stairs.  5. You may feel lightheaded.  IF so, sit for a few minutes before standing.  Have someone help you get up.   6. If you have nausea (feel sick to your stomach): Drink only clear liquids such as apple juice, ginger ale, broth or 7-Up.  Rest may also help.  Be sure to drink enough fluids.  Move to a regular diet as you feel able.   7. You may have a slight fever.  Call the doctor if your fever is over 100 F (37.7 C) (taken under the tongue) or lasts longer than 24 hours.  8. You may have a dry mouth, a sore throat, muscle aches or trouble sleeping. These should go away after 24 hours.  9. Do not make important or legal decisions.               Tips for taking pain medications  To get the best pain relief possible, remember these points:    Take pain medications as directed, before pain becomes severe.    Pain medication can upset your stomach: taking it with food may help.    Constipation is a common side effect of pain medication. Drink plenty of  fluids.    Eat foods high in fiber. Take a stool softener if recommended by your doctor or pharmacist.    Do not drink alcohol, drive or operate machinery while taking pain medications.    Ask about other ways to control pain, such as with heat, ice or relaxation.    Tylenol/Acetaminophen Consumption  To help encourage the safe use of acetaminophen, the makers of TYLENOL  have lowered the maximum daily dose for single-ingredient Extra Strength TYLENOL  (acetaminophen) products sold in the [place] from 8  pills per day (4,000 mg) to 6 pills per day (3,000 mg). The dosing interval has also changed from 2 pills every 4-6 hours to 2 pills every 6 hours.    If you feel your pain relief is insufficient, you may take Tylenol/Acetaminophen in addition to your narcotic pain medication.     Be careful not to exceed 3,000 mg of Tylenol/Acetaminophen in a 24 hour period from all sources.    If you are taking extra strength Tylenol/acetaminophen (500 mg), the maximum dose is 6 tablets in 24 hours.    If you are taking regular strength acetaminophen (325 mg), the maximum dose is 9 tablets in 24 hours.    **You received 975 mg of Tylenol at 6:15 AM. Okay to take at 12:15 PM, and follow dosing instructions on bottle.**    Call a doctor for any of the followin. Signs of infection (fever, growing tenderness at the surgery site, a large amount of drainage or bleeding, severe pain, foul-smelling drainage, redness, swelling).  2. It has been over 8 to 10 hours since surgery and you are still not able to urinate (pass water).  3. Headache for over 24 hours.  4. Signs of Covid-19 infection (temperature over 100 degrees, shortness of breath, cough, loss of taste/smell, generalized body aches, persistent headache, chills, sore throat, nausea/vomiting/diarrhea)    Your doctor is:     Dr. Karli Gao, Gynecologic Oncology: [phone]               Or dial [phone] and ask for the resident on call for:  Gynecologic Oncology  For emergency care, call the:  [facility]:  [phone] (TTY for hearing impaired: [phone])

## 2020-07-01 NOTE — BRIEF OP NOTE
[facility]    Brief Operative Note    Pre-operative diagnosis: HSIL (high grade squamous intraepithelial lesion) on Pap smear of cervix [R87.613]  Post-operative diagnosis Same as pre-operative diagnosis    Procedure: Procedure(s):  Examination under anesthesia, vaginal biopsies  Surgeon: Surgeon(s) and Role:     * Karli Gao MD - Primary  Anesthesia: Choice   Estimated blood loss: minimal  Drains: None  Specimens:   ID Type Source Tests Collected by Time Destination   A : Right vaginal biopsy Tissue Vagina SURGICAL PATHOLOGY EXAM Karli Gao MD 7/1/2020  7:30 AM    B : Left vaginal biopsy Tissue Vagina SURGICAL PATHOLOGY EXAM Karli Gao MD 7/1/2020  7:31 AM    C : Right vaginal apex Tissue Vagina SURGICAL PATHOLOGY EXAM Karli Gao MD 7/1/2020  7:32 AM    D : Left vaginal apex Tissue Vagina SURGICAL PATHOLOGY EXAM Karli Gao MD 7/1/2020  7:39 AM      Findings:   EUA revealed normal external genitalia, scarred vaginal apex with no apparent lesions..  Complications: none.  Implants: * No implants in log *    Sarita Hernandez MD PGY1

## 2020-07-08 LAB — COPATH REPORT: NORMAL

## 2020-07-16 DIAGNOSIS — G50.0 TRIGEMINAL NEURALGIA: ICD-10-CM

## 2020-07-16 RX ORDER — GABAPENTIN 600 MG/1
600 TABLET ORAL 3 TIMES DAILY
Qty: 90 TABLET | Refills: 3 | Status: SHIPPED | OUTPATIENT
Start: 2020-07-16 | End: 2020-12-29

## 2020-07-16 NOTE — TELEPHONE ENCOUNTER
Regulated Med not under RN refill protocol.  Will forward to MD for refill consideration.  Pauline Farr RN

## 2020-07-16 NOTE — TELEPHONE ENCOUNTER
Last Written Prescription Date:  2/21/202  Last Fill Quantity :90# refills: 3    Gabapentin 600 mg TID    Last office visit: 10/25/2019 with prescribing provider: Dr. Bell  Future Office Visit:  Currently not scheduled.    Requested Prescriptions   Pending Prescriptions Disp Refills     gabapentin (NEURONTIN) 600 MG tablet 90 tablet 3     Sig: Take 1 tablet (600 mg) by mouth 3 times daily       There is no refill protocol information for this order        Lyly Lee   [facility]  RN

## 2020-07-16 NOTE — TELEPHONE ENCOUNTER
Reason for Call:  Other     Detailed comments: Pt's , Gaudencio, calling (CTC on file):  Pt needs Neurontin refilled. Asks this be addressed today as he is leaving town tomorrow.     PHARMACY:  [facility]     Phone Number Patient can be reached at: Home number on file [phone] (home)    Best Time: Any    Can we leave a detailed message on this number? YES    Call taken on 7/16/2020 at 8:32 AM by Denise Behrendt

## 2020-07-27 ENCOUNTER — VIRTUAL VISIT (OUTPATIENT)
Dept: NEUROSURGERY | Facility: CLINIC | Age: 70
End: 2020-07-27
Payer: COMMERCIAL

## 2020-07-27 DIAGNOSIS — G50.0 TRIGEMINAL NEURALGIA: Primary | ICD-10-CM

## 2020-07-27 NOTE — PATIENT INSTRUCTIONS
Schedule Left Sided Radiofrequency Rhizotomy for Trigeminal Neuralgia V2 pain.    Pre anesthesia appointment to be scheduled along with Covid Testing prior to procedure    Call Christin RN for questions/concerns     Thank you for using [facility].

## 2020-07-27 NOTE — LETTER
2020       RE: Marissa Guadarrama  [address] [place] [id number]     Dear Colleague,    Thank you for referring your patient, Marissa Guadarrama, to the [facility] NEUROSURGERY at [facility]. Please see a copy of my visit note below.    Service Date: 2020      Eliazar Menendez MD   [facility]   [address] [place]  [address]      RE: Marissa Guadarrama   MRN: [id number]   : 1950      Dear Dr. Menendez:      I had the pleasure to visit with Marissa during a phone visit today in my Neurosurgical Clinic.      Briefly, Marissa is a 70-year-old woman who has a history of left-sided V2 trigeminal neuralgia.  I have been following her now for about 3 months.  She is on Neurontin.  We had trialed her on Tegretol.  However, she had side effects of sleepiness and discontinued that.  She went back on Neurontin.  Neurontin does seem to be treating her pain pretty well.  She does not like being on Neurontin and would much rather have some surgical intervention to treat her trigeminal neuralgia.      Today I did review her MRI, and I do see compression of the trigeminal nerve at the root entry junction by the superior cerebellar artery.  With this in mind, I did talk to her about a microvascular decompression versus a percutaneous radiofrequency rhizotomy.  She understands the pros and cons now of both of these procedures and ultimately would like to proceed with a radiofrequency rhizotomy.  She understands the expectation that there will be permanent numbness following this procedure.  With this in mind, she does want to proceed as soon as possible.  We will set her up for a left-sided radiofrequency rhizotomy in the near future to target the left V2 distribution.      Thank you for your trust and opportunity to participate in Marissa's care.  If you have any further questions or concerns, please feel free to contact me on my cell phone at  "[phone].      With kindest personal regards,      Cas Callaway MD      Overall, I spent approximately 20 minutes on the phone with Rosalinda with the majority of this time spent in consultation and developing a treatment plan.         CAS CALLAWAY MD             D: 2020   T: 2020   MT: annel      Name:     ROSALINDA GUADARRAMA   MRN:      [id number]        Account:      [id number]   :      1950           Service Date: 2020      Document: [id number]      Rosalinda Guadarrama is a 70 year old female who is being evaluated via a billable telephone visit.      The patient has been notified of following:     \"This telephone visit will be conducted via a call between you and your physician/provider. We have found that certain health care needs can be provided without the need for a physical exam.  This service lets us provide the care you need with a short phone conversation.  If a prescription is necessary we can send it directly to your pharmacy.  If lab work is needed we can place an order for that and you can then stop by our lab to have the test done at a later time.    Telephone visits are billed at different rates depending on your insurance coverage. During this emergency period, for some insurers they may be billed the same as an in-person visit.  Please reach out to your insurance provider with any questions.    If during the course of the call the physician/provider feels a telephone visit is not appropriate, you will not be charged for this service.\"    Patient has given verbal consent for Telephone visit?  yes  What phone number would you like to be contacted at? [phone]    How would you like to obtain your AVS? Mail a copy          "

## 2020-07-27 NOTE — PROGRESS NOTES
"Marissa Guadarrama is a 70 year old female who is being evaluated via a billable telephone visit.      The patient has been notified of following:     \"This telephone visit will be conducted via a call between you and your physician/provider. We have found that certain health care needs can be provided without the need for a physical exam.  This service lets us provide the care you need with a short phone conversation.  If a prescription is necessary we can send it directly to your pharmacy.  If lab work is needed we can place an order for that and you can then stop by our lab to have the test done at a later time.    Telephone visits are billed at different rates depending on your insurance coverage. During this emergency period, for some insurers they may be billed the same as an in-person visit.  Please reach out to your insurance provider with any questions.    If during the course of the call the physician/provider feels a telephone visit is not appropriate, you will not be charged for this service.\"    Patient has given verbal consent for Telephone visit?  yes  What phone number would you like to be contacted at? [phone]    How would you like to obtain your AVS? Mail a copy      "

## 2020-07-27 NOTE — PROGRESS NOTES
Service Date: 2020      Eliazar Menendez MD   [facility]   [address] [place]  [address]      RE: Marissa Guadarrama   MRN: [id number]   : 1950      Dear Dr. Menendez:      I had the pleasure to visit with Marissa during a phone visit today in my Neurosurgical Clinic.      Briefly, Marissa is a 70-year-old woman who has a history of left-sided V2 trigeminal neuralgia.  I have been following her now for about 3 months.  She is on Neurontin.  We had trialed her on Tegretol.  However, she had side effects of sleepiness and discontinued that.  She went back on Neurontin.  Neurontin does seem to be treating her pain pretty well.  She does not like being on Neurontin and would much rather have some surgical intervention to treat her trigeminal neuralgia.      Today I did review her MRI, and I do see compression of the trigeminal nerve at the root entry junction by the superior cerebellar artery.  With this in mind, I did talk to her about a microvascular decompression versus a percutaneous radiofrequency rhizotomy.  She understands the pros and cons now of both of these procedures and ultimately would like to proceed with a radiofrequency rhizotomy.  She understands the expectation that there will be permanent numbness following this procedure.  With this in mind, she does want to proceed as soon as possible.  We will set her up for a left-sided radiofrequency rhizotomy in the near future to target the left V2 distribution.      Thank you for your trust and opportunity to participate in Marissa's care.  If you have any further questions or concerns, please feel free to contact me on my cell phone at [phone].      With kindest personal regards,      Cas Callaway MD      Overall, I spent approximately 20 minutes on the phone with Marissa with the majority of this time spent in consultation and developing a treatment plan.         CAS CALLAWAY MD             D:  2020   T: 2020   MT: annel      Name:     ROSALINDA LANCASTER   MRN:      [id number]        Account:      [id number]   :      1950           Service Date: 2020      Document: [id number]

## 2020-07-28 ENCOUNTER — TELEPHONE (OUTPATIENT)
Dept: NEUROSURGERY | Facility: CLINIC | Age: 70
End: 2020-07-28

## 2020-07-28 ENCOUNTER — VIRTUAL VISIT (OUTPATIENT)
Dept: ONCOLOGY | Facility: CLINIC | Age: 70
End: 2020-07-28
Attending: OBSTETRICS & GYNECOLOGY
Payer: COMMERCIAL

## 2020-07-28 DIAGNOSIS — Z11.59 ENCOUNTER FOR SCREENING FOR OTHER VIRAL DISEASES: Primary | ICD-10-CM

## 2020-07-28 DIAGNOSIS — R87.613 HSIL (HIGH GRADE SQUAMOUS INTRAEPITHELIAL LESION) ON PAP SMEAR OF CERVIX: Primary | ICD-10-CM

## 2020-07-28 PROCEDURE — 40001009 ZZH VIDEO/TELEPHONE VISIT; NO CHARGE

## 2020-07-28 PROCEDURE — 99214 OFFICE O/P EST MOD 30 MIN: CPT | Mod: 95 | Performed by: OBSTETRICS & GYNECOLOGY

## 2020-07-28 NOTE — PROGRESS NOTES
"Marissa Guadarrama is a 70 year old female who is being evaluated via a billable video visit.      The patient has been notified of following:     \"This video visit will be conducted via a call between you and your physician/provider. We have found that certain health care needs can be provided without the need for an in-person physical exam.  This service lets us provide the care you need with a video conversation.  If a prescription is necessary we can send it directly to your pharmacy.  If lab work is needed we can place an order for that and you can then stop by our lab to have the test done at a later time.    Video visits are billed at different rates depending on your insurance coverage.  Please reach out to your insurance provider with any questions.    If during the course of the call the physician/provider feels a video visit is not appropriate, you will not be charged for this service.\"    Patient has given verbal consent for Video visit? Yes    How would you like to obtain your AVS? Mail a copy     If you are dropped from the video visit, the video invite should be resent to: Text to cell phone: [phone]     Will anyone else be joining your video visit? 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     Her history is as follows:  Notably, she has history of cervical cancer treated in 2007 at [facility], a history of colon cancer and recurrent carcinoid tumor and breast cancer.        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 Depth of invasion 7 mm out of 1.9 and horizontal spread of 1 cm. " "48 negative nodes.   2009: ASCUS pap  11/1/13 Vaginal Pap returned ASC-H.   12/23/13 Colposcopy/Vaginal cuff (submitted as \"cervix\"), biopsy:VAIN III    2/5/14: Patient established care with Dr. Pack and desired surgical management     3/13/14: Colposcopy, Vaginal Biopsies, Co2 Laser of the Upper Vagina on , which showed low-grade squamous intraepithelial lesion (VAIN 1) at 12:00 and 6:00 and high-grade squamous intraepithelial lesion VAIN 29:00 vaginal cuff. Vaginal cuff, 3:00 (biopsy): Mostly denuded squamous mucosa with focal features suggestive of low-grade squamous intraepithelial lesion (VAIN 1)     7/30/14: biopsy of right upper vagina showed VAINI, negative p16     2/9/15: upper vaginal biopsy \"LSIL\"     9/25/17:  VAIN III     12/12/17: CO2 laser and biopsies of vaginal dysplasia     4/3/18:  Stable exam.     10/9/18:  Colpo, no biopsies. Pap LSIL, HR HPV 16+     4/9/19:  Pap ASC-H, HR HPV 16+     5/14/19:  Colpo with biopsies.  Biopsies benign.     11/12/19:  Pap HSIL, HR HPV+     12/10/19:  Colpo with biopsies: Benign     6/1/20:   Pap HSIL, HR HPV 16+.  Recommend EUA and biopsies    7/1/20:  EUA, vaginal biopsies.  All biopsies negative for dysplasia    Patient see on video for follow-up.  She is doing well and has no complaints after biopsy.  Slight bleeding that has now resolved.       Past Medical History:    Past Medical History:   Diagnosis Date     Arthritis     knee     Benign breast biopsy     benign     Carcinoid tumor 12/2003     Cervical cancer (H) 2007     H/O colposcopy with cervical biopsy 12/23/13    vaginal cuff biopsy- VAIN III. referred back to gyn/onc     HTN      Hyperlipidemia      Obesity      Pap smear of vagina with ASC-H 11/1/13     Post-polio syndromes      Trigeminal neuralgias          Past Surgical History:    Past Surgical History:   Procedure Laterality Date     APPENDECTOMY  1983     BIOPSY NODE SENTINEL Bilateral 6/1/2016    Procedure: BIOPSY NODE SENTINEL;  Surgeon: " Brent Arana MD;  Location: [facility]     C BSO, OMENTECTOMY W/OSMAN  5/2007     C TOTAL ABDOM HYSTERECTOMY  5/2007     CL AFF SURGICAL PATHOLOGY       COLONOSCOPY N/A 6/23/2017    Procedure: COMBINED COLONOSCOPY, SINGLE OR MULTIPLE BIOPSY/POLYPECTOMY BY BIOPSY;  Colonoscopy Dx:Carcinoid tumor of colon prep mailed analy;  Surgeon: Talisha Greco MD;  [facility]     COLPOSCOPY, BIOPSY, COMBINED  3/13/2014    Procedure: COMBINED COLPOSCOPY, BIOPSY;;  Surgeon: Lara Pack MD;  [facility]     COMBINED CYSTOSCOPY, RETROGRADES, EXCHANGE STENT URETER(S) Left 2/7/2019    Procedure: COMBINED CYSTOSCOPY, RETROGRADES, EXCHANGE STENT URETER--left;  Surgeon: Zhao La MD;  Location: [facility]     COMBINED CYSTOSCOPY, RETROGRADES, EXCHANGE STENT URETER(S) Left 2/20/2020    Procedure: CYSTOSCOPY, WITH RETROGRADE PYELOGRAM AND Left URETERAL STENT exchange;  Surgeon: Zhao La MD;  Location: [facility]     COMBINED CYSTOSCOPY, RETROGRADES, URETEROSCOPY, INSERT STENT Left 2/2/2017    Procedure: COMBINED CYSTOSCOPY, RETROGRADES, URETEROSCOPY, INSERT STENT;  Surgeon: Zhao La MD;  Location: [facility]     CYSTOSCOPY, RETROGRADES, INSERT STENT URETER(S), COMBINED Left 9/7/2017    Procedure: COMBINED CYSTOSCOPY, RETROGRADES, INSERT STENT URETER(S);  Cystoscopy,Left Stent Exchange;  Surgeon: Zhao La MD;  Location: [facility]     CYSTOSCOPY, RETROGRADES, INSERT STENT URETER(S), COMBINED  12/12/2017    Procedure: COMBINED CYSTOSCOPY, RETROGRADES, INSERT STENT URETER(S);;  Surgeon: Zhao La MD;  [facility]     CYSTOSCOPY, RETROGRADES, INSERT STENT URETER(S), COMBINED Left 7/5/2018    Procedure: COMBINED CYSTOSCOPY, RETROGRADES, INSERT STENT URETER(S);  Cystoscopy, Left Stent Exchange;  Surgeon: Zhao La MD;  Location: [facility]     EXAM UNDER ANESTHESIA PELVIC  3/13/2014    Procedure: EXAM UNDER ANESTHESIA PELVIC;  Exam  Under Anestheisa, Colposcopy, Vaginal Biopsies, Co2 Laser of the Upper Vagina;  Surgeon: Lara Pack MD;  [facility]     EXAM UNDER ANESTHESIA PELVIC N/A 2020    Procedure: Examination under anesthesia, vaginal biopsies;  Surgeon: Karli Gao MD;  [facility]     HERNIORRHAPHY INCISIONAL (LOCATION)       IR RHIZOTOMY  2020     LASER CO2 VAGINA  3/13/2014    VAIN /2     LASER CO2 VAGINA N/A 2017    Procedure: LASER CO2 VAGINA;  Exam Under Anesthesia, CO2 Laser Ablation Of Vagina, Cystoscopy, Left Retrograde Pyelogram with Left Stent Placement;  Surgeon: Nic Segundo MD;  [facility]     LUMPECTOMY BREAST WITH SEED LOCALIZATION Bilateral 2016    Procedure: LUMPECTOMY BREAST WITH SEED LOCALIZATION;  Surgeon: Brent Arana MD;  Location: [facility]     SURGICAL HISTORY OF -       ovarian cystectomy     SURGICAL HISTORY OF -       right colon resection secondary to carcinoid tumor     TUBAL LIGATION           Health Maintenance:  Health Maintenance Due   Topic Date Due     ZOSTER IMMUNIZATION (1 of 2) 1998     LIPID  2019     MICROALBUMIN  2019     INFLUENZA VACCINE (1) 2020         Current Medications:     has a current medication list which includes the following prescription(s): albuterol, exemestane, gabapentin, hydrochlorothiazide, clear eyes for dry eyes, and ibuprofen.       Allergies:     [unfilled]        Social History:     Social History     Tobacco Use     Smoking status: Former Smoker     Packs/day: 1.00     Years: 29.00     Pack years: 29.00     Types: Cigarettes     Last attempt to quit: 10/30/2006     Years since quittin.8     Smokeless tobacco: Never Used   Substance Use Topics     Alcohol use: No     Alcohol/week: 0.0 standard drinks     Comment: 1 drink per year       History   Drug Use No           Family History:     The patient's family history is notable for :    Family History   Problem Relation Age of Onset      Cancer Mother         bone / liver     Breast Cancer Mother      Cancer Maternal Grandmother      Cancer Maternal Grandfather      Cancer Paternal Grandmother      Cancer Paternal Grandfather      Cancer Sister         vulvar ca, cervical ca, squamous cell cancer     Cancer Brother         Rectal- Stage 4     Rectal Cancer Brother      Breast Cancer Paternal Aunt      Colon Cancer Paternal Aunt      Breast Cancer Maternal Aunt      Pancreatic Cancer Nephew      Breast Cancer Sister      Anesthesia Reaction No family hx of          Physical Exam:     There were no vitals taken for this visit.  There is no height or weight on file to calculate BMI.    GENERAL: Healthy, alert and no distress  EYES: Eyes grossly normal to inspection.  No discharge or erythema, or obvious scleral/conjunctival abnormalities.  RESP: No audible wheeze, cough, or visible cyanosis.  No visible retractions or increased work of breathing.    SKIN: Visible skin clear. No significant rash, abnormal pigmentation or lesions.  NEURO: Cranial nerves grossly intact.  Mentation and speech appropriate for age.  PSYCH: Mentation appears normal, affect normal/bright, judgement and insight intact, normal speech and appearance well-groomed.       The rest of a comprehensive physical examination is deferred due to Public Health Emergency telephone visit restrictions       Assessment:     Marissa Guadarrama is a 70 year old woman with a diagnosis of HR HPV, persistent abnromal pap and VAIN3.  She also has history of multiple cancers including metastatic carcinoid tumor and bilateral breast cancer.     A total of 30 minutes was spent with the patient on video, 25 minutes of which were spent in counseling the patient and/or treatment planning.      Plan:      History of VAINIII with recent HSIL pap in 11/2019.  Colpo with biopsies negative. She then had a repeat HSIL pap in 6/2019. Therefore, underwent EUA and biopsies due to difficulty with examine in the  office.  All biopsies are negative.  Continue to follow closely.    Return in six months.    Questions answered, patient expressed understanding of plan of care.     Karli Gao MD  Gynecologic Oncology  [facility]         CC  Patient Care Team:  Eliazar Menendez MD as PCP - General (Family Practice)  Hosea King MD as MD (Hematology & Oncology)  Zhao La MD as MD (Urology)  Talisha Greco MD as MD (Colon and Rectal Surgery)  Allen Downey MD as MD (Orthopedics)  Eliazar Menendez MD as Assigned PCP  KARLI GAO        Video-Visit Details    Type of service:  Video Visit    Originating Location (pt. Location): Home    Distant Location (provider location):  [facility]     Platform used for Video Visit: Tallyfy

## 2020-07-28 NOTE — LETTER
"7/28/2020      RE: Marissa Guadarrama  [address] [place] [phone]       Marissa Guadarrama is a 70 year old female who is being evaluated via a billable video visit.      The patient has been notified of following:     \"This video visit will be conducted via a call between you and your physician/provider. We have found that certain health care needs can be provided without the need for an in-person physical exam.  This service lets us provide the care you need with a video conversation.  If a prescription is necessary we can send it directly to your pharmacy.  If lab work is needed we can place an order for that and you can then stop by our lab to have the test done at a later time.    Video visits are billed at different rates depending on your insurance coverage.  Please reach out to your insurance provider with any questions.    If during the course of the call the physician/provider feels a video visit is not appropriate, you will not be charged for this service.\"    Patient has given verbal consent for Video visit? Yes    How would you like to obtain your AVS? Mail a copy     If you are dropped from the video visit, the video invite should be resent to: Text to cell phone: [phone]     Will anyone else be joining your video visit? No  {If patient encounters technical issues they should call [phone] :[id number]}     Vitals - Patient Reported  Weight (Patient Reported): 110.7 kg (244 lb)  Height (Patient Reported): 171.5 cm (5' 7.52\")  BMI (Based on Pt Reported Ht/Wt): 37.63  Pain Score: No Pain (0)    Frank rTuong LPN      Video-Visit Details    Type of service:  Video Visit    Video Start Time: {video visit start/end time:152948}  Video End Time: {video visit start/end time:152948}    Originating Location (pt. Location): {video visit patient location:325869::\"Home\"}    Distant Location (provider location):  [facility]     Platform used for Video Visit: {Virtual Visit " "Platforms:608496::\"Abdirizak\"}    {signature options:317304}        Karli Gao MD    "

## 2020-07-28 NOTE — Clinical Note
7/28/2020         RE: Marissa Guadarrama  [address] [place] [id number]        Dear Colleague,    Thank you for referring your patient, Marissa Guadarrama, to the [facility]. Please see a copy of my visit note below.    No notes on file    Again, thank you for allowing me to participate in the care of your patient.        Sincerely,        Karli Gao MD

## 2020-07-28 NOTE — TELEPHONE ENCOUNTER
Called and left a voicemail regarding surgery orders. Left my direct number for a call back. Surgery date of 08/14/20 at [facility]. Asked for a call back to confirm this date works. [phone]

## 2020-07-28 NOTE — TELEPHONE ENCOUNTER
Patient is scheduled for surgery with Dr. Angulo      Spoke or left message with: Patient    Date of Surgery: 08/14/20    [facility]    H&P: Scheduled with 8/11/20    Post op: 08/31/20    Additional imaging/appointments: COVID 19 test    Surgery packet sent: the nurse will mail out     Additional comments: The patient is aware they need a PRE-OP/PAC appt at least a couple of weeks before surgery date. We went over the patient needing a  and someone to stay with them for 24 hours after the surgery. The patient was given my direct number for any questions [phone]

## 2020-07-29 NOTE — TELEPHONE ENCOUNTER
FUTURE VISIT INFORMATION      SURGERY INFORMATION:    Date: 20    [facility]    Surgeon:  Juan Angulo MD     Anesthesia Type:  Combined MAC with Local     Procedure: ANESTHESIA OUT OF OR Left Radio Frequency Rhizotomy @0730     Consult: virtual visit     RECORDS REQUESTED FROM:       Primary Care Provider: Eliazar Menendez MD- [facility]    Pertinent Medical History: hypertension    Most recent EKG+ Tracin20    Most recent ECHO: 16

## 2020-07-30 ENCOUNTER — DOCUMENTATION ONLY (OUTPATIENT)
Dept: NEUROSURGERY | Facility: CLINIC | Age: 70
End: 2020-07-30

## 2020-08-11 ENCOUNTER — PRE VISIT (OUTPATIENT)
Dept: SURGERY | Facility: CLINIC | Age: 70
End: 2020-08-11

## 2020-08-11 DIAGNOSIS — Z11.59 ENCOUNTER FOR SCREENING FOR OTHER VIRAL DISEASES: ICD-10-CM

## 2020-08-11 DIAGNOSIS — Z01.818 PREOP EXAMINATION: Primary | ICD-10-CM

## 2020-08-11 DIAGNOSIS — Z01.818 PREOP EXAMINATION: ICD-10-CM

## 2020-08-11 LAB
ANION GAP SERPL CALCULATED.3IONS-SCNC: 3 MMOL/L (ref 3–14)
BUN SERPL-MCNC: 30 MG/DL (ref 7–30)
CALCIUM SERPL-MCNC: 9.8 MG/DL (ref 8.5–10.1)
CHLORIDE SERPL-SCNC: 104 MMOL/L (ref 94–109)
CO2 SERPL-SCNC: 34 MMOL/L (ref 20–32)
CREAT SERPL-MCNC: 1.13 MG/DL (ref 0.52–1.04)
ERYTHROCYTE [DISTWIDTH] IN BLOOD BY AUTOMATED COUNT: 13.1 % (ref 10–15)
GFR SERPL CREATININE-BSD FRML MDRD: 48 ML/MIN/{1.73_M2}
GLUCOSE SERPL-MCNC: 100 MG/DL (ref 70–99)
HCT VFR BLD AUTO: 47 % (ref 35–47)
HGB BLD-MCNC: 15.3 G/DL (ref 11.7–15.7)
INR PPP: 1.02 (ref 0.86–1.14)
MCH RBC QN AUTO: 30.2 PG (ref 26.5–33)
MCHC RBC AUTO-ENTMCNC: 32.6 G/DL (ref 31.5–36.5)
MCV RBC AUTO: 93 FL (ref 78–100)
PLATELET # BLD AUTO: 195 10E9/L (ref 150–450)
POTASSIUM SERPL-SCNC: 3.5 MMOL/L (ref 3.4–5.3)
RBC # BLD AUTO: 5.07 10E12/L (ref 3.8–5.2)
SODIUM SERPL-SCNC: 141 MMOL/L (ref 133–144)
WBC # BLD AUTO: 7.3 10E9/L (ref 4–11)

## 2020-08-11 NOTE — RESULT ENCOUNTER NOTE
Labs routed to PASTORA Watson PA-C who will be seeing the patient in PAC tomorrow.        Maricruz Diaz DNP, RN, ANP-C

## 2020-08-12 ENCOUNTER — ANESTHESIA EVENT (OUTPATIENT)
Dept: SURGERY | Facility: CLINIC | Age: 70
End: 2020-08-12
Payer: MEDICARE

## 2020-08-12 ENCOUNTER — OFFICE VISIT (OUTPATIENT)
Dept: SURGERY | Facility: CLINIC | Age: 70
End: 2020-08-12
Payer: COMMERCIAL

## 2020-08-12 ENCOUNTER — PRE VISIT (OUTPATIENT)
Dept: SURGERY | Facility: CLINIC | Age: 70
End: 2020-08-12

## 2020-08-12 VITALS
SYSTOLIC BLOOD PRESSURE: 137 MMHG | WEIGHT: 238 LBS | HEIGHT: 68 IN | OXYGEN SATURATION: 97 % | RESPIRATION RATE: 16 BRPM | DIASTOLIC BLOOD PRESSURE: 99 MMHG | BODY MASS INDEX: 36.07 KG/M2 | TEMPERATURE: 97.8 F | HEART RATE: 64 BPM

## 2020-08-12 DIAGNOSIS — Z01.818 PREOP EXAMINATION: Primary | ICD-10-CM

## 2020-08-12 LAB
SARS-COV-2 RNA SPEC QL NAA+PROBE: NOT DETECTED
SPECIMEN SOURCE: NORMAL

## 2020-08-12 RX ORDER — IBUPROFEN 200 MG
200 TABLET ORAL EVERY 4 HOURS PRN
Status: ON HOLD | COMMUNITY
End: 2021-05-15

## 2020-08-12 ASSESSMENT — PAIN SCALES - GENERAL: PAINLEVEL: NO PAIN (0)

## 2020-08-12 ASSESSMENT — MIFFLIN-ST. JEOR: SCORE: 1630.12

## 2020-08-12 ASSESSMENT — LIFESTYLE VARIABLES: TOBACCO_USE: 1

## 2020-08-12 NOTE — TELEPHONE ENCOUNTER
FUTURE VISIT INFORMATION        SURGERY INFORMATION:    Date: 20    [facility]    Surgeon:  Juan Angulo MD     Anesthesia Type:  Combined MAC with Local     Procedure: ANESTHESIA OUT OF OR Left Radio Frequency Rhizotomy @0730     Consult: virtual visit      RECORDS REQUESTED FROM:         Primary Care Provider: Eliazar Menendez MD- [place]     Pertinent Medical History: hypertension     Most recent EKG+ Tracin20     Most recent ECHO: 16

## 2020-08-12 NOTE — PATIENT INSTRUCTIONS
Preparing for Your Surgery      Name:  Marissa Guadarrama   MRN:  [id number]   :  1950   Today's Date:  2020       Arriving for surgery:  Surgery date:  20  Arrival time:  5:30 am    Restrictions due to COVID 19:  Patients are allowed one visitor in the pre-op period  All visitors must wear a mask  No visitors under 18  No ill visitors   parking is not available     Please come to:  [facility]   [address] [place]  [address]    -    Please proceed to the Surgery Lounge on the 3rd floor. [phone]?     - ?If you are in need of directions, wheelchair or escort please stop at the Information Desk in the lobby.  Inform the information person that you are here for surgery; a wheelchair and escort will be provided to the Surgery Lounge .?     What can I eat or drink?  -  You may eat and drink normally for up to 8 hours before your surgery. (Until 11:30 pm 20)  -  You may have clear liquids until 2 hours before surgery. (Until 5:30 am)  Examples of clear liquids:  Water  Clear broth  Juices (apple, white grape, white cranberry  and cider) without pulp  Noncarbonated, powder based beverages  (lemonade and Haris-Aid)  Sodas (Sprite, 7-Up, ginger ale and seltzer)  Coffee or tea (without milk or cream)  Gatorade    -  No Alcohol for at least 24 hours before surgery     Which medicines can I take?  Hold Aspirin for 7 days before surgery.   Hold Multivitamins for 7 days before surgery.  Hold Supplements for 7 days before surgery.  *Hold Ibuprofen (Advil, Motrin) for 1 day before surgery--unless otherwise directed by surgeon.  Hold Naproxen (Aleve) for 4 days before surgery.    -  PLEASE TAKE these medications the day of surgery:  Exemestane (Aromasin), Gabapentin, Hydrochlorothiazide  Albuterol inhaler if needed  Acetaminophen (Tylenol) if needed    How do I prepare myself?  -  Bring Albuterol inhaler the day of surgery.  - Please take 2 showers  before surgery using Scrubcare or Hibiclens soap.    Use this soap only from the neck to your toes.     Leave the soap on your skin for one minute--then rinse thoroughly.      You may use your own shampoo and conditioner; no other hair products.   - Please remove all jewelry and body piercings.  - No lotions, deodorants or fragrance.  - No makeup or fingernail polish.   - Bring your ID and insurance card.    - All patients are required to have a Covid-19 test within 4 days of surgery/procedure.      -Patients will be contacted by the [facility] scheduling team within 1 week of surgery to make an appointment.      - Patients may call the Scheduling team at [phone] if they have not been scheduled within 4 days of  surgery.      ALL PATIENTS GOING HOME THE SAME DAY OF SURGERY ARE REQUIRED TO HAVE A RESPONSIBLE ADULT TO DRIVE AND BE IN ATTENDANCE WITH THEM FOR 24 HOURS FOLLOWING SURGERY     Questions or Concerns:    - For any questions regarding the day of surgery or your hospital stay, please contact the Pre Admission Nursing Office at [phone].       - If you have health changes between today and your surgery please call your surgeon.       For questions after surgery please call your surgeons office.     AFTER YOUR SURGERY  Breathing exercises   Breathing exercises help you recover faster. Take deep breaths and let the air out slowly. This will:     Help you wake up after surgery.    Help prevent complications like pneumonia.  Preventing complications will help you go home sooner.   Nausea and vomiting   You may feel sick to your stomach after surgery; if so, let your nurse know.    Pain control:  After surgery, you may have pain. Our goal is to help you manage your pain. Pain medicine will help you feel comfortable enough to do activities that will help you heal.  These activities may include breathing exercises, walking and physical therapy.   To help your health care team treat your pain we will  ask: 1) If you have pain  2) where it is located 3) describe your pain in your words  Methods of pain control include medications given by mouth, vein or by nerve block for some surgeries.  Sequential Compression Device (SCD):  You may need to wear SCD S (also called pneumo boots)on your legs or feet. These are wraps connected to a machine that pumps in air and releases it. The repeated pumping helps prevent blood clots from forming.

## 2020-08-12 NOTE — H&P
Pre-Operative H & P     CC:  Preoperative exam to assess for increased cardiopulmonary risk while undergoing surgery and anesthesia.    Date of Encounter: 8/12/2020  Primary Care Physician:  Eliazar Menendez  Associated Diagnosis: left side trigeminal neuralgia    HPI  Marissa Guadarrama is a 70 year old female who presents for pre-operative H & P in preparation for ANESTHESIA OUT OF OR Left Radio Frequency Rhizotomy with Dr. Angulo on 8/14/2020 at [facility]. MAC with local anesthesia.    Patient is a 70 year old female with PMH significant for hypertension, hyperlipidemia, obesity, CRI, bilateral breast cancer s/p mastectomies and radiation 2016, cervical cancer 2007 s/p OSMAN BSO now on exemestane (followed by Dr. 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 Arango), and post polio syndrome.  She has had trigeminal neuralgia for many years and tried many treatments to include gabapentin, tegretol, muscle relaxers, topical analgesics.  Her pain is left sided and shearing/stabbing in nature.  It is triggered by chewing, talking and even light wind.  She wishes to proceed with a more definitive treatment for her symptoms and has been counseled on the above procedure.      History is obtained from the patient and chart review.     Past Medical History  Past Medical History:   Diagnosis Date     Arthritis     knee     Benign breast biopsy     benign     Carcinoid tumor 12/2003     Cervical cancer (H) 2007     H/O colposcopy with cervical biopsy 12/23/13    vaginal cuff biopsy- VAIN III. referred back to gyn/onc     HTN      Hyperlipidemia      Obesity      Pap smear of vagina with ASC-H 11/1/13     Post-polio syndromes      Trigeminal neuralgias        Past Surgical History  Past Surgical History:   Procedure Laterality Date     APPENDECTOMY  1983     BIOPSY NODE SENTINEL Bilateral 6/1/2016    Procedure: BIOPSY NODE SENTINEL;   Surgeon: Brent Arana MD;  Location: [facility]     C BSO, OMENTECTOMY W/OSMAN  5/2007     C TOTAL ABDOM HYSTERECTOMY  5/2007     CL AFF SURGICAL PATHOLOGY       COLONOSCOPY N/A 6/23/2017    Procedure: COMBINED COLONOSCOPY, SINGLE OR MULTIPLE BIOPSY/POLYPECTOMY BY BIOPSY;  Colonoscopy Dx:Carcinoid tumor of colon prep mailed golytely;  Surgeon: Talisha Greco MD;  [facility]     COLPOSCOPY, BIOPSY, COMBINED  3/13/2014    Procedure: COMBINED COLPOSCOPY, BIOPSY;;  Surgeon: Lara Pack MD;  [facility]     COMBINED CYSTOSCOPY, RETROGRADES, EXCHANGE STENT URETER(S) Left 2/7/2019    Procedure: COMBINED CYSTOSCOPY, RETROGRADES, EXCHANGE STENT URETER--left;  Surgeon: Zhao La MD;  Location: [facility]     COMBINED CYSTOSCOPY, RETROGRADES, EXCHANGE STENT URETER(S) Left 2/20/2020    Procedure: CYSTOSCOPY, WITH RETROGRADE PYELOGRAM AND Left URETERAL STENT exchange;  Surgeon: Zhao La MD;  Location: [facility]     COMBINED CYSTOSCOPY, RETROGRADES, URETEROSCOPY, INSERT STENT Left 2/2/2017    Procedure: COMBINED CYSTOSCOPY, RETROGRADES, URETEROSCOPY, INSERT STENT;  Surgeon: Zhao La MD;  [facility]     CYSTOSCOPY, RETROGRADES, INSERT STENT URETER(S), COMBINED Left 9/7/2017    Procedure: COMBINED CYSTOSCOPY, RETROGRADES, INSERT STENT URETER(S);  Cystoscopy,Left Stent Exchange;  Surgeon: Zhao La MD;  [facility]     CYSTOSCOPY, RETROGRADES, INSERT STENT URETER(S), COMBINED  12/12/2017    Procedure: COMBINED CYSTOSCOPY, RETROGRADES, INSERT STENT URETER(S);;  Surgeon: Zhao La MD;  [facility]     CYSTOSCOPY, RETROGRADES, INSERT STENT URETER(S), COMBINED Left 7/5/2018    Procedure: COMBINED CYSTOSCOPY, RETROGRADES, INSERT STENT URETER(S);  Cystoscopy, Left Stent Exchange;  Surgeon: Zhao La MD;  [facility]: [facility]     EXAM UNDER ANESTHESIA PELVIC  3/13/2014    Procedure: EXAM UNDER ANESTHESIA  PELVIC;  Exam Under Anestheisa, Colposcopy, Vaginal Biopsies, Co2 Laser of the Upper Vagina;  Surgeon: Lara Pack MD;  [facility]     EXAM UNDER ANESTHESIA PELVIC N/A 7/1/2020    Procedure: Examination under anesthesia, vaginal biopsies;  Surgeon: Karli Gao MD;  [facility]     HERNIORRHAPHY INCISIONAL (LOCATION)       LASER CO2 VAGINA  3/13/2014    VAIN 1/2     LASER CO2 VAGINA N/A 12/12/2017    Procedure: LASER CO2 VAGINA;  Exam Under Anesthesia, CO2 Laser Ablation Of Vagina, Cystoscopy, Left Retrograde Pyelogram with Left Stent Placement;  Surgeon: Nic Segundo MD;  [facility]     LUMPECTOMY BREAST WITH SEED LOCALIZATION Bilateral 6/1/2016    Procedure: LUMPECTOMY BREAST WITH SEED LOCALIZATION;  Surgeon: Brent Arana MD;  Location: [facility]     SURGICAL HISTORY OF -       ovarian cystectomy     SURGICAL HISTORY OF -   2003    right colon resection secondary to carcinoid tumor     TUBAL LIGATION         Hx of Blood transfusions/reactions: denies     Hx of abnormal bleeding or anti-platelet use: denies    Menstrual history: No LMP recorded. Patient has had a hysterectomy.:     Steroid use in the last year: denies    Personal or FH with difficulty with Anesthesia:  denies    Prior to Admission Medications  Current Outpatient Medications   Medication Sig Dispense Refill     exemestane (AROMASIN) 25 MG tablet Take 1 tablet (25 mg) by mouth every morning 90 tablet 3     gabapentin (NEURONTIN) 600 MG tablet Take 1 tablet (600 mg) by mouth 3 times daily 90 tablet 3     hydrochlorothiazide (HYDRODIURIL) 25 MG tablet TAKE A HALF TABLET BY MOUTH ONCE DAILY IN THE MORNING. Hold on file until needed. (Patient taking differently: Take 12.5 mg by mouth every morning TAKE A HALF TABLET BY MOUTH ONCE DAILY IN THE MORNING. Hold on file until needed.) 45 tablet 3     albuterol (PROAIR HFA/PROVENTIL HFA/VENTOLIN HFA) 108 (90 Base) MCG/ACT inhaler Inhale 2 puffs into the lungs every 6 hours  as needed for shortness of breath / dyspnea or wheezing Hold on file until needed. 1 Inhaler 1     ibuprofen (ADVIL) 200 MG tablet Take 200 mg by mouth every 4 hours as needed for mild pain         Allergies  No Known Allergies    Social History  Social History     Socioeconomic History     Marital status:      Spouse name: Not on file     Number of children: Not on file     Years of education: Not on file     Highest education level: Not on file   Occupational History     Not on file   Social Needs     Financial resource strain: Not on file     Food insecurity     Worry: Not on file     Inability: Not on file     Transportation needs     Medical: Not on file     Non-medical: Not on file   Tobacco Use     Smoking status: Former Smoker     Packs/day: 1.00     Years: 29.00     Pack years: 29.00     Types: Cigarettes     Last attempt to quit: 10/30/2006     Years since quittin.7     Smokeless tobacco: Never Used   Substance and Sexual Activity     Alcohol use: No     Alcohol/week: 0.0 standard drinks     Comment: 1 drink per year     Drug use: No     Sexual activity: Yes     Partners: Male   Lifestyle     Physical activity     Days per week: Not on file     Minutes per session: Not on file     Stress: Not on file   Relationships     Social connections     Talks on phone: Not on file     Gets together: Not on file     Attends Anabaptism service: Not on file     Active member of club or organization: Not on file     Attends meetings of clubs or organizations: Not on file     Relationship status: Not on file     Intimate partner violence     Fear of current or ex partner: Not on file     Emotionally abused: Not on file     Physically abused: Not on file     Forced sexual activity: Not on file   Other Topics Concern      Service No     Blood Transfusions No     Caffeine Concern Yes     Comment: occasional coffee     Occupational Exposure No     Hobby Hazards Yes     Comment: [place],     Sleep Concern Yes      Comment: not sleeping well     Stress Concern Yes     Comment: Grandson in the      Weight Concern Yes     Special Diet No     Back Care No     Exercise No     Bike Helmet Not Asked     Seat Belt Yes     Self-Exams Yes     Parent/sibling w/ CABG, MI or angioplasty before 65F 55M? No   Social History Narrative     Not on file       Family History  Family History   Problem Relation Age of Onset     Cancer Mother         bone / liver     Breast Cancer Mother      Cancer Maternal Grandmother      Cancer Maternal Grandfather      Cancer Paternal Grandmother      Cancer Paternal Grandfather      Cancer Sister         vulvar ca, cervical ca, squamous cell cancer     Cancer Brother         Rectal- Stage 4     Rectal Cancer Brother      Breast Cancer Paternal Aunt      Colon Cancer Paternal Aunt      Breast Cancer Maternal Aunt      Pancreatic Cancer Nephew      Breast Cancer Sister      Anesthesia Reaction No family hx of            Anesthesia Evaluation     . Pt has had prior anesthetic.     No history of anesthetic complications          ROS/MED HX  The complete review of systems is negative other than noted in the HPI or here.   ENT/Pulmonary:  - neg pulmonary ROS   (+)tobacco use (distant use), Past use , . .    Neurologic: Comment: Trigeminal neuralgia    (+)other neuro post-polio syndrome    Cardiovascular:     (+) Dyslipidemia, hypertension----. : . . . :. . Previous cardiac testing Echodate:11/2016results:Interpretation Summary  Global and regional left ventricular function is normal with an EF of 60-65%.  Global right ventricular function is normal.  No significant valvular dysfunction present.  Pulmonary artery systolic pressure is normal.  The inferior vena cava was normal in size with preserved respiratory  variability.  No pericardial effusion is present  date: results:ECG reviewed date:2/17/20 results:NSR, normal EKG date: results:         (-) taking anticoagulants/antiplatelets   METS/Exercise  "Tolerance: Comment: [place], does housework. H/o polio, so limited activity due to left leg weakness.  3 - Able to walk 1-2 blocks without stopping   Hematologic:  - neg hematologic  ROS      (-) history of blood clots   Musculoskeletal:   (+) arthritis,  -       GI/Hepatic:  - neg GI/hepatic ROS       Renal/Genitourinary:     (+) chronic renal disease, type: CRI,       Endo:     (+) Obesity, .      Psychiatric:  - neg psychiatric ROS       Infectious Disease:  - neg infectious disease ROS       Malignancy:   (+) Malignancy   Carcinoid tumor of cecum- stable.  Recent cystoscopy with replacement of ureteral stent.    Early stage breast cancer on exemestane    H/o cervical cancer in 2007; current HSIL        Other:    (+) No chance of pregnancy no H/O Chronic Pain,           PHYSICAL EXAM:   Mental Status/Neuro: A/A/O; Age Appropriate   Airway: Facies: Feasible  Mallampati: I  Mouth/Opening: Full  TM distance: > 6 cm  Neck ROM: Full   Respiratory: Auscultation: CTAB     Resp. Rate: Normal     Resp. Effort: Normal      CV: Rhythm: Regular  Rate: Age appropriate  Heart: Normal Sounds  Edema: None   Comments:      Dental: Normal Dentition              Temp: 97.8  F (36.6  C) Temp src: Oral BP: (!) 137/99 Pulse: 64   Resp: 16 SpO2: 97 %         238 lbs 0 oz  5' 7.5\"   Body mass index is 36.73 kg/m .       Physical Exam  Constitutional: Awake, alert, cooperative, no apparent distress, and appears stated age.  Eyes: Pupils equal, round and reactive to light, extra ocular muscles intact, sclera clear, conjunctiva normal.  HENT: Normocephalic, oral pharynx with moist mucus membranes, good dentition. No goiter appreciated.   Respiratory: Clear to auscultation bilaterally, no crackles or wheezing.  Cardiovascular: Regular rate and rhythm, normal S1 and S2, and no murmur noted.  Carotids +2, no bruits. Bialteral LE edema, not pitting. Palpable pulses to radial.  DP and PT arteries difficult to palpate.   GI: Normal bowel sounds, " soft, obese abdomen  Lymph/Hematologic: No cervical lymphadenopathy and no supraclavicular lymphadenopathy.  Genitourinary:  deferred  Skin: Warm and dry.  No rashes at anticipated surgical site.   Musculoskeletal: Full ROM of neck. There is no redness, warmth, or swelling of the joints. Gross motor strength is normal.    Neurologic: Awake, alert, oriented to name, place and time. Cranial nerves II-XII are grossly intact.  Neuropsychiatric: Calm, cooperative. Normal affect.     Labs: (personally reviewed)  Component      Latest Ref Rng & Units 8/11/2020   Sodium      133 - 144 mmol/L 141   Potassium      3.4 - 5.3 mmol/L 3.5   Chloride      94 - 109 mmol/L 104   Carbon Dioxide      20 - 32 mmol/L 34 (H)   Anion Gap      3 - 14 mmol/L 3   Glucose      70 - 99 mg/dL 100 (H)   Urea Nitrogen      7 - 30 mg/dL 30   Creatinine      0.52 - 1.04 mg/dL 1.13 (H)   GFR Estimate      >60 mL/min/1.73:m2 48 (L)   GFR Estimate If Black      >60 mL/min/1.73:m2 56 (L)   Calcium      8.5 - 10.1 mg/dL 9.8     Component      Latest Ref Rng & Units 8/11/2020   WBC      4.0 - 11.0 10e9/L 7.3   RBC Count      3.8 - 5.2 10e12/L 5.07   Hemoglobin      11.7 - 15.7 g/dL 15.3   Hematocrit      35.0 - 47.0 % 47.0   MCV      78 - 100 fl 93   MCH      26.5 - 33.0 pg 30.2   MCHC      31.5 - 36.5 g/dL 32.6   RDW      10.0 - 15.0 % 13.1   Platelet Count      150 - 450 10e9/L 195     Component      Latest Ref Rng & Units 8/11/2020   INR      0.86 - 1.14 1.02     Echo 11/8/2016    Interpretation Summary  Global and regional left ventricular function is normal with an EF of 60-65%.  Global right ventricular function is normal.  No significant valvular dysfunction present.  Pulmonary artery systolic pressure is normal.  The inferior vena cava was normal in size with preserved respiratory  variability.  No pericardial effusion is present.    EKG 2/17/20  NSR    MRI BRAIN WITHOUT AND WITH CONTRAST  6/29/2020 11:49 AM   IMPRESSION:    1. Slight abutment  without obvious compression of the 5th cranial  nerve in its mid cisternal course by a vascular structure. No  significant abutment or compression of the left 5th cranial nerve  along its cisternal course. No abnormal masses or enhancement along  Meckel's cave or in the cavernous sinus.  2. Unremarkable MRI of the brain parenchyma without acute infarct,  mass, hemorrhage, or herniation.      Outside records reviewed from: care everywhere    ASSESSMENT and PLAN  Marissa Guadarrama is a 70 year old female scheduled for ANESTHESIA OUT OF OR Left Radio Frequency Rhizotomy on 8/14/2020 by Dr. Angulo in treatment of V2 trigeminal neuralgia, left.  PAC referral for risk assessment and optimization for anesthesia with comorbid conditions of hypertension, dyslipidemia, obesity, carcinoid tumor of the cecum, breast cancer, h/o cervical cancer, and CRI:    Pre-operative considerations:  1.  Cardiac:  Functional status- METS 3. No reported cardiac history. Denies cardiac symptoms.  Hypertension, continue hydrochlorothiazide- reports she monitors BP at home- reports 123/ 60s today. low risk surgery with 0.4% (RCRI #) risk of major adverse cardiac event. Testing as above.  2.  Pulm:  Airway feasible.  BELL risk: intermediate. Denies pulmonary symptoms. Former smoker, quit 2006. COVID testing ordered per surgery team.  3.  GI:  Risk of PONV score = 2.  If > 2, anti-emetic intervention recommended.  4.  Neuro: trigeminal neuralgia- currently using gabapentin TID, procedure as above.   5.  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 Lawrence- stable, most recent ureteral stent exchange 2/2020. Early stage breast cancer diagnosed 2016 s/p mastectomies and radiation, continue exemestane- followed by Dr. King.  H/o cervical cancer in 2007 s/p OSMAN BSO.  Current HSIL on PAP s/p EUA with negative biopsies.  6.  MSK: post-polio syndrome.  She uses walker for weakness in LE.  7.  Renal: CRI with  creatinine of 1.13  8/11/2020  8. Access: difficult IV access        VTE risk: 3%    Patient is optimized and is acceptable candidate for the proposed procedure.  No further diagnostic evaluation is needed.       Misa Watson PA-C  Preoperative Assessment Center  [facility]  Clinic and Surgery Center  Phone: [phone]  Fax: [phone]

## 2020-08-12 NOTE — ANESTHESIA PREPROCEDURE EVALUATION
"Anesthesia Pre-Procedure Evaluation    Patient: Marissa Guadarrama   MRN:     [id number] Gender:   female   Age:    70 year old :      1950        Preoperative Diagnosis: * No surgery found *        LABS:  CBC:   Lab Results   Component Value Date    WBC 7.3 2020    WBC 6.9 2020    HGB 15.3 2020    HGB 15.0 2020    HCT 47.0 2020    HCT 46.4 2020     2020     2020     BMP:   Lab Results   Component Value Date     2020     2020    POTASSIUM 3.5 2020    POTASSIUM 4.6 2020    CHLORIDE 104 2020    CHLORIDE 104 2020    CO2 34 (H) 2020    CO2 30 2020    BUN 30 2020    BUN 27 2020    CR 1.13 (H) 2020    CR 1.24 (H) 2020     (H) 2020    GLC 98 2020     COAGS:   Lab Results   Component Value Date    INR 1.02 2020     POC:   Lab Results   Component Value Date     (H) 2017     OTHER:   Lab Results   Component Value Date    MARILIA 9.8 2020    ALBUMIN 3.4 2020    PROTTOTAL 7.6 2020    ALT 38 2020    AST 25 2020    ALKPHOS 100 2020    BILITOTAL 0.3 2020    TSH 2.54 2015        Preop Vitals    BP Readings from Last 3 Encounters:   20 (!) 141/70   20 134/83   20 (!) 160/87    Pulse Readings from Last 3 Encounters:   20 52   20 64   20 69      Resp Readings from Last 3 Encounters:   20 16   20 14   20 16    SpO2 Readings from Last 3 Encounters:   20 97%   20 95%   20 96%      Temp Readings from Last 1 Encounters:   20 97.2  F (36.2  C) (Temporal)    Ht Readings from Last 1 Encounters:   20 1.715 m (5' 7.5\")      Wt Readings from Last 1 Encounters:   20 110.7 kg (244 lb)    Estimated body mass index is 37.65 kg/m  as calculated from the following:    Height as of 20: 1.715 m (5' 7.5\").    Weight as of 20: " 110.7 kg (244 lb).     LDA:  Peripheral IV 02/20/20 Right Wrist (Active)   Number of days: 174       Ureteral Drain/Stent Left ureter 6 fr (Active)   Number of days: 174        Past Medical History:   Diagnosis Date     Arthritis     knee     Benign breast biopsy     benign     Carcinoid tumor 12/2003     Cervical cancer (H) 2007     H/O colposcopy with cervical biopsy 12/23/13    vaginal cuff biopsy- VAIN III. referred back to gyn/onc     HTN      Hyperlipidemia      Obesity      Pap smear of vagina with ASC-H 11/1/13     Post-polio syndromes      Trigeminal neuralgias       Past Surgical History:   Procedure Laterality Date     APPENDECTOMY  1983     BIOPSY NODE SENTINEL Bilateral 6/1/2016    Procedure: BIOPSY NODE SENTINEL;  Surgeon: Brent Arana MD;  Location: [facility]     C BSO, OMENTECTOMY W/OSMAN  5/2007     C TOTAL ABDOM HYSTERECTOMY  5/2007     CL AFF SURGICAL PATHOLOGY       COLONOSCOPY N/A 6/23/2017    Procedure: COMBINED COLONOSCOPY, SINGLE OR MULTIPLE BIOPSY/POLYPECTOMY BY BIOPSY;  Colonoscopy Dx:Carcinoid tumor of colon prep mailed [facility];  Surgeon: Talisha Greco MD;  [facility]     COLPOSCOPY, BIOPSY, COMBINED  3/13/2014    Procedure: COMBINED COLPOSCOPY, BIOPSY;;  Surgeon: Lara Pack MD;  [facility]     COMBINED CYSTOSCOPY, RETROGRADES, EXCHANGE STENT URETER(S) Left 2/7/2019    Procedure: COMBINED CYSTOSCOPY, RETROGRADES, EXCHANGE STENT URETER--left;  Surgeon: Zhao La MD;  Location: [facility]     COMBINED CYSTOSCOPY, RETROGRADES, EXCHANGE STENT URETER(S) Left 2/20/2020    Procedure: CYSTOSCOPY, WITH RETROGRADE PYELOGRAM AND Left URETERAL STENT exchange;  Surgeon: Zhao La MD;  Location: [facility]     COMBINED CYSTOSCOPY, RETROGRADES, URETEROSCOPY, INSERT STENT Left 2/2/2017    Procedure: COMBINED CYSTOSCOPY, RETROGRADES, URETEROSCOPY, INSERT STENT;  Surgeon: Zhao La MD;  Location: [facility]     CYSTOSCOPY, RETROGRADES, INSERT  STENT URETER(S), COMBINED Left 9/7/2017    Procedure: COMBINED CYSTOSCOPY, RETROGRADES, INSERT STENT URETER(S);  Cystoscopy,Left Stent Exchange;  Surgeon: Zhao La MD;  Location: [facility]     CYSTOSCOPY, RETROGRADES, INSERT STENT URETER(S), COMBINED  12/12/2017    Procedure: COMBINED CYSTOSCOPY, RETROGRADES, INSERT STENT URETER(S);;  Surgeon: Zhao La MD;  [facility]     CYSTOSCOPY, RETROGRADES, INSERT STENT URETER(S), COMBINED Left 7/5/2018    Procedure: COMBINED CYSTOSCOPY, RETROGRADES, INSERT STENT URETER(S);  Cystoscopy, Left Stent Exchange;  Surgeon: Zhao La MD;  Location: [facility]     EXAM UNDER ANESTHESIA PELVIC  3/13/2014    Procedure: EXAM UNDER ANESTHESIA PELVIC;  Exam Under Anestheisa, Colposcopy, Vaginal Biopsies, Co2 Laser of the Upper Vagina;  Surgeon: Lara Pack MD;  [facility]     EXAM UNDER ANESTHESIA PELVIC N/A 7/1/2020    Procedure: Examination under anesthesia, vaginal biopsies;  Surgeon: Karli Gao MD;  [facility]     HERNIORRHAPHY INCISIONAL (LOCATION)       LASER CO2 VAGINA  3/13/2014    VAIN 1/2     LASER CO2 VAGINA N/A 12/12/2017    Procedure: LASER CO2 VAGINA;  Exam Under Anesthesia, CO2 Laser Ablation Of Vagina, Cystoscopy, Left Retrograde Pyelogram with Left Stent Placement;  Surgeon: Nic Segundo MD;  [facility]     LUMPECTOMY BREAST WITH SEED LOCALIZATION Bilateral 6/1/2016    Procedure: LUMPECTOMY BREAST WITH SEED LOCALIZATION;  Surgeon: Brent Arana MD;  Location: [facility]     SURGICAL HISTORY OF -       ovarian cystectomy     SURGICAL HISTORY OF -   2003    right colon resection secondary to carcinoid tumor     TUBAL LIGATION        No Known Allergies     Anesthesia Evaluation     . Pt has had prior anesthetic.     No history of anesthetic complications          ROS/MED HX    ENT/Pulmonary:  - neg pulmonary ROS   (+)tobacco use (distant use), Past use , . .    Neurologic: Comment:  Trigeminal neuralgia    (+)other neuro post-polio syndrome    Cardiovascular:     (+) Dyslipidemia, hypertension----. : . . . :. . Previous cardiac testing Echodate:11/2016results:Interpretation Summary  Global and regional left ventricular function is normal with an EF of 60-65%.  Global right ventricular function is normal.  No significant valvular dysfunction present.  Pulmonary artery systolic pressure is normal.  The inferior vena cava was normal in size with preserved respiratory  variability.  No pericardial effusion is present  date: results:ECG reviewed date:2/17/20 results:NSR, normal EKG date: results:         (-) taking anticoagulants/antiplatelets   METS/Exercise Tolerance: Comment: [place], does housework. H/o polio, so limited activity due to left leg weakness.  3 - Able to walk 1-2 blocks without stopping   Hematologic:  - neg hematologic  ROS      (-) history of blood clots   Musculoskeletal:   (+) arthritis,  -       GI/Hepatic:  - neg GI/hepatic ROS       Renal/Genitourinary:     (+) chronic renal disease, type: CRI,       Endo:     (+) Obesity, .      Psychiatric:  - neg psychiatric ROS       Infectious Disease:  - neg infectious disease ROS       Malignancy:   (+) Malignancy   Carcinoid tumor of cecum- stable.  Recent cystoscopy with replacement of ureteral stent.    Early stage breast cancer on exemestane    H/o cervical cancer in 2007; current HSIL        Other:    (+) No chance of pregnancy no H/O Chronic Pain,                       PHYSICAL EXAM:   Mental Status/Neuro: A/A/O; Age Appropriate   Airway: Facies: Feasible  Mallampati: I  Mouth/Opening: Full  TM distance: > 6 cm  Neck ROM: Full   Respiratory: Auscultation: CTAB     Resp. Rate: Normal     Resp. Effort: Normal      CV: Rhythm: Regular  Rate: Age appropriate  Heart: Normal Sounds  Edema: None   Comments:      Dental: Normal Dentition                Assessment:   ASA SCORE: 3    H&P: History and physical reviewed and following  examination; no interval change.   Smoking Status:  Non-Smoker/Unknown   NPO Status: NPO Appropriate     Plan:   Anes. Type:  MAC   Pre-Medication: None   Induction:  N/a   Airway: Native Airway   Access/Monitoring: PIV   Maintenance: N/a     Postop Plan:   Postop Pain: None  Postop Sedation/Airway: Not planned  Disposition: Outpatient     PONV Management:   Adult Risk Factors: Female, Non-Smoker   Prevention: Ondansetron     CONSENT: Direct conversation   Plan and risks discussed with: Patient   Blood Products: Consent Deferred (Minimal Blood Loss)                PAC Discussion and Assessment    ASA Classification: 3  Case is suitable for: [place]  Anesthetic techniques and relevant risks discussed: MAC with GA as backup  Invasive monitoring and risk discussed: No  Types:   Possibility and Risk of blood transfusion discussed: No  NPO instructions given:   Additional anesthetic preparation and risks discussed:   Needs early admission to pre-op area:   Other:     PAC Resident/NP Anesthesia Assessment:  Marissa Guadarrama is a 70 year old female scheduled for ANESTHESIA OUT OF OR Left Radio Frequency Rhizotomy on 8/14/2020 by Dr. Angulo in treatment of V2 trigeminal neuralgia, left.  PAC referral for risk assessment and optimization for anesthesia with comorbid conditions of hypertension, dyslipidemia, obesity, carcinoid tumor of the cecum, breast cancer, h/o cervical cancer, CRI:    Pre-operative considerations:  1.  Cardiac:  Functional status- METS 3. No reported cardiac history. Denies cardiac symptoms.  Hypertension, continue hydrochlorothiazide- reports she monitors BP at home- reports 123/ 60s today. low risk surgery with 0.4% (RCRI #) risk of major adverse cardiac event. Testing as above.  2.  Pulm:  Airway feasible.  BELL risk: intermediate. Denies pulmonary symptoms. Former smoker, quit 2006. COVID testing ordered per surgery team.  3.  GI:  Risk of PONV score = 2.  If > 2, anti-emetic intervention  recommended.  4.  Neuro: trigeminal neuralgia- currently using gabapentin TID, procedure as above.   5.  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 Lawrence- stable, most recent ureteral stent exchange 2/2020. Early stage breast cancer diagnosed 2016 s/p mastectomies and radiation, continue exemestane- followed by Dr. King.  H/o cervical cancer in 2007 s/p OSMAN BSO.  Current HSIL on PAP s/p EUA with negative biopsies.  6.  MSK: post-polio syndrome.  She uses walker for weakness in LE.  7.  Renal: CRI with creatinine of 1.13  8/11/2020  8. Access: difficult IV access          VTE risk: 3%    Patient is optimized and is acceptable candidate for the proposed procedure.  No further diagnostic evaluation is needed.         **For further details of assessment, testing, and physical exam please see H and P completed on same date.          Misa Watson PA-C, [facility]      Reviewed and Signed by PAC Mid-Level Provider/Resident  Mid-Level Provider/Resident: Misa Watson  Date: 8/12/2020  Time:     Attending Anesthesiologist Anesthesia Assessment:        Anesthesiologist:   Date:   Time:   Pass/Fail:   Disposition:     PAC Pharmacist Assessment:        Pharmacist:   Date:   Time:    Misa Watson PA-C

## 2020-08-13 LAB
ABO + RH BLD: NORMAL
ABO + RH BLD: NORMAL
BLD GP AB SCN SERPL QL: NORMAL
BLOOD BANK CMNT PATIENT-IMP: NORMAL
BLOOD BANK CMNT PATIENT-IMP: NORMAL
SPECIMEN EXP DATE BLD: NORMAL

## 2020-08-14 ENCOUNTER — APPOINTMENT (OUTPATIENT)
Dept: INTERVENTIONAL RADIOLOGY/VASCULAR | Facility: CLINIC | Age: 70
End: 2020-08-14
Attending: NEUROLOGICAL SURGERY
Payer: MEDICARE

## 2020-08-14 ENCOUNTER — HOSPITAL ENCOUNTER (OUTPATIENT)
Facility: CLINIC | Age: 70
Discharge: HOME | End: 2020-08-14
Attending: ANESTHESIOLOGY | Admitting: NEUROLOGICAL SURGERY
Payer: MEDICARE

## 2020-08-14 ENCOUNTER — ANESTHESIA (OUTPATIENT)
Dept: SURGERY | Facility: CLINIC | Age: 70
End: 2020-08-14
Payer: MEDICARE

## 2020-08-14 VITALS
RESPIRATION RATE: 16 BRPM | OXYGEN SATURATION: 96 % | DIASTOLIC BLOOD PRESSURE: 66 MMHG | HEART RATE: 70 BPM | TEMPERATURE: 98 F | SYSTOLIC BLOOD PRESSURE: 137 MMHG | BODY MASS INDEX: 37.47 KG/M2 | HEIGHT: 67 IN | WEIGHT: 238.76 LBS

## 2020-08-14 DIAGNOSIS — G50.0 TRIGEMINAL NEURALGIA: ICD-10-CM

## 2020-08-14 LAB — GLUCOSE BLDC GLUCOMTR-MCNC: 106 MG/DL (ref 70–99)

## 2020-08-14 PROCEDURE — 25000128 H RX IP 250 OP 636: Performed by: NURSE ANESTHETIST, CERTIFIED REGISTERED

## 2020-08-14 PROCEDURE — 25800030 ZZH RX IP 258 OP 636: Performed by: ANESTHESIOLOGY

## 2020-08-14 PROCEDURE — 25000128 H RX IP 250 OP 636: Performed by: ANESTHESIOLOGY

## 2020-08-14 PROCEDURE — 61791 TREAT TRIGEMINAL TRACT: CPT

## 2020-08-14 PROCEDURE — 82962 GLUCOSE BLOOD TEST: CPT

## 2020-08-14 PROCEDURE — 71000027 ZZH RECOVERY PHASE 2 EACH 15 MINS

## 2020-08-14 PROCEDURE — 37000009 ZZH ANESTHESIA TECHNICAL FEE, EACH ADDTL 15 MIN

## 2020-08-14 PROCEDURE — 25000125 ZZHC RX 250: Performed by: NURSE ANESTHETIST, CERTIFIED REGISTERED

## 2020-08-14 PROCEDURE — 27210732 ZZH ACCESSORY CR1

## 2020-08-14 PROCEDURE — 40000170 ZZH STATISTIC PRE-PROCEDURE ASSESSMENT II

## 2020-08-14 PROCEDURE — 71000014 ZZH RECOVERY PHASE 1 LEVEL 2 FIRST HR

## 2020-08-14 PROCEDURE — 25000132 ZZH RX MED GY IP 250 OP 250 PS 637: Mod: GY | Performed by: ANESTHESIOLOGY

## 2020-08-14 PROCEDURE — 37000008 ZZH ANESTHESIA TECHNICAL FEE, 1ST 30 MIN

## 2020-08-14 RX ORDER — HYDROMORPHONE HYDROCHLORIDE 1 MG/ML
.3-.5 INJECTION, SOLUTION INTRAMUSCULAR; INTRAVENOUS; SUBCUTANEOUS EVERY 5 MIN PRN
Status: DISCONTINUED | OUTPATIENT
Start: 2020-08-14 | End: 2020-08-14 | Stop reason: HOSPADM

## 2020-08-14 RX ORDER — ONDANSETRON 2 MG/ML
4 INJECTION INTRAMUSCULAR; INTRAVENOUS EVERY 30 MIN PRN
Status: DISCONTINUED | OUTPATIENT
Start: 2020-08-14 | End: 2020-08-14 | Stop reason: HOSPADM

## 2020-08-14 RX ORDER — LABETALOL 20 MG/4 ML (5 MG/ML) INTRAVENOUS SYRINGE
PRN
Status: DISCONTINUED | OUTPATIENT
Start: 2020-08-14 | End: 2020-08-14

## 2020-08-14 RX ORDER — NAPHAZOLINE/ZINC SULF/GLYCERIN 0.012-0.25
2 DROPS OPHTHALMIC (EYE) EVERY 4 HOURS
Qty: 1 BOTTLE | Refills: 0 | Status: SHIPPED | OUTPATIENT
Start: 2020-08-14 | End: 2020-08-28

## 2020-08-14 RX ORDER — SODIUM CHLORIDE, SODIUM LACTATE, POTASSIUM CHLORIDE, CALCIUM CHLORIDE 600; 310; 30; 20 MG/100ML; MG/100ML; MG/100ML; MG/100ML
INJECTION, SOLUTION INTRAVENOUS CONTINUOUS
Status: DISCONTINUED | OUTPATIENT
Start: 2020-08-14 | End: 2020-08-14 | Stop reason: HOSPADM

## 2020-08-14 RX ORDER — OXYCODONE HYDROCHLORIDE 5 MG/1
5 TABLET ORAL EVERY 4 HOURS PRN
Status: DISCONTINUED | OUTPATIENT
Start: 2020-08-14 | End: 2020-08-14 | Stop reason: HOSPADM

## 2020-08-14 RX ORDER — NALOXONE HYDROCHLORIDE 0.4 MG/ML
.1-.4 INJECTION, SOLUTION INTRAMUSCULAR; INTRAVENOUS; SUBCUTANEOUS
Status: DISCONTINUED | OUTPATIENT
Start: 2020-08-14 | End: 2020-08-14 | Stop reason: HOSPADM

## 2020-08-14 RX ORDER — ONDANSETRON 4 MG/1
4 TABLET, ORALLY DISINTEGRATING ORAL EVERY 30 MIN PRN
Status: DISCONTINUED | OUTPATIENT
Start: 2020-08-14 | End: 2020-08-14 | Stop reason: HOSPADM

## 2020-08-14 RX ORDER — LIDOCAINE 40 MG/G
CREAM TOPICAL
Status: DISCONTINUED | OUTPATIENT
Start: 2020-08-14 | End: 2020-08-14 | Stop reason: HOSPADM

## 2020-08-14 RX ORDER — FENTANYL CITRATE 50 UG/ML
25-50 INJECTION, SOLUTION INTRAMUSCULAR; INTRAVENOUS
Status: DISCONTINUED | OUTPATIENT
Start: 2020-08-14 | End: 2020-08-14 | Stop reason: HOSPADM

## 2020-08-14 RX ADMIN — FENTANYL CITRATE 25 MCG: 50 INJECTION, SOLUTION INTRAMUSCULAR; INTRAVENOUS at 09:32

## 2020-08-14 RX ADMIN — METHOHEXITAL SODIUM 50 MG: 500 INJECTION, POWDER, LYOPHILIZED, FOR SOLUTION INTRAMUSCULAR; INTRAVENOUS; RECTAL at 08:49

## 2020-08-14 RX ADMIN — SODIUM CHLORIDE, POTASSIUM CHLORIDE, SODIUM LACTATE AND CALCIUM CHLORIDE: 600; 310; 30; 20 INJECTION, SOLUTION INTRAVENOUS at 08:22

## 2020-08-14 RX ADMIN — METHOHEXITAL SODIUM 40 MG: 500 INJECTION, POWDER, LYOPHILIZED, FOR SOLUTION INTRAMUSCULAR; INTRAVENOUS; RECTAL at 08:39

## 2020-08-14 RX ADMIN — METHOHEXITAL SODIUM 50 MG: 500 INJECTION, POWDER, LYOPHILIZED, FOR SOLUTION INTRAMUSCULAR; INTRAVENOUS; RECTAL at 08:57

## 2020-08-14 RX ADMIN — METHOHEXITAL SODIUM 50 MG: 500 INJECTION, POWDER, LYOPHILIZED, FOR SOLUTION INTRAMUSCULAR; INTRAVENOUS; RECTAL at 08:44

## 2020-08-14 RX ADMIN — LABETALOL 20 MG/4 ML (5 MG/ML) INTRAVENOUS SYRINGE 10 MG: at 08:56

## 2020-08-14 RX ADMIN — OXYCODONE HYDROCHLORIDE 5 MG: 5 TABLET ORAL at 10:00

## 2020-08-14 RX ADMIN — METHOHEXITAL SODIUM 40 MG: 500 INJECTION, POWDER, LYOPHILIZED, FOR SOLUTION INTRAMUSCULAR; INTRAVENOUS; RECTAL at 09:06

## 2020-08-14 ASSESSMENT — MIFFLIN-ST. JEOR: SCORE: 1625.63

## 2020-08-14 NOTE — OP NOTE
Procedure Date: 08/14/2020      SURGEON:  Juan Angulo MD      PREOPERATIVE DIAGNOSIS:  Left-sided V2 trigeminal neuralgia.      POSTOPERATIVE DIAGNOSIS:  Left-sided V2 trigeminal neuralgia.      PROCEDURES PERFORMED:  Left-sided percutaneous stereotactic radiofrequency rhizotomy.      ANESTHESIA:  Local MAC.      ESTIMATED BLOOD LOSS:  Less than 1 mL      COMPLICATIONS:  None immediately apparent.      PREOPERATIVE HISTORY:  Marissa is a 70-year-old woman who has a history of left-sided V2 trigeminal neuralgia.  This has been somewhat refractory to medical therapy and she had hoped to move forward with a surgical intervention.  We discussed different options including microvascular decompression and percutaneous radiofrequency rhizotomy.  Ultimately, she decided to proceed with percutaneous radiofrequency rhizotomy.      DESCRIPTION OF PROCEDURE:  She was brought to the angiography suite where she was positioned in supine fashion on the angiography table.  Hartel's landmarks were marked on the left side of the face, which was then prepped and draped in the usual sterile fashion.  After a timeout, we then percutaneously placed a needle through the cheek up through the foramen ovale being guided by our lateral fluoroscopy.  We brought in the AP view and entered the medial portion of the foramen magnum.  When we removed our inner stylette we had good egress of CSF.  We replaced the inner stylet with a curved CIARA electrode.  This was placed in line with the clivus pointing up.  We then stimulated and we were able to reproduce pain at 0.12 milliamps.  We therefore decided that we were in the correct position and so put her back to sleep with Brevital and made a lesion at 70 degrees for 90 seconds.  When she awoke, she appeared to have 90% reduction in pinprick in the V2 distribution and also in the V1 distribution.  She had normal pinprick sensation in V3.  At this point, we decided that we are happy with the lesion  and put her to sleep one more time and then removed our electrode and needle.  We then held pressure on the cheek for 3 minutes.  She was then awoken and taken to recovery.      I attest I was present for the entire duration of the procedure.         CAS CALLAWAY MD             D: 2020   T: 2020   MT: GINGER      Name:     ROSALINDA LANCASTER   MRN:      [id number]        Account:        [id number]   :      1950           Procedure Date: 2020      Document: [id number]

## 2020-08-14 NOTE — ANESTHESIA POSTPROCEDURE EVALUATION
Anesthesia POST Procedure Evaluation    Patient: Marissa Guadarrama   MRN:     [id number] Gender:   female   Age:    70 year old :      1950        Preoperative Diagnosis: Trigeminal neuralgia [G50.0]   Procedure(s):  ANESTHESIA OUT OF OR Left Radio Frequency Rhizotomy @0730   Postop Comments: No value filed.     Anesthesia Type: MAC       Disposition: Outpatient   Postop Pain Control: Uneventful            Sign Out: Well controlled pain   PONV: No   Neuro/Psych: Uneventful            Sign Out: Acceptable/Baseline neuro status   Airway/Respiratory: Uneventful            Sign Out: Acceptable/Baseline resp. status   CV/Hemodynamics: Uneventful            Sign Out: Acceptable CV status   Other NRE: NONE   DID A NON-ROUTINE EVENT OCCUR? No         Last Anesthesia Record Vitals:  CRNA VITALS  2020 0848 - 2020 0948      2020             Resp Rate (observed):  16    EKG:  Sinus rhythm          Last PACU Vitals:  Vitals Value Taken Time   /46 2020 10:10 AM   Temp 36.5  C (97.7  F) 2020 10:00 AM   Pulse 60 2020 10:10 AM   Resp 16 2020 10:00 AM   SpO2 95 % 2020 10:14 AM   Temp src     NIBP     Pulse     SpO2     Resp     Temp     Ht Rate     Temp 2     Vitals shown include unvalidated device data.      Electronically Signed By: Pushpa Soares MD, 2020, 10:15 AM

## 2020-08-14 NOTE — ANESTHESIA CARE TRANSFER NOTE
Patient: Marissa Guadarrama    Procedure(s):  ANESTHESIA OUT OF OR Left Radio Frequency Rhizotomy @0730    Diagnosis: Trigeminal neuralgia [G50.0]  Diagnosis Additional Information: No value filed.    Anesthesia Type:   MAC     Note:  Airway :Nasal Cannula  Patient transferred to:PACU  Comments:   -on 4L O2 via NC, Pt Spont. breathing, awake & alert, monitors placed,VSS, RN at bedside, no airway      Vitals: (Last set prior to Anesthesia Care Transfer)    CRNA VITALS  8/14/2020 0848 - 8/14/2020 0926      8/14/2020             Pulse:  76    Ht Rate:  78    SpO2:  99 %                Electronically Signed By: KAREN Ortiz CRNA  August 14, 2020  9:26 AM

## 2020-08-14 NOTE — IR NOTE
Escorted to IR rm 3 by anesthesia team, identified, consent reviewed, time out with Dr. Angulo for left radio frequency rhizotomy.To PACU post procedure.

## 2020-08-14 NOTE — DISCHARGE INSTRUCTIONS
PLEASE WEAN YOUR DOSE OF GABAPENTIN (NEURONTIN).  TAKE 300mg NEURONTIN (1/2 OF YOUR NORMAL DOSE), THREE TIMES A DAY UNTIL YOU WILL MEET WITH YOUR NEUROLOGIST IN A FEW WEEKS.       [facility], [place]  Same-Day Surgery   Adult Discharge Orders & Instructions     For 24 hours after surgery    1. Get plenty of rest.  A responsible adult must stay with you for at least 24 hours after you leave the hospital.   2. Do not drive or use heavy equipment.  If you have weakness or tingling, don't drive or use heavy equipment until this feeling goes away.  3. Do not drink alcohol.  4. Avoid strenuous or risky activities.  Ask for help when climbing stairs.   5. You may feel lightheaded.  IF so, sit for a few minutes before standing.  Have someone help you get up.   6. If you have nausea (feel sick to your stomach): Drink only clear liquids such as apple juice, ginger ale, broth or 7-Up.  Rest may also help.  Be sure to drink enough fluids.  Move to a regular diet as you feel able.  7. You may have a slight fever. Call the doctor if your fever is over 100 F (37.7 C) (taken under the tongue) or lasts longer than 24 hours.  8. You may have a dry mouth, a sore throat, muscle aches or trouble sleeping.  These should go away after 24 hours.  9. Do not make important or legal decisions.   Call your doctor for any of the followin.  Signs of infection (fever, growing tenderness at the surgery site, a large amount of drainage or bleeding, severe pain, foul-smelling drainage, redness, swelling).    2. It has been over 8 to 10 hours since surgery and you are still not able to urinate (pass water).    3.  Headache for over 24 hours.    To contact a doctor, call Dr Angulo's ENT office at [phone] Dr. Angulo's neurosurgery office at [phone] or:      [phone] and ask for the resident on call for Neurosurgery (answered 24 hours a day)      Emergency Department:  [facility]: [phone]        (TTY for hearing impaired: [phone])

## 2020-08-31 ENCOUNTER — TELEPHONE (OUTPATIENT)
Dept: NEUROSURGERY | Facility: CLINIC | Age: 70
End: 2020-08-31

## 2020-09-02 ENCOUNTER — VIRTUAL VISIT (OUTPATIENT)
Dept: NEUROSURGERY | Facility: CLINIC | Age: 70
End: 2020-09-02
Payer: COMMERCIAL

## 2020-09-02 DIAGNOSIS — G50.0 TRIGEMINAL NEURALGIA: Primary | ICD-10-CM

## 2020-09-02 NOTE — PROGRESS NOTES
"Marissa Guadarrama is a 70 year old female who is being evaluated via telephone visit, conducted during 90-global post-operative time period.     This is a video/telephone visit conducted due to [facility] on non-urgent clinic visits due to risk of the spread of COVID-19 pandemic virus. The patient did not identify any urgent or red-flag symptoms that would require in-person evaluation.    The patient has been notified of following:     \"This telephone visit will be conducted via a call between you and your physician/provider. We have found that certain health care needs can be provided without the need for a physical exam.  This service lets us provide the care you need with a short phone conversation.  If a prescription is necessary we can send it directly to your pharmacy.  If lab work is needed we can place an order for that and you can then stop by our lab to have the test done at a later time.    Telephone visits are billed at different rates depending on your insurance coverage. During this emergency period, for some insurers they may be billed the same as an in-person visit.  Please reach out to your insurance provider with any questions.    Patient has given verbal consent for Telephone visit?  YES                What phone number would you like to be contacted at?   Cellular phone     How would you like to obtain your AVS?     My Chart       Originating Location (pt. Location):  Home    Distant Location (provider location):  [facility] NEUROSURGERY     Platform used for Visit:   Patient did not have the technical resources/capability to accommodate a virtual video visit, therefore elected to proceed with telephone visit    Reason for Visit:        Post Operative Evaluation and Wound Assessment                PROCEDURES PERFORMED:  Left-sided percutaneous stereotactic radiofrequency rhizotomy on 8/14/20 w/Dr. Angulo.        HISTORY OF PRESENT ILLNESS:    Marissa chavez " "a 70-year-old woman who has a history of left-sided V2 trigeminal neuralgia. Dr Angulo followed her approximately 3 months.  She is on Neurontin.  She had trialed Tegretol.  However, she had side effects of sleepiness and discontinued that. She went back on Neurontin.  Neurontin did seem to be treating her pain pretty well.  However, she did not like being on Neurontin and preferred surgical intervention to treat her trigeminal neuralgia.      MRI imaging revealed compression of the trigeminal nerve at the root entry junction by the superior cerebellar artery.  With this in mind,a microvascular decompression versus a percutaneous radiofrequency rhizotomy was discussed.  She verbalized understanding of the pros and cons of both of these procedures and ultimately elected to proceed with a radiofrequency rhizotomy.      Today, she is doing well.   She does continue to have some eye dryness.   She is using her eyedrops  On the left, just above the eyebrow, (forehead), she has some numbness.  She also has a bit of numbness around her mouth, and alittle below her nose  -\"no big deal\"  Her left eye is numb.   She is very pleased that the numbness is minimal.   Able to chew, swallow, talk without pain.   She has cut back on the Gabapentin (600 mg tablet)  to one and 1/2 half tablets a day.  This has been going well.    No increase in pain, or side effects from medication taper.         Current Outpatient Medications   Medication     albuterol (PROAIR HFA/PROVENTIL HFA/VENTOLIN HFA) 108 (90 Base) MCG/ACT inhaler     exemestane (AROMASIN) 25 MG tablet     gabapentin (NEURONTIN) 600 MG tablet     hydrochlorothiazide (HYDRODIURIL) 25 MG tablet     ibuprofen (ADVIL) 200 MG tablet     No current facility-administered medications for this visit.      Examination:   There were no vitals taken for this visit.    Neurological Assessment:   General    Alert, cooperative.  No acute distress  Pulmonary:   Breathing comfortably. No " cough, or shortness of breath  Speech is fluent      Assessment:   S/p Left-sided percutaneous stereotactic radiofrequency rhizotomy on 8/14/20 w/Dr. Angulo.     Plan:   ~Continue eyedrops as needed  ~'s nurse, Christin Ceron RN will contact patient regarding Gabapentin medication taper plan  (Goal to wean off completely)  ~Return to Neurosurgery Clinic for Routine Post-Operative Evaluation in 1 month with Dr. Angulo via telephone visit.     At the end of the visit, all the patient's questions and concerns had been addressed and the patient was agreeable with the plan of care as outlined above. The patient has our office contact information at [phone], and knows to call with any questions, concerns, or changes in condition.        Roxy Suarez DNP  Neurosurgery Nurse Practitioner  [facility]  [phone]      Phone call duration: approx 8 minutes

## 2020-09-02 NOTE — PATIENT INSTRUCTIONS
~Continue eyedrops as needed  ~'s nurse, Christin Ceron, RN will contact patient regarding Gabapentin medication taper plan  (Goal to wean off completely)  ~Return to Neurosurgery Clinic for Routine Post-Operative Evaluation in 1 month with Dr. Angulo via telephone visit.     At the end of the visit, all the patient's questions and concerns had been addressed and the patient was agreeable with the plan of care as outlined above. The patient has our office contact information at [phone], and knows to call with any questions, concerns, or changes in condition.

## 2020-09-02 NOTE — LETTER
"9/2/2020       RE: Marissa Guadarrama  [address] [place] [id number]     Dear Colleague,    Thank you for referring your patient, Marissa Guadarrama, to the [facility] NEUROSURGERY at [facility]. Please see a copy of my visit note below.    Marissa Guadarrama is a 70 year old female who is being evaluated via telephone visit, conducted during 90-global post-operative time period.     This is a video/telephone visit conducted due to [facility] and [facility] restrictions on non-urgent clinic visits due to risk of the spread of COVID-19 pandemic virus. The patient did not identify any urgent or red-flag symptoms that would require in-person evaluation.    The patient has been notified of following:     \"This telephone visit will be conducted via a call between you and your physician/provider. We have found that certain health care needs can be provided without the need for a physical exam.  This service lets us provide the care you need with a short phone conversation.  If a prescription is necessary we can send it directly to your pharmacy.  If lab work is needed we can place an order for that and you can then stop by our lab to have the test done at a later time.    Telephone visits are billed at different rates depending on your insurance coverage. During this emergency period, for some insurers they may be billed the same as an in-person visit.  Please reach out to your insurance provider with any questions.    Patient has given verbal consent for Telephone visit?  YES                What phone number would you like to be contacted at?   Cellular phone     How would you like to obtain your AVS?     My Chart       Originating Location (pt. Location):  Home    Distant Location (provider location):  [facility] NEUROSURGERY     Platform used for Visit:   Patient did not have the technical resources/capability to accommodate a virtual video visit, therefore " "elected to proceed with telephone visit    Reason for Visit:        Post Operative Evaluation and Wound Assessment                PROCEDURES PERFORMED:  Left-sided percutaneous stereotactic radiofrequency rhizotomy on 8/14/20 w/Dr. Angulo.        HISTORY OF PRESENT ILLNESS:    Marissa is a 70-year-old woman who has a history of left-sided V2 trigeminal neuralgia. Dr Angulo followed her approximately 3 months.  She is on Neurontin.  She had trialed Tegretol.  However, she had side effects of sleepiness and discontinued that. She went back on Neurontin.  Neurontin did seem to be treating her pain pretty well.  However, she did not like being on Neurontin and preferred surgical intervention to treat her trigeminal neuralgia.      MRI imaging revealed compression of the trigeminal nerve at the root entry junction by the superior cerebellar artery.  With this in mind,a microvascular decompression versus a percutaneous radiofrequency rhizotomy was discussed.  She verbalized understanding of the pros and cons of both of these procedures and ultimately elected to proceed with a radiofrequency rhizotomy.      Today, she is doing well.   She does continue to have some eye dryness.   She is using her eyedrops  On the left, just above the eyebrow, (forehead), she has some numbness.  She also has a bit of numbness around her mouth, and alittle below her nose  -\"no big deal\"  Her left eye is numb.   She is very pleased that the numbness is minimal.   Able to chew, swallow, talk without pain.   She has cut back on the Gabapentin (600 mg tablet)  to one and 1/2 half tablets a day.  This has been going well.    No increase in pain, or side effects from medication taper.         Current Outpatient Medications   Medication     albuterol (PROAIR HFA/PROVENTIL HFA/VENTOLIN HFA) 108 (90 Base) MCG/ACT inhaler     exemestane (AROMASIN) 25 MG tablet     gabapentin (NEURONTIN) 600 MG tablet     hydrochlorothiazide (HYDRODIURIL) 25 MG tablet "     ibuprofen (ADVIL) 200 MG tablet     No current facility-administered medications for this visit.      Examination:   There were no vitals taken for this visit.    Neurological Assessment:   General    Alert, cooperative.  No acute distress  Pulmonary:   Breathing comfortably. No cough, or shortness of breath  Speech is fluent      Assessment:   S/p Left-sided percutaneous stereotactic radiofrequency rhizotomy on 8/14/20 w/Dr. Angulo.     Plan:   ~Continue eyedrops as needed  ~'s nurse, Christin Ceron RN will contact patient regarding Gabapentin medication taper plan  (Goal to wean off completely)  ~Return to Neurosurgery Clinic for Routine Post-Operative Evaluation in 1 month with Dr. Angulo via telephone visit.     At the end of the visit, all the patient's questions and concerns had been addressed and the patient was agreeable with the plan of care as outlined above. The patient has our office contact information at [phone], and knows to call with any questions, concerns, or changes in condition.        Roxy Suarez DNP  Neurosurgery Nurse Practitioner  [facility] [place]  [phone]      Phone call duration: approx 8 minutes                Again, thank you for allowing me to participate in the care of your patient.      Sincerely,    KAREN Duran CNP

## 2020-09-02 NOTE — LETTER
"9/2/2020       RE: Marissa Guadarrama  [address] [place] [id number]     Dear Colleague,    Thank you for referring your patient, Marissa Guadarrama, to the [facility] NEUROSURGERY at [facility]. Please see a copy of my visit note below.    Reason for Visit:        Post Operative Evaluation and Wound Assessment              PROCEDURES PERFORMED:  Left-sided percutaneous stereotactic radiofrequency rhizotomy on 8/14/20 w/Dr. Angulo.     HISTORY OF PRESENT ILLNESS:    Marissa is a 70-year-old woman who has a history of left-sided V2 trigeminal neuralgia. Dr Angulo followed her approximately 3 months.  She is on Neurontin.  She had trialed Tegretol.  However, she had side effects of sleepiness and discontinued that. She went back on Neurontin.  Neurontin did seem to be treating her pain pretty well.  However, she did not like being on Neurontin and preferred surgical intervention to treat her trigeminal neuralgia.      MRI imaging revealed compression of the trigeminal nerve at the root entry junction by the superior cerebellar artery.  With this in mind,a microvascular decompression versus a percutaneous radiofrequency rhizotomy was discussed.  She verbalized understanding of the pros and cons of both of these procedures and ultimately elected to proceed with a radiofrequency rhizotomy.      Today, she is doing well.   She does continue to have some eye dryness.   She is using her eyedrops  On the left, just above the eyebrow, (forehead), she has some numbness.  She also has a bit of numbness around her mouth, and alittle below her nose  -\"no big deal\"  Her left eye is numb.   She is very pleased that the numbness is minimal.   Able to chew, swallow, talk without pain.   She has cut back on the Gabapentin (600 mg tablet)  to one and 1/2 half tablets a day.  This has been going well.    No increase in pain, or side effects from medication taper.     Current Outpatient Medications "   Medication     albuterol (PROAIR HFA/PROVENTIL HFA/VENTOLIN HFA) 108 (90 Base) MCG/ACT inhaler     exemestane (AROMASIN) 25 MG tablet     gabapentin (NEURONTIN) 600 MG tablet     hydrochlorothiazide (HYDRODIURIL) 25 MG tablet     ibuprofen (ADVIL) 200 MG tablet     No current facility-administered medications for this visit.      Examination:   There were no vitals taken for this visit.    Neurological Assessment:   General    Alert, cooperative.  No acute distress  Pulmonary:   Breathing comfortably. No cough, or shortness of breath  Speech is fluent    Assessment:   S/p Left-sided percutaneous stereotactic radiofrequency rhizotomy on 8/14/20 w/Dr. nAgulo.     Plan:   ~Continue eyedrops as needed  ~'s nurse, Christin Ceron RN will contact patient regarding Gabapentin medication taper plan  (Goal to wean off completely)  ~Return to Neurosurgery Clinic for Routine Post-Operative Evaluation in 1 month with Dr. Angulo via telephone visit.     At the end of the visit, all the patient's questions and concerns had been addressed and the patient was agreeable with the plan of care as outlined above. The patient has our office contact information at [phone], and knows to call with any questions, concerns, or changes in condition.      Phone call duration: approx 8 minutes    Again, thank you for allowing me to participate in the care of your patient.  Sincerely,    Roxy Suarez DNP  Neurosurgery Nurse Practitioner  [facility]  [phone]

## 2020-11-15 NOTE — MR AVS SNAPSHOT
After Visit Summary   11/29/2018    Marissa Guadarrama    MRN: [id number]           Patient Information     Date Of Birth          1950        Visit Information        Provider Department      11/29/2018 8:40 AM Zaida Saldana DO [facility]        Today's Diagnoses     Hypertension goal BP (blood pressure) < 140/90    -  1    Ureteral obstruction        Carcinoid tumor        Essential hypertension with goal blood pressure less than 140/90        CKD (chronic kidney disease) stage 3, GFR 30-59 ml/min (H)          Care Instructions    See the nephrologist.     Restart hydrochlorothiazide 12.5 mg.     Continue metoprolol 12.5 mg.     Continue to wear compression stockings.     Follow up with Dr. Durán in 1 month     If you can, it is helpful if you fill out a survey about your clinic visit if it is mailed to your house in a few weeks.          Follow-ups after your visit        Additional Services     NEPHROLOGY ADULT REFERRAL       Your provider has referred you to: [facility]: Kidney (Nephrology) Clinic Jose Kang [phone]   [email or web address]    Please be aware that coverage of these services is subject to the terms and limitations of your health insurance plan.  Call member services at your health plan with any benefit or coverage questions.      Reason for referral:  [place] eGFR < 60 ml/min AND blood presure persistently > 130/80 despite antihypertensive management.   Please bring the following to your appointment:    >>   Any x-rays, CTs or MRIs which have been performed.  Contact the facility where they were done to arrange for  prior to your scheduled appointment.   >>   List of current medications   >>   This referral request   >>   Any documents/labs given to you for this referral                  Follow-up notes from your care team     Return for BP Recheck.      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     Variants of migraine, not elsewhere classified, without mention of intractable migraine without mention of status migrainosus     GERD (gastroesophageal reflux disease)     Depression     History of MRSA infection     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)     Stage III pressure ulcer of back (CMS/HCC)     Paraplegia (CMS/HCC)     MRSA (methicillin resistant staph aureus) culture positive     Boils     Acquired hypothyroidism     Moderate recurrent major depression (CMS/HCC)     Sepsis secondary to UTI (CMS/HCC)     Surgical wound, non healing, subsequent encounter      Spina bifida                                                  Hyperlipidemia                                                  Comment: goal is 100 or less    Decubitus ulcer of coccyx                                     GERD (gastroesophageal reflux disease)                        Depression                                                      Comment: in remission    MRSA (methicillin resistant staph aureus) cult* 5/28/14         Comment: sacral pressure ulcer    Allergy                                                       Thyroid disease                                               Neurogenic bladder                                            Surgical wound, non healing, subsequent encoun* 10/5/2020           Relevant Problems   No relevant active problems       Physical Exam     Airway   Mallampati: II    Cardiovascular    Cardio Rhythm:  2018 11:00 AM CST   Office Visit with Eliazar Menendez MD   [facility] ([facility]    [address] [id number] [phone]           Bring a current list of meds and any records pertaining to this visit. For Physicals, please bring immunization records and any forms needing to be filled out. Please arrive 10 minutes early to complete paperwork.            Dec 26, 2018 12:20 PM CST   Pre-Op physical with Jeannette Durán DO   [facility] ([facility])    [address] [id number] [phone]            Jan 03, 2019   Procedure with Zhao La MD   [facility] (--)    [address] [place] [id number] [phone]           The medical center is located at [address] [place] [address]. (between [address] in [place], [place] north of [place]).            Apr 09, 2019  1:20 PM CDT   (Arrive by 1:05 PM)   COLPOSCOPY with  GYN ONC COLPOSCOPY PROVIDER   [facility] ([facility])    [address] [place] [address]  [address] [place] [id number] [phone]            May 13, 2019 10:50 AM CDT   LAB with [facility] ([facility])    [address] [address] [phone]           Please do not eat 10-12 hours before your appointment if you are coming in fasting for labs on lipids, cholesterol, or glucose (sugar). This does not apply to pregnant women. Water, hot tea and black coffee (with nothing added) are okay. Do not drink other fluids, diet soda or chew gum.            May 15, 2019 11:00 AM CDT   Return Visit with Hosea King MD   [facility] ([facility])    [facility]  [address] [place] [address]  [facility] [address] [phone]              Who to contact     If you have questions or  Regular  Cardio Rate: Normal    Head Assessment  Head assessment: Normocephalic    General Assessment  General Assessment: Alert and oriented and No acute distress    Pulmonary Exam  Pulmonary exam normal    Abdominal Exam  Abdominal exam normal      Anesthesia Plan    ASA Status: 2    Anesthesia Type: General    Induction: Intravenous  Preferred Airway Type: ETT  Maintenance: Inhalational  Premedication: None      Post-op Pain Management: Per Surgeon      Checklist  Reviewed: Lab Results, Patient Summary, Care Everywhere, Allergies, Past Med History, Medications, DNR Status, Beta Blocker Status, Outside Records, NPO Status and Problem list    Informed Consent  The proposed anesthetic plan, including its risks and benefits, have been discussed with the Patient  - along with the risks and benefits of alternatives.  Questions were encouraged and answered and the patient and/or representative understands and agrees to proceed.     Blood Products: Not Anticipated     "need follow up information about today's clinic visit or your schedule please contact [facility] directly at [phone].  Normal or non-critical lab and imaging results will be communicated to you by MyChart, letter or phone within 4 business days after the clinic has received the results. If you do not hear from us within 7 days, please contact the clinic through MyChart or phone. If you have a critical or abnormal lab result, we will notify you by phone as soon as possible.  Submit refill requests through 365webcall or call your pharmacy and they will forward the refill request to us. Please allow 3 business days for your refill to be completed.          Additional Information About Your Visit        Care EveryWhere ID     This is your Care EveryWhere ID. This could be used by other organizations to access your [place] medical records  [id number]        Your Vitals Were     Pulse Temperature Height BMI (Body Mass Index)          56 98.3  F (36.8  C) (Oral) 5' 7.5\" (1.715 m) 38.89 kg/m2         Blood Pressure from Last 3 Encounters:   11/29/18 (!) 184/94   11/28/18 178/72   11/14/18 163/62    Weight from Last 3 Encounters:   11/29/18 252 lb (114.3 kg)   11/28/18 251 lb (113.9 kg)   11/14/18 248 lb 1.6 oz (112.5 kg)              We Performed the Following     Albumin Random Urine Quantitative with Creat Ratio     Basic metabolic panel  (Ca, Cl, CO2, Creat, Gluc, K, Na, BUN)     NEPHROLOGY ADULT REFERRAL          Where to get your medicines      These medications were sent to [id number] - [place], [place] - [address]  [address] [address]     Phone:  [phone]     hydrochlorothiazide 25 MG tablet          Primary Care Provider Office Phone # Fax #    Zaida DO Shana [phone]       [address] [place] [address]        Equal Access to Services     TASIA RANGEL AH: Bernardino Lopez, wamalihada luqadavin, qaybta kaalmargie " niesha lrloraine billpooja guoaan ah. So [facility] [phone].    ATENCIÓN: Si torola marianela, tiene a allan disposición servicios gratuitos de asistencia lingüística. Beba al [phone].    We comply with applicable federal civil rights laws and [place] laws. We do not discriminate on the basis of race, color, national origin, age, disability, sex, sexual orientation, or gender identity.            Thank you!     Thank you for choosing [facility]  for your care. Our goal is always to provide you with excellent care. Hearing back from our patients is one way we can continue to improve our services. Please take a few minutes to complete the written survey that you may receive in the mail after your visit with us. Thank you!             Your Updated Medication List - Protect others around you: Learn how to safely use, store and throw away your medicines at www.disposemymeds.org.          This list is accurate as of 11/29/18  9:23 AM.  Always use your most recent med list.                   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, estrogen receptor positive (H)       gabapentin 600 MG tablet    NEURONTIN    180 tablet    Take 1 tablet (600 mg) by mouth 3 times daily    Trigeminal neuralgia       hydrochlorothiazide 25 MG tablet    HYDRODIURIL    15 tablet    TAKE 1/2 (ONE-HALF) TABLET BY MOUTH ONCE DAILY IN THE MORNING    Essential hypertension with goal blood pressure less than 140/90       HYDROcodone-acetaminophen 5-325 MG tablet    NORCO    5 tablet    Take 1-2 tablets by  mouth every 6 hours as needed for other (Moderate to Severe Pain)    Vaginal dysplasia       metoprolol succinate ER 25 MG 24 hr tablet    TOPROL-XL    15 tablet    Take 0.5 tablets (12.5 mg) by mouth daily    Hypertension goal BP (blood pressure) < 140/90

## 2020-12-26 ENCOUNTER — HOSPITAL ENCOUNTER (EMERGENCY)
Facility: CLINIC | Age: 70
Discharge: HOME | End: 2020-12-26
Attending: NURSE PRACTITIONER | Admitting: NURSE PRACTITIONER
Payer: MEDICARE

## 2020-12-26 VITALS
BODY MASS INDEX: 36.02 KG/M2 | TEMPERATURE: 98 F | DIASTOLIC BLOOD PRESSURE: 101 MMHG | OXYGEN SATURATION: 94 % | RESPIRATION RATE: 16 BRPM | WEIGHT: 230 LBS | HEART RATE: 72 BPM | SYSTOLIC BLOOD PRESSURE: 185 MMHG

## 2020-12-26 DIAGNOSIS — N30.01 ACUTE CYSTITIS WITH HEMATURIA: ICD-10-CM

## 2020-12-26 LAB
ALBUMIN UR-MCNC: 100 MG/DL
APPEARANCE UR: ABNORMAL
BILIRUB UR QL STRIP: NEGATIVE
COLOR UR AUTO: YELLOW
GLUCOSE UR STRIP-MCNC: NEGATIVE MG/DL
HGB UR QL STRIP: ABNORMAL
KETONES UR STRIP-MCNC: NEGATIVE MG/DL
LEUKOCYTE ESTERASE UR QL STRIP: ABNORMAL
NITRATE UR QL: NEGATIVE
PH UR STRIP: 6 PH (ref 5–7)
RBC #/AREA URNS AUTO: 27 /HPF (ref 0–2)
SOURCE: ABNORMAL
SP GR UR STRIP: 1.01 (ref 1–1.03)
SQUAMOUS #/AREA URNS AUTO: 1 /HPF (ref 0–1)
UROBILINOGEN UR STRIP-MCNC: 0 MG/DL (ref 0–2)
WBC #/AREA URNS AUTO: >182 /HPF (ref 0–5)
WBC CLUMPS #/AREA URNS HPF: PRESENT /HPF

## 2020-12-26 PROCEDURE — 99214 OFFICE O/P EST MOD 30 MIN: CPT | Performed by: NURSE PRACTITIONER

## 2020-12-26 PROCEDURE — 87086 URINE CULTURE/COLONY COUNT: CPT | Performed by: NURSE PRACTITIONER

## 2020-12-26 PROCEDURE — G0463 HOSPITAL OUTPT CLINIC VISIT: HCPCS | Performed by: NURSE PRACTITIONER

## 2020-12-26 PROCEDURE — 81001 URINALYSIS AUTO W/SCOPE: CPT | Performed by: EMERGENCY MEDICINE

## 2020-12-26 RX ORDER — CEPHALEXIN 500 MG/1
500 CAPSULE ORAL 2 TIMES DAILY
Qty: 14 CAPSULE | Refills: 0 | Status: SHIPPED | OUTPATIENT
Start: 2020-12-26 | End: 2021-01-02

## 2020-12-26 ASSESSMENT — ENCOUNTER SYMPTOMS
FREQUENCY: 1
GASTROINTESTINAL NEGATIVE: 1
RESPIRATORY NEGATIVE: 1
MUSCULOSKELETAL NEGATIVE: 1
NEUROLOGICAL NEGATIVE: 1
CARDIOVASCULAR NEGATIVE: 1
FEVER: 0
DYSURIA: 1
CHILLS: 0

## 2020-12-26 NOTE — ED PROVIDER NOTES
History     Chief Complaint   Patient presents with     Urgency     HX of UTI     HPI  Marissa Guadarrama is a 70 year old female who presents to urgent care for evaluation of dysuria, urinary urgency, and urinary frequency.  Symptoms started 1 week ago.  Denies abdominal pain or back pain.  Denies fever or chills.  Denies nausea or vomiting.  Denies history of frequent UTIs.  Denies history of kidney stones.    Allergies:  No Known Allergies    Problem List:    Patient Active Problem List    Diagnosis Date Noted     HSIL (high grade squamous intraepithelial lesion) on Pap smear of cervix 06/15/2020     Priority: Medium     Added automatically from request for surgery [id number]       Obstruction of left ureter 02/11/2020     Priority: Medium     Added automatically from request for surgery [id number]       Osteopenia of hip 05/15/2019     Priority: Medium     Need for prophylactic chemotherapy 05/15/2019     Priority: Medium     Rheumatoid arthritis with positive rheumatoid factor, involving unspecified site (H) 12/31/2018     Priority: Medium     Ureteral obstruction 11/20/2018     Priority: Medium     Added automatically from request for surgery [id number]       Obesity (BMI 35.0-39.9) with comorbidity (H) 09/28/2018     Priority: Medium     Peritoneal metastases (H) 10/30/2017     Priority: Medium     Serum calcium elevated 04/07/2017     Priority: Medium     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  Marissa Guadarrama has no  "plan or code status on file. Discussed available resources and provided with information. Confirmed code status reflects current choices pending further ACP discussions.  Confirmed/documented designated decision maker(s). See permanent comments section of demographics in clinical tab. Added by Tiff Askew on 2/6/2015  Discussed advance care planning with patient; however, patient declined at this time. 9/19/2012              OA (osteoarthritis) of knee 10/05/2011     Priority: Medium     Hypertension goal BP (blood pressure) < 140/90 11/01/2010     Priority: Medium     HYPERLIPIDEMIA LDL GOAL <130 10/31/2010     Priority: Medium     Post-polio syndrome      Priority: Medium     Left knee, diagnosed by ortho in 1976, had left leg/toe surgeries as child  (Problem list name updated by automated process. Provider to review and confirm.)       Cervical cancer (H)      Priority: Medium     5/2007.  Dr. Alonso, [facility] gyn/onc,  Now needs routine paps, patient not always compliant  3/26/09 pap NIL  9/24/09 pap ASCUS  11/1/13 pap ASC-H. Plan-- colposcopy   12/23/13 colposcopy scheduled. Reminder placed.  colpscopy 12/23/2013-Vaginal cuff (submitted as \"cervix\"), biopsy:  - High grade squamous intraepithelial lesion (vaginal  intraepithelial neoplasia grade III, VaIN III, severe dysplasia and  carcinoma in situ).  - No invasive malignancy identified.  - Cervical transformation zone not identified in biopsies.  - Please see comment.  Referral back to gynecology/oncology  2/5/14 gyn/onc appt   3/13/14 colposcopy and CO2 laser vagina, path:VAIN 1/2.  3/19/14 follow up in 4 months  7/30/14 vaginal biopsy: VAIN 1. Plan: repeat colp in 6 months.(9/17/14)   9/17/14 colp. No staining seen. No biopsy taken. Pap- ASCUS + HR HPV. Plan- repeat pap at next visit.  2/9/15 colposcopy. bx- LSIL/koilocytosis. Pap- NIL/+ HR HPV 16.  Plan: 3/16/15 treatment planning.   3/16/15 repeat colposcopy in 4 months (due 7/16/15)  7/6/15 " scheduled. Tracking.             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  Patient has not been complaint with follow up.          Past Medical History:    Past Medical History:   Diagnosis Date     Arthritis      Benign breast biopsy      Carcinoid tumor 12/2003     Cervical cancer (H) 2007     H/O colposcopy with cervical biopsy 12/23/13     HTN      Hyperlipidemia      Obesity      Pap smear of vagina with ASC-H 11/1/13     Post-polio syndromes      Trigeminal neuralgias        Past Surgical History:    Past Surgical History:   Procedure Laterality Date     APPENDECTOMY  1983     BIOPSY NODE SENTINEL Bilateral 6/1/2016    Procedure: BIOPSY NODE SENTINEL;  Surgeon: Brent Arana MD;  Location: [facility]     C BSO, OMENTECTOMY W/OSMAN  5/2007     C TOTAL ABDOM HYSTERECTOMY  5/2007     CL AFF SURGICAL PATHOLOGY       COLONOSCOPY N/A 6/23/2017    Procedure: COMBINED COLONOSCOPY, SINGLE OR MULTIPLE BIOPSY/POLYPECTOMY BY BIOPSY;  Colonoscopy Dx:Carcinoid tumor of colon prep mailed golytely;  Surgeon: Talisha Greco MD;  [facility]: [facility]     COLPOSCOPY, BIOPSY, COMBINED  3/13/2014    Procedure: COMBINED COLPOSCOPY, BIOPSY;;  Surgeon: Lara Pack MD;  [facility]     COMBINED CYSTOSCOPY, RETROGRADES, EXCHANGE STENT URETER(S) Left 2/7/2019    Procedure: COMBINED CYSTOSCOPY, RETROGRADES, EXCHANGE STENT URETER--left;  Surgeon: Zhao La MD;  Location: [facility]     COMBINED CYSTOSCOPY, RETROGRADES, EXCHANGE STENT URETER(S) Left 2/20/2020    Procedure: CYSTOSCOPY, WITH RETROGRADE PYELOGRAM AND Left URETERAL STENT exchange;  Surgeon: Zhao La MD;  Location: [facility]     COMBINED CYSTOSCOPY, RETROGRADES, URETEROSCOPY, INSERT STENT Left 2/2/2017    Procedure: COMBINED CYSTOSCOPY, RETROGRADES, URETEROSCOPY, INSERT STENT;  Surgeon: Zhao La MD;   Location: [facility]     CYSTOSCOPY, RETROGRADES, INSERT STENT URETER(S), COMBINED Left 9/7/2017    Procedure: COMBINED CYSTOSCOPY, RETROGRADES, INSERT STENT URETER(S);  Cystoscopy,Left Stent Exchange;  Surgeon: Zhao La MD;  Location: [facility]     CYSTOSCOPY, RETROGRADES, INSERT STENT URETER(S), COMBINED  12/12/2017    Procedure: COMBINED CYSTOSCOPY, RETROGRADES, INSERT STENT URETER(S);;  Surgeon: Zhao La MD;  [facility]     CYSTOSCOPY, RETROGRADES, INSERT STENT URETER(S), COMBINED Left 7/5/2018    Procedure: COMBINED CYSTOSCOPY, RETROGRADES, INSERT STENT URETER(S);  Cystoscopy, Left Stent Exchange;  Surgeon: Zhao La MD;  Location: [facility]     EXAM UNDER ANESTHESIA PELVIC  3/13/2014    Procedure: EXAM UNDER ANESTHESIA PELVIC;  Exam Under Anestheisa, Colposcopy, Vaginal Biopsies, Co2 Laser of the Upper Vagina;  Surgeon: Lara Pack MD;  [facility]     EXAM UNDER ANESTHESIA PELVIC N/A 7/1/2020    Procedure: Examination under anesthesia, vaginal biopsies;  Surgeon: Karli Gao MD;  [facility]     HERNIORRHAPHY INCISIONAL (LOCATION)       IR RHIZOTOMY  8/14/2020     LASER CO2 VAGINA  3/13/2014    VAIN 1/2     LASER CO2 VAGINA N/A 12/12/2017    Procedure: LASER CO2 VAGINA;  Exam Under Anesthesia, CO2 Laser Ablation Of Vagina, Cystoscopy, Left Retrograde Pyelogram with Left Stent Placement;  Surgeon: Nic Segundo MD;  [facility]     LUMPECTOMY BREAST WITH SEED LOCALIZATION Bilateral 6/1/2016    Procedure: LUMPECTOMY BREAST WITH SEED LOCALIZATION;  Surgeon: Brent Arana MD;  Location: [facility]     SURGICAL HISTORY OF -       ovarian cystectomy     SURGICAL HISTORY OF -   2003    right colon resection secondary to carcinoid tumor     TUBAL LIGATION         Family History:    Family History   Problem Relation Age of Onset     Cancer Mother         bone / liver     Breast Cancer Mother      Cancer Maternal Grandmother      Cancer  Maternal Grandfather      Cancer Paternal Grandmother      Cancer Paternal Grandfather      Cancer Sister         vulvar ca, cervical ca, squamous cell cancer     Cancer Brother         Rectal- Stage 4     Rectal Cancer Brother      Breast Cancer Paternal Aunt      Colon Cancer Paternal Aunt      Breast Cancer Maternal Aunt      Pancreatic Cancer Nephew      Breast Cancer Sister      Anesthesia Reaction No family hx of        Social History:  Marital Status:   [2]  Social History     Tobacco Use     Smoking status: Former Smoker     Packs/day: 1.00     Years: 29.00     Pack years: 29.00     Types: Cigarettes     Quit date: 10/30/2006     Years since quittin.1     Smokeless tobacco: Never Used   Substance Use Topics     Alcohol use: No     Alcohol/week: 0.0 standard drinks     Comment: 1 drink per year     Drug use: No        Medications:         cephALEXin (KEFLEX) 500 MG capsule       albuterol (PROAIR HFA/PROVENTIL HFA/VENTOLIN HFA) 108 (90 Base) MCG/ACT inhaler       exemestane (AROMASIN) 25 MG tablet       gabapentin (NEURONTIN) 600 MG tablet       hydrochlorothiazide (HYDRODIURIL) 25 MG tablet       ibuprofen (ADVIL) 200 MG tablet          Review of Systems   Constitutional: Negative for chills and fever.   HENT: Negative.    Respiratory: Negative.    Cardiovascular: Negative.    Gastrointestinal: Negative.    Genitourinary: Positive for dysuria, frequency and urgency.   Musculoskeletal: Negative.    Skin: Negative.    Neurological: Negative.    All other systems reviewed and are negative.      Physical Exam   BP: (!) 185/101  Pulse: 72  Temp: 98  F (36.7  C)  Resp: 16  Weight: 104.3 kg (230 lb)  SpO2: 94 %      Physical Exam  Constitutional:       Appearance: Normal appearance.   HENT:      Head: Normocephalic and atraumatic.   Eyes:      General: No scleral icterus.     Conjunctiva/sclera: Conjunctivae normal.   Cardiovascular:      Rate and Rhythm: Normal rate and regular rhythm.   Pulmonary:       Effort: Pulmonary effort is normal.      Breath sounds: Normal breath sounds.   Skin:     General: Skin is warm and dry.   Neurological:      General: No focal deficit present.      Mental Status: She is alert and oriented to person, place, and time.         ED Course        Procedures               Results for orders placed or performed during the hospital encounter of 12/26/20 (from the past 24 hour(s))   UA reflex to Microscopic   Result Value Ref Range    Color Urine Yellow     Appearance Urine Cloudy     Glucose Urine Negative NEG^Negative mg/dL    Bilirubin Urine Negative NEG^Negative    Ketones Urine Negative NEG^Negative mg/dL    Specific Gravity Urine 1.012 1.003 - 1.035    Blood Urine Small (A) NEG^Negative    pH Urine 6.0 5.0 - 7.0 pH    Protein Albumin Urine 100 (A) NEG^Negative mg/dL    Urobilinogen mg/dL 0.0 0.0 - 2.0 mg/dL    Nitrite Urine Negative NEG^Negative    Leukocyte Esterase Urine Large (A) NEG^Negative    Source Midstream Urine     RBC Urine 27 (H) 0 - 2 /HPF    WBC Urine >182 (H) 0 - 5 /HPF    WBC Clumps Present (A) NEG^Negative /HPF    Squamous Epithelial /HPF Urine 1 0 - 1 /HPF       Medications - No data to display    Assessments & Plan (with Medical Decision Making)   Urinalysis positive for infection.  No signs or symptoms concerning for pyelonephritis or nephrolithiasis at this time.  Patient will be discharged home stable on Keflex pending urine culture results from today's visit.  Patient treated to return for fevers, abdominal pain, back pain, or worsening way.    I have reviewed the nursing notes.    I have reviewed the findings, diagnosis, plan and need for follow up with the patient.    New Prescriptions    CEPHALEXIN (KEFLEX) 500 MG CAPSULE    Take 1 capsule (500 mg) by mouth 2 times daily for 7 days       Final diagnoses:   Acute cystitis with hematuria       12/26/2020   [facility] EMERGENCY DEPT     Jd, KAREN Arana CNP  12/26/20 1321

## 2020-12-26 NOTE — DISCHARGE INSTRUCTIONS
Keflex 500 mg twice a day for 7 days.  Drink plenty of water.  Return for fevers, abdominal pain, back pain or worse in any way.

## 2020-12-26 NOTE — ED AVS SNAPSHOT
[facility] Emergency Dept  [address] [address]  Phone: [phone]  Fax: [phone]                                    Marissa Guadarrama   MRN: [id number]    Department: [facility] Emergency Dept   Date of Visit: 12/26/2020           After Visit Summary Signature Page    I have received my discharge instructions, and my questions have been answered. I have discussed any challenges I see with this plan with the nurse or doctor.    ..........................................................................................................................................  Patient/Patient Representative Signature      ..........................................................................................................................................  Patient Representative Print Name and Relationship to Patient    ..................................................               ................................................  Date                                   Time    ..........................................................................................................................................  Reviewed by Signature/Title    ...................................................              ..............................................  Date                                               Time          22EPIC Rev 08/18

## 2020-12-27 LAB
BACTERIA SPEC CULT: NORMAL
Lab: NORMAL
SPECIMEN SOURCE: NORMAL

## 2020-12-29 DIAGNOSIS — G50.0 TRIGEMINAL NEURALGIA: ICD-10-CM

## 2020-12-29 RX ORDER — GABAPENTIN 600 MG/1
600 TABLET ORAL 3 TIMES DAILY
Qty: 90 TABLET | Refills: 3 | Status: ON HOLD | OUTPATIENT
Start: 2020-12-29 | End: 2021-05-15

## 2020-12-29 NOTE — TELEPHONE ENCOUNTER
Requested Prescriptions   Pending Prescriptions Disp Refills     gabapentin (NEURONTIN) 600 MG tablet 90 tablet 3     Sig: Take 1 tablet (600 mg) by mouth 3 times daily       There is no refill protocol information for this order        Last Written Prescription Date:    Last Fill Quantity: ,  # refills:    Last office visit: Visit date not found with prescribing provider:  Arabella Escoto Office Visit:

## 2021-01-10 DIAGNOSIS — I10 ESSENTIAL HYPERTENSION WITH GOAL BLOOD PRESSURE LESS THAN 140/90: ICD-10-CM

## 2021-01-10 NOTE — LETTER
[facility]  [address] [address]  Phone: [phone]       January 15, 2021         Marissa Guadarrama  [address] [place] [id number]            Dear Marissa:    We are concerned about your health care.  We recently provided you with medication refills.  Many medications require routine follow-up with your doctor.    Your prescription(s) have been refilled for 30 days so you may have time for the above noted follow-up. Please call to schedule soon so we can assure you have an appointment before your next refills are needed.    Thank you,      Eliazar Menendez MD / Kim BAKER, RN, BSN

## 2021-01-11 NOTE — TELEPHONE ENCOUNTER
"Requested Prescriptions   Pending Prescriptions Disp Refills     hydrochlorothiazide (HYDRODIURIL) 25 MG tablet [Pharmacy Med Name: hydroCHLOROthiazide Oral Tablet 25 MG] 135 tablet 0     Sig: TAKE ONE-HALF TABLET BY MOUTH ONCE DAILY IN THE MORNING       Diuretics (Including Combos) Protocol Failed - 1/10/2021  9:49 AM        Failed - Blood pressure under 140/90 in past 12 months     BP Readings from Last 3 Encounters:   12/26/20 (!) 185/101   08/14/20 137/66   08/12/20 (!) 137/99                 Failed - Normal serum creatinine on file in past 12 months     Recent Labs   Lab Test 08/11/20  1458   CR 1.13*              Passed - Recent (12 mo) or future (30 days) visit within the authorizing provider's specialty     Patient has had an office visit with the authorizing provider or a provider within the authorizing providers department within the previous 12 mos or has a future within next 30 days. See \"Patient Info\" tab in inbasket, or \"Choose Columns\" in Meds & Orders section of the refill encounter.              Passed - Medication is active on med list        Passed - Patient is age 18 or older        Passed - No active pregancy on record        Passed - Normal serum potassium on file in past 12 months     Recent Labs   Lab Test 08/11/20  1458   POTASSIUM 3.5                    Passed - Normal serum sodium on file in past 12 months     Recent Labs   Lab Test 08/11/20  1458                 Passed - No positive pregnancy test in past 12 months             "

## 2021-01-12 ENCOUNTER — OFFICE VISIT (OUTPATIENT)
Dept: ONCOLOGY | Facility: CLINIC | Age: 71
End: 2021-01-12
Attending: OBSTETRICS & GYNECOLOGY
Payer: MEDICARE

## 2021-01-12 VITALS
HEIGHT: 67 IN | OXYGEN SATURATION: 94 % | BODY MASS INDEX: 35.94 KG/M2 | WEIGHT: 229 LBS | TEMPERATURE: 99.2 F | RESPIRATION RATE: 16 BRPM | HEART RATE: 69 BPM | DIASTOLIC BLOOD PRESSURE: 82 MMHG | SYSTOLIC BLOOD PRESSURE: 148 MMHG

## 2021-01-12 DIAGNOSIS — R87.613 HSIL (HIGH GRADE SQUAMOUS INTRAEPITHELIAL LESION) ON PAP SMEAR OF CERVIX: Primary | ICD-10-CM

## 2021-01-12 PROCEDURE — G0463 HOSPITAL OUTPT CLINIC VISIT: HCPCS

## 2021-01-12 PROCEDURE — 88175 CYTOPATH C/V AUTO FLUID REDO: CPT | Mod: TC | Performed by: OBSTETRICS & GYNECOLOGY

## 2021-01-12 PROCEDURE — 87624 HPV HI-RISK TYP POOLED RSLT: CPT | Performed by: OBSTETRICS & GYNECOLOGY

## 2021-01-12 PROCEDURE — 88141 CYTOPATH C/V INTERPRET: CPT | Mod: 26 | Performed by: PATHOLOGY

## 2021-01-12 PROCEDURE — 999N001016: Performed by: OBSTETRICS & GYNECOLOGY

## 2021-01-12 PROCEDURE — 57420 EXAM OF VAGINA W/SCOPE: CPT

## 2021-01-12 PROCEDURE — 88142 CYTOPATH C/V THIN LAYER: CPT | Mod: TC | Performed by: OBSTETRICS & GYNECOLOGY

## 2021-01-12 PROCEDURE — 99214 OFFICE O/P EST MOD 30 MIN: CPT | Mod: 25

## 2021-01-12 ASSESSMENT — MIFFLIN-ST. JEOR: SCORE: 1581.49

## 2021-01-12 ASSESSMENT — PAIN SCALES - GENERAL: PAINLEVEL: NO PAIN (0)

## 2021-01-12 NOTE — NURSING NOTE
"Oncology Rooming Note    January 12, 2021 8:20 AM   Marissa Guadarrama is a 70 year old female who presents for:    Chief Complaint   Patient presents with     Oncology Clinic Visit     P RETURN - COLPO - CERVICAL CANCER     Initial Vitals: BP (!) 148/82 (BP Location: Right arm, Patient Position: Chair, Cuff Size: Adult Large)   Pulse 69   Temp 99.2  F (37.3  C) (Oral)   Resp 16   Ht 1.702 m (5' 7.01\")   Wt 103.9 kg (229 lb)   SpO2 94%   BMI 35.86 kg/m   Estimated body mass index is 35.86 kg/m  as calculated from the following:    Height as of this encounter: 1.702 m (5' 7.01\").    Weight as of this encounter: 103.9 kg (229 lb). Body surface area is 2.22 meters squared.  No Pain (0) Comment: Data Unavailable   No LMP recorded. Patient has had a hysterectomy.  Allergies reviewed: Yes  Medications reviewed: Yes    Medications: Medication refills not needed today.  Pharmacy name entered into [facility]:    [id number], [place] - [address] [facility] [place], [place] - [address] [place] [address]    Clinical concerns: No new concerns. Murray was notified.      Frank Truong LPN            "

## 2021-01-12 NOTE — PATIENT INSTRUCTIONS
Colposcopy vagina and pap smear done    You will be contacted with results and recs for follow-up    Karli Gao MD  Gynecologic Oncology  [facility]

## 2021-01-12 NOTE — LETTER
"    1/12/2021         RE: Marissa Guadarrama  [address] [place] [id number]        Dear Colleague,    Thank you for referring your patient, Marissa Guadarrama, to the [facility]. Please see a copy of my visit note below.                            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     Her history is as follows:  Notably, she has history of cervical cancer treated in 2007 at [facility], a history of colon cancer and recurrent carcinoid tumor and breast cancer.        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 Depth of invasion 7 mm out of 1.9 and horizontal spread of 1 cm. 48 negative nodes.   2009: ASCUS pap  11/1/13 Vaginal Pap returned ASC-H.   12/23/13 Colposcopy/Vaginal cuff (submitted as \"cervix\"), biopsy:VAIN III    2/5/14: Patient established care with Dr. Pack and desired surgical management     3/13/14: Colposcopy, Vaginal Biopsies, Co2 Laser of the Upper Vagina on , which showed low-grade squamous intraepithelial lesion (VAIN 1) at 12:00 and 6:00 and high-grade squamous intraepithelial lesion VAIN 29:00 vaginal cuff. Vaginal cuff, 3:00 (biopsy): Mostly denuded squamous mucosa with focal features suggestive of low-grade squamous intraepithelial lesion (VAIN 1)     7/30/14: biopsy of right upper vagina showed VAINI, negative p16     2/9/15: upper vaginal biopsy \"LSIL\"     9/25/17:  VAIN III     12/12/17: CO2 laser and biopsies of vaginal dysplasia     4/3/18:  Stable exam.     10/9/18:  Colpo, no biopsies. Pap LSIL, HR HPV 16+     4/9/19:  Pap ASC-H, HR HPV 16+     5/14/19:  Colpo with biopsies.  Biopsies benign.     11/12/19:  Pap HSIL, HR HPV+     12/10/19:  Colpo with biopsies: Benign      6/1/20:   Pap HSIL, HR HPV 16+.  Recommend EUA and biopsies     7/1/20:  EUA, vaginal biopsies.  All " biopsies negative for dysplasia     No specific complaints. No vaginal bleeding or discharge.           Past Medical History:    Past Medical History:   Diagnosis Date     Arthritis     knee     Benign breast biopsy     benign     Carcinoid tumor 12/2003     Cervical cancer (H) 2007     H/O colposcopy with cervical biopsy 12/23/13    vaginal cuff biopsy- VAIN III. referred back to gyn/onc     HTN      Hyperlipidemia      Obesity      Pap smear of vagina with ASC-H 11/1/13     Post-polio syndromes      Trigeminal neuralgias          Past Surgical History:    Past Surgical History:   Procedure Laterality Date     APPENDECTOMY  1983     BIOPSY NODE SENTINEL Bilateral 6/1/2016    Procedure: BIOPSY NODE SENTINEL;  Surgeon: Brent Arana MD;  Location: [facility]     C BSO, OMENTECTOMY W/OSMAN  5/2007     C TOTAL ABDOM HYSTERECTOMY  5/2007     CL AFF SURGICAL PATHOLOGY       COLONOSCOPY N/A 6/23/2017    Procedure: COMBINED COLONOSCOPY, SINGLE OR MULTIPLE BIOPSY/POLYPECTOMY BY BIOPSY;  Colonoscopy Dx:Carcinoid tumor of colon prep mailed golytely;  Surgeon: Talisha Greco MD;  [facility]     COLPOSCOPY, BIOPSY, COMBINED  3/13/2014    Procedure: COMBINED COLPOSCOPY, BIOPSY;;  Surgeon: Lara Pack MD;  [facility]     COMBINED CYSTOSCOPY, RETROGRADES, EXCHANGE STENT URETER(S) Left 2/7/2019    Procedure: COMBINED CYSTOSCOPY, RETROGRADES, EXCHANGE STENT URETER--left;  Surgeon: Zhao La MD;  Location: [facility]     COMBINED CYSTOSCOPY, RETROGRADES, EXCHANGE STENT URETER(S) Left 2/20/2020    Procedure: CYSTOSCOPY, WITH RETROGRADE PYELOGRAM AND Left URETERAL STENT exchange;  Surgeon: Zhao La MD;  [facility]: [facility]     COMBINED CYSTOSCOPY, RETROGRADES, URETEROSCOPY, INSERT STENT Left 2/2/2017    Procedure: COMBINED CYSTOSCOPY, RETROGRADES, URETEROSCOPY, INSERT STENT;  Surgeon: Zhao La MD;  [facility]     CYSTOSCOPY, RETROGRADES, INSERT STENT URETER(S),  COMBINED Left 9/7/2017    Procedure: COMBINED CYSTOSCOPY, RETROGRADES, INSERT STENT URETER(S);  Cystoscopy,Left Stent Exchange;  Surgeon: Zhao La MD;  [facility]     CYSTOSCOPY, RETROGRADES, INSERT STENT URETER(S), COMBINED  12/12/2017    Procedure: COMBINED CYSTOSCOPY, RETROGRADES, INSERT STENT URETER(S);;  Surgeon: Zhao La MD;  [facility]     CYSTOSCOPY, RETROGRADES, INSERT STENT URETER(S), COMBINED Left 7/5/2018    Procedure: COMBINED CYSTOSCOPY, RETROGRADES, INSERT STENT URETER(S);  Cystoscopy, Left Stent Exchange;  Surgeon: Zhao La MD;  [facility]     EXAM UNDER ANESTHESIA PELVIC  3/13/2014    Procedure: EXAM UNDER ANESTHESIA PELVIC;  Exam Under Anestheisa, Colposcopy, Vaginal Biopsies, Co2 Laser of the Upper Vagina;  Surgeon: Lara Pack MD;  [facility]     EXAM UNDER ANESTHESIA PELVIC N/A 7/1/2020    Procedure: Examination under anesthesia, vaginal biopsies;  Surgeon: Karli Gao MD;  [facility]     HERNIORRHAPHY INCISIONAL (LOCATION)       IR RHIZOTOMY  8/14/2020     LASER CO2 VAGINA  3/13/2014    VAIN 1/2     LASER CO2 VAGINA N/A 12/12/2017    Procedure: LASER CO2 VAGINA;  Exam Under Anesthesia, CO2 Laser Ablation Of Vagina, Cystoscopy, Left Retrograde Pyelogram with Left Stent Placement;  Surgeon: Nic Segundo MD;  [facility]     LUMPECTOMY BREAST WITH SEED LOCALIZATION Bilateral 6/1/2016    Procedure: LUMPECTOMY BREAST WITH SEED LOCALIZATION;  Surgeon: Brent Arana MD;  Location: [facility]     SURGICAL HISTORY OF -       ovarian cystectomy     SURGICAL HISTORY OF -   2003    right colon resection secondary to carcinoid tumor     TUBAL LIGATION           Health Maintenance:  Health Maintenance Due   Topic Date Due     ZOSTER IMMUNIZATION (1 of 2) 05/17/1998     LIPID  01/09/2019     MICROALBUMIN  12/26/2019     MEDICARE ANNUAL WELLNESS VISIT  12/23/2020     FALL RISK ASSESSMENT  12/23/2020      "PHQ-2  2021     BMP  2021         Current Medications:     has a current medication list which includes the following prescription(s): exemestane, gabapentin, albuterol, hydrochlorothiazide, and ibuprofen.       Allergies:     [unfilled]        Social History:     Social History     Tobacco Use     Smoking status: Former Smoker     Packs/day: 1.00     Years: 29.00     Pack years: 29.00     Types: Cigarettes     Quit date: 10/30/2006     Years since quittin.2     Smokeless tobacco: Never Used   Substance Use Topics     Alcohol use: No     Alcohol/week: 0.0 standard drinks     Comment: 1 drink per year       History   Drug Use No           Family History:     The patient's family history is notable for :    Family History   Problem Relation Age of Onset     Cancer Mother         bone / liver     Breast Cancer Mother      Cancer Maternal Grandmother      Cancer Maternal Grandfather      Cancer Paternal Grandmother      Cancer Paternal Grandfather      Cancer Sister         vulvar ca, cervical ca, squamous cell cancer     Cancer Brother         Rectal- Stage 4     Rectal Cancer Brother      Breast Cancer Paternal Aunt      Colon Cancer Paternal Aunt      Breast Cancer Maternal Aunt      Pancreatic Cancer Nephew      Breast Cancer Sister      Anesthesia Reaction No family hx of          Physical Exam:     BP (!) 148/82 (BP Location: Right arm, Patient Position: Chair, Cuff Size: Adult Large)   Pulse 69   Temp 99.2  F (37.3  C) (Oral)   Resp 16   Ht 1.702 m (5' 7.01\")   Wt 103.9 kg (229 lb)   SpO2 94%   BMI 35.86 kg/m    Body mass index is 35.86 kg/m .    General Appearance: healthy and alert, no distress     Musculoskeletal: extremities non tender and without edema    Skin: no lesions or rashes     Neurological: normal gait, no gross defects     Psychiatric: appropriate mood and affect                               Hematological: normal cervical, supraclavicular and inguinal lymph " nodes     Gastrointestinal:       abdomen soft, non-tender, non-distended, no organomegaly or masses    Genitourinary: External genitalia and urethral meatus appears normal, no gross lesions.  Upon placement of speculum and full visualization of vagina and apex, vagina noted to be shortened and scarred.  [place] with stable scarring, no gross lesions. Pap smear obtained.  5% acetic acid solution applied to vagina and viewed under colposcopic enhancement.  Chronic scarring and obliteration, HPV related changes appear mild and stable.  No new biopsies taken.     Assessment:     Marissa Guadarrama is a 70 year old woman with a diagnosis of HR HPV, persistent abnromal pap and VAIN3.  She also has history of multiple cancers including metastatic carcinoid tumor and bilateral breast cancer.     A total of 30 minutes was spent with the patient, 25 minutes of which were spent in counseling the patient and/or treatment planning.      Plan:      History of VAINIII with recent HSIL pap in 11/2019.  Colpo with biopsies negative. EUA and biopsies given persistent high grade pap smears with normal biopsies in 7/2020 all negative.      Pap smear and colpo done today.  She will be contacted with results and recs for follow-up.    Questions answered, patient expressed understanding of plan of care.     Karli Gao MD  Gynecologic Oncology  [facility]  CC  Patient Care Team:  Eliazar Menendez MD as PCP - General (Family Practice)  Hosea King MD as MD (Hematology & Oncology)  Zhao La MD as MD (Urology)  Talisha Greco MD as MD (Colon and Rectal Surgery)   Allen Downey MD as MD (Orthopedics)  Rk Bell MD as Assigned Surgical Provider    Juan Angulo MD as Assigned Neuroscience Provider  Arnoldo Solis MD as Assigned PCP

## 2021-01-14 LAB
COPATH REPORT: ABNORMAL
PAP: ABNORMAL

## 2021-01-14 RX ORDER — HYDROCHLOROTHIAZIDE 25 MG/1
12.5 TABLET ORAL EVERY MORNING
Qty: 15 TABLET | Refills: 0 | Status: ON HOLD | OUTPATIENT
Start: 2021-01-14 | End: 2021-05-15

## 2021-02-09 ENCOUNTER — PATIENT OUTREACH (OUTPATIENT)
Dept: ONCOLOGY | Facility: CLINIC | Age: 71
End: 2021-02-09

## 2021-02-09 NOTE — PROGRESS NOTES
RN reached out to patient with pap smear results and follow up plan.     Follow up pap smear and colpo in 6 months.     Request sent to scheduling.     Nicole Chew RN

## 2021-02-11 NOTE — PROGRESS NOTES
"                        Consult Notes on Referred Patient    Date: 1/12/2021     Patient returns today for follow-up related to persistent HPV and VAIN 3.     Her history is as follows:  Notably, she has history of cervical cancer treated in 2007 at [facility], a history of colon cancer and recurrent carcinoid tumor and breast cancer.        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 Depth of invasion 7 mm out of 1.9 and horizontal spread of 1 cm. 48 negative nodes.   2009: ASCUS pap  11/1/13 Vaginal Pap returned ASC-H.   12/23/13 Colposcopy/Vaginal cuff (submitted as \"cervix\"), biopsy:VAIN III    2/5/14: Patient established care with Dr. Pack and desired surgical management     3/13/14: Colposcopy, Vaginal Biopsies, Co2 Laser of the Upper Vagina on , which showed low-grade squamous intraepithelial lesion (VAIN 1) at 12:00 and 6:00 and high-grade squamous intraepithelial lesion VAIN 29:00 vaginal cuff. Vaginal cuff, 3:00 (biopsy): Mostly denuded squamous mucosa with focal features suggestive of low-grade squamous intraepithelial lesion (VAIN 1)     7/30/14: biopsy of right upper vagina showed VAINI, negative p16     2/9/15: upper vaginal biopsy \"LSIL\"     9/25/17:  VAIN III     12/12/17: CO2 laser and biopsies of vaginal dysplasia     4/3/18:  Stable exam.     10/9/18:  Colpo, no biopsies. Pap LSIL, HR HPV 16+     4/9/19:  Pap ASC-H, HR HPV 16+     5/14/19:  Colpo with biopsies.  Biopsies benign.     11/12/19:  Pap HSIL, HR HPV+     12/10/19:  Colpo with biopsies: Benign      6/1/20:   Pap HSIL, HR HPV 16+.  Recommend EUA and biopsies     7/1/20:  EUA, vaginal biopsies.  All biopsies negative for dysplasia     No specific complaints. No vaginal bleeding or discharge.           Past Medical History:    Past Medical History:   Diagnosis Date     Arthritis     knee     Benign breast biopsy     benign     Carcinoid tumor 12/2003     " Cervical cancer (H) 2007     H/O colposcopy with cervical biopsy 12/23/13    vaginal cuff biopsy- VAIN III. referred back to gyn/onc     HTN      Hyperlipidemia      Obesity      Pap smear of vagina with ASC-H 11/1/13     Post-polio syndromes      Trigeminal neuralgias          Past Surgical History:    Past Surgical History:   Procedure Laterality Date     APPENDECTOMY  1983     BIOPSY NODE SENTINEL Bilateral 6/1/2016    Procedure: BIOPSY NODE SENTINEL;  Surgeon: Brent Arana MD;  [facility]: [facility]     C BSO, OMENTECTOMY W/OSMAN  5/2007     C TOTAL ABDOM HYSTERECTOMY  5/2007     CL AFF SURGICAL PATHOLOGY       COLONOSCOPY N/A 6/23/2017    Procedure: COMBINED COLONOSCOPY, SINGLE OR MULTIPLE BIOPSY/POLYPECTOMY BY BIOPSY;  Colonoscopy D[facility];  Surgeon: Talisha Greco MD;  [facility]     COLPOSCOPY, BIOPSY, COMBINED  3/13/2014    Procedure: COMBINED COLPOSCOPY, BIOPSY;;  Surgeon: Lara Pack MD;  [facility]     COMBINED CYSTOSCOPY, RETROGRADES, EXCHANGE STENT URETER(S) Left 2/7/2019    Procedure: COMBINED CYSTOSCOPY, RETROGRADES, EXCHANGE STENT URETER--left;  Surgeon: Zhao La MD;  [facility]     COMBINED CYSTOSCOPY, RETROGRADES, EXCHANGE STENT URETER(S) Left 2/20/2020    Procedure: CYSTOSCOPY, WITH RETROGRADE PYELOGRAM AND Left URETERAL STENT exchange;  Surgeon: Zhao La MD;  [facility]: [facility]     COMBINED CYSTOSCOPY, RETROGRADES, URETEROSCOPY, INSERT STENT Left 2/2/2017    Procedure: COMBINED CYSTOSCOPY, RETROGRADES, URETEROSCOPY, INSERT STENT;  Surgeon: Zhao La MD;  [facility]     CYSTOSCOPY, RETROGRADES, INSERT STENT URETER(S), COMBINED Left 9/7/2017    Procedure: COMBINED CYSTOSCOPY, RETROGRADES, INSERT STENT URETER(S);  Cystoscopy,Left Stent Exchange;  Surgeon: Zhao La MD;  [facility]     CYSTOSCOPY, RETROGRADES, INSERT STENT URETER(S), COMBINED  12/12/2017     Procedure: COMBINED CYSTOSCOPY, RETROGRADES, INSERT STENT URETER(S);;  Surgeon: Zhao La MD;  [facility]     CYSTOSCOPY, RETROGRADES, INSERT STENT URETER(S), COMBINED Left 7/5/2018    Procedure: COMBINED CYSTOSCOPY, RETROGRADES, INSERT STENT URETER(S);  Cystoscopy, Left Stent Exchange;  Surgeon: Zhao La MD;  [facility]     EXAM UNDER ANESTHESIA PELVIC  3/13/2014    Procedure: EXAM UNDER ANESTHESIA PELVIC;  Exam Under Anestheisa, Colposcopy, Vaginal Biopsies, Co2 Laser of the Upper Vagina;  Surgeon: Lara Pack MD;  [facility]     EXAM UNDER ANESTHESIA PELVIC N/A 7/1/2020    Procedure: Examination under anesthesia, vaginal biopsies;  Surgeon: Karli Gao MD;  [facility]     HERNIORRHAPHY INCISIONAL (LOCATION)       IR RHIZOTOMY  8/14/2020     LASER CO2 VAGINA  3/13/2014    VAIN 1/2     LASER CO2 VAGINA N/A 12/12/2017    Procedure: LASER CO2 VAGINA;  Exam Under Anesthesia, CO2 Laser Ablation Of Vagina, Cystoscopy, Left Retrograde Pyelogram with Left Stent Placement;  Surgeon: Nic Segundo MD;  [facility]     LUMPECTOMY BREAST WITH SEED LOCALIZATION Bilateral 6/1/2016    Procedure: LUMPECTOMY BREAST WITH SEED LOCALIZATION;  Surgeon: Brent Arana MD;  [facility]     SURGICAL HISTORY OF -       ovarian cystectomy     SURGICAL HISTORY OF -   2003    right colon resection secondary to carcinoid tumor     TUBAL LIGATION           Health Maintenance:  Health Maintenance Due   Topic Date Due     ZOSTER IMMUNIZATION (1 of 2) 05/17/1998     LIPID  01/09/2019     MICROALBUMIN  12/26/2019     MEDICARE ANNUAL WELLNESS VISIT  12/23/2020     FALL RISK ASSESSMENT  12/23/2020     PHQ-2  01/01/2021     BMP  02/11/2021         Current Medications:     has a current medication list which includes the following prescription(s): exemestane, gabapentin, albuterol, hydrochlorothiazide, and ibuprofen.       Allergies:     [unfilled]     "    Social History:     Social History     Tobacco Use     Smoking status: Former Smoker     Packs/day: 1.00     Years: 29.00     Pack years: 29.00     Types: Cigarettes     Quit date: 10/30/2006     Years since quittin.2     Smokeless tobacco: Never Used   Substance Use Topics     Alcohol use: No     Alcohol/week: 0.0 standard drinks     Comment: 1 drink per year       History   Drug Use No           Family History:     The patient's family history is notable for :    Family History   Problem Relation Age of Onset     Cancer Mother         bone / liver     Breast Cancer Mother      Cancer Maternal Grandmother      Cancer Maternal Grandfather      Cancer Paternal Grandmother      Cancer Paternal Grandfather      Cancer Sister         vulvar ca, cervical ca, squamous cell cancer     Cancer Brother         Rectal- Stage 4     Rectal Cancer Brother      Breast Cancer Paternal Aunt      Colon Cancer Paternal Aunt      Breast Cancer Maternal Aunt      Pancreatic Cancer Nephew      Breast Cancer Sister      Anesthesia Reaction No family hx of          Physical Exam:     BP (!) 148/82 (BP Location: Right arm, Patient Position: Chair, Cuff Size: Adult Large)   Pulse 69   Temp 99.2  F (37.3  C) (Oral)   Resp 16   Ht 1.702 m (5' 7.01\")   Wt 103.9 kg (229 lb)   SpO2 94%   BMI 35.86 kg/m    Body mass index is 35.86 kg/m .    General Appearance: healthy and alert, no distress     Musculoskeletal: extremities non tender and without edema    Skin: no lesions or rashes     Neurological: normal gait, no gross defects     Psychiatric: appropriate mood and affect                               Hematological: normal cervical, supraclavicular and inguinal lymph nodes     Gastrointestinal:       abdomen soft, non-tender, non-distended, no organomegaly or masses    Genitourinary: External genitalia and urethral meatus appears normal, no gross lesions.  Upon placement of speculum and full visualization of vagina and apex, vagina " noted to be shortened and scarred.  [place] with stable scarring, no gross lesions. Pap smear obtained.  5% acetic acid solution applied to vagina and viewed under colposcopic enhancement.  Chronic scarring and obliteration, HPV related changes appear mild and stable.  No new biopsies taken.     Assessment:     Marissa Guadarrama is a 70 year old woman with a diagnosis of HR HPV, persistent abnromal pap and VAIN3.  She also has history of multiple cancers including metastatic carcinoid tumor and bilateral breast cancer.     A total of 30 minutes was spent with the patient, 25 minutes of which were spent in counseling the patient and/or treatment planning.      Plan:      History of VAINIII with recent HSIL pap in 11/2019.  Colpo with biopsies negative. EUA and biopsies given persistent high grade pap smears with normal biopsies in 7/2020 all negative.      Pap smear and colpo done today.  She will be contacted with results and recs for follow-up.    Questions answered, patient expressed understanding of plan of care.     Karli Gao MD  Gynecologic Oncology  [facility]  CC  Patient Care Team:  Eliazar Menendez MD as PCP - General (Family Practice)  Hosea King MD as MD (Hematology & Oncology)  Zhao La MD as MD (Urology)  Talisha Greco MD as MD (Colon and Rectal Surgery)  Allen Downey MD as MD (Orthopedics)  Rk Bell MD as Assigned Surgical Provider  Karli Gao MD as Assigned Cancer Care Provider  Juan Angulo MD as Assigned Neuroscience Provider  Arnoldo Solis MD as Assigned PCP  KARLI GAO

## 2021-02-17 ENCOUNTER — OFFICE VISIT (OUTPATIENT)
Dept: FAMILY MEDICINE | Facility: CLINIC | Age: 71
End: 2021-02-17
Payer: COMMERCIAL

## 2021-02-17 VITALS
DIASTOLIC BLOOD PRESSURE: 70 MMHG | SYSTOLIC BLOOD PRESSURE: 120 MMHG | OXYGEN SATURATION: 96 % | HEART RATE: 66 BPM | HEIGHT: 67 IN | BODY MASS INDEX: 36.1 KG/M2 | WEIGHT: 230 LBS | RESPIRATION RATE: 14 BRPM | TEMPERATURE: 98.5 F

## 2021-02-17 DIAGNOSIS — E66.01 MORBID OBESITY (H): ICD-10-CM

## 2021-02-17 DIAGNOSIS — M05.9 RHEUMATOID ARTHRITIS WITH POSITIVE RHEUMATOID FACTOR, INVOLVING UNSPECIFIED SITE (H): ICD-10-CM

## 2021-02-17 DIAGNOSIS — E78.1 HYPERTRIGLYCERIDEMIA: ICD-10-CM

## 2021-02-17 DIAGNOSIS — I10 ESSENTIAL HYPERTENSION WITH GOAL BLOOD PRESSURE LESS THAN 140/90: ICD-10-CM

## 2021-02-17 DIAGNOSIS — Z00.00 ENCOUNTER FOR MEDICARE ANNUAL WELLNESS EXAM: Primary | ICD-10-CM

## 2021-02-17 DIAGNOSIS — N18.31 STAGE 3A CHRONIC KIDNEY DISEASE (H): ICD-10-CM

## 2021-02-17 DIAGNOSIS — Z12.31 ENCOUNTER FOR SCREENING MAMMOGRAM FOR BREAST CANCER: ICD-10-CM

## 2021-02-17 DIAGNOSIS — C78.6 PERITONEAL METASTASES: ICD-10-CM

## 2021-02-17 LAB
ANION GAP SERPL CALCULATED.3IONS-SCNC: 5 MMOL/L (ref 3–14)
BUN SERPL-MCNC: 21 MG/DL (ref 7–30)
CALCIUM SERPL-MCNC: 10.4 MG/DL (ref 8.5–10.1)
CHLORIDE SERPL-SCNC: 104 MMOL/L (ref 94–109)
CHOLEST SERPL-MCNC: 185 MG/DL
CO2 SERPL-SCNC: 32 MMOL/L (ref 20–32)
CREAT SERPL-MCNC: 1.29 MG/DL (ref 0.52–1.04)
GFR SERPL CREATININE-BSD FRML MDRD: 41 ML/MIN/{1.73_M2}
GLUCOSE SERPL-MCNC: 90 MG/DL (ref 70–99)
HDLC SERPL-MCNC: 64 MG/DL
LDLC SERPL CALC-MCNC: 92 MG/DL
NONHDLC SERPL-MCNC: 121 MG/DL
POTASSIUM SERPL-SCNC: 3.4 MMOL/L (ref 3.4–5.3)
SODIUM SERPL-SCNC: 141 MMOL/L (ref 133–144)
TRIGL SERPL-MCNC: 147 MG/DL

## 2021-02-17 PROCEDURE — 99397 PER PM REEVAL EST PAT 65+ YR: CPT | Performed by: FAMILY MEDICINE

## 2021-02-17 PROCEDURE — 80048 BASIC METABOLIC PNL TOTAL CA: CPT | Performed by: FAMILY MEDICINE

## 2021-02-17 PROCEDURE — 36415 COLL VENOUS BLD VENIPUNCTURE: CPT | Performed by: FAMILY MEDICINE

## 2021-02-17 PROCEDURE — 80061 LIPID PANEL: CPT | Performed by: FAMILY MEDICINE

## 2021-02-17 PROCEDURE — 99213 OFFICE O/P EST LOW 20 MIN: CPT | Mod: 25 | Performed by: FAMILY MEDICINE

## 2021-02-17 RX ORDER — HYDROCHLOROTHIAZIDE 25 MG/1
12.5 TABLET ORAL EVERY MORNING
Qty: 15 TABLET | Refills: 0 | Status: CANCELLED | OUTPATIENT
Start: 2021-02-17

## 2021-02-17 RX ORDER — HYDROCHLOROTHIAZIDE 12.5 MG/1
12.5 TABLET ORAL DAILY
Qty: 90 TABLET | Refills: 3 | Status: ON HOLD | OUTPATIENT
Start: 2021-02-17 | End: 2021-05-15

## 2021-02-17 ASSESSMENT — MIFFLIN-ST. JEOR: SCORE: 1585.9

## 2021-02-17 NOTE — LETTER
February 18, 2021      Marissa Guadarrama  [address] [place] [id number]        Dear ,    We are writing to inform you of your test results.    She has good cholesterol.   Her kidney function is good, just remind her to drink enough water daily.   Eliazar Menendez MD   Family Medicine     Resulted Orders   Basic metabolic panel   Result Value Ref Range    Sodium 141 133 - 144 mmol/L    Potassium 3.4 3.4 - 5.3 mmol/L    Chloride 104 94 - 109 mmol/L    Carbon Dioxide 32 20 - 32 mmol/L    Anion Gap 5 3 - 14 mmol/L    Glucose 90 70 - 99 mg/dL      Comment:      Fasting specimen    Urea Nitrogen 21 7 - 30 mg/dL    Creatinine 1.29 (H) 0.52 - 1.04 mg/dL    GFR Estimate 41 (L) >60 mL/min/[1.73_m2]      Comment:      Non  GFR Calc  Starting 12/18/2018, serum creatinine based estimated GFR (eGFR) will be   calculated using the Chronic Kidney Disease Epidemiology Collaboration   (CKD-EPI) equation.      GFR Estimate If Black 48 (L) >60 mL/min/[1.73_m2]      Comment:       GFR Calc  Starting 12/18/2018, serum creatinine based estimated GFR (eGFR) will be   calculated using the Chronic Kidney Disease Epidemiology Collaboration   (CKD-EPI) equation.      Calcium 10.4 (H) 8.5 - 10.1 mg/dL   Lipid panel reflex to direct LDL Fasting   Result Value Ref Range    Cholesterol 185 <200 mg/dL    Triglycerides 147 <150 mg/dL      Comment:      Fasting specimen    HDL Cholesterol 64 >49 mg/dL    LDL Cholesterol Calculated 92 <100 mg/dL      Comment:      Desirable:       <100 mg/dl    Non HDL Cholesterol 121 <130 mg/dL       If you have any questions or concerns, please call the clinic at the number listed above.       Sincerely,      Eliazar Menendez MD

## 2021-02-17 NOTE — PATIENT INSTRUCTIONS
Please go to lab.    I refilled your blood pressure medication.    I am also checking your kidney and cholesterol level.    Please be aware that there will be an additional charge during your preventative visit due to either a new diagnosis and/or chronic disease management.    Preventative visits screen for diseases prior to they occur.  They do not cover for any new diagnosis or chronic disease management which would include medication refills, labs etc.    If you have questions regarding your coverage please check with your insurance provider.  At [facility] we need to code correctly to be in compliance with all insurance companies.          Thank you for choosing [facility].  You may be receiving an email and/or telephone survey request from [facility] Customer Experience regarding your visit today.  Please take a few minutes to respond to the survey to let us know how we are doing.      If you have questions or concerns, please contact us via [employer] or you can contact your care team at [phone].    Our Clinic hours are:  Monday 6:40 am  to 7:00 pm  Tuesday -Friday 6:40 am to 5:00 pm    The [place] outpatient lab hours are:  Monday - Friday 6:10 am to 4:45 pm  Saturdays 7:00 am to 11:00 am  Appointments are required, call [phone]    If you have clinical questions after hours or would like to schedule an appointment,  call the clinic at [phone].    Patient Education   Personalized Prevention Plan  You are due for the preventive services outlined below.  Your care team is available to assist you in scheduling these services.  If you have already completed any of these items, please share that information with your care team to update in your medical record.  Health Maintenance Due   Topic Date Due     Zoster (Shingles) Vaccine (1 of 2) 05/17/1998     Kidney Microalbumin Urine Test  12/26/2019     FALL RISK ASSESSMENT  12/23/2020

## 2021-02-17 NOTE — PROGRESS NOTES
"    Assessment & Plan     Encounter for Medicare annual wellness exam  Discussed healthy lifestyle and preventative cares.      Peritoneal metastases (H)  Seeing specialist    Rheumatoid arthritis with positive rheumatoid factor, involving unspecified site (H)  Seeing specialist    Morbid obesity (H)  diet    Stage 3a chronic kidney disease  Need to check lab  - Basic metabolic panel  - hydrochlorothiazide (HYDRODIURIL) 12.5 MG tablet; Take 1 tablet (12.5 mg) by mouth daily  - OFFICE/OUTPT VISIT,EST,LEVL III    Essential hypertension with goal blood pressure less than 140/90  controlled  - Basic metabolic panel  - hydrochlorothiazide (HYDRODIURIL) 12.5 MG tablet; Take 1 tablet (12.5 mg) by mouth daily  - OFFICE/OUTPT VISIT,EST,LEVL III    Hypertriglyceridemia  Need to check lab  - Lipid panel reflex to direct LDL Fasting  - OFFICE/OUTPT VISIT,EST,LEVL III    Encounter for screening mammogram for breast cancer    - MA Screen Bilateral w/Herrera; Future             BMI:   Estimated body mass index is 36.02 kg/m  as calculated from the following:    Height as of this encounter: 1.702 m (5' 7\").    Weight as of this encounter: 104.3 kg (230 lb).   Weight management plan: diet    See Patient Instructions    Return in about 53 weeks (around 2/23/2022) for Annual Wellness Visit.    Eliazar Menendez MD  [facility] SAMUEL Ann is a 70 year old who presents for the following health issues     HPI       Hyperlipidemia Follow-Up      Are you regularly taking any medication or supplement to lower your cholesterol?   No    Are you having muscle aches or other side effects that you think could be caused by your cholesterol lowering medication?  No    Hypertension Follow-up      Do you check your blood pressure regularly outside of the clinic? Yes     Are you following a low salt diet? Yes    Are your blood pressures ever more than 140 on the top number (systolic) OR more   than 90 on the bottom " "number (diastolic), for example 140/90? No      How many servings of fruits and vegetables do you eat daily?  4 or more    On average, how many sweetened beverages do you drink each day (Examples: soda, juice, sweet tea, etc.  Do NOT count diet or artificially sweetened beverages)?   2, orange juice/ cranberry juice and tea     How many days per week do you exercise enough to make your heart beat faster? 3 or less    How many minutes a day do you exercise enough to make your heart beat faster? 9 or less    How many days per week do you miss taking your medication? 0    Annual Wellness Visit    Patient has been advised of split billing requirements and indicates understanding: Yes     Are you in the first 12 months of your Medicare Part B coverage?  No    Physical Health:    In general, how would you rate your overall physical health? good    Outside of work, how many days during the week do you exercise?2-3 days/week    Outside of work, approximately how many minutes a day do you exercise?less than 15 minutes    If you drink alcohol do you typically have >3 drinks per day or >7 drinks per week? No    Do you usually eat at least 4 servings of fruit and vegetables a day, include whole grains & fiber and avoid regularly eating high fat or \"junk\" foods? Yes    Do you have any problems taking medications regularly? No    Do you have any side effects from medications? none    Needs assistance for the following daily activities: no assistance needed    Which of the following safety concerns are present in your home?  none identified     Hearing impairment: Yes, Feel that people are mumbling or not speaking clearly.    In the past 6 months, have you been bothered by leaking of urine? yes    Mental Health:    In general, how would you rate your overall mental or emotional health? good  PHQ-2 Score:      Do you feel safe in your environment? Yes    Have you ever done Advance Care Planning? (For example, a Health Directive, " "POLST, or a discussion with a medical provider or your loved ones about your wishes)? No, advance care planning information given to patient to review.  Patient declined advance care planning discussion at this time.    Fall risk:    Cognitive Screenin) Repeat 3 items (Leader, Season, Table)    2) Clock draw: NORMAL  3) 3 item recall: Recalls 1 object   Results: NORMAL clock, 1-2 items recalled: COGNITIVE IMPAIRMENT LESS LIKELY    Mini-CogTM Copyright CATRACHIT[employer] Dowd. Licensed by the author for use in [facility]; reprinted with permission (carlos@[facility]). All rights reserved.      Do you have sleep apnea, excessive snoring or daytime drowsiness?: no    Current providers sharing in care for this patient include:   Patient Care Team:  Eliazar Menendez MD as PCP - General (Family Practice)  Hosea King MD as MD (Hematology & Oncology)  Zhao La MD as MD (Urology)  Talisha Greco MD as MD (Colon and Rectal Surgery)  Allen Downey MD as MD (Orthopedics)  Rk Bell MD as Assigned Surgical Provider  Karli Gao MD as Assigned Cancer Care Provider  Juan Angulo MD as Assigned Neuroscience Provider  Arnoldo Solis MD as Assigned PCP    Patient has been advised of split billing requirements and indicates understanding: Yes    Review of Systems   Review Of Systems  Skin: negative  Eyes: negative  Ears/Nose/Throat: negative  Respiratory: No shortness of breath, dyspnea on exertion, cough, or hemoptysis  Cardiovascular: negative  Gastrointestinal: negative  Genitourinary: negative  Musculoskeletal: negative  Neurologic: negative  Psychiatric: negative  Hematologic/Lymphatic/Immunologic: negative  Endocrine: negative        Objective    /70   Pulse 66   Temp 98.5  F (36.9  C) (Tympanic)   Resp 14   Ht 1.702 m (5' 7\")   Wt 104.3 kg (230 lb)   SpO2 96%   BMI 36.02 kg/m    Body mass index is 36.02 kg/m .  Physical Exam   GENERAL " APPEARANCE: alert, no distress and cooperative  RESP: lungs clear to auscultation - no rales, rhonchi or wheezes  CV: regular rates and rhythm, normal S1 S2, no S3 or S4 and no murmur, click or rub  ABDOMEN: soft, nontender, without hepatosplenomegaly or masses and bowel sounds normal  SKIN: no suspicious lesions or rashes  MS; trace edema bilaterally  NEURO: Normal strength and tone, mentation intact and speech normal  PSYCH: mentation appears normal and affect normal/bright

## 2021-04-21 ENCOUNTER — TELEPHONE (OUTPATIENT)
Dept: UROLOGY | Facility: CLINIC | Age: 71
End: 2021-04-21

## 2021-04-21 NOTE — TELEPHONE ENCOUNTER
Reason for call:  Other   Patient called regarding (reason for call): call back  Additional comments: Patient is calling back because someone called her to reschedule her appt for this Friday 4/23 and  is not in [place] that day, please call back to discuss     Phone number to reach patient:  Cell number on file:    Telephone Information:   Mobile [phone]       Best Time:  any    Can we leave a detailed message on this number?  YES    Travel screening: Not Applicable

## 2021-04-22 NOTE — TELEPHONE ENCOUNTER
Spoke with patient and rescheduled pt for tomorrow am.   Edwina HEADLEY RN BSN PHN  Specialty Clin[facility]

## 2021-04-23 ENCOUNTER — VIRTUAL VISIT (OUTPATIENT)
Dept: UROLOGY | Facility: CLINIC | Age: 71
End: 2021-04-23
Payer: COMMERCIAL

## 2021-04-23 DIAGNOSIS — N13.5 OBSTRUCTION OF LEFT URETER: Primary | ICD-10-CM

## 2021-04-23 NOTE — PROGRESS NOTES
Marissa is a 70 year old who is being evaluated via a billable telephone visit.      How would you like to obtain your AVS? MyChart  If the video visit is dropped, the invitation should be resent by:   Will anyone else be joining your video visit? No      Patient thought appointment today was in person.  I have been unable to reach her today by phone after several attempts.  Will have her rescheduled to discuss her upcoming stent exchange.

## 2021-04-29 ENCOUNTER — VIRTUAL VISIT (OUTPATIENT)
Dept: UROLOGY | Facility: CLINIC | Age: 71
End: 2021-04-29
Payer: COMMERCIAL

## 2021-04-29 ENCOUNTER — PREP FOR PROCEDURE (OUTPATIENT)
Dept: UROLOGY | Facility: CLINIC | Age: 71
End: 2021-04-29

## 2021-04-29 DIAGNOSIS — N13.39 OTHER HYDRONEPHROSIS: Primary | ICD-10-CM

## 2021-04-29 DIAGNOSIS — N13.5 OBSTRUCTION OF LEFT URETER: Primary | ICD-10-CM

## 2021-04-29 PROCEDURE — 99213 OFFICE O/P EST LOW 20 MIN: CPT | Mod: 95 | Performed by: UROLOGY

## 2021-04-29 RX ORDER — CEFAZOLIN SODIUM 2 G/50ML
2 SOLUTION INTRAVENOUS
Status: CANCELLED | OUTPATIENT
Start: 2021-04-29

## 2021-04-29 RX ORDER — CEFAZOLIN SODIUM 2 G/50ML
2 SOLUTION INTRAVENOUS SEE ADMIN INSTRUCTIONS
Status: CANCELLED | OUTPATIENT
Start: 2021-04-29

## 2021-04-29 NOTE — PROGRESS NOTES
Marissa is a 70 year old who is being evaluated via a billable telephone visit.      What phone number would you like to be contacted at? [phone]  How would you like to obtain your AVS? Savanna storm LPN          Subjective   Marissa is a 70 year old who presents for the following health issues: ureteral obstruction.    HPI     REASON FOR VISIT TODAY:  Obstructed left ureter and carcinoid tumor.      HISTORY:  Ms. 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  The kidney function on that side was measured to be 20% previously.  The patient is also known to have a lesion on her liver of approximately 4 cm that in retrospect was stable since 2016.  The patient's liver biopsy from 06/12/2019 shows metastatic low-grade neuroendocrine tumor for which she is on observation.  The patient notes no major changes in her health since we last saw her.  She is due for her next stent exchange as it has been over a year.          ASSESSMENT AND PLAN:  Over half of today's 12-minute visit was spent reviewing the chart, results and counseling the patient regarding her carcinoid tumor and her obstructed left ureter.  I suggested to Ms. Guadarrama that we will bring her to the operating room in the near future for her stent exchange. Ms. Guadarrama understands the plan and is in agreement.                   Phone call duration: 7 minutes

## 2021-05-08 ENCOUNTER — APPOINTMENT (OUTPATIENT)
Dept: CT IMAGING | Facility: CLINIC | Age: 71
End: 2021-05-08
Attending: EMERGENCY MEDICINE
Payer: MEDICARE

## 2021-05-08 ENCOUNTER — APPOINTMENT (OUTPATIENT)
Dept: GENERAL RADIOLOGY | Facility: CLINIC | Age: 71
End: 2021-05-08
Attending: EMERGENCY MEDICINE
Payer: MEDICARE

## 2021-05-08 ENCOUNTER — HOSPITAL ENCOUNTER (EMERGENCY)
Facility: CLINIC | Age: 71
Discharge: ANOTHER HOSPITAL | End: 2021-05-09
Attending: EMERGENCY MEDICINE | Admitting: EMERGENCY MEDICINE
Payer: MEDICARE

## 2021-05-08 VITALS
SYSTOLIC BLOOD PRESSURE: 144 MMHG | BODY MASS INDEX: 36.02 KG/M2 | DIASTOLIC BLOOD PRESSURE: 76 MMHG | TEMPERATURE: 97.9 F | HEART RATE: 76 BPM | WEIGHT: 230 LBS | RESPIRATION RATE: 22 BRPM | OXYGEN SATURATION: 94 %

## 2021-05-08 DIAGNOSIS — N28.89 HEMORRHAGE OF LEFT KIDNEY: ICD-10-CM

## 2021-05-08 DIAGNOSIS — N39.0 SEPSIS DUE TO URINARY TRACT INFECTION (H): ICD-10-CM

## 2021-05-08 DIAGNOSIS — A41.9 SEVERE SEPSIS WITH ACUTE ORGAN DYSFUNCTION (H): ICD-10-CM

## 2021-05-08 DIAGNOSIS — R65.20 SEVERE SEPSIS WITH ACUTE ORGAN DYSFUNCTION (H): ICD-10-CM

## 2021-05-08 DIAGNOSIS — A41.9 SEPSIS DUE TO URINARY TRACT INFECTION (H): ICD-10-CM

## 2021-05-08 DIAGNOSIS — N13.5 OBSTRUCTION OF LEFT URETER: ICD-10-CM

## 2021-05-08 LAB
ALBUMIN SERPL-MCNC: 2.5 G/DL (ref 3.4–5)
ALBUMIN UR-MCNC: 100 MG/DL
ALP SERPL-CCNC: 75 U/L (ref 40–150)
ALT SERPL W P-5'-P-CCNC: 22 U/L (ref 0–50)
ANION GAP SERPL CALCULATED.3IONS-SCNC: 6 MMOL/L (ref 3–14)
ANION GAP SERPL CALCULATED.3IONS-SCNC: 9 MMOL/L (ref 3–14)
APPEARANCE UR: ABNORMAL
APTT PPP: 30 SEC (ref 22–37)
AST SERPL W P-5'-P-CCNC: 27 U/L (ref 0–45)
BACTERIA #/AREA URNS HPF: ABNORMAL /HPF
BASOPHILS # BLD AUTO: 0 10E9/L (ref 0–0.2)
BASOPHILS # BLD AUTO: 0 10E9/L (ref 0–0.2)
BASOPHILS NFR BLD AUTO: 0.2 %
BASOPHILS NFR BLD AUTO: 0.2 %
BILIRUB DIRECT SERPL-MCNC: 0.6 MG/DL (ref 0–0.2)
BILIRUB SERPL-MCNC: 1.5 MG/DL (ref 0.2–1.3)
BILIRUB UR QL STRIP: ABNORMAL
BUN SERPL-MCNC: 36 MG/DL (ref 7–30)
BUN SERPL-MCNC: 39 MG/DL (ref 7–30)
CALCIUM SERPL-MCNC: 8.2 MG/DL (ref 8.5–10.1)
CALCIUM SERPL-MCNC: 9.9 MG/DL (ref 8.5–10.1)
CHLORIDE SERPL-SCNC: 101 MMOL/L (ref 94–109)
CHLORIDE SERPL-SCNC: 93 MMOL/L (ref 94–109)
CO2 SERPL-SCNC: 30 MMOL/L (ref 20–32)
CO2 SERPL-SCNC: 31 MMOL/L (ref 20–32)
COLOR UR AUTO: ABNORMAL
CREAT SERPL-MCNC: 1.81 MG/DL (ref 0.52–1.04)
CREAT SERPL-MCNC: 2.1 MG/DL (ref 0.52–1.04)
DIFFERENTIAL METHOD BLD: ABNORMAL
DIFFERENTIAL METHOD BLD: ABNORMAL
EOSINOPHIL # BLD AUTO: 0 10E9/L (ref 0–0.7)
EOSINOPHIL # BLD AUTO: 0 10E9/L (ref 0–0.7)
EOSINOPHIL NFR BLD AUTO: 0.1 %
EOSINOPHIL NFR BLD AUTO: 0.1 %
ERYTHROCYTE [DISTWIDTH] IN BLOOD BY AUTOMATED COUNT: 12.7 % (ref 10–15)
ERYTHROCYTE [DISTWIDTH] IN BLOOD BY AUTOMATED COUNT: 12.7 % (ref 10–15)
GFR SERPL CREATININE-BSD FRML MDRD: 23 ML/MIN/{1.73_M2}
GFR SERPL CREATININE-BSD FRML MDRD: 27 ML/MIN/{1.73_M2}
GLUCOSE SERPL-MCNC: 112 MG/DL (ref 70–99)
GLUCOSE SERPL-MCNC: 166 MG/DL (ref 70–99)
GLUCOSE UR STRIP-MCNC: NEGATIVE MG/DL
HCT VFR BLD AUTO: 32.4 % (ref 35–47)
HCT VFR BLD AUTO: 38.5 % (ref 35–47)
HEMOCCULT STL QL: NEGATIVE
HGB BLD-MCNC: 10.7 G/DL (ref 11.7–15.7)
HGB BLD-MCNC: 12.7 G/DL (ref 11.7–15.7)
HGB UR QL STRIP: ABNORMAL
IMM GRANULOCYTES # BLD: 0.1 10E9/L (ref 0–0.4)
IMM GRANULOCYTES # BLD: 0.1 10E9/L (ref 0–0.4)
IMM GRANULOCYTES NFR BLD: 0.5 %
IMM GRANULOCYTES NFR BLD: 0.6 %
INR PPP: 1.32 (ref 0.86–1.14)
KETONES UR STRIP-MCNC: NEGATIVE MG/DL
LABORATORY COMMENT REPORT: NORMAL
LACTATE BLD-SCNC: 1.1 MMOL/L (ref 0.7–2)
LACTATE BLD-SCNC: 2.2 MMOL/L (ref 0.7–2)
LEUKOCYTE ESTERASE UR QL STRIP: ABNORMAL
LIPASE SERPL-CCNC: 139 U/L (ref 73–393)
LYMPHOCYTES # BLD AUTO: 1.4 10E9/L (ref 0.8–5.3)
LYMPHOCYTES # BLD AUTO: 1.7 10E9/L (ref 0.8–5.3)
LYMPHOCYTES NFR BLD AUTO: 11.1 %
LYMPHOCYTES NFR BLD AUTO: 9.3 %
MCH RBC QN AUTO: 29.4 PG (ref 26.5–33)
MCH RBC QN AUTO: 29.5 PG (ref 26.5–33)
MCHC RBC AUTO-ENTMCNC: 33 G/DL (ref 31.5–36.5)
MCHC RBC AUTO-ENTMCNC: 33 G/DL (ref 31.5–36.5)
MCV RBC AUTO: 89 FL (ref 78–100)
MCV RBC AUTO: 90 FL (ref 78–100)
MONOCYTES # BLD AUTO: 0.9 10E9/L (ref 0–1.3)
MONOCYTES # BLD AUTO: 1.1 10E9/L (ref 0–1.3)
MONOCYTES NFR BLD AUTO: 5.9 %
MONOCYTES NFR BLD AUTO: 7.2 %
MUCOUS THREADS #/AREA URNS LPF: PRESENT /LPF
NEUTROPHILS # BLD AUTO: 10.5 10E9/L (ref 1.6–8.3)
NEUTROPHILS # BLD AUTO: 15.1 10E9/L (ref 1.6–8.3)
NEUTROPHILS NFR BLD AUTO: 80.9 %
NEUTROPHILS NFR BLD AUTO: 83.9 %
NITRATE UR QL: NEGATIVE
NRBC # BLD AUTO: 0 10*3/UL
NRBC # BLD AUTO: 0 10*3/UL
NRBC BLD AUTO-RTO: 0 /100
NRBC BLD AUTO-RTO: 0 /100
NT-PROBNP SERPL-MCNC: 2121 PG/ML (ref 0–900)
PH UR STRIP: 5 PH (ref 5–7)
PLATELET # BLD AUTO: 256 10E9/L (ref 150–450)
PLATELET # BLD AUTO: 362 10E9/L (ref 150–450)
POTASSIUM SERPL-SCNC: 3.2 MMOL/L (ref 3.4–5.3)
POTASSIUM SERPL-SCNC: 3.7 MMOL/L (ref 3.4–5.3)
PROT SERPL-MCNC: 7.8 G/DL (ref 6.8–8.8)
RBC # BLD AUTO: 3.64 10E12/L (ref 3.8–5.2)
RBC # BLD AUTO: 4.3 10E12/L (ref 3.8–5.2)
RBC #/AREA URNS AUTO: >182 /HPF (ref 0–2)
SARS-COV-2 RNA RESP QL NAA+PROBE: NEGATIVE
SODIUM SERPL-SCNC: 133 MMOL/L (ref 133–144)
SODIUM SERPL-SCNC: 137 MMOL/L (ref 133–144)
SOURCE: ABNORMAL
SP GR UR STRIP: 1.02 (ref 1–1.03)
SPECIMEN SOURCE: NORMAL
SQUAMOUS #/AREA URNS AUTO: 11 /HPF (ref 0–1)
TROPONIN I SERPL-MCNC: 0.07 UG/L (ref 0–0.04)
UROBILINOGEN UR STRIP-MCNC: 4 MG/DL (ref 0–2)
WBC # BLD AUTO: 13 10E9/L (ref 4–11)
WBC # BLD AUTO: 18 10E9/L (ref 4–11)
WBC #/AREA URNS AUTO: >182 /HPF (ref 0–5)
WBC CLUMPS #/AREA URNS HPF: PRESENT /HPF

## 2021-05-08 PROCEDURE — 96367 TX/PROPH/DG ADDL SEQ IV INF: CPT | Performed by: EMERGENCY MEDICINE

## 2021-05-08 PROCEDURE — 83605 ASSAY OF LACTIC ACID: CPT | Performed by: EMERGENCY MEDICINE

## 2021-05-08 PROCEDURE — 99285 EMERGENCY DEPT VISIT HI MDM: CPT | Mod: 25 | Performed by: EMERGENCY MEDICINE

## 2021-05-08 PROCEDURE — 99291 CRITICAL CARE FIRST HOUR: CPT | Mod: 25 | Performed by: EMERGENCY MEDICINE

## 2021-05-08 PROCEDURE — 80048 BASIC METABOLIC PNL TOTAL CA: CPT | Mod: 91 | Performed by: EMERGENCY MEDICINE

## 2021-05-08 PROCEDURE — 84484 ASSAY OF TROPONIN QUANT: CPT | Performed by: EMERGENCY MEDICINE

## 2021-05-08 PROCEDURE — 81001 URINALYSIS AUTO W/SCOPE: CPT | Performed by: EMERGENCY MEDICINE

## 2021-05-08 PROCEDURE — 85730 THROMBOPLASTIN TIME PARTIAL: CPT | Performed by: EMERGENCY MEDICINE

## 2021-05-08 PROCEDURE — 71046 X-RAY EXAM CHEST 2 VIEWS: CPT

## 2021-05-08 PROCEDURE — 87186 SC STD MICRODIL/AGAR DIL: CPT | Performed by: EMERGENCY MEDICINE

## 2021-05-08 PROCEDURE — 96365 THER/PROPH/DIAG IV INF INIT: CPT | Performed by: EMERGENCY MEDICINE

## 2021-05-08 PROCEDURE — 87086 URINE CULTURE/COLONY COUNT: CPT | Performed by: EMERGENCY MEDICINE

## 2021-05-08 PROCEDURE — 83690 ASSAY OF LIPASE: CPT | Performed by: EMERGENCY MEDICINE

## 2021-05-08 PROCEDURE — 87040 BLOOD CULTURE FOR BACTERIA: CPT | Mod: XS | Performed by: EMERGENCY MEDICINE

## 2021-05-08 PROCEDURE — 250N000011 HC RX IP 250 OP 636: Performed by: EMERGENCY MEDICINE

## 2021-05-08 PROCEDURE — 82272 OCCULT BLD FECES 1-3 TESTS: CPT | Performed by: EMERGENCY MEDICINE

## 2021-05-08 PROCEDURE — 93005 ELECTROCARDIOGRAM TRACING: CPT | Performed by: EMERGENCY MEDICINE

## 2021-05-08 PROCEDURE — 87088 URINE BACTERIA CULTURE: CPT | Performed by: EMERGENCY MEDICINE

## 2021-05-08 PROCEDURE — 85610 PROTHROMBIN TIME: CPT | Performed by: EMERGENCY MEDICINE

## 2021-05-08 PROCEDURE — 93010 ELECTROCARDIOGRAM REPORT: CPT | Performed by: EMERGENCY MEDICINE

## 2021-05-08 PROCEDURE — C9803 HOPD COVID-19 SPEC COLLECT: HCPCS | Performed by: EMERGENCY MEDICINE

## 2021-05-08 PROCEDURE — 87635 SARS-COV-2 COVID-19 AMP PRB: CPT | Performed by: EMERGENCY MEDICINE

## 2021-05-08 PROCEDURE — 99292 CRITICAL CARE ADDL 30 MIN: CPT | Mod: 25 | Performed by: EMERGENCY MEDICINE

## 2021-05-08 PROCEDURE — 80076 HEPATIC FUNCTION PANEL: CPT | Performed by: EMERGENCY MEDICINE

## 2021-05-08 PROCEDURE — 83880 ASSAY OF NATRIURETIC PEPTIDE: CPT | Performed by: EMERGENCY MEDICINE

## 2021-05-08 PROCEDURE — 258N000003 HC RX IP 258 OP 636: Performed by: EMERGENCY MEDICINE

## 2021-05-08 PROCEDURE — 85025 COMPLETE CBC W/AUTO DIFF WBC: CPT | Performed by: EMERGENCY MEDICINE

## 2021-05-08 PROCEDURE — 74176 CT ABD & PELVIS W/O CONTRAST: CPT

## 2021-05-08 PROCEDURE — 96366 THER/PROPH/DIAG IV INF ADDON: CPT | Performed by: EMERGENCY MEDICINE

## 2021-05-08 RX ORDER — CEFAZOLIN SODIUM 1 G/50ML
2000 SOLUTION INTRAVENOUS ONCE
Status: COMPLETED | OUTPATIENT
Start: 2021-05-08 | End: 2021-05-08

## 2021-05-08 RX ORDER — SODIUM CHLORIDE, SODIUM LACTATE, POTASSIUM CHLORIDE, CALCIUM CHLORIDE 600; 310; 30; 20 MG/100ML; MG/100ML; MG/100ML; MG/100ML
1000 INJECTION, SOLUTION INTRAVENOUS CONTINUOUS
Status: DISCONTINUED | OUTPATIENT
Start: 2021-05-08 | End: 2021-05-09 | Stop reason: HOSPADM

## 2021-05-08 RX ORDER — MEROPENEM 1 G/1
1 INJECTION, POWDER, FOR SOLUTION INTRAVENOUS ONCE
Status: COMPLETED | OUTPATIENT
Start: 2021-05-08 | End: 2021-05-08

## 2021-05-08 RX ADMIN — SODIUM CHLORIDE 3129 ML: 9 INJECTION, SOLUTION INTRAVENOUS at 17:30

## 2021-05-08 RX ADMIN — SODIUM CHLORIDE, POTASSIUM CHLORIDE, SODIUM LACTATE AND CALCIUM CHLORIDE 500 ML: 600; 310; 30; 20 INJECTION, SOLUTION INTRAVENOUS at 23:31

## 2021-05-08 RX ADMIN — TAZOBACTAM SODIUM AND PIPERACILLIN SODIUM 3.38 G: 375; 3 INJECTION, SOLUTION INTRAVENOUS at 18:09

## 2021-05-08 RX ADMIN — VANCOMYCIN HYDROCHLORIDE 2000 MG: 10 INJECTION, POWDER, LYOPHILIZED, FOR SOLUTION INTRAVENOUS at 18:39

## 2021-05-08 RX ADMIN — MEROPENEM 1 G: 1 INJECTION, POWDER, FOR SOLUTION INTRAVENOUS at 21:21

## 2021-05-08 NOTE — ED PROVIDER NOTES
History     Chief Complaint   Patient presents with     Vaginal Bleeding     for 2 days, feels weak     History per patient, her  and review of EMR.    NORMA Guadarrama is a 70 year old female with history of cervical cancer, neuroendocrine cancer, carcinoid tumor of the rectum, left ureteral obstruction and stenting due to tumor compression of the ureter, with 3 days of generalized malaise and weakness, decreased appetite and oral intake and ?  Several days of bleeding felt to be of vaginal origin, noted in the toilet and on her adult diaper undergarment.  She denies any abdominal pain, vulvar or rectal pain, fever chills or vomiting.  She does not think that she has blood in the urine and denies UTI signs or symptoms.  She cannot characterize her BMs or stools.   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  He reports that she is very weak, is not mentating well and appears ill.    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  The kidney function on that side was measured to be 20% previously.  The patient is also known to have a lesion on her liver of approximately 4 cm that in retrospect was stable since 2016.  The patient's liver biopsy from 06/12/2019 shows metastatic low-grade neuroendocrine tumor for which she is on observation. We will bring her to the operating room in the near future for her stent exchange.      Allergies:  No Known Allergies    Problem List:    Patient Active Problem List    Diagnosis Date Noted     HSIL (high grade squamous intraepithelial lesion) on Pap smear of cervix 06/15/2020     Priority: Medium     Added automatically from request for surgery [id number]       Obstruction of left ureter 02/11/2020     Priority: Medium     Added automatically from  request for surgery [id number]       Osteopenia of hip 05/15/2019     Priority: Medium     Need for prophylactic chemotherapy 05/15/2019     Priority: Medium     Rheumatoid arthritis with positive rheumatoid factor, involving unspecified site (H) 12/31/2018     Priority: Medium     Ureteral obstruction 11/20/2018     Priority: Medium     Added automatically from request for surgery [id number]       Obesity (BMI 35.0-39.9) with comorbidity (H) 09/28/2018     Priority: Medium     Peritoneal metastases (H) 10/30/2017     Priority: Medium     Serum calcium elevated 04/07/2017     Priority: Medium     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  Marissa Guadarrama has no plan or code status on file. Discussed available resources and provided with information. Confirmed code status reflects current choices pending further ACP discussions.  Confirmed/documented designated decision maker(s). See permanent comments section of demographics in clinical tab. Added by Tiff Askew on 2/6/2015  Discussed advance care planning with patient; however, patient declined at this time. 9/19/2012              OA (osteoarthritis) of knee 10/05/2011     Priority: Medium     Hypertension goal BP (blood pressure) < 140/90 11/01/2010     Priority: Medium     HYPERLIPIDEMIA LDL GOAL <130 10/31/2010     Priority: Medium     Post-polio syndrome      Priority: Medium     Left knee, diagnosed by ortho in 1976, had left leg/toe surgeries as child  (Problem  "list name updated by automated process. Provider to review and confirm.)       Cervical cancer (H)      Priority: Medium     5/2007.  Dr. Alonso, [facility] gyn/onc,  Now needs routine paps, patient not always compliant  3/26/09 pap NIL  9/24/09 pap ASCUS  11/1/13 pap ASC-H. Plan-- colposcopy   12/23/13 colposcopy scheduled. Reminder placed.  colpscopy 12/23/2013-Vaginal cuff (submitted as \"cervix\"), biopsy:  - High grade squamous intraepithelial lesion (vaginal  intraepithelial neoplasia grade III, VaIN III, severe dysplasia and  carcinoma in situ).  - No invasive malignancy identified.  - Cervical transformation zone not identified in biopsies.  - Please see comment.  Referral back to gynecology/oncology  2/5/14 gyn/onc appt   3/13/14 colposcopy and CO2 laser vagina, path:VAIN 1/2.  3/19/14 follow up in 4 months  7/30/14 vaginal biopsy: VAIN 1. Plan: repeat colp in 6 months.(9/17/14)   9/17/14 colp. No staining seen. No biopsy taken. Pap- ASCUS + HR HPV. Plan- repeat pap at next visit.  2/9/15 colposcopy. bx- LSIL/koilocytosis. Pap- NIL/+ HR HPV 16.  Plan: 3/16/15 treatment planning.   3/16/15 repeat colposcopy in 4 months (due 7/16/15)  7/6/15 scheduled. Tracking.             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  Patient has not been complaint with follow up.          Past Medical History:    Past Medical History:   Diagnosis Date     Arthritis      Benign breast biopsy      Carcinoid tumor 12/2003     Cervical cancer (H) 2007     H/O colposcopy with cervical biopsy 12/23/13     HTN      Hyperlipidemia      Obesity      Pap smear of vagina with ASC-H 11/1/13     Post-polio syndromes      Trigeminal neuralgias        Past Surgical History:    Past Surgical History:   Procedure Laterality Date     APPENDECTOMY  1983     BIOPSY NODE SENTINEL Bilateral 6/1/2016    Procedure: BIOPSY NODE SENTINEL; "  Surgeon: Brent Arana MD;  Location: [facility]     C BSO, OMENTECTOMY W/OSMAN  5/2007     C TOTAL ABDOM HYSTERECTOMY  5/2007     CL AFF SURGICAL PATHOLOGY       COLONOSCOPY N/A 6/23/2017    Procedure: COMBINED COLONOSCOPY, SINGLE OR MULTIPLE BIOPSY/POLYPECTOMY BY BIOPSY;  Colonoscopy Dx:Carcinoid tumor of colon prep mailed golytely;  Surgeon: Talisha Greco MD;  [facility]: [facility]     COLPOSCOPY, BIOPSY, COMBINED  3/13/2014    Procedure: COMBINED COLPOSCOPY, BIOPSY;;  Surgeon: Lara Pack MD;  [facility]     COMBINED CYSTOSCOPY, RETROGRADES, EXCHANGE STENT URETER(S) Left 2/7/2019    Procedure: COMBINED CYSTOSCOPY, RETROGRADES, EXCHANGE STENT URETER--left;  Surgeon: Zhao La MD;  Location: [facility]     COMBINED CYSTOSCOPY, RETROGRADES, EXCHANGE STENT URETER(S) Left 2/20/2020    Procedure: CYSTOSCOPY, WITH RETROGRADE PYELOGRAM AND Left URETERAL STENT exchange;  Surgeon: Zhao La MD;  Location: [facility]     COMBINED CYSTOSCOPY, RETROGRADES, URETEROSCOPY, INSERT STENT Left 2/2/2017    Procedure: COMBINED CYSTOSCOPY, RETROGRADES, URETEROSCOPY, INSERT STENT;  Surgeon: Zhao La MD;  Location: [facility]     CYSTOSCOPY, RETROGRADES, INSERT STENT URETER(S), COMBINED Left 9/7/2017    Procedure: COMBINED CYSTOSCOPY, RETROGRADES, INSERT STENT URETER(S);  Cystoscopy,Left Stent Exchange;  Surgeon: Zhao La MD;  Location: [facility]     CYSTOSCOPY, RETROGRADES, INSERT STENT URETER(S), COMBINED  12/12/2017    Procedure: COMBINED CYSTOSCOPY, RETROGRADES, INSERT STENT URETER(S);;  Surgeon: Zhao La MD;  [facility]     CYSTOSCOPY, RETROGRADES, INSERT STENT URETER(S), COMBINED Left 7/5/2018    Procedure: COMBINED CYSTOSCOPY, RETROGRADES, INSERT STENT URETER(S);  Cystoscopy, Left Stent Exchange;  Surgeon: Zhao La MD;  Location: [facility]     EXAM UNDER ANESTHESIA PELVIC  3/13/2014    Procedure: EXAM UNDER ANESTHESIA  PELVIC;  Exam Under Anestheisa, Colposcopy, Vaginal Biopsies, Co2 Laser of the Upper Vagina;  Surgeon: Lara Pack MD;  [facility]     EXAM UNDER ANESTHESIA PELVIC N/A 2020    Procedure: Examination under anesthesia, vaginal biopsies;  Surgeon: Karli Gao MD;  [facility]     HERNIORRHAPHY INCISIONAL (LOCATION)       IR RHIZOTOMY  2020     LASER CO2 VAGINA  3/13/2014    VAIN 1/     LASER CO2 VAGINA N/A 2017    Procedure: LASER CO2 VAGINA;  Exam Under Anesthesia, CO2 Laser Ablation Of Vagina, Cystoscopy, Left Retrograde Pyelogram with Left Stent Placement;  Surgeon: Nic Segundo MD;  [facility]     LUMPECTOMY BREAST WITH SEED LOCALIZATION Bilateral 2016    Procedure: LUMPECTOMY BREAST WITH SEED LOCALIZATION;  Surgeon: Brent Arana MD;  Location: [facility]     SURGICAL HISTORY OF -       ovarian cystectomy     SURGICAL HISTORY OF -       right colon resection secondary to carcinoid tumor     TUBAL LIGATION         Family History:    Family History   Problem Relation Age of Onset     Cancer Mother         bone / liver     Breast Cancer Mother      Cancer Maternal Grandmother      Cancer Maternal Grandfather      Cancer Paternal Grandmother      Cancer Paternal Grandfather      Cancer Sister         vulvar ca, cervical ca, squamous cell cancer     Cancer Brother         Rectal- Stage 4     Rectal Cancer Brother      Breast Cancer Paternal Aunt      Colon Cancer Paternal Aunt      Breast Cancer Maternal Aunt      Pancreatic Cancer Nephew      Breast Cancer Sister      Anesthesia Reaction No family hx of        Social History:  Marital Status:   [2]  Social History     Tobacco Use     Smoking status: Former Smoker     Packs/day: 1.00     Years: 29.00     Pack years: 29.00     Types: Cigarettes     Quit date: 10/30/2006     Years since quittin.5     Smokeless tobacco: Never Used   Substance Use Topics     Alcohol use: No     Alcohol/week: 0.0  standard drinks     Comment: 1 drink per year     Drug use: No        Medications:    No current outpatient medications on file.    Review of Systems  As mentioned above in the history present illness.  All other systems were reviewed and are negative.    Physical Exam   BP: (!) 89/67  Pulse: 102  Temp: 97.9  F (36.6  C)  Resp: 24  Weight: 104.3 kg (230 lb)  SpO2: 95 %      Physical Exam  Vitals signs and nursing note reviewed.   Constitutional:       General: She is not in acute distress.     Appearance: She is well-developed. She is obese. She is ill-appearing. She is not diaphoretic.   HENT:      Head: Normocephalic and atraumatic.      Right Ear: External ear normal.      Left Ear: External ear normal.      Nose: Nose normal.      Mouth/Throat:      Mouth: Mucous membranes are dry.   Eyes:      General: No scleral icterus.     Extraocular Movements: Extraocular movements intact.      Conjunctiva/sclera: Conjunctivae normal.   Neck:      Musculoskeletal: Normal range of motion and neck supple.      Trachea: No tracheal deviation.   Cardiovascular:      Rate and Rhythm: Regular rhythm. Tachycardia present.      Pulses: Normal pulses.      Heart sounds: Normal heart sounds. No murmur. No friction rub. No gallop.    Pulmonary:      Effort: Pulmonary effort is normal. No respiratory distress.      Breath sounds: Normal breath sounds. No wheezing, rhonchi or rales.   Abdominal:      General: There is no distension.      Palpations: Abdomen is soft. There is no mass.      Tenderness: There is abdominal tenderness ( Left-sided abdominal pain greatest in the left lower quadrant). There is guarding. There is no right CVA tenderness, left CVA tenderness or rebound.      Hernia: No hernia is present.   Genitourinary:     Comments: /Rectal examination: Bleeding external hemorrhoids, is normal examination with no evidence of active bleeding.  No masses or blood in the vaginal canal.  No masses in the rectal with loose brown  otherwise normal-appearing stool.  Musculoskeletal: Normal range of motion.         General: No tenderness.      Right lower leg: No edema.      Left lower leg: No edema.   Skin:     General: Skin is warm and dry.      Coloration: Skin is not pale.      Findings: No erythema or rash.   Neurological:      General: No focal deficit present.      Mental Status: She is alert and oriented to person, place, and time.   Psychiatric:         Behavior: Behavior normal.      Comments: Flat affect.         ED Course        Procedures               EKG Interpretation:      Interpreted by Carlyle Jordan MD  Time reviewed: Upon completion  Symptoms at time of EKG: Generalized weakness  Rhythm: normal sinus   Rate: normal  Axis: normal  Ectopy: none  Conduction: normal  ST Segments/ T Waves: No ST-T wave changes  Q Waves: V1, V2, seen previous  Comparison to prior: 2/17/20  Clinical Impression: no acute EKG changes      5:59 PM - Elevated lactate and WBC, sepsis protocol initiated.  The patient has signs of Severe Sepsis as evidenced by:  1. 2 SIRS criteria, AND  2. Suspected infection, AND   3. Organ dysfunction: Lactic Acidosis with value >2.0, ARF with Cr >2 due to infection and Acute encephalopathy due to sepsis    Time severe sepsis diagnosis confirmed: 5:59 PM  05/08/21 as this was the time when Lactate resulted, and the level was > 2.0 and Lab results revealing acute organ dysfunction due to infection (Cr, Bili, Plt)    3 Hour Severe Sepsis Bundle Completion:    1. Initial Lactic Acid Result:   Recent Labs   Lab Test 05/08/21 2125 05/08/21  1719   LACT 1.1 2.2*     2. Blood Cultures before Antibiotics: Yes  3. Broad Spectrum Antibiotics Administered:  yes       Anti-infectives (From admission through now)    Start     Dose/Rate Route Frequency Ordered Stop    05/08/21 2030  meropenem (MERREM) 1 g vial to attach to  mL bag      1 g  over 30 Minutes Intravenous ONCE 05/08/21 1954 05/08/21 2259    05/08/21 1820   vancomycin (VANCOCIN) 2,000 mg in sodium chloride 0.9 % 500 mL intermittent infusion      2,000 mg  over 2 Hours Intravenous ONCE 05/08/21 1818 05/08/21 2121    05/08/21 1800  piperacillin-tazobactam (ZOSYN) infusion 3.375 g      3.375 g  over 30 Minutes Intravenous ONCE 05/08/21 1759 05/08/21 1838          4. Fluid volume administered in ED:  Full 30 mL/kg bolus given (see amount below).    BMI Readings from Last 1 Encounters:   05/08/21 36.02 kg/m      30 mL/kg fluids based on weight: 3,130 mL  30 mL/kg fluids based on IBW (must be >= 60 inches tall): 1,850 mL                Severe Sepsis reassessment:  1. Repeat Lactic Acid Level: 1.1  2. MAP>65 after initial IVF bolus, will continue to monitor fluid status and vital signs    I attest to having performed a repeat sepsis exam and assessment of perfusion at 8:10 PM and the results demonstrate improved perfusion.         Results for orders placed or performed during the hospital encounter of 05/08/21 (from the past 24 hour(s))   CBC with platelets differential   Result Value Ref Range    WBC 18.0 (H) 4.0 - 11.0 10e9/L    RBC Count 4.30 3.8 - 5.2 10e12/L    Hemoglobin 12.7 11.7 - 15.7 g/dL    Hematocrit 38.5 35.0 - 47.0 %    MCV 90 78 - 100 fl    MCH 29.5 26.5 - 33.0 pg    MCHC 33.0 31.5 - 36.5 g/dL    RDW 12.7 10.0 - 15.0 %    Platelet Count 362 150 - 450 10e9/L    Diff Method Automated Method     % Neutrophils 83.9 %    % Lymphocytes 9.3 %    % Monocytes 5.9 %    % Eosinophils 0.1 %    % Basophils 0.2 %    % Immature Granulocytes 0.6 %    Nucleated RBCs 0 0 /100    Absolute Neutrophil 15.1 (H) 1.6 - 8.3 10e9/L    Absolute Lymphocytes 1.7 0.8 - 5.3 10e9/L    Absolute Monocytes 1.1 0.0 - 1.3 10e9/L    Absolute Eosinophils 0.0 0.0 - 0.7 10e9/L    Absolute Basophils 0.0 0.0 - 0.2 10e9/L    Abs Immature Granulocytes 0.1 0 - 0.4 10e9/L    Absolute Nucleated RBC 0.0    Basic metabolic panel   Result Value Ref Range    Sodium 133 133 - 144 mmol/L    Potassium 3.2 (L) 3.4 -  5.3 mmol/L    Chloride 93 (L) 94 - 109 mmol/L    Carbon Dioxide 31 20 - 32 mmol/L    Anion Gap 9 3 - 14 mmol/L    Glucose 166 (H) 70 - 99 mg/dL    Urea Nitrogen 39 (H) 7 - 30 mg/dL    Creatinine 2.10 (H) 0.52 - 1.04 mg/dL    GFR Estimate 23 (L) >60 mL/min/[1.73_m2]    GFR Estimate If Black 26 (L) >60 mL/min/[1.73_m2]    Calcium 9.9 8.5 - 10.1 mg/dL   Lactic acid whole blood   Result Value Ref Range    Lactic Acid 2.2 (H) 0.7 - 2.0 mmol/L   Hepatic panel   Result Value Ref Range    Bilirubin Direct 0.6 (H) 0.0 - 0.2 mg/dL    Bilirubin Total 1.5 (H) 0.2 - 1.3 mg/dL    Albumin 2.5 (L) 3.4 - 5.0 g/dL    Protein Total 7.8 6.8 - 8.8 g/dL    Alkaline Phosphatase 75 40 - 150 U/L    ALT 22 0 - 50 U/L    AST 27 0 - 45 U/L   NT pro BNP   Result Value Ref Range    N-Terminal Pro BNP Inpatient 2,121 (H) 0 - 900 pg/mL   Troponin I   Result Value Ref Range    Troponin I ES 0.069 (H) 0.000 - 0.045 ug/L   Partial thromboplastin time   Result Value Ref Range    PTT 30 22 - 37 sec   INR   Result Value Ref Range    INR 1.32 (H) 0.86 - 1.14   Lipase   Result Value Ref Range    Lipase 139 73 - 393 U/L   Occult blood stool   Result Value Ref Range    Occult Blood Negative NEG^Negative   Symptomatic SARS-CoV-2 COVID-19 Virus (Coronavirus) by PCR    Specimen: Nasopharyngeal   Result Value Ref Range    SARS-CoV-2 Virus Specimen Source Nasopharyngeal     SARS-CoV-2 PCR Result NEGATIVE     SARS-CoV-2 PCR Comment (Note)    Blood culture    Specimen: Blood    Right Arm   Result Value Ref Range    Specimen Description Blood Right Arm     Culture Micro PENDING    UA with Microscopic   Result Value Ref Range    Color Urine Kimberley     Appearance Urine Cloudy     Glucose Urine Negative NEG^Negative mg/dL    Bilirubin Urine Small (A) NEG^Negative    Ketones Urine Negative NEG^Negative mg/dL    Specific Gravity Urine 1.019 1.003 - 1.035    Blood Urine Large (A) NEG^Negative    pH Urine 5.0 5.0 - 7.0 pH    Protein Albumin Urine 100 (A) NEG^Negative mg/dL     Urobilinogen mg/dL 4.0 (H) 0.0 - 2.0 mg/dL    Nitrite Urine Negative NEG^Negative    Leukocyte Esterase Urine Large (A) NEG^Negative    Source Catheterized Urine     WBC Urine >182 (H) 0 - 5 /HPF    RBC Urine >182 (H) 0 - 2 /HPF    WBC Clumps Present (A) NEG^Negative /HPF    Bacteria Urine Many (A) NEG^Negative /HPF    Squamous Epithelial /HPF Urine 11 (H) 0 - 1 /HPF    Mucous Urine Present (A) NEG^Negative /LPF   Blood culture    Specimen: Blood    Left Arm   Result Value Ref Range    Specimen Description Blood Left Arm     Culture Micro PENDING    CT Abdomen Pelvis w/o Contrast    Narrative    EXAM: CT ABDOMEN PELVIS W/O CONTRAST  LOCATION: [facility]  DATE/TIME: 5/8/2021 7:18 PM    INDICATION: Metastatic carcinoid tumor mid left ureter. Cough, shortness of air.  COMPARISON: CT abdomen and pelvis 06/12/2019  TECHNIQUE: CT scan of the abdomen and pelvis was performed without IV contrast. Multiplanar reformats were obtained. Dose reduction techniques were used.  CONTRAST: None.    FINDINGS:   LOWER CHEST: Small left pleural effusion and left basilar atelectasis. Very small pericardial effusion.    HEPATOBILIARY: Trace of fluid along the lateral and posterior margin of the liver. Cholecystectomy.    PANCREAS: Normal.    SPLEEN: Normal.    ADRENAL GLANDS: Normal.    KIDNEYS/BLADDER: Very large acute cortical and subcapsular hematoma involving an atrophic left kidney. No normal renal parenchyma is currently visible. Hemorrhage extends inferiorly within the left retroperitoneum along the anterior margin of the left   psoas muscle into the mid pelvis. There is marked hydronephrosis despite the presence of a left ureteral stent extending from the left renal pelvis to the bladder. 2 mm calyceal tip nonobstructing stone lower pole right kidney.    BOWEL: Right hemicolectomy with ileotransverse anastomosis.    LYMPH NODES: Normal.    VASCULATURE: Unremarkable.    PELVIC ORGANS:  Hysterectomy.    MUSCULOSKELETAL: 12 mm cystic lesion left breast on image #1 Advanced degenerative disc disease lumbar spine.      Impression    IMPRESSION:   1.  Extensive acute hemorrhage involving an atrophic left kidney.  2.  Marked left hydronephrosis despite a left ureteral stent in good position suggesting possible stent malfunction.  3.  Right hemicolectomy. No evidence for bowel obstruction.  4.  Small left pleural effusion and left basilar atelectasis.     XR Chest 2 Views    Narrative    XR CHEST TWO VIEWS   5/8/2021 7:39 PM     HISTORY: Cough, shortness of breath.    COMPARISON: CT chest dated 12/15/2016, chest x-rays dated 11/2/2016.    FINDINGS:  Posterior and lateral costophrenic angles are not  completely included and cannot be completely evaluated. Lungs are  grossly clear. Heart size, mediastinum and pulmonary vascularity are  within normal limits. No pneumothorax, significant pleural fluid  collection, or acute osseous fracture is identified.      Impression    IMPRESSION: No evidence of acute cardiopulmonary disease is seen.    ESTELA MARTINEZ MD   Lactic acid whole blood   Result Value Ref Range    Lactic Acid 1.1 0.7 - 2.0 mmol/L   CBC with platelets, differential   Result Value Ref Range    WBC 13.0 (H) 4.0 - 11.0 10e9/L    RBC Count 3.64 (L) 3.8 - 5.2 10e12/L    Hemoglobin 10.7 (L) 11.7 - 15.7 g/dL    Hematocrit 32.4 (L) 35.0 - 47.0 %    MCV 89 78 - 100 fl    MCH 29.4 26.5 - 33.0 pg    MCHC 33.0 31.5 - 36.5 g/dL    RDW 12.7 10.0 - 15.0 %    Platelet Count 256 150 - 450 10e9/L    Diff Method Automated Method     % Neutrophils 80.9 %    % Lymphocytes 11.1 %    % Monocytes 7.2 %    % Eosinophils 0.1 %    % Basophils 0.2 %    % Immature Granulocytes 0.5 %    Nucleated RBCs 0 0 /100    Absolute Neutrophil 10.5 (H) 1.6 - 8.3 10e9/L    Absolute Lymphocytes 1.4 0.8 - 5.3 10e9/L    Absolute Monocytes 0.9 0.0 - 1.3 10e9/L    Absolute Eosinophils 0.0 0.0 - 0.7 10e9/L    Absolute Basophils 0.0 0.0 - 0.2  10e9/L    Abs Immature Granulocytes 0.1 0 - 0.4 10e9/L    Absolute Nucleated RBC 0.0    Basic metabolic panel   Result Value Ref Range    Sodium 137 133 - 144 mmol/L    Potassium 3.7 3.4 - 5.3 mmol/L    Chloride 101 94 - 109 mmol/L    Carbon Dioxide 30 20 - 32 mmol/L    Anion Gap 6 3 - 14 mmol/L    Glucose 112 (H) 70 - 99 mg/dL    Urea Nitrogen 36 (H) 7 - 30 mg/dL    Creatinine 1.81 (H) 0.52 - 1.04 mg/dL    GFR Estimate 27 (L) >60 mL/min/[1.73_m2]    GFR Estimate If Black 32 (L) >60 mL/min/[1.73_m2]    Calcium 8.2 (L) 8.5 - 10.1 mg/dL       Medications   0.9% sodium chloride BOLUS ( Intravenous Canceled Entry 5/8/21 2773)   piperacillin-tazobactam (ZOSYN) infusion 3.375 g (0 g Intravenous Stopped 5/8/21 1838)   vancomycin (VANCOCIN) 2,000 mg in sodium chloride 0.9 % 500 mL intermittent infusion (0 mg Intravenous Stopped 5/8/21 2121)   meropenem (MERREM) 1 g vial to attach to  mL bag (0 g Intravenous Stopped 5/8/21 2259)   lactated ringers BOLUS 500 mL (0 mLs Intravenous ED Infusing on Admission/transfer 5/8/21 8535)       5:59 PM - Elevated lactate and WBC, sepsis protocol initiated.    6:05 PM - I reviewed the patient's elevated creatinine,  decreased GFR and use of IV contrast administration for CT evaluation with radiology.  Conferred with radiologist, Dr. Hernandez is uncomfortable performing CT imaging evaluation IV contrast    7:54 PM - I reviewed Meropenem dosing with the pharmacist on duty, added for UTI/urosepsis coverage for potential ESBL bacterial urosepsis.  Awaiting CT results.    9:00 PM - Consulted with Dr. Owens, Urology service.     9:14 PM - Dr. Everett, Medicine triage physician was added to call with Urology.    9:18 PM - Dr. Mack, Interventional Radiology was added to call with Urology and Medicine triage physician.    Critical Care time:  was 120 minutes for this patient excluding procedures.    Assessments & Plan (with Medical Decision Making)   70 year old female with history of  cervical cancer, neuroendocrine cancer, carcinoid tumor of the rectum, left ureteral obstruction and stenting due to tumor compression of the ureter, with 3 days of generalized malaise and weakness, decreased appetite and oral intake and ? several days of bleeding felt to be of vaginal origin, noted in the toilet and on her adult diaper undergarment.  She denies abdominal pain, vulvar or rectal pain, fever chills or vomiting.  She does not think that she has blood in the urine and denies UTI signs or symptoms.  Etiology of the bleeding is unclear, no active bleeding on examination.  No blood in the vaginal canal or rectum.  I suspect bleeding is urethral or  in etiology.  CT evaluation showed marked left hydronephrosis suggestive of shunt malfunction and extensive acute hemorrhage of the left atrophic kidney.  She is hemodynamically stable with a significantly elevated WBC, elevated lactate and severe sepsis with source being urosepsis.  She had blood and urine cultures obtained and broad-spectrum antibiotic therapy was initially provided with Zosyn and Vancomycin IV for unknown source.  After UTI was found Meropenem was added for coverage for possible ESBL urosepsis.  Urology and the triage Medicine attending at [facility] were consulted and she was transferred to their facility where urology and interventional radiology services available for consultation and care.    I have reviewed the nursing notes.    I have reviewed the findings, diagnosis, plan and need for follow up with the patient.    Discharge Medication List as of 5/9/2021 12:25 AM          Final diagnoses:   Severe sepsis with acute organ dysfunction (H)   Sepsis due to urinary tract infection (H)   Obstruction of left ureter   Hemorrhage of left kidney       5/8/2021   [facility] EMERGENCY DEPT     Carlyle Jordan MD  05/09/21 0249

## 2021-05-08 NOTE — PHARMACY-VANCOMYCIN DOSING SERVICE
Pharmacy Vancomycin Initial Note  Date of Service May 8, 2021  Patient's  1950  70 year old, female    Indication: Sepsis    Current estimated CrCl = Estimated Creatinine Clearance: 30.1 mL/min (A) (based on SCr of 2.1 mg/dL (H)).    Creatinine for last 3 days  2021:  5:19 PM Creatinine 2.10 mg/dL    Recent Vancomycin Level(s) for last 3 days  No results found for requested labs within last 72 hours.      Vancomycin IV Administrations (past 72 hours)      No vancomycin orders with administrations in past 72 hours.                Nephrotoxins and other renal medications (From now, onward)    Start     Dose/Rate Route Frequency Ordered Stop    21 1820  vancomycin (VANCOCIN) 2,000 mg in sodium chloride 0.9 % 500 mL intermittent infusion      2,000 mg  over 2 Hours Intravenous ONCE 21 1818      21 1813  vancomycin place hairston - receiving intermittent dosing      1 each Intravenous SEE ADMIN INSTRUCTIONS 21 1813      05/08/21 1800  piperacillin-tazobactam (ZOSYN) infusion 3.375 g      3.375 g  over 30 Minutes Intravenous ONCE 21 1759            Contrast Orders - past 72 hours (72h ago, onward)    None                Plan:  1. Start vancomycin  2000 mg IV q24-48h.   2. Vancomycin monitoring method: Trough (Method 1 = dosing nomogram)  3. Vancomycin therapeutic monitoring goal: 15-20 mg/L  4. Pharmacy will check vancomycin levels as appropriate in 1-3 Days, depending on creatinine tomorrow.  5. Serum creatinine levels will be ordered daily for the first week of therapy and at least twice weekly for subsequent weeks.      Nicole Ricketts [facility]

## 2021-05-09 ENCOUNTER — ANESTHESIA (OUTPATIENT)
Dept: SURGERY | Facility: CLINIC | Age: 71
DRG: 659 | End: 2021-05-09
Payer: MEDICARE

## 2021-05-09 ENCOUNTER — APPOINTMENT (OUTPATIENT)
Dept: GENERAL RADIOLOGY | Facility: CLINIC | Age: 71
DRG: 659 | End: 2021-05-09
Attending: STUDENT IN AN ORGANIZED HEALTH CARE EDUCATION/TRAINING PROGRAM
Payer: MEDICARE

## 2021-05-09 ENCOUNTER — ANESTHESIA EVENT (OUTPATIENT)
Dept: SURGERY | Facility: CLINIC | Age: 71
DRG: 659 | End: 2021-05-09
Payer: MEDICARE

## 2021-05-09 ENCOUNTER — HOSPITAL ENCOUNTER (INPATIENT)
Facility: CLINIC | Age: 71
LOS: 6 days | Discharge: HOME | DRG: 659 | End: 2021-05-15
Attending: STUDENT IN AN ORGANIZED HEALTH CARE EDUCATION/TRAINING PROGRAM | Admitting: INTERNAL MEDICINE
Payer: MEDICARE

## 2021-05-09 DIAGNOSIS — N13.39 OTHER HYDRONEPHROSIS: ICD-10-CM

## 2021-05-09 DIAGNOSIS — G14 POST-POLIO SYNDROME (H): ICD-10-CM

## 2021-05-09 DIAGNOSIS — C53.9 MALIGNANT NEOPLASM OF CERVIX, UNSPECIFIED SITE (H): ICD-10-CM

## 2021-05-09 DIAGNOSIS — N13.5 URETERAL OBSTRUCTION, LEFT: Primary | ICD-10-CM

## 2021-05-09 DIAGNOSIS — M85.852 OSTEOPENIA OF LEFT HIP: ICD-10-CM

## 2021-05-09 DIAGNOSIS — M05.9 RHEUMATOID ARTHRITIS WITH POSITIVE RHEUMATOID FACTOR, INVOLVING UNSPECIFIED SITE (H): ICD-10-CM

## 2021-05-09 LAB
ALBUMIN SERPL-MCNC: 1.9 G/DL (ref 3.4–5)
ALP SERPL-CCNC: 58 U/L (ref 40–150)
ALT SERPL W P-5'-P-CCNC: 18 U/L (ref 0–50)
ANION GAP SERPL CALCULATED.3IONS-SCNC: 5 MMOL/L (ref 3–14)
AST SERPL W P-5'-P-CCNC: 31 U/L (ref 0–45)
BILIRUB SERPL-MCNC: 1.2 MG/DL (ref 0.2–1.3)
BUN SERPL-MCNC: 32 MG/DL (ref 7–30)
CALCIUM SERPL-MCNC: 8.9 MG/DL (ref 8.5–10.1)
CHLORIDE SERPL-SCNC: 102 MMOL/L (ref 94–109)
CO2 SERPL-SCNC: 30 MMOL/L (ref 20–32)
CREAT SERPL-MCNC: 1.45 MG/DL (ref 0.52–1.04)
ERYTHROCYTE [DISTWIDTH] IN BLOOD BY AUTOMATED COUNT: 13 % (ref 10–15)
ERYTHROCYTE [DISTWIDTH] IN BLOOD BY AUTOMATED COUNT: 13.1 % (ref 10–15)
GFR SERPL CREATININE-BSD FRML MDRD: 36 ML/MIN/{1.73_M2}
GLUCOSE BLDC GLUCOMTR-MCNC: 103 MG/DL (ref 70–99)
GLUCOSE BLDC GLUCOMTR-MCNC: 89 MG/DL (ref 70–99)
GLUCOSE BLDC GLUCOMTR-MCNC: 98 MG/DL (ref 70–99)
GLUCOSE BLDC GLUCOMTR-MCNC: 99 MG/DL (ref 70–99)
GLUCOSE SERPL-MCNC: 90 MG/DL (ref 70–99)
HCT VFR BLD AUTO: 31.1 % (ref 35–47)
HCT VFR BLD AUTO: 31.5 % (ref 35–47)
HGB BLD-MCNC: 10.1 G/DL (ref 11.7–15.7)
HGB BLD-MCNC: 10.2 G/DL (ref 11.7–15.7)
HGB BLD-MCNC: 10.6 G/DL (ref 11.7–15.7)
INR PPP: 1.34 (ref 0.86–1.14)
MCH RBC QN AUTO: 29 PG (ref 26.5–33)
MCH RBC QN AUTO: 29.1 PG (ref 26.5–33)
MCHC RBC AUTO-ENTMCNC: 32.4 G/DL (ref 31.5–36.5)
MCHC RBC AUTO-ENTMCNC: 32.5 G/DL (ref 31.5–36.5)
MCV RBC AUTO: 90 FL (ref 78–100)
MCV RBC AUTO: 90 FL (ref 78–100)
PLATELET # BLD AUTO: 240 10E9/L (ref 150–450)
PLATELET # BLD AUTO: 275 10E9/L (ref 150–450)
POTASSIUM SERPL-SCNC: 3.8 MMOL/L (ref 3.4–5.3)
PROT SERPL-MCNC: 6.2 G/DL (ref 6.8–8.8)
RBC # BLD AUTO: 3.47 10E12/L (ref 3.8–5.2)
RBC # BLD AUTO: 3.52 10E12/L (ref 3.8–5.2)
SODIUM SERPL-SCNC: 137 MMOL/L (ref 133–144)
WBC # BLD AUTO: 13.2 10E9/L (ref 4–11)
WBC # BLD AUTO: 13.6 10E9/L (ref 4–11)

## 2021-05-09 PROCEDURE — 710N000010 HC RECOVERY PHASE 1, LEVEL 2, PER MIN: Performed by: STUDENT IN AN ORGANIZED HEALTH CARE EDUCATION/TRAINING PROGRAM

## 2021-05-09 PROCEDURE — C1769 GUIDE WIRE: HCPCS | Performed by: STUDENT IN AN ORGANIZED HEALTH CARE EDUCATION/TRAINING PROGRAM

## 2021-05-09 PROCEDURE — 85610 PROTHROMBIN TIME: CPT | Performed by: STUDENT IN AN ORGANIZED HEALTH CARE EDUCATION/TRAINING PROGRAM

## 2021-05-09 PROCEDURE — 370N000017 HC ANESTHESIA TECHNICAL FEE, PER MIN: Performed by: STUDENT IN AN ORGANIZED HEALTH CARE EDUCATION/TRAINING PROGRAM

## 2021-05-09 PROCEDURE — 250N000009 HC RX 250: Performed by: STUDENT IN AN ORGANIZED HEALTH CARE EDUCATION/TRAINING PROGRAM

## 2021-05-09 PROCEDURE — 999N000179 XR SURGERY CARM FLUORO LESS THAN 5 MIN W STILLS: Mod: TC

## 2021-05-09 PROCEDURE — 0TP98DZ REMOVAL OF INTRALUMINAL DEVICE FROM URETER, VIA NATURAL OR ARTIFICIAL OPENING ENDOSCOPIC: ICD-10-PCS | Performed by: STUDENT IN AN ORGANIZED HEALTH CARE EDUCATION/TRAINING PROGRAM

## 2021-05-09 PROCEDURE — 0T778DZ DILATION OF LEFT URETER WITH INTRALUMINAL DEVICE, VIA NATURAL OR ARTIFICIAL OPENING ENDOSCOPIC: ICD-10-PCS | Performed by: STUDENT IN AN ORGANIZED HEALTH CARE EDUCATION/TRAINING PROGRAM

## 2021-05-09 PROCEDURE — 360N000082 HC SURGERY LEVEL 2 W/ FLUORO, PER MIN: Performed by: STUDENT IN AN ORGANIZED HEALTH CARE EDUCATION/TRAINING PROGRAM

## 2021-05-09 PROCEDURE — 999N001017 HC STATISTIC GLUCOSE BY METER IP

## 2021-05-09 PROCEDURE — 250N000011 HC RX IP 250 OP 636: Performed by: STUDENT IN AN ORGANIZED HEALTH CARE EDUCATION/TRAINING PROGRAM

## 2021-05-09 PROCEDURE — 80053 COMPREHEN METABOLIC PANEL: CPT | Performed by: STUDENT IN AN ORGANIZED HEALTH CARE EDUCATION/TRAINING PROGRAM

## 2021-05-09 PROCEDURE — 85027 COMPLETE CBC AUTOMATED: CPT | Performed by: STUDENT IN AN ORGANIZED HEALTH CARE EDUCATION/TRAINING PROGRAM

## 2021-05-09 PROCEDURE — 36415 COLL VENOUS BLD VENIPUNCTURE: CPT | Performed by: HOSPITALIST

## 2021-05-09 PROCEDURE — 36415 COLL VENOUS BLD VENIPUNCTURE: CPT | Performed by: INTERNAL MEDICINE

## 2021-05-09 PROCEDURE — 272N000001 HC OR GENERAL SUPPLY STERILE: Performed by: STUDENT IN AN ORGANIZED HEALTH CARE EDUCATION/TRAINING PROGRAM

## 2021-05-09 PROCEDURE — 85027 COMPLETE CBC AUTOMATED: CPT | Performed by: HOSPITALIST

## 2021-05-09 PROCEDURE — 250N000013 HC RX MED GY IP 250 OP 250 PS 637: Performed by: STUDENT IN AN ORGANIZED HEALTH CARE EDUCATION/TRAINING PROGRAM

## 2021-05-09 PROCEDURE — 85018 HEMOGLOBIN: CPT | Performed by: INTERNAL MEDICINE

## 2021-05-09 PROCEDURE — 36415 COLL VENOUS BLD VENIPUNCTURE: CPT | Performed by: STUDENT IN AN ORGANIZED HEALTH CARE EDUCATION/TRAINING PROGRAM

## 2021-05-09 PROCEDURE — 999N000141 HC STATISTIC PRE-PROCEDURE NURSING ASSESSMENT: Performed by: STUDENT IN AN ORGANIZED HEALTH CARE EDUCATION/TRAINING PROGRAM

## 2021-05-09 PROCEDURE — 99223 1ST HOSP IP/OBS HIGH 75: CPT | Mod: GC | Performed by: INTERNAL MEDICINE

## 2021-05-09 PROCEDURE — 258N000003 HC RX IP 258 OP 636: Performed by: ANESTHESIOLOGY

## 2021-05-09 PROCEDURE — 258N000003 HC RX IP 258 OP 636: Performed by: STUDENT IN AN ORGANIZED HEALTH CARE EDUCATION/TRAINING PROGRAM

## 2021-05-09 PROCEDURE — C2617 STENT, NON-COR, TEM W/O DEL: HCPCS | Performed by: STUDENT IN AN ORGANIZED HEALTH CARE EDUCATION/TRAINING PROGRAM

## 2021-05-09 PROCEDURE — 255N000002 HC RX 255 OP 636: Performed by: STUDENT IN AN ORGANIZED HEALTH CARE EDUCATION/TRAINING PROGRAM

## 2021-05-09 PROCEDURE — 120N000002 HC R&B MED SURG/OB UMMC

## 2021-05-09 PROCEDURE — 99222 1ST HOSP IP/OBS MODERATE 55: CPT | Mod: 25 | Performed by: STUDENT IN AN ORGANIZED HEALTH CARE EDUCATION/TRAINING PROGRAM

## 2021-05-09 DEVICE — STENT URETERAL PERCUFLEX PLUS 6FRX24CM M0061752620
Type: IMPLANTABLE DEVICE | Site: URETER | Status: NON-FUNCTIONAL
Removed: 2021-09-16

## 2021-05-09 RX ORDER — CEFAZOLIN SODIUM 2 G/100ML
2 INJECTION, SOLUTION INTRAVENOUS
Status: DISCONTINUED | OUTPATIENT
Start: 2021-05-09 | End: 2021-05-09

## 2021-05-09 RX ORDER — LIDOCAINE 40 MG/G
CREAM TOPICAL
Status: DISCONTINUED | OUTPATIENT
Start: 2021-05-09 | End: 2021-05-09 | Stop reason: HOSPADM

## 2021-05-09 RX ORDER — MEPERIDINE HYDROCHLORIDE 25 MG/ML
12.5 INJECTION INTRAMUSCULAR; INTRAVENOUS; SUBCUTANEOUS
Status: DISCONTINUED | OUTPATIENT
Start: 2021-05-09 | End: 2021-05-09 | Stop reason: HOSPADM

## 2021-05-09 RX ORDER — LIDOCAINE 40 MG/G
CREAM TOPICAL
Status: DISCONTINUED | OUTPATIENT
Start: 2021-05-09 | End: 2021-05-15 | Stop reason: HOSPADM

## 2021-05-09 RX ORDER — ONDANSETRON 2 MG/ML
4 INJECTION INTRAMUSCULAR; INTRAVENOUS EVERY 6 HOURS PRN
Status: DISCONTINUED | OUTPATIENT
Start: 2021-05-09 | End: 2021-05-15 | Stop reason: HOSPADM

## 2021-05-09 RX ORDER — PIPERACILLIN SODIUM, TAZOBACTAM SODIUM 3; .375 G/15ML; G/15ML
3.38 INJECTION, POWDER, LYOPHILIZED, FOR SOLUTION INTRAVENOUS EVERY 6 HOURS
Status: DISCONTINUED | OUTPATIENT
Start: 2021-05-09 | End: 2021-05-09

## 2021-05-09 RX ORDER — POLYETHYLENE GLYCOL 3350 17 G/17G
17 POWDER, FOR SOLUTION ORAL DAILY PRN
Status: DISCONTINUED | OUTPATIENT
Start: 2021-05-09 | End: 2021-05-15 | Stop reason: HOSPADM

## 2021-05-09 RX ORDER — CEFAZOLIN SODIUM 2 G/100ML
2 INJECTION, SOLUTION INTRAVENOUS SEE ADMIN INSTRUCTIONS
Status: DISCONTINUED | OUTPATIENT
Start: 2021-05-09 | End: 2021-05-09

## 2021-05-09 RX ORDER — ONDANSETRON 4 MG/1
4 TABLET, ORALLY DISINTEGRATING ORAL EVERY 6 HOURS PRN
Status: DISCONTINUED | OUTPATIENT
Start: 2021-05-09 | End: 2021-05-15 | Stop reason: HOSPADM

## 2021-05-09 RX ORDER — NICOTINE POLACRILEX 4 MG
15-30 LOZENGE BUCCAL
Status: DISCONTINUED | OUTPATIENT
Start: 2021-05-09 | End: 2021-05-15 | Stop reason: HOSPADM

## 2021-05-09 RX ORDER — ONDANSETRON 4 MG/1
4 TABLET, ORALLY DISINTEGRATING ORAL EVERY 30 MIN PRN
Status: DISCONTINUED | OUTPATIENT
Start: 2021-05-09 | End: 2021-05-09 | Stop reason: HOSPADM

## 2021-05-09 RX ORDER — DEXTROSE MONOHYDRATE 25 G/50ML
25-50 INJECTION, SOLUTION INTRAVENOUS
Status: DISCONTINUED | OUTPATIENT
Start: 2021-05-09 | End: 2021-05-15 | Stop reason: HOSPADM

## 2021-05-09 RX ORDER — PROPOFOL 10 MG/ML
INJECTION, EMULSION INTRAVENOUS CONTINUOUS PRN
Status: DISCONTINUED | OUTPATIENT
Start: 2021-05-09 | End: 2021-05-09

## 2021-05-09 RX ORDER — ALBUTEROL SULFATE 90 UG/1
2 AEROSOL, METERED RESPIRATORY (INHALATION) EVERY 6 HOURS PRN
Status: DISCONTINUED | OUTPATIENT
Start: 2021-05-09 | End: 2021-05-15 | Stop reason: HOSPADM

## 2021-05-09 RX ORDER — NALOXONE HYDROCHLORIDE 0.4 MG/ML
0.2 INJECTION, SOLUTION INTRAMUSCULAR; INTRAVENOUS; SUBCUTANEOUS
Status: DISCONTINUED | OUTPATIENT
Start: 2021-05-09 | End: 2021-05-09 | Stop reason: HOSPADM

## 2021-05-09 RX ORDER — LIDOCAINE HYDROCHLORIDE 20 MG/ML
INJECTION, SOLUTION INFILTRATION; PERINEURAL PRN
Status: DISCONTINUED | OUTPATIENT
Start: 2021-05-09 | End: 2021-05-09

## 2021-05-09 RX ORDER — CEFTRIAXONE 1 G/1
1 INJECTION, POWDER, FOR SOLUTION INTRAMUSCULAR; INTRAVENOUS EVERY 24 HOURS
Status: DISCONTINUED | OUTPATIENT
Start: 2021-05-09 | End: 2021-05-10

## 2021-05-09 RX ORDER — ONDANSETRON 2 MG/ML
4 INJECTION INTRAMUSCULAR; INTRAVENOUS EVERY 30 MIN PRN
Status: DISCONTINUED | OUTPATIENT
Start: 2021-05-09 | End: 2021-05-09 | Stop reason: HOSPADM

## 2021-05-09 RX ORDER — ACETAMINOPHEN 325 MG/1
650 TABLET ORAL EVERY 4 HOURS PRN
Status: DISCONTINUED | OUTPATIENT
Start: 2021-05-09 | End: 2021-05-15 | Stop reason: HOSPADM

## 2021-05-09 RX ORDER — SODIUM CHLORIDE, SODIUM LACTATE, POTASSIUM CHLORIDE, CALCIUM CHLORIDE 600; 310; 30; 20 MG/100ML; MG/100ML; MG/100ML; MG/100ML
INJECTION, SOLUTION INTRAVENOUS CONTINUOUS
Status: DISCONTINUED | OUTPATIENT
Start: 2021-05-09 | End: 2021-05-09 | Stop reason: HOSPADM

## 2021-05-09 RX ORDER — EXEMESTANE 25 MG/1
25 TABLET ORAL EVERY MORNING
Status: DISCONTINUED | OUTPATIENT
Start: 2021-05-09 | End: 2021-05-15 | Stop reason: HOSPADM

## 2021-05-09 RX ORDER — NALOXONE HYDROCHLORIDE 0.4 MG/ML
0.4 INJECTION, SOLUTION INTRAMUSCULAR; INTRAVENOUS; SUBCUTANEOUS
Status: DISCONTINUED | OUTPATIENT
Start: 2021-05-09 | End: 2021-05-09 | Stop reason: HOSPADM

## 2021-05-09 RX ORDER — FENTANYL CITRATE 50 UG/ML
INJECTION, SOLUTION INTRAMUSCULAR; INTRAVENOUS PRN
Status: DISCONTINUED | OUTPATIENT
Start: 2021-05-09 | End: 2021-05-09

## 2021-05-09 RX ORDER — PROPOFOL 10 MG/ML
INJECTION, EMULSION INTRAVENOUS PRN
Status: DISCONTINUED | OUTPATIENT
Start: 2021-05-09 | End: 2021-05-09

## 2021-05-09 RX ORDER — MAGNESIUM HYDROXIDE 1200 MG/15ML
LIQUID ORAL PRN
Status: DISCONTINUED | OUTPATIENT
Start: 2021-05-09 | End: 2021-05-09 | Stop reason: HOSPADM

## 2021-05-09 RX ADMIN — SODIUM CHLORIDE, POTASSIUM CHLORIDE, SODIUM LACTATE AND CALCIUM CHLORIDE: 600; 310; 30; 20 INJECTION, SOLUTION INTRAVENOUS at 08:14

## 2021-05-09 RX ADMIN — LIDOCAINE HYDROCHLORIDE 60 MG: 20 INJECTION, SOLUTION INFILTRATION; PERINEURAL at 08:21

## 2021-05-09 RX ADMIN — FENTANYL CITRATE 25 MCG: 50 INJECTION, SOLUTION INTRAMUSCULAR; INTRAVENOUS at 08:43

## 2021-05-09 RX ADMIN — PROPOFOL 100 MCG/KG/MIN: 10 INJECTION, EMULSION INTRAVENOUS at 08:21

## 2021-05-09 RX ADMIN — CEFTRIAXONE 1 G: 1 INJECTION, POWDER, FOR SOLUTION INTRAMUSCULAR; INTRAVENOUS at 12:33

## 2021-05-09 RX ADMIN — ACETAMINOPHEN 650 MG: 325 TABLET, FILM COATED ORAL at 17:06

## 2021-05-09 RX ADMIN — PIPERACILLIN SODIUM AND TAZOBACTAM SODIUM 3.38 G: 3; .375 INJECTION, POWDER, LYOPHILIZED, FOR SOLUTION INTRAVENOUS at 05:31

## 2021-05-09 RX ADMIN — PHENYLEPHRINE HYDROCHLORIDE 50 MCG: 10 INJECTION INTRAVENOUS at 08:45

## 2021-05-09 RX ADMIN — PROPOFOL 40 MG: 10 INJECTION, EMULSION INTRAVENOUS at 08:21

## 2021-05-09 RX ADMIN — PHENYLEPHRINE HYDROCHLORIDE 50 MCG: 10 INJECTION INTRAVENOUS at 08:38

## 2021-05-09 ASSESSMENT — ACTIVITIES OF DAILY LIVING (ADL)
ADLS_ACUITY_SCORE: 19
ADLS_ACUITY_SCORE: 16
ADLS_ACUITY_SCORE: 19
ADLS_ACUITY_SCORE: 19

## 2021-05-09 ASSESSMENT — LIFESTYLE VARIABLES: TOBACCO_USE: 1

## 2021-05-09 NOTE — PROGRESS NOTES
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 ER 5/9/21 for management of L ureteral hydronephrosis & L renal hematoma.      Left Hydronephrosis due to Ureteral Stent Malfunction    Acute left kidney subcapsular hematoma  Complicated UTI  Presented with painless hematuria. CT showed large subcapsular hematoma as well as left hydronephrosis despite good stent position. UA with >182 WBC, >182 RBC, large LE. Given vanco, yakov, and zosyn in ER. Narrowed to zosyn on admission here. Urology consulted and took her to the OR early am 5/9 for cystourethroscopy with left retrograde pyelography, exchange of left ureteral stent. She tolerated the procedure well.  - Deescalate antibiotics further to ceftriaxone  Urology post-op recs:  - Continue with catheter for 24 hours for maximal drainage. Ok to remove once WBC and temp normal for 24 hours  - Follow-up at [facility] for next stent exchange in  with Dr. La      Acute blood loss anemia  Hgb 12.7 on presentation. Down to 10.1 with hydration. Baseline around 15. Normocytic. Likely acute blood loss due to hematuria and large subcapsular hematoma. Drop seems to have leveled off.   - monitor hemoglobin closely. Repeat this pm to help establish trend   -  IR aware of patient and recommended multiphasic CT (noncontrast, arterial phase, portal venous phase) to evaluate for source of bleeding and whether or not it would be amenable to embolization if hemodynamic instability  or continued rapid drop in Hgb.      Acute toxic/metabolic encephalopathy, resolved  Reportedly waxing and waning encephalopathy after transfer. Seems resolved for now.   - CTM     CRIS on Stage 3a chronic kidney disease  Cr 2.1 on presentation to OSH. Baseline Scr appears to be more in the 1.2-1.4 range. Trended down with hydration prior to procedure.   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 Currently with metastasis causing compressive effect on L ureter (necessitating need for ureteral stent). Currently on Exemestane for breast cancer.   - Continue PTA Exemestane   - Consider palliative care consult during admission or on discharge pending course      Post polio syndrome  - PT/OT once more stable      Trigeminal Neuralgia   - Pt reports no longer taking Neurotin, will not order        Diet:   resume regular diet  Lines: PIV  DVT Prophylaxis: Ambulate every shift and SCDs no pharmacologic AC due to bleeding   Cano Catheter: in place, indication: Other (Comment), (P) /GI/GYN Pelvic Procedure  Code Status: No CPR- Do NOT Intubate           Disposition Plan   Expected discharge: 2 - 3 days, recommended to prior living arrangement once bleeding resolved, antibiotic plan in place.  Entered: Ken Branham MD 05/09/2021, 10:48 AM       Ken Branham MD  [address]  Please see sign in/sign out for up to date coverage information  ______________________________________________________________________    Interval History   No acute events. Tolerated procedure well  today. No fevers. Continues to have hematuria post procedure    4 pt ROS otherwise negative    Data reviewed today: I reviewed all medications, new labs and imaging results over the last 24 hours.     Physical Exam   Vital Signs: Temp: 99.6  F (37.6  C) Temp src: Oral BP: (!) 136/95 Pulse: 91   Resp: 20 SpO2: 98 % O2 Device: Nasal cannula Oxygen Delivery: 1 LPM  Weight: 223 lbs 14.4 oz  Gen: Awake, alert, NAD  ENT: MMM  CV: RRR, no MRG, no LE edema  Resp: CTAB, non-labored  GI: Soft, NT, ND, NABS  : norris in place

## 2021-05-09 NOTE — CONSULTS
Urology Consult    Name: Marissa Guadarrama    MRN: [id number]   YOB: 1950               Chief Complaint:   Left hydronephrosis, subcapsular hematoma    History is obtained from the patient and chart review          History of Present Illness:   Marissa Guadarrama is a 70 year old female with a hx of cervical cancer and metastatic carcinoid tumor of rectal origin with involvement of her liver and left ureter and resultant left hydro managed w/ indwelling stent, who was transferred from [facility] ED for generalized weakness, left hydro despite stent and large supcapsular hematoma as well as likely UTI. Of note, hx is limited due to patient's altered mental status.     She presented to the ED last evening w/ malaise/weakness, decreased appetite, and several days of bleeding in her diaper thought to be from her vagina. Denies pain. Denies fevers at home. Outside workup notable for anemia w/ Hgb of 10/7; WBC 13.6 and she was given vanc, zosyn and meropenem for presumed UTI w/ LE positive urine, nitrite negative. Her CT was notable for significant left hydro despite stent in good position, as well as a large left subcapsular hematoma which was not present on previous imaging (CT or MRI from 2019).     Of note, her last stent exchange was 2/20/2020. Preevious exchange had been at 1 year and stent was minimally encrusted at that time. Also of note, she had a renogram 05/2019 which showed only 17% differential function of the left kidney.           Past Medical History:     Past Medical History:   Diagnosis Date     Arthritis     knee     Benign breast biopsy     benign     Carcinoid tumor 12/2003     Cervical cancer (H) 2007     H/O colposcopy with cervical biopsy 12/23/13    vaginal cuff biopsy- VAIN III. referred back to gyn/onc     HTN      Hyperlipidemia      Obesity      Pap smear of vagina with ASC-H 11/1/13     Post-polio syndromes      Trigeminal neuralgias             Past Surgical History:      Past Surgical History:   Procedure Laterality Date     APPENDECTOMY  1983     BIOPSY NODE SENTINEL Bilateral 6/1/2016    Procedure: BIOPSY NODE SENTINEL;  Surgeon: Brent Arana MD;  Location: [facility]     C BSO, OMENTECTOMY W/OSMAN  5/2007     C TOTAL ABDOM HYSTERECTOMY  5/2007     CL AFF SURGICAL PATHOLOGY       COLONOSCOPY N/A 6/23/2017    Procedure: COMBINED COLONOSCOPY, SINGLE OR MULTIPLE BIOPSY/POLYPECTOMY BY BIOPSY;  Colonoscopy Dx:Carcinoid tumor of colon prep mailed golytely;  Surgeon: Talisha Greco MD;  Location: [facility]     COLPOSCOPY, BIOPSY, COMBINED  3/13/2014    Procedure: COMBINED COLPOSCOPY, BIOPSY;;  Surgeon: Lara Pack MD;  Location: [facility]     COMBINED CYSTOSCOPY, RETROGRADES, EXCHANGE STENT URETER(S) Left 2/7/2019    Procedure: COMBINED CYSTOSCOPY, RETROGRADES, EXCHANGE STENT URETER--left;  Surgeon: Zhao La MD;  Location: [facility]     COMBINED CYSTOSCOPY, RETROGRADES, EXCHANGE STENT URETER(S) Left 2/20/2020    Procedure: CYSTOSCOPY, WITH RETROGRADE PYELOGRAM AND Left URETERAL STENT exchange;  Surgeon: Zhao La MD;  Location: [facility]     COMBINED CYSTOSCOPY, RETROGRADES, URETEROSCOPY, INSERT STENT Left 2/2/2017    Procedure: COMBINED CYSTOSCOPY, RETROGRADES, URETEROSCOPY, INSERT STENT;  Surgeon: Zhao La MD;  Location: [facility]     CYSTOSCOPY, RETROGRADES, INSERT STENT URETER(S), COMBINED Left 9/7/2017    Procedure: COMBINED CYSTOSCOPY, RETROGRADES, INSERT STENT URETER(S);  Cystoscopy,Left Stent Exchange;  Surgeon: Zhao La MD;  Location: [facility]     CYSTOSCOPY, RETROGRADES, INSERT STENT URETER(S), COMBINED  12/12/2017    Procedure: COMBINED CYSTOSCOPY, RETROGRADES, INSERT STENT URETER(S);;  Surgeon: Zhao La MD;  [facility]     CYSTOSCOPY, RETROGRADES, INSERT STENT URETER(S), COMBINED Left 7/5/2018    Procedure: COMBINED CYSTOSCOPY, RETROGRADES, INSERT STENT URETER(S);  Cystoscopy, Left  Stent Exchange;  Surgeon: Zhao La MD;  Location: [facility]     EXAM UNDER ANESTHESIA PELVIC  3/13/2014    Procedure: EXAM UNDER ANESTHESIA PELVIC;  Exam Under Anestheisa, Colposcopy, Vaginal Biopsies, Co2 Laser of the Upper Vagina;  Surgeon: Lara Pack MD;  [facility]: [facility]     EXAM UNDER ANESTHESIA PELVIC N/A 2020    Procedure: Examination under anesthesia, vaginal biopsies;  Surgeon: Karli Gao MD;  [facility]: [facility]     HERNIORRHAPHY INCISIONAL (LOCATION)       IR RHIZOTOMY  2020     LASER CO2 VAGINA  3/13/2014    VAIN      LASER CO2 VAGINA N/A 2017    Procedure: LASER CO2 VAGINA;  Exam Under Anesthesia, CO2 Laser Ablation Of Vagina, Cystoscopy, Left Retrograde Pyelogram with Left Stent Placement;  Surgeon: Nic Segundo MD;  Location: [facility]     LUMPECTOMY BREAST WITH SEED LOCALIZATION Bilateral 2016    Procedure: LUMPECTOMY BREAST WITH SEED LOCALIZATION;  Surgeon: Brent Arana MD;  Location: [facility]     SURGICAL HISTORY OF -       ovarian cystectomy     SURGICAL HISTORY OF -       right colon resection secondary to carcinoid tumor     TUBAL LIGATION              Social History:     Social History     Tobacco Use     Smoking status: Former Smoker     Packs/day: 1.00     Years: 29.00     Pack years: 29.00     Types: Cigarettes     Quit date: 10/30/2006     Years since quittin.5     Smokeless tobacco: Never Used   Substance Use Topics     Alcohol use: No     Alcohol/week: 0.0 standard drinks     Comment: 1 drink per year            Family History:     Family History   Problem Relation Age of Onset     Cancer Mother         bone / liver     Breast Cancer Mother      Cancer Maternal Grandmother      Cancer Maternal Grandfather      Cancer Paternal Grandmother      Cancer Paternal Grandfather      Cancer Sister         vulvar ca, cervical ca, squamous cell cancer     Cancer Brother         Rectal- Stage 4     Rectal Cancer Brother      Breast  Cancer Paternal Aunt      Colon Cancer Paternal Aunt      Breast Cancer Maternal Aunt      Pancreatic Cancer Nephew      Breast Cancer Sister      Anesthesia Reaction No family hx of             Allergies:   No Known Allergies         Medications:     Current Facility-Administered Medications   Medication     acetaminophen (TYLENOL) tablet 650 mg     albuterol (PROAIR HFA/PROVENTIL HFA/VENTOLIN HFA) 108 (90 Base) MCG/ACT inhaler 2 puff     exemestane (AROMASIN) tablet 25 mg     famotidine (PEPCID) infusion 20 mg     lidocaine (LMX4) cream     lidocaine 1 % 0.1-1 mL     melatonin tablet 1 mg     ondansetron (ZOFRAN-ODT) ODT tab 4 mg    Or     ondansetron (ZOFRAN) injection 4 mg     polyethylene glycol (MIRALAX) Packet 17 g     sodium chloride (PF) 0.9% PF flush 3 mL     sodium chloride (PF) 0.9% PF flush 3 mL             Review of Systems:    ROS: 10 point ROS neg other than the symptoms noted above in the HPI           Physical Exam:   VS:  T: 98.7    HR: 90    BP: 140/67    RR: 18   GEN:  AOx3.  NAD.    CV:  RRR  LUNGS: Non-labored breathing.   BACK:  No midline or CVA tenderness on the right, left CVA tenderness present.  ABD:  Soft.  Nondistended. Moderate left abdominal tenderness to palpation.  :  Cano in place draining konstantin UOP.  EXT:  Warm, well perfused.  Mild BLE edema.  SKIN:  Warm.  Dry.  No rashes.  NEURO:  CN grossly intact.            Data:   All laboratory data reviewed:    Recent Labs   Lab 05/08/21 2125 05/08/21  1719   WBC 13.0* 18.0*   HGB 10.7* 12.7    362     Recent Labs   Lab 05/08/21 2125 05/08/21  1719    133   POTASSIUM 3.7 3.2*   CHLORIDE 101 93*   CO2 30 31   BUN 36* 39*   CR 1.81* 2.10*   * 166*   MARILIA 8.2* 9.9     Recent Labs   Lab 05/08/21  1756   COLOR Konstantin   APPEARANCE Cloudy   URINEGLC Negative   URINEBILI Small*   URINEKETONE Negative   SG 1.019   URINEPH 5.0   PROTEIN 100*   NITRITE Negative   LEUKEST Large*   RBCU >182*   WBCU >182*       All pertinent  imaging reviewed:    CT A/P 5/8/21:  IMPRESSION:   1.  Extensive acute hemorrhage involving an atrophic left kidney.  2.  Marked left hydronephrosis despite a left ureteral stent in good position suggesting possible stent malfunction.  3.  Right hemicolectomy. No evidence for bowel obstruction.  4.  Small left pleural effusion and left basilar atelectasis.         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 She is symptomatic with left abdominal and CVA tenderness on exam. Given dirty UA, altered mental status/constitutional symptoms in the setting of obvious left obstruction and likely stent failure, we will recommend exchanging her left ureteral stent. Last exchange was 15 months ago. She had previously gone 12 months between exchanges with minimal encrustation.       Plan:     - NPO for OR this morning for cysto, left RPG, left stent exchange    - Patient is altered and consent will need to be obtained from her  in the morning    - Agree with treating for presumed UTI in the meantime    - Please keep catheter in place for maximal decompression  - Urology will follow, please call with questions in the meantime     Discussed with staff Dr. Roman Preciado MD  Urology PGY3        I saw and evaluated the patient and agree with the resident note and plan of care.  Patient transferred for CRIS, UTI, left perinephric hematoma, left hydronephrosis in setting of retained stent (does not appear significantly encrusted on CT scan this may be possible stent failure with retroperitoneal disease).  There is no evidence of trauma the patient presented  altered and was unable to give more information.  Unclear etiology of left hematoma consider trauma versus metastatic disease.  We discussed the fact that the hydronephrosis may signal that the stent has failed or is significantly encrusted given that its been in for greater than a year.  After discussion with IR, they stated that they were unable to place a percutaneous nephrostomy tube safely given his hematoma.  We discussed this with the patient this morning when she was alert and oriented.  We discussed the need for a stent exchange with possible tandem stent placement.  We discussed that with hydronephrosis in the setting of UTIs she may develop obstructive pyelonephritis, and risk of worsening sepsis. Patient is aware of risks and benefits of the procedure and agrees to proceed with stent exchange.    Fred Owens MD  Reconstructive Urology Fellow  Date of encounter: 5/9/2021

## 2021-05-09 NOTE — H&P
[facility]   Internal Medicine History and Physical        Date of Admission:  5/9/2021      Chief Complaint:   Renal Hematoma, Ureteral obstruction     History obtained from patient.    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     She presented to [facility] ED for painless hematuria. CT abdomen showed left uretral obstruction with hematoma within left kidney development with stent malfunction. Per report, IR Dr Whyte (IR) & Dr Owens (Urology) were consulted. Dr. Owens did not feel like stent in the L ureter could be replaced & Dr. Whyte did not see adequate windows for percutaneous drain placement. She was transferred to [facility] for further evaluation & management. Prior to transfer, she was given Vanc, Zosyn, & Meropenem. IR recommended triphasic CT abd/pelvis for assessment of L renal hematoma if worsening.     Upon arrival to [facility], she was afebrile, /67, HR 90, oxygenating ORA. She denied chest pain, shortness of breath, nausea/vomiting, diarrhea/constipation, pain with urination, abdominal or flank pain.       Review of Systems:   Negative unless otherwise stated in the HPI.     Past Medical History:    I have reviewed this patient's medical history and updated it with pertinent information if needed.   Past Medical History:   Diagnosis Date     Arthritis     knee     Benign breast biopsy     benign     Carcinoid tumor 12/2003     Cervical cancer (H) 2007     H/O colposcopy with cervical biopsy 12/23/13    vaginal cuff biopsy- VAIN III. referred back to  gyn/onc     HTN      Hyperlipidemia      Obesity      Pap smear of vagina with ASC-H 11/1/13     Post-polio syndromes      Trigeminal neuralgias         Past Surgical History:   I have reviewed this patient's surgical history and updated it with pertinent information if needed.  Past Surgical History:   Procedure Laterality Date     APPENDECTOMY  1983     BIOPSY NODE SENTINEL Bilateral 6/1/2016    Procedure: BIOPSY NODE SENTINEL;  Surgeon: Brent Arana MD;  Location: [facility]     C BSO, OMENTECTOMY W/OSMAN  5/2007     C TOTAL ABDOM HYSTERECTOMY  5/2007     CL AFF SURGICAL PATHOLOGY       COLONOSCOPY N/A 6/23/2017    Procedure: COMBINED COLONOSCOPY, SINGLE OR MULTIPLE BIOPSY/POLYPECTOMY BY BIOPSY;  Colonoscopy Dx:[facility];  Surgeon: Talisha Greco MD;  [facility]     COLPOSCOPY, BIOPSY, COMBINED  3/13/2014    Procedure: COMBINED COLPOSCOPY, BIOPSY;;  Surgeon: Lara Pack MD;  [facility]     COMBINED CYSTOSCOPY, RETROGRADES, EXCHANGE STENT URETER(S) Left 2/7/2019    Procedure: COMBINED CYSTOSCOPY, RETROGRADES, EXCHANGE STENT URETER--left;  Surgeon: Zhao La MD;  Location: [facility]     COMBINED CYSTOSCOPY, RETROGRADES, EXCHANGE STENT URETER(S) Left 2/20/2020    Procedure: CYSTOSCOPY, WITH RETROGRADE PYELOGRAM AND Left URETERAL STENT exchange;  Surgeon: Zhao La MD;  Location: [facility]     COMBINED CYSTOSCOPY, RETROGRADES, URETEROSCOPY, INSERT STENT Left 2/2/2017    Procedure: COMBINED CYSTOSCOPY, RETROGRADES, URETEROSCOPY, INSERT STENT;  Surgeon: Zhao La MD;  [facility]: [facility]     CYSTOSCOPY, RETROGRADES, INSERT STENT URETER(S), COMBINED Left 9/7/2017    Procedure: COMBINED CYSTOSCOPY, RETROGRADES, INSERT STENT URETER(S);  Cystoscopy,Left Stent Exchange;  Surgeon: Zhao La MD;  Location: [facility]     CYSTOSCOPY, RETROGRADES, INSERT STENT URETER(S), COMBINED  12/12/2017    Procedure: COMBINED  CYSTOSCOPY, RETROGRADES, INSERT STENT URETER(S);;  Surgeon: Zhao La MD;  [facility]     CYSTOSCOPY, RETROGRADES, INSERT STENT URETER(S), COMBINED Left 2018    Procedure: COMBINED CYSTOSCOPY, RETROGRADES, INSERT STENT URETER(S);  Cystoscopy, Left Stent Exchange;  Surgeon: Zhao La MD;  Location: [facility]     EXAM UNDER ANESTHESIA PELVIC  3/13/2014    Procedure: EXAM UNDER ANESTHESIA PELVIC;  Exam Under Anestheisa, Colposcopy, Vaginal Biopsies, Co2 Laser of the Upper Vagina;  Surgeon: Lara Pack MD;  [facility]     EXAM UNDER ANESTHESIA PELVIC N/A 2020    Procedure: Examination under anesthesia, vaginal biopsies;  Surgeon: Karli Gao MD;  [facility]     HERNIORRHAPHY INCISIONAL (LOCATION)       IR RHIZOTOMY  2020     LASER CO2 VAGINA  3/13/2014    VAIN /     LASER CO2 VAGINA N/A 2017    Procedure: LASER CO2 VAGINA;  Exam Under Anesthesia, CO2 Laser Ablation Of Vagina, Cystoscopy, Left Retrograde Pyelogram with Left Stent Placement;  Surgeon: Nic Segundo MD;  [facility]     LUMPECTOMY BREAST WITH SEED LOCALIZATION Bilateral 2016    Procedure: LUMPECTOMY BREAST WITH SEED LOCALIZATION;  Surgeon: Brent Arana MD;  Location: [facility]     SURGICAL HISTORY OF -       ovarian cystectomy     SURGICAL HISTORY OF -       right colon resection secondary to carcinoid tumor     TUBAL LIGATION          Social History:   Social History     Tobacco Use     Smoking status: Former Smoker     Packs/day: 1.00     Years: 29.00     Pack years: 29.00     Types: Cigarettes     Quit date: 10/30/2006     Years since quittin.5     Smokeless tobacco: Never Used   Substance Use Topics     Alcohol use: No     Alcohol/week: 0.0 standard drinks     Comment: 1 drink per year     Drug use: No       Family History:   I have reviewed this patient's family history and updated it with pertinent information if needed.   Family History    Problem Relation Age of Onset     Cancer Mother         bone / liver     Breast Cancer Mother      Cancer Maternal Grandmother      Cancer Maternal Grandfather      Cancer Paternal Grandmother      Cancer Paternal Grandfather      Cancer Sister         vulvar ca, cervical ca, squamous cell cancer     Cancer Brother         Rectal- Stage 4     Rectal Cancer Brother      Breast Cancer Paternal Aunt      Colon Cancer Paternal Aunt      Breast Cancer Maternal Aunt      Pancreatic Cancer Nephew      Breast Cancer Sister      Anesthesia Reaction No family hx of        Prior to Admission Medications:   [COMPLETED] 0.9% sodium chloride BOLUS  [COMPLETED] lactated ringers BOLUS 500 mL  [COMPLETED] meropenem (MERREM) 1 g vial to attach to  mL bag  [COMPLETED] piperacillin-tazobactam (ZOSYN) infusion 3.375 g  [COMPLETED] vancomycin (VANCOCIN) 2,000 mg in sodium chloride 0.9 % 500 mL intermittent infusion    albuterol (PROAIR HFA/PROVENTIL HFA/VENTOLIN HFA) 108 (90 Base) MCG/ACT inhaler, Inhale 2 puffs into the lungs every 6 hours as needed for shortness of breath / dyspnea or wheezing Hold on file until needed.  exemestane (AROMASIN) 25 MG tablet, Take 1 tablet (25 mg) by mouth every morning  gabapentin (NEURONTIN) 600 MG tablet, Take 1 tablet (600 mg) by mouth 3 times daily (Patient not taking: Reported on 4/29/2021)  hydrochlorothiazide (HYDRODIURIL) 12.5 MG tablet, Take 1 tablet (12.5 mg) by mouth daily  hydrochlorothiazide (HYDRODIURIL) 25 MG tablet, Take 0.5 tablets (12.5 mg) by mouth every morning Please follow up in the office for further refills.  ibuprofen (ADVIL) 200 MG tablet, Take 200 mg by mouth every 4 hours as needed for mild pain        Allergies: No Known Allergies    Physical Exam:   Vital Signs: Temp: 98.7  F (37.1  C) Temp src: Oral BP: (!) 140/67 Pulse: 90   Resp: 18 SpO2: 95 % O2 Device: None (Room air)    Weight: 0 lbs 0 oz    General: Pleasant female, laying in bed, tired but engaged in exam    HEENT: No Scleral icterus. PERRL, EOMI  Cardiac: RRR. No m/r/g. Normal S1, S2  Pulm: CTAB, no wheezes, or crackles. Normal respiratory effort  Abd: Soft, non distended, non tender. No hepatosplenomegaly  : norris in place   Skin: No jaundice, No rash  Extremities: No LE edema, No joint pain or swelling   Neuro: A&Ox3, no focal deficits  Psych: appropriate mood, full affect    Labs:   BMP: Na 137, K 3.7, BUN 36, Cr 1.81  Lactate: 1.1  CBC: WBC 13, Hgb 10.7, plts 256    Urine: large leuk esterase, neg nitrites, > 182 RBC, > 182 WBC    Imaging:     CT Abdomen Pelvis w/o Contrast    Narrative    EXAM: CT ABDOMEN PELVIS W/O CONTRAST  LOCATION: [facility]  DATE/TIME: 5/8/2021 7:18 PM    INDICATION: Metastatic carcinoid tumor mid left ureter. Cough, shortness of air.  COMPARISON: CT abdomen and pelvis 06/12/2019  TECHNIQUE: CT scan of the abdomen and pelvis was performed without IV contrast. Multiplanar reformats were obtained. Dose reduction techniques were used.  CONTRAST: None.    FINDINGS:   LOWER CHEST: Small left pleural effusion and left basilar atelectasis. Very small pericardial effusion.    HEPATOBILIARY: Trace of fluid along the lateral and posterior margin of the liver. Cholecystectomy.    PANCREAS: Normal.    SPLEEN: Normal.    ADRENAL GLANDS: Normal.    KIDNEYS/BLADDER: Very large acute cortical and subcapsular hematoma involving an atrophic left kidney. No normal renal parenchyma is currently visible. Hemorrhage extends inferiorly within the left retroperitoneum along the anterior margin of the left   psoas muscle into the mid pelvis. There is marked hydronephrosis despite the presence of a left ureteral stent extending from the left renal pelvis to the bladder. 2 mm calyceal tip nonobstructing stone lower pole right kidney.    BOWEL: Right hemicolectomy with ileotransverse anastomosis.    LYMPH NODES: Normal.    VASCULATURE: Unremarkable.    PELVIC ORGANS: Hysterectomy.    MUSCULOSKELETAL:  12 mm cystic lesion left breast on image #1 Advanced degenerative disc disease lumbar spine.      Impression    IMPRESSION:   1.  Extensive acute hemorrhage involving an atrophic left kidney.  2.  Marked left hydronephrosis despite a left ureteral stent in good position suggesting possible stent malfunction.  3.  Right hemicolectomy. No evidence for bowel obstruction.  4.  Small left pleural effusion and left basilar atelectasis.     XR Chest 2 Views    Narrative    XR CHEST TWO VIEWS   5/8/2021 7:39 PM     HISTORY: Cough, shortness of breath.    COMPARISON: CT chest dated 12/15/2016, chest x-rays dated 11/2/2016.    FINDINGS:  Posterior and lateral costophrenic angles are not  completely included and cannot be completely evaluated. Lungs are  grossly clear. Heart size, mediastinum and pulmonary vascularity are  within normal limits. No pneumothorax, significant pleural fluid  collection, or acute osseous fracture is identified.      Impression    IMPRESSION: No evidence of acute cardiopulmonary disease is seen.    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     # L Hydronephrosis, Likely Ureteral Obstruction from Stent Malfunction    # Acute Hemorrhage involving L kidney   # Hematuria   # Acute Anemia   Presented to [facility] ED for painless hematuria. Hgb 12.7 on presentation. On CT, found to have significant L hydro despite seemingly good ureteral stent placement & large L subcapsular L kidney hematoma which was not present on previous imaging from 2019. IR consulted prior  to transfer & stated that patient was not amenable to percutaneous nephrostomy tube & that Urology is to manage stent. Did recommend multiphasic CT (noncontrast, arterial phase, portal venous phase) to evaluate for source of bleeding and whether or not it would be amenable to embolization if hemodynamic instability or continued rapid drop in Hgb. Received Vanc, Zosyn, & Rafia at OSH prior to transfer for presumed UTI WBC 18, UA with > 182 RBC, > 182 WBC, positive leuk esterase, neg nitr. Per chart review, has grown GBS & E Coli in the past with no issues of abx resistance.   - Close monitoring of vitals for potential bleed & serial Hgbs    - Pending trajectory, may need multiphasic CT as recommended by IR   - Hold anticoagulation/DVT prophylaxis   - Strict I/Os   - S/p Vanc, Zosyn, & Meropenem at OSH for presumed UTI   - Continue Zosyn 3.75mg Q6 for now, de-escalate as able    - No indication seen at this time for Vanc & Meropenem   - Urology following, appreciate assistance    - NPO for OR today for cysto, L RPG, L stent exchange    - Keep catheter in place for maximal compression   - Consented for blood (may need 's consent, see below)      # Concerns for Altered Mental Status   Upon transfer, patient with waxing/waning mental status. Per my exam, pt was tired but seemed alert/oriented. Even had fairly robust discussion about code status & patient's preference to be DNR/DNI. However, pt's RN stated that pt told him she was full code on admission. Urology also had some concerns for altered status & intends on using pt's  for consent for procedures. I did get pt's consent for blood products & pt was able to recall risks/benefits from our discussion.   - Assess capacity again in AM when pt more awake/alert (intake occurred during early hours of morning)  - Revisit code status discussion & determine pt's ability to make this decision for herself. Will leave DNR/DNI as this is what we discussed when she  seemed alert.   - Consider calling  for his consent for blood products if needed if concerns that pt lacks capacity   - Delirium precautions in place     # Subjective Dyspnea   Per EMR review, reported shortness of breath at [facility] ED. Breathing comfortably on room air upon arrival to [facility]. Denies having had issues with dyspnea at the OSH. CXR at OSH grossly normal.   - CTM     # Stage 3a chronic kidney disease  Cr 2.1 on presentation to OSH. Baseline Scr appears to be more in the 1.2-1.4 range.   - Avoid nephrotoxic agents  - Strict I/Os   - Monitor Cr     # Hypertension  - Holding PTA hydrochlorothiazide in setting of potential active bleed & OR today     # Bilateral breast cancer (s/p mastectomies, radiation 2016)  # Cervical cancer (2007 s/p OSMAN BSO)  # Carcinoid tumor of cecum with retroperitoneal & liver metastases causing L ureteral obstruction   Robust oncologic history. Currently with metastasis causing compressive effect on L ureter (necessitating need for ureteral stent). Currently on Exemestane for breast cancer.   - Continue PTA Exemestane   - Consider palliative care consult during admission or on discharge pending course   - Code status discussion (as above)    # Post polio syndrome  - PT/OT once more stable     # Trigeminal Neuralgia   - Pt reports no longer taking Neurotin, will not order        Diet: NPO   Fluids: None   DVT Prophylaxis: SCDs   Code Status: DNR / DNI (consider revisiting discussion as above)  Disposition: Pending symptom improvement & clinical stability   Family: /POA (Gaudencio): [phone]    Patient to be formally staffed in the morning.    Madhuri Mcneill MD/MPH  Internal Medicine, PGY2  p [phone]

## 2021-05-09 NOTE — OP NOTE
Pt. Urine dark red. No clots. Cano patent. OR RN and anesthesia stated Urology surgeons are aware. Labs ordered for morning. AVSS.

## 2021-05-09 NOTE — PROGRESS NOTES
Dr. Durán  Time: 6930    Paged team to clarify if pt needed tele or not. If not, to please discontinue order. .    Addendum: 9122  Tele order discontinued.

## 2021-05-09 NOTE — ANESTHESIA PREPROCEDURE EVALUATION
Anesthesia Pre-Procedure Evaluation    Patient: Marissa Guadarrama   MRN: [id number] : 1950        Preoperative Diagnosis: Other hydronephrosis [N13.39]   Procedure : Procedure(s):  CYSTOSCOPY, WITH RETROGRADE PYELOGRAM AND URETERAL STENT REPLACEMENT     Past Medical History:   Diagnosis Date     Arthritis     knee     Benign breast biopsy     benign     Carcinoid tumor 2003     Cervical cancer (H)      H/O colposcopy with cervical biopsy 13    vaginal cuff biopsy- VAIN III. referred back to gyn/onc     HTN      Hyperlipidemia      Obesity      Pap smear of vagina with ASC-H 13     Post-polio syndromes      Trigeminal neuralgias       Past Surgical History:   Procedure Laterality Date     APPENDECTOMY       BIOPSY NODE SENTINEL Bilateral 2016    Procedure: BIOPSY NODE SENTINEL;  Surgeon: Brent Arana MD;  Location: [facility]     C BSO, OMENTECTOMY W/OSMAN  2007     C TOTAL ABDOM HYSTERECTOMY  2007     CL AFF SURGICAL PATHOLOGY       COLONOSCOPY N/A 2017    Procedure: COMBINED COLONOSCOPY, SINGLE OR MULTIPLE BIOPSY/POLYPECTOMY BY BIOPSY;  Colonoscopy Dx:Carcinoid tumor of colon prep mailed golytely;  Surgeon: Talisha Greco MD;  [facility]     COLPOSCOPY, BIOPSY, COMBINED  3/13/2014    Procedure: COMBINED COLPOSCOPY, BIOPSY;;  Surgeon: Lara Pack MD;  [facility]     COMBINED CYSTOSCOPY, RETROGRADES, EXCHANGE STENT URETER(S) Left 2019    Procedure: COMBINED CYSTOSCOPY, RETROGRADES, EXCHANGE STENT URETER--left;  Surgeon: Zhao La MD;  [facility]: [facility]     COMBINED CYSTOSCOPY, RETROGRADES, EXCHANGE STENT URETER(S) Left 2020    Procedure: CYSTOSCOPY, WITH RETROGRADE PYELOGRAM AND Left URETERAL STENT exchange;  Surgeon: Zhao La MD;  Location: [facility]     COMBINED CYSTOSCOPY, RETROGRADES, URETEROSCOPY, INSERT STENT Left 2017    Procedure: COMBINED CYSTOSCOPY, RETROGRADES, URETEROSCOPY, INSERT STENT;  Surgeon:  Zhao La MD;  [facility]: [facility]     CYSTOSCOPY, RETROGRADES, INSERT STENT URETER(S), COMBINED Left 9/7/2017    Procedure: COMBINED CYSTOSCOPY, RETROGRADES, INSERT STENT URETER(S);  Cystoscopy,Left Stent Exchange;  Surgeon: Zhao La MD;  [facility]: [facility]     CYSTOSCOPY, RETROGRADES, INSERT STENT URETER(S), COMBINED  12/12/2017    Procedure: COMBINED CYSTOSCOPY, RETROGRADES, INSERT STENT URETER(S);;  Surgeon: Zhao La MD;  [facility]     CYSTOSCOPY, RETROGRADES, INSERT STENT URETER(S), COMBINED Left 7/5/2018    Procedure: COMBINED CYSTOSCOPY, RETROGRADES, INSERT STENT URETER(S);  Cystoscopy, Left Stent Exchange;  Surgeon: Zhao La MD;  Location: [facility]     EXAM UNDER ANESTHESIA PELVIC  3/13/2014    Procedure: EXAM UNDER ANESTHESIA PELVIC;  Exam Under Anestheisa, Colposcopy, Vaginal Biopsies, Co2 Laser of the Upper Vagina;  Surgeon: Lara Pack MD;  [facility]     EXAM UNDER ANESTHESIA PELVIC N/A 7/1/2020    Procedure: Examination under anesthesia, vaginal biopsies;  Surgeon: Karli Gao MD;  [facility]     HERNIORRHAPHY INCISIONAL (LOCATION)       IR RHIZOTOMY  8/14/2020     LASER CO2 VAGINA  3/13/2014    VAIN 1/2     LASER CO2 VAGINA N/A 12/12/2017    Procedure: LASER CO2 VAGINA;  Exam Under Anesthesia, CO2 Laser Ablation Of Vagina, Cystoscopy, Left Retrograde Pyelogram with Left Stent Placement;  Surgeon: Nic Segundo MD;  [facility]     LUMPECTOMY BREAST WITH SEED LOCALIZATION Bilateral 6/1/2016    Procedure: LUMPECTOMY BREAST WITH SEED LOCALIZATION;  Surgeon: Brent Arana MD;  Location: [facility]     SURGICAL HISTORY OF -       ovarian cystectomy     SURGICAL HISTORY OF -   2003    right colon resection secondary to carcinoid tumor     TUBAL LIGATION        No Known Allergies   Social History     Tobacco Use     Smoking status: Former Smoker     Packs/day: 1.00     Years: 29.00     Pack years: 29.00      Types: Cigarettes     Quit date: 10/30/2006     Years since quittin.5     Smokeless tobacco: Never Used   Substance Use Topics     Alcohol use: No     Alcohol/week: 0.0 standard drinks     Comment: 1 drink per year      Wt Readings from Last 1 Encounters:   21 101.6 kg (223 lb 14.4 oz)        Anesthesia Evaluation   Pt has had prior anesthetic. Type: General.        ROS/MED HX  ENT/Pulmonary:     (+) tobacco use, Past use,     Neurologic:       Cardiovascular:     (+) Dyslipidemia hypertension-----Previous cardiac testing   Echo: Date:  Results:  Interpretation Summary     Global and regional left ventricular function is normal with an EF of 60-65%.  Global right ventricular function is normal.  No significant valvular dysfunction present.  Pulmonary artery systolic pressure is normal.  The inferior vena cava was normal in size with preserved respiratory  variability.  No pericardial effusion is present.  Stress Test: Date: Results:    ECG Reviewed: Date: 2021 Results:  NSR.  Cath: Date: Results:      METS/Exercise Tolerance:     Hematologic:       Musculoskeletal:   (+) arthritis,     GI/Hepatic:       Renal/Genitourinary:     (+) renal disease (Renal Hematoma with obstructive uropathy), type: ARF,     Endo:     (+) Obesity,     Psychiatric/Substance Use:       Infectious Disease:       Malignancy: Comment: Carcinoid tumor of cecum w/ metastasis to liver and retroperitoneum.     S/P bilateral mastectomy, radiation d/t breast cancer.   (+) Malignancy, History of Breast and GI.Breast CA status post Surgery and Chemo.  GI CA Active status post.        Other:            Physical Exam    Airway        Mallampati: II   TM distance: > 3 FB   Neck ROM: full   Mouth opening: > 3 cm    Respiratory Devices and Support         Dental  no notable dental history         Cardiovascular   cardiovascular exam normal          Pulmonary   pulmonary exam normal                OUTSIDE LABS:  CBC:   Lab Results    Component Value Date    WBC 13.6 (H) 05/09/2021    WBC 13.0 (H) 05/08/2021    HGB 10.2 (L) 05/09/2021    HGB 10.7 (L) 05/08/2021    HCT 31.5 (L) 05/09/2021    HCT 32.4 (L) 05/08/2021     05/09/2021     05/08/2021     BMP:   Lab Results   Component Value Date     05/08/2021     05/08/2021    POTASSIUM 3.7 05/08/2021    POTASSIUM 3.2 (L) 05/08/2021    CHLORIDE 101 05/08/2021    CHLORIDE 93 (L) 05/08/2021    CO2 30 05/08/2021    CO2 31 05/08/2021    BUN 36 (H) 05/08/2021    BUN 39 (H) 05/08/2021    CR 1.81 (H) 05/08/2021    CR 2.10 (H) 05/08/2021     (H) 05/08/2021     (H) 05/08/2021     COAGS:   Lab Results   Component Value Date    PTT 30 05/08/2021    INR 1.32 (H) 05/08/2021     POC:   Lab Results   Component Value Date    BGM 89 05/09/2021     HEPATIC:   Lab Results   Component Value Date    ALBUMIN 2.5 (L) 05/08/2021    PROTTOTAL 7.8 05/08/2021    ALT 22 05/08/2021    AST 27 05/08/2021    ALKPHOS 75 05/08/2021    BILITOTAL 1.5 (H) 05/08/2021     OTHER:   Lab Results   Component Value Date    LACT 1.1 05/08/2021    MARILIA 8.2 (L) 05/08/2021    LIPASE 139 05/08/2021    TSH 2.54 09/28/2015       Anesthesia Plan    ASA Status:  3   NPO Status:  NPO Appropriate    Anesthesia Type: MAC.     - Reason for MAC: immobility needed, straight local not clinically adequate   Induction: Propofol.           Consents    Anesthesia Plan(s) and associated risks, benefits, and realistic alternatives discussed. Questions answered and patient/representative(s) expressed understanding.     - Discussed with:  Patient      - Extended Intubation/Ventilatory Support Discussed: No.      - Patient is DNR/DNI Status: No    Use of blood products discussed: No .     Postoperative Care    Pain management: IV analgesics.        Comments:    Discussed MAC and GETT with patient.  She states understanding of both, risks and benefits and wishes to proceed with MAC.     H&P reviewed: Unable to attach H&P to  encounter due to EHR limitations. H&P Update: appropriate H&P reviewed, patient examined. No interval changes since H&P (within 30 days).         Francisco Dela Cruz MD

## 2021-05-09 NOTE — ANESTHESIA POSTPROCEDURE EVALUATION
Patient: Marissa Guadarrama    Procedure(s):  CYSTOSCOPY, WITH RETROGRADE PYELOGRAM AND LEFT URETERAL STENT REPLACEMENT    Diagnosis:Other hydronephrosis [N13.39]  Diagnosis Additional Information: No value filed.    Anesthesia Type:  MAC    Note:  Disposition: Inpatient   Postop Pain Control: Uneventful            Sign Out: Well controlled pain   PONV: No   Neuro/Psych: Uneventful            Sign Out: Acceptable/Baseline neuro status   Airway/Respiratory: Uneventful            Sign Out: Acceptable/Baseline resp. status   CV/Hemodynamics: Uneventful            Sign Out: Acceptable CV status; No obvious hypovolemia; No obvious fluid overload   Other NRE: NONE   DID A NON-ROUTINE EVENT OCCUR? No           Last vitals:  Vitals:    05/09/21 1030 05/09/21 1056 05/09/21 1112   BP: (!) 155/75 (!) 159/68 (!) 151/60   Pulse: 93 91 91   Resp: 26 22 25   Temp:      SpO2: 90% 92% 94%       Last vitals prior to Anesthesia Care Transfer:  CRNA VITALS  5/9/2021 0837 - 5/9/2021 0937      5/9/2021             Pulse:  98    SpO2:  96 %    Resp Rate (observed):  (!) 1          Electronically Signed By: Ed Quijano MD  May 9, 2021  11:27 AM

## 2021-05-09 NOTE — PLAN OF CARE
Patient sleepy but arousable. Left abdomen tender to touch. Tylenol given for pain and temp of 100.6. Md notified of temp. Cano patent w/ red output. Cano cares done. IS encouraged, done w/ encouragement to 900.

## 2021-05-09 NOTE — PLAN OF CARE
/81   Pulse 84   Temp 99.1  F (37.3  C) (Oral)   Resp 20   Wt 101.6 kg (223 lb 14.4 oz)   SpO2 92%   BMI 35.07 kg/m      [id number]  Status: Left hydronephrosis, Subcapsular hematoma  Neuros: A&O x4, calls appropriately. CMS intact   Cardiac: WDL   Respiratory: WDL on RA   GI/: Cano in place w/ adequate UOP. +Bs, +Flatus. Last BM 5/7   Diet/Nausea: NPO except ice chips. No nausea reported   Skin: Skin assessed w/ Sara CRUZ, RN. Skin tag noted on medial lower chest, Perineal moisture w/ blanchable redness. Cano in place  Lines: Bilateral PIV, R PIV infusing TKO w/ intermittent abx   Labs: BG 89. Trending Hgb (10.2). Cr: 1.81. Reviewed   Pain: Denies pain   Activity: Up in room w/ assist 1   Plan: NPO for possible stent exchange

## 2021-05-09 NOTE — OP NOTE
OPERATIVE REPORT    DATE AND TIME OF OP NOTE/SURGERY: May 9, 2021    PREOPERATIVE DIAGNOSIS: Left ureteral stricture    POSTOPERATIVE DIAGNOSIS:  Same    PROCEDURES PERFORMED:   1. Cystourethroscopy with Left retrograde pyelography  2. Exchange of Left ureteral stent(s).  3. Intraoperative interpretation of fluoroscopic imaging.     STAFF SURGEON: Dr. Owens    RESIDENT: Marija Howell MD    ANESTHESIA: Monitor Anesthesia Care    ESTIMATED BLOOD LOSS: 10 mL.     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 She presented to [facility] with c/o malaise, generalized weakness, and hematuria. Workup noted leukocytosis and anemia. She was started on empiric antibiotics for presumed UTI and urology was contacted for further management. Of note, she has h/o large left subcapsular hematoma which was not present on previous imaging (CT or MRI from 2019). The patient was counseled on the alternatives, risks, and benefits and elected to proceed with the above stated procedure.    DESCRIPTION OF PROCEDURE:    After informed consent was obtained, the patient was taken to the operating room, and moved to the operating table.  After adequate anesthesia was induced, the patient was repositioned in dorsal lithotomy position and prepped and draped in the usual sterile fashion. A timeout was taken to confirm correct patient, procedure and laterality.     A 22-Mongolian cystoscope was inserted into a well lubricated urethra. The urethra was unremarkable.  The bladder was free of tumors, stones or diverticuli.  The media was clear.  Bilateral ureteral orifices were  orthotopic.  The distal end of the left ureteral stent was noted to be significantly encrusted.  We placed a sensor wire alongside that stent into the renal pelvis.    We then pulled the stent using the stent grasper to the urethral meatus, and attempted to pass a sensor guidewire up the renal pelvis through it, but were unable to do so due to significant encrustation. We then passed a second wire alongside the first wire using a 5F open ended catheter. We then exchanged the stent for a 5Fr catheter, which we used to obtain a retrograde pyelogram, which noted severe hydroureteronephrosis, down to the mid-distal ureter. We then re-inserted the wire and placed tandem stents (6F 24 cm) over the two wires into the renal pelvis under fluorscopy after which the wires were removed with good coils in the renal pelvis and bladder. A catheter was placed (16F, 10 ml in balloon).  The patient had mild hematuria following the procedure.    The patient tolerated the procedure well.  There were no complications.      PLAN:   - Return to medicine floor  - Continue with catheter for 24 hours for maximal drainage. Ok to remove once WBC and temp normal for 24 hours  - Follow-up at [facility] for next stent exchange in  with Dr. aL

## 2021-05-09 NOTE — PLAN OF CARE
/70 (BP Location: Left arm)   Pulse 92   Temp 99.6  F (37.6  C) (Oral)   Resp 30   Wt 101.6 kg (223 lb 14.4 oz)   SpO2 94%   BMI 35.07 kg/m        Neuro: Oriented x4; forgetful. Reports she is tired and sleepy from procedure this AM. Easily arouses to voice. Calls appropriately. CMS intact.    Cardiac: hypertensive but within parameters  Respiratory: sats mid 90s on 1L NC. On continuous capnography.   GI/: faint BS, no BM this shift. Cano in place with dark red urine.   Pain: C/O discomfort with urinating. Declined any PRN meds.   Diet/Nausea: Regular diet, tolerating ok. Denies nausea.   IV Access: (L) PIV- TKO with intermitt. abx, (R) PIV- saline locked.    Labs: BG 98. Hgb: 10.6      Activity: Has not gotten up since procedure this morning but able to independently reposition in bed. Pt reports only being able to pivot with transfers d/t L leg/knee pain and weakness.   Plan: Continue to monitor and follow plan of care.

## 2021-05-09 NOTE — PROGRESS NOTES
[facility]  Transfer Triage Note    Date of call: 05/08/21  Time of call: 9:35 PM    Is pandemic COVID-19 a concern? NO    Reason for transfer: Further diagnostic work up, management, and consultation for specialized care   Diagnosis: Acute left renal obstruction with widely metastatic disease.     Outside Records: Available  Additional records requested to be faxed to [phone].    Stability of Patient: Patient is at risk for instability, however patient requires urgent transfer and does not meet ICU criteria   ICU: No    Expected Time of Arrival for Transfer: greater than 24 hours    Arrival Location:  [address] [address] Phone: [phone]    Recommendations for Management and Stabilization: Given    Additional Comments 70 yr old female with pmh  significant for hypertension, hyperlipidemia, obesity, CRI, bilateral breast cancer s/p mastectomies and radiation 2016, cervical cancer 2007 s/p OSMAN BSO now on exemestane (followed by Dr. 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 Arango), and post polio syndrome.comes with Hematurea to [facility] ER.     Patient was given one dose of zosyn and vancomycin, ct abdomen showed left uretral obstruction with hematoma within left kidney development with stent malfunction.     Spoke to IR Dr Whyte and Dr Owens from urology, They did not feel like stent in the left ureter could be replaced and IR did not see adequate windows for percutaneous drain placement.     It was decided to bring the patient to the [facility] start antibiotics for Urine infection. Perform serial cbcs and get a triphasic CT abdomen to assess for worsening hematoma in the left kidney.  Urology would also like to be involved for potential ureteral stent replacement.     Francisco Everett MD

## 2021-05-09 NOTE — ANESTHESIA CARE TRANSFER NOTE
Patient: Marissa Guadarrama    Procedure(s):  CYSTOSCOPY, WITH RETROGRADE PYELOGRAM AND LEFT URETERAL STENT REPLACEMENT    Diagnosis: Other hydronephrosis [N13.39]  Diagnosis Additional Information: No value filed.    Anesthesia Type:   MAC     Note:    Oropharynx: oropharynx clear of all foreign objects  Level of Consciousness: awake  Oxygen Supplementation: blow-by O2    Independent Airway: airway patency satisfactory and stable  Dentition: dentition unchanged  Vital Signs Stable: post-procedure vital signs reviewed and stable  Report to RN Given: handoff report given  Patient transferred to: PACU  Comments: VSS. Patient denies n/v/pain. All questions answered to RN.   Handoff Report: Identifed the Patient, Identified the Reponsible Provider, Reviewed the pertinent medical history, Discussed the surgical course, Reviewed Intra-OP anesthesia mangement and issues during anesthesia, Set expectations for post-procedure period and Allowed opportunity for questions and acknowledgement of understanding      Vitals: (Last set prior to Anesthesia Care Transfer)  CRNA VITALS  5/9/2021 0837 - 5/9/2021 0937      5/9/2021             Pulse:  98    SpO2:  96 %    Resp Rate (observed):  (!) 1        Electronically Signed By: Francisco Dela Cruz MD  May 9, 2021  10:56 AM

## 2021-05-09 NOTE — PROGRESS NOTES
[facility]    Medicine Progress Note - Hospitalist Service, Gold 3       Date of Admission:  5/9/2021  Assessment & Plan           is a 70 year old F with PMH history significant for HTN, HLD, CRI, Ca-cx, metastatic carcinoid tumor of cecum with involvement of liver and L ureter with resultant L hydronephrosis managed with indwelling stent, and annual stent exchange.  Patient is admitted here for Mx of L ureteral hydronephrosis and L renal hematoma.     Lt hydronephrosis due to stent malfunction   Acute L renal hematoma   UTI  -Patient presented with several days of hematuria felt to be vaginal origin, malaise, weakness  and decreased appetite.   -CT showed extensive acute hemorrhage involving an atrophic left kidney. Marked left hydronephrosis despite a left ureteral stent in good position suggesting possible stent malfunction. 2 mm calyceal tip nonobstructing stone lower pole right kidney  -On presentation, WBC 18, GFR 23  -Urology consulted, performed her cystourethroscopy with L RPG and ureteral stent replacement   -Continue with catheter for 24 hours for maximal drainage. Ok to remove once WBC and temp normal for 24 hours  -Continue Ceftriaxone   -Follow-up at [facility] for next stent exchange in      Acute blood loss anemia  -On presentation, Hb 12.7 dropped down to 10.1  -Close monitoring vitals for Hgbs    CRIS on Stage 3a chronic kidney disease  -Monitor Cr. Baseline Scr appears to be more in the 1.2-1.4 range.  -Avoid nephrotoxic agent  -Strict I/Os    Essential hypertension   -Monitor BP  -Restrict salt  -Holding hydrochlorothiazide (HYDRODIURIL) until further clarification      RESOLVED/STABLE PROBLEMS:     Hypertriglyceridemia  -Need to check lab  -Lipid panel reflex to direct LDL Fasting    Bilateral breast Ca (s/p mastectomies, radiation 2016)  Ca-cx (2007 s/p OSMAN-BSO)  Carcinoid tumor of cecum with retro peritoneal & Liver metastasis  causing L ureteral obstruction  -Continue Exemestane 25 mg     Peritoneal metastases (H)  Seeing specialist    Post polio syndrome:   -PT/OT once more stable      Diet:   Regular diet adult   DVT Prophylaxis: Pneumatic Compression Devices  Cano Catheter: in place, indication: Other (Comment), /GI/GYN Pelvic Procedure  Code Status: No CPR- Do NOT Intubate           Disposition Plan   Expected discharge: 2 - 3 days, recommended to prior living arrangement once renal function improved.  Entered: Marlene Wiseman 05/09/2021, 10:36 AM      Marlene Wiseman  Hospitalist Service, [address]  Contact information available via [facility] Paging/Directory  Please see sign in/sign out for up to date coverage information  ______________________________________________________________________    Interval History   Patient well tolerated stent replacement. Still has mild L flank pain. Denies fever, short of breath, chest pain.      4 point ROS is negative otherwise    Data reviewed today: I reviewed all medications, new labs and imaging results over the last 24 hours.     Physical Exam   Vital Signs: Temp: 99.6  F (37.6  C) Temp src: Oral BP: (!) 136/95 Pulse: 91   Resp: 20 SpO2: 98 % O2 Device: Nasal cannula Oxygen Delivery: 1 LPM, Weight: 223 lbs 14.4 oz     Constitutional: Ill appearing, NAD  HEENT: NC/AT, EOMI, MMS,    Respiratory: No increased work of breathing, good air exchange, CTAB, no crackles or wheezing  Cardiovascular: Normal apical impulse, RRR, normal S1 and S2, and no murmur noted  GI: Normal bowel sounds, soft, non-distended, tender L flank, no hepatosplenomegally  Skin: normal skin color, texture, turgor  Musculoskeletal: Full range of motion noted  Neurologic: Awake, alert, oriented to name, place and time.  Cranial nerves II-XII are grossly intact, no obvious neurologic deficit      Data   Recent Labs   Lab 05/09/21  0558 05/09/21  0237 05/08/21  7644  05/08/21  1722 05/08/21  1719   WBC 13.2* 13.6* 13.0*  --  18.0*   HGB 10.1* 10.2* 10.7*  --  12.7   MCV 90 90 89  --  90    240 256  --  362   INR 1.34*  --   --   --  1.32*     --  137  --  133   POTASSIUM 3.8  --  3.7  --  3.2*   CHLORIDE 102  --  101  --  93*   CO2 30  --  30  --  31   BUN 32*  --  36*  --  39*   CR 1.45*  --  1.81*  --  2.10*   ANIONGAP 5  --  6  --  9   MARILIA 8.9  --  8.2*  --  9.9   GLC 90  --  112*  --  166*   ALBUMIN 1.9*  --   --   --  2.5*   PROTTOTAL 6.2*  --   --   --  7.8   BILITOTAL 1.2  --   --   --  1.5*   ALKPHOS 58  --   --   --  75   ALT 18  --   --   --  22   AST 31  --   --   --  27   LIPASE  --   --   --  139  --    TROPI  --   --   --   --  0.069*

## 2021-05-09 NOTE — CONSULTS
INTERVENTIONAL RADIOLOGY CONSULT    Patient is a 70-year-old female with past medical history significant for cervical cancer and metastatic neuroendocrine tumor most notably with metastasis to the left ureter causing ureteral obstruction, status post ureteral stent placement managed by urology.  Patient presents to outside hospital for bleeding that was felt to be of vaginal origin.  Patient was also fatigued.  Interventional radiology consulted for percutaneous nephrostomy tube placement.    Noncontrast CT demonstrating a large left subcapsular/perinephric hematoma as well as moderate to severe left-sided hydronephrosis.  Ureteral double-J stent appears to be appropriately positioned.  Renal parenchyma is not able to be differentiated from hematoma.  Terminal ends of the calyces are not well appreciated.    - Covid: 5/8/2021, negative  - Anticoagulation: None      B/P: 138/67, T: 97.9, P: 76, R: 17       CBC RESULTS:   Recent Labs   Lab Test 05/08/21 2125   WBC 13.0*   RBC 3.64*   HGB 10.7*   HCT 32.4*   MCV 89   MCH 29.4   MCHC 33.0   RDW 12.7         Lab Results   Component Value Date    INR 1.32 05/08/2021    INR 1.02 08/11/2020     Lab Results   Component Value Date     05/08/2021     05/08/2021       Assessment/Plan: Renal parenchyma is difficult to identify amongst the moderate to large subcapsular/perinephric hematoma.  There is moderate to severe hydronephrosis, although the calyces are not well differentiated from the hematoma/parenchyma.  The kidney has been moved anteriorly by the hematoma making the percutaneous tract very long.  Cannot exclude a renal parenchymal fracture which would complicate placement.  This patient is not amenable to percutaneous nephrostomy tube at this time.  No evidence for sepsis.     Patient has had a hemoglobin drop when compared to 8/11/2020 from 15.3-12.7.  However, the patient is hemodynamically stable.  Patient has also received fluids which may  cloud the situation with follow-up hemoglobins.  This may take a few hours to equilibrate.  Noncontrast CT does not reveal if there remains active bleeding.    Recommendations:  -Patient is not amenable to percutaneous nephrostomy tube.  Urology to manage stent. Antibiotics per primary team.  -Trend hemoglobin and hemodynamics.  If patient continues to have hemoglobin drop, would recommend a multiphasic CT (noncontrast, arterial phase, portal venous phase) to evaluate for source of bleeding and whether or not it would be amenable to embolization.  This would be recommended despite patient's acute kidney injury.  -N.p.o.  -Hold anticoagulation/DVT prophylaxis    Please page interventional radiology if there is active extravasation on the multiphasic CT.     Pato Mack, DO  Interventional Radiology Resident  Pager [phone]  Call Pager for After Hours: [phone]

## 2021-05-10 ENCOUNTER — APPOINTMENT (OUTPATIENT)
Dept: GENERAL RADIOLOGY | Facility: CLINIC | Age: 71
DRG: 659 | End: 2021-05-10
Attending: INTERNAL MEDICINE
Payer: MEDICARE

## 2021-05-10 ENCOUNTER — APPOINTMENT (OUTPATIENT)
Dept: PHYSICAL THERAPY | Facility: CLINIC | Age: 71
DRG: 659 | End: 2021-05-10
Attending: STUDENT IN AN ORGANIZED HEALTH CARE EDUCATION/TRAINING PROGRAM
Payer: MEDICARE

## 2021-05-10 LAB
ANION GAP SERPL CALCULATED.3IONS-SCNC: 6 MMOL/L (ref 3–14)
BUN SERPL-MCNC: 22 MG/DL (ref 7–30)
CALCIUM SERPL-MCNC: 9.2 MG/DL (ref 8.5–10.1)
CHLORIDE SERPL-SCNC: 100 MMOL/L (ref 94–109)
CO2 SERPL-SCNC: 30 MMOL/L (ref 20–32)
CREAT SERPL-MCNC: 1.12 MG/DL (ref 0.52–1.04)
ERYTHROCYTE [DISTWIDTH] IN BLOOD BY AUTOMATED COUNT: 13.1 % (ref 10–15)
GFR SERPL CREATININE-BSD FRML MDRD: 49 ML/MIN/{1.73_M2}
GLUCOSE BLDC GLUCOMTR-MCNC: 131 MG/DL (ref 70–99)
GLUCOSE BLDC GLUCOMTR-MCNC: 162 MG/DL (ref 70–99)
GLUCOSE BLDC GLUCOMTR-MCNC: 192 MG/DL (ref 70–99)
GLUCOSE SERPL-MCNC: 96 MG/DL (ref 70–99)
HCT VFR BLD AUTO: 31.4 % (ref 35–47)
HGB BLD-MCNC: 9.9 G/DL (ref 11.7–15.7)
LACTATE BLD-SCNC: 1.1 MMOL/L (ref 0.7–2)
MAGNESIUM SERPL-MCNC: 1.6 MG/DL (ref 1.6–2.3)
MCH RBC QN AUTO: 28.5 PG (ref 26.5–33)
MCHC RBC AUTO-ENTMCNC: 31.5 G/DL (ref 31.5–36.5)
MCV RBC AUTO: 91 FL (ref 78–100)
PLATELET # BLD AUTO: 303 10E9/L (ref 150–450)
POTASSIUM SERPL-SCNC: 3.1 MMOL/L (ref 3.4–5.3)
POTASSIUM SERPL-SCNC: 3.3 MMOL/L (ref 3.4–5.3)
POTASSIUM SERPL-SCNC: 3.7 MMOL/L (ref 3.4–5.3)
RBC # BLD AUTO: 3.47 10E12/L (ref 3.8–5.2)
SODIUM SERPL-SCNC: 135 MMOL/L (ref 133–144)
WBC # BLD AUTO: 16.2 10E9/L (ref 4–11)

## 2021-05-10 PROCEDURE — 85027 COMPLETE CBC AUTOMATED: CPT | Performed by: STUDENT IN AN ORGANIZED HEALTH CARE EDUCATION/TRAINING PROGRAM

## 2021-05-10 PROCEDURE — 71045 X-RAY EXAM CHEST 1 VIEW: CPT | Mod: 26 | Performed by: RADIOLOGY

## 2021-05-10 PROCEDURE — 999N001017 HC STATISTIC GLUCOSE BY METER IP

## 2021-05-10 PROCEDURE — 250N000013 HC RX MED GY IP 250 OP 250 PS 637: Performed by: STUDENT IN AN ORGANIZED HEALTH CARE EDUCATION/TRAINING PROGRAM

## 2021-05-10 PROCEDURE — 71045 X-RAY EXAM CHEST 1 VIEW: CPT

## 2021-05-10 PROCEDURE — 97162 PT EVAL MOD COMPLEX 30 MIN: CPT | Mod: GP

## 2021-05-10 PROCEDURE — 97110 THERAPEUTIC EXERCISES: CPT | Mod: GP

## 2021-05-10 PROCEDURE — 250N000013 HC RX MED GY IP 250 OP 250 PS 637: Performed by: INTERNAL MEDICINE

## 2021-05-10 PROCEDURE — 99233 SBSQ HOSP IP/OBS HIGH 50: CPT | Performed by: INTERNAL MEDICINE

## 2021-05-10 PROCEDURE — 999N000128 HC STATISTIC PERIPHERAL IV START W/O US GUIDANCE

## 2021-05-10 PROCEDURE — 80048 BASIC METABOLIC PNL TOTAL CA: CPT | Performed by: STUDENT IN AN ORGANIZED HEALTH CARE EDUCATION/TRAINING PROGRAM

## 2021-05-10 PROCEDURE — 250N000011 HC RX IP 250 OP 636: Performed by: INTERNAL MEDICINE

## 2021-05-10 PROCEDURE — 250N000011 HC RX IP 250 OP 636: Performed by: STUDENT IN AN ORGANIZED HEALTH CARE EDUCATION/TRAINING PROGRAM

## 2021-05-10 PROCEDURE — 36415 COLL VENOUS BLD VENIPUNCTURE: CPT | Performed by: STUDENT IN AN ORGANIZED HEALTH CARE EDUCATION/TRAINING PROGRAM

## 2021-05-10 PROCEDURE — 83735 ASSAY OF MAGNESIUM: CPT | Performed by: INTERNAL MEDICINE

## 2021-05-10 PROCEDURE — 83605 ASSAY OF LACTIC ACID: CPT | Performed by: INTERNAL MEDICINE

## 2021-05-10 PROCEDURE — 36415 COLL VENOUS BLD VENIPUNCTURE: CPT | Performed by: INTERNAL MEDICINE

## 2021-05-10 PROCEDURE — 120N000002 HC R&B MED SURG/OB UMMC

## 2021-05-10 PROCEDURE — 84132 ASSAY OF SERUM POTASSIUM: CPT | Performed by: INTERNAL MEDICINE

## 2021-05-10 RX ORDER — AMPICILLIN AND SULBACTAM 2; 1 G/1; G/1
3 INJECTION, POWDER, FOR SOLUTION INTRAMUSCULAR; INTRAVENOUS EVERY 6 HOURS
Status: DISCONTINUED | OUTPATIENT
Start: 2021-05-10 | End: 2021-05-11

## 2021-05-10 RX ORDER — POTASSIUM CHLORIDE 750 MG/1
40 TABLET, EXTENDED RELEASE ORAL ONCE
Status: COMPLETED | OUTPATIENT
Start: 2021-05-10 | End: 2021-05-10

## 2021-05-10 RX ADMIN — AMPICILLIN SODIUM AND SULBACTAM SODIUM 3 G: 2; 1 INJECTION, POWDER, FOR SOLUTION INTRAMUSCULAR; INTRAVENOUS at 14:40

## 2021-05-10 RX ADMIN — POTASSIUM CHLORIDE 40 MEQ: 750 TABLET, EXTENDED RELEASE ORAL at 09:28

## 2021-05-10 RX ADMIN — ACETAMINOPHEN 650 MG: 325 TABLET, FILM COATED ORAL at 23:43

## 2021-05-10 RX ADMIN — EXEMESTANE 25 MG: 25 TABLET ORAL at 09:26

## 2021-05-10 RX ADMIN — CEFTRIAXONE 1 G: 1 INJECTION, POWDER, FOR SOLUTION INTRAMUSCULAR; INTRAVENOUS at 12:06

## 2021-05-10 RX ADMIN — AMPICILLIN SODIUM AND SULBACTAM SODIUM 3 G: 2; 1 INJECTION, POWDER, FOR SOLUTION INTRAMUSCULAR; INTRAVENOUS at 20:21

## 2021-05-10 ASSESSMENT — ACTIVITIES OF DAILY LIVING (ADL)
ADLS_ACUITY_SCORE: 20

## 2021-05-10 NOTE — PROGRESS NOTES
"Asked to discuss code status by nursing, who in discussion w/pt thought pt desired full code status though was DNR/DNI on admit. At bedside pt fully oriented x 4, \"here for stent replacement\" when asked why she was in the hospital. Pt desires to be full code at this time after review of CPR and intubation. Will change order.   "

## 2021-05-10 NOTE — PROGRESS NOTES
[facility]    Medicine Progress Note - Hospitalist Service, Gold 3       Date of Admission:  5/9/2021  Assessment & Plan        is a 70 year old F with PMH history significant for HTN, CRI, Ca-cx (s/p 2007), metastatic carcinoid tumor of ractal origin with involvement of liver and L ureter with resultant L hydronephrosis managed with indwelling stent. Patient was admitted here on 5/9/2021 for Mx. of L ureteral hydronephrosis and L renal hematoma.     Patient is on day 2 s/p L ureteral stent replacement.       Lt hydronephrosis due to stent malfunction   Acute L renal hematoma   Sepsis due to complicated UTI  -Patient presented with several days of hematuria felt to be vaginal origin, malaise, weakness  and decreased appetite.   -CT showed extensive acute hemorrhage involving an atrophic left kidney. Marked left hydronephrosis despite a left ureteral stent in good position suggesting possible stent malfunction. 2 mm calyceal tip nonobstructing stone lower pole right kidney  -Urology consulted, performed cystourethroscopy with L RPG and ureteral stent replacement    -Continue with catheter for 24 hours for maximal drainage. Ok to remove once WBC and temp normal for 24 hours  -Continue Ceftriaxone   -Follow-up at [facility] for next stent exchange in      Acute Resp. failure with hypoxia  -Since admission, O2 requitement has bumped up from 1 to 2 L    -worsening Leukocytosis, Fever   -CXR reassuring against infiltrate, possible atelectasis       Acute blood loss anemia  -Hb 10.7 on presentation, has dropped down to 9.9, normocytic, most likely acute blood loss due to post stent hematuria  -Close monitoring Hgbs    CRIS on Stage 3a chronic kidney disease  -Monitor GFR, Cr.   -Avoid nephrotoxic agent  -Strict I/Os       RESOLVED/STABLE PROBLEMS:     Essential hypertension   -Monitor BP  -Restrict salt  -Holding hydrochlorothiazide (HYDRODIURIL) until further  clarification     Hypertriglyceridemia  -Lipid panel reflex to direct LDL Fasting     Bilateral breast Ca (s/p mastectomies, radiation 2016)  Ca-cx (2007 s/p OSMAN-BSO)  Carcinoid tumor of cecum with retro peritoneal & Liver metastasis causing L ureteral obstruction  -Continue Exemestane 25 mg      Peritoneal metastases (H)  Seeing specialist     Post polio syndrome:   -PT/OT once more stable       Diet: Regular Diet Adult    DVT Prophylaxis: Pneumatic Compression Devices  Cano Catheter: in place, indication: Other (Comment), /GI/GYN Pelvic Procedure  Code Status: Full Code           Disposition Plan   Expected discharge: 2 - 3 days, recommended to prior living arrangement once hemoglobin stable.  Entered: Marlene Wiseman 05/10/2021, 9:36 AM       The patient's care was discussed with the Attending Physician,  ***.    Marlene Wiseman  Hospitalist Service, [address]  Contact information available via [facility] Paging/Directory  Please see sign in/sign out for up to date coverage information  ______________________________________________________________________    Interval History      Patient is day 2 s/p stent replacement. Still has mild L flank pain. Denies fever, short of breath, chest pain.       4 point ROS is negative otherwise    Constitutional: Alert, oriened, ill appearing, NAD  HEENT: NC/AT, EOMI, MMS   Respiratory: No increased work of breathing, CTAB, no crackles or wheezing  Cardiovascular: Normal apical impulse, RRR, normal S1 and S2, and no murmur noted  GI: Normal bowel sounds, soft, non-distended, tender L flank, no hepatosplenomegaly  : Cano catheter in place  Skin: normal skin color, texture, turgor  Neurologic: No obvious neurologic deficit    Data reviewed today: I reviewed all medications, new labs and imaging results over the last 24 hours. I personally reviewed .    Physical Exam   Vital Signs: Temp: 99.3  F (37.4  C) Temp src: Oral  BP: 124/61 Pulse: 93   Resp: 25 SpO2: 91 % O2 Device: Nasal cannula Oxygen Delivery: 2 LPM, Weight: 223 lbs 14.4 oz      Data   Recent Labs   Lab 05/10/21  0658 05/09/21  1352 05/09/21  0558 05/09/21  0237 05/08/21  2125 05/08/21  1722 05/08/21  1719   WBC 16.2*  --  13.2* 13.6* 13.0*  --  18.0*   HGB 9.9* 10.6* 10.1* 10.2* 10.7*  --  12.7   MCV 91  --  90 90 89  --  90     --  275 240 256  --  362   INR  --   --  1.34*  --   --   --  1.32*     --  137  --  137  --  133   POTASSIUM 3.1*  --  3.8  --  3.7  --  3.2*   CHLORIDE 100  --  102  --  101  --  93*   CO2 30  --  30  --  30  --  31   BUN 22  --  32*  --  36*  --  39*   CR 1.12*  --  1.45*  --  1.81*  --  2.10*   ANIONGAP 6  --  5  --  6  --  9   MARILIA 9.2  --  8.9  --  8.2*  --  9.9   GLC 96  --  90  --  112*  --  166*   ALBUMIN  --   --  1.9*  --   --   --  2.5*   PROTTOTAL  --   --  6.2*  --   --   --  7.8   BILITOTAL  --   --  1.2  --   --   --  1.5*   ALKPHOS  --   --  58  --   --   --  75   ALT  --   --  18  --   --   --  22   AST  --   --  31  --   --   --  27   LIPASE  --   --   --   --   --  139  --    TROPI  --   --   --   --   --   --  0.069*

## 2021-05-10 NOTE — PLAN OF CARE
/62 (BP Location: Right arm)   Pulse 91   Temp 99.1  F (37.3  C) (Oral)   Resp 24   Wt 101.6 kg (223 lb 14.4 oz)   SpO2 93%   BMI 35.07 kg/m      [id number]  Status: Left hydronephrosis, Subcapsular hematoma s/p POD #1 from stent placement  + hematoma evacuation   Neuros: A&O x4, calls appropriately, lethargic. CMS intact   Cardiac: WDL   Respiratory: WDL on 2 L NC   GI/: Cano in place w/ adequate RED UOP for entirety of shift. +Bs, +Flatus. Last BM 5/7   Diet/Nausea: Regular diet, tolerating drinks of clears w/ some pudding + broth   Skin: No overt skin deficits   Lines: Bilateral PIV, L PIV infusing TKO w/ intermittent abx   Labs: Reviewed   Pain: Denies pain   Activity: Up in room w/ assist 1. LLE w/ noticeable weakness. Pt states 'unable to bear weight' d/t hx of Polio    Plan: Continue plan of care

## 2021-05-10 NOTE — PROGRESS NOTES
05/10/21 1400   Quick Adds   Type of Visit Initial PT Evaluation   Living Environment   People in home spouse   Current Living Arrangements house   Home Accessibility stairs to enter home;stairs within home   Number of Stairs, Main Entrance 5   Number of Stairs, Within Home, Primary 10  (stair lift down to apt)   Stair Railings, Within Home, Primary railings safe and in good condition   Transportation Anticipated family or friend will provide;other (see comments)  ( drives)   Living Environment Comments pt lives in house with . Does house chores and gardening.  Mod I with FWW for short distance. Longer distances uses motorized scooter    Self-Care   Usual Activity Tolerance moderate   Current Activity Tolerance moderate   Regular Exercise No   Equipment Currently Used at Home walker, rolling;walker, standard   Disability/Function   Hearing Difficulty or Deaf no   Wear Glasses or Blind yes   Concentrating, Remembering or Making Decisions Difficulty no   Difficulty Communicating no   Walking or Climbing Stairs Difficulty yes   Walking or Climbing Stairs ambulation difficulty, requires equipment   Dressing/Bathing Difficulty no   Toileting issues no   Doing Errands Independently Difficulty (such as shopping) yes   Fall history within last six months no   Change in Functional Status Since Onset of Current Illness/Injury yes   General Information   Onset of Illness/Injury or Date of Surgery 05/09/21   Referring Physician Cholo Toth MD   Patient/Family Therapy Goals Statement (PT) return home   Pertinent History of Current Problem (include personal factors and/or comorbidities that impact the POC) 70 year old woman with pmh significant for hypertension, hyperlipidemia, CRI, bilateral breast cancer (s/p mastectomies, radiation 2016, currently on Exemestane), cervical cancer (2007 s/p OSMAN BSO), 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    Existing Precautions/Restrictions fall   Heart Disease Risk Factors Age;Medical history   Cognition   Orientation Status (Cognition) oriented x 4   Affect/Mental Status (Cognition) WFL   Follows Commands (Cognition) WFL   Posture    Posture Kyphosis   Range of Motion (ROM)   ROM Comment WFL    Strength   Strength Comments WFL   Bed Mobility   Comment (Bed Mobility) Supine <>sit with mod I   Transfers   Transfer Safety Comments STS wtih CGAx1   Gait/Stairs (Locomotion)   Comment (Gait/Stairs) Ambulates 100ft x2 with FWW and CGAx1   Balance   Balance Comments requiring FWW   Clinical Impression   Criteria for Skilled Therapeutic Intervention yes, treatment indicated   PT Diagnosis (PT) impaired functional mobility   Influenced by the following impairments fatigue, deconditioning   Clinical Presentation Evolving/Changing   Clinical Presentation Rationale clinical judgement   Clinical Decision Making (Complexity) moderate complexity   Therapy Frequency (PT) 3x/week   Predicted Duration of Therapy Intervention (days/wks) 1 wk   Planned Therapy Interventions (PT) balance training;gait training;neuromuscular re-education;strengthening;stair training;transfer training   Risk & Benefits of therapy have been explained evaluation/treatment results reviewed;care plan/treatment goals reviewed;risks/benefits reviewed;current/potential barriers reviewed;participants voiced agreement with care plan;participants included;patient   PT Discharge Planning    PT Discharge Recommendation (DC Rec) home with home care physical therapy   PT Rationale for DC Rec Pt is near baseline mobility.  Likely will be able to d/c home with continued amb in halls with therapy and RN staff. HHPT in order to increase IND with functional mobilty and tolerance   PT Brief overview of current status  RN to amb  with pt in halls with FWW and GB x3-4/day   Total Evaluation Time   Total Evaluation Time (Minutes) 10

## 2021-05-10 NOTE — PROGRESS NOTES
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      Left Hydronephrosis due to Ureteral Stent Malfunction    Acute left kidney subcapsular hematoma  Sepsis due to Complicated UTI  Presented with painless hematuria. CT showed large subcapsular hematoma as well as left hydronephrosis despite good stent position. UA with >182 WBC, >182 RBC, large LE. Given vanco, yakov, and zosyn in ER. Narrowed to zosyn on admission here. Urology consulted and took her to the OR early am 5/9 for cystourethroscopy with left retrograde pyelography, exchange of left ureteral stent. She tolerated the procedure well. Post-op with fever and leukocytosis--2/4 SIRS criteria with presumed bacterial infection. Urine culture growing 2 strains of LF GNRs.  - change ceftriaxone to unasyn as below  - follow urine and blood culture results  Urology post-op recs:  - Continue with catheter for 24 hours for maximal drainage. Ok to remove once WBC and temp normal for 24 hours  - Follow-up at [facility] for next stent exchange in 3 months with Dr. La    Antimicrobials:  Vancomycin 5/8  Meropenem 5/8  Zosyn 5/8 - 5/9  Ceftriaxone 5/9 - 5/10  Unasyn 5/10 - present    Acute respiratory failure with hypoxia  Possible aspiration pneumonia  Has required O2 since time of procedure. Initially up to 6L but now at 2L with sats in low 90's. Possibly related to volume overload. With  worsening leukocytosis and fever, consider PNA. CXR 5/10: eft lower lobe retrocardiac opacity could represent infection versus atelectasis.   - will change antibiotics to unasyn to add anaerobic coverage as well to cover possible aspiration PNA      Acute blood loss anemia  Hgb 12.7 on presentation. Down to 10.1 with hydration. Baseline around 15. Normocytic. Likely acute blood loss due to hematuria and large subcapsular hematoma. Seems have leveled off around 10.  - monitor hemoglobin closely.   -  IR aware of patient and recommended multiphasic CT (noncontrast, arterial phase, portal venous phase) to evaluate for source of bleeding and whether or not it would be amenable to embolization if hemodynamic instability or continued rapid drop in Hgb.      Acute toxic/metabolic encephalopathy, resolved  Reportedly waxing and waning encephalopathy after transfer. Seems resolved for now.   - CTM     CRIS on Stage 3a chronic kidney disease  Cr 2.1 on presentation to OSH. Baseline Scr appears to be more in the 1.2-1.4 range. Trended down with hydration and now below baseline.   - Avoid nephrotoxic agents  - Strict I/Os   - Monitor Cr       Chronic/Stable Conditions  Bilateral breast cancer (s/p mastectomies, radiation 2016)  Cervical cancer (2007 s/p OSMAN BSO)  Carcinoid tumor of cecum with retroperitoneal & liver metastases causing L ureteral obstruction   Extensive oncologic history. Currently with metastasis causing compressive effect on L ureter (necessitating need for ureteral stent). Currently on Exemestane for breast cancer.   - Continue PTA Exemestane   - Consider palliative care consult during admission or on discharge pending course      Post polio syndrome  - PT/OT once more stable      Trigeminal Neuralgia   - Pt reports no longer taking Neurotin, will not order        Diet: Regular Diet Adult resume regular diet  Lines: PIV  DVT Prophylaxis: Ambulate every shift and SCDs no pharmacologic AC due to bleeding   Cano  Catheter: in place, indication: Other (Comment), /GI/GYN Pelvic Procedure  Code Status: Full Code           Disposition Plan   Expected discharge: 2 - 3 days, recommended to prior living arrangement once bleeding resolved, antibiotic plan in place.  Entered: Ken Branham MD 05/10/2021, 8:28 AM       Ken Branham MD  [address]  Please see sign in/sign out for up to date coverage information  ______________________________________________________________________    Interval History   Low grade fever to 100.9. continues to need O2. Otherwise doing well.  Continues to have hematuria post procedure but improving. Tolerating regular diet.     4 pt ROS otherwise negative    Data reviewed today: I reviewed all medications, new labs and imaging results over the last 24 hours.     Physical Exam   Vital Signs: Temp: 99.3  F (37.4  C) Temp src: Oral BP: 124/61 Pulse: 93   Resp: 25 SpO2: 91 % O2 Device: Nasal cannula Oxygen Delivery: 2 LPM  Weight: 223 lbs 14.4 oz  Gen: Awake, alert, NAD  ENT: MMM  CV: RRR, no MRG, no LE edema  Resp: CTAB, non-labored  GI: Soft, NT, ND, NABS  : norris in place with dark urine

## 2021-05-10 NOTE — PLAN OF CARE
NEURO: A&O x4, calm/pleasant. Lethargic at times. Calls appropriately   RESPIRATORY: LS clear and equal bilaterally. On 1L NC, wean as tolerated. Dyspnea on exertion  CARDIAC: WDL, denies cardiac chest pain   GI/: Cano in place with adequate amount of red output. +BS, LBM 5/10-incontinent, loose stool.   DIET: Regular diet, fair appetite   PAIN/NAUSEA: Denies pain and nausea this shift  SKIN/INCISION/DRAINS: No skin issues noted   IV ACCESS: R PIV infusing TKO + intermittent antibiotics   ACTIVITY: Up with assist of one with RW   LAB: WBC 16.2, hgb 9.9, creatinine 1.12, K+ 3.1-replaced this shift, recheck pending.   PLAN: Continue with POC

## 2021-05-10 NOTE — PROGRESS NOTES
Urology  Progress Note    ENRIQUE SHEAN  S/p tandem L ureteral stent placement   No complaints this morning    Exam  /62 (BP Location: Right arm)   Pulse 91   Temp 99.1  F (37.3  C) (Oral)   Resp 24   Wt 101.6 kg (223 lb 14.4 oz)   SpO2 93%   BMI 35.07 kg/m    No acute distress  Unlabored breathing  Abdomen soft, nontender, nondistended.   Norris with thin maroon urine in tubing    UOP 1855/670    Labs  WBC 13.2  Hgb 10.6  Cr 1.45    AM labs pending    Assessment/Plan  70 year old female with a PMH cervical cancer and metastatic carcinoid tumor of rectal origin with involvement of her liver and left ureter and resultant left hydro managed w/ indwelling stent, managed with chronic stent exchanges annually - last was in Feb 2020. She presented to [facility] ED with c/o malaise, generalized weakness, and hematuria. Workup noted leukocytosis and anemia. She was started on empiric antibiotics for presumed UTI and urology was contacted for further management. Of note, she has h/o large left subcapsular hematoma which was not present on previous imaging (CT or MRI from 2019). She is now s/p tandem L ureteral stent exchange. Febrile overnight, thin maroon urine with good UOP.    - continue norris until WBC and temp normal for 24 hours and hematuria improves.   - Abx per primary team, follow up cultures  - patient will be due for ureteral stent exchange in 3-4 months. Will need follow up with Dr. La.   - urology will sign off. Please reconsult if further issues arise    Seen and examined with the chief resident. Will discuss with Dr. Roman Parish MD, PGY-2  Urology Resident     Contacting the Urology Team     Please use the following job codes to reach the Urology Team. Note that you must use an in house phone and that job codes cannot receive text pages.     On weekdays, dial [phone] (or star-star-star [phone] on the new [employer] telephones) then [phone] to reach the Adult Urology resident or PA on  call    On weekdays, dial [phone] (or star-star-star [phone] on the new [employer] telephones) then [phone] to reach the Pediatric Urology resident    On weeknights and weekends, dial [phone] (or star-star-star [phone] on the new [place] telephones) then [phone] to reach the Urology resident on call (for both Adult and Pediatrics)          I saw and evaluated the patient and agree with the resident note and plan of care. If WBC does not improve and no source of infection, consider CTAP with and without contrast to evaluate for renal source now that Cr improved.    Fred Owens MD  Reconstructive Urology Fellow  Date of encounter: 5/10/2021

## 2021-05-11 ENCOUNTER — APPOINTMENT (OUTPATIENT)
Dept: OCCUPATIONAL THERAPY | Facility: CLINIC | Age: 71
DRG: 659 | End: 2021-05-11
Attending: STUDENT IN AN ORGANIZED HEALTH CARE EDUCATION/TRAINING PROGRAM
Payer: MEDICARE

## 2021-05-11 ENCOUNTER — APPOINTMENT (OUTPATIENT)
Dept: PHYSICAL THERAPY | Facility: CLINIC | Age: 71
DRG: 659 | End: 2021-05-11
Attending: STUDENT IN AN ORGANIZED HEALTH CARE EDUCATION/TRAINING PROGRAM
Payer: MEDICARE

## 2021-05-11 ENCOUNTER — HOME INFUSION (PRE-WILLOW HOME INFUSION) (OUTPATIENT)
Dept: PHARMACY | Facility: CLINIC | Age: 71
End: 2021-05-11

## 2021-05-11 LAB
ANION GAP SERPL CALCULATED.3IONS-SCNC: 6 MMOL/L (ref 3–14)
BACTERIA SPEC CULT: ABNORMAL
BACTERIA SPEC CULT: ABNORMAL
BUN SERPL-MCNC: 22 MG/DL (ref 7–30)
CALCIUM SERPL-MCNC: 9.1 MG/DL (ref 8.5–10.1)
CHLORIDE SERPL-SCNC: 101 MMOL/L (ref 94–109)
CO2 SERPL-SCNC: 27 MMOL/L (ref 20–32)
CREAT SERPL-MCNC: 1.21 MG/DL (ref 0.52–1.04)
CRP SERPL-MCNC: 170 MG/L (ref 0–8)
ERYTHROCYTE [DISTWIDTH] IN BLOOD BY AUTOMATED COUNT: 12.9 % (ref 10–15)
GFR SERPL CREATININE-BSD FRML MDRD: 44 ML/MIN/{1.73_M2}
GLUCOSE BLDC GLUCOMTR-MCNC: 109 MG/DL (ref 70–99)
GLUCOSE BLDC GLUCOMTR-MCNC: 121 MG/DL (ref 70–99)
GLUCOSE BLDC GLUCOMTR-MCNC: 144 MG/DL (ref 70–99)
GLUCOSE BLDC GLUCOMTR-MCNC: 92 MG/DL (ref 70–99)
GLUCOSE SERPL-MCNC: 98 MG/DL (ref 70–99)
HCT VFR BLD AUTO: 31.9 % (ref 35–47)
HGB BLD-MCNC: 10.3 G/DL (ref 11.7–15.7)
MCH RBC QN AUTO: 30.3 PG (ref 26.5–33)
MCHC RBC AUTO-ENTMCNC: 32.3 G/DL (ref 31.5–36.5)
MCV RBC AUTO: 94 FL (ref 78–100)
PLATELET # BLD AUTO: 336 10E9/L (ref 150–450)
POTASSIUM SERPL-SCNC: 3.6 MMOL/L (ref 3.4–5.3)
RBC # BLD AUTO: 3.4 10E12/L (ref 3.8–5.2)
SODIUM SERPL-SCNC: 134 MMOL/L (ref 133–144)
SPECIMEN SOURCE: ABNORMAL
WBC # BLD AUTO: 18.3 10E9/L (ref 4–11)

## 2021-05-11 PROCEDURE — 250N000011 HC RX IP 250 OP 636: Performed by: STUDENT IN AN ORGANIZED HEALTH CARE EDUCATION/TRAINING PROGRAM

## 2021-05-11 PROCEDURE — 36415 COLL VENOUS BLD VENIPUNCTURE: CPT | Performed by: STUDENT IN AN ORGANIZED HEALTH CARE EDUCATION/TRAINING PROGRAM

## 2021-05-11 PROCEDURE — 99232 SBSQ HOSP IP/OBS MODERATE 35: CPT | Mod: GC | Performed by: PEDIATRICS

## 2021-05-11 PROCEDURE — 97535 SELF CARE MNGMENT TRAINING: CPT | Mod: GO | Performed by: OCCUPATIONAL THERAPIST

## 2021-05-11 PROCEDURE — 80048 BASIC METABOLIC PNL TOTAL CA: CPT | Performed by: STUDENT IN AN ORGANIZED HEALTH CARE EDUCATION/TRAINING PROGRAM

## 2021-05-11 PROCEDURE — 97165 OT EVAL LOW COMPLEX 30 MIN: CPT | Mod: GO | Performed by: OCCUPATIONAL THERAPIST

## 2021-05-11 PROCEDURE — 85027 COMPLETE CBC AUTOMATED: CPT | Performed by: STUDENT IN AN ORGANIZED HEALTH CARE EDUCATION/TRAINING PROGRAM

## 2021-05-11 PROCEDURE — 86140 C-REACTIVE PROTEIN: CPT | Performed by: STUDENT IN AN ORGANIZED HEALTH CARE EDUCATION/TRAINING PROGRAM

## 2021-05-11 PROCEDURE — 97116 GAIT TRAINING THERAPY: CPT | Mod: GP

## 2021-05-11 PROCEDURE — 250N000013 HC RX MED GY IP 250 OP 250 PS 637: Performed by: STUDENT IN AN ORGANIZED HEALTH CARE EDUCATION/TRAINING PROGRAM

## 2021-05-11 PROCEDURE — 120N000002 HC R&B MED SURG/OB UMMC

## 2021-05-11 PROCEDURE — 999N001017 HC STATISTIC GLUCOSE BY METER IP

## 2021-05-11 PROCEDURE — 250N000011 HC RX IP 250 OP 636: Performed by: INTERNAL MEDICINE

## 2021-05-11 PROCEDURE — 97530 THERAPEUTIC ACTIVITIES: CPT | Mod: GP

## 2021-05-11 RX ORDER — CEFTRIAXONE 1 G/1
1 INJECTION, POWDER, FOR SOLUTION INTRAMUSCULAR; INTRAVENOUS EVERY 24 HOURS
Status: COMPLETED | OUTPATIENT
Start: 2021-05-11 | End: 2021-05-14

## 2021-05-11 RX ADMIN — EXEMESTANE 25 MG: 25 TABLET ORAL at 08:42

## 2021-05-11 RX ADMIN — AMPICILLIN SODIUM AND SULBACTAM SODIUM 3 G: 2; 1 INJECTION, POWDER, FOR SOLUTION INTRAMUSCULAR; INTRAVENOUS at 02:14

## 2021-05-11 RX ADMIN — CEFTRIAXONE 1 G: 1 INJECTION, POWDER, FOR SOLUTION INTRAMUSCULAR; INTRAVENOUS at 13:23

## 2021-05-11 ASSESSMENT — ACTIVITIES OF DAILY LIVING (ADL)
ADLS_ACUITY_SCORE: 20
IADL_COMMENTS: OT: PT WAS IND
ADLS_ACUITY_SCORE: 20
DEPENDENT_IADLS:: INDEPENDENT
ADLS_ACUITY_SCORE: 20
ADLS_ACUITY_SCORE: 19
ADLS_ACUITY_SCORE: 20
ADLS_ACUITY_SCORE: 20

## 2021-05-11 NOTE — PLAN OF CARE
Vitals:    05/11/21 0352 05/11/21 0830 05/11/21 1149 05/11/21 1550   BP: 132/62 129/61 138/57 135/51   BP Location: Left arm Left arm Left arm Right arm   Pulse: 77 88 85 94   Resp: 16 18 16 18   Temp: 97  F (36.1  C) 98.3  F (36.8  C) 98.8  F (37.1  C) 100.1  F (37.8  C)   TempSrc: Oral Oral Oral Oral   SpO2: 96% 97% 92% 96%   Weight:         Pt resting in bed this evening, sleeping for most part, denies pain. 275 red urine output. No BM this evening. MD paged per prior RN request for pt having loose stools, but no r/o of c.diff at this time. Meal tray ordered for patient. Bed alarm activated for safety and pt enc to call. Continue with the POC.

## 2021-05-11 NOTE — PLAN OF CARE
/64 (BP Location: Left arm)   Pulse 97   Temp 99.4  F (37.4  C) (Oral)   Resp 20   Wt 102.2 kg (225 lb 6.4 oz)   SpO2 97%   BMI 35.30 kg/m      Neuro: Oriented x4; forgetful. Lethargic at times.  Cardiac: WNL  Respiratory: sats mid 90s on 1L NC.  GI/: +BS, +passing flatus, no BM this shift. LBM this morning.  Cano in place with dark red urine.   Pain: Denies pain.    Diet/Nausea: Regular diet, poor appetite. Denies nausea.   IV Access:  (L) PIV- infusing TKO with intermittent abx.  Labs:  and 131. Hgb: 9.9  Activity: Did not get out of bed this shift. Pt reports LLE weakness and reports unable to bear weight.   Plan: Continue to monitor and follow plan of care.

## 2021-05-11 NOTE — PROGRESS NOTES
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      Today:  - Switched Abx to CTX from ampicillin/sulbactam  - Continues to have fevers overnight    Left Hydronephrosis due to Ureteral Stent Malfunction    Acute left kidney subcapsular hematoma  Sepsis due to Complicated UTI  Presented with painless hematuria. CT showed large subcapsular hematoma as well as left hydronephrosis despite good stent position. UA with >182 WBC, >182 RBC, large LE. Given vanco, yakov, and zosyn in ER. Narrowed to zosyn on admission here. Urology consulted and took her to the OR early am 5/9 for cystourethroscopy with left retrograde pyelography, exchange of left ureteral stent. She tolerated the procedure well. Post-op with fever and leukocytosis--2/4 SIRS criteria with presumed bacterial infection. Urine culture growing 2 strains of LF GNRs, speciation and sensitivities returned with Klebsiella. With elevated WBC, low risk for aspiration pneumonia and need for anaerobic coverage, abx changed back to ceftriaxone.  - change ceftriaxone to unasyn as below  - follow urine and blood culture results  Urology post-op recs:  - Continue with catheter for 24 hours for maximal drainage. Ok to remove once WBC and temp normal for 24 hours  - Follow-up at [facility] for next stent exchange in 3 months with Dr. La    Antimicrobials:  Vancomycin 5/8  Meropenem 5/8  Zosyn 5/8 -  5/9  Ceftriaxone 5/9 - 5/10, 5/11-?  Unasyn 5/10 - 5/11    Acute respiratory failure with hypoxia  Possible aspiration pneumonia  Has required O2 since time of procedure. Initially up to 6L but now at 2L with sats in low 90's. Possibly related to volume overload. With worsening leukocytosis and fever, consider PNA. CXR 5/10: eft lower lobe retrocardiac opacity could represent infection versus atelectasis.   - will change antibiotics to unasyn to add anaerobic coverage as well to cover possible aspiration PNA      Acute blood loss anemia  Hgb 12.7 on presentation. Down to 10.1 with hydration. Baseline around 15. Normocytic. Likely acute blood loss due to hematuria and large subcapsular hematoma. Seems have leveled off around 10.  - monitor hemoglobin closely.   -  IR aware of patient and recommended multiphasic CT (noncontrast, arterial phase, portal venous phase) to evaluate for source of bleeding and whether or not it would be amenable to embolization if hemodynamic instability or continued rapid drop in Hgb.      Acute toxic/metabolic encephalopathy, resolved  Reportedly waxing and waning encephalopathy after transfer. Seems resolved for now.   - CTM     CRIS on Stage 3a chronic kidney disease  Cr 2.1 on presentation to OSH. Baseline Scr appears to be more in the 1.2-1.4 range. Trended down with hydration and now below baseline.   - Avoid nephrotoxic agents  - Strict I/Os   - Monitor Cr       Chronic/Stable Conditions  Bilateral breast cancer (s/p mastectomies, radiation 2016)  Cervical cancer (2007 s/p OSMAN BSO)  Carcinoid tumor of cecum with retroperitoneal & liver metastases causing L ureteral obstruction   Extensive oncologic history. Currently with metastasis causing compressive effect on L ureter (necessitating need for ureteral stent). Currently on Exemestane for breast cancer.   - Continue PTA Exemestane   - Consider palliative care consult during admission or on discharge pending course      Post polio  syndrome  - PT/OT once more stable      Trigeminal Neuralgia   - Pt reports no longer taking Neurotin, will not order        Diet: Regular Diet Adult resume regular diet  Lines: PIV  DVT Prophylaxis: Ambulate every shift and SCDs no pharmacologic AC due to bleeding   Norris Catheter: in place, indication: Other (Comment), /GI/GYN Pelvic Procedure  Code Status: Full Code           Disposition Plan   Expected discharge: 2 - 3 days, recommended to prior living arrangement once bleeding resolved, antibiotic plan in place.  Entered: Herve Durán MD 05/11/2021, 7:49 AM      Patient staffed with Dr. Bonilla.    Herve Durán MD  [address]  Please see sign in/sign out for up to date coverage information  ______________________________________________________________________    Interval History   Care team notes reviewed. Continues to have fevers overnight, up to 100.6. Received acetaminophen. Feeling well this morning. Continues to have bloody output from norris. No chills, sweats. Some left sided flank pain.    4 pt ROS otherwise negative    Data reviewed today: I reviewed all medications, new labs and imaging results over the last 24 hours.     Physical Exam   Vital Signs: Temp: 97  F (36.1  C) Temp src: Oral BP: 132/62 Pulse: 77   Resp: 16 SpO2: 96 % O2 Device: Nasal cannula Oxygen Delivery: 1 LPM  Weight: 225 lbs 6.4 oz  Gen: Awake, alert, no acute distress  ENT: MMM  CV: RRR, no MRG, no LE edema  Resp: CTAB, non-labored  GI: Soft, NT, ND, NABS  : norris in place with dark urine

## 2021-05-11 NOTE — CONSULTS
Care Management Initial Consult    General Information  Assessment completed with: Patient,    Type of CM/SW Visit: Initial Assessment    Primary Care Provider verified and updated as needed: Yes   Readmission within the last 30 days: no previous admission in last 30 days      Reason for Consult: discharge planning  Advance Care Planning: Advance Care Planning Reviewed: no concerns identified          Communication Assessment  Patient's communication style: spoken language (English or Bilingual)    Hearing Difficulty or Deaf: no   Wear Glasses or Blind: yes    Cognitive  Cognitive/Neuro/Behavioral: .WDL except  Level of Consciousness: alert, lethargic  Arousal Level: opens eyes spontaneously  Orientation: oriented x 4  Mood/Behavior: calm, cooperative  Best Language: 0 - No aphasia  Speech: clear, spontaneous    Living Environment:   People in home: spouse  Gaudencio  Current living Arrangements: house      Able to return to prior arrangements: yes       Family/Social Support:  Care provided by: self, spouse/significant other  Provides care for: no one  Marital Status:             Description of Support System: Supportive, Involved    Support Assessment: Adequate family and caregiver support, Adequate social supports    Current Resources:   Patient receiving home care services: No     Community Resources: None  Equipment currently used at home: lift device, shower chair, walker, rolling, other (see comments)(electric scooter)  Supplies currently used at home: None    Employment/Financial:  Employment Status: retired        Financial Concerns: No concerns identified           Lifestyle & Psychosocial Needs:        Socioeconomic History     Marital status:      Spouse name: Not on file     Number of children: Not on file     Years of education: Not on file     Highest education level: Not on file     Tobacco Use     Smoking status: Former Smoker     Packs/day: 1.00     Years: 29.00     Pack years:  29.00     Types: Cigarettes     Quit date: 10/30/2006     Years since quittin.5     Smokeless tobacco: Never Used   Substance and Sexual Activity     Alcohol use: No     Alcohol/week: 0.0 standard drinks     Comment: 1 drink per year     Drug use: No     Sexual activity: Yes     Partners: Male       Functional Status:  Prior to admission patient needed assistance:   Dependent ADLs:: Independent  Dependent IADLs:: Independent  Assesssment of Functional Status: Not at baseline with mobility    Mental Health Status:  Mental Health Status: No Current Concerns       Chemical Dependency Status:  Chemical Dependency Status: No Current Concerns             Values/Beliefs:  Spiritual, Cultural Beliefs, Jainism Practices, Values that affect care: no               Additional Information:  Patient is a 70 year old female with past medical history of hypertension, hyperlipidemia, CRI, bilateral breast cancer, cervical cancer, 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 for management of L ureteral hydronephrosis & L renal hematoma.  PT evaluated the patient and are recommending home PT upon discharge.  Patient currently has a norris in place, it is unknown if patient will need this upon discharge.  She is also currently on IV abx.  Met with patient and spouse, Gaudencio, at bedside to introduce RNCC role and discuss discharge planning.  Patient lives in [place], [place] with her spouse.  Gaudencio is able to provide assist as needed, but does work ats a  and is gone from Monday morning to Friday afternoon.  Patient has others in her support system that can assist including a son, sister, and neighbor.  Discussed home care with the patient which she is agreeable to.  After providing a choice of agency patient prefers to keep things within the [facility] system and would like a referral made to [facility].  Referral made and orders placed.   Patient is anticipated to be medically ready for discharge in 1-3 days.  Patients spouse will provide transportation to home at discharge.  RNCC will continue to follow and assist with discharge planning.      [facility](RN/PT)  Phone: [phone]  Fax: [phone]       SHELLEY Cardona  Phone: [phone]  Pager: [phone]  To contact the weekend RNCC, Page: [phone]

## 2021-05-11 NOTE — PROGRESS NOTES
05/11/21 0800   Quick Adds   Type of Visit Initial Occupational Therapy Evaluation   Living Environment   People in home spouse   Current Living Arrangements house   Home Accessibility stairs to enter home;stairs within home   Number of Stairs, Main Entrance 5   Number of Stairs, Within Home, Primary 10  (stair lift down to apt)   Stair Railings, Within Home, Primary railings safe and in good condition   Transportation Anticipated family or friend will provide;other (see comments)  ( drives)   Living Environment Comments pt lives in house with . Does house chores and gardening.  Mod I with FWW for short distance. Longer distances uses motorized scooter    Self-Care   Usual Activity Tolerance moderate   Current Activity Tolerance moderate   Equipment Currently Used at Home walker, rolling;walker, standard   Disability/Function   Hearing Difficulty or Deaf no   Wear Glasses or Blind yes   Concentrating, Remembering or Making Decisions Difficulty no   Difficulty Communicating no   Walking or Climbing Stairs Difficulty yes   Walking or Climbing Stairs ambulation difficulty, requires equipment   Dressing/Bathing Difficulty no   Toileting issues no   Doing Errands Independently Difficulty (such as shopping) yes   Fall history within last six months no   Change in Functional Status Since Onset of Current Illness/Injury yes   General Information   Onset of Illness/Injury or Date of Surgery 05/09/21   Referring Physician Cholo Toth MD   Additional Occupational Profile Info/Pertinent History of Current Problem 70 year old woman with pmh significant for hypertension, hyperlipidemia, CRI, bilateral breast cancer (s/p mastectomies, radiation 2016, currently on Exemestane), cervical cancer (2007 s/p OSMAN BSO), 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    Existing Precautions/Restrictions fall   Cognitive Status Examination   Cognitive Status Comments OT: No concerns at this time, OT will continue to assess prn   Visual Perception   Impact of Vision Impairment on Function (Vision) OT: Pt wears glasses at baseline, no acute changes reported   Range of Motion Comprehensive   Comment, General Range of Motion OT: UBE WFL   Strength Comprehensive (MMT)   Comment, General Manual Muscle Testing (MMT) Assessment OT: Overall deconditioned   Bed Mobility   Comment (Bed Mobility) OT: Pt refused   Instrumental Activities of Daily Living (IADL)   IADL Comments OT: Pt was ind   Clinical Impression   Criteria for Skilled Therapeutic Interventions Met (OT) yes   OT Diagnosis decreased ind in I/ADLS   OT Problem List-Impairments impacting ADL problems related to;activity tolerance impaired;balance;strength;mobility   ADL comments/analysis decreased ind in I/ADLS   Assessment of Occupational Performance 1-3 Performance Deficits   Planned Therapy Interventions (OT) ADL retraining;IADL retraining;balance training;bed mobility training;strengthening;transfer training;home program guidelines;progressive activity/exercise   Clinical Decision Making Complexity (OT) low complexity   Therapy Frequency (OT) Daily   Predicted Duration of Therapy 5/21/21   Risk & Benefits of therapy have been explained care plan/treatment goals reviewed;evaluation/treatment results reviewed;patient   OT Discharge Planning    OT Discharge Recommendation (DC Rec) Home with assist;home with home care occupational therapy   OT Rationale for DC Rec to increase ind in I/ADLS   Total Evaluation Time (Minutes)   Total Evaluation Time (Minutes) 4

## 2021-05-11 NOTE — PLAN OF CARE
NEURO: A&O x4, calm/pleasant. Lethargic at times, but easily arousable. Calls appropriately   RESPIRATORY: LS clear and diminished. On 1L NC, wean as tolerated. Dyspnea on exertion.  CARDIAC: WDL, denies cardiac chest pain   GI/: Cano in place with adequate amount of red output. +BS, LBM 5/11-incontinent, loose stool.   DIET: Regular diet, fair appetite   PAIN/NAUSEA: C/o L sided abdominal pain, declined PRN Tylenol. Denies nausea   SKIN/INCISION/DRAINS: No skin issues noted   IV ACCESS: R PIV infusing TKO + intermittent antibiotics   ACTIVITY: Up with assist of one with RW   LAB: WBC 18.3, hgb 10.3, creatinine 1.21, K+ 3.5-no replacement needed.   PLAN: Continue with POC

## 2021-05-11 NOTE — PLAN OF CARE
Vital signs:  Temp: 97  F (36.1  C) Temp src: Oral BP: 132/62 Pulse: 77   Resp: 16 SpO2: 96 % O2 Device: Nasal cannula Oxygen Delivery: 1 LPM     Time: 2330-0730  Neuros: A&Ox4, lethargic, calm.    Cardiac: WNL, denies chest pain.   Respiratory: LS clear, diminished.   Pain: Denies pain.   Nausea: Denies nausea.   Diet: Regular diet.   GI/: Cano in place with adequate red output. +BS, smear stool, ketty cares done.   Skin/incisions: No new skin deficits noted.   Lines: L PIV infusing TKO, IV Unasyn given.   Drains: Cano.   Labs:  and 92.   Activity: Turned and repositioned in bed with A1.   New Changes this Shift: Temp 100.6 F @2335 (Maroon 3 notified). PRN tylenol given, IV Unasyn given per order, recheck  temps. 98.8 F and 97 F.   Plan: Continue POC.

## 2021-05-11 NOTE — PROGRESS NOTES
Therapy:IV ABX  Insurance: Effective Measure [employer] (Medicare plan)    Patient Marissa Guadarrama does not have iv abx coverage with her Effective Measure [place] [employer] plan, she is self-pay. Cost for ceftriaxone 1gm Q24 is $31.86 per day for drug and supplies, nursing is only covered if she is homebound if not [facility] charges $90.00 per visit.     She should have coverage in a TCU or IC    In reference to admission date 05/09/2021 [facility] to check on IV ABX coverage    Please contact Intake with any questions, [phone] or In Basket pool,  Home Infusion ([phone]).

## 2021-05-12 ENCOUNTER — APPOINTMENT (OUTPATIENT)
Dept: PHYSICAL THERAPY | Facility: CLINIC | Age: 71
DRG: 659 | End: 2021-05-12
Attending: STUDENT IN AN ORGANIZED HEALTH CARE EDUCATION/TRAINING PROGRAM
Payer: MEDICARE

## 2021-05-12 LAB
ANION GAP SERPL CALCULATED.3IONS-SCNC: 3 MMOL/L (ref 3–14)
BUN SERPL-MCNC: 22 MG/DL (ref 7–30)
CALCIUM SERPL-MCNC: 9.6 MG/DL (ref 8.5–10.1)
CHLORIDE SERPL-SCNC: 98 MMOL/L (ref 94–109)
CO2 SERPL-SCNC: 34 MMOL/L (ref 20–32)
CREAT SERPL-MCNC: 1.18 MG/DL (ref 0.52–1.04)
ERYTHROCYTE [DISTWIDTH] IN BLOOD BY AUTOMATED COUNT: 13.1 % (ref 10–15)
GFR SERPL CREATININE-BSD FRML MDRD: 46 ML/MIN/{1.73_M2}
GLUCOSE BLDC GLUCOMTR-MCNC: 120 MG/DL (ref 70–99)
GLUCOSE BLDC GLUCOMTR-MCNC: 120 MG/DL (ref 70–99)
GLUCOSE BLDC GLUCOMTR-MCNC: 147 MG/DL (ref 70–99)
GLUCOSE BLDC GLUCOMTR-MCNC: 204 MG/DL (ref 70–99)
GLUCOSE SERPL-MCNC: 110 MG/DL (ref 70–99)
HCT VFR BLD AUTO: 32.4 % (ref 35–47)
HGB BLD-MCNC: 10.4 G/DL (ref 11.7–15.7)
MCH RBC QN AUTO: 29.8 PG (ref 26.5–33)
MCHC RBC AUTO-ENTMCNC: 32.1 G/DL (ref 31.5–36.5)
MCV RBC AUTO: 93 FL (ref 78–100)
PLATELET # BLD AUTO: 398 10E9/L (ref 150–450)
POTASSIUM SERPL-SCNC: 3.6 MMOL/L (ref 3.4–5.3)
RBC # BLD AUTO: 3.49 10E12/L (ref 3.8–5.2)
SODIUM SERPL-SCNC: 135 MMOL/L (ref 133–144)
WBC # BLD AUTO: 17.4 10E9/L (ref 4–11)

## 2021-05-12 PROCEDURE — 97530 THERAPEUTIC ACTIVITIES: CPT | Mod: GP

## 2021-05-12 PROCEDURE — 85027 COMPLETE CBC AUTOMATED: CPT | Performed by: PEDIATRICS

## 2021-05-12 PROCEDURE — 80048 BASIC METABOLIC PNL TOTAL CA: CPT | Performed by: PEDIATRICS

## 2021-05-12 PROCEDURE — 250N000011 HC RX IP 250 OP 636: Performed by: STUDENT IN AN ORGANIZED HEALTH CARE EDUCATION/TRAINING PROGRAM

## 2021-05-12 PROCEDURE — 99232 SBSQ HOSP IP/OBS MODERATE 35: CPT | Mod: GC | Performed by: PEDIATRICS

## 2021-05-12 PROCEDURE — 120N000002 HC R&B MED SURG/OB UMMC

## 2021-05-12 PROCEDURE — 250N000013 HC RX MED GY IP 250 OP 250 PS 637: Performed by: STUDENT IN AN ORGANIZED HEALTH CARE EDUCATION/TRAINING PROGRAM

## 2021-05-12 PROCEDURE — 36415 COLL VENOUS BLD VENIPUNCTURE: CPT | Performed by: PEDIATRICS

## 2021-05-12 PROCEDURE — 999N001017 HC STATISTIC GLUCOSE BY METER IP

## 2021-05-12 RX ADMIN — CEFTRIAXONE 1 G: 1 INJECTION, POWDER, FOR SOLUTION INTRAMUSCULAR; INTRAVENOUS at 12:13

## 2021-05-12 RX ADMIN — EXEMESTANE 25 MG: 25 TABLET ORAL at 08:43

## 2021-05-12 ASSESSMENT — ACTIVITIES OF DAILY LIVING (ADL)
ADLS_ACUITY_SCORE: 19
ADLS_ACUITY_SCORE: 20
ADLS_ACUITY_SCORE: 19
ADLS_ACUITY_SCORE: 19
ADLS_ACUITY_SCORE: 20
ADLS_ACUITY_SCORE: 20

## 2021-05-12 NOTE — PLAN OF CARE
Vital signs:  Temp: 99  F (37.2  C) Temp src: Oral BP: 129/62 Pulse: 85   Resp: 18 SpO2: 99 % O2 Device: Nasal cannula Oxygen Delivery: 1 LPM   Weight: 101.1 kg (222 lb 12.8 oz)    Activity: assist of 1 with rolling walker  Neuros: WDL, A&O x4. Intermittently lethargic, easily arousable.   Cardiac: WDL, VSS, denies chest pain.   Respiratory: On 1L NC, LS diminished. Denies SOB.  GI/: Cano with adequate red output. + BM, none this shift, intermittently incontinent.   Diet: Regular  Lines: R PIV SL  Labs:   Pain/nausea: Denies this shift.   Plan:   Continue POC

## 2021-05-12 NOTE — PLAN OF CARE
Pt is alert but lethargic. No complaints of pain. Pt remains on one liter of oxygen, attempted to wean pt but saturated at 89% on room air. Pts  was at bedside this morning, asked writer to contact physician to talk about wifes current state. Healthcare team was at the bedside with  around 1030. Continue with plan of care.

## 2021-05-12 NOTE — PROGRESS NOTES
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      Today:  - Continue CTX   - Stable WBC, no fevers overnight  - Continue norris    Left Hydronephrosis due to Ureteral Stent Malfunction    Acute left kidney subcapsular hematoma  Sepsis due to Complicated UTI  Presented with painless hematuria. CT showed large subcapsular hematoma as well as left hydronephrosis despite good stent position. UA with >182 WBC, >182 RBC, large LE. Given vanco, yakov, and zosyn in ER. Narrowed to zosyn on admission here. Urology consulted and took her to the OR early am 5/9 for cystourethroscopy with left retrograde pyelography, exchange of left ureteral stent. She tolerated the procedure well. Post-op with fever and leukocytosis--2/4 SIRS criteria with presumed bacterial infection. Urine culture growing 2 strains of LF GNRs, speciation and sensitivities returned with Klebsiella. With elevated WBC, low risk for aspiration pneumonia and need for anaerobic coverage, abx changed back to ceftriaxone.  - Change ceftriaxone to unasyn as below  - Follow urine and blood culture results  - Continue norris catheter until WBC and temperature normal for 24 hrs  - Follow-up at [facility] for next stent exchange in 3 months with Dr. La    Antimicrobials:  Vancomycin 5/8  Meropenem 5/8  Zosyn 5/8 - 5/9  Ceftriaxone 5/9 - 5/10, 5/11-?  Unasyn 5/10 - 5/11    Acute respiratory  failure with hypoxia  Possible aspiration pneumonia  Has required O2 since time of procedure. Initially up to 6L but now at 2L with sats in low 90's. Possibly related to volume overload. With worsening leukocytosis and fever, consider PNA. CXR 5/10: eft lower lobe retrocardiac opacity could represent infection versus atelectasis.  Unlikely need for anaerobic coverage, will continue treatment with ceftriaxone.  - Ceftriaxone Abx      Acute blood loss anemia  Hgb 12.7 on presentation. Down to 10.1 with hydration. Baseline around 15. Normocytic. Likely acute blood loss due to hematuria and large subcapsular hematoma. Seems have leveled off around 10.  - monitor hemoglobin closely.   -  IR aware of patient and recommended multiphasic CT (noncontrast, arterial phase, portal venous phase) to evaluate for source of bleeding and whether or not it would be amenable to embolization if hemodynamic instability or continued rapid drop in Hgb.      Acute toxic/metabolic encephalopathy, resolved  Reportedly waxing and waning encephalopathy after transfer. Seems resolved for now.   - CTM     CRIS on Stage 3a chronic kidney disease  Cr 2.1 on presentation to OSH. Baseline Scr appears to be more in the 1.2-1.4 range. Trended down with hydration and now below baseline.   - Avoid nephrotoxic agents  - Strict I/Os   - Monitor Cr       Chronic/Stable Conditions  Bilateral breast cancer (s/p mastectomies, radiation 2016)  Cervical cancer (2007 s/p OSMAN BSO)  Carcinoid tumor of cecum with retroperitoneal & liver metastases causing L ureteral obstruction   Extensive oncologic history. Currently with metastasis causing compressive effect on L ureter (necessitating need for ureteral stent). Currently on Exemestane for breast cancer.   - Continue PTA Exemestane   - Consider palliative care consult during admission or on discharge pending course      Post polio syndrome  - PT/OT once more stable      Trigeminal Neuralgia   - Pt reports no longer  taking Neurotin, will not order        Diet: Regular Diet Adult resume regular diet  Lines: PIV  DVT Prophylaxis: Ambulate every shift and SCDs no pharmacologic AC due to bleeding   Norris Catheter: in place, indication: Other (Comment), /GI/GYN Pelvic Procedure  Code Status: Full Code           Disposition Plan   Expected discharge: 2 - 3 days, recommended to prior living arrangement once bleeding resolved, antibiotic plan in place.  Entered: Herve Durán MD 05/12/2021, 7:39 AM      Patient staffed with Dr. Bonilla.    Herve Durán MD  [address]  Please see sign in/sign out for up to date coverage information  ______________________________________________________________________    Interval History   Care team notes reviewed. No acute changes overnight. No fevers overnight. Patient feels well, continues to be a bit tired. Urine output continues to be red tinged. Some left sided pain, consistent with day prior. No increase in dyspnea.    4 pt ROS otherwise negative    Data reviewed today: I reviewed all medications, new labs and imaging results over the last 24 hours.     Physical Exam   Vital Signs: Temp: 99.7  F (37.6  C) Temp src: Oral BP: 123/52 Pulse: 92   Resp: 18 SpO2: 96 % O2 Device: Nasal cannula Oxygen Delivery: 1 LPM  Weight: 222 lbs 12.8 oz  Gen: Awake, alert, no acute distress  ENT: MMM  CV: RRR, no MRG, no LE edema  Resp: CTAB, non-labored  GI: Soft, NT, ND, NABS  : norris in place with dark blood tinged urine

## 2021-05-12 NOTE — PLAN OF CARE
OT: cx-patient sleeping upon arrival, awoke to therapist's voice, but declined any OOB/EOB activity due to fatigue. Will reschedule per POC.

## 2021-05-12 NOTE — PLAN OF CARE
/61 (BP Location: Right arm)   Pulse 92   Temp 98.9  F (37.2  C) (Oral)   Resp 16   Wt 100.4 kg (221 lb 6.4 oz)   SpO2 93%   BMI 34.68 kg/m      Neuro: A&O x4, intermittently lethargic, easily arousable.   Respiratory: Sats low 90's on 1L NC. Denies SOB.   Cardiac: WDL, denies cardiac chest pain/symptoms.   GI/: norris w/ adequate red output. +BS, no BM this shift.   Diet: regular   Pain/Nausea: denies both.    IV Access: PIV TKO.   Labs: reviewed.   Activity: Ax1 w/ gait and belt walker.   Plan: Continue with POC.

## 2021-05-12 NOTE — PLAN OF CARE
Suzanna 3 team paged 5/12 @ 1473 : GRACIELA MILLS     Paged regarding the pts  at the bedside wanting some questions answered about his wife's current state of condition and the plan of care.

## 2021-05-13 ENCOUNTER — APPOINTMENT (OUTPATIENT)
Dept: PHYSICAL THERAPY | Facility: CLINIC | Age: 71
DRG: 659 | End: 2021-05-13
Attending: STUDENT IN AN ORGANIZED HEALTH CARE EDUCATION/TRAINING PROGRAM
Payer: MEDICARE

## 2021-05-13 ENCOUNTER — APPOINTMENT (OUTPATIENT)
Dept: OCCUPATIONAL THERAPY | Facility: CLINIC | Age: 71
DRG: 659 | End: 2021-05-13
Attending: STUDENT IN AN ORGANIZED HEALTH CARE EDUCATION/TRAINING PROGRAM
Payer: MEDICARE

## 2021-05-13 LAB
ANION GAP SERPL CALCULATED.3IONS-SCNC: 3 MMOL/L (ref 3–14)
BUN SERPL-MCNC: 20 MG/DL (ref 7–30)
CALCIUM SERPL-MCNC: 9.5 MG/DL (ref 8.5–10.1)
CHLORIDE SERPL-SCNC: 100 MMOL/L (ref 94–109)
CO2 SERPL-SCNC: 33 MMOL/L (ref 20–32)
CREAT SERPL-MCNC: 1.05 MG/DL (ref 0.52–1.04)
ERYTHROCYTE [DISTWIDTH] IN BLOOD BY AUTOMATED COUNT: 13 % (ref 10–15)
GFR SERPL CREATININE-BSD FRML MDRD: 53 ML/MIN/{1.73_M2}
GLUCOSE BLDC GLUCOMTR-MCNC: 100 MG/DL (ref 70–99)
GLUCOSE BLDC GLUCOMTR-MCNC: 103 MG/DL (ref 70–99)
GLUCOSE BLDC GLUCOMTR-MCNC: 106 MG/DL (ref 70–99)
GLUCOSE BLDC GLUCOMTR-MCNC: 113 MG/DL (ref 70–99)
GLUCOSE BLDC GLUCOMTR-MCNC: 116 MG/DL (ref 70–99)
GLUCOSE BLDC GLUCOMTR-MCNC: 140 MG/DL (ref 70–99)
GLUCOSE SERPL-MCNC: 102 MG/DL (ref 70–99)
HCT VFR BLD AUTO: 32.8 % (ref 35–47)
HGB BLD-MCNC: 10.5 G/DL (ref 11.7–15.7)
MCH RBC QN AUTO: 29.4 PG (ref 26.5–33)
MCHC RBC AUTO-ENTMCNC: 32 G/DL (ref 31.5–36.5)
MCV RBC AUTO: 92 FL (ref 78–100)
PLATELET # BLD AUTO: 486 10E9/L (ref 150–450)
POTASSIUM SERPL-SCNC: 3.5 MMOL/L (ref 3.4–5.3)
RBC # BLD AUTO: 3.57 10E12/L (ref 3.8–5.2)
SODIUM SERPL-SCNC: 136 MMOL/L (ref 133–144)
WBC # BLD AUTO: 14.3 10E9/L (ref 4–11)

## 2021-05-13 PROCEDURE — 85027 COMPLETE CBC AUTOMATED: CPT | Performed by: PEDIATRICS

## 2021-05-13 PROCEDURE — 120N000002 HC R&B MED SURG/OB UMMC

## 2021-05-13 PROCEDURE — 97535 SELF CARE MNGMENT TRAINING: CPT | Mod: GO | Performed by: OCCUPATIONAL THERAPIST

## 2021-05-13 PROCEDURE — 250N000011 HC RX IP 250 OP 636: Performed by: STUDENT IN AN ORGANIZED HEALTH CARE EDUCATION/TRAINING PROGRAM

## 2021-05-13 PROCEDURE — 97530 THERAPEUTIC ACTIVITIES: CPT | Mod: GP

## 2021-05-13 PROCEDURE — 80048 BASIC METABOLIC PNL TOTAL CA: CPT | Performed by: PEDIATRICS

## 2021-05-13 PROCEDURE — 250N000013 HC RX MED GY IP 250 OP 250 PS 637: Performed by: STUDENT IN AN ORGANIZED HEALTH CARE EDUCATION/TRAINING PROGRAM

## 2021-05-13 PROCEDURE — 99232 SBSQ HOSP IP/OBS MODERATE 35: CPT | Mod: GC | Performed by: PEDIATRICS

## 2021-05-13 PROCEDURE — 999N001017 HC STATISTIC GLUCOSE BY METER IP

## 2021-05-13 PROCEDURE — 97530 THERAPEUTIC ACTIVITIES: CPT | Mod: GO | Performed by: OCCUPATIONAL THERAPIST

## 2021-05-13 PROCEDURE — 36415 COLL VENOUS BLD VENIPUNCTURE: CPT | Performed by: PEDIATRICS

## 2021-05-13 PROCEDURE — 97116 GAIT TRAINING THERAPY: CPT | Mod: GP

## 2021-05-13 RX ADMIN — CEFTRIAXONE 1 G: 1 INJECTION, POWDER, FOR SOLUTION INTRAMUSCULAR; INTRAVENOUS at 12:02

## 2021-05-13 RX ADMIN — ACETAMINOPHEN 650 MG: 325 TABLET, FILM COATED ORAL at 14:00

## 2021-05-13 RX ADMIN — EXEMESTANE 25 MG: 25 TABLET ORAL at 08:07

## 2021-05-13 ASSESSMENT — ACTIVITIES OF DAILY LIVING (ADL)
ADLS_ACUITY_SCORE: 20
ADLS_ACUITY_SCORE: 19
ADLS_ACUITY_SCORE: 20
ADLS_ACUITY_SCORE: 19

## 2021-05-13 NOTE — PLAN OF CARE
VSS. 1 L o2  GI/: norris auop, red  Diet: tolerated regular diet fairly this AM  Incisions/Drains: none  IV: PIV TKO  Activity: up A1; ambulated in urias with PT and took shower with OT  PRN meds: tyelnol given for knee pain

## 2021-05-13 NOTE — PROGRESS NOTES
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      Today:  - Improved urine output, decrease in hematuria  - Improved WBC  - Complete CTX course on 5/14    Left Hydronephrosis due to Ureteral Stent Malfunction    Acute left kidney subcapsular hematoma  Sepsis due to Complicated UTI  Presented with painless hematuria. CT showed large subcapsular hematoma as well as left hydronephrosis despite good stent position. UA with >182 WBC, >182 RBC, large LE. Given vanco, yakov, and zosyn in ER. Narrowed to zosyn on admission here. Urology consulted and took her to the OR early am 5/9 for cystourethroscopy with left retrograde pyelography, exchange of left ureteral stent. She tolerated the procedure well. Post-op with fever and leukocytosis--2/4 SIRS criteria with presumed bacterial infection. Urine culture growing 2 strains of LF GNRs, speciation and sensitivities returned with Klebsiella. With elevated WBC, low risk for aspiration pneumonia and need for anaerobic coverage, abx changed back to ceftriaxone.  - Will discontinue ceftriaxone on 5/14, total 7 days of antibiotics  - Follow urine and blood culture results  - Continue norris catheter until WBC and temperature normal for 24 hrs  - Follow-up at [facility] for next stent exchange in 3 months with Dr. La    Antimicrobials:  Vancomycin 5/8  Meropenem 5/8  Zosyn 5/8 - 5/9  Ceftriaxone 5/9 -  5/10, 5/11-5/14  Unasyn 5/10 - 5/11    Acute respiratory failure with hypoxia  Possible aspiration pneumonia  Has required O2 since time of procedure. Initially up to 6L but now at 2L with sats in low 90's. Possibly related to volume overload. With worsening leukocytosis and fever, consider PNA. CXR 5/10: eft lower lobe retrocardiac opacity could represent infection versus atelectasis.  Unlikely need for anaerobic coverage, will continue treatment with ceftriaxone.  - Ceftriaxone Abx as above      Acute blood loss anemia  Hgb 12.7 on presentation. Down to 10.1 with hydration. Baseline around 15. Normocytic. Likely acute blood loss due to hematuria and large subcapsular hematoma. Seems have leveled off around 10.  - monitor hemoglobin closely.   -  IR aware of patient and recommended multiphasic CT (noncontrast, arterial phase, portal venous phase) to evaluate for source of bleeding and whether or not it would be amenable to embolization if hemodynamic instability or continued rapid drop in Hgb.      Acute toxic/metabolic encephalopathy, resolved  Reportedly waxing and waning encephalopathy after transfer. Seems resolved for now.   - CTM     CRIS on Stage 3a chronic kidney disease  Cr 2.1 on presentation to OSH. Baseline Scr appears to be more in the 1.2-1.4 range. Trended down with hydration and now below baseline.   - Avoid nephrotoxic agents  - Strict I/Os   - Monitor Cr       Chronic/Stable Conditions  Bilateral breast cancer (s/p mastectomies, radiation 2016)  Cervical cancer (2007 s/p OSMAN BSO)  Carcinoid tumor of cecum with retroperitoneal & liver metastases causing L ureteral obstruction   Extensive oncologic history. Currently with metastasis causing compressive effect on L ureter (necessitating need for ureteral stent). Currently on Exemestane for breast cancer.   - Continue PTA Exemestane   - Consider palliative care consult during admission or on discharge pending course      Post polio syndrome  - PT/OT  once more stable      Trigeminal Neuralgia   - Pt reports no longer taking Neurotin, will not order        Diet: Regular Diet Adult resume regular diet  Lines: PIV  DVT Prophylaxis: Ambulate every shift and SCDs no pharmacologic AC due to bleeding   Norris Catheter: in place, indication: Other (Comment), /GI/GYN Pelvic Procedure  Code Status: Full Code         Disposition Plan   Expected discharge: 2 - 3 days, recommended to prior living arrangement once bleeding resolved, antibiotic plan in place.  Entered: Herve Durán MD 05/13/2021, 7:36 AM      Patient staffed with Dr. Bonilla.    Herve Durán MD  [address]  Please see sign in/sign out for up to date coverage information  ______________________________________________________________________    Interval History   Care team notes reviewed. No acute changes overnight. Urine output with less blood. No fevers overnight. Patient feels more alert, awake, generally better. No dyspnea or flank pain.    4 pt ROS otherwise negative    Data reviewed today: I reviewed all medications, new labs and imaging results over the last 24 hours.     Physical Exam   Vital Signs: Temp: 98.2  F (36.8  C) Temp src: Oral BP: 127/60 Pulse: 85   Resp: 16 SpO2: 93 % O2 Device: Nasal cannula Oxygen Delivery: 1 LPM  Weight: 221 lbs 6.4 oz  Gen: Awake, alert, no acute distress  ENT: MMM  CV: RRR, no MRG, no LE edema  Resp: CTAB, non-labored  GI: Soft, NT, ND, NABS  : norris in place with lightly blood tinged urine

## 2021-05-13 NOTE — PLAN OF CARE
/64 (BP Location: Right arm)   Pulse 82   Temp 98.6  F (37  C) (Oral)   Resp 16   Wt 100.4 kg (221 lb 6.4 oz)   SpO2 94%   BMI 34.68 kg/m      Activity: assist of 1 with rolling walker  Neuros: WDL, A&O x4. Intermittently lethargic, easily arousable.   Cardiac: WDL, VSS, denies chest pain.   Respiratory: On 1L NC, LS diminished. Denies SOB.  GI/: Cano with adequate red output. + BM, none this shift, intermittently incontinent.   Diet: Regular  Lines: R PIV SL  Labs:   Pain/nausea: Denies this shift.   Plan:   Continue POC

## 2021-05-14 ENCOUNTER — APPOINTMENT (OUTPATIENT)
Dept: PHYSICAL THERAPY | Facility: CLINIC | Age: 71
DRG: 659 | End: 2021-05-14
Attending: STUDENT IN AN ORGANIZED HEALTH CARE EDUCATION/TRAINING PROGRAM
Payer: MEDICARE

## 2021-05-14 LAB
ANION GAP SERPL CALCULATED.3IONS-SCNC: <1 MMOL/L (ref 3–14)
BACTERIA SPEC CULT: NO GROWTH
BACTERIA SPEC CULT: NO GROWTH
BUN SERPL-MCNC: 17 MG/DL (ref 7–30)
CALCIUM SERPL-MCNC: 9.3 MG/DL (ref 8.5–10.1)
CHLORIDE SERPL-SCNC: 102 MMOL/L (ref 94–109)
CO2 SERPL-SCNC: 34 MMOL/L (ref 20–32)
CREAT SERPL-MCNC: 0.91 MG/DL (ref 0.52–1.04)
CRP SERPL-MCNC: 120 MG/L (ref 0–8)
ERYTHROCYTE [DISTWIDTH] IN BLOOD BY AUTOMATED COUNT: 13.1 % (ref 10–15)
GFR SERPL CREATININE-BSD FRML MDRD: 63 ML/MIN/{1.73_M2}
GLUCOSE BLDC GLUCOMTR-MCNC: 103 MG/DL (ref 70–99)
GLUCOSE BLDC GLUCOMTR-MCNC: 108 MG/DL (ref 70–99)
GLUCOSE BLDC GLUCOMTR-MCNC: 146 MG/DL (ref 70–99)
GLUCOSE BLDC GLUCOMTR-MCNC: 150 MG/DL (ref 70–99)
GLUCOSE SERPL-MCNC: 105 MG/DL (ref 70–99)
HCT VFR BLD AUTO: 31.8 % (ref 35–47)
HGB BLD-MCNC: 10.2 G/DL (ref 11.7–15.7)
MCH RBC QN AUTO: 29.7 PG (ref 26.5–33)
MCHC RBC AUTO-ENTMCNC: 32.1 G/DL (ref 31.5–36.5)
MCV RBC AUTO: 92 FL (ref 78–100)
PLATELET # BLD AUTO: 480 10E9/L (ref 150–450)
POTASSIUM SERPL-SCNC: 3.3 MMOL/L (ref 3.4–5.3)
POTASSIUM SERPL-SCNC: 3.7 MMOL/L (ref 3.4–5.3)
RBC # BLD AUTO: 3.44 10E12/L (ref 3.8–5.2)
SODIUM SERPL-SCNC: 137 MMOL/L (ref 133–144)
SPECIMEN SOURCE: NORMAL
SPECIMEN SOURCE: NORMAL
WBC # BLD AUTO: 11.3 10E9/L (ref 4–11)

## 2021-05-14 PROCEDURE — 120N000002 HC R&B MED SURG/OB UMMC

## 2021-05-14 PROCEDURE — 250N000011 HC RX IP 250 OP 636: Performed by: STUDENT IN AN ORGANIZED HEALTH CARE EDUCATION/TRAINING PROGRAM

## 2021-05-14 PROCEDURE — 86140 C-REACTIVE PROTEIN: CPT | Performed by: PEDIATRICS

## 2021-05-14 PROCEDURE — 250N000013 HC RX MED GY IP 250 OP 250 PS 637: Performed by: PEDIATRICS

## 2021-05-14 PROCEDURE — 97116 GAIT TRAINING THERAPY: CPT | Mod: GP

## 2021-05-14 PROCEDURE — 97530 THERAPEUTIC ACTIVITIES: CPT | Mod: GP

## 2021-05-14 PROCEDURE — 99232 SBSQ HOSP IP/OBS MODERATE 35: CPT | Mod: GC | Performed by: PEDIATRICS

## 2021-05-14 PROCEDURE — 84132 ASSAY OF SERUM POTASSIUM: CPT | Performed by: PEDIATRICS

## 2021-05-14 PROCEDURE — 250N000013 HC RX MED GY IP 250 OP 250 PS 637: Performed by: STUDENT IN AN ORGANIZED HEALTH CARE EDUCATION/TRAINING PROGRAM

## 2021-05-14 PROCEDURE — 36415 COLL VENOUS BLD VENIPUNCTURE: CPT | Performed by: PEDIATRICS

## 2021-05-14 PROCEDURE — 999N001017 HC STATISTIC GLUCOSE BY METER IP

## 2021-05-14 PROCEDURE — 80048 BASIC METABOLIC PNL TOTAL CA: CPT | Performed by: PEDIATRICS

## 2021-05-14 PROCEDURE — 85027 COMPLETE CBC AUTOMATED: CPT | Performed by: PEDIATRICS

## 2021-05-14 RX ORDER — POTASSIUM CHLORIDE 750 MG/1
40 TABLET, EXTENDED RELEASE ORAL ONCE
Status: COMPLETED | OUTPATIENT
Start: 2021-05-14 | End: 2021-05-14

## 2021-05-14 RX ADMIN — CEFTRIAXONE 1 G: 1 INJECTION, POWDER, FOR SOLUTION INTRAMUSCULAR; INTRAVENOUS at 12:01

## 2021-05-14 RX ADMIN — POTASSIUM CHLORIDE 40 MEQ: 750 TABLET, EXTENDED RELEASE ORAL at 09:46

## 2021-05-14 RX ADMIN — EXEMESTANE 25 MG: 25 TABLET ORAL at 08:07

## 2021-05-14 RX ADMIN — POLYETHYLENE GLYCOL 3350 17 G: 17 POWDER, FOR SOLUTION ORAL at 08:10

## 2021-05-14 ASSESSMENT — ACTIVITIES OF DAILY LIVING (ADL)
ADLS_ACUITY_SCORE: 19
ADLS_ACUITY_SCORE: 20
ADLS_ACUITY_SCORE: 19
ADLS_ACUITY_SCORE: 19

## 2021-05-14 NOTE — PROGRESS NOTES
"Care Management Follow Up    Length of Stay (days): 5    Expected Discharge Date: 05/15/21     Concerns to be Addressed: discharge planning     Patient plan of care discussed at interdisciplinary rounds: Yes    Anticipated Discharge Disposition: Home, Home Care     Anticipated Discharge Services: None  Anticipated Discharge DME: None(no new needs anticipated)    Patient/family educated on Medicare website which has current facility and service quality ratings: yes  Education Provided on the Discharge Plan: Yes, SW explained TCU recommendation    Patient/Family in Agreement with the Plan: Pt is refusing TCU    Referrals Placed by CM/SW: Internal Clinic Care Coordination  Private pay costs discussed: Not applicable    Additional Information:  Pt is a 70 year old female \"with pmh significant for hypertension, hyperlipidemia, CRI, bilateral breast cancer (s/p mastectomies, radiation 2016, currently on Exemestane), cervical cancer (2007 s/p OSMAN BSO), 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 Pt is currently on IV abx but per medical team pt is not anticipated to need IV abx at discharge and could possibly be ready for discharge Saturday.     TIMI met with pt and pt's  Gaudencio at pt's bedside to introduce self and SW role and discuss discharge planning. Pt was laying in bed, agreeable to visit, and said she is ok today.   TIMI noted pt's previous meeting with SHELLEY Maldonado who helps with home discharges and explained SW role with discharge planning for TCU. Pt waved her finger and said she will not go to a TCU. Gaudencio discussed how pt needs to get home to the 4 cats. TIMI explained wanting to ensure pt has an " "accurate understanding of what a TCU is and explained TCU to pt. TIMI also explained that sometimes people reach a point where they are medically ready for discharge but are not moving well enough or have needs that cannot be met at home and this is where TCU comes into play as a bridge between hospital and home. Gaudencio said that pt cannot be away from the home because there are animals that need to be taken care of so things need to be done at home for pt or something else needs to be figured out. Gaudencio said he cannot take anymore time off of work or he will lose his job. Gaudencio discussed being a  and that he is gone for a week at a time and is home from Friday evening until Monday morning. SW asked if there is anyone else who can help at home and pt noted having a son who is \"worthless\". When SW asked if this son could help care for the cats at home pt and Gaudencio both said no. Pt also noted that her other family is either in [place] or [place].   TIMI noted SHELLEY Maldonado had set pt up with home care previously and pt said she feels this would be the best plan. TIMI explained that home care staff would not be at pt's home every day or for any significant amount of time and pt expressed understanding of this. Of note, pt did acknowledge that her current mobility is worse than her baseline however is adamantly refusing TCU.   TIMI explained that PT/OT will be updated so they are aware of pt's continued plan for a home discharge.     TIMI updated PT Tiara CLEVELAND of pt's refusal of TCU. Tiara noted she checked to see if pt's  is still present at the hospital and he had left already but she will call him later after working with pt.     TIMI updated SHELLEY Maldonado as to pt's refusal of TCU.     Wilma Rangel, MALISSA, [facility]  [address]   [phone] (pager) [phone]  5/14/2021          "

## 2021-05-14 NOTE — PLAN OF CARE
PT/7B: Attempted to call pt's  3x today to discuss discharge placement, as he was already gone in AM      30-Second Sit to Stand Test:  The test is conducted with a straight back chair, without armrests, with a 17-inch seat height.    Patient Score (score =0 if must use arms)0 reps  (Although patient scored 0 due to using arms, they completed: 2 reps with arms)    The 30 Second Sit to Stand Test is considered a test of fall risk.  Data from CARLOS SAUCEDO, cosponsored by [place] Dept of Health:  If must use arms = High Fall Risk regardless of reps  8 or less times = High Fall Risk   9 to 12 times = Moderate Risk  13 or more times = Low Risk    The 30 Second Sit to Stand Test is also considered a test of leg strength and endurance.   Normative Data from Sajan et al,. 2001  Age                 Reps: Men        Reps: Women  60-64                14-19                       12-17                               65-69                12-18                       11-16                    70-74                12-17                       10-15              75-79                11-17                       10-15                    80-84                10-15                         9-14  85-89                 8-14                          8-13  90-94                 7-12                          4-11    Assessment (rationale for performing, application to patient s function & care plan): pt scores well below her age group. Pt very high falls risk 2/2 weakness, significant lethargy/fatigue and very poor activity tolerance

## 2021-05-14 NOTE — PROGRESS NOTES
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      Today:  - Per Urology, continue norris until WBC normal for 24 hours --> likely able to discontinue norris tomorrow (5/15) and discharge to home  - Complete 7-day course of Ceftriaxone for complicated UTI and possibly aspiration PNA today on 5/14  - Patient refuses TCU, plan discharge to home with home PT/OT    Left Hydronephrosis due to ureteral stent malfunction s/p stent exchange (5/9)  Acute left kidney subcapsular hematoma  Sepsis due to complicated UTI  Presented with painless hematuria. CT showed large subcapsular hematoma as well as left hydronephrosis despite good stent position. UA with >182 WBC, >182 RBC, large LE. Given vanco, yakov, and zosyn in ER. Narrowed to zosyn on admission here. Urology consulted and took her to the OR early am 5/9 for cystourethroscopy with left retrograde pyelography, exchange of left ureteral stent. She tolerated the procedure well. Post-op with fever and leukocytosis--2/4 SIRS criteria with presumed bacterial infection. Urine culture growing 2 strains of LF GNRs, speciation and sensitivities returned with Klebsiella. With elevated WBC, low risk for aspiration pneumonia and need for anaerobic coverage, abx changed back to ceftriaxone.  - Will discontinue ceftriaxone on 5/14, total 7 days of antibiotics  - Per Urology, continue norris until WBC normal for 24 hours  --> likely able to discontinue norris tomorrow (5/15) and discharge to home  - Complete 7-day course of Ceftriaxone for complicated UTI and possibly aspiration PNA today on 5/14  - Follow-up with Urology at [facility] for next stent exchange in 3 months with Dr. La    Antimicrobials:  Vancomycin 5/8  Meropenem 5/8  Zosyn 5/8 - 5/9  Ceftriaxone 5/9 - 5/10, 5/11-5/14  Unasyn 5/10 - 5/11    Acute respiratory failure with hypoxia (improving)  Possible aspiration pneumonia  Has required O2 since time of procedure. Initially up to 6L but now at 2L with sats in low 90's. Possibly related to volume overload. With worsening leukocytosis and fever, consider PNA. CXR 5/10: eft lower lobe retrocardiac opacity could represent infection versus atelectasis.  Unlikely need for anaerobic coverage, will continue treatment with ceftriaxone.  - Ceftriaxone Abx as above      Acute blood loss anemia  Hgb 12.7 on presentation. Down to 10.1 with hydration. Baseline around 15. Normocytic. Likely acute blood loss due to hematuria and large subcapsular hematoma. Seems have leveled off around 10.  - Monitor hemoglobin closely.   - IR aware of patient and recommended multiphasic CT (noncontrast, arterial phase, portal venous phase) to evaluate for source of bleeding and whether or not it would be amenable to embolization if hemodynamic instability or continued rapid drop in Hgb.      Acute toxic/metabolic encephalopathy, resolved  Reportedly waxing and waning encephalopathy after transfer. Seems resolved for now.   - CTM     CRIS on CKD stage IIIa  Cr 2.1 on presentation to OSH. Baseline Scr appears to be more in the 1.2-1.4 range. Trended down with hydration and now below baseline.   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 Currently with metastasis causing compressive effect on L ureter (necessitating need for ureteral stent). Currently on Exemestane for breast cancer.   - Continue PTA Exemestane   - Consider palliative care consult during admission or on discharge pending course      Post polio syndrome  - PT/OT once more stable      Trigeminal Neuralgia   - Pt reports no longer taking Neurotin, will not order        Diet: Regular Diet Adult resume regular diet  Lines: PIV  DVT Prophylaxis: Ambulate every shift and SCDs no pharmacologic AC due to bleeding   Cano Catheter: in place, indication: Other (Comment), /GI/GYN Pelvic Procedure  Code Status: Full Code         Disposition Plan   Expected discharge: 1-2 days, recommended to prior living arrangement once bleeding resolved, antibiotic plan in place.  Entered: Cholo Toth MD 05/14/2021, 12:02 PM      Patient staffed with Dr. Bonilla.    Cholo Toth MD  [address]  Please see sign in/sign out for up to date coverage information  ______________________________________________________________________    Interval History   Care team notes reviewed. No acute changes overnight. Urine output without blood. No fevers overnight and WBC normalized this AM. States that she is doing well in general. Denies any fever, chest pain, shortness of breath, cough, abdominal pain or changes in bowel movement/urination.    4 pt ROS otherwise negative    Data reviewed today: I reviewed all medications, new labs and imaging results over the last 24 hours.     Physical Exam   Vital Signs: Temp: 98.4  F (36.9  C) Temp src: Oral BP: 127/58 Pulse: 80   Resp: 18 SpO2: 92 % O2 Device: None (Room air) Oxygen Delivery: 2.5 LPM  Weight: 221 lbs 6.4 oz  Gen: Awake, alert, no acute distress  ENT: MMM  CV: RRR, no MRG, no LE edema  Resp: CTAB, non-labored  GI: Soft, NT, ND,  NABS  : norris in place with yellowish urine

## 2021-05-14 NOTE — PLAN OF CARE
Vital signs:  Temp: 99  F (37.2  C) Temp src: Oral BP: 125/64 Pulse: 71   Resp: 16 SpO2: 97 % O2 Device: Nasal cannula Oxygen Delivery: 2 LPM     Activity: Ax1 with walker.   Neuros: A&O x4. Lethargic at times, easily arousable.   Cardiac: WDL.  Respiratory: Desatted to 80s on RA, on 2L O2 via NC with O2 sats >92%, pulse ox on.   GI/: Cano with adequate UOP. +BS, +flatus, -BM.   Diet: Regular.   Lines: R PIV TKO.   Labs: , 108.   Pain/nausea: Denies.  Plan: Possible discharge to home.

## 2021-05-14 NOTE — PLAN OF CARE
VSS. 1 L o2  GI/: jr auop, konstantin; had BM  Diet: tolerated regular diet fairly this AM  Incisions/Drains: none  IV: PIV TKO  Activity: up A1 and walker ambulated to BR and  ambulated in urias with PT  PRN meds: tylenol given for knee pain and R ankle pain  Plan to take norris out tomorrow and discharge

## 2021-05-14 NOTE — PLAN OF CARE
OT: Pt declining therapy when OT attempted in late AM. OT will check back as available/appropriate or reschedule.

## 2021-05-15 ENCOUNTER — APPOINTMENT (OUTPATIENT)
Dept: PHYSICAL THERAPY | Facility: CLINIC | Age: 71
DRG: 659 | End: 2021-05-15
Attending: STUDENT IN AN ORGANIZED HEALTH CARE EDUCATION/TRAINING PROGRAM
Payer: MEDICARE

## 2021-05-15 ENCOUNTER — PATIENT OUTREACH (OUTPATIENT)
Dept: CARE COORDINATION | Facility: CLINIC | Age: 71
End: 2021-05-15

## 2021-05-15 ENCOUNTER — APPOINTMENT (OUTPATIENT)
Dept: GENERAL RADIOLOGY | Facility: CLINIC | Age: 71
DRG: 659 | End: 2021-05-15
Attending: STUDENT IN AN ORGANIZED HEALTH CARE EDUCATION/TRAINING PROGRAM
Payer: MEDICARE

## 2021-05-15 VITALS
SYSTOLIC BLOOD PRESSURE: 141 MMHG | RESPIRATION RATE: 16 BRPM | BODY MASS INDEX: 35.08 KG/M2 | HEART RATE: 85 BPM | OXYGEN SATURATION: 94 % | WEIGHT: 224 LBS | TEMPERATURE: 97.1 F | DIASTOLIC BLOOD PRESSURE: 63 MMHG

## 2021-05-15 LAB
ANION GAP SERPL CALCULATED.3IONS-SCNC: 2 MMOL/L (ref 3–14)
BUN SERPL-MCNC: 13 MG/DL (ref 7–30)
CALCIUM SERPL-MCNC: 9.4 MG/DL (ref 8.5–10.1)
CHLORIDE SERPL-SCNC: 102 MMOL/L (ref 94–109)
CO2 SERPL-SCNC: 34 MMOL/L (ref 20–32)
CREAT SERPL-MCNC: 0.89 MG/DL (ref 0.52–1.04)
ERYTHROCYTE [DISTWIDTH] IN BLOOD BY AUTOMATED COUNT: 13.2 % (ref 10–15)
GFR SERPL CREATININE-BSD FRML MDRD: 64 ML/MIN/{1.73_M2}
GLUCOSE BLDC GLUCOMTR-MCNC: 206 MG/DL (ref 70–99)
GLUCOSE SERPL-MCNC: 102 MG/DL (ref 70–99)
HCT VFR BLD AUTO: 31.3 % (ref 35–47)
HGB BLD-MCNC: 10.1 G/DL (ref 11.7–15.7)
MCH RBC QN AUTO: 30 PG (ref 26.5–33)
MCHC RBC AUTO-ENTMCNC: 32.3 G/DL (ref 31.5–36.5)
MCV RBC AUTO: 93 FL (ref 78–100)
PLATELET # BLD AUTO: 506 10E9/L (ref 150–450)
POTASSIUM SERPL-SCNC: 3.7 MMOL/L (ref 3.4–5.3)
RBC # BLD AUTO: 3.37 10E12/L (ref 3.8–5.2)
SODIUM SERPL-SCNC: 138 MMOL/L (ref 133–144)
WBC # BLD AUTO: 11.5 10E9/L (ref 4–11)

## 2021-05-15 PROCEDURE — 999N001017 HC STATISTIC GLUCOSE BY METER IP

## 2021-05-15 PROCEDURE — 71045 X-RAY EXAM CHEST 1 VIEW: CPT | Mod: 26 | Performed by: RADIOLOGY

## 2021-05-15 PROCEDURE — 71045 X-RAY EXAM CHEST 1 VIEW: CPT

## 2021-05-15 PROCEDURE — 36415 COLL VENOUS BLD VENIPUNCTURE: CPT | Performed by: PEDIATRICS

## 2021-05-15 PROCEDURE — 97530 THERAPEUTIC ACTIVITIES: CPT | Mod: GP

## 2021-05-15 PROCEDURE — 99239 HOSP IP/OBS DSCHRG MGMT >30: CPT | Performed by: PEDIATRICS

## 2021-05-15 PROCEDURE — 80048 BASIC METABOLIC PNL TOTAL CA: CPT | Performed by: PEDIATRICS

## 2021-05-15 PROCEDURE — 250N000013 HC RX MED GY IP 250 OP 250 PS 637: Performed by: STUDENT IN AN ORGANIZED HEALTH CARE EDUCATION/TRAINING PROGRAM

## 2021-05-15 PROCEDURE — 97116 GAIT TRAINING THERAPY: CPT | Mod: GP

## 2021-05-15 PROCEDURE — 85027 COMPLETE CBC AUTOMATED: CPT | Performed by: PEDIATRICS

## 2021-05-15 RX ADMIN — EXEMESTANE 25 MG: 25 TABLET ORAL at 11:24

## 2021-05-15 ASSESSMENT — ACTIVITIES OF DAILY LIVING (ADL)
ADLS_ACUITY_SCORE: 19

## 2021-05-15 NOTE — PLAN OF CARE
/50 (BP Location: Left arm)   Pulse 79   Temp 99.1  F (37.3  C) (Oral)   Resp 18   Wt 101.6 kg (224 lb)   SpO2 95%   BMI 35.08 kg/m      Neuro: Oriented x4; forgetful. Continues to be somnolent throughout shift. Easily arouses to voice and touch.   Cardiac: hypertensive. Denies chest pain.   Respiratory: sats mid 92-93% on 1L NC. Attempted to wean oxygen this evening to 0.5L, de-satted to 87-89%  GI/: +BS, +passing flatus, no BM this shift. Norris in place with tea colored urine.   Pain: C/O (L) knee and (R) ankle discomfort. Declined any PRN meds. Heat pack given for ankle with some relief.   Diet/Nausea: Regular diet, fair appetite. Denies nausea.   IV Access:  (L) PIV- infusing TKO with intermittent abx.  Labs:  and 150.  Activity: Up with assist of 1 and walker with gait belt.   Plan: Possible norris removal and discharge home tomorrow. Continue to monitor and follow plan of care.

## 2021-05-15 NOTE — PLAN OF CARE
/48 (BP Location: Right arm)   Pulse 75   Temp 98.7  F (37.1  C) (Oral)   Resp 16   Wt 101.6 kg (224 lb)   SpO2 95%   BMI 35.08 kg/m      Time: 2330-0730  Status:POD 6 s/p exchange of Left ureteral stents due to L ureteral hydronephrosis & L real hematoma; has hx of cecum tumor with retroperitoneal and liver metastases c/b compressive effect on L ureter, s/p stenting.   Neuro:  A&Ox4, lethargic, easy arouses to voice and gentle touch.   Activity: assist of one person using walker and GB.   Pain: denied, no prn pain med needed this shift. Pt reported pain worse with activity.   Cardiac: Denied chest pain with latest /48, apical heart beat regular.   Respiratory: On 1 L oxygen supplement via nc. LS clear/diminished to bases. Denied SOB at rest.   GI/: no bm this shift. Active bowel sound. Cano cath with concentrated, but no bleeding noted.   Diet: regular, denied nausea/vomiting.   Skin: no new skin deficit this shift.   LDAs: Cano cath patent, PIV tko.   Labs/Imaging: latest K+ 3.7.   New change this shift: none   Plan: Cano to be removed, discharge home today with PT/OT as an out patient.

## 2021-05-15 NOTE — PLAN OF CARE
Vitals:    21 1956 21 2300 05/15/21 0330 05/15/21 0738   BP: 128/50 127/55 124/48 123/56   BP Location: Left arm Left arm Right arm Right arm   Pulse: 79 82 75 71   Resp: 18 16 16 16   Temp: 99.1  F (37.3  C) 99.5  F (37.5  C) 98.7  F (37.1  C) 98.4  F (36.9  C)   TempSrc: Oral Oral Oral Oral   SpO2: 95% 95% 95% 98%   Weight: 101.6 kg (224 lb)          Neuro: alert and oriented     VS: afebrile vital stable sating 96% on room air, non labor breathing     B - 206    Cardiac: 71    Pain/Nausea: denies any nausea, denies any pain     Lines/Drains: PIV TKO    GI/: norris with adequate amount, norris discontinued at 1400, pt later  Voided,     Diet/Appetite:tolerating intake,     Activity: up ad memo     Plan:  Discharge script written, teaching has been done.            A copy of discharge script given to pt, pt discharged home.

## 2021-05-15 NOTE — DISCHARGE SUMMARY
[facility]  Discharge Summary - Medicine & Pediatrics       Date of Admission:  5/9/2021  Date of Discharge:  5/15/2021  Discharging Provider: Seth Bonilla MD  [facility]    Discharge Diagnoses   Left Hydronephrosis due to ureteral stent malfunction s/p stent exchange (5/9)  Acute left kidney subcapsular hematoma  Sepsis due to complicated UTI  Acute respiratory failure with hypoxia (improving)  Possible aspiration pneumonia  Acute blood loss anemia  Acute toxic/metabolic encephalopathy, resolved  CRIS on CKD stage IIIa  Bilateral breast cancer (s/p mastectomies, radiation 2016)  Cervical cancer (2007 s/p OSMAN BSO)  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 La      Discharge Disposition   Discharged to home  Condition at discharge: Stable    Hospital Course   Marissa Guadarrama was admitted on 5/9/2021 for ureter obstruction and renal hematoma.  The following problems were addressed during her hospitalization:    Left Hydronephrosis due to ureteral stent malfunction s/p stent exchange (5/9)  Acute left kidney subcapsular hematoma  Sepsis due to complicated UTI  Presented with painless hematuria. CT showed large subcapsular hematoma as well as left hydronephrosis despite good stent position. UA with >182 WBC, >182 RBC, large LE. Given vanco, yakov, and zosyn in ER. Narrowed to zosyn on admission here. Urology consulted and took her to the OR early am 5/9 for cystourethroscopy with left retrograde pyelography, exchange of left ureteral stent. She tolerated the procedure well. Post-op with fever and leukocytosis--2/4 SIRS criteria with presumed bacterial infection. Urine culture growing 2 strains of LF  GNRs, speciation and sensitivities returned with Klebsiella. With elevated WBC, low risk for aspiration pneumonia and need for anaerobic coverage, abx changed back to ceftriaxone of which she completed a 7 day course of antibiotics.   - Follow-up with Urology at [facility] for next stent exchange in 3 months with Dr. La     Antimicrobials:  Vancomycin 5/8  Meropenem 5/8  Zosyn 5/8 - 5/9  Ceftriaxone 5/9 - 5/10, 5/11-5/14  Unasyn 5/10 - 5/11     Acute respiratory failure with hypoxia (improving)  Possible aspiration pneumonia  Has required O2 since time of procedure. Initially up to 6L but now at 2L with sats in low 90's. Possibly related to volume overload. With worsening leukocytosis and fever, consider PNA. CXR 5/10: eft lower lobe retrocardiac opacity could represent infection versus atelectasis.  Intermittent use of supplemental oxygen, CXR on day of discharge without focal opacities or concern for pulmonary edema. Completed 7 day course of ceftriaxone.      Acute blood loss anemia  Hgb 12.7 on presentation. Down to 10.1 with hydration. Baseline around 15. Normocytic. Likely acute blood loss due to hematuria and large subcapsular hematoma. Seems have leveled off around 10 and reminded stable through discharge.     Acute toxic/metabolic encephalopathy, resolved  Reportedly waxing and waning encephalopathy after transfer. Seems resolved for now.      CRIS on CKD stage IIIa  Cr 2.1 on presentation to OSH. Baseline Scr appears to be more in the 1.2-1.4 range. Trended down with hydration and now below baseline.      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 Currently with metastasis causing compressive effect on L ureter (necessitating need for ureteral stent). Currently on Exemestane for breast cancer.    - Continue PTA Exemestane     Consultations This Hospital Stay   PHYSICAL THERAPY ADULT IP CONSULT  OCCUPATIONAL THERAPY ADULT IP CONSULT  VASCULAR ACCESS CARE ADULT IP CONSULT  CARE MANAGEMENT / SOCIAL WORK IP CONSULT    Code Status   Full Code       The patient was discussed with Dr. Chad Durán MD  [address] [address]  Phone: [phone]  ______________________________________________________________________    Physical Exam   Vital Signs: Temp: 98.4  F (36.9  C) Temp src: Oral BP: 123/56 Pulse: 71   Resp: 16 SpO2: 98 % O2 Device: Nasal cannula Oxygen Delivery: 3 LPM  Weight: 224 lbs 0 oz  Constitutional: awake, alert, cooperative, no apparent distress  Eyes: Lids and lashes normal, pupils equal, round and reactive to light, extra ocular muscles intact, sclera clear, conjunctiva normal  ENT: Normocephalic, without obvious abnormality, atraumatic, sinuses nontender on palpation, external ears without lesions, oral pharynx with moist mucous membranes, tonsils without erythema or exudates, gums normal and good dentition.  Respiratory: No increased work of breathing, good air exchange, clear to auscultation bilaterally, no crackles or wheezing. On room air at time of discharge.  Cardiovascular: Normal apical impulse, regular rate and rhythm, normal S1 and S2, no S3 or S4, and no murmur noted  GI: No scars, normal bowel sounds, soft, non-distended, non-tender, no masses palpated, no hepatosplenomegally  Skin: no bruising or bleeding  Musculoskeletal: no lower extremity pitting edema present  there is no redness, warmth, or swelling of the joints  Neurologic: Awake, alert, oriented to name, place and time.  Cranial nerves II-XII are grossly intact.      Primary Care Physician   Eliazar Menendez    Discharge Orders      Home care nursing referral      MD face to face encounter    Documentation of Face to Face and  Certification for Home Health Services    I certify that patient: Marissa Guadarrama is under my care and that I, or a nurse practitioner or physician's assistant working with me, had a face-to-face encounter that meets the physician face-to-face encounter requirements with this patient on: 5/11/2021.    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  Deconditioning post hospitalization    I certify that, based on my findings, the following services are medically necessary home health services: Nursing and Physical Therapy.    My clinical findings support the need for the above services because: Nurse is needed: to provide assessment in home setting post hospitalization. and Physical Therapy Services are needed to assess and treat the following functional impairments: mobility, endurance.    Further, I certify that my clinical findings support that this patient is homebound (i.e. absences from home require considerable and taxing effort and are for medical reasons or [employer] services or infrequently or of short duration when for other reasons) because: Requires assistance of another person or specialized equipment to access medical services because patient: Requires supervision of another for safe transfer...    Based on the above findings. I certify that this patient is confined to the home and needs intermittent skilled nursing care, physical therapy and/or speech therapy.  The patient is under my care, and I have initiated the establishment of the plan of  care.  This patient will be followed by a physician who will periodically review the plan of care.  Physician/Provider to provide follow up care: Eliazar Menendez    Attending hospital physician (the Medicare certified [place] provider): Dr. Seth Bonilla  Physician Signature: See electronic signature associated with these discharge orders.  Date: 5/11/2021       Significant Results and Procedures   Most Recent 3 CBC's:  Recent Labs   Lab Test 05/15/21  0708 05/14/21  0722 05/13/21  0839   WBC 11.5* 11.3* 14.3*   HGB 10.1* 10.2* 10.5*   MCV 93 92 92   * 480* 486*     Most Recent 3 BMP's:  Recent Labs   Lab Test 05/15/21  0708 05/14/21  1340 05/14/21  0722 05/13/21  0839     --  137 136   POTASSIUM 3.7 3.7 3.3* 3.5   CHLORIDE 102  --  102 100   CO2 34*  --  34* 33*   BUN 13  --  17 20   CR 0.89  --  0.91 1.05*   ANIONGAP 2*  --  <1* 3   MARILIA 9.4  --  9.3 9.5   *  --  105* 102*     Most Recent 2 LFT's:  Recent Labs   Lab Test 05/09/21  0558 05/08/21  1719   AST 31 27   ALT 18 22   ALKPHOS 58 75   BILITOTAL 1.2 1.5*     Most Recent Urinalysis:  Recent Labs   Lab Test 05/08/21  1756 02/17/20  0932 02/17/20  0932   COLOR Kimberley   < > Yellow   APPEARANCE Cloudy   < > Cloudy   URINEGLC Negative   < > Negative   URINEBILI Small*   < > Negative   URINEKETONE Negative   < > Negative   SG 1.019   < > 1.015   UBLD Large*   < > Moderate*   URINEPH 5.0   < > 6.5   PROTEIN 100*   < > Negative   UROBILINOGEN  --   --  0.2   NITRITE Negative   < > Positive*   LEUKEST Large*   < > Large*   RBCU >182*   < > 10-25*   WBCU >182*   < > >100*    < > = values in this interval not displayed.       Discharge Medications   Current Discharge Medication List      CONTINUE these medications which have NOT CHANGED    Details   albuterol (PROAIR HFA/PROVENTIL HFA/VENTOLIN HFA) 108 (90 Base) MCG/ACT inhaler Inhale 2 puffs into the lungs every 6 hours as needed for shortness of breath / dyspnea or wheezing Hold on file until  needed.  Qty: 1 Inhaler, Refills: 1    Comments: Pharmacy may dispense brand covered by insurance (Proair, or proventil or ventolin or generic albuterol inhaler)  Associated Diagnoses: SOB (shortness of breath)      exemestane (AROMASIN) 25 MG tablet Take 1 tablet (25 mg) by mouth every morning  Qty: 90 tablet, Refills: 3    Associated Diagnoses: 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, estrogen receptor positive (H)      gabapentin (NEURONTIN) 600 MG tablet Take 1 tablet (600 mg) by mouth 3 times daily  Qty: 90 tablet, Refills: 3    Associated Diagnoses: Trigeminal neuralgia      !! hydrochlorothiazide (HYDRODIURIL) 12.5 MG tablet Take 1 tablet (12.5 mg) by mouth daily  Qty: 90 tablet, Refills: 3    Associated Diagnoses: Stage 3a chronic kidney disease; Essential hypertension with goal blood pressure less than 140/90      !! hydrochlorothiazide (HYDRODIURIL) 25 MG tablet Take 0.5 tablets (12.5 mg) by mouth every morning Please follow up in the office for further refills.  Qty: 15 tablet, Refills: 0    Associated Diagnoses: Essential hypertension with goal blood pressure less than 140/90      ibuprofen (ADVIL) 200 MG tablet Take 200 mg by mouth every 4 hours as needed for mild pain       !! - Potential duplicate medications found. Please discuss with provider.        Allergies   No Known Allergies

## 2021-05-16 NOTE — PLAN OF CARE
Physical Therapy Discharge Summary    Reason for therapy discharge:    Discharged to home with home therapy.    Progress towards therapy goal(s). See goals on Care Plan in [facility] electronic health record for goal details.  Goals not met.  Barriers to achieving goals:   discharge from facility.    Therapy recommendation(s):    Continued therapy is recommended.  Rationale/Recommendations:  Highly recommended pt discharge to TCU as is home alone often with  traveling for work. Pt had not progressed to mod I with transfers, gait or ADLs, all with use of wh walker.

## 2021-05-17 NOTE — PLAN OF CARE
Occupational Therapy Discharge Summary    Reason for therapy discharge:    Discharged to home with home therapy.    Progress towards therapy goal(s). See goals on Care Plan in [facility] electronic health record for goal details.  Goals not met.  Barriers to achieving goals:   discharge from facility.    Therapy recommendation(s):    Continued therapy is recommended.  Rationale/Recommendations:   . Pt will benefit from HHOT as pt is pt requires assistive device, assistance from an additional person, and would require taxing amount of energy to leave home environment, therefore appropriate for HH therapy.

## 2021-05-18 NOTE — PROGRESS NOTES
Attempted to contact the patient x2 for post-hospital call without answer. Will close encounter at this time.    Luly Lee CMA, [facility]  Post Hospital Discharge Team

## 2021-06-09 ENCOUNTER — PREP FOR PROCEDURE (OUTPATIENT)
Dept: UROLOGY | Facility: CLINIC | Age: 71
End: 2021-06-09

## 2021-06-09 DIAGNOSIS — N13.39 OTHER HYDRONEPHROSIS: Primary | ICD-10-CM

## 2021-06-09 RX ORDER — CEFAZOLIN SODIUM 2 G/50ML
2 SOLUTION INTRAVENOUS SEE ADMIN INSTRUCTIONS
Status: CANCELLED | OUTPATIENT
Start: 2021-06-09

## 2021-06-09 RX ORDER — CEFAZOLIN SODIUM 2 G/50ML
2 SOLUTION INTRAVENOUS
Status: CANCELLED | OUTPATIENT
Start: 2021-06-09

## 2021-06-14 ENCOUNTER — TELEPHONE (OUTPATIENT)
Dept: FAMILY MEDICINE | Facility: CLINIC | Age: 71
End: 2021-06-14

## 2021-06-14 DIAGNOSIS — Z17.0 MALIGNANT NEOPLASM OF UPPER-OUTER QUADRANT OF BOTH BREASTS IN FEMALE, ESTROGEN RECEPTOR POSITIVE (H): Primary | ICD-10-CM

## 2021-06-14 DIAGNOSIS — C50.412 MALIGNANT NEOPLASM OF UPPER-OUTER QUADRANT OF BOTH BREASTS IN FEMALE, ESTROGEN RECEPTOR POSITIVE (H): Primary | ICD-10-CM

## 2021-06-14 DIAGNOSIS — C50.411 MALIGNANT NEOPLASM OF UPPER-OUTER QUADRANT OF BOTH BREASTS IN FEMALE, ESTROGEN RECEPTOR POSITIVE (H): Primary | ICD-10-CM

## 2021-06-14 DIAGNOSIS — I10 HYPERTENSION GOAL BP (BLOOD PRESSURE) < 140/90: Primary | ICD-10-CM

## 2021-06-14 RX ORDER — HYDROCHLOROTHIAZIDE 12.5 MG/1
12.5 TABLET ORAL DAILY
Qty: 90 TABLET | Refills: 3 | Status: SHIPPED | OUTPATIENT
Start: 2021-06-14 | End: 2022-02-28

## 2021-06-14 RX ORDER — EXEMESTANE 25 MG/1
25 TABLET ORAL EVERY MORNING
Qty: 30 TABLET | Refills: 0 | Status: SHIPPED | OUTPATIENT
Start: 2021-06-14 | End: 2021-07-02

## 2021-06-14 NOTE — TELEPHONE ENCOUNTER
Medication for hydrochlorothiazide 12.5 mg taking one tab by mouth daily has been sent to her pharmacy.  Eliazar Menendez MD  Family Medicine

## 2021-06-14 NOTE — PROGRESS NOTES
Fax received from pharmacy requesting a refill of aromasin on behalf of patient.  Last refill: 6/1/21  # 90 with 3 refills at [facility].  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 King before another fill, one month supply given today. Patient made aware of this and appt now scheduled with Fernando.     Maria Victoria Ram RN

## 2021-06-14 NOTE — TELEPHONE ENCOUNTER
calling. He said her water pill was not refilled and he doesn't know why. D/c'd on 5/15/21 said it was stopped at discharge. Hydrochlorothiazide 12.5 mg this is not on her med list. I spoke to the wife she believes she should be on this.   Jasmyne Justin RN

## 2021-06-19 DIAGNOSIS — Z11.59 ENCOUNTER FOR SCREENING FOR OTHER VIRAL DISEASES: ICD-10-CM

## 2021-06-22 DIAGNOSIS — N39.0 URINARY TRACT INFECTION: Primary | ICD-10-CM

## 2021-07-02 ENCOUNTER — HOSPITAL ENCOUNTER (OUTPATIENT)
Dept: LAB | Facility: CLINIC | Age: 71
Discharge: HOME | End: 2021-07-02
Attending: INTERNAL MEDICINE | Admitting: INTERNAL MEDICINE
Payer: MEDICARE

## 2021-07-02 ENCOUNTER — ONCOLOGY VISIT (OUTPATIENT)
Dept: ONCOLOGY | Facility: CLINIC | Age: 71
End: 2021-07-02
Attending: INTERNAL MEDICINE
Payer: COMMERCIAL

## 2021-07-02 VITALS
BODY MASS INDEX: 32.24 KG/M2 | WEIGHT: 205.4 LBS | OXYGEN SATURATION: 96 % | SYSTOLIC BLOOD PRESSURE: 140 MMHG | HEART RATE: 61 BPM | DIASTOLIC BLOOD PRESSURE: 61 MMHG | RESPIRATION RATE: 16 BRPM | TEMPERATURE: 97.6 F | HEIGHT: 67 IN

## 2021-07-02 DIAGNOSIS — Z17.0 MALIGNANT NEOPLASM OF UPPER-OUTER QUADRANT OF BOTH BREASTS IN FEMALE, ESTROGEN RECEPTOR POSITIVE (H): ICD-10-CM

## 2021-07-02 DIAGNOSIS — C50.412 BILATERAL MALIGNANT NEOPLASM OF UPPER OUTER QUADRANT OF BREAST IN FEMALE, UNSPECIFIED ESTROGEN RECEPTOR STATUS (H): Primary | ICD-10-CM

## 2021-07-02 DIAGNOSIS — I10 HYPERTENSION GOAL BP (BLOOD PRESSURE) < 140/90: ICD-10-CM

## 2021-07-02 DIAGNOSIS — C50.412 MALIGNANT NEOPLASM OF UPPER-OUTER QUADRANT OF BOTH BREASTS IN FEMALE, ESTROGEN RECEPTOR POSITIVE (H): ICD-10-CM

## 2021-07-02 DIAGNOSIS — C50.411 BILATERAL MALIGNANT NEOPLASM OF UPPER OUTER QUADRANT OF BREAST IN FEMALE, UNSPECIFIED ESTROGEN RECEPTOR STATUS (H): Primary | ICD-10-CM

## 2021-07-02 DIAGNOSIS — C7A.021 MALIGNANT CARCINOID TUMOR OF CECUM (H): ICD-10-CM

## 2021-07-02 DIAGNOSIS — C50.411 MALIGNANT NEOPLASM OF UPPER-OUTER QUADRANT OF BOTH BREASTS IN FEMALE, ESTROGEN RECEPTOR POSITIVE (H): ICD-10-CM

## 2021-07-02 LAB
ALBUMIN SERPL-MCNC: 3.3 G/DL (ref 3.4–5)
ALP SERPL-CCNC: 86 U/L (ref 40–150)
ALT SERPL W P-5'-P-CCNC: 25 U/L (ref 0–50)
ANION GAP SERPL CALCULATED.3IONS-SCNC: 6 MMOL/L (ref 3–14)
AST SERPL W P-5'-P-CCNC: 27 U/L (ref 0–45)
BASOPHILS # BLD AUTO: 0 10E9/L (ref 0–0.2)
BASOPHILS NFR BLD AUTO: 0.4 %
BILIRUB SERPL-MCNC: 0.7 MG/DL (ref 0.2–1.3)
BUN SERPL-MCNC: 18 MG/DL (ref 7–30)
CALCIUM SERPL-MCNC: 9.8 MG/DL (ref 8.5–10.1)
CANCER AG27-29 SERPL-ACNC: 23 U/ML (ref 0–39)
CHLORIDE SERPL-SCNC: 104 MMOL/L (ref 94–109)
CO2 SERPL-SCNC: 32 MMOL/L (ref 20–32)
CREAT SERPL-MCNC: 1.19 MG/DL (ref 0.52–1.04)
DIFFERENTIAL METHOD BLD: NORMAL
EOSINOPHIL # BLD AUTO: 0.4 10E9/L (ref 0–0.7)
EOSINOPHIL NFR BLD AUTO: 5.7 %
ERYTHROCYTE [DISTWIDTH] IN BLOOD BY AUTOMATED COUNT: 14.3 % (ref 10–15)
GFR SERPL CREATININE-BSD FRML MDRD: 45 ML/MIN/{1.73_M2}
GLUCOSE SERPL-MCNC: 92 MG/DL (ref 70–99)
HCT VFR BLD AUTO: 41.9 % (ref 35–47)
HGB BLD-MCNC: 13.4 G/DL (ref 11.7–15.7)
IMM GRANULOCYTES # BLD: 0 10E9/L (ref 0–0.4)
IMM GRANULOCYTES NFR BLD: 0.3 %
LYMPHOCYTES # BLD AUTO: 2 10E9/L (ref 0.8–5.3)
LYMPHOCYTES NFR BLD AUTO: 26.6 %
MCH RBC QN AUTO: 29.1 PG (ref 26.5–33)
MCHC RBC AUTO-ENTMCNC: 32 G/DL (ref 31.5–36.5)
MCV RBC AUTO: 91 FL (ref 78–100)
MONOCYTES # BLD AUTO: 0.4 10E9/L (ref 0–1.3)
MONOCYTES NFR BLD AUTO: 5.3 %
NEUTROPHILS # BLD AUTO: 4.6 10E9/L (ref 1.6–8.3)
NEUTROPHILS NFR BLD AUTO: 61.7 %
NRBC # BLD AUTO: 0 10*3/UL
NRBC BLD AUTO-RTO: 0 /100
PLATELET # BLD AUTO: 276 10E9/L (ref 150–450)
POTASSIUM SERPL-SCNC: 3.3 MMOL/L (ref 3.4–5.3)
PROT SERPL-MCNC: 7.4 G/DL (ref 6.8–8.8)
RBC # BLD AUTO: 4.61 10E12/L (ref 3.8–5.2)
SODIUM SERPL-SCNC: 142 MMOL/L (ref 133–144)
WBC # BLD AUTO: 7.4 10E9/L (ref 4–11)

## 2021-07-02 PROCEDURE — 36415 COLL VENOUS BLD VENIPUNCTURE: CPT | Performed by: INTERNAL MEDICINE

## 2021-07-02 PROCEDURE — 86300 IMMUNOASSAY TUMOR CA 15-3: CPT | Performed by: INTERNAL MEDICINE

## 2021-07-02 PROCEDURE — 99215 OFFICE O/P EST HI 40 MIN: CPT | Performed by: INTERNAL MEDICINE

## 2021-07-02 PROCEDURE — G0463 HOSPITAL OUTPT CLINIC VISIT: HCPCS

## 2021-07-02 PROCEDURE — 85025 COMPLETE CBC W/AUTO DIFF WBC: CPT | Performed by: INTERNAL MEDICINE

## 2021-07-02 PROCEDURE — 80053 COMPREHEN METABOLIC PANEL: CPT | Performed by: INTERNAL MEDICINE

## 2021-07-02 RX ORDER — EXEMESTANE 25 MG/1
25 TABLET ORAL EVERY MORNING
Qty: 90 TABLET | Refills: 1 | Status: SHIPPED | OUTPATIENT
Start: 2021-07-02 | End: 2022-01-28

## 2021-07-02 RX ORDER — POTASSIUM CHLORIDE 750 MG/1
10 TABLET, EXTENDED RELEASE ORAL DAILY
Qty: 30 TABLET | Refills: 1 | Status: SHIPPED | OUTPATIENT
Start: 2021-07-02 | End: 2021-09-14

## 2021-07-02 ASSESSMENT — MIFFLIN-ST. JEOR: SCORE: 1469.32

## 2021-07-02 ASSESSMENT — PAIN SCALES - GENERAL: PAINLEVEL: NO PAIN (0)

## 2021-07-02 NOTE — PROGRESS NOTES
Hematology/ Oncology Follow-up Visit:  Jul 2, 2021    Reason for Visit:   Chief Complaint   Patient presents with     Oncology Clinic Visit     Follow up breast cancer. Review lab results.        Oncologic History:    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)  Marissa Guadarrama was found on routine mammogram a group of microcalcification is in the upper outer right breast. There was also a focal asymmetry in the lower left breast. Subsequently the patient went on to have bilateral diagnostic mammography with ultrasound on March 28, 2016.. On the right breast there was microcalcification is the main grouping measures 1.6 cm x 1 cm bilateral 2.9 cm located 2.9 cm from the nipple. An additional tiny area about 0.2 cm seen at the anterolateral margin of this main grouping about 1 cm from the main grouping. The left breast shows no masses or significant abnormalities. Subsequently the patient went on to have breast needle biopsy on March 30, 2016. 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 Angiolymphatic invasion was not identified. 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 Microcalcifications was also associated with ductal carcinoma in situ. His surgeon receptor was positive. Progesterone receptor was negative. She underwent bilateral sentinel lymph node biopsy and bilateral lumpectomies with prior seed placement.  the surgical pathology Showing left breast lumpectomy was a tumor size of 13 mm grade 2 of 3, angiolymphatic invasion was not identified the tumor was unifocal associated with ductal carcinoma in situ high-grade. Surgical margins where negative. [place] lymph node was negative for metastatic tumor. Stage is T1cN0 M0 On the right breast and there was invasive ductal carcinoma with a tumor size of 3 mm grade 3 of 3. [place] lymph nodes were negative for metastatic disease. The tumor stage is T1aN0 M0. The  tumor on the left was positive for estrogen and progesterone receptor. It did show no HER-2/praveen amplification. The tumor on the right is positive for estrogen receptor and negative for progesterone receptor and shows no HER-2/praveen amplification. Subsequently the patient concluded the radiation therapy. She is currently on hormonal therapy with Arimidex. She developed a skin rash and Arimidex was switched to Aromasin.      Interval History:  Patient is here today for follow-up.  She was recently hospitalized with left hydronephrosis status post ureteral stent malfunction and exchange with acute left kidney subcapsular hematoma and sepsis complicated UTI.  She is gradually improving since discharge from the hospital in May 2021.  Otherwise she denies any recent history of fever or chills or night sweats.  She has nausea vomiting or diarrhea.  She is currently on exemestane.  Renal mammogram is overdue.    Review Of Systems:  All other ROS negative unless mentioned in interval history.    Past medical, social, surgical, and family histories reviewed.    Allergies:  Allergies as of 07/02/2021     (No Known Allergies)       Current Medications:  Current Outpatient Medications   Medication Sig Dispense Refill     exemestane (AROMASIN) 25 MG tablet Take 1 tablet (25 mg) by mouth every morning 90 tablet 1     hydrochlorothiazide (HYDRODIURIL) 12.5 MG tablet Take 1 tablet (12.5 mg) by mouth daily 90 tablet 3     potassium chloride ER (KLOR-CON M) 10 MEQ CR tablet Take 1 tablet (10 mEq) by mouth daily 30 tablet 1     albuterol (PROAIR HFA/PROVENTIL HFA/VENTOLIN HFA) 108 (90 Base) MCG/ACT inhaler Inhale 2 puffs into the lungs every 6 hours as needed for shortness of breath / dyspnea or wheezing Hold on file until needed. (Patient not taking: Reported on 7/2/2021) 1 Inhaler 1        Physical Exam:  BP (!) 140/61 (BP Location: Right arm, Patient Position: Sitting, Cuff Size: Adult Large)   Pulse 61   Temp 97.6  F (36.4  C)  "(Oral)   Resp 16   Ht 1.702 m (5' 7\")   Wt 93.2 kg (205 lb 6.4 oz)   SpO2 96%   Breastfeeding No   BMI 32.17 kg/m    Wt Readings from Last 12 Encounters:   07/02/21 93.2 kg (205 lb 6.4 oz)   05/14/21 101.6 kg (224 lb)   05/08/21 104.3 kg (230 lb)   02/17/21 104.3 kg (230 lb)   01/12/21 103.9 kg (229 lb)   12/26/20 104.3 kg (230 lb)   08/14/20 108.3 kg (238 lb 12.1 oz)   08/12/20 108 kg (238 lb)   07/01/20 110.7 kg (244 lb)   06/24/20 111.1 kg (245 lb)   06/01/20 110.7 kg (244 lb)   02/20/20 113.4 kg (250 lb)     GENERAL APPEARANCE: Healthy, alert and in no acute distress.  HEENT: Sclerae anicteric. PERRLA. Oropharynx without ulcers, lesions, or thrush.  NECK: Supple. No asymmetry or masses.  LYMPHATICS: No palpable cervical, supraclavicular, axillary, or inguinal lymphadenopathy.  RESP: Lungs clear to auscultation bilaterally without rales, rhonchi or wheezes.\", BREAST: Right- No mass, nipple discharge or axillary adenopathy. Left- No mass, nipple discharge or axillary adenopathy \"CARDIOVASCULAR: Regular rate and rhythm. Normal S1, S2; no S3 or S4. No murmur, gallop, or rub.  ABDOMEN: Soft, nontender. Bowel sounds normal. No palpable organomegaly or masses.  MUSCULOSKELETAL: Extremities without gross deformities noted. No edema of bilateral lower extremities.  SKIN: No suspicious lesions or rashes.  NEURO: Alert and oriented x 3. Cranial nerves II-XII grossly intact.  PSYCHIATRIC: Mentation and affect appear normal.    Laboratory/Imaging Studies:  No visits with results within 2 Week(s) from this visit.   Latest known visit with results is:   Office Visit on 02/17/2021   Component Date Value Ref Range Status     Sodium 02/17/2021 141  133 - 144 mmol/L Final     Potassium 02/17/2021 3.4  3.4 - 5.3 mmol/L Final     Chloride 02/17/2021 104  94 - 109 mmol/L Final     Carbon Dioxide 02/17/2021 32  20 - 32 mmol/L Final     Anion Gap 02/17/2021 5  3 - 14 mmol/L Final     Glucose 02/17/2021 90  70 - 99 mg/dL Final    " Fasting specimen     Urea Nitrogen 02/17/2021 21  7 - 30 mg/dL Final     Creatinine 02/17/2021 1.29* 0.52 - 1.04 mg/dL Final     GFR Estimate 02/17/2021 41* >60 mL/min/[1.73_m2] Final    Comment: Non  GFR Calc  Starting 12/18/2018, serum creatinine based estimated GFR (eGFR) will be   calculated using the Chronic Kidney Disease Epidemiology Collaboration   (CKD-EPI) equation.       GFR Estimate If Black 02/17/2021 48* >60 mL/min/[1.73_m2] Final    Comment:  GFR Calc  Starting 12/18/2018, serum creatinine based estimated GFR (eGFR) will be   calculated using the Chronic Kidney Disease Epidemiology Collaboration   (CKD-EPI) equation.       Calcium 02/17/2021 10.4* 8.5 - 10.1 mg/dL Final     Cholesterol 02/17/2021 185  <200 mg/dL Final     Triglycerides 02/17/2021 147  <150 mg/dL Final    Fasting specimen     HDL Cholesterol 02/17/2021 64  >49 mg/dL Final     LDL Cholesterol Calculated 02/17/2021 92  <100 mg/dL Final    Desirable:       <100 mg/dl     Non HDL Cholesterol 02/17/2021 121  <130 mg/dL Final            Assessment and plan:    (C50.411,  C50.412) 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, unspecified estrogen receptor status (H)  (primary encounter diagnosis)  I reviewed with the patient today most recent laboratory tests.  She is overdue for her annual mammogram.  We will order annual mammogram.  On physical examination today there is no evidence of breast abnormalities.  We will continue to see the patient every 6 months or sooner if there are any developments or concerns    (I10) Hypertension goal BP (blood pressure) < 140/90  Blood pressure is currently well controlled with hydrochlorothiazide 12.5 mg orally daily.  Today laboratory test revealed low potassium at 3.3.  We will add potassium chloride 10 mEq daily.    The patient is ready to learn, no apparent learning barriers were identified.  Diagnosis and treatment plans were explained to  the patient. The patient expressed understanding of the content. The patient asked appropriate questions. The patient questions were answered to her satisfaction.    Chart documentation with Dragon Voice recognition Software. Although reviewed after completion, some words and grammatical errors may remain.

## 2021-07-02 NOTE — ASSESSMENT & PLAN NOTE
Marissa Guadarrama was found on routine mammogram a group of microcalcification is in the upper outer right breast. There was also a focal asymmetry in the lower left breast. Subsequently the patient went on to have bilateral diagnostic mammography with ultrasound on March 28, 2016.. On the right breast there was microcalcification is the main grouping measures 1.6 cm x 1 cm bilateral 2.9 cm located 2.9 cm from the nipple. An additional tiny area about 0.2 cm seen at the anterolateral margin of this main grouping about 1 cm from the main grouping. The left breast shows no masses or significant abnormalities. Subsequently the patient went on to have breast needle biopsy on March 30, 2016. 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 Angiolymphatic invasion was not identified. 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 Microcalcifications was also associated with ductal carcinoma in situ. His surgeon receptor was positive. Progesterone receptor was negative. She underwent bilateral sentinel lymph node biopsy and bilateral lumpectomies with prior seed placement.  the surgical pathology Showing left breast lumpectomy was a tumor size of 13 mm grade 2 of 3, angiolymphatic invasion was not identified the tumor was unifocal associated with ductal carcinoma in situ high-grade. Surgical margins where negative. [place] lymph node was negative for metastatic tumor. Stage is T1cN0 M0 On the right breast and there was invasive ductal carcinoma with a tumor size of 3 mm grade 3 of 3. [place] lymph nodes were negative for metastatic disease. The tumor stage is T1aN0 M0. The tumor on the left was positive for estrogen and progesterone receptor. It did show no HER-2/praveen amplification. The tumor on the right is positive for estrogen receptor and negative for progesterone receptor and shows no HER-2/praveen amplification. Subsequently the patient concluded the radiation  therapy. She is currently on hormonal therapy with Arimidex. She developed a skin rash and Arimidex was switched to Aromasin.

## 2021-07-02 NOTE — PROGRESS NOTES
"Oncology Rooming Note    July 2, 2021 10:09 AM   Marissa Guadarrama is a 71 year old female who presents for:    Chief Complaint   Patient presents with     Oncology Clinic Visit     Follow up breast cancer. Review lab results.      Initial Vitals: BP (!) 140/61 (BP Location: Right arm, Patient Position: Sitting, Cuff Size: Adult Large)   Pulse 61   Temp 97.6  F (36.4  C) (Oral)   Resp 16   Ht 1.702 m (5' 7\")   Wt 93.2 kg (205 lb 6.4 oz)   SpO2 96%   Breastfeeding No   BMI 32.17 kg/m   Estimated body mass index is 32.17 kg/m  as calculated from the following:    Height as of this encounter: 1.702 m (5' 7\").    Weight as of this encounter: 93.2 kg (205 lb 6.4 oz). Body surface area is 2.1 meters squared.  No Pain (0) Comment: Data Unavailable   No LMP recorded. Patient has had a hysterectomy.  Allergies reviewed: Yes  Medications reviewed: Yes    Medications: Medication refills not needed today.  Pharmacy name entered into [employer]: [id number] [address]    Clinical concerns: Follow up breast cancer. Review lab results. Overall doing well, no concerns today.       Katie Mills, [facility]            "

## 2021-07-05 ENCOUNTER — HOSPITAL ENCOUNTER (OUTPATIENT)
Dept: MAMMOGRAPHY | Facility: CLINIC | Age: 71
Discharge: HOME | End: 2021-07-05
Attending: INTERNAL MEDICINE | Admitting: INTERNAL MEDICINE
Payer: MEDICARE

## 2021-07-05 DIAGNOSIS — Z12.31 VISIT FOR SCREENING MAMMOGRAM: ICD-10-CM

## 2021-07-05 PROCEDURE — 77063 BREAST TOMOSYNTHESIS BI: CPT

## 2021-08-16 NOTE — PROGRESS NOTES
"                       Follow-up Note on Referred Patient    Date: 8/17/2021     Reason for visit: History of cervical cancer, VaIN3. Surveillance visit.      History of Present Illness:  Marissa Guadarrama is a 71 year old female seen in the [facility] gynecologic oncology clinic for management of VaIN3. Medical history significant for cervical cancer, colon cancer, recurrent carcinoid tumor, and breast cancer. History as follows:      2007: Hysterectomy, bilateral salpingo-oophorectomy, lymphadenectomy with [facility].   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     2009: Vaginal Pap ASCUS.    11/1/13 Vaginal Pap ASC-H.   12/23/13 Vaginal cuff biopsy:VAIN III  3/13/14: Colposcopy, Vaginal Biopsies, CO2 Laser of the Upper Vagina  -Pathology: 12:00, 3:00, 6:00=Vaginal histologic LSIL (VaIN1); Vaginal histologic HSIL (VaIN3) at vaginal cuff.      7/30/14: Vaginal biopsy LSIL (VaIN1)  2/9/15: Upper vaginal LSIL     9/25/17:  Vaginal biopsy histologic HSIL (VaIN3)  12/12/17: CO2 laser and biopsies of vaginal dysplasia     10/9/18:  Pap LSIL, hrHPV 16+  -Vaginoscopy negative.      4/9/19:  Pap ASC-H, hrHPV 16+  5/14/19:  Vaginoscopy, biopsies benign     11/12/19:  Pap HSIL, hrHPV16+  12/10/19:  Vaginoscopy, biopsies benign     6/1/20:   Pap HSIL, hrHPV 16+.     7/1/20:  EUA, vaginal biopsies.    -Pathology: Negative for dysplasia.    1/12/21: Pap ASC-H, hrHPV16+  -Vaginoscopy without lesions.  - Colpo 1/2021 - \"Chronic scarring and obliteration, HPV related changes appear mild & stable.  No new biopsies taken\"      Subjective:  Marissa returns to the gyn onc clinic today for her scheduled surveillance visit, unaccompanied.  No specific complaints. No vaginal bleeding or discharge.        Past Medical History:  Past Medical History:   Diagnosis Date     Arthritis     knee     Benign breast biopsy     " benign     Carcinoid tumor 12/2003     Cervical cancer (H) 2007     H/O colposcopy with cervical biopsy 12/23/13    vaginal cuff biopsy- VAIN III. referred back to gyn/onc     HTN      Hyperlipidemia      Obesity      Pap smear of vagina with ASC-H 11/1/13     Post-polio syndromes      Trigeminal neuralgias          Past Surgical History:  Past Surgical History:   Procedure Laterality Date     APPENDECTOMY  1983     BIOPSY NODE SENTINEL Bilateral 6/1/2016    Procedure: BIOPSY NODE SENTINEL;  Surgeon: Brent Arana MD;  Location: [facility]     C BSO, OMENTECTOMY W/OSMAN  5/2007     C TOTAL ABDOM HYSTERECTOMY  5/2007     CL AFF SURGICAL PATHOLOGY       COLONOSCOPY N/A 6/23/2017    Procedure: COMBINED COLONOSCOPY, SINGLE OR MULTIPLE BIOPSY/POLYPECTOMY BY BIOPSY;  Colonoscopy Dx:Carcinoid tumor of colon prep mailed golytely;  Surgeon: Talisha Greco MD;  [facility]: [facility]     COLPOSCOPY, BIOPSY, COMBINED  3/13/2014    Procedure: COMBINED COLPOSCOPY, BIOPSY;;  Surgeon: Lara Pack MD;  Location: [facility]     COMBINED CYSTOSCOPY, RETROGRADES, EXCHANGE STENT URETER(S) Left 2/7/2019    Procedure: COMBINED CYSTOSCOPY, RETROGRADES, EXCHANGE STENT URETER--left;  Surgeon: Zhao La MD;  Location: [facility]     COMBINED CYSTOSCOPY, RETROGRADES, EXCHANGE STENT URETER(S) Left 2/20/2020    Procedure: CYSTOSCOPY, WITH RETROGRADE PYELOGRAM AND Left URETERAL STENT exchange;  Surgeon: Zhao La MD;  Location: [facility]     COMBINED CYSTOSCOPY, RETROGRADES, EXCHANGE STENT URETER(S) Left 5/9/2021    Procedure: CYSTOSCOPY, WITH RETROGRADE PYELOGRAM AND LEFT URETERAL STENT REPLACEMENT;  Surgeon: Fred Owens MD;  [facility]     COMBINED CYSTOSCOPY, RETROGRADES, URETEROSCOPY, INSERT STENT Left 2/2/2017    Procedure: COMBINED CYSTOSCOPY, RETROGRADES, URETEROSCOPY, INSERT STENT;  Surgeon: Zhao La MD;  Location: [facility]     CYSTOSCOPY, RETROGRADES, INSERT STENT URETER(S),  COMBINED Left 9/7/2017    Procedure: COMBINED CYSTOSCOPY, RETROGRADES, INSERT STENT URETER(S);  Cystoscopy,Left Stent Exchange;  Surgeon: Zhao La MD;  Location: [facility]     CYSTOSCOPY, RETROGRADES, INSERT STENT URETER(S), COMBINED  12/12/2017    Procedure: COMBINED CYSTOSCOPY, RETROGRADES, INSERT STENT URETER(S);;  Surgeon: Zhao La MD;  [facility]     CYSTOSCOPY, RETROGRADES, INSERT STENT URETER(S), COMBINED Left 7/5/2018    Procedure: COMBINED CYSTOSCOPY, RETROGRADES, INSERT STENT URETER(S);  Cystoscopy, Left Stent Exchange;  Surgeon: Zhao La MD;  Location: [facility]     EXAM UNDER ANESTHESIA PELVIC  3/13/2014    Procedure: EXAM UNDER ANESTHESIA PELVIC;  Exam Under Anestheisa, Colposcopy, Vaginal Biopsies, Co2 Laser of the Upper Vagina;  Surgeon: Lara Pack MD;  [facility]     EXAM UNDER ANESTHESIA PELVIC N/A 7/1/2020    Procedure: Examination under anesthesia, vaginal biopsies;  Surgeon: Karli Gao MD;  [facility]     HERNIORRHAPHY INCISIONAL (LOCATION)       IR RHIZOTOMY  8/14/2020     LASER CO2 VAGINA  3/13/2014    VAIN 1/2     LASER CO2 VAGINA N/A 12/12/2017    Procedure: LASER CO2 VAGINA;  Exam Under Anesthesia, CO2 Laser Ablation Of Vagina, Cystoscopy, Left Retrograde Pyelogram with Left Stent Placement;  Surgeon: Nic Segundo MD;  Location: [facility]     LUMPECTOMY BREAST WITH SEED LOCALIZATION Bilateral 6/1/2016    Procedure: LUMPECTOMY BREAST WITH SEED LOCALIZATION;  Surgeon: Brent Arana MD;  Location: [facility]     SURGICAL HISTORY OF -       ovarian cystectomy     SURGICAL HISTORY OF -   2003    right colon resection secondary to carcinoid tumor     TUBAL LIGATION         Current Medications:   has a current medication list which includes the following prescription(s): exemestane, hydrochlorothiazide, albuterol, and potassium chloride er.       Allergies:   No Known Allergies        Social History:  Social History      Tobacco Use     Smoking status: Former Smoker     Packs/day: 1.00     Years: 29.00     Pack years: 29.00     Types: Cigarettes     Quit date: 10/30/2006     Years since quittin.8     Smokeless tobacco: Never Used   Substance Use Topics     Alcohol use: No     Alcohol/week: 0.0 standard drinks     Comment: 1 drink per year       History   Drug Use No       Family History:   Family History   Problem Relation Age of Onset     Cancer Mother         bone / liver     Breast Cancer Mother      Cancer Maternal Grandmother      Cancer Maternal Grandfather      Cancer Paternal Grandmother      Cancer Paternal Grandfather      Cancer Sister         vulvar ca, cervical ca, squamous cell cancer     Cancer Brother         Rectal- Stage 4     Rectal Cancer Brother      Breast Cancer Paternal Aunt      Colon Cancer Paternal Aunt      Breast Cancer Maternal Aunt      Pancreatic Cancer Nephew      Breast Cancer Sister      Anesthesia Reaction No family hx of        Physical Exam:     /83 (BP Location: Right arm, Patient Position: Sitting, Cuff Size: Adult Large)   Pulse 60   Temp 98.3  F (36.8  C) (Oral)   Resp 18   Wt 92.8 kg (204 lb 9.6 oz)   SpO2 96%   BMI 32.04 kg/m    Body mass index is 32.04 kg/m .    General Appearance: healthy and alert, no distress    Genitourinary: External genitalia and urethral meatus appears normal, no gross lesions.  Upon placement of speculum and full visualization of vagina and apex, vagina noted to be shortened and scarred.  [place] with stable scarring, no gross lesions. Pap and HPV test specimen obtained. Digital anorectal exam without any irregularities or abnormalities.      Procedure Risk Statement:  The patient was counseled regarding risks, benefits and alternatives to vaginoscopy, possible biopsies.  Risks discussed include but not limited to:  Bleeding; infection; injury to adjacent organs; inadequate sample or false negative result.  The patient's questions were answered,  and informed consent signed.      Procedure:  After informed consent was signed and time-out procedure performed, dilute acetic acid was applied to the vagina. No acetowhite changes or other lesions. Lugol's solution applied circumferentially with no non-staining areas or lesions. No biopsies indicated.       Performance Status:  ECOG Grade 0.       Assessment:     Marissa Guadarrama is a 71 year old  woman with a history of cervical cancer and VaIN3 with persistent hrHPV+, currently without evidence of disease.     -Modifiable risk factors: None.    Medical history significant for colon cancer, recurrent carcinoid tumor, and breast cancer.      A total of 20 minutes was spent with the patient, 10 minutes of which were spent in counseling the patient and/or treatment planning, 10 minutes of which were spent in the procedure above.      Plan:   1) Cervical cancer: Surveillance complete.    2) Vaginal HSIL: Marissa will return to the gyn onc clinic in 1 year for her next surveillance visit.  Surveillance as follows: Annually with Pap and HPV co-test, vaginoscopy, and digital anorectal exam at each visit per ASCCP recommendations (Barbara MCDONOUGH et al. A common clinical dilemma: Management of abnormal vaginal cytology and human papillomavirus test results. Gynecol Oncol 2016;141(2):364-70.)  She will call in the interim if she has any vaginal bleeding, abnormal vaginal discharge, or other concerning symptoms.     3) Healthcare maintenance: Continue routine healthcare maintenance with her primary care provider, and oncology care with her medical oncologists.   -Completed COVID vaccination spring 2021.     4) Prescriptions: None.      María Camacho MD, MS, FACOG, FACS  8/17/2021  10:49 AM            CC  Patient Care Team:  Eliazar Menendez MD as PCP - General (Family Practice)  Hosea King MD as MD (Hematology & Oncology)  Zhao La MD as MD (Urology)  Talisha Greco MD as MD (Colon and Rectal  Surgery)  Allen Downey MD as MD (Orthopedics)  Karli Gao MD as Assigned Cancer Care Provider  Juan Angulo MD as Assigned Neuroscience Provider  Zhao La MD as Assigned Surgical Provider  Eliazar Menendez MD as Assigned PCP  KARLI GAO

## 2021-08-16 NOTE — PATIENT INSTRUCTIONS
SCHEDULING:  -RTC in 1 year for vaginal colposcopy (MD, in-person)      DIAGNOSIS:  History of FIGO Stage IB1(p), Grade 2 squamous cell carcinoma of the cervix, currently without evidence of disease.   Treatment History:  -2007: Hysterectomy (removal of uterus/cervix), bilateral salpingo-oophorectomy (removal of bilateral ovaries & fallopian tubes), lymphadenectomy (lymph node sampling).    Vaginal histologic HSIL (VaIN3, pre-cancer) with persistence of hrHPV+, currently without evidence of disease.  Treatment History:  -3/13/14: CO2 laser of the upper vagina  -12/12/17: CO2 laser of the vagina      PLAN:  1) Cervical cancer: Surveillance complete.    2) Vaginal HSIL:   Surveillance as follows: Annually with Pap and HPV co-test, vaginoscopy, and digital anorectal exam at each visit per ASCCP recommendations.  Please call in the interim if you have any vaginal bleeding, abnormal discharge, or other concerning symptoms.    3) Healthcare maintenance: Continue routine healthcare maintenance with your primary care provider, and oncologic care with your medical oncologist.      It was a pleasure meeting you.        María Camacho MD, MS, FACOG, FACS  8/17/2021  10:46 AM

## 2021-08-17 ENCOUNTER — OFFICE VISIT (OUTPATIENT)
Dept: ONCOLOGY | Facility: CLINIC | Age: 71
End: 2021-08-17
Attending: OBSTETRICS & GYNECOLOGY
Payer: MEDICARE

## 2021-08-17 VITALS
WEIGHT: 204.6 LBS | RESPIRATION RATE: 18 BRPM | DIASTOLIC BLOOD PRESSURE: 83 MMHG | SYSTOLIC BLOOD PRESSURE: 132 MMHG | TEMPERATURE: 98.3 F | BODY MASS INDEX: 32.04 KG/M2 | OXYGEN SATURATION: 96 % | HEART RATE: 60 BPM

## 2021-08-17 DIAGNOSIS — N89.3 VAGINAL DYSPLASIA: Primary | ICD-10-CM

## 2021-08-17 PROCEDURE — 57420 EXAM OF VAGINA W/SCOPE: CPT

## 2021-08-17 PROCEDURE — 99213 OFFICE O/P EST LOW 20 MIN: CPT | Mod: 25 | Performed by: OBSTETRICS & GYNECOLOGY

## 2021-08-17 PROCEDURE — G0463 HOSPITAL OUTPT CLINIC VISIT: HCPCS | Mod: 25

## 2021-08-17 PROCEDURE — 57420 EXAM OF VAGINA W/SCOPE: CPT | Performed by: OBSTETRICS & GYNECOLOGY

## 2021-08-17 PROCEDURE — 87624 HPV HI-RISK TYP POOLED RSLT: CPT | Performed by: OBSTETRICS & GYNECOLOGY

## 2021-08-17 PROCEDURE — 88175 CYTOPATH C/V AUTO FLUID REDO: CPT | Performed by: OBSTETRICS & GYNECOLOGY

## 2021-08-17 PROCEDURE — G0463 HOSPITAL OUTPT CLINIC VISIT: HCPCS

## 2021-08-17 ASSESSMENT — PAIN SCALES - GENERAL: PAINLEVEL: NO PAIN (0)

## 2021-08-17 NOTE — LETTER
"    8/17/2021         RE: Marissa Guadarrama  [address] [place] [id number]        Dear Colleague,    Thank you for referring your patient, Marissa Guadarrama, to the [facility]. Please see a copy of my visit note below.                           Follow-up Note on Referred Patient    Date: 8/17/2021     Reason for visit: History of cervical cancer, VaIN3. Surveillance visit.      History of Present Illness:  Marissa Guadarrama is a 71 year old female seen in the [facility] gynecologic oncology clinic for management of VaIN3. Medical history significant for cervical cancer, colon cancer, recurrent carcinoid tumor, and breast cancer. History as follows:      2007: Hysterectomy, bilateral salpingo-oophorectomy, lymphadenectomy with [facility].   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     2009: Vaginal Pap ASCUS.    11/1/13 Vaginal Pap ASC-H.   12/23/13 Vaginal cuff biopsy:VAIN III  3/13/14: Colposcopy, Vaginal Biopsies, CO2 Laser of the Upper Vagina  -Pathology: 12:00, 3:00, 6:00=Vaginal histologic LSIL (VaIN1); Vaginal histologic HSIL (VaIN3) at vaginal cuff.      7/30/14: Vaginal biopsy LSIL (VaIN1)  2/9/15: Upper vaginal LSIL     9/25/17:  Vaginal biopsy histologic HSIL (VaIN3)  12/12/17: CO2 laser and biopsies of vaginal dysplasia     10/9/18:  Pap LSIL, hrHPV 16+  -Vaginoscopy negative.      4/9/19:  Pap ASC-H, hrHPV 16+  5/14/19:  Vaginoscopy, biopsies benign     11/12/19:  Pap HSIL, hrHPV16+  12/10/19:  Vaginoscopy, biopsies benign     6/1/20:   Pap HSIL, hrHPV 16+.     7/1/20:  EUA, vaginal biopsies.    -Pathology: Negative for dysplasia.    1/12/21: Pap ASC-H, hrHPV16+  -Vaginoscopy without lesions.  - Colpo 1/2021 - \"Chronic scarring and obliteration, HPV related changes appear mild & stable.  No new biopsies taken\"      Subjective:  Marissa returns to the " gyn onc clinic today for her scheduled surveillance visit, unaccompanied.  No specific complaints. No vaginal bleeding or discharge.        Past Medical History:  Past Medical History:   Diagnosis Date     Arthritis     knee     Benign breast biopsy     benign     Carcinoid tumor 12/2003     Cervical cancer (H) 2007     H/O colposcopy with cervical biopsy 12/23/13    vaginal cuff biopsy- VAIN III. referred back to gyn/onc     HTN      Hyperlipidemia      Obesity      Pap smear of vagina with ASC-H 11/1/13     Post-polio syndromes      Trigeminal neuralgias          Past Surgical History:  Past Surgical History:   Procedure Laterality Date     APPENDECTOMY  1983     BIOPSY NODE SENTINEL Bilateral 6/1/2016    Procedure: BIOPSY NODE SENTINEL;  Surgeon: Brent Arana MD;  Location: [facility]     C BSO, OMENTECTOMY W/OSMAN  5/2007     C TOTAL ABDOM HYSTERECTOMY  5/2007     CL AFF SURGICAL PATHOLOGY       COLONOSCOPY N/A 6/23/2017    Procedure: COMBINED COLONOSCOPY, SINGLE OR MULTIPLE BIOPSY/POLYPECTOMY BY BIOPSY;  Colonoscopy Dx:Carcinoid tumor of colon prep mailed golytely;  Surgeon: Talisha Greco MD;  Location:  GI     COLPOSCOPY, BIOPSY, COMBINED  3/13/2014    Procedure: COMBINED COLPOSCOPY, BIOPSY;;  Surgeon: Lara Pack MD;  Location:  OR     COMBINED CYSTOSCOPY, RETROGRADES, EXCHANGE STENT URETER(S) Left 2/7/2019    Procedure: COMBINED CYSTOSCOPY, RETROGRADES, EXCHANGE STENT URETER--left;  Surgeon: Zhao La MD;  Location: [facility]     COMBINED CYSTOSCOPY, RETROGRADES, EXCHANGE STENT URETER(S) Left 2/20/2020    Procedure: CYSTOSCOPY, WITH RETROGRADE PYELOGRAM AND Left URETERAL STENT exchange;  Surgeon: Zhao La MD;  Location: [facility]     COMBINED CYSTOSCOPY, RETROGRADES, EXCHANGE STENT URETER(S) Left 5/9/2021    Procedure: CYSTOSCOPY, WITH RETROGRADE PYELOGRAM AND LEFT URETERAL STENT REPLACEMENT;  Surgeon: Fred Owens MD;  Location:  OR     COMBINED  CYSTOSCOPY, RETROGRADES, URETEROSCOPY, INSERT STENT Left 2/2/2017    Procedure: COMBINED CYSTOSCOPY, RETROGRADES, URETEROSCOPY, INSERT STENT;  Surgeon: Zhao La MD;  Location: [facility]     CYSTOSCOPY, RETROGRADES, INSERT STENT URETER(S), COMBINED Left 9/7/2017    Procedure: COMBINED CYSTOSCOPY, RETROGRADES, INSERT STENT URETER(S);  Cystoscopy,Left Stent Exchange;  Surgeon: Zhao La MD;  Location: [facility]     CYSTOSCOPY, RETROGRADES, INSERT STENT URETER(S), COMBINED  12/12/2017    Procedure: COMBINED CYSTOSCOPY, RETROGRADES, INSERT STENT URETER(S);;  Surgeon: Zhao La MD;  [facility]     CYSTOSCOPY, RETROGRADES, INSERT STENT URETER(S), COMBINED Left 7/5/2018    Procedure: COMBINED CYSTOSCOPY, RETROGRADES, INSERT STENT URETER(S);  Cystoscopy, Left Stent Exchange;  Surgeon: Zhao La MD;  Location: [facility]     EXAM UNDER ANESTHESIA PELVIC  3/13/2014    Procedure: EXAM UNDER ANESTHESIA PELVIC;  Exam Under Anestheisa, Colposcopy, Vaginal Biopsies, Co2 Laser of the Upper Vagina;  Surgeon: Lara Pack MD;  [facility]     EXAM UNDER ANESTHESIA PELVIC N/A 7/1/2020    Procedure: Examination under anesthesia, vaginal biopsies;  Surgeon: Karli Gao MD;  [facility]     HERNIORRHAPHY INCISIONAL (LOCATION)       IR RHIZOTOMY  8/14/2020     LASER CO2 VAGINA  3/13/2014    VAIN 1/2     LASER CO2 VAGINA N/A 12/12/2017    Procedure: LASER CO2 VAGINA;  Exam Under Anesthesia, CO2 Laser Ablation Of Vagina, Cystoscopy, Left Retrograde Pyelogram with Left Stent Placement;  Surgeon: Nic Segundo MD;  Location: [facility]     LUMPECTOMY BREAST WITH SEED LOCALIZATION Bilateral 6/1/2016    Procedure: LUMPECTOMY BREAST WITH SEED LOCALIZATION;  Surgeon: Brent Arana MD;  Location: [facility]     SURGICAL HISTORY OF -       ovarian cystectomy     SURGICAL HISTORY OF -   2003    right colon resection secondary to carcinoid tumor     TUBAL LIGATION          Current Medications:   has a current medication list which includes the following prescription(s): exemestane, hydrochlorothiazide, albuterol, and potassium chloride er.       Allergies:   No Known Allergies        Social History:  Social History     Tobacco Use     Smoking status: Former Smoker     Packs/day: 1.00     Years: 29.00     Pack years: 29.00     Types: Cigarettes     Quit date: 10/30/2006     Years since quittin.8     Smokeless tobacco: Never Used   Substance Use Topics     Alcohol use: No     Alcohol/week: 0.0 standard drinks     Comment: 1 drink per year       History   Drug Use No       Family History:   Family History   Problem Relation Age of Onset     Cancer Mother         bone / liver     Breast Cancer Mother      Cancer Maternal Grandmother      Cancer Maternal Grandfather      Cancer Paternal Grandmother      Cancer Paternal Grandfather      Cancer Sister         vulvar ca, cervical ca, squamous cell cancer     Cancer Brother         Rectal- Stage 4     Rectal Cancer Brother      Breast Cancer Paternal Aunt      Colon Cancer Paternal Aunt      Breast Cancer Maternal Aunt      Pancreatic Cancer Nephew      Breast Cancer Sister      Anesthesia Reaction No family hx of        Physical Exam:     /83 (BP Location: Right arm, Patient Position: Sitting, Cuff Size: Adult Large)   Pulse 60   Temp 98.3  F (36.8  C) (Oral)   Resp 18   Wt 92.8 kg (204 lb 9.6 oz)   SpO2 96%   BMI 32.04 kg/m    Body mass index is 32.04 kg/m .    General Appearance: healthy and alert, no distress    Genitourinary: External genitalia and urethral meatus appears normal, no gross lesions.  Upon placement of speculum and full visualization of vagina and apex, vagina noted to be shortened and scarred.  Stevenson with stable scarring, no gross lesions. Pap and HPV test specimen obtained. Digital anorectal exam without any irregularities or abnormalities.      Procedure Risk Statement:  The patient was counseled  regarding risks, benefits and alternatives to vaginoscopy, possible biopsies.  Risks discussed include but not limited to:  Bleeding; infection; injury to adjacent organs; inadequate sample or false negative result.  The patient's questions were answered, and informed consent signed.      Procedure:  After informed consent was signed and time-out procedure performed, dilute acetic acid was applied to the vagina. No acetowhite changes or other lesions. Lugol's solution applied circumferentially with no non-staining areas or lesions. No biopsies indicated.       Performance Status:  ECOG Grade 0.       Assessment:     Marissa Guadarrama is a 71 year old  woman with a history of cervical cancer and VaIN3 with persistent hrHPV+, currently without evidence of disease.     -Modifiable risk factors: None.    Medical history significant for colon cancer, recurrent carcinoid tumor, and breast cancer.      A total of 20 minutes was spent with the patient, 10 minutes of which were spent in counseling the patient and/or treatment planning, 10 minutes of which were spent in the procedure above.      Plan:   1) Cervical cancer: Surveillance complete.    2) Vaginal HSIL: Marissa will return to the gyn onc clinic in 1 year for her next surveillance visit.  Surveillance as follows: Annually with Pap and HPV co-test, vaginoscopy, and digital anorectal exam at each visit per ASCCP recommendations (Jimenez MJ et al. A common clinical dilemma: Management of abnormal vaginal cytology and human papillomavirus test results. Gynecol Oncol 2016;141(2):364-70.)  She will call in the interim if she has any vaginal bleeding, abnormal vaginal discharge, or other concerning symptoms.     3) Healthcare maintenance: Continue routine healthcare maintenance with her primary care provider, and oncology care with her medical oncologists.   -Completed COVID vaccination spring 2021.     4) Prescriptions: None.    María Camacho MD, MS, FACOG,  FACS  8/17/2021  10:49 AM    CC  Patient Care Team:  Eliazar Menendez MD as PCP - General (Family Practice)  Hosea King MD as MD (Hematology & Oncology)  Zhao La MD as MD (Urology)  Talisha Greco MD as MD (Colon and Rectal Surgery)  Allen Downey MD as MD (Orthopedics)  Karli Gao MD as Assigned Cancer Care Provider  Juan Angulo MD as Assigned Neuroscience Provider  Zhao La MD as Assigned Surgical Provider  Eliazar Menendez MD as Assigned PCP

## 2021-08-17 NOTE — NURSING NOTE
"Oncology Rooming Note    August 17, 2021 10:20 AM   Marissa Guadarrama is a 71 year old female who presents for:    Chief Complaint   Patient presents with     Oncology Clinic Visit     Cervical cancer      Initial Vitals: Pulse 60   Temp 98.3  F (36.8  C) (Oral)   Resp 18   Wt 92.8 kg (204 lb 9.6 oz)   SpO2 96%   BMI 32.04 kg/m   Estimated body mass index is 32.04 kg/m  as calculated from the following:    Height as of 7/2/21: 1.702 m (5' 7\").    Weight as of this encounter: 92.8 kg (204 lb 9.6 oz). Body surface area is 2.09 meters squared.  No Pain (0) Comment: Data Unavailable   No LMP recorded. Patient has had a hysterectomy.  Allergies reviewed: Yes  Medications reviewed: Yes    Medications: Medication refills not needed today.  Pharmacy name entered into [employer]: [id number] - [place], [place] - [address] [place] [address]    Clinical concerns: None       Leisa Rdoriguez LPN            "

## 2021-08-24 LAB
BKR LAB AP GYN ADEQUACY: ABNORMAL
BKR LAB AP GYN INTERPRETATION: ABNORMAL
BKR LAB AP HPV REFLEX: ABNORMAL
BKR LAB AP PREVIOUS ABNL DX: ABNORMAL
BKR LAB AP PREVIOUS ABNORMAL: ABNORMAL
PATH REPORT.COMMENTS IMP SPEC: ABNORMAL
PATH REPORT.RELEVANT HX SPEC: ABNORMAL

## 2021-08-24 PROCEDURE — 88141 CYTOPATH C/V INTERPRET: CPT | Performed by: PATHOLOGY

## 2021-08-25 ENCOUNTER — TELEPHONE (OUTPATIENT)
Dept: ONCOLOGY | Facility: CLINIC | Age: 71
End: 2021-08-25

## 2021-08-25 LAB
HUMAN PAPILLOMA VIRUS 16 DNA: POSITIVE
HUMAN PAPILLOMA VIRUS 18 DNA: NEGATIVE
HUMAN PAPILLOMA VIRUS FINAL DIAGNOSIS: ABNORMAL
HUMAN PAPILLOMA VIRUS OTHER HR: NEGATIVE

## 2021-08-25 NOTE — TELEPHONE ENCOUNTER
Called Marissa to inform her of the Pap test results (ASCUS) and HPV test results (hrHPV16+). Vaginoscopy at the time of Pap and HPV co-test was negative for dysplasia. Plan to repeat Pap + HPV co-test with concomitant vaginoscopy given high likelihood of persistent hrHPV+ in 1 year per ASCCP guidelines.    Will send her a copy of the Pap and HPV co-test results via mail.    María Camacho MD, MS, FACOG, FACS  8/25/2021  10:52 AM

## 2021-09-03 ENCOUNTER — OFFICE VISIT (OUTPATIENT)
Dept: FAMILY MEDICINE | Facility: CLINIC | Age: 71
End: 2021-09-03
Payer: COMMERCIAL

## 2021-09-03 VITALS
RESPIRATION RATE: 14 BRPM | TEMPERATURE: 97.9 F | WEIGHT: 200 LBS | OXYGEN SATURATION: 96 % | BODY MASS INDEX: 31.39 KG/M2 | SYSTOLIC BLOOD PRESSURE: 128 MMHG | HEART RATE: 70 BPM | DIASTOLIC BLOOD PRESSURE: 74 MMHG | HEIGHT: 67 IN

## 2021-09-03 DIAGNOSIS — N39.0 URINARY TRACT INFECTION WITHOUT HEMATURIA, SITE UNSPECIFIED: ICD-10-CM

## 2021-09-03 DIAGNOSIS — I10 HYPERTENSION GOAL BP (BLOOD PRESSURE) < 140/90: ICD-10-CM

## 2021-09-03 DIAGNOSIS — Z01.818 PREOP GENERAL PHYSICAL EXAM: Primary | ICD-10-CM

## 2021-09-03 DIAGNOSIS — N13.5 OBSTRUCTION OF LEFT URETER: ICD-10-CM

## 2021-09-03 LAB
ALBUMIN UR-MCNC: ABNORMAL MG/DL
ANION GAP SERPL CALCULATED.3IONS-SCNC: 3 MMOL/L (ref 3–14)
APPEARANCE UR: ABNORMAL
BASOPHILS # BLD AUTO: 0 10E3/UL (ref 0–0.2)
BASOPHILS NFR BLD AUTO: 0 %
BILIRUB UR QL STRIP: NEGATIVE
BUN SERPL-MCNC: 25 MG/DL (ref 7–30)
CALCIUM SERPL-MCNC: 9.4 MG/DL (ref 8.5–10.1)
CHLORIDE BLD-SCNC: 105 MMOL/L (ref 94–109)
CO2 SERPL-SCNC: 33 MMOL/L (ref 20–32)
COLOR UR AUTO: YELLOW
CREAT SERPL-MCNC: 1.42 MG/DL (ref 0.52–1.04)
EOSINOPHIL # BLD AUTO: 0.4 10E3/UL (ref 0–0.7)
EOSINOPHIL NFR BLD AUTO: 6 %
ERYTHROCYTE [DISTWIDTH] IN BLOOD BY AUTOMATED COUNT: 13.3 % (ref 10–15)
GFR SERPL CREATININE-BSD FRML MDRD: 37 ML/MIN/1.73M2
GLUCOSE BLD-MCNC: 84 MG/DL (ref 70–99)
GLUCOSE UR STRIP-MCNC: NEGATIVE MG/DL
HCT VFR BLD AUTO: 44.9 % (ref 35–47)
HGB BLD-MCNC: 14.2 G/DL (ref 11.7–15.7)
HGB UR QL STRIP: ABNORMAL
KETONES UR STRIP-MCNC: NEGATIVE MG/DL
LEUKOCYTE ESTERASE UR QL STRIP: ABNORMAL
LYMPHOCYTES # BLD AUTO: 2.1 10E3/UL (ref 0.8–5.3)
LYMPHOCYTES NFR BLD AUTO: 29 %
MCH RBC QN AUTO: 29.4 PG (ref 26.5–33)
MCHC RBC AUTO-ENTMCNC: 31.6 G/DL (ref 31.5–36.5)
MCV RBC AUTO: 93 FL (ref 78–100)
MONOCYTES # BLD AUTO: 0.5 10E3/UL (ref 0–1.3)
MONOCYTES NFR BLD AUTO: 8 %
NEUTROPHILS # BLD AUTO: 4.1 10E3/UL (ref 1.6–8.3)
NEUTROPHILS NFR BLD AUTO: 58 %
NITRATE UR QL: POSITIVE
PH UR STRIP: 5.5 [PH] (ref 5–7)
PLATELET # BLD AUTO: 210 10E3/UL (ref 150–450)
POTASSIUM BLD-SCNC: 3.1 MMOL/L (ref 3.4–5.3)
RBC # BLD AUTO: 4.83 10E6/UL (ref 3.8–5.2)
RBC #/AREA URNS AUTO: ABNORMAL /HPF
SODIUM SERPL-SCNC: 141 MMOL/L (ref 133–144)
SP GR UR STRIP: 1.02 (ref 1–1.03)
SQUAMOUS #/AREA URNS AUTO: ABNORMAL /LPF
UROBILINOGEN UR STRIP-ACNC: 0.2 E.U./DL
WBC # BLD AUTO: 7.1 10E3/UL (ref 4–11)
WBC #/AREA URNS AUTO: ABNORMAL /HPF

## 2021-09-03 PROCEDURE — 85025 COMPLETE CBC W/AUTO DIFF WBC: CPT | Performed by: NURSE PRACTITIONER

## 2021-09-03 PROCEDURE — 87086 URINE CULTURE/COLONY COUNT: CPT | Performed by: NURSE PRACTITIONER

## 2021-09-03 PROCEDURE — 80048 BASIC METABOLIC PNL TOTAL CA: CPT | Performed by: NURSE PRACTITIONER

## 2021-09-03 PROCEDURE — 81001 URINALYSIS AUTO W/SCOPE: CPT | Performed by: NURSE PRACTITIONER

## 2021-09-03 PROCEDURE — 36415 COLL VENOUS BLD VENIPUNCTURE: CPT | Performed by: NURSE PRACTITIONER

## 2021-09-03 PROCEDURE — 99214 OFFICE O/P EST MOD 30 MIN: CPT | Performed by: NURSE PRACTITIONER

## 2021-09-03 RX ORDER — SULFAMETHOXAZOLE/TRIMETHOPRIM 800-160 MG
1 TABLET ORAL 2 TIMES DAILY
Qty: 10 TABLET | Refills: 0 | Status: SHIPPED | OUTPATIENT
Start: 2021-09-03 | End: 2021-09-08

## 2021-09-03 ASSESSMENT — MIFFLIN-ST. JEOR: SCORE: 1444.82

## 2021-09-03 NOTE — H&P (VIEW-ONLY)
[facility]  [address] [facility] [address] [facility] [address]  Phone: [phone]  Primary Provider: Eliazar Menendez   :[id number]}  PREOPERATIVE EVALUATION:  Today's date: 9/3/2021    Marissa Guadarrama is a 71 year old female who presents for a preoperative evaluation.    Surgical Information:  Surgery/Procedure: CYSTOSCOPY, WITH RETROGRADE PYELOGRAM AND URETERAL STENT REPLACEMENT, LEFT  Surgery Location: [facility]  Surgeon: Zhao La MD  Surgery Date: 09/16/21  Time of Surgery: TBD  Where patient plans to recover: At home with family  Fax number for surgical facility: Note does not need to be faxed, will be available electronically in Epic.    Type of Anesthesia Anticipated: Monitor Anesthesia Care    Assessment & Plan     The proposed surgical procedure is considered INTERMEDIATE risk.    Preop general physical exam    - UA with Microscopic reflex to Culture - lab collect; Future  - CBC with platelets and differential; Future  - Basic metabolic panel  (Ca, Cl, CO2, Creat, Gluc, K, Na, BUN); Future  - Basic metabolic panel  (Ca, Cl, CO2, Creat, Gluc, K, Na, BUN)  - CBC with platelets and differential  - UA with Microscopic reflex to Culture - lab collect  - Urine Microscopic Exam  - Urine Culture    Obstruction of left ureter  -scheduled for cystoscopy and stent placement     Urinary tract infection without hematuria, site unspecified  -urine culture pending  - start sulfamethoxazole-trimethoprim (BACTRIM DS) 800-160 MG tablet; Take 1 tablet by mouth 2 times daily for 5 days    Hypertension goal BP (blood pressure) < 140/90  -well controlled   -labs pending        Risks and Recommendations:  The patient has the following additional risks and recommendations for perioperative complications:   - History of urinary retention    Medication Instructions:   - Diuretics: HOLD on the day of surgery.    RECOMMENDATION:  APPROVAL GIVEN to proceed with  proposed procedure, without further diagnostic evaluation.      Subjective     HPI related to upcoming procedure: history of left ureter obstruction, scheduled for cystoscopy and stent placement       Preop Questions 9/3/2021   1. Have you ever had a heart attack or stroke? No   2. Have you ever had surgery on your heart or blood vessels, such as a stent placement, a coronary artery bypass, or surgery on an artery in your head, neck, heart, or legs? No   3. Do you have chest pain with activity? No   4. Do you have a history of  heart failure? No   5. Do you currently have a cold, bronchitis or symptoms of other infection? No   6. Do you have a cough, shortness of breath, or wheezing? No   7. Do you or anyone in your family have previous history of blood clots? No   8. Do you or does anyone in your family have a serious bleeding problem such as prolonged bleeding following surgeries or cuts? No   9. Have you ever had problems with anemia or been told to take iron pills? No   10. Have you had any abnormal blood loss such as black, tarry or bloody stools, or abnormal vaginal bleeding? No   11. Have you ever had a blood transfusion? No   12. Are you willing to have a blood transfusion if it is medically needed before, during, or after your surgery? Yes   13. Have you or any of your relatives ever had problems with anesthesia? No   14. Do you have sleep apnea, excessive snoring or daytime drowsiness? No   15. Do you have any artifical heart valves or other implanted medical devices like a pacemaker, defibrillator, or continuous glucose monitor? No   16. Do you have artificial joints? No   17. Are you allergic to latex? No   18. Is there any chance that you may be pregnant? -         Preoperative Review of :   reviewed - no record of controlled substances prescribed.      Status of Chronic Conditions:  See problem list for active medical problems.  Problems all longstanding and stable, except as noted/documented.   See ROS for pertinent symptoms related to these conditions.      Review of Systems  Constitutional, neuro, ENT, endocrine, pulmonary, cardiac, gastrointestinal, genitourinary, musculoskeletal, integument and psychiatric systems are negative, except as otherwise noted.    Patient Active Problem List    Diagnosis Date Noted     Ureteral obstruction, left 05/09/2021     Priority: Medium     Other hydronephrosis 05/09/2021     Priority: Medium     Added automatically from request for surgery [id number]       HSIL (high grade squamous intraepithelial lesion) on Pap smear of cervix 06/15/2020     Priority: Medium     Added automatically from request for surgery [id number]       Obstruction of left ureter 02/11/2020     Priority: Medium     Added automatically from request for surgery [id number]       Osteopenia of hip 05/15/2019     Priority: Medium     Need for prophylactic chemotherapy 05/15/2019     Priority: Medium     Rheumatoid arthritis with positive rheumatoid factor, involving unspecified site (H) 12/31/2018     Priority: Medium     Ureteral obstruction 11/20/2018     Priority: Medium     Added automatically from request for surgery [id number]       Obesity (BMI 35.0-39.9) with comorbidity (H) 09/28/2018     Priority: Medium     Peritoneal metastases (H) 10/30/2017     Priority: Medium     Serum calcium elevated 04/07/2017     Priority: Medium     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  Marissa Guadarrama has no plan or code status  "on file. Discussed available resources and provided with information. Confirmed code status reflects current choices pending further ACP discussions.  Confirmed/documented designated decision maker(s). See permanent comments section of demographics in clinical tab. Added by Tiff Askew on 2/6/2015  Discussed advance care planning with patient; however, patient declined at this time. 9/19/2012              OA (osteoarthritis) of knee 10/05/2011     Priority: Medium     Hypertension goal BP (blood pressure) < 140/90 11/01/2010     Priority: Medium     HYPERLIPIDEMIA LDL GOAL <130 10/31/2010     Priority: Medium     Post-polio syndrome      Priority: Medium     Left knee, diagnosed by ortho in 1976, had left leg/toe surgeries as child  (Problem list name updated by automated process. Provider to review and confirm.)       Cervical cancer (H)      Priority: Medium     5/2007.  Dr. Alonso, [facility] gyn/onc,  Now needs routine paps, patient not always compliant  3/26/09 pap NIL  9/24/09 pap ASCUS  11/1/13 pap ASC-H. Plan-- colposcopy   12/23/13 colposcopy scheduled. Reminder placed.  colpscopy 12/23/2013-Vaginal cuff (submitted as \"cervix\"), biopsy:  - High grade squamous intraepithelial lesion (vaginal  intraepithelial neoplasia grade III, VaIN III, severe dysplasia and  carcinoma in situ).  - No invasive malignancy identified.  - Cervical transformation zone not identified in biopsies.  - Please see comment.  Referral back to gynecology/oncology  2/5/14 gyn/onc appt   3/13/14 colposcopy and CO2 laser vagina, path:VAIN 1/2.  3/19/14 follow up in 4 months  7/30/14 vaginal biopsy: VAIN 1. Plan: repeat colp in 6 months.(9/17/14)   9/17/14 colp. No staining seen. No biopsy taken. Pap- ASCUS + HR HPV. Plan- repeat pap at next visit.  2/9/15 colposcopy. bx- LSIL/koilocytosis. Pap- NIL/+ HR HPV 16.  Plan: 3/16/15 treatment planning.   3/16/15 repeat colposcopy in 4 months (due 7/16/15)  7/6/15 scheduled. Tracking.         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  Patient has not been complaint with follow up.        Past Medical History:   Diagnosis Date     Arthritis     knee     Benign breast biopsy     benign     Carcinoid tumor 12/2003     Cervical cancer (H) 2007     H/O colposcopy with cervical biopsy 12/23/13    vaginal cuff biopsy- VAIN III. referred back to gyn/onc     HTN      Hyperlipidemia      Obesity      Pap smear of vagina with ASC-H 11/1/13     Post-polio syndromes      Trigeminal neuralgias      Past Surgical History:   Procedure Laterality Date     APPENDECTOMY  1983     BIOPSY NODE SENTINEL Bilateral 6/1/2016    Procedure: BIOPSY NODE SENTINEL;  Surgeon: Brnet Arana MD;  Location: [facility]     C BSO, OMENTECTOMY W/OSMAN  5/2007     C TOTAL ABDOM HYSTERECTOMY  5/2007     CL AFF SURGICAL PATHOLOGY       COLONOSCOPY N/A 6/23/2017    Procedure: COMBINED COLONOSCOPY, SINGLE OR MULTIPLE BIOPSY/POLYPECTOMY BY BIOPSY;  Colonoscopy Dx:Carcinoid tumor of colon prep mailed golytely;  Surgeon: Talisha Greco MD;  Location: [facility]     COLPOSCOPY, BIOPSY, COMBINED  3/13/2014    Procedure: COMBINED COLPOSCOPY, BIOPSY;;  Surgeon: Lara Pack MD;  Location: [facility]     COMBINED CYSTOSCOPY, RETROGRADES, EXCHANGE STENT URETER(S) Left 2/7/2019    Procedure: COMBINED CYSTOSCOPY, RETROGRADES, EXCHANGE STENT URETER--left;  Surgeon: Zhao La MD;  Location: [facility]     COMBINED CYSTOSCOPY, RETROGRADES, EXCHANGE STENT URETER(S) Left 2/20/2020    Procedure: CYSTOSCOPY, WITH RETROGRADE PYELOGRAM AND Left URETERAL STENT exchange;  Surgeon: Zhao La MD;  Location: [facility]     COMBINED CYSTOSCOPY, RETROGRADES, EXCHANGE STENT URETER(S) Left 5/9/2021    Procedure: CYSTOSCOPY, WITH RETROGRADE PYELOGRAM AND LEFT URETERAL STENT REPLACEMENT;  Surgeon: Fred Owens MD;   [facility]     COMBINED CYSTOSCOPY, RETROGRADES, URETEROSCOPY, INSERT STENT Left 2/2/2017    Procedure: COMBINED CYSTOSCOPY, RETROGRADES, URETEROSCOPY, INSERT STENT;  Surgeon: Zhao La MD;  Location: [facility]     CYSTOSCOPY, RETROGRADES, INSERT STENT URETER(S), COMBINED Left 9/7/2017    Procedure: COMBINED CYSTOSCOPY, RETROGRADES, INSERT STENT URETER(S);  Cystoscopy,Left Stent Exchange;  Surgeon: Zhao La MD;  Location: [facility]     CYSTOSCOPY, RETROGRADES, INSERT STENT URETER(S), COMBINED  12/12/2017    Procedure: COMBINED CYSTOSCOPY, RETROGRADES, INSERT STENT URETER(S);;  Surgeon: Zhao La MD;  [facility]     CYSTOSCOPY, RETROGRADES, INSERT STENT URETER(S), COMBINED Left 7/5/2018    Procedure: COMBINED CYSTOSCOPY, RETROGRADES, INSERT STENT URETER(S);  Cystoscopy, Left Stent Exchange;  Surgeon: Zhao La MD;  Location: [facility]     EXAM UNDER ANESTHESIA PELVIC  3/13/2014    Procedure: EXAM UNDER ANESTHESIA PELVIC;  Exam Under Anestheisa, Colposcopy, Vaginal Biopsies, Co2 Laser of the Upper Vagina;  Surgeon: Lara Pack MD;  [facility]     EXAM UNDER ANESTHESIA PELVIC N/A 7/1/2020    Procedure: Examination under anesthesia, vaginal biopsies;  Surgeon: Karli Gao MD;  [facility]     HERNIORRHAPHY INCISIONAL (LOCATION)       IR RHIZOTOMY  8/14/2020     LASER CO2 VAGINA  3/13/2014    VAIN 1/2     LASER CO2 VAGINA N/A 12/12/2017    Procedure: LASER CO2 VAGINA;  Exam Under Anesthesia, CO2 Laser Ablation Of Vagina, Cystoscopy, Left Retrograde Pyelogram with Left Stent Placement;  Surgeon: Nic Seugndo MD;  Location: [facility]     LUMPECTOMY BREAST WITH SEED LOCALIZATION Bilateral 6/1/2016    Procedure: LUMPECTOMY BREAST WITH SEED LOCALIZATION;  Surgeon: Brent Arana MD;  Location: [facility]     SURGICAL HISTORY OF -       ovarian cystectomy     SURGICAL HISTORY OF -   2003    right colon resection secondary to  "carcinoid tumor     TUBAL LIGATION       Current Outpatient Medications   Medication Sig Dispense Refill     exemestane (AROMASIN) 25 MG tablet Take 1 tablet (25 mg) by mouth every morning 90 tablet 1     hydrochlorothiazide (HYDRODIURIL) 12.5 MG tablet Take 1 tablet (12.5 mg) by mouth daily 90 tablet 3     sulfamethoxazole-trimethoprim (BACTRIM DS) 800-160 MG tablet Take 1 tablet by mouth 2 times daily for 5 days 10 tablet 0     albuterol (PROAIR HFA/PROVENTIL HFA/VENTOLIN HFA) 108 (90 Base) MCG/ACT inhaler Inhale 2 puffs into the lungs every 6 hours as needed for shortness of breath / dyspnea or wheezing Hold on file until needed. (Patient not taking: Reported on 2021) 1 Inhaler 1     potassium chloride ER (KLOR-CON M) 10 MEQ CR tablet Take 1 tablet (10 mEq) by mouth daily (Patient not taking: Reported on 2021) 30 tablet 1       No Known Allergies     Social History     Tobacco Use     Smoking status: Former Smoker     Packs/day: 1.00     Years: 29.00     Pack years: 29.00     Types: Cigarettes     Quit date: 10/30/2006     Years since quittin.8     Smokeless tobacco: Never Used   Substance Use Topics     Alcohol use: No     Alcohol/week: 0.0 standard drinks     Comment: 1 drink per year       History   Drug Use No         Objective     /74   Pulse 70   Temp 97.9  F (36.6  C) (Tympanic)   Resp 14   Ht 1.702 m (5' 7\")   Wt 90.7 kg (200 lb)   SpO2 96%   BMI 31.32 kg/m      Physical Exam    GENERAL APPEARANCE: healthy, alert and no distress     EYES: EOMI, PERRL     HENT: ear canals and TM's normal and nose and mouth without ulcers or lesions     NECK: no adenopathy, no asymmetry, masses, or scars and thyroid normal to palpation     RESP: lungs clear to auscultation - no rales, rhonchi or wheezes     CV: regular rates and rhythm, normal S1 S2, no S3 or S4 and no murmur, click or rub     MS: extremities normal- no gross deformities noted, no evidence of inflammation in joints, FROM in all " extremities.     SKIN: no suspicious lesions or rashes     NEURO: Normal strength and tone, sensory exam grossly normal, mentation intact and speech normal     PSYCH: mentation appears normal. and affect normal/bright     LYMPHATICS: No cervical adenopathy    Recent Labs   Lab Test 07/02/21  1003 05/15/21  0708 05/09/21  1352 05/09/21  0558 05/08/21  1719   HGB 13.4 10.1*  --  10.1* 12.7    506*   < > 275 362   INR  --   --   --  1.34* 1.32*    138   < > 137 133   POTASSIUM 3.3* 3.7   < > 3.8 3.2*   CR 1.19* 0.89   < > 1.45* 2.10*    < > = values in this interval not displayed.        Diagnostics:  Recent Results (from the past 24 hour(s))   UA with Microscopic reflex to Culture - lab collect    Collection Time: 09/03/21 10:10 AM    Specimen: Urine, NOS   Result Value Ref Range    Color Urine Yellow Colorless, Straw, Light Yellow, Yellow    Appearance Urine Slightly Cloudy (A) Clear    Glucose Urine Negative Negative mg/dL    Bilirubin Urine Negative Negative    Ketones Urine Negative Negative mg/dL    Specific Gravity Urine 1.020 1.003 - 1.035    Blood Urine Small (A) Negative    pH Urine 5.5 5.0 - 7.0    Protein Albumin Urine Trace (A) Negative mg/dL    Urobilinogen Urine 0.2 0.2, 1.0 E.U./dL    Nitrite Urine Positive (A) Negative    Leukocyte Esterase Urine Large (A) Negative   CBC with platelets and differential    Collection Time: 09/03/21 10:10 AM   Result Value Ref Range    WBC Count 7.1 4.0 - 11.0 10e3/uL    RBC Count 4.83 3.80 - 5.20 10e6/uL    Hemoglobin 14.2 11.7 - 15.7 g/dL    Hematocrit 44.9 35.0 - 47.0 %    MCV 93 78 - 100 fL    MCH 29.4 26.5 - 33.0 pg    MCHC 31.6 31.5 - 36.5 g/dL    RDW 13.3 10.0 - 15.0 %    Platelet Count 210 150 - 450 10e3/uL    % Neutrophils 58 %    % Lymphocytes 29 %    % Monocytes 8 %    % Eosinophils 6 %    % Basophils 0 %    Absolute Neutrophils 4.1 1.6 - 8.3 10e3/uL    Absolute Lymphocytes 2.1 0.8 - 5.3 10e3/uL    Absolute Monocytes 0.5 0.0 - 1.3 10e3/uL     Absolute Eosinophils 0.4 0.0 - 0.7 10e3/uL    Absolute Basophils 0.0 0.0 - 0.2 10e3/uL   Urine Microscopic Exam    Collection Time: 09/03/21 10:10 AM   Result Value Ref Range    RBC Urine None Seen 0-2 /HPF /HPF    WBC Urine 5-10 (A) 0-5 /HPF /HPF    Squamous Epithelials Urine Few (A) None Seen /LPF      EKG was normal in 5/2021, patient asymptomatic     Revised Cardiac Risk Index (RCRI):  The patient has the following serious cardiovascular risks for perioperative complications:   - No serious cardiac risks = 0 points     RCRI Interpretation: 0 points: Class I (very low risk - 0.4% complication rate)           Signed Electronically by: KAREN Unger CNP  Copy of this evaluation report is provided to requesting physician.

## 2021-09-03 NOTE — PROGRESS NOTES
[facility]  [address] [address]  Phone: [phone]  Primary Provider: Eliazar Menendez   :[id number]}  PREOPERATIVE EVALUATION:  Today's date: 9/3/2021    Marissa Guadarrama is a 71 year old female who presents for a preoperative evaluation.    Surgical Information:  Surgery/Procedure: CYSTOSCOPY, WITH RETROGRADE PYELOGRAM AND URETERAL STENT REPLACEMENT, LEFT  Surgery Location: [facility]  Surgeon: Zhao La MD  Surgery Date: 09/16/21  Time of Surgery: TBD  Where patient plans to recover: At home with family  Fax number for surgical facility: Note does not need to be faxed, will be available electronically in Epic.    Type of Anesthesia Anticipated: Monitor Anesthesia Care    Assessment & Plan     The proposed surgical procedure is considered INTERMEDIATE risk.    Preop general physical exam    - UA with Microscopic reflex to Culture - lab collect; Future  - CBC with platelets and differential; Future  - Basic metabolic panel  (Ca, Cl, CO2, Creat, Gluc, K, Na, BUN); Future  - Basic metabolic panel  (Ca, Cl, CO2, Creat, Gluc, K, Na, BUN)  - CBC with platelets and differential  - UA with Microscopic reflex to Culture - lab collect  - Urine Microscopic Exam  - Urine Culture    Obstruction of left ureter  -scheduled for cystoscopy and stent placement     Urinary tract infection without hematuria, site unspecified  -urine culture pending  - start sulfamethoxazole-trimethoprim (BACTRIM DS) 800-160 MG tablet; Take 1 tablet by mouth 2 times daily for 5 days    Hypertension goal BP (blood pressure) < 140/90  -well controlled   -labs pending        Risks and Recommendations:  The patient has the following additional risks and recommendations for perioperative complications:   - History of urinary retention    Medication Instructions:   - Diuretics: HOLD on the day of surgery.    RECOMMENDATION:  APPROVAL GIVEN to proceed with  proposed procedure, without further diagnostic evaluation.      Subjective     HPI related to upcoming procedure: history of left ureter obstruction, scheduled for cystoscopy and stent placement       Preop Questions 9/3/2021   1. Have you ever had a heart attack or stroke? No   2. Have you ever had surgery on your heart or blood vessels, such as a stent placement, a coronary artery bypass, or surgery on an artery in your head, neck, heart, or legs? No   3. Do you have chest pain with activity? No   4. Do you have a history of  heart failure? No   5. Do you currently have a cold, bronchitis or symptoms of other infection? No   6. Do you have a cough, shortness of breath, or wheezing? No   7. Do you or anyone in your family have previous history of blood clots? No   8. Do you or does anyone in your family have a serious bleeding problem such as prolonged bleeding following surgeries or cuts? No   9. Have you ever had problems with anemia or been told to take iron pills? No   10. Have you had any abnormal blood loss such as black, tarry or bloody stools, or abnormal vaginal bleeding? No   11. Have you ever had a blood transfusion? No   12. Are you willing to have a blood transfusion if it is medically needed before, during, or after your surgery? Yes   13. Have you or any of your relatives ever had problems with anesthesia? No   14. Do you have sleep apnea, excessive snoring or daytime drowsiness? No   15. Do you have any artifical heart valves or other implanted medical devices like a pacemaker, defibrillator, or continuous glucose monitor? No   16. Do you have artificial joints? No   17. Are you allergic to latex? No   18. Is there any chance that you may be pregnant? -         Preoperative Review of :   reviewed - no record of controlled substances prescribed.      Status of Chronic Conditions:  See problem list for active medical problems.  Problems all longstanding and stable, except as noted/documented.   See ROS for pertinent symptoms related to these conditions.      Review of Systems  Constitutional, neuro, ENT, endocrine, pulmonary, cardiac, gastrointestinal, genitourinary, musculoskeletal, integument and psychiatric systems are negative, except as otherwise noted.    Patient Active Problem List    Diagnosis Date Noted     Ureteral obstruction, left 05/09/2021     Priority: Medium     Other hydronephrosis 05/09/2021     Priority: Medium     Added automatically from request for surgery [id number]       HSIL (high grade squamous intraepithelial lesion) on Pap smear of cervix 06/15/2020     Priority: Medium     Added automatically from request for surgery [id number]       Obstruction of left ureter 02/11/2020     Priority: Medium     Added automatically from request for surgery [id number]       Osteopenia of hip 05/15/2019     Priority: Medium     Need for prophylactic chemotherapy 05/15/2019     Priority: Medium     Rheumatoid arthritis with positive rheumatoid factor, involving unspecified site (H) 12/31/2018     Priority: Medium     Ureteral obstruction 11/20/2018     Priority: Medium     Added automatically from request for surgery [id number]       Obesity (BMI 35.0-39.9) with comorbidity (H) 09/28/2018     Priority: Medium     Peritoneal metastases (H) 10/30/2017     Priority: Medium     Serum calcium elevated 04/07/2017     Priority: Medium     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  Marissa Guadarrama has no plan or code status  "on file. Discussed available resources and provided with information. Confirmed code status reflects current choices pending further ACP discussions.  Confirmed/documented designated decision maker(s). See permanent comments section of demographics in clinical tab. Added by Tiff Askew on 2/6/2015  Discussed advance care planning with patient; however, patient declined at this time. 9/19/2012              OA (osteoarthritis) of knee 10/05/2011     Priority: Medium     Hypertension goal BP (blood pressure) < 140/90 11/01/2010     Priority: Medium     HYPERLIPIDEMIA LDL GOAL <130 10/31/2010     Priority: Medium     Post-polio syndrome      Priority: Medium     Left knee, diagnosed by ortho in 1976, had left leg/toe surgeries as child  (Problem list name updated by automated process. Provider to review and confirm.)       Cervical cancer (H)      Priority: Medium     5/2007.  Dr. Alonso, [facility] gyn/onc,  Now needs routine paps, patient not always compliant  3/26/09 pap NIL  9/24/09 pap ASCUS  11/1/13 pap ASC-H. Plan-- colposcopy   12/23/13 colposcopy scheduled. Reminder placed.  colpscopy 12/23/2013-Vaginal cuff (submitted as \"cervix\"), biopsy:  - High grade squamous intraepithelial lesion (vaginal  intraepithelial neoplasia grade III, VaIN III, severe dysplasia and  carcinoma in situ).  - No invasive malignancy identified.  - Cervical transformation zone not identified in biopsies.  - Please see comment.  Referral back to gynecology/oncology  2/5/14 gyn/onc appt   3/13/14 colposcopy and CO2 laser vagina, path:VAIN 1/2.  3/19/14 follow up in 4 months  7/30/14 vaginal biopsy: VAIN 1. Plan: repeat colp in 6 months.(9/17/14)   9/17/14 colp. No staining seen. No biopsy taken. Pap- ASCUS + HR HPV. Plan- repeat pap at next visit.  2/9/15 colposcopy. bx- LSIL/koilocytosis. Pap- NIL/+ HR HPV 16.  Plan: 3/16/15 treatment planning.   3/16/15 repeat colposcopy in 4 months (due 7/16/15)  7/6/15 scheduled. Tracking.         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  Patient has not been complaint with follow up.        Past Medical History:   Diagnosis Date     Arthritis     knee     Benign breast biopsy     benign     Carcinoid tumor 12/2003     Cervical cancer (H) 2007     H/O colposcopy with cervical biopsy 12/23/13    vaginal cuff biopsy- VAIN III. referred back to gyn/onc     HTN      Hyperlipidemia      Obesity      Pap smear of vagina with ASC-H 11/1/13     Post-polio syndromes      Trigeminal neuralgias      Past Surgical History:   Procedure Laterality Date     APPENDECTOMY  1983     BIOPSY NODE SENTINEL Bilateral 6/1/2016    Procedure: BIOPSY NODE SENTINEL;  Surgeon: Brent Arana MD;  Location: [facility]     C BSO, OMENTECTOMY W/OSMAN  5/2007     C TOTAL ABDOM HYSTERECTOMY  5/2007     CL AFF SURGICAL PATHOLOGY       COLONOSCOPY N/A 6/23/2017    Procedure: COMBINED COLONOSCOPY, SINGLE OR MULTIPLE BIOPSY/POLYPECTOMY BY BIOPSY;  Colonoscopy Dx:Carcinoid tumor of colon prep mailed golytely;  Surgeon: Talisha Greco MD;  Location: [facility]     COLPOSCOPY, BIOPSY, COMBINED  3/13/2014    Procedure: COMBINED COLPOSCOPY, BIOPSY;;  Surgeon: Lara Pack MD;  Location: [facility]     COMBINED CYSTOSCOPY, RETROGRADES, EXCHANGE STENT URETER(S) Left 2/7/2019    Procedure: COMBINED CYSTOSCOPY, RETROGRADES, EXCHANGE STENT URETER--left;  Surgeon: Zhao La MD;  Location: [facility]     COMBINED CYSTOSCOPY, RETROGRADES, EXCHANGE STENT URETER(S) Left 2/20/2020    Procedure: CYSTOSCOPY, WITH RETROGRADE PYELOGRAM AND Left URETERAL STENT exchange;  Surgeon: Zhao La MD;  Location: [facility]     COMBINED CYSTOSCOPY, RETROGRADES, EXCHANGE STENT URETER(S) Left 5/9/2021    Procedure: CYSTOSCOPY, WITH RETROGRADE PYELOGRAM AND LEFT URETERAL STENT REPLACEMENT;  Surgeon: Fred Owens MD;   [facility]     COMBINED CYSTOSCOPY, RETROGRADES, URETEROSCOPY, INSERT STENT Left 2/2/2017    Procedure: COMBINED CYSTOSCOPY, RETROGRADES, URETEROSCOPY, INSERT STENT;  Surgeon: Zhao La MD;  Location: [facility]     CYSTOSCOPY, RETROGRADES, INSERT STENT URETER(S), COMBINED Left 9/7/2017    Procedure: COMBINED CYSTOSCOPY, RETROGRADES, INSERT STENT URETER(S);  Cystoscopy,Left Stent Exchange;  Surgeon: Zhao La MD;  Location: [facility]     CYSTOSCOPY, RETROGRADES, INSERT STENT URETER(S), COMBINED  12/12/2017    Procedure: COMBINED CYSTOSCOPY, RETROGRADES, INSERT STENT URETER(S);;  Surgeon: Zhao La MD;  [facility]     CYSTOSCOPY, RETROGRADES, INSERT STENT URETER(S), COMBINED Left 7/5/2018    Procedure: COMBINED CYSTOSCOPY, RETROGRADES, INSERT STENT URETER(S);  Cystoscopy, Left Stent Exchange;  Surgeon: Zhao La MD;  Location: [facility]     EXAM UNDER ANESTHESIA PELVIC  3/13/2014    Procedure: EXAM UNDER ANESTHESIA PELVIC;  Exam Under Anestheisa, Colposcopy, Vaginal Biopsies, Co2 Laser of the Upper Vagina;  Surgeon: Lara Pack MD;  [facility]     EXAM UNDER ANESTHESIA PELVIC N/A 7/1/2020    Procedure: Examination under anesthesia, vaginal biopsies;  Surgeon: Karli Gao MD;  [facility]     HERNIORRHAPHY INCISIONAL (LOCATION)       IR RHIZOTOMY  8/14/2020     LASER CO2 VAGINA  3/13/2014    VAIN 1/2     LASER CO2 VAGINA N/A 12/12/2017    Procedure: LASER CO2 VAGINA;  Exam Under Anesthesia, CO2 Laser Ablation Of Vagina, Cystoscopy, Left Retrograde Pyelogram with Left Stent Placement;  Surgeon: Nic Segundo MD;  [facility]: [facility]     LUMPECTOMY BREAST WITH SEED LOCALIZATION Bilateral 6/1/2016    Procedure: LUMPECTOMY BREAST WITH SEED LOCALIZATION;  Surgeon: Brent Arana MD;  Location: [facility]     SURGICAL HISTORY OF -       ovarian cystectomy     SURGICAL HISTORY OF -   2003    right colon resection secondary to  "carcinoid tumor     TUBAL LIGATION       Current Outpatient Medications   Medication Sig Dispense Refill     exemestane (AROMASIN) 25 MG tablet Take 1 tablet (25 mg) by mouth every morning 90 tablet 1     hydrochlorothiazide (HYDRODIURIL) 12.5 MG tablet Take 1 tablet (12.5 mg) by mouth daily 90 tablet 3     sulfamethoxazole-trimethoprim (BACTRIM DS) 800-160 MG tablet Take 1 tablet by mouth 2 times daily for 5 days 10 tablet 0     albuterol (PROAIR HFA/PROVENTIL HFA/VENTOLIN HFA) 108 (90 Base) MCG/ACT inhaler Inhale 2 puffs into the lungs every 6 hours as needed for shortness of breath / dyspnea or wheezing Hold on file until needed. (Patient not taking: Reported on 2021) 1 Inhaler 1     potassium chloride ER (KLOR-CON M) 10 MEQ CR tablet Take 1 tablet (10 mEq) by mouth daily (Patient not taking: Reported on 2021) 30 tablet 1       No Known Allergies     Social History     Tobacco Use     Smoking status: Former Smoker     Packs/day: 1.00     Years: 29.00     Pack years: 29.00     Types: Cigarettes     Quit date: 10/30/2006     Years since quittin.8     Smokeless tobacco: Never Used   Substance Use Topics     Alcohol use: No     Alcohol/week: 0.0 standard drinks     Comment: 1 drink per year       History   Drug Use No         Objective     /74   Pulse 70   Temp 97.9  F (36.6  C) (Tympanic)   Resp 14   Ht 1.702 m (5' 7\")   Wt 90.7 kg (200 lb)   SpO2 96%   BMI 31.32 kg/m      Physical Exam    GENERAL APPEARANCE: healthy, alert and no distress     EYES: EOMI, PERRL     HENT: ear canals and TM's normal and nose and mouth without ulcers or lesions     NECK: no adenopathy, no asymmetry, masses, or scars and thyroid normal to palpation     RESP: lungs clear to auscultation - no rales, rhonchi or wheezes     CV: regular rates and rhythm, normal S1 S2, no S3 or S4 and no murmur, click or rub     MS: extremities normal- no gross deformities noted, no evidence of inflammation in joints, FROM in all " extremities.     SKIN: no suspicious lesions or rashes     NEURO: Normal strength and tone, sensory exam grossly normal, mentation intact and speech normal     PSYCH: mentation appears normal. and affect normal/bright     LYMPHATICS: No cervical adenopathy    Recent Labs   Lab Test 07/02/21  1003 05/15/21  0708 05/09/21  1352 05/09/21  0558 05/08/21  1719   HGB 13.4 10.1*  --  10.1* 12.7    506*   < > 275 362   INR  --   --   --  1.34* 1.32*    138   < > 137 133   POTASSIUM 3.3* 3.7   < > 3.8 3.2*   CR 1.19* 0.89   < > 1.45* 2.10*    < > = values in this interval not displayed.        Diagnostics:  Recent Results (from the past 24 hour(s))   UA with Microscopic reflex to Culture - lab collect    Collection Time: 09/03/21 10:10 AM    Specimen: Urine, NOS   Result Value Ref Range    Color Urine Yellow Colorless, Straw, Light Yellow, Yellow    Appearance Urine Slightly Cloudy (A) Clear    Glucose Urine Negative Negative mg/dL    Bilirubin Urine Negative Negative    Ketones Urine Negative Negative mg/dL    Specific Gravity Urine 1.020 1.003 - 1.035    Blood Urine Small (A) Negative    pH Urine 5.5 5.0 - 7.0    Protein Albumin Urine Trace (A) Negative mg/dL    Urobilinogen Urine 0.2 0.2, 1.0 E.U./dL    Nitrite Urine Positive (A) Negative    Leukocyte Esterase Urine Large (A) Negative   CBC with platelets and differential    Collection Time: 09/03/21 10:10 AM   Result Value Ref Range    WBC Count 7.1 4.0 - 11.0 10e3/uL    RBC Count 4.83 3.80 - 5.20 10e6/uL    Hemoglobin 14.2 11.7 - 15.7 g/dL    Hematocrit 44.9 35.0 - 47.0 %    MCV 93 78 - 100 fL    MCH 29.4 26.5 - 33.0 pg    MCHC 31.6 31.5 - 36.5 g/dL    RDW 13.3 10.0 - 15.0 %    Platelet Count 210 150 - 450 10e3/uL    % Neutrophils 58 %    % Lymphocytes 29 %    % Monocytes 8 %    % Eosinophils 6 %    % Basophils 0 %    Absolute Neutrophils 4.1 1.6 - 8.3 10e3/uL    Absolute Lymphocytes 2.1 0.8 - 5.3 10e3/uL    Absolute Monocytes 0.5 0.0 - 1.3 10e3/uL     Absolute Eosinophils 0.4 0.0 - 0.7 10e3/uL    Absolute Basophils 0.0 0.0 - 0.2 10e3/uL   Urine Microscopic Exam    Collection Time: 09/03/21 10:10 AM   Result Value Ref Range    RBC Urine None Seen 0-2 /HPF /HPF    WBC Urine 5-10 (A) 0-5 /HPF /HPF    Squamous Epithelials Urine Few (A) None Seen /LPF      EKG was normal in 5/2021, patient asymptomatic     Revised Cardiac Risk Index (RCRI):  The patient has the following serious cardiovascular risks for perioperative complications:   - No serious cardiac risks = 0 points     RCRI Interpretation: 0 points: Class I (very low risk - 0.4% complication rate)           Signed Electronically by: KAREN Unger CNP  Copy of this evaluation report is provided to requesting physician.

## 2021-09-05 LAB — BACTERIA UR CULT: NORMAL

## 2021-09-12 ENCOUNTER — LAB (OUTPATIENT)
Dept: LAB | Facility: CLINIC | Age: 71
End: 2021-09-12
Attending: UROLOGY
Payer: COMMERCIAL

## 2021-09-12 DIAGNOSIS — Z11.59 ENCOUNTER FOR SCREENING FOR OTHER VIRAL DISEASES: ICD-10-CM

## 2021-09-12 PROCEDURE — U0005 INFEC AGEN DETEC AMPLI PROBE: HCPCS

## 2021-09-12 PROCEDURE — U0003 INFECTIOUS AGENT DETECTION BY NUCLEIC ACID (DNA OR RNA); SEVERE ACUTE RESPIRATORY SYNDROME CORONAVIRUS 2 (SARS-COV-2) (CORONAVIRUS DISEASE [COVID-19]), AMPLIFIED PROBE TECHNIQUE, MAKING USE OF HIGH THROUGHPUT TECHNOLOGIES AS DESCRIBED BY CMS-2020-01-R: HCPCS

## 2021-09-13 LAB — SARS-COV-2 RNA RESP QL NAA+PROBE: NEGATIVE

## 2021-09-15 ENCOUNTER — ANESTHESIA EVENT (OUTPATIENT)
Dept: SURGERY | Facility: CLINIC | Age: 71
End: 2021-09-15
Payer: MEDICARE

## 2021-09-16 ENCOUNTER — ANESTHESIA (OUTPATIENT)
Dept: SURGERY | Facility: CLINIC | Age: 71
End: 2021-09-16
Payer: MEDICARE

## 2021-09-16 ENCOUNTER — HOSPITAL ENCOUNTER (OUTPATIENT)
Facility: CLINIC | Age: 71
Discharge: HOME | End: 2021-09-16
Attending: UROLOGY | Admitting: UROLOGY
Payer: MEDICARE

## 2021-09-16 ENCOUNTER — APPOINTMENT (OUTPATIENT)
Dept: GENERAL RADIOLOGY | Facility: CLINIC | Age: 71
End: 2021-09-16
Attending: UROLOGY
Payer: MEDICARE

## 2021-09-16 VITALS
HEIGHT: 67 IN | SYSTOLIC BLOOD PRESSURE: 131 MMHG | WEIGHT: 200 LBS | OXYGEN SATURATION: 97 % | HEART RATE: 56 BPM | RESPIRATION RATE: 15 BRPM | TEMPERATURE: 97.6 F | BODY MASS INDEX: 31.39 KG/M2 | DIASTOLIC BLOOD PRESSURE: 53 MMHG

## 2021-09-16 DIAGNOSIS — C7A.00 MALIGNANT CARCINOID TUMOR, UNSPECIFIED SITE (H): Primary | ICD-10-CM

## 2021-09-16 DIAGNOSIS — N13.39 OTHER HYDRONEPHROSIS: ICD-10-CM

## 2021-09-16 DIAGNOSIS — N28.89 HEMORRHAGE OF LEFT KIDNEY: ICD-10-CM

## 2021-09-16 PROCEDURE — 255N000002 HC RX 255 OP 636: Performed by: UROLOGY

## 2021-09-16 PROCEDURE — 250N000011 HC RX IP 250 OP 636: Performed by: UROLOGY

## 2021-09-16 PROCEDURE — 258N000003 HC RX IP 258 OP 636: Performed by: NURSE ANESTHETIST, CERTIFIED REGISTERED

## 2021-09-16 PROCEDURE — 52332 CYSTOSCOPY AND TREATMENT: CPT | Mod: LT | Performed by: UROLOGY

## 2021-09-16 PROCEDURE — 370N000017 HC ANESTHESIA TECHNICAL FEE, PER MIN: Performed by: UROLOGY

## 2021-09-16 PROCEDURE — 250N000013 HC RX MED GY IP 250 OP 250 PS 637: Performed by: NURSE ANESTHETIST, CERTIFIED REGISTERED

## 2021-09-16 PROCEDURE — 710N000012 HC RECOVERY PHASE 2, PER MINUTE: Performed by: UROLOGY

## 2021-09-16 PROCEDURE — 272N000001 HC OR GENERAL SUPPLY STERILE: Performed by: UROLOGY

## 2021-09-16 PROCEDURE — 250N000009 HC RX 250: Performed by: NURSE ANESTHETIST, CERTIFIED REGISTERED

## 2021-09-16 PROCEDURE — C2617 STENT, NON-COR, TEM W/O DEL: HCPCS | Performed by: UROLOGY

## 2021-09-16 PROCEDURE — 999N000141 HC STATISTIC PRE-PROCEDURE NURSING ASSESSMENT: Performed by: UROLOGY

## 2021-09-16 PROCEDURE — 74420 UROGRAPHY RTRGR +-KUB: CPT | Mod: 26 | Performed by: UROLOGY

## 2021-09-16 PROCEDURE — C1758 CATHETER, URETERAL: HCPCS | Performed by: UROLOGY

## 2021-09-16 PROCEDURE — C1769 GUIDE WIRE: HCPCS | Performed by: UROLOGY

## 2021-09-16 PROCEDURE — 999N000179 XR SURGERY CARM FLUORO LESS THAN 5 MIN W STILLS

## 2021-09-16 PROCEDURE — 250N000011 HC RX IP 250 OP 636: Performed by: NURSE ANESTHETIST, CERTIFIED REGISTERED

## 2021-09-16 PROCEDURE — 360N000082 HC SURGERY LEVEL 2 W/ FLUORO, PER MIN: Performed by: UROLOGY

## 2021-09-16 DEVICE — STENT URETERAL PERCUFLEX PLUS 6FRX24CM M0061752620
Type: IMPLANTABLE DEVICE | Site: URETER | Status: NON-FUNCTIONAL
Removed: 2022-03-24

## 2021-09-16 RX ORDER — SODIUM CHLORIDE, SODIUM LACTATE, POTASSIUM CHLORIDE, CALCIUM CHLORIDE 600; 310; 30; 20 MG/100ML; MG/100ML; MG/100ML; MG/100ML
INJECTION, SOLUTION INTRAVENOUS CONTINUOUS
Status: DISCONTINUED | OUTPATIENT
Start: 2021-09-16 | End: 2021-09-16 | Stop reason: HOSPADM

## 2021-09-16 RX ORDER — ONDANSETRON 2 MG/ML
INJECTION INTRAMUSCULAR; INTRAVENOUS PRN
Status: DISCONTINUED | OUTPATIENT
Start: 2021-09-16 | End: 2021-09-16

## 2021-09-16 RX ORDER — NALOXONE HYDROCHLORIDE 0.4 MG/ML
0.2 INJECTION, SOLUTION INTRAMUSCULAR; INTRAVENOUS; SUBCUTANEOUS
Status: DISCONTINUED | OUTPATIENT
Start: 2021-09-16 | End: 2021-09-16 | Stop reason: HOSPADM

## 2021-09-16 RX ORDER — ONDANSETRON 4 MG/1
4 TABLET, ORALLY DISINTEGRATING ORAL EVERY 30 MIN PRN
Status: DISCONTINUED | OUTPATIENT
Start: 2021-09-16 | End: 2021-09-16 | Stop reason: HOSPADM

## 2021-09-16 RX ORDER — ACETAMINOPHEN 325 MG/1
975 TABLET ORAL ONCE
Status: COMPLETED | OUTPATIENT
Start: 2021-09-16 | End: 2021-09-16

## 2021-09-16 RX ORDER — GABAPENTIN 100 MG/1
100 CAPSULE ORAL
Status: DISCONTINUED | OUTPATIENT
Start: 2021-09-16 | End: 2021-09-16 | Stop reason: HOSPADM

## 2021-09-16 RX ORDER — CEFAZOLIN SODIUM 2 G/100ML
2 INJECTION, SOLUTION INTRAVENOUS SEE ADMIN INSTRUCTIONS
Status: DISCONTINUED | OUTPATIENT
Start: 2021-09-16 | End: 2021-09-16 | Stop reason: HOSPADM

## 2021-09-16 RX ORDER — MAGNESIUM SULFATE HEPTAHYDRATE 40 MG/ML
2 INJECTION, SOLUTION INTRAVENOUS ONCE
Status: COMPLETED | OUTPATIENT
Start: 2021-09-16 | End: 2021-09-16

## 2021-09-16 RX ORDER — OXYCODONE HYDROCHLORIDE 5 MG/1
5 TABLET ORAL EVERY 4 HOURS PRN
Status: DISCONTINUED | OUTPATIENT
Start: 2021-09-16 | End: 2021-09-16 | Stop reason: HOSPADM

## 2021-09-16 RX ORDER — CELECOXIB 200 MG/1
200 CAPSULE ORAL ONCE
Status: COMPLETED | OUTPATIENT
Start: 2021-09-16 | End: 2021-09-16

## 2021-09-16 RX ORDER — LIDOCAINE HYDROCHLORIDE 10 MG/ML
INJECTION, SOLUTION INFILTRATION; PERINEURAL PRN
Status: DISCONTINUED | OUTPATIENT
Start: 2021-09-16 | End: 2021-09-16

## 2021-09-16 RX ORDER — NALOXONE HYDROCHLORIDE 0.4 MG/ML
0.4 INJECTION, SOLUTION INTRAMUSCULAR; INTRAVENOUS; SUBCUTANEOUS
Status: DISCONTINUED | OUTPATIENT
Start: 2021-09-16 | End: 2021-09-16 | Stop reason: HOSPADM

## 2021-09-16 RX ORDER — PROPOFOL 10 MG/ML
INJECTION, EMULSION INTRAVENOUS CONTINUOUS PRN
Status: DISCONTINUED | OUTPATIENT
Start: 2021-09-16 | End: 2021-09-16

## 2021-09-16 RX ORDER — MEPERIDINE HYDROCHLORIDE 25 MG/ML
12.5 INJECTION INTRAMUSCULAR; INTRAVENOUS; SUBCUTANEOUS
Status: DISCONTINUED | OUTPATIENT
Start: 2021-09-16 | End: 2021-09-16 | Stop reason: HOSPADM

## 2021-09-16 RX ORDER — LIDOCAINE 40 MG/G
CREAM TOPICAL
Status: DISCONTINUED | OUTPATIENT
Start: 2021-09-16 | End: 2021-09-16 | Stop reason: HOSPADM

## 2021-09-16 RX ORDER — CEFAZOLIN SODIUM 2 G/100ML
2 INJECTION, SOLUTION INTRAVENOUS
Status: COMPLETED | OUTPATIENT
Start: 2021-09-16 | End: 2021-09-16

## 2021-09-16 RX ORDER — ONDANSETRON 2 MG/ML
4 INJECTION INTRAMUSCULAR; INTRAVENOUS EVERY 30 MIN PRN
Status: DISCONTINUED | OUTPATIENT
Start: 2021-09-16 | End: 2021-09-16 | Stop reason: HOSPADM

## 2021-09-16 RX ORDER — FENTANYL CITRATE 50 UG/ML
25 INJECTION, SOLUTION INTRAMUSCULAR; INTRAVENOUS
Status: DISCONTINUED | OUTPATIENT
Start: 2021-09-16 | End: 2021-09-16 | Stop reason: HOSPADM

## 2021-09-16 RX ORDER — IOPAMIDOL 612 MG/ML
INJECTION, SOLUTION INTRAVASCULAR PRN
Status: DISCONTINUED | OUTPATIENT
Start: 2021-09-16 | End: 2021-09-16 | Stop reason: HOSPADM

## 2021-09-16 RX ORDER — FENTANYL CITRATE 50 UG/ML
INJECTION, SOLUTION INTRAMUSCULAR; INTRAVENOUS PRN
Status: DISCONTINUED | OUTPATIENT
Start: 2021-09-16 | End: 2021-09-16

## 2021-09-16 RX ADMIN — LIDOCAINE HYDROCHLORIDE 30 MG: 10 INJECTION, SOLUTION INFILTRATION; PERINEURAL at 09:06

## 2021-09-16 RX ADMIN — ACETAMINOPHEN 975 MG: 325 TABLET, FILM COATED ORAL at 08:17

## 2021-09-16 RX ADMIN — PROPOFOL 150 MCG/KG/MIN: 10 INJECTION, EMULSION INTRAVENOUS at 09:07

## 2021-09-16 RX ADMIN — FENTANYL CITRATE 50 MCG: 50 INJECTION, SOLUTION INTRAMUSCULAR; INTRAVENOUS at 09:35

## 2021-09-16 RX ADMIN — MAGNESIUM SULFATE HEPTAHYDRATE 2 G: 40 INJECTION, SOLUTION INTRAVENOUS at 09:13

## 2021-09-16 RX ADMIN — CELECOXIB 200 MG: 200 CAPSULE ORAL at 08:17

## 2021-09-16 RX ADMIN — LIDOCAINE HYDROCHLORIDE 0.1 ML: 10 INJECTION, SOLUTION EPIDURAL; INFILTRATION; INTRACAUDAL; PERINEURAL at 08:28

## 2021-09-16 RX ADMIN — FENTANYL CITRATE 50 MCG: 50 INJECTION, SOLUTION INTRAMUSCULAR; INTRAVENOUS at 09:30

## 2021-09-16 RX ADMIN — SODIUM CHLORIDE, POTASSIUM CHLORIDE, SODIUM LACTATE AND CALCIUM CHLORIDE: 600; 310; 30; 20 INJECTION, SOLUTION INTRAVENOUS at 08:27

## 2021-09-16 RX ADMIN — ONDANSETRON 4 MG: 2 INJECTION INTRAMUSCULAR; INTRAVENOUS at 09:13

## 2021-09-16 RX ADMIN — PHENYLEPHRINE HYDROCHLORIDE 100 MCG: 10 INJECTION INTRAVENOUS at 09:26

## 2021-09-16 RX ADMIN — CEFAZOLIN SODIUM 2 G: 2 INJECTION, SOLUTION INTRAVENOUS at 09:08

## 2021-09-16 ASSESSMENT — MIFFLIN-ST. JEOR: SCORE: 1444.82

## 2021-09-16 ASSESSMENT — LIFESTYLE VARIABLES: TOBACCO_USE: 1

## 2021-09-16 NOTE — BRIEF OP NOTE
[facility] Brief Operative Note    Pre-operative diagnosis: Other hydronephrosis [N13.39]   Post-operative diagnosis same   Procedure: Procedure(s):  CYSTOSCOPY, WITH RETROGRADE PYELOGRAM AND URETERAL STENT REPLACEMENT   Surgeon(s): Surgeon(s) and Role:     * Zhao La MD - Primary   Estimated blood loss: 1 cc   Specimens: * No specimens in log *   Findings:  Drains:     No complications No hydro, minimal stent encrustation  6 x 24 JJ stent x 2 on left

## 2021-09-16 NOTE — ANESTHESIA PREPROCEDURE EVALUATION
Anesthesia Pre-Procedure Evaluation    Patient: Marissa Guadarrama   MRN: [id number] : 1950        Preoperative Diagnosis: Other hydronephrosis [N13.39]   Procedure : Procedure(s):  CYSTOSCOPY, WITH RETROGRADE PYELOGRAM AND URETERAL STENT REPLACEMENT     Past Medical History:   Diagnosis Date     Arthritis     knee     Benign breast biopsy     benign     Carcinoid tumor 2003     Cervical cancer (H)      H/O colposcopy with cervical biopsy 13    vaginal cuff biopsy- VAIN III. referred back to gyn/onc     HTN      Hyperlipidemia      Obesity      Pap smear of vagina with ASC-H 13     Post-polio syndromes      Trigeminal neuralgias       Past Surgical History:   Procedure Laterality Date     APPENDECTOMY       BIOPSY NODE SENTINEL Bilateral 2016    Procedure: BIOPSY NODE SENTINEL;  Surgeon: Brent Arana MD;  Location: [facility]     C BSO, OMENTECTOMY W/OSMAN  2007     C TOTAL ABDOM HYSTERECTOMY  2007     CL AFF SURGICAL PATHOLOGY       COLONOSCOPY N/A 2017    Procedure: COMBINED COLONOSCOPY, SINGLE OR MULTIPLE BIOPSY/POLYPECTOMY BY BIOPSY;  Colonoscopy Dx:Carcinoid tumor of colon prep mailed golytely;  Surgeon: Talisha Greco MD;  [facility]     COLPOSCOPY, BIOPSY, COMBINED  3/13/2014    Procedure: COMBINED COLPOSCOPY, BIOPSY;;  Surgeon: Lara Pack MD;  [facility]     COMBINED CYSTOSCOPY, RETROGRADES, EXCHANGE STENT URETER(S) Left 2019    Procedure: COMBINED CYSTOSCOPY, RETROGRADES, EXCHANGE STENT URETER--left;  Surgeon: Zhao La MD;  Location: [facility]     COMBINED CYSTOSCOPY, RETROGRADES, EXCHANGE STENT URETER(S) Left 2020    Procedure: CYSTOSCOPY, WITH RETROGRADE PYELOGRAM AND Left URETERAL STENT exchange;  Surgeon: Zhao La MD;  [facility]: [facility]     COMBINED CYSTOSCOPY, RETROGRADES, EXCHANGE STENT URETER(S) Left 2021    Procedure: CYSTOSCOPY, WITH RETROGRADE PYELOGRAM AND LEFT URETERAL STENT  REPLACEMENT;  Surgeon: Fred Owens MD;  [facility]     COMBINED CYSTOSCOPY, RETROGRADES, URETEROSCOPY, INSERT STENT Left 2/2/2017    Procedure: COMBINED CYSTOSCOPY, RETROGRADES, URETEROSCOPY, INSERT STENT;  Surgeon: Zhao La MD;  Location: [facility]     CYSTOSCOPY, RETROGRADES, INSERT STENT URETER(S), COMBINED Left 9/7/2017    Procedure: COMBINED CYSTOSCOPY, RETROGRADES, INSERT STENT URETER(S);  Cystoscopy,Left Stent Exchange;  Surgeon: Zhao La MD;  [facility]: [facility]     CYSTOSCOPY, RETROGRADES, INSERT STENT URETER(S), COMBINED  12/12/2017    Procedure: COMBINED CYSTOSCOPY, RETROGRADES, INSERT STENT URETER(S);;  Surgeon: Zhao La MD;  [facility]     CYSTOSCOPY, RETROGRADES, INSERT STENT URETER(S), COMBINED Left 7/5/2018    Procedure: COMBINED CYSTOSCOPY, RETROGRADES, INSERT STENT URETER(S);  Cystoscopy, Left Stent Exchange;  Surgeon: Zhao La MD;  Location: [facility]     EXAM UNDER ANESTHESIA PELVIC  3/13/2014    Procedure: EXAM UNDER ANESTHESIA PELVIC;  Exam Under Anestheisa, Colposcopy, Vaginal Biopsies, Co2 Laser of the Upper Vagina;  Surgeon: Lara Pack MD;  [facility]     EXAM UNDER ANESTHESIA PELVIC N/A 7/1/2020    Procedure: Examination under anesthesia, vaginal biopsies;  Surgeon: Karli Gao MD;  [facility]     HERNIORRHAPHY INCISIONAL (LOCATION)       IR RHIZOTOMY  8/14/2020     LASER CO2 VAGINA  3/13/2014    VAIN 1/2     LASER CO2 VAGINA N/A 12/12/2017    Procedure: LASER CO2 VAGINA;  Exam Under Anesthesia, CO2 Laser Ablation Of Vagina, Cystoscopy, Left Retrograde Pyelogram with Left Stent Placement;  Surgeon: Nic Segundo MD;  [facility]     LUMPECTOMY BREAST WITH SEED LOCALIZATION Bilateral 6/1/2016    Procedure: LUMPECTOMY BREAST WITH SEED LOCALIZATION;  Surgeon: Brent Arana MD;  Location: [facility]     SURGICAL HISTORY OF -       ovarian cystectomy     SURGICAL HISTORY OF -        right colon resection secondary to carcinoid tumor     TUBAL LIGATION        No Known Allergies   Social History     Tobacco Use     Smoking status: Former Smoker     Packs/day: 1.00     Years: 29.00     Pack years: 29.00     Types: Cigarettes     Quit date: 10/30/2006     Years since quittin.8     Smokeless tobacco: Never Used   Substance Use Topics     Alcohol use: No     Alcohol/week: 0.0 standard drinks     Comment: 1 drink per year      Wt Readings from Last 1 Encounters:   21 90.7 kg (200 lb)        Anesthesia Evaluation   Pt has had prior anesthetic. Type: General and MAC.    No history of anesthetic complications       ROS/MED HX  ENT/Pulmonary:     (+) tobacco use, Past use,     Neurologic: Comment: Post-polio syndromes   Trigeminal neuralgias- residual numbness on L face up from upper lip up into head above L temple.  Weakness in L leg          Cardiovascular:     (+) Dyslipidemia hypertension-----Previous cardiac testing   Echo: Date: 16 Results:  Interpretation Summary     Global and regional left ventricular function is normal with an EF of 60-65%.  Global right ventricular function is normal.  No significant valvular dysfunction present.  Pulmonary artery systolic pressure is normal.  The inferior vena cava was normal in size with preserved respiratory  variability.  No pericardial effusion is present  Stress Test: Date: Results:    ECG Reviewed: Date: 21 Results:  Sinus Rhythm   -Old anteroseptal infarct  Cath: Date: Results:      METS/Exercise Tolerance:     Hematologic:       Musculoskeletal:   (+) arthritis (RA),     GI/Hepatic:  - neg GI/hepatic ROS     Renal/Genitourinary: Comment: Ureteral obstruction, left  hydronephrosis        (+) renal disease, type: CRI,     Endo:     (+) Obesity,     Psychiatric/Substance Use:  - neg psychiatric ROS     Infectious Disease:  - neg infectious disease ROS     Malignancy: Comment: Carcinoid tumor of cecum  Peritoneal metastases    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   (+) Malignancy, History of Other, GI and Breast.Breast CA status post Surgery.  GI CA status post Chemo.  Other CA CERVICAL status post Surgery.    Other:  - neg other ROS          Physical Exam    Airway        Mallampati: II   TM distance: > 3 FB   Neck ROM: full     Respiratory Devices and Support         Dental  no notable dental history         Cardiovascular   cardiovascular exam normal          Pulmonary   pulmonary exam normal                OUTSIDE LABS:  CBC:   Lab Results   Component Value Date    WBC 7.1 09/03/2021    WBC 7.4 07/02/2021    HGB 14.2 09/03/2021    HGB 13.4 07/02/2021    HCT 44.9 09/03/2021    HCT 41.9 07/02/2021     09/03/2021     07/02/2021     BMP:   Lab Results   Component Value Date     09/03/2021     07/02/2021    POTASSIUM 3.1 (L) 09/03/2021    POTASSIUM 3.3 (L) 07/02/2021    CHLORIDE 105 09/03/2021    CHLORIDE 104 07/02/2021    CO2 33 (H) 09/03/2021    CO2 32 07/02/2021    BUN 25 09/03/2021    BUN 18 07/02/2021    CR 1.42 (H) 09/03/2021    CR 1.19 (H) 07/02/2021    GLC 84 09/03/2021    GLC 92 07/02/2021     COAGS:   Lab Results   Component Value Date    PTT 30 05/08/2021    INR 1.34 (H) 05/09/2021     POC:   Lab Results   Component Value Date     (H) 05/15/2021     HEPATIC:   Lab Results   Component Value Date    ALBUMIN 3.3 (L) 07/02/2021    PROTTOTAL 7.4 07/02/2021    ALT 25 07/02/2021    AST 27 07/02/2021    ALKPHOS 86 07/02/2021    BILITOTAL 0.7 07/02/2021     OTHER:   Lab Results   Component Value Date    LACT 1.1 05/08/2021    MARILIA 9.4 09/03/2021    MAG 1.6 05/10/2021    LIPASE 139 05/08/2021    TSH 2.54 09/28/2015    .0 (H) 05/14/2021       Anesthesia Plan    ASA Status:  3      Anesthesia Type: MAC.     - Reason for MAC: straight local not clinically adequate              Consents            Postoperative Care       PONV prophylaxis: Ondansetron (or other  5HT-3), Dexamethasone or Solumedrol, Background Propofol Infusion     Comments:                KAREN Ma CRNA

## 2021-09-16 NOTE — ANESTHESIA CARE TRANSFER NOTE
Patient: Marissa Guadarrama    Procedure(s):  CYSTOSCOPY, WITH RETROGRADE PYELOGRAM AND URETERAL STENT REPLACEMENT    Diagnosis: Other hydronephrosis [N13.39]  Diagnosis Additional Information: No value filed.    Anesthesia Type:   MAC     Note:    Oropharynx: oropharynx clear of all foreign objects  Level of Consciousness: awake  Oxygen Supplementation: face mask  Level of Supplemental Oxygen (L/min / FiO2): 6  Independent Airway: airway patency satisfactory and stable  Dentition: dentition unchanged  Vital Signs Stable: post-procedure vital signs reviewed and stable  Report to RN Given: handoff report given  Patient transferred to: Phase II    Handoff Report: Identifed the Patient, Identified the Reponsible Provider, Reviewed the pertinent medical history, Discussed the surgical course, Reviewed Intra-OP anesthesia mangement and issues during anesthesia, Set expectations for post-procedure period and Allowed opportunity for questions and acknowledgement of understanding      Vitals: see RN flowsheet for VS post-op  Vitals Value Taken Time   BP     Temp     Pulse     Resp     SpO2         Electronically Signed By: KAREN Morales CRNA  September 16, 2021  10:02 AM

## 2021-09-16 NOTE — ANESTHESIA POSTPROCEDURE EVALUATION
Patient: Marissa Guadarrama    Procedure(s):  CYSTOSCOPY, WITH RETROGRADE PYELOGRAM AND URETERAL STENT REPLACEMENT    Diagnosis:Other hydronephrosis [N13.39]  Diagnosis Additional Information: No value filed.    Anesthesia Type:  MAC    Note:  Disposition: Outpatient   Postop Pain Control: Uneventful            Sign Out: Well controlled pain   PONV: No   Neuro/Psych: Uneventful            Sign Out: Acceptable/Baseline neuro status   Airway/Respiratory: Uneventful            Sign Out: Acceptable/Baseline resp. status   CV/Hemodynamics: Uneventful            Sign Out: Acceptable CV status; No obvious hypovolemia; No obvious fluid overload   Other NRE:    DID A NON-ROUTINE EVENT OCCUR?            Last vitals:  Vitals Value Taken Time   BP     Temp     Pulse     Resp     SpO2         Electronically Signed By: KAREN Morales CRNA  September 16, 2021  9:28 AM

## 2021-09-16 NOTE — OP NOTE
Procedure Date: 09/16/2021    PREOPERATIVE DIAGNOSIS:  Obstructed left ureter.    POSTOPERATIVE DIAGNOSIS:  Obstructed left ureter.    PROCEDURE:  Cystoscopy, left retrograde pyelogram, left stent exchange.    INDICATIONS FOR PROCEDURE:  Ms. 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  She is being managed by chronic stent exchanges.  She presents today for routine stent exchange.    DESCRIPTION OF PROCEDURE:  After informed consent was obtained, the patient was brought to the operating room where she was administered MAC anesthesia.  After suitable level of anesthesia was obtained, she was placed in lithotomy position with all pressure points padded.  She was administered preoperative antibiotics.  She was prepped and draped in standard sterile fashion.  Next, a 22-Sinhala cystoscope was introduced in the patient's bladder.  The two tandem stents could be seen emanating from the left UO that had a mild amount of crust on them.  The last stent exchange was roughly 4 months ago.  One of the stents was secured with a grasper and brought to the urethral meatus under fluoroscopic guidance into the kidney.  We then placed a Sensor wire through this stent and advanced into the kidney under fluoroscopic guidance.  The second stent migrated to the urethral meatus as we removed the first stent and this was secured and the sensor wire was placed through it as well and advanced to the kidney under fluoroscopic guidance and both stents were removed.  We then used a dual lumen catheter to shoot a retrograde pyelogram, which revealed sharp calices without significant hydronephrosis, having persistent filling defect in the distal ureter consistent with the known carcinoid obstruction.  We then placed two 6 x 24 double-J stent over the wires and advanced these simultaneously up the ureter into the kidney under fluoroscopic guidance.  The proximal curl was  confirmed by fluoroscopy, the distal curl was confirmed by fluoroscopy and direct vision.  The patient's bladder was then drained.  She was awakened and brought to recovery room in stable condition.    SURGEON:  Zhao La MD    ESTIMATED BLOOD LOSS:  1 mL    COMPLICATIONS:  There were no immediate complications noted.    DRAINS:  Include a 6 x 24 double-J stent x2 on the left.        Zhao La MD        D: 2021   T: 2021   MT: [id number]    Name:     ROSALINDA LANCASTERKenn  MRN:      [id number]        Account:        [id number]   :      1950           Procedure Date: 2021     Document: [id number]

## 2021-09-16 NOTE — DISCHARGE INSTRUCTIONS
Same Day Surgery Discharge Instructions  Special Precautions After Surgery - Adult    1. It is not unusual to feel lightheaded or faint, up to 24 hours after surgery or while taking pain medication.  If you have these symptoms; sit for a few minutes before standing and have someone assist you when getting up.  2. You should rest and relax for the next 24 hours and must have someone stay with you for at least 24 hours after your discharge.  3. DO NOT DRIVE any vehicle or operate mechanical equipment for 24 hours following the end of your surgery.  DO NOT DRIVE while taking narcotic pain medications that have been prescribed by your physician.  If you had a limb operated on, you must be able to use it fully to drive.  4. DO NOT drink alcoholic beverages for 24 hours following surgery or while taking prescription pain medication.  5. Drink clear liquids (apple juice, ginger ale, broth, 7-Up, etc.).  Progress to your regular diet as you feel able.  6. Any questions call your physician and do not make important decisions for 24 hours.      Nausea and Vomiting: Nausea and vomiting can occur any time after receiving anesthesia. If you experience nausea and vomiting we encourage you to move to a clear liquid diet and advance your diet as tolerated. If nausea and vomiting do not improve within 12 hours please call the surgeon or present to the Emergency department.     Break-through Bleeding: If your experience bleeding from your surgical site apply pressure and additional dressing per nurse instruction. For simple problems such as a saturated dressing, you may need to reinforce the dressing with more gauze and tape and put slight pressure on the site. If bleeding does not subside contact the surgeon or present to the Emergency Department.    Post-op Infection: If you develop a fever of 100.4 or greater, have pus like drainage, redness, swelling or severe pain at the surgical site not alleviated with pain  medications; please contact the surgeon or present to the Emergency Department.    __________________________________________________________________________________________________________________________________  IMPORTANT NUMBERS:    [facility] Main Number:  [phone], [phone]  Pharmacy:  [phone]  Same Day Surgery:  [phone], Monday - Friday until 8:30 p.m.  Urgent Care:  [phone]  Emergency Room:  [phone]      [facility]:  [phone]                                                                             [facility]:  [phone] option [address] [place] [address] Orthopedics:  [phone]     [facility]:  [phone]   [facility]:  [phone]   Home Medical Equipment: [phone]  [facility]:  [phone]          Notify physician if fever/chills/sweats    You may see blood in the urine while the stents are in place.    The clinic will contact you for next steps.

## 2021-09-21 DIAGNOSIS — N28.89 HEMORRHAGE OF LEFT KIDNEY: ICD-10-CM

## 2021-09-21 DIAGNOSIS — C7A.00 MALIGNANT CARCINOID TUMOR, UNSPECIFIED SITE (H): Primary | ICD-10-CM

## 2021-11-12 ENCOUNTER — HOSPITAL ENCOUNTER (OUTPATIENT)
Dept: NUCLEAR MEDICINE | Facility: CLINIC | Age: 71
Setting detail: NUCLEAR MEDICINE
End: 2021-11-12
Attending: UROLOGY
Payer: MEDICARE

## 2021-11-12 DIAGNOSIS — N28.89 HEMORRHAGE OF LEFT KIDNEY: ICD-10-CM

## 2021-11-12 DIAGNOSIS — C7A.00 MALIGNANT CARCINOID TUMOR, UNSPECIFIED SITE (H): ICD-10-CM

## 2021-11-12 PROCEDURE — A9562 TC99M MERTIATIDE: HCPCS | Performed by: UROLOGY

## 2021-11-12 PROCEDURE — 78708 K FLOW/FUNCT IMAGE W/DRUG: CPT

## 2021-11-12 PROCEDURE — 250N000011 HC RX IP 250 OP 636: Performed by: UROLOGY

## 2021-11-12 PROCEDURE — 343N000001 HC RX 343: Performed by: UROLOGY

## 2021-11-12 RX ORDER — FUROSEMIDE 10 MG/ML
20 INJECTION INTRAMUSCULAR; INTRAVENOUS ONCE
Status: COMPLETED | OUTPATIENT
Start: 2021-11-12 | End: 2021-11-12

## 2021-11-12 RX ADMIN — FUROSEMIDE 20 MG: 10 INJECTION, SOLUTION INTRAMUSCULAR; INTRAVENOUS at 08:55

## 2021-11-12 RX ADMIN — TECHNESCAN TC 99M MERTIATIDE 6.8 MILLICURIE: 1 INJECTION, POWDER, LYOPHILIZED, FOR SOLUTION INTRAVENOUS at 08:35

## 2021-11-19 ENCOUNTER — HOSPITAL ENCOUNTER (OUTPATIENT)
Dept: CT IMAGING | Facility: CLINIC | Age: 71
Discharge: HOME | End: 2021-11-19
Attending: UROLOGY | Admitting: UROLOGY
Payer: MEDICARE

## 2021-11-19 DIAGNOSIS — C7A.00 MALIGNANT CARCINOID TUMOR, UNSPECIFIED SITE (H): ICD-10-CM

## 2021-11-19 DIAGNOSIS — N28.89 HEMORRHAGE OF LEFT KIDNEY: ICD-10-CM

## 2021-11-19 LAB
CREAT BLD-MCNC: 1.6 MG/DL (ref 0.5–1)
GFR SERPL CREATININE-BSD FRML MDRD: 32 ML/MIN/1.73M2

## 2021-11-19 PROCEDURE — 74176 CT ABD & PELVIS W/O CONTRAST: CPT

## 2021-11-19 PROCEDURE — 82565 ASSAY OF CREATININE: CPT

## 2021-11-19 RX ORDER — IOPAMIDOL 755 MG/ML
97 INJECTION, SOLUTION INTRAVASCULAR ONCE
Status: DISCONTINUED | OUTPATIENT
Start: 2021-11-19 | End: 2021-11-19

## 2021-12-09 ENCOUNTER — PREP FOR PROCEDURE (OUTPATIENT)
Dept: UROLOGY | Facility: CLINIC | Age: 71
End: 2021-12-09

## 2021-12-09 ENCOUNTER — OFFICE VISIT (OUTPATIENT)
Dept: UROLOGY | Facility: CLINIC | Age: 71
End: 2021-12-09
Payer: COMMERCIAL

## 2021-12-09 VITALS — RESPIRATION RATE: 16 BRPM | DIASTOLIC BLOOD PRESSURE: 74 MMHG | SYSTOLIC BLOOD PRESSURE: 142 MMHG | HEART RATE: 66 BPM

## 2021-12-09 DIAGNOSIS — N13.5 OBSTRUCTION OF LEFT URETER: Primary | ICD-10-CM

## 2021-12-09 DIAGNOSIS — N13.39 OTHER HYDRONEPHROSIS: Primary | ICD-10-CM

## 2021-12-09 PROCEDURE — 99214 OFFICE O/P EST MOD 30 MIN: CPT | Performed by: UROLOGY

## 2021-12-09 RX ORDER — CEFAZOLIN SODIUM 2 G/50ML
2 SOLUTION INTRAVENOUS SEE ADMIN INSTRUCTIONS
Status: CANCELLED | OUTPATIENT
Start: 2021-12-09

## 2021-12-09 RX ORDER — CEFAZOLIN SODIUM 2 G/50ML
2 SOLUTION INTRAVENOUS
Status: CANCELLED | OUTPATIENT
Start: 2021-12-09

## 2021-12-09 NOTE — NURSING NOTE
"Initial BP (!) 142/74 (BP Location: Right arm, Patient Position: Chair, Cuff Size: Adult Regular)   Pulse 66   Resp 16  Estimated body mass index is 31.32 kg/m  as calculated from the following:    Height as of 9/16/21: 1.702 m (5' 7\").    Weight as of 9/16/21: 90.7 kg (200 lb). .    Patient is here for a follow up to her ct scan,  irena storm LPN    "

## 2021-12-10 NOTE — PROGRESS NOTES
HPI      REASON FOR VISIT TODAY:  Obstructed left ureter and carcinoid tumor.      HISTORY:  Ms. Guadarrama is a 71-year-old woman followed in our clinic for history of an obstructed left ureter secondary to a metastatic carcinoid deposit on the mid ureter on the left side.  She currently has tandem stents on that side. The kidney function on that side was measured to be 20% previously.  The patient is also known to have a lesion on her liver of approximately 4 cm that in retrospect was stable since 2016.  The patient's liver biopsy from 06/12/2019 shows metastatic low-grade neuroendocrine tumor for which she is on observation.  The patient notes no major changes in her health since we last saw her.  Her last stent exchange was  9/16/21.    OBJECTIVE:  Renal Scan from 11/12/21 shows the left kidney with 10% split function     CT scan 11/19/21 shows a group of stones in the left kidney about 8 mm in aggregate and a 2 mm stone in the ureter along the stents.  There is also groin adenopathy slightly enlarged on right        ASSESSMENT AND PLAN:  Over half of today's 38-minute visit was spent reviewing the chart, results and counseling the patient regarding her carcinoid tumor and her obstructed left ureter. The patient's left kidney now has minimal function.  We discussed options including continuing with stent exchanges, vs ureteroscopy to remove the stones vs nephrectomy.  The patient would like to avoid hospitalization at this point given the COVID risks.  They would like to continue with stent exchanges in the meantime.  We will get her on the schedule for this shortly.   Ms. Guadarrama understands the plan and is in agreement.

## 2022-01-18 DIAGNOSIS — Z01.812 PRE-PROCEDURE LAB EXAM: ICD-10-CM

## 2022-01-18 DIAGNOSIS — N39.0 URINARY TRACT INFECTION: Primary | ICD-10-CM

## 2022-01-23 ENCOUNTER — LAB (OUTPATIENT)
Dept: LAB | Facility: CLINIC | Age: 72
End: 2022-01-23
Payer: COMMERCIAL

## 2022-01-23 LAB
ALBUMIN SERPL-MCNC: 3.3 G/DL (ref 3.4–5)
ALP SERPL-CCNC: 85 U/L (ref 40–150)
ALT SERPL W P-5'-P-CCNC: 12 U/L (ref 0–50)
ANION GAP SERPL CALCULATED.3IONS-SCNC: 4 MMOL/L (ref 3–14)
AST SERPL W P-5'-P-CCNC: 14 U/L (ref 0–45)
BASOPHILS # BLD AUTO: 0 10E3/UL (ref 0–0.2)
BASOPHILS NFR BLD AUTO: 0 %
BILIRUB SERPL-MCNC: 0.7 MG/DL (ref 0.2–1.3)
BUN SERPL-MCNC: 21 MG/DL (ref 7–30)
CALCIUM SERPL-MCNC: 9.7 MG/DL (ref 8.5–10.1)
CANCER AG27-29 SERPL-ACNC: 22 U/ML (ref 0–39)
CHLORIDE BLD-SCNC: 107 MMOL/L (ref 94–109)
CO2 SERPL-SCNC: 31 MMOL/L (ref 20–32)
CREAT SERPL-MCNC: 1.31 MG/DL (ref 0.52–1.04)
EOSINOPHIL # BLD AUTO: 0.4 10E3/UL (ref 0–0.7)
EOSINOPHIL NFR BLD AUTO: 6 %
ERYTHROCYTE [DISTWIDTH] IN BLOOD BY AUTOMATED COUNT: 13.3 % (ref 10–15)
GFR SERPL CREATININE-BSD FRML MDRD: 43 ML/MIN/1.73M2
GLUCOSE BLD-MCNC: 90 MG/DL (ref 70–99)
HCT VFR BLD AUTO: 42.7 % (ref 35–47)
HGB BLD-MCNC: 13.9 G/DL (ref 11.7–15.7)
LYMPHOCYTES # BLD AUTO: 1.8 10E3/UL (ref 0.8–5.3)
LYMPHOCYTES NFR BLD AUTO: 27 %
MCH RBC QN AUTO: 29.8 PG (ref 26.5–33)
MCHC RBC AUTO-ENTMCNC: 32.6 G/DL (ref 31.5–36.5)
MCV RBC AUTO: 92 FL (ref 78–100)
MONOCYTES # BLD AUTO: 0.5 10E3/UL (ref 0–1.3)
MONOCYTES NFR BLD AUTO: 7 %
NEUTROPHILS # BLD AUTO: 3.9 10E3/UL (ref 1.6–8.3)
NEUTROPHILS NFR BLD AUTO: 59 %
PLATELET # BLD AUTO: 187 10E3/UL (ref 150–450)
POTASSIUM BLD-SCNC: 3.3 MMOL/L (ref 3.4–5.3)
PROT SERPL-MCNC: 7.3 G/DL (ref 6.8–8.8)
RBC # BLD AUTO: 4.66 10E6/UL (ref 3.8–5.2)
SODIUM SERPL-SCNC: 142 MMOL/L (ref 133–144)
WBC # BLD AUTO: 6.6 10E3/UL (ref 4–11)

## 2022-01-23 PROCEDURE — 36415 COLL VENOUS BLD VENIPUNCTURE: CPT

## 2022-01-23 PROCEDURE — 86300 IMMUNOASSAY TUMOR CA 15-3: CPT

## 2022-01-23 PROCEDURE — 85025 COMPLETE CBC W/AUTO DIFF WBC: CPT

## 2022-01-23 PROCEDURE — 80053 COMPREHEN METABOLIC PANEL: CPT

## 2022-01-28 ENCOUNTER — ONCOLOGY VISIT (OUTPATIENT)
Dept: ONCOLOGY | Facility: CLINIC | Age: 72
End: 2022-01-28
Attending: INTERNAL MEDICINE
Payer: COMMERCIAL

## 2022-01-28 VITALS
RESPIRATION RATE: 12 BRPM | OXYGEN SATURATION: 98 % | DIASTOLIC BLOOD PRESSURE: 65 MMHG | SYSTOLIC BLOOD PRESSURE: 158 MMHG | TEMPERATURE: 97.9 F | HEART RATE: 56 BPM | WEIGHT: 202 LBS | BODY MASS INDEX: 31.64 KG/M2

## 2022-01-28 DIAGNOSIS — Z17.0 MALIGNANT NEOPLASM OF UPPER-OUTER QUADRANT OF BOTH BREASTS IN FEMALE, ESTROGEN RECEPTOR POSITIVE (H): ICD-10-CM

## 2022-01-28 DIAGNOSIS — Z12.31 VISIT FOR SCREENING MAMMOGRAM: Primary | ICD-10-CM

## 2022-01-28 DIAGNOSIS — C50.412 MALIGNANT NEOPLASM OF UPPER-OUTER QUADRANT OF BOTH BREASTS IN FEMALE, ESTROGEN RECEPTOR POSITIVE (H): ICD-10-CM

## 2022-01-28 DIAGNOSIS — C7A.021 MALIGNANT CARCINOID TUMOR OF CECUM (H): ICD-10-CM

## 2022-01-28 DIAGNOSIS — C50.411 MALIGNANT NEOPLASM OF UPPER-OUTER QUADRANT OF BOTH BREASTS IN FEMALE, ESTROGEN RECEPTOR POSITIVE (H): ICD-10-CM

## 2022-01-28 PROCEDURE — G0463 HOSPITAL OUTPT CLINIC VISIT: HCPCS

## 2022-01-28 PROCEDURE — 99215 OFFICE O/P EST HI 40 MIN: CPT | Performed by: INTERNAL MEDICINE

## 2022-01-28 RX ORDER — EXEMESTANE 25 MG/1
25 TABLET ORAL EVERY MORNING
Qty: 90 TABLET | Refills: 1 | Status: SHIPPED | OUTPATIENT
Start: 2022-01-28 | End: 2022-09-12

## 2022-01-28 ASSESSMENT — PAIN SCALES - GENERAL: PAINLEVEL: NO PAIN (0)

## 2022-01-28 NOTE — PROGRESS NOTES
"The patient is being seen for breast cancer diagnosed in 2016 and metastatic carcinoid tumor diagnosed in 2003.    \"She was found on routine mammogram a group of microcalcification is in the upper outer right breast. There was also a focal asymmetry in the lower left breast. Subsequently the patient went on to have bilateral diagnostic mammography with ultrasound on March 28, 2016.. On the right breast there was microcalcification is the main grouping measures 1.6 cm x 1 cm bilateral 2.9 cm located 2.9 cm from the nipple. An additional tiny area about 0.2 cm seen at the anterolateral margin of this main grouping about 1 cm from the main grouping. The left breast shows no masses or significant abnormalities. Subsequently the patient went on to have breast needle biopsy on March 30, 2016. 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 Angiolymphatic invasion was not identified. 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 Microcalcifications was also associated with ductal carcinoma in situ. His surgeon receptor was positive. Progesterone receptor was negative. She underwent bilateral sentinel lymph node biopsy and bilateral lumpectomies with prior seed placement.  the surgical pathology Showing left breast lumpectomy was a tumor size of 13 mm grade 2 of 3, angiolymphatic invasion was not identified the tumor was unifocal associated with ductal carcinoma in situ high-grade. Surgical margins where negative. [place] lymph node was negative for metastatic tumor. Stage is T1cN0 M0 On the right breast and there was invasive ductal carcinoma with a tumor size of 3 mm grade 3 of 3. [place] lymph nodes were negative for metastatic disease. The tumor stage is T1aN0 M0. The tumor on the left was positive for estrogen and progesterone receptor. It did show no HER-2/praveen amplification. The tumor on the right is positive for estrogen receptor and negative for " progesterone receptor and shows no HER-2/praveen amplification. Subsequently the patient concluded the radiation therapy. She is currently on hormonal therapy with Arimidex. She developed a skin rash and Arimidex was switched to Aromasin.    Initially treated with adjuvant hormonal treatment with Arimidex which resulted in a rash and was then switched to Aromasin.     She also has a history of metastatic carcinoid tumor.    She also has a history of metastatic carcinoid tumor resulting in ureteral obstruction requiring ureteral stent placement by urology.  She also has a liver lesion, presumably also a metastatic carcinoid lesion since it has been stable. Previously, she was followed by Dr. Lawrence for this and was then transitioned to Dr. Hicks.     In 2003 she underwent excision of the terminal ileum and right colon which revealed a 2.4 cm invasive ileal carcinoid extending to the deep muscularis propria with metastatic carcinoid tumor identified in 5 of 24 pericolonic lymph nodes.  Residual tubulovillous polyp was identified in the cecum.  Possible submucous lipoma was noted at the ileocecal valve.  The mucosal margins were negative.  The tumor was staged as G1, well-differentiated carcinoid tumor with clear margins without perineural invasion, pT2, pN2.     She also has a history of cervical cancer treated with surgical excision.    CBC and CMP results from 5 days ago were reviewed which were normal except for a slightly reduced serum potassium level of 3.3 and serum creatinine of 1.3 which is slightly elevated.    Her creatinine has been stable in the past 7 months.     Bilateral screening mammography from July 2021 was benign      PHYSICAL EXAMINATION:    General: Todays' vital signs reviewed in the EMR.  She is in no acute distress.  She has some weakness in one of her lower extremities due to prior polio illness.  ECOG PS is 1  HEENT: No scleral icterus  Lymphatic: No palpable cervical, supraclavicular,  infraclavicular or axillary adenopathy  Cardiovascular: Cor RRR  Respiratory: Lungs CTA bilaterally  Gastrointestinal: No hepatosplenomegaly, no abdominal tenderness.  Skin: No jaundice    ASSESSMENT:    Encounter Diagnoses   Name Primary?     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    PLAN:    Will need to schedule an Octreoscan now to assess the extent of your carcinoid tumor.  I will contact you by telephone and go over your Octreoscan results.    I refilled your prescription for Aromasin today which you should continue until you see me again for a routine office visit in 6 months.  I will check a CMP blood test that day.     I also recommend that you take OTC vitamin D and calcium daily to prevent bone loss.    You are due for your next bilateral screening mammogram in July which I have ordered for you today.    I spent a total of44 minutes on the care of this patient on the day of service including 10 minutes face to face time and the remainder in chart review, care coordination, and documentation on the day of service.     English

## 2022-01-28 NOTE — PATIENT INSTRUCTIONS
Will need to schedule an Octreoscan now to assess the extent of your carcinoid tumor.  I will contact you by telephone and go over your Octreoscan results.    I refilled your prescription for Aromasin today which you should continue until you see me again for a routine office visit in 6 months.  I will check a CMP blood test that day.     I also recommend that you take OTC vitamin D and calcium daily to prevent bone loss.    You are due for your next bilateral screening mammogram in July which I have ordered for you today.

## 2022-01-28 NOTE — LETTER
"    1/28/2022         RE: Marissa Guadarrama  [address] [place] [id number]        Dear Colleague,    Thank you for referring your patient, Marissa Guadarrama, to the [facility]. Please see a copy of my visit note below.    The patient is being seen for breast cancer diagnosed in 2016 and metastatic carcinoid tumor diagnosed in 2003.    \"She was found on routine mammogram a group of microcalcification is in the upper outer right breast. There was also a focal asymmetry in the lower left breast. Subsequently the patient went on to have bilateral diagnostic mammography with ultrasound on March 28, 2016.. On the right breast there was microcalcification is the main grouping measures 1.6 cm x 1 cm bilateral 2.9 cm located 2.9 cm from the nipple. An additional tiny area about 0.2 cm seen at the anterolateral margin of this main grouping about 1 cm from the main grouping. The left breast shows no masses or significant abnormalities. Subsequently the patient went on to have breast needle biopsy on March 30, 2016. 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 Angiolymphatic invasion was not identified. 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 Microcalcifications was also associated with ductal carcinoma in situ. His surgeon receptor was positive. Progesterone receptor was negative. She underwent bilateral sentinel lymph node biopsy and bilateral lumpectomies with prior seed placement.  the surgical pathology Showing left breast lumpectomy was a tumor size of 13 mm grade 2 of 3, angiolymphatic invasion was not identified the tumor was unifocal associated with ductal carcinoma in situ high-grade. Surgical margins where negative. [place] lymph node was negative for metastatic tumor. Stage is T1cN0 M0 On the right breast and there was invasive ductal carcinoma with a tumor size of 3 mm grade 3 of 3. [place] lymph nodes were " negative for metastatic disease. The tumor stage is T1aN0 M0. The tumor on the left was positive for estrogen and progesterone receptor. It did show no HER-2/praveen amplification. The tumor on the right is positive for estrogen receptor and negative for progesterone receptor and shows no HER-2/praveen amplification. Subsequently the patient concluded the radiation therapy. She is currently on hormonal therapy with Arimidex. She developed a skin rash and Arimidex was switched to Aromasin.    Initially treated with adjuvant hormonal treatment with Arimidex which resulted in a rash and was then switched to Aromasin.     She also has a history of metastatic carcinoid tumor.    She also has a history of metastatic carcinoid tumor resulting in ureteral obstruction requiring ureteral stent placement by urology.  She also has a liver lesion, presumably also a metastatic carcinoid lesion since it has been stable. Previously, she was followed by Dr. Lawrence for this and was then transitioned to Dr. Hicks.     In 2003 she underwent excision of the terminal ileum and right colon which revealed a 2.4 cm invasive ileal carcinoid extending to the deep muscularis propria with metastatic carcinoid tumor identified in 5 of 24 pericolonic lymph nodes.  Residual tubulovillous polyp was identified in the cecum.  Possible submucous lipoma was noted at the ileocecal valve.  The mucosal margins were negative.  The tumor was staged as G1, well-differentiated carcinoid tumor with clear margins without perineural invasion, pT2, pN2.     She also has a history of cervical cancer treated with surgical excision.    CBC and CMP results from 5 days ago were reviewed which were normal except for a slightly reduced serum potassium level of 3.3 and serum creatinine of 1.3 which is slightly elevated.    Her creatinine has been stable in the past 7 months.     Bilateral screening mammography from July 2021 was benign      PHYSICAL EXAMINATION:    General:  Todays' vital signs reviewed in the EMR.  She is in no acute distress.  She has some weakness in one of her lower extremities due to prior polio illness.  ECOG PS is 1  HEENT: No scleral icterus  Lymphatic: No palpable cervical, supraclavicular, infraclavicular or axillary adenopathy  Cardiovascular: Cor RRR  Respiratory: Lungs CTA bilaterally  Gastrointestinal: No hepatosplenomegaly, no abdominal tenderness.  Skin: No jaundice    ASSESSMENT:    Encounter Diagnoses   Name Primary?     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    PLAN:    Will need to schedule an Octreoscan now to assess the extent of your carcinoid tumor.  I will contact you by telephone and go over your Octreoscan results.    I refilled your prescription for Aromasin today which you should continue until you see me again for a routine office visit in 6 months.  I will check a CMP blood test that day.     I also recommend that you take OTC vitamin D and calcium daily to prevent bone loss.    You are due for your next bilateral screening mammogram in July which I have ordered for you today.    I spent a total of44 minutes on the care of this patient on the day of service including 10 minutes face to face time and the remainder in chart review, care coordination, and documentation on the day of service.      Oncology Rooming Note    January 28, 2022 10:09 AM   Marissa Guadarrama is a 71 year old female who presents for:    Chief Complaint   Patient presents with     Oncology Clinic Visit     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 - provider visit only     Initial Vitals: BP (!) 158/65 (BP Location: Right arm, Patient Position: Sitting, Cuff Size: Adult Regular)   Pulse  "56   Temp 97.9  F (36.6  C) (Oral)   Resp 12   Wt 91.6 kg (202 lb)   SpO2 98%   BMI 31.64 kg/m   Estimated body mass index is 31.64 kg/m  as calculated from the following:    Height as of 9/16/21: 1.702 m (5' 7\").    Weight as of this encounter: 91.6 kg (202 lb). Body surface area is 2.08 meters squared.  No Pain (0) Comment: Data Unavailable   No LMP recorded. Patient has had a hysterectomy.  Allergies reviewed: Yes  Medications reviewed: Yes    Medications: Medication refills not needed today.  Pharmacy name entered into [employer]: [id number] - [place], [place] - [address] [place] [address]    Clinical concerns:  None      Rhea Bloom [facility]                Again, thank you for allowing me to participate in the care of your patient.        Sincerely,        Chad Hayden MD    "

## 2022-01-28 NOTE — PROGRESS NOTES
"Oncology Rooming Note    January 28, 2022 10:09 AM   Marissa Guadarrama is a 71 year old female who presents for:    Chief Complaint   Patient presents with     Oncology Clinic Visit     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 - provider visit only     Initial Vitals: BP (!) 158/65 (BP Location: Right arm, Patient Position: Sitting, Cuff Size: Adult Regular)   Pulse 56   Temp 97.9  F (36.6  C) (Oral)   Resp 12   Wt 91.6 kg (202 lb)   SpO2 98%   BMI 31.64 kg/m   Estimated body mass index is 31.64 kg/m  as calculated from the following:    Height as of 9/16/21: 1.702 m (5' 7\").    Weight as of this encounter: 91.6 kg (202 lb). Body surface area is 2.08 meters squared.  No Pain (0) Comment: Data Unavailable   No LMP recorded. Patient has had a hysterectomy.  Allergies reviewed: Yes  Medications reviewed: Yes    Medications: Medication refills not needed today.  Pharmacy name entered into [employer]: [facility][id number] - [address]    Clinical concerns:  None      Rhea Bloom CMA            "

## 2022-02-10 ENCOUNTER — HOSPITAL ENCOUNTER (OUTPATIENT)
Dept: NUCLEAR MEDICINE | Facility: CLINIC | Age: 72
Setting detail: NUCLEAR MEDICINE
End: 2022-02-10
Attending: INTERNAL MEDICINE
Payer: MEDICARE

## 2022-02-10 DIAGNOSIS — C7A.021 MALIGNANT CARCINOID TUMOR OF CECUM (H): ICD-10-CM

## 2022-02-10 PROCEDURE — 78802 RP LOCLZJ TUM WHBDY 1 D IMG: CPT | Mod: 26

## 2022-02-10 PROCEDURE — 343N000001 HC RX 343: Performed by: INTERNAL MEDICINE

## 2022-02-10 PROCEDURE — 78802 RP LOCLZJ TUM WHBDY 1 D IMG: CPT

## 2022-02-10 PROCEDURE — A9572 INDIUM IN-111 PENTETREOTIDE: HCPCS | Performed by: INTERNAL MEDICINE

## 2022-02-10 PROCEDURE — 78830 RP LOCLZJ TUM SPECT W/CT 1: CPT | Mod: 26

## 2022-02-10 RX ADMIN — INDIUM IN -111 PENTETREOTIDE 6.1 MILLICURIE: KIT at 12:44

## 2022-02-11 ENCOUNTER — HOSPITAL ENCOUNTER (OUTPATIENT)
Dept: NUCLEAR MEDICINE | Facility: CLINIC | Age: 72
Setting detail: NUCLEAR MEDICINE
End: 2022-02-11
Attending: INTERNAL MEDICINE
Payer: MEDICARE

## 2022-02-15 ENCOUNTER — VIRTUAL VISIT (OUTPATIENT)
Dept: ONCOLOGY | Facility: CLINIC | Age: 72
End: 2022-02-15
Attending: INTERNAL MEDICINE
Payer: COMMERCIAL

## 2022-02-15 DIAGNOSIS — C50.412 MALIGNANT NEOPLASM OF UPPER-OUTER QUADRANT OF BOTH BREASTS IN FEMALE, ESTROGEN RECEPTOR POSITIVE (H): ICD-10-CM

## 2022-02-15 DIAGNOSIS — C50.411 MALIGNANT NEOPLASM OF UPPER-OUTER QUADRANT OF BOTH BREASTS IN FEMALE, ESTROGEN RECEPTOR POSITIVE (H): ICD-10-CM

## 2022-02-15 DIAGNOSIS — C7A.021 MALIGNANT CARCINOID TUMOR OF CECUM (H): Primary | ICD-10-CM

## 2022-02-15 DIAGNOSIS — Z17.0 MALIGNANT NEOPLASM OF UPPER-OUTER QUADRANT OF BOTH BREASTS IN FEMALE, ESTROGEN RECEPTOR POSITIVE (H): ICD-10-CM

## 2022-02-15 PROCEDURE — 99214 OFFICE O/P EST MOD 30 MIN: CPT | Mod: 95 | Performed by: INTERNAL MEDICINE

## 2022-02-15 RX ORDER — ALBUTEROL SULFATE 0.83 MG/ML
2.5 SOLUTION RESPIRATORY (INHALATION)
Status: CANCELLED | OUTPATIENT
Start: 2022-02-15

## 2022-02-15 RX ORDER — ALBUTEROL SULFATE 90 UG/1
1-2 AEROSOL, METERED RESPIRATORY (INHALATION)
Status: CANCELLED
Start: 2022-05-10

## 2022-02-15 RX ORDER — ALBUTEROL SULFATE 90 UG/1
1-2 AEROSOL, METERED RESPIRATORY (INHALATION)
Status: CANCELLED
Start: 2022-02-15

## 2022-02-15 RX ORDER — DIPHENHYDRAMINE HYDROCHLORIDE 50 MG/ML
50 INJECTION INTRAMUSCULAR; INTRAVENOUS
Status: CANCELLED
Start: 2022-05-10

## 2022-02-15 RX ORDER — MEPERIDINE HYDROCHLORIDE 25 MG/ML
25 INJECTION INTRAMUSCULAR; INTRAVENOUS; SUBCUTANEOUS EVERY 30 MIN PRN
Status: CANCELLED | OUTPATIENT
Start: 2022-03-15

## 2022-02-15 RX ORDER — EPINEPHRINE 1 MG/ML
0.3 INJECTION, SOLUTION, CONCENTRATE INTRAVENOUS EVERY 5 MIN PRN
Status: CANCELLED | OUTPATIENT
Start: 2022-05-10

## 2022-02-15 RX ORDER — ALBUTEROL SULFATE 90 UG/1
1-2 AEROSOL, METERED RESPIRATORY (INHALATION)
Status: CANCELLED
Start: 2022-03-15

## 2022-02-15 RX ORDER — ALBUTEROL SULFATE 0.83 MG/ML
2.5 SOLUTION RESPIRATORY (INHALATION)
Status: CANCELLED | OUTPATIENT
Start: 2022-05-10

## 2022-02-15 RX ORDER — ALBUTEROL SULFATE 0.83 MG/ML
2.5 SOLUTION RESPIRATORY (INHALATION)
Status: CANCELLED | OUTPATIENT
Start: 2022-04-12

## 2022-02-15 RX ORDER — MEPERIDINE HYDROCHLORIDE 25 MG/ML
25 INJECTION INTRAMUSCULAR; INTRAVENOUS; SUBCUTANEOUS EVERY 30 MIN PRN
Status: CANCELLED | OUTPATIENT
Start: 2022-05-10

## 2022-02-15 RX ORDER — DIPHENHYDRAMINE HYDROCHLORIDE 50 MG/ML
50 INJECTION INTRAMUSCULAR; INTRAVENOUS
Status: CANCELLED
Start: 2022-02-15

## 2022-02-15 RX ORDER — MEPERIDINE HYDROCHLORIDE 25 MG/ML
25 INJECTION INTRAMUSCULAR; INTRAVENOUS; SUBCUTANEOUS EVERY 30 MIN PRN
Status: CANCELLED | OUTPATIENT
Start: 2022-06-07

## 2022-02-15 RX ORDER — DIPHENHYDRAMINE HYDROCHLORIDE 50 MG/ML
50 INJECTION INTRAMUSCULAR; INTRAVENOUS
Status: CANCELLED
Start: 2022-07-05

## 2022-02-15 RX ORDER — DIPHENHYDRAMINE HYDROCHLORIDE 50 MG/ML
50 INJECTION INTRAMUSCULAR; INTRAVENOUS
Status: CANCELLED
Start: 2022-04-12

## 2022-02-15 RX ORDER — EPINEPHRINE 1 MG/ML
0.3 INJECTION, SOLUTION, CONCENTRATE INTRAVENOUS EVERY 5 MIN PRN
Status: CANCELLED | OUTPATIENT
Start: 2022-04-12

## 2022-02-15 RX ORDER — LANREOTIDE ACETATE 120 MG/.5ML
120 INJECTION SUBCUTANEOUS ONCE
Status: CANCELLED
Start: 2022-07-05 | End: 2022-07-05

## 2022-02-15 RX ORDER — NALOXONE HYDROCHLORIDE 0.4 MG/ML
0.2 INJECTION, SOLUTION INTRAMUSCULAR; INTRAVENOUS; SUBCUTANEOUS
Status: CANCELLED | OUTPATIENT
Start: 2022-07-05

## 2022-02-15 RX ORDER — METHYLPREDNISOLONE SODIUM SUCCINATE 125 MG/2ML
125 INJECTION, POWDER, LYOPHILIZED, FOR SOLUTION INTRAMUSCULAR; INTRAVENOUS
Status: CANCELLED
Start: 2022-06-07

## 2022-02-15 RX ORDER — METHYLPREDNISOLONE SODIUM SUCCINATE 125 MG/2ML
125 INJECTION, POWDER, LYOPHILIZED, FOR SOLUTION INTRAMUSCULAR; INTRAVENOUS
Status: CANCELLED
Start: 2022-04-12

## 2022-02-15 RX ORDER — LANREOTIDE ACETATE 120 MG/.5ML
120 INJECTION SUBCUTANEOUS ONCE
Status: CANCELLED
Start: 2022-02-15 | End: 2022-02-15

## 2022-02-15 RX ORDER — NALOXONE HYDROCHLORIDE 0.4 MG/ML
0.2 INJECTION, SOLUTION INTRAMUSCULAR; INTRAVENOUS; SUBCUTANEOUS
Status: CANCELLED | OUTPATIENT
Start: 2022-02-15

## 2022-02-15 RX ORDER — ALBUTEROL SULFATE 0.83 MG/ML
2.5 SOLUTION RESPIRATORY (INHALATION)
Status: CANCELLED | OUTPATIENT
Start: 2022-06-07

## 2022-02-15 RX ORDER — DIPHENHYDRAMINE HYDROCHLORIDE 50 MG/ML
50 INJECTION INTRAMUSCULAR; INTRAVENOUS
Status: CANCELLED
Start: 2022-06-07

## 2022-02-15 RX ORDER — MEPERIDINE HYDROCHLORIDE 25 MG/ML
25 INJECTION INTRAMUSCULAR; INTRAVENOUS; SUBCUTANEOUS EVERY 30 MIN PRN
Status: CANCELLED | OUTPATIENT
Start: 2022-02-15

## 2022-02-15 RX ORDER — METHYLPREDNISOLONE SODIUM SUCCINATE 125 MG/2ML
125 INJECTION, POWDER, LYOPHILIZED, FOR SOLUTION INTRAMUSCULAR; INTRAVENOUS
Status: CANCELLED
Start: 2022-07-05

## 2022-02-15 RX ORDER — ALBUTEROL SULFATE 0.83 MG/ML
2.5 SOLUTION RESPIRATORY (INHALATION)
Status: CANCELLED | OUTPATIENT
Start: 2022-03-15

## 2022-02-15 RX ORDER — LANREOTIDE ACETATE 120 MG/.5ML
120 INJECTION SUBCUTANEOUS ONCE
Status: CANCELLED
Start: 2022-06-07 | End: 2022-06-07

## 2022-02-15 RX ORDER — LANREOTIDE ACETATE 120 MG/.5ML
120 INJECTION SUBCUTANEOUS ONCE
Status: CANCELLED
Start: 2022-05-10 | End: 2022-05-10

## 2022-02-15 RX ORDER — ALBUTEROL SULFATE 90 UG/1
1-2 AEROSOL, METERED RESPIRATORY (INHALATION)
Status: CANCELLED
Start: 2022-07-05

## 2022-02-15 RX ORDER — EPINEPHRINE 1 MG/ML
0.3 INJECTION, SOLUTION, CONCENTRATE INTRAVENOUS EVERY 5 MIN PRN
Status: CANCELLED | OUTPATIENT
Start: 2022-06-07

## 2022-02-15 RX ORDER — LANREOTIDE ACETATE 120 MG/.5ML
120 INJECTION SUBCUTANEOUS ONCE
Status: CANCELLED
Start: 2022-03-15 | End: 2022-03-15

## 2022-02-15 RX ORDER — METHYLPREDNISOLONE SODIUM SUCCINATE 125 MG/2ML
125 INJECTION, POWDER, LYOPHILIZED, FOR SOLUTION INTRAMUSCULAR; INTRAVENOUS
Status: CANCELLED
Start: 2022-05-10

## 2022-02-15 RX ORDER — MEPERIDINE HYDROCHLORIDE 25 MG/ML
25 INJECTION INTRAMUSCULAR; INTRAVENOUS; SUBCUTANEOUS EVERY 30 MIN PRN
Status: CANCELLED | OUTPATIENT
Start: 2022-04-12

## 2022-02-15 RX ORDER — METHYLPREDNISOLONE SODIUM SUCCINATE 125 MG/2ML
125 INJECTION, POWDER, LYOPHILIZED, FOR SOLUTION INTRAMUSCULAR; INTRAVENOUS
Status: CANCELLED
Start: 2022-02-15

## 2022-02-15 RX ORDER — ALBUTEROL SULFATE 0.83 MG/ML
2.5 SOLUTION RESPIRATORY (INHALATION)
Status: CANCELLED | OUTPATIENT
Start: 2022-07-05

## 2022-02-15 RX ORDER — ALBUTEROL SULFATE 90 UG/1
1-2 AEROSOL, METERED RESPIRATORY (INHALATION)
Status: CANCELLED
Start: 2022-04-12

## 2022-02-15 RX ORDER — METHYLPREDNISOLONE SODIUM SUCCINATE 125 MG/2ML
125 INJECTION, POWDER, LYOPHILIZED, FOR SOLUTION INTRAMUSCULAR; INTRAVENOUS
Status: CANCELLED
Start: 2022-03-15

## 2022-02-15 RX ORDER — EPINEPHRINE 1 MG/ML
0.3 INJECTION, SOLUTION, CONCENTRATE INTRAVENOUS EVERY 5 MIN PRN
Status: CANCELLED | OUTPATIENT
Start: 2022-03-15

## 2022-02-15 RX ORDER — NALOXONE HYDROCHLORIDE 0.4 MG/ML
0.2 INJECTION, SOLUTION INTRAMUSCULAR; INTRAVENOUS; SUBCUTANEOUS
Status: CANCELLED | OUTPATIENT
Start: 2022-03-15

## 2022-02-15 RX ORDER — ALBUTEROL SULFATE 90 UG/1
1-2 AEROSOL, METERED RESPIRATORY (INHALATION)
Status: CANCELLED
Start: 2022-06-07

## 2022-02-15 RX ORDER — EPINEPHRINE 1 MG/ML
0.3 INJECTION, SOLUTION, CONCENTRATE INTRAVENOUS EVERY 5 MIN PRN
Status: CANCELLED | OUTPATIENT
Start: 2022-07-05

## 2022-02-15 RX ORDER — LANREOTIDE ACETATE 120 MG/.5ML
120 INJECTION SUBCUTANEOUS ONCE
Status: CANCELLED
Start: 2022-04-12 | End: 2022-04-12

## 2022-02-15 RX ORDER — NALOXONE HYDROCHLORIDE 0.4 MG/ML
0.2 INJECTION, SOLUTION INTRAMUSCULAR; INTRAVENOUS; SUBCUTANEOUS
Status: CANCELLED | OUTPATIENT
Start: 2022-04-12

## 2022-02-15 RX ORDER — NALOXONE HYDROCHLORIDE 0.4 MG/ML
0.2 INJECTION, SOLUTION INTRAMUSCULAR; INTRAVENOUS; SUBCUTANEOUS
Status: CANCELLED | OUTPATIENT
Start: 2022-06-07

## 2022-02-15 RX ORDER — EPINEPHRINE 1 MG/ML
0.3 INJECTION, SOLUTION, CONCENTRATE INTRAVENOUS EVERY 5 MIN PRN
Status: CANCELLED | OUTPATIENT
Start: 2022-02-15

## 2022-02-15 RX ORDER — MEPERIDINE HYDROCHLORIDE 25 MG/ML
25 INJECTION INTRAMUSCULAR; INTRAVENOUS; SUBCUTANEOUS EVERY 30 MIN PRN
Status: CANCELLED | OUTPATIENT
Start: 2022-07-05

## 2022-02-15 RX ORDER — DIPHENHYDRAMINE HYDROCHLORIDE 50 MG/ML
50 INJECTION INTRAMUSCULAR; INTRAVENOUS
Status: CANCELLED
Start: 2022-03-15

## 2022-02-15 RX ORDER — NALOXONE HYDROCHLORIDE 0.4 MG/ML
0.2 INJECTION, SOLUTION INTRAMUSCULAR; INTRAVENOUS; SUBCUTANEOUS
Status: CANCELLED | OUTPATIENT
Start: 2022-05-10

## 2022-02-15 NOTE — LETTER
2/15/2022         RE: Marissa Guadarrama  [address] [place] [id number]        Dear Colleague,    Thank you for referring your patient, Marissa Guadarrama, to the [facility]. Please see a copy of my visit note below.    Marissa is a 71 year old who is being evaluated via a billable telephone visit.      What phone number would you like to be contacted at? [phone]  How would you like to obtain your AVS? Mail a copy     Rhea Bloom CMA on 2/15/2022 at 12:43 PM              The patient is being seen for metastatic carcinoid tumor diagnosed in 2003 and early stage breast cancer diagnosed in 2016.    She also has a history of metastatic carcinoid tumor resulting in ureteral obstruction requiring ureteral stent placement by urology.  She also has a liver lesion, presumably also a metastatic carcinoid lesion given there fairly long period of stability. Previously, she was followed by Dr. Lawrence for this and was then transitioned to Dr. Hicks.     In 2003, she underwent excision of the terminal ileum and right colon which revealed a 2.4 cm invasive ileal carcinoid extending to the deep muscularis propria with metastatic carcinoid tumor identified in 5 of 24 pericolonic lymph nodes.  Residual tubulovillous polyp was identified in the cecum.  Possible submucous lipoma was noted at the ileocecal valve.  The mucosal margins were negative.  The tumor was staged as G1, well-differentiated carcinoid tumor with clear margins without perineural invasion, pT2, pN2.     She is currently on adjuvant hormonal treatment for breast cancer consisting of exemestane.  Initially she had been on anastrozole which resulted in rash and prompted her to be switched to exemestane.      She also has a history of cervical cancer treated with surgical excision.    Bilateral screening mammography from July 2021 was benign.    I met with the patient for the first time on January 20, 2022 to take over  her care as her primary oncologist after Dr. Hicks's departure from [facility].    She had not had any imaging for her neuroendocrine tumor for quite some time so I ordered an Octreoscan which was completed on February 10, 2022.  I called her today to discuss her Octreoscan results with her.      ASSESSMENT:    Encounter Diagnoses   Name Primary?     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    PLAN:    Will need to schedule an Octreoscan now to assess the extent of your carcinoid tumor.  I will contact you by telephone and go over your Octreoscan results.    I previously refilled your prescription for Aromasin today which you should continue until you see me again for a routine office visit in 6 months. I I have ordered a CMP to be checked that day.    I also recommend that you continue to take OTC vitamin D and calcium daily to prevent bone loss.    You are due for your next bilateral screening mammogram in July which I have ordered for you.    I spent a total of 23 minutes on the care of this patient on the day of service including 11 minutes on the phone and remainder in chart review, care coordination, and documentation on the day of service.        Again, thank you for allowing me to participate in the care of your patient.        Sincerely,        Chad Hayden MD

## 2022-02-15 NOTE — PATIENT INSTRUCTIONS
Please schedule this patient to start lanreotide injections once every 4 weeks for metastatic carcinoid tumor.  She can keep her scheduled appointment with me in 6 months.

## 2022-02-15 NOTE — PROGRESS NOTES
Marissa is a 71 year old who is being evaluated via a billable telephone visit.      What phone number would you like to be contacted at? [phone]  How would you like to obtain your AVS? Mail a copy     Rhea Bloom CMA on 2/15/2022 at 12:43 PM

## 2022-02-15 NOTE — PROGRESS NOTES
The patient is being seen for metastatic carcinoid tumor diagnosed in 2003 and early stage breast cancer diagnosed in 2016.    She also has a history of metastatic carcinoid tumor resulting in ureteral obstruction requiring ureteral stent placement by urology.  She also has a liver lesion, presumably also a metastatic carcinoid lesion given there fairly long period of stability. Previously, she was followed by Dr. Lawrence for this and was then transitioned to Dr. Hicks.     In 2003, she underwent excision of the terminal ileum and right colon which revealed a 2.4 cm invasive ileal carcinoid extending to the deep muscularis propria with metastatic carcinoid tumor identified in 5 of 24 pericolonic lymph nodes.  Residual tubulovillous polyp was identified in the cecum.  Possible submucous lipoma was noted at the ileocecal valve.  The mucosal margins were negative.  The tumor was staged as G1, well-differentiated carcinoid tumor with clear margins without perineural invasion, pT2, pN2.     She is currently on adjuvant hormonal treatment for breast cancer consisting of exemestane.  Initially she had been on anastrozole which resulted in rash and prompted her to be switched to exemestane.      She also has a history of cervical cancer treated with surgical excision.    Bilateral screening mammography from July 2021 was benign.    I met with the patient for the first time on January 20, 2022 to take over her care as her primary oncologist after Dr. Hicks's departure from [facility].    She had not had any imaging for her neuroendocrine tumor for quite some time so I ordered an Octreoscan which was completed on February 10, 2022.  I called her today to discuss her Octreoscan results with her.      ASSESSMENT:    Encounter Diagnoses   Name Primary?     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    PLAN:    Will need to schedule an Octreoscan now to assess the extent of your carcinoid tumor.  I will contact you by telephone and go over your Octreoscan results.    I previously refilled your prescription for Aromasin today which you should continue until you see me again for a routine office visit in 6 months. I I have ordered a CMP to be checked that day.    I also recommend that you continue to take OTC vitamin D and calcium daily to prevent bone loss.    You are due for your next bilateral screening mammogram in July which I have ordered for you.    I spent a total of 23 minutes on the care of this patient on the day of service including 11 minutes on the phone and remainder in chart review, care coordination, and documentation on the day of service.

## 2022-02-15 NOTE — LETTER
2/15/2022         RE: Marissa Guadarrama  [address] [place] [id number]        Dear Colleague,    Thank you for referring your patient, Marissa Guadarrama, to the [facility]. Please see a copy of my visit note below.    Marissa is a 71 year old who is being evaluated via a billable telephone visit.      What phone number would you like to be contacted at? [phone]  How would you like to obtain your AVS? Mail a copy     Rhea Bloom CMA on 2/15/2022 at 12:43 PM              The patient is being seen for metastatic carcinoid tumor diagnosed in 2003 and early stage breast cancer diagnosed in 2016.    She also has a history of metastatic carcinoid tumor resulting in ureteral obstruction requiring ureteral stent placement by urology.  She also has a liver lesion, presumably also a metastatic carcinoid lesion given there fairly long period of stability. Previously, she was followed by Dr. Lawrence for this and was then transitioned to Dr. Hicks.     In 2003, she underwent excision of the terminal ileum and right colon which revealed a 2.4 cm invasive ileal carcinoid extending to the deep muscularis propria with metastatic carcinoid tumor identified in 5 of 24 pericolonic lymph nodes.  Residual tubulovillous polyp was identified in the cecum.  Possible submucous lipoma was noted at the ileocecal valve.  The mucosal margins were negative.  The tumor was staged as G1, well-differentiated carcinoid tumor with clear margins without perineural invasion, pT2, pN2.     She is currently on adjuvant hormonal treatment for breast cancer consisting of exemestane.  Initially she had been on anastrozole which resulted in rash and prompted her to be switched to exemestane.      She also has a history of cervical cancer treated with surgical excision.    Bilateral screening mammography from July 2021 was benign.    I met with the patient for the first time on January 20, 2022 to take over  her care as her primary oncologist after Dr. Hicks's departure from [facility].    She had not had any imaging for her neuroendocrine tumor for quite some time so I ordered an Octreoscan which was completed on February 10, 2022.  I called her today to discuss her Octreoscan results with her.      ASSESSMENT:    Encounter Diagnoses   Name Primary?     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    PLAN:    Will need to schedule an Octreoscan now to assess the extent of your carcinoid tumor.  I will contact you by telephone and go over your Octreoscan results.    I previously refilled your prescription for Aromasin today which you should continue until you see me again for a routine office visit in 6 months. I I have ordered a CMP to be checked that day.    I also recommend that you continue to take OTC vitamin D and calcium daily to prevent bone loss.    You are due for your next bilateral screening mammogram in July which I have ordered for you.    I spent a total of 23 minutes on the care of this patient on the day of service including 11 minutes on the phone and remainder in chart review, care coordination, and documentation on the day of service.        Again, thank you for allowing me to participate in the care of your patient.        Sincerely,        Chad Hayden MD

## 2022-02-17 DIAGNOSIS — Z11.59 ENCOUNTER FOR SCREENING FOR OTHER VIRAL DISEASES: Primary | ICD-10-CM

## 2022-02-25 NOTE — PATIENT INSTRUCTIONS
You can continue to take your medications as directed.    Please make a lab visit prior to your surgery to have your COVID and urine tests.  I did order future labs to be done at that same time.      Preparing for Your Surgery  Getting started  A nurse will call you to review your health history and instructions. They will give you an arrival time based on your scheduled surgery time. Please be ready to share:    Your doctor's clinic name and phone number    Your medical, surgical and anesthesia history    A list of allergies and sensitivities    A list of medicines, including herbal treatments and over-the-counter drugs    Whether the patient has a legal guardian (ask how to send us the papers in advance)  Please tell us if you're pregnant--or if there's any chance you might be pregnant. Some surgeries may injure a fetus (unborn baby), so they require a pregnancy test. Surgeries that are safe for a fetus don't always need a test, and you can choose whether to have one.   If you have a child who's having surgery, please ask for a copy of Preparing for Your Child's Surgery.    Preparing for surgery    Within 30 days of surgery: Have a pre-op exam (sometimes called an H&P, or History and Physical). This can be done at a clinic or pre-operative center.  ? If you're having a , you may not need this exam. Talk to your care team.    At your pre-op exam, talk to your care team about all medicines you take. If you need to stop any medicines before surgery, ask when to start taking them again.  ? We do this for your safety. Many medicines can make you bleed too much during surgery. Some change how well surgery (anesthesia) drugs work.    Call your insurance company to let them know you're having surgery. (If you don't have insurance, call [phone].)    Call your clinic if there's any change in your health. This includes signs of a cold or flu (sore throat, runny nose, cough, rash, fever). It also includes a  scrape or scratch near the surgery site.    If you have questions on the day of surgery, call your hospital or surgery center.  COVID testing  You may need to be tested for COVID-19 before having surgery. If so, your surgical team will give you instructions for scheduling this test, separate from your preoperative history and physical.  Eating and drinking guidelines  For your safety: Unless your surgeon tells you otherwise, follow the guidelines below.    Eat and drink as usual until 8 hours before surgery. After that, no food or milk.    Drink clear liquids until 2 hours before surgery. These are liquids you can see through, like water, Gatorade and Propel Water. You may also have black coffee and tea (no cream or milk).    Nothing by mouth within 2 hours of surgery. This includes gum, candy and breath mints.    If you drink alcohol: Stop drinking it the night before surgery.    If your care team tells you to take medicine on the morning of surgery, it's okay to take it with a sip of water.  Preventing infection    Shower or bathe the night before and morning of your surgery. Follow the instructions your clinic gave you. (If no instructions, use regular soap.)    Don't shave or clip hair near your surgery site. We'll remove the hair if needed.    Don't smoke or vape the morning of surgery. You may chew nicotine gum up to 2 hours before surgery. A nicotine patch is okay.  ? Note: Some surgeries require you to completely quit smoking and nicotine. Check with your surgeon.    Your care team will make every effort to keep you safe from infection. We will:  ? Clean our hands often with soap and water (or an alcohol-based hand rub).  ? Clean the skin at your surgery site with a special soap that kills germs.  ? Give you a special gown to keep you warm. (Cold raises the risk of infection.)  ? Wear special hair covers, masks, gowns and gloves during surgery.  ? Give antibiotic medicine, if prescribed. Not all surgeries  need antibiotics.  What to bring on the day of surgery    Photo ID and insurance card    Copy of your health care directive, if you have one    Glasses and hearing aides (bring cases)  ? You can't wear contacts during surgery    Inhaler and eye drops, if you use them (tell us about these when you arrive)    CPAP machine or breathing device, if you use them    A few personal items, if spending the night    If you have . . .  ? A pacemaker, ICD (cardiac defibrillator) or other implant: Bring the ID card.  ? An implanted stimulator: Bring the remote control.  ? A legal guardian: Bring a copy of the certified (court-stamped) guardianship papers.  Please remove any jewelry, including body piercings. Leave jewelry and other valuables at home.  If you're going home the day of surgery    You must have a responsible adult drive you home. They should stay with you overnight as well.    If you don't have someone to stay with you, and you aren't safe to go home alone, we may keep you overnight. Insurance often won't pay for this.  After surgery  If it's hard to control your pain or you need more pain medicine, please call your surgeon's office.  Questions?   If you have any questions for your care team, list them here: _________________________________________________________________________________________________________________________________________________________________________ ____________________________________ ____________________________________ ____________________________________  For informational purposes only. Not to replace the advice of your health care provider. Copyright   2003, 2019 [facility]. All rights reserved. Clinically reviewed by Shereen Cordova MD. [employer] [id number] - REV 07/21.    Preparing for Your Surgery  Getting started  A nurse will call you to review your health history and instructions. They will give you an arrival time based on your scheduled surgery time. Please be ready to  share:    Your doctor's clinic name and phone number    Your medical, surgical and anesthesia history    A list of allergies and sensitivities    A list of medicines, including herbal treatments and over-the-counter drugs    Whether the patient has a legal guardian (ask how to send us the papers in advance)  Please tell us if you're pregnant--or if there's any chance you might be pregnant. Some surgeries may injure a fetus (unborn baby), so they require a pregnancy test. Surgeries that are safe for a fetus don't always need a test, and you can choose whether to have one.   If you have a child who's having surgery, please ask for a copy of Preparing for Your Child's Surgery.    Preparing for surgery    Within 30 days of surgery: Have a pre-op exam (sometimes called an H&P, or History and Physical). This can be done at a clinic or pre-operative center.  ? If you're having a , you may not need this exam. Talk to your care team.    At your pre-op exam, talk to your care team about all medicines you take. If you need to stop any medicines before surgery, ask when to start taking them again.  ? We do this for your safety. Many medicines can make you bleed too much during surgery. Some change how well surgery (anesthesia) drugs work.    Call your insurance company to let them know you're having surgery. (If you don't have insurance, call [phone].)    Call your clinic if there's any change in your health. This includes signs of a cold or flu (sore throat, runny nose, cough, rash, fever). It also includes a scrape or scratch near the surgery site.    If you have questions on the day of surgery, call your hospital or surgery center.  COVID testing  You may need to be tested for COVID-19 before having surgery. If so, your surgical team will give you instructions for scheduling this test, separate from your preoperative history and physical.  Eating and drinking guidelines  For your safety: Unless your surgeon  tells you otherwise, follow the guidelines below.    Eat and drink as usual until 8 hours before surgery. After that, no food or milk.    Drink clear liquids until 2 hours before surgery. These are liquids you can see through, like water, Gatorade and Propel Water. You may also have black coffee and tea (no cream or milk).    Nothing by mouth within 2 hours of surgery. This includes gum, candy and breath mints.    If you drink alcohol: Stop drinking it the night before surgery.    If your care team tells you to take medicine on the morning of surgery, it's okay to take it with a sip of water.  Preventing infection    Shower or bathe the night before and morning of your surgery. Follow the instructions your clinic gave you. (If no instructions, use regular soap.)    Don't shave or clip hair near your surgery site. We'll remove the hair if needed.    Don't smoke or vape the morning of surgery. You may chew nicotine gum up to 2 hours before surgery. A nicotine patch is okay.  ? Note: Some surgeries require you to completely quit smoking and nicotine. Check with your surgeon.    Your care team will make every effort to keep you safe from infection. We will:  ? Clean our hands often with soap and water (or an alcohol-based hand rub).  ? Clean the skin at your surgery site with a special soap that kills germs.  ? Give you a special gown to keep you warm. (Cold raises the risk of infection.)  ? Wear special hair covers, masks, gowns and gloves during surgery.  ? Give antibiotic medicine, if prescribed. Not all surgeries need antibiotics.  What to bring on the day of surgery    Photo ID and insurance card    Copy of your health care directive, if you have one    Glasses and hearing aides (bring cases)  ? You can't wear contacts during surgery    Inhaler and eye drops, if you use them (tell us about these when you arrive)    CPAP machine or breathing device, if you use them    A few personal items, if spending the  night    If you have . . .  ? A pacemaker, ICD (cardiac defibrillator) or other implant: Bring the ID card.  ? An implanted stimulator: Bring the remote control.  ? A legal guardian: Bring a copy of the certified (court-stamped) guardianship papers.  Please remove any jewelry, including body piercings. Leave jewelry and other valuables at home.  If you're going home the day of surgery    You must have a responsible adult drive you home. They should stay with you overnight as well.    If you don't have someone to stay with you, and you aren't safe to go home alone, we may keep you overnight. Insurance often won't pay for this.  After surgery  If it's hard to control your pain or you need more pain medicine, please call your surgeon's office.  Questions?   If you have any questions for your care team, list them here: _________________________________________________________________________________________________________________________________________________________________________ ____________________________________ ____________________________________ ____________________________________  For informational purposes only. Not to replace the advice of your health care provider. Copyright   2003, 2019 [employer]. All rights reserved. Clinically reviewed by Shereen Cordova MD. [employer] [id number] - REV 07/21.    Preparing for Your Surgery  Getting started  A nurse will call you to review your health history and instructions. They will give you an arrival time based on your scheduled surgery time. Please be ready to share:    Your doctor's clinic name and phone number    Your medical, surgical and anesthesia history    A list of allergies and sensitivities    A list of medicines, including herbal treatments and over-the-counter drugs    Whether the patient has a legal guardian (ask how to send us the papers in advance)  Please tell us if you're pregnant--or if there's any chance you might be pregnant. Some  surgeries may injure a fetus (unborn baby), so they require a pregnancy test. Surgeries that are safe for a fetus don't always need a test, and you can choose whether to have one.   If you have a child who's having surgery, please ask for a copy of Preparing for Your Child's Surgery.    Preparing for surgery    Within 30 days of surgery: Have a pre-op exam (sometimes called an H&P, or History and Physical). This can be done at a clinic or pre-operative center.  ? If you're having a , you may not need this exam. Talk to your care team.    At your pre-op exam, talk to your care team about all medicines you take. If you need to stop any medicines before surgery, ask when to start taking them again.  ? We do this for your safety. Many medicines can make you bleed too much during surgery. Some change how well surgery (anesthesia) drugs work.    Call your insurance company to let them know you're having surgery. (If you don't have insurance, call [phone].)    Call your clinic if there's any change in your health. This includes signs of a cold or flu (sore throat, runny nose, cough, rash, fever). It also includes a scrape or scratch near the surgery site.    If you have questions on the day of surgery, call your hospital or surgery center.  COVID testing  You may need to be tested for COVID-19 before having surgery. If so, your surgical team will give you instructions for scheduling this test, separate from your preoperative history and physical.  Eating and drinking guidelines  For your safety: Unless your surgeon tells you otherwise, follow the guidelines below.    Eat and drink as usual until 8 hours before surgery. After that, no food or milk.    Drink clear liquids until 2 hours before surgery. These are liquids you can see through, like water, Gatorade and Propel Water. You may also have black coffee and tea (no cream or milk).    Nothing by mouth within 2 hours of surgery. This includes gum, candy and  breath mints.    If you drink alcohol: Stop drinking it the night before surgery.    If your care team tells you to take medicine on the morning of surgery, it's okay to take it with a sip of water.  Preventing infection    Shower or bathe the night before and morning of your surgery. Follow the instructions your clinic gave you. (If no instructions, use regular soap.)    Don't shave or clip hair near your surgery site. We'll remove the hair if needed.    Don't smoke or vape the morning of surgery. You may chew nicotine gum up to 2 hours before surgery. A nicotine patch is okay.  ? Note: Some surgeries require you to completely quit smoking and nicotine. Check with your surgeon.    Your care team will make every effort to keep you safe from infection. We will:  ? Clean our hands often with soap and water (or an alcohol-based hand rub).  ? Clean the skin at your surgery site with a special soap that kills germs.  ? Give you a special gown to keep you warm. (Cold raises the risk of infection.)  ? Wear special hair covers, masks, gowns and gloves during surgery.  ? Give antibiotic medicine, if prescribed. Not all surgeries need antibiotics.  What to bring on the day of surgery    Photo ID and insurance card    Copy of your health care directive, if you have one    Glasses and hearing aides (bring cases)  ? You can't wear contacts during surgery    Inhaler and eye drops, if you use them (tell us about these when you arrive)    CPAP machine or breathing device, if you use them    A few personal items, if spending the night    If you have . . .  ? A pacemaker, ICD (cardiac defibrillator) or other implant: Bring the ID card.  ? An implanted stimulator: Bring the remote control.  ? A legal guardian: Bring a copy of the certified (court-stamped) guardianship papers.  Please remove any jewelry, including body piercings. Leave jewelry and other valuables at home.  If you're going home the day of surgery    You must have a  responsible adult drive you home. They should stay with you overnight as well.    If you don't have someone to stay with you, and you aren't safe to go home alone, we may keep you overnight. Insurance often won't pay for this.  After surgery  If it's hard to control your pain or you need more pain medicine, please call your surgeon's office.  Questions?   If you have any questions for your care team, list them here: _________________________________________________________________________________________________________________________________________________________________________ ____________________________________ ____________________________________ ____________________________________  For informational purposes only. Not to replace the advice of your health care provider. Copyright   2003, 2019 [employer]. All rights reserved. Clinically reviewed by Shereen Cordova MD. [employer] [id number] - REV 07/21.

## 2022-02-25 NOTE — H&P (VIEW-ONLY)
[facility]  [address] [address]  Phone: [phone]  Primary Provider: Amelie Zayas  Pre-op Performing Provider: AMELIE ZAYAS      PREOPERATIVE EVALUATION:  Today's date: 2/28/2022    Marissa Guadarrama is a 71 year old female who presents for a preoperative evaluation.    Surgical Information:  Surgery/Procedure: CYSTOSCOPY, WITH RETROGRADE PYELOGRAM AND URETERAL STENT EXCHANGE  Surgery Location: [facility]  Surgeon: Abhinav  Surgery Date: 3/24/22  Time of Surgery: 10:15 AM  Where patient plans to recover: At home with family  Fax number for surgical facility: Note does not need to be faxed, will be available electronically in Epic.    Type of Anesthesia Anticipated: Monitor Anesthesia Care    Assessment & Plan     The proposed surgical procedure is considered INTERMEDIATE risk.    Preop general physical exam      Obstruction of left ureter  Having stent replaced    Hypertension goal BP (blood pressure) < 140/90  Will check labs when she comes in for preop labs for COVID and Urine analysis  - Albumin Random Urine Quantitative with Creat Ratio; Future  - hydrochlorothiazide (HYDRODIURIL) 12.5 MG tablet; Take 1 tablet (12.5 mg) by mouth daily  - Basic metabolic panel; Future    Peritoneal metastases (H)  She is seeing oncology    Rheumatoid arthritis with positive rheumatoid factor, involving unspecified site (H)  Has seen rheumatology in the past and no medication were recommended due to no joint erosions.  Plan is to monitor and return if more symptomatic.      Stage 3a chronic kidney disease (H)  Need to check lab    Screening for hyperlipidemia    - Lipid panel reflex to direct LDL Fasting; Future         Risks and Recommendations:  The patient has the following additional risks and recommendations for perioperative complications:   - No identified additional risk factors other than previously addressed        RECOMMENDATION:  APPROVAL  GIVEN to proceed with proposed procedure, without further diagnostic evaluation.                      Subjective     HPI related to upcoming procedure: having cystoscopy and stent replacement.      Preop Questions 2/28/2022   1. Have you ever had a heart attack or stroke? No   2. Have you ever had surgery on your heart or blood vessels, such as a stent placement, a coronary artery bypass, or surgery on an artery in your head, neck, heart, or legs? No   3. Do you have chest pain with activity? No   4. Do you have a history of  heart failure? No   5. Do you currently have a cold, bronchitis or symptoms of other infection? No   6. Do you have a cough, shortness of breath, or wheezing? No   7. Do you or anyone in your family have previous history of blood clots? No   8. Do you or does anyone in your family have a serious bleeding problem such as prolonged bleeding following surgeries or cuts? No   9. Have you ever had problems with anemia or been told to take iron pills? No   10. Have you had any abnormal blood loss such as black, tarry or bloody stools, or abnormal vaginal bleeding? No   11. Have you ever had a blood transfusion? No   12. Are you willing to have a blood transfusion if it is medically needed before, during, or after your surgery? Yes   13. Have you or any of your relatives ever had problems with anesthesia? No   14. Do you have sleep apnea, excessive snoring or daytime drowsiness? No   15. Do you have any artifical heart valves or other implanted medical devices like a pacemaker, defibrillator, or continuous glucose monitor? No   16. Do you have artificial joints? No   17. Are you allergic to latex? No   18. Is there any chance that you may be pregnant? -     Health Care Directive:  Patient does not have a Health Care Directive or Living Will: Advance Directive received and scanned. Click on Code in the patient header to view.    Preoperative Review of :   reviewed - no record of controlled  substances prescribed.      Status of Chronic Conditions:  See problem list for active medical problems.  Problems all longstanding and stable, except as noted/documented.  See ROS for pertinent symptoms related to these conditions.      Review of Systems  Review Of Systems  Constitutional: Negative  Skin: negative  Eyes: negative  Ears/Nose/Throat: negative  Respiratory: No shortness of breath, dyspnea on exertion, cough, or hemoptysis  Cardiovascular: negative  Gastrointestinal: negative  Genitourinary: negative  Musculoskeletal: negative  Neurologic: negative  Psychiatric: negative  Hematologic/Lymphatic/Immunologic: negative  Endocrine: negative      Patient Active Problem List    Diagnosis Date Noted     Malignant carcinoid tumor of cecum (H) 02/15/2022     Priority: Medium     Ureteral obstruction, left 05/09/2021     Priority: Medium     Other hydronephrosis 05/09/2021     Priority: Medium     Added automatically from request for surgery [id number]       HSIL (high grade squamous intraepithelial lesion) on Pap smear of cervix 06/15/2020     Priority: Medium     Added automatically from request for surgery [id number]       Obstruction of left ureter 02/11/2020     Priority: Medium     Added automatically from request for surgery [id number]       Osteopenia of hip 05/15/2019     Priority: Medium     Need for prophylactic chemotherapy 05/15/2019     Priority: Medium     Rheumatoid arthritis with positive rheumatoid factor, involving unspecified site (H) 12/31/2018     Priority: Medium     Ureteral obstruction 11/20/2018     Priority: Medium     Added automatically from request for surgery [id number]       Obesity (BMI 35.0-39.9) with comorbidity (H) 09/28/2018     Priority: Medium     Peritoneal metastases (H) 10/30/2017     Priority: Medium     Serum calcium elevated 04/07/2017     Priority: Medium     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  Marissa Guadarrama has no plan or code status on file. Discussed available resources and provided with information. Confirmed code status reflects current choices pending further ACP discussions.  Confirmed/documented designated decision maker(s). See permanent comments section of demographics in clinical tab. Added by Tiff Askew on 2/6/2015  Discussed advance care planning with patient; however, patient declined at this time. 9/19/2012              OA (osteoarthritis) of knee 10/05/2011     Priority: Medium     Hypertension goal BP (blood pressure) < 140/90 11/01/2010     Priority: Medium     HYPERLIPIDEMIA LDL GOAL <130 10/31/2010     Priority: Medium     Post-polio syndrome      Priority: Medium     Left knee, diagnosed by ortho in 1976, had left leg/toe surgeries as child  (Problem list name updated by automated process. Provider to review and confirm.)       Cervical cancer (H)      Priority: Medium     5/2007.  Dr. Alonso, [facility] gyn/onc,  Now needs routine paps, patient not always compliant  3/26/09 pap NIL  9/24/09 pap ASCUS  11/1/13 pap ASC-H. Plan-- colposcopy   12/23/13 colposcopy scheduled. Reminder placed.  colpscopy 12/23/2013-Vaginal cuff (submitted as \"cervix\"), biopsy:  - High grade squamous intraepithelial lesion (vaginal  intraepithelial neoplasia grade III, VaIN III, severe dysplasia and  carcinoma in situ).  - No invasive malignancy identified.  - Cervical transformation zone not identified in biopsies.  - Please see comment.  Referral back to " gynecology/oncology  2/5/14 gyn/onc appt   3/13/14 colposcopy and CO2 laser vagina, path:VAIN 1/2.  3/19/14 follow up in 4 months  7/30/14 vaginal biopsy: VAIN 1. Plan: repeat colp in 6 months.(9/17/14)   9/17/14 colp. No staining seen. No biopsy taken. Pap- ASCUS + HR HPV. Plan- repeat pap at next visit.  2/9/15 colposcopy. bx- LSIL/koilocytosis. Pap- NIL/+ HR HPV 16.  Plan: 3/16/15 treatment planning.   3/16/15 repeat colposcopy in 4 months (due 7/16/15)  7/6/15 scheduled. Tracking.             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  Patient has not been complaint with follow up.        Past Medical History:   Diagnosis Date     Arthritis     knee     Benign breast biopsy     benign     Carcinoid tumor 12/2003     Cervical cancer (H) 2007     H/O colposcopy with cervical biopsy 12/23/13    vaginal cuff biopsy- VAIN III. referred back to gyn/onc     HTN      Hyperlipidemia      Obesity      Pap smear of vagina with ASC-H 11/1/13     Post-polio syndromes      Trigeminal neuralgias      Past Surgical History:   Procedure Laterality Date     APPENDECTOMY  1983     BIOPSY NODE SENTINEL Bilateral 6/1/2016    Procedure: BIOPSY NODE SENTINEL;  Surgeon: Brent Arana MD;  Location: [facility]     [facility] [address] SURGICAL PATHOLOGY       COLONOSCOPY N/A 6/23/2017    Procedure: COMBINED COLONOSCOPY, SINGLE OR MULTIPLE BIOPSY/POLYPECTOMY BY BIOPSY;  Colonoscopy Dx:Carcinoid tumor of colon prep mailed golytely;  Surgeon: Talisha Greco MD;  Location: [facility]     COLPOSCOPY, BIOPSY, COMBINED  3/13/2014    Procedure: COMBINED COLPOSCOPY, BIOPSY;;  Surgeon: Lara Pack MD;  Location: [facility]     COMBINED CYSTOSCOPY, RETROGRADES, EXCHANGE STENT URETER(S) Left 2/7/2019    Procedure: COMBINED CYSTOSCOPY, RETROGRADES, EXCHANGE STENT URETER--left;  Surgeon: Zhao La MD;  Location: [facility]      COMBINED CYSTOSCOPY, RETROGRADES, EXCHANGE STENT URETER(S) Left 2/20/2020    Procedure: CYSTOSCOPY, WITH RETROGRADE PYELOGRAM AND Left URETERAL STENT exchange;  Surgeon: Zhao La MD;  Location: [facility]     COMBINED CYSTOSCOPY, RETROGRADES, EXCHANGE STENT URETER(S) Left 5/9/2021    Procedure: CYSTOSCOPY, WITH RETROGRADE PYELOGRAM AND LEFT URETERAL STENT REPLACEMENT;  Surgeon: Fred Owens MD;  [facility]     COMBINED CYSTOSCOPY, RETROGRADES, EXCHANGE STENT URETER(S) Left 9/16/2021    Procedure: CYSTOSCOPY, WITH left RETROGRADE PYELOGRAM AND left  URETERAL STENT REPLACEMENT;  Surgeon: Zhao La MD;  Location: [facility]     COMBINED CYSTOSCOPY, RETROGRADES, URETEROSCOPY, INSERT STENT Left 2/2/2017    Procedure: COMBINED CYSTOSCOPY, RETROGRADES, URETEROSCOPY, INSERT STENT;  Surgeon: Zhao La MD;  Location: [facility]     CYSTOSCOPY, RETROGRADES, INSERT STENT URETER(S), COMBINED Left 9/7/2017    Procedure: COMBINED CYSTOSCOPY, RETROGRADES, INSERT STENT URETER(S);  Cystoscopy,Left Stent Exchange;  Surgeon: Zhao La MD;  Location: [facility]     CYSTOSCOPY, RETROGRADES, INSERT STENT URETER(S), COMBINED  12/12/2017    Procedure: COMBINED CYSTOSCOPY, RETROGRADES, INSERT STENT URETER(S);;  Surgeon: Zhao La MD;  [facility]     CYSTOSCOPY, RETROGRADES, INSERT STENT URETER(S), COMBINED Left 7/5/2018    Procedure: COMBINED CYSTOSCOPY, RETROGRADES, INSERT STENT URETER(S);  Cystoscopy, Left Stent Exchange;  Surgeon: Zhao La MD;  Location: [facility]     EXAM UNDER ANESTHESIA PELVIC  3/13/2014    Procedure: EXAM UNDER ANESTHESIA PELVIC;  Exam Under Anestheisa, Colposcopy, Vaginal Biopsies, Co2 Laser of the Upper Vagina;  Surgeon: Lara Pack MD;  [facility]     EXAM UNDER ANESTHESIA PELVIC N/A 7/1/2020    Procedure: Examination under anesthesia, vaginal biopsies;  Surgeon: Karli Gao MD;  [facility]      HERNIORRHAPHY INCISIONAL (LOCATION)       IR RHIZOTOMY  8/14/2020     LASER CO2 VAGINA  3/13/2014    VAIN 1/2     LASER CO2 VAGINA N/A 12/12/2017    Procedure: LASER CO2 VAGINA;  Exam Under Anesthesia, CO2 Laser Ablation Of Vagina, Cystoscopy, Left Retrograde Pyelogram with Left Stent Placement;  Surgeon: Nic Segundo MD;  Location: [facility]     LUMPECTOMY BREAST WITH SEED LOCALIZATION Bilateral 6/1/2016    Procedure: LUMPECTOMY BREAST WITH SEED LOCALIZATION;  Surgeon: Brent Arana MD;  Location: [facility]     SURGICAL HISTORY OF -       ovarian cystectomy     SURGICAL HISTORY OF -   2003    right colon resection secondary to carcinoid tumor     TUBAL LIGATION       [facility] BSO, OMENTECTOMY W/OSMAN  5/2007     [facility] TOTAL ABDOM HYSTERECTOMY  5/2007     Current Outpatient Medications   Medication Sig Dispense Refill     exemestane (AROMASIN) 25 MG tablet Take 1 tablet (25 mg) by mouth every morning 90 tablet 1     hydrochlorothiazide (HYDRODIURIL) 12.5 MG tablet Take 1 tablet (12.5 mg) by mouth daily 90 tablet 3       No Known Allergies     Social History     Tobacco Use     Smoking status: Former Smoker     Packs/day: 1.00     Years: 29.00     Pack years: 29.00     Types: Cigarettes     Quit date: 10/30/2006     Years since quitting: 15.3     Smokeless tobacco: Never Used   Substance Use Topics     Alcohol use: No     Alcohol/week: 0.0 standard drinks     Comment: 1 drink per year     Family History   Problem Relation Age of Onset     Cancer Mother         bone / liver     Breast Cancer Mother      Cancer Maternal Grandmother      Cancer Maternal Grandfather      Cancer Paternal Grandmother      Cancer Paternal Grandfather      Cancer Sister         vulvar ca, cervical ca, squamous cell cancer     Cancer Brother         Rectal- Stage 4     Rectal Cancer Brother      Breast Cancer Paternal Aunt      Colon Cancer Paternal Aunt      Breast Cancer Maternal Aunt      Pancreatic Cancer Nephew      Breast Cancer Sister       Anesthesia Reaction No family hx of      History   Drug Use No         Objective     There were no vitals taken for this visit.    Physical Exam    GENERAL APPEARANCE: healthy, alert and no distress     EYES: EOMI, PERRL     HENT: ear canals and TM's normal and nose and mouth without ulcers or lesions     NECK: no adenopathy, no asymmetry, masses, or scars and thyroid normal to palpation     RESP: lungs clear to auscultation - no rales, rhonchi or wheezes     CV: regular rates and rhythm, normal S1 S2, no S3 or S4 and no murmur, click or rub     ABDOMEN:  soft, nontender, no HSM or masses and bowel sounds normal     MS: extremities normal- no gross deformities noted, no evidence of inflammation in joints, FROM in all extremities.     SKIN: no suspicious lesions or rashes     NEURO: Normal strength and tone, sensory exam grossly normal, mentation intact and speech normal     PSYCH: mentation appears normal. and affect normal/bright     LYMPHATICS: No cervical adenopathy    Recent Labs   Lab Test 01/23/22  1027 11/19/21  1024 09/03/21  1010 05/09/21  1352 05/09/21  0558 05/08/21  2125 05/08/21  1719   HGB 13.9  --  14.2   < > 10.1*   < > 12.7     --  210   < > 275   < > 362   INR  --   --   --   --  1.34*  --  1.32*     --  141   < > 137   < > 133   POTASSIUM 3.3*  --  3.1*   < > 3.8   < > 3.2*   CR 1.31* 1.6* 1.42*   < > 1.45*   < > 2.10*    < > = values in this interval not displayed.        Diagnostics:  Labs pending at this time.  Results will be reviewed when available.   No EKG required, no history of coronary heart disease, significant arrhythmia, peripheral arterial disease or other structural heart disease.    Revised Cardiac Risk Index (RCRI):  The patient has the following serious cardiovascular risks for perioperative complications:       RCRI Interpretation: 0 points: Class I (very low risk - 0.4% complication rate)           Signed Electronically by: Eliazar Menendez MD  Copy of this  evaluation report is provided to requesting physician.

## 2022-02-25 NOTE — PROGRESS NOTES
[facility]  [address] [facility] [address]  Phone: [phone]  Primary Provider: Amelie Zayas  Pre-op Performing Provider: AMELIE ZAYAS      PREOPERATIVE EVALUATION:  Today's date: 2/28/2022    Marissa Guadarrama is a 71 year old female who presents for a preoperative evaluation.    Surgical Information:  Surgery/Procedure: CYSTOSCOPY, WITH RETROGRADE PYELOGRAM AND URETERAL STENT EXCHANGE  Surgery Location: [facility]  Surgeon: Abhinav  Surgery Date: 3/24/22  Time of Surgery: 10:15 AM  Where patient plans to recover: At home with family  Fax number for surgical facility: Note does not need to be faxed, will be available electronically in Epic.    Type of Anesthesia Anticipated: Monitor Anesthesia Care    Assessment & Plan     The proposed surgical procedure is considered INTERMEDIATE risk.    Preop general physical exam      Obstruction of left ureter  Having stent replaced    Hypertension goal BP (blood pressure) < 140/90  Will check labs when she comes in for preop labs for COVID and Urine analysis  - Albumin Random Urine Quantitative with Creat Ratio; Future  - hydrochlorothiazide (HYDRODIURIL) 12.5 MG tablet; Take 1 tablet (12.5 mg) by mouth daily  - Basic metabolic panel; Future    Peritoneal metastases (H)  She is seeing oncology    Rheumatoid arthritis with positive rheumatoid factor, involving unspecified site (H)  Has seen rheumatology in the past and no medication were recommended due to no joint erosions.  Plan is to monitor and return if more symptomatic.      Stage 3a chronic kidney disease (H)  Need to check lab    Screening for hyperlipidemia    - Lipid panel reflex to direct LDL Fasting; Future         Risks and Recommendations:  The patient has the following additional risks and recommendations for perioperative complications:   - No identified additional risk factors other than previously addressed        RECOMMENDATION:  APPROVAL  GIVEN to proceed with proposed procedure, without further diagnostic evaluation.                      Subjective     HPI related to upcoming procedure: having cystoscopy and stent replacement.      Preop Questions 2/28/2022   1. Have you ever had a heart attack or stroke? No   2. Have you ever had surgery on your heart or blood vessels, such as a stent placement, a coronary artery bypass, or surgery on an artery in your head, neck, heart, or legs? No   3. Do you have chest pain with activity? No   4. Do you have a history of  heart failure? No   5. Do you currently have a cold, bronchitis or symptoms of other infection? No   6. Do you have a cough, shortness of breath, or wheezing? No   7. Do you or anyone in your family have previous history of blood clots? No   8. Do you or does anyone in your family have a serious bleeding problem such as prolonged bleeding following surgeries or cuts? No   9. Have you ever had problems with anemia or been told to take iron pills? No   10. Have you had any abnormal blood loss such as black, tarry or bloody stools, or abnormal vaginal bleeding? No   11. Have you ever had a blood transfusion? No   12. Are you willing to have a blood transfusion if it is medically needed before, during, or after your surgery? Yes   13. Have you or any of your relatives ever had problems with anesthesia? No   14. Do you have sleep apnea, excessive snoring or daytime drowsiness? No   15. Do you have any artifical heart valves or other implanted medical devices like a pacemaker, defibrillator, or continuous glucose monitor? No   16. Do you have artificial joints? No   17. Are you allergic to latex? No   18. Is there any chance that you may be pregnant? -     Health Care Directive:  Patient does not have a Health Care Directive or Living Will: Advance Directive received and scanned. Click on Code in the patient header to view.    Preoperative Review of :   reviewed - no record of controlled  substances prescribed.      Status of Chronic Conditions:  See problem list for active medical problems.  Problems all longstanding and stable, except as noted/documented.  See ROS for pertinent symptoms related to these conditions.      Review of Systems  Review Of Systems  Constitutional: Negative  Skin: negative  Eyes: negative  Ears/Nose/Throat: negative  Respiratory: No shortness of breath, dyspnea on exertion, cough, or hemoptysis  Cardiovascular: negative  Gastrointestinal: negative  Genitourinary: negative  Musculoskeletal: negative  Neurologic: negative  Psychiatric: negative  Hematologic/Lymphatic/Immunologic: negative  Endocrine: negative      Patient Active Problem List    Diagnosis Date Noted     Malignant carcinoid tumor of cecum (H) 02/15/2022     Priority: Medium     Ureteral obstruction, left 05/09/2021     Priority: Medium     Other hydronephrosis 05/09/2021     Priority: Medium     Added automatically from request for surgery [id number]       HSIL (high grade squamous intraepithelial lesion) on Pap smear of cervix 06/15/2020     Priority: Medium     Added automatically from request for surgery [id number]       Obstruction of left ureter 02/11/2020     Priority: Medium     Added automatically from request for surgery [id number]       Osteopenia of hip 05/15/2019     Priority: Medium     Need for prophylactic chemotherapy 05/15/2019     Priority: Medium     Rheumatoid arthritis with positive rheumatoid factor, involving unspecified site (H) 12/31/2018     Priority: Medium     Ureteral obstruction 11/20/2018     Priority: Medium     Added automatically from request for surgery [id number]       Obesity (BMI 35.0-39.9) with comorbidity (H) 09/28/2018     Priority: Medium     Peritoneal metastases (H) 10/30/2017     Priority: Medium     Serum calcium elevated 04/07/2017     Priority: Medium     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  Marissa Guadarrama has no plan or code status on file. Discussed available resources and provided with information. Confirmed code status reflects current choices pending further ACP discussions.  Confirmed/documented designated decision maker(s). See permanent comments section of demographics in clinical tab. Added by Tiff Askew on 2/6/2015  Discussed advance care planning with patient; however, patient declined at this time. 9/19/2012              OA (osteoarthritis) of knee 10/05/2011     Priority: Medium     Hypertension goal BP (blood pressure) < 140/90 11/01/2010     Priority: Medium     HYPERLIPIDEMIA LDL GOAL <130 10/31/2010     Priority: Medium     Post-polio syndrome      Priority: Medium     Left knee, diagnosed by ortho in 1976, had left leg/toe surgeries as child  (Problem list name updated by automated process. Provider to review and confirm.)       Cervical cancer (H)      Priority: Medium     5/2007.  Dr. Alonso, [facility] gyn/onc,  Now needs routine paps, patient not always compliant  3/26/09 pap NIL  9/24/09 pap ASCUS  11/1/13 pap ASC-H. Plan-- colposcopy   12/23/13 colposcopy scheduled. Reminder placed.  colpscopy 12/23/2013-Vaginal cuff (submitted as \"cervix\"), biopsy:  - High grade squamous intraepithelial lesion (vaginal  intraepithelial neoplasia grade III, VaIN III, severe dysplasia and  carcinoma in situ).  - No invasive malignancy identified.  - Cervical transformation zone not identified in biopsies.  - Please see comment.  Referral back to " gynecology/oncology  2/5/14 gyn/onc appt   3/13/14 colposcopy and CO2 laser vagina, path:VAIN 1/2.  3/19/14 follow up in 4 months  7/30/14 vaginal biopsy: VAIN 1. Plan: repeat colp in 6 months.(9/17/14)   9/17/14 colp. No staining seen. No biopsy taken. Pap- ASCUS + HR HPV. Plan- repeat pap at next visit.  2/9/15 colposcopy. bx- LSIL/koilocytosis. Pap- NIL/+ HR HPV 16.  Plan: 3/16/15 treatment planning.   3/16/15 repeat colposcopy in 4 months (due 7/16/15)  7/6/15 scheduled. Tracking.             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  Patient has not been complaint with follow up.        Past Medical History:   Diagnosis Date     Arthritis     knee     Benign breast biopsy     benign     Carcinoid tumor 12/2003     Cervical cancer (H) 2007     H/O colposcopy with cervical biopsy 12/23/13    vaginal cuff biopsy- VAIN III. referred back to gyn/onc     HTN      Hyperlipidemia      Obesity      Pap smear of vagina with ASC-H 11/1/13     Post-polio syndromes      Trigeminal neuralgias      Past Surgical History:   Procedure Laterality Date     APPENDECTOMY  1983     BIOPSY NODE SENTINEL Bilateral 6/1/2016    Procedure: BIOPSY NODE SENTINEL;  Surgeon: Brent Arana MD;  Location: [facility]     [facility] SURGICAL PATHOLOGY       COLONOSCOPY N/A 6/23/2017    Procedure: COMBINED COLONOSCOPY, SINGLE OR MULTIPLE BIOPSY/POLYPECTOMY BY BIOPSY;  Colonoscopy Dx:Carcinoid tumor of colon prep mailed golytely;  Surgeon: Talisha Greco MD;  Location: [facility]     COLPOSCOPY, BIOPSY, COMBINED  3/13/2014    Procedure: COMBINED COLPOSCOPY, BIOPSY;;  Surgeon: Lara Pack MD;  [facility]: [facility]     COMBINED CYSTOSCOPY, RETROGRADES, EXCHANGE STENT URETER(S) Left 2/7/2019    Procedure: COMBINED CYSTOSCOPY, RETROGRADES, EXCHANGE STENT URETER--left;  Surgeon: Zhao La MD;  Location: [facility]      COMBINED CYSTOSCOPY, RETROGRADES, EXCHANGE STENT URETER(S) Left 2/20/2020    Procedure: CYSTOSCOPY, WITH RETROGRADE PYELOGRAM AND Left URETERAL STENT exchange;  Surgeon: Zhao La MD;  Location: [facility]     COMBINED CYSTOSCOPY, RETROGRADES, EXCHANGE STENT URETER(S) Left 5/9/2021    Procedure: CYSTOSCOPY, WITH RETROGRADE PYELOGRAM AND LEFT URETERAL STENT REPLACEMENT;  Surgeon: Fred Owens MD;  [facility]     COMBINED CYSTOSCOPY, RETROGRADES, EXCHANGE STENT URETER(S) Left 9/16/2021    Procedure: CYSTOSCOPY, WITH left RETROGRADE PYELOGRAM AND left  URETERAL STENT REPLACEMENT;  Surgeon: Zhao La MD;  Location: [facility]     COMBINED CYSTOSCOPY, RETROGRADES, URETEROSCOPY, INSERT STENT Left 2/2/2017    Procedure: COMBINED CYSTOSCOPY, RETROGRADES, URETEROSCOPY, INSERT STENT;  Surgeon: Zhao La MD;  Location: [facility]     CYSTOSCOPY, RETROGRADES, INSERT STENT URETER(S), COMBINED Left 9/7/2017    Procedure: COMBINED CYSTOSCOPY, RETROGRADES, INSERT STENT URETER(S);  Cystoscopy,Left Stent Exchange;  Surgeon: Zhao La MD;  Location: [facility]     CYSTOSCOPY, RETROGRADES, INSERT STENT URETER(S), COMBINED  12/12/2017    Procedure: COMBINED CYSTOSCOPY, RETROGRADES, INSERT STENT URETER(S);;  Surgeon: Zhao La MD;  [facility]     CYSTOSCOPY, RETROGRADES, INSERT STENT URETER(S), COMBINED Left 7/5/2018    Procedure: COMBINED CYSTOSCOPY, RETROGRADES, INSERT STENT URETER(S);  Cystoscopy, Left Stent Exchange;  Surgeon: Zhao La MD;  Location: [facility]     EXAM UNDER ANESTHESIA PELVIC  3/13/2014    Procedure: EXAM UNDER ANESTHESIA PELVIC;  Exam Under Anestheisa, Colposcopy, Vaginal Biopsies, Co2 Laser of the Upper Vagina;  Surgeon: Lara Pack MD;  [facility]     EXAM UNDER ANESTHESIA PELVIC N/A 7/1/2020    Procedure: Examination under anesthesia, vaginal biopsies;  Surgeon: Karli Gao MD;  [facility]      HERNIORRHAPHY INCISIONAL (LOCATION)       IR RHIZOTOMY  8/14/2020     LASER CO2 VAGINA  3/13/2014    VAIN 1/2     LASER CO2 VAGINA N/A 12/12/2017    Procedure: LASER CO2 VAGINA;  Exam Under Anesthesia, CO2 Laser Ablation Of Vagina, Cystoscopy, Left Retrograde Pyelogram with Left Stent Placement;  Surgeon: Nic Segundo MD;  Location: [facility]     LUMPECTOMY BREAST WITH SEED LOCALIZATION Bilateral 6/1/2016    Procedure: LUMPECTOMY BREAST WITH SEED LOCALIZATION;  Surgeon: Brent Arana MD;  Location: [facility]     SURGICAL HISTORY OF -       ovarian cystectomy     SURGICAL HISTORY OF -   2003    right colon resection secondary to carcinoid tumor     TUBAL LIGATION       [facility] BSO, OMENTECTOMY W/OSMAN  5/2007     [facility] TOTAL ABDOM HYSTERECTOMY  5/2007     Current Outpatient Medications   Medication Sig Dispense Refill     exemestane (AROMASIN) 25 MG tablet Take 1 tablet (25 mg) by mouth every morning 90 tablet 1     hydrochlorothiazide (HYDRODIURIL) 12.5 MG tablet Take 1 tablet (12.5 mg) by mouth daily 90 tablet 3       No Known Allergies     Social History     Tobacco Use     Smoking status: Former Smoker     Packs/day: 1.00     Years: 29.00     Pack years: 29.00     Types: Cigarettes     Quit date: 10/30/2006     Years since quitting: 15.3     Smokeless tobacco: Never Used   Substance Use Topics     Alcohol use: No     Alcohol/week: 0.0 standard drinks     Comment: 1 drink per year     Family History   Problem Relation Age of Onset     Cancer Mother         bone / liver     Breast Cancer Mother      Cancer Maternal Grandmother      Cancer Maternal Grandfather      Cancer Paternal Grandmother      Cancer Paternal Grandfather      Cancer Sister         vulvar ca, cervical ca, squamous cell cancer     Cancer Brother         Rectal- Stage 4     Rectal Cancer Brother      Breast Cancer Paternal Aunt      Colon Cancer Paternal Aunt      Breast Cancer Maternal Aunt      Pancreatic Cancer Nephew      Breast Cancer Sister       Anesthesia Reaction No family hx of      History   Drug Use No         Objective     There were no vitals taken for this visit.    Physical Exam    GENERAL APPEARANCE: healthy, alert and no distress     EYES: EOMI, PERRL     HENT: ear canals and TM's normal and nose and mouth without ulcers or lesions     NECK: no adenopathy, no asymmetry, masses, or scars and thyroid normal to palpation     RESP: lungs clear to auscultation - no rales, rhonchi or wheezes     CV: regular rates and rhythm, normal S1 S2, no S3 or S4 and no murmur, click or rub     ABDOMEN:  soft, nontender, no HSM or masses and bowel sounds normal     MS: extremities normal- no gross deformities noted, no evidence of inflammation in joints, FROM in all extremities.     SKIN: no suspicious lesions or rashes     NEURO: Normal strength and tone, sensory exam grossly normal, mentation intact and speech normal     PSYCH: mentation appears normal. and affect normal/bright     LYMPHATICS: No cervical adenopathy    Recent Labs   Lab Test 01/23/22  1027 11/19/21  1024 09/03/21  1010 05/09/21  1352 05/09/21  0558 05/08/21  2125 05/08/21  1719   HGB 13.9  --  14.2   < > 10.1*   < > 12.7     --  210   < > 275   < > 362   INR  --   --   --   --  1.34*  --  1.32*     --  141   < > 137   < > 133   POTASSIUM 3.3*  --  3.1*   < > 3.8   < > 3.2*   CR 1.31* 1.6* 1.42*   < > 1.45*   < > 2.10*    < > = values in this interval not displayed.        Diagnostics:  Labs pending at this time.  Results will be reviewed when available.   No EKG required, no history of coronary heart disease, significant arrhythmia, peripheral arterial disease or other structural heart disease.    Revised Cardiac Risk Index (RCRI):  The patient has the following serious cardiovascular risks for perioperative complications:       RCRI Interpretation: 0 points: Class I (very low risk - 0.4% complication rate)           Signed Electronically by: Eliazar Menendez MD  Copy of this  evaluation report is provided to requesting physician.

## 2022-02-28 ENCOUNTER — OFFICE VISIT (OUTPATIENT)
Dept: FAMILY MEDICINE | Facility: CLINIC | Age: 72
End: 2022-02-28
Payer: COMMERCIAL

## 2022-02-28 VITALS
BODY MASS INDEX: 30.1 KG/M2 | HEIGHT: 68 IN | RESPIRATION RATE: 16 BRPM | DIASTOLIC BLOOD PRESSURE: 78 MMHG | WEIGHT: 198.6 LBS | HEART RATE: 69 BPM | OXYGEN SATURATION: 97 % | TEMPERATURE: 97.8 F | SYSTOLIC BLOOD PRESSURE: 132 MMHG

## 2022-02-28 DIAGNOSIS — C78.6 PERITONEAL METASTASES: ICD-10-CM

## 2022-02-28 DIAGNOSIS — N13.5 OBSTRUCTION OF LEFT URETER: ICD-10-CM

## 2022-02-28 DIAGNOSIS — Z01.818 PREOP GENERAL PHYSICAL EXAM: Primary | ICD-10-CM

## 2022-02-28 DIAGNOSIS — M05.9 RHEUMATOID ARTHRITIS WITH POSITIVE RHEUMATOID FACTOR, INVOLVING UNSPECIFIED SITE (H): ICD-10-CM

## 2022-02-28 DIAGNOSIS — N18.31 STAGE 3A CHRONIC KIDNEY DISEASE (H): ICD-10-CM

## 2022-02-28 DIAGNOSIS — E87.6 HYPOPOTASSEMIA: ICD-10-CM

## 2022-02-28 DIAGNOSIS — I10 HYPERTENSION GOAL BP (BLOOD PRESSURE) < 140/90: ICD-10-CM

## 2022-02-28 DIAGNOSIS — Z13.220 SCREENING FOR HYPERLIPIDEMIA: ICD-10-CM

## 2022-02-28 PROBLEM — E66.01 MORBID OBESITY (H): Status: RESOLVED | Noted: 2018-09-28 | Resolved: 2022-02-28

## 2022-02-28 PROCEDURE — 99214 OFFICE O/P EST MOD 30 MIN: CPT | Performed by: FAMILY MEDICINE

## 2022-02-28 RX ORDER — HYDROCHLOROTHIAZIDE 12.5 MG/1
12.5 TABLET ORAL DAILY
Qty: 90 TABLET | Refills: 3 | Status: SHIPPED | OUTPATIENT
Start: 2022-02-28 | End: 2023-06-13

## 2022-02-28 ASSESSMENT — PAIN SCALES - GENERAL: PAINLEVEL: NO PAIN (0)

## 2022-03-04 ENCOUNTER — INFUSION THERAPY VISIT (OUTPATIENT)
Dept: INFUSION THERAPY | Facility: CLINIC | Age: 72
End: 2022-03-04
Attending: INTERNAL MEDICINE
Payer: MEDICARE

## 2022-03-04 ENCOUNTER — APPOINTMENT (OUTPATIENT)
Dept: LAB | Facility: CLINIC | Age: 72
End: 2022-03-04
Payer: MEDICARE

## 2022-03-04 VITALS
DIASTOLIC BLOOD PRESSURE: 80 MMHG | HEART RATE: 59 BPM | TEMPERATURE: 98.3 F | SYSTOLIC BLOOD PRESSURE: 157 MMHG | RESPIRATION RATE: 18 BRPM

## 2022-03-04 DIAGNOSIS — C7A.021 MALIGNANT CARCINOID TUMOR OF CECUM (H): Primary | ICD-10-CM

## 2022-03-04 LAB
CEA SERPL-MCNC: 1.8 UG/L (ref 0–2.5)
LDH SERPL L TO P-CCNC: 142 U/L (ref 81–234)

## 2022-03-04 PROCEDURE — 83615 LACTATE (LD) (LDH) ENZYME: CPT | Performed by: INTERNAL MEDICINE

## 2022-03-04 PROCEDURE — 86316 IMMUNOASSAY TUMOR OTHER: CPT | Mod: GA | Performed by: INTERNAL MEDICINE

## 2022-03-04 PROCEDURE — 96372 THER/PROPH/DIAG INJ SC/IM: CPT | Performed by: INTERNAL MEDICINE

## 2022-03-04 PROCEDURE — 84260 ASSAY OF SEROTONIN: CPT | Performed by: INTERNAL MEDICINE

## 2022-03-04 PROCEDURE — 250N000011 HC RX IP 250 OP 636: Performed by: INTERNAL MEDICINE

## 2022-03-04 PROCEDURE — 82378 CARCINOEMBRYONIC ANTIGEN: CPT | Performed by: INTERNAL MEDICINE

## 2022-03-04 PROCEDURE — 36415 COLL VENOUS BLD VENIPUNCTURE: CPT

## 2022-03-04 RX ORDER — LANREOTIDE ACETATE 120 MG/.5ML
120 INJECTION SUBCUTANEOUS ONCE
Status: COMPLETED | OUTPATIENT
Start: 2022-03-04 | End: 2022-03-04

## 2022-03-04 RX ADMIN — LANREOTIDE ACETATE 120 MG: 120 INJECTION SUBCUTANEOUS at 15:28

## 2022-03-04 NOTE — PROGRESS NOTES
Infusion Nursing Note:  Marissa KUMAR Jennifergian presents today for Lanreotide.    Patient seen by provider today: No   present during visit today: Not Applicable.    Note: N/A.    Intravenous Access:  No Intravenous access/labs at this visit.    Treatment Conditions:  Not Applicable.    Post Infusion Assessment:  Patient tolerated injection without incident.  Site patent and intact, free from redness, edema or discomfort.  No evidence of extravasations.     Discharge Plan:   Discharge instructions reviewed with: Patient.  Patient and/or family verbalized understanding of discharge instructions and all questions answered.  AVS to patient via [employer].  Patient will return 4/1/2022 for next appointment.   Patient discharged in stable condition accompanied by: self.  Departure Mode: Ambulatory.    Crista Jaramillo RN

## 2022-03-08 NOTE — TELEPHONE ENCOUNTER
DIAGNOSIS: severe foot pain unable put pressure / self ref/ no images    APPOINTMENT DATE: 3.12.22   NOTES STATUS DETAILS   MEDICATION LIST Internal

## 2022-03-09 ENCOUNTER — PATIENT OUTREACH (OUTPATIENT)
Dept: ONCOLOGY | Facility: CLINIC | Age: 72
End: 2022-03-09
Payer: COMMERCIAL

## 2022-03-09 NOTE — PROGRESS NOTES
Called pt to see how she is doing after her 1st Lanreotide injection on 03.04.    LM with direct line for a return call.     Maricruz Crowder RN on 3/9/2022 at 1:10 PM

## 2022-03-10 DIAGNOSIS — M25.579 PAIN IN JOINT, ANKLE AND FOOT: Primary | ICD-10-CM

## 2022-03-10 LAB — SEROTONIN BLD-MCNC: 1219 NG/ML

## 2022-03-11 LAB — CGA SERPL-MCNC: 824 NG/ML

## 2022-03-12 ENCOUNTER — PRE VISIT (OUTPATIENT)
Dept: ORTHOPEDICS | Facility: CLINIC | Age: 72
End: 2022-03-12

## 2022-03-12 ENCOUNTER — ANCILLARY PROCEDURE (OUTPATIENT)
Dept: GENERAL RADIOLOGY | Facility: CLINIC | Age: 72
End: 2022-03-12
Attending: FAMILY MEDICINE
Payer: COMMERCIAL

## 2022-03-12 ENCOUNTER — OFFICE VISIT (OUTPATIENT)
Dept: ORTHOPEDICS | Facility: CLINIC | Age: 72
End: 2022-03-12
Payer: COMMERCIAL

## 2022-03-12 VITALS — HEIGHT: 68 IN | WEIGHT: 198 LBS | BODY MASS INDEX: 30.01 KG/M2

## 2022-03-12 DIAGNOSIS — M76.61 TENDONITIS, ACHILLES, RIGHT: Primary | ICD-10-CM

## 2022-03-12 DIAGNOSIS — M25.579 PAIN IN JOINT, ANKLE AND FOOT: ICD-10-CM

## 2022-03-12 PROCEDURE — 99203 OFFICE O/P NEW LOW 30 MIN: CPT | Performed by: FAMILY MEDICINE

## 2022-03-12 PROCEDURE — 73630 X-RAY EXAM OF FOOT: CPT | Mod: RT | Performed by: RADIOLOGY

## 2022-03-12 NOTE — H&P (VIEW-ONLY)
"Sports Medicine Clinic Visit    PCP: Eliazar Menendez    Marissa Guadarrama is a 71 year old female who is seen  as self referral presenting with right posterior ankle pain with insidious onset.     She noted it after rising from a seated position in her basement 1 1/2 weeks ago.  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  She does have a walker available that she uses at home.  She does have a history of rheumatoid arthritis and also cervical cancer, breast cancer and carcinoid tumor of the cecum.    Previous x-ray of her right ankle in 2016 showed no degenerative change.  We went over x-rays of the foot today that showed no acute fracture.  Mild scattered degenerative change.  No abnormality in the area of the posterior heel.        RIGHT ANKLE THREE OR MORE VIEWS 2/5/2016 3:34 PM      HISTORY: Pain in the right ankle and foot.     COMPARISON: None.        IMPRESSION: No evidence of fracture or dislocation. Ankle mortise is  intact. Prominent plantar and posterior calcaneal spurs.      BETY CID MD        Injury: No injury    Location of Pain: right ankle  Duration of Pain: 1.5 week(s)  Rating of Pain: 9/10  Pain is better with: Nothing  Pain is worse with: Movement and weight bearing  Additional Features: Swelling   Treatment so far consists of: Aleve  Prior History of related problems: Previous issue with right ankle about 5 years ago     Ht 1.715 m (5' 7.5\")   Wt 89.8 kg (198 lb)   BMI 30.55 kg/m           PMH:  Past Medical History:   Diagnosis Date     Arthritis     knee     Benign breast biopsy     benign     Carcinoid tumor 12/2003     Cervical cancer (H) 2007     H/O colposcopy with cervical biopsy 12/23/13    vaginal cuff biopsy- VAIN III. referred back to gyn/onc     HTN      Hyperlipidemia      Obesity      Pap smear of vagina with ASC-H 11/1/13     Post-polio syndromes      Trigeminal neuralgias        Active problem list:  Patient Active Problem List "   Diagnosis     Post-polio syndrome     Carcinoid tumor of cecum     Cervical cancer (H)     Trigeminal neuralgia     HYPERLIPIDEMIA LDL GOAL <130     Hypertension goal BP (blood pressure) < 140/90     OA (osteoarthritis) of knee     Advanced directives, counseling/discussion     CKD (chronic kidney disease) stage 3, GFR 30-59 ml/min (H)     Vaginal dysplasia     Urinary incontinence     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)     Serum calcium elevated     Peritoneal metastases (H)     Ureteral obstruction     Rheumatoid arthritis with positive rheumatoid factor, involving unspecified site (H)     Osteopenia of hip     Need for prophylactic chemotherapy     Obstruction of left ureter     HSIL (high grade squamous intraepithelial lesion) on Pap smear of cervix     Ureteral obstruction, left     Other hydronephrosis     Malignant carcinoid tumor of cecum (H)       FH:  Family History   Problem Relation Age of Onset     Cancer Mother         bone / liver     Breast Cancer Mother      Cancer Maternal Grandmother      Cancer Maternal Grandfather      Cancer Paternal Grandmother      Cancer Paternal Grandfather      Cancer Sister         vulvar ca, cervical ca, squamous cell cancer     Cancer Brother         Rectal- Stage 4     Rectal Cancer Brother      Breast Cancer Paternal Aunt      Colon Cancer Paternal Aunt      Breast Cancer Maternal Aunt      Pancreatic Cancer Nephew      Breast Cancer Sister      Anesthesia Reaction No family hx of        SH:  Social History     Socioeconomic History     Marital status:      Spouse name: Not on file     Number of children: Not on file     Years of education: Not on file     Highest education level: Not on file   Occupational History     Not on file   Tobacco Use     Smoking status: Former Smoker     Packs/day: 1.00     Years: 29.00     Pack years: 29.00     Types: Cigarettes     Quit date: 10/30/2006     Years since quitting: 15.3      Smokeless tobacco: Never Used   Vaping Use     Vaping Use: Never used   Substance and Sexual Activity     Alcohol use: No     Alcohol/week: 0.0 standard drinks     Comment: 1 drink per year     Drug use: No     Sexual activity: Yes     Partners: Male   Other Topics Concern      Service No     Blood Transfusions No     Caffeine Concern Yes     Comment: occasional coffee     Occupational Exposure No     Hobby Hazards Yes     Comment: [place],     Sleep Concern Yes     Comment: not sleeping well     Stress Concern Yes     Comment: Grandson in the      Weight Concern Yes     Special Diet No     Back Care No     Exercise No     Bike Helmet Not Asked     Seat Belt Yes     Self-Exams Yes     Parent/sibling w/ CABG, MI or angioplasty before 65F 55M? No   Social History Narrative     Not on file     Social Determinants of Health     Financial Resource Strain: Not on file   Food Insecurity: Not on file   Transportation Needs: Not on file   Physical Activity: Not on file   Stress: Not on file   Social Connections: Not on file   Intimate Partner Violence: Not At Risk     Fear of Current or Ex-Partner: No     Emotionally Abused: No     Physically Abused: No     Sexually Abused: No   Housing Stability: Not on file       MEDS:  See EMR, reviewed  ALL:  See EMR, reviewed    REVIEW OF SYSTEMS:  CONSTITUTIONAL:NEGATIVE for fever, chills, change in weight  INTEGUMENTARY/SKIN: NEGATIVE for worrisome rashes, moles or lesions  EYES: NEGATIVE for vision changes or irritation  ENT/MOUTH: NEGATIVE for ear, mouth and throat problems  RESP:NEGATIVE for significant cough or SOB  BREAST: NEGATIVE for masses, tenderness or discharge  CV: NEGATIVE for chest pain, palpitations or peripheral edema  GI: NEGATIVE for nausea, abdominal pain, heartburn, or change in bowel habits  :NEGATIVE for frequency, dysuria, or hematuria  :NEGATIVE for frequency, dysuria, or hematuria  NEURO: NEGATIVE for weakness, dizziness or  paresthesias  ENDOCRINE: NEGATIVE for temperature intolerance, skin/hair changes  HEME/ALLERGY/IMMUNE: NEGATIVE for bleeding problems  PSYCHIATRIC: NEGATIVE for changes in mood or affect        Objective: She is tender along the course of the distal Achilles tendon as it approaches the base of her heel.  I do not palpate a defect.  Squeeze test of gastroc indicates that the Achilles tendon appears to be intact.  Nontender about the posterior tibial tendon or peroneal tendon.  Nontender at the base of the heel or plantar fascia attachment.  No swelling in the calf.  Homans' sign is negative      Assessment Achilles tendonitis     Plan: For the next 10 days to 2 weeks she will use a cam walker boot for ambulation.  She can take it off for sleep and when she is resting.  Gentle massage to the tendon.  In 2 weeks if she is no longer tender along the course of the tendon she can discontinue the boot.  She has a walker available at home to assist with ambulation.  We went over some gentle calf and heel cord stretches to begin when the tenderness improves 2 weeks from now.  If She is not improving at that point she will return to clinic for reevaluation.  If necessary physical therapy could be arranged.

## 2022-03-12 NOTE — LETTER
"  3/12/2022      RE: Marissa Guadarrama  [address] [place] [id number]       Sports Medicine Clinic Visit    PCP: Eliazar Menendez    Marissa Guadarrama is a 71 year old female who is seen  as self referral presenting with right posterior ankle pain with insidious onset.     She noted it after rising from a seated position in her basement 1 1/2 weeks ago.  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  She does have a walker available that she uses at home.  She does have a history of rheumatoid arthritis and also cervical cancer, breast cancer and carcinoid tumor of the cecum.    Previous x-ray of her right ankle in 2016 showed no degenerative change.  We went over x-rays of the foot today that showed no acute fracture.  Mild scattered degenerative change.  No abnormality in the area of the posterior heel.        RIGHT ANKLE THREE OR MORE VIEWS 2/5/2016 3:34 PM      HISTORY: Pain in the right ankle and foot.     COMPARISON: None.        IMPRESSION: No evidence of fracture or dislocation. Ankle mortise is  intact. Prominent plantar and posterior calcaneal spurs.      BETY CID MD        Injury: No injury    Location of Pain: right ankle  Duration of Pain: 1.5 week(s)  Rating of Pain: 9/10  Pain is better with: Nothing  Pain is worse with: Movement and weight bearing  Additional Features: Swelling   Treatment so far consists of: Nathan  Prior History of related problems: Previous issue with right ankle about 5 years ago     Ht 1.715 m (5' 7.5\")   Wt 89.8 kg (198 lb)   BMI 30.55 kg/m           PMH:  Past Medical History:   Diagnosis Date     Arthritis     knee     Benign breast biopsy     benign     Carcinoid tumor 12/2003     Cervical cancer (H) 2007     H/O colposcopy with cervical biopsy 12/23/13    vaginal cuff biopsy- VAIN III. referred back to gyn/onc     HTN      Hyperlipidemia      Obesity      Pap smear of vagina with ASC-H 11/1/13     Post-polio syndromes      " Trigeminal neuralgias        Active problem list:  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)     Serum calcium elevated     Peritoneal metastases (H)     Ureteral obstruction     Rheumatoid arthritis with positive rheumatoid factor, involving unspecified site (H)     Osteopenia of hip     Need for prophylactic chemotherapy     Obstruction of left ureter     HSIL (high grade squamous intraepithelial lesion) on Pap smear of cervix     Ureteral obstruction, left     Other hydronephrosis     Malignant carcinoid tumor of cecum (H)       FH:  Family History   Problem Relation Age of Onset     Cancer Mother         bone / liver     Breast Cancer Mother      Cancer Maternal Grandmother      Cancer Maternal Grandfather      Cancer Paternal Grandmother      Cancer Paternal Grandfather      Cancer Sister         vulvar ca, cervical ca, squamous cell cancer     Cancer Brother         Rectal- Stage 4     Rectal Cancer Brother      Breast Cancer Paternal Aunt      Colon Cancer Paternal Aunt      Breast Cancer Maternal Aunt      Pancreatic Cancer Nephew      Breast Cancer Sister      Anesthesia Reaction No family hx of        SH:  Social History     Socioeconomic History     Marital status:      Spouse name: Not on file     Number of children: Not on file     Years of education: Not on file     Highest education level: Not on file   Occupational History     Not on file   Tobacco Use     Smoking status: Former Smoker     Packs/day: 1.00     Years: 29.00     Pack years: 29.00      Types: Cigarettes     Quit date: 10/30/2006     Years since quitting: 15.3     Smokeless tobacco: Never Used   Vaping Use     Vaping Use: Never used   Substance and Sexual Activity     Alcohol use: No     Alcohol/week: 0.0 standard drinks     Comment: 1 drink per year     Drug use: No     Sexual activity: Yes     Partners: Male   Other Topics Concern      Service No     Blood Transfusions No     Caffeine Concern Yes     Comment: occasional coffee     Occupational Exposure No     Hobby Hazards Yes     Comment: [employer],     Sleep Concern Yes     Comment: not sleeping well     Stress Concern Yes     Comment: Grandson in the      Weight Concern Yes     Special Diet No     Back Care No     Exercise No     Bike Helmet Not Asked     Seat Belt Yes     Self-Exams Yes     Parent/sibling w/ CABG, MI or angioplasty before 65F 55M? No   Social History Narrative     Not on file     Social Determinants of Health     Financial Resource Strain: Not on file   Food Insecurity: Not on file   Transportation Needs: Not on file   Physical Activity: Not on file   Stress: Not on file   Social Connections: Not on file   Intimate Partner Violence: Not At Risk     Fear of Current or Ex-Partner: No     Emotionally Abused: No     Physically Abused: No     Sexually Abused: No   Housing Stability: Not on file       MEDS:  See EMR, reviewed  ALL:  See EMR, reviewed    REVIEW OF SYSTEMS:  CONSTITUTIONAL:NEGATIVE for fever, chills, change in weight  INTEGUMENTARY/SKIN: NEGATIVE for worrisome rashes, moles or lesions  EYES: NEGATIVE for vision changes or irritation  ENT/MOUTH: NEGATIVE for ear, mouth and throat problems  RESP:NEGATIVE for significant cough or SOB  BREAST: NEGATIVE for masses, tenderness or discharge  CV: NEGATIVE for chest pain, palpitations or peripheral edema  GI: NEGATIVE for nausea, abdominal pain, heartburn, or change in bowel habits  :NEGATIVE for frequency, dysuria, or hematuria  :NEGATIVE for frequency,  dysuria, or hematuria  NEURO: NEGATIVE for weakness, dizziness or paresthesias  ENDOCRINE: NEGATIVE for temperature intolerance, skin/hair changes  HEME/ALLERGY/IMMUNE: NEGATIVE for bleeding problems  PSYCHIATRIC: NEGATIVE for changes in mood or affect        Objective: She is tender along the course of the distal Achilles tendon as it approaches the base of her heel.  I do not palpate a defect.  Squeeze test of gastroc indicates that the Achilles tendon appears to be intact.  Nontender about the posterior tibial tendon or peroneal tendon.  Nontender at the base of the heel or plantar fascia attachment.  No swelling in the calf.  Homans' sign is negative      Assessment Achilles tendonitis     Plan: For the next 10 days to 2 weeks she will use a cam walker boot for ambulation.  She can take it off for sleep and when she is resting.  Gentle massage to the tendon.  In 2 weeks if she is no longer tender along the course of the tendon she can discontinue the boot.  She has a walker available at home to assist with ambulation.  We went over some gentle calf and heel cord stretches to begin when the tenderness improves 2 weeks from now.  If She is not improving at that point she will return to clinic for reevaluation.  If necessary physical therapy could be arranged.      Efraín Schmidt MD

## 2022-03-12 NOTE — PROGRESS NOTES
"Sports Medicine Clinic Visit    PCP: Eliazar Menendez    Marissa Guadarrama is a 71 year old female who is seen  as self referral presenting with right posterior ankle pain with insidious onset.     She noted it after rising from a seated position in her basement 1 1/2 weeks ago.  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  She does have a walker available that she uses at home.  She does have a history of rheumatoid arthritis and also cervical cancer, breast cancer and carcinoid tumor of the cecum.    Previous x-ray of her right ankle in 2016 showed no degenerative change.  We went over x-rays of the foot today that showed no acute fracture.  Mild scattered degenerative change.  No abnormality in the area of the posterior heel.        RIGHT ANKLE THREE OR MORE VIEWS 2/5/2016 3:34 PM      HISTORY: Pain in the right ankle and foot.     COMPARISON: None.        IMPRESSION: No evidence of fracture or dislocation. Ankle mortise is  intact. Prominent plantar and posterior calcaneal spurs.      BETY CID MD        Injury: No injury    Location of Pain: right ankle  Duration of Pain: 1.5 week(s)  Rating of Pain: 9/10  Pain is better with: Nothing  Pain is worse with: Movement and weight bearing  Additional Features: Swelling   Treatment so far consists of: Aleve  Prior History of related problems: Previous issue with right ankle about 5 years ago     Ht 1.715 m (5' 7.5\")   Wt 89.8 kg (198 lb)   BMI 30.55 kg/m           PMH:  Past Medical History:   Diagnosis Date     Arthritis     knee     Benign breast biopsy     benign     Carcinoid tumor 12/2003     Cervical cancer (H) 2007     H/O colposcopy with cervical biopsy 12/23/13    vaginal cuff biopsy- VAIN III. referred back to gyn/onc     HTN      Hyperlipidemia      Obesity      Pap smear of vagina with ASC-H 11/1/13     Post-polio syndromes      Trigeminal neuralgias        Active problem list:  Patient Active Problem List "   Diagnosis     Post-polio syndrome     Carcinoid tumor of cecum     Cervical cancer (H)     Trigeminal neuralgia     HYPERLIPIDEMIA LDL GOAL <130     Hypertension goal BP (blood pressure) < 140/90     OA (osteoarthritis) of knee     Advanced directives, counseling/discussion     CKD (chronic kidney disease) stage 3, GFR 30-59 ml/min (H)     Vaginal dysplasia     Urinary incontinence     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)     Serum calcium elevated     Peritoneal metastases (H)     Ureteral obstruction     Rheumatoid arthritis with positive rheumatoid factor, involving unspecified site (H)     Osteopenia of hip     Need for prophylactic chemotherapy     Obstruction of left ureter     HSIL (high grade squamous intraepithelial lesion) on Pap smear of cervix     Ureteral obstruction, left     Other hydronephrosis     Malignant carcinoid tumor of cecum (H)       FH:  Family History   Problem Relation Age of Onset     Cancer Mother         bone / liver     Breast Cancer Mother      Cancer Maternal Grandmother      Cancer Maternal Grandfather      Cancer Paternal Grandmother      Cancer Paternal Grandfather      Cancer Sister         vulvar ca, cervical ca, squamous cell cancer     Cancer Brother         Rectal- Stage 4     Rectal Cancer Brother      Breast Cancer Paternal Aunt      Colon Cancer Paternal Aunt      Breast Cancer Maternal Aunt      Pancreatic Cancer Nephew      Breast Cancer Sister      Anesthesia Reaction No family hx of        SH:  Social History     Socioeconomic History     Marital status:      Spouse name: Not on file     Number of children: Not on file     Years of education: Not on file     Highest education level: Not on file   Occupational History     Not on file   Tobacco Use     Smoking status: Former Smoker     Packs/day: 1.00     Years: 29.00     Pack years: 29.00     Types: Cigarettes     Quit date: 10/30/2006     Years since quitting: 15.3      Smokeless tobacco: Never Used   Vaping Use     Vaping Use: Never used   Substance and Sexual Activity     Alcohol use: No     Alcohol/week: 0.0 standard drinks     Comment: 1 drink per year     Drug use: No     Sexual activity: Yes     Partners: Male   Other Topics Concern      Service No     Blood Transfusions No     Caffeine Concern Yes     Comment: occasional coffee     Occupational Exposure No     Hobby Hazards Yes     Comment: [place],     Sleep Concern Yes     Comment: not sleeping well     Stress Concern Yes     Comment: Grandson in the      Weight Concern Yes     Special Diet No     Back Care No     Exercise No     Bike Helmet Not Asked     Seat Belt Yes     Self-Exams Yes     Parent/sibling w/ CABG, MI or angioplasty before 65F 55M? No   Social History Narrative     Not on file     Social Determinants of Health     Financial Resource Strain: Not on file   Food Insecurity: Not on file   Transportation Needs: Not on file   Physical Activity: Not on file   Stress: Not on file   Social Connections: Not on file   Intimate Partner Violence: Not At Risk     Fear of Current or Ex-Partner: No     Emotionally Abused: No     Physically Abused: No     Sexually Abused: No   Housing Stability: Not on file       MEDS:  See EMR, reviewed  ALL:  See EMR, reviewed    REVIEW OF SYSTEMS:  CONSTITUTIONAL:NEGATIVE for fever, chills, change in weight  INTEGUMENTARY/SKIN: NEGATIVE for worrisome rashes, moles or lesions  EYES: NEGATIVE for vision changes or irritation  ENT/MOUTH: NEGATIVE for ear, mouth and throat problems  RESP:NEGATIVE for significant cough or SOB  BREAST: NEGATIVE for masses, tenderness or discharge  CV: NEGATIVE for chest pain, palpitations or peripheral edema  GI: NEGATIVE for nausea, abdominal pain, heartburn, or change in bowel habits  :NEGATIVE for frequency, dysuria, or hematuria  :NEGATIVE for frequency, dysuria, or hematuria  NEURO: NEGATIVE for weakness, dizziness or  paresthesias  ENDOCRINE: NEGATIVE for temperature intolerance, skin/hair changes  HEME/ALLERGY/IMMUNE: NEGATIVE for bleeding problems  PSYCHIATRIC: NEGATIVE for changes in mood or affect        Objective: She is tender along the course of the distal Achilles tendon as it approaches the base of her heel.  I do not palpate a defect.  Squeeze test of gastroc indicates that the Achilles tendon appears to be intact.  Nontender about the posterior tibial tendon or peroneal tendon.  Nontender at the base of the heel or plantar fascia attachment.  No swelling in the calf.  Homans' sign is negative      Assessment Achilles tendonitis     Plan: For the next 10 days to 2 weeks she will use a cam walker boot for ambulation.  She can take it off for sleep and when she is resting.  Gentle massage to the tendon.  In 2 weeks if she is no longer tender along the course of the tendon she can discontinue the boot.  She has a walker available at home to assist with ambulation.  We went over some gentle calf and heel cord stretches to begin when the tenderness improves 2 weeks from now.  If She is not improving at that point she will return to clinic for reevaluation.  If necessary physical therapy could be arranged.

## 2022-03-14 NOTE — PROGRESS NOTES
Called pt again to see how she is doing and she has not had any side effects from treatment. She will call if anything arises.     Pt is scheduled for monthly injections and then a follow up in July.     Maricruz Crowder RN on 3/14/2022 at 2:35 PM

## 2022-03-18 ENCOUNTER — LAB (OUTPATIENT)
Dept: LAB | Facility: CLINIC | Age: 72
End: 2022-03-18
Payer: COMMERCIAL

## 2022-03-18 DIAGNOSIS — N13.5 OBSTRUCTION OF LEFT URETER: ICD-10-CM

## 2022-03-18 DIAGNOSIS — E87.6 HYPOKALEMIA: Primary | ICD-10-CM

## 2022-03-18 DIAGNOSIS — I10 HYPERTENSION GOAL BP (BLOOD PRESSURE) < 140/90: ICD-10-CM

## 2022-03-18 DIAGNOSIS — N39.0 URINARY TRACT INFECTION: ICD-10-CM

## 2022-03-18 DIAGNOSIS — Z13.220 SCREENING FOR HYPERLIPIDEMIA: ICD-10-CM

## 2022-03-18 LAB
ALBUMIN UR-MCNC: ABNORMAL MG/DL
ANION GAP SERPL CALCULATED.3IONS-SCNC: 5 MMOL/L (ref 3–14)
APPEARANCE UR: ABNORMAL
BACTERIA #/AREA URNS HPF: ABNORMAL /HPF
BILIRUB UR QL STRIP: NEGATIVE
BUN SERPL-MCNC: 19 MG/DL (ref 7–30)
CALCIUM SERPL-MCNC: 9.8 MG/DL (ref 8.5–10.1)
CHLORIDE BLD-SCNC: 106 MMOL/L (ref 94–109)
CHOLEST SERPL-MCNC: 161 MG/DL
CO2 SERPL-SCNC: 31 MMOL/L (ref 20–32)
COLOR UR AUTO: YELLOW
CREAT SERPL-MCNC: 1.13 MG/DL (ref 0.52–1.04)
CREAT UR-MCNC: 66 MG/DL
FASTING STATUS PATIENT QL REPORTED: NO
GFR SERPL CREATININE-BSD FRML MDRD: 51 ML/MIN/1.73M2
GLUCOSE BLD-MCNC: 111 MG/DL (ref 70–99)
GLUCOSE UR STRIP-MCNC: NEGATIVE MG/DL
HDLC SERPL-MCNC: 50 MG/DL
HGB UR QL STRIP: ABNORMAL
KETONES UR STRIP-MCNC: NEGATIVE MG/DL
LDLC SERPL CALC-MCNC: 89 MG/DL
LEUKOCYTE ESTERASE UR QL STRIP: ABNORMAL
MICROALBUMIN UR-MCNC: 209 MG/L
MICROALBUMIN/CREAT UR: 316.67 MG/G CR (ref 0–25)
NITRATE UR QL: POSITIVE
NONHDLC SERPL-MCNC: 111 MG/DL
PH UR STRIP: 5 [PH] (ref 5–7)
POTASSIUM BLD-SCNC: 2.9 MMOL/L (ref 3.4–5.3)
RBC #/AREA URNS AUTO: ABNORMAL /HPF
SODIUM SERPL-SCNC: 142 MMOL/L (ref 133–144)
SP GR UR STRIP: 1.02 (ref 1–1.03)
SQUAMOUS #/AREA URNS AUTO: ABNORMAL /LPF
TRIGL SERPL-MCNC: 110 MG/DL
UROBILINOGEN UR STRIP-ACNC: 0.2 E.U./DL
WBC #/AREA URNS AUTO: ABNORMAL /HPF
WBC CLUMPS #/AREA URNS HPF: PRESENT /HPF

## 2022-03-18 PROCEDURE — 80048 BASIC METABOLIC PNL TOTAL CA: CPT

## 2022-03-18 PROCEDURE — 87086 URINE CULTURE/COLONY COUNT: CPT

## 2022-03-18 PROCEDURE — 80061 LIPID PANEL: CPT

## 2022-03-18 PROCEDURE — 82043 UR ALBUMIN QUANTITATIVE: CPT

## 2022-03-18 PROCEDURE — 36415 COLL VENOUS BLD VENIPUNCTURE: CPT

## 2022-03-18 PROCEDURE — 81001 URINALYSIS AUTO W/SCOPE: CPT

## 2022-03-18 RX ORDER — POTASSIUM CHLORIDE 1500 MG/1
40 TABLET, EXTENDED RELEASE ORAL 2 TIMES DAILY
Qty: 20 TABLET | Refills: 0 | Status: SHIPPED | OUTPATIENT
Start: 2022-03-18 | End: 2022-03-23

## 2022-03-18 RX ORDER — POTASSIUM CHLORIDE 1500 MG/1
20 TABLET, EXTENDED RELEASE ORAL DAILY
Qty: 30 TABLET | Refills: 0 | Status: SHIPPED | OUTPATIENT
Start: 2022-03-18 | End: 2022-03-28

## 2022-03-18 NOTE — RESULT ENCOUNTER NOTE
Covering for primary/ordering provider    The urine has more protein than when last checked 3 years ago.  This could be related to the stent that is in place.  Recommend recheck in 1 year.  The cholesterol is well controlled and similar to previous values seen 1 year ago.    The kidney function is mildly low but improved from most recent values.      The potassium is very low almost certainly due to the hydrochlorothiazide which is known to cause low potassium.  Low potassium can make anesthesia less safe.  I see that you have a procedure scheduled for 3/24.    I sent an order for potassium 40 mEq twice daily x5 days.  Please start that today.  You should have your potassium level rechecked early next week, 3/21 or 3/22.  Please schedule lab visit for this.  After you finish the 5 days of potassium, then take potassium 20 mEq daily.  This will be a long-term medication as long as you are on the hydrochlorothiazide.  Once you are recovered from surgery, follow-up with your primary care provider to discuss either changing hydrochlorothiazide, or ongoing monitoring of potassium levels with blood work

## 2022-03-19 LAB — BACTERIA UR CULT: NORMAL

## 2022-03-20 ENCOUNTER — LAB (OUTPATIENT)
Dept: LAB | Facility: CLINIC | Age: 72
End: 2022-03-20
Attending: UROLOGY
Payer: COMMERCIAL

## 2022-03-20 DIAGNOSIS — Z11.59 ENCOUNTER FOR SCREENING FOR OTHER VIRAL DISEASES: ICD-10-CM

## 2022-03-20 PROCEDURE — U0005 INFEC AGEN DETEC AMPLI PROBE: HCPCS

## 2022-03-20 PROCEDURE — U0003 INFECTIOUS AGENT DETECTION BY NUCLEIC ACID (DNA OR RNA); SEVERE ACUTE RESPIRATORY SYNDROME CORONAVIRUS 2 (SARS-COV-2) (CORONAVIRUS DISEASE [COVID-19]), AMPLIFIED PROBE TECHNIQUE, MAKING USE OF HIGH THROUGHPUT TECHNOLOGIES AS DESCRIBED BY CMS-2020-01-R: HCPCS

## 2022-03-21 ENCOUNTER — TELEPHONE (OUTPATIENT)
Dept: UROLOGY | Facility: CLINIC | Age: 72
End: 2022-03-21

## 2022-03-21 LAB — SARS-COV-2 RNA RESP QL NAA+PROBE: NEGATIVE

## 2022-03-23 ENCOUNTER — LAB (OUTPATIENT)
Dept: LAB | Facility: CLINIC | Age: 72
End: 2022-03-23
Payer: COMMERCIAL

## 2022-03-23 ENCOUNTER — ANESTHESIA EVENT (OUTPATIENT)
Dept: SURGERY | Facility: CLINIC | Age: 72
End: 2022-03-23
Payer: MEDICARE

## 2022-03-23 ENCOUNTER — TELEPHONE (OUTPATIENT)
Dept: UROLOGY | Facility: CLINIC | Age: 72
End: 2022-03-23

## 2022-03-23 DIAGNOSIS — E87.6 HYPOPOTASSEMIA: ICD-10-CM

## 2022-03-23 LAB
ANION GAP SERPL CALCULATED.3IONS-SCNC: 5 MMOL/L (ref 3–14)
BUN SERPL-MCNC: 17 MG/DL (ref 7–30)
CALCIUM SERPL-MCNC: 10.5 MG/DL (ref 8.5–10.1)
CHLORIDE BLD-SCNC: 106 MMOL/L (ref 94–109)
CO2 SERPL-SCNC: 31 MMOL/L (ref 20–32)
CREAT SERPL-MCNC: 1.2 MG/DL (ref 0.52–1.04)
GFR SERPL CREATININE-BSD FRML MDRD: 48 ML/MIN/1.73M2
GLUCOSE BLD-MCNC: 69 MG/DL (ref 70–99)
POTASSIUM BLD-SCNC: 4.2 MMOL/L (ref 3.4–5.3)
SODIUM SERPL-SCNC: 142 MMOL/L (ref 133–144)

## 2022-03-23 PROCEDURE — 80048 BASIC METABOLIC PNL TOTAL CA: CPT

## 2022-03-23 PROCEDURE — 36415 COLL VENOUS BLD VENIPUNCTURE: CPT

## 2022-03-23 NOTE — TELEPHONE ENCOUNTER
Pre Op Teaching Flowsheet       Pre and Post op Patient Education  Relevant Diagnosis:  hydronephrosis  Teaching Topic:  Pre and post op teaching  Person Involved in teaching:  pt    Motivation Level:  Asks Questions: Yes  Eager to Learn:  Yes  Cooperative: Yes  Receptive (willing/able to accept information):  Yes  Patient demonstrates understanding of the following:  Date and time of surgery:  3/24/22 TBD  Location of surgery:  [facility]  History and Physical and any other testing necessary prior to surgery: Yes  Required time line for completion of History and Physical and any pre-op testing: Yes    NPO Guidelines: NPO after midnight    Patient demonstrates understanding of the following:  Pre-op bowel prep:  N/A        If you need to take pills, take them with a sip of  water.    Pre-op showering/scrub information with PCMX Soap: Yes  Medications to take the day of surgery:  Per PCP  Blood thinner medications discussed and when to stop (if applicable):  Yes  Diabetes medication management (if applicable):  N/A  Discussed pain control after surgery: pain scale  Infection Prevention: Patient demonstrates understanding of the following:  Patient instructed on hand hygiene:  Yes  Surgical procedure site care taught: N/A  Signs and symptoms of infection taught:  Yes  Wound care will be taught at the time of discharge.  Central venous catheter care will be taught at the time of discharge (if applicable).    Post-op follow-up:  Discussed how to contact the hospital, nurse, and clinic scheduling staff if necessary.    Instructional materials used/given/mailed:  [facility] Booklet, post op teaching sheet, Map, Soap, and arrival/location information.    Surgical instructions mailed to patient Yes.  I spoke to patient on the telephone.  irena storm LPN

## 2022-03-23 NOTE — ANESTHESIA PREPROCEDURE EVALUATION
Anesthesia Pre-Procedure Evaluation    Patient: Marissa Guadarrama   MRN: [id number] : 1950        Procedure : Procedure(s):  CYSTOSCOPY, WITH RETROGRADE PYELOGRAM AND URETERAL STENT EXCHANGE          Past Medical History:   Diagnosis Date     Arthritis     knee     Benign breast biopsy     benign     Carcinoid tumor 2003     Cervical cancer (H)      H/O colposcopy with cervical biopsy 13    vaginal cuff biopsy- VAIN III. referred back to gyn/onc     HTN      Hyperlipidemia      Obesity      Pap smear of vagina with ASC-H 13     Post-polio syndromes      Trigeminal neuralgias       Past Surgical History:   Procedure Laterality Date     APPENDECTOMY       BIOPSY NODE SENTINEL Bilateral 2016    Procedure: BIOPSY NODE SENTINEL;  Surgeon: Brent Arana MD;  Location: [facility]     [facility] SURGICAL PATHOLOGY       COLONOSCOPY N/A 2017    Procedure: COMBINED COLONOSCOPY, SINGLE OR MULTIPLE BIOPSY/POLYPECTOMY BY BIOPSY;  Colonoscopy Dx:Carcinoid tumor of colon prep mailed golytely;  Surgeon: Talisha Greco MD;  Location:  GI     COLPOSCOPY, BIOPSY, COMBINED  3/13/2014    Procedure: COMBINED COLPOSCOPY, BIOPSY;;  Surgeon: Lara Pack MD;  [facility]: [facility]     COMBINED CYSTOSCOPY, RETROGRADES, EXCHANGE STENT URETER(S) Left 2019    Procedure: COMBINED CYSTOSCOPY, RETROGRADES, EXCHANGE STENT URETER--left;  Surgeon: Zhao La MD;  Location: [facility]     COMBINED CYSTOSCOPY, RETROGRADES, EXCHANGE STENT URETER(S) Left 2020    Procedure: CYSTOSCOPY, WITH RETROGRADE PYELOGRAM AND Left URETERAL STENT exchange;  Surgeon: Zhao La MD;  Location: [facility]     COMBINED CYSTOSCOPY, RETROGRADES, EXCHANGE STENT URETER(S) Left 2021    Procedure: CYSTOSCOPY, WITH RETROGRADE PYELOGRAM AND LEFT URETERAL STENT REPLACEMENT;  Surgeon: Fred Owens MD;  [facility]     COMBINED CYSTOSCOPY, RETROGRADES, EXCHANGE STENT URETER(S) Left  9/16/2021    Procedure: CYSTOSCOPY, WITH left RETROGRADE PYELOGRAM AND left  URETERAL STENT REPLACEMENT;  Surgeon: Zhao La MD;  Location: [facility]     COMBINED CYSTOSCOPY, RETROGRADES, URETEROSCOPY, INSERT STENT Left 2/2/2017    Procedure: COMBINED CYSTOSCOPY, RETROGRADES, URETEROSCOPY, INSERT STENT;  Surgeon: Zhao La MD;  Location: [facility]     CYSTOSCOPY, RETROGRADES, INSERT STENT URETER(S), COMBINED Left 9/7/2017    Procedure: COMBINED CYSTOSCOPY, RETROGRADES, INSERT STENT URETER(S);  Cystoscopy,Left Stent Exchange;  Surgeon: Zhao La MD;  Location: [facility]     CYSTOSCOPY, RETROGRADES, INSERT STENT URETER(S), COMBINED  12/12/2017    Procedure: COMBINED CYSTOSCOPY, RETROGRADES, INSERT STENT URETER(S);;  Surgeon: Zhao La MD;  [facility]     CYSTOSCOPY, RETROGRADES, INSERT STENT URETER(S), COMBINED Left 7/5/2018    Procedure: COMBINED CYSTOSCOPY, RETROGRADES, INSERT STENT URETER(S);  Cystoscopy, Left Stent Exchange;  Surgeon: Zhao La MD;  Location: [facility]     EXAM UNDER ANESTHESIA PELVIC  3/13/2014    Procedure: EXAM UNDER ANESTHESIA PELVIC;  Exam Under Anestheisa, Colposcopy, Vaginal Biopsies, Co2 Laser of the Upper Vagina;  Surgeon: Lara Pack MD;  [facility]     EXAM UNDER ANESTHESIA PELVIC N/A 7/1/2020    Procedure: Examination under anesthesia, vaginal biopsies;  Surgeon: Karli Gao MD;  [facility]     HERNIORRHAPHY INCISIONAL (LOCATION)       IR RHIZOTOMY  8/14/2020     LASER CO2 VAGINA  3/13/2014    VAIN 1/2     LASER CO2 VAGINA N/A 12/12/2017    Procedure: LASER CO2 VAGINA;  Exam Under Anesthesia, CO2 Laser Ablation Of Vagina, Cystoscopy, Left Retrograde Pyelogram with Left Stent Placement;  Surgeon: Nic Segundo MD;  [facility]     LUMPECTOMY BREAST WITH SEED LOCALIZATION Bilateral 6/1/2016    Procedure: LUMPECTOMY BREAST WITH SEED LOCALIZATION;  Surgeon: Brent Arana,  MD;  Location: [facility]     SURGICAL HISTORY OF -       ovarian cystectomy     SURGICAL HISTORY OF -   2003    right colon resection secondary to carcinoid tumor     TUBAL LIGATION       ZC BSO, OMENTECTOMY W/OSMAN  5/2007     Z TOTAL ABDOM HYSTERECTOMY  5/2007      No Known Allergies   Social History     Tobacco Use     Smoking status: Former Smoker     Packs/day: 1.00     Years: 29.00     Pack years: 29.00     Types: Cigarettes     Quit date: 10/30/2006     Years since quitting: 15.4     Smokeless tobacco: Never Used   Substance Use Topics     Alcohol use: No     Alcohol/week: 0.0 standard drinks     Comment: 1 drink per year      Wt Readings from Last 1 Encounters:   03/12/22 89.8 kg (198 lb)        Anesthesia Evaluation            ROS/MED HX  ENT/Pulmonary:       Neurologic:       Cardiovascular:     (+) Dyslipidemia hypertension-----    METS/Exercise Tolerance:     Hematologic:       Musculoskeletal:   (+) arthritis,     GI/Hepatic:       Renal/Genitourinary:     (+) renal disease,     Endo:     (+) Obesity,     Psychiatric/Substance Use:       Infectious Disease:       Malignancy:   (+) Malignancy,     Other:            Physical Exam    Airway        Mallampati: II   TM distance: > 3 FB   Neck ROM: full   Mouth opening: > 3 cm    Respiratory Devices and Support         Dental  no notable dental history         Cardiovascular   cardiovascular exam normal          Pulmonary   pulmonary exam normal                OUTSIDE LABS:  CBC:   Lab Results   Component Value Date    WBC 6.6 01/23/2022    WBC 7.1 09/03/2021    HGB 13.9 01/23/2022    HGB 14.2 09/03/2021    HCT 42.7 01/23/2022    HCT 44.9 09/03/2021     01/23/2022     09/03/2021     BMP:   Lab Results   Component Value Date     03/18/2022     01/23/2022    POTASSIUM 2.9 (L) 03/18/2022    POTASSIUM 3.3 (L) 01/23/2022    CHLORIDE 106 03/18/2022    CHLORIDE 107 01/23/2022    CO2 31 03/18/2022    CO2 31 01/23/2022    BUN 19 03/18/2022     BUN 21 01/23/2022    CR 1.13 (H) 03/18/2022    CR 1.31 (H) 01/23/2022     (H) 03/18/2022    GLC 90 01/23/2022     COAGS:   Lab Results   Component Value Date    PTT 30 05/08/2021    INR 1.34 (H) 05/09/2021     POC:   Lab Results   Component Value Date     (H) 05/15/2021     HEPATIC:   Lab Results   Component Value Date    ALBUMIN 3.3 (L) 01/23/2022    PROTTOTAL 7.3 01/23/2022    ALT 12 01/23/2022    AST 14 01/23/2022    ALKPHOS 85 01/23/2022    BILITOTAL 0.7 01/23/2022     OTHER:   Lab Results   Component Value Date    LACT 1.1 05/08/2021    MARILIA 9.8 03/18/2022    MAG 1.6 05/10/2021    LIPASE 139 05/08/2021    TSH 2.54 09/28/2015    .0 (H) 05/14/2021       Anesthesia Plan    ASA Status:  3   NPO Status:  NPO Appropriate    Anesthesia Type: General.      Maintenance: Balanced.        Consents    Anesthesia Plan(s) and associated risks, benefits, and realistic alternatives discussed. Questions answered and patient/representative(s) expressed understanding.    - Discussed:     - Discussed with:  Patient         Postoperative Care            Comments:                KAREN Zimmerman CRNA

## 2022-03-24 ENCOUNTER — APPOINTMENT (OUTPATIENT)
Dept: GENERAL RADIOLOGY | Facility: CLINIC | Age: 72
End: 2022-03-24
Attending: UROLOGY
Payer: MEDICARE

## 2022-03-24 ENCOUNTER — HOSPITAL ENCOUNTER (OUTPATIENT)
Facility: CLINIC | Age: 72
Discharge: HOME | End: 2022-03-24
Attending: UROLOGY | Admitting: UROLOGY
Payer: MEDICARE

## 2022-03-24 ENCOUNTER — ANESTHESIA (OUTPATIENT)
Dept: SURGERY | Facility: CLINIC | Age: 72
End: 2022-03-24
Payer: MEDICARE

## 2022-03-24 VITALS
RESPIRATION RATE: 14 BRPM | OXYGEN SATURATION: 97 % | SYSTOLIC BLOOD PRESSURE: 148 MMHG | BODY MASS INDEX: 30.01 KG/M2 | DIASTOLIC BLOOD PRESSURE: 68 MMHG | WEIGHT: 198 LBS | HEART RATE: 52 BPM | TEMPERATURE: 98 F | HEIGHT: 68 IN

## 2022-03-24 PROCEDURE — 370N000017 HC ANESTHESIA TECHNICAL FEE, PER MIN: Performed by: UROLOGY

## 2022-03-24 PROCEDURE — 52332 CYSTOSCOPY AND TREATMENT: CPT | Mod: LT | Performed by: UROLOGY

## 2022-03-24 PROCEDURE — 710N000012 HC RECOVERY PHASE 2, PER MINUTE: Performed by: UROLOGY

## 2022-03-24 PROCEDURE — 250N000011 HC RX IP 250 OP 636: Performed by: UROLOGY

## 2022-03-24 PROCEDURE — 272N000001 HC OR GENERAL SUPPLY STERILE: Performed by: UROLOGY

## 2022-03-24 PROCEDURE — C1769 GUIDE WIRE: HCPCS | Performed by: UROLOGY

## 2022-03-24 PROCEDURE — 250N000011 HC RX IP 250 OP 636: Performed by: NURSE ANESTHETIST, CERTIFIED REGISTERED

## 2022-03-24 PROCEDURE — 999N000179 XR SURGERY CARM FLUORO LESS THAN 5 MIN W STILLS: Mod: TC

## 2022-03-24 PROCEDURE — 250N000013 HC RX MED GY IP 250 OP 250 PS 637: Performed by: NURSE ANESTHETIST, CERTIFIED REGISTERED

## 2022-03-24 PROCEDURE — 255N000002 HC RX 255 OP 636: Performed by: UROLOGY

## 2022-03-24 PROCEDURE — 360N000082 HC SURGERY LEVEL 2 W/ FLUORO, PER MIN: Performed by: UROLOGY

## 2022-03-24 PROCEDURE — 999N000141 HC STATISTIC PRE-PROCEDURE NURSING ASSESSMENT: Performed by: UROLOGY

## 2022-03-24 PROCEDURE — C2617 STENT, NON-COR, TEM W/O DEL: HCPCS | Performed by: UROLOGY

## 2022-03-24 PROCEDURE — 258N000001 HC RX 258: Performed by: UROLOGY

## 2022-03-24 PROCEDURE — 250N000009 HC RX 250: Performed by: NURSE ANESTHETIST, CERTIFIED REGISTERED

## 2022-03-24 PROCEDURE — 258N000003 HC RX IP 258 OP 636: Performed by: NURSE ANESTHETIST, CERTIFIED REGISTERED

## 2022-03-24 PROCEDURE — 250N000009 HC RX 250: Performed by: UROLOGY

## 2022-03-24 DEVICE — STENT URETERAL PERCUFLEX PLUS 6FRX24CM M0061752620
Type: IMPLANTABLE DEVICE | Site: URETER | Status: NON-FUNCTIONAL
Removed: 2022-11-04

## 2022-03-24 RX ORDER — LIDOCAINE 40 MG/G
CREAM TOPICAL
Status: DISCONTINUED | OUTPATIENT
Start: 2022-03-24 | End: 2022-03-24 | Stop reason: HOSPADM

## 2022-03-24 RX ORDER — NALOXONE HYDROCHLORIDE 0.4 MG/ML
0.2 INJECTION, SOLUTION INTRAMUSCULAR; INTRAVENOUS; SUBCUTANEOUS
Status: DISCONTINUED | OUTPATIENT
Start: 2022-03-24 | End: 2022-03-24 | Stop reason: HOSPADM

## 2022-03-24 RX ORDER — FENTANYL CITRATE 50 UG/ML
25 INJECTION, SOLUTION INTRAMUSCULAR; INTRAVENOUS
Status: DISCONTINUED | OUTPATIENT
Start: 2022-03-24 | End: 2022-03-24 | Stop reason: HOSPADM

## 2022-03-24 RX ORDER — PROPOFOL 10 MG/ML
INJECTION, EMULSION INTRAVENOUS CONTINUOUS PRN
Status: DISCONTINUED | OUTPATIENT
Start: 2022-03-24 | End: 2022-03-24

## 2022-03-24 RX ORDER — ACETAMINOPHEN 325 MG/1
975 TABLET ORAL ONCE
Status: COMPLETED | OUTPATIENT
Start: 2022-03-24 | End: 2022-03-24

## 2022-03-24 RX ORDER — NALOXONE HYDROCHLORIDE 0.4 MG/ML
0.4 INJECTION, SOLUTION INTRAMUSCULAR; INTRAVENOUS; SUBCUTANEOUS
Status: DISCONTINUED | OUTPATIENT
Start: 2022-03-24 | End: 2022-03-24 | Stop reason: HOSPADM

## 2022-03-24 RX ORDER — ONDANSETRON 2 MG/ML
4 INJECTION INTRAMUSCULAR; INTRAVENOUS EVERY 30 MIN PRN
Status: DISCONTINUED | OUTPATIENT
Start: 2022-03-24 | End: 2022-03-24 | Stop reason: HOSPADM

## 2022-03-24 RX ORDER — IOPAMIDOL 612 MG/ML
INJECTION, SOLUTION INTRAVASCULAR PRN
Status: DISCONTINUED | OUTPATIENT
Start: 2022-03-24 | End: 2022-03-24 | Stop reason: HOSPADM

## 2022-03-24 RX ORDER — LIDOCAINE HYDROCHLORIDE 20 MG/ML
JELLY TOPICAL PRN
Status: DISCONTINUED | OUTPATIENT
Start: 2022-03-24 | End: 2022-03-24 | Stop reason: HOSPADM

## 2022-03-24 RX ORDER — SODIUM CHLORIDE, SODIUM LACTATE, POTASSIUM CHLORIDE, CALCIUM CHLORIDE 600; 310; 30; 20 MG/100ML; MG/100ML; MG/100ML; MG/100ML
INJECTION, SOLUTION INTRAVENOUS CONTINUOUS
Status: DISCONTINUED | OUTPATIENT
Start: 2022-03-24 | End: 2022-03-24 | Stop reason: HOSPADM

## 2022-03-24 RX ORDER — FENTANYL CITRATE 50 UG/ML
INJECTION, SOLUTION INTRAMUSCULAR; INTRAVENOUS PRN
Status: DISCONTINUED | OUTPATIENT
Start: 2022-03-24 | End: 2022-03-24

## 2022-03-24 RX ORDER — FENTANYL CITRATE 50 UG/ML
25 INJECTION, SOLUTION INTRAMUSCULAR; INTRAVENOUS EVERY 5 MIN PRN
Status: CANCELLED | OUTPATIENT
Start: 2022-03-24

## 2022-03-24 RX ORDER — OXYCODONE HYDROCHLORIDE 5 MG/1
5 TABLET ORAL EVERY 4 HOURS PRN
Status: DISCONTINUED | OUTPATIENT
Start: 2022-03-24 | End: 2022-03-24 | Stop reason: HOSPADM

## 2022-03-24 RX ORDER — MAGNESIUM SULFATE HEPTAHYDRATE 40 MG/ML
2 INJECTION, SOLUTION INTRAVENOUS ONCE
Status: COMPLETED | OUTPATIENT
Start: 2022-03-24 | End: 2022-03-24

## 2022-03-24 RX ORDER — CEFAZOLIN SODIUM/WATER 2 G/20 ML
2 SYRINGE (ML) INTRAVENOUS
Status: COMPLETED | OUTPATIENT
Start: 2022-03-24 | End: 2022-03-24

## 2022-03-24 RX ORDER — CEFAZOLIN SODIUM/WATER 2 G/20 ML
2 SYRINGE (ML) INTRAVENOUS SEE ADMIN INSTRUCTIONS
Status: DISCONTINUED | OUTPATIENT
Start: 2022-03-24 | End: 2022-03-24 | Stop reason: HOSPADM

## 2022-03-24 RX ORDER — ONDANSETRON 4 MG/1
4 TABLET, ORALLY DISINTEGRATING ORAL EVERY 30 MIN PRN
Status: DISCONTINUED | OUTPATIENT
Start: 2022-03-24 | End: 2022-03-24 | Stop reason: HOSPADM

## 2022-03-24 RX ORDER — MEPERIDINE HYDROCHLORIDE 25 MG/ML
12.5 INJECTION INTRAMUSCULAR; INTRAVENOUS; SUBCUTANEOUS
Status: DISCONTINUED | OUTPATIENT
Start: 2022-03-24 | End: 2022-03-24 | Stop reason: HOSPADM

## 2022-03-24 RX ORDER — HYDROMORPHONE HCL IN WATER/PF 6 MG/30 ML
0.2 PATIENT CONTROLLED ANALGESIA SYRINGE INTRAVENOUS EVERY 5 MIN PRN
Status: CANCELLED | OUTPATIENT
Start: 2022-03-24

## 2022-03-24 RX ADMIN — Medication 2 G: at 10:18

## 2022-03-24 RX ADMIN — MAGNESIUM SULFATE HEPTAHYDRATE 2 G: 40 INJECTION, SOLUTION INTRAVENOUS at 10:24

## 2022-03-24 RX ADMIN — FENTANYL CITRATE 100 MCG: 50 INJECTION, SOLUTION INTRAMUSCULAR; INTRAVENOUS at 10:18

## 2022-03-24 RX ADMIN — LIDOCAINE HYDROCHLORIDE 0.1 ML: 10 INJECTION, SOLUTION EPIDURAL; INFILTRATION; INTRACAUDAL; PERINEURAL at 09:25

## 2022-03-24 RX ADMIN — OXYCODONE HYDROCHLORIDE 5 MG: 5 TABLET ORAL at 12:15

## 2022-03-24 RX ADMIN — MIDAZOLAM 2 MG: 1 INJECTION INTRAMUSCULAR; INTRAVENOUS at 10:18

## 2022-03-24 RX ADMIN — ACETAMINOPHEN 975 MG: 325 TABLET, FILM COATED ORAL at 09:24

## 2022-03-24 RX ADMIN — PROPOFOL 25 MCG/KG/MIN: 10 INJECTION, EMULSION INTRAVENOUS at 10:21

## 2022-03-24 RX ADMIN — SODIUM CHLORIDE, POTASSIUM CHLORIDE, SODIUM LACTATE AND CALCIUM CHLORIDE: 600; 310; 30; 20 INJECTION, SOLUTION INTRAVENOUS at 09:22

## 2022-03-24 NOTE — OP NOTE
Procedure Date: 03/24/2022    PREOPERATIVE DIAGNOSIS:  Obstructed left ureter.    POSTOPERATIVE DIAGNOSIS:  Obstructed left ureter.    PROCEDURE:  Cystoscopy and tandem left ureteral exchanges.    INDICATIONS FOR PROCEDURE:  Ms. 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  She has been on chronic stent exchanges for several years, she currently has 2 indwelling stents on the left side.  She presents today for routine stent exchange.    DESCRIPTION OF PROCEDURE:  After informed consent was obtained, the patient was brought to the operating room where she was administered MAC anesthesia.  After suitable level of anesthesia was obtained, she was placed in the lithotomy position with all pressure points padded.  She was administered preoperative antibiotics.  She was prepped and draped in standard sterile fashion.  Next, a 22-Malaysian cystoscope was introduced in the patient's bladder, revealing the stent emanating from the left UO.  A stent grasper was used to secure one of the stents and it was brought to the urethral meatus under fluoroscopic guidance.  We then placed a Sensor wire up the stent into the kidney under fluoroscopic guidance.  We then removed the second stent to the urethral meatus and placed a wire through it and advancing into the kidney under fluoroscopic guidance and both stents were removed.  We then used a 5-Malaysian open-ended catheter over one of the wires and advanced this up to the kidney and contrast was administered to outline the renal pelvis.  There did appear to be some persistence of hydro on this side.  We then replaced the Sensor wire and two 6 x 24 double stents were placed over the wire and advanced up simultaneously into the kidney.  Proximal curls were confirmed by fluoroscopy, distal curl confirmed by direct vision.  The patient's bladder was drained.  She was  awakened and brought to recovery room in stable condition.    SURGEON:  Zhao La MD    ESTIMATED BLOOD LOSS:  1 mL    DRAINS:  Include left 6 x 24 double-J stents x2.    COMPLICATIONS:  There were no immediate complications noted.    Zhao La MD        D: 2022   T: 2022   MT: [id number]    Name:     ROSALINDA LANCASTER  MRN:      [id number]        Account:        [id number]   :      1950           Procedure Date: 2022     Document: [id number]

## 2022-03-24 NOTE — ANESTHESIA CARE TRANSFER NOTE
Patient: Marissa Guadarrama    Procedure: Procedure(s):  CYSTOSCOPY, WITH RETROGRADE PYELOGRAM AND URETERAL STENT EXCHANGE       Diagnosis: Other hydronephrosis [N13.39]  Diagnosis Additional Information: No value filed.    Anesthesia Type:   General     Note:    Oropharynx: oropharynx clear of all foreign objects and spontaneously breathing  Level of Consciousness: awake and drowsy  Oxygen Supplementation: room air    Independent Airway: airway patency satisfactory and stable  Dentition: dentition unchanged  Vital Signs Stable: post-procedure vital signs reviewed and stable  Report to RN Given: handoff report given  Patient transferred to: Phase II    Handoff Report: Identifed the Patient, Identified the Reponsible Provider, Reviewed the pertinent medical history, Discussed the surgical course, Reviewed Intra-OP anesthesia mangement and issues during anesthesia, Set expectations for post-procedure period and Allowed opportunity for questions and acknowledgement of understanding      Vitals:  Vitals Value Taken Time   BP     Temp     Pulse     Resp     SpO2         Electronically Signed By: Ayaka Kay CRNA, APRN CRNA  March 24, 2022  11:09 AM

## 2022-03-24 NOTE — ANESTHESIA POSTPROCEDURE EVALUATION
Patient: Marissa Guadarrama    Procedure: Procedure(s):  CYSTOSCOPY, WITH RETROGRADE PYELOGRAM AND URETERAL STENT EXCHANGE       Anesthesia Type:  General    Note:  Disposition: Outpatient   Postop Pain Control: Uneventful            Sign Out: Well controlled pain   PONV: No   Neuro/Psych: Uneventful            Sign Out: Acceptable/Baseline neuro status   Airway/Respiratory: Uneventful            Sign Out: Acceptable/Baseline resp. status   CV/Hemodynamics: Uneventful            Sign Out: Acceptable CV status; No obvious hypovolemia; No obvious fluid overload   Other NRE: NONE   DID A NON-ROUTINE EVENT OCCUR? No           Last vitals:  Vitals Value Taken Time   BP     Temp     Pulse     Resp     SpO2         Electronically Signed By: Ayaka Kay CRNA, APRN CRNA  March 24, 2022  11:10 AM

## 2022-03-24 NOTE — BRIEF OP NOTE
[facility] Brief Operative Note    Pre-operative diagnosis: Other hydronephrosis [N13.39]   Post-operative diagnosis same   Procedure: Procedure(s):  CYSTOSCOPY, WITH RETROGRADE PYELOGRAM AND URETERAL STENT EXCHANGE   Surgeon(s): Surgeon(s) and Role:     * Zhao La MD - Primary   Estimated blood loss: 1 cc    Specimens: none   Findings:  Drains:  No complications None  6 x 24 JJ stent times 2

## 2022-03-24 NOTE — DISCHARGE INSTRUCTIONS
Notify physician if fever/chills/sweats.  You may see blood in the urine while the stent is in place.  Follow up in August 2022 to discuss nest steps.  Urology office will contact you to schedule a time. Appointment w/ Dr. La on August 25th at 11:00.  May use Tylenol for pain.                        Same Day Surgery Discharge Instructions  Special Precautions After Surgery - Adult    1. It is not unusual to feel lightheaded or faint, up to 24 hours after surgery or while taking pain medication.  If you have these symptoms; sit for a few minutes before standing and have someone assist you when getting up.  2. You should rest and relax for the next 24 hours and must have someone stay with you for at least 24 hours after your discharge.  3. DO NOT DRIVE any vehicle or operate mechanical equipment for 24 hours following the end of your surgery.  DO NOT DRIVE while taking narcotic pain medications that have been prescribed by your physician.  If you had a limb operated on, you must be able to use it fully to drive.  4. DO NOT drink alcoholic beverages for 24 hours following surgery or while taking prescription pain medication.  5. Drink clear liquids (apple juice, ginger ale, broth, 7-Up, etc.).  Progress to your regular diet as you feel able.  6. Any questions call your physician and do not make important decisions for 24 hours.    __________________________________________________________________  IMPORTANT NUMBERS:    [facility] [place]  [phone], [phone]  Pharmacy:  [phone]  Same Day Surgery:  [phone], Monday - Friday until 8:30 p.m.  Urgent Care:  [phone]  Emergency Room:  [phone]      [facility]:  [phone]                                                                               [facility]:  [phone]

## 2022-03-24 NOTE — INTERVAL H&P NOTE
"I have reviewed the surgical (or preoperative) H&P that is linked to this encounter, and examined the patient. There are no significant changes    Clinical Conditions Present on Arrival:  SECTIONPRESENTONADMISSIONBEGIN@           # Hypercalcemia: Ca = 10.5 mg/dL (Ref range: 8.5 - 10.1 mg/dL) and/or iCa = N/A on admission, will monitor as appropriate         # Obesity: Estimated body mass index is 30.55 kg/m  as calculated from the following:    Height as of this encounter: 1.715 m (5' 7.5\").    Weight as of this encounter: 89.8 kg (198 lb).       "

## 2022-03-28 DIAGNOSIS — E87.6 HYPOKALEMIA: Primary | ICD-10-CM

## 2022-04-01 ENCOUNTER — INFUSION THERAPY VISIT (OUTPATIENT)
Dept: INFUSION THERAPY | Facility: CLINIC | Age: 72
End: 2022-04-01
Attending: INTERNAL MEDICINE
Payer: MEDICARE

## 2022-04-01 VITALS — SYSTOLIC BLOOD PRESSURE: 159 MMHG | TEMPERATURE: 98 F | DIASTOLIC BLOOD PRESSURE: 79 MMHG

## 2022-04-01 DIAGNOSIS — C7A.021 MALIGNANT CARCINOID TUMOR OF CECUM (H): Primary | ICD-10-CM

## 2022-04-01 PROCEDURE — 250N000011 HC RX IP 250 OP 636: Performed by: INTERNAL MEDICINE

## 2022-04-01 PROCEDURE — 96372 THER/PROPH/DIAG INJ SC/IM: CPT | Performed by: INTERNAL MEDICINE

## 2022-04-01 RX ORDER — LANREOTIDE ACETATE 120 MG/.5ML
120 INJECTION SUBCUTANEOUS ONCE
Status: COMPLETED | OUTPATIENT
Start: 2022-04-01 | End: 2022-04-01

## 2022-04-01 RX ADMIN — LANREOTIDE ACETATE 120 MG: 120 INJECTION SUBCUTANEOUS at 14:57

## 2022-04-01 NOTE — PROGRESS NOTES
Infusion Nursing Note:  Marissa A Chiara presents today for Lanreotide.    Patient seen by provider today: No   present during visit today: Not Applicable.    Note: N/A.      Intravenous Access:  No Intravenous access/labs at this visit.    Treatment Conditions:  Not Applicable.      Post Infusion Assessment:  Patient tolerated injection without incident.  Site patent and intact, free from redness, edema or discomfort.  Access discontinued per protocol.       Discharge Plan:   Copy of AVS reviewed with patient and/or family.  Patient will return 4/29/22 for next appointment.  Patient discharged in stable condition accompanied by: self.  Departure Mode: Ambulatory.      Maria Victoria Ram RN

## 2022-04-23 ENCOUNTER — LAB (OUTPATIENT)
Dept: LAB | Facility: CLINIC | Age: 72
End: 2022-04-23
Payer: COMMERCIAL

## 2022-04-23 DIAGNOSIS — E87.6 HYPOKALEMIA: ICD-10-CM

## 2022-04-23 LAB — POTASSIUM BLD-SCNC: 3.7 MMOL/L (ref 3.4–5.3)

## 2022-04-23 PROCEDURE — 36415 COLL VENOUS BLD VENIPUNCTURE: CPT

## 2022-04-23 PROCEDURE — 84132 ASSAY OF SERUM POTASSIUM: CPT

## 2022-04-29 ENCOUNTER — APPOINTMENT (OUTPATIENT)
Dept: LAB | Facility: CLINIC | Age: 72
End: 2022-04-29
Payer: MEDICARE

## 2022-04-29 ENCOUNTER — INFUSION THERAPY VISIT (OUTPATIENT)
Dept: INFUSION THERAPY | Facility: CLINIC | Age: 72
End: 2022-04-29
Attending: INTERNAL MEDICINE
Payer: MEDICARE

## 2022-04-29 VITALS
SYSTOLIC BLOOD PRESSURE: 150 MMHG | RESPIRATION RATE: 18 BRPM | TEMPERATURE: 98.4 F | DIASTOLIC BLOOD PRESSURE: 68 MMHG | HEART RATE: 68 BPM

## 2022-04-29 DIAGNOSIS — C7A.021 MALIGNANT CARCINOID TUMOR OF CECUM (H): Primary | ICD-10-CM

## 2022-04-29 LAB
CEA SERPL-MCNC: 1.9 UG/L (ref 0–2.5)
LDH SERPL L TO P-CCNC: 166 U/L (ref 81–234)

## 2022-04-29 PROCEDURE — 36415 COLL VENOUS BLD VENIPUNCTURE: CPT

## 2022-04-29 PROCEDURE — 86316 IMMUNOASSAY TUMOR OTHER: CPT | Performed by: INTERNAL MEDICINE

## 2022-04-29 PROCEDURE — 83615 LACTATE (LD) (LDH) ENZYME: CPT | Performed by: INTERNAL MEDICINE

## 2022-04-29 PROCEDURE — 250N000011 HC RX IP 250 OP 636: Performed by: INTERNAL MEDICINE

## 2022-04-29 PROCEDURE — 84260 ASSAY OF SEROTONIN: CPT | Performed by: INTERNAL MEDICINE

## 2022-04-29 PROCEDURE — 82378 CARCINOEMBRYONIC ANTIGEN: CPT | Performed by: INTERNAL MEDICINE

## 2022-04-29 PROCEDURE — 96372 THER/PROPH/DIAG INJ SC/IM: CPT | Performed by: INTERNAL MEDICINE

## 2022-04-29 RX ORDER — LANREOTIDE ACETATE 120 MG/.5ML
120 INJECTION SUBCUTANEOUS ONCE
Status: COMPLETED | OUTPATIENT
Start: 2022-04-29 | End: 2022-04-29

## 2022-04-29 RX ADMIN — LANREOTIDE ACETATE 120 MG: 120 INJECTION SUBCUTANEOUS at 15:41

## 2022-04-29 ASSESSMENT — PAIN SCALES - GENERAL: PAINLEVEL: NO PAIN (0)

## 2022-04-29 NOTE — PROGRESS NOTES
Infusion Nursing Note:  Marissa JOSÉ Guadarrama presents today for Lanreotide.    Patient seen by provider today: No   present during visit today: Not Applicable.    Note: N/A.    Intravenous Access:  Labs drawn peripherally by .    Treatment Conditions:  Not Applicable.    Post Infusion Assessment:  Patient tolerated injection without incident.  Site patent and intact, free from redness, edema or discomfort.  No evidence of extravasations.     Discharge Plan:   Discharge instructions reviewed with: Patient.  Patient and/or family verbalized understanding of discharge instructions and all questions answered.  Copy of AVS reviewed with patient and/or family.  Patient will return 5/27/2022 for next appointment.  Patient discharged in stable condition accompanied by: self.  Departure Mode: Ambulatory.    Crista Jaramillo RN

## 2022-05-03 LAB — CGA SERPL-MCNC: 491 NG/ML

## 2022-05-05 LAB — SEROTONIN BLD-MCNC: 909 NG/ML

## 2022-05-27 ENCOUNTER — INFUSION THERAPY VISIT (OUTPATIENT)
Dept: INFUSION THERAPY | Facility: CLINIC | Age: 72
End: 2022-05-27
Attending: INTERNAL MEDICINE
Payer: MEDICARE

## 2022-05-27 VITALS
SYSTOLIC BLOOD PRESSURE: 135 MMHG | RESPIRATION RATE: 16 BRPM | TEMPERATURE: 98.2 F | DIASTOLIC BLOOD PRESSURE: 51 MMHG | HEART RATE: 64 BPM

## 2022-05-27 DIAGNOSIS — C7A.021 MALIGNANT CARCINOID TUMOR OF CECUM (H): Primary | ICD-10-CM

## 2022-05-27 PROCEDURE — 96372 THER/PROPH/DIAG INJ SC/IM: CPT | Performed by: INTERNAL MEDICINE

## 2022-05-27 PROCEDURE — 250N000011 HC RX IP 250 OP 636: Performed by: INTERNAL MEDICINE

## 2022-05-27 RX ORDER — LANREOTIDE ACETATE 120 MG/.5ML
120 INJECTION SUBCUTANEOUS ONCE
Status: COMPLETED | OUTPATIENT
Start: 2022-05-27 | End: 2022-05-27

## 2022-05-27 RX ADMIN — LANREOTIDE ACETATE 120 MG: 120 INJECTION SUBCUTANEOUS at 15:19

## 2022-06-24 ENCOUNTER — INFUSION THERAPY VISIT (OUTPATIENT)
Dept: INFUSION THERAPY | Facility: CLINIC | Age: 72
End: 2022-06-24
Attending: INTERNAL MEDICINE
Payer: MEDICARE

## 2022-06-24 VITALS
HEART RATE: 67 BPM | TEMPERATURE: 98.6 F | RESPIRATION RATE: 18 BRPM | SYSTOLIC BLOOD PRESSURE: 129 MMHG | DIASTOLIC BLOOD PRESSURE: 75 MMHG

## 2022-06-24 DIAGNOSIS — C7A.021 MALIGNANT CARCINOID TUMOR OF CECUM (H): Primary | ICD-10-CM

## 2022-06-24 PROCEDURE — 96372 THER/PROPH/DIAG INJ SC/IM: CPT | Performed by: INTERNAL MEDICINE

## 2022-06-24 PROCEDURE — 250N000011 HC RX IP 250 OP 636: Performed by: INTERNAL MEDICINE

## 2022-06-24 RX ORDER — LANREOTIDE ACETATE 120 MG/.5ML
120 INJECTION SUBCUTANEOUS ONCE
Status: COMPLETED | OUTPATIENT
Start: 2022-06-24 | End: 2022-06-24

## 2022-06-24 RX ADMIN — LANREOTIDE ACETATE 120 MG: 120 INJECTION SUBCUTANEOUS at 15:06

## 2022-06-24 NOTE — PROGRESS NOTES
Infusion Nursing Note:  Marissa A Jennifergian presents today for Lanreotide.    Patient seen by provider today: No   present during visit today: Not Applicable.    Note: N/A.    Intravenous Access:  No Intravenous access/labs at this visit.    Treatment Conditions:  Not Applicable.    Post Infusion Assessment:  Patient tolerated injection without incident.  Site patent and intact, free from redness, edema or discomfort.  No evidence of extravasations.     Discharge Plan:   Discharge instructions reviewed with: Patient.  Patient and/or family verbalized understanding of discharge instructions and all questions answered.  AVS to patient via [employer].  Patient will return 7/15/2022 for visit with Dr. Mares next appointment.   Patient discharged in stable condition accompanied by: self.  Departure Mode: Ambulatory.    Crista Jaramillo RN

## 2022-07-08 ENCOUNTER — LAB (OUTPATIENT)
Dept: LAB | Facility: CLINIC | Age: 72
End: 2022-07-08
Payer: MEDICARE

## 2022-07-08 ENCOUNTER — HOSPITAL ENCOUNTER (OUTPATIENT)
Dept: MAMMOGRAPHY | Facility: CLINIC | Age: 72
Discharge: HOME | End: 2022-07-08
Attending: INTERNAL MEDICINE | Admitting: INTERNAL MEDICINE
Payer: MEDICARE

## 2022-07-08 DIAGNOSIS — Z85.030 PERSONAL HISTORY OF MALIGNANT CARCINOID TUMOR OF LARGE INTESTINE: ICD-10-CM

## 2022-07-08 DIAGNOSIS — C7A.021 MALIGNANT CARCINOID TUMOR OF CECUM (H): ICD-10-CM

## 2022-07-08 DIAGNOSIS — Z17.0 MALIGNANT NEOPLASM OF UPPER-OUTER QUADRANT OF BOTH BREASTS IN FEMALE, ESTROGEN RECEPTOR POSITIVE (H): ICD-10-CM

## 2022-07-08 DIAGNOSIS — C7A.021 MALIGNANT CARCINOID TUMOR OF CECUM (H): Primary | ICD-10-CM

## 2022-07-08 DIAGNOSIS — C50.411 MALIGNANT NEOPLASM OF UPPER-OUTER QUADRANT OF BOTH BREASTS IN FEMALE, ESTROGEN RECEPTOR POSITIVE (H): ICD-10-CM

## 2022-07-08 DIAGNOSIS — Z12.31 VISIT FOR SCREENING MAMMOGRAM: ICD-10-CM

## 2022-07-08 DIAGNOSIS — C50.412 MALIGNANT NEOPLASM OF UPPER-OUTER QUADRANT OF BOTH BREASTS IN FEMALE, ESTROGEN RECEPTOR POSITIVE (H): ICD-10-CM

## 2022-07-08 LAB
ALBUMIN SERPL-MCNC: 3.4 G/DL (ref 3.4–5)
ALP SERPL-CCNC: 106 U/L (ref 40–150)
ALT SERPL W P-5'-P-CCNC: 20 U/L (ref 0–50)
ANION GAP SERPL CALCULATED.3IONS-SCNC: 4 MMOL/L (ref 3–14)
AST SERPL W P-5'-P-CCNC: 17 U/L (ref 0–45)
BILIRUB SERPL-MCNC: 0.8 MG/DL (ref 0.2–1.3)
BUN SERPL-MCNC: 15 MG/DL (ref 7–30)
CALCIUM SERPL-MCNC: 10 MG/DL (ref 8.5–10.1)
CEA SERPL-MCNC: 4 NG/ML
CHLORIDE BLD-SCNC: 106 MMOL/L (ref 94–109)
CO2 SERPL-SCNC: 33 MMOL/L (ref 20–32)
CREAT SERPL-MCNC: 1.08 MG/DL (ref 0.52–1.04)
GFR SERPL CREATININE-BSD FRML MDRD: 54 ML/MIN/1.73M2
GLUCOSE BLD-MCNC: 114 MG/DL (ref 70–99)
LDH SERPL L TO P-CCNC: 152 U/L (ref 81–234)
POTASSIUM BLD-SCNC: 3.5 MMOL/L (ref 3.4–5.3)
PROT SERPL-MCNC: 7.5 G/DL (ref 6.8–8.8)
SODIUM SERPL-SCNC: 143 MMOL/L (ref 133–144)

## 2022-07-08 PROCEDURE — 83615 LACTATE (LD) (LDH) ENZYME: CPT

## 2022-07-08 PROCEDURE — 36415 COLL VENOUS BLD VENIPUNCTURE: CPT

## 2022-07-08 PROCEDURE — 77067 SCR MAMMO BI INCL CAD: CPT

## 2022-07-08 PROCEDURE — 82378 CARCINOEMBRYONIC ANTIGEN: CPT

## 2022-07-08 PROCEDURE — 86316 IMMUNOASSAY TUMOR OTHER: CPT

## 2022-07-08 PROCEDURE — 80053 COMPREHEN METABOLIC PANEL: CPT

## 2022-07-13 LAB — CGA SERPL-MCNC: 431 NG/ML

## 2022-07-15 ENCOUNTER — ONCOLOGY VISIT (OUTPATIENT)
Dept: ONCOLOGY | Facility: CLINIC | Age: 72
End: 2022-07-15
Attending: INTERNAL MEDICINE
Payer: COMMERCIAL

## 2022-07-15 VITALS
DIASTOLIC BLOOD PRESSURE: 66 MMHG | WEIGHT: 208.7 LBS | RESPIRATION RATE: 16 BRPM | TEMPERATURE: 97.9 F | HEART RATE: 56 BPM | SYSTOLIC BLOOD PRESSURE: 173 MMHG | BODY MASS INDEX: 32.2 KG/M2 | OXYGEN SATURATION: 96 %

## 2022-07-15 DIAGNOSIS — C50.412 MALIGNANT NEOPLASM OF UPPER-OUTER QUADRANT OF BOTH BREASTS IN FEMALE, ESTROGEN RECEPTOR POSITIVE (H): ICD-10-CM

## 2022-07-15 DIAGNOSIS — C7A.021 MALIGNANT CARCINOID TUMOR OF CECUM (H): Primary | ICD-10-CM

## 2022-07-15 DIAGNOSIS — Z17.0 MALIGNANT NEOPLASM OF UPPER-OUTER QUADRANT OF BOTH BREASTS IN FEMALE, ESTROGEN RECEPTOR POSITIVE (H): ICD-10-CM

## 2022-07-15 DIAGNOSIS — C50.411 MALIGNANT NEOPLASM OF UPPER-OUTER QUADRANT OF BOTH BREASTS IN FEMALE, ESTROGEN RECEPTOR POSITIVE (H): ICD-10-CM

## 2022-07-15 PROCEDURE — 99214 OFFICE O/P EST MOD 30 MIN: CPT | Performed by: INTERNAL MEDICINE

## 2022-07-15 PROCEDURE — G0463 HOSPITAL OUTPT CLINIC VISIT: HCPCS

## 2022-07-15 RX ORDER — NALOXONE HYDROCHLORIDE 0.4 MG/ML
0.2 INJECTION, SOLUTION INTRAMUSCULAR; INTRAVENOUS; SUBCUTANEOUS
Status: CANCELLED | OUTPATIENT
Start: 2022-10-14

## 2022-07-15 RX ORDER — LANREOTIDE ACETATE 120 MG/.5ML
120 INJECTION SUBCUTANEOUS ONCE
Status: CANCELLED
Start: 2022-12-09 | End: 2022-11-11

## 2022-07-15 RX ORDER — METHYLPREDNISOLONE SODIUM SUCCINATE 125 MG/2ML
125 INJECTION, POWDER, LYOPHILIZED, FOR SOLUTION INTRAMUSCULAR; INTRAVENOUS
Status: CANCELLED
Start: 2022-10-14

## 2022-07-15 RX ORDER — ALBUTEROL SULFATE 90 UG/1
1-2 AEROSOL, METERED RESPIRATORY (INHALATION)
Status: CANCELLED
Start: 2022-10-14

## 2022-07-15 RX ORDER — LANREOTIDE ACETATE 120 MG/.5ML
120 INJECTION SUBCUTANEOUS ONCE
Status: CANCELLED
Start: 2022-08-19 | End: 2022-08-02

## 2022-07-15 RX ORDER — ALBUTEROL SULFATE 0.83 MG/ML
2.5 SOLUTION RESPIRATORY (INHALATION)
Status: CANCELLED | OUTPATIENT
Start: 2022-09-16

## 2022-07-15 RX ORDER — EPINEPHRINE 1 MG/ML
0.3 INJECTION, SOLUTION, CONCENTRATE INTRAVENOUS EVERY 5 MIN PRN
Status: CANCELLED | OUTPATIENT
Start: 2022-12-09

## 2022-07-15 RX ORDER — DIPHENHYDRAMINE HYDROCHLORIDE 50 MG/ML
50 INJECTION INTRAMUSCULAR; INTRAVENOUS
Status: CANCELLED
Start: 2022-12-09

## 2022-07-15 RX ORDER — ALBUTEROL SULFATE 90 UG/1
1-2 AEROSOL, METERED RESPIRATORY (INHALATION)
Status: CANCELLED
Start: 2022-12-09

## 2022-07-15 RX ORDER — ALBUTEROL SULFATE 0.83 MG/ML
2.5 SOLUTION RESPIRATORY (INHALATION)
Status: CANCELLED | OUTPATIENT
Start: 2022-12-09

## 2022-07-15 RX ORDER — DIPHENHYDRAMINE HYDROCHLORIDE 50 MG/ML
50 INJECTION INTRAMUSCULAR; INTRAVENOUS
Status: CANCELLED
Start: 2022-10-14

## 2022-07-15 RX ORDER — EPINEPHRINE 1 MG/ML
0.3 INJECTION, SOLUTION, CONCENTRATE INTRAVENOUS EVERY 5 MIN PRN
Status: CANCELLED | OUTPATIENT
Start: 2022-09-16

## 2022-07-15 RX ORDER — NALOXONE HYDROCHLORIDE 0.4 MG/ML
0.2 INJECTION, SOLUTION INTRAMUSCULAR; INTRAVENOUS; SUBCUTANEOUS
Status: CANCELLED | OUTPATIENT
Start: 2022-12-09

## 2022-07-15 RX ORDER — ALBUTEROL SULFATE 0.83 MG/ML
2.5 SOLUTION RESPIRATORY (INHALATION)
Status: CANCELLED | OUTPATIENT
Start: 2022-10-14

## 2022-07-15 RX ORDER — ALBUTEROL SULFATE 90 UG/1
1-2 AEROSOL, METERED RESPIRATORY (INHALATION)
Status: CANCELLED
Start: 2022-09-16

## 2022-07-15 RX ORDER — MEPERIDINE HYDROCHLORIDE 25 MG/ML
25 INJECTION INTRAMUSCULAR; INTRAVENOUS; SUBCUTANEOUS EVERY 30 MIN PRN
Status: CANCELLED | OUTPATIENT
Start: 2022-12-09

## 2022-07-15 RX ORDER — MEPERIDINE HYDROCHLORIDE 25 MG/ML
25 INJECTION INTRAMUSCULAR; INTRAVENOUS; SUBCUTANEOUS EVERY 30 MIN PRN
Status: CANCELLED | OUTPATIENT
Start: 2022-09-16

## 2022-07-15 RX ORDER — DIPHENHYDRAMINE HYDROCHLORIDE 50 MG/ML
50 INJECTION INTRAMUSCULAR; INTRAVENOUS
Status: CANCELLED
Start: 2022-09-16

## 2022-07-15 RX ORDER — EPINEPHRINE 1 MG/ML
0.3 INJECTION, SOLUTION, CONCENTRATE INTRAVENOUS EVERY 5 MIN PRN
Status: CANCELLED | OUTPATIENT
Start: 2022-10-14

## 2022-07-15 RX ORDER — DIPHENHYDRAMINE HYDROCHLORIDE 50 MG/ML
50 INJECTION INTRAMUSCULAR; INTRAVENOUS
Status: CANCELLED
Start: 2022-08-19

## 2022-07-15 RX ORDER — ALBUTEROL SULFATE 0.83 MG/ML
2.5 SOLUTION RESPIRATORY (INHALATION)
Status: CANCELLED | OUTPATIENT
Start: 2022-08-19

## 2022-07-15 RX ORDER — METHYLPREDNISOLONE SODIUM SUCCINATE 125 MG/2ML
125 INJECTION, POWDER, LYOPHILIZED, FOR SOLUTION INTRAMUSCULAR; INTRAVENOUS
Status: CANCELLED
Start: 2022-12-09

## 2022-07-15 RX ORDER — LANREOTIDE ACETATE 120 MG/.5ML
120 INJECTION SUBCUTANEOUS ONCE
Status: CANCELLED
Start: 2022-10-14 | End: 2022-10-14

## 2022-07-15 RX ORDER — EPINEPHRINE 1 MG/ML
0.3 INJECTION, SOLUTION, CONCENTRATE INTRAVENOUS EVERY 5 MIN PRN
Status: CANCELLED | OUTPATIENT
Start: 2022-08-19

## 2022-07-15 RX ORDER — LANREOTIDE ACETATE 120 MG/.5ML
120 INJECTION SUBCUTANEOUS ONCE
Status: CANCELLED
Start: 2022-09-16 | End: 2022-09-16

## 2022-07-15 RX ORDER — NALOXONE HYDROCHLORIDE 0.4 MG/ML
0.2 INJECTION, SOLUTION INTRAMUSCULAR; INTRAVENOUS; SUBCUTANEOUS
Status: CANCELLED | OUTPATIENT
Start: 2022-08-19

## 2022-07-15 RX ORDER — METHYLPREDNISOLONE SODIUM SUCCINATE 125 MG/2ML
125 INJECTION, POWDER, LYOPHILIZED, FOR SOLUTION INTRAMUSCULAR; INTRAVENOUS
Status: CANCELLED
Start: 2022-09-16

## 2022-07-15 RX ORDER — METHYLPREDNISOLONE SODIUM SUCCINATE 125 MG/2ML
125 INJECTION, POWDER, LYOPHILIZED, FOR SOLUTION INTRAMUSCULAR; INTRAVENOUS
Status: CANCELLED
Start: 2022-08-19

## 2022-07-15 RX ORDER — NALOXONE HYDROCHLORIDE 0.4 MG/ML
0.2 INJECTION, SOLUTION INTRAMUSCULAR; INTRAVENOUS; SUBCUTANEOUS
Status: CANCELLED | OUTPATIENT
Start: 2022-09-16

## 2022-07-15 RX ORDER — MEPERIDINE HYDROCHLORIDE 25 MG/ML
25 INJECTION INTRAMUSCULAR; INTRAVENOUS; SUBCUTANEOUS EVERY 30 MIN PRN
Status: CANCELLED | OUTPATIENT
Start: 2022-10-14

## 2022-07-15 RX ORDER — MEPERIDINE HYDROCHLORIDE 25 MG/ML
25 INJECTION INTRAMUSCULAR; INTRAVENOUS; SUBCUTANEOUS EVERY 30 MIN PRN
Status: CANCELLED | OUTPATIENT
Start: 2022-08-19

## 2022-07-15 RX ORDER — ALBUTEROL SULFATE 90 UG/1
1-2 AEROSOL, METERED RESPIRATORY (INHALATION)
Status: CANCELLED
Start: 2022-08-19

## 2022-07-15 ASSESSMENT — PAIN SCALES - GENERAL: PAINLEVEL: NO PAIN (0)

## 2022-07-15 NOTE — PATIENT INSTRUCTIONS
We will need to schedule another Octreoscan now to assess the extent of your carcinoid tumor's response to lanreotide injections. We will contact you by telephone and go over your Octreoscan results.    If positive response is confirmed on this imaging study we will have you continue your every 4-week lanreotide injections and schedule another office visit with me in 4 months.    Continue Aromasin (exemestane).    Continue to take OTC vitamin D and calcium daily to prevent bone loss.

## 2022-07-15 NOTE — LETTER
"    7/15/2022         RE: Marissa Guadarrama  [address] [place] [id number]        Dear Colleague,    Thank you for referring your patient, Marissa Guadarrama, to the [facility]. Please see a copy of my visit note below.    Oncology Rooming Note    July 15, 2022 9:31 AM   Marissa Guadarrama is a 72 year old female who presents for:    Chief Complaint   Patient presents with     Clinic Care Coordination - Follow-up     6 month Follow up visit     Initial Vitals: BP (!) 173/66   Pulse 56   Temp 97.9  F (36.6  C)   Resp 16   Wt 94.7 kg (208 lb 11.2 oz)   SpO2 96%   BMI 32.20 kg/m   Estimated body mass index is 32.2 kg/m  as calculated from the following:    Height as of 3/24/22: 1.715 m (5' 7.5\").    Weight as of this encounter: 94.7 kg (208 lb 11.2 oz). Body surface area is 2.12 meters squared.  No Pain (0) Comment: Data Unavailable   No LMP recorded. Patient has had a hysterectomy.  Allergies reviewed: Yes  Medications reviewed: Yes    Medications: Medication refills not needed today.  Pharmacy name entered into [employer]: [id number] - [address]    Clinical concerns: patient here for 6 month follow up visit. Patient denies any concerns at this time.  Dr. Hayden was notified.      Magaly Alex RN                The patient is being seen for metastatic carcinoid tumor diagnosed in 2003 and early stage breast cancer diagnosed in 2016.    She also has a history of metastatic carcinoid tumor resulting in ureteral obstruction requiring ureteral stent placement by urology.  She also has a liver lesion, presumably also a metastatic carcinoid lesion given there fairly long period of stability. Previously, she was followed by Dr. Lawrence for this and was then transitioned to Dr. Hicks.     In 2003, she underwent excision of the terminal ileum and right colon which revealed a 2.4 cm invasive ileal carcinoid extending to the deep muscularis propria " with metastatic carcinoid tumor identified in 5 of 24 pericolonic lymph nodes.  Residual tubulovillous polyp was identified in the cecum.  Possible submucous lipoma was noted at the ileocecal valve.  The mucosal margins were negative.  The tumor was staged as G1, well-differentiated carcinoid tumor with clear margins without perineural invasion, pT2, pN2.     She is currently on adjuvant hormonal treatment for breast cancer consisting of exemestane.  Initially she had been on anastrozole which resulted in rash and prompted her switch to exemestane.      She also has a history of cervical cancer treated with surgical excision.    Bilateral screening mammography from July 2022 is benign.    I met with the patient for the first time on January 20, 2022 to take over her care as her primary oncologist after Dr. Hicks's departure from [facility].    She had not had any imaging for her neuroendocrine tumor for quite some time so I ordered an Octreoscan which was completed on February 10, 2022 which revealed progressive disease.  She was then started on lanreotide injections which have been administered every 4 weeks -high risk drug therapy requiring intensive monitoring for toxicity.    She denies any side effects from lanreotide or exemestane at this time.  She denies any unusual pains or aches.  Her bowel movements are normal.  She has not had any bleeding.    General: Todays' vital signs reviewed in the EMR.    BP (!) 173/66   Pulse 56   Temp 97.9  F (36.6  C)   Resp 16   Wt 94.7 kg (208 lb 11.2 oz)   SpO2 96%   BMI 32.20 kg/m      ECOG PS is 2  HEENT: No scleral icterus  Cardiovascular: Cor RRR  Respiratory: Lungs clear to auscultation bilaterally  Gastrointestinal: No abdominal tenderness, no palpable hepatosplenomegaly  Lymphatic: No cervical, supraclavicular, infraclavicular, or axillary adenopathy       ASSESSMENT:    Encounter Diagnoses   Name Primary?     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 We will contact you by telephone and go over your Octreoscan results.    If positive response is confirmed on this imaging study we will have you continue your every 4-week lanreotide injections and schedule another office visit with me in 4 months.    Continue Aromasin (exemestane).    Continue to take OTC vitamin D and calcium daily to prevent bone loss.          Again, thank you for allowing me to participate in the care of your patient.        Sincerely,        Chad Hayden MD

## 2022-07-15 NOTE — PROGRESS NOTES
The patient is being seen for metastatic carcinoid tumor diagnosed in 2003 and early stage breast cancer diagnosed in 2016.    She also has a history of metastatic carcinoid tumor resulting in ureteral obstruction requiring ureteral stent placement by urology.  She also has a liver lesion, presumably also a metastatic carcinoid lesion given there fairly long period of stability. Previously, she was followed by Dr. Lawrence for this and was then transitioned to Dr. Hicks.     In 2003, she underwent excision of the terminal ileum and right colon which revealed a 2.4 cm invasive ileal carcinoid extending to the deep muscularis propria with metastatic carcinoid tumor identified in 5 of 24 pericolonic lymph nodes.  Residual tubulovillous polyp was identified in the cecum.  Possible submucous lipoma was noted at the ileocecal valve.  The mucosal margins were negative.  The tumor was staged as G1, well-differentiated carcinoid tumor with clear margins without perineural invasion, pT2, pN2.     She is currently on adjuvant hormonal treatment for breast cancer consisting of exemestane.  Initially she had been on anastrozole which resulted in rash and prompted her switch to exemestane.      She also has a history of cervical cancer treated with surgical excision.    Bilateral screening mammography from July 2022 is benign.    I met with the patient for the first time on January 20, 2022 to take over her care as her primary oncologist after Dr. Hicks's departure from [facility].    She had not had any imaging for her neuroendocrine tumor for quite some time so I ordered an Octreoscan which was completed on February 10, 2022 which revealed progressive disease.  She was then started on lanreotide injections which have been administered every 4 weeks -high risk drug therapy requiring intensive monitoring for toxicity.    She denies any side effects from lanreotide or exemestane at this time.  She  denies any unusual pains or aches.  Her bowel movements are normal.  She has not had any bleeding.    General: Todays' vital signs reviewed in the EMR.    BP (!) 173/66   Pulse 56   Temp 97.9  F (36.6  C)   Resp 16   Wt 94.7 kg (208 lb 11.2 oz)   SpO2 96%   BMI 32.20 kg/m      ECOG PS is 2  HEENT: No scleral icterus  Cardiovascular: Cor RRR  Respiratory: Lungs clear to auscultation bilaterally  Gastrointestinal: No abdominal tenderness, no palpable hepatosplenomegaly  Lymphatic: No cervical, supraclavicular, infraclavicular, or axillary adenopathy       ASSESSMENT:    Encounter Diagnoses   Name Primary?     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 We will contact you by telephone and go over your Octreoscan results.    If positive response is confirmed on this imaging study we will have you continue your every 4-week lanreotide injections and schedule another office visit with me in 4 months.    Continue Aromasin (exemestane).    Continue to take OTC vitamin D and calcium daily to prevent bone loss.       Name band;

## 2022-07-15 NOTE — PROGRESS NOTES
"Oncology Rooming Note    July 15, 2022 9:31 AM   Marissa Guadarrama is a 72 year old female who presents for:    Chief Complaint   Patient presents with     Clinic Care Coordination - Follow-up     6 month Follow up visit     Initial Vitals: BP (!) 173/66   Pulse 56   Temp 97.9  F (36.6  C)   Resp 16   Wt 94.7 kg (208 lb 11.2 oz)   SpO2 96%   BMI 32.20 kg/m   Estimated body mass index is 32.2 kg/m  as calculated from the following:    Height as of 3/24/22: 1.715 m (5' 7.5\").    Weight as of this encounter: 94.7 kg (208 lb 11.2 oz). Body surface area is 2.12 meters squared.  No Pain (0) Comment: Data Unavailable   No LMP recorded. Patient has had a hysterectomy.  Allergies reviewed: Yes  Medications reviewed: Yes    Medications: Medication refills not needed today.  Pharmacy name entered into [employer]: [id number] - [place], [place] - [address] [place] [address]    Clinical concerns: patient here for 6 month follow up visit. Patient denies any concerns at this time.  Dr. Hayden was notified.      Magaly Alex RN              "

## 2022-07-22 ENCOUNTER — INFUSION THERAPY VISIT (OUTPATIENT)
Dept: INFUSION THERAPY | Facility: CLINIC | Age: 72
End: 2022-07-22
Attending: INTERNAL MEDICINE
Payer: MEDICARE

## 2022-07-22 ENCOUNTER — APPOINTMENT (OUTPATIENT)
Dept: LAB | Facility: CLINIC | Age: 72
End: 2022-07-22
Payer: MEDICARE

## 2022-07-22 DIAGNOSIS — C7A.021 MALIGNANT CARCINOID TUMOR OF CECUM (H): Primary | ICD-10-CM

## 2022-07-22 LAB
CEA SERPL-MCNC: 4.4 NG/ML
LDH SERPL L TO P-CCNC: 162 U/L (ref 81–234)

## 2022-07-22 PROCEDURE — 36415 COLL VENOUS BLD VENIPUNCTURE: CPT

## 2022-07-22 PROCEDURE — 82378 CARCINOEMBRYONIC ANTIGEN: CPT | Performed by: INTERNAL MEDICINE

## 2022-07-22 PROCEDURE — 83615 LACTATE (LD) (LDH) ENZYME: CPT | Performed by: INTERNAL MEDICINE

## 2022-07-22 PROCEDURE — 84260 ASSAY OF SEROTONIN: CPT | Performed by: INTERNAL MEDICINE

## 2022-07-22 PROCEDURE — 250N000011 HC RX IP 250 OP 636: Performed by: INTERNAL MEDICINE

## 2022-07-22 PROCEDURE — 96372 THER/PROPH/DIAG INJ SC/IM: CPT | Performed by: INTERNAL MEDICINE

## 2022-07-22 PROCEDURE — 86316 IMMUNOASSAY TUMOR OTHER: CPT | Mod: GZ | Performed by: INTERNAL MEDICINE

## 2022-07-22 RX ORDER — LANREOTIDE ACETATE 120 MG/.5ML
120 INJECTION SUBCUTANEOUS ONCE
Status: COMPLETED | OUTPATIENT
Start: 2022-07-22 | End: 2022-07-22

## 2022-07-22 RX ADMIN — LANREOTIDE ACETATE 120 MG: 120 INJECTION SUBCUTANEOUS at 15:49

## 2022-07-22 NOTE — PROGRESS NOTES
Infusion Nursing Note:  Marissa Guadarrama presents today for Lanreotide acetate.    Patient seen by provider today: No   present during visit today: Not Applicable.    Note: N/A.    Intravenous Access:  N/A.    Treatment Conditions:  Not Applicable.    Post Infusion Assessment:  Patient tolerated injection without incident.     Discharge Plan:   Discharge instructions reviewed with: Patient.  Patient and/or family verbalized understanding of discharge instructions and all questions answered.  Patient discharged in stable condition accompanied by: self.  Departure Mode: Ambulatory.      Barbara Corrales RN

## 2022-07-25 ENCOUNTER — HOSPITAL ENCOUNTER (OUTPATIENT)
Dept: PET IMAGING | Facility: CLINIC | Age: 72
Discharge: HOME | End: 2022-07-25
Attending: INTERNAL MEDICINE | Admitting: INTERNAL MEDICINE
Payer: MEDICARE

## 2022-07-25 DIAGNOSIS — C7A.021 MALIGNANT CARCINOID TUMOR OF CECUM (H): ICD-10-CM

## 2022-07-25 PROCEDURE — 250N000011 HC RX IP 250 OP 636: Performed by: INTERNAL MEDICINE

## 2022-07-25 PROCEDURE — 74177 CT ABD & PELVIS W/CONTRAST: CPT | Mod: 26

## 2022-07-25 PROCEDURE — G1010 CDSM STANSON: HCPCS | Mod: GC

## 2022-07-25 PROCEDURE — A9592 HC RX IP 250 OP 636: HCPCS | Performed by: INTERNAL MEDICINE

## 2022-07-25 PROCEDURE — 78816 PET IMAGE W/CT FULL BODY: CPT | Mod: 26

## 2022-07-25 PROCEDURE — 71260 CT THORAX DX C+: CPT | Mod: 26

## 2022-07-25 PROCEDURE — 74177 CT ABD & PELVIS W/CONTRAST: CPT

## 2022-07-25 PROCEDURE — G1010 CDSM STANSON: HCPCS | Mod: PI

## 2022-07-25 RX ORDER — IOPAMIDOL 755 MG/ML
45-150 INJECTION, SOLUTION INTRAVASCULAR ONCE
Status: COMPLETED | OUTPATIENT
Start: 2022-07-25 | End: 2022-07-25

## 2022-07-25 RX ADMIN — IOPAMIDOL 114 ML: 755 INJECTION, SOLUTION INTRAVENOUS at 13:46

## 2022-07-25 RX ADMIN — COPPER CU 64 DOTATATE 4.34 MCI.: 1 INJECTION, SOLUTION INTRAVENOUS at 12:24

## 2022-07-26 LAB — CGA SERPL-MCNC: 426 NG/ML

## 2022-07-28 LAB — SEROTONIN BLD-MCNC: 313 NG/ML

## 2022-08-03 ENCOUNTER — VIRTUAL VISIT (OUTPATIENT)
Dept: ONCOLOGY | Facility: CLINIC | Age: 72
End: 2022-08-03
Attending: INTERNAL MEDICINE
Payer: COMMERCIAL

## 2022-08-03 DIAGNOSIS — C7A.021 MALIGNANT CARCINOID TUMOR OF CECUM (H): Primary | ICD-10-CM

## 2022-08-03 PROCEDURE — 99213 OFFICE O/P EST LOW 20 MIN: CPT | Mod: 95 | Performed by: INTERNAL MEDICINE

## 2022-08-03 NOTE — PROGRESS NOTES
Marissa is a 72 year old who is being evaluated via a billable telephone visit.      What phone number would you like to be contacted at? [phone]  How would you like to obtain your AVS? Mail a copy     Rhea Bloom CMA on 8/3/2022 at 2:46 PM

## 2022-08-03 NOTE — PATIENT INSTRUCTIONS
So basically although the Dotatate PET report is indicating that the PET scan is showing progression compared to her February Octreoscan of this year I would like her to continue with lanreotide injections every 4 weeks for anoother 2-3 months and repeat a Dotatate PET.  Obviously, we are kind of comparing apples to oranges if we are looking at the dotatate PET now and comparing it to an Octreoscan from February so it is hard to know what constitutes true progression of cancer as the dotatate PET scan is more sensitive for picking up her cancer.    My clinic staff will make arrangements for a dotatate PET in about 3 months followed by a phone visit with me a couple days later.

## 2022-08-03 NOTE — LETTER
8/3/2022         RE: Marissa Guadarrama  [address] [place] [id number]        Dear Colleague,    Thank you for referring your patient, Marissa Guadarrama, to the [facility]. Please see a copy of my visit note below.    Marissa is a 72 year old who is being evaluated via a billable telephone visit.      What phone number would you like to be contacted at? [phone]  How would you like to obtain your AVS? Mail a copy     Rhea Bloom CMA on 8/3/2022 at 2:46 PM          The patient is being seen for the following issue/s:  1. Malignant carcinoid tumor of cecum (H)      I had ordered another Octreoscan for her but the radiology department insisted that we change it to a dotatate PET scan which was eventually completed in July but had to be compared to her last radiographic tumor assessment which was done by Octreoscan in February 2022.     She has no acute complaints and specifically denies any problems of bowel movements or pain.    ASSESSMENT/PLAN:    1. Malignant carcinoid tumor of cecum (H)      So basically although the Dotatate PET report is indicating that the PET scan is showing progression compared to her February Octreoscan of this year I would like her to continue with lanreotide for anoother 2-3 months and repeat a Dotatate PET.  Obviously, we are kind of comparing apples to oranges if we are looking at the dotatate PET now and comparing it to an Octreoscan from February so it is hard to know what constitutes true progression of cancer as the dotatate PET scan is more sensitive for picking up her cancer.    Interestingly, her chromogranin a level has Haft since she started lanreotide about 5 months ago (from 824 about 5 months ago to 426 in July 2022).    My clinic staff will make arrangements for a dotatate PET in about 3 months followed by a phone visit with me a couple days later.    Phone call duration: 11 minutes    .  ..            Again, thank you for  allowing me to participate in the care of your patient.        Sincerely,        Chad Hayden MD

## 2022-08-03 NOTE — LETTER
8/3/2022         RE: Marissa Guadarrama  [address] [place] [phone]        Dear Colleague,    Thank you for referring your patient, Marissa Guadarrama, to the [facility]. Please see a copy of my visit note below.    Marissa is a 72 year old who is being evaluated via a billable telephone visit.      What phone number would you like to be contacted at? [phone]  How would you like to obtain your AVS? Mail a copy     Rhea Bloom CMA on 8/3/2022 at 2:46 PM          The patient is being seen for the following issue/s:  1. Malignant carcinoid tumor of cecum (H)      I had ordered another Octreoscan for her but the radiology department insisted that we change it to a dotatate PET scan which was eventually completed in July but had to be compared to her last radiographic tumor assessment which was done by Octreoscan in February 2022.     She has no acute complaints and specifically denies any problems of bowel movements or pain.    ASSESSMENT/PLAN:    1. Malignant carcinoid tumor of cecum (H)      So basically although the Dotatate PET report is indicating that the PET scan is showing progression compared to her February Octreoscan of this year I would like her to continue with lanreotide for anoother 2-3 months and repeat a Dotatate PET.  Obviously, we are kind of comparing apples to oranges if we are looking at the dotatate PET now and comparing it to an Octreoscan from February so it is hard to know what constitutes true progression of cancer as the dotatate PET scan is more sensitive for picking up her cancer.    Interestingly, her chromogranin a level has Haft since she started lanreotide about 5 months ago (from 824 about 5 months ago to 426 in July 2022).    My clinic staff will make arrangements for a dotatate PET in about 3 months followed by a phone visit with me a couple days later.    Phone call duration: 11 minutes    .  ..            Again, thank you for  allowing me to participate in the care of your patient.        Sincerely,        Chad Hayden MD

## 2022-08-03 NOTE — PROGRESS NOTES
The patient is being seen for the following issue/s:  1. Malignant carcinoid tumor of cecum (H)      I had ordered another Octreoscan for her but the radiology department insisted that we change it to a dotatate PET scan which was eventually completed in July but had to be compared to her last radiographic tumor assessment which was done by Octreoscan in February 2022.     She has no acute complaints and specifically denies any problems of bowel movements or pain.    ASSESSMENT/PLAN:    1. Malignant carcinoid tumor of cecum (H)      So basically although the Dotatate PET report is indicating that the PET scan is showing progression compared to her February Octreoscan of this year I would like her to continue with lanreotide for anoother 2-3 months and repeat a Dotatate PET.  Obviously, we are kind of comparing apples to oranges if we are looking at the dotatate PET now and comparing it to an Octreoscan from February so it is hard to know what constitutes true progression of cancer as the dotatate PET scan is more sensitive for picking up her cancer.    Interestingly, her chromogranin a level has Haft since she started lanreotide about 5 months ago (from 824 about 5 months ago to 426 in July 2022).    My clinic staff will make arrangements for a dotatate PET in about 3 months followed by a phone visit with me a couple days later.    Phone call duration: 11 minutes    .  ..

## 2022-08-19 ENCOUNTER — APPOINTMENT (OUTPATIENT)
Dept: LAB | Facility: CLINIC | Age: 72
End: 2022-08-19
Payer: MEDICARE

## 2022-08-19 ENCOUNTER — INFUSION THERAPY VISIT (OUTPATIENT)
Dept: INFUSION THERAPY | Facility: CLINIC | Age: 72
End: 2022-08-19
Attending: INTERNAL MEDICINE
Payer: MEDICARE

## 2022-08-19 VITALS
RESPIRATION RATE: 16 BRPM | SYSTOLIC BLOOD PRESSURE: 149 MMHG | HEART RATE: 64 BPM | DIASTOLIC BLOOD PRESSURE: 57 MMHG | TEMPERATURE: 99 F

## 2022-08-19 DIAGNOSIS — C7A.021 MALIGNANT CARCINOID TUMOR OF CECUM (H): Primary | ICD-10-CM

## 2022-08-19 LAB
CEA SERPL-MCNC: 4.1 NG/ML
LDH SERPL L TO P-CCNC: 162 U/L (ref 81–234)

## 2022-08-19 PROCEDURE — 96372 THER/PROPH/DIAG INJ SC/IM: CPT | Performed by: INTERNAL MEDICINE

## 2022-08-19 PROCEDURE — 82378 CARCINOEMBRYONIC ANTIGEN: CPT | Performed by: INTERNAL MEDICINE

## 2022-08-19 PROCEDURE — 250N000011 HC RX IP 250 OP 636: Performed by: INTERNAL MEDICINE

## 2022-08-19 PROCEDURE — 96402 CHEMO HORMON ANTINEOPL SQ/IM: CPT

## 2022-08-19 PROCEDURE — 84260 ASSAY OF SEROTONIN: CPT | Performed by: INTERNAL MEDICINE

## 2022-08-19 PROCEDURE — 83615 LACTATE (LD) (LDH) ENZYME: CPT | Performed by: INTERNAL MEDICINE

## 2022-08-19 PROCEDURE — 36415 COLL VENOUS BLD VENIPUNCTURE: CPT | Performed by: INTERNAL MEDICINE

## 2022-08-19 PROCEDURE — 86316 IMMUNOASSAY TUMOR OTHER: CPT | Performed by: INTERNAL MEDICINE

## 2022-08-19 RX ORDER — LANREOTIDE ACETATE 120 MG/.5ML
120 INJECTION SUBCUTANEOUS ONCE
Status: COMPLETED | OUTPATIENT
Start: 2022-08-19 | End: 2022-08-19

## 2022-08-19 RX ADMIN — LANREOTIDE ACETATE 120 MG: 120 INJECTION SUBCUTANEOUS at 15:15

## 2022-08-24 LAB — CGA SERPL-MCNC: 481 NG/ML

## 2022-08-25 ENCOUNTER — OFFICE VISIT (OUTPATIENT)
Dept: UROLOGY | Facility: CLINIC | Age: 72
End: 2022-08-25
Payer: COMMERCIAL

## 2022-08-25 ENCOUNTER — PREP FOR PROCEDURE (OUTPATIENT)
Dept: UROLOGY | Facility: CLINIC | Age: 72
End: 2022-08-25

## 2022-08-25 VITALS
DIASTOLIC BLOOD PRESSURE: 78 MMHG | HEIGHT: 68 IN | SYSTOLIC BLOOD PRESSURE: 140 MMHG | OXYGEN SATURATION: 97 % | HEART RATE: 53 BPM | WEIGHT: 200 LBS | BODY MASS INDEX: 30.31 KG/M2

## 2022-08-25 DIAGNOSIS — N28.9 NON-FUNCTIONING KIDNEY: Primary | ICD-10-CM

## 2022-08-25 LAB — SEROTONIN BLD-MCNC: 712 NG/ML

## 2022-08-25 PROCEDURE — 99215 OFFICE O/P EST HI 40 MIN: CPT | Performed by: UROLOGY

## 2022-08-25 RX ORDER — CEFAZOLIN SODIUM 2 G/50ML
2 SOLUTION INTRAVENOUS
Status: CANCELLED | OUTPATIENT
Start: 2022-08-25

## 2022-08-25 RX ORDER — CEFAZOLIN SODIUM 2 G/50ML
2 SOLUTION INTRAVENOUS SEE ADMIN INSTRUCTIONS
Status: CANCELLED | OUTPATIENT
Start: 2022-08-25

## 2022-08-25 NOTE — PROGRESS NOTES
REASON FOR VISIT TODAY:  Obstructed left ureter and carcinoid tumor.      HISTORY:  Ms. Guadarrama is a 72-year-old woman followed in our clinic for history of an obstructed left ureter secondary to a metastatic carcinoid deposit on the mid ureter on the left side.  She currently has tandem stents on that side. The kidney function on that side was measured to be 10% previously.  The patient is also known to have a lesion on her liver of approximately 4 cm that in retrospect was stable since 2016.  The patient's liver biopsy from 06/12/2019 shows metastatic low-grade neuroendocrine tumor for which she is on observation.  The patient notes no major changes in her health since we last saw her.  She did have another scan recently which shows more extensive disease carcinoid disease.  Her last stent exchange was  3/24/22.     OBJECTIVE:  Renal Scan from 11/12/21 shows the left kidney with 10% split function      PET DOTATATE from 8/3/22 shows increased uptake in several new locations        ASSESSMENT AND PLAN:  Over half of today's 49-minute visit was spent reviewing the chart, results and counseling the patient regarding her carcinoid tumor and her obstructed left ureter. The patient's left kidney now has minimal function.  We discussed options including continuing with stent exchanges, vs ureteroscopy to remove the stones vs nephrectomy.  At this point the patient would like to move forward with nephrectomy which makes sense.  After discussion of risks and benefits of the procedure,  Ms. Guadarrama understands the plan and is in agreement.

## 2022-08-25 NOTE — NURSING NOTE
"Chief Complaint   Patient presents with     RECHECK     Follow up on stent and surgery per patient       Vitals:    08/25/22 1112   BP: (!) 140/78   BP Location: Left arm   Patient Position: Sitting   Cuff Size: Adult Regular   Pulse: 53   SpO2: 97%   Weight: 90.7 kg (200 lb)   Height: 1.715 m (5' 7.5\")     Wt Readings from Last 1 Encounters:   08/25/22 90.7 kg (200 lb)     Bri Izquierdo MA      "

## 2022-09-01 ENCOUNTER — TELEPHONE (OUTPATIENT)
Dept: UROLOGY | Facility: CLINIC | Age: 72
End: 2022-09-01

## 2022-09-01 NOTE — TELEPHONE ENCOUNTER
Called patient to schedule surgery with Dr. La    Date of Surgery: 11/04/22    Location of surgery: [facility]    Pre-Op H&P: PAC    Pre/Post Imaging:  No    Discussed COVID-19 Testing: Yes    Post-Op Appt Date: 11/14    Surgery Packet Mailed: mailed to pt address 09/01      Additional comments: Called and offered pt 11/04 surgery date. Pt stated this is a little far out, but accepted the 11/04 date. Also scheduled pt for PAC IP on 10/14 at the [facility] as pt has labs this day (but at the [place] location). Pt declined virtual PAC visit stating she does not do anything virtually. Informed pt PAC will be here at the [facility], not [place], and pt agreed.      Ciera Law on 9/1/2022 at 9:01 AM*

## 2022-09-02 NOTE — TELEPHONE ENCOUNTER
FUTURE VISIT INFORMATION      SURGERY INFORMATION:    Date: 22    Location: ur or    Surgeon:  Zhao La MD    Anesthesia Type:  general    Procedure: CYSTOSCOPY, LEFT STENT REMOVAL, LEFT NEPHRECTOMY, ROBOT-ASSISTED    Consult: ov 22    RECORDS REQUESTED FROM:       Primary Care Provider: [facility]    Pertinent Medical History: hypertension    Most recent EKG+ Tracin21    Most recent ECHO: 2007- [facility]

## 2022-09-16 ENCOUNTER — INFUSION THERAPY VISIT (OUTPATIENT)
Dept: INFUSION THERAPY | Facility: CLINIC | Age: 72
End: 2022-09-16
Attending: INTERNAL MEDICINE
Payer: MEDICARE

## 2022-09-16 ENCOUNTER — APPOINTMENT (OUTPATIENT)
Dept: LAB | Facility: CLINIC | Age: 72
End: 2022-09-16
Payer: MEDICARE

## 2022-09-16 VITALS
RESPIRATION RATE: 16 BRPM | HEART RATE: 64 BPM | TEMPERATURE: 98.8 F | SYSTOLIC BLOOD PRESSURE: 165 MMHG | DIASTOLIC BLOOD PRESSURE: 64 MMHG

## 2022-09-16 DIAGNOSIS — C7A.021 MALIGNANT CARCINOID TUMOR OF CECUM (H): Primary | ICD-10-CM

## 2022-09-16 LAB
CEA SERPL-MCNC: 4.1 NG/ML
LDH SERPL L TO P-CCNC: 210 U/L (ref 0–250)

## 2022-09-16 PROCEDURE — 86316 IMMUNOASSAY TUMOR OTHER: CPT | Performed by: INTERNAL MEDICINE

## 2022-09-16 PROCEDURE — 84260 ASSAY OF SEROTONIN: CPT | Performed by: INTERNAL MEDICINE

## 2022-09-16 PROCEDURE — 82378 CARCINOEMBRYONIC ANTIGEN: CPT | Performed by: INTERNAL MEDICINE

## 2022-09-16 PROCEDURE — 250N000011 HC RX IP 250 OP 636: Performed by: INTERNAL MEDICINE

## 2022-09-16 PROCEDURE — 83615 LACTATE (LD) (LDH) ENZYME: CPT | Performed by: INTERNAL MEDICINE

## 2022-09-16 PROCEDURE — 96372 THER/PROPH/DIAG INJ SC/IM: CPT | Performed by: INTERNAL MEDICINE

## 2022-09-16 PROCEDURE — 36415 COLL VENOUS BLD VENIPUNCTURE: CPT | Performed by: INTERNAL MEDICINE

## 2022-09-16 RX ORDER — LANREOTIDE ACETATE 120 MG/.5ML
120 INJECTION SUBCUTANEOUS ONCE
Status: COMPLETED | OUTPATIENT
Start: 2022-09-16 | End: 2022-09-16

## 2022-09-16 RX ADMIN — LANREOTIDE ACETATE 120 MG: 120 INJECTION SUBCUTANEOUS at 15:30

## 2022-09-16 NOTE — PROGRESS NOTES
Infusion Nursing Note:  Marissa Guadarrama presents today for Somatuline.    Patient seen by provider today: No   present during visit today: Not Applicable.    Note: N/A.    Intravenous Access:  No Intravenous access/labs at this visit.    Treatment Conditions:  Not Applicable.    Post Infusion Assessment:  Patient tolerated injection without incident.     Discharge Plan:   Patient and/or family verbalized understanding of discharge instructions and all questions answered.  Patient discharged in stable condition accompanied by: self.  Departure Mode: Ambulatory.      Olga Lidia Roman RN

## 2022-09-21 LAB — CGA SERPL-MCNC: 437 NG/ML

## 2022-09-22 LAB — SEROTONIN BLD-MCNC: 887 NG/ML

## 2022-10-04 DIAGNOSIS — Z01.812 PRE-PROCEDURE LAB EXAM: Primary | ICD-10-CM

## 2022-10-04 NOTE — PROGRESS NOTES
u   In order to meet Medicare requirements, the clinical documentation must support the information cited in the admission order.  Please be sure to provide detailed and clear documentation about the following in the admitting note/history and physical:

## 2022-10-13 LAB
ABO/RH(D): NORMAL
ANTIBODY SCREEN: NEGATIVE
SPECIMEN EXPIRATION DATE: NORMAL

## 2022-10-14 ENCOUNTER — OFFICE VISIT (OUTPATIENT)
Dept: SURGERY | Facility: CLINIC | Age: 72
End: 2022-10-14
Payer: COMMERCIAL

## 2022-10-14 ENCOUNTER — ANESTHESIA EVENT (OUTPATIENT)
Dept: SURGERY | Facility: CLINIC | Age: 72
End: 2022-10-14

## 2022-10-14 ENCOUNTER — LAB (OUTPATIENT)
Dept: LAB | Facility: CLINIC | Age: 72
End: 2022-10-14
Payer: COMMERCIAL

## 2022-10-14 ENCOUNTER — PRE VISIT (OUTPATIENT)
Dept: SURGERY | Facility: CLINIC | Age: 72
End: 2022-10-14

## 2022-10-14 VITALS
BODY MASS INDEX: 32.02 KG/M2 | RESPIRATION RATE: 16 BRPM | TEMPERATURE: 98.2 F | SYSTOLIC BLOOD PRESSURE: 129 MMHG | WEIGHT: 211.3 LBS | OXYGEN SATURATION: 100 % | HEIGHT: 68 IN | HEART RATE: 58 BPM | DIASTOLIC BLOOD PRESSURE: 69 MMHG

## 2022-10-14 DIAGNOSIS — C7A.021 MALIGNANT CARCINOID TUMOR OF CECUM (H): ICD-10-CM

## 2022-10-14 DIAGNOSIS — N28.9 NON-FUNCTIONING KIDNEY: ICD-10-CM

## 2022-10-14 DIAGNOSIS — Z01.812 PRE-PROCEDURE LAB EXAM: ICD-10-CM

## 2022-10-14 DIAGNOSIS — Z01.818 PREOP EXAMINATION: Primary | ICD-10-CM

## 2022-10-14 DIAGNOSIS — Z01.818 PREOP EXAMINATION: ICD-10-CM

## 2022-10-14 LAB
ALBUMIN UR-MCNC: 30 MG/DL
ANION GAP SERPL CALCULATED.3IONS-SCNC: 8 MMOL/L (ref 7–15)
APPEARANCE UR: ABNORMAL
BACTERIA #/AREA URNS HPF: ABNORMAL /HPF
BILIRUB UR QL STRIP: NEGATIVE
BUN SERPL-MCNC: 22 MG/DL (ref 8–23)
CALCIUM SERPL-MCNC: 10.3 MG/DL (ref 8.8–10.2)
CEA SERPL-MCNC: 4.5 NG/ML
CHLORIDE SERPL-SCNC: 106 MMOL/L (ref 98–107)
COLOR UR AUTO: YELLOW
CREAT SERPL-MCNC: 1.17 MG/DL (ref 0.51–0.95)
DEPRECATED HCO3 PLAS-SCNC: 31 MMOL/L (ref 22–29)
ERYTHROCYTE [DISTWIDTH] IN BLOOD BY AUTOMATED COUNT: 13 % (ref 10–15)
GFR SERPL CREATININE-BSD FRML MDRD: 49 ML/MIN/1.73M2
GLUCOSE SERPL-MCNC: 99 MG/DL (ref 70–99)
GLUCOSE UR STRIP-MCNC: NEGATIVE MG/DL
HCT VFR BLD AUTO: 45.2 % (ref 35–47)
HGB BLD-MCNC: 14.6 G/DL (ref 11.7–15.7)
HGB UR QL STRIP: ABNORMAL
KETONES UR STRIP-MCNC: NEGATIVE MG/DL
LDH SERPL L TO P-CCNC: 186 U/L (ref 0–250)
LEUKOCYTE ESTERASE UR QL STRIP: ABNORMAL
MCH RBC QN AUTO: 29.9 PG (ref 26.5–33)
MCHC RBC AUTO-ENTMCNC: 32.3 G/DL (ref 31.5–36.5)
MCV RBC AUTO: 93 FL (ref 78–100)
MUCOUS THREADS #/AREA URNS LPF: PRESENT /LPF
NITRATE UR QL: POSITIVE
PH UR STRIP: 6 [PH] (ref 5–7)
PLATELET # BLD AUTO: 202 10E3/UL (ref 150–450)
POTASSIUM SERPL-SCNC: 4 MMOL/L (ref 3.4–5.3)
RBC # BLD AUTO: 4.88 10E6/UL (ref 3.8–5.2)
RBC URINE: 23 /HPF
SODIUM SERPL-SCNC: 145 MMOL/L (ref 136–145)
SP GR UR STRIP: 1.02 (ref 1–1.03)
SQUAMOUS EPITHELIAL: <1 /HPF
UROBILINOGEN UR STRIP-MCNC: NORMAL MG/DL
WBC # BLD AUTO: 7.8 10E3/UL (ref 4–11)
WBC CLUMPS #/AREA URNS HPF: PRESENT /HPF
WBC URINE: >182 /HPF

## 2022-10-14 PROCEDURE — 86850 RBC ANTIBODY SCREEN: CPT | Performed by: PATHOLOGY

## 2022-10-14 PROCEDURE — 83615 LACTATE (LD) (LDH) ENZYME: CPT | Performed by: PATHOLOGY

## 2022-10-14 PROCEDURE — 99000 SPECIMEN HANDLING OFFICE-LAB: CPT | Performed by: PATHOLOGY

## 2022-10-14 PROCEDURE — 81001 URINALYSIS AUTO W/SCOPE: CPT | Performed by: PATHOLOGY

## 2022-10-14 PROCEDURE — 36415 COLL VENOUS BLD VENIPUNCTURE: CPT | Mod: GA | Performed by: PATHOLOGY

## 2022-10-14 PROCEDURE — 86316 IMMUNOASSAY TUMOR OTHER: CPT | Mod: 90 | Performed by: PATHOLOGY

## 2022-10-14 PROCEDURE — 99203 OFFICE O/P NEW LOW 30 MIN: CPT | Performed by: PHYSICIAN ASSISTANT

## 2022-10-14 PROCEDURE — 82378 CARCINOEMBRYONIC ANTIGEN: CPT | Performed by: PATHOLOGY

## 2022-10-14 PROCEDURE — 87186 SC STD MICRODIL/AGAR DIL: CPT | Performed by: PATHOLOGY

## 2022-10-14 PROCEDURE — 85027 COMPLETE CBC AUTOMATED: CPT | Performed by: PATHOLOGY

## 2022-10-14 PROCEDURE — 86900 BLOOD TYPING SEROLOGIC ABO: CPT | Performed by: PATHOLOGY

## 2022-10-14 PROCEDURE — 87086 URINE CULTURE/COLONY COUNT: CPT | Performed by: PATHOLOGY

## 2022-10-14 PROCEDURE — 80048 BASIC METABOLIC PNL TOTAL CA: CPT | Performed by: PATHOLOGY

## 2022-10-14 PROCEDURE — 87088 URINE BACTERIA CULTURE: CPT | Performed by: PATHOLOGY

## 2022-10-14 PROCEDURE — 86901 BLOOD TYPING SEROLOGIC RH(D): CPT | Performed by: PATHOLOGY

## 2022-10-14 ASSESSMENT — LIFESTYLE VARIABLES: TOBACCO_USE: 1

## 2022-10-14 ASSESSMENT — PAIN SCALES - GENERAL: PAINLEVEL: NO PAIN (0)

## 2022-10-14 ASSESSMENT — COPD QUESTIONNAIRES: COPD: 0

## 2022-10-14 NOTE — H&P
Pre-Operative H & P     CC:  Preoperative exam to assess for increased cardiopulmonary risk while undergoing surgery and anesthesia.    Date of Encounter: 10/14/2022  Primary Care Physician:  Eliazar Menendez     Reason for visit:   Encounter Diagnoses   Name Primary?     Preop examination Yes     Non-functioning kidney        HPI  Marissa Guadarrama is a 72 year old female who presents for pre-operative H & P in preparation for  Procedure Information     Date/Time: 11/4/22     Procedure: CYSTOSCOPY, LEFT STENT REMOVAL, LEFT NEPHRECTOMY, ROBOT-ASSISTED    Anesthesia type: General    Pre-op diagnosis: non functioning kidney    Location: [facility]    Providers: Dr. Abhinav Ann is a 72 year old female with PMH significant for hypertension, hyperlipidemia, obesity, CRI, bilateral breast cancer s/p mastectomies and radiation 2016, cervical cancer 2007 s/p OSMAN BSO now on exemestane (followed by Dr. 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 Arango with recent progression seen on CT), trigeminal neuralgia, and post polio syndrome. She is followed by Dr. La for history of an obstructed left ureter secondary to a metastatic carcinoid deposit on the mid ureter on the left side.  She currently has tandem stents on that side. The kidney function on that side was measured to be 10% previously.  Her last stent exchange was 3/24/22.  Due to minimal function of left kidney the above procedure is planned.    History is obtained from the patient and chart review    Hx of abnormal bleeding or anti-platelet use: denies    Menstrual history: No LMP recorded. Patient has had a hysterectomy.:      Past Medical History  Past Medical History:   Diagnosis Date     Arthritis     knee     Benign breast biopsy     benign     Breast cancer (H)      Carcinoid tumor 12/2003     Cervical cancer (H) 2007     H/O colposcopy with  cervical biopsy 12/23/2013    vaginal cuff biopsy- VAIN III. referred back to gyn/onc     HTN      Hyperlipidemia      Malignant neoplasm of ovary (H)      Obesity      Pap smear of vagina with ASC-H 11/01/2013     Post-polio syndromes      Trigeminal neuralgias        Past Surgical History  Past Surgical History:   Procedure Laterality Date     APPENDECTOMY  01/01/1983     BIOPSY BREAST       BIOPSY NODE SENTINEL Bilateral 06/01/2016    Procedure: BIOPSY NODE SENTINEL;  Surgeon: Brent Arana MD;  Location: [facility]     [facility] SURGICAL PATHOLOGY       COLONOSCOPY N/A 06/23/2017    Procedure: COMBINED COLONOSCOPY, SINGLE OR MULTIPLE BIOPSY/POLYPECTOMY BY BIOPSY;  Colonoscopy Dx:Carcinoid tumor of colon prep mailed golytely;  Surgeon: Talisha Greco MD;  [facility]: [facility]     COLPOSCOPY, BIOPSY, COMBINED  03/13/2014    Procedure: COMBINED COLPOSCOPY, BIOPSY;;  Surgeon: Lara Pack MD;  [facility]     COMBINED CYSTOSCOPY, RETROGRADES, EXCHANGE STENT URETER(S) Left 02/07/2019    Procedure: COMBINED CYSTOSCOPY, RETROGRADES, EXCHANGE STENT URETER--left;  Surgeon: Zhao La MD;  Location: [facility]     COMBINED CYSTOSCOPY, RETROGRADES, EXCHANGE STENT URETER(S) Left 02/20/2020    Procedure: CYSTOSCOPY, WITH RETROGRADE PYELOGRAM AND Left URETERAL STENT exchange;  Surgeon: Zhao La MD;  Location: [facility]     COMBINED CYSTOSCOPY, RETROGRADES, EXCHANGE STENT URETER(S) Left 05/09/2021    Procedure: CYSTOSCOPY, WITH RETROGRADE PYELOGRAM AND LEFT URETERAL STENT REPLACEMENT;  Surgeon: Fred Owens MD;  [facility]     COMBINED CYSTOSCOPY, RETROGRADES, EXCHANGE STENT URETER(S) Left 09/16/2021    Procedure: CYSTOSCOPY, WITH left RETROGRADE PYELOGRAM AND left  URETERAL STENT REPLACEMENT;  Surgeon: Zhao La MD;  Location: [facility]     COMBINED CYSTOSCOPY, RETROGRADES, EXCHANGE STENT URETER(S) Left 03/24/2022    Procedure: CYSTOSCOPY, WITH RETROGRADE PYELOGRAM  AND URETERAL STENT EXCHANGE, LEFT;  Surgeon: Zhao La MD;  Location: [facility]     COMBINED CYSTOSCOPY, RETROGRADES, URETEROSCOPY, INSERT STENT Left 02/02/2017    Procedure: COMBINED CYSTOSCOPY, RETROGRADES, URETEROSCOPY, INSERT STENT;  Surgeon: Zhao La MD;  Location: [facility]     CYSTOSCOPY, RETROGRADES, INSERT STENT URETER(S), COMBINED Left 09/07/2017    Procedure: COMBINED CYSTOSCOPY, RETROGRADES, INSERT STENT URETER(S);  Cystoscopy,Left Stent Exchange;  Surgeon: Zhao La MD;  Location: [facility]     CYSTOSCOPY, RETROGRADES, INSERT STENT URETER(S), COMBINED  12/12/2017    Procedure: COMBINED CYSTOSCOPY, RETROGRADES, INSERT STENT URETER(S);;  Surgeon: Zhao La MD;  [facility]     CYSTOSCOPY, RETROGRADES, INSERT STENT URETER(S), COMBINED Left 07/05/2018    Procedure: COMBINED CYSTOSCOPY, RETROGRADES, INSERT STENT URETER(S);  Cystoscopy, Left Stent Exchange;  Surgeon: Zhao La MD;  Location: [facility]     EXAM UNDER ANESTHESIA PELVIC  03/13/2014    Procedure: EXAM UNDER ANESTHESIA PELVIC;  Exam Under Anestheisa, Colposcopy, Vaginal Biopsies, Co2 Laser of the Upper Vagina;  Surgeon: Lara Pack MD;  [facility]     EXAM UNDER ANESTHESIA PELVIC N/A 07/01/2020    Procedure: Examination under anesthesia, vaginal biopsies;  Surgeon: Karli Gao MD;  [facility]     HERNIORRHAPHY INCISIONAL (LOCATION)       IR RHIZOTOMY  08/14/2020     LASER CO2 VAGINA  03/13/2014    VAIN 1/2     LASER CO2 VAGINA N/A 12/12/2017    Procedure: LASER CO2 VAGINA;  Exam Under Anesthesia, CO2 Laser Ablation Of Vagina, Cystoscopy, Left Retrograde Pyelogram with Left Stent Placement;  Surgeon: Nic Segundo MD;  [facility]     LUMPECTOMY BREAST       LUMPECTOMY BREAST WITH SEED LOCALIZATION Bilateral 06/01/2016    Procedure: LUMPECTOMY BREAST WITH SEED LOCALIZATION;  Surgeon: Brent Arana MD;  Location: [facility]     SURGICAL  HISTORY OF -       ovarian cystectomy     SURGICAL HISTORY OF -   01/01/2003    right colon resection secondary to carcinoid tumor     TUBAL LIGATION       [facility] BSO, OMENTECTOMY W/OSMAN  05/01/2007     [facility] TOTAL ABDOM HYSTERECTOMY  05/01/2007       Prior to Admission Medications  Current Outpatient Medications   Medication Sig Dispense Refill     exemestane (AROMASIN) 25 MG tablet Take 1 tablet (25 mg) by mouth every morning 90 tablet 1     hydrochlorothiazide (HYDRODIURIL) 12.5 MG tablet Take 1 tablet (12.5 mg) by mouth daily (Patient taking differently: Take 12.5 mg by mouth every morning) 90 tablet 3       Allergies  No Known Allergies    Social History  Social History     Socioeconomic History     Marital status:      Spouse name: Not on file     Number of children: Not on file     Years of education: Not on file     Highest education level: Not on file   Occupational History     Not on file   Tobacco Use     Smoking status: Former     Packs/day: 1.00     Years: 29.00     Pack years: 29.00     Types: Cigarettes     Quit date: 10/30/2006     Years since quitting: 15.9     Smokeless tobacco: Never   Vaping Use     Vaping Use: Never used   Substance and Sexual Activity     Alcohol use: No     Alcohol/week: 0.0 standard drinks     Comment: 1 drink per year     Drug use: No     Sexual activity: Yes     Partners: Male   Other Topics Concern      Service No     Blood Transfusions No     Caffeine Concern Yes     Comment: occasional coffee     Occupational Exposure No     Hobby Hazards Yes     Comment: [employer],     Sleep Concern Yes     Comment: not sleeping well     Stress Concern Yes     Comment: Grandson in the      Weight Concern Yes     Special Diet No     Back Care No     Exercise No     Bike Helmet Not Asked     Seat Belt Yes     Self-Exams Yes     Parent/sibling w/ CABG, MI or angioplasty before 65F 55M? No   Social History Narrative     Not on file     Social Determinants of Health      Financial Resource Strain: Not on file   Food Insecurity: Not on file   Transportation Needs: Not on file   Physical Activity: Not on file   Stress: Not on file   Social Connections: Not on file   Intimate Partner Violence: Not At Risk     Fear of Current or Ex-Partner: No     Emotionally Abused: No     Physically Abused: No     Sexually Abused: No   Housing Stability: Not on file       Family History  Family History   Problem Relation Age of Onset     Cancer Mother         bone / liver     Breast Cancer Mother      Cancer Sister         vulvar ca, cervical ca, squamous cell cancer     Breast Cancer Sister      Cancer Brother         Rectal- Stage 4     Rectal Cancer Brother      Cancer Maternal Grandmother      Cancer Maternal Grandfather      Cancer Paternal Grandmother      Cancer Paternal Grandfather      Breast Cancer Maternal Aunt      Breast Cancer Paternal Aunt      Colon Cancer Paternal Aunt      Pancreatic Cancer Nephew      Anesthesia Reaction No family hx of      Venous thrombosis No family hx of      Bleeding Disorder No family hx of        Review of Systems  The complete review of systems is negative other than noted in the HPI or here.     Anesthesia Evaluation   Pt has had prior anesthetic.     No history of anesthetic complications       ROS/MED HX  ENT/Pulmonary:     (+) BELL risk factors, hypertension, tobacco use, Past use,  (-) asthma and COPD   Neurologic: Comment: Trigeminal neuralgia s/p treatment.  Symptoms resolved    Post polio syndrome, weak left leg  -recent falls  -uses walker  -drop left foot - neg neurologic ROS     Cardiovascular:     (+) Dyslipidemia hypertension-----Previous cardiac testing   Echo: Date: 2016 Results:    Stress Test: Date: Results:    ECG Reviewed: Date: Results:    Cath: Date: Results:      METS/Exercise Tolerance: 3 - Able to walk 1-2 blocks without stopping    Hematologic:  - neg hematologic  ROS  (-) history of blood clots and history of blood transfusion  "  Musculoskeletal:  - neg musculoskeletal ROS     GI/Hepatic:  - neg GI/hepatic ROS  (-) GERD   Renal/Genitourinary:     (+) renal disease,     Endo:     (+) Obesity,  (-) Type I DM, Type II DM and thyroid disease   Psychiatric/Substance Use:  - neg psychiatric ROS     Infectious Disease:       Malignancy: Comment: Carcinoid tumor of cecum   (+) Malignancy, History of Breast, Other and GI.Breast CA Remission status post Surgery and Radiation.  GI CA Active status post.  Other CA cervical s/p OSMAN BSO 2007 status post Surgery.    Other:  - neg other ROS          /69 (BP Location: Right arm, Patient Position: Sitting, Cuff Size: Adult Large)   Pulse 58   Temp 98.2  F (36.8  C) (Oral)   Resp 16   Ht 1.715 m (5' 7.5\")   Wt 95.8 kg (211 lb 4.8 oz)   SpO2 100%   Breastfeeding No   BMI 32.61 kg/m      Physical Exam   Constitutional: Awake, alert, cooperative, no apparent distress, and appears stated age.  Eyes: Pupils equal, round and reactive to light, extra ocular muscles intact, sclera clear, conjunctiva normal.  HENT: Normocephalic, oral pharynx with moist mucus membranes, good dentition. No goiter appreciated.   Respiratory: Clear to auscultation bilaterally, no crackles or wheezing.  Cardiovascular: Regular rate and rhythm, normal S1 and S2, and no murmur noted.  Carotids +2, no bruits. No edema. Palpable pulses to radial and PT arteries.   GI: Not assessed  Lymph/Hematologic: No cervical lymphadenopathy and no supraclavicular lymphadenopathy.  Genitourinary:  deferred  Skin: Warm and dry.    Musculoskeletal: Full ROM of neck. There is no redness, warmth, or swelling of the joints. Gross motor strength is normal.    Neurologic: Awake, alert, oriented to name, place and time. Cranial nerves II-XII are grossly intact.    Neuropsychiatric: Calm, cooperative. Normal affect.     Prior Labs/Diagnostic Studies   All labs and imaging personally reviewed     Component      Latest Ref Rng & Units 10/14/2022 "   Sodium      136 - 145 mmol/L 145   Potassium      3.4 - 5.3 mmol/L 4.0   Chloride      98 - 107 mmol/L 106   Carbon Dioxide (CO2)      22 - 29 mmol/L 31 (H)   Anion Gap      7 - 15 mmol/L 8   Urea Nitrogen      8.0 - 23.0 mg/dL 22.0   Creatinine      0.51 - 0.95 mg/dL 1.17 (H)   Calcium      8.8 - 10.2 mg/dL 10.3 (H)   Glucose      70 - 99 mg/dL 99   GFR Estimate      >60 mL/min/1.73m2 49 (L)     Component      Latest Ref Rng & Units 10/14/2022   WBC      4.0 - 11.0 10e3/uL 7.8   RBC Count      3.80 - 5.20 10e6/uL 4.88   Hemoglobin      11.7 - 15.7 g/dL 14.6   Hematocrit      35.0 - 47.0 % 45.2   MCV      78 - 100 fL 93   MCH      26.5 - 33.0 pg 29.9   MCHC      31.5 - 36.5 g/dL 32.3   RDW      10.0 - 15.0 % 13.0   Platelet Count      150 - 450 10e3/uL 202       EKG/ stress test - if available please see in ROS above       The patient's records and results personally reviewed by this provider.     Outside records reviewed from: Care Everywhere        Assessment      Marissa Guadarrama is a 72 year old female seen as a PAC referral for risk assessment and optimization for anesthesia.    Plan/Recommendations  Pt will be optimized for the proposed procedure.  See below for details on the assessment, risk, and preoperative recommendations    NEUROLOGY  - No history of TIA, CVA or seizure    -Post Op delirium risk factors:  Age    ENT  - No current airway concerns.  Will need to be reassessed day of surgery.  Mallampati: I  TM: > 3    CARDIAC  - Hypertension, hold hydrochlorothiazide on DOS  - denies chest pain, chest tightness, SOB, NUÑEZ, palpitations, orthopnea    - METS (Metabolic Equivalents), =3, can walk 1-2 blocks          RCRI-Low risk: Class 2 0.9% complication rate            Total Score: 1    RCRI: High Risk Surgery        PULMONARY    BELL Low Risk            Total Score: 2    BELL: Hypertension    BELL: Over 50 ys old      - Denies asthma or inhaler use  - Tobacco History      History   Smoking Status      "Former     Packs/day: 1.00     Years: 29.00     Types: Cigarettes     Quit date: 10/30/2006   Smokeless Tobacco     Never       GI  - denies GERD  PONV High Risk  Total Score: 3           1 AN PONV: Pt is Female    1 AN PONV: Patient is not a current smoker    1 AN PONV: Intended Post Op Opioids        /RENAL  - Baseline Creatinine 1.17    ENDOCRINE    - BMI: Estimated body mass index is 32.61 kg/m  as calculated from the following:    Height as of this encounter: 1.715 m (5' 7.5\").    Weight as of this encounter: 95.8 kg (211 lb 4.8 oz).  Obesity (BMI >30)  - No history of Diabetes Mellitus    HEME  VTE Medium Risk 1.8%            Total Score: 6    VTE: Greater than 59 yrs old    VTE: Current cancer      - No history of abnormal bleeding or antiplatelet use.      ADDENDUM:  - UA/UC results reviewed.  Staff message sent to VIVIANA Johnson and lab results routed to him as well.    The patient is optimized for their procedure. AVS with information on surgery time/arrival time, meds and NPO status given by nursing staff. No further diagnostic testing indicated.      On the day of service:     Prep time: 15 minutes  Visit time: 11 minutes  Documentation time: 12 minutes  ------------------------------------------  Total time: 38 minutes      Misa Watson PA-C  Preoperative Assessment Center  [facility]  Clinic and Surgery Center  Phone: [phone]  Fax: [phone]  "

## 2022-10-14 NOTE — PATIENT INSTRUCTIONS
Preparing for Your Surgery      Name:  Marissa Guadarrama   MRN:  [id number]   :  1950   Today's Date:  10/14/2022       Arriving for surgery:  Surgery date:  22  Arrival time:  05:30 am     Surgeries and procedures: Adult patients can have 2 visitors all through the surgery process.     Visiting hours: 8 a.m. to 8:30 p.m.     Hospital: Adult patients and children under age 18 can have 4 visitor at a time     No visitors under the age of 5 are allowed for hospital patients.  Double occupancy rooms: Patients can have only two visitors at a time.     Patients with disabilities: Can have a support person with them (family member, service provider     Or someone well informed about their needs) plus the allowed number of visitors     Patients confirmed or suspected to have symptoms of COVID 19 or flu:     No visitors allowed for adult patients.   Children (under age 18) can have 1 named visitor.     People who are sick or showing symptoms of COVID 19 or flu:    Are not allowed to visit patients--we can only make exceptions in special situations.       Please follow these guidelines for your visit:   Arrive wearing a mask over your mouth and nose; we will give you a medical mask to wear    If you arrive wearing a cloth mask.   Keep it on during your entire visit, even when in patient's room.   If you don't wear a mask we'll ask you to leave.     Clean your hands with alcohol hand . Do this when you arrive at and leave the building and patient room,    And again after you touch your mask or anything in the room.     You can t visit if you have a fever, cough, shortness of breath, muscle aches, headaches, sore throat    Or diarrhea      Stay 6 feet away from others during your visit and between visits     Go directly to and from the room you are visiting.     Stay in the patient s room during your visit. Limit going to other places in the hospital as much as possible     Leave bags and jackets at home  or in the car.     For everyone s health, please don t come and go during your visit. That includes for smoking   during your visit.     Please come to:   [facility] [address]  [address] [place], [place] [address]  [address]  - parking is available in front of [facility] from 5:15AM to 8:00PM. If you prefer, park your car in the Green Lot.  -Proceed to the 3rd floor, check in at the Adult Surgery Waiting Lounge. [phone]    If an escort is needed stop at the Information Desk in the lobby. Inform the information person that you are here for surgery. An escort to the Adult Surgery Waiting Lounge will be provided.    What can I eat or drink?  -  You may eat and drink normally up to 8 hours prior to arrival time.   -  You may have clear liquids until 2 hours prior to arrival time.     Examples of clear liquids:  Water  Clear broth  Juices (apple, white grape, white cranberry  and cider) without pulp  Noncarbonated, powder based beverages  (lemonade and Haris-Aid)  Sodas (Sprite, 7-Up, ginger ale and seltzer)  Coffee or tea (without milk or cream)  Gatorade    -  No Alcohol for at least 24 hours before surgery.     Which medicines can I take?  Hold Aspirin for 7 days before surgery.   Hold Multivitamins for 7 days before surgery.  Hold Supplements for 7 days before surgery.  Hold Ibuprofen (Advil, Motrin) for 1 day before surgery--unless otherwise directed by surgeon.  Hold Naproxen (Aleve) for 4 days before surgery.  -  DO NOT take these medications the day of surgery:  Hydrochlorothiazide.  -  PLEASE TAKE these medications the day of surgery:  Tylenol if needed; take morning medications.    How do I prepare myself?  - Please take 2 showers before surgery using Scrubcare or Hibiclens soap.    Use this soap only from the neck to your toes.     Leave the soap on your skin for one minute--then rinse thoroughly.      You may use your own shampoo and  conditioner. No other hair products.   - Please remove all jewelry and body piercings.  - No lotions, deodorants or fragrance.  - No makeup or fingernail polish.   - Bring your ID and insurance card.    -If you have a Deep Brain Stimulator, Spinal Cord Stimulator, or any Neuro Stimulator device---you must bring the remote control to the hospital.      ALL PATIENTS GOING HOME THE SAME DAY OF SURGERY ARE REQUIRED TO HAVE A RESPONSIBLE ADULT TO DRIVE AND BE IN ATTENDANCE WITH THEM FOR 24 HOURS FOLLOWING SURGERY.      Questions or Concerns:    - For any questions regarding the day of surgery or your hospital stay, please contact the Pre Admission Nursing Office at [phone].       - If you have health changes between today and your surgery, please call your surgeon.       - For questions after surgery, please call your surgeons office.

## 2022-10-14 NOTE — H&P (VIEW-ONLY)
Pre-Operative H & P     CC:  Preoperative exam to assess for increased cardiopulmonary risk while undergoing surgery and anesthesia.    Date of Encounter: 10/14/2022  Primary Care Physician:  Eliazar Menendez     Reason for visit:   Encounter Diagnoses   Name Primary?     Preop examination Yes     Non-functioning kidney        HPI  Marissa Guadarrama is a 72 year old female who presents for pre-operative H & P in preparation for  Procedure Information     Date/Time: 11/4/22     Procedure: CYSTOSCOPY, LEFT STENT REMOVAL, LEFT NEPHRECTOMY, ROBOT-ASSISTED    Anesthesia type: General    Pre-op diagnosis: non functioning kidney    Location: [facility]    Providers: Dr. Abhinav Ann is a 72 year old female with PMH significant for hypertension, hyperlipidemia, obesity, CRI, bilateral breast cancer s/p mastectomies and radiation 2016, cervical cancer 2007 s/p OSMAN BSO now on exemestane (followed by Dr. 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 Arango with recent progression seen on CT), trigeminal neuralgia, and post polio syndrome. She is followed by Dr. La for history of an obstructed left ureter secondary to a metastatic carcinoid deposit on the mid ureter on the left side.  She currently has tandem stents on that side. The kidney function on that side was measured to be 10% previously.  Her last stent exchange was 3/24/22.  Due to minimal function of left kidney the above procedure is planned.    History is obtained from the patient and chart review    Hx of abnormal bleeding or anti-platelet use: denies    Menstrual history: No LMP recorded. Patient has had a hysterectomy.:      Past Medical History  Past Medical History:   Diagnosis Date     Arthritis     knee     Benign breast biopsy     benign     Breast cancer (H)      Carcinoid tumor 12/2003     Cervical cancer (H) 2007     H/O colposcopy with  cervical biopsy 12/23/2013    vaginal cuff biopsy- VAIN III. referred back to gyn/onc     HTN      Hyperlipidemia      Malignant neoplasm of ovary (H)      Obesity      Pap smear of vagina with ASC-H 11/01/2013     Post-polio syndromes      Trigeminal neuralgias        Past Surgical History  Past Surgical History:   Procedure Laterality Date     APPENDECTOMY  01/01/1983     BIOPSY BREAST       BIOPSY NODE SENTINEL Bilateral 06/01/2016    Procedure: BIOPSY NODE SENTINEL;  Surgeon: Brent Arana MD;  Location: [facility]     [facility] SURGICAL PATHOLOGY       COLONOSCOPY N/A 06/23/2017    Procedure: COMBINED COLONOSCOPY, SINGLE OR MULTIPLE BIOPSY/POLYPECTOMY BY BIOPSY;  Colonoscopy Dx:Carcinoid tumor of colon prep mailed golytely;  Surgeon: Talisha Greco MD;  Location: [facility]     COLPOSCOPY, BIOPSY, COMBINED  03/13/2014    Procedure: COMBINED COLPOSCOPY, BIOPSY;;  Surgeon: Lara Pack MD;  Location: [facility]     COMBINED CYSTOSCOPY, RETROGRADES, EXCHANGE STENT URETER(S) Left 02/07/2019    Procedure: COMBINED CYSTOSCOPY, RETROGRADES, EXCHANGE STENT URETER--left;  Surgeon: Zhao La MD;  Location: [facility]     COMBINED CYSTOSCOPY, RETROGRADES, EXCHANGE STENT URETER(S) Left 02/20/2020    Procedure: CYSTOSCOPY, WITH RETROGRADE PYELOGRAM AND Left URETERAL STENT exchange;  Surgeon: Zhao La MD;  Location: [facility]     COMBINED CYSTOSCOPY, RETROGRADES, EXCHANGE STENT URETER(S) Left 05/09/2021    Procedure: CYSTOSCOPY, WITH RETROGRADE PYELOGRAM AND LEFT URETERAL STENT REPLACEMENT;  Surgeon: Fred Owens MD;  [facility]     COMBINED CYSTOSCOPY, RETROGRADES, EXCHANGE STENT URETER(S) Left 09/16/2021    Procedure: CYSTOSCOPY, WITH left RETROGRADE PYELOGRAM AND left  URETERAL STENT REPLACEMENT;  Surgeon: Zhao La MD;  Location: [facility]     COMBINED CYSTOSCOPY, RETROGRADES, EXCHANGE STENT URETER(S) Left 03/24/2022    Procedure: CYSTOSCOPY, WITH RETROGRADE PYELOGRAM  AND URETERAL STENT EXCHANGE, LEFT;  Surgeon: Zhao La MD;  Location: [facility]     COMBINED CYSTOSCOPY, RETROGRADES, URETEROSCOPY, INSERT STENT Left 02/02/2017    Procedure: COMBINED CYSTOSCOPY, RETROGRADES, URETEROSCOPY, INSERT STENT;  Surgeon: Zhao La MD;  Location: [facility]     CYSTOSCOPY, RETROGRADES, INSERT STENT URETER(S), COMBINED Left 09/07/2017    Procedure: COMBINED CYSTOSCOPY, RETROGRADES, INSERT STENT URETER(S);  Cystoscopy,Left Stent Exchange;  Surgeon: Zhao La MD;  Location: [facility]     CYSTOSCOPY, RETROGRADES, INSERT STENT URETER(S), COMBINED  12/12/2017    Procedure: COMBINED CYSTOSCOPY, RETROGRADES, INSERT STENT URETER(S);;  Surgeon: Zhao La MD;  [facility]     CYSTOSCOPY, RETROGRADES, INSERT STENT URETER(S), COMBINED Left 07/05/2018    Procedure: COMBINED CYSTOSCOPY, RETROGRADES, INSERT STENT URETER(S);  Cystoscopy, Left Stent Exchange;  Surgeon: Zhao La MD;  Location: [facility]     EXAM UNDER ANESTHESIA PELVIC  03/13/2014    Procedure: EXAM UNDER ANESTHESIA PELVIC;  Exam Under Anestheisa, Colposcopy, Vaginal Biopsies, Co2 Laser of the Upper Vagina;  Surgeon: Lara Pack MD;  [facility]     EXAM UNDER ANESTHESIA PELVIC N/A 07/01/2020    Procedure: Examination under anesthesia, vaginal biopsies;  Surgeon: Karli Gao MD;  [facility]     HERNIORRHAPHY INCISIONAL (LOCATION)       IR RHIZOTOMY  08/14/2020     LASER CO2 VAGINA  03/13/2014    VAIN 1/2     LASER CO2 VAGINA N/A 12/12/2017    Procedure: LASER CO2 VAGINA;  Exam Under Anesthesia, CO2 Laser Ablation Of Vagina, Cystoscopy, Left Retrograde Pyelogram with Left Stent Placement;  Surgeon: Nci Segundo MD;  Location: [facility]     LUMPECTOMY BREAST       LUMPECTOMY BREAST WITH SEED LOCALIZATION Bilateral 06/01/2016    Procedure: LUMPECTOMY BREAST WITH SEED LOCALIZATION;  Surgeon: Brent Arana MD;  Location: [facility]     SURGICAL  HISTORY OF -       ovarian cystectomy     SURGICAL HISTORY OF -   01/01/2003    right colon resection secondary to carcinoid tumor     TUBAL LIGATION       [facility] BSO, OMENTECTOMY W/OSMAN  05/01/2007     [facility] TOTAL ABDOM HYSTERECTOMY  05/01/2007       Prior to Admission Medications  Current Outpatient Medications   Medication Sig Dispense Refill     exemestane (AROMASIN) 25 MG tablet Take 1 tablet (25 mg) by mouth every morning 90 tablet 1     hydrochlorothiazide (HYDRODIURIL) 12.5 MG tablet Take 1 tablet (12.5 mg) by mouth daily (Patient taking differently: Take 12.5 mg by mouth every morning) 90 tablet 3       Allergies  No Known Allergies    Social History  Social History     Socioeconomic History     Marital status:      Spouse name: Not on file     Number of children: Not on file     Years of education: Not on file     Highest education level: Not on file   Occupational History     Not on file   Tobacco Use     Smoking status: Former     Packs/day: 1.00     Years: 29.00     Pack years: 29.00     Types: Cigarettes     Quit date: 10/30/2006     Years since quitting: 15.9     Smokeless tobacco: Never   Vaping Use     Vaping Use: Never used   Substance and Sexual Activity     Alcohol use: No     Alcohol/week: 0.0 standard drinks     Comment: 1 drink per year     Drug use: No     Sexual activity: Yes     Partners: Male   Other Topics Concern      Service No     Blood Transfusions No     Caffeine Concern Yes     Comment: occasional coffee     Occupational Exposure No     Hobby Hazards Yes     Comment: [place],     Sleep Concern Yes     Comment: not sleeping well     Stress Concern Yes     Comment: Grandson in the      Weight Concern Yes     Special Diet No     Back Care No     Exercise No     Bike Helmet Not Asked     Seat Belt Yes     Self-Exams Yes     Parent/sibling w/ CABG, MI or angioplasty before 65F 55M? No   Social History Narrative     Not on file     Social Determinants of Health      Financial Resource Strain: Not on file   Food Insecurity: Not on file   Transportation Needs: Not on file   Physical Activity: Not on file   Stress: Not on file   Social Connections: Not on file   Intimate Partner Violence: Not At Risk     Fear of Current or Ex-Partner: No     Emotionally Abused: No     Physically Abused: No     Sexually Abused: No   Housing Stability: Not on file       Family History  Family History   Problem Relation Age of Onset     Cancer Mother         bone / liver     Breast Cancer Mother      Cancer Sister         vulvar ca, cervical ca, squamous cell cancer     Breast Cancer Sister      Cancer Brother         Rectal- Stage 4     Rectal Cancer Brother      Cancer Maternal Grandmother      Cancer Maternal Grandfather      Cancer Paternal Grandmother      Cancer Paternal Grandfather      Breast Cancer Maternal Aunt      Breast Cancer Paternal Aunt      Colon Cancer Paternal Aunt      Pancreatic Cancer Nephew      Anesthesia Reaction No family hx of      Venous thrombosis No family hx of      Bleeding Disorder No family hx of        Review of Systems  The complete review of systems is negative other than noted in the HPI or here.     Anesthesia Evaluation   Pt has had prior anesthetic.     No history of anesthetic complications       ROS/MED HX  ENT/Pulmonary:     (+) BELL risk factors, hypertension, tobacco use, Past use,  (-) asthma and COPD   Neurologic: Comment: Trigeminal neuralgia s/p treatment.  Symptoms resolved    Post polio syndrome, weak left leg  -recent falls  -uses walker  -drop left foot - neg neurologic ROS     Cardiovascular:     (+) Dyslipidemia hypertension-----Previous cardiac testing   Echo: Date: 2016 Results:    Stress Test: Date: Results:    ECG Reviewed: Date: Results:    Cath: Date: Results:      METS/Exercise Tolerance: 3 - Able to walk 1-2 blocks without stopping    Hematologic:  - neg hematologic  ROS  (-) history of blood clots and history of blood transfusion  "  Musculoskeletal:  - neg musculoskeletal ROS     GI/Hepatic:  - neg GI/hepatic ROS  (-) GERD   Renal/Genitourinary:     (+) renal disease,     Endo:     (+) Obesity,  (-) Type I DM, Type II DM and thyroid disease   Psychiatric/Substance Use:  - neg psychiatric ROS     Infectious Disease:       Malignancy: Comment: Carcinoid tumor of cecum   (+) Malignancy, History of Breast, Other and GI.Breast CA Remission status post Surgery and Radiation.  GI CA Active status post.  Other CA cervical s/p OSMAN BSO 2007 status post Surgery.    Other:  - neg other ROS          /69 (BP Location: Right arm, Patient Position: Sitting, Cuff Size: Adult Large)   Pulse 58   Temp 98.2  F (36.8  C) (Oral)   Resp 16   Ht 1.715 m (5' 7.5\")   Wt 95.8 kg (211 lb 4.8 oz)   SpO2 100%   Breastfeeding No   BMI 32.61 kg/m      Physical Exam   Constitutional: Awake, alert, cooperative, no apparent distress, and appears stated age.  Eyes: Pupils equal, round and reactive to light, extra ocular muscles intact, sclera clear, conjunctiva normal.  HENT: Normocephalic, oral pharynx with moist mucus membranes, good dentition. No goiter appreciated.   Respiratory: Clear to auscultation bilaterally, no crackles or wheezing.  Cardiovascular: Regular rate and rhythm, normal S1 and S2, and no murmur noted.  Carotids +2, no bruits. No edema. Palpable pulses to radial and PT arteries.   GI: Not assessed  Lymph/Hematologic: No cervical lymphadenopathy and no supraclavicular lymphadenopathy.  Genitourinary:  deferred  Skin: Warm and dry.    Musculoskeletal: Full ROM of neck. There is no redness, warmth, or swelling of the joints. Gross motor strength is normal.    Neurologic: Awake, alert, oriented to name, place and time. Cranial nerves II-XII are grossly intact.    Neuropsychiatric: Calm, cooperative. Normal affect.     Prior Labs/Diagnostic Studies   All labs and imaging personally reviewed     Component      Latest Ref Rng & Units 10/14/2022 "   Sodium      136 - 145 mmol/L 145   Potassium      3.4 - 5.3 mmol/L 4.0   Chloride      98 - 107 mmol/L 106   Carbon Dioxide (CO2)      22 - 29 mmol/L 31 (H)   Anion Gap      7 - 15 mmol/L 8   Urea Nitrogen      8.0 - 23.0 mg/dL 22.0   Creatinine      0.51 - 0.95 mg/dL 1.17 (H)   Calcium      8.8 - 10.2 mg/dL 10.3 (H)   Glucose      70 - 99 mg/dL 99   GFR Estimate      >60 mL/min/1.73m2 49 (L)     Component      Latest Ref Rng & Units 10/14/2022   WBC      4.0 - 11.0 10e3/uL 7.8   RBC Count      3.80 - 5.20 10e6/uL 4.88   Hemoglobin      11.7 - 15.7 g/dL 14.6   Hematocrit      35.0 - 47.0 % 45.2   MCV      78 - 100 fL 93   MCH      26.5 - 33.0 pg 29.9   MCHC      31.5 - 36.5 g/dL 32.3   RDW      10.0 - 15.0 % 13.0   Platelet Count      150 - 450 10e3/uL 202       EKG/ stress test - if available please see in ROS above       The patient's records and results personally reviewed by this provider.     Outside records reviewed from: Care Everywhere        Assessment      Marissa Guadarrama is a 72 year old female seen as a PAC referral for risk assessment and optimization for anesthesia.    Plan/Recommendations  Pt will be optimized for the proposed procedure.  See below for details on the assessment, risk, and preoperative recommendations    NEUROLOGY  - No history of TIA, CVA or seizure    -Post Op delirium risk factors:  Age    ENT  - No current airway concerns.  Will need to be reassessed day of surgery.  Mallampati: I  TM: > 3    CARDIAC  - Hypertension, hold hydrochlorothiazide on DOS  - denies chest pain, chest tightness, SOB, NUÑEZ, palpitations, orthopnea    - METS (Metabolic Equivalents), =3, can walk 1-2 blocks          RCRI-Low risk: Class 2 0.9% complication rate            Total Score: 1    RCRI: High Risk Surgery        PULMONARY    BELL Low Risk            Total Score: 2    BELL: Hypertension    BELL: Over 50 ys old      - Denies asthma or inhaler use  - Tobacco History      History   Smoking Status      "Former     Packs/day: 1.00     Years: 29.00     Types: Cigarettes     Quit date: 10/30/2006   Smokeless Tobacco     Never       GI  - denies GERD  PONV High Risk  Total Score: 3           1 AN PONV: Pt is Female    1 AN PONV: Patient is not a current smoker    1 AN PONV: Intended Post Op Opioids        /RENAL  - Baseline Creatinine 1.17    ENDOCRINE    - BMI: Estimated body mass index is 32.61 kg/m  as calculated from the following:    Height as of this encounter: 1.715 m (5' 7.5\").    Weight as of this encounter: 95.8 kg (211 lb 4.8 oz).  Obesity (BMI >30)  - No history of Diabetes Mellitus    HEME  VTE Medium Risk 1.8%            Total Score: 6    VTE: Greater than 59 yrs old    VTE: Current cancer      - No history of abnormal bleeding or antiplatelet use.      ADDENDUM:  - UA/UC results reviewed.  Staff message sent to VIVIANA Johnson and lab results routed to him as well.    The patient is optimized for their procedure. AVS with information on surgery time/arrival time, meds and NPO status given by nursing staff. No further diagnostic testing indicated.      On the day of service:     Prep time: 15 minutes  Visit time: 11 minutes  Documentation time: 12 minutes  ------------------------------------------  Total time: 38 minutes      Misa Watson PA-C  Preoperative Assessment Center  [facility]  [facility]  Phone: [phone]  Fax: [phone]  "

## 2022-10-16 LAB — BACTERIA UR CULT: ABNORMAL

## 2022-10-17 DIAGNOSIS — N39.0 URINARY TRACT INFECTION WITHOUT HEMATURIA, SITE UNSPECIFIED: Primary | ICD-10-CM

## 2022-10-17 RX ORDER — SULFAMETHOXAZOLE/TRIMETHOPRIM 800-160 MG
1 TABLET ORAL 2 TIMES DAILY
Qty: 10 TABLET | Refills: 0 | Status: SHIPPED | OUTPATIENT
Start: 2022-10-17 | End: 2022-10-22

## 2022-10-17 NOTE — PROGRESS NOTES
Alerted by DUC about UTI, called patient and informed her that an Rx for Bactrim DS would be called in to her pharmacy of choice.  Patient had no other questions/concerns at this point.This author's direct line provided for future questions/concerns.    Cade Johnson, RN  RN Care Coordinator - Urology

## 2022-10-18 LAB
BLOOD BANK CHART COMMENT: NORMAL
CGA SERPL-MCNC: 470 NG/ML
SPECIMEN EXPIRATION DATE: NORMAL

## 2022-10-30 ENCOUNTER — LAB (OUTPATIENT)
Dept: LAB | Facility: CLINIC | Age: 72
End: 2022-10-30
Payer: COMMERCIAL

## 2022-10-30 DIAGNOSIS — Z20.822 ENCOUNTER FOR LABORATORY TESTING FOR COVID-19 VIRUS: ICD-10-CM

## 2022-10-30 PROCEDURE — U0003 INFECTIOUS AGENT DETECTION BY NUCLEIC ACID (DNA OR RNA); SEVERE ACUTE RESPIRATORY SYNDROME CORONAVIRUS 2 (SARS-COV-2) (CORONAVIRUS DISEASE [COVID-19]), AMPLIFIED PROBE TECHNIQUE, MAKING USE OF HIGH THROUGHPUT TECHNOLOGIES AS DESCRIBED BY CMS-2020-01-R: HCPCS

## 2022-10-30 PROCEDURE — U0005 INFEC AGEN DETEC AMPLI PROBE: HCPCS

## 2022-10-31 LAB — SARS-COV-2 RNA RESP QL NAA+PROBE: NEGATIVE

## 2022-11-03 ENCOUNTER — ANESTHESIA EVENT (OUTPATIENT)
Dept: SURGERY | Facility: CLINIC | Age: 72
DRG: 660 | End: 2022-11-03
Payer: MEDICARE

## 2022-11-03 ASSESSMENT — COPD QUESTIONNAIRES: COPD: 0

## 2022-11-03 ASSESSMENT — LIFESTYLE VARIABLES: TOBACCO_USE: 1

## 2022-11-03 NOTE — ANESTHESIA PREPROCEDURE EVALUATION
Pre-Operative H & P     CC:  Preoperative exam to assess for increased cardiopulmonary risk while undergoing surgery and anesthesia.    Date of Encounter: 10/14/2022  Primary Care Physician:  Eliazar Menendez     Reason for visit:   No diagnosis found.    NORMA Guadarrama is a 72 year old female who presents for pre-operative H & P in preparation for  Procedure Information     Date/Time: 11/4/22     Procedure: CYSTOSCOPY, LEFT STENT REMOVAL, LEFT NEPHRECTOMY, ROBOT-ASSISTED    Anesthesia type: General    Pre-op diagnosis: non functioning kidney    Location: [facility]    Providers: Dr. Abhinav Ann is a 72 year old female with PMH significant for hypertension, hyperlipidemia, obesity, CRI, bilateral breast cancer s/p mastectomies and radiation 2016, cervical cancer 2007 s/p OSMAN BSO now on exemestane (followed by Dr. 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 Arango with recent progression seen on CT), trigeminal neuralgia, and post polio syndrome. She is followed by Dr. La for history of an obstructed left ureter secondary to a metastatic carcinoid deposit on the mid ureter on the left side.  She currently has tandem stents on that side. The kidney function on that side was measured to be 10% previously.  Her last stent exchange was 3/24/22.  Due to minimal function of left kidney the above procedure is planned.    History is obtained from the patient and chart review    Hx of abnormal bleeding or anti-platelet use: denies    Menstrual history: No LMP recorded. Patient has had a hysterectomy.:      Past Medical History  Past Medical History:   Diagnosis Date     Arthritis     knee     Benign breast biopsy     benign     Breast cancer (H)      Carcinoid tumor 12/2003     Cervical cancer (H) 2007     H/O colposcopy with cervical biopsy 12/23/2013    vaginal cuff biopsy- VAIN III. referred  back to gyn/onc     HTN      Hyperlipidemia      Malignant neoplasm of ovary (H)      Obesity      Pap smear of vagina with ASC-H 11/01/2013     Post-polio syndromes      Trigeminal neuralgias        Past Surgical History  Past Surgical History:   Procedure Laterality Date     APPENDECTOMY  01/01/1983     BIOPSY BREAST       BIOPSY NODE SENTINEL Bilateral 06/01/2016    Procedure: BIOPSY NODE SENTINEL;  Surgeon: Brent Arana MD;  Location: [facility]     [facility] SURGICAL PATHOLOGY       COLONOSCOPY N/A 06/23/2017    Procedure: COMBINED COLONOSCOPY, SINGLE OR MULTIPLE BIOPSY/POLYPECTOMY BY BIOPSY;  Colonoscopy Dx:Carcinoid tumor of colon prep mailed golytely;  Surgeon: Talisha Greco MD;  [facility]     COLPOSCOPY, BIOPSY, COMBINED  03/13/2014    Procedure: COMBINED COLPOSCOPY, BIOPSY;;  Surgeon: Lara Pack MD;  [facility]     COMBINED CYSTOSCOPY, RETROGRADES, EXCHANGE STENT URETER(S) Left 02/07/2019    Procedure: COMBINED CYSTOSCOPY, RETROGRADES, EXCHANGE STENT URETER--left;  Surgeon: Zhao La MD;  Location: [facility]     COMBINED CYSTOSCOPY, RETROGRADES, EXCHANGE STENT URETER(S) Left 02/20/2020    Procedure: CYSTOSCOPY, WITH RETROGRADE PYELOGRAM AND Left URETERAL STENT exchange;  Surgeon: Zhao La MD;  Location: [facility]     COMBINED CYSTOSCOPY, RETROGRADES, EXCHANGE STENT URETER(S) Left 05/09/2021    Procedure: CYSTOSCOPY, WITH RETROGRADE PYELOGRAM AND LEFT URETERAL STENT REPLACEMENT;  Surgeon: Fred Owens MD;  [facility]     COMBINED CYSTOSCOPY, RETROGRADES, EXCHANGE STENT URETER(S) Left 09/16/2021    Procedure: CYSTOSCOPY, WITH left RETROGRADE PYELOGRAM AND left  URETERAL STENT REPLACEMENT;  Surgeon: Zhao La MD;  Location: [facility]     COMBINED CYSTOSCOPY, RETROGRADES, EXCHANGE STENT URETER(S) Left 03/24/2022    Procedure: CYSTOSCOPY, WITH RETROGRADE PYELOGRAM AND URETERAL STENT EXCHANGE, LEFT;  Surgeon: Zhao La  MD Harvinder;  Location: [facility]     COMBINED CYSTOSCOPY, RETROGRADES, URETEROSCOPY, INSERT STENT Left 02/02/2017    Procedure: COMBINED CYSTOSCOPY, RETROGRADES, URETEROSCOPY, INSERT STENT;  Surgeon: Zhao La MD;  Location: [facility]     CYSTOSCOPY, RETROGRADES, INSERT STENT URETER(S), COMBINED Left 09/07/2017    Procedure: COMBINED CYSTOSCOPY, RETROGRADES, INSERT STENT URETER(S);  Cystoscopy,Left Stent Exchange;  Surgeon: Zhao La MD;  Location: [facility]     CYSTOSCOPY, RETROGRADES, INSERT STENT URETER(S), COMBINED  12/12/2017    Procedure: COMBINED CYSTOSCOPY, RETROGRADES, INSERT STENT URETER(S);;  Surgeon: Zhao La MD;  [facility]     CYSTOSCOPY, RETROGRADES, INSERT STENT URETER(S), COMBINED Left 07/05/2018    Procedure: COMBINED CYSTOSCOPY, RETROGRADES, INSERT STENT URETER(S);  Cystoscopy, Left Stent Exchange;  Surgeon: Zhao La MD;  Location: [facility]     EXAM UNDER ANESTHESIA PELVIC  03/13/2014    Procedure: EXAM UNDER ANESTHESIA PELVIC;  Exam Under Anestheisa, Colposcopy, Vaginal Biopsies, Co2 Laser of the Upper Vagina;  Surgeon: Lara Pack MD;  [facility]     EXAM UNDER ANESTHESIA PELVIC N/A 07/01/2020    Procedure: Examination under anesthesia, vaginal biopsies;  Surgeon: Karli Gao MD;  [facility]     HERNIORRHAPHY INCISIONAL (LOCATION)       IR RHIZOTOMY  08/14/2020     LASER CO2 VAGINA  03/13/2014    VAIN 1/2     LASER CO2 VAGINA N/A 12/12/2017    Procedure: LASER CO2 VAGINA;  Exam Under Anesthesia, CO2 Laser Ablation Of Vagina, Cystoscopy, Left Retrograde Pyelogram with Left Stent Placement;  Surgeon: Nic Segundo MD;  [facility]     LUMPECTOMY BREAST       LUMPECTOMY BREAST WITH SEED LOCALIZATION Bilateral 06/01/2016    Procedure: LUMPECTOMY BREAST WITH SEED LOCALIZATION;  Surgeon: Brent Arana MD;  Location: [facility]     SURGICAL HISTORY OF -       ovarian cystectomy     SURGICAL HISTORY OF -    2003    right colon resection secondary to carcinoid tumor     TUBAL LIGATION       [facility] BSO, OMENTECTOMY W/OSMAN  2007     [facility] TOTAL ABDOM HYSTERECTOMY  2007       Prior to Admission Medications  Current Outpatient Medications   Medication Sig Dispense Refill     exemestane (AROMASIN) 25 MG tablet Take 1 tablet (25 mg) by mouth every morning 90 tablet 1     hydrochlorothiazide (HYDRODIURIL) 12.5 MG tablet Take 1 tablet (12.5 mg) by mouth daily (Patient taking differently: Take 12.5 mg by mouth every morning) 90 tablet 3       Allergies  No Known Allergies    Social History  Social History     Socioeconomic History     Marital status:      Spouse name: Not on file     Number of children: Not on file     Years of education: Not on file     Highest education level: Not on file   Occupational History     Not on file   Tobacco Use     Smoking status: Former     Packs/day: 1.00     Years: 29.00     Pack years: 29.00     Types: Cigarettes     Quit date: 10/30/2006     Years since quittin.0     Smokeless tobacco: Never   Vaping Use     Vaping Use: Never used   Substance and Sexual Activity     Alcohol use: No     Alcohol/week: 0.0 standard drinks     Comment: 1 drink per year     Drug use: No     Sexual activity: Yes     Partners: Male   Other Topics Concern      Service No     Blood Transfusions No     Caffeine Concern Yes     Comment: occasional coffee     Occupational Exposure No     Hobby Hazards Yes     Comment: [place],     Sleep Concern Yes     Comment: not sleeping well     Stress Concern Yes     Comment: Grandson in the      Weight Concern Yes     Special Diet No     Back Care No     Exercise No     Bike Helmet Not Asked     Seat Belt Yes     Self-Exams Yes     Parent/sibling w/ CABG, MI or angioplasty before 65F 55M? No   Social History Narrative     Not on file     Social Determinants of Health     Financial Resource Strain: Not on file   Food Insecurity: Not on file    Transportation Needs: Not on file   Physical Activity: Not on file   Stress: Not on file   Social Connections: Not on file   Intimate Partner Violence: Not At Risk     Fear of Current or Ex-Partner: No     Emotionally Abused: No     Physically Abused: No     Sexually Abused: No   Housing Stability: Not on file       Family History  Family History   Problem Relation Age of Onset     Cancer Mother         bone / liver     Breast Cancer Mother      Cancer Sister         vulvar ca, cervical ca, squamous cell cancer     Breast Cancer Sister      Cancer Brother         Rectal- Stage 4     Rectal Cancer Brother      Cancer Maternal Grandmother      Cancer Maternal Grandfather      Cancer Paternal Grandmother      Cancer Paternal Grandfather      Breast Cancer Maternal Aunt      Breast Cancer Paternal Aunt      Colon Cancer Paternal Aunt      Pancreatic Cancer Nephew      Anesthesia Reaction No family hx of      Venous thrombosis No family hx of      Bleeding Disorder No family hx of        Review of Systems  The complete review of systems is negative other than noted in the HPI or here.     Anesthesia Evaluation   Pt has had prior anesthetic. Type: General.    No history of anesthetic complications       ROS/MED HX  ENT/Pulmonary:     (+) BELL risk factors, hypertension, tobacco use, Past use,  (-) asthma and COPD   Neurologic: Comment: Trigeminal neuralgia s/p treatment.  Symptoms resolved    Post polio syndrome, weak left leg  -recent falls  -uses walker  -drop left foot - neg neurologic ROS     Cardiovascular:     (+) Dyslipidemia hypertension-----Previous cardiac testing   Echo: Date: 2016 Results:    Stress Test: Date: Results:    ECG Reviewed: Date: Results:    Cath: Date: Results:      METS/Exercise Tolerance: 3 - Able to walk 1-2 blocks without stopping    Hematologic:  - neg hematologic  ROS  (-) history of blood clots and history of blood transfusion   Musculoskeletal:  - neg musculoskeletal ROS     GI/Hepatic:   - neg GI/hepatic ROS  (-) GERD   Renal/Genitourinary:     (+) renal disease,     Endo:     (+) Obesity,  (-) Type I DM, Type II DM and thyroid disease   Psychiatric/Substance Use:  - neg psychiatric ROS     Infectious Disease:       Malignancy: Comment: Carcinoid tumor of cecum   (+) Malignancy, History of Breast, Other and GI.Breast CA Remission status post Surgery and Radiation.  GI CA Active status post.  Other CA cervical s/p OSMAN BSO 2007 status post Surgery.    Other:  - neg other ROS          There were no vitals taken for this visit.    Physical Exam   Constitutional: Awake, alert, cooperative, no apparent distress, and appears stated age.  Eyes: Pupils equal, round and reactive to light, extra ocular muscles intact, sclera clear, conjunctiva normal.  HENT: Normocephalic, oral pharynx with moist mucus membranes, good dentition. No goiter appreciated.   Respiratory: Clear to auscultation bilaterally, no crackles or wheezing.  Cardiovascular: Regular rate and rhythm, normal S1 and S2, and no murmur noted.  Carotids +2, no bruits. No edema. Palpable pulses to radial and PT arteries.   GI: Not assessed  Lymph/Hematologic: No cervical lymphadenopathy and no supraclavicular lymphadenopathy.  Genitourinary:  deferred  Skin: Warm and dry.    Musculoskeletal: Full ROM of neck. There is no redness, warmth, or swelling of the joints. Gross motor strength is normal.    Neurologic: Awake, alert, oriented to name, place and time. Cranial nerves II-XII are grossly intact.    Neuropsychiatric: Calm, cooperative. Normal affect.     Prior Labs/Diagnostic Studies   All labs and imaging personally reviewed     Component      Latest Ref Rng & Units 10/14/2022   Sodium      136 - 145 mmol/L 145   Potassium      3.4 - 5.3 mmol/L 4.0   Chloride      98 - 107 mmol/L 106   Carbon Dioxide (CO2)      22 - 29 mmol/L 31 (H)   Anion Gap      7 - 15 mmol/L 8   Urea Nitrogen      8.0 - 23.0 mg/dL 22.0   Creatinine      0.51 - 0.95 mg/dL 1.17  (H)   Calcium      8.8 - 10.2 mg/dL 10.3 (H)   Glucose      70 - 99 mg/dL 99   GFR Estimate      >60 mL/min/1.73m2 49 (L)     Component      Latest Ref Rng & Units 10/14/2022   WBC      4.0 - 11.0 10e3/uL 7.8   RBC Count      3.80 - 5.20 10e6/uL 4.88   Hemoglobin      11.7 - 15.7 g/dL 14.6   Hematocrit      35.0 - 47.0 % 45.2   MCV      78 - 100 fL 93   MCH      26.5 - 33.0 pg 29.9   MCHC      31.5 - 36.5 g/dL 32.3   RDW      10.0 - 15.0 % 13.0   Platelet Count      150 - 450 10e3/uL 202       EKG/ stress test - if available please see in ROS above       The patient's records and results personally reviewed by this provider.     Outside records reviewed from: Care Everywhere        Assessment      Marissa Guadarrama is a 72 year old female seen as a PAC referral for risk assessment and optimization for anesthesia.    Plan/Recommendations  Pt will be optimized for the proposed procedure.  See below for details on the assessment, risk, and preoperative recommendations    NEUROLOGY  - No history of TIA, CVA or seizure    -Post Op delirium risk factors:  Age    ENT  - No current airway concerns.  Will need to be reassessed day of surgery.  Mallampati: I  TM: > 3    CARDIAC  - Hypertension, hold hydrochlorothiazide on DOS  - denies chest pain, chest tightness, SOB, NUÑEZ, palpitations, orthopnea    - METS (Metabolic Equivalents), =3, can walk 1-2 blocks          RCRI-Low risk: Class 2 0.9% complication rate            Total Score: 1    RCRI: High Risk Surgery        PULMONARY    BELL Low Risk            Total Score: 2    BELL: Hypertension    BELL: Over 50 ys old      - Denies asthma or inhaler use  - Tobacco History      History   Smoking Status     Former     Packs/day: 1.00     Years: 29.00     Types: Cigarettes     Quit date: 10/30/2006   Smokeless Tobacco     Never       GI  - denies GERD  PONV High Risk  Total Score: 3           1 AN PONV: Pt is Female    1 AN PONV: Patient is not a current smoker    1 AN PONV: Intended  "Post Op Opioids        /RENAL  - Baseline Creatinine 1.17    ENDOCRINE    - BMI: Estimated body mass index is 32.61 kg/m  as calculated from the following:    Height as of 10/14/22: 1.715 m (5' 7.5\").    Weight as of 10/14/22: 95.8 kg (211 lb 4.8 oz).  Obesity (BMI >30)  - No history of Diabetes Mellitus    HEME  VTE Medium Risk 1.8%            Total Score: 6    VTE: Greater than 59 yrs old    VTE: Current cancer      - No history of abnormal bleeding or antiplatelet use.      ADDENDUM:  - UA/UC results reviewed.  Staff message sent to CC Elizabeth and lab results routed to him as well.    The patient is optimized for their procedure. AVS with information on surgery time/arrival time, meds and NPO status given by nursing staff. No further diagnostic testing indicated.      On the day of service:     Prep time: 15 minutes  Visit time: 11 minutes  Documentation time: 12 minutes  ------------------------------------------  Total time: 38 minutes      Misa Watson PA-C  Preoperative Assessment Center  [facility]  Phone: [phone]  Fax: [phone]    Physical Exam    Airway        Mallampati: II   TM distance: > 3 FB   Neck ROM: limited   Mouth opening: > 3 cm    Respiratory Devices and Support         Dental       (+) caps      Cardiovascular   cardiovascular exam normal          Pulmonary   pulmonary exam normal                  Anesthesia Plan    ASA Status:  3      Anesthesia Type: General.     - Airway: ETT   Induction: Intravenous.   Maintenance: Balanced.   Techniques and Equipment:     - Airway: Video-Laryngoscope     - Lines/Monitors: 2nd IV, Arterial Line     Consents    Anesthesia Plan(s) and associated risks, benefits, and realistic alternatives discussed. Questions answered and patient/representative(s) expressed understanding.    - Discussed:     - Discussed with:  Patient         Postoperative Care    Pain management: Multi-modal analgesia. "   PONV prophylaxis: Ondansetron (or other 5HT-3), Dexamethasone or Solumedrol     Comments:

## 2022-11-04 ENCOUNTER — ANESTHESIA (OUTPATIENT)
Dept: SURGERY | Facility: CLINIC | Age: 72
DRG: 660 | End: 2022-11-04
Payer: MEDICARE

## 2022-11-04 ENCOUNTER — HOSPITAL ENCOUNTER (INPATIENT)
Facility: CLINIC | Age: 72
LOS: 1 days | Discharge: HOME | DRG: 660 | End: 2022-11-05
Attending: UROLOGY | Admitting: UROLOGY
Payer: MEDICARE

## 2022-11-04 DIAGNOSIS — C7A.021 MALIGNANT CARCINOID TUMOR OF CECUM (H): Primary | ICD-10-CM

## 2022-11-04 DIAGNOSIS — N13.39 OTHER HYDRONEPHROSIS: ICD-10-CM

## 2022-11-04 LAB
BASE EXCESS BLDA CALC-SCNC: -0.5 MMOL/L (ref -9.6–2)
BASE EXCESS BLDA CALC-SCNC: -3.8 MMOL/L (ref -9.6–2)
BASE EXCESS BLDV CALC-SCNC: 0.7 MMOL/L (ref -8.1–1.9)
CA-I BLD-MCNC: 4.1 MG/DL (ref 4.4–5.2)
CA-I BLD-MCNC: 4.5 MG/DL (ref 4.4–5.2)
CA-I BLD-MCNC: 5.1 MG/DL (ref 4.4–5.2)
GLUCOSE BLD-MCNC: 136 MG/DL (ref 70–99)
GLUCOSE BLD-MCNC: 158 MG/DL (ref 70–99)
GLUCOSE BLD-MCNC: 162 MG/DL (ref 70–99)
GLUCOSE BLDC GLUCOMTR-MCNC: 122 MG/DL (ref 70–99)
HCO3 BLDA-SCNC: 21 MMOL/L (ref 21–28)
HCO3 BLDA-SCNC: 24 MMOL/L (ref 21–28)
HCO3 BLDV-SCNC: 27 MMOL/L (ref 21–28)
HGB BLD-MCNC: 11.7 G/DL (ref 11.7–15.7)
HGB BLD-MCNC: 12.4 G/DL (ref 11.7–15.7)
HGB BLD-MCNC: 14.4 G/DL (ref 11.7–15.7)
HOLD SPECIMEN: NORMAL
LACTATE BLD-SCNC: 2 MMOL/L
LACTATE BLD-SCNC: 2.3 MMOL/L
LACTATE BLD-SCNC: 4 MMOL/L
O2/TOTAL GAS SETTING VFR VENT: 30 %
O2/TOTAL GAS SETTING VFR VENT: 31 %
O2/TOTAL GAS SETTING VFR VENT: 32 %
PCO2 BLDA: 35 MM HG (ref 35–45)
PCO2 BLDA: 40 MM HG (ref 35–45)
PCO2 BLDV: 46 MM HG (ref 40–50)
PH BLDA: 7.38 [PH] (ref 7.35–7.45)
PH BLDA: 7.39 [PH] (ref 7.35–7.45)
PH BLDV: 7.37 [PH] (ref 7.32–7.43)
PO2 BLDA: 106 MM HG (ref 80–105)
PO2 BLDA: 97 MM HG (ref 80–105)
PO2 BLDV: 72 MM HG (ref 25–47)
POTASSIUM BLD-SCNC: 2.7 MMOL/L (ref 3.5–5)
POTASSIUM BLD-SCNC: 2.8 MMOL/L (ref 3.5–5)
POTASSIUM BLD-SCNC: 3.1 MMOL/L (ref 3.4–5.3)
POTASSIUM BLD-SCNC: 3.9 MMOL/L (ref 3.5–5)
SARS-COV-2 RNA RESP QL NAA+PROBE: NEGATIVE
SODIUM BLD-SCNC: 143 MMOL/L (ref 133–144)
SODIUM BLD-SCNC: 145 MMOL/L (ref 133–144)
SODIUM BLD-SCNC: 145 MMOL/L (ref 133–144)

## 2022-11-04 PROCEDURE — 0TT14ZZ RESECTION OF LEFT KIDNEY, PERCUTANEOUS ENDOSCOPIC APPROACH: ICD-10-PCS | Performed by: UROLOGY

## 2022-11-04 PROCEDURE — 36415 COLL VENOUS BLD VENIPUNCTURE: CPT | Performed by: ANESTHESIOLOGY

## 2022-11-04 PROCEDURE — 250N000011 HC RX IP 250 OP 636: Performed by: UROLOGY

## 2022-11-04 PROCEDURE — 82962 GLUCOSE BLOOD TEST: CPT

## 2022-11-04 PROCEDURE — 250N000011 HC RX IP 250 OP 636: Performed by: ANESTHESIOLOGY

## 2022-11-04 PROCEDURE — 250N000011 HC RX IP 250 OP 636: Performed by: STUDENT IN AN ORGANIZED HEALTH CARE EDUCATION/TRAINING PROGRAM

## 2022-11-04 PROCEDURE — 0TP94DZ REMOVAL OF INTRALUMINAL DEVICE FROM URETER, PERCUTANEOUS ENDOSCOPIC APPROACH: ICD-10-PCS | Performed by: UROLOGY

## 2022-11-04 PROCEDURE — 258N000002 HC RX IP 258 OP 250: Performed by: ANESTHESIOLOGY

## 2022-11-04 PROCEDURE — 250N000009 HC RX 250: Performed by: NURSE ANESTHETIST, CERTIFIED REGISTERED

## 2022-11-04 PROCEDURE — 250N000012 HC RX MED GY IP 250 OP 636 PS 637: Performed by: ANESTHESIOLOGY

## 2022-11-04 PROCEDURE — 999N000015 HC STATISTIC ARTERIAL MONITORING DAILY

## 2022-11-04 PROCEDURE — 250N000011 HC RX IP 250 OP 636: Performed by: NURSE ANESTHETIST, CERTIFIED REGISTERED

## 2022-11-04 PROCEDURE — U0003 INFECTIOUS AGENT DETECTION BY NUCLEIC ACID (DNA OR RNA); SEVERE ACUTE RESPIRATORY SYNDROME CORONAVIRUS 2 (SARS-COV-2) (CORONAVIRUS DISEASE [COVID-19]), AMPLIFIED PROBE TECHNIQUE, MAKING USE OF HIGH THROUGHPUT TECHNOLOGIES AS DESCRIBED BY CMS-2020-01-R: HCPCS | Performed by: ANESTHESIOLOGY

## 2022-11-04 PROCEDURE — 84132 ASSAY OF SERUM POTASSIUM: CPT

## 2022-11-04 PROCEDURE — 50546 LAPAROSCOPIC NEPHRECTOMY: CPT | Mod: 22 | Performed by: UROLOGY

## 2022-11-04 PROCEDURE — 710N000010 HC RECOVERY PHASE 1, LEVEL 2, PER MIN: Performed by: UROLOGY

## 2022-11-04 PROCEDURE — 272N000001 HC OR GENERAL SUPPLY STERILE: Performed by: UROLOGY

## 2022-11-04 PROCEDURE — 360N000080 HC SURGERY LEVEL 7, PER MIN: Performed by: UROLOGY

## 2022-11-04 PROCEDURE — 370N000017 HC ANESTHESIA TECHNICAL FEE, PER MIN: Performed by: UROLOGY

## 2022-11-04 PROCEDURE — 258N000003 HC RX IP 258 OP 636: Performed by: NURSE ANESTHETIST, CERTIFIED REGISTERED

## 2022-11-04 PROCEDURE — 120N000002 HC R&B MED SURG/OB UMMC

## 2022-11-04 PROCEDURE — 84132 ASSAY OF SERUM POTASSIUM: CPT | Performed by: ANESTHESIOLOGY

## 2022-11-04 PROCEDURE — 250N000013 HC RX MED GY IP 250 OP 250 PS 637: Performed by: ANESTHESIOLOGY

## 2022-11-04 PROCEDURE — 272N000002 HC OR SUPPLY OTHER OPNP: Performed by: UROLOGY

## 2022-11-04 PROCEDURE — 250N000025 HC SEVOFLURANE, PER MIN: Performed by: UROLOGY

## 2022-11-04 PROCEDURE — 999N000141 HC STATISTIC PRE-PROCEDURE NURSING ASSESSMENT: Performed by: UROLOGY

## 2022-11-04 PROCEDURE — 250N000013 HC RX MED GY IP 250 OP 250 PS 637: Performed by: STUDENT IN AN ORGANIZED HEALTH CARE EDUCATION/TRAINING PROGRAM

## 2022-11-04 PROCEDURE — 88307 TISSUE EXAM BY PATHOLOGIST: CPT | Mod: TC | Performed by: UROLOGY

## 2022-11-04 PROCEDURE — 272N000010 HC KIT CATH ARTERIAL EXT SUPPLY

## 2022-11-04 PROCEDURE — 0DN80ZZ RELEASE SMALL INTESTINE, OPEN APPROACH: ICD-10-PCS | Performed by: UROLOGY

## 2022-11-04 PROCEDURE — 999N000157 HC STATISTIC RCP TIME EA 10 MIN

## 2022-11-04 PROCEDURE — 8E0W4CZ ROBOTIC ASSISTED PROCEDURE OF TRUNK REGION, PERCUTANEOUS ENDOSCOPIC APPROACH: ICD-10-PCS | Performed by: UROLOGY

## 2022-11-04 RX ORDER — AMOXICILLIN 250 MG
1 CAPSULE ORAL 2 TIMES DAILY
Status: DISCONTINUED | OUTPATIENT
Start: 2022-11-04 | End: 2022-11-05 | Stop reason: HOSPADM

## 2022-11-04 RX ORDER — HYDROMORPHONE HYDROCHLORIDE 1 MG/ML
0.2 INJECTION, SOLUTION INTRAMUSCULAR; INTRAVENOUS; SUBCUTANEOUS EVERY 5 MIN PRN
Status: DISCONTINUED | OUTPATIENT
Start: 2022-11-04 | End: 2022-11-04 | Stop reason: HOSPADM

## 2022-11-04 RX ORDER — HYDROCHLOROTHIAZIDE 12.5 MG/1
12.5 TABLET ORAL EVERY MORNING
Status: DISCONTINUED | OUTPATIENT
Start: 2022-11-05 | End: 2022-11-05 | Stop reason: HOSPADM

## 2022-11-04 RX ORDER — ACETAMINOPHEN 325 MG/1
650 TABLET ORAL EVERY 6 HOURS
Status: DISCONTINUED | OUTPATIENT
Start: 2022-11-04 | End: 2022-11-05 | Stop reason: HOSPADM

## 2022-11-04 RX ORDER — HYDROMORPHONE HCL IN WATER/PF 6 MG/30 ML
0.2 PATIENT CONTROLLED ANALGESIA SYRINGE INTRAVENOUS
Status: DISCONTINUED | OUTPATIENT
Start: 2022-11-04 | End: 2022-11-05 | Stop reason: HOSPADM

## 2022-11-04 RX ORDER — PROCHLORPERAZINE MALEATE 5 MG
5 TABLET ORAL EVERY 6 HOURS PRN
Status: DISCONTINUED | OUTPATIENT
Start: 2022-11-04 | End: 2022-11-05 | Stop reason: HOSPADM

## 2022-11-04 RX ORDER — LIDOCAINE HYDROCHLORIDE 20 MG/ML
INJECTION, SOLUTION INFILTRATION; PERINEURAL PRN
Status: DISCONTINUED | OUTPATIENT
Start: 2022-11-04 | End: 2022-11-04

## 2022-11-04 RX ORDER — BUPIVACAINE HYDROCHLORIDE 2.5 MG/ML
INJECTION, SOLUTION INFILTRATION; PERINEURAL PRN
Status: DISCONTINUED | OUTPATIENT
Start: 2022-11-04 | End: 2022-11-04 | Stop reason: HOSPADM

## 2022-11-04 RX ORDER — NALOXONE HYDROCHLORIDE 0.4 MG/ML
0.4 INJECTION, SOLUTION INTRAMUSCULAR; INTRAVENOUS; SUBCUTANEOUS
Status: DISCONTINUED | OUTPATIENT
Start: 2022-11-04 | End: 2022-11-05 | Stop reason: HOSPADM

## 2022-11-04 RX ORDER — SODIUM CHLORIDE, SODIUM LACTATE, POTASSIUM CHLORIDE, CALCIUM CHLORIDE 600; 310; 30; 20 MG/100ML; MG/100ML; MG/100ML; MG/100ML
INJECTION, SOLUTION INTRAVENOUS CONTINUOUS PRN
Status: DISCONTINUED | OUTPATIENT
Start: 2022-11-04 | End: 2022-11-04

## 2022-11-04 RX ORDER — FENTANYL CITRATE 50 UG/ML
INJECTION, SOLUTION INTRAMUSCULAR; INTRAVENOUS PRN
Status: DISCONTINUED | OUTPATIENT
Start: 2022-11-04 | End: 2022-11-04

## 2022-11-04 RX ORDER — FENTANYL CITRATE 50 UG/ML
25 INJECTION, SOLUTION INTRAMUSCULAR; INTRAVENOUS EVERY 5 MIN PRN
Status: DISCONTINUED | OUTPATIENT
Start: 2022-11-04 | End: 2022-11-04 | Stop reason: HOSPADM

## 2022-11-04 RX ORDER — DEXAMETHASONE SODIUM PHOSPHATE 4 MG/ML
INJECTION, SOLUTION INTRA-ARTICULAR; INTRALESIONAL; INTRAMUSCULAR; INTRAVENOUS; SOFT TISSUE PRN
Status: DISCONTINUED | OUTPATIENT
Start: 2022-11-04 | End: 2022-11-04

## 2022-11-04 RX ORDER — PROPOFOL 10 MG/ML
INJECTION, EMULSION INTRAVENOUS PRN
Status: DISCONTINUED | OUTPATIENT
Start: 2022-11-04 | End: 2022-11-04

## 2022-11-04 RX ORDER — OXYCODONE HYDROCHLORIDE 10 MG/1
10 TABLET ORAL EVERY 4 HOURS PRN
Status: DISCONTINUED | OUTPATIENT
Start: 2022-11-04 | End: 2022-11-05 | Stop reason: HOSPADM

## 2022-11-04 RX ORDER — CEFAZOLIN SODIUM/WATER 2 G/20 ML
2 SYRINGE (ML) INTRAVENOUS SEE ADMIN INSTRUCTIONS
Status: DISCONTINUED | OUTPATIENT
Start: 2022-11-04 | End: 2022-11-04 | Stop reason: HOSPADM

## 2022-11-04 RX ORDER — EPHEDRINE SULFATE 50 MG/ML
INJECTION, SOLUTION INTRAMUSCULAR; INTRAVENOUS; SUBCUTANEOUS PRN
Status: DISCONTINUED | OUTPATIENT
Start: 2022-11-04 | End: 2022-11-04

## 2022-11-04 RX ORDER — GABAPENTIN 100 MG/1
100 CAPSULE ORAL
Status: DISCONTINUED | OUTPATIENT
Start: 2022-11-04 | End: 2022-11-04 | Stop reason: HOSPADM

## 2022-11-04 RX ORDER — POTASSIUM CHLORIDE 7.45 MG/ML
INJECTION INTRAVENOUS PRN
Status: DISCONTINUED | OUTPATIENT
Start: 2022-11-04 | End: 2022-11-04

## 2022-11-04 RX ORDER — POLYETHYLENE GLYCOL 3350 17 G/17G
17 POWDER, FOR SOLUTION ORAL DAILY
Status: DISCONTINUED | OUTPATIENT
Start: 2022-11-05 | End: 2022-11-05 | Stop reason: HOSPADM

## 2022-11-04 RX ORDER — NALOXONE HYDROCHLORIDE 0.4 MG/ML
0.2 INJECTION, SOLUTION INTRAMUSCULAR; INTRAVENOUS; SUBCUTANEOUS
Status: DISCONTINUED | OUTPATIENT
Start: 2022-11-04 | End: 2022-11-05 | Stop reason: HOSPADM

## 2022-11-04 RX ORDER — NALOXONE HYDROCHLORIDE 0.4 MG/ML
0.4 INJECTION, SOLUTION INTRAMUSCULAR; INTRAVENOUS; SUBCUTANEOUS
Status: DISCONTINUED | OUTPATIENT
Start: 2022-11-04 | End: 2022-11-04 | Stop reason: HOSPADM

## 2022-11-04 RX ORDER — ONDANSETRON 4 MG/1
4 TABLET, ORALLY DISINTEGRATING ORAL EVERY 30 MIN PRN
Status: DISCONTINUED | OUTPATIENT
Start: 2022-11-04 | End: 2022-11-04 | Stop reason: HOSPADM

## 2022-11-04 RX ORDER — CEFAZOLIN SODIUM/WATER 2 G/20 ML
2 SYRINGE (ML) INTRAVENOUS
Status: COMPLETED | OUTPATIENT
Start: 2022-11-04 | End: 2022-11-04

## 2022-11-04 RX ORDER — CEFAZOLIN SODIUM 1 G/3ML
1 INJECTION, POWDER, FOR SOLUTION INTRAMUSCULAR; INTRAVENOUS EVERY 8 HOURS
Status: COMPLETED | OUTPATIENT
Start: 2022-11-04 | End: 2022-11-05

## 2022-11-04 RX ORDER — NALOXONE HYDROCHLORIDE 0.4 MG/ML
0.2 INJECTION, SOLUTION INTRAMUSCULAR; INTRAVENOUS; SUBCUTANEOUS
Status: DISCONTINUED | OUTPATIENT
Start: 2022-11-04 | End: 2022-11-04 | Stop reason: HOSPADM

## 2022-11-04 RX ORDER — MAGNESIUM SULFATE HEPTAHYDRATE 40 MG/ML
INJECTION, SOLUTION INTRAVENOUS PRN
Status: DISCONTINUED | OUTPATIENT
Start: 2022-11-04 | End: 2022-11-04

## 2022-11-04 RX ORDER — LIDOCAINE 40 MG/G
CREAM TOPICAL
Status: DISCONTINUED | OUTPATIENT
Start: 2022-11-04 | End: 2022-11-05 | Stop reason: HOSPADM

## 2022-11-04 RX ORDER — ONDANSETRON 4 MG/1
4 TABLET, ORALLY DISINTEGRATING ORAL EVERY 6 HOURS PRN
Status: DISCONTINUED | OUTPATIENT
Start: 2022-11-04 | End: 2022-11-05 | Stop reason: HOSPADM

## 2022-11-04 RX ORDER — PROPOFOL 10 MG/ML
INJECTION, EMULSION INTRAVENOUS CONTINUOUS PRN
Status: DISCONTINUED | OUTPATIENT
Start: 2022-11-04 | End: 2022-11-04

## 2022-11-04 RX ORDER — OXYCODONE HYDROCHLORIDE 5 MG/1
5 TABLET ORAL EVERY 6 HOURS PRN
Qty: 12 TABLET | Refills: 0 | Status: SHIPPED | OUTPATIENT
Start: 2022-11-04 | End: 2022-11-07

## 2022-11-04 RX ORDER — ACETAMINOPHEN 325 MG/1
975 TABLET ORAL ONCE
Status: COMPLETED | OUTPATIENT
Start: 2022-11-04 | End: 2022-11-04

## 2022-11-04 RX ORDER — EXEMESTANE 25 MG/1
25 TABLET ORAL EVERY MORNING
Status: DISCONTINUED | OUTPATIENT
Start: 2022-11-05 | End: 2022-11-05 | Stop reason: HOSPADM

## 2022-11-04 RX ORDER — OXYCODONE HYDROCHLORIDE 5 MG/1
5 TABLET ORAL EVERY 4 HOURS PRN
Status: DISCONTINUED | OUTPATIENT
Start: 2022-11-04 | End: 2022-11-05 | Stop reason: HOSPADM

## 2022-11-04 RX ORDER — ONDANSETRON 2 MG/ML
4 INJECTION INTRAMUSCULAR; INTRAVENOUS EVERY 6 HOURS PRN
Status: DISCONTINUED | OUTPATIENT
Start: 2022-11-04 | End: 2022-11-05 | Stop reason: HOSPADM

## 2022-11-04 RX ORDER — BISACODYL 10 MG
10 SUPPOSITORY, RECTAL RECTAL DAILY PRN
Status: DISCONTINUED | OUTPATIENT
Start: 2022-11-04 | End: 2022-11-05 | Stop reason: HOSPADM

## 2022-11-04 RX ORDER — KETAMINE HYDROCHLORIDE 10 MG/ML
INJECTION INTRAMUSCULAR; INTRAVENOUS PRN
Status: DISCONTINUED | OUTPATIENT
Start: 2022-11-04 | End: 2022-11-04

## 2022-11-04 RX ORDER — OXYCODONE HYDROCHLORIDE 5 MG/1
5 TABLET ORAL EVERY 4 HOURS PRN
Status: DISCONTINUED | OUTPATIENT
Start: 2022-11-04 | End: 2022-11-04 | Stop reason: HOSPADM

## 2022-11-04 RX ORDER — OXYCODONE HYDROCHLORIDE 5 MG/1
5 TABLET ORAL EVERY 6 HOURS PRN
Qty: 12 TABLET | Refills: 0 | Status: SHIPPED | OUTPATIENT
Start: 2022-11-04 | End: 2022-11-04

## 2022-11-04 RX ORDER — HYDROMORPHONE HCL IN WATER/PF 6 MG/30 ML
0.4 PATIENT CONTROLLED ANALGESIA SYRINGE INTRAVENOUS
Status: DISCONTINUED | OUTPATIENT
Start: 2022-11-04 | End: 2022-11-05 | Stop reason: HOSPADM

## 2022-11-04 RX ORDER — SODIUM CHLORIDE, SODIUM LACTATE, POTASSIUM CHLORIDE, CALCIUM CHLORIDE 600; 310; 30; 20 MG/100ML; MG/100ML; MG/100ML; MG/100ML
INJECTION, SOLUTION INTRAVENOUS CONTINUOUS
Status: DISCONTINUED | OUTPATIENT
Start: 2022-11-04 | End: 2022-11-04 | Stop reason: HOSPADM

## 2022-11-04 RX ORDER — ONDANSETRON 2 MG/ML
4 INJECTION INTRAMUSCULAR; INTRAVENOUS EVERY 30 MIN PRN
Status: DISCONTINUED | OUTPATIENT
Start: 2022-11-04 | End: 2022-11-04 | Stop reason: HOSPADM

## 2022-11-04 RX ADMIN — LIDOCAINE HYDROCHLORIDE 100 MG: 20 INJECTION, SOLUTION INFILTRATION; PERINEURAL at 07:41

## 2022-11-04 RX ADMIN — FENTANYL CITRATE 50 MCG: 50 INJECTION, SOLUTION INTRAMUSCULAR; INTRAVENOUS at 10:23

## 2022-11-04 RX ADMIN — FENTANYL CITRATE 50 MCG: 50 INJECTION, SOLUTION INTRAMUSCULAR; INTRAVENOUS at 08:56

## 2022-11-04 RX ADMIN — Medication 5 MG: at 08:12

## 2022-11-04 RX ADMIN — Medication 5 MG: at 08:05

## 2022-11-04 RX ADMIN — Medication 20 MG: at 12:03

## 2022-11-04 RX ADMIN — Medication 2 G: at 12:44

## 2022-11-04 RX ADMIN — DEXMEDETOMIDINE HYDROCHLORIDE 0.3 MCG/KG/HR: 100 INJECTION, SOLUTION INTRAVENOUS at 09:05

## 2022-11-04 RX ADMIN — Medication 50 MG: at 07:43

## 2022-11-04 RX ADMIN — Medication 2 G: at 08:39

## 2022-11-04 RX ADMIN — PROPOFOL 50 MCG/KG/MIN: 10 INJECTION, EMULSION INTRAVENOUS at 08:47

## 2022-11-04 RX ADMIN — PROPOFOL 50 MG: 10 INJECTION, EMULSION INTRAVENOUS at 08:16

## 2022-11-04 RX ADMIN — SODIUM CHLORIDE, POTASSIUM CHLORIDE, SODIUM LACTATE AND CALCIUM CHLORIDE: 600; 310; 30; 20 INJECTION, SOLUTION INTRAVENOUS at 07:35

## 2022-11-04 RX ADMIN — MAGNESIUM SULFATE HEPTAHYDRATE 2 G: 40 INJECTION, SOLUTION INTRAVENOUS at 08:30

## 2022-11-04 RX ADMIN — POTASSIUM CHLORIDE 10 MEQ: 7.46 INJECTION, SOLUTION INTRAVENOUS at 10:09

## 2022-11-04 RX ADMIN — ACETAMINOPHEN 650 MG: 325 TABLET, FILM COATED ORAL at 16:32

## 2022-11-04 RX ADMIN — Medication 10 MG: at 09:31

## 2022-11-04 RX ADMIN — ACETAMINOPHEN 650 MG: 325 TABLET, FILM COATED ORAL at 23:03

## 2022-11-04 RX ADMIN — PHENYLEPHRINE HYDROCHLORIDE 50 MCG: 10 INJECTION INTRAVENOUS at 14:14

## 2022-11-04 RX ADMIN — Medication 10 MG: at 11:34

## 2022-11-04 RX ADMIN — PHENYLEPHRINE HYDROCHLORIDE 100 MCG: 10 INJECTION INTRAVENOUS at 12:04

## 2022-11-04 RX ADMIN — DEXAMETHASONE SODIUM PHOSPHATE 4 MG: 4 INJECTION, SOLUTION INTRA-ARTICULAR; INTRALESIONAL; INTRAMUSCULAR; INTRAVENOUS; SOFT TISSUE at 08:02

## 2022-11-04 RX ADMIN — Medication 20 MG: at 13:29

## 2022-11-04 RX ADMIN — PHENYLEPHRINE HYDROCHLORIDE 100 MCG: 10 INJECTION INTRAVENOUS at 07:45

## 2022-11-04 RX ADMIN — PHENYLEPHRINE HYDROCHLORIDE 0.2 MCG/KG/MIN: 10 INJECTION INTRAVENOUS at 12:34

## 2022-11-04 RX ADMIN — Medication 30 MG: at 09:24

## 2022-11-04 RX ADMIN — PHENYLEPHRINE HYDROCHLORIDE 100 MCG: 10 INJECTION INTRAVENOUS at 12:36

## 2022-11-04 RX ADMIN — CEFAZOLIN 1 G: 1 INJECTION, POWDER, FOR SOLUTION INTRAMUSCULAR; INTRAVENOUS at 20:08

## 2022-11-04 RX ADMIN — Medication 30 MG: at 08:47

## 2022-11-04 RX ADMIN — SODIUM CHLORIDE, SODIUM LACTATE, POTASSIUM CHLORIDE, CALCIUM CHLORIDE: 600; 310; 30; 20 INJECTION, SOLUTION INTRAVENOUS at 08:30

## 2022-11-04 RX ADMIN — PHENYLEPHRINE HYDROCHLORIDE 100 MCG: 10 INJECTION INTRAVENOUS at 07:59

## 2022-11-04 RX ADMIN — ACETAMINOPHEN 975 MG: 325 TABLET, FILM COATED ORAL at 06:27

## 2022-11-04 RX ADMIN — SUGAMMADEX 200 MG: 100 INJECTION, SOLUTION INTRAVENOUS at 14:37

## 2022-11-04 RX ADMIN — HEPARIN 3 ML/HR: 100 SYRINGE at 11:43

## 2022-11-04 RX ADMIN — OXYCODONE HYDROCHLORIDE 5 MG: 5 TABLET ORAL at 16:32

## 2022-11-04 RX ADMIN — FENTANYL CITRATE 25 MCG: 50 INJECTION INTRAMUSCULAR; INTRAVENOUS at 15:28

## 2022-11-04 RX ADMIN — POTASSIUM CHLORIDE 10 MEQ: 7.46 INJECTION, SOLUTION INTRAVENOUS at 10:59

## 2022-11-04 RX ADMIN — Medication 10 MG: at 10:30

## 2022-11-04 RX ADMIN — PROPOFOL 200 MG: 10 INJECTION, EMULSION INTRAVENOUS at 07:41

## 2022-11-04 RX ADMIN — Medication 20 MG: at 10:12

## 2022-11-04 RX ADMIN — Medication 10 MG: at 13:24

## 2022-11-04 RX ADMIN — FENTANYL CITRATE 25 MCG: 50 INJECTION INTRAMUSCULAR; INTRAVENOUS at 15:54

## 2022-11-04 RX ADMIN — Medication 10 MG: at 12:33

## 2022-11-04 RX ADMIN — FENTANYL CITRATE 50 MCG: 50 INJECTION, SOLUTION INTRAMUSCULAR; INTRAVENOUS at 07:41

## 2022-11-04 RX ADMIN — Medication 10 MG: at 08:29

## 2022-11-04 RX ADMIN — Medication 5 MG: at 08:24

## 2022-11-04 RX ADMIN — FENTANYL CITRATE 50 MCG: 50 INJECTION, SOLUTION INTRAMUSCULAR; INTRAVENOUS at 08:16

## 2022-11-04 RX ADMIN — PHENYLEPHRINE HYDROCHLORIDE 100 MCG: 10 INJECTION INTRAVENOUS at 13:40

## 2022-11-04 ASSESSMENT — ACTIVITIES OF DAILY LIVING (ADL)
ADLS_ACUITY_SCORE: 39
ADLS_ACUITY_SCORE: 35
ADLS_ACUITY_SCORE: 39
ADLS_ACUITY_SCORE: 35
ADLS_ACUITY_SCORE: 39
ADLS_ACUITY_SCORE: 35
ADLS_ACUITY_SCORE: 35

## 2022-11-04 NOTE — OR NURSING
PACU to Inpatient Nursing Handoff    Patient Marissa Guadarrama is a 72 year old female who speaks English.   Procedure Procedure(s):  , LEFT NEPHRECTOMY, ROBOT-ASSISTED  CYSTOSCOPY, LEFT STENT REMOVAL   Surgeon(s) Primary: Zhao La MD  Resident - Assisting: Ilene Dallas MD     No Known Allergies    Isolation  No active isolations     Past Medical History   has a past medical history of Arthritis, Benign breast biopsy, Breast cancer (H), Carcinoid tumor (12/2003), Cervical cancer (H) (2007), H/O colposcopy with cervical biopsy (12/23/2013), HTN, Hyperlipidemia, Malignant neoplasm of ovary (H), Obesity, Pap smear of vagina with ASC-H (11/01/2013), Post-polio syndromes, and Trigeminal neuralgias.    Anesthesia General   Dermatome Level     Preop Meds acetaminophen (Tylenol) - time given: 0627   Nerve block Not applicable   Intraop Meds dexamethasone (Decadron)  dexmedetomidine (Precedex): on gtt throughout case mcg total  fentanyl (Sublimaze): 200 mcg total  ketamine (Ketalar): 90 mg given  KCL 10meq x2 and Mag Sulfate 2gm   Local Meds Yes   Antibiotics   cefazolin (Ancef) - last given at 1244     Pain Patient Currently in Pain: denies   PACU meds  50 mcg total fentanyl. 650 mg tyenol at 1632. 5 mg oxycodone 1632   PCA / epidural No   Capnography     Telemetry ECG Rhythm: Normal sinus rhythm   Inpatient Telemetry Monitor Ordered? No        Labs Glucose Lab Results   Component Value Date     11/04/2022     11/04/2022     07/08/2022    GLC 92 07/02/2021       Hgb Lab Results   Component Value Date    HGB 14.4 11/04/2022    HGB 14.6 10/14/2022    HGB 13.4 07/02/2021       INR Lab Results   Component Value Date    INR 1.34 05/09/2021      PACU Imaging Not applicable     Wound/Incision Incision/Surgical Site 07/01/20 Vagina (Active)   Number of days: 856       Incision/Surgical Site 11/04/22 Left;Lower Abdomen (Active)   Incision Assessment WDL 11/04/22 1514   Closure Liquid  bandage 11/04/22 1514   Incision Drainage Amount None 11/04/22 1514   Dressing Intervention Open to air / No Dressing 11/04/22 1514   Number of days: 0       Incision/Surgical Site 11/04/22 Left;Upper Abdomen (Active)   Incision Assessment WDL 11/04/22 1514   Closure Liquid bandage 11/04/22 1514   Incision Drainage Amount None 11/04/22 1514   Dressing Intervention Open to air / No Dressing 11/04/22 1514   Number of days: 0       Incision/Surgical Site 11/04/22 Mid Abdomen (Active)   Incision Assessment UTV 11/04/22 1514   Closure Liquid bandage 11/04/22 1514   Incision Drainage Amount None 11/04/22 1514   Dressing Intervention Clean, dry, intact 11/04/22 1514   Number of days: 0       Incision/Surgical Site 11/04/22 Left;Outer Abdomen (Active)   Incision Assessment [facility] 11/04/22 1514   Closure Liquid bandage 11/04/22 1514   Incision Drainage Amount None 11/04/22 1514   Dressing Intervention Open to air / No Dressing 11/04/22 1514   Number of days: 0       Incision/Surgical Site 11/04/22 Left Abdomen (Active)   Incision Assessment [facility] 11/04/22 1514   Closure Liquid bandage 11/04/22 1514   Incision Drainage Amount None 11/04/22 1514   Dressing Intervention Open to air / No Dressing 11/04/22 1514   Number of days: 0      CMS        Equipment Not applicable   Other LDA       IV Access Peripheral IV 11/04/22 Right Hand (Active)   Site Assessment [facility] 11/04/22 1700   Line Status Infusing 11/04/22 1700   Phlebitis Scale 0-->no symptoms 11/04/22 1700   Infiltration Scale 0 11/04/22 1700   Number of days: 0       Peripheral IV 11/04/22 Right Wrist (Active)   Site Assessment [facility] 11/04/22 1700   Line Status Saline locked 11/04/22 1700   Phlebitis Scale 0-->no symptoms 11/04/22 1700   Infiltration Scale 0 11/04/22 1700   Number of days: 0      Blood Products Not applicable    mL   Intake/Output Date 11/04/22 0700 - 11/05/22 0659   Shift 6863-2666 8649-3023 1820-6291 24 Hour Total   INTAKE   I.V. 1700   1700   Shift  Total(mL/kg) 1700(17.76)   1700(17.76)   OUTPUT   Urine 200   200   Blood 100   100   Shift Total(mL/kg) 300(3.13)   300(3.13)   Weight (kg) 95.7 95.7 95.7 95.7      Drains / Cano Closed/Suction Drain 1 Left Abdomen Bulb 19 [place] (Active)   Site Description [facility] 11/04/22 1630   Dressing Status Normal: Clean, Dry & Intact 11/04/22 1630   Drainage Appearance Bloody/Bright Red;Normal 11/04/22 1700   Status Clamped 11/04/22 1630   Output (ml) 30 ml 11/04/22 1700   Number of days: 0       Urethral Catheter 11/04/22 Double-lumen;Non-latex;Straight-tip 16 fr (Active)   Tube Description [facility] 11/04/22 1447   Collection Container Standard 11/04/22 1630   Securement Method Securing device (Describe) 11/04/22 1630   Urine Output 250 mL 11/04/22 1700   Number of days: 0      Time of void PreOp Void Prior to Procedure: 0641 (11/04/22 0640)    PostOp      Diapered? No   Bladder Scan     PO         Vitals    B/P: 130/83  T: 98.1  F (36.7  C)    Temp src: Skin  P:  Pulse: 73 (11/04/22 1500)          R: 18  O2:  SpO2: 97 %         Oxygen Delivery: 1 LPM (11/04/22 1630)         Family/support present Gaudencio (spouse)   Patient belongings  rolling walker and 2 belongings bag   Patient transported on cart   DC meds/scripts (obs/outpt) Yes, scripts   Inpatient Pain Meds Released? Yes       Special needs/considerations N/A   Tasks needing completion None     Jennifer Gonsalez, RN   ASCOM [id number]

## 2022-11-04 NOTE — BRIEF OP NOTE
[facility]    Brief Operative Note    Pre-operative diagnosis: Non-functioning kidney [N28.9]  Post-operative diagnosis Same as pre-operative diagnosis    Procedure: Procedure(s):  , LEFT NEPHRECTOMY, ROBOT-ASSISTED  CYSTOSCOPY, LEFT STENT REMOVAL  Surgeon: Surgeon(s) and Role:  Panel 1:     * Zhao La MD - Primary     * Ilene Dallas MD - Resident - Assisting  Panel 2:     * Zhao La MD - Primary  Anesthesia: General   Estimated Blood Loss: 100 mL    Drains: LLQ BERNADETTE drain, 16 Fr norris catheter  Specimens:   ID Type Source Tests Collected by Time Destination   1 : Kidney, Left Tissue Kidney, Left SURGICAL PATHOLOGY EXAM Zhao La MD 11/4/2022  2:08 PM      Findings: Extensive intraabdominal adhesions to midline mesh. Kidney was fixed to surrounding tissues with obstrucred planes. 1 renal artery and 1 renal vein encountered. The left ureter was dilated.   Complications: Possible veress needle injury to a loop of small bowel- oversewn with 3-0 vicryl in a lembert fashion. Possible thermal injury to the colon, again oversewn with 3-0 vicryl in a lembert fashion.  Implants:   Implant Name Type Inv. Item Serial No.  Lot No. LRB No. Used Action   STENT URETERAL PERCUFLEX PLUS [id number] - [id number] Stent STENT URETERAL PERCUFLEX PLUS [id number]  [employer] [id number] Left 1 Explanted     Dispo:  - PACU then floor  - ADAT  - 24 hours of Ancef Abx based on pre-op urine Cx  - Drain can likely be removed prior to discharge.     Ilene Dallas MD  PGY-5 Urology  Pager [phone]

## 2022-11-04 NOTE — OR NURSING
Pt remains sleepy from surgery. Pt will wake to verbal stimuli with easy. Pt medicated for pain as ordered. Plan to admit to the floor when appropriate. [place] removed shortly after arrival due to dampened wave form and it was reported that the line did not draw. Will continue to monitor closely.

## 2022-11-04 NOTE — INTERVAL H&P NOTE
"I have reviewed the surgical (or preoperative) H&P that is linked to this encounter, and examined the patient. There are no significant changes    Clinical Conditions Present on Arrival:  Clinically Significant Risk Factors Present on Admission          # Hypokalemia: Lowest K = 3.1 mmol/L (Ref range: 3.4-5.3) in last 2 days, will replace as needed   # Hypercalcemia: Highest Ca = 10.3 mg/dL (Ref range: 8.5 - 10.1 mg/dL) in last 30 days, will monitor as appropriate         # Obesity: Estimated body mass index is 33.04 kg/m  as calculated from the following:    Height as of this encounter: 1.702 m (5' 7\").    Weight as of this encounter: 95.7 kg (210 lb 15.7 oz).       "

## 2022-11-04 NOTE — ANESTHESIA PROCEDURE NOTES
Arterial Line Procedure Note    Pre-Procedure   Staff -        Anesthesiologist:  Rosy Castañeda MD       Performed By: anesthesiologist       Location: OR       Pre-Anesthestic Checklist: patient identified, IV checked, risks and benefits discussed, monitors and equipment checked and pre-op evaluation  Timeout:       Correct Patient: Yes        Correct Procedure: Yes        Correct Site: Yes        Correct Position: Yes   Line Placement:   This line was placed Post Induction  Procedure   Procedure: arterial line       Laterality: left       Insertion Site: radial.  Sterile Prep        Standard elements of sterile barrier followed       Skin prep: Chloraprep  Insertion/Injection        Technique: Seldinger Technique and ultrasound guided        1. Ultrasound was used to evaluate the access site.       2. Artery evaluated via ultrasound for patency/adequacy.       3. Using real-time ultrasound the needle/catheter was observed entering the artery/vein.       Catheter Type/Size: 20 G, 12 cm  Narrative        Arterial waveform: Yes        IBP within 10% of NIBP: No

## 2022-11-04 NOTE — ANESTHESIA PROCEDURE NOTES
Airway       Patient location during procedure: OR       Procedure Start/Stop Times: 11/4/2022 7:46 AM  Staff -        CRNA: Navdeep Mackenzie APRN CRNA       Performed By: CRNA  Consent for Airway        Urgency: elective  Indications and Patient Condition       Indications for airway management: ketty-procedural       Induction type:intravenous       Mask difficulty assessment: 1 - vent by mask    Final Airway Details       Final airway type: endotracheal airway       Successful airway: ETT - single and Oral  Endotracheal Airway Details        ETT size (mm): 7.0       Cuffed: yes       Inital cuff pressure (cm H2O): 25       Successful intubation technique: video laryngoscopy       VL Blade Size: MAC 3       Grade View of Cords: 1       Adjucts: stylet       Position: Right       Measured from: lips       Secured at (cm): 21       Bite block used: None    Post intubation assessment        Placement verified by: capnometry, equal breath sounds and chest rise        Number of attempts at approach: 1       Number of other approaches attempted: 0       Secured with: silk tape       Ease of procedure: easy       Dentition: Intact and Unchanged    Medication(s) Administered   Medication Administration Time: 11/4/2022 7:46 AM

## 2022-11-04 NOTE — ANESTHESIA CARE TRANSFER NOTE
Patient: Marissa Guadarrama    Procedure: Procedure(s):  , LEFT NEPHRECTOMY, ROBOT-ASSISTED  CYSTOSCOPY, LEFT STENT REMOVAL       Diagnosis: Non-functioning kidney [N28.9]  Diagnosis Additional Information: No value filed.    Anesthesia Type:   General     Note:    Oropharynx: oropharynx clear of all foreign objects and spontaneously breathing  Level of Consciousness: drowsy  Oxygen Supplementation: face mask  Level of Supplemental Oxygen (L/min / FiO2): 6  Independent Airway: airway patency satisfactory and stable  Dentition: dentition unchanged  Vital Signs Stable: post-procedure vital signs reviewed and stable  Report to RN Given: handoff report given  Patient transferred to: PACU    Handoff Report: Identifed the Patient, Identified the Reponsible Provider, Reviewed the pertinent medical history, Discussed the surgical course, Reviewed Intra-OP anesthesia mangement and issues during anesthesia, Set expectations for post-procedure period and Allowed opportunity for questions and acknowledgement of understanding      Vitals:  Vitals Value Taken Time   /71 11/04/22 1447   Temp     Pulse 72 11/04/22 1456   Resp 19 11/04/22 1456   SpO2 100 % 11/04/22 1456   Vitals shown include unvalidated device data.    Electronically Signed By: KAREN Elias CRNA  November 4, 2022  2:58 PM

## 2022-11-05 VITALS
HEIGHT: 67 IN | BODY MASS INDEX: 33.11 KG/M2 | OXYGEN SATURATION: 95 % | RESPIRATION RATE: 16 BRPM | SYSTOLIC BLOOD PRESSURE: 133 MMHG | TEMPERATURE: 96.4 F | DIASTOLIC BLOOD PRESSURE: 60 MMHG | WEIGHT: 210.98 LBS | HEART RATE: 70 BPM

## 2022-11-05 LAB
ANION GAP SERPL CALCULATED.3IONS-SCNC: 10 MMOL/L (ref 3–14)
BUN SERPL-MCNC: 20 MG/DL (ref 7–30)
CALCIUM SERPL-MCNC: 9.4 MG/DL (ref 8.5–10.1)
CHLORIDE BLD-SCNC: 100 MMOL/L (ref 94–109)
CO2 SERPL-SCNC: 30 MMOL/L (ref 20–32)
CREAT SERPL-MCNC: 1.07 MG/DL (ref 0.52–1.04)
ERYTHROCYTE [DISTWIDTH] IN BLOOD BY AUTOMATED COUNT: 12.9 % (ref 10–15)
GFR SERPL CREATININE-BSD FRML MDRD: 54 ML/MIN/1.73M2
GLUCOSE BLD-MCNC: 151 MG/DL (ref 70–99)
HCT VFR BLD AUTO: 39.4 % (ref 35–47)
HGB BLD-MCNC: 13.1 G/DL (ref 11.7–15.7)
MCH RBC QN AUTO: 30.5 PG (ref 26.5–33)
MCHC RBC AUTO-ENTMCNC: 33.2 G/DL (ref 31.5–36.5)
MCV RBC AUTO: 92 FL (ref 78–100)
PLATELET # BLD AUTO: 180 10E3/UL (ref 150–450)
POTASSIUM BLD-SCNC: 4.2 MMOL/L (ref 3.4–5.3)
RBC # BLD AUTO: 4.29 10E6/UL (ref 3.8–5.2)
SODIUM SERPL-SCNC: 140 MMOL/L (ref 133–144)
WBC # BLD AUTO: 14.7 10E3/UL (ref 4–11)

## 2022-11-05 PROCEDURE — 85027 COMPLETE CBC AUTOMATED: CPT | Performed by: STUDENT IN AN ORGANIZED HEALTH CARE EDUCATION/TRAINING PROGRAM

## 2022-11-05 PROCEDURE — 250N000013 HC RX MED GY IP 250 OP 250 PS 637: Performed by: STUDENT IN AN ORGANIZED HEALTH CARE EDUCATION/TRAINING PROGRAM

## 2022-11-05 PROCEDURE — 82310 ASSAY OF CALCIUM: CPT | Performed by: STUDENT IN AN ORGANIZED HEALTH CARE EDUCATION/TRAINING PROGRAM

## 2022-11-05 PROCEDURE — 36415 COLL VENOUS BLD VENIPUNCTURE: CPT | Performed by: STUDENT IN AN ORGANIZED HEALTH CARE EDUCATION/TRAINING PROGRAM

## 2022-11-05 PROCEDURE — 250N000011 HC RX IP 250 OP 636: Performed by: STUDENT IN AN ORGANIZED HEALTH CARE EDUCATION/TRAINING PROGRAM

## 2022-11-05 RX ORDER — SENNA AND DOCUSATE SODIUM 50; 8.6 MG/1; MG/1
1 TABLET, FILM COATED ORAL 2 TIMES DAILY PRN
Qty: 30 TABLET | Refills: 0 | Status: SHIPPED | OUTPATIENT
Start: 2022-11-05

## 2022-11-05 RX ADMIN — ACETAMINOPHEN 650 MG: 325 TABLET, FILM COATED ORAL at 04:47

## 2022-11-05 RX ADMIN — CEFAZOLIN 1 G: 1 INJECTION, POWDER, FOR SOLUTION INTRAMUSCULAR; INTRAVENOUS at 04:47

## 2022-11-05 RX ADMIN — SENNOSIDES AND DOCUSATE SODIUM 1 TABLET: 50; 8.6 TABLET ORAL at 08:13

## 2022-11-05 RX ADMIN — POLYETHYLENE GLYCOL 3350 17 G: 17 POWDER, FOR SOLUTION ORAL at 08:13

## 2022-11-05 RX ADMIN — HYDROCHLOROTHIAZIDE 12.5 MG: 12.5 TABLET ORAL at 08:13

## 2022-11-05 RX ADMIN — ACETAMINOPHEN 650 MG: 325 TABLET, FILM COATED ORAL at 15:44

## 2022-11-05 RX ADMIN — CEFAZOLIN 1 G: 1 INJECTION, POWDER, FOR SOLUTION INTRAMUSCULAR; INTRAVENOUS at 12:27

## 2022-11-05 ASSESSMENT — ACTIVITIES OF DAILY LIVING (ADL)
ADLS_ACUITY_SCORE: 38

## 2022-11-05 NOTE — PROGRESS NOTES
"Urology  Progress Note    ENRIQUE CADET  Slept well  Feels well, minimal pain  Tolerated liquids last night, hungry this AM    Exam  BP (!) 149/60 (BP Location: Left arm)   Pulse 67   Temp (!) 96.7  F (35.9  C) (Oral)   Resp 18   Ht 1.702 m (5' 7\")   Wt 95.7 kg (210 lb 15.7 oz)   SpO2 92%   BMI 33.04 kg/m    No acute distress  Unlabored breathing  Abdomen soft, nontender, nondistended. Incisions c/d/i with dermabond  BERNADETTE scant  Norris with clear yellow urine in tubing    /375  BERNADETTE 150/32    Labs  WBC 15  Hg 13  BMP pending    Assessment/Plan  72 year old y/o female POD#1 s/p left robotic nephrectomy for non-functional kidney, complicated by VICTOR M for dense adhesions to midline mesh.     Neuro: continue current regimen for pain control  CV: no acute concerns  Pulm: incentive spirometry while awake  FEN/GI: regular diet, no MIVC  Endo: no acute concerns  : norris out this AM  Heme/ID: s/p perioperative antibiotics  Activity: as tolerated  PPx: SCDs  Dispo: home today pending toleration of breakfast, ambulation; BERNADETTE out prior to discharge    Seen and examined with the chief resident and Dr James. Will discuss with Dr. La.    Genoveva Parish MD, PGY-4  Urology Resident     Contacting the Urology Team     Please use the following job codes to reach the Urology Team. Note that you must use an in house phone and that job codes cannot receive text pages.     On weekdays, dial [phone] (or star-star-star [phone] on the new [employer] telephones) then [phone] to reach the Adult Urology resident or PA on call    On weekdays, dial [phone] (or star-star-star [phone] on the new [employer] telephones) then [phone] to reach the Pediatric Urology resident    On weeknights and weekends, dial [phone] (or star-star-star [phone] on the new [employer] telephones) then [phone] to reach the Urology resident on call (for both Adult and Pediatrics)              "

## 2022-11-05 NOTE — PROGRESS NOTES
DISCHARGE SUMMARY    Pt discharging to: home  Transportation: wheelchair to    AVS given and discussed: yes  Stoplight Tool given and discussed: NA  Medications given: yes  Belongings returned: yes  Comments: tolerated well. Removed drain and provided with extra dressing change

## 2022-11-05 NOTE — PLAN OF CARE
"Goal Outcome Evaluation:      Plan of Care Reviewed With: patient          Outcome Evaluation: d/c to home today pending orders      VS: /60 (BP Location: Left arm)   Pulse 70   Temp (!) 96.4  F (35.8  C) (Oral)   Resp 16   Ht 1.702 m (5' 7\")   Wt 95.7 kg (210 lb 15.7 oz)   SpO2 95%   BMI 33.04 kg/m    Denies SOB, CP, N/T   O2: RA. LS CTA. IS in use   Output: Voided in BR, PVR 33 post norris removal   Last BM: 11/2, took bowel meds   Activity: Up with Ax1, WW, GB   Skin: Liquid bandage to abdomen incisions   Pain: Declined scheduled tylenol   CMS: Intact, AxO   Dressing: CDI to drain site   Diet: Reg   LDA: PIV  BERNADETTE   Equipment: Personal walker, Pulse ox, IV pole   Plan: Discharge to home today   Additional Info:  at bedside      "

## 2022-11-05 NOTE — PHARMACY-ADMISSION MEDICATION HISTORY
Admission Medication History Completed by Pharmacy    See [facility] Admission Navigator for allergy information, preferred outpatient pharmacy, prior to admission medications and immunization status.     Medication History Sources:     Surescripts, RN documented last doses    Changes made to PTA medication list (reason):    Added: None    Deleted: None    Changed: None    Additional Information:    None    Prior to Admission medications    Medication Sig Last Dose Taking? Auth Provider Long Term End Date   exemestane (AROMASIN) 25 MG tablet Take 1 tablet (25 mg) by mouth every morning 11/3/2022 at 0900 Yes Chad Hayden MD Yes    hydrochlorothiazide (HYDRODIURIL) 12.5 MG tablet Take 1 tablet (12.5 mg) by mouth daily  Patient taking differently: Take 12.5 mg by mouth every morning 11/3/2022 at 0900 Yes Eliazar Menendez MD Yes    oxyCODONE (ROXICODONE) 5 MG tablet Take 1 tablet (5 mg) by mouth every 6 hours as needed for severe pain  Yes Zhao La MD No 11/7/22   SENNA-docusate sodium (SENNA S) 8.6-50 MG tablet Take 1 tablet by mouth 2 times daily as needed (prevent opioid induced constipation)  Yes Zhao La MD         Date completed: 11/05/22    Medication history completed by: Genoveva Archer [facility]

## 2022-11-05 NOTE — PLAN OF CARE
VS: VSS, declined CAPNO after a while   O2: N/A   Output: Has patent norris catheter.   Last BM: 11/2   Activity: Slept for most of shift.   Skin: Liquid bandage to abdomen area.   Pain: Pt taking scheduled tylenol and said this is effective.   CMS: Intact, no new numbness or tingling.   Dressing: Liquid bandage   Diet: Regular   LDA: J/P bulb and norris   Equipment:    Plan: Pt would like to discharge today if possible   Additional Info: Gloria lady

## 2022-11-06 NOTE — DISCHARGE SUMMARY
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 PET of the neck, chest, abdomen, and pelvis.  2. PET CT Fusion for Attenuation Correction and Anatomical  Localization:    3. Diagnostic CT scan of the chest, abdomen, and pelvis with  intravenous contrast for interpretation.  4. 3D MIP and PET-CT fused images were processed on an independent  workstation and archived to PACS and reviewed by a radiologist.    Technique:    1. PET: The patient received 4.34 mCi of Cu-64 Dotatate; body weight  was 94.7 kg. Images were evaluated in the axial, sagittal, and coronal  planes as well as the rotational whole body MIP. Images were acquired  from the Vertex to the Feet.    UPTAKE WAS MEASURED AT 75 MINUTES.     BACKGROUND:  Liver SUV max= 8.6    2. CT: Volumetric acquisition for clinical interpretation of the  chest, abdomen, and pelvis acquired at 3 mm sections. The chest,  abdomen, and pelvis were evaluated at 5 mm sections in bone, soft  tissue, and lung windows.      The patient received 114 cc. of Isovue 370 intravenously for the  examination.   "  --    3. 3D MIP and PET-CT fused images were processed on an independent  workstation and archived to PACS and reviewed by a radiologist.    INDICATION: needed for assessment of response to lanreotide for  metastatic Carcinoid tumor; Malignant carcinoid tumor of cecum (H)    ADDITIONAL INFORMATION OBTAINED FROM EMR: \"metastatic carcinoid tumor  diagnosed in 2003 and early stage breast cancer diagnosed in 2016. She  also has a history of metastatic carcinoid tumor resulting in ureteral  obstruction requiring ureteral stent placement by urology. ?In 2003,  she underwent excision of the terminal ileum and right colon which  revealed a 2.4 cm invasive ileal carcinoid extending to the deep  muscularis propria with metastatic carcinoid tumor identified in 5 of  24 pericolonic lymph nodes. ?Residual tubulovillous polyp was  identified in the cecum. She is currently on adjuvant hormonal  treatment for breast cancer consisting of exemestane. ?Initially she  had been on anastrozole which resulted in rash and prompted her switch  to exemestane. ?She also has a history of cervical cancer treated with  surgical excision. She had.... Octreoscan which was completed on  February 10, 2022?which revealed progressive disease. ?She was then  started on lanreotide injections?which have been administered every 4  weeks -high risk drug therapy requiring intensive monitoring for  toxicity.\"?    Radiation therapy of left breast on 9/2/16.    COMPARISON: Octreoscan dated 2/11/2022    FINDINGS:     HEAD/NECK:  There is no  suspicious Dotatate uptake in the neck.     The paranasal sinuses are clear. The mastoid air cells are clear. The  mucosal pharyngeal space, the , prevertebral and carotid  spaces are within normal limits. No masses, mass effect or  pathologically enlarged lymph nodes are evident. The thyroid gland is  normal.    CHEST:  There is no suspicious Dotatate uptake in the chest.    Heart is not enlarged. No " pericardial effusion. Normal caliber  thoracic aorta and main pulmonary artery. Scattered coronary artery  atherosclerotic calcifications. No pathologically enlarged thoracic  lymph nodes.      Central tracheobronchial tree is patent. Postradiation changes in the  left upper lobe. Biapical scarring. No focal consolidative opacity,  pleural effusion, or pneumothorax. No suspicious pulmonary nodules or  masses.    ABDOMEN AND PELVIS:  Marked uptake in medial right lobe liver lesion with an SUV maximum of  50.1. Another lesion near the dome of the right lobe of the liver  demonstrates an SUV max of 22.7. There are at least 4 other new  lesions in the right liver which demonstrate increased Dotatate  uptake.    Two new peritoneal lesions in the right upper quadrant with SUV maxes  of 32 (series 12, image 77) and 12.6 (series 12, image 74).    Several new Dotatate avid foci are seen along the course of the small  bowel without any definite CT correlate. For example, in the mid right  abdomen (series 4, image 233) along a segment of small bowel there is  an SUV max of 39.    No substantial change in size of ill defined soft tissue enhancing  lesion in the left pelvis posterior to the left ureter with an SUV max  of 43.7(series 4, image 261)    Mild extrahepatic and intrahepatic biliary dilatation similar to prior  may be related to reservoir effect in the setting of prior  cholecystectomy. There is no splenomegaly or evidence for splenic or  pancreatic mass lesion. Presumed splenules in the left upper quadrant  are stable since 9/28/19. Left adrenal gland is normal. Right adrenal  gland is not well seen due to the adjacent liver mass. Normal  enhancement of the right kidney without evidence of hydronephrosis or  nephrolithiasis. Atrophic left kidney with double-J ureteral stent in  place. Stable mild periureteral stranding. No evidence of bowel  obstruction postsurgical changes of partial bowel resection in mid  abdomen.  Anastomosis is patent. There is no evidence for  diverticulitis, bowel obstruction or free fluid.      LOWER EXTREMITIES:   No abnormal masses or Dotatate avid lesions. Marked fatty atrophy of  the left lower extremity muscles.    BONES:   There are no suspicious lytic or blastic osseous lesions.  There is no  abnormal Dotatate uptake in the skeleton.    Soft tissues: Scattered soft tissue attenuating nodular densities in  the posterior subcutaneous fat may be related to prior injections.      Impression    IMPRESSION: In this patient with history of metastatic carcinoid tumor  status post right ileal resection and right colectomy currently  receiving lanreotide injections:    1. Evidence of disease progression:  1a. Multiple new Dotatate avid liver lesions.  1b. Multiple new Dotatate avid peritoneal soft tissue nodules. At  least two Dotatate avid foci along the course of the small bowel,  likely serosal implants.  1c. Stable size of Dotatate avid ill defined left pelvic mass.  2. Postradiation changes to the left upper lung.    I have personally reviewed the examination and initial interpretation  and I agree with the findings.    NEO STEVENS MD         SYSTEM ID:  [id number]   CT Chest/Abdomen/Pelvis w Contrast    Narrative    Combined Report of: PET Dotatate and CT on  7/25/2022 2:04 PM :    1. PET of the neck, chest, abdomen, and pelvis.  2. PET CT Fusion for Attenuation Correction and Anatomical  Localization:    3. Diagnostic CT scan of the chest, abdomen, and pelvis with  intravenous contrast for interpretation.  4. 3D MIP and PET-CT fused images were processed on an independent  workstation and archived to PACS and reviewed by a radiologist.    Technique:    1. PET: The patient received 4.34 mCi of Cu-64 Dotatate; body weight  was 94.7 kg. Images were evaluated in the axial, sagittal, and coronal  planes as well as the rotational whole body MIP. Images were acquired  from the Vertex to the Feet.    UPTAKE  "WAS MEASURED AT 75 MINUTES.     BACKGROUND:  Liver SUV max= 8.6    2. CT: Volumetric acquisition for clinical interpretation of the  chest, abdomen, and pelvis acquired at 3 mm sections. The chest,  abdomen, and pelvis were evaluated at 5 mm sections in bone, soft  tissue, and lung windows.      The patient received 114 cc. of Isovue 370 intravenously for the  examination.    --    3. 3D MIP and PET-CT fused images were processed on an independent  workstation and archived to PACS and reviewed by a radiologist.    INDICATION: needed for assessment of response to lanreotide for  metastatic Carcinoid tumor; Malignant carcinoid tumor of cecum (H)    ADDITIONAL INFORMATION OBTAINED FROM EMR: \"metastatic carcinoid tumor  diagnosed in 2003 and early stage breast cancer diagnosed in 2016. She  also has a history of metastatic carcinoid tumor resulting in ureteral  obstruction requiring ureteral stent placement by urology. ?In 2003,  she underwent excision of the terminal ileum and right colon which  revealed a 2.4 cm invasive ileal carcinoid extending to the deep  muscularis propria with metastatic carcinoid tumor identified in 5 of  24 pericolonic lymph nodes. ?Residual tubulovillous polyp was  identified in the cecum. She is currently on adjuvant hormonal  treatment for breast cancer consisting of exemestane. ?Initially she  had been on anastrozole which resulted in rash and prompted her switch  to exemestane. ?She also has a history of cervical cancer treated with  surgical excision. She had.... Octreoscan which was completed on  February 10, 2022?which revealed progressive disease. ?She was then  started on lanreotide injections?which have been administered every 4  weeks -high risk drug therapy requiring intensive monitoring for  toxicity.\"?    Radiation therapy of left breast on 9/2/16.    COMPARISON: Octreoscan dated 2/11/2022    FINDINGS:     HEAD/NECK:  There is no  suspicious Dotatate uptake in the neck.     The " paranasal sinuses are clear. The mastoid air cells are clear. The  mucosal pharyngeal space, the , prevertebral and carotid  spaces are within normal limits. No masses, mass effect or  pathologically enlarged lymph nodes are evident. The thyroid gland is  normal.    CHEST:  There is no suspicious Dotatate uptake in the chest.    Heart is not enlarged. No pericardial effusion. Normal caliber  thoracic aorta and main pulmonary artery. Scattered coronary artery  atherosclerotic calcifications. No pathologically enlarged thoracic  lymph nodes.      Central tracheobronchial tree is patent. Postradiation changes in the  left upper lobe. Biapical scarring. No focal consolidative opacity,  pleural effusion, or pneumothorax. No suspicious pulmonary nodules or  masses.    ABDOMEN AND PELVIS:  Marked uptake in medial right lobe liver lesion with an SUV maximum of  50.1. Another lesion near the dome of the right lobe of the liver  demonstrates an SUV max of 22.7. There are at least 4 other new  lesions in the right liver which demonstrate increased Dotatate  uptake.    Two new peritoneal lesions in the right upper quadrant with SUV maxes  of 32 (series 12, image 77) and 12.6 (series 12, image 74).    Several new Dotatate avid foci are seen along the course of the small  bowel without any definite CT correlate. For example, in the mid right  abdomen (series 4, image 233) along a segment of small bowel there is  an SUV max of 39.    No substantial change in size of ill defined soft tissue enhancing  lesion in the left pelvis posterior to the left ureter with an SUV max  of 43.7(series 4, image 261)    Mild extrahepatic and intrahepatic biliary dilatation similar to prior  may be related to reservoir effect in the setting of prior  cholecystectomy. There is no splenomegaly or evidence for splenic or  pancreatic mass lesion. Presumed splenules in the left upper quadrant  are stable since 9/28/19. Left adrenal gland is  normal. Right adrenal  gland is not well seen due to the adjacent liver mass. Normal  enhancement of the right kidney without evidence of hydronephrosis or  nephrolithiasis. Atrophic left kidney with double-J ureteral stent in  place. Stable mild periureteral stranding. No evidence of bowel  obstruction postsurgical changes of partial bowel resection in mid  abdomen. Anastomosis is patent. There is no evidence for  diverticulitis, bowel obstruction or free fluid.      LOWER EXTREMITIES:   No abnormal masses or Dotatate avid lesions. Marked fatty atrophy of  the left lower extremity muscles.    BONES:   There are no suspicious lytic or blastic osseous lesions.  There is no  abnormal Dotatate uptake in the skeleton.    Soft tissues: Scattered soft tissue attenuating nodular densities in  the posterior subcutaneous fat may be related to prior injections.      Impression    IMPRESSION: In this patient with history of metastatic carcinoid tumor  status post right ileal resection and right colectomy currently  receiving lanreotide injections:    1. Evidence of disease progression:  1a. Multiple new Dotatate avid liver lesions.  1b. Multiple new Dotatate avid peritoneal soft tissue nodules. At  least two Dotatate avid foci along the course of the small bowel,  likely serosal implants.  1c. Stable size of Dotatate avid ill defined left pelvic mass.  2. Postradiation changes to the left upper lung.    I have personally reviewed the examination and initial interpretation  and I agree with the findings.    NEO STEVENS MD         SYSTEM ID:  [id number]            Medications Prior to Admission:     No medications prior to admission.            Discharge Medications:     Discharge Medication List as of 11/5/2022  3:25 PM      START taking these medications    Details   oxyCODONE (ROXICODONE) 5 MG tablet Take 1 tablet (5 mg) by mouth every 6 hours as needed for severe pain, Disp-12 tablet, R-0, Local Print      SENNA-docusate  sodium (SENNA S) 8.6-50 MG tablet Take 1 tablet by mouth 2 times daily as needed (prevent opioid induced constipation), Disp-30 tablet, R-0, E-PrescribeTake 1-2 times daily as needed to prevent constipation. Always take if using oxycodone         CONTINUE these medications which have NOT CHANGED    Details   exemestane (AROMASIN) 25 MG tablet Take 1 tablet (25 mg) by mouth every morning, Disp-90 tablet, R-1, E-Prescribe      hydrochlorothiazide (HYDRODIURIL) 12.5 MG tablet Take 1 tablet (12.5 mg) by mouth daily, Disp-90 tablet, R-3, E-Prescribe                    Brief History of Illness:   Reason for admission requiring a surgical or invasive procedure:   Non-functioning kidney [N28.9]   The patient underwent the following procedure(s):   See above   There were no immediate complications during this procedure.    Please refer to the full operative summary for details.           Hospital Course:   The patient's hospital course was unremarkable.  Marissa Guadarrama recovered as anticipated and experienced no post-operative complications.      On POD#1 patient was ambulating without assitance, tolerating the discharge diet, had pain controlled with PO medications to go home with, and requiring no IV medications or fluids. Drain was removed. Patient was discharged home with appropriate contact information, follow-up and instructions as seen below in the discharge paperwork.         Final Pathology Result:   Pending at time of discharge         Discharge Instructions and Follow-Up:     Discharge Procedure Orders   Reason for your hospital stay   Order Comments: You were admitted for kidney surgery     Activity   Order Comments: Your activity upon discharge: activity as tolerated; no heavy lifting >15 pounds x 6 weeks     Order Specific Question Answer Comments   Is discharge order? Yes      Discharge Instructions   Order Comments: Activity  - No strenuous exercise for 6 weeks.  - No lifting, pushing, pulling more than  10 pounds for 6 weeks.   - Do not strain with bowel movements.  - Do not drive until you can press the brake pedal quickly and fully without pain.   - Do not operate a motor vehicle while taking narcotic pain medications.     Diet  You may resume your regular post-procedure diet  Many patients do not regain their full appetite for several weeks after surgery  Take it slow, eating small meals frequently  If you notice you are passing less gas or feeling bloated, stop eating solid foods and stick to liquids for the next several hours until you begin to pass gas again.  You are not required to have a bowel movement prior to leaving the hospital. Some patients take several days for bowel movements to return due to anesthesia and pain medications.     Incisions  - You may shower and get incisions wet starting 48 hrs after surgery.  - Do not scrub incisions or submerge wounds for 2 weeks or until seen in follow-up.   - Remove wound dressing 48 hours after surgery.   - If purple dermabond glue was used, avoid applying any lotions or ointments.   - If steri-strips were used, they will fall off on their own.   - Leave incision open to air. Cover with gauze only if needed for comfort or to protect clothing from drainage.   - The stitches do not need to be removed, they will dissolve on their own.    Medications  - Transition from narcotic pain medications to tylenol (acetaminophen) as you are able.  Wean yourself off all pain medications as you are able.  - Some pain medications contain both tylenol (acetaminophen) and a narcotic (Norco, vicodin, percocet), do not take more than 4,000mg of Tylenol (acetaminophen) from all sources in any 24 hour period.  - Narcotics can make you constipated.  Take over the counter fiber (metamucil or benefiber) and stool softeners (miralax, docusate or senna) while taking narcotic pain medications, but stop if you develop diarrhea.  - No driving or operating machinery while taking narcotic  "pain medications     Follow-Up:  - Follow up with Dr La on 11/13.  - Call or return sooner than your regularly scheduled visit if you develop any of the following: fever (greater than 101.5), uncontrolled pain, uncontrolled nausea or vomiting, or inability to urinate.    Phone numbers:   - Monday through Friday 8am to 4:30pm: Call [phone] with questions, requests for medication refills, or to schedule or confirm an appointment.  - Nights or weekends: call the after hours emergency pager - [phone] and tell the  \"I would like to page the Urology Resident on call.\" Please note, due to prescribing laws, resident physicians are unable to prescribe narcotics after-hours. If you feel as though you will need a refill of a narcotic pain medication, you will need to call the clinic during business hours OR seek emergency care.  - For emergencies, call [phone]     Follow Up ([facility])   Order Comments: Follow up as scheduled 11/14 with Dr La    Appointments on [place] and/or [facility] (with [facility] or [facility] provider or service). Call [phone] if you haven't heard regarding these appointments within 7 days of discharge.     Diet   Order Comments: Follow this diet upon discharge: Orders Placed This Encounter      Regular Diet Adult     Order Specific Question Answer Comments   Is discharge order? Yes             Discharge Disposition:     Discharged to Home      Condition at discharge: Good    --    Genoveva Parish MD  Urology Resident    8:00 AM, 11/6/2022    "

## 2022-11-07 ENCOUNTER — PATIENT OUTREACH (OUTPATIENT)
Dept: FAMILY MEDICINE | Facility: CLINIC | Age: 72
End: 2022-11-07

## 2022-11-07 ENCOUNTER — PRE VISIT (OUTPATIENT)
Dept: UROLOGY | Facility: CLINIC | Age: 72
End: 2022-11-07

## 2022-11-07 NOTE — TELEPHONE ENCOUNTER
Reason for visit: post op      Dx/Hx/Sx: non-functional left kidney     Records/imaging/labs/orders: pathology in epic    At Rooming: video visit

## 2022-11-07 NOTE — ANESTHESIA POSTPROCEDURE EVALUATION
Patient: Marissa Guadarrama    Procedure: Procedure(s):  , LEFT NEPHRECTOMY, ROBOT-ASSISTED  CYSTOSCOPY, LEFT STENT REMOVAL       Anesthesia Type:  General    Note:  Disposition: Outpatient   Postop Pain Control: Uneventful            Sign Out: Well controlled pain   PONV: No   Neuro/Psych: Uneventful            Sign Out: Acceptable/Baseline neuro status   Airway/Respiratory: Uneventful            Sign Out: Acceptable/Baseline resp. status   CV/Hemodynamics: Uneventful            Sign Out: Acceptable CV status; No obvious hypovolemia; No obvious fluid overload   Other NRE: NONE   DID A NON-ROUTINE EVENT OCCUR? No           Last vitals:  Vitals Value Taken Time   /71 11/04/22 1800   Temp 36.3  C (97.3  F) 11/04/22 1630   Pulse 80 11/04/22 1819   Resp 14 11/04/22 1820   SpO2 96 % 11/04/22 1822   Vitals shown include unvalidated device data.    Electronically Signed By: Seth Christian MD, MD  November 7, 2022  8:35 AM

## 2022-11-08 ENCOUNTER — PATIENT OUTREACH (OUTPATIENT)
Dept: CARE COORDINATION | Facility: CLINIC | Age: 72
End: 2022-11-08

## 2022-11-08 NOTE — TELEPHONE ENCOUNTER
ED / Discharge Outreach Protocol    Patient Contact    Attempt # 2    Was call answered?  No.  Left message on voicemail with information to call me back.    Karolina Guerrero RN

## 2022-11-08 NOTE — TELEPHONE ENCOUNTER
"ED / Discharge Outreach Protocol    Patient Contact    Attempt # 1    Was call answered?  No.  Left message with   with information to call me back.  \"She is sleeping right now.\"    Karolina Guerrero RN   "

## 2022-11-08 NOTE — PROGRESS NOTES
"[facility] Contact  [facility]/Voicemail     Clinical Data: Transitional Care Management Outreach     Outreach attempted x 1 by [facility] staff on 11/7- patient answered, stated \"f** you\" and ended the call.  Prior to making second outreach attempt it is noted that patient's PCP care team has also made outreach attempts for same purpose of post-discharge outreach.     Plan:  [facility] will do no further outreaches at this time.       Gina Flores RN  [facility], [facility]    *[facility] Care Resource Team does NOT follow patient ongoing. Referrals are identified based on internal discharge reports and the outreach is to ensure patient has an understanding of their discharge instructions.  "

## 2022-11-09 LAB
PATH REPORT.COMMENTS IMP SPEC: NORMAL
PATH REPORT.COMMENTS IMP SPEC: NORMAL
PATH REPORT.FINAL DX SPEC: NORMAL
PATH REPORT.GROSS SPEC: NORMAL
PATH REPORT.RELEVANT HX SPEC: NORMAL
PHOTO IMAGE: NORMAL

## 2022-11-09 PROCEDURE — 88307 TISSUE EXAM BY PATHOLOGIST: CPT | Mod: 26 | Performed by: PATHOLOGY

## 2022-11-09 NOTE — TELEPHONE ENCOUNTER
"  ED for acute condition Discharge Protocol    \"Hi, my name is Tiara Delcid RN, a registered nurse, and I am calling from [facility].  I am calling to follow up and see how things are going for you after your recent emergency visit.\"    Tell me how you are doing now that you are home?\" fine just tired.      Discharge Instructions    \"Let's review your discharge instructions.  What is/are the follow-up recommendations?  Pt. Response: appt scheduled    \"Has an appointment with your primary care provider been scheduled?\"  No (not needed)    Medications    \"Tell me what changed about your medicines when you discharged?\"    Pain medication    \"What questions do you have about your medications?\"   None        Call Summary    \"What questions or concerns do you have about your recent visit and your follow-up care?\"     none    \"If you have questions or things don't continue to improve, we encourage you contact us through the main clinic number (give number).  Even if the clinic is not open, triage nurses are available 24/7 to help you.     We would like you to know that our clinic has extended hours (provide information).  We also have urgent care (provide details on closest location and hours/contact info)\"    \"Thank you for your time and take care!\"                "

## 2022-11-10 ENCOUNTER — HOSPITAL ENCOUNTER (OUTPATIENT)
Dept: PET IMAGING | Facility: CLINIC | Age: 72
Discharge: HOME | End: 2022-11-10
Attending: INTERNAL MEDICINE | Admitting: INTERNAL MEDICINE
Payer: MEDICARE

## 2022-11-10 DIAGNOSIS — C7A.021 MALIGNANT CARCINOID TUMOR OF CECUM (H): ICD-10-CM

## 2022-11-10 LAB
CREAT BLD-MCNC: 1 MG/DL (ref 0.5–1)
GFR SERPL CREATININE-BSD FRML MDRD: 59 ML/MIN/1.73M2

## 2022-11-10 PROCEDURE — 74177 CT ABD & PELVIS W/CONTRAST: CPT | Mod: 26

## 2022-11-10 PROCEDURE — 250N000011 HC RX IP 250 OP 636: Performed by: INTERNAL MEDICINE

## 2022-11-10 PROCEDURE — 78816 PET IMAGE W/CT FULL BODY: CPT | Mod: 26

## 2022-11-10 PROCEDURE — G1010 CDSM STANSON: HCPCS | Mod: PS

## 2022-11-10 PROCEDURE — 71260 CT THORAX DX C+: CPT | Mod: 26

## 2022-11-10 PROCEDURE — 82565 ASSAY OF CREATININE: CPT

## 2022-11-10 PROCEDURE — A9592 HC RX IP 250 OP 636: HCPCS | Performed by: INTERNAL MEDICINE

## 2022-11-10 PROCEDURE — G1010 CDSM STANSON: HCPCS

## 2022-11-10 PROCEDURE — 74177 CT ABD & PELVIS W/CONTRAST: CPT

## 2022-11-10 RX ORDER — IOPAMIDOL 755 MG/ML
45-135 INJECTION, SOLUTION INTRAVASCULAR ONCE
Status: COMPLETED | OUTPATIENT
Start: 2022-11-10 | End: 2022-11-10

## 2022-11-10 RX ADMIN — COPPER CU 64 DOTATATE 4.8 MCI.: 1 INJECTION, SOLUTION INTRAVENOUS at 12:49

## 2022-11-10 RX ADMIN — IOPAMIDOL 116 ML: 755 INJECTION, SOLUTION INTRAVENOUS at 13:53

## 2022-11-11 ENCOUNTER — INFUSION THERAPY VISIT (OUTPATIENT)
Dept: INFUSION THERAPY | Facility: CLINIC | Age: 72
End: 2022-11-11
Attending: INTERNAL MEDICINE
Payer: MEDICARE

## 2022-11-11 ENCOUNTER — APPOINTMENT (OUTPATIENT)
Dept: LAB | Facility: CLINIC | Age: 72
End: 2022-11-11
Payer: MEDICARE

## 2022-11-11 VITALS
TEMPERATURE: 98.5 F | HEART RATE: 65 BPM | DIASTOLIC BLOOD PRESSURE: 82 MMHG | SYSTOLIC BLOOD PRESSURE: 136 MMHG | RESPIRATION RATE: 18 BRPM

## 2022-11-11 DIAGNOSIS — C7A.021 MALIGNANT CARCINOID TUMOR OF CECUM (H): Primary | ICD-10-CM

## 2022-11-11 LAB
CEA SERPL-MCNC: 3.6 NG/ML
LDH SERPL L TO P-CCNC: 174 U/L (ref 0–250)

## 2022-11-11 PROCEDURE — 86316 IMMUNOASSAY TUMOR OTHER: CPT | Performed by: INTERNAL MEDICINE

## 2022-11-11 PROCEDURE — 82378 CARCINOEMBRYONIC ANTIGEN: CPT | Performed by: INTERNAL MEDICINE

## 2022-11-11 PROCEDURE — 83615 LACTATE (LD) (LDH) ENZYME: CPT | Performed by: INTERNAL MEDICINE

## 2022-11-11 PROCEDURE — 250N000011 HC RX IP 250 OP 636: Performed by: INTERNAL MEDICINE

## 2022-11-11 PROCEDURE — 84260 ASSAY OF SEROTONIN: CPT | Performed by: INTERNAL MEDICINE

## 2022-11-11 PROCEDURE — 96372 THER/PROPH/DIAG INJ SC/IM: CPT | Performed by: INTERNAL MEDICINE

## 2022-11-11 PROCEDURE — 36415 COLL VENOUS BLD VENIPUNCTURE: CPT

## 2022-11-11 RX ORDER — LANREOTIDE ACETATE 120 MG/.5ML
120 INJECTION SUBCUTANEOUS ONCE
Status: COMPLETED | OUTPATIENT
Start: 2022-11-11 | End: 2022-11-11

## 2022-11-11 RX ADMIN — LANREOTIDE ACETATE 120 MG: 120 INJECTION SUBCUTANEOUS at 15:34

## 2022-11-11 NOTE — PROGRESS NOTES
Infusion Nursing Note:  Marissa A Chiara presents today for Somatuline.    Patient seen by provider today: No   present during visit today: Not Applicable.    Note: N/A.    Intravenous Access:  Labs drawn peripherally by .    Treatment Conditions:  Not Applicable.    Post Infusion Assessment:  Patient tolerated injection without incident.  Site patent and intact, free from redness, edema or discomfort.  No evidence of extravasations.     Discharge Plan:   Discharge instructions reviewed with: Patient.  Patient and/or family verbalized understanding of discharge instructions and all questions answered.  Copy of AVS reviewed with patient and/or family.  Patient will return 11/16/2022 for telephone visit with Dr. Hayden for next appointment.  Patient discharged in stable condition accompanied by: self.  Departure Mode: Ambulatory.    Crista Jaramillo RN

## 2022-11-14 ENCOUNTER — OFFICE VISIT (OUTPATIENT)
Dept: UROLOGY | Facility: CLINIC | Age: 72
End: 2022-11-14
Payer: COMMERCIAL

## 2022-11-14 VITALS
HEIGHT: 68 IN | BODY MASS INDEX: 30.31 KG/M2 | SYSTOLIC BLOOD PRESSURE: 132 MMHG | DIASTOLIC BLOOD PRESSURE: 90 MMHG | WEIGHT: 200 LBS | HEART RATE: 70 BPM

## 2022-11-14 DIAGNOSIS — N13.5 OBSTRUCTION OF LEFT URETER: Primary | ICD-10-CM

## 2022-11-14 PROCEDURE — 99024 POSTOP FOLLOW-UP VISIT: CPT | Performed by: UROLOGY

## 2022-11-14 ASSESSMENT — PAIN SCALES - GENERAL: PAINLEVEL: MODERATE PAIN (4)

## 2022-11-14 NOTE — PROGRESS NOTES
CC: post-op after nephrectomy    HPI: 73 yo female with metastatic carcinoid tumor obstructing the ureter followed in our clinic for a non-functioning kidney.  She underwent nephrectomy on 11/4/22.  She notes significant fatigue but slow improvement since surgery.    OBJECTIVE:  Pathology from 11/4/22 shows no cancer, but arterionephrosclerosis    Abd soft, wounds C/D/I    ASSESSMENT AND PLAN: Patient doing well after nephrectomy though recovery is slow.  She remains on a lifting restriction for 6 weeks.  She will follow up with our PA at that time, and then if things are well, can follow up in urology clinic PRN.

## 2022-11-14 NOTE — NURSING NOTE
"Chief Complaint   Patient presents with     Surgical Followup       Blood pressure (!) 132/90, pulse 70, height 1.715 m (5' 7.5\"), weight 90.7 kg (200 lb), not currently breastfeeding. Body mass index is 30.86 kg/m .    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)     Serum calcium elevated     Peritoneal metastases (H)     Ureteral obstruction     Rheumatoid arthritis with positive rheumatoid factor, involving unspecified site (H)     Osteopenia of hip     Need for prophylactic chemotherapy     Obstruction of left ureter     HSIL (high grade squamous intraepithelial lesion) on Pap smear of cervix     Ureteral obstruction, left     Other hydronephrosis     Malignant carcinoid tumor of cecum (H)       No Known Allergies    Current Outpatient Medications   Medication Sig Dispense Refill     exemestane (AROMASIN) 25 MG tablet Take 1 tablet (25 mg) by mouth every morning 90 tablet 1     hydrochlorothiazide (HYDRODIURIL) 12.5 MG tablet Take 1 tablet (12.5 mg) by mouth daily (Patient taking differently: Take 12.5 mg by mouth every morning) 90 tablet 3     SENNA-docusate sodium (SENNA S) 8.6-50 MG tablet Take 1 tablet by mouth 2 times daily as needed (prevent opioid induced constipation) 30 tablet 0       Social History     Tobacco Use     Smoking status: Former     Packs/day: 1.00     Years: 29.00     Pack years: 29.00     Types: Cigarettes     Quit date: 10/30/2006     Years since quittin.0     Smokeless tobacco: Never   Vaping Use     Vaping Use: Never used   Substance Use " Topics     Alcohol use: No     Alcohol/week: 0.0 standard drinks     Comment: 1 drink per year     Drug use: Fely Majano  11/14/2022  11:07 AM

## 2022-11-14 NOTE — PATIENT INSTRUCTIONS
Please follow up in 6 weeks with TIMOTHY Anglin at the [facility].    It was a pleasure meeting with you today.  Thank you for allowing me and my team the privilege of caring for you today.  YOU are the reason we are here, and I truly hope we provided you with the excellent service you deserve.  Please let us know if there is anything else we can do for you so that we can be sure you are leaving completely satisfied with your care experience.

## 2022-11-14 NOTE — LETTER
11/14/2022       RE: Marissa Guadarrama  [address] [address]     Dear Colleague,    Thank you for referring your patient, Marissa Guadarrama, to the [facility]OLOG[facility] [place] at [facility]. Please see a copy of my visit note below.    CC: post-op after nephrectomy    HPI: 71 yo female with metastatic carcinoid tumor obstructing the ureter followed in our clinic for a non-functioning kidney.  She underwent nephrectomy on 11/4/22.  She notes significant fatigue but slow improvement since surgery.    OBJECTIVE:  Pathology from 11/4/22 shows no cancer, but arterionephrosclerosis    Abd soft, wounds C/D/I    ASSESSMENT AND PLAN: Patient doing well after nephrectomy though recovery is slow.  She remains on a lifting restriction for 6 weeks.  She will follow up with our PA at that time, and then if things are well, can follow up in urology clinic PRN.    Zhao La MD

## 2022-11-15 LAB
CGA SERPL-MCNC: 438 NG/ML
SEROTONIN BLD-MCNC: 701 NG/ML

## 2022-11-16 ENCOUNTER — VIRTUAL VISIT (OUTPATIENT)
Dept: ONCOLOGY | Facility: CLINIC | Age: 72
End: 2022-11-16
Attending: INTERNAL MEDICINE
Payer: COMMERCIAL

## 2022-11-16 DIAGNOSIS — C7A.021 MALIGNANT CARCINOID TUMOR OF CECUM (H): Primary | ICD-10-CM

## 2022-11-16 PROCEDURE — 99213 OFFICE O/P EST LOW 20 MIN: CPT | Mod: 95 | Performed by: INTERNAL MEDICINE

## 2022-11-16 RX ORDER — DIPHENHYDRAMINE HYDROCHLORIDE 50 MG/ML
50 INJECTION INTRAMUSCULAR; INTRAVENOUS
Status: CANCELLED
Start: 2023-02-03

## 2022-11-16 RX ORDER — MEPERIDINE HYDROCHLORIDE 25 MG/ML
25 INJECTION INTRAMUSCULAR; INTRAVENOUS; SUBCUTANEOUS EVERY 30 MIN PRN
Status: CANCELLED | OUTPATIENT
Start: 2023-02-03

## 2022-11-16 RX ORDER — EPINEPHRINE 1 MG/ML
0.3 INJECTION, SOLUTION, CONCENTRATE INTRAVENOUS EVERY 5 MIN PRN
Status: CANCELLED | OUTPATIENT
Start: 2023-02-03

## 2022-11-16 RX ORDER — ALBUTEROL SULFATE 0.83 MG/ML
2.5 SOLUTION RESPIRATORY (INHALATION)
Status: CANCELLED | OUTPATIENT
Start: 2023-02-03

## 2022-11-16 RX ORDER — ALBUTEROL SULFATE 0.83 MG/ML
2.5 SOLUTION RESPIRATORY (INHALATION)
Status: CANCELLED | OUTPATIENT
Start: 2023-01-06

## 2022-11-16 RX ORDER — LANREOTIDE ACETATE 120 MG/.5ML
120 INJECTION SUBCUTANEOUS ONCE
Status: CANCELLED
Start: 2023-02-03 | End: 2023-01-18

## 2022-11-16 RX ORDER — LANREOTIDE ACETATE 120 MG/.5ML
120 INJECTION SUBCUTANEOUS ONCE
Status: CANCELLED
Start: 2023-01-06 | End: 2022-12-21

## 2022-11-16 RX ORDER — METHYLPREDNISOLONE SODIUM SUCCINATE 125 MG/2ML
125 INJECTION, POWDER, LYOPHILIZED, FOR SOLUTION INTRAMUSCULAR; INTRAVENOUS
Status: CANCELLED
Start: 2023-02-03

## 2022-11-16 RX ORDER — ALBUTEROL SULFATE 90 UG/1
1-2 AEROSOL, METERED RESPIRATORY (INHALATION)
Status: CANCELLED
Start: 2023-01-06

## 2022-11-16 RX ORDER — METHYLPREDNISOLONE SODIUM SUCCINATE 125 MG/2ML
125 INJECTION, POWDER, LYOPHILIZED, FOR SOLUTION INTRAMUSCULAR; INTRAVENOUS
Status: CANCELLED
Start: 2023-01-06

## 2022-11-16 RX ORDER — MEPERIDINE HYDROCHLORIDE 25 MG/ML
25 INJECTION INTRAMUSCULAR; INTRAVENOUS; SUBCUTANEOUS EVERY 30 MIN PRN
Status: CANCELLED | OUTPATIENT
Start: 2023-01-06

## 2022-11-16 RX ORDER — EPINEPHRINE 1 MG/ML
0.3 INJECTION, SOLUTION, CONCENTRATE INTRAVENOUS EVERY 5 MIN PRN
Status: CANCELLED | OUTPATIENT
Start: 2023-01-06

## 2022-11-16 RX ORDER — NALOXONE HYDROCHLORIDE 0.4 MG/ML
0.2 INJECTION, SOLUTION INTRAMUSCULAR; INTRAVENOUS; SUBCUTANEOUS
Status: CANCELLED | OUTPATIENT
Start: 2023-01-06

## 2022-11-16 RX ORDER — DIPHENHYDRAMINE HYDROCHLORIDE 50 MG/ML
50 INJECTION INTRAMUSCULAR; INTRAVENOUS
Status: CANCELLED
Start: 2023-01-06

## 2022-11-16 RX ORDER — NALOXONE HYDROCHLORIDE 0.4 MG/ML
0.2 INJECTION, SOLUTION INTRAMUSCULAR; INTRAVENOUS; SUBCUTANEOUS
Status: CANCELLED | OUTPATIENT
Start: 2023-02-03

## 2022-11-16 RX ORDER — ALBUTEROL SULFATE 90 UG/1
1-2 AEROSOL, METERED RESPIRATORY (INHALATION)
Status: CANCELLED
Start: 2023-02-03

## 2022-11-16 NOTE — LETTER
11/16/2022         RE: Marissa Guadarrama  [address] [facility] [address]        Dear Colleague,    Thank you for referring your patient, Marissa Guadarrama, to the [facility]. Please see a copy of my visit note below.    Marissa is a 72 year old who is being evaluated via a billable telephone visit.      What phone number would you like to be contacted at? [phone]  How would you like to obtain your AVS? Mail a copy     Rhea Bloom CMA on 11/16/2022 at 2:41 PM          The patient was called for a billable telephone visit regarding the following issue/s:  1. Malignant carcinoid tumor of cecum (H)      She started lanreotide in March.  Her serum chromogranin A level was in the 800s and has gradually decreased since then and reached a plateau in the 400s in the last 6 months.    She just has her nonfunctioning left kidney resected.  She is recovering well from her surgery.    She has no acute complaints and specifically denies any problems of bowel movements or pain.    ASSESSMENT/PLAN:    1. Malignant carcinoid tumor of cecum (H)      Recent Dotatate PET from November 4, 2022 is consistent with stable disease when compared to her July 2022 imaging.      Chromogranin a levels also remained stable and I recommend that you continue with lanreotide injections.    You were in agreement and my clinic staff will schedule you to come in for your next lanreotide injection next Wednesday.    I recommend to subsequently continue with lanreotide injections every 4 weeks and return for your next office visit with me on February 15, 2022.    I spent a total of 11 minutes on the telephone medical discussion with the patient today.  The patient was at her home in [place] and I was onsite at [facility] in [place], [place].    The patient provided verbal consent for telephone visit.      .  ..            Again, thank you for allowing me to participate in the care  of your patient.        Sincerely,        Chad Hayden MD

## 2022-11-16 NOTE — PATIENT INSTRUCTIONS
My clinic staff will schedule you to come in for your next lanreotide injection next Wednesday.    I recommend to subsequently continue with lanreotide injections every 4 weeks and return for your next office visit with me on February 15, 2022.

## 2022-11-16 NOTE — PROGRESS NOTES
The patient was called for a billable telephone visit regarding the following issue/s:  1. Malignant carcinoid tumor of cecum (H)      She started lanreotide in March.  Her serum chromogranin A level was in the 800s and has gradually decreased since then and reached a plateau in the 400s in the last 6 months.    She just has her nonfunctioning left kidney resected.  She is recovering well from her surgery.    She has no acute complaints and specifically denies any problems of bowel movements or pain.    ASSESSMENT/PLAN:    1. Malignant carcinoid tumor of cecum (H)      Recent Dotatate PET from November 4, 2022 is consistent with stable disease when compared to her July 2022 imaging.      Chromogranin a levels also remained stable and I recommend that you continue with lanreotide injections.    You were in agreement and my clinic staff will schedule you to come in for your next lanreotide injection next Wednesday.    I recommend to subsequently continue with lanreotide injections every 4 weeks and return for your next office visit with me on February 15, 2022.    I spent a total of 11 minutes on the telephone medical discussion with the patient today.  The patient was at her home in [place] and I was onsite at [facility] in [place], [place].    The patient provided verbal consent for telephone visit.      .  ..

## 2022-11-16 NOTE — PROGRESS NOTES
Marissa is a 72 year old who is being evaluated via a billable telephone visit.      What phone number would you like to be contacted at? [phone]  How would you like to obtain your AVS? Mail a copy     Rhea Bloom CMA on 11/16/2022 at 2:41 PM

## 2022-12-09 ENCOUNTER — INFUSION THERAPY VISIT (OUTPATIENT)
Dept: INFUSION THERAPY | Facility: CLINIC | Age: 72
End: 2022-12-09
Attending: INTERNAL MEDICINE
Payer: MEDICARE

## 2022-12-09 ENCOUNTER — APPOINTMENT (OUTPATIENT)
Dept: LAB | Facility: CLINIC | Age: 72
End: 2022-12-09
Payer: MEDICARE

## 2022-12-09 VITALS
DIASTOLIC BLOOD PRESSURE: 63 MMHG | SYSTOLIC BLOOD PRESSURE: 167 MMHG | TEMPERATURE: 98 F | HEART RATE: 61 BPM | RESPIRATION RATE: 14 BRPM

## 2022-12-09 DIAGNOSIS — C7A.021 MALIGNANT CARCINOID TUMOR OF CECUM (H): Primary | ICD-10-CM

## 2022-12-09 LAB
CEA SERPL-MCNC: 3.4 NG/ML
LDH SERPL L TO P-CCNC: 150 U/L (ref 0–250)

## 2022-12-09 PROCEDURE — 82378 CARCINOEMBRYONIC ANTIGEN: CPT | Performed by: INTERNAL MEDICINE

## 2022-12-09 PROCEDURE — 84260 ASSAY OF SEROTONIN: CPT | Performed by: INTERNAL MEDICINE

## 2022-12-09 PROCEDURE — 86316 IMMUNOASSAY TUMOR OTHER: CPT | Mod: GA | Performed by: INTERNAL MEDICINE

## 2022-12-09 PROCEDURE — 83615 LACTATE (LD) (LDH) ENZYME: CPT | Performed by: INTERNAL MEDICINE

## 2022-12-09 PROCEDURE — 96372 THER/PROPH/DIAG INJ SC/IM: CPT | Performed by: INTERNAL MEDICINE

## 2022-12-09 PROCEDURE — 36415 COLL VENOUS BLD VENIPUNCTURE: CPT | Performed by: INTERNAL MEDICINE

## 2022-12-09 PROCEDURE — 250N000011 HC RX IP 250 OP 636: Performed by: INTERNAL MEDICINE

## 2022-12-09 RX ORDER — LANREOTIDE ACETATE 120 MG/.5ML
120 INJECTION SUBCUTANEOUS ONCE
Status: COMPLETED | OUTPATIENT
Start: 2022-12-09 | End: 2022-12-09

## 2022-12-09 RX ADMIN — LANREOTIDE ACETATE 120 MG: 120 INJECTION SUBCUTANEOUS at 15:51

## 2022-12-09 ASSESSMENT — PAIN SCALES - GENERAL: PAINLEVEL: NO PAIN (0)

## 2022-12-09 NOTE — PROGRESS NOTES
Infusion Nursing Note:  Marissa Guadarrama presents today for Somatuline.    Patient seen by provider today: No   present during visit today: Not Applicable.    Note: Labs drawn peripherally.    Intravenous Access:  N/A.    Treatment Conditions:  Results reviewed, labs MET treatment parameters, ok to proceed with treatment.    Post Infusion Assessment:  Patient tolerated injection without incident.     Discharge Plan:   Patient discharged in stable condition accompanied by: self.  Departure Mode: Ambulatory.      Caitlyn Mensah RN

## 2022-12-14 LAB — CGA SERPL-MCNC: 458 NG/ML

## 2022-12-15 LAB — SEROTONIN BLD-MCNC: 1018 NG/ML

## 2023-01-06 ENCOUNTER — APPOINTMENT (OUTPATIENT)
Dept: LAB | Facility: CLINIC | Age: 73
End: 2023-01-06
Payer: MEDICARE

## 2023-01-06 ENCOUNTER — INFUSION THERAPY VISIT (OUTPATIENT)
Dept: INFUSION THERAPY | Facility: CLINIC | Age: 73
End: 2023-01-06
Attending: INTERNAL MEDICINE
Payer: MEDICARE

## 2023-01-06 VITALS
DIASTOLIC BLOOD PRESSURE: 60 MMHG | HEART RATE: 58 BPM | RESPIRATION RATE: 18 BRPM | TEMPERATURE: 98.3 F | SYSTOLIC BLOOD PRESSURE: 136 MMHG

## 2023-01-06 DIAGNOSIS — C7A.021 MALIGNANT CARCINOID TUMOR OF CECUM (H): Primary | ICD-10-CM

## 2023-01-06 LAB
CEA SERPL-MCNC: 4.5 NG/ML
LDH SERPL L TO P-CCNC: 161 U/L (ref 0–250)

## 2023-01-06 PROCEDURE — 250N000011 HC RX IP 250 OP 636: Performed by: INTERNAL MEDICINE

## 2023-01-06 PROCEDURE — 82378 CARCINOEMBRYONIC ANTIGEN: CPT | Performed by: INTERNAL MEDICINE

## 2023-01-06 PROCEDURE — 36415 COLL VENOUS BLD VENIPUNCTURE: CPT

## 2023-01-06 PROCEDURE — 86316 IMMUNOASSAY TUMOR OTHER: CPT | Mod: GA | Performed by: INTERNAL MEDICINE

## 2023-01-06 PROCEDURE — 96372 THER/PROPH/DIAG INJ SC/IM: CPT | Performed by: INTERNAL MEDICINE

## 2023-01-06 PROCEDURE — 83615 LACTATE (LD) (LDH) ENZYME: CPT | Performed by: INTERNAL MEDICINE

## 2023-01-06 PROCEDURE — 84260 ASSAY OF SEROTONIN: CPT | Performed by: INTERNAL MEDICINE

## 2023-01-06 RX ORDER — LANREOTIDE ACETATE 120 MG/.5ML
120 INJECTION SUBCUTANEOUS ONCE
Status: COMPLETED | OUTPATIENT
Start: 2023-01-06 | End: 2023-01-06

## 2023-01-06 RX ADMIN — LANREOTIDE ACETATE 120 MG: 120 INJECTION SUBCUTANEOUS at 14:30

## 2023-01-06 NOTE — PROGRESS NOTES
.Infusion Nursing Note:  Marissa Guadarrama presents today for Somatuline.    Patient seen by provider today: No   present during visit today: Not Applicable.    Note: N/A.    Intravenous Access:  No Intravenous access/labs at this visit.    Treatment Conditions:  Not Applicable.    Post Infusion Assessment:  Patient tolerated injection without incident.     Discharge Plan:   Patient and/or family verbalized understanding of discharge instructions and all questions answered.  Patient discharged in stable condition accompanied by: self.  Departure Mode: Ambulatory.      Olga Lidia Roman RN

## 2023-01-09 LAB — CGA SERPL-MCNC: 445 NG/ML

## 2023-01-10 LAB — SEROTONIN BLD-MCNC: 850 NG/ML

## 2023-02-03 ENCOUNTER — ONCOLOGY VISIT (OUTPATIENT)
Dept: ONCOLOGY | Facility: CLINIC | Age: 73
End: 2023-02-03
Attending: INTERNAL MEDICINE
Payer: MEDICARE

## 2023-02-03 ENCOUNTER — INFUSION THERAPY VISIT (OUTPATIENT)
Dept: INFUSION THERAPY | Facility: CLINIC | Age: 73
End: 2023-02-03
Attending: INTERNAL MEDICINE
Payer: MEDICARE

## 2023-02-03 ENCOUNTER — APPOINTMENT (OUTPATIENT)
Dept: LAB | Facility: CLINIC | Age: 73
End: 2023-02-03
Payer: MEDICARE

## 2023-02-03 VITALS
BODY MASS INDEX: 31.4 KG/M2 | OXYGEN SATURATION: 96 % | DIASTOLIC BLOOD PRESSURE: 59 MMHG | TEMPERATURE: 98.4 F | WEIGHT: 203.5 LBS | HEART RATE: 58 BPM | RESPIRATION RATE: 16 BRPM | SYSTOLIC BLOOD PRESSURE: 151 MMHG

## 2023-02-03 DIAGNOSIS — C78.6 PERITONEAL METASTASES: ICD-10-CM

## 2023-02-03 DIAGNOSIS — C78.7 LIVER METASTASES: ICD-10-CM

## 2023-02-03 DIAGNOSIS — C7A.021 MALIGNANT CARCINOID TUMOR OF CECUM (H): Primary | ICD-10-CM

## 2023-02-03 LAB
CEA SERPL-MCNC: 5.4 NG/ML
LDH SERPL L TO P-CCNC: 178 U/L (ref 0–250)

## 2023-02-03 PROCEDURE — 99215 OFFICE O/P EST HI 40 MIN: CPT | Performed by: INTERNAL MEDICINE

## 2023-02-03 PROCEDURE — 86316 IMMUNOASSAY TUMOR OTHER: CPT | Mod: GA | Performed by: INTERNAL MEDICINE

## 2023-02-03 PROCEDURE — 96372 THER/PROPH/DIAG INJ SC/IM: CPT | Performed by: INTERNAL MEDICINE

## 2023-02-03 PROCEDURE — 83615 LACTATE (LD) (LDH) ENZYME: CPT | Performed by: INTERNAL MEDICINE

## 2023-02-03 PROCEDURE — 250N000011 HC RX IP 250 OP 636: Performed by: INTERNAL MEDICINE

## 2023-02-03 PROCEDURE — 82378 CARCINOEMBRYONIC ANTIGEN: CPT | Performed by: INTERNAL MEDICINE

## 2023-02-03 PROCEDURE — 84260 ASSAY OF SEROTONIN: CPT | Performed by: INTERNAL MEDICINE

## 2023-02-03 PROCEDURE — 36415 COLL VENOUS BLD VENIPUNCTURE: CPT | Mod: GA

## 2023-02-03 PROCEDURE — G0463 HOSPITAL OUTPT CLINIC VISIT: HCPCS | Mod: 25 | Performed by: INTERNAL MEDICINE

## 2023-02-03 RX ORDER — EPINEPHRINE 1 MG/ML
0.3 INJECTION, SOLUTION, CONCENTRATE INTRAVENOUS EVERY 5 MIN PRN
Status: CANCELLED | OUTPATIENT
Start: 2023-03-03

## 2023-02-03 RX ORDER — LANREOTIDE ACETATE 120 MG/.5ML
120 INJECTION SUBCUTANEOUS ONCE
Status: COMPLETED | OUTPATIENT
Start: 2023-02-03 | End: 2023-02-03

## 2023-02-03 RX ORDER — DIPHENHYDRAMINE HYDROCHLORIDE 50 MG/ML
50 INJECTION INTRAMUSCULAR; INTRAVENOUS
Status: CANCELLED
Start: 2023-03-31

## 2023-02-03 RX ORDER — DIPHENHYDRAMINE HYDROCHLORIDE 50 MG/ML
50 INJECTION INTRAMUSCULAR; INTRAVENOUS
Status: CANCELLED
Start: 2023-04-28

## 2023-02-03 RX ORDER — MEPERIDINE HYDROCHLORIDE 25 MG/ML
25 INJECTION INTRAMUSCULAR; INTRAVENOUS; SUBCUTANEOUS EVERY 30 MIN PRN
Status: CANCELLED | OUTPATIENT
Start: 2023-03-03

## 2023-02-03 RX ORDER — NALOXONE HYDROCHLORIDE 0.4 MG/ML
0.2 INJECTION, SOLUTION INTRAMUSCULAR; INTRAVENOUS; SUBCUTANEOUS
Status: CANCELLED | OUTPATIENT
Start: 2023-04-28

## 2023-02-03 RX ORDER — ALBUTEROL SULFATE 0.83 MG/ML
2.5 SOLUTION RESPIRATORY (INHALATION)
Status: CANCELLED | OUTPATIENT
Start: 2023-04-28

## 2023-02-03 RX ORDER — ALBUTEROL SULFATE 0.83 MG/ML
2.5 SOLUTION RESPIRATORY (INHALATION)
Status: CANCELLED | OUTPATIENT
Start: 2023-03-03

## 2023-02-03 RX ORDER — METHYLPREDNISOLONE SODIUM SUCCINATE 125 MG/2ML
125 INJECTION, POWDER, LYOPHILIZED, FOR SOLUTION INTRAMUSCULAR; INTRAVENOUS
Status: CANCELLED
Start: 2023-04-28

## 2023-02-03 RX ORDER — LANREOTIDE ACETATE 120 MG/.5ML
120 INJECTION SUBCUTANEOUS ONCE
Status: CANCELLED
Start: 2023-03-03 | End: 2023-03-03

## 2023-02-03 RX ORDER — DIPHENHYDRAMINE HYDROCHLORIDE 50 MG/ML
50 INJECTION INTRAMUSCULAR; INTRAVENOUS
Status: CANCELLED
Start: 2023-03-03

## 2023-02-03 RX ORDER — METHYLPREDNISOLONE SODIUM SUCCINATE 125 MG/2ML
125 INJECTION, POWDER, LYOPHILIZED, FOR SOLUTION INTRAMUSCULAR; INTRAVENOUS
Status: CANCELLED
Start: 2023-03-31

## 2023-02-03 RX ORDER — ALBUTEROL SULFATE 90 UG/1
1-2 AEROSOL, METERED RESPIRATORY (INHALATION)
Status: CANCELLED
Start: 2023-03-03

## 2023-02-03 RX ORDER — MEPERIDINE HYDROCHLORIDE 25 MG/ML
25 INJECTION INTRAMUSCULAR; INTRAVENOUS; SUBCUTANEOUS EVERY 30 MIN PRN
Status: CANCELLED | OUTPATIENT
Start: 2023-03-31

## 2023-02-03 RX ORDER — ALBUTEROL SULFATE 90 UG/1
1-2 AEROSOL, METERED RESPIRATORY (INHALATION)
Status: CANCELLED
Start: 2023-04-28

## 2023-02-03 RX ORDER — ALBUTEROL SULFATE 0.83 MG/ML
2.5 SOLUTION RESPIRATORY (INHALATION)
Status: CANCELLED | OUTPATIENT
Start: 2023-03-31

## 2023-02-03 RX ORDER — LANREOTIDE ACETATE 120 MG/.5ML
120 INJECTION SUBCUTANEOUS ONCE
Status: CANCELLED
Start: 2023-03-31 | End: 2023-03-31

## 2023-02-03 RX ORDER — EPINEPHRINE 1 MG/ML
0.3 INJECTION, SOLUTION, CONCENTRATE INTRAVENOUS EVERY 5 MIN PRN
Status: CANCELLED | OUTPATIENT
Start: 2023-04-28

## 2023-02-03 RX ORDER — EPINEPHRINE 1 MG/ML
0.3 INJECTION, SOLUTION, CONCENTRATE INTRAVENOUS EVERY 5 MIN PRN
Status: CANCELLED | OUTPATIENT
Start: 2023-03-31

## 2023-02-03 RX ORDER — LANREOTIDE ACETATE 120 MG/.5ML
120 INJECTION SUBCUTANEOUS ONCE
Status: CANCELLED
Start: 2023-04-28 | End: 2023-04-28

## 2023-02-03 RX ORDER — NALOXONE HYDROCHLORIDE 0.4 MG/ML
0.2 INJECTION, SOLUTION INTRAMUSCULAR; INTRAVENOUS; SUBCUTANEOUS
Status: CANCELLED | OUTPATIENT
Start: 2023-03-31

## 2023-02-03 RX ORDER — MEPERIDINE HYDROCHLORIDE 25 MG/ML
25 INJECTION INTRAMUSCULAR; INTRAVENOUS; SUBCUTANEOUS EVERY 30 MIN PRN
Status: CANCELLED | OUTPATIENT
Start: 2023-04-28

## 2023-02-03 RX ORDER — ALBUTEROL SULFATE 90 UG/1
1-2 AEROSOL, METERED RESPIRATORY (INHALATION)
Status: CANCELLED
Start: 2023-03-31

## 2023-02-03 RX ORDER — NALOXONE HYDROCHLORIDE 0.4 MG/ML
0.2 INJECTION, SOLUTION INTRAMUSCULAR; INTRAVENOUS; SUBCUTANEOUS
Status: CANCELLED | OUTPATIENT
Start: 2023-03-03

## 2023-02-03 RX ORDER — METHYLPREDNISOLONE SODIUM SUCCINATE 125 MG/2ML
125 INJECTION, POWDER, LYOPHILIZED, FOR SOLUTION INTRAMUSCULAR; INTRAVENOUS
Status: CANCELLED
Start: 2023-03-03

## 2023-02-03 RX ADMIN — LANREOTIDE ACETATE 120 MG: 120 INJECTION SUBCUTANEOUS at 15:36

## 2023-02-03 ASSESSMENT — PAIN SCALES - GENERAL: PAINLEVEL: NO PAIN (0)

## 2023-02-03 NOTE — LETTER
"    2/3/2023         RE: Marissa Guadarrama  [address] [facility] [address]        Dear Colleague,    Thank you for referring your patient, Marissa Guadarrama, to the [facility]. Please see a copy of my visit note below.    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    Weight as of this encounter: 92.3 kg (203 lb 8 oz). Body surface area is 2.1 meters squared.  No Pain (0) Comment: Data Unavailable   No LMP recorded. Patient has had a hysterectomy.  Allergies reviewed: Yes  Medications reviewed: Yes    Medications: Medication refills not needed today.  Pharmacy name entered into [employer]: [id number] - [place], [place] - [address] [place] [address]    Clinical concerns:  None      Nurys Blanco, SCOTTIE              The patient was seen for a clinic visit regarding the following issue/s:  1. Malignant carcinoid tumor of cecum (H)    2. Peritoneal metastases (H)    3. Liver metastases (H)      She started lanreotide in March 2022.  Her serum chromogranin A level was in the 800s and has gradually decreased since then and reached a plateau in the 400s in the last 6 months.  Most recent chromogranin A level was 445 when checked 4 weeks ago.  Her LDH was normal 4 weeks ago.  Serotonin levels have been difficult to follow and fluctuating significantly.    Dotatate PET from November 4, 2022 was consistent with stable " disease with multiple Dotatate avid liver lesions and multiple Dotatate avid foci within the abdomen and pelvis which also appeared stable when compared to her July 2022 imaging.      Last year, she also had her nonfunctioning left kidney resected.  She has recovered well from her kidney surgery.      She has no acute complaints.  Bowel movements are normal and she has had no fevers or chills.    Todays' vital signs reviewed in the EMR.    ECOG PS is 2  Physical examination:  HEENT: No scleral icterus  Cardiovascular: Cor RRR  Respiratory: Lungs clear to auscultation bilaterally  Gastrointestinal: No abdominal tenderness.  No palpable masses.  No palpable hepatosplenomegaly  Skin: No jaundice lymphatic: No cervical, supraclavicular, infraclavicular, or axillary adenopathy    ASSESSMENT/PLAN:    1. Malignant carcinoid tumor of cecum (H)    2. Peritoneal metastases (H)    3. Liver metastases (H)      Chromogranin A levels also remained stable and unless you chromogranin A level from today returns markedly elevated, I recommend that you continue with lanreotide injections every 4 weeks and return for your next oncology clinic follow-up visit with Jennifer Allen NP in 4 months. As long as your chromogranin A level remains stable we can wait with your next dotatate PET scan until November of this year. I will cancel your urine 5-HIAA test  Since it is unlikely to result in a change in treatment given your excellent tolerance of lanreotide and lack of clinical signs of progression cancer.      I spent a total of 40 minutes on the care of this patient on the day of service including face to face time and the remainder in chart review, care coordination, and documentation on the day of service.        .  ..            Again, thank you for allowing me to participate in the care of your patient.        Sincerely,        Chad Hayden MD

## 2023-02-03 NOTE — PATIENT INSTRUCTIONS
ASSESSMENT/PLAN:    1. Malignant carcinoid tumor of cecum (H)    2. Peritoneal metastases (H)      Chromogranin A levels also remained stable and unless you chromogranin A level from today returns markedly elevated, I recommend that you continue with lanreotide injections every 4 weeks and return for your next oncology clinic follow-up visit with Jennifer Allen NP in 4 months. As long as your chromogranin A level remains stable we can wait with your next dotatate PET scan until November of this year. I will cancel your urine 5-HIAA test  Since it is unlikely to result in a change in treatment given your excellent tolerance of lanreotide and lack of clinical signs of progression cancer.

## 2023-02-03 NOTE — PROGRESS NOTES
Infusion Nursing Note:  Marissa Guadarrama presents today for Somatuline.    Patient seen by provider today: Yes: Dr. Hayden, therefore assessment deferred.   present during visit today: Not Applicable.    Note: Injection given in left upper gluteal deep SQ.    Intravenous Access:  No Intravenous access/labs at this visit.    Treatment Conditions:  Not Applicable.    Post Infusion Assessment:  Patient tolerated injection without incident.  Site patent and intact, free from redness, edema or discomfort.  Access discontinued per protocol.     Discharge Plan:   Copy of AVS reviewed with patient and/or family.  Patient will return 3/3/23 for next appointment.  Patient discharged in stable condition accompanied by: self.  Departure Mode: Ambulatory.      Maria Victoria Ram RN

## 2023-02-03 NOTE — PROGRESS NOTES
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    Weight as of this encounter: 92.3 kg (203 lb 8 oz). Body surface area is 2.1 meters squared.  No Pain (0) Comment: Data Unavailable   No LMP recorded. Patient has had a hysterectomy.  Allergies reviewed: Yes  Medications reviewed: Yes    Medications: Medication refills not needed today.  Pharmacy name entered into [employer]: [id number] - [place], [place] - [address] [place] [address]    Clinical concerns:  None      Nurys Blanco CMA            "

## 2023-02-03 NOTE — PROGRESS NOTES
The patient was seen for a clinic visit regarding the following issue/s:  1. Malignant carcinoid tumor of cecum (H)    2. Peritoneal metastases (H)    3. Liver metastases (H)      She started lanreotide in March 2022.  Her serum chromogranin A level was in the 800s and has gradually decreased since then and reached a plateau in the 400s in the last 6 months.  Most recent chromogranin A level was 445 when checked 4 weeks ago.  Her LDH was normal 4 weeks ago.  Serotonin levels have been difficult to follow and fluctuating significantly.    Dotatate PET from November 4, 2022 was consistent with stable disease with multiple Dotatate avid liver lesions and multiple Dotatate avid foci within the abdomen and pelvis which also appeared stable when compared to her July 2022 imaging.      Last year, she also had her nonfunctioning left kidney resected.  She has recovered well from her kidney surgery.      She has no acute complaints.  Bowel movements are normal and she has had no fevers or chills.    Todays' vital signs reviewed in the EMR.    ECOG PS is 2  Physical examination:  HEENT: No scleral icterus  Cardiovascular: Cor RRR  Respiratory: Lungs clear to auscultation bilaterally  Gastrointestinal: No abdominal tenderness.  No palpable masses.  No palpable hepatosplenomegaly  Skin: No jaundice lymphatic: No cervical, supraclavicular, infraclavicular, or axillary adenopathy    ASSESSMENT/PLAN:    1. Malignant carcinoid tumor of cecum (H)    2. Peritoneal metastases (H)    3. Liver metastases (H)      Chromogranin A levels also remained stable and unless you chromogranin A level from today returns markedly elevated, I recommend that you continue with lanreotide injections every 4 weeks and return for your next oncology clinic follow-up visit with Jennifer Allen NP in 4 months. As long as your chromogranin A level remains stable we can wait with your next dotatate PET scan until November of this year. I will cancel your urine  5-HIAA test  Since it is unlikely to result in a change in treatment given your excellent tolerance of lanreotide and lack of clinical signs of progression cancer.      I spent a total of 40 minutes on the care of this patient on the day of service including face to face time and the remainder in chart review, care coordination, and documentation on the day of service.        .  ..

## 2023-02-07 LAB
CGA SERPL-MCNC: 504 NG/ML
SEROTONIN BLD-MCNC: 781 NG/ML

## 2023-03-03 ENCOUNTER — INFUSION THERAPY VISIT (OUTPATIENT)
Dept: INFUSION THERAPY | Facility: CLINIC | Age: 73
End: 2023-03-03
Attending: INTERNAL MEDICINE
Payer: MEDICARE

## 2023-03-03 ENCOUNTER — LAB (OUTPATIENT)
Dept: LAB | Facility: CLINIC | Age: 73
End: 2023-03-03
Payer: MEDICARE

## 2023-03-03 VITALS — RESPIRATION RATE: 16 BRPM | DIASTOLIC BLOOD PRESSURE: 80 MMHG | SYSTOLIC BLOOD PRESSURE: 149 MMHG | HEART RATE: 66 BPM

## 2023-03-03 DIAGNOSIS — C7A.021 MALIGNANT CARCINOID TUMOR OF CECUM (H): Primary | ICD-10-CM

## 2023-03-03 DIAGNOSIS — Z01.812 PRE-PROCEDURE LAB EXAM: Primary | ICD-10-CM

## 2023-03-03 DIAGNOSIS — Z79.899 HIGH RISK MEDICATION USE: ICD-10-CM

## 2023-03-03 LAB
CEA SERPL-MCNC: 5.9 NG/ML
HOLD SPECIMEN: NORMAL
LDH SERPL L TO P-CCNC: 148 U/L (ref 0–250)

## 2023-03-03 PROCEDURE — 250N000011 HC RX IP 250 OP 636: Performed by: INTERNAL MEDICINE

## 2023-03-03 PROCEDURE — 83615 LACTATE (LD) (LDH) ENZYME: CPT | Performed by: INTERNAL MEDICINE

## 2023-03-03 PROCEDURE — 36415 COLL VENOUS BLD VENIPUNCTURE: CPT

## 2023-03-03 PROCEDURE — 82378 CARCINOEMBRYONIC ANTIGEN: CPT | Performed by: INTERNAL MEDICINE

## 2023-03-03 PROCEDURE — 96372 THER/PROPH/DIAG INJ SC/IM: CPT | Performed by: INTERNAL MEDICINE

## 2023-03-03 RX ORDER — LANREOTIDE ACETATE 120 MG/.5ML
120 INJECTION SUBCUTANEOUS ONCE
Status: COMPLETED | OUTPATIENT
Start: 2023-03-03 | End: 2023-03-03

## 2023-03-03 RX ADMIN — LANREOTIDE ACETATE 120 MG: 120 INJECTION SUBCUTANEOUS at 15:26

## 2023-03-03 NOTE — PROGRESS NOTES
Infusion Nursing Note:  Marissa A Jennifergian presents today for Somatuline.    Patient seen by provider today: No   present during visit today: Not Applicable.    Note: Pt denies any new health changes or concerns.    Intravenous Access:  N/A.    Treatment Conditions:  Not Applicable.    Post Infusion Assessment:  Patient tolerated injection without incident.     Discharge Plan:   Discharge instructions reviewed with: Patient.  Patient and/or family verbalized understanding of discharge instructions and all questions answered.  Patient discharged in stable condition accompanied by: self.  Departure Mode: Ambulatory.      Barbara Corrales RN

## 2023-03-31 ENCOUNTER — APPOINTMENT (OUTPATIENT)
Dept: LAB | Facility: CLINIC | Age: 73
End: 2023-03-31
Payer: MEDICARE

## 2023-03-31 ENCOUNTER — INFUSION THERAPY VISIT (OUTPATIENT)
Dept: INFUSION THERAPY | Facility: CLINIC | Age: 73
End: 2023-03-31
Attending: INTERNAL MEDICINE
Payer: MEDICARE

## 2023-03-31 VITALS — DIASTOLIC BLOOD PRESSURE: 82 MMHG | SYSTOLIC BLOOD PRESSURE: 162 MMHG | TEMPERATURE: 98.7 F | HEART RATE: 66 BPM

## 2023-03-31 DIAGNOSIS — C7A.021 MALIGNANT CARCINOID TUMOR OF CECUM (H): Primary | ICD-10-CM

## 2023-03-31 DIAGNOSIS — Z79.899 HIGH RISK MEDICATION USE: ICD-10-CM

## 2023-03-31 LAB
HOLD SPECIMEN: NORMAL
LDH SERPL L TO P-CCNC: 136 U/L (ref 0–250)

## 2023-03-31 PROCEDURE — 250N000011 HC RX IP 250 OP 636: Performed by: NURSE PRACTITIONER

## 2023-03-31 PROCEDURE — 83615 LACTATE (LD) (LDH) ENZYME: CPT | Performed by: INTERNAL MEDICINE

## 2023-03-31 PROCEDURE — 36415 COLL VENOUS BLD VENIPUNCTURE: CPT | Performed by: NURSE PRACTITIONER

## 2023-03-31 PROCEDURE — 86316 IMMUNOASSAY TUMOR OTHER: CPT | Performed by: NURSE PRACTITIONER

## 2023-03-31 PROCEDURE — 96372 THER/PROPH/DIAG INJ SC/IM: CPT | Performed by: NURSE PRACTITIONER

## 2023-03-31 RX ORDER — LANREOTIDE ACETATE 120 MG/.5ML
120 INJECTION SUBCUTANEOUS ONCE
Status: COMPLETED | OUTPATIENT
Start: 2023-03-31 | End: 2023-03-31

## 2023-03-31 RX ADMIN — LANREOTIDE ACETATE 120 MG: 120 INJECTION SUBCUTANEOUS at 15:27

## 2023-03-31 NOTE — PROGRESS NOTES
Infusion Nursing Note:  Marissa Guadarrama presents today for Somatuline injection.    Patient seen by provider today: No   present during visit today: Not Applicable.    Note: N/A.    Intravenous Access:  No Intravenous access/labs at this visit.    Treatment Conditions:  Not Applicable.    Post Infusion Assessment:  Patient tolerated injection without incident.     Discharge Plan:   Patient discharged in stable condition accompanied by: self.  Departure Mode: Ambulatory.      Fany Mendoza RN

## 2023-04-04 LAB — CGA SERPL-MCNC: 480 NG/ML

## 2023-05-12 ENCOUNTER — APPOINTMENT (OUTPATIENT)
Dept: LAB | Facility: CLINIC | Age: 73
End: 2023-05-12
Attending: INTERNAL MEDICINE
Payer: MEDICARE

## 2023-05-12 ENCOUNTER — INFUSION THERAPY VISIT (OUTPATIENT)
Dept: INFUSION THERAPY | Facility: CLINIC | Age: 73
End: 2023-05-12
Attending: INTERNAL MEDICINE
Payer: MEDICARE

## 2023-05-12 ENCOUNTER — ONCOLOGY VISIT (OUTPATIENT)
Dept: ONCOLOGY | Facility: CLINIC | Age: 73
End: 2023-05-12
Attending: INTERNAL MEDICINE
Payer: MEDICARE

## 2023-05-12 VITALS
RESPIRATION RATE: 12 BRPM | SYSTOLIC BLOOD PRESSURE: 133 MMHG | BODY MASS INDEX: 32.36 KG/M2 | OXYGEN SATURATION: 97 % | TEMPERATURE: 98.8 F | HEART RATE: 64 BPM | WEIGHT: 209.7 LBS | DIASTOLIC BLOOD PRESSURE: 58 MMHG

## 2023-05-12 DIAGNOSIS — C7A.021 MALIGNANT CARCINOID TUMOR OF CECUM (H): Primary | ICD-10-CM

## 2023-05-12 LAB
CEA SERPL-MCNC: 8.7 NG/ML
LDH SERPL L TO P-CCNC: 145 U/L (ref 0–250)

## 2023-05-12 PROCEDURE — 83615 LACTATE (LD) (LDH) ENZYME: CPT | Performed by: INTERNAL MEDICINE

## 2023-05-12 PROCEDURE — G0463 HOSPITAL OUTPT CLINIC VISIT: HCPCS | Performed by: INTERNAL MEDICINE

## 2023-05-12 PROCEDURE — 96372 THER/PROPH/DIAG INJ SC/IM: CPT | Performed by: INTERNAL MEDICINE

## 2023-05-12 PROCEDURE — 82378 CARCINOEMBRYONIC ANTIGEN: CPT | Performed by: INTERNAL MEDICINE

## 2023-05-12 PROCEDURE — 99214 OFFICE O/P EST MOD 30 MIN: CPT | Mod: VID | Performed by: INTERNAL MEDICINE

## 2023-05-12 PROCEDURE — 36415 COLL VENOUS BLD VENIPUNCTURE: CPT

## 2023-05-12 PROCEDURE — 86316 IMMUNOASSAY TUMOR OTHER: CPT | Performed by: INTERNAL MEDICINE

## 2023-05-12 PROCEDURE — 250N000011 HC RX IP 250 OP 636: Performed by: INTERNAL MEDICINE

## 2023-05-12 RX ORDER — LANREOTIDE ACETATE 120 MG/.5ML
120 INJECTION SUBCUTANEOUS ONCE
Status: CANCELLED
Start: 2023-05-12 | End: 2023-05-12

## 2023-05-12 RX ORDER — LANREOTIDE ACETATE 120 MG/.5ML
120 INJECTION SUBCUTANEOUS ONCE
Status: COMPLETED | OUTPATIENT
Start: 2023-05-12 | End: 2023-05-12

## 2023-05-12 RX ADMIN — LANREOTIDE ACETATE 120 MG: 120 INJECTION SUBCUTANEOUS at 15:45

## 2023-05-12 ASSESSMENT — ENCOUNTER SYMPTOMS
EYES NEGATIVE: 1
ENDOCRINE NEGATIVE: 1
PSYCHIATRIC NEGATIVE: 1
RESPIRATORY NEGATIVE: 1
CARDIOVASCULAR NEGATIVE: 1
EXTREMITY WEAKNESS: 1
FLANK PAIN: 0
NECK PAIN: 0
ARTHRALGIAS: 0
CONSTITUTIONAL NEGATIVE: 1
MYALGIAS: 0
BACK PAIN: 0
HEMATOLOGIC/LYMPHATIC NEGATIVE: 1

## 2023-05-12 ASSESSMENT — PAIN SCALES - GENERAL: PAINLEVEL: NO PAIN (0)

## 2023-05-12 NOTE — PROGRESS NOTES
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    Weight as of this encounter: 95.1 kg (209 lb 11.2 oz). Body surface area is 2.13 meters squared.  No Pain (0) Comment: Data Unavailable   No LMP recorded. Patient has had a hysterectomy.  Allergies reviewed: Yes  Medications reviewed: Yes    Medications: Medication refills not needed today.  Pharmacy name entered into [employer]: [facility] #[id number] - [place], [place] - [address] [place] [address]    Clinical concerns: None      Nurys Blanco CMA            "

## 2023-05-12 NOTE — PROGRESS NOTES
Infusion Nursing Note:  Marissa Guadarrama presents today for Somatuline.    Patient seen by provider today: Yes   present during visit today: Not Applicable.    Note: N/A.      Intravenous Access:  N/A.    Treatment Conditions:  Results reviewed, labs MET treatment parameters, ok to proceed with treatment.      Post Infusion Assessment:  Patient tolerated injection without incident.       Discharge Plan:   Patient discharged in stable condition accompanied by: self.  Departure Mode: Ambulatory.      Caitlyn Mensah RN

## 2023-05-12 NOTE — PROGRESS NOTES
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  Her dominant problem for now is recurrent carcinoid and that actually has been rather stable biochemically and minimally symptomatic on lanreotide.    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  She also had a nephrectomy for kidney stones many years ago.    As of today, she feels fine, reports no problems from the carcinoid or from its treatment.    2003  Dec 5 Resection at [facility] - Ileal carcinoid with positive nodes    As of 2014:  \"I had the pleasure of seeing your patient Marissa Guadarrama here at the Gynecologic Cancer Clinic at the [facility] on 2/5/2014.  As you know she is a very pleasant 63 year old woman with h/o cervical cancer and remote h/o colon cancer with a recent diagnosis of VAIN III.  Given these findings she was subsequently sent to the Gynecologic Cancer Clinic for new patient consultation. 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 Depth of invasion 7 mm out of 1.9 and horizontal spread of 1 cm. 48 negative nodes. More remote history of colon cancer with no evidence of recurrence. Uterine serosa and tiny nodule at bladder showed metastatic carcinoid and she has followed with Dr. Dallas for that. She has done 24 hour urine every 6 months. Did receive octreotide for carcinoid but did not tolerated and treatment was discontinued. 6/2007 colonoscopy was normal. CT A/P 11/2008 essentially normal. Did have ASC-US pap in 2009, no record of any follow up.   On 11/1/13 she presented to primary " "care for annual physical. Vaginal Pap returned ASC-H. Follow up colpo with Dr. Alexandre as below.  12/23/13 Vaginal cuff (submitted as \"cervix\"), biopsy:  - High grade squamous intraepithelial lesion (vaginal intraepithelial neoplasia grade III, VaIN III, severe dysplasia and carcinoma in situ).  - No invasive malignancy identified.  - Cervical transformation zone not identified in biopsies.  - COMMENT: The findings correlate with the recent cytology ([id number], ASC-H, reviewed and confirmed). The specimen is received with a specimen description of \"cervical biopsy.\" The history is reviewed in the [facility] medical record, indicating that the patient is status post total abdominal hysterectomy for a prior cervical cancer, and that on exam the cervix was surgically absent with lesions noted over the vaginal cuff. Therefore the specimen description is changed to \"vaginal cuff\" in the final diagnosis. Clinical correlation is recommended.    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)     Serum calcium elevated     Peritoneal metastases     Ureteral obstruction     Rheumatoid arthritis with positive rheumatoid factor, involving unspecified site (H)     Osteopenia of hip     Need for prophylactic chemotherapy     Obstruction of left ureter     HSIL (high grade squamous intraepithelial lesion) on Pap smear of cervix     Ureteral obstruction, left     Other hydronephrosis     Malignant carcinoid tumor of cecum (H)     Current Outpatient Medications   Medication     exemestane " (AROMASIN) 25 MG tablet     hydrochlorothiazide (HYDRODIURIL) 12.5 MG tablet     SENNA-docusate sodium (SENNA S) 8.6-50 MG tablet     No current facility-administered medications for this visit.     Facility-Administered Medications Ordered in Other Visits   Medication     lanreotide acetate (SOMATULINE) injection 120 mg     Past Medical History:   Diagnosis Date     Arthritis     knee     Benign breast biopsy     benign     Breast cancer (H)      Carcinoid tumor 12/2003     Cervical cancer (H) 2007     H/O colposcopy with cervical biopsy 12/23/2013    vaginal cuff biopsy- VAIN III. referred back to gyn/onc     HTN      Hyperlipidemia      Malignant neoplasm of ovary (H)      Obesity      Pap smear of vagina with ASC-H 11/01/2013     Post-polio syndromes      Trigeminal neuralgias      Past Surgical History:   Procedure Laterality Date     APPENDECTOMY  01/01/1983     BIOPSY BREAST       BIOPSY NODE SENTINEL Bilateral 06/01/2016    Procedure: BIOPSY NODE SENTINEL;  Surgeon: Brent Arana MD;  Location: [facility] OR     [facility] SURGICAL PATHOLOGY       COLONOSCOPY N/A 06/23/2017    Procedure: COMBINED COLONOSCOPY, SINGLE OR MULTIPLE BIOPSY/POLYPECTOMY BY BIOPSY;  Colonoscopy Dx:Carcinoid tumor of colon prep mailed golytely;  Surgeon: Talisha Greco MD;  Location:  GI     COLPOSCOPY, BIOPSY, COMBINED  03/13/2014    Procedure: COMBINED COLPOSCOPY, BIOPSY;;  Surgeon: Lara Pack MD;  Location:  OR     COMBINED CYSTOSCOPY, RETROGRADES, EXCHANGE STENT URETER(S) Left 02/07/2019    Procedure: COMBINED CYSTOSCOPY, RETROGRADES, EXCHANGE STENT URETER--left;  Surgeon: Zhao La MD;  Location: [facility] OR     COMBINED CYSTOSCOPY, RETROGRADES, EXCHANGE STENT URETER(S) Left 02/20/2020    Procedure: CYSTOSCOPY, WITH RETROGRADE PYELOGRAM AND Left URETERAL STENT exchange;  Surgeon: Zhao La MD;  Location: [facility]     COMBINED CYSTOSCOPY, RETROGRADES, EXCHANGE STENT URETER(S) Left 05/09/2021     Procedure: CYSTOSCOPY, WITH RETROGRADE PYELOGRAM AND LEFT URETERAL STENT REPLACEMENT;  Surgeon: Fred Owens MD;  [facility]     COMBINED CYSTOSCOPY, RETROGRADES, EXCHANGE STENT URETER(S) Left 09/16/2021    Procedure: CYSTOSCOPY, WITH left RETROGRADE PYELOGRAM AND left  URETERAL STENT REPLACEMENT;  Surgeon: Zhao La MD;  Location: [facility]     COMBINED CYSTOSCOPY, RETROGRADES, EXCHANGE STENT URETER(S) Left 03/24/2022    Procedure: CYSTOSCOPY, WITH RETROGRADE PYELOGRAM AND URETERAL STENT EXCHANGE, LEFT;  Surgeon: Zhao La MD;  Location: [facility]     COMBINED CYSTOSCOPY, RETROGRADES, URETEROSCOPY, INSERT STENT Left 02/02/2017    Procedure: COMBINED CYSTOSCOPY, RETROGRADES, URETEROSCOPY, INSERT STENT;  Surgeon: Zhao La MD;  Location: [facility]     CYSTOSCOPY, REMOVE STENT(S), COMBINED N/A 11/4/2022    Procedure: CYSTOSCOPY, LEFT STENT REMOVAL;  Surgeon: Zhao La MD;  Location: [facility]     CYSTOSCOPY, RETROGRADES, INSERT STENT URETER(S), COMBINED Left 09/07/2017    Procedure: COMBINED CYSTOSCOPY, RETROGRADES, INSERT STENT URETER(S);  Cystoscopy,Left Stent Exchange;  Surgeon: Zhao La MD;  Location: [facility]     CYSTOSCOPY, RETROGRADES, INSERT STENT URETER(S), COMBINED  12/12/2017    Procedure: COMBINED CYSTOSCOPY, RETROGRADES, INSERT STENT URETER(S);;  Surgeon: Zaho La MD;  [facility]     CYSTOSCOPY, RETROGRADES, INSERT STENT URETER(S), COMBINED Left 07/05/2018    Procedure: COMBINED CYSTOSCOPY, RETROGRADES, INSERT STENT URETER(S);  Cystoscopy, Left Stent Exchange;  Surgeon: Zhao La MD;  Location: [facility]     DAVINCI NEPHRECTOMY Left 11/4/2022    Procedure: , LEFT NEPHRECTOMY, ROBOT-ASSISTED;  Surgeon: Zhao La MD;  Location: [facility]     EXAM UNDER ANESTHESIA PELVIC  03/13/2014    Procedure: EXAM UNDER ANESTHESIA PELVIC;  Exam Under Anestheisa, Colposcopy, Vaginal  Biopsies, Co2 Laser of the Upper Vagina;  Surgeon: Lara Pack MD;  [facility]: [facility]     EXAM UNDER ANESTHESIA PELVIC N/A 07/01/2020    Procedure: Examination under anesthesia, vaginal biopsies;  Surgeon: Karli Gao MD;  Location: [facility]     HERNIORRHAPHY INCISIONAL (LOCATION)       IR RHIZOTOMY  08/14/2020     LASER CO2 VAGINA  03/13/2014    VAIN 1/2     LASER CO2 VAGINA N/A 12/12/2017    Procedure: LASER CO2 VAGINA;  Exam Under Anesthesia, CO2 Laser Ablation Of Vagina, Cystoscopy, Left Retrograde Pyelogram with Left Stent Placement;  Surgeon: Nic Segundo MD;  Location: [facility]     LUMPECTOMY BREAST       LUMPECTOMY BREAST WITH SEED LOCALIZATION Bilateral 06/01/2016    Procedure: LUMPECTOMY BREAST WITH SEED LOCALIZATION;  Surgeon: Brent Arana MD;  Location: [facility]     SURGICAL HISTORY OF -       ovarian cystectomy     SURGICAL HISTORY OF -   01/01/2003    right colon resection secondary to carcinoid tumor     TUBAL LIGATION       Z BSO, OMENTECTOMY W/OSMAN  05/01/2007     Z TOTAL ABDOM HYSTERECTOMY  05/01/2007     Socioeconomic History     Marital status:      Social Determinants of Health     Intimate Partner Violence: Not At Risk (8/17/2021)    Humiliation, Afraid, Rape, and Kick questionnaire      Fear of Current or Ex-Partner: No      Emotionally Abused: No      Physically Abused: No      Sexually Abused: No     Review of Systems   Constitutional: Negative.    HENT:  Negative.         Due for check of eyes and ears   Eyes: Negative.    Respiratory: Negative.         Quit smoking 2006   Cardiovascular: Negative.    Endocrine: Negative.    Genitourinary: Negative.     Musculoskeletal: Positive for gait problem. Negative for arthralgias, back pain, flank pain, myalgias and neck pain.   Skin: Negative.    Neurological: Positive for extremity weakness and gait problem.   Hematological: Negative.    Psychiatric/Behavioral: Negative.    All other systems reviewed and are  negative.      /58 (BP Location: Right arm, Patient Position: Sitting, Cuff Size: Adult Large)   Pulse 64   Temp 98.8  F (37.1  C) (Tympanic)   Resp 12   Wt 95.1 kg (209 lb 11.2 oz)   SpO2 97%   BMI 32.36 kg/m      RESP: No audible wheeze, cough.  PSYCH: Mentation appears normal, affect normal/bright, judgement and insight intact, normal speech.    Recent Results (from the past 720 hour(s))   Lactate Dehydrogenase    Collection Time: 05/12/23  1:44 PM   Result Value Ref Range    Lactate Dehydrogenase 145 0 - 250 U/L     No results found for this or any previous visit (from the past 744 hour(s)).    Total time for review of records, interview and examination, medication management, care coordination, documentation =37 mn    Virtual Visit:  [employer]  Patient Location ON SITE ([place]  Provider Location OFF SITE  Visit Start 1430  Visit End 1517

## 2023-05-15 LAB — CGA SERPL-MCNC: 533 NG/ML

## 2023-06-09 ENCOUNTER — LAB (OUTPATIENT)
Dept: LAB | Facility: CLINIC | Age: 73
End: 2023-06-09
Payer: COMMERCIAL

## 2023-06-09 ENCOUNTER — INFUSION THERAPY VISIT (OUTPATIENT)
Dept: INFUSION THERAPY | Facility: CLINIC | Age: 73
End: 2023-06-09
Attending: INTERNAL MEDICINE
Payer: MEDICARE

## 2023-06-09 ENCOUNTER — ONCOLOGY VISIT (OUTPATIENT)
Dept: ONCOLOGY | Facility: CLINIC | Age: 73
End: 2023-06-09
Attending: INTERNAL MEDICINE
Payer: MEDICARE

## 2023-06-09 VITALS
RESPIRATION RATE: 12 BRPM | BODY MASS INDEX: 32.34 KG/M2 | OXYGEN SATURATION: 96 % | DIASTOLIC BLOOD PRESSURE: 75 MMHG | SYSTOLIC BLOOD PRESSURE: 136 MMHG | TEMPERATURE: 99 F | HEART RATE: 73 BPM | WEIGHT: 209.6 LBS

## 2023-06-09 DIAGNOSIS — C50.411 MALIGNANT NEOPLASM OF UPPER-OUTER QUADRANT OF BOTH BREASTS IN FEMALE, ESTROGEN RECEPTOR POSITIVE (H): Primary | ICD-10-CM

## 2023-06-09 DIAGNOSIS — Z17.0 MALIGNANT NEOPLASM OF UPPER-OUTER QUADRANT OF BOTH BREASTS IN FEMALE, ESTROGEN RECEPTOR POSITIVE (H): Primary | ICD-10-CM

## 2023-06-09 DIAGNOSIS — C7A.021 MALIGNANT CARCINOID TUMOR OF CECUM (H): Primary | ICD-10-CM

## 2023-06-09 DIAGNOSIS — C50.412 MALIGNANT NEOPLASM OF UPPER-OUTER QUADRANT OF BOTH BREASTS IN FEMALE, ESTROGEN RECEPTOR POSITIVE (H): Primary | ICD-10-CM

## 2023-06-09 DIAGNOSIS — C7A.021 MALIGNANT CARCINOID TUMOR OF CECUM (H): ICD-10-CM

## 2023-06-09 LAB
HOLD SPECIMEN: NORMAL
HOLD SPECIMEN: NORMAL
LDH SERPL L TO P-CCNC: 148 U/L (ref 0–250)

## 2023-06-09 PROCEDURE — 99213 OFFICE O/P EST LOW 20 MIN: CPT | Performed by: INTERNAL MEDICINE

## 2023-06-09 PROCEDURE — 86316 IMMUNOASSAY TUMOR OTHER: CPT

## 2023-06-09 PROCEDURE — 96372 THER/PROPH/DIAG INJ SC/IM: CPT | Performed by: INTERNAL MEDICINE

## 2023-06-09 PROCEDURE — G0463 HOSPITAL OUTPT CLINIC VISIT: HCPCS | Performed by: INTERNAL MEDICINE

## 2023-06-09 PROCEDURE — 36415 COLL VENOUS BLD VENIPUNCTURE: CPT | Mod: GA

## 2023-06-09 PROCEDURE — 250N000011 HC RX IP 250 OP 636: Performed by: INTERNAL MEDICINE

## 2023-06-09 PROCEDURE — 83615 LACTATE (LD) (LDH) ENZYME: CPT

## 2023-06-09 RX ORDER — METHYLPREDNISOLONE SODIUM SUCCINATE 125 MG/2ML
125 INJECTION, POWDER, LYOPHILIZED, FOR SOLUTION INTRAMUSCULAR; INTRAVENOUS
Status: CANCELLED
Start: 2023-06-09

## 2023-06-09 RX ORDER — NALOXONE HYDROCHLORIDE 0.4 MG/ML
0.2 INJECTION, SOLUTION INTRAMUSCULAR; INTRAVENOUS; SUBCUTANEOUS
Status: CANCELLED | OUTPATIENT
Start: 2023-06-09

## 2023-06-09 RX ORDER — EPINEPHRINE 1 MG/ML
0.3 INJECTION, SOLUTION, CONCENTRATE INTRAVENOUS EVERY 5 MIN PRN
Status: CANCELLED | OUTPATIENT
Start: 2023-06-09

## 2023-06-09 RX ORDER — LANREOTIDE ACETATE 120 MG/.5ML
120 INJECTION SUBCUTANEOUS ONCE
Status: CANCELLED
Start: 2023-06-09 | End: 2023-06-09

## 2023-06-09 RX ORDER — LANREOTIDE ACETATE 120 MG/.5ML
120 INJECTION SUBCUTANEOUS ONCE
Status: COMPLETED | OUTPATIENT
Start: 2023-06-09 | End: 2023-06-09

## 2023-06-09 RX ORDER — DIPHENHYDRAMINE HYDROCHLORIDE 50 MG/ML
50 INJECTION INTRAMUSCULAR; INTRAVENOUS
Status: CANCELLED
Start: 2023-06-09

## 2023-06-09 RX ORDER — MEPERIDINE HYDROCHLORIDE 25 MG/ML
25 INJECTION INTRAMUSCULAR; INTRAVENOUS; SUBCUTANEOUS EVERY 30 MIN PRN
Status: CANCELLED | OUTPATIENT
Start: 2023-06-09

## 2023-06-09 RX ORDER — ALBUTEROL SULFATE 90 UG/1
1-2 AEROSOL, METERED RESPIRATORY (INHALATION)
Status: CANCELLED
Start: 2023-06-09

## 2023-06-09 RX ORDER — ALBUTEROL SULFATE 0.83 MG/ML
2.5 SOLUTION RESPIRATORY (INHALATION)
Status: CANCELLED | OUTPATIENT
Start: 2023-06-09

## 2023-06-09 RX ADMIN — LANREOTIDE ACETATE 120 MG: 120 INJECTION SUBCUTANEOUS at 15:01

## 2023-06-09 ASSESSMENT — ENCOUNTER SYMPTOMS
RESPIRATORY NEGATIVE: 1
PSYCHIATRIC NEGATIVE: 1
EYE PROBLEMS: 1
EXTREMITY WEAKNESS: 1
LEG SWELLING: 1
GASTROINTESTINAL NEGATIVE: 1
HEMATOLOGIC/LYMPHATIC NEGATIVE: 1
CONSTITUTIONAL NEGATIVE: 1

## 2023-06-09 ASSESSMENT — PAIN SCALES - GENERAL: PAINLEVEL: NO PAIN (0)

## 2023-06-09 NOTE — LETTER
"    6/9/2023         RE: Marissa Guadarrama  [address]  [place] [address]        Dear Colleague,    Thank you for referring your patient, Marissa Guadarrama, to the [facility]. Please see a copy of my visit note below.    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    Weight as of this encounter: 95.1 kg (209 lb 9.6 oz). Body surface area is 2.13 meters squared.  No Pain (0) Comment: Data Unavailable   No LMP recorded. Patient has had a hysterectomy.  Allergies reviewed: Yes  Medications reviewed: Yes    Medications: Medication refills not needed today.  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  She has had good disease control with lantreotide and sees us monthly.       She does report some increasing swelling of her left leg (which was paretic due to polio) but she hasn't had pain and controls the swelling with elevation and compression socks.  In general, her health is pretty stable.    Oncologic History  2003  Dec 5   Resection " "at [facility] - Ileal carcinoid with positive nodes     As of 2014:  \"I had the pleasure of seeing your patient Marissa Guadarrama here at the Gynecologic Cancer Clinic at the [facility] on 2/5/2014.  As you know she is a very pleasant 63 year old woman with h/o cervical cancer and remote h/o colon cancer with a recent diagnosis of VAIN III.  Given these findings she was subsequently sent to the Gynecologic Cancer Clinic for new patient consultation. 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 Depth of invasion 7 mm out of 1.9 and horizontal spread of 1 cm. 48 negative nodes. More remote history of colon cancer with no evidence of recurrence. Uterine serosa and tiny nodule at bladder showed metastatic carcinoid and she has followed with Dr. Dallas for that. She has done 24 hour urine every 6 months. Did receive octreotide for carcinoid but did not tolerated and treatment was discontinued. 6/2007 colonoscopy was normal. CT A/P 11/2008 essentially normal. Did have ASC-US pap in 2009, no record of any follow up.   On 11/1/13 she presented to primary care for annual physical. Vaginal Pap returned ASC-H. Follow up colpo with Dr. Alexandre as below.  12/23/13 Vaginal cuff (submitted as \"cervix\"), biopsy:  - High grade squamous intraepithelial lesion (vaginal intraepithelial neoplasia grade III, VaIN III, severe dysplasia and carcinoma in situ).  - No invasive malignancy identified.  - Cervical transformation zone not identified in biopsies.  - COMMENT: The findings correlate with the recent cytology ([id number], ASC-H, reviewed and confirmed). The specimen is received with a specimen description of \"cervical biopsy.\" The history is reviewed in the [facility] medical record, indicating that the patient is status post total abdominal hysterectomy for a prior cervical cancer, and that on exam the cervix was surgically " "absent with lesions noted over the vaginal cuff. Therefore the specimen description is changed to \"vaginal cuff\" in the final diagnosis. Clinical correlation is recommended.    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)     Serum calcium elevated     Peritoneal metastases     Ureteral obstruction     Rheumatoid arthritis with positive rheumatoid factor, involving unspecified site (H)     Osteopenia of hip     Need for prophylactic chemotherapy     Obstruction of left ureter     HSIL (high grade squamous intraepithelial lesion) on Pap smear of cervix     Ureteral obstruction, left     Other hydronephrosis     Malignant carcinoid tumor of cecum (H)     Current Outpatient Medications   Medication     exemestane (AROMASIN) 25 MG tablet     hydrochlorothiazide (HYDRODIURIL) 12.5 MG tablet     SENNA-docusate sodium (SENNA S) 8.6-50 MG tablet     No current facility-administered medications for this visit.     Past Medical History:   Diagnosis Date     Arthritis     knee     Benign breast biopsy     benign     Breast cancer (H)      Carcinoid tumor 12/2003     Cervical cancer (H) 2007     H/O colposcopy with cervical biopsy 12/23/2013    vaginal cuff biopsy- VAIN III. referred back to gyn/onc     HTN      Hyperlipidemia      Malignant neoplasm of ovary (H)      Obesity      Pap smear of vagina with ASC-H 11/01/2013     Post-polio syndromes      Trigeminal neuralgias      Past Surgical History:   Procedure Laterality Date     APPENDECTOMY  01/01/1983     BIOPSY BREAST "       BIOPSY NODE SENTINEL Bilateral 06/01/2016    Procedure: BIOPSY NODE SENTINEL;  Surgeon: Brent Arana MD;  Location: [facility]     [facility] SURGICAL PATHOLOGY       COLONOSCOPY N/A 06/23/2017    Procedure: COMBINED COLONOSCOPY, SINGLE OR MULTIPLE BIOPSY/POLYPECTOMY BY BIOPSY;  Colonoscopy Dx:Carcinoid tumor of colon prep mailed golytely;  Surgeon: Talisha Greco MD;  [facility]     COLPOSCOPY, BIOPSY, COMBINED  03/13/2014    Procedure: COMBINED COLPOSCOPY, BIOPSY;;  Surgeon: Lara Pack MD;  [facility]     COMBINED CYSTOSCOPY, RETROGRADES, EXCHANGE STENT URETER(S) Left 02/07/2019    Procedure: COMBINED CYSTOSCOPY, RETROGRADES, EXCHANGE STENT URETER--left;  Surgeon: Zhao La MD;  Location: [facility]     COMBINED CYSTOSCOPY, RETROGRADES, EXCHANGE STENT URETER(S) Left 02/20/2020    Procedure: CYSTOSCOPY, WITH RETROGRADE PYELOGRAM AND Left URETERAL STENT exchange;  Surgeon: Zhao La MD;  Location: [facility]     COMBINED CYSTOSCOPY, RETROGRADES, EXCHANGE STENT URETER(S) Left 05/09/2021    Procedure: CYSTOSCOPY, WITH RETROGRADE PYELOGRAM AND LEFT URETERAL STENT REPLACEMENT;  Surgeon: Fred Owens MD;  [facility]     COMBINED CYSTOSCOPY, RETROGRADES, EXCHANGE STENT URETER(S) Left 09/16/2021    Procedure: CYSTOSCOPY, WITH left RETROGRADE PYELOGRAM AND left  URETERAL STENT REPLACEMENT;  Surgeon: Zhao La MD;  Location: [facility]     COMBINED CYSTOSCOPY, RETROGRADES, EXCHANGE STENT URETER(S) Left 03/24/2022    Procedure: CYSTOSCOPY, WITH RETROGRADE PYELOGRAM AND URETERAL STENT EXCHANGE, LEFT;  Surgeon: Zhao La MD;  Location: [facility]     COMBINED CYSTOSCOPY, RETROGRADES, URETEROSCOPY, INSERT STENT Left 02/02/2017    Procedure: COMBINED CYSTOSCOPY, RETROGRADES, URETEROSCOPY, INSERT STENT;  Surgeon: Zhao La MD;  Location: [facility]     CYSTOSCOPY, REMOVE STENT(S), COMBINED N/A 11/4/2022    Procedure:  CYSTOSCOPY, LEFT STENT REMOVAL;  Surgeon: Zhao La MD;  [facility]     CYSTOSCOPY, RETROGRADES, INSERT STENT URETER(S), COMBINED Left 09/07/2017    Procedure: COMBINED CYSTOSCOPY, RETROGRADES, INSERT STENT URETER(S);  Cystoscopy,Left Stent Exchange;  Surgeon: Zhao La MD;  Location: [facility]     CYSTOSCOPY, RETROGRADES, INSERT STENT URETER(S), COMBINED  12/12/2017    Procedure: COMBINED CYSTOSCOPY, RETROGRADES, INSERT STENT URETER(S);;  Surgeon: Zhao La MD;  [facility]     CYSTOSCOPY, RETROGRADES, INSERT STENT URETER(S), COMBINED Left 07/05/2018    Procedure: COMBINED CYSTOSCOPY, RETROGRADES, INSERT STENT URETER(S);  Cystoscopy, Left Stent Exchange;  Surgeon: Zhao La MD;  Location: [facility]     DAVINCI NEPHRECTOMY Left 11/4/2022    Procedure: , LEFT NEPHRECTOMY, ROBOT-ASSISTED;  Surgeon: Zhao La MD;  Location: [facility]     EXAM UNDER ANESTHESIA PELVIC  03/13/2014    Procedure: EXAM UNDER ANESTHESIA PELVIC;  Exam Under Anestheisa, Colposcopy, Vaginal Biopsies, Co2 Laser of the Upper Vagina;  Surgeon: Lara Pack MD;  [facility]     EXAM UNDER ANESTHESIA PELVIC N/A 07/01/2020    Procedure: Examination under anesthesia, vaginal biopsies;  Surgeon: Karli Gao MD;  [facility]     HERNIORRHAPHY INCISIONAL (LOCATION)       IR RHIZOTOMY  08/14/2020     LASER CO2 VAGINA  03/13/2014    VAIN 1/2     LASER CO2 VAGINA N/A 12/12/2017    Procedure: LASER CO2 VAGINA;  Exam Under Anesthesia, CO2 Laser Ablation Of Vagina, Cystoscopy, Left Retrograde Pyelogram with Left Stent Placement;  Surgeon: Nic Segundo MD;  [facility]     LUMPECTOMY BREAST       LUMPECTOMY BREAST WITH SEED LOCALIZATION Bilateral 06/01/2016    Procedure: LUMPECTOMY BREAST WITH SEED LOCALIZATION;  Surgeon: Brent Arana MD;  Location: [facility]     SURGICAL HISTORY OF -       ovarian cystectomy     SURGICAL HISTORY OF -    2003    right colon resection secondary to carcinoid tumor     TUBAL LIGATION       [facility] BSO, OMENTECTOMY W/OSMAN  2007     Z TOTAL ABDOM HYSTERECTOMY  2007     Social History     Socioeconomic History     Marital status:      Spouse name: Not on file     Number of children: Not on file     Years of education: Not on file     Highest education level: Not on file   Occupational History     Not on file   Tobacco Use     Smoking status: Former     Packs/day: 1.00     Years: 29.00     Pack years: 29.00     Types: Cigarettes     Quit date: 10/30/2006     Years since quittin.6     Smokeless tobacco: Never   Vaping Use     Vaping status: Never Used   Substance and Sexual Activity     Alcohol use: No     Alcohol/week: 0.0 standard drinks of alcohol     Comment: 1 drink per year     Drug use: No     Sexual activity: Yes     Partners: Male   Other Topics Concern      Service No     Blood Transfusions No     Caffeine Concern Yes     Comment: occasional coffee     Occupational Exposure No     Hobby Hazards Yes     Comment: [place],     Sleep Concern Yes     Comment: not sleeping well     Stress Concern Yes     Comment: Grandson in the      Weight Concern Yes     Special Diet No     Back Care No     Exercise No     Bike Helmet Not Asked     Seat Belt Yes     Self-Exams Yes     Parent/sibling w/ CABG, MI or angioplasty before 65F 55M? No   Social History Narrative     Not on file     Social Determinants of Health     Financial Resource Strain: Not on file   Food Insecurity: Not on file   Transportation Needs: Not on file   Physical Activity: Not on file   Stress: Not on file   Social Connections: Not on file   Intimate Partner Violence: Not At Risk (2021)    Humiliation, Afraid, Rape, and Kick questionnaire      Fear of Current or Ex-Partner: No      Emotionally Abused: No      Physically Abused: No      Sexually Abused: No   Housing Stability: Not on file     Review of Systems    Constitutional: Negative.    HENT:   Positive for hearing loss.    Eyes: Positive for eye problems (cataracts).   Respiratory: Negative.    Cardiovascular: Positive for leg swelling.   Gastrointestinal: Negative.    Genitourinary: Positive for bladder incontinence.    Musculoskeletal:        Left leg is weak   Neurological: Positive for extremity weakness.   Hematological: Negative.    Psychiatric/Behavioral: Negative.    All other systems reviewed and are negative.      /75 (BP Location: Left arm, Patient Position: Sitting, Cuff Size: Adult Regular)   Pulse 73   Temp 99  F (37.2  C) (Tympanic)   Resp 12   Wt 95.1 kg (209 lb 9.6 oz)   SpO2 96%   BMI 32.34 kg/m      GENERAL: Healthy, alert and no distress  RESP: No audible wheeze, cough, or visible cyanosis.  No visible retractions or increased work of breathing.    SKIN: Visible skin clear. No significant rash, abnormal pigmentation or lesions.  NEURO: Cranial nerves grossly intact.  Mentation and speech appropriate for age.  PSYCH: Mentation appears normal, affect normal/bright, judgement and insight intact, normal speech and appearance well-groomed.      Recent Results (from the past 720 hour(s))   Lactate Dehydrogenase    Collection Time: 05/12/23  1:44 PM   Result Value Ref Range    Lactate Dehydrogenase 145 0 - 250 U/L   Chromogranin A    Collection Time: 05/12/23  1:44 PM   Result Value Ref Range    Chromogranin A 533 (H) 0 - 103 ng/mL   CEA    Collection Time: 05/12/23  1:44 PM   Result Value Ref Range    CEA 8.7 ng/mL   Chromogranin A    Collection Time: 06/09/23  2:02 PM   Result Value Ref Range    Chromogranin A     Lactate Dehydrogenase    Collection Time: 06/09/23  2:02 PM   Result Value Ref Range    Lactate Dehydrogenase 148 0 - 250 U/L   Extra Green Top (Lithium Heparin) Tube    Collection Time: 06/09/23  2:02 PM   Result Value Ref Range    Hold Specimen     Extra Purple Top Tube    Collection Time: 06/09/23  2:02 PM   Result Value Ref  Range    Hold Specimen       No results found for this or any previous visit (from the past 744 hour(s)).    Medical decision Making    Reviewed Labs    Reviewed new imaging results    Notes from other providers and nursing staff  Total time for review of records, interview and examination, documentation = 25  Virtual Visit    Platform:  [employer]    Patient Location: ON SITE - [place]    Provider Location: OFF SITE - Home office    Visit Start Time: 2:30 PM    Visit End Time: 5:18 PM      Again, thank you for allowing me to participate in the care of your patient.        Sincerely,        Pushpa Doan MD

## 2023-06-09 NOTE — PROGRESS NOTES
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    Weight as of this encounter: 95.1 kg (209 lb 9.6 oz). Body surface area is 2.13 meters squared.  No Pain (0) Comment: Data Unavailable   No LMP recorded. Patient has had a hysterectomy.  Allergies reviewed: Yes  Medications reviewed: Yes    Medications: Medication refills not needed today.  Pharmacy name entered into [employer]: [id number] - [place], [place] - [address] [place] [address]    Clinical concerns:  None      Nurys Blanco CMA            "

## 2023-06-09 NOTE — PROGRESS NOTES
Infusion Nursing Note:  Marissa Guadarrama presents today for Somatuline    Patient seen by provider today: Yes: Dr. Doan   present during visit today: Not Applicable.    Note: N/A.      Intravenous Access:  Labs drawn without difficulty.    Treatment Conditions:  Not Applicable.      Post Infusion Assessment:  Patient tolerated Somatuline injection subcutaneous to the right upper buttocks without incident.       Discharge Plan:   Patient discharged in stable condition accompanied by: .  Departure Mode: Ambulatory with walker.  Pt to return 7/7/23 at 2:00 pm for labs followed by clinic visit and then Somatuline injection.       Claudine Branch RN

## 2023-06-09 NOTE — PROGRESS NOTES
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  She has had good disease control with lantreotide and sees us monthly.       She does report some increasing swelling of her left leg (which was paretic due to polio) but she hasn't had pain and controls the swelling with elevation and compression socks.  In general, her health is pretty stable.    Oncologic History  2003  Dec 5   Resection at [facility] - Ileal carcinoid with positive nodes     As of 2014:  \"I had the pleasure of seeing your patient Marissa Guadarrama here at the Gynecologic Cancer Clinic at the [facility] on 2/5/2014.  As you know she is a very pleasant 63 year old woman with h/o cervical cancer and remote h/o colon cancer with a recent diagnosis of VAIN III.  Given these findings she was subsequently sent to the Gynecologic Cancer Clinic for new patient consultation. 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 Depth of invasion 7 mm out of 1.9 and horizontal spread of 1 cm. 48 negative nodes. More remote history of colon cancer with no evidence of recurrence. Uterine serosa and tiny nodule at bladder showed metastatic carcinoid and she has followed with Dr. Dallas for that. She has done 24 hour urine every 6 months. Did receive octreotide for carcinoid but did not tolerated and treatment was discontinued. 6/2007 colonoscopy was normal. CT A/P 11/2008 essentially normal. Did have ASC-US pap in 2009, no record of any follow up.   On 11/1/13 she presented to primary care for annual physical. Vaginal Pap returned ASC-H. Follow up colpo with Dr. Alexandre as below.  12/23/13 Vaginal cuff (submitted as \"cervix\"), biopsy:  - High grade squamous intraepithelial lesion " "(vaginal intraepithelial neoplasia grade III, VaIN III, severe dysplasia and carcinoma in situ).  - No invasive malignancy identified.  - Cervical transformation zone not identified in biopsies.  - COMMENT: The findings correlate with the recent cytology ([id number], ASC-H, reviewed and confirmed). The specimen is received with a specimen description of \"cervical biopsy.\" The history is reviewed in the [facility] medical record, indicating that the patient is status post total abdominal hysterectomy for a prior cervical cancer, and that on exam the cervix was surgically absent with lesions noted over the vaginal cuff. Therefore the specimen description is changed to \"vaginal cuff\" in the final diagnosis. Clinical correlation is recommended.    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)     Serum calcium elevated     Peritoneal metastases     Ureteral obstruction     Rheumatoid arthritis with positive rheumatoid factor, involving unspecified site (H)     Osteopenia of hip     Need for prophylactic chemotherapy     Obstruction of left ureter     HSIL (high grade squamous intraepithelial lesion) on Pap smear of cervix     Ureteral obstruction, left     Other hydronephrosis     Malignant carcinoid tumor of cecum (H)     Current Outpatient Medications   Medication     exemestane (AROMASIN) 25 MG tablet     hydrochlorothiazide (HYDRODIURIL) 12.5 MG tablet     SENNA-docusate sodium (SENNA S) 8.6-50 MG tablet     No current facility-administered medications for this visit. "     Past Medical History:   Diagnosis Date     Arthritis     knee     Benign breast biopsy     benign     Breast cancer (H)      Carcinoid tumor 12/2003     Cervical cancer (H) 2007     H/O colposcopy with cervical biopsy 12/23/2013    vaginal cuff biopsy- VAIN III. referred back to gyn/onc     HTN      Hyperlipidemia      Malignant neoplasm of ovary (H)      Obesity      Pap smear of vagina with ASC-H 11/01/2013     Post-polio syndromes      Trigeminal neuralgias      Past Surgical History:   Procedure Laterality Date     APPENDECTOMY  01/01/1983     BIOPSY BREAST       BIOPSY NODE SENTINEL Bilateral 06/01/2016    Procedure: BIOPSY NODE SENTINEL;  Surgeon: Brent Arana MD;  Location: [facility] OR     [facility] SURGICAL PATHOLOGY       COLONOSCOPY N/A 06/23/2017    Procedure: COMBINED COLONOSCOPY, SINGLE OR MULTIPLE BIOPSY/POLYPECTOMY BY BIOPSY;  Colonoscopy Dx:Carcinoid tumor of colon prep mailed [facility];  Surgeon: Talisha Greco MD;  Location:  GI     COLPOSCOPY, BIOPSY, COMBINED  03/13/2014    Procedure: COMBINED COLPOSCOPY, BIOPSY;;  Surgeon: Lara Pack MD;  [facility]     COMBINED CYSTOSCOPY, RETROGRADES, EXCHANGE STENT URETER(S) Left 02/07/2019    Procedure: COMBINED CYSTOSCOPY, RETROGRADES, EXCHANGE STENT URETER--left;  Surgeon: Zhao La MD;  Location: [facility] OR     COMBINED CYSTOSCOPY, RETROGRADES, EXCHANGE STENT URETER(S) Left 02/20/2020    Procedure: CYSTOSCOPY, WITH RETROGRADE PYELOGRAM AND Left URETERAL STENT exchange;  Surgeon: Zhao La MD;  Location: [facility] OR     COMBINED CYSTOSCOPY, RETROGRADES, EXCHANGE STENT URETER(S) Left 05/09/2021    Procedure: CYSTOSCOPY, WITH RETROGRADE PYELOGRAM AND LEFT URETERAL STENT REPLACEMENT;  Surgeon: Fred Owens MD;  [facility]     COMBINED CYSTOSCOPY, RETROGRADES, EXCHANGE STENT URETER(S) Left 09/16/2021    Procedure: CYSTOSCOPY, WITH left RETROGRADE PYELOGRAM AND left  URETERAL STENT REPLACEMENT;   Surgeon: Zhao La MD;  Location: [facility] OR     COMBINED CYSTOSCOPY, RETROGRADES, EXCHANGE STENT URETER(S) Left 03/24/2022    Procedure: CYSTOSCOPY, WITH RETROGRADE PYELOGRAM AND URETERAL STENT EXCHANGE, LEFT;  Surgeon: Zhao La MD;  Location: [facility]     COMBINED CYSTOSCOPY, RETROGRADES, URETEROSCOPY, INSERT STENT Left 02/02/2017    Procedure: COMBINED CYSTOSCOPY, RETROGRADES, URETEROSCOPY, INSERT STENT;  Surgeon: Zhao La MD;  Location: [facility]     CYSTOSCOPY, REMOVE STENT(S), COMBINED N/A 11/4/2022    Procedure: CYSTOSCOPY, LEFT STENT REMOVAL;  Surgeon: Zhao La MD;  [facility]     CYSTOSCOPY, RETROGRADES, INSERT STENT URETER(S), COMBINED Left 09/07/2017    Procedure: COMBINED CYSTOSCOPY, RETROGRADES, INSERT STENT URETER(S);  Cystoscopy,Left Stent Exchange;  Surgeon: Zhao La MD;  Location: [facility]     CYSTOSCOPY, RETROGRADES, INSERT STENT URETER(S), COMBINED  12/12/2017    Procedure: COMBINED CYSTOSCOPY, RETROGRADES, INSERT STENT URETER(S);;  Surgeon: Zhao La MD;  [facility]     CYSTOSCOPY, RETROGRADES, INSERT STENT URETER(S), COMBINED Left 07/05/2018    Procedure: COMBINED CYSTOSCOPY, RETROGRADES, INSERT STENT URETER(S);  Cystoscopy, Left Stent Exchange;  Surgeon: Zhao La MD;  Location: [facility]     DAVINCI NEPHRECTOMY Left 11/4/2022    Procedure: , LEFT NEPHRECTOMY, ROBOT-ASSISTED;  Surgeon: Zhao La MD;  [facility]     EXAM UNDER ANESTHESIA PELVIC  03/13/2014    Procedure: EXAM UNDER ANESTHESIA PELVIC;  Exam Under Anestheisa, Colposcopy, Vaginal Biopsies, Co2 Laser of the Upper Vagina;  Surgeon: Lara Pack MD;  [facility]     EXAM UNDER ANESTHESIA PELVIC N/A 07/01/2020    Procedure: Examination under anesthesia, vaginal biopsies;  Surgeon: Karli Gao MD;  [facility]     HERNIORRHAPHY INCISIONAL (LOCATION)       IR RHIZOTOMY  08/14/2020      LASER CO2 VAGINA  2014    VAIN 1/2     LASER CO2 VAGINA N/A 2017    Procedure: LASER CO2 VAGINA;  Exam Under Anesthesia, CO2 Laser Ablation Of Vagina, Cystoscopy, Left Retrograde Pyelogram with Left Stent Placement;  Surgeon: Nic Segundo MD;  [facility]     LUMPECTOMY BREAST       LUMPECTOMY BREAST WITH SEED LOCALIZATION Bilateral 2016    Procedure: LUMPECTOMY BREAST WITH SEED LOCALIZATION;  Surgeon: Brent Arana MD;  Location: [facility]     SURGICAL HISTORY OF -       ovarian cystectomy     SURGICAL HISTORY OF -   2003    right colon resection secondary to carcinoid tumor     TUBAL LIGATION       ZZC BSO, OMENTECTOMY W/OSMAN  2007     ZZC TOTAL ABDOM HYSTERECTOMY  2007     Social History     Socioeconomic History     Marital status:      Spouse name: Not on file     Number of children: Not on file     Years of education: Not on file     Highest education level: Not on file   Occupational History     Not on file   Tobacco Use     Smoking status: Former     Packs/day: 1.00     Years: 29.00     Pack years: 29.00     Types: Cigarettes     Quit date: 10/30/2006     Years since quittin.6     Smokeless tobacco: Never   Vaping Use     Vaping status: Never Used   Substance and Sexual Activity     Alcohol use: No     Alcohol/week: 0.0 standard drinks of alcohol     Comment: 1 drink per year     Drug use: No     Sexual activity: Yes     Partners: Male   Other Topics Concern      Service No     Blood Transfusions No     Caffeine Concern Yes     Comment: occasional coffee     Occupational Exposure No     Hobby Hazards Yes     Comment: [place],     Sleep Concern Yes     Comment: not sleeping well     Stress Concern Yes     Comment: Grandson in the      Weight Concern Yes     Special Diet No     Back Care No     Exercise No     Bike Helmet Not Asked     Seat Belt Yes     Self-Exams Yes     Parent/sibling w/ CABG, MI or angioplasty before 65F 55M?  No   Social History Narrative     Not on file     Social Determinants of Health     Financial Resource Strain: Not on file   Food Insecurity: Not on file   Transportation Needs: Not on file   Physical Activity: Not on file   Stress: Not on file   Social Connections: Not on file   Intimate Partner Violence: Not At Risk (8/17/2021)    Humiliation, Afraid, Rape, and Kick questionnaire      Fear of Current or Ex-Partner: No      Emotionally Abused: No      Physically Abused: No      Sexually Abused: No   Housing Stability: Not on file     Review of Systems   Constitutional: Negative.    HENT:   Positive for hearing loss.    Eyes: Positive for eye problems (cataracts).   Respiratory: Negative.    Cardiovascular: Positive for leg swelling.   Gastrointestinal: Negative.    Genitourinary: Positive for bladder incontinence.    Musculoskeletal:        Left leg is weak   Neurological: Positive for extremity weakness.   Hematological: Negative.    Psychiatric/Behavioral: Negative.    All other systems reviewed and are negative.      /75 (BP Location: Left arm, Patient Position: Sitting, Cuff Size: Adult Regular)   Pulse 73   Temp 99  F (37.2  C) (Tympanic)   Resp 12   Wt 95.1 kg (209 lb 9.6 oz)   SpO2 96%   BMI 32.34 kg/m      GENERAL: Healthy, alert and no distress  RESP: No audible wheeze, cough, or visible cyanosis.  No visible retractions or increased work of breathing.    SKIN: Visible skin clear. No significant rash, abnormal pigmentation or lesions.  NEURO: Cranial nerves grossly intact.  Mentation and speech appropriate for age.  PSYCH: Mentation appears normal, affect normal/bright, judgement and insight intact, normal speech and appearance well-groomed.      Recent Results (from the past 720 hour(s))   Lactate Dehydrogenase    Collection Time: 05/12/23  1:44 PM   Result Value Ref Range    Lactate Dehydrogenase 145 0 - 250 U/L   Chromogranin A    Collection Time: 05/12/23  1:44 PM   Result Value Ref Range     Chromogranin A 533 (H) 0 - 103 ng/mL   CEA    Collection Time: 05/12/23  1:44 PM   Result Value Ref Range    CEA 8.7 ng/mL   Chromogranin A    Collection Time: 06/09/23  2:02 PM   Result Value Ref Range    Chromogranin A     Lactate Dehydrogenase    Collection Time: 06/09/23  2:02 PM   Result Value Ref Range    Lactate Dehydrogenase 148 0 - 250 U/L   Extra Green Top (Lithium Heparin) Tube    Collection Time: 06/09/23  2:02 PM   Result Value Ref Range    Hold Specimen     Extra Purple Top Tube    Collection Time: 06/09/23  2:02 PM   Result Value Ref Range    Hold Specimen       No results found for this or any previous visit (from the past 744 hour(s)).    Medical decision Making    Reviewed Labs    Reviewed new imaging results    Notes from other providers and nursing staff  Total time for review of records, interview and examination, documentation = 25  Virtual Visit    Platform:  [employer]    Patient Location: ON SITE - [place]    Provider Location: OFF SITE - Home office    Visit Start Time: 2:30 PM    Visit End Time: 5:18 PM

## 2023-06-10 DIAGNOSIS — I10 HYPERTENSION GOAL BP (BLOOD PRESSURE) < 140/90: ICD-10-CM

## 2023-06-12 DIAGNOSIS — C50.411 MALIGNANT NEOPLASM OF UPPER-OUTER QUADRANT OF BOTH BREASTS IN FEMALE, ESTROGEN RECEPTOR POSITIVE (H): ICD-10-CM

## 2023-06-12 DIAGNOSIS — Z17.0 MALIGNANT NEOPLASM OF UPPER-OUTER QUADRANT OF BOTH BREASTS IN FEMALE, ESTROGEN RECEPTOR POSITIVE (H): ICD-10-CM

## 2023-06-12 DIAGNOSIS — C50.412 MALIGNANT NEOPLASM OF UPPER-OUTER QUADRANT OF BOTH BREASTS IN FEMALE, ESTROGEN RECEPTOR POSITIVE (H): ICD-10-CM

## 2023-06-12 LAB — CGA SERPL-MCNC: 567 NG/ML

## 2023-06-12 RX ORDER — EXEMESTANE 25 MG/1
25 TABLET ORAL EVERY MORNING
Qty: 90 TABLET | Refills: 1 | Status: SHIPPED | OUTPATIENT
Start: 2023-06-12 | End: 2023-11-03

## 2023-06-12 NOTE — TELEPHONE ENCOUNTER
Pending Prescriptions:                       Disp   Refills    hydrochlorothiazide (HYDRODIURIL) 12.5 MG*90 tab*0            Sig: TAKE ONE TABLET BY MOUTH ONE TIME DAILY    Routing refill request to provider for review/approval because:  Labs out of range:    Creatinine   Date Value Ref Range Status   11/05/2022 1.07 (H) 0.52 - 1.04 mg/dL Final   07/02/2021 1.19 (H) 0.52 - 1.04 mg/dL Final     Creatinine POCT   Date Value Ref Range Status   11/10/2022 1.0 0.5 - 1.0 mg/dL Final     No visit in 16 mo      Rafael Guthrie RN

## 2023-06-13 RX ORDER — HYDROCHLOROTHIAZIDE 12.5 MG/1
12.5 TABLET ORAL EVERY MORNING
Qty: 90 TABLET | Refills: 0 | Status: SHIPPED | OUTPATIENT
Start: 2023-06-13 | End: 2023-10-31

## 2023-07-14 ENCOUNTER — TELEPHONE (OUTPATIENT)
Dept: GASTROENTEROLOGY | Facility: CLINIC | Age: 73
End: 2023-07-14

## 2023-07-14 ENCOUNTER — INFUSION THERAPY VISIT (OUTPATIENT)
Dept: INFUSION THERAPY | Facility: CLINIC | Age: 73
End: 2023-07-14
Attending: INTERNAL MEDICINE
Payer: MEDICARE

## 2023-07-14 ENCOUNTER — ONCOLOGY VISIT (OUTPATIENT)
Dept: ONCOLOGY | Facility: CLINIC | Age: 73
End: 2023-07-14
Attending: INTERNAL MEDICINE
Payer: MEDICARE

## 2023-07-14 ENCOUNTER — LAB (OUTPATIENT)
Dept: LAB | Facility: CLINIC | Age: 73
End: 2023-07-14
Payer: MEDICARE

## 2023-07-14 VITALS
WEIGHT: 210.8 LBS | RESPIRATION RATE: 16 BRPM | OXYGEN SATURATION: 98 % | HEART RATE: 57 BPM | BODY MASS INDEX: 32.53 KG/M2 | DIASTOLIC BLOOD PRESSURE: 62 MMHG | SYSTOLIC BLOOD PRESSURE: 142 MMHG | TEMPERATURE: 97.8 F

## 2023-07-14 DIAGNOSIS — C7A.021 MALIGNANT CARCINOID TUMOR OF CECUM (H): Primary | ICD-10-CM

## 2023-07-14 LAB
CEA SERPL-MCNC: 12.8 NG/ML
LDH SERPL L TO P-CCNC: 146 U/L (ref 0–250)

## 2023-07-14 PROCEDURE — G0463 HOSPITAL OUTPT CLINIC VISIT: HCPCS | Performed by: INTERNAL MEDICINE

## 2023-07-14 PROCEDURE — 250N000011 HC RX IP 250 OP 636: Performed by: INTERNAL MEDICINE

## 2023-07-14 PROCEDURE — 96372 THER/PROPH/DIAG INJ SC/IM: CPT | Performed by: INTERNAL MEDICINE

## 2023-07-14 PROCEDURE — 82378 CARCINOEMBRYONIC ANTIGEN: CPT | Performed by: INTERNAL MEDICINE

## 2023-07-14 PROCEDURE — 99214 OFFICE O/P EST MOD 30 MIN: CPT | Performed by: INTERNAL MEDICINE

## 2023-07-14 PROCEDURE — 83615 LACTATE (LD) (LDH) ENZYME: CPT | Performed by: INTERNAL MEDICINE

## 2023-07-14 PROCEDURE — 36415 COLL VENOUS BLD VENIPUNCTURE: CPT

## 2023-07-14 RX ORDER — MEPERIDINE HYDROCHLORIDE 25 MG/ML
25 INJECTION INTRAMUSCULAR; INTRAVENOUS; SUBCUTANEOUS EVERY 30 MIN PRN
Status: CANCELLED | OUTPATIENT
Start: 2023-07-14

## 2023-07-14 RX ORDER — DIPHENHYDRAMINE HYDROCHLORIDE 50 MG/ML
50 INJECTION INTRAMUSCULAR; INTRAVENOUS
Status: CANCELLED
Start: 2023-07-14

## 2023-07-14 RX ORDER — METHYLPREDNISOLONE SODIUM SUCCINATE 125 MG/2ML
125 INJECTION, POWDER, LYOPHILIZED, FOR SOLUTION INTRAMUSCULAR; INTRAVENOUS
Status: CANCELLED
Start: 2023-07-14

## 2023-07-14 RX ORDER — LANREOTIDE ACETATE 120 MG/.5ML
120 INJECTION SUBCUTANEOUS ONCE
Status: CANCELLED
Start: 2023-07-14 | End: 2023-07-14

## 2023-07-14 RX ORDER — NALOXONE HYDROCHLORIDE 0.4 MG/ML
0.2 INJECTION, SOLUTION INTRAMUSCULAR; INTRAVENOUS; SUBCUTANEOUS
Status: CANCELLED | OUTPATIENT
Start: 2023-07-14

## 2023-07-14 RX ORDER — ALBUTEROL SULFATE 0.83 MG/ML
2.5 SOLUTION RESPIRATORY (INHALATION)
Status: CANCELLED | OUTPATIENT
Start: 2023-07-14

## 2023-07-14 RX ORDER — EPINEPHRINE 1 MG/ML
0.3 INJECTION, SOLUTION, CONCENTRATE INTRAVENOUS EVERY 5 MIN PRN
Status: CANCELLED | OUTPATIENT
Start: 2023-07-14

## 2023-07-14 RX ORDER — LANREOTIDE ACETATE 120 MG/.5ML
120 INJECTION SUBCUTANEOUS ONCE
Status: COMPLETED | OUTPATIENT
Start: 2023-07-14 | End: 2023-07-14

## 2023-07-14 RX ORDER — ALBUTEROL SULFATE 90 UG/1
1-2 AEROSOL, METERED RESPIRATORY (INHALATION)
Status: CANCELLED
Start: 2023-07-14

## 2023-07-14 RX ADMIN — LANREOTIDE ACETATE 120 MG: 120 INJECTION SUBCUTANEOUS at 14:50

## 2023-07-14 ASSESSMENT — PAIN SCALES - GENERAL: PAINLEVEL: EXTREME PAIN (9)

## 2023-07-14 NOTE — LETTER
"    7/14/2023         RE: Marissa Guadarrama  [address] [address]        Dear Colleague,    Thank you for referring your patient, Marissa Guadarrama, to the [facility] [place]. Please see a copy of my visit note below.    Oncology Rooming Note    July 14, 2023 1:49 PM   Marissa Guadarrama is a 73 year old female who presents for:    Chief Complaint   Patient presents with     Oncology Clinic Visit     Carcinoid tumor of cecum, 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 - Lab provider and infusion     Initial Vitals: BP (!) 142/62 (BP Location: Left arm, Patient Position: Sitting, Cuff Size: Adult Large)   Pulse 57   Temp 97.8  F (36.6  C) (Tympanic)   Resp 16   Wt 95.6 kg (210 lb 12.8 oz)   SpO2 98%   BMI 32.53 kg/m   Estimated body mass index is 32.53 kg/m  as calculated from the following:    Height as of 11/14/22: 1.715 m (5' 7.5\").    Weight as of this encounter: 95.6 kg (210 lb 12.8 oz). Body surface area is 2.13 meters squared.  Extreme Pain (9) Comment: Data Unavailable   No LMP recorded. Patient has had a hysterectomy.  Allergies reviewed: Yes  Medications reviewed: Yes    Medications: Medication refills not needed today.  Pharmacy name entered into [employer] [facility]: [id number] - [place], [place] - [address]    Clinical concerns:  None      Nurys Blanco, [facility] Hematology and Oncology Progress Note    Patient: Marissa Guadarrama  MRN: [id number]  Date of Service: Jul 14, 2023         Reason for Visit  Malignant carcinoid of cecum  Liver metastasis  Peritoneal metastasis      History of Present Illness    Ms. Marissa Guadarrama pleasant 73-year-old woman seen in clinic today for above-mentioned diagnosis.  She denies anorexia weight loss diarrhea flushing no palpable masses.  She denies hematuria or dysuria.    She started lanreotide in March 2022.  Her serum chromogranin A level was in the 800s and has " gradually decreased since then and reached a plateau in the 500s in the last 6 months.       Dotatate PET from November 4, 2022 was consistent with stable disease with multiple Dotatate avid liver lesions and multiple Dotatate avid foci within the abdomen and pelvis which also appeared stable when compared to her July 2022 imaging.       2022:  nonfunctioning left kidney resected.        5/12/23: CT scan chest abdomen and pelvis:  1. No CT evidence of pulmonary embolism seen.   2. There is a heterogeneously enhancing subcapsular mass along the medial right lobe of the liver measuring 4.6 x 3.8 cm. Further evaluation with MRI is recommended to exclude a hepatic malignancy.   3. There is an ill-defined hypodense region in the lateral cortex of the right renal midzone measuring 2 x 1.5 cm. A 2nd lesion is seen near the upper pole of the right kidney measuring 2.5 cm with heterogeneous enhancement. These lesions should also be further assessed with contrast enhanced MRI.   4. Mild wall thickening of the gastric antrum is present. This may be due to gastritis or peptic ulcer disease and confirmation with barium GI series or endoscopy is recommended.           Review of systems.  8 point review of systems obtained and was negative except as mentioned above        Past History    Past Medical History:   Diagnosis Date     Arthritis     knee     Benign breast biopsy     benign     Breast cancer (H)      Carcinoid tumor 12/2003     Cervical cancer (H) 2007     H/O colposcopy with cervical biopsy 12/23/2013    vaginal cuff biopsy- VAIN III. referred back to gyn/onc     HTN      Hyperlipidemia      Malignant neoplasm of ovary (H)      Obesity      Pap smear of vagina with ASC-H 11/01/2013     Post-polio syndromes      Trigeminal neuralgias        Past Surgical History:   Procedure Laterality Date     APPENDECTOMY  01/01/1983     BIOPSY BREAST       BIOPSY NODE SENTINEL Bilateral 06/01/2016    Procedure: BIOPSY NODE SENTINEL;   Surgeon: Brent Arana MD;  Location: [facility] OR     [facility] SURGICAL PATHOLOGY       COLONOSCOPY N/A 06/23/2017    Procedure: COMBINED COLONOSCOPY, SINGLE OR MULTIPLE BIOPSY/POLYPECTOMY BY BIOPSY;  Colonoscopy Dx:Carcinoid tumor of colon prep mailed golytely;  Surgeon: Talisha Greco MD;  [facility]     COLPOSCOPY, BIOPSY, COMBINED  03/13/2014    Procedure: COMBINED COLPOSCOPY, BIOPSY;;  Surgeon: Lara Pack MD;  [facility]     COMBINED CYSTOSCOPY, RETROGRADES, EXCHANGE STENT URETER(S) Left 02/07/2019    Procedure: COMBINED CYSTOSCOPY, RETROGRADES, EXCHANGE STENT URETER--left;  Surgeon: Zhao La MD;  Location: [facility]     COMBINED CYSTOSCOPY, RETROGRADES, EXCHANGE STENT URETER(S) Left 02/20/2020    Procedure: CYSTOSCOPY, WITH RETROGRADE PYELOGRAM AND Left URETERAL STENT exchange;  Surgeon: Zhao La MD;  Location: [facility]     COMBINED CYSTOSCOPY, RETROGRADES, EXCHANGE STENT URETER(S) Left 05/09/2021    Procedure: CYSTOSCOPY, WITH RETROGRADE PYELOGRAM AND LEFT URETERAL STENT REPLACEMENT;  Surgeon: Fred Owens MD;  [facility]     COMBINED CYSTOSCOPY, RETROGRADES, EXCHANGE STENT URETER(S) Left 09/16/2021    Procedure: CYSTOSCOPY, WITH left RETROGRADE PYELOGRAM AND left  URETERAL STENT REPLACEMENT;  Surgeon: Zhao La MD;  Location: [facility]     COMBINED CYSTOSCOPY, RETROGRADES, EXCHANGE STENT URETER(S) Left 03/24/2022    Procedure: CYSTOSCOPY, WITH RETROGRADE PYELOGRAM AND URETERAL STENT EXCHANGE, LEFT;  Surgeon: Zhao La MD;  Location: [facility]     COMBINED CYSTOSCOPY, RETROGRADES, URETEROSCOPY, INSERT STENT Left 02/02/2017    Procedure: COMBINED CYSTOSCOPY, RETROGRADES, URETEROSCOPY, INSERT STENT;  Surgeon: Zhao La MD;  Location: [facility]     CYSTOSCOPY, REMOVE STENT(S), COMBINED N/A 11/4/2022    Procedure: CYSTOSCOPY, LEFT STENT REMOVAL;  Surgeon: Zhao La MD;  Location:  OR      CYSTOSCOPY, RETROGRADES, INSERT STENT URETER(S), COMBINED Left 09/07/2017    Procedure: COMBINED CYSTOSCOPY, RETROGRADES, INSERT STENT URETER(S);  Cystoscopy,Left Stent Exchange;  Surgeon: Zhao La MD;  Location: [facility]     CYSTOSCOPY, RETROGRADES, INSERT STENT URETER(S), COMBINED  12/12/2017    Procedure: COMBINED CYSTOSCOPY, RETROGRADES, INSERT STENT URETER(S);;  Surgeon: Zhao La MD;  [facility]     CYSTOSCOPY, RETROGRADES, INSERT STENT URETER(S), COMBINED Left 07/05/2018    Procedure: COMBINED CYSTOSCOPY, RETROGRADES, INSERT STENT URETER(S);  Cystoscopy, Left Stent Exchange;  Surgeon: Zhao La MD;  Location: [facility]     DAVINCI NEPHRECTOMY Left 11/4/2022    Procedure: , LEFT NEPHRECTOMY, ROBOT-ASSISTED;  Surgeon: Zhao La MD;  [facility]     EXAM UNDER ANESTHESIA PELVIC  03/13/2014    Procedure: EXAM UNDER ANESTHESIA PELVIC;  Exam Under Anestheisa, Colposcopy, Vaginal Biopsies, Co2 Laser of the Upper Vagina;  Surgeon: Lara Pack MD;  [facility]     EXAM UNDER ANESTHESIA PELVIC N/A 07/01/2020    Procedure: Examination under anesthesia, vaginal biopsies;  Surgeon: Karli Gao MD;  [facility]     HERNIORRHAPHY INCISIONAL (LOCATION)       IR RHIZOTOMY  08/14/2020     LASER CO2 VAGINA  03/13/2014    VAIN 1/2     LASER CO2 VAGINA N/A 12/12/2017    Procedure: LASER CO2 VAGINA;  Exam Under Anesthesia, CO2 Laser Ablation Of Vagina, Cystoscopy, Left Retrograde Pyelogram with Left Stent Placement;  Surgeon: Nic Segundo MD;  [facility]     LUMPECTOMY BREAST       LUMPECTOMY BREAST WITH SEED LOCALIZATION Bilateral 06/01/2016    Procedure: LUMPECTOMY BREAST WITH SEED LOCALIZATION;  Surgeon: Brent Arana MD;  Location: [facility]     SURGICAL HISTORY OF -       ovarian cystectomy     SURGICAL HISTORY OF -   01/01/2003    right colon resection secondary to carcinoid tumor     TUBAL LIGATION       [facility] BSO,  OMENTECTOMY W/OSMAN  05/01/2007     ZZC TOTAL ABDOM HYSTERECTOMY  05/01/2007         Physical Exam    BP (!) 142/62 (BP Location: Left arm, Patient Position: Sitting, Cuff Size: Adult Large)   Pulse 57   Temp 97.8  F (36.6  C) (Tympanic)   Resp 16   Wt 95.6 kg (210 lb 12.8 oz)   SpO2 98%   BMI 32.53 kg/m        GENERAL: Alert and oriented to time place and person. Seated comfortably. In no distress.      CHEST: clear to auscultation bilaterally.      CVS: S1 and S2 are heard. Regular rate and rhythm.  No murmur or gallop or rub heard.  No peripheral edema.    ABDOMEN: Soft. Not tender. Not distended.  No palpable hepatomegaly or splenomegaly.  Bowel sounds heard.    Neurological: Alert and oriented, grossly intact    Psychiatric: No apparent distress        Lab Results    Recent Results (from the past 168 hour(s))   Lactate Dehydrogenase   Result Value Ref Range    Lactate Dehydrogenase 146 0 - 250 U/L       Imaging    No results found.    Assessment and Plan  1. Malignant carcinoid tumor of cecum (H)    2. Peritoneal metastases (H)    3. Liver metastases (H)    we will obtain DOTA PETt scan now to assess response to treatment.  She has chronically elevated chromogranin A level.  She also remains on monthly lanreotide which she has been tolerating well.    Right renal lesion: We will obtain renal protocol MRI to further evaluate these lesions and if needed we will obtain biopsy.  Of note she only has 1 kidney left.    Gastric antral thickening: Wants referral to GI, she denies any symptoms of GERD or peptic ulcer disease but will have an evaluation with gastroenterology.    Follow-up 2 weeks to review DOTA PET AND  MRI kidneys.    Patient completely understands and agrees with the plan.      Total time spent was over 30 minutes.  This includes chart prep time, review of clinical data including notes from outside physicians, labs, review of medical imaging, discussion about  plan of care, documentation and order  entry.    This note has been dictated using voice recognition software. Any grammatical or context distortions are unintentional and inherent to the software     Signed by: Benson Meyer MD        Again, thank you for allowing me to participate in the care of your patient.        Sincerely,        Benson Meyer MD

## 2023-07-14 NOTE — PROGRESS NOTES
[facility] Hematology and Oncology Progress Note    Patient: Marissa Guadarrama  MRN: [id number]  Date of Service: Jul 14, 2023         Reason for Visit  Malignant carcinoid of cecum  Liver metastasis  Peritoneal metastasis      History of Present Illness    Ms. Marissa Guadarrama pleasant 73-year-old woman seen in clinic today for above-mentioned diagnosis.  She denies anorexia weight loss diarrhea flushing no palpable masses.  She denies hematuria or dysuria.    She started lanreotide in March 2022.  Her serum chromogranin A level was in the 800s and has gradually decreased since then and reached a plateau in the 500s in the last 6 months.       Dotatate PET from November 4, 2022 was consistent with stable disease with multiple Dotatate avid liver lesions and multiple Dotatate avid foci within the abdomen and pelvis which also appeared stable when compared to her July 2022 imaging.       2022:  nonfunctioning left kidney resected.        5/12/23: CT scan chest abdomen and pelvis:  1. No CT evidence of pulmonary embolism seen.   2. There is a heterogeneously enhancing subcapsular mass along the medial right lobe of the liver measuring 4.6 x 3.8 cm. Further evaluation with MRI is recommended to exclude a hepatic malignancy.   3. There is an ill-defined hypodense region in the lateral cortex of the right renal midzone measuring 2 x 1.5 cm. A 2nd lesion is seen near the upper pole of the right kidney measuring 2.5 cm with heterogeneous enhancement. These lesions should also be further assessed with contrast enhanced MRI.   4. Mild wall thickening of the gastric antrum is present. This may be due to gastritis or peptic ulcer disease and confirmation with barium GI series or endoscopy is recommended.           Review of systems.  8 point review of systems obtained and was negative except as mentioned above        Past History    Past Medical History:   Diagnosis Date     Arthritis     knee     Benign breast  biopsy     benign     Breast cancer (H)      Carcinoid tumor 12/2003     Cervical cancer (H) 2007     H/O colposcopy with cervical biopsy 12/23/2013    vaginal cuff biopsy- VAIN III. referred back to gyn/onc     HTN      Hyperlipidemia      Malignant neoplasm of ovary (H)      Obesity      Pap smear of vagina with ASC-H 11/01/2013     Post-polio syndromes      Trigeminal neuralgias        Past Surgical History:   Procedure Laterality Date     APPENDECTOMY  01/01/1983     BIOPSY BREAST       BIOPSY NODE SENTINEL Bilateral 06/01/2016    Procedure: BIOPSY NODE SENTINEL;  Surgeon: Brent Arana MD;  Location: [facility] OR     [facility] SURGICAL PATHOLOGY       COLONOSCOPY N/A 06/23/2017    Procedure: COMBINED COLONOSCOPY, SINGLE OR MULTIPLE BIOPSY/POLYPECTOMY BY BIOPSY;  Colonoscopy Dx:Carcinoid tumor of colon prep mailed [facility];  Surgeon: Talisha Greco MD;  Location:  GI     COLPOSCOPY, BIOPSY, COMBINED  03/13/2014    Procedure: COMBINED COLPOSCOPY, BIOPSY;;  Surgeon: Lara Pack MD;  [facility] OR     COMBINED CYSTOSCOPY, RETROGRADES, EXCHANGE STENT URETER(S) Left 02/07/2019    Procedure: COMBINED CYSTOSCOPY, RETROGRADES, EXCHANGE STENT URETER--left;  Surgeon: Zhao La MD;  Location: [facility] OR     COMBINED CYSTOSCOPY, RETROGRADES, EXCHANGE STENT URETER(S) Left 02/20/2020    Procedure: CYSTOSCOPY, WITH RETROGRADE PYELOGRAM AND Left URETERAL STENT exchange;  Surgeon: Zhao La MD;  Location: [facility] OR     COMBINED CYSTOSCOPY, RETROGRADES, EXCHANGE STENT URETER(S) Left 05/09/2021    Procedure: CYSTOSCOPY, WITH RETROGRADE PYELOGRAM AND LEFT URETERAL STENT REPLACEMENT;  Surgeon: Fred Owens MD;  [facility]     COMBINED CYSTOSCOPY, RETROGRADES, EXCHANGE STENT URETER(S) Left 09/16/2021    Procedure: CYSTOSCOPY, WITH left RETROGRADE PYELOGRAM AND left  URETERAL STENT REPLACEMENT;  Surgeon: Zhao La MD;  Location: [facility] OR     COMBINED CYSTOSCOPY,  RETROGRADES, EXCHANGE STENT URETER(S) Left 03/24/2022    Procedure: CYSTOSCOPY, WITH RETROGRADE PYELOGRAM AND URETERAL STENT EXCHANGE, LEFT;  Surgeon: Zhao La MD;  Location: [facility] OR     COMBINED CYSTOSCOPY, RETROGRADES, URETEROSCOPY, INSERT STENT Left 02/02/2017    Procedure: COMBINED CYSTOSCOPY, RETROGRADES, URETEROSCOPY, INSERT STENT;  Surgeon: Zhao La MD;  Location: [facility] OR     CYSTOSCOPY, REMOVE STENT(S), COMBINED N/A 11/4/2022    Procedure: CYSTOSCOPY, LEFT STENT REMOVAL;  Surgeon: Zhao La MD;  [facility] OR     CYSTOSCOPY, RETROGRADES, INSERT STENT URETER(S), COMBINED Left 09/07/2017    Procedure: COMBINED CYSTOSCOPY, RETROGRADES, INSERT STENT URETER(S);  Cystoscopy,Left Stent Exchange;  Surgeon: Zhao La MD;  Location: [facility]     CYSTOSCOPY, RETROGRADES, INSERT STENT URETER(S), COMBINED  12/12/2017    Procedure: COMBINED CYSTOSCOPY, RETROGRADES, INSERT STENT URETER(S);;  Surgeon: Zhao La MD;  [facility]     CYSTOSCOPY, RETROGRADES, INSERT STENT URETER(S), COMBINED Left 07/05/2018    Procedure: COMBINED CYSTOSCOPY, RETROGRADES, INSERT STENT URETER(S);  Cystoscopy, Left Stent Exchange;  Surgeon: Zhao La MD;  Location: [facility]     DAVINCI NEPHRECTOMY Left 11/4/2022    Procedure: , LEFT NEPHRECTOMY, ROBOT-ASSISTED;  Surgeon: Zhao La MD;  Location: [facility] OR     EXAM UNDER ANESTHESIA PELVIC  03/13/2014    Procedure: EXAM UNDER ANESTHESIA PELVIC;  Exam Under Anestheisa, Colposcopy, Vaginal Biopsies, Co2 Laser of the Upper Vagina;  Surgeon: Lara Pack MD;  [facility]     EXAM UNDER ANESTHESIA PELVIC N/A 07/01/2020    Procedure: Examination under anesthesia, vaginal biopsies;  Surgeon: Karli Gao MD;  [facility]     HERNIORRHAPHY INCISIONAL (LOCATION)       IR RHIZOTOMY  08/14/2020     LASER CO2 VAGINA  03/13/2014    VAIN 1/2     LASER CO2 VAGINA N/A 12/12/2017     Procedure: LASER CO2 VAGINA;  Exam Under Anesthesia, CO2 Laser Ablation Of Vagina, Cystoscopy, Left Retrograde Pyelogram with Left Stent Placement;  Surgeon: Nic Segundo MD;  [facility]     LUMPECTOMY BREAST       LUMPECTOMY BREAST WITH SEED LOCALIZATION Bilateral 06/01/2016    Procedure: LUMPECTOMY BREAST WITH SEED LOCALIZATION;  Surgeon: Brent Arana MD;  Location: [facility]     SURGICAL HISTORY OF -       ovarian cystectomy     SURGICAL HISTORY OF -   01/01/2003    right colon resection secondary to carcinoid tumor     TUBAL LIGATION       [facility] BSO, OMENTECTOMY W/OSMAN  05/01/2007     [facility] TOTAL ABDOM HYSTERECTOMY  05/01/2007         Physical Exam    BP (!) 142/62 (BP Location: Left arm, Patient Position: Sitting, Cuff Size: Adult Large)   Pulse 57   Temp 97.8  F (36.6  C) (Tympanic)   Resp 16   Wt 95.6 kg (210 lb 12.8 oz)   SpO2 98%   BMI 32.53 kg/m        GENERAL: Alert and oriented to time place and person. Seated comfortably. In no distress.      CHEST: clear to auscultation bilaterally.      CVS: S1 and S2 are heard. Regular rate and rhythm.  No murmur or gallop or rub heard.  No peripheral edema.    ABDOMEN: Soft. Not tender. Not distended.  No palpable hepatomegaly or splenomegaly.  Bowel sounds heard.    Neurological: Alert and oriented, grossly intact    Psychiatric: No apparent distress        Lab Results    Recent Results (from the past 168 hour(s))   Lactate Dehydrogenase   Result Value Ref Range    Lactate Dehydrogenase 146 0 - 250 U/L       Imaging    No results found.    Assessment and Plan  1. Malignant carcinoid tumor of cecum (H)    2. Peritoneal metastases (H)    3. Liver metastases (H)    we will obtain DOTA PETt scan now to assess response to treatment.  She has chronically elevated chromogranin A level.  She also remains on monthly lanreotide which she has been tolerating well.    Right renal lesion: We will obtain renal protocol MRI to further evaluate these lesions  and if needed we will obtain biopsy.  Of note she only has 1 kidney left.    Gastric antral thickening: Wants referral to GI, she denies any symptoms of GERD or peptic ulcer disease but will have an evaluation with gastroenterology.    Follow-up 2 weeks to review DOTA PET AND  MRI kidneys.    Patient completely understands and agrees with the plan.      Total time spent was over 30 minutes.  This includes chart prep time, review of clinical data including notes from outside physicians, labs, review of medical imaging, discussion about  plan of care, documentation and .    This note has been dictated using voice recognition software. Any grammatical or context distortions are unintentional and inherent to the software     Signed by: Benson Meyer MD

## 2023-07-14 NOTE — TELEPHONE ENCOUNTER
[facility] Call Center    Phone Message    May a detailed message be left on voicemail: Yes    Reason for Call: Other: Patient is currently scheduled on 7/31/23, as a patient New GI Emergent . This is outside the expected timeline for this schedule. Paitent has been added to the waitlist.      Action Taken: Message routed to:  Other: GI REFERRAL TRIAGE POOL     Travel Screening: Not Applicable

## 2023-07-14 NOTE — PATIENT INSTRUCTIONS
we will obtain DOTA PETt scan now to assess response to treatment.  She has chronically elevated chromogranin A level.  She also remains on monthly lanreotide which she has been tolerating well.     Right renal lesion: We will obtain renal protocol MRI to further evaluate these lesions and if needed we will obtain biopsy.  Of note she only has 1 kidney left.     Gastric antral thickening: Wants referral to GI, she denies any symptoms of GERD or peptic ulcer disease but will have an evaluation with gastroenterology.     Follow-up 2 weeks to review DOTA PET AND  MRI kidneys.

## 2023-07-14 NOTE — PROGRESS NOTES
Infusion Nursing Note:  Marissa Guadarrama presents today for Somatuline.    Patient seen by provider today: Yes: Dr. Meyer   present during visit today: Not Applicable.    Note: N/A.      Intravenous Access:  No Intravenous access/labs at this visit.    Treatment Conditions:  Not Applicable.      Post Infusion Assessment:  Patient tolerated injection without incident.       Discharge Plan:   Patient discharged in stable condition accompanied by: .  Departure Mode: Ambulatory.      Fany Mendoza RN

## 2023-07-14 NOTE — PROGRESS NOTES
"Oncology Rooming Note    July 14, 2023 1:49 PM   Marissa Guadarrama is a 73 year old female who presents for:    Chief Complaint   Patient presents with    Oncology Clinic Visit     Carcinoid tumor of cecum, 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 - Lab provider and infusion     Initial Vitals: BP (!) 142/62 (BP Location: Left arm, Patient Position: Sitting, Cuff Size: Adult Large)   Pulse 57   Temp 97.8  F (36.6  C) (Tympanic)   Resp 16   Wt 95.6 kg (210 lb 12.8 oz)   SpO2 98%   BMI 32.53 kg/m   Estimated body mass index is 32.53 kg/m  as calculated from the following:    Height as of 11/14/22: 1.715 m (5' 7.5\").    Weight as of this encounter: 95.6 kg (210 lb 12.8 oz). Body surface area is 2.13 meters squared.  Extreme Pain (9) Comment: Data Unavailable   No LMP recorded. Patient has had a hysterectomy.  Allergies reviewed: Yes  Medications reviewed: Yes    Medications: Medication refills not needed today.  Pharmacy name entered into [employer]: [id number] - [place], [place] - [address] [place] [address]    Clinical concerns:  None      Nurys Blanco CMA            "

## 2023-07-17 NOTE — TELEPHONE ENCOUNTER
Percyr called and talked with Pt's spouse Tereso regarding scheduling a sooner appointment for Pt, per referral.  offered Pt an appointment with Dr. Samuel Maldonado on 7/18/2023. Tereso declined a sooner appointment and would like to keep appointment as scheduled for 7/31/2023. Closing encounter.

## 2023-07-18 NOTE — ADDENDUM NOTE
SUBJECTIVE:   Amy Jauregui is a 20 year old female who presents to clinic today for the following health issues:    Anxiety Follow-Up    Status since last visit: Worsened     Other associated symptoms:Not sleeping good, no appetite    Complicating factors:   Significant life event: Yes-  Grandpa is really sick, last semester of school is starting   Current substance abuse: None  Depression symptoms: Yes  GURU-7 SCORE 11/5/2015 6/1/2016 6/26/2017   Total Score - - -   Total Score 11 6 6       GAD7        Amount of exercise or physical activity: None    Problems taking medications regularly: No    Medication side effects: headaches from Trazodone. Has an eye appt tomorrow. Thinks it was because of studying so much    Diet: regular (no restrictions)      Last semester  Has been more stressed  Recently her grandpa was diagnosed with non-hodgkin's  lymohma.     Not sleeping well  When she finally does sleep, it's for 13 hours    Has an eye appointment tomorrow to make sure this isn't causing her headaches    Tried dietary changes with GERD, and still getting.   Didn't try any antacids yet    Not currently seeing a counselor        Problem list and histories reviewed & adjusted, as indicated.  Additional history: as documented    ROS:  C: NEGATIVE for fever, chills, change in weight  I: NEGATIVE for worrisome rashes, moles or lesions  E: NEGATIVE for vision changes or irritation  E/M: NEGATIVE for ear, mouth and throat problems  R: NEGATIVE for significant cough or SOB  CV: NEGATIVE for chest pain, palpitations or peripheral edema  GI: NEGATIVE for constipation, diarrhea, hematemesis, hematochezia, hemorrhoids, jaundice, melena, poor appetite, vomiting and weight loss  : NEGATIVE for frequency, dysuria, or hematuria  M: NEGATIVE for significant arthralgias or myalgia  N: NEGATIVE for weakness, dizziness or paresthesias  E: NEGATIVE for temperature intolerance, skin/hair changes  H: NEGATIVE for bleeding  Addended by: STANLEY FELTON on: 7/18/2023 08:38 AM     Modules accepted: Orders     problems  PSYCHIATRIC: NEGATIVE for thoughts of hurting someone else and thoughts of self harm    Patient Active Problem List   Diagnosis     Exercise-induced asthma     Intermittent asthma     Congenital anomaly of urinary system     Moderate major depression (H)     Pyelonephritis     Duplicated urinary collecting system     Anemia     Frequent UTI     Dysmenorrhea     Irregular menses     GURU (generalized anxiety disorder)     Upper respiratory tract infection, unspecified upper respiratory infection     LSIL on pap (<25 yrs of age)     Past Surgical History:   Procedure Laterality Date     ABDOMEN SURGERY       CYSTOSCOPY  7/10    and vaginoscopy= normal     SURGICAL HISTORY OF -   08/15/02    Right 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       Social History   Substance Use Topics     Smoking status: Never Smoker     Smokeless tobacco: Never Used     Alcohol use Yes      Comment: occ     Family History   Problem Relation Age of Onset     Cardiovascular Mother      Unknown/Adopted Mother      Hypertension Maternal Grandmother      Unknown/Adopted Maternal Grandmother      DIABETES Maternal Grandfather      Unknown/Adopted Maternal Grandfather      Unknown/Adopted Father      Unknown/Adopted Brother      Unknown/Adopted Sister      Unknown/Adopted Paternal Grandmother      Unknown/Adopted Paternal Grandfather            Problem list, Medication list, Allergies, and Medical/Social/Surgical histories reviewed in [facility] and updated as appropriate.    OBJECTIVE:                                                    /88  Pulse 85  Temp 98.1  F (36.7  C) (Oral)  Wt 143 lb 1.6 oz (64.9 kg)  SpO2 98%  BMI 24.37 kg/m2 Body mass index is 24.37 kg/(m^2).   GENERAL:  Alert and oriented x 3.  WD WN  HEENT: Atramautic, PERRLA.  EOMs intact, Normal canal, TM normal, nostrils patent, oropharynx hydrated without edema erythema or exudates. Good dentition.  HEART:  Regular rhythm.  Normal  "rate.  No murmur, gallop, rub or  ectopy.  PMI is not displaced.  LUNGS:  Clear to auscultation bilaterally without rhonchi rhales or wheezes. Chest rise symmetrical and no tenderness to palpation. Good breath sounds throughout. NO deformity and no accessory muscle use  SKIN:  Warm and dry.   PSYCH: Mood: \"ok\"   Affect: blunted and anxious   Insight: good   Thought process: linear  Abdomen: soft, non-tender, non-distended. Normal bowel sounds         ASSESSMENT/PLAN:                                                        ICD-10-CM    1. GURU (generalized anxiety disorder) F41.1 sertraline (ZOLOFT) 50 MG tablet     MENTAL HEALTH REFERRAL  - Adult; Outpatient Treatment; Individual/Couples/Family/Group Therapy/Health Psychology; [facility] [place] [facility] [phone]; We will contact you to schedule the appointment or please call with any questions   2. Major depressive disorder, recurrent episode, moderate (H) F33.1 sertraline (ZOLOFT) 50 MG tablet     MENTAL HEALTH REFERRAL  - Adult; Outpatient Treatment; Individual/Couples/Family/Group Therapy/Health Psychology; G: [facility] [phone]; We will contact you to schedule the appointment or please call with any questions   3. Gastroesophageal reflux disease, esophagitis presence not specified K21.9 omeprazole (PRILOSEC) 20 MG CR capsule   4. Vitamin D deficiency E55.9 Vitamin D Deficiency       PLAN:  1) Health habits reviewed, and patient encouraged to avoid alcohol and exercise regularly.  2) Medications and duration of treatment discussed, possible side effects reviewed, and start zoloft as directed.  3) Return appointment in 5 weeks.  4) Referral to therapy  5) start prilosec for 2 weeks. Likely stress related. Continue gerd diet.      Patient Instructions   tums or zantac as needed after 2 week course of prilosec  Start on zoloft  Follow up with therapist. Look at [facility] website to look at profiles.   Recheck in 5 weeks  Return " "to clinic for any new or worsening symptoms or go to ER Urgent care in off hours          Estimated body mass index is 24.37 kg/(m^2) as calculated from the following:    Height as of 7/18/17: 5' 4.25\" (1.632 m).    Weight as of this encounter: 143 lb 1.6 oz (64.9 kg).       Marie Hearn  [facility] [place]      "

## 2023-07-21 ENCOUNTER — HOSPITAL ENCOUNTER (OUTPATIENT)
Dept: PET IMAGING | Facility: CLINIC | Age: 73
Discharge: HOME | End: 2023-07-21
Attending: INTERNAL MEDICINE
Payer: MEDICARE

## 2023-07-21 DIAGNOSIS — C7A.021 MALIGNANT CARCINOID TUMOR OF CECUM (H): ICD-10-CM

## 2023-07-21 LAB
CREAT BLD-MCNC: 1.7 MG/DL (ref 0.5–1)
GFR SERPL CREATININE-BSD FRML MDRD: 31 ML/MIN/1.73M2

## 2023-07-21 PROCEDURE — 250N000011 HC RX IP 250 OP 636: Mod: XU | Performed by: INTERNAL MEDICINE

## 2023-07-21 PROCEDURE — 82565 ASSAY OF CREATININE: CPT

## 2023-07-21 PROCEDURE — 71250 CT THORAX DX C-: CPT | Mod: XU

## 2023-07-21 PROCEDURE — 71260 CT THORAX DX C+: CPT | Mod: 26 | Performed by: RADIOLOGY

## 2023-07-21 PROCEDURE — G1010 CDSM STANSON: HCPCS | Mod: PS

## 2023-07-21 PROCEDURE — A9592 HC RX IP 250 OP 636: HCPCS | Mod: XU | Performed by: INTERNAL MEDICINE

## 2023-07-21 PROCEDURE — 70490 CT SOFT TISSUE NECK W/O DYE: CPT | Mod: 26 | Performed by: RADIOLOGY

## 2023-07-21 PROCEDURE — 74177 CT ABD & PELVIS W/CONTRAST: CPT | Mod: 26 | Performed by: RADIOLOGY

## 2023-07-21 PROCEDURE — 70490 CT SOFT TISSUE NECK W/O DYE: CPT

## 2023-07-21 PROCEDURE — 78816 PET IMAGE W/CT FULL BODY: CPT | Mod: 26 | Performed by: RADIOLOGY

## 2023-07-21 PROCEDURE — G1010 CDSM STANSON: HCPCS | Performed by: RADIOLOGY

## 2023-07-21 RX ORDER — IOPAMIDOL 755 MG/ML
0-135 INJECTION, SOLUTION INTRAVASCULAR ONCE
Status: DISCONTINUED | OUTPATIENT
Start: 2023-07-21 | End: 2023-07-21

## 2023-07-21 RX ADMIN — COPPER CU 64 DOTATATE 4.65 MILLICURIE: 1 INJECTION, SOLUTION INTRAVENOUS at 13:49

## 2023-07-28 ENCOUNTER — LAB (OUTPATIENT)
Dept: LAB | Facility: CLINIC | Age: 73
End: 2023-07-28
Attending: INTERNAL MEDICINE
Payer: MEDICARE

## 2023-07-28 ENCOUNTER — HOSPITAL ENCOUNTER (OUTPATIENT)
Dept: MRI IMAGING | Facility: CLINIC | Age: 73
Discharge: HOME | End: 2023-07-28
Attending: INTERNAL MEDICINE
Payer: MEDICARE

## 2023-07-28 DIAGNOSIS — C7A.021 MALIGNANT CARCINOID TUMOR OF CECUM (H): ICD-10-CM

## 2023-07-28 LAB — LDH SERPL L TO P-CCNC: 145 U/L (ref 0–250)

## 2023-07-28 PROCEDURE — 258N000003 HC RX IP 258 OP 636: Performed by: INTERNAL MEDICINE

## 2023-07-28 PROCEDURE — 255N000002 HC RX 255 OP 636: Performed by: INTERNAL MEDICINE

## 2023-07-28 PROCEDURE — 36415 COLL VENOUS BLD VENIPUNCTURE: CPT | Mod: GA

## 2023-07-28 PROCEDURE — 86316 IMMUNOASSAY TUMOR OTHER: CPT | Mod: GA

## 2023-07-28 PROCEDURE — A9585 GADOBUTROL INJECTION: HCPCS | Performed by: INTERNAL MEDICINE

## 2023-07-28 PROCEDURE — 83615 LACTATE (LD) (LDH) ENZYME: CPT

## 2023-07-28 PROCEDURE — G1010 CDSM STANSON: HCPCS

## 2023-07-28 RX ORDER — GADOBUTROL 604.72 MG/ML
9.5 INJECTION INTRAVENOUS ONCE
Status: COMPLETED | OUTPATIENT
Start: 2023-07-28 | End: 2023-07-28

## 2023-07-28 RX ADMIN — SODIUM CHLORIDE 50 ML: 9 INJECTION, SOLUTION INTRAVENOUS at 16:02

## 2023-07-28 RX ADMIN — GADOBUTROL 9.5 ML: 604.72 INJECTION INTRAVENOUS at 15:27

## 2023-07-31 LAB — CGA SERPL-MCNC: 562 NG/ML

## 2023-08-06 ENCOUNTER — APPOINTMENT (OUTPATIENT)
Dept: CT IMAGING | Facility: CLINIC | Age: 73
End: 2023-08-06
Attending: EMERGENCY MEDICINE
Payer: MEDICARE

## 2023-08-06 ENCOUNTER — HOSPITAL ENCOUNTER (EMERGENCY)
Facility: CLINIC | Age: 73
Discharge: HOME | End: 2023-08-06
Attending: EMERGENCY MEDICINE | Admitting: EMERGENCY MEDICINE
Payer: MEDICARE

## 2023-08-06 VITALS
DIASTOLIC BLOOD PRESSURE: 84 MMHG | SYSTOLIC BLOOD PRESSURE: 166 MMHG | BODY MASS INDEX: 31.39 KG/M2 | HEIGHT: 67 IN | TEMPERATURE: 97.6 F | HEART RATE: 56 BPM | WEIGHT: 200 LBS | RESPIRATION RATE: 16 BRPM | OXYGEN SATURATION: 97 %

## 2023-08-06 DIAGNOSIS — N28.9 RENAL INSUFFICIENCY: ICD-10-CM

## 2023-08-06 DIAGNOSIS — R31.9 HEMATURIA, UNSPECIFIED TYPE: ICD-10-CM

## 2023-08-06 DIAGNOSIS — N30.91 HEMORRHAGIC CYSTITIS: ICD-10-CM

## 2023-08-06 LAB
ALBUMIN UR-MCNC: 30 MG/DL
ANION GAP SERPL CALCULATED.3IONS-SCNC: 9 MMOL/L (ref 7–15)
APPEARANCE UR: ABNORMAL
BACTERIA #/AREA URNS HPF: ABNORMAL /HPF
BASOPHILS # BLD AUTO: 0 10E3/UL (ref 0–0.2)
BASOPHILS NFR BLD AUTO: 0 %
BILIRUB UR QL STRIP: NEGATIVE
BUN SERPL-MCNC: 25.6 MG/DL (ref 8–23)
CALCIUM SERPL-MCNC: 10 MG/DL (ref 8.8–10.2)
CHLORIDE SERPL-SCNC: 103 MMOL/L (ref 98–107)
COLOR UR AUTO: ABNORMAL
CREAT SERPL-MCNC: 1.44 MG/DL (ref 0.51–0.95)
DEPRECATED HCO3 PLAS-SCNC: 31 MMOL/L (ref 22–29)
EOSINOPHIL # BLD AUTO: 0.3 10E3/UL (ref 0–0.7)
EOSINOPHIL NFR BLD AUTO: 5 %
ERYTHROCYTE [DISTWIDTH] IN BLOOD BY AUTOMATED COUNT: 12.9 % (ref 10–15)
GFR SERPL CREATININE-BSD FRML MDRD: 38 ML/MIN/1.73M2
GLUCOSE SERPL-MCNC: 126 MG/DL (ref 70–99)
GLUCOSE UR STRIP-MCNC: NEGATIVE MG/DL
HCT VFR BLD AUTO: 42 % (ref 35–47)
HGB BLD-MCNC: 13.7 G/DL (ref 11.7–15.7)
HGB UR QL STRIP: ABNORMAL
IMM GRANULOCYTES # BLD: 0 10E3/UL
IMM GRANULOCYTES NFR BLD: 0 %
KETONES UR STRIP-MCNC: NEGATIVE MG/DL
LEUKOCYTE ESTERASE UR QL STRIP: ABNORMAL
LYMPHOCYTES # BLD AUTO: 1.5 10E3/UL (ref 0.8–5.3)
LYMPHOCYTES NFR BLD AUTO: 22 %
MCH RBC QN AUTO: 29.8 PG (ref 26.5–33)
MCHC RBC AUTO-ENTMCNC: 32.6 G/DL (ref 31.5–36.5)
MCV RBC AUTO: 91 FL (ref 78–100)
MONOCYTES # BLD AUTO: 0.4 10E3/UL (ref 0–1.3)
MONOCYTES NFR BLD AUTO: 6 %
NEUTROPHILS # BLD AUTO: 4.3 10E3/UL (ref 1.6–8.3)
NEUTROPHILS NFR BLD AUTO: 67 %
NITRATE UR QL: NEGATIVE
NRBC # BLD AUTO: 0 10E3/UL
NRBC BLD AUTO-RTO: 0 /100
PH UR STRIP: 7 [PH] (ref 5–7)
PLATELET # BLD AUTO: 181 10E3/UL (ref 150–450)
POTASSIUM SERPL-SCNC: 4 MMOL/L (ref 3.4–5.3)
RBC # BLD AUTO: 4.6 10E6/UL (ref 3.8–5.2)
RBC URINE: >182 /HPF
SODIUM SERPL-SCNC: 143 MMOL/L (ref 136–145)
SP GR UR STRIP: 1.01 (ref 1–1.03)
UROBILINOGEN UR STRIP-MCNC: NORMAL MG/DL
WBC # BLD AUTO: 6.5 10E3/UL (ref 4–11)
WBC CLUMPS #/AREA URNS HPF: PRESENT /HPF
WBC URINE: 134 /HPF

## 2023-08-06 PROCEDURE — 87088 URINE BACTERIA CULTURE: CPT | Performed by: EMERGENCY MEDICINE

## 2023-08-06 PROCEDURE — 99284 EMERGENCY DEPT VISIT MOD MDM: CPT | Performed by: EMERGENCY MEDICINE

## 2023-08-06 PROCEDURE — 99284 EMERGENCY DEPT VISIT MOD MDM: CPT | Mod: 25

## 2023-08-06 PROCEDURE — 85004 AUTOMATED DIFF WBC COUNT: CPT | Performed by: EMERGENCY MEDICINE

## 2023-08-06 PROCEDURE — G1010 CDSM STANSON: HCPCS

## 2023-08-06 PROCEDURE — 81001 URINALYSIS AUTO W/SCOPE: CPT | Performed by: EMERGENCY MEDICINE

## 2023-08-06 PROCEDURE — 80048 BASIC METABOLIC PNL TOTAL CA: CPT | Performed by: EMERGENCY MEDICINE

## 2023-08-06 PROCEDURE — 36415 COLL VENOUS BLD VENIPUNCTURE: CPT | Performed by: EMERGENCY MEDICINE

## 2023-08-06 PROCEDURE — 96360 HYDRATION IV INFUSION INIT: CPT

## 2023-08-06 PROCEDURE — 258N000003 HC RX IP 258 OP 636: Performed by: EMERGENCY MEDICINE

## 2023-08-06 RX ORDER — CEPHALEXIN 500 MG/1
500 CAPSULE ORAL 2 TIMES DAILY
Qty: 14 CAPSULE | Refills: 0 | Status: SHIPPED | OUTPATIENT
Start: 2023-08-06 | End: 2023-08-13

## 2023-08-06 RX ADMIN — SODIUM CHLORIDE 500 ML: 9 INJECTION, SOLUTION INTRAVENOUS at 11:56

## 2023-08-06 ASSESSMENT — ACTIVITIES OF DAILY LIVING (ADL)
ADLS_ACUITY_SCORE: 35

## 2023-08-06 NOTE — ED NOTES
Inpatient Rehab Program (IRP) admission orientation completed with handbook provided. Patient and/or care partner educated on the role of the Care Coordinator. Per CMS requirements, patient was provided with a copy of the Inpatient Rehab Facility Patient Assessment Instrument (IRF-ELI) rights in the handbook.      Care Coordinator is NICOLETTE Corado.   Vaginal bleeding, blood in urine since last night.  Patient stated that she has gone through 4 pads.  Patient denies abdominal pain.

## 2023-08-06 NOTE — ED PROVIDER NOTES
History     Chief Complaint   Patient presents with     Hematuria     Blood in toilet after urinating, also soaking pads, has hx of incontinence, bleeding started last night     HPI  Marissa Guadarrama is a 73 year old female with past medical history significant for polio carcinoid tumor of cecum cervical cancer hypertension rheumatoid arthritis left nephrectomy in 2022 who presents emergency department complaining of blood pressure vagina or in urine.  Patient states last night she went to the bathroom and large amount of blood in the toilet and since then has had several episodes of bleeding.  She has had previous urinary tract infection and has had no other symptoms.  She denies any fevers or chills has not had any headache or visual changes denies any neck pain has not any chest pain or shortness of breath does not have any abdominal pain.  She denies any back pain is not any focal numbness weakness in any extremity or calf pain.    Allergies:  No Known Allergies    Problem List:    Patient Active Problem List    Diagnosis Date Noted     Malignant carcinoid tumor of cecum (H) 02/15/2022     Priority: Medium     Ureteral obstruction, left 05/09/2021     Priority: Medium     Other hydronephrosis 05/09/2021     Priority: Medium     Added automatically from request for surgery [id number]       HSIL (high grade squamous intraepithelial lesion) on Pap smear of cervix 06/15/2020     Priority: Medium     Added automatically from request for surgery [id number]       Obstruction of left ureter 02/11/2020     Priority: Medium     Added automatically from request for surgery [id number]       Osteopenia of hip 05/15/2019     Priority: Medium     Need for prophylactic chemotherapy 05/15/2019     Priority: Medium     Rheumatoid arthritis with positive rheumatoid factor, involving unspecified site (H) 12/31/2018     Priority: Medium     Ureteral obstruction 11/20/2018     Priority: Medium     Added automatically from request for  "surgery [id number]       Peritoneal metastases 10/30/2017     Priority: Medium     Serum calcium elevated 04/07/2017     Priority: Medium     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) 06/28/2016     Priority: Medium     Rt upper outer, L lower outer ER+DC+ Her 2-       Urinary incontinence 09/12/2014     Priority: Medium     Vaginal dysplasia 03/10/2014     Priority: Medium     CKD (chronic kidney disease) stage 3, GFR 30-59 ml/min (H) 09/23/2012     Priority: Medium     Advanced directives, counseling/discussion 09/19/2012     Priority: Medium     Advance Care Planning:   ACP Review and Resources Provided:  Reviewed chart for advance care plan.  Marissa Guadarrama has no plan or code status on file. Discussed available resources and provided with information. Confirmed code status reflects current choices pending further ACP discussions.  Confirmed/documented designated decision maker(s). See permanent comments section of demographics in clinical tab. Added by Tiff Askew on 2/6/2015  Discussed advance care planning with patient; however, patient declined at this time. 9/19/2012              OA (osteoarthritis) of knee 10/05/2011     Priority: Medium     Hypertension goal BP (blood pressure) < 140/90 11/01/2010     Priority: Medium     HYPERLIPIDEMIA LDL GOAL <130 10/31/2010     Priority: Medium     Post-polio syndrome      Priority: Medium     Left knee, diagnosed by ortho in 1976, had left leg/toe surgeries as child  (Problem list name updated by automated process. Provider to review and confirm.)       Cervical cancer (H)      Priority: Medium     5/2007.  Dr. Alonso, [facility] gyn/onc,  Now needs routine paps, patient not always compliant  3/26/09 pap NIL  9/24/09 pap ASCUS  11/1/13 pap ASC-H. Plan-- colposcopy   12/23/13 colposcopy scheduled. Reminder placed.  colpscopy 12/23/2013-Vaginal cuff (submitted as \"cervix\"), biopsy:  - High grade squamous intraepithelial lesion " (vaginal  intraepithelial neoplasia grade III, VaIN III, severe dysplasia and  carcinoma in situ).  - No invasive malignancy identified.  - Cervical transformation zone not identified in biopsies.  - Please see comment.  Referral back to gynecology/oncology  2/5/14 gyn/onc appt   3/13/14 colposcopy and CO2 laser vagina, path:VAIN 1/2.  3/19/14 follow up in 4 months  7/30/14 vaginal biopsy: VAIN 1. Plan: repeat colp in 6 months.(9/17/14)   9/17/14 colp. No staining seen. No biopsy taken. Pap- ASCUS + HR HPV. Plan- repeat pap at next visit.  2/9/15 colposcopy. bx- LSIL/koilocytosis. Pap- NIL/+ HR HPV 16.  Plan: 3/16/15 treatment planning.   3/16/15 repeat colposcopy in 4 months (due 7/16/15)  7/6/15 scheduled. Tracking.             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  Patient has not been complaint with follow up.          Past Medical History:    Past Medical History:   Diagnosis Date     Arthritis      Benign breast biopsy      Breast cancer (H)      Carcinoid tumor 12/2003     Cervical cancer (H) 2007     H/O colposcopy with cervical biopsy 12/23/2013     HTN      Hyperlipidemia      Malignant neoplasm of ovary (H)      Obesity      Pap smear of vagina with ASC-H 11/01/2013     Post-polio syndromes      Trigeminal neuralgias        Past Surgical History:    Past Surgical History:   Procedure Laterality Date     APPENDECTOMY  01/01/1983     BIOPSY BREAST       BIOPSY NODE SENTINEL Bilateral 06/01/2016    Procedure: BIOPSY NODE SENTINEL;  Surgeon: Brent Arana MD;  Location: [facility]     [facility] SURGICAL PATHOLOGY       COLONOSCOPY N/A 06/23/2017    Procedure: COMBINED COLONOSCOPY, SINGLE OR MULTIPLE BIOPSY/POLYPECTOMY BY BIOPSY;  Colonoscopy Dx:Carcinoid tumor of colon prep mailed golytely;  Surgeon: Talisha Greco MD;  Location:  GI     COLPOSCOPY, BIOPSY, COMBINED  03/13/2014     Procedure: COMBINED COLPOSCOPY, BIOPSY;;  Surgeon: Lraa Pack MD;  [facility]: [facility]     COMBINED CYSTOSCOPY, RETROGRADES, EXCHANGE STENT URETER(S) Left 02/07/2019    Procedure: COMBINED CYSTOSCOPY, RETROGRADES, EXCHANGE STENT URETER--left;  Surgeon: Zhao La MD;  Location: [facility] OR     COMBINED CYSTOSCOPY, RETROGRADES, EXCHANGE STENT URETER(S) Left 02/20/2020    Procedure: CYSTOSCOPY, WITH RETROGRADE PYELOGRAM AND Left URETERAL STENT exchange;  Surgeon: Zhao La MD;  Location: [facility] OR     COMBINED CYSTOSCOPY, RETROGRADES, EXCHANGE STENT URETER(S) Left 05/09/2021    Procedure: CYSTOSCOPY, WITH RETROGRADE PYELOGRAM AND LEFT URETERAL STENT REPLACEMENT;  Surgeon: Fred Owens MD;  [facility]     COMBINED CYSTOSCOPY, RETROGRADES, EXCHANGE STENT URETER(S) Left 09/16/2021    Procedure: CYSTOSCOPY, WITH left RETROGRADE PYELOGRAM AND left  URETERAL STENT REPLACEMENT;  Surgeon: Zhao La MD;  Location: [facility] OR     COMBINED CYSTOSCOPY, RETROGRADES, EXCHANGE STENT URETER(S) Left 03/24/2022    Procedure: CYSTOSCOPY, WITH RETROGRADE PYELOGRAM AND URETERAL STENT EXCHANGE, LEFT;  Surgeon: Zhao La MD;  Location: [facility]     COMBINED CYSTOSCOPY, RETROGRADES, URETEROSCOPY, INSERT STENT Left 02/02/2017    Procedure: COMBINED CYSTOSCOPY, RETROGRADES, URETEROSCOPY, INSERT STENT;  Surgeon: Zhao La MD;  Location: [facility] OR     CYSTOSCOPY, REMOVE STENT(S), COMBINED N/A 11/4/2022    Procedure: CYSTOSCOPY, LEFT STENT REMOVAL;  Surgeon: Zhao La MD;  Location: [facility]     CYSTOSCOPY, RETROGRADES, INSERT STENT URETER(S), COMBINED Left 09/07/2017    Procedure: COMBINED CYSTOSCOPY, RETROGRADES, INSERT STENT URETER(S);  Cystoscopy,Left Stent Exchange;  Surgeon: Zhao La MD;  Location: [facility]     CYSTOSCOPY, RETROGRADES, INSERT STENT URETER(S), COMBINED  12/12/2017    Procedure: COMBINED CYSTOSCOPY, RETROGRADES,  INSERT STENT URETER(S);;  Surgeon: Zhao La MD;  [facility]     CYSTOSCOPY, RETROGRADES, INSERT STENT URETER(S), COMBINED Left 07/05/2018    Procedure: COMBINED CYSTOSCOPY, RETROGRADES, INSERT STENT URETER(S);  Cystoscopy, Left Stent Exchange;  Surgeon: Zhao La MD;  Location: [facility]     DAVINCI NEPHRECTOMY Left 11/4/2022    Procedure: , LEFT NEPHRECTOMY, ROBOT-ASSISTED;  Surgeon: Zhao La MD;  Location: [facility]     EXAM UNDER ANESTHESIA PELVIC  03/13/2014    Procedure: EXAM UNDER ANESTHESIA PELVIC;  Exam Under Anestheisa, Colposcopy, Vaginal Biopsies, Co2 Laser of the Upper Vagina;  Surgeon: Lara Pack MD;  Location: [facility]     EXAM UNDER ANESTHESIA PELVIC N/A 07/01/2020    Procedure: Examination under anesthesia, vaginal biopsies;  Surgeon: Karli Gao MD;  [facility]     HERNIORRHAPHY INCISIONAL (LOCATION)       IR RHIZOTOMY  08/14/2020     LASER CO2 VAGINA  03/13/2014    VAIN 1/2     LASER CO2 VAGINA N/A 12/12/2017    Procedure: LASER CO2 VAGINA;  Exam Under Anesthesia, CO2 Laser Ablation Of Vagina, Cystoscopy, Left Retrograde Pyelogram with Left Stent Placement;  Surgeon: Nic Segundo MD;  Location: [facility]     LUMPECTOMY BREAST       LUMPECTOMY BREAST WITH SEED LOCALIZATION Bilateral 06/01/2016    Procedure: LUMPECTOMY BREAST WITH SEED LOCALIZATION;  Surgeon: Brent Arana MD;  Location: [facility]     SURGICAL HISTORY OF -       ovarian cystectomy     SURGICAL HISTORY OF -   01/01/2003    right colon resection secondary to carcinoid tumor     TUBAL LIGATION       [facility] BSO, OMENTECTOMY W/OSMAN  05/01/2007     [facility] TOTAL ABDOM HYSTERECTOMY  05/01/2007       Family History:    Family History   Problem Relation Age of Onset     Cancer Mother         bone / liver     Breast Cancer Mother      Cancer Sister         vulvar ca, cervical ca, squamous cell cancer     Breast Cancer Sister      Cancer Brother         Rectal- Stage 4      "Rectal Cancer Brother      Cancer Maternal Grandmother      Cancer Maternal Grandfather      Cancer Paternal Grandmother      Cancer Paternal Grandfather      Breast Cancer Maternal Aunt      Breast Cancer Paternal Aunt      Colon Cancer Paternal Aunt      Pancreatic Cancer Nephew      Anesthesia Reaction No family hx of      Venous thrombosis No family hx of      Bleeding Disorder No family hx of        Social History:  Marital Status:   [2]  Social History     Tobacco Use     Smoking status: Former     Packs/day: 1.00     Years: 29.00     Pack years: 29.00     Types: Cigarettes     Quit date: 10/30/2006     Years since quittin.7     Smokeless tobacco: Never   Vaping Use     Vaping Use: Never used   Substance Use Topics     Alcohol use: No     Alcohol/week: 0.0 standard drinks of alcohol     Comment: 1 drink per year     Drug use: No        Medications:    exemestane (AROMASIN) 25 MG tablet  hydrochlorothiazide (HYDRODIURIL) 12.5 MG tablet  SENNA-docusate sodium (SENNA S) 8.6-50 MG tablet          Review of Systems  As per HPI.  Physical Exam   BP: (!) 166/84  Pulse: 56  Temp: 97.6  F (36.4  C)  Resp: 16  Height: 170.2 cm (5' 7\")  Weight: 90.7 kg (200 lb)  SpO2: 97 %      Physical Exam  Vitals and nursing note reviewed. Exam conducted with a chaperone present.   Constitutional:       Appearance: She is not ill-appearing, toxic-appearing or diaphoretic.      Comments: Frail elderly female in no acute distress.   HENT:      Head: Normocephalic and atraumatic.      Nose: Nose normal.      Mouth/Throat:      Mouth: Mucous membranes are moist.      Pharynx: Oropharynx is clear.   Eyes:      Conjunctiva/sclera: Conjunctivae normal.   Cardiovascular:      Rate and Rhythm: Normal rate and regular rhythm.      Pulses: Normal pulses.      Heart sounds: Normal heart sounds. No murmur heard.  Pulmonary:      Effort: Pulmonary effort is normal.      Breath sounds: Normal breath sounds. No stridor. No wheezing or " rhonchi.   Abdominal:      General: Abdomen is flat. Bowel sounds are normal. There is no distension.      Palpations: Abdomen is soft.      Tenderness: There is no abdominal tenderness. There is no right CVA tenderness or left CVA tenderness.   Genitourinary:     Comments: Normal external genitalia with no blood in vaginal vault.  Musculoskeletal:         General: No swelling or tenderness. Normal range of motion.      Cervical back: Normal range of motion and neck supple.      Right lower leg: No edema.      Left lower leg: No edema.   Skin:     General: Skin is warm and dry.      Findings: No rash.   Neurological:      General: No focal deficit present.      Mental Status: She is alert and oriented to person, place, and time.      Sensory: No sensory deficit.      Motor: No weakness.      Coordination: Coordination normal.   Psychiatric:         Mood and Affect: Mood normal.         ED Course                 Procedures              Critical Care time:  none               Results for orders placed or performed during the hospital encounter of 08/06/23 (from the past 24 hour(s))   UA with Microscopic reflex to Culture    Specimen: Urine, Midstream   Result Value Ref Range    Color Urine Red (A) Colorless, Straw, Light Yellow, Yellow    Appearance Urine Cloudy (A) Clear    Glucose Urine Negative Negative mg/dL    Bilirubin Urine Negative Negative    Ketones Urine Negative Negative mg/dL    Specific Gravity Urine 1.011 1.003 - 1.035    Blood Urine Large (A) Negative    pH Urine 7.0 5.0 - 7.0    Protein Albumin Urine 30 (A) Negative mg/dL    Urobilinogen Urine Normal Normal, 2.0 mg/dL    Nitrite Urine Negative Negative    Leukocyte Esterase Urine Large (A) Negative    Bacteria Urine Moderate (A) None Seen /HPF    WBC Clumps Urine Present (A) None Seen /HPF    RBC Urine >182 (H) <=2 /HPF    WBC Urine 134 (H) <=5 /HPF    Narrative    Urine Culture ordered based on laboratory criteria       Medications   0.9% sodium  chloride BOLUS (0 mLs Intravenous Stopped 8/6/23 1242)       Assessments & Plan (with Medical Decision Making) records reviewed including past medical history medications and allergies.  Oncology visit from 7/14/2023 was reviewed.  Labs were obtained.  A pelvic exam was performed and revealed no blood in the vaginal vault.  I independently reviewed and interpreted the labs.  CBC was unremarkable.  Comprehensive metabolic panel significant for a BUN of 25 creatinine of 1.44.  Medications   0.9% sodium chloride BOLUS (0 mLs Intravenous Stopped 8/6/23 1242)   Patient was given a fluid bolus.  Patient is urinalysis with large blood 30 protein large leukocyte Estrace.  WBC and clumps with greater than 182 RBCs and 134 WBCs.  Due to patient's presentation and findings a CT stone protocol was obtained.  This revealed a stable right hepatic adrenal mass with no right-sided hydro ureteral nephrosis no obstructing renal or ureteral stones status post left nephrectomy.  I am going to treat the patient with Keflex and have her return if further bleeding decreased urine output fevers or any abdominal pain.  Patient will be referred to urology for follow-up of hematuria.  Patient feels comfortable with this plan will return if worsening symptoms.     I have reviewed the nursing notes.    I have reviewed the findings, diagnosis, plan and need for follow up with the patient.           Discharge Medication List as of 8/6/2023  1:05 PM      START taking these medications    Details   cephALEXin (KEFLEX) 500 MG capsule Take 1 capsule (500 mg) by mouth 2 times daily for 7 days, Disp-14 capsule, R-0, Local Print             Final diagnoses:   Hematuria, unspecified type   Hemorrhagic cystitis - possible   Renal insufficiency       8/6/2023   [facility] EMERGENCY DEPT       Toby, Rashaad Rider MD  08/10/23 8819

## 2023-08-06 NOTE — ED TRIAGE NOTES
Blood in toilet after urinating, also soaking pads, has hx of incontinence, bleeding started last night. Pt unsure if blood present in urine or from vaginal bleeding. Denies other urinary symptoms. Denies pain, cramping.      Triage Assessment       Row Name 08/06/23 0728       Triage Assessment (Adult)    Airway WDL WDL       Respiratory WDL    Respiratory WDL WDL       Skin Circulation/Temperature WDL    Skin Circulation/Temperature WDL WDL       Cardiac WDL    Cardiac WDL WDL       Peripheral/Neurovascular WDL    Peripheral Neurovascular WDL WDL       Cognitive/Neuro/Behavioral WDL    Cognitive/Neuro/Behavioral WDL WDL

## 2023-08-06 NOTE — DISCHARGE INSTRUCTIONS
Return if symptoms worsen or new symptoms develop.  Follow-up with primary care physician next available.  Drink plenty of fluids.  If increased fever chills nausea vomiting diarrhea or other symptoms present please return for further evaluation and care decreased urine output should bring you in for recheck worsening pain.  Fevers not controlled with ibuprofen or Tylenol.  Your creatinine level was a little high and I have recommended that you drink plenty of fluids over the next few days.  Take antibiotics as directed and if hematuria does not resolve he should follow-up with urology.

## 2023-08-07 NOTE — RESULT ENCOUNTER NOTE
[facility] Emergency Dept discharge antibiotic (if prescribed): Cephalexin (Keflex) 500 mg capsule, 1 capsule (500 mg) by mouth 2 times daily for 7 days.   Date of Rx (if applicable):  8/6/23  No changes in treatment per [facility] ED Lab Result Urine culture protocol.

## 2023-08-08 LAB — BACTERIA UR CULT: ABNORMAL

## 2023-08-10 NOTE — PROGRESS NOTES
Hematology/Medical Oncology Follow-up Note      August 11, 2023    ADDENDUM: Called patient's  (phone shared with patient but she was not present) regarding chromogranin-A level of 582 which is very slightly higher. However, plan as outlined during 8/11/2023 still stands. He understands. 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    Impression:  Asymptomatic but progressive metastatic low-grade neuroendocrine cancer with new left sacral sclerotic metastasis which is somatostatin-active on Dotatate scan  History of bilateral ER/SC positive breast cancer on anastrozole without apparent recurrence  History of obesity and postpolio syndrome    Recommendation, plan, instructions:  Await results of repeat chromogranin A collected today; reviewed the trend of her chromogranin A levels since the onset of lanreotide therapy with initial improvement and evidence of some progression now.  Lanreotide 120 mg as before  Follow-up Jennifer Allen NP in 4 weeks with CBC, CMP and chromogranin A level before appointment for additional lanreotide  Follow-up with me in 8 weeks.  Would repeat same labs.  If she develops symptomatic left sacral pain without evidence of other disease, she may benefit from radiation.  On the other hand, if she develops symptomatic and more widespread disease, she may benefit from Lutetium [id number] dotatate therapy.  Continue exemestane  These matters were discussed at length and detail with the patient and her .  They understand and agree.    Time with patient including review, documentation, history, examination, coordination of care and counseling was 40 minutes.    History of present illness:  7/21/2023  and 8/6/2023 Dotatate PET scan and CT CAP revealed increased size and gluco-avidity of left sclerotic sacral lesion compared to November, 2022 with unchanged Dotatate-avid liver and intra-abdominal/pelvic foci since July/20 5/2022.    Review of chromogranin A levels since April, 2022 have demonstrated stability until March, 2023 with approximately 10% increase over the intervening last 4 months.    In 2003, she underwent a right colonic and terminal ileal resection for a well-differentiated, pT2, pN2 neuroendocrine tumor history of metastases causing left ureteral obstruction in 2020 with subsequent 2022 left nephrectomy and stable liver lesion previously followed by Dr. Elfego Lawrence and then Dr. Hosea King.    However, a follow-up 2/10/2022 Octreoscan scan confirmed evidence of progressive disease for which she was started on lanreotide every 4 weeks with a baseline chromogranin A level of 824.  The chromogranin A joslyn was 445 on 1/6/2023 and 562 on 7/28/2023.    Past medical history:  History of 2016 bilateral ER positive breast cancer on exemestane.  Both tumors were 100% ER/NJ+, HER2 neg and patient declined Oncotype DX testing since she indicated she would never want to receive adjuvant systemic chemotherapy.  Past Medical History:   Diagnosis Date    Arthritis     knee    Benign breast biopsy     benign    Breast cancer (H)     Carcinoid tumor 12/2003    Cervical cancer (H) 2007    H/O colposcopy with cervical biopsy 12/23/2013    vaginal cuff biopsy- VAIN III. referred back to gyn/onc    HTN     Hyperlipidemia     Malignant neoplasm of ovary (H)     Obesity     Pap smear of vagina with ASC-H 11/01/2013    Post-polio syndromes     Trigeminal neuralgias         Family history:  I have reviewed this patient's family history and updated it with pertinent information if needed.  Family History   Problem Relation Age of Onset    Cancer Mother         bone / liver    Breast Cancer Mother     Cancer Sister          "vulvar ca, cervical ca, squamous cell cancer    Breast Cancer Sister     Cancer Brother         Rectal- Stage 4    Rectal Cancer Brother     Cancer Maternal Grandmother     Cancer Maternal Grandfather     Cancer Paternal Grandmother     Cancer Paternal Grandfather     Breast Cancer Maternal Aunt     Breast Cancer Paternal Aunt     Colon Cancer Paternal Aunt     Pancreatic Cancer Nephew     Anesthesia Reaction No family hx of     Venous thrombosis No family hx of     Bleeding Disorder No family hx of          Medications:  Current Outpatient Medications   Medication    cephALEXin (KEFLEX) 500 MG capsule    exemestane (AROMASIN) 25 MG tablet    hydrochlorothiazide (HYDRODIURIL) 12.5 MG tablet    SENNA-docusate sodium (SENNA S) 8.6-50 MG tablet     No current facility-administered medications for this visit.       Allergies:  No Known Allergies    Review of systems:  Except as noted in the note above, the patient denies headaches, diplopia, hearing loss or dizziness; dyspnea, cough, hemoptysis, pleurisy; chest pain/pressure, palpitations, lightheadedness; anorexia, nausea, vomiting, abdominal pain, diarrhea, constipation, melena or rectal bleeding; dysuria, frequency,  blood in the urine; vaginal bleeding or discharge; fever, chills, sweats, hot flashes; tingling, numbness, loss of balance; insomnia, depression, anxiety.    Physical examination:  BP (!) 151/61 (BP Location: Right arm, Patient Position: Sitting, Cuff Size: Adult Large)   Pulse 56   Temp 98.4  F (36.9  C) (Tympanic)   Resp 12   Ht 1.727 m (5' 8\")   Wt 96.3 kg (212 lb 6.4 oz)   SpO2 94%   BMI 32.30 kg/m      The patient is alert and oriented. Throat and oral mucosa are normal-appearing. Thyroid gland is not enlarged. Adenopathy is absent in the neck, axilla, groin and supraclavicular fossa; Lungs are clear to auscultation and percussion without rales, wheezes or rubs; heart rhythm is regular, heart sounds are without murmurs or gallops; the abdomen " is soft and flat without hepatosplenomegaly, masses, ascites or tenderness.; extremities and skin reveal no unusual skin lesions, joint swelling or edema;      Josue Meredith MD

## 2023-08-11 ENCOUNTER — APPOINTMENT (OUTPATIENT)
Dept: LAB | Facility: CLINIC | Age: 73
End: 2023-08-11
Payer: MEDICARE

## 2023-08-11 ENCOUNTER — ONCOLOGY VISIT (OUTPATIENT)
Dept: ONCOLOGY | Facility: CLINIC | Age: 73
End: 2023-08-11
Attending: INTERNAL MEDICINE
Payer: MEDICARE

## 2023-08-11 ENCOUNTER — INFUSION THERAPY VISIT (OUTPATIENT)
Dept: INFUSION THERAPY | Facility: CLINIC | Age: 73
End: 2023-08-11
Attending: INTERNAL MEDICINE
Payer: MEDICARE

## 2023-08-11 VITALS
HEART RATE: 56 BPM | WEIGHT: 212.4 LBS | DIASTOLIC BLOOD PRESSURE: 61 MMHG | HEIGHT: 68 IN | RESPIRATION RATE: 12 BRPM | OXYGEN SATURATION: 94 % | SYSTOLIC BLOOD PRESSURE: 151 MMHG | BODY MASS INDEX: 32.19 KG/M2 | TEMPERATURE: 98.4 F

## 2023-08-11 DIAGNOSIS — C7A.021 MALIGNANT CARCINOID TUMOR OF CECUM (H): Primary | ICD-10-CM

## 2023-08-11 DIAGNOSIS — C7B.02 SECONDARY CARCINOID TUMORS OF LIVER (H): ICD-10-CM

## 2023-08-11 LAB — LDH SERPL L TO P-CCNC: 154 U/L (ref 0–250)

## 2023-08-11 PROCEDURE — 83615 LACTATE (LD) (LDH) ENZYME: CPT | Performed by: INTERNAL MEDICINE

## 2023-08-11 PROCEDURE — 36415 COLL VENOUS BLD VENIPUNCTURE: CPT

## 2023-08-11 PROCEDURE — G0463 HOSPITAL OUTPT CLINIC VISIT: HCPCS | Mod: 25 | Performed by: INTERNAL MEDICINE

## 2023-08-11 PROCEDURE — 96372 THER/PROPH/DIAG INJ SC/IM: CPT | Performed by: INTERNAL MEDICINE

## 2023-08-11 PROCEDURE — 86316 IMMUNOASSAY TUMOR OTHER: CPT | Mod: GA | Performed by: INTERNAL MEDICINE

## 2023-08-11 PROCEDURE — 99215 OFFICE O/P EST HI 40 MIN: CPT | Mod: Q6 | Performed by: INTERNAL MEDICINE

## 2023-08-11 PROCEDURE — 250N000011 HC RX IP 250 OP 636: Performed by: INTERNAL MEDICINE

## 2023-08-11 RX ORDER — LANREOTIDE ACETATE 120 MG/.5ML
120 INJECTION SUBCUTANEOUS ONCE
Status: COMPLETED | OUTPATIENT
Start: 2023-08-11 | End: 2023-08-11

## 2023-08-11 RX ORDER — DIPHENHYDRAMINE HYDROCHLORIDE 50 MG/ML
50 INJECTION INTRAMUSCULAR; INTRAVENOUS
Status: CANCELLED
Start: 2023-08-11

## 2023-08-11 RX ORDER — EPINEPHRINE 1 MG/ML
0.3 INJECTION, SOLUTION, CONCENTRATE INTRAVENOUS EVERY 5 MIN PRN
Status: CANCELLED | OUTPATIENT
Start: 2023-08-11

## 2023-08-11 RX ORDER — METHYLPREDNISOLONE SODIUM SUCCINATE 125 MG/2ML
125 INJECTION, POWDER, LYOPHILIZED, FOR SOLUTION INTRAMUSCULAR; INTRAVENOUS
Status: CANCELLED
Start: 2023-08-11

## 2023-08-11 RX ORDER — NALOXONE HYDROCHLORIDE 0.4 MG/ML
0.2 INJECTION, SOLUTION INTRAMUSCULAR; INTRAVENOUS; SUBCUTANEOUS
Status: CANCELLED | OUTPATIENT
Start: 2023-08-11

## 2023-08-11 RX ORDER — ALBUTEROL SULFATE 90 UG/1
1-2 AEROSOL, METERED RESPIRATORY (INHALATION)
Status: CANCELLED
Start: 2023-08-11

## 2023-08-11 RX ORDER — ALBUTEROL SULFATE 0.83 MG/ML
2.5 SOLUTION RESPIRATORY (INHALATION)
Status: CANCELLED | OUTPATIENT
Start: 2023-08-11

## 2023-08-11 RX ORDER — MEPERIDINE HYDROCHLORIDE 25 MG/ML
25 INJECTION INTRAMUSCULAR; INTRAVENOUS; SUBCUTANEOUS EVERY 30 MIN PRN
Status: CANCELLED | OUTPATIENT
Start: 2023-08-11

## 2023-08-11 RX ADMIN — LANREOTIDE ACETATE 120 MG: 120 INJECTION SUBCUTANEOUS at 14:43

## 2023-08-11 ASSESSMENT — PAIN SCALES - GENERAL: PAINLEVEL: NO PAIN (0)

## 2023-08-11 NOTE — PROGRESS NOTES
Infusion Nursing Note:  Marissa Guadarrama presents today for Somatuline inj.    Patient seen by provider today: Yes: Viri   present during visit today: Not Applicable.    Note: N/A.      Intravenous Access:  No Intravenous access/labs at this visit.    Treatment Conditions:  Not Applicable.      Post Infusion Assessment:  Patient tolerated injection without incident.       Discharge Plan:   Patient discharged in stable condition accompanied by: self and .  Departure Mode: Ambulatory.      Coral Mcmahan RN

## 2023-08-11 NOTE — PATIENT INSTRUCTIONS
Lanreotide injection today  Follow-up Jennifer Allen NP in 4 weeks prior to next lanreotide injection  Follow-up Dr. Meredith in 8 weeks with same blood tests before appointment

## 2023-08-11 NOTE — LETTER
8/11/2023         RE: Marissa Guadarrama  [address] [facility] [address]        Dear Colleague,    Thank you for referring your patient, Marissa Guadarrama, to the [facility]CER [facility]. Please see a copy of my visit note below.    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    Impression:  Asymptomatic but progressive metastatic low-grade neuroendocrine cancer with new left sacral sclerotic metastasis which is somatostatin-active on Dotatate scan  History of bilateral ER/MS positive breast cancer on anastrozole without apparent recurrence  History of obesity and postpolio syndrome    Recommendation, plan, instructions:  Await results of repeat chromogranin A collected today; reviewed the trend of her chromogranin A levels since the onset of lanreotide therapy with initial improvement and evidence of some progression now.  Lanreotide 120 mg as before  Follow-up Jennifer Allen NP in 4 weeks with CBC, CMP and chromogranin A level before appointment for additional lanreotide  Follow-up with me in 8 weeks.  Would repeat same labs.  If she develops symptomatic left sacral pain without evidence of other disease, she may benefit from radiation.  On the other hand, if she develops symptomatic and more widespread disease, she may benefit from Lutetium ILEANA-177 dotatate therapy.  Continue exemestane  These matters were discussed at length and detail with the patient and her .  They understand and agree.    Time with patient including review, documentation, history, examination, coordination of care and counseling was 40 minutes.    History of present  illness:  7/21/2023 and 8/6/2023 Dotatate PET scan and CT CAP revealed increased size and gluco-avidity of left sclerotic sacral lesion compared to November, 2022 with unchanged Dotatate-avid liver and intra-abdominal/pelvic foci since July/20 5/2022.    Review of chromogranin A levels since April, 2022 have demonstrated stability until March, 2023 with approximately 10% increase over the intervening last 4 months.    In 2003, she underwent a right colonic and terminal ileal resection for a well-differentiated, pT2, pN2 neuroendocrine tumor history of metastases causing left ureteral obstruction in 2020 with subsequent 2022 left nephrectomy and stable liver lesion previously followed by Dr. Elfego Lawrence and then Dr. Hosea King.    However, a follow-up 2/10/2022 Octreoscan scan confirmed evidence of progressive disease for which she was started on lanreotide every 4 weeks with a baseline chromogranin A level of 824.  The chromogranin A joslyn was 445 on 1/6/2023 and 562 on 7/28/2023.    Past medical history:  History of 2016 bilateral ER positive breast cancer on exemestane.  Both tumors were 100% ER/OR+, HER2 neg and patient declined Oncotype DX testing since she indicated she would never want to receive adjuvant systemic chemotherapy.  Past Medical History:   Diagnosis Date     Arthritis     knee     Benign breast biopsy     benign     Breast cancer (H)      Carcinoid tumor 12/2003     Cervical cancer (H) 2007     H/O colposcopy with cervical biopsy 12/23/2013    vaginal cuff biopsy- VAIN III. referred back to gyn/onc     HTN      Hyperlipidemia      Malignant neoplasm of ovary (H)      Obesity      Pap smear of vagina with ASC-H 11/01/2013     Post-polio syndromes      Trigeminal neuralgias         Family history:  I have reviewed this patient's family history and updated it with pertinent information if needed.  Family History   Problem Relation Age of Onset     Cancer Mother         bone / liver     Breast  "Cancer Mother      Cancer Sister         vulvar ca, cervical ca, squamous cell cancer     Breast Cancer Sister      Cancer Brother         Rectal- Stage 4     Rectal Cancer Brother      Cancer Maternal Grandmother      Cancer Maternal Grandfather      Cancer Paternal Grandmother      Cancer Paternal Grandfather      Breast Cancer Maternal Aunt      Breast Cancer Paternal Aunt      Colon Cancer Paternal Aunt      Pancreatic Cancer Nephew      Anesthesia Reaction No family hx of      Venous thrombosis No family hx of      Bleeding Disorder No family hx of          Medications:  Current Outpatient Medications   Medication     cephALEXin (KEFLEX) 500 MG capsule     exemestane (AROMASIN) 25 MG tablet     hydrochlorothiazide (HYDRODIURIL) 12.5 MG tablet     SENNA-docusate sodium (SENNA S) 8.6-50 MG tablet     No current facility-administered medications for this visit.       Allergies:  No Known Allergies    Review of systems:  Except as noted in the note above, the patient denies headaches, diplopia, hearing loss or dizziness; dyspnea, cough, hemoptysis, pleurisy; chest pain/pressure, palpitations, lightheadedness; anorexia, nausea, vomiting, abdominal pain, diarrhea, constipation, melena or rectal bleeding; dysuria, frequency,  blood in the urine; vaginal bleeding or discharge; fever, chills, sweats, hot flashes; tingling, numbness, loss of balance; insomnia, depression, anxiety.    Physical examination:  BP (!) 151/61 (BP Location: Right arm, Patient Position: Sitting, Cuff Size: Adult Large)   Pulse 56   Temp 98.4  F (36.9  C) (Tympanic)   Resp 12   Ht 1.727 m (5' 8\")   Wt 96.3 kg (212 lb 6.4 oz)   SpO2 94%   BMI 32.30 kg/m      The patient is alert and oriented. Throat and oral mucosa are normal-appearing. Thyroid gland is not enlarged. Adenopathy is absent in the neck, axilla, groin and supraclavicular fossa; Lungs are clear to auscultation and percussion without rales, wheezes or rubs; heart rhythm is " "regular, heart sounds are without murmurs or gallops; the abdomen is soft and flat without hepatosplenomegaly, masses, ascites or tenderness.; extremities and skin reveal no unusual skin lesions, joint swelling or edema;      Josue Meredith MD    Oncology Rooming Note    August 11, 2023 1:45 PM   Marissa Guadarrama is a 73 year old female who presents for:    Chief Complaint   Patient presents with     Oncology Clinic Visit     Malignant carcinoid tumor of cecum, 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  - Lab provider and infusion     Initial Vitals: BP (!) 151/61 (BP Location: Right arm, Patient Position: Sitting, Cuff Size: Adult Large)   Pulse 56   Temp 98.4  F (36.9  C) (Tympanic)   Resp 12   Ht 1.727 m (5' 8\")   Wt 96.3 kg (212 lb 6.4 oz)   SpO2 94%   BMI 32.30 kg/m   Estimated body mass index is 32.3 kg/m  as calculated from the following:    Height as of this encounter: 1.727 m (5' 8\").    Weight as of this encounter: 96.3 kg (212 lb 6.4 oz). Body surface area is 2.15 meters squared.  No Pain (0) Comment: Data Unavailable   No LMP recorded. Patient has had a hysterectomy.  Allergies reviewed: Yes  Medications reviewed: Yes    Medications: Medication refills not needed today.  Pharmacy name entered into [employer]: [id number] - [address]    Clinical concerns:  N/A      Nurys Blanco, [facility]                Again, thank you for allowing me to participate in the care of your patient.        Sincerely,        Josue Meredith MD  "

## 2023-08-11 NOTE — PROGRESS NOTES
"Oncology Rooming Note    August 11, 2023 1:45 PM   Marissa Guadarrama is a 73 year old female who presents for:    Chief Complaint   Patient presents with    Oncology Clinic Visit     Malignant carcinoid tumor of cecum, 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  - Lab provider and infusion     Initial Vitals: BP (!) 151/61 (BP Location: Right arm, Patient Position: Sitting, Cuff Size: Adult Large)   Pulse 56   Temp 98.4  F (36.9  C) (Tympanic)   Resp 12   Ht 1.727 m (5' 8\")   Wt 96.3 kg (212 lb 6.4 oz)   SpO2 94%   BMI 32.30 kg/m   Estimated body mass index is 32.3 kg/m  as calculated from the following:    Height as of this encounter: 1.727 m (5' 8\").    Weight as of this encounter: 96.3 kg (212 lb 6.4 oz). Body surface area is 2.15 meters squared.  No Pain (0) Comment: Data Unavailable   No LMP recorded. Patient has had a hysterectomy.  Allergies reviewed: Yes  Medications reviewed: Yes    Medications: Medication refills not needed today.  Pharmacy name entered into [employer]: [id number] - [place], [place] - [address] [place] [address]    Clinical concerns:  N/A      Nurys Blanco CMA              "

## 2023-08-13 LAB — CGA SERPL-MCNC: 582 NG/ML

## 2023-08-30 ENCOUNTER — DOCUMENTATION ONLY (OUTPATIENT)
Dept: GASTROENTEROLOGY | Facility: CLINIC | Age: 73
End: 2023-08-30
Payer: COMMERCIAL

## 2023-08-30 NOTE — PROGRESS NOTES
Messages sent to PCP to determine when patient is due for next colonoscopy. No response from PCP, will push colorectal cancer screening outreach for one year given active injections and unknown recall.

## 2023-09-01 NOTE — PROGRESS NOTES
Called and spoke with Jyotsna with [employer] regarding her O/A brace.  Per Jyotsna, she has received another form to get filled out for the insurance company to approve the brace.    She will fax this form to our office and we will have Dr. Casas sign off and fax back to [employer].   Pt back from IR s/p liver biopsy.  VSS.  Pt alert and oriented x4.  Pt denies any pain.  Pt sleepy but arouses easily to voice.  Rt back site F/D/I.  Pt's family at the bedside.

## 2023-09-07 ENCOUNTER — PATIENT OUTREACH (OUTPATIENT)
Dept: GASTROENTEROLOGY | Facility: CLINIC | Age: 73
End: 2023-09-07
Payer: COMMERCIAL

## 2023-09-07 DIAGNOSIS — Z12.11 SPECIAL SCREENING FOR MALIGNANT NEOPLASMS, COLON: Primary | ICD-10-CM

## 2023-09-07 NOTE — LETTER
September 7, 2023      Marissa JOSÉ Guadarrama  [facility] [address] [facility] [facility] [address]            Ashley Mtzona,    We hope this letter finds you doing well.     Your health is important to us at [facility]. Our records indicate that you could be due, or possibly overdue, for a screening or surveillance colonoscopy.     An order has been placed for you to have this completed. Our scheduling team will be reaching out to you to assist in getting this scheduled. If you do not hear from them within 7 days or you would like to schedule sooner, please call [phone], option 2 for endoscopy scheduling.     If you have questions about this order or have further concerns, please reach out to your primary care provider.     Willie     Colorectal Cancer RN Screening Team  [facility]

## 2023-09-07 NOTE — PROGRESS NOTES
"Patients last colonoscopy was in 2017 and was recommended to repeat screening in 5 years. Patient is now due and meets CRC criteria to place standing order. After messages with PCPs and oncology MD order will be placed for a colonoscopy per 2017 colon report recommendations.     Josue Meredith MD Stechmann, Kathleen, JOAN; Magaly Alex RN Hi Katie- Dr. Tariq's note of July, 2023 indicated he requested an EGD because of the patient's abnormal antral gastric fold on May, 2023 PET scan. The surveillance colonoscopy is ordered in view of 5-year follow-up colonoscopy recommended at time of July, 2017 colonoscopy where benign polyps were identified but also in view of patient's history of a malignant cecal carcinoid tumor.    Donell Meredith MD              Ordering/Referring Provider: Eliazar Menendez MD  BMI: Estimated body mass index is 32.3 kg/m  as calculated from the following:    Height as of 8/11/23: 1.727 m (5' 8\").    Weight as of 8/11/23: 96.3 kg (212 lb 6.4 oz).     Sedation:  Based on patient's medical history patient is appropriate for   moderate sedation. If patient's BMI > 50 do not schedule in ASC.    Location:  Does patient have an LVAD?  No    Does patient have a history of moderate to severe sleep apnea?  No    Does patient have a history of asthma, COPD or any other lung disease?  No    Does patient have a history of cardiac disease?  No    Is patient awaiting a heart or lung transplant?   No    Has patient had a stroke or transient ischemic attack in the last 6 months?   No    Is the patient currently on dialysis?   No    Prep:  Previous prep (last colonoscopy):   Standard Golytely    Quality of previous prep:   Excellent    Is patient currently on dialysis, is ESRD, or CKD stage 4/5?   No (standard prep)    Does patient have a diagnosis of diabetes?  No    Does patient have a diagnosis of cystic fibrosis (CF)?  No    BMI > 40?  No    Final Referral Status:  meets the criteria for placement of " colonoscopy screening  referral order.      Referral order placed with the following recommendations:  Sedation: Moderate Sedation  Location Type: No Scheduling Restrictions  Prep: Standard MiraLAX

## 2023-09-08 ENCOUNTER — VIRTUAL VISIT (OUTPATIENT)
Dept: ONCOLOGY | Facility: HOSPITAL | Age: 73
End: 2023-09-08
Attending: NURSE PRACTITIONER
Payer: MEDICARE

## 2023-09-08 ENCOUNTER — APPOINTMENT (OUTPATIENT)
Dept: LAB | Facility: CLINIC | Age: 73
End: 2023-09-08
Payer: MEDICARE

## 2023-09-08 ENCOUNTER — INFUSION THERAPY VISIT (OUTPATIENT)
Dept: INFUSION THERAPY | Facility: CLINIC | Age: 73
End: 2023-09-08
Attending: INTERNAL MEDICINE
Payer: MEDICARE

## 2023-09-08 VITALS
BODY MASS INDEX: 32.58 KG/M2 | OXYGEN SATURATION: 96 % | TEMPERATURE: 98.5 F | HEART RATE: 60 BPM | HEIGHT: 68 IN | SYSTOLIC BLOOD PRESSURE: 155 MMHG | DIASTOLIC BLOOD PRESSURE: 72 MMHG | RESPIRATION RATE: 12 BRPM | WEIGHT: 215 LBS

## 2023-09-08 DIAGNOSIS — C7B.02 SECONDARY CARCINOID TUMORS OF LIVER (H): ICD-10-CM

## 2023-09-08 DIAGNOSIS — C7A.021 MALIGNANT CARCINOID TUMOR OF CECUM (H): Primary | ICD-10-CM

## 2023-09-08 DIAGNOSIS — Z79.811 LONG TERM (CURRENT) USE OF AROMATASE INHIBITORS: ICD-10-CM

## 2023-09-08 DIAGNOSIS — C50.411 MALIGNANT NEOPLASM OF UPPER-OUTER QUADRANT OF BOTH BREASTS IN FEMALE, ESTROGEN RECEPTOR POSITIVE (H): ICD-10-CM

## 2023-09-08 DIAGNOSIS — Z17.0 MALIGNANT NEOPLASM OF UPPER-OUTER QUADRANT OF BOTH BREASTS IN FEMALE, ESTROGEN RECEPTOR POSITIVE (H): ICD-10-CM

## 2023-09-08 DIAGNOSIS — C50.412 MALIGNANT NEOPLASM OF UPPER-OUTER QUADRANT OF BOTH BREASTS IN FEMALE, ESTROGEN RECEPTOR POSITIVE (H): ICD-10-CM

## 2023-09-08 DIAGNOSIS — Z12.31 VISIT FOR SCREENING MAMMOGRAM: ICD-10-CM

## 2023-09-08 LAB
ALBUMIN SERPL BCG-MCNC: 3.7 G/DL (ref 3.5–5.2)
ALP SERPL-CCNC: 134 U/L (ref 35–104)
ALT SERPL W P-5'-P-CCNC: 10 U/L (ref 0–50)
ANION GAP SERPL CALCULATED.3IONS-SCNC: 8 MMOL/L (ref 7–15)
AST SERPL W P-5'-P-CCNC: 19 U/L (ref 0–45)
BASOPHILS # BLD AUTO: 0 10E3/UL (ref 0–0.2)
BASOPHILS NFR BLD AUTO: 1 %
BILIRUB SERPL-MCNC: 0.5 MG/DL
BUN SERPL-MCNC: 20.8 MG/DL (ref 8–23)
CALCIUM SERPL-MCNC: 10.2 MG/DL (ref 8.8–10.2)
CHLORIDE SERPL-SCNC: 103 MMOL/L (ref 98–107)
CREAT SERPL-MCNC: 1.34 MG/DL (ref 0.51–0.95)
DEPRECATED HCO3 PLAS-SCNC: 32 MMOL/L (ref 22–29)
EGFRCR SERPLBLD CKD-EPI 2021: 41 ML/MIN/1.73M2
EOSINOPHIL # BLD AUTO: 0.4 10E3/UL (ref 0–0.7)
EOSINOPHIL NFR BLD AUTO: 5 %
ERYTHROCYTE [DISTWIDTH] IN BLOOD BY AUTOMATED COUNT: 13.2 % (ref 10–15)
GLUCOSE SERPL-MCNC: 168 MG/DL (ref 70–99)
HCT VFR BLD AUTO: 41.6 % (ref 35–47)
HGB BLD-MCNC: 13.3 G/DL (ref 11.7–15.7)
IMM GRANULOCYTES # BLD: 0 10E3/UL
IMM GRANULOCYTES NFR BLD: 0 %
LDH SERPL L TO P-CCNC: 193 U/L (ref 0–250)
LYMPHOCYTES # BLD AUTO: 1.8 10E3/UL (ref 0.8–5.3)
LYMPHOCYTES NFR BLD AUTO: 26 %
MCH RBC QN AUTO: 29.3 PG (ref 26.5–33)
MCHC RBC AUTO-ENTMCNC: 32 G/DL (ref 31.5–36.5)
MCV RBC AUTO: 92 FL (ref 78–100)
MONOCYTES # BLD AUTO: 0.4 10E3/UL (ref 0–1.3)
MONOCYTES NFR BLD AUTO: 6 %
NEUTROPHILS # BLD AUTO: 4.3 10E3/UL (ref 1.6–8.3)
NEUTROPHILS NFR BLD AUTO: 62 %
NRBC # BLD AUTO: 0 10E3/UL
NRBC BLD AUTO-RTO: 0 /100
PLATELET # BLD AUTO: 194 10E3/UL (ref 150–450)
POTASSIUM SERPL-SCNC: 3.7 MMOL/L (ref 3.4–5.3)
PROT SERPL-MCNC: 7.5 G/DL (ref 6.4–8.3)
RBC # BLD AUTO: 4.54 10E6/UL (ref 3.8–5.2)
SODIUM SERPL-SCNC: 143 MMOL/L (ref 136–145)
WBC # BLD AUTO: 6.9 10E3/UL (ref 4–11)

## 2023-09-08 PROCEDURE — 250N000011 HC RX IP 250 OP 636: Performed by: NURSE PRACTITIONER

## 2023-09-08 PROCEDURE — 36415 COLL VENOUS BLD VENIPUNCTURE: CPT

## 2023-09-08 PROCEDURE — 96372 THER/PROPH/DIAG INJ SC/IM: CPT | Performed by: NURSE PRACTITIONER

## 2023-09-08 PROCEDURE — 85025 COMPLETE CBC W/AUTO DIFF WBC: CPT | Performed by: INTERNAL MEDICINE

## 2023-09-08 PROCEDURE — 99214 OFFICE O/P EST MOD 30 MIN: CPT | Mod: VID | Performed by: NURSE PRACTITIONER

## 2023-09-08 PROCEDURE — 80053 COMPREHEN METABOLIC PANEL: CPT | Performed by: INTERNAL MEDICINE

## 2023-09-08 PROCEDURE — 83615 LACTATE (LD) (LDH) ENZYME: CPT | Performed by: INTERNAL MEDICINE

## 2023-09-08 PROCEDURE — 86316 IMMUNOASSAY TUMOR OTHER: CPT | Performed by: INTERNAL MEDICINE

## 2023-09-08 RX ORDER — NALOXONE HYDROCHLORIDE 0.4 MG/ML
0.2 INJECTION, SOLUTION INTRAMUSCULAR; INTRAVENOUS; SUBCUTANEOUS
Status: CANCELLED | OUTPATIENT
Start: 2023-09-08

## 2023-09-08 RX ORDER — MEPERIDINE HYDROCHLORIDE 25 MG/ML
25 INJECTION INTRAMUSCULAR; INTRAVENOUS; SUBCUTANEOUS EVERY 30 MIN PRN
Status: CANCELLED | OUTPATIENT
Start: 2023-09-08

## 2023-09-08 RX ORDER — ALBUTEROL SULFATE 90 UG/1
1-2 AEROSOL, METERED RESPIRATORY (INHALATION)
Status: CANCELLED
Start: 2023-09-08

## 2023-09-08 RX ORDER — METHYLPREDNISOLONE SODIUM SUCCINATE 125 MG/2ML
125 INJECTION, POWDER, LYOPHILIZED, FOR SOLUTION INTRAMUSCULAR; INTRAVENOUS
Status: CANCELLED
Start: 2023-09-08

## 2023-09-08 RX ORDER — LANREOTIDE ACETATE 120 MG/.5ML
120 INJECTION SUBCUTANEOUS ONCE
Status: CANCELLED | OUTPATIENT
Start: 2023-09-08 | End: 2023-09-08

## 2023-09-08 RX ORDER — ALBUTEROL SULFATE 0.83 MG/ML
2.5 SOLUTION RESPIRATORY (INHALATION)
Status: CANCELLED | OUTPATIENT
Start: 2023-09-08

## 2023-09-08 RX ORDER — DIPHENHYDRAMINE HYDROCHLORIDE 50 MG/ML
50 INJECTION INTRAMUSCULAR; INTRAVENOUS
Status: CANCELLED
Start: 2023-09-08

## 2023-09-08 RX ORDER — LANREOTIDE ACETATE 120 MG/.5ML
120 INJECTION SUBCUTANEOUS ONCE
Status: COMPLETED | OUTPATIENT
Start: 2023-09-08 | End: 2023-09-08

## 2023-09-08 RX ORDER — EPINEPHRINE 1 MG/ML
0.3 INJECTION, SOLUTION INTRAMUSCULAR; SUBCUTANEOUS EVERY 5 MIN PRN
Status: CANCELLED | OUTPATIENT
Start: 2023-09-08

## 2023-09-08 RX ADMIN — LANREOTIDE ACETATE 120 MG: 120 INJECTION SUBCUTANEOUS at 15:28

## 2023-09-08 ASSESSMENT — PAIN SCALES - GENERAL: PAINLEVEL: NO PAIN (0)

## 2023-09-08 NOTE — PROGRESS NOTES
Infusion Nursing Note:  Marissa A Chiara presents today for Somatuline.    Patient seen by provider today: Yes: Jennifer Allen NP via virtual visit   present during visit today: Not Applicable.    Note: N/A.      Intravenous Access:  Labs drawn without difficulty.    Treatment Conditions:  Not Applicable.      Post Infusion Assessment:  Patient tolerated Somatuline injection subcutaneous to the left upper buttock without incident.       Discharge Plan:   Patient discharged in stable condition accompanied by: .  Departure Mode: Ambulatory.  Pt to return on 10/6/23 at 2:30 pm for labs, clinic visit with Dr. Meredith, and then Somatuline injection.        Claudine Branch RN

## 2023-09-08 NOTE — LETTER
9/8/2023         RE: Marissa Guadarrama  [address] [address]        Dear Colleague,    Thank you for referring your patient, Marissa Guadarrama, to the [facility]CER CENTER [place]. Please see a copy of my visit note below.    [facility] Hematology and Oncology Outpatient Progress Note    Patient: Marissa Guadarrama  MRN: [id number]  Date of Service: Sep 8, 2023          Reason for Visit    Recurrent/metastatic small bowel carcinoid  History of breast cancer    Primary Oncologist: Dr. Meredith    Virtual visit      Assessment/Plan  Recurrent/metastatic small bowel carcinoid (liver/peritoneum, bone)  Patient has continued on lanreotide for the last year.  Chromogranin nadired to 445 and at her last visit was up slightly to 582.  Dotatate PET scans have showed some increased size of an avid left sclerotic sacral bone lesion but remainder of disease is stable.  She has been asymptomatic.  Given the general stability/minimal progression and excellent tolerance of lanreotide, Dr. Meredith has recommended continuing this for the time being and trending her chromogranin levels.    She has had some recent (1-2-week onset) of mild intermittent left hip pain which could be possibly related to the left sacral lesion.  Its mild and manageable with topical analgesic at this point.  Otherwise, asymptomatic.    Chromogranin A level: Pending.  Other lab work: Alk phos mildly elevated 134, rest LFTs WNL. CBC WNL. LDH WNL.     Plan:  -Proceed with next lanreotide dose  -We will await chromogranin A level pending today.  If it is stable, we will follow her symptoms clinically at her next visit in 4 weeks.  If there is been a rise in her chromogranin A level we will update imaging ahead of her 4-week visit.  -Return in 4 weeks with labs, including serial chromogranin a level and follow-up with Dr. Meredith, with lanreotide to follow  -If she develops symptomatic progression in the left sacral bone met only,  consider palliative radiation to this site.  If, on the other hand, she has more systemic progression, Dr. Meredith has discussed changing systemic treatment to Lutetium ILEANA-177 dotatate therapy.    History of ER/VA positive bilateral breast cancer  Has been on exemestane 7 years without evidence for recurrent disease.  Tolerates this well.    Last mammogram was 7/2022 (benign).  No clinical concern for recurrence.    Plan:  -Past due for annual screening mammogram, ordered and she will get scheduled  -Planning for AI up to 10 years, pending tolerance    3.  Bone health  She has not had a recent DEXA scan (last was 2019).  Has been on exemestane.  She is not any taking any calcium/vit D    Plan:  -Update DEXA scan sometime this year  -Start Calcium and vitamin D supplementation  ______________________________________________________________________________    History of Present Illness/ Interval History    Ms. Marissa Guadarrama is a 73 year old with a longstanding history of low-grade neuroendocrine (carcinoid) small bowel cancer with recurrent/metastatic disease few years ago, currently on maintenance lanreotide monthly.  She also has a remote history of bilateral ER/VA positive breast cancer treated with lumpectomies and has been on exemestane for 7 years.  Returns for monthly visit ahead of her next lanreotide injection.    Continues to tolerate lanreotide well.    Notes mild intermittent left hip pain x 2 weeks. Topical analgesic medicine alleviates it. No other new pain. No abdominal symptoms, diarrhea, flushing.      ECOG Performance    1        Oncology History/Treatment  Diagnosis/Stage:   2003: stage III (pT2-pN2) well-differentiated neuroendocrine cancer (carcinoid) small bowel cancer  ---2020: met stage IV recurrence causing left ureteral obstruction + liver met   ---2/2022: progressive disease confirmed by Octeoscan (dotatate+ liver, intra-abd and bone mets). Chromogranin A level 824    2016: bilateral ER/VA+  breast cancer  -both tumors 100% ER/FL+, HER2 neg  -Oncotype DX score not done (pt opted against chemo, regardless)    Treatment:  Small bowel carcinoid  -2003: right colonic and terminal ileal resection  -2022: left nephrectomy (met causing ureteral obstruction)  -2022 - present: lanreotide every 4 weeks  ---joslyn chromogranin 445 (1/2023)    Breast cancer  -bilateral lumpectomies  -2016 - present: exemestane      Physical Exam    GENERAL: Alert and oriented to time place and person. Seated comfortably. In no distress.  HEAD: Atraumatic and normocephalic. No alopecia.  EYES: NASIR, EOMI. No erythema. No icterus.  CHEST: Regular respiratory effort.  CVS: RRR  ABDOMEN: Soft. Not tender. Not distended. No palpable hepatomegaly or splenomegaly. No other mass palpable. Bowel sounds present.  EXTREMITIES: Warm. No peripheral edema.  SKIN: no rash, or bruising or purpura.   NEURO: No gross deficit noted. Non-antalgic gait.      Lab Results    Recent Results (from the past 168 hour(s))   Comprehensive metabolic panel   Result Value Ref Range    Sodium 143 136 - 145 mmol/L    Potassium 3.7 3.4 - 5.3 mmol/L    Chloride 103 98 - 107 mmol/L    Carbon Dioxide (CO2) 32 (H) 22 - 29 mmol/L    Anion Gap 8 7 - 15 mmol/L    Urea Nitrogen 20.8 8.0 - 23.0 mg/dL    Creatinine 1.34 (H) 0.51 - 0.95 mg/dL    Calcium 10.2 8.8 - 10.2 mg/dL    Glucose 168 (H) 70 - 99 mg/dL    Alkaline Phosphatase 134 (H) 35 - 104 U/L    AST 19 0 - 45 U/L    ALT 10 0 - 50 U/L    Protein Total 7.5 6.4 - 8.3 g/dL    Albumin 3.7 3.5 - 5.2 g/dL    Bilirubin Total 0.5 <=1.2 mg/dL    GFR Estimate 41 (L) >60 mL/min/1.73m2   CBC with platelets and differential   Result Value Ref Range    WBC Count 6.9 4.0 - 11.0 10e3/uL    RBC Count 4.54 3.80 - 5.20 10e6/uL    Hemoglobin 13.3 11.7 - 15.7 g/dL    Hematocrit 41.6 35.0 - 47.0 %    MCV 92 78 - 100 fL    MCH 29.3 26.5 - 33.0 pg    MCHC 32.0 31.5 - 36.5 g/dL    RDW 13.2 10.0 - 15.0 %    Platelet Count 194 150 - 450 10e3/uL     % Neutrophils 62 %    % Lymphocytes 26 %    % Monocytes 6 %    % Eosinophils 5 %    % Basophils 1 %    % Immature Granulocytes 0 %    NRBCs per 100 WBC 0 <1 /100    Absolute Neutrophils 4.3 1.6 - 8.3 10e3/uL    Absolute Lymphocytes 1.8 0.8 - 5.3 10e3/uL    Absolute Monocytes 0.4 0.0 - 1.3 10e3/uL    Absolute Eosinophils 0.4 0.0 - 0.7 10e3/uL    Absolute Basophils 0.0 0.0 - 0.2 10e3/uL    Absolute Immature Granulocytes 0.0 <=0.4 10e3/uL    Absolute NRBCs 0.0 10e3/uL   Lactate Dehydrogenase   Result Value Ref Range    Lactate Dehydrogenase 193 0 - 250 U/L       Imaging    No results found.    Billing  Total time 35 minutes, to include face to face visit, review of EMR, ordering, documentation and coordination of care on date of service    Start time: 1453 / End time: 1505  Platform: Cuffed and Wanted  Patient location: Home  Provider location: [facility]    Signed by: Jennifer Allen NP      Virtual Visit Details    Type of service:  Video Visit   Video Start Time:  1453  Video End Time: 1505    Originating Location (pt. Location): Other in [facility]    Distant Location (provider location):  On-site [facility]  Platform used for Video Visit: Brandon Bloom CMA on 9/8/2023 at 2:37 PM      Again, thank you for allowing me to participate in the care of your patient.        Sincerely,        Jennifer Allen NP

## 2023-09-08 NOTE — PROGRESS NOTES
[facility] Hematology and Oncology Outpatient Progress Note    Patient: Marissa Guadarrama  MRN: [id number]  Date of Service: Sep 8, 2023          Reason for Visit    Recurrent/metastatic small bowel carcinoid  History of breast cancer    Primary Oncologist: Dr. Meredith    Virtual visit      Assessment/Plan  Recurrent/metastatic small bowel carcinoid (liver/peritoneum, bone)  Patient has continued on lanreotide for the last year.  Chromogranin nadired to 445 and at her last visit was up slightly to 582.  Dotatate PET scans have showed some increased size of an avid left sclerotic sacral bone lesion but remainder of disease is stable.  She has been asymptomatic.  Given the general stability/minimal progression and excellent tolerance of lanreotide, Dr. Meredith has recommended continuing this for the time being and trending her chromogranin levels.    She has had some recent (1-2-week onset) of mild intermittent left hip pain which could be possibly related to the left sacral lesion.  Its mild and manageable with topical analgesic at this point.  Otherwise, asymptomatic.    Chromogranin A level: Pending.  Other lab work: Alk phos mildly elevated 134, rest LFTs WNL. CBC WNL. LDH WNL.     Plan:  -Proceed with next lanreotide dose  -We will await chromogranin A level pending today.  If it is stable, we will follow her symptoms clinically at her next visit in 4 weeks.  If there is been a rise in her chromogranin A level we will update imaging ahead of her 4-week visit.  -Return in 4 weeks with labs, including serial chromogranin a level and follow-up with Dr. Meredith, with lanreotide to follow  -If she develops symptomatic progression in the left sacral bone met only, consider palliative radiation to this site.  If, on the other hand, she has more systemic progression, Dr. Meredith has discussed changing systemic treatment to Lutetium ILEANA-177 dotatate therapy.    History of ER/AL positive bilateral breast cancer  Has been on  exemestane 7 years without evidence for recurrent disease.  Tolerates this well.    Last mammogram was 7/2022 (benign).  No clinical concern for recurrence.    Plan:  -Past due for annual screening mammogram, ordered and she will get scheduled  -Planning for AI up to 10 years, pending tolerance    3.  Bone health  She has not had a recent DEXA scan (last was 2019).  Has been on exemestane.  She is not any taking any calcium/vit D    Plan:  -Update DEXA scan sometime this year  -Start Calcium and vitamin D supplementation  ______________________________________________________________________________    History of Present Illness/ Interval History    Ms. Marissa Guadarrama is a 73 year old with a longstanding history of low-grade neuroendocrine (carcinoid) small bowel cancer with recurrent/metastatic disease few years ago, currently on maintenance lanreotide monthly.  She also has a remote history of bilateral ER/VA positive breast cancer treated with lumpectomies and has been on exemestane for 7 years.  Returns for monthly visit ahead of her next lanreotide injection.    Continues to tolerate lanreotide well.    Notes mild intermittent left hip pain x 2 weeks. Topical analgesic medicine alleviates it. No other new pain. No abdominal symptoms, diarrhea, flushing.      ECOG Performance    1        Oncology History/Treatment  Diagnosis/Stage:   2003: stage III (pT2-pN2) well-differentiated neuroendocrine cancer (carcinoid) small bowel cancer  ---2020: met stage IV recurrence causing left ureteral obstruction + liver met   ---2/2022: progressive disease confirmed by Octeoscan (dotatate+ liver, intra-abd and bone mets). Chromogranin A level 824    2016: bilateral ER/VA+ breast cancer  -both tumors 100% ER/VA+, HER2 neg  -Oncotype DX score not done (pt opted against chemo, regardless)    Treatment:  Small bowel carcinoid  -2003: right colonic and terminal ileal resection  -2022: left nephrectomy (met causing ureteral  obstruction)  -2022 - present: lanreotide every 4 weeks  ---joslyn chromogranin 445 (1/2023)    Breast cancer  -bilateral lumpectomies  -2016 - present: exemestane      Physical Exam    GENERAL: Alert and oriented to time place and person. Seated comfortably. In no distress.  HEAD: Atraumatic and normocephalic. No alopecia.  EYES: NASIR, EOMI. No erythema. No icterus.  CHEST: Regular respiratory effort.  CVS: RRR  ABDOMEN: Soft. Not tender. Not distended. No palpable hepatomegaly or splenomegaly. No other mass palpable. Bowel sounds present.  EXTREMITIES: Warm. No peripheral edema.  SKIN: no rash, or bruising or purpura.   NEURO: No gross deficit noted. Non-antalgic gait.      Lab Results    Recent Results (from the past 168 hour(s))   Comprehensive metabolic panel   Result Value Ref Range    Sodium 143 136 - 145 mmol/L    Potassium 3.7 3.4 - 5.3 mmol/L    Chloride 103 98 - 107 mmol/L    Carbon Dioxide (CO2) 32 (H) 22 - 29 mmol/L    Anion Gap 8 7 - 15 mmol/L    Urea Nitrogen 20.8 8.0 - 23.0 mg/dL    Creatinine 1.34 (H) 0.51 - 0.95 mg/dL    Calcium 10.2 8.8 - 10.2 mg/dL    Glucose 168 (H) 70 - 99 mg/dL    Alkaline Phosphatase 134 (H) 35 - 104 U/L    AST 19 0 - 45 U/L    ALT 10 0 - 50 U/L    Protein Total 7.5 6.4 - 8.3 g/dL    Albumin 3.7 3.5 - 5.2 g/dL    Bilirubin Total 0.5 <=1.2 mg/dL    GFR Estimate 41 (L) >60 mL/min/1.73m2   CBC with platelets and differential   Result Value Ref Range    WBC Count 6.9 4.0 - 11.0 10e3/uL    RBC Count 4.54 3.80 - 5.20 10e6/uL    Hemoglobin 13.3 11.7 - 15.7 g/dL    Hematocrit 41.6 35.0 - 47.0 %    MCV 92 78 - 100 fL    MCH 29.3 26.5 - 33.0 pg    MCHC 32.0 31.5 - 36.5 g/dL    RDW 13.2 10.0 - 15.0 %    Platelet Count 194 150 - 450 10e3/uL    % Neutrophils 62 %    % Lymphocytes 26 %    % Monocytes 6 %    % Eosinophils 5 %    % Basophils 1 %    % Immature Granulocytes 0 %    NRBCs per 100 WBC 0 <1 /100    Absolute Neutrophils 4.3 1.6 - 8.3 10e3/uL    Absolute Lymphocytes 1.8 0.8 - 5.3  10e3/uL    Absolute Monocytes 0.4 0.0 - 1.3 10e3/uL    Absolute Eosinophils 0.4 0.0 - 0.7 10e3/uL    Absolute Basophils 0.0 0.0 - 0.2 10e3/uL    Absolute Immature Granulocytes 0.0 <=0.4 10e3/uL    Absolute NRBCs 0.0 10e3/uL   Lactate Dehydrogenase   Result Value Ref Range    Lactate Dehydrogenase 193 0 - 250 U/L       Imaging    No results found.    Billing  Total time 35 minutes, to include face to face visit, review of EMR, ordering, documentation and coordination of care on date of service    Start time: 1453 / End time: 1505  Platform: Wi[employer]  Patient location: Home  Provider location: [facility]    Signed by: Jennifer Allen NP

## 2023-09-08 NOTE — PROGRESS NOTES
Virtual Visit Details    Type of service:  Video Visit   Video Start Time:  1453  Video End Time: 1505    Originating Location (pt. Location): Other in [facility]    Distant Location (provider location):  On-site [facility]  Platform used for Video Visit: Brandon Bloom CMA on 9/8/2023 at 2:37 PM

## 2023-09-10 LAB — CGA SERPL-MCNC: 521 NG/ML

## 2023-09-12 ENCOUNTER — TELEPHONE (OUTPATIENT)
Dept: ONCOLOGY | Facility: CLINIC | Age: 73
End: 2023-09-12
Payer: COMMERCIAL

## 2023-09-12 NOTE — TELEPHONE ENCOUNTER
----- Message from Josue Meredith MD sent at 9/12/2023  8:49 AM CDT -----  Aleksey Gee-    Please notify patient her chromogranin-A level is stable (actually slightly lower). Recommend plan as outlined by Jennifer. I called patient but her voicemail box was full.    Donell Meredith MD

## 2023-09-12 NOTE — CONFIDENTIAL NOTE
Patient notified of Chromagranin-A levels being stable per Dr. Meredith. Patient scheduled to see Dr. Meredith as planned in October. .Magaly Alex RN on 9/12/2023 at 11:42 AM

## 2023-09-27 NOTE — PROGRESS NOTES
Hematology/Medical Oncology Follow-up Note      October 6, 2023    Reason for visit:  Marissa Guadarrama is a 73 year old [employer] retiree from [place] accompanied by her , Gaudencio, who presents for oncologic reevaluation with a history of a metastatic low-grade small bowel neuroendocrine tumor (carcinoid tumor) on lanreotide and s/p 7/21/2023 Dotatate PET scan and CT imaging.    Impression:  Now symptomatic, metastatic low-grade neuroendocrine cancer with new left sacral sclerotic metastasis which is somatostatin-active on Dotatate scan  History of bilateral ER/UT positive breast cancer on anastrozole without apparent recurrence  History of obesity and postpolio syndrome    Recommendation, plan, instructions:  Lanreotide 120 mg as before  Radiation therapy referral to Dr. Manuel here for consideration of localized radiation.  I would prefer to defer use of Lutetium ILEANA-177 dotatate therapy if and when she develops more extensive and/or symptomatic disease.  Follow-up with me in 4 weeks  Continue exemestane  These matters were discussed at length and detail with the patient and her .  They understand and agree.    Time with patient including review, documentation, history, examination, coordination of care and counseling was 20 minutes.    Recent oncology review:  7/21/2023 and 8/6/2023 Dotatate PET scan and CT CAP revealed increased size and gluco-avidity of left sclerotic sacral lesion compared to November, 2022 with unchanged Dotatate-avid liver and intra-abdominal/pelvic foci since July/5/2022.    Review of chromogranin A levels since April, 2022 have demonstrated stability until March, 2023 with approximately 10% increase over the intervening March, 2023-August, 2023 interval followed by decline from August to September, 2023 from 582 to 521.    However, in the last 4 weeks, she has experienced now new left sacral pain referable to her left sacral metastasis.  Otherwise she has felt well.    History of  present illness:  In 2003, she underwent a right colonic and terminal ileal resection for a well-differentiated, pT2, pN2 neuroendocrine tumor history of metastases causing left ureteral obstruction in 2020 with subsequent 2022 left nephrectomy and stable liver lesion previously followed by Dr. Elfego Lawrence and then Dr. Hosea King.    However, a follow-up 2/10/2022 Octreoscan scan confirmed evidence of progressive disease for which she was started on lanreotide every 4 weeks with a baseline chromogranin A level of 824.  The chromogranin A joslyn was 445 on 1/6/2023 and 562 on 7/28/2023.  Last month, the chromogranin A level was 521.    Past medical history:  History of 2016 bilateral ER positive breast cancer on exemestane.  Both tumors were 100% ER/TN+, HER2 neg and patient declined Oncotype DX testing since she indicated she would never want to receive adjuvant systemic chemotherapy.    Past Medical History:   Diagnosis Date    Arthritis     knee    Benign breast biopsy     benign    Breast cancer (H)     Carcinoid tumor (H28) 12/2003    Cervical cancer (H) 2007    H/O colposcopy with cervical biopsy 12/23/2013    vaginal cuff biopsy- VAIN III. referred back to gyn/onc    HTN     Hyperlipidemia     Malignant neoplasm of ovary (H)     Obesity     Pap smear of vagina with ASC-H 11/01/2013    Post-polio syndromes     Trigeminal neuralgias         Family history:  I have reviewed this patient's family history and updated it with pertinent information if needed.  Family History   Problem Relation Age of Onset    Cancer Mother         bone / liver    Breast Cancer Mother     Cancer Sister         vulvar ca, cervical ca, squamous cell cancer    Breast Cancer Sister     Cancer Brother         Rectal- Stage 4    Rectal Cancer Brother     Cancer Maternal Grandmother     Cancer Maternal Grandfather     Cancer Paternal Grandmother     Cancer Paternal Grandfather     Breast Cancer Maternal Aunt     Breast Cancer Paternal Aunt  "    Colon Cancer Paternal Aunt     Pancreatic Cancer Nephew     Anesthesia Reaction No family hx of     Venous thrombosis No family hx of     Bleeding Disorder No family hx of          Medications:  Current Outpatient Medications   Medication    exemestane (AROMASIN) 25 MG tablet    hydrochlorothiazide (HYDRODIURIL) 12.5 MG tablet    SENNA-docusate sodium (SENNA S) 8.6-50 MG tablet     No current facility-administered medications for this visit.       Allergies:  No Known Allergies    Review of systems:  Except as noted in the note above, the patient denies headaches, diplopia, hearing loss or dizziness; dyspnea, cough, hemoptysis, pleurisy; chest pain/pressure, palpitations, lightheadedness; anorexia, nausea, vomiting, abdominal pain, diarrhea, constipation, melena or rectal bleeding; dysuria, frequency,  blood in the urine; vaginal bleeding or discharge; fever, chills, sweats, hot flashes; tingling, numbness, loss of balance; insomnia, depression, anxiety.    Physical examination:  BP (!) 154/68 (BP Location: Right arm, Patient Position: Sitting, Cuff Size: Adult Regular)   Pulse 59   Temp 97.7  F (36.5  C) (Tympanic)   Resp 16   Ht 1.727 m (5' 8\")   Wt 96.2 kg (212 lb)   SpO2 95%   BMI 32.23 kg/m      The patient is alert and oriented.      Josue Meredith MD  "

## 2023-10-06 ENCOUNTER — ONCOLOGY VISIT (OUTPATIENT)
Dept: ONCOLOGY | Facility: CLINIC | Age: 73
End: 2023-10-06
Attending: INTERNAL MEDICINE
Payer: MEDICARE

## 2023-10-06 ENCOUNTER — APPOINTMENT (OUTPATIENT)
Dept: LAB | Facility: CLINIC | Age: 73
End: 2023-10-06
Payer: MEDICARE

## 2023-10-06 ENCOUNTER — INFUSION THERAPY VISIT (OUTPATIENT)
Dept: INFUSION THERAPY | Facility: CLINIC | Age: 73
End: 2023-10-06
Attending: INTERNAL MEDICINE
Payer: MEDICARE

## 2023-10-06 VITALS
BODY MASS INDEX: 32.13 KG/M2 | OXYGEN SATURATION: 95 % | WEIGHT: 212 LBS | SYSTOLIC BLOOD PRESSURE: 154 MMHG | HEART RATE: 59 BPM | HEIGHT: 68 IN | RESPIRATION RATE: 16 BRPM | TEMPERATURE: 97.7 F | DIASTOLIC BLOOD PRESSURE: 68 MMHG

## 2023-10-06 VITALS — DIASTOLIC BLOOD PRESSURE: 91 MMHG | HEART RATE: 60 BPM | SYSTOLIC BLOOD PRESSURE: 170 MMHG

## 2023-10-06 DIAGNOSIS — C7A.021 MALIGNANT CARCINOID TUMOR OF CECUM (H): Primary | ICD-10-CM

## 2023-10-06 LAB — LDH SERPL L TO P-CCNC: 198 U/L (ref 0–250)

## 2023-10-06 PROCEDURE — 86316 IMMUNOASSAY TUMOR OTHER: CPT | Performed by: INTERNAL MEDICINE

## 2023-10-06 PROCEDURE — 96372 THER/PROPH/DIAG INJ SC/IM: CPT | Performed by: INTERNAL MEDICINE

## 2023-10-06 PROCEDURE — 99214 OFFICE O/P EST MOD 30 MIN: CPT | Performed by: INTERNAL MEDICINE

## 2023-10-06 PROCEDURE — G0463 HOSPITAL OUTPT CLINIC VISIT: HCPCS | Performed by: INTERNAL MEDICINE

## 2023-10-06 PROCEDURE — 83615 LACTATE (LD) (LDH) ENZYME: CPT | Performed by: INTERNAL MEDICINE

## 2023-10-06 PROCEDURE — 250N000011 HC RX IP 250 OP 636: Performed by: INTERNAL MEDICINE

## 2023-10-06 PROCEDURE — 36415 COLL VENOUS BLD VENIPUNCTURE: CPT

## 2023-10-06 RX ORDER — MEPERIDINE HYDROCHLORIDE 25 MG/ML
25 INJECTION INTRAMUSCULAR; INTRAVENOUS; SUBCUTANEOUS EVERY 30 MIN PRN
Status: CANCELLED | OUTPATIENT
Start: 2023-10-06

## 2023-10-06 RX ORDER — ALBUTEROL SULFATE 90 UG/1
1-2 AEROSOL, METERED RESPIRATORY (INHALATION)
Status: CANCELLED
Start: 2023-10-06

## 2023-10-06 RX ORDER — METHYLPREDNISOLONE SODIUM SUCCINATE 125 MG/2ML
125 INJECTION, POWDER, LYOPHILIZED, FOR SOLUTION INTRAMUSCULAR; INTRAVENOUS
Status: CANCELLED
Start: 2023-10-06

## 2023-10-06 RX ORDER — LANREOTIDE ACETATE 120 MG/.5ML
120 INJECTION SUBCUTANEOUS ONCE
Status: CANCELLED | OUTPATIENT
Start: 2023-10-06 | End: 2023-10-06

## 2023-10-06 RX ORDER — LANREOTIDE ACETATE 120 MG/.5ML
120 INJECTION SUBCUTANEOUS ONCE
Status: COMPLETED | OUTPATIENT
Start: 2023-10-06 | End: 2023-10-06

## 2023-10-06 RX ORDER — EPINEPHRINE 1 MG/ML
0.3 INJECTION, SOLUTION, CONCENTRATE INTRAVENOUS EVERY 5 MIN PRN
Status: CANCELLED | OUTPATIENT
Start: 2023-10-06

## 2023-10-06 RX ORDER — DIPHENHYDRAMINE HYDROCHLORIDE 50 MG/ML
50 INJECTION INTRAMUSCULAR; INTRAVENOUS
Status: CANCELLED
Start: 2023-10-06

## 2023-10-06 RX ORDER — NALOXONE HYDROCHLORIDE 0.4 MG/ML
0.2 INJECTION, SOLUTION INTRAMUSCULAR; INTRAVENOUS; SUBCUTANEOUS
Status: CANCELLED | OUTPATIENT
Start: 2023-10-06

## 2023-10-06 RX ORDER — ALBUTEROL SULFATE 0.83 MG/ML
2.5 SOLUTION RESPIRATORY (INHALATION)
Status: CANCELLED | OUTPATIENT
Start: 2023-10-06

## 2023-10-06 RX ADMIN — LANREOTIDE ACETATE 120 MG: 120 INJECTION SUBCUTANEOUS at 15:39

## 2023-10-06 ASSESSMENT — PAIN SCALES - GENERAL: PAINLEVEL: NO PAIN (0)

## 2023-10-06 NOTE — PROGRESS NOTES
Infusion Nursing Note:  Marissa Guadarrama presents today for Somatuline.    Patient seen by provider today: Yes: Dr. Meredith note   present during visit today: Not Applicable.    Note: Injection to the Right buttock area deep SQ.      Intravenous Access:  No Intravenous access/labs at this visit.    Treatment Conditions:  Not Applicable.      Post Infusion Assessment:  Patient tolerated injection without incident.  Site patent and intact, free from redness, edema or discomfort.  No evidence of extravasations.       Discharge Plan:   Patient and/or family verbalized understanding of discharge instructions and all questions answered.  Patient discharged in stable condition accompanied by: self.  Departure Mode: Ambulatory and walker.      Erica Be RN

## 2023-10-06 NOTE — PATIENT INSTRUCTIONS
1. Sandostatin today as before  2. Referral for possible radiation  3. Follow-up Dr. Meredith in 4 weeks

## 2023-10-06 NOTE — LETTER
10/6/2023         RE: Marissa Guadarrama  [facility] [address] [id number]        Dear Colleague,    Thank you for referring your patient, Marissa Guadarrama, to the [facility]. Please see a copy of my visit note below.    Hematology/Medical Oncology Follow-up Note      October 6, 2023    Reason for visit:  Marissa Guadarrama is a 73 year old [employer] retiree from [place] accompanied by her , Gaudencio, who presents for oncologic reevaluation with a history of a metastatic low-grade small bowel neuroendocrine tumor (carcinoid tumor) on lanreotide and s/p 7/21/2023 Dotatate PET scan and CT imaging.    Impression:  Now symptomatic, metastatic low-grade neuroendocrine cancer with new left sacral sclerotic metastasis which is somatostatin-active on Dotatate scan  History of bilateral ER/OK positive breast cancer on anastrozole without apparent recurrence  History of obesity and postpolio syndrome    Recommendation, plan, instructions:  Lanreotide 120 mg as before  Radiation therapy referral to Dr. Manuel here for consideration of localized radiation.  I would prefer to defer use of Lutetium ILEANA-177 dotatate therapy if and when she develops more extensive and/or symptomatic disease.  Follow-up with me in 4 weeks  Continue exemestane  These matters were discussed at length and detail with the patient and her .  They understand and agree.    Time with patient including review, documentation, history, examination, coordination of care and counseling was 20 minutes.    Recent oncology review:  7/21/2023 and 8/6/2023 Dotatate PET scan and CT CAP revealed increased size and gluco-avidity of left sclerotic sacral lesion compared to November, 2022 with unchanged Dotatate-avid liver and intra-abdominal/pelvic foci since July/5/2022.    Review of chromogranin A levels since April, 2022 have demonstrated stability until March, 2023 with approximately 10% increase over the intervening March,  2023-August, 2023 interval followed by decline from August to September, 2023 from 582 to 521.    However, in the last 4 weeks, she has experienced now new left sacral pain referable to her left sacral metastasis.  Otherwise she has felt well.    History of present illness:  In 2003, she underwent a right colonic and terminal ileal resection for a well-differentiated, pT2, pN2 neuroendocrine tumor history of metastases causing left ureteral obstruction in 2020 with subsequent 2022 left nephrectomy and stable liver lesion previously followed by Dr. Elfego Lawrence and then Dr. Hosea King.    However, a follow-up 2/10/2022 Octreoscan scan confirmed evidence of progressive disease for which she was started on lanreotide every 4 weeks with a baseline chromogranin A level of 824.  The chromogranin A joslyn was 445 on 1/6/2023 and 562 on 7/28/2023.  Last month, the chromogranin A level was 521.    Past medical history:  History of 2016 bilateral ER positive breast cancer on exemestane.  Both tumors were 100% ER/MS+, HER2 neg and patient declined Oncotype DX testing since she indicated she would never want to receive adjuvant systemic chemotherapy.    Past Medical History:   Diagnosis Date     Arthritis     knee     Benign breast biopsy     benign     Breast cancer (H)      Carcinoid tumor (H28) 12/2003     Cervical cancer (H) 2007     H/O colposcopy with cervical biopsy 12/23/2013    vaginal cuff biopsy- VAIN III. referred back to gyn/onc     HTN      Hyperlipidemia      Malignant neoplasm of ovary (H)      Obesity      Pap smear of vagina with ASC-H 11/01/2013     Post-polio syndromes      Trigeminal neuralgias         Family history:  I have reviewed this patient's family history and updated it with pertinent information if needed.  Family History   Problem Relation Age of Onset     Cancer Mother         bone / liver     Breast Cancer Mother      Cancer Sister         vulvar ca, cervical ca, squamous cell cancer      "Breast Cancer Sister      Cancer Brother         Rectal- Stage 4     Rectal Cancer Brother      Cancer Maternal Grandmother      Cancer Maternal Grandfather      Cancer Paternal Grandmother      Cancer Paternal Grandfather      Breast Cancer Maternal Aunt      Breast Cancer Paternal Aunt      Colon Cancer Paternal Aunt      Pancreatic Cancer Nephew      Anesthesia Reaction No family hx of      Venous thrombosis No family hx of      Bleeding Disorder No family hx of          Medications:  Current Outpatient Medications   Medication     exemestane (AROMASIN) 25 MG tablet     hydrochlorothiazide (HYDRODIURIL) 12.5 MG tablet     SENNA-docusate sodium (SENNA S) 8.6-50 MG tablet     No current facility-administered medications for this visit.       Allergies:  No Known Allergies    Review of systems:  Except as noted in the note above, the patient denies headaches, diplopia, hearing loss or dizziness; dyspnea, cough, hemoptysis, pleurisy; chest pain/pressure, palpitations, lightheadedness; anorexia, nausea, vomiting, abdominal pain, diarrhea, constipation, melena or rectal bleeding; dysuria, frequency,  blood in the urine; vaginal bleeding or discharge; fever, chills, sweats, hot flashes; tingling, numbness, loss of balance; insomnia, depression, anxiety.    Physical examination:  BP (!) 154/68 (BP Location: Right arm, Patient Position: Sitting, Cuff Size: Adult Regular)   Pulse 59   Temp 97.7  F (36.5  C) (Tympanic)   Resp 16   Ht 1.727 m (5' 8\")   Wt 96.2 kg (212 lb)   SpO2 95%   BMI 32.23 kg/m      The patient is alert and oriented.      Josue Meredith MD    Oncology Rooming Note    October 6, 2023 2:56 PM   Marissa Guadarrama is a 73 year old female who presents for:    Chief Complaint   Patient presents with     Oncology Clinic Visit     Malignant carcinoid tumor of cecum - Labs provider and infusion     Initial Vitals: BP (!) 154/68 (BP Location: Right arm, Patient Position: Sitting, Cuff Size: Adult Regular) " "  Pulse 59   Temp 97.7  F (36.5  C) (Tympanic)   Resp 16   Ht 1.727 m (5' 8\")   Wt 96.2 kg (212 lb)   SpO2 95%   BMI 32.23 kg/m   Estimated body mass index is 32.23 kg/m  as calculated from the following:    Height as of this encounter: 1.727 m (5' 8\").    Weight as of this encounter: 96.2 kg (212 lb). Body surface area is 2.15 meters squared.  No Pain (0) Comment: Data Unavailable   No LMP recorded. Patient has had a hysterectomy.  Allergies reviewed: Yes  Medications reviewed: Yes    Medications: Medication refills not needed today.  Pharmacy name entered into [employer]: [id number] [address]    Clinical concerns:  None      Rhea Bloom [facility]                Again, thank you for allowing me to participate in the care of your patient.        Sincerely,        Josue Meredith MD  "

## 2023-10-06 NOTE — PROGRESS NOTES
"Oncology Rooming Note    October 6, 2023 2:56 PM   Marissa Guadarrama is a 73 year old female who presents for:    Chief Complaint   Patient presents with    Oncology Clinic Visit     Malignant carcinoid tumor of cecum - Labs provider and infusion     Initial Vitals: BP (!) 154/68 (BP Location: Right arm, Patient Position: Sitting, Cuff Size: Adult Regular)   Pulse 59   Temp 97.7  F (36.5  C) (Tympanic)   Resp 16   Ht 1.727 m (5' 8\")   Wt 96.2 kg (212 lb)   SpO2 95%   BMI 32.23 kg/m   Estimated body mass index is 32.23 kg/m  as calculated from the following:    Height as of this encounter: 1.727 m (5' 8\").    Weight as of this encounter: 96.2 kg (212 lb). Body surface area is 2.15 meters squared.  No Pain (0) Comment: Data Unavailable   No LMP recorded. Patient has had a hysterectomy.  Allergies reviewed: Yes  Medications reviewed: Yes    Medications: Medication refills not needed today.  Pharmacy name entered into [employer]: [facility] #[id number] - [place], [place] - [address] [place] [address]    Clinical concerns:  None      Rhea Bloom CMA              "

## 2023-10-08 LAB — CGA SERPL-MCNC: 469 NG/ML

## 2023-10-13 ENCOUNTER — HOSPITAL ENCOUNTER (OUTPATIENT)
Dept: MAMMOGRAPHY | Facility: CLINIC | Age: 73
Discharge: HOME | End: 2023-10-13
Attending: NURSE PRACTITIONER
Payer: MEDICARE

## 2023-10-13 ENCOUNTER — HOSPITAL ENCOUNTER (OUTPATIENT)
Dept: BONE DENSITY | Facility: CLINIC | Age: 73
Discharge: HOME | End: 2023-10-13
Attending: NURSE PRACTITIONER
Payer: MEDICARE

## 2023-10-13 DIAGNOSIS — Z79.811 LONG TERM (CURRENT) USE OF AROMATASE INHIBITORS: ICD-10-CM

## 2023-10-13 DIAGNOSIS — Z12.31 VISIT FOR SCREENING MAMMOGRAM: ICD-10-CM

## 2023-10-13 PROCEDURE — 77067 SCR MAMMO BI INCL CAD: CPT

## 2023-10-13 PROCEDURE — 77080 DXA BONE DENSITY AXIAL: CPT

## 2023-10-27 DIAGNOSIS — I10 HYPERTENSION GOAL BP (BLOOD PRESSURE) < 140/90: ICD-10-CM

## 2023-10-30 RX ORDER — HYDROCHLOROTHIAZIDE 12.5 MG/1
12.5 TABLET ORAL EVERY MORNING
Qty: 90 TABLET | Refills: 0 | OUTPATIENT
Start: 2023-10-30

## 2023-10-30 NOTE — TELEPHONE ENCOUNTER
Patient made appt for 12/8/23, would like enough med to get to her appt. Marlen Mullins on 10/30/2023 at 4:42 PM

## 2023-10-31 RX ORDER — HYDROCHLOROTHIAZIDE 12.5 MG/1
12.5 TABLET ORAL EVERY MORNING
Qty: 90 TABLET | Refills: 0 | Status: SHIPPED | OUTPATIENT
Start: 2023-10-31 | End: 2023-11-03

## 2023-11-02 NOTE — PROGRESS NOTES
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    Impression:  Symptomatic, metastatic low-grade neuroendocrine cancer with new left sacral sclerotic metastasis which is somatostatin-active on Dotatate scan  History of bilateral ER/AK positive breast cancer on anastrozole without apparent recurrence  History of obesity and postpolio syndrome    Recommendation, plan, instructions:  Lanreotide 120 mg as before  Radiation therapy referral to Dr. Manuel (11/27/2023) here for consideration of localized radiation.  I would prefer to defer use of Lutetium ILEANA-177 dotatate therapy if and when she develops more extensive and/or symptomatic disease.  Oxycodone 2.5-5.0 mg q6 hours prn beginning at bedtime first  Follow-up lanreotide in 4 weeks; follow-up with me in 8 weeks  Continue exemestane  These matters were discussed at length and detail with the patient and her .  They understand and agree.    Time with patient including review, documentation, history, examination, coordination of care and counseling was 15 minutes.    Recent oncology review:  7/21/2023 and 8/6/2023 Dotatate PET scan and CT CAP revealed increased size and gluco-avidity of left sclerotic sacral lesion compared to November, 2022 with unchanged Dotatate-avid liver and intra-abdominal/pelvic foci since July/5/2022.    Review of chromogranin A levels since April, 2022 have demonstrated stability until March, 2023 with approximately 10% increase over the intervening March, 2023-August, 2023 interval followed by decline from August to September, 2023 from 582 to 521.    However, in the last 8 weeks, she has experienced now  new left sacral pain referable to her left sacral metastasis.  Otherwise she has felt well.    History of present illness:  In 2003, she underwent a right colonic and terminal ileal resection for a well-differentiated, pT2, pN2 neuroendocrine tumor history of metastases causing left ureteral obstruction in 2020 with subsequent 2022 left nephrectomy and stable liver lesion previously followed by Dr. Elfego Lawrence and then Dr. Hosea King.    However, a follow-up 2/10/2022 Octreoscan scan confirmed evidence of progressive disease for which she was started on lanreotide every 4 weeks with a baseline chromogranin A level of 824.  The chromogranin A joslyn was 445 on 1/6/2023 and 562 on 7/28/2023.  Last month, the chromogranin A level was 521.    Past medical history:  History of 2016 bilateral ER positive breast cancer on exemestane.  Both tumors were 100% ER/IL+, HER2 neg and patient declined Oncotype DX testing since she indicated she would never want to receive adjuvant systemic chemotherapy.    Past Medical History:   Diagnosis Date    Arthritis     knee    Benign breast biopsy     benign    Breast cancer (H)     Carcinoid tumor (H28) 12/2003    Cervical cancer (H) 2007    H/O colposcopy with cervical biopsy 12/23/2013    vaginal cuff biopsy- VAIN III. referred back to gyn/onc    HTN     Hyperlipidemia     Malignant neoplasm of ovary (H)     Obesity     Pap smear of vagina with ASC-H 11/01/2013    Post-polio syndromes     Trigeminal neuralgias         Family history:  I have reviewed this patient's family history and updated it with pertinent information if needed.  Family History   Problem Relation Age of Onset    Cancer Mother         bone / liver    Breast Cancer Mother     Cancer Sister         vulvar ca, cervical ca, squamous cell cancer    Breast Cancer Sister     Cancer Brother         Rectal- Stage 4    Rectal Cancer Brother     Cancer Maternal Grandmother     Cancer Maternal Grandfather     Cancer Paternal  "Grandmother     Cancer Paternal Grandfather     Breast Cancer Maternal Aunt     Breast Cancer Paternal Aunt     Colon Cancer Paternal Aunt     Pancreatic Cancer Nephew     Anesthesia Reaction No family hx of     Venous thrombosis No family hx of     Bleeding Disorder No family hx of          Medications:  Current Outpatient Medications   Medication    exemestane (AROMASIN) 25 MG tablet    hydrochlorothiazide (HYDRODIURIL) 12.5 MG tablet    oxyCODONE (ROXICODONE) 5 MG tablet    SENNA-docusate sodium (SENNA S) 8.6-50 MG tablet     No current facility-administered medications for this visit.     Facility-Administered Medications Ordered in Other Visits   Medication    lanreotide acetate (SOMATULINE) injection 120 mg       Allergies:  No Known Allergies    Review of systems:  Except as noted in the note above, the patient denies headaches, diplopia, hearing loss or dizziness; dyspnea, cough, hemoptysis, pleurisy; chest pain/pressure, palpitations, lightheadedness; anorexia, nausea, vomiting, abdominal pain, diarrhea, constipation, melena or rectal bleeding; dysuria, frequency,  blood in the urine; vaginal bleeding or discharge; fever, chills, sweats, hot flashes; tingling, numbness, loss of balance; insomnia, depression, anxiety.    Physical examination:  BP (!) 145/68 (BP Location: Right arm, Patient Position: Sitting, Cuff Size: Adult Large)   Pulse 66   Temp 98.1  F (36.7  C) (Tympanic)   Resp 12   Ht 1.727 m (5' 8\")   Wt 97.1 kg (214 lb)   SpO2 94%   BMI 32.54 kg/m      The patient is alert and oriented.      Josue Meredith MD  "

## 2023-11-03 ENCOUNTER — APPOINTMENT (OUTPATIENT)
Dept: LAB | Facility: CLINIC | Age: 73
End: 2023-11-03
Attending: INTERNAL MEDICINE
Payer: MEDICARE

## 2023-11-03 ENCOUNTER — TELEPHONE (OUTPATIENT)
Dept: GASTROENTEROLOGY | Facility: CLINIC | Age: 73
End: 2023-11-03
Payer: COMMERCIAL

## 2023-11-03 ENCOUNTER — INFUSION THERAPY VISIT (OUTPATIENT)
Dept: INFUSION THERAPY | Facility: CLINIC | Age: 73
End: 2023-11-03
Attending: INTERNAL MEDICINE
Payer: MEDICARE

## 2023-11-03 ENCOUNTER — ONCOLOGY VISIT (OUTPATIENT)
Dept: ONCOLOGY | Facility: CLINIC | Age: 73
End: 2023-11-03
Attending: INTERNAL MEDICINE
Payer: MEDICARE

## 2023-11-03 ENCOUNTER — HOSPITAL ENCOUNTER (OUTPATIENT)
Facility: CLINIC | Age: 73
End: 2023-11-03
Attending: SPECIALIST | Admitting: SPECIALIST
Payer: MEDICARE

## 2023-11-03 VITALS
DIASTOLIC BLOOD PRESSURE: 68 MMHG | RESPIRATION RATE: 12 BRPM | SYSTOLIC BLOOD PRESSURE: 145 MMHG | TEMPERATURE: 98.1 F | HEART RATE: 66 BPM | WEIGHT: 214 LBS | BODY MASS INDEX: 32.43 KG/M2 | HEIGHT: 68 IN | OXYGEN SATURATION: 94 %

## 2023-11-03 DIAGNOSIS — C7A.021 MALIGNANT CARCINOID TUMOR OF CECUM (H): Primary | ICD-10-CM

## 2023-11-03 DIAGNOSIS — I10 HYPERTENSION GOAL BP (BLOOD PRESSURE) < 140/90: ICD-10-CM

## 2023-11-03 DIAGNOSIS — C50.411 MALIGNANT NEOPLASM OF UPPER-OUTER QUADRANT OF BOTH BREASTS IN FEMALE, ESTROGEN RECEPTOR POSITIVE (H): ICD-10-CM

## 2023-11-03 DIAGNOSIS — C50.412 MALIGNANT NEOPLASM OF UPPER-OUTER QUADRANT OF BOTH BREASTS IN FEMALE, ESTROGEN RECEPTOR POSITIVE (H): ICD-10-CM

## 2023-11-03 DIAGNOSIS — Z17.0 MALIGNANT NEOPLASM OF UPPER-OUTER QUADRANT OF BOTH BREASTS IN FEMALE, ESTROGEN RECEPTOR POSITIVE (H): ICD-10-CM

## 2023-11-03 LAB — LDH SERPL L TO P-CCNC: 175 U/L (ref 0–250)

## 2023-11-03 PROCEDURE — 96372 THER/PROPH/DIAG INJ SC/IM: CPT

## 2023-11-03 PROCEDURE — G0463 HOSPITAL OUTPT CLINIC VISIT: HCPCS | Mod: 25 | Performed by: INTERNAL MEDICINE

## 2023-11-03 PROCEDURE — 99214 OFFICE O/P EST MOD 30 MIN: CPT | Performed by: INTERNAL MEDICINE

## 2023-11-03 PROCEDURE — 96372 THER/PROPH/DIAG INJ SC/IM: CPT | Performed by: INTERNAL MEDICINE

## 2023-11-03 PROCEDURE — 36415 COLL VENOUS BLD VENIPUNCTURE: CPT | Mod: GA

## 2023-11-03 PROCEDURE — 250N000011 HC RX IP 250 OP 636: Performed by: INTERNAL MEDICINE

## 2023-11-03 PROCEDURE — 83615 LACTATE (LD) (LDH) ENZYME: CPT | Performed by: INTERNAL MEDICINE

## 2023-11-03 PROCEDURE — 86316 IMMUNOASSAY TUMOR OTHER: CPT | Performed by: INTERNAL MEDICINE

## 2023-11-03 RX ORDER — LANREOTIDE ACETATE 120 MG/.5ML
120 INJECTION SUBCUTANEOUS ONCE
Status: COMPLETED | OUTPATIENT
Start: 2023-11-03 | End: 2023-11-03

## 2023-11-03 RX ORDER — EPINEPHRINE 1 MG/ML
0.3 INJECTION, SOLUTION, CONCENTRATE INTRAVENOUS EVERY 5 MIN PRN
Status: CANCELLED | OUTPATIENT
Start: 2023-12-01

## 2023-11-03 RX ORDER — MEPERIDINE HYDROCHLORIDE 25 MG/ML
25 INJECTION INTRAMUSCULAR; INTRAVENOUS; SUBCUTANEOUS EVERY 30 MIN PRN
Status: CANCELLED | OUTPATIENT
Start: 2023-11-03

## 2023-11-03 RX ORDER — NALOXONE HYDROCHLORIDE 0.4 MG/ML
0.2 INJECTION, SOLUTION INTRAMUSCULAR; INTRAVENOUS; SUBCUTANEOUS
Status: CANCELLED | OUTPATIENT
Start: 2023-11-03

## 2023-11-03 RX ORDER — LANREOTIDE ACETATE 120 MG/.5ML
120 INJECTION SUBCUTANEOUS ONCE
Status: CANCELLED | OUTPATIENT
Start: 2023-11-03 | End: 2023-11-03

## 2023-11-03 RX ORDER — METHYLPREDNISOLONE SODIUM SUCCINATE 125 MG/2ML
125 INJECTION, POWDER, LYOPHILIZED, FOR SOLUTION INTRAMUSCULAR; INTRAVENOUS
Status: CANCELLED
Start: 2023-12-01

## 2023-11-03 RX ORDER — NALOXONE HYDROCHLORIDE 0.4 MG/ML
0.2 INJECTION, SOLUTION INTRAMUSCULAR; INTRAVENOUS; SUBCUTANEOUS
Status: CANCELLED | OUTPATIENT
Start: 2023-12-01

## 2023-11-03 RX ORDER — HYDROCHLOROTHIAZIDE 12.5 MG/1
12.5 TABLET ORAL EVERY MORNING
Qty: 90 TABLET | Refills: 0 | Status: SHIPPED | OUTPATIENT
Start: 2023-11-03 | End: 2024-03-08

## 2023-11-03 RX ORDER — EXEMESTANE 25 MG/1
25 TABLET ORAL EVERY MORNING
Qty: 90 TABLET | Refills: 1 | Status: SHIPPED | OUTPATIENT
Start: 2023-11-03 | End: 2024-07-26

## 2023-11-03 RX ORDER — ALBUTEROL SULFATE 0.83 MG/ML
2.5 SOLUTION RESPIRATORY (INHALATION)
Status: CANCELLED | OUTPATIENT
Start: 2023-12-01

## 2023-11-03 RX ORDER — DIPHENHYDRAMINE HYDROCHLORIDE 50 MG/ML
50 INJECTION INTRAMUSCULAR; INTRAVENOUS
Status: CANCELLED
Start: 2023-11-03

## 2023-11-03 RX ORDER — DIPHENHYDRAMINE HYDROCHLORIDE 50 MG/ML
50 INJECTION INTRAMUSCULAR; INTRAVENOUS
Status: CANCELLED
Start: 2023-12-01

## 2023-11-03 RX ORDER — LANREOTIDE ACETATE 120 MG/.5ML
120 INJECTION SUBCUTANEOUS ONCE
Status: CANCELLED | OUTPATIENT
Start: 2023-12-01 | End: 2023-12-01

## 2023-11-03 RX ORDER — METHYLPREDNISOLONE SODIUM SUCCINATE 125 MG/2ML
125 INJECTION, POWDER, LYOPHILIZED, FOR SOLUTION INTRAMUSCULAR; INTRAVENOUS
Status: CANCELLED
Start: 2023-11-03

## 2023-11-03 RX ORDER — MEPERIDINE HYDROCHLORIDE 25 MG/ML
25 INJECTION INTRAMUSCULAR; INTRAVENOUS; SUBCUTANEOUS EVERY 30 MIN PRN
Status: CANCELLED | OUTPATIENT
Start: 2023-12-01

## 2023-11-03 RX ORDER — OXYCODONE HYDROCHLORIDE 5 MG/1
5 TABLET ORAL EVERY 6 HOURS PRN
Qty: 30 TABLET | Refills: 0 | Status: SHIPPED | OUTPATIENT
Start: 2023-11-03 | End: 2023-12-03

## 2023-11-03 RX ORDER — ALBUTEROL SULFATE 90 UG/1
1-2 AEROSOL, METERED RESPIRATORY (INHALATION)
Status: CANCELLED
Start: 2023-12-01

## 2023-11-03 RX ORDER — EPINEPHRINE 1 MG/ML
0.3 INJECTION, SOLUTION, CONCENTRATE INTRAVENOUS EVERY 5 MIN PRN
Status: CANCELLED | OUTPATIENT
Start: 2023-11-03

## 2023-11-03 RX ORDER — ALBUTEROL SULFATE 90 UG/1
1-2 AEROSOL, METERED RESPIRATORY (INHALATION)
Status: CANCELLED
Start: 2023-11-03

## 2023-11-03 RX ORDER — ALBUTEROL SULFATE 0.83 MG/ML
2.5 SOLUTION RESPIRATORY (INHALATION)
Status: CANCELLED | OUTPATIENT
Start: 2023-11-03

## 2023-11-03 RX ADMIN — LANREOTIDE ACETATE 120 MG: 120 INJECTION SUBCUTANEOUS at 16:03

## 2023-11-03 ASSESSMENT — PAIN SCALES - GENERAL: PAINLEVEL: EXTREME PAIN (8)

## 2023-11-03 NOTE — PATIENT INSTRUCTIONS
Oxycodone 2.5-5.0 mg (one-half or full tablet) every 6 hours as needed for pain; try the first dose at bedtime  Lanreotide injection today  Next injection Friday, December 1, 2023  Follow-up with Dr. Meredith Friday, December 29, 2023

## 2023-11-03 NOTE — LETTER
11/3/2023         RE: Marissa Guadarrama  [facility] [facility] [address] [facility] [id number]        Dear Colleague,    Thank you for referring your patient, Marissa Guadarrama, to the [facility]. Please see a copy of my visit note below.    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    Impression:  Symptomatic, metastatic low-grade neuroendocrine cancer with new left sacral sclerotic metastasis which is somatostatin-active on Dotatate scan  History of bilateral ER/MT positive breast cancer on anastrozole without apparent recurrence  History of obesity and postpolio syndrome    Recommendation, plan, instructions:  Lanreotide 120 mg as before  Radiation therapy referral to Dr. Manuel (11/27/2023) here for consideration of localized radiation.  I would prefer to defer use of Lutetium ILEANA-177 dotatate therapy if and when she develops more extensive and/or symptomatic disease.  Oxycodone 2.5-5.0 mg q6 hours prn beginning at bedtime first  Follow-up lanreotide in 4 weeks; follow-up with me in 8 weeks  Continue exemestane  These matters were discussed at length and detail with the patient and her .  They understand and agree.    Time with patient including review, documentation, history, examination, coordination of care and counseling was 15 minutes.    Recent oncology review:  7/21/2023 and 8/6/2023 Dotatate PET scan and CT CAP revealed increased size and gluco-avidity of left sclerotic sacral lesion compared to November, 2022 with unchanged Dotatate-avid liver and intra-abdominal/pelvic foci since July/5/2022.    Review of chromogranin A levels since April, 2022  have demonstrated stability until March, 2023 with approximately 10% increase over the intervening March, 2023-August, 2023 interval followed by decline from August to September, 2023 from 582 to 521.    However, in the last 8 weeks, she has experienced now new left sacral pain referable to her left sacral metastasis.  Otherwise she has felt well.    History of present illness:  In 2003, she underwent a right colonic and terminal ileal resection for a well-differentiated, pT2, pN2 neuroendocrine tumor history of metastases causing left ureteral obstruction in 2020 with subsequent 2022 left nephrectomy and stable liver lesion previously followed by Dr. Elfego Lawrence and then Dr. Hosea King.    However, a follow-up 2/10/2022 Octreoscan scan confirmed evidence of progressive disease for which she was started on lanreotide every 4 weeks with a baseline chromogranin A level of 824.  The chromogranin A joslyn was 445 on 1/6/2023 and 562 on 7/28/2023.  Last month, the chromogranin A level was 521.    Past medical history:  History of 2016 bilateral ER positive breast cancer on exemestane.  Both tumors were 100% ER/CO+, HER2 neg and patient declined Oncotype DX testing since she indicated she would never want to receive adjuvant systemic chemotherapy.    Past Medical History:   Diagnosis Date     Arthritis     knee     Benign breast biopsy     benign     Breast cancer (H)      Carcinoid tumor (H28) 12/2003     Cervical cancer (H) 2007     H/O colposcopy with cervical biopsy 12/23/2013    vaginal cuff biopsy- VAIN III. referred back to gyn/onc     HTN      Hyperlipidemia      Malignant neoplasm of ovary (H)      Obesity      Pap smear of vagina with ASC-H 11/01/2013     Post-polio syndromes      Trigeminal neuralgias         Family history:  I have reviewed this patient's family history and updated it with pertinent information if needed.  Family History   Problem Relation Age of Onset     Cancer Mother         bone / liver  "    Breast Cancer Mother      Cancer Sister         vulvar ca, cervical ca, squamous cell cancer     Breast Cancer Sister      Cancer Brother         Rectal- Stage 4     Rectal Cancer Brother      Cancer Maternal Grandmother      Cancer Maternal Grandfather      Cancer Paternal Grandmother      Cancer Paternal Grandfather      Breast Cancer Maternal Aunt      Breast Cancer Paternal Aunt      Colon Cancer Paternal Aunt      Pancreatic Cancer Nephew      Anesthesia Reaction No family hx of      Venous thrombosis No family hx of      Bleeding Disorder No family hx of          Medications:  Current Outpatient Medications   Medication     exemestane (AROMASIN) 25 MG tablet     hydrochlorothiazide (HYDRODIURIL) 12.5 MG tablet     oxyCODONE (ROXICODONE) 5 MG tablet     SENNA-docusate sodium (SENNA S) 8.6-50 MG tablet     No current facility-administered medications for this visit.     Facility-Administered Medications Ordered in Other Visits   Medication     lanreotide acetate (SOMATULINE) injection 120 mg       Allergies:  No Known Allergies    Review of systems:  Except as noted in the note above, the patient denies headaches, diplopia, hearing loss or dizziness; dyspnea, cough, hemoptysis, pleurisy; chest pain/pressure, palpitations, lightheadedness; anorexia, nausea, vomiting, abdominal pain, diarrhea, constipation, melena or rectal bleeding; dysuria, frequency,  blood in the urine; vaginal bleeding or discharge; fever, chills, sweats, hot flashes; tingling, numbness, loss of balance; insomnia, depression, anxiety.    Physical examination:  BP (!) 145/68 (BP Location: Right arm, Patient Position: Sitting, Cuff Size: Adult Large)   Pulse 66   Temp 98.1  F (36.7  C) (Tympanic)   Resp 12   Ht 1.727 m (5' 8\")   Wt 97.1 kg (214 lb)   SpO2 94%   BMI 32.54 kg/m      The patient is alert and oriented.      Josue Meredith MD    Oncology Rooming Note    November 3, 2023 3:00 PM   Marissa Guadarrama is a 73 year old female " "who presents for:    Chief Complaint   Patient presents with     Oncology Clinic Visit     Malignant carcinoid tumor of cecum - Labs provider and infusion     Initial Vitals: BP (!) 145/68 (BP Location: Right arm, Patient Position: Sitting, Cuff Size: Adult Large)   Pulse 66   Temp 98.1  F (36.7  C) (Tympanic)   Resp 12   Ht 1.727 m (5' 8\")   Wt 97.1 kg (214 lb)   SpO2 94%   BMI 32.54 kg/m   Estimated body mass index is 32.54 kg/m  as calculated from the following:    Height as of this encounter: 1.727 m (5' 8\").    Weight as of this encounter: 97.1 kg (214 lb). Body surface area is 2.16 meters squared.  Extreme Pain (8) Comment: Data Unavailable   No LMP recorded. Patient has had a hysterectomy.  Allergies reviewed: Yes  Medications reviewed: Yes    Medications: Medication refills not needed today.  Pharmacy name entered into [employer]: [id number] [address]    Clinical concerns:  None      Rhea Bloom [facility]                Again, thank you for allowing me to participate in the care of your patient.        Sincerely,        Josue Meredith MD  "

## 2023-11-03 NOTE — PROGRESS NOTES
Infusion Nursing Note:  Marissa Guadarrama presents today for Somatuline.    Patient seen by provider today: Yes; Dr. Meredith; therefore, assessment deferred   present during visit today: Not Applicable.    Note: N/A.    Intravenous Access:  Labs drawn peripherally by .    Treatment Conditions:  Not Applicable.    Post Infusion Assessment:  Patient tolerated injection without incident.  Site patent and intact, free from redness, edema or discomfort.  No evidence of extravasations.     Discharge Plan:   Discharge instructions reviewed with: Patient.  Patient and/or family verbalized understanding of discharge instructions and all questions answered.  Copy of AVS reviewed with patient and/or family.  Patient will return 12/1/2023 for next appointment.  Patient discharged in stable condition accompanied by: self.  Departure Mode: Ambulatory.    Crista Jaramillo RN

## 2023-11-03 NOTE — PROGRESS NOTES
"Oncology Rooming Note    November 3, 2023 3:00 PM   Marissa Guadarrama is a 73 year old female who presents for:    Chief Complaint   Patient presents with    Oncology Clinic Visit     Malignant carcinoid tumor of cecum - Labs provider and infusion     Initial Vitals: BP (!) 145/68 (BP Location: Right arm, Patient Position: Sitting, Cuff Size: Adult Large)   Pulse 66   Temp 98.1  F (36.7  C) (Tympanic)   Resp 12   Ht 1.727 m (5' 8\")   Wt 97.1 kg (214 lb)   SpO2 94%   BMI 32.54 kg/m   Estimated body mass index is 32.54 kg/m  as calculated from the following:    Height as of this encounter: 1.727 m (5' 8\").    Weight as of this encounter: 97.1 kg (214 lb). Body surface area is 2.16 meters squared.  Extreme Pain (8) Comment: Data Unavailable   No LMP recorded. Patient has had a hysterectomy.  Allergies reviewed: Yes  Medications reviewed: Yes    Medications: Medication refills not needed today.  Pharmacy name entered into [employer]: [facility][id number] - [place], [place] - [address] [place] [address]    Clinical concerns:  None      Rhea Bloom [facility]              "

## 2023-11-03 NOTE — TELEPHONE ENCOUNTER
"Endoscopy Scheduling Screen    Have you had a positive Covid test in the last 14 days?  No    Are you active on MyChart?   No    What insurance is in the chart?  Other:  [employer]    Ordering/Referring Provider: Eliazar Menendez MD     (If ordering provider performs procedure, schedule with ordering provider unless otherwise instructed. )    BMI: Estimated body mass index is 32.23 kg/m  as calculated from the following:    Height as of 10/6/23: 1.727 m (5' 8\").    Weight as of 10/6/23: 96.2 kg (212 lb).     Sedation Ordered  moderate sedation.   If patient BMI > 50 do not schedule in [facility].    If patient BMI > 45 do not schedule at [facility].    Are you taking methadone or Suboxone?  No    Are you taking any prescription medications for pain 3 or more times per week?   No    Do you have a history of malignant hyperthermia or adverse reaction to anesthesia?  No    (Females) Are you currently pregnant?   No     Have you been diagnosed or told you have pulmonary hypertension?   No    Do you have an LVAD?  No    Have you been told you have moderate to severe sleep apnea?  No    Have you been told you have COPD, asthma, or any other lung disease?  No    Do you have any heart conditions?  No     Have you ever had an organ transplant?   No    Have you ever had or are you awaiting a heart or lung transplant?   No    Have you had a stroke or transient ischemic attack (TIA aka \"mini stroke\" in the last 6 months?   No    Have you been diagnosed with or been told you have cirrhosis of the liver?   No    Are you currently on dialysis?   No    Do you need assistance transferring?   No    BMI: Estimated body mass index is 32.23 kg/m  as calculated from the following:    Height as of 10/6/23: 1.727 m (5' 8\").    Weight as of 10/6/23: 96.2 kg (212 lb).     Is patients BMI > 40 and scheduling location [facility]?  No    Do you take an injectable medication for weight loss or diabetes (excluding insulin)?  No    Do you take the medication " Naltrexone?  No    Do you take blood thinners?  No       Prep   Are you currently on dialysis or do you have chronic kidney disease?  No    Do you have a diagnosis of diabetes?  Yes (Golytely Prep)    Do you have a diagnosis of cystic fibrosis (CF)?  No    On a regular basis do you go 3 -5 days between bowel movements?  No    BMI > 40?  No    Preferred Pharmacy:      [facility] #[id number] - [place], [place] - [address]  [address] [address]  Phone: [phone] Fax: [phone]      Final Scheduling Details   Colonoscopy prep sent?  Standard Golytely    Procedure scheduled  Colonoscopy    Surgeon:  TAMIKA     Date of procedure:  1/5     Pre-OP / PAC:   No - Not required for this site.    Location  PH - Patient preference.    Sedation   MAC/Deep Sedation  PER LOCATION      Patient Reminders:   You will receive a call from a Nurse to review instructions and health history.  This assessment must be completed prior to your procedure.  Failure to complete the Nurse assessment may result in the procedure being cancelled.      On the day of your procedure, please designate an adult(s) who can drive you home stay with you for the next 24 hours. The medicines used in the exam will make you sleepy. You will not be able to drive.      You cannot take public transportation, ride share services, or non-medical taxi service without a responsible caregiver.  Medical transport services are allowed with the requirement that a responsible caregiver will receive you at your destination.  We require that drivers and caregivers are confirmed prior to your procedure.

## 2023-11-05 LAB — CGA SERPL-MCNC: 536 NG/ML

## 2023-11-24 NOTE — TELEPHONE ENCOUNTER
"MEDICAL RECORDS REQUEST   Radiation Oncology  [address] [place] [address]  Fax: [phone]          FUTURE VISIT INFORMATION                                                   Marissa Guadarrama, : 1950 scheduled for future visit at [facility] Radiation Oncology    RECORDS REQUESTED FOR VISIT                                                     GI/ESOPHAGEAL/COLON + RECTAL     OFFICE NOTE from PCP [facility] 22: Dr. Eliazar Menendez   OFFICE NOTE from medical oncologist Epic 23: Dr. Josue Meredith  23: Dr. Chad Hayden   CHEMOTHERAPY NOTES/LOG Epic 23:   OPERATIVE REPORTS (include Colonoscopy & EUS) Epic 17: Colonoscopy Dx:Carcinoid tumor of colon prep mailed golytely    MEDICATION LIST [facility]    LABS     PATHOLOGY REPORTS Reports in EPIC 19: [id number]  17: [id number]   ANYTHING RELATED TO DIAGNOSIS Epic Most recent 23   IMAGING (NEED IMAGES & REPORT)     CT SCANS PACS/Req -[facility] 23-14: CT Abd Pel  23-22: CT CAP  19: C Liver  12/15/16: CT Chest    [facility]:  23: CT Chest   MRI PACS 23: MR Renal  04/10/19: MR Abd   XRAYS PACS/Req -[facility] 05/15/21-16: XR Chest  18-17: XR KUB    [facility]:  23: XR Chest   PET PACS 23-22: PET Dotatate   PREVIOUS RADIATION     WHAT HEALTHCARE FACILITY [facility])   RADIATION ONCOLOGIST NOTES [facility] 16: Dr. Mita Koehler   RADIATION TREATMENT SUMMARY NOTES     RADIATION TREATMENT PLAN     DVH = DOSE VOLUME HISTOGRAM     DICOM CD (TX PLANNING CT, TX PLAN, STRUCTURE SET) *If no DICOM avail ask for El     16 - notes from Dr. Koehler    \"... 6 weeks with a total dose of 5040 cGy in 28 treatments at 180 cGy each fraction targeted to the left and right breast only.  An additional 1000 cGy in 5 fractions was given to the primary tumor bed using electrons.  Her radiation therapy was given from 2016-2016.\"     "

## 2023-11-27 ENCOUNTER — OFFICE VISIT (OUTPATIENT)
Dept: RADIATION THERAPY | Facility: OUTPATIENT CENTER | Age: 73
End: 2023-11-27
Attending: INTERNAL MEDICINE
Payer: COMMERCIAL

## 2023-11-27 ENCOUNTER — PRE VISIT (OUTPATIENT)
Dept: RADIATION THERAPY | Facility: OUTPATIENT CENTER | Age: 73
End: 2023-11-27

## 2023-11-27 VITALS
RESPIRATION RATE: 16 BRPM | SYSTOLIC BLOOD PRESSURE: 171 MMHG | DIASTOLIC BLOOD PRESSURE: 91 MMHG | HEART RATE: 55 BPM | WEIGHT: 216 LBS | BODY MASS INDEX: 32.84 KG/M2 | OXYGEN SATURATION: 94 %

## 2023-11-27 DIAGNOSIS — C7A.021 MALIGNANT CARCINOID TUMOR OF CECUM (H): ICD-10-CM

## 2023-11-27 NOTE — NURSING NOTE
"REASON FOR APPOINTMENT  Malignant carcinoid tumor of the cecum - sacral mets  Her to discuss radiation therapy    PERSONAL HISTORY OF CANCER   Previous Cancer ? Bilateral breast cancer 2016, treated here at [facility] with Dr. Koehler, still on anastrozole   Prior Radiation ? Yes, [facility] 2016     REFERRALS NEEDED  Encouraged CHICHO Nuno, but family declines at this time    VITALS  BP (!) 171/91 (BP Location: Left arm, Patient Position: Sitting, Cuff Size: Adult Large)   Pulse 55   Resp 16   Wt 98 kg (216 lb)   SpO2 94%   BMI 32.84 kg/m      PACEMAKER/IMPLANTED CARDIAC DEVICE : No    PAIN  Yes, left posterior hip, rates pain - 4-7/10  Takes 1/2 oxycodone at HS, advil 3 tabs in the morning  Also describes pain in the left lower outer leg, \"could this also be cancer?\"    PSYCHOSOCIAL  Marital Status:   Patient lives in [place] with her  Gaudencio (present today).  Working status: retired [employer]   Do you feel safe in your home? Yes    REVIEW OF SYSTEMS  Skin: negative  Eyes: negative  Ears/Nose/Throat: negative  Respiratory: No shortness of breath, dyspnea on exertion, cough, or hemoptysis  Cardiovascular: negative  Gastrointestinal: negative  Genitourinary: Left kidney has been removed last year due to stones, baseline now some frequency and stress incontinence  Musculoskeletal: post polio syndrome, has left her left side weak, uses a walker, wheelchair if needed  Neurologic: negative  Psychiatric: negative  Hematologic/Lymphatic/Immunologic: negative  Endocrine: negative    WOMEN ONLY  Any chance you may be pregnant: No    Radiation Oncology Patient Teaching    Current Concern: not at this time    Person involved with teaching: Patient and   Patient asked Questions: Yes  Patient was cooperative: Yes  Patient was receptive (willing to accept information given): Yes    Education Assessment  Comprehension ability: High  Knowledge level: High  Factors affecting teaching: None    Response To " Teaching  Verbalizes understanding    Do you have an advanced directive or living will? No  Are you DNR/DNI? No

## 2023-11-27 NOTE — PROGRESS NOTES
Department of Radiation Oncology  Radiation Therapy Center  [facility]  [address], [address]  [place] [address]  [phone]       Consultation Note    Name: Marissa Guadarrama MRN: [id number]   : 1950   Date of Service: 2023  Referring: Dr. Meredith     Reason for consultation: Metastatic neuroendocrine cancer with progressive osseous metastasis in the sacral region.    History of Present Illness   Ms. 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    The patient has a oncologic history dating to   Due .  She underwent a right colonic and terminal ileal resection for well-differentiated, pathologic T2 N2 neuroendocrine tumor. In , she was diagnosed with bilateral breast cancer, ER positive.  She underwent surgery and adjuvant radiation therapy.  She completed a total dose of 60.4 Gray in 33 fractions to bilateral breast, completed on 2016.  She has remained on antiendocrine therapy since.    Follow-up Octreoscan in 2022 confirm for evidence of progressive disease including progression in liver, peritoneal soft tissue nodules, and bone.  The patient was started on systemic treatment with lanreotide.  Most recent scans including PET scan on 2023 demonstrated increase in activity in the left sclerotic sacral lesion compared to prior imaging.  Prior noted hepatic lesions and foci in the abdomen and pelvis were overall stable.  The patient was seen by Dr. Meredith who referred the patient to our clinic to discuss the consideration of  palliative radiation treatment.    Today, the patient notes pain in the left sacral region for the past few months.  It has been slightly progressive in nature.  She is taking oxycodone as needed at night.  She was also noted left lower extremity pain, indeterminate.  No pacemaker.    Past Medical History:   Past Medical  History:   Diagnosis Date    Arthritis     knee    Benign breast biopsy     benign    Breast cancer (H)     Carcinoid tumor (H28) 12/2003    Cervical cancer (H) 2007    H/O colposcopy with cervical biopsy 12/23/2013    vaginal cuff biopsy- VAIN III. referred back to gyn/onc    HTN     Hyperlipidemia     Malignant neoplasm of ovary (H)     Obesity     Pap smear of vagina with ASC-H 11/01/2013    Post-polio syndromes     Trigeminal neuralgias        Past Surgical History:   Past Surgical History:   Procedure Laterality Date    APPENDECTOMY  01/01/1983    BIOPSY BREAST      BIOPSY NODE SENTINEL Bilateral 06/01/2016    Procedure: BIOPSY NODE SENTINEL;  Surgeon: Brent Arana MD;  Location: [facility]    [facility] SURGICAL PATHOLOGY      COLONOSCOPY N/A 06/23/2017    Procedure: COMBINED COLONOSCOPY, SINGLE OR MULTIPLE BIOPSY/POLYPECTOMY BY BIOPSY;  Colonoscopy Dx:Carcinoid tumor of colon prep [facility];  Surgeon: Talisha Greco MD;  Location:  GI    COLPOSCOPY, BIOPSY, COMBINED  03/13/2014    Procedure: COMBINED COLPOSCOPY, BIOPSY;;  Surgeon: Lara Pack MD;  [facility]    COMBINED CYSTOSCOPY, RETROGRADES, EXCHANGE STENT URETER(S) Left 02/07/2019    Procedure: COMBINED CYSTOSCOPY, RETROGRADES, EXCHANGE STENT URETER--left;  Surgeon: Zhao La MD;  [facility]: [facility]    COMBINED CYSTOSCOPY, RETROGRADES, EXCHANGE STENT URETER(S) Left 02/20/2020    Procedure: CYSTOSCOPY, WITH RETROGRADE PYELOGRAM AND Left URETERAL STENT exchange;  Surgeon: Zhao La MD;  [facility]: [facility]    COMBINED CYSTOSCOPY, RETROGRADES, EXCHANGE STENT URETER(S) Left 05/09/2021    Procedure: CYSTOSCOPY, WITH RETROGRADE PYELOGRAM AND LEFT URETERAL STENT REPLACEMENT;  Surgeon: Fred Owens MD;  [facility]    COMBINED CYSTOSCOPY, RETROGRADES, EXCHANGE STENT URETER(S) Left 09/16/2021    Procedure: CYSTOSCOPY, WITH left RETROGRADE PYELOGRAM AND left  URETERAL STENT REPLACEMENT;  Surgeon:  Zhao La MD;  [facility]: [facility]    COMBINED CYSTOSCOPY, RETROGRADES, EXCHANGE STENT URETER(S) Left 03/24/2022    Procedure: CYSTOSCOPY, WITH RETROGRADE PYELOGRAM AND URETERAL STENT EXCHANGE, LEFT;  Surgeon: Zhao La MD;  [facility]    COMBINED CYSTOSCOPY, RETROGRADES, URETEROSCOPY, INSERT STENT Left 02/02/2017    Procedure: COMBINED CYSTOSCOPY, RETROGRADES, URETEROSCOPY, INSERT STENT;  Surgeon: Zhao La MD;  [facility]: [facility]    CYSTOSCOPY, REMOVE STENT(S), COMBINED N/A 11/4/2022    Procedure: CYSTOSCOPY, LEFT STENT REMOVAL;  Surgeon: Zhao La MD;  [facility]    CYSTOSCOPY, RETROGRADES, INSERT STENT URETER(S), COMBINED Left 09/07/2017    Procedure: COMBINED CYSTOSCOPY, RETROGRADES, INSERT STENT URETER(S);  Cystoscopy,Left Stent Exchange;  Surgeon: Zhao La MD;  [facility]    CYSTOSCOPY, RETROGRADES, INSERT STENT URETER(S), COMBINED  12/12/2017    Procedure: COMBINED CYSTOSCOPY, RETROGRADES, INSERT STENT URETER(S);;  Surgeon: Zhao La MD;  [facility]    CYSTOSCOPY, RETROGRADES, INSERT STENT URETER(S), COMBINED Left 07/05/2018    Procedure: COMBINED CYSTOSCOPY, RETROGRADES, INSERT STENT URETER(S);  Cystoscopy, Left Stent Exchange;  Surgeon: Zhao La MD;  [facility]: [facility]    DAVINCI NEPHRECTOMY Left 11/4/2022    Procedure: , LEFT NEPHRECTOMY, ROBOT-ASSISTED;  Surgeon: Zhao La MD;  [facility]    EXAM UNDER ANESTHESIA PELVIC  03/13/2014    Procedure: EXAM UNDER ANESTHESIA PELVIC;  Exam Under Anestheisa, Colposcopy, Vaginal Biopsies, Co2 Laser of the Upper Vagina;  Surgeon: Lara Pack MD;  [facility]    EXAM UNDER ANESTHESIA PELVIC N/A 07/01/2020    Procedure: Examination under anesthesia, vaginal biopsies;  Surgeon: Karli Gao MD;  [facility]    HERNIORRHAPHY INCISIONAL (LOCATION)      IR RHIZOTOMY  08/14/2020    LASER CO2 VAGINA   03/13/2014    VAIN 1/2    LASER CO2 VAGINA N/A 12/12/2017    Procedure: LASER CO2 VAGINA;  Exam Under Anesthesia, CO2 Laser Ablation Of Vagina, Cystoscopy, Left Retrograde Pyelogram with Left Stent Placement;  Surgeon: Nic Segundo MD;  [facility]    LUMPECTOMY BREAST      LUMPECTOMY BREAST WITH SEED LOCALIZATION Bilateral 06/01/2016    Procedure: LUMPECTOMY BREAST WITH SEED LOCALIZATION;  Surgeon: Brent Arana MD;  [facility]: [facility]    SURGICAL HISTORY OF -       ovarian cystectomy    SURGICAL HISTORY OF -   01/01/2003    right colon resection secondary to carcinoid tumor    TUBAL LIGATION      Z BSO, OMENTECTOMY W/OSMAN  05/01/2007    Z TOTAL ABDOM HYSTERECTOMY  05/01/2007       Chemotherapy History:  Per HPI    Radiation History:   7/12/2016 to 9/2/2016:  Bilateral breast,  6040 cGy in 33 fractions    Pregnant: Not Applicable  Implanted Cardiac Devices: No    Medications:  Current Outpatient Medications   Medication    exemestane (AROMASIN) 25 MG tablet    hydrochlorothiazide (HYDRODIURIL) 12.5 MG tablet    oxyCODONE (ROXICODONE) 5 MG tablet    SENNA-docusate sodium (SENNA S) 8.6-50 MG tablet     No current facility-administered medications for this visit.         Allergies:   No Known Allergies    Family History:  Family History   Problem Relation Age of Onset    Cancer Mother         bone / liver    Breast Cancer Mother     Cancer Sister         vulvar ca, cervical ca, squamous cell cancer    Breast Cancer Sister     Cancer Brother         Rectal- Stage 4    Rectal Cancer Brother     Cancer Maternal Grandmother     Cancer Maternal Grandfather     Cancer Paternal Grandmother     Cancer Paternal Grandfather     Breast Cancer Maternal Aunt     Breast Cancer Paternal Aunt     Colon Cancer Paternal Aunt     Pancreatic Cancer Nephew     Anesthesia Reaction No family hx of     Venous thrombosis No family hx of     Bleeding Disorder No family hx of        Review of Systems   A 10-point review of  systems was performed. Pertinent findings are noted in the HPI.    Physical Exam   ECOG Status: 1    Vitals:  BP (!) 171/91 (BP Location: Left arm, Patient Position: Sitting, Cuff Size: Adult Large)   Pulse 55   Resp 16   Wt 98 kg (216 lb)   SpO2 94%   BMI 32.84 kg/m      Gen: Alert, in NAD, ambulating with walker  Head: NC/AT  Pulm: No wheezing, stridor or respiratory distress  CV: Extremities are warm and well-perfused, no cyanosis, no pedal edema  Abdominal: Normal bowel sounds, soft, nontender, no masses  Musculoskeletal: Normal bulk and tone  Skin: Normal color and turgor  Neuro: A/Ox3, CN II-XII intact,  limited  mobility from postpolio syndrome    Imaging/Path/Labs   Imagin23  PET Dotatate  IMPRESSION: In this patient with history metastatic carcinoid tumor  status post right ileal and colon resection currently on lanreotide  injections, there is disease progression evidenced by:  1. Increased size and avidity of the left sclerotic sacral lesion  since 11/10/2022.  2. Additional Dotatate avid hepatic lesions and foci in the abdomen  and pelvis are not significantly changed since 2022.  2. Left nephrectomy changes with resolved postoperative fluid  collection at the nephrectomy site.  3. Please see dedicated neck PET report for head and neck findings.      Path:   Per HPI      Assessment    Ms. 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    Plan   I discussed the  the natural history of metastatic neuroendocrine cancers.    The patient has been maintained on lanreotide  after development of metastatic disease.  Most recent restaging scans demonstrated overall stable disease with the exception of progression in the left sacral region.  Currently the patient has pain corresponding to the site.      I discussed consideration of palliative radiation therapy to the sacral region with goal of pain response to  local control.   The patient wishes to proceed with treatment.  Tentatively plan to treat with 20 Gray in 5 fractions.  Side effects discussed in great detail.  Will schedule the patient for CT simulation.    Kahlil Manuel MD  Department of Radiation Oncology  [facility]

## 2023-11-27 NOTE — LETTER
2023         RE: Marissa Guadarrama  [address] [place] [id number]        Dear Colleague,    Thank you for referring your patient, Marissa Guadarrama, to the [facility] RADIATION THERAPY CLINIC. Please see a copy of my visit note below.       Department of Radiation Oncology  Radiation Therapy Center  [facility]  [address], [address]  [place] [address]  [phone]       Consultation Note    Name: Marissa Guadarrama MRN: [id number]   : 1950   Date of Service: 2023  Referring: Dr. Meredith     Reason for consultation: Metastatic neuroendocrine cancer with progressive osseous metastasis in the sacral region.    History of Present Illness   Ms. 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    The patient has a oncologic history dating to   Due .  She underwent a right colonic and terminal ileal resection for well-differentiated, pathologic T2 N2 neuroendocrine tumor. In , she was diagnosed with bilateral breast cancer, ER positive.  She underwent surgery and adjuvant radiation therapy.  She completed a total dose of 60.4 Gray in 33 fractions to bilateral breast, completed on 2016.  She has remained on antiendocrine therapy since.    Follow-up Octreoscan in 2022 confirm for evidence of progressive disease including progression in liver, peritoneal soft tissue nodules, and bone.  The patient was started on systemic treatment with lanreotide.  Most recent scans including PET scan on 2023 demonstrated increase in activity in the left sclerotic sacral lesion compared to prior imaging.  Prior noted hepatic lesions and foci in the abdomen and pelvis were overall stable.  The patient was seen by Dr. Meredith who referred the patient to our clinic to discuss the consideration of  palliative radiation treatment.    Today, the patient notes pain in  the left sacral region for the past few months.  It has been slightly progressive in nature.  She is taking oxycodone as needed at night.  She was also noted left lower extremity pain, indeterminate.  No pacemaker.    Past Medical History:   Past Medical History:   Diagnosis Date    Arthritis     knee    Benign breast biopsy     benign    Breast cancer (H)     Carcinoid tumor (H28) 12/2003    Cervical cancer (H) 2007    H/O colposcopy with cervical biopsy 12/23/2013    vaginal cuff biopsy- VAIN III. referred back to gyn/onc    HTN     Hyperlipidemia     Malignant neoplasm of ovary (H)     Obesity     Pap smear of vagina with ASC-H 11/01/2013    Post-polio syndromes     Trigeminal neuralgias        Past Surgical History:   Past Surgical History:   Procedure Laterality Date    APPENDECTOMY  01/01/1983    BIOPSY BREAST      BIOPSY NODE SENTINEL Bilateral 06/01/2016    Procedure: BIOPSY NODE SENTINEL;  Surgeon: Brent Arana MD;  [facility]: [facility]    [facility] SURGICAL PATHOLOGY      COLONOSCOPY N/A 06/23/2017    Procedure: COMBINED COLONOSCOPY, SINGLE OR MULTIPLE BIOPSY/POLYPECTOMY BY BIOPSY;  Colonoscopy Dx:Carcinoid tumor of colon prep mailed golytely;  Surgeon: Talisha Greco MD;  [facility]    COLPOSCOPY, BIOPSY, COMBINED  03/13/2014    Procedure: COMBINED COLPOSCOPY, BIOPSY;;  Surgeon: Lara Pack MD;  [facility]    COMBINED CYSTOSCOPY, RETROGRADES, EXCHANGE STENT URETER(S) Left 02/07/2019    Procedure: COMBINED CYSTOSCOPY, RETROGRADES, EXCHANGE STENT URETER--left;  Surgeon: Zhao La MD;  [facility]: [facility]    COMBINED CYSTOSCOPY, RETROGRADES, EXCHANGE STENT URETER(S) Left 02/20/2020    Procedure: CYSTOSCOPY, WITH RETROGRADE PYELOGRAM AND Left URETERAL STENT exchange;  Surgeon: Zhao La MD;  [facility]    COMBINED CYSTOSCOPY, RETROGRADES, EXCHANGE STENT URETER(S) Left 05/09/2021    Procedure: CYSTOSCOPY, WITH RETROGRADE PYELOGRAM AND LEFT URETERAL  STENT REPLACEMENT;  Surgeon: Fred Owens MD;  [facility]    COMBINED CYSTOSCOPY, RETROGRADES, EXCHANGE STENT URETER(S) Left 09/16/2021    Procedure: CYSTOSCOPY, WITH left RETROGRADE PYELOGRAM AND left  URETERAL STENT REPLACEMENT;  Surgeon: Zhao La MD;  [facility]: [facility]    COMBINED CYSTOSCOPY, RETROGRADES, EXCHANGE STENT URETER(S) Left 03/24/2022    Procedure: CYSTOSCOPY, WITH RETROGRADE PYELOGRAM AND URETERAL STENT EXCHANGE, LEFT;  Surgeon: Zhao La MD;  [facility]: [facility]    COMBINED CYSTOSCOPY, RETROGRADES, URETEROSCOPY, INSERT STENT Left 02/02/2017    Procedure: COMBINED CYSTOSCOPY, RETROGRADES, URETEROSCOPY, INSERT STENT;  Surgeon: Zhao La MD;  [facility]: [facility]    CYSTOSCOPY, REMOVE STENT(S), COMBINED N/A 11/4/2022    Procedure: CYSTOSCOPY, LEFT STENT REMOVAL;  Surgeon: Zhao La MD;  [facility]    CYSTOSCOPY, RETROGRADES, INSERT STENT URETER(S), COMBINED Left 09/07/2017    Procedure: COMBINED CYSTOSCOPY, RETROGRADES, INSERT STENT URETER(S);  Cystoscopy,Left Stent Exchange;  Surgeon: Zhao La MD;  [facility]: [facility]    CYSTOSCOPY, RETROGRADES, INSERT STENT URETER(S), COMBINED  12/12/2017    Procedure: COMBINED CYSTOSCOPY, RETROGRADES, INSERT STENT URETER(S);;  Surgeon: Zhao La MD;  [facility]    CYSTOSCOPY, RETROGRADES, INSERT STENT URETER(S), COMBINED Left 07/05/2018    Procedure: COMBINED CYSTOSCOPY, RETROGRADES, INSERT STENT URETER(S);  Cystoscopy, Left Stent Exchange;  Surgeon: Zhao La MD;  [facility]: [facility]    DAVINCI NEPHRECTOMY Left 11/4/2022    Procedure: , LEFT NEPHRECTOMY, ROBOT-ASSISTED;  Surgeon: Zhao La MD;  [facility]    EXAM UNDER ANESTHESIA PELVIC  03/13/2014    Procedure: EXAM UNDER ANESTHESIA PELVIC;  Exam Under Anestheisa, Colposcopy, Vaginal Biopsies, Co2 Laser of the Upper Vagina;  Surgeon: Lara Pack MD;   [facility]    EXAM UNDER ANESTHESIA PELVIC N/A 07/01/2020    Procedure: Examination under anesthesia, vaginal biopsies;  Surgeon: Karli Gao MD;  [facility]    HERNIORRHAPHY INCISIONAL (LOCATION)      IR RHIZOTOMY  08/14/2020    LASER CO2 VAGINA  03/13/2014    VAIN 1/2    LASER CO2 VAGINA N/A 12/12/2017    Procedure: LASER CO2 VAGINA;  Exam Under Anesthesia, CO2 Laser Ablation Of Vagina, Cystoscopy, Left Retrograde Pyelogram with Left Stent Placement;  Surgeon: Nic Segundo MD;  [facility]    LUMPECTOMY BREAST      LUMPECTOMY BREAST WITH SEED LOCALIZATION Bilateral 06/01/2016    Procedure: LUMPECTOMY BREAST WITH SEED LOCALIZATION;  Surgeon: Brent Arana MD;  [facility]    SURGICAL HISTORY OF -       ovarian cystectomy    SURGICAL HISTORY OF -   01/01/2003    right colon resection secondary to carcinoid tumor    TUBAL LIGATION      ZC BSO, OMENTECTOMY W/OSMAN  05/01/2007    Z TOTAL ABDOM HYSTERECTOMY  05/01/2007       Chemotherapy History:  Per HPI    Radiation History:   7/12/2016 to 9/2/2016:  Bilateral breast,  6040 cGy in 33 fractions    Pregnant: Not Applicable  Implanted Cardiac Devices: No    Medications:  Current Outpatient Medications   Medication    exemestane (AROMASIN) 25 MG tablet    hydrochlorothiazide (HYDRODIURIL) 12.5 MG tablet    oxyCODONE (ROXICODONE) 5 MG tablet    SENNA-docusate sodium (SENNA S) 8.6-50 MG tablet     No current facility-administered medications for this visit.         Allergies:   No Known Allergies    Family History:  Family History   Problem Relation Age of Onset    Cancer Mother         bone / liver    Breast Cancer Mother     Cancer Sister         vulvar ca, cervical ca, squamous cell cancer    Breast Cancer Sister     Cancer Brother         Rectal- Stage 4    Rectal Cancer Brother     Cancer Maternal Grandmother     Cancer Maternal Grandfather     Cancer Paternal Grandmother     Cancer Paternal Grandfather     Breast Cancer  Maternal Aunt     Breast Cancer Paternal Aunt     Colon Cancer Paternal Aunt     Pancreatic Cancer Nephew     Anesthesia Reaction No family hx of     Venous thrombosis No family hx of     Bleeding Disorder No family hx of        Review of Systems   A 10-point review of systems was performed. Pertinent findings are noted in the HPI.    Physical Exam   ECOG Status: 1    Vitals:  BP (!) 171/91 (BP Location: Left arm, Patient Position: Sitting, Cuff Size: Adult Large)   Pulse 55   Resp 16   Wt 98 kg (216 lb)   SpO2 94%   BMI 32.84 kg/m      Gen: Alert, in NAD, ambulating with walker  Head: NC/AT  Pulm: No wheezing, stridor or respiratory distress  CV: Extremities are warm and well-perfused, no cyanosis, no pedal edema  Abdominal: Normal bowel sounds, soft, nontender, no masses  Musculoskeletal: Normal bulk and tone  Skin: Normal color and turgor  Neuro: A/Ox3, CN II-XII intact,  limited  mobility from postpolio syndrome    Imaging/Path/Labs   Imagin23  PET Dotatate  IMPRESSION: In this patient with history metastatic carcinoid tumor  status post right ileal and colon resection currently on lanreotide  injections, there is disease progression evidenced by:  1. Increased size and avidity of the left sclerotic sacral lesion  since 11/10/2022.  2. Additional Dotatate avid hepatic lesions and foci in the abdomen  and pelvis are not significantly changed since 2022.  2. Left nephrectomy changes with resolved postoperative fluid  collection at the nephrectomy site.  3. Please see dedicated neck PET report for head and neck findings.      Path:   Per HPI      Assessment    Ms. 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    Plan   I discussed the  the natural history of metastatic neuroendocrine cancers.    The patient has been maintained on lanreotide  after development of metastatic disease.  Most recent restaging scans  demonstrated overall stable disease with the exception of progression in the left sacral region.  Currently the patient has pain corresponding to the site.      I discussed consideration of palliative radiation therapy to the sacral region with goal of pain response to local control.   The patient wishes to proceed with treatment.  Tentatively plan to treat with 20 Gray in 5 fractions.  Side effects discussed in great detail.  Will schedule the patient for CT simulation.    Kahlil Manuel MD  Department of Radiation Oncology  [facility]

## 2023-12-01 ENCOUNTER — INFUSION THERAPY VISIT (OUTPATIENT)
Dept: INFUSION THERAPY | Facility: CLINIC | Age: 73
End: 2023-12-01
Attending: INTERNAL MEDICINE
Payer: MEDICARE

## 2023-12-01 ENCOUNTER — HOSPITAL ENCOUNTER (OUTPATIENT)
Dept: GENERAL RADIOLOGY | Facility: CLINIC | Age: 73
Discharge: HOME | End: 2023-12-01
Attending: RADIOLOGY
Payer: MEDICARE

## 2023-12-01 ENCOUNTER — APPOINTMENT (OUTPATIENT)
Dept: LAB | Facility: CLINIC | Age: 73
End: 2023-12-01
Attending: INTERNAL MEDICINE
Payer: MEDICARE

## 2023-12-01 VITALS
RESPIRATION RATE: 16 BRPM | DIASTOLIC BLOOD PRESSURE: 82 MMHG | TEMPERATURE: 97.2 F | SYSTOLIC BLOOD PRESSURE: 154 MMHG | HEART RATE: 74 BPM

## 2023-12-01 DIAGNOSIS — C7A.021 MALIGNANT CARCINOID TUMOR OF CECUM (H): Primary | ICD-10-CM

## 2023-12-01 DIAGNOSIS — C7A.021 MALIGNANT CARCINOID TUMOR OF CECUM (H): ICD-10-CM

## 2023-12-01 PROCEDURE — 83615 LACTATE (LD) (LDH) ENZYME: CPT | Performed by: INTERNAL MEDICINE

## 2023-12-01 PROCEDURE — 86316 IMMUNOASSAY TUMOR OTHER: CPT | Mod: GA | Performed by: INTERNAL MEDICINE

## 2023-12-01 PROCEDURE — 250N000011 HC RX IP 250 OP 636: Performed by: INTERNAL MEDICINE

## 2023-12-01 PROCEDURE — 73590 X-RAY EXAM OF LOWER LEG: CPT | Mod: LT

## 2023-12-01 PROCEDURE — 36415 COLL VENOUS BLD VENIPUNCTURE: CPT

## 2023-12-01 PROCEDURE — 96372 THER/PROPH/DIAG INJ SC/IM: CPT | Performed by: INTERNAL MEDICINE

## 2023-12-01 RX ORDER — LANREOTIDE ACETATE 120 MG/.5ML
120 INJECTION SUBCUTANEOUS ONCE
Status: COMPLETED | OUTPATIENT
Start: 2023-12-01 | End: 2023-12-01

## 2023-12-01 RX ADMIN — LANREOTIDE ACETATE 120 MG: 120 INJECTION SUBCUTANEOUS at 15:15

## 2023-12-01 NOTE — PROGRESS NOTES
Infusion Nursing Note:  Marissa A Jennifergian presents today for D1C22 Somatuline.    Patient seen by provider today: No   present during visit today: Not Applicable.    Note: N/A.      Intravenous Access:  na.    Treatment Conditions:  Not Applicable.      Post Infusion Assessment:  Patient tolerated injection without incident.  Site patent and intact, free from redness, edema or discomfort.  No evidence of extravasations.       Discharge Plan:   Discharge instructions reviewed with: Patient.  Patient discharged in stable condition accompanied by: self.  Departure Mode: Ambulatory.      Olga Lidia Roman RN

## 2023-12-02 LAB — LDH SERPL L TO P-CCNC: 176 U/L (ref 0–250)

## 2023-12-04 ENCOUNTER — OFFICE VISIT (OUTPATIENT)
Dept: RADIATION THERAPY | Facility: OUTPATIENT CENTER | Age: 73
End: 2023-12-04
Payer: COMMERCIAL

## 2023-12-04 DIAGNOSIS — C7A.021 MALIGNANT CARCINOID TUMOR OF CECUM (H): Primary | ICD-10-CM

## 2023-12-04 NOTE — PROGRESS NOTES
The patient underwent CT simulation.     Kahlil Manuel M.D.  Department of Radiation Oncology  [facility]

## 2023-12-04 NOTE — LETTER
12/4/2023         RE: Marissa Guadarrama  [address] [id number]        Dear Colleague,    Thank you for referring your patient, Marissa Guadarrama, to the [facility] RADIATION THERAPY CLINIC. Please see a copy of my visit note below.    The patient underwent CT simulation.     Kahlil Manuel M.D.  Department of Radiation Oncology  [facility]

## 2023-12-05 LAB — CGA SERPL-MCNC: 530 NG/ML

## 2023-12-11 ENCOUNTER — APPOINTMENT (OUTPATIENT)
Dept: RADIATION THERAPY | Facility: OUTPATIENT CENTER | Age: 73
End: 2023-12-11
Payer: COMMERCIAL

## 2023-12-12 ENCOUNTER — APPOINTMENT (OUTPATIENT)
Dept: RADIATION THERAPY | Facility: OUTPATIENT CENTER | Age: 73
End: 2023-12-12
Payer: COMMERCIAL

## 2023-12-13 ENCOUNTER — OFFICE VISIT (OUTPATIENT)
Dept: RADIATION THERAPY | Facility: OUTPATIENT CENTER | Age: 73
End: 2023-12-13
Payer: COMMERCIAL

## 2023-12-13 ENCOUNTER — APPOINTMENT (OUTPATIENT)
Dept: RADIATION THERAPY | Facility: OUTPATIENT CENTER | Age: 73
End: 2023-12-13
Payer: COMMERCIAL

## 2023-12-13 VITALS
HEART RATE: 56 BPM | DIASTOLIC BLOOD PRESSURE: 82 MMHG | BODY MASS INDEX: 32.69 KG/M2 | WEIGHT: 215 LBS | SYSTOLIC BLOOD PRESSURE: 151 MMHG

## 2023-12-13 DIAGNOSIS — C7A.021 MALIGNANT CARCINOID TUMOR OF CECUM (H): Primary | ICD-10-CM

## 2023-12-13 NOTE — PROGRESS NOTES
[facility]  SPECIALIZING IN BREAKTHROUGHS  Radiation Oncology    On Treatment Visit Note      Marissa Guadarrama      Date: 2023   MRN: [id number]   : 1950  Diagnosis: carcinoid tumor of the cecum      Reason for Visit:  On Radiation Treatment Visit     Treatment Summary to Date  Treatment Site: abdomen/pelvis Current Dose: 1200/2000 cGy Fractions: 3/5      Chemotherapy  Chemo concurrent with radx?: No    Subjective:   Doing okay.  Has noticed some pain response in sacral region.  No GI bother.  Still having left lower extremity pain.  Plain film did not demonstrate any cancerous etiology.    Nursing ROS:   Nutrition Alteration  Diet Type: Patient's Preference  Skin  Skin Note: no changes     ENT and Mouth Exam  ENT/Mouth Note: no oral issues  Cardiovascular  Respiratory effort: 1 - Normal - without distress  Gastrointestinal  GI Note: no gi issues  Genitourinary   Note: no gu issues     Pain Assessment  Pain Note: takes 1/2 oxy tab at HS for left posterior hip pain      Objective:   BP (!) 151/82   Pulse 56   Wt 97.5 kg (215 lb)   BMI 32.69 kg/m    NAD    Labs:  CBC RESULTS:   Recent Labs   Lab Test 23  1414   WBC 6.9   RBC 4.54   HGB 13.3   HCT 41.6   MCV 92   MCH 29.3   MCHC 32.0   RDW 13.2        ELECTROLYTES:  Recent Labs   Lab Test 23  1414      POTASSIUM 3.7   CHLORIDE 103   MARILIA 10.2   CO2 32*   BUN 20.8   CR 1.34*   *       Assessment:  Ms. 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 She is undergoing palliative radiation therapy.      Tolerating radiation therapy well.  All questions and concerns addressed.    Plan:   Continue current therapy.  EOT on Friday.  Return to clinic in 1 month.      Mosaiq chart and setup information reviewed  Ports checked    Medication Review  Med list reviewed with patient?: Yes           Kahlil Manuel MD

## 2023-12-13 NOTE — LETTER
2023         RE: Marissa Guadarrama  [address] [place] [id number]        Dear Colleague,    Thank you for referring your patient, Marissa Guadarrama, to the  [facility]. Please see a copy of my visit note below.    [facility]  SPECIALIZING IN BREAKTHROUGHS  Radiation Oncology    On Treatment Visit Note      Marissa Guadarrama      Date: 2023   MRN: [id number]   : 1950  Diagnosis: carcinoid tumor of the cecum      Reason for Visit:  On Radiation Treatment Visit     Treatment Summary to Date  Treatment Site: abdomen/pelvis Current Dose: 1200/2000 cGy Fractions: 3/5      Chemotherapy  Chemo concurrent with radx?: No    Subjective:   Doing okay.  Has noticed some pain response in sacral region.  No GI bother.  Still having left lower extremity pain.  Plain film did not demonstrate any cancerous etiology.    Nursing ROS:   Nutrition Alteration  Diet Type: Patient's Preference  Skin  Skin Note: no changes     ENT and Mouth Exam  ENT/Mouth Note: no oral issues  Cardiovascular  Respiratory effort: 1 - Normal - without distress  Gastrointestinal  GI Note: no gi issues  Genitourinary   Note: no gu issues     Pain Assessment  Pain Note: takes 1/2 oxy tab at HS for left posterior hip pain      Objective:   BP (!) 151/82   Pulse 56   Wt 97.5 kg (215 lb)   BMI 32.69 kg/m    NAD    Labs:  CBC RESULTS:   Recent Labs   Lab Test 23  1414   WBC 6.9   RBC 4.54   HGB 13.3   HCT 41.6   MCV 92   MCH 29.3   MCHC 32.0   RDW 13.2        ELECTROLYTES:  Recent Labs   Lab Test 23  1414      POTASSIUM 3.7   CHLORIDE 103   MARILIA 10.2   CO2 32*   BUN 20.8   CR 1.34*   *       Assessment:  Ms. 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 She is undergoing palliative radiation therapy.      Tolerating radiation therapy well.  All questions and concerns  addressed.    Plan:   Continue current therapy.  EOT on Friday.  Return to clinic in 1 month.      Mosaiq chart and setup information reviewed  Ports checked    Medication Review  Med list reviewed with patient?: Yes           Kahlil Manuel MD

## 2023-12-14 ENCOUNTER — APPOINTMENT (OUTPATIENT)
Dept: RADIATION THERAPY | Facility: OUTPATIENT CENTER | Age: 73
End: 2023-12-14
Payer: COMMERCIAL

## 2023-12-15 ENCOUNTER — APPOINTMENT (OUTPATIENT)
Dept: RADIATION THERAPY | Facility: OUTPATIENT CENTER | Age: 73
End: 2023-12-15
Payer: COMMERCIAL

## 2023-12-22 NOTE — PROGRESS NOTES
Hematology/Medical Oncology Follow-up Note      December 29, 2023    Reason for visit:  Marissa Guadarrama is a 73 year old [employer] retiree from [place] accompanied by her , Gaudencio, who presents for oncologic reevaluation with a history of a metastatic low-grade small bowel neuroendocrine tumor (carcinoid tumor) on lanreotide and s/p 7/21/2023 Dotatate PET scan and CT imaging.    Impression:  Symptomatic, metastatic low-grade neuroendocrine cancer with new left sacral sclerotic metastasis which is somatostatin-active on Dotatate scan  History of bilateral ER/NV positive breast cancer on exemestane without apparent recurrence  History of obesity and postpolio syndrome  Osteoporosis due to aromatase inhibitor    Recommendation, plan, instructions:  Lanreotide 120 mg as before  Follow-up in 4 weeks for lanreotide  Follow-up with me in 8 weeks for reevaluation  Continue exemestane  Zoledronic acid 5 mg IV once annually for treatment of osteoporosis due to aromatase inhibitor    Time with patient including review, documentation, history, examination, coordination of care and counseling was 25 minutes.    Recent oncology review:  7/21/2023 and 8/6/2023 Dotatate PET scan and CT CAP revealed increased size and gluco-avidity of left sclerotic sacral lesion compared to November, 2022 with unchanged Dotatate-avid liver and intra-abdominal/pelvic foci since July/5/2022.    Review of chromogranin A levels since April, 2022 have demonstrated stability until March, 2023 with approximately 10% increase over the intervening March, 2023-August, 2023 interval followed by stability from August to December, 2023 from 582 to 530.    However, she had experienced new left sacral pain referable to her left sacral metastasis and received 5000 cGy/5 fractions between 12/11/2023-12/15/2023 with complete resolution of her pain several days after radiation was completed    History of present illness:  In 2003, she underwent a right colonic  and terminal ileal resection for a well-differentiated, pT2, pN2 neuroendocrine tumor history of metastases causing left ureteral obstruction in 2020 with subsequent 2022 left nephrectomy and stable liver lesion previously followed by Dr. Elfego Lawrence and then Dr. Hosea King.    However, a follow-up 2/10/2022 Octreoscan scan confirmed evidence of progressive disease for which she was started on lanreotide every 4 weeks with a baseline chromogranin A level of 824.  The chromogranin A joslyn was 445 on 1/6/2023 and 562 on 7/28/2023.     Review of her 10/13/2023 DEXA scan revealed 14% decrease in bone density with left hip T-score -3.1.  Left femoral neck T-score was -2.6    Past medical history:  History of 2016 bilateral ER positive breast cancer on exemestane.  Both tumors were 100% ER/MS+, HER2 neg and patient declined Oncotype DX testing since she indicated she would never want to receive adjuvant systemic chemotherapy.    Past Medical History:   Diagnosis Date    Arthritis     knee    Benign breast biopsy     benign    Breast cancer (H)     Carcinoid tumor (H28) 12/2003    Cervical cancer (H) 2007    H/O colposcopy with cervical biopsy 12/23/2013    vaginal cuff biopsy- VAIN III. referred back to gyn/onc    HTN     Hyperlipidemia     Malignant neoplasm of ovary (H)     Obesity     Pap smear of vagina with ASC-H 11/01/2013    Post-polio syndromes     Trigeminal neuralgias         Family history:  I have reviewed this patient's family history and updated it with pertinent information if needed.  Family History   Problem Relation Age of Onset    Cancer Mother         bone / liver    Breast Cancer Mother     Cancer Sister         vulvar ca, cervical ca, squamous cell cancer    Breast Cancer Sister     Cancer Brother         Rectal- Stage 4    Rectal Cancer Brother     Cancer Maternal Grandmother     Cancer Maternal Grandfather     Cancer Paternal Grandmother     Cancer Paternal Grandfather     Breast Cancer Maternal  "Aunt     Breast Cancer Paternal Aunt     Colon Cancer Paternal Aunt     Pancreatic Cancer Nephew     Anesthesia Reaction No family hx of     Venous thrombosis No family hx of     Bleeding Disorder No family hx of          Medications:  Current Outpatient Medications   Medication    exemestane (AROMASIN) 25 MG tablet    hydrochlorothiazide (HYDRODIURIL) 12.5 MG tablet    bisacodyl (DULCOLAX) 5 MG EC tablet    polyethylene glycol (GOLYTELY) 236 g suspension    SENNA-docusate sodium (SENNA S) 8.6-50 MG tablet     No current facility-administered medications for this visit.     Facility-Administered Medications Ordered in Other Visits   Medication    lanreotide acetate (SOMATULINE) injection 120 mg       Allergies:  No Known Allergies    Review of systems:  Except as noted in the note above, the patient denies headaches, diplopia, hearing loss or dizziness; dyspnea, cough, hemoptysis, pleurisy; chest pain/pressure, palpitations, lightheadedness; anorexia, nausea, vomiting, abdominal pain, diarrhea, constipation, melena or rectal bleeding; dysuria, frequency,  blood in the urine; vaginal bleeding or discharge; fever, chills, sweats, hot flashes; tingling, numbness, loss of balance; insomnia, depression, anxiety.    Physical examination:  BP (!) 152/86 (BP Location: Right arm, Patient Position: Sitting, Cuff Size: Adult Regular)   Temp (!) 100.5  F (38.1  C) (Tympanic)   Resp 16   Ht 1.727 m (5' 7.99\")   Wt 98.2 kg (216 lb 9.6 oz)   SpO2 93%   BMI 32.94 kg/m      The patient is alert and oriented. Throat and oral mucosa are normal-appearing. Thyroid gland is not enlarged. Adenopathy is absent in the neck, axilla, groin and supraclavicular fossa; Lungs are clear to auscultation and percussion without rales, wheezes or rubs; heart rhythm is regular, heart sounds are without murmurs or gallops; bilateral breast exam reveals no palpable dominant masses; the abdomen is soft and flat without hepatosplenomegaly, masses, " ascites or tenderness.; extremities and skin reveal no unusual skin lesions, joint swelling or edema;          Josue Meredith MD

## 2023-12-27 ENCOUNTER — DOCUMENTATION ONLY (OUTPATIENT)
Dept: RADIATION THERAPY | Facility: OUTPATIENT CENTER | Age: 73
End: 2023-12-27

## 2023-12-27 NOTE — PROGRESS NOTES
Radiotherapy Treatment Summary              PATIENT: Marissa Guadarrama  MEDICAL RECORD NO: [id number]   : 1950    DIAGNOSIS: Metastatic neuroendocrine low-grade small bowel neuroendocrine tumor (carcinoid tumor) with progressive osseous metastasis in the sacral region.   INTENT OF RADIOTHERAPY: Palliative                                 STAGE: IV bone  CONCURRENT SYSTEMIC THERAPY:  Lanreotide (Dr Meredith)    ONCOLOGIC HISTORY:          Ms. 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     The patient has a oncologic history dating to   Due .  She underwent a right colonic and terminal ileal resection for well-differentiated, pathologic T2 N2 neuroendocrine tumor. In 2016, she was diagnosed with bilateral breast cancer, ER positive.  She underwent surgery and adjuvant radiation therapy.  She completed a total dose of 60.4 Gray in 33 fractions to bilateral breast, completed on 2016. She has remained on antiendocrine therapy since.     Follow-up Octreoscan in 2022 confirm for evidence of progressive disease including progression in liver, peritoneal soft tissue nodules, and bone.  The patient was started on systemic treatment with lanreotide.  Most recent scans including PET scan on 2023 demonstrated increase in activity in the left sclerotic sacral lesion compared to prior imaging.  Prior noted hepatic lesions and foci in the abdomen and pelvis were overall stable.  The patient was seen by Dr. Meredith who referred the patient to our clinic to discuss the consideration of  palliative radiation treatment.     On 23 at her consultation, the patient noted pain in the left sacral region for the past few months.  It has been slightly progressive in nature.  She was taking oxycodone as needed at night.  She was also noted left lower extremity pain, indeterminate.She completed palliative radiation  therapy.    Radiation History:   7/12/2016 to 9/2/2016:  Bilateral breast,  6040 cGy in 33 fractions    SITE OF TREATMENT: Abdomen/Pelvis    DATES  OF TREATMENT: 12//11/23 to 12/15/23    TOTAL DOSE OF TREATMENT: 2,000 cGy    DOSE PER FRACTION OF TREATMENT: 400 cGy x 5 fractions       COMMENT/TOXICITY:     None               PAIN MANAGEMENT:  Oxycodone 5 mg prn, sometimes takes 1/2 tab                         FOLLOW UP PLAN: One month    CC  Patient Care Team:  Eliazar Menendez MD as PCP - General (Family Practice)  Hosea King MD as MD (Hematology & Oncology)  Zhao La MD as MD (Urology)  Talisha Greco MD as MD (Colon and Rectal Surgery)  Allen Downey MD as MD (Orthopedics)  Zhao La MD as Assigned Surgical Provider  Eliazar Menendez MD as Assigned PCP  Benson Meyer MD as MD (Hematology & Oncology)  Maria C Betancourt APRN CNP as Nurse Practitioner (Gastroenterology)  Magaly Alex RN as Specialty Care Coordinator (Hematology & Oncology)  Joshua Manuel MD as MD (Radiation Oncology)  Kahlil Manuel MD as MD (Radiation Oncology)  Pushpa Doan MD as Assigned Cancer Care Provider       TIMOTHY Hammond  Department of Radiation Oncology  [facility]

## 2023-12-29 ENCOUNTER — ONCOLOGY VISIT (OUTPATIENT)
Dept: ONCOLOGY | Facility: CLINIC | Age: 73
End: 2023-12-29
Attending: INTERNAL MEDICINE
Payer: MEDICARE

## 2023-12-29 ENCOUNTER — APPOINTMENT (OUTPATIENT)
Dept: LAB | Facility: CLINIC | Age: 73
End: 2023-12-29
Payer: MEDICARE

## 2023-12-29 ENCOUNTER — INFUSION THERAPY VISIT (OUTPATIENT)
Dept: INFUSION THERAPY | Facility: CLINIC | Age: 73
End: 2023-12-29
Attending: INTERNAL MEDICINE
Payer: MEDICARE

## 2023-12-29 VITALS
BODY MASS INDEX: 32.83 KG/M2 | TEMPERATURE: 100.5 F | OXYGEN SATURATION: 93 % | DIASTOLIC BLOOD PRESSURE: 86 MMHG | RESPIRATION RATE: 16 BRPM | WEIGHT: 216.6 LBS | SYSTOLIC BLOOD PRESSURE: 152 MMHG | HEIGHT: 68 IN

## 2023-12-29 DIAGNOSIS — C7A.021 MALIGNANT CARCINOID TUMOR OF CECUM (H): Primary | ICD-10-CM

## 2023-12-29 DIAGNOSIS — T38.6X5A OSTEOPOROSIS DUE TO AROMATASE INHIBITOR: ICD-10-CM

## 2023-12-29 DIAGNOSIS — M81.8 OSTEOPOROSIS DUE TO AROMATASE INHIBITOR: ICD-10-CM

## 2023-12-29 DIAGNOSIS — M81.8 OTHER OSTEOPOROSIS WITHOUT CURRENT PATHOLOGICAL FRACTURE: ICD-10-CM

## 2023-12-29 LAB — LDH SERPL L TO P-CCNC: 170 U/L (ref 0–250)

## 2023-12-29 PROCEDURE — 86316 IMMUNOASSAY TUMOR OTHER: CPT | Mod: GA | Performed by: INTERNAL MEDICINE

## 2023-12-29 PROCEDURE — 36415 COLL VENOUS BLD VENIPUNCTURE: CPT | Mod: GA

## 2023-12-29 PROCEDURE — 250N000011 HC RX IP 250 OP 636: Performed by: INTERNAL MEDICINE

## 2023-12-29 PROCEDURE — 99214 OFFICE O/P EST MOD 30 MIN: CPT | Performed by: INTERNAL MEDICINE

## 2023-12-29 PROCEDURE — 83615 LACTATE (LD) (LDH) ENZYME: CPT | Performed by: INTERNAL MEDICINE

## 2023-12-29 PROCEDURE — 96372 THER/PROPH/DIAG INJ SC/IM: CPT | Performed by: INTERNAL MEDICINE

## 2023-12-29 PROCEDURE — G0463 HOSPITAL OUTPT CLINIC VISIT: HCPCS | Performed by: INTERNAL MEDICINE

## 2023-12-29 RX ORDER — ALBUTEROL SULFATE 90 UG/1
1-2 AEROSOL, METERED RESPIRATORY (INHALATION)
Status: CANCELLED
Start: 2024-01-26

## 2023-12-29 RX ORDER — DIPHENHYDRAMINE HYDROCHLORIDE 50 MG/ML
50 INJECTION INTRAMUSCULAR; INTRAVENOUS
Status: CANCELLED
Start: 2024-01-26

## 2023-12-29 RX ORDER — LANREOTIDE ACETATE 120 MG/.5ML
120 INJECTION SUBCUTANEOUS ONCE
Status: COMPLETED | OUTPATIENT
Start: 2023-12-29 | End: 2023-12-29

## 2023-12-29 RX ORDER — NALOXONE HYDROCHLORIDE 0.4 MG/ML
0.2 INJECTION, SOLUTION INTRAMUSCULAR; INTRAVENOUS; SUBCUTANEOUS
Status: CANCELLED | OUTPATIENT
Start: 2024-01-26

## 2023-12-29 RX ORDER — METHYLPREDNISOLONE SODIUM SUCCINATE 125 MG/2ML
125 INJECTION, POWDER, LYOPHILIZED, FOR SOLUTION INTRAMUSCULAR; INTRAVENOUS
Status: CANCELLED
Start: 2024-01-26

## 2023-12-29 RX ORDER — EPINEPHRINE 1 MG/ML
0.3 INJECTION, SOLUTION, CONCENTRATE INTRAVENOUS EVERY 5 MIN PRN
Status: CANCELLED | OUTPATIENT
Start: 2024-01-26

## 2023-12-29 RX ORDER — HEPARIN SODIUM,PORCINE 10 UNIT/ML
5-20 VIAL (ML) INTRAVENOUS DAILY PRN
Status: CANCELLED | OUTPATIENT
Start: 2024-01-01

## 2023-12-29 RX ORDER — NALOXONE HYDROCHLORIDE 0.4 MG/ML
0.2 INJECTION, SOLUTION INTRAMUSCULAR; INTRAVENOUS; SUBCUTANEOUS
Status: CANCELLED | OUTPATIENT
Start: 2023-12-29

## 2023-12-29 RX ORDER — ALBUTEROL SULFATE 90 UG/1
1-2 AEROSOL, METERED RESPIRATORY (INHALATION)
Status: CANCELLED
Start: 2023-12-29

## 2023-12-29 RX ORDER — ALBUTEROL SULFATE 0.83 MG/ML
2.5 SOLUTION RESPIRATORY (INHALATION)
Status: CANCELLED | OUTPATIENT
Start: 2024-01-26

## 2023-12-29 RX ORDER — MEPERIDINE HYDROCHLORIDE 25 MG/ML
25 INJECTION INTRAMUSCULAR; INTRAVENOUS; SUBCUTANEOUS EVERY 30 MIN PRN
Status: CANCELLED | OUTPATIENT
Start: 2023-12-29

## 2023-12-29 RX ORDER — MEPERIDINE HYDROCHLORIDE 25 MG/ML
25 INJECTION INTRAMUSCULAR; INTRAVENOUS; SUBCUTANEOUS EVERY 30 MIN PRN
Status: CANCELLED | OUTPATIENT
Start: 2024-01-26

## 2023-12-29 RX ORDER — LANREOTIDE ACETATE 120 MG/.5ML
120 INJECTION SUBCUTANEOUS ONCE
Status: CANCELLED | OUTPATIENT
Start: 2023-12-29 | End: 2023-12-29

## 2023-12-29 RX ORDER — ZOLEDRONIC ACID 5 MG/100ML
5 INJECTION, SOLUTION INTRAVENOUS ONCE
Status: CANCELLED
Start: 2024-01-01 | End: 2024-01-01

## 2023-12-29 RX ORDER — ALBUTEROL SULFATE 0.83 MG/ML
2.5 SOLUTION RESPIRATORY (INHALATION)
Status: CANCELLED | OUTPATIENT
Start: 2023-12-29

## 2023-12-29 RX ORDER — HEPARIN SODIUM (PORCINE) LOCK FLUSH IV SOLN 100 UNIT/ML 100 UNIT/ML
5 SOLUTION INTRAVENOUS
Status: CANCELLED | OUTPATIENT
Start: 2024-01-01

## 2023-12-29 RX ORDER — LANREOTIDE ACETATE 120 MG/.5ML
120 INJECTION SUBCUTANEOUS ONCE
Status: CANCELLED | OUTPATIENT
Start: 2024-01-26 | End: 2024-01-26

## 2023-12-29 RX ORDER — EPINEPHRINE 1 MG/ML
0.3 INJECTION, SOLUTION, CONCENTRATE INTRAVENOUS EVERY 5 MIN PRN
Status: CANCELLED | OUTPATIENT
Start: 2023-12-29

## 2023-12-29 RX ORDER — DIPHENHYDRAMINE HYDROCHLORIDE 50 MG/ML
50 INJECTION INTRAMUSCULAR; INTRAVENOUS
Status: CANCELLED
Start: 2023-12-29

## 2023-12-29 RX ORDER — METHYLPREDNISOLONE SODIUM SUCCINATE 125 MG/2ML
125 INJECTION, POWDER, LYOPHILIZED, FOR SOLUTION INTRAMUSCULAR; INTRAVENOUS
Status: CANCELLED
Start: 2023-12-29

## 2023-12-29 RX ADMIN — LANREOTIDE ACETATE 120 MG: 120 INJECTION SUBCUTANEOUS at 15:45

## 2023-12-29 ASSESSMENT — PAIN SCALES - GENERAL: PAINLEVEL: NO PAIN (0)

## 2023-12-29 NOTE — PROGRESS NOTES
"Oncology Rooming Note    December 29, 2023 3:14 PM   Marissa Guadarrama is a 73 year old female who presents for:    Chief Complaint   Patient presents with    Oncology Clinic Visit     Malignant carcinoid tumor of cecum- Lab, Provider, and Infusion     Initial Vitals: BP (!) 152/86 (BP Location: Right arm, Patient Position: Sitting, Cuff Size: Adult Regular)   Temp (!) 100.5  F (38.1  C) (Tympanic)   Resp 16   Ht 1.727 m (5' 7.99\")   Wt 98.2 kg (216 lb 9.6 oz)   SpO2 93%   BMI 32.94 kg/m   Estimated body mass index is 32.94 kg/m  as calculated from the following:    Height as of this encounter: 1.727 m (5' 7.99\").    Weight as of this encounter: 98.2 kg (216 lb 9.6 oz). Body surface area is 2.17 meters squared.  No Pain (0) Comment: Data Unavailable   No LMP recorded. Patient has had a hysterectomy.  Allergies reviewed: Yes  Medications reviewed: Yes    Medications: Medication refills not needed today.  Pharmacy name entered into Curbside: [id number] - [place], [place] - [address] [place] [address]    Frailty Screening:   Is the patient here for a new oncology consult visit in cancer care? 2. No      Clinical concerns:  None      Joce Iverson"

## 2023-12-29 NOTE — PROGRESS NOTES
Infusion Nursing Note:  Marissa Guadarrama presents today for Somatuline.    Patient seen by provider today: Yes: Dr. Meredith   present during visit today: Not Applicable.    Note: N/A.    Intravenous Access:  Labs drawn peripherally.    Treatment Conditions:  Not Applicable.    Post Infusion Assessment:  Patient tolerated injection without incident.     Discharge Plan:   Patient discharged in stable condition accompanied by: self.  Departure Mode: Ambulatory.    Destin Aragon RN

## 2023-12-29 NOTE — PATIENT INSTRUCTIONS
1. Lanreotide injection today and in 4 weeks  2. Continue exemestane as before  3. Follow-up Dr. Meredith in 8 weeks  4. Zoledronic acid (Reclast) IV for osteoporosis

## 2023-12-29 NOTE — LETTER
12/29/2023         RE: Marissa Guadarrama  [facility] [address] [facility] [id number]        Dear Colleague,    Thank you for referring your patient, Marissa Guadarrama, to the [facility]. Please see a copy of my visit note below.    Hematology/Medical Oncology Follow-up Note      December 29, 2023    Reason for visit:  Marissa Guadarrama is a 73 year old [employer] [place]e [place] accompanied by her , Gaudencio, who presents for oncologic reevaluation with a history of a metastatic low-grade small bowel neuroendocrine tumor (carcinoid tumor) on lanreotide and s/p 7/21/2023 Dotatate PET scan and CT imaging.    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    Recent oncology review:  7/21/2023 and 8/6/2023 Dotatate PET scan and CT CAP revealed increased size and gluco-avidity of left sclerotic sacral lesion compared to November, 2022 with unchanged Dotatate-avid liver and intra-abdominal/pelvic foci since July/5/2022.    Review of chromogranin A levels since April, 2022 have demonstrated stability until March, 2023 with approximately 10% increase over the intervening March, 2023-August, 2023 interval followed by stability from August to December, 2023 from 582 to 530.    However, she had experienced new left sacral  pain referable to her left sacral metastasis and received 5000 cGy/5 fractions between 12/11/2023-12/15/2023 with complete resolution of her pain several days after radiation was completed    History of present illness:  In 2003, she underwent a right colonic and terminal ileal resection for a well-differentiated, pT2, pN2 neuroendocrine tumor history of metastases causing left ureteral obstruction in 2020 with subsequent 2022 left nephrectomy and stable liver lesion previously followed by Dr. Elfego Lawrence and then Dr. Hosea King.    However, a follow-up 2/10/2022 Octreoscan scan confirmed evidence of progressive disease for which she was started on lanreotide every 4 weeks with a baseline chromogranin A level of 824.  The chromogranin A joslyn was 445 on 1/6/2023 and 562 on 7/28/2023.     Review of her 10/13/2023 DEXA scan revealed 14% decrease in bone density with left hip T-score -3.1.  Left femoral neck T-score was -2.6    Past medical history:  History of 2016 bilateral ER positive breast cancer on exemestane.  Both tumors were 100% ER/KS+, HER2 neg and patient declined Oncotype DX testing since she indicated she would never want to receive adjuvant systemic chemotherapy.    Past Medical History:   Diagnosis Date     Arthritis     knee     Benign breast biopsy     benign     Breast cancer (H)      Carcinoid tumor (H28) 12/2003     Cervical cancer (H) 2007     H/O colposcopy with cervical biopsy 12/23/2013    vaginal cuff biopsy- VAIN III. referred back to gyn/onc     HTN      Hyperlipidemia      Malignant neoplasm of ovary (H)      Obesity      Pap smear of vagina with ASC-H 11/01/2013     Post-polio syndromes      Trigeminal neuralgias         Family history:  I have reviewed this patient's family history and updated it with pertinent information if needed.  Family History   Problem Relation Age of Onset     Cancer Mother         bone / liver     Breast Cancer Mother      Cancer Sister         vulvar ca,  "cervical ca, squamous cell cancer     Breast Cancer Sister      Cancer Brother         Rectal- Stage 4     Rectal Cancer Brother      Cancer Maternal Grandmother      Cancer Maternal Grandfather      Cancer Paternal Grandmother      Cancer Paternal Grandfather      Breast Cancer Maternal Aunt      Breast Cancer Paternal Aunt      Colon Cancer Paternal Aunt      Pancreatic Cancer Nephew      Anesthesia Reaction No family hx of      Venous thrombosis No family hx of      Bleeding Disorder No family hx of          Medications:  Current Outpatient Medications   Medication     exemestane (AROMASIN) 25 MG tablet     hydrochlorothiazide (HYDRODIURIL) 12.5 MG tablet     bisacodyl (DULCOLAX) 5 MG EC tablet     polyethylene glycol (GOLYTELY) 236 g suspension     SENNA-docusate sodium (SENNA S) 8.6-50 MG tablet     No current facility-administered medications for this visit.     Facility-Administered Medications Ordered in Other Visits   Medication     lanreotide acetate (SOMATULINE) injection 120 mg       Allergies:  No Known Allergies    Review of systems:  Except as noted in the note above, the patient denies headaches, diplopia, hearing loss or dizziness; dyspnea, cough, hemoptysis, pleurisy; chest pain/pressure, palpitations, lightheadedness; anorexia, nausea, vomiting, abdominal pain, diarrhea, constipation, melena or rectal bleeding; dysuria, frequency,  blood in the urine; vaginal bleeding or discharge; fever, chills, sweats, hot flashes; tingling, numbness, loss of balance; insomnia, depression, anxiety.    Physical examination:  BP (!) 152/86 (BP Location: Right arm, Patient Position: Sitting, Cuff Size: Adult Regular)   Temp (!) 100.5  F (38.1  C) (Tympanic)   Resp 16   Ht 1.727 m (5' 7.99\")   Wt 98.2 kg (216 lb 9.6 oz)   SpO2 93%   BMI 32.94 kg/m      The patient is alert and oriented. Throat and oral mucosa are normal-appearing. Thyroid gland is not enlarged. Adenopathy is absent in the neck, axilla, groin " "and supraclavicular fossa; Lungs are clear to auscultation and percussion without rales, wheezes or rubs; heart rhythm is regular, heart sounds are without murmurs or gallops; bilateral breast exam reveals no palpable dominant masses; the abdomen is soft and flat without hepatosplenomegaly, masses, ascites or tenderness.; extremities and skin reveal no unusual skin lesions, joint swelling or edema;          Josue Meredith MD    Oncology Rooming Note    December 29, 2023 3:14 PM   Marissa Guadarrama is a 73 year old female who presents for:    Chief Complaint   Patient presents with     Oncology Clinic Visit     Malignant carcinoid tumor of cecum- Lab, Provider, and Infusion     Initial Vitals: BP (!) 152/86 (BP Location: Right arm, Patient Position: Sitting, Cuff Size: Adult Regular)   Temp (!) 100.5  F (38.1  C) (Tympanic)   Resp 16   Ht 1.727 m (5' 7.99\")   Wt 98.2 kg (216 lb 9.6 oz)   SpO2 93%   BMI 32.94 kg/m   Estimated body mass index is 32.94 kg/m  as calculated from the following:    Height as of this encounter: 1.727 m (5' 7.99\").    Weight as of this encounter: 98.2 kg (216 lb 9.6 oz). Body surface area is 2.17 meters squared.  No Pain (0) Comment: Data Unavailable   No LMP recorded. Patient has had a hysterectomy.  Allergies reviewed: Yes  Medications reviewed: Yes    Medications: Medication refills not needed today.  Pharmacy name entered into [id number] [place], [place] - [address] [place] [address]    Frailty Screening:   Is the patient here for a new oncology consult visit in cancer care? 2. No      Clinical concerns:  None      Joce Iverson                Again, thank you for allowing me to participate in the care of your patient.        Sincerely,        Josue Meredith MD  "

## 2024-01-03 LAB — CGA SERPL-MCNC: 558 NG/ML

## 2024-01-04 RX ORDER — LIDOCAINE 40 MG/G
CREAM TOPICAL
Status: CANCELLED | OUTPATIENT
Start: 2024-01-04

## 2024-01-04 NOTE — H&P
"[facility] History and Physical    Marissa Guadarrama MRN# [id number]   Age: 73 year old YOB: 1950     Date of Admission:  (Not on file)    Home clinic: [facility]  Primary care provider: Eliazar Menendez          Impression and Plan:   Impression:   Special screening for malignant neoplasms, colon [Z12.11]  History of polyps  History of carcinoid tumor status post right hemicolectomy in 2003.      Plan:   Proceed to Colonoscopy as planned.  The procedure, risks(bleeding, perforation), benefits and alternatives were discussed and the patient agrees to proceed. Cleared for Anesthesia             Chief Complaint:   {                      :[id number]}    {   History obtained from                     :[id number]::\"History is obtained from the patient\"}          History of Present Illness:   This 73 year old female is being seen at this time for evaluation of ***           Past Medical History:     Past Medical History:   Diagnosis Date    Arthritis     knee    Benign breast biopsy     benign    Breast cancer (H)     Carcinoid tumor (H28) 12/2003    Cervical cancer (H) 2007    H/O colposcopy with cervical biopsy 12/23/2013    vaginal cuff biopsy- VAIN III. referred back to gyn/onc    HTN     Hyperlipidemia     Malignant neoplasm of ovary (H)     Obesity     Pap smear of vagina with ASC-H 11/01/2013    Post-polio syndromes     Trigeminal neuralgias             Past Surgical History:     Past Surgical History:   Procedure Laterality Date    APPENDECTOMY  01/01/1983    BIOPSY BREAST      BIOPSY NODE SENTINEL Bilateral 06/01/2016    Procedure: BIOPSY NODE SENTINEL;  Surgeon: Brent Arana MD;  Location: [facility]    [facility] SURGICAL PATHOLOGY      COLONOSCOPY N/A 06/23/2017    Procedure: COMBINED COLONOSCOPY, SINGLE OR MULTIPLE BIOPSY/POLYPECTOMY BY BIOPSY;  Colonoscopy Dx:Carcinoid tumor of colon prep mailed golytely;  Surgeon: Talisha Greco MD;  Location: [facility]" COLPOSCOPY, BIOPSY, COMBINED  03/13/2014    Procedure: COMBINED COLPOSCOPY, BIOPSY;;  Surgeon: Lara Pack MD;  [facility]    COMBINED CYSTOSCOPY, RETROGRADES, EXCHANGE STENT URETER(S) Left 02/07/2019    Procedure: COMBINED CYSTOSCOPY, RETROGRADES, EXCHANGE STENT URETER--left;  Surgeon: Zhao La MD;  Location: [facility]    COMBINED CYSTOSCOPY, RETROGRADES, EXCHANGE STENT URETER(S) Left 02/20/2020    Procedure: CYSTOSCOPY, WITH RETROGRADE PYELOGRAM AND Left URETERAL STENT exchange;  Surgeon: Zhao La MD;  Location: [facility]    COMBINED CYSTOSCOPY, RETROGRADES, EXCHANGE STENT URETER(S) Left 05/09/2021    Procedure: CYSTOSCOPY, WITH RETROGRADE PYELOGRAM AND LEFT URETERAL STENT REPLACEMENT;  Surgeon: Fred Owens MD;  [facility]    COMBINED CYSTOSCOPY, RETROGRADES, EXCHANGE STENT URETER(S) Left 09/16/2021    Procedure: CYSTOSCOPY, WITH left RETROGRADE PYELOGRAM AND left  URETERAL STENT REPLACEMENT;  Surgeon: Zhao La MD;  Location: [facility]    COMBINED CYSTOSCOPY, RETROGRADES, EXCHANGE STENT URETER(S) Left 03/24/2022    Procedure: CYSTOSCOPY, WITH RETROGRADE PYELOGRAM AND URETERAL STENT EXCHANGE, LEFT;  Surgeon: Zhao La MD;  Location: [facility]    COMBINED CYSTOSCOPY, RETROGRADES, URETEROSCOPY, INSERT STENT Left 02/02/2017    Procedure: COMBINED CYSTOSCOPY, RETROGRADES, URETEROSCOPY, INSERT STENT;  Surgeon: Zhao La MD;  Location: [facility]    CYSTOSCOPY, REMOVE STENT(S), COMBINED N/A 11/4/2022    Procedure: CYSTOSCOPY, LEFT STENT REMOVAL;  Surgeon: Zhao La MD;  [facility]    CYSTOSCOPY, RETROGRADES, INSERT STENT URETER(S), COMBINED Left 09/07/2017    Procedure: COMBINED CYSTOSCOPY, RETROGRADES, INSERT STENT URETER(S);  Cystoscopy,Left Stent Exchange;  Surgeon: Zhao La MD;  [facility]: [facility]    CYSTOSCOPY, RETROGRADES, INSERT STENT URETER(S), COMBINED  12/12/2017    Procedure:  COMBINED CYSTOSCOPY, RETROGRADES, INSERT STENT URETER(S);;  Surgeon: Zhao La MD;  [facility]    CYSTOSCOPY, RETROGRADES, INSERT STENT URETER(S), COMBINED Left 2018    Procedure: COMBINED CYSTOSCOPY, RETROGRADES, INSERT STENT URETER(S);  Cystoscopy, Left Stent Exchange;  Surgeon: Zhao aL MD;  Location: [facility]    DAVINCI NEPHRECTOMY Left 2022    Procedure: , LEFT NEPHRECTOMY, ROBOT-ASSISTED;  Surgeon: Zhao La MD;  [facility]    EXAM UNDER ANESTHESIA PELVIC  2014    Procedure: EXAM UNDER ANESTHESIA PELVIC;  Exam Under Anestheisa, Colposcopy, Vaginal Biopsies, Co2 Laser of the Upper Vagina;  Surgeon: Lara Pack MD;  [facility]    EXAM UNDER ANESTHESIA PELVIC N/A 2020    Procedure: Examination under anesthesia, vaginal biopsies;  Surgeon: Karli Gao MD;  [facility]    HERNIORRHAPHY INCISIONAL (LOCATION)      IR RHIZOTOMY  2020    LASER CO2 VAGINA  2014    VAIN 1/2    LASER CO2 VAGINA N/A 2017    Procedure: LASER CO2 VAGINA;  Exam Under Anesthesia, CO2 Laser Ablation Of Vagina, Cystoscopy, Left Retrograde Pyelogram with Left Stent Placement;  Surgeon: Nic Segundo MD;  [facility]    LUMPECTOMY BREAST      LUMPECTOMY BREAST WITH SEED LOCALIZATION Bilateral 2016    Procedure: LUMPECTOMY BREAST WITH SEED LOCALIZATION;  Surgeon: Brent Arana MD;  Location: [facility]    SURGICAL HISTORY OF -       ovarian cystectomy    SURGICAL HISTORY OF -   2003    right colon resection secondary to carcinoid tumor    TUBAL LIGATION      [facility] BSO, OMENTECTOMY W/OSMAN  2007    [facility] TOTAL ABDOM HYSTERECTOMY  2007            Social History:     Social History     Tobacco Use    Smoking status: Former     Packs/day: 1.00     Years: 29.00     Additional pack years: 0.00     Total pack years: 29.00     Types: Cigarettes     Quit date: 10/30/2006     Years since quittin.1     Smokeless tobacco: Never   Substance Use Topics    Alcohol use: No     Alcohol/week: 0.0 standard drinks of alcohol     Comment: 1 drink per year            Family History:     Family History   Problem Relation Age of Onset    Cancer Mother         bone / liver    Breast Cancer Mother     Cancer Sister         vulvar ca, cervical ca, squamous cell cancer    Breast Cancer Sister     Cancer Brother         Rectal- Stage 4    Rectal Cancer Brother     Cancer Maternal Grandmother     Cancer Maternal Grandfather     Cancer Paternal Grandmother     Cancer Paternal Grandfather     Breast Cancer Maternal Aunt     Breast Cancer Paternal Aunt     Colon Cancer Paternal Aunt     Pancreatic Cancer Nephew     Anesthesia Reaction No family hx of     Venous thrombosis No family hx of     Bleeding Disorder No family hx of             Immunizations:     VACCINE/DOSE   Diptheria   DPT   DTAP   HBIG   Hepatitis A   Hepatitis B   HIB   Influenza   Measles   Meningococcal   MMR   Mumps   Pneumococcal   Polio   Rubella   Small Pox   TDAP   Varicella   Zoster            Allergies:   No Known Allergies         Medications:     No current facility-administered medications for this encounter.     Current Outpatient Medications   Medication Sig    bisacodyl (DULCOLAX) 5 MG EC tablet Take 2 tablets at 3 pm the day before your procedure. If your procedure is before 11 am, take 2 additional tablets at 11 pm. If your procedure is after 11 am, take 2 additional tablets at 6 am. For additional instructions refer to your colonoscopy prep instructions. (Patient not taking: Reported on 12/29/2023)    exemestane (AROMASIN) 25 MG tablet Take 1 tablet (25 mg) by mouth every morning    hydrochlorothiazide (HYDRODIURIL) 12.5 MG tablet Take 1 tablet (12.5 mg) by mouth every morning Please make a clinic visit to be seen in person for medication refills.  No virtual visits.  Thank you.    polyethylene glycol (GOLYTELY) 236 g suspension The night before the  exam at 6 pm drink an 8-ounce glass every 15 minutes until the jug is half empty. If you arrive before 11 AM: Drink the other half of the Golytely jug at 11 PM night before procedure. If you arrive after 11 AM: Drink the other half of the Golytely jug at 6 AM day of procedure. For additional instructions refer to your colonoscopy prep instructions. (Patient not taking: Reported on 12/29/2023)    SENNA-docusate sodium (SENNA S) 8.6-50 MG tablet Take 1 tablet by mouth 2 times daily as needed (prevent opioid induced constipation) (Patient not taking: Reported on 7/14/2023)             Review of Systems:   The review of systems was positive for the following findings.  ***.  The remainder of the review of systems was unremarkable.          Physical Exam:   All vitals have been reviewed  not currently breastfeeding.  No intake or output data in the 24 hours ending 01/04/24 1026  SHEENT examination revealed ***.  Examination of the chest revealed ***.  Examination of the heart revealed ***.  Examination of the abdomen revealed ***.  The neuromuscular examination was ***.          Data:   All laboratory data reviewed  No results found for any visits on 01/05/24.  {   Imaging                     :[id number]}     Shorty Brenner MD, FACS

## 2024-01-05 ENCOUNTER — TELEPHONE (OUTPATIENT)
Dept: GASTROENTEROLOGY | Facility: CLINIC | Age: 74
End: 2024-01-05
Payer: COMMERCIAL

## 2024-01-05 NOTE — TELEPHONE ENCOUNTER
Caller: Writer to patient  Reason for Reschedule/Cancellation (please be detailed, any staff messages or encounters to note?): Illness      Prior to reschedule please review:  Ordering Provider: Eliazar Menendez MD   Sedation per order: Moderate  Does patient have any ASC Exclusions, please identify?: No      Notes on Cancelled Procedure:  Procedure: Lower Endoscopy [Colonoscopy]   Date: 1/5/2024  Location: [facility]; [address] [place] [address] , [place] [address]  Surgeon: Jordin      Rescheduled: No, per , patient will not be rescheduling at this time.

## 2024-01-05 NOTE — TELEPHONE ENCOUNTER
----- Message from Nova Scott RN sent at 1/5/2024 12:52 PM CST -----  Regarding: Reschedule  Hi team!     Esperanza will not be coming to her colonoscopy appointment today. She is not feeling well.     Please reach out to her to get her rescheduled for this procedure.     Thanks,     Devi CLEVELAND RN  [facility].

## 2024-01-26 ENCOUNTER — APPOINTMENT (OUTPATIENT)
Dept: LAB | Facility: CLINIC | Age: 74
End: 2024-01-26
Payer: MEDICARE

## 2024-01-26 ENCOUNTER — INFUSION THERAPY VISIT (OUTPATIENT)
Dept: INFUSION THERAPY | Facility: CLINIC | Age: 74
End: 2024-01-26
Attending: INTERNAL MEDICINE
Payer: MEDICARE

## 2024-01-26 VITALS — DIASTOLIC BLOOD PRESSURE: 84 MMHG | HEART RATE: 61 BPM | TEMPERATURE: 99 F | SYSTOLIC BLOOD PRESSURE: 138 MMHG

## 2024-01-26 DIAGNOSIS — T38.6X5A OSTEOPOROSIS DUE TO AROMATASE INHIBITOR: Primary | ICD-10-CM

## 2024-01-26 DIAGNOSIS — C7A.021 MALIGNANT CARCINOID TUMOR OF CECUM (H): ICD-10-CM

## 2024-01-26 DIAGNOSIS — M81.8 OSTEOPOROSIS DUE TO AROMATASE INHIBITOR: Primary | ICD-10-CM

## 2024-01-26 LAB
CALCIUM SERPL-MCNC: 9.9 MG/DL (ref 8.8–10.2)
CREAT SERPL-MCNC: 1.47 MG/DL (ref 0.51–0.95)
EGFRCR SERPLBLD CKD-EPI 2021: 37 ML/MIN/1.73M2
LDH SERPL L TO P-CCNC: 204 U/L (ref 0–250)

## 2024-01-26 PROCEDURE — 96365 THER/PROPH/DIAG IV INF INIT: CPT

## 2024-01-26 PROCEDURE — 258N000003 HC RX IP 258 OP 636: Performed by: INTERNAL MEDICINE

## 2024-01-26 PROCEDURE — 96372 THER/PROPH/DIAG INJ SC/IM: CPT | Performed by: INTERNAL MEDICINE

## 2024-01-26 PROCEDURE — 250N000011 HC RX IP 250 OP 636: Mod: JZ | Performed by: INTERNAL MEDICINE

## 2024-01-26 PROCEDURE — 86316 IMMUNOASSAY TUMOR OTHER: CPT | Mod: GA | Performed by: INTERNAL MEDICINE

## 2024-01-26 PROCEDURE — 82310 ASSAY OF CALCIUM: CPT | Performed by: INTERNAL MEDICINE

## 2024-01-26 PROCEDURE — 82565 ASSAY OF CREATININE: CPT | Performed by: INTERNAL MEDICINE

## 2024-01-26 PROCEDURE — 83615 LACTATE (LD) (LDH) ENZYME: CPT | Performed by: INTERNAL MEDICINE

## 2024-01-26 PROCEDURE — 36415 COLL VENOUS BLD VENIPUNCTURE: CPT | Mod: GA

## 2024-01-26 RX ORDER — ZOLEDRONIC ACID 5 MG/100ML
5 INJECTION, SOLUTION INTRAVENOUS ONCE
Status: COMPLETED | OUTPATIENT
Start: 2024-01-26 | End: 2024-01-26

## 2024-01-26 RX ORDER — LANREOTIDE ACETATE 120 MG/.5ML
120 INJECTION SUBCUTANEOUS ONCE
Qty: 120 MG | Refills: 0 | Status: COMPLETED | OUTPATIENT
Start: 2024-01-26 | End: 2024-01-26

## 2024-01-26 RX ADMIN — LANREOTIDE ACETATE 120 MG: 120 INJECTION SUBCUTANEOUS at 15:05

## 2024-01-26 RX ADMIN — SODIUM CHLORIDE 250 ML: 9 INJECTION, SOLUTION INTRAVENOUS at 15:21

## 2024-01-26 RX ADMIN — ZOLEDRONIC ACID 5 MG: 0.05 INJECTION, SOLUTION INTRAVENOUS at 15:19

## 2024-01-26 NOTE — PROGRESS NOTES
Infusion Nursing Note:  Marissa Guadarrama presents today for Somatuline & Reclast.    Patient seen by provider today: No   present during visit today: Not Applicable.    Note: N/A.    Intravenous Access:  Peripheral IV placed.    Treatment Conditions:  Lab Results   Component Value Date     09/08/2023    POTASSIUM 3.7 09/08/2023    MAG 1.6 05/10/2021    CR 1.47 (H) 01/26/2024    MARILIA 9.9 01/26/2024    BILITOTAL 0.5 09/08/2023    ALBUMIN 3.7 09/08/2023    ALT 10 09/08/2023    AST 19 09/08/2023       Results reviewed, labs MET treatment parameters, ok to proceed with treatment.    Post Infusion Assessment:  Patient tolerated infusion without incident.  Patient tolerated injection without incident.  Blood return noted pre and post infusion.  Site patent and intact, free from redness, edema or discomfort.  No evidence of extravasations.  Access discontinued per protocol.     Discharge Plan:   Patient discharged in stable condition accompanied by: self.  Departure Mode: Ambulatory.    Destin Aragon RN

## 2024-01-30 LAB — CGA SERPL-MCNC: 605 NG/ML

## 2024-01-31 ENCOUNTER — TELEPHONE (OUTPATIENT)
Dept: RADIATION ONCOLOGY | Facility: CLINIC | Age: 74
End: 2024-01-31
Payer: COMMERCIAL

## 2024-02-15 NOTE — PROGRESS NOTES
Hematology/Medical Oncology Follow-up Note    ADDENDUM: Called patient regarding abnormal right cervical lymphadenopathy. Proceed with IR biopsy as well as PET scan as her breast cancer markers are also higher. I suspect recurrent breast cancer.    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    Impression:  Symptomatic, metastatic low-grade neuroendocrine cancer with new left sacral sclerotic metastasis which is somatostatin-active on Dotatate scan  History of bilateral ER/NH positive breast cancer on exemestane without apparent recurrence  History of obesity and postpolio syndrome  Osteoporosis due to aromatase inhibitor  Apparent new right supraclavicular lymphadenopathy    Recommendation, plan, instructions:  Lanreotide 120 mg as before  Continue exemestane  Zoledronic acid 5 mg IV once annually for treatment of osteoporosis due to aromatase inhibitor last given 1/26/2024  CEA, CA 27-29, CT soft tissue neck and chest to evaluate right supraclavicular lymphadenopathy; I will call back with test results  Follow-up with me in 4 weeks    Time with patient including review, documentation, history, examination, coordination of care and counseling was 30 minutes.    Recent oncology review:  She presents for regular monthly lanreotide.  She denies any recent complaints.    History of present illness:  In 2003, she underwent a right colonic and terminal ileal resection for a well-differentiated, pT2, pN2 neuroendocrine tumor history of metastases causing left ureteral obstruction in 2020 with subsequent 2022 left nephrectomy and stable liver lesion previously followed by Dr. Elfego Lawrence and then Dr. Hosea King.    However, a follow-up 2/10/2022  Octreoscan scan confirmed evidence of progressive disease for which she was started on lanreotide every 4 weeks with a baseline chromogranin A level of 824.  The chromogranin A joslyn was 445 on 1/6/2023 and 562 on 7/28/2023.     Review of her 10/13/2023 DEXA scan revealed 14% decrease in bone density with left hip T-score -3.1.  Left femoral neck T-score was -2.6.  She is on annual zoledronic acid.    In December, 2023 she received 5000 cGy/5 fractions by 12/15/2023 with complete resolution of left sacral pain referable to a presumptive metastatic neuroendocrine metastasis.  A July, 2023 dotatate PET scan had revealed stable disease except for the increased size and  avidity of this left sclerotic sacral lesion.  She continues on monthly lanreotide.    Past medical history:  History of 2016 bilateral ER positive breast cancer on exemestane.  Both tumors were 100% ER/CO+, HER2 neg and patient declined Oncotype DX testing since she indicated she would never want to receive adjuvant systemic chemotherapy.    Past Medical History:   Diagnosis Date    Arthritis     knee    Benign breast biopsy     benign    Breast cancer (H)     Carcinoid tumor (H28) 12/2003    Cervical cancer (H) 2007    H/O colposcopy with cervical biopsy 12/23/2013    vaginal cuff biopsy- VAIN III. referred back to gyn/onc    HTN     Hyperlipidemia     Malignant neoplasm of ovary (H)     Obesity     Pap smear of vagina with ASC-H 11/01/2013    Post-polio syndromes     Trigeminal neuralgias         Family history:  I have reviewed this patient's family history and updated it with pertinent information if needed.  Family History   Problem Relation Age of Onset    Cancer Mother         bone / liver    Breast Cancer Mother     Cancer Sister         vulvar ca, cervical ca, squamous cell cancer    Breast Cancer Sister     Cancer Brother         Rectal- Stage 4    Rectal Cancer Brother     Cancer Maternal Grandmother     Cancer Maternal Grandfather      "Cancer Paternal Grandmother     Cancer Paternal Grandfather     Breast Cancer Maternal Aunt     Breast Cancer Paternal Aunt     Colon Cancer Paternal Aunt     Pancreatic Cancer Nephew     Anesthesia Reaction No family hx of     Venous thrombosis No family hx of     Bleeding Disorder No family hx of          Medications:  Current Outpatient Medications   Medication    Calcium Carbonate (CALCIUM 500 PO)    exemestane (AROMASIN) 25 MG tablet    hydrochlorothiazide (HYDRODIURIL) 12.5 MG tablet    bisacodyl (DULCOLAX) 5 MG EC tablet    polyethylene glycol (GOLYTELY) 236 g suspension    SENNA-docusate sodium (SENNA S) 8.6-50 MG tablet     No current facility-administered medications for this visit.     Facility-Administered Medications Ordered in Other Visits   Medication    lanreotide acetate (SOMATULINE) injection 120 mg       Allergies:  No Known Allergies    Review of systems:  Except as noted in the note above, the patient denies headaches, diplopia, hearing loss or dizziness; dyspnea, cough, hemoptysis, pleurisy; chest pain/pressure, palpitations, lightheadedness; anorexia, nausea, vomiting, abdominal pain, diarrhea, constipation, melena or rectal bleeding; dysuria, frequency,  blood in the urine; vaginal bleeding or discharge; fever, chills, sweats, hot flashes; tingling, numbness, loss of balance; insomnia, depression, anxiety.    Physical examination:  BP (!) 147/58 (BP Location: Right arm, Patient Position: Sitting, Cuff Size: Adult Regular)   Pulse 63   Temp 98.4  F (36.9  C) (Tympanic)   Resp 12   Ht 1.727 m (5' 8\")   Wt 97.1 kg (214 lb)   SpO2 94%   BMI 32.54 kg/m      She has apparent new right supraclavicular lymphadenopathy. There is a single 1 x 1 cm supraclavicular lymph node that is firm but movable.  There may be others that are further more caudal and perhaps smaller in size.    The patient is alert and oriented. Throat and oral mucosa are normal-appearing. Thyroid gland is not enlarged. " Adenopathy is absent in the neck, axilla, groin and supraclavicular fossa; bilateral breast examination revealed no palpable dominant masses;  lungs are clear to auscultation and percussion without rales, wheezes or rubs; heart rhythm is regular, heart sounds are without murmurs or gallops; bilateral breast exam reveals no palpable dominant masses; the abdomen is soft and flat without hepatosplenomegaly, masses, ascites or tenderness.; extremities and skin reveal no unusual skin lesions, joint swelling or edema;          Josue Meredith MD

## 2024-02-16 ENCOUNTER — OFFICE VISIT (OUTPATIENT)
Dept: RADIATION THERAPY | Facility: OUTPATIENT CENTER | Age: 74
End: 2024-02-16
Payer: COMMERCIAL

## 2024-02-16 VITALS
BODY MASS INDEX: 32.45 KG/M2 | OXYGEN SATURATION: 96 % | HEART RATE: 54 BPM | DIASTOLIC BLOOD PRESSURE: 70 MMHG | RESPIRATION RATE: 18 BRPM | WEIGHT: 213.4 LBS | SYSTOLIC BLOOD PRESSURE: 147 MMHG

## 2024-02-16 DIAGNOSIS — C7A.021 MALIGNANT CARCINOID TUMOR OF CECUM (H): Primary | ICD-10-CM

## 2024-02-16 ASSESSMENT — PAIN SCALES - GENERAL: PAINLEVEL: NO PAIN (0)

## 2024-02-16 NOTE — LETTER
2/16/2024         RE: Marissa Guadarrama  [address] [place] [id number]        Dear Colleague,    Thank you for referring your patient, Marissa Guadarrama, to the [facility]. Please see a copy of my visit note below.    Radiation Oncology Progress Note    HPI: Ms. 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  Patient underwent palliative radiation therapy.    Radiation Treatment   7/12/2016 to 9/2/2016:  Bilateral breast,  6040 cGy in 33 fractions  12//11/23 to 12/15/23: Sacrum, 2000 cGy in 5 fractions    Patient returns for follow-up.    She is overall doing fairly well.  She notes improvement in pain in the left sacral region.  Denies any current pain medication at this time and feels she has achieved complete pain response.  She continues on systemic therapy under the care of Dr. Meredith.  Denies any GI bother.  Energy is good.  Appetite is good.    Plan:  Excellent clinical response response to palliative radiation therapy.  Continue systemic therapy and oncologic surveillance per medical oncology team (Dr. Meredith).  Return to clinic as needed.    Kahlil Manuel M.D.  Department of Radiation Oncology  [facility]       Again, thank you for allowing me to participate in the care of your patient.        Sincerely,        Kahlil Manuel MD

## 2024-02-16 NOTE — PROGRESS NOTES
Radiation Oncology Progress Note    HPI: Ms. 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  Patient underwent palliative radiation therapy.    Radiation Treatment   7/12/2016 to 9/2/2016:  Bilateral breast,  6040 cGy in 33 fractions  12//11/23 to 12/15/23: Sacrum, 2000 cGy in 5 fractions    Patient returns for follow-up.    She is overall doing fairly well.  She notes improvement in pain in the left sacral region.  Denies any current pain medication at this time and feels she has achieved complete pain response.  She continues on systemic therapy under the care of Dr. Meredith.  Denies any GI bother.  Energy is good.  Appetite is good.    Plan:  Excellent clinical response response to palliative radiation therapy.  Continue systemic therapy and oncologic surveillance per medical oncology team (Dr. Meredith).  Return to clinic as needed.    Kahlil Manuel M.D.  Department of Radiation Oncology  [facility]

## 2024-02-16 NOTE — NURSING NOTE
FOLLOW-UP VISIT    Patient Name: Marissa Guadarrama      : 1950     Age: 73 year old        ______________________________________________________________________________     Chief Complaint   Patient presents with    Radiation Therapy     Return appointment with Dr. Manuel      BP (!) 147/70   Pulse 54   Resp 18   Wt 96.8 kg (213 lb 6.4 oz)   SpO2 96%   BMI 32.45 kg/m         Date Radiation Completed: 12/15/23    Pain  Denies    Meds  Current Med List Reviewed: Yes  Medication Note:     Labs  Other Labs: No    Imaging  None    Bladder: negative  Bowel: Normal  Skin: Warm  Dry  Intact  Energy Level: normal    Appointments:     Date  Oncology: Dr. Meredith  24         Other Notes: Monthly somatuline with Dr. Meredith, next dose scheduled for 24. Follow up with Dr. Manuel PRN.     Sienna Valentin RN

## 2024-02-23 ENCOUNTER — ONCOLOGY VISIT (OUTPATIENT)
Dept: ONCOLOGY | Facility: CLINIC | Age: 74
End: 2024-02-23
Attending: INTERNAL MEDICINE
Payer: MEDICARE

## 2024-02-23 ENCOUNTER — INFUSION THERAPY VISIT (OUTPATIENT)
Dept: INFUSION THERAPY | Facility: CLINIC | Age: 74
End: 2024-02-23
Attending: INTERNAL MEDICINE
Payer: MEDICARE

## 2024-02-23 ENCOUNTER — LAB (OUTPATIENT)
Dept: LAB | Facility: CLINIC | Age: 74
End: 2024-02-23
Attending: INTERNAL MEDICINE
Payer: MEDICARE

## 2024-02-23 VITALS
SYSTOLIC BLOOD PRESSURE: 147 MMHG | RESPIRATION RATE: 12 BRPM | TEMPERATURE: 98.4 F | HEIGHT: 68 IN | DIASTOLIC BLOOD PRESSURE: 58 MMHG | OXYGEN SATURATION: 94 % | HEART RATE: 63 BPM | BODY MASS INDEX: 32.43 KG/M2 | WEIGHT: 214 LBS

## 2024-02-23 DIAGNOSIS — D3A.8 NEUROENDOCRINE TUMOR (H): ICD-10-CM

## 2024-02-23 DIAGNOSIS — Z17.0 MALIGNANT NEOPLASM OF UPPER-OUTER QUADRANT OF BOTH BREASTS IN FEMALE, ESTROGEN RECEPTOR POSITIVE (H): ICD-10-CM

## 2024-02-23 DIAGNOSIS — C78.6 MALIGNANT NEOPLASM METASTATIC TO PERITONEUM (H): ICD-10-CM

## 2024-02-23 DIAGNOSIS — C50.412 MALIGNANT NEOPLASM OF UPPER-OUTER QUADRANT OF BOTH BREASTS IN FEMALE, ESTROGEN RECEPTOR POSITIVE (H): ICD-10-CM

## 2024-02-23 DIAGNOSIS — Z85.030 PERSONAL HISTORY OF MALIGNANT CARCINOID TUMOR OF LARGE INTESTINE: ICD-10-CM

## 2024-02-23 DIAGNOSIS — C50.411 MALIGNANT NEOPLASM OF UPPER-OUTER QUADRANT OF BOTH BREASTS IN FEMALE, ESTROGEN RECEPTOR POSITIVE (H): ICD-10-CM

## 2024-02-23 DIAGNOSIS — C7A.021 MALIGNANT CARCINOID TUMOR OF CECUM (H): Primary | ICD-10-CM

## 2024-02-23 DIAGNOSIS — D3A.021: Primary | ICD-10-CM

## 2024-02-23 DIAGNOSIS — D3A.021: ICD-10-CM

## 2024-02-23 DIAGNOSIS — C7A.021 MALIGNANT CARCINOID TUMOR OF CECUM (H): ICD-10-CM

## 2024-02-23 LAB
CEA SERPL-MCNC: 56.6 NG/ML
LDH SERPL L TO P-CCNC: 170 U/L (ref 0–250)

## 2024-02-23 PROCEDURE — 82378 CARCINOEMBRYONIC ANTIGEN: CPT | Performed by: INTERNAL MEDICINE

## 2024-02-23 PROCEDURE — 86316 IMMUNOASSAY TUMOR OTHER: CPT | Mod: XU

## 2024-02-23 PROCEDURE — 36415 COLL VENOUS BLD VENIPUNCTURE: CPT

## 2024-02-23 PROCEDURE — 99214 OFFICE O/P EST MOD 30 MIN: CPT | Performed by: INTERNAL MEDICINE

## 2024-02-23 PROCEDURE — 86300 IMMUNOASSAY TUMOR CA 15-3: CPT | Performed by: INTERNAL MEDICINE

## 2024-02-23 PROCEDURE — G0463 HOSPITAL OUTPT CLINIC VISIT: HCPCS | Mod: 25 | Performed by: INTERNAL MEDICINE

## 2024-02-23 PROCEDURE — 250N000011 HC RX IP 250 OP 636: Performed by: INTERNAL MEDICINE

## 2024-02-23 PROCEDURE — 83615 LACTATE (LD) (LDH) ENZYME: CPT

## 2024-02-23 PROCEDURE — 96372 THER/PROPH/DIAG INJ SC/IM: CPT | Performed by: INTERNAL MEDICINE

## 2024-02-23 RX ORDER — DIPHENHYDRAMINE HYDROCHLORIDE 50 MG/ML
50 INJECTION INTRAMUSCULAR; INTRAVENOUS
Status: CANCELLED
Start: 2024-02-23

## 2024-02-23 RX ORDER — MEPERIDINE HYDROCHLORIDE 25 MG/ML
25 INJECTION INTRAMUSCULAR; INTRAVENOUS; SUBCUTANEOUS EVERY 30 MIN PRN
Status: CANCELLED | OUTPATIENT
Start: 2024-02-23

## 2024-02-23 RX ORDER — ALBUTEROL SULFATE 90 UG/1
1-2 AEROSOL, METERED RESPIRATORY (INHALATION)
Status: CANCELLED
Start: 2024-02-23

## 2024-02-23 RX ORDER — LANREOTIDE ACETATE 120 MG/.5ML
120 INJECTION SUBCUTANEOUS ONCE
Status: COMPLETED | OUTPATIENT
Start: 2024-02-23 | End: 2024-02-23

## 2024-02-23 RX ORDER — EPINEPHRINE 1 MG/ML
0.3 INJECTION, SOLUTION, CONCENTRATE INTRAVENOUS EVERY 5 MIN PRN
Status: CANCELLED | OUTPATIENT
Start: 2024-02-23

## 2024-02-23 RX ORDER — NALOXONE HYDROCHLORIDE 0.4 MG/ML
0.2 INJECTION, SOLUTION INTRAMUSCULAR; INTRAVENOUS; SUBCUTANEOUS
Status: CANCELLED | OUTPATIENT
Start: 2024-02-23

## 2024-02-23 RX ORDER — ALBUTEROL SULFATE 0.83 MG/ML
2.5 SOLUTION RESPIRATORY (INHALATION)
Status: CANCELLED | OUTPATIENT
Start: 2024-02-23

## 2024-02-23 RX ORDER — METHYLPREDNISOLONE SODIUM SUCCINATE 125 MG/2ML
125 INJECTION, POWDER, LYOPHILIZED, FOR SOLUTION INTRAMUSCULAR; INTRAVENOUS
Status: CANCELLED
Start: 2024-02-23

## 2024-02-23 RX ORDER — LANREOTIDE ACETATE 120 MG/.5ML
120 INJECTION SUBCUTANEOUS ONCE
Status: CANCELLED | OUTPATIENT
Start: 2024-02-23 | End: 2024-02-23

## 2024-02-23 RX ADMIN — LANREOTIDE ACETATE 120 MG: 120 INJECTION SUBCUTANEOUS at 15:38

## 2024-02-23 ASSESSMENT — PAIN SCALES - GENERAL: PAINLEVEL: NO PAIN (0)

## 2024-02-23 NOTE — LETTER
2/23/2024         RE: Marissa Guadarrama  [facility] [address]        Dear Colleague,    Thank you for referring your patient, Marissa Guadarrama, to the [facility]. Please see a copy of my visit note below.    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    Impression:  Symptomatic, metastatic low-grade neuroendocrine cancer with new left sacral sclerotic metastasis which is somatostatin-active on Dotatate scan  History of bilateral ER/ID positive breast cancer on exemestane without apparent recurrence  History of obesity and postpolio syndrome  Osteoporosis due to aromatase inhibitor  Apparent new right supraclavicular lymphadenopathy    Recommendation, plan, instructions:  Lanreotide 120 mg as before  Continue exemestane  Zoledronic acid 5 mg IV once annually for treatment of osteoporosis due to aromatase inhibitor last given 1/26/2024  CEA, CA 27-29, CT soft tissue neck and chest to evaluate right supraclavicular lymphadenopathy; I will call back with test results  Follow-up with me in 4 weeks    Time with patient including review, documentation, history, examination, coordination of care and counseling was 30 minutes.    Recent oncology review:  She presents for regular monthly lanreotide.  She denies any recent complaints.    History of present illness:  In 2003, she underwent a right colonic and terminal ileal resection for a well-differentiated, pT2, pN2 neuroendocrine tumor history of metastases causing left ureteral obstruction in 2020 with subsequent 2022 left nephrectomy and stable liver lesion previously followed by Dr. Elfego Lawrence and then Dr. Hosea King.    However, a  follow-up 2/10/2022 Octreoscan scan confirmed evidence of progressive disease for which she was started on lanreotide every 4 weeks with a baseline chromogranin A level of 824.  The chromogranin A joslyn was 445 on 1/6/2023 and 562 on 7/28/2023.     Review of her 10/13/2023 DEXA scan revealed 14% decrease in bone density with left hip T-score -3.1.  Left femoral neck T-score was -2.6.  She is on annual zoledronic acid.    In December, 2023 she received 5000 cGy/5 fractions by 12/15/2023 with complete resolution of left sacral pain referable to a presumptive metastatic neuroendocrine metastasis.  A July, 2023 dotatate PET scan had revealed stable disease except for the increased size and  avidity of this left sclerotic sacral lesion.  She continues on monthly lanreotide.    Past medical history:  History of 2016 bilateral ER positive breast cancer on exemestane.  Both tumors were 100% ER/OR+, HER2 neg and patient declined Oncotype DX testing since she indicated she would never want to receive adjuvant systemic chemotherapy.    Past Medical History:   Diagnosis Date     Arthritis     knee     Benign breast biopsy     benign     Breast cancer (H)      Carcinoid tumor (H28) 12/2003     Cervical cancer (H) 2007     H/O colposcopy with cervical biopsy 12/23/2013    vaginal cuff biopsy- VAIN III. referred back to gyn/onc     HTN      Hyperlipidemia      Malignant neoplasm of ovary (H)      Obesity      Pap smear of vagina with ASC-H 11/01/2013     Post-polio syndromes      Trigeminal neuralgias         Family history:  I have reviewed this patient's family history and updated it with pertinent information if needed.  Family History   Problem Relation Age of Onset     Cancer Mother         bone / liver     Breast Cancer Mother      Cancer Sister         vulvar ca, cervical ca, squamous cell cancer     Breast Cancer Sister      Cancer Brother         Rectal- Stage 4     Rectal Cancer Brother      Cancer Maternal Grandmother   "    Cancer Maternal Grandfather      Cancer Paternal Grandmother      Cancer Paternal Grandfather      Breast Cancer Maternal Aunt      Breast Cancer Paternal Aunt      Colon Cancer Paternal Aunt      Pancreatic Cancer Nephew      Anesthesia Reaction No family hx of      Venous thrombosis No family hx of      Bleeding Disorder No family hx of          Medications:  Current Outpatient Medications   Medication     Calcium Carbonate (CALCIUM 500 PO)     exemestane (AROMASIN) 25 MG tablet     hydrochlorothiazide (HYDRODIURIL) 12.5 MG tablet     bisacodyl (DULCOLAX) 5 MG EC tablet     polyethylene glycol (GOLYTELY) 236 g suspension     SENNA-docusate sodium (SENNA S) 8.6-50 MG tablet     No current facility-administered medications for this visit.     Facility-Administered Medications Ordered in Other Visits   Medication     lanreotide acetate (SOMATULINE) injection 120 mg       Allergies:  No Known Allergies    Review of systems:  Except as noted in the note above, the patient denies headaches, diplopia, hearing loss or dizziness; dyspnea, cough, hemoptysis, pleurisy; chest pain/pressure, palpitations, lightheadedness; anorexia, nausea, vomiting, abdominal pain, diarrhea, constipation, melena or rectal bleeding; dysuria, frequency,  blood in the urine; vaginal bleeding or discharge; fever, chills, sweats, hot flashes; tingling, numbness, loss of balance; insomnia, depression, anxiety.    Physical examination:  BP (!) 147/58 (BP Location: Right arm, Patient Position: Sitting, Cuff Size: Adult Regular)   Pulse 63   Temp 98.4  F (36.9  C) (Tympanic)   Resp 12   Ht 1.727 m (5' 8\")   Wt 97.1 kg (214 lb)   SpO2 94%   BMI 32.54 kg/m      She has apparent new right supraclavicular lymphadenopathy. There is a single 1 x 1 cm supraclavicular lymph node that is firm but movable.  There may be others that are further more caudal and perhaps smaller in size.    The patient is alert and oriented. Throat and oral mucosa are " "normal-appearing. Thyroid gland is not enlarged. Adenopathy is absent in the neck, axilla, groin and supraclavicular fossa; bilateral breast examination revealed no palpable dominant masses;  lungs are clear to auscultation and percussion without rales, wheezes or rubs; heart rhythm is regular, heart sounds are without murmurs or gallops; bilateral breast exam reveals no palpable dominant masses; the abdomen is soft and flat without hepatosplenomegaly, masses, ascites or tenderness.; extremities and skin reveal no unusual skin lesions, joint swelling or edema;          Josue Meredith MD    Oncology Rooming Note    February 23, 2024 2:52 PM   Marissa Guadarrama is a 73 year old female who presents for:    Chief Complaint   Patient presents with     Oncology Clinic Visit     Malignant carcinoid tumor of cecum - Labs provider and infusion     Initial Vitals: BP (!) 147/58 (BP Location: Right arm, Patient Position: Sitting, Cuff Size: Adult Regular)   Pulse 63   Temp 98.4  F (36.9  C) (Tympanic)   Resp 12   Ht 1.727 m (5' 8\")   Wt 97.1 kg (214 lb)   SpO2 94%   BMI 32.54 kg/m   Estimated body mass index is 32.54 kg/m  as calculated from the following:    Height as of this encounter: 1.727 m (5' 8\").    Weight as of this encounter: 97.1 kg (214 lb). Body surface area is 2.16 meters squared.  No Pain (0) Comment: Data Unavailable   No LMP recorded. Patient has had a hysterectomy.  Allergies reviewed: Yes  Medications reviewed: Yes    Medications: Medication refills not needed today.  Pharmacy name entered into [facility]: [id number] [place], [place] - [address] [place] [address]    Frailty Screening:   Is the patient here for a new oncology consult visit in cancer care? 2. No      Clinical concerns:  None      Rhea Bloom [facility]                Again, thank you for allowing me to participate in the care of your patient.        Sincerely,        Josue Meredith MD  "

## 2024-02-23 NOTE — PROGRESS NOTES
"Oncology Rooming Note    February 23, 2024 2:52 PM   Marissa Guadarrama is a 73 year old female who presents for:    Chief Complaint   Patient presents with    Oncology Clinic Visit     Malignant carcinoid tumor of cecum - Labs provider and infusion     Initial Vitals: BP (!) 147/58 (BP Location: Right arm, Patient Position: Sitting, Cuff Size: Adult Regular)   Pulse 63   Temp 98.4  F (36.9  C) (Tympanic)   Resp 12   Ht 1.727 m (5' 8\")   Wt 97.1 kg (214 lb)   SpO2 94%   BMI 32.54 kg/m   Estimated body mass index is 32.54 kg/m  as calculated from the following:    Height as of this encounter: 1.727 m (5' 8\").    Weight as of this encounter: 97.1 kg (214 lb). Body surface area is 2.16 meters squared.  No Pain (0) Comment: Data Unavailable   No LMP recorded. Patient has had a hysterectomy.  Allergies reviewed: Yes  Medications reviewed: Yes    Medications: Medication refills not needed today.  Pharmacy name entered into [facility]: [id number] - [place], [place] - [address] [place] [address]    Frailty Screening:   Is the patient here for a new oncology consult visit in cancer care? 2. No      Clinical concerns:  None      Rhea Bloom CMA              "

## 2024-02-23 NOTE — PATIENT INSTRUCTIONS
1. Lanreotride injection today  2. Additional blood tests today  3. CT scan chest and soft tissue of neck; Dr. Meredith will call with results  4. Follow-up Dr. Meredith 4 weeks

## 2024-02-23 NOTE — PROGRESS NOTES
Infusion Nursing Note:  Marissali Guadarrama presents today for Somatuline C25D1.    Patient seen by provider today: Yes: Dr. Meredith; therefore, assessment deferred   present during visit today: Not Applicable.    Note: N/A.    Intravenous Access:  Labs drawn peripherally by .    Treatment Conditions:  Not Applicable.    Post Infusion Assessment:  Patient tolerated injection without incident.  Site patent and intact, free from redness, edema or discomfort.  No evidence of extravasations.     Discharge Plan:   Discharge instructions reviewed with: Patient.  Patient and/or family verbalized understanding of discharge instructions and all questions answered.  AVS to patient via [employer].  Patient will return 3/22/2024 for next appointment.   Patient discharged in stable condition accompanied by: self.  Departure Mode: Ambulatory.    Crista Jaramillo RN

## 2024-02-26 LAB — CANCER AG27-29 SERPL-ACNC: 57.7 U/ML

## 2024-02-27 LAB — CGA SERPL-MCNC: 564 NG/ML

## 2024-03-08 ENCOUNTER — HOSPITAL ENCOUNTER (OUTPATIENT)
Dept: CT IMAGING | Facility: CLINIC | Age: 74
Discharge: HOME | End: 2024-03-08
Attending: INTERNAL MEDICINE
Payer: MEDICARE

## 2024-03-08 DIAGNOSIS — C7A.021 MALIGNANT CARCINOID TUMOR OF CECUM (H): ICD-10-CM

## 2024-03-08 DIAGNOSIS — Z17.0 MALIGNANT NEOPLASM OF UPPER-OUTER QUADRANT OF BOTH BREASTS IN FEMALE, ESTROGEN RECEPTOR POSITIVE (H): ICD-10-CM

## 2024-03-08 DIAGNOSIS — C50.412 MALIGNANT NEOPLASM OF UPPER-OUTER QUADRANT OF BOTH BREASTS IN FEMALE, ESTROGEN RECEPTOR POSITIVE (H): ICD-10-CM

## 2024-03-08 DIAGNOSIS — I10 HYPERTENSION GOAL BP (BLOOD PRESSURE) < 140/90: ICD-10-CM

## 2024-03-08 DIAGNOSIS — C50.411 MALIGNANT NEOPLASM OF UPPER-OUTER QUADRANT OF BOTH BREASTS IN FEMALE, ESTROGEN RECEPTOR POSITIVE (H): ICD-10-CM

## 2024-03-08 LAB
CREAT BLD-MCNC: 1.5 MG/DL (ref 0.5–1)
EGFRCR SERPLBLD CKD-EPI 2021: 36 ML/MIN/1.73M2

## 2024-03-08 PROCEDURE — 82565 ASSAY OF CREATININE: CPT

## 2024-03-08 PROCEDURE — 71260 CT THORAX DX C+: CPT | Mod: MG

## 2024-03-08 PROCEDURE — 70491 CT SOFT TISSUE NECK W/DYE: CPT | Mod: MG

## 2024-03-08 PROCEDURE — 250N000009 HC RX 250: Performed by: INTERNAL MEDICINE

## 2024-03-08 PROCEDURE — 250N000011 HC RX IP 250 OP 636: Performed by: RADIOLOGY

## 2024-03-08 RX ORDER — HYDROCHLOROTHIAZIDE 12.5 MG/1
12.5 TABLET ORAL EVERY MORNING
Qty: 90 TABLET | Refills: 0 | Status: SHIPPED | OUTPATIENT
Start: 2024-03-08 | End: 2024-06-28

## 2024-03-08 RX ORDER — IOPAMIDOL 755 MG/ML
125 INJECTION, SOLUTION INTRAVASCULAR ONCE
Status: COMPLETED | OUTPATIENT
Start: 2024-03-08 | End: 2024-03-08

## 2024-03-08 RX ORDER — IOPAMIDOL 755 MG/ML
115 INJECTION, SOLUTION INTRAVASCULAR ONCE
Status: COMPLETED | OUTPATIENT
Start: 2024-03-08 | End: 2024-03-08

## 2024-03-08 RX ADMIN — IOPAMIDOL 115 ML: 755 INJECTION, SOLUTION INTRAVENOUS at 12:33

## 2024-03-08 RX ADMIN — SODIUM CHLORIDE 80 ML: 9 INJECTION, SOLUTION INTRAVENOUS at 12:33

## 2024-03-12 ENCOUNTER — TELEPHONE (OUTPATIENT)
Dept: MEDSURG UNIT | Facility: CLINIC | Age: 74
End: 2024-03-12
Payer: COMMERCIAL

## 2024-03-22 ENCOUNTER — HOSPITAL ENCOUNTER (OUTPATIENT)
Dept: ULTRASOUND IMAGING | Facility: CLINIC | Age: 74
Discharge: HOME | End: 2024-03-22
Attending: INTERNAL MEDICINE | Admitting: INTERNAL MEDICINE
Payer: MEDICARE

## 2024-03-22 ENCOUNTER — ONCOLOGY VISIT (OUTPATIENT)
Dept: ONCOLOGY | Facility: CLINIC | Age: 74
End: 2024-03-22
Attending: INTERNAL MEDICINE
Payer: MEDICARE

## 2024-03-22 ENCOUNTER — HOSPITAL ENCOUNTER (OUTPATIENT)
Facility: CLINIC | Age: 74
Discharge: HOME | End: 2024-03-22
Admitting: INTERNAL MEDICINE
Payer: MEDICARE

## 2024-03-22 ENCOUNTER — INFUSION THERAPY VISIT (OUTPATIENT)
Dept: INFUSION THERAPY | Facility: CLINIC | Age: 74
End: 2024-03-22
Attending: INTERNAL MEDICINE
Payer: MEDICARE

## 2024-03-22 ENCOUNTER — LAB (OUTPATIENT)
Dept: LAB | Facility: CLINIC | Age: 74
End: 2024-03-22
Attending: INTERNAL MEDICINE
Payer: COMMERCIAL

## 2024-03-22 VITALS
TEMPERATURE: 98.8 F | BODY MASS INDEX: 31.98 KG/M2 | RESPIRATION RATE: 12 BRPM | WEIGHT: 211 LBS | HEIGHT: 68 IN | DIASTOLIC BLOOD PRESSURE: 72 MMHG | SYSTOLIC BLOOD PRESSURE: 140 MMHG | HEART RATE: 62 BPM | OXYGEN SATURATION: 95 %

## 2024-03-22 DIAGNOSIS — D3A.021: Primary | ICD-10-CM

## 2024-03-22 DIAGNOSIS — Z17.0 MALIGNANT NEOPLASM OF UPPER-OUTER QUADRANT OF BOTH BREASTS IN FEMALE, ESTROGEN RECEPTOR POSITIVE (H): ICD-10-CM

## 2024-03-22 DIAGNOSIS — C50.411 MALIGNANT NEOPLASM OF UPPER-OUTER QUADRANT OF BOTH BREASTS IN FEMALE, ESTROGEN RECEPTOR POSITIVE (H): ICD-10-CM

## 2024-03-22 DIAGNOSIS — C7A.021 MALIGNANT CARCINOID TUMOR OF CECUM (H): Primary | ICD-10-CM

## 2024-03-22 DIAGNOSIS — C7A.021 MALIGNANT CARCINOID TUMOR OF CECUM (H): ICD-10-CM

## 2024-03-22 DIAGNOSIS — C50.412 MALIGNANT NEOPLASM OF UPPER-OUTER QUADRANT OF BOTH BREASTS IN FEMALE, ESTROGEN RECEPTOR POSITIVE (H): ICD-10-CM

## 2024-03-22 DIAGNOSIS — C78.6 MALIGNANT NEOPLASM METASTATIC TO PERITONEUM (H): ICD-10-CM

## 2024-03-22 LAB
HOLD SPECIMEN: NORMAL
LDH SERPL L TO P-CCNC: 187 U/L (ref 0–250)

## 2024-03-22 PROCEDURE — 36415 COLL VENOUS BLD VENIPUNCTURE: CPT

## 2024-03-22 PROCEDURE — 272N000431 US BIOPSY CORE LYMPH NODE

## 2024-03-22 PROCEDURE — G2211 COMPLEX E/M VISIT ADD ON: HCPCS | Performed by: INTERNAL MEDICINE

## 2024-03-22 PROCEDURE — 83615 LACTATE (LD) (LDH) ENZYME: CPT

## 2024-03-22 PROCEDURE — 99213 OFFICE O/P EST LOW 20 MIN: CPT | Performed by: INTERNAL MEDICINE

## 2024-03-22 PROCEDURE — 250N000009 HC RX 250: Performed by: STUDENT IN AN ORGANIZED HEALTH CARE EDUCATION/TRAINING PROGRAM

## 2024-03-22 PROCEDURE — 86316 IMMUNOASSAY TUMOR OTHER: CPT | Mod: GA

## 2024-03-22 PROCEDURE — 250N000011 HC RX IP 250 OP 636: Performed by: INTERNAL MEDICINE

## 2024-03-22 PROCEDURE — 88360 TUMOR IMMUNOHISTOCHEM/MANUAL: CPT | Mod: TC | Performed by: INTERNAL MEDICINE

## 2024-03-22 PROCEDURE — G0463 HOSPITAL OUTPT CLINIC VISIT: HCPCS | Mod: 25 | Performed by: INTERNAL MEDICINE

## 2024-03-22 PROCEDURE — 96372 THER/PROPH/DIAG INJ SC/IM: CPT | Performed by: INTERNAL MEDICINE

## 2024-03-22 RX ORDER — NALOXONE HYDROCHLORIDE 0.4 MG/ML
0.2 INJECTION, SOLUTION INTRAMUSCULAR; INTRAVENOUS; SUBCUTANEOUS
Status: CANCELLED | OUTPATIENT
Start: 2024-04-19

## 2024-03-22 RX ORDER — DIPHENHYDRAMINE HYDROCHLORIDE 50 MG/ML
50 INJECTION INTRAMUSCULAR; INTRAVENOUS
Status: CANCELLED
Start: 2024-04-19

## 2024-03-22 RX ORDER — NALOXONE HYDROCHLORIDE 0.4 MG/ML
0.2 INJECTION, SOLUTION INTRAMUSCULAR; INTRAVENOUS; SUBCUTANEOUS
Status: CANCELLED | OUTPATIENT
Start: 2024-03-22

## 2024-03-22 RX ORDER — LANREOTIDE ACETATE 120 MG/.5ML
120 INJECTION SUBCUTANEOUS ONCE
Status: CANCELLED | OUTPATIENT
Start: 2024-04-19 | End: 2024-04-19

## 2024-03-22 RX ORDER — LIDOCAINE HYDROCHLORIDE 10 MG/ML
10 INJECTION, SOLUTION EPIDURAL; INFILTRATION; INTRACAUDAL; PERINEURAL ONCE
Status: COMPLETED | OUTPATIENT
Start: 2024-03-22 | End: 2024-03-22

## 2024-03-22 RX ORDER — METHYLPREDNISOLONE SODIUM SUCCINATE 125 MG/2ML
125 INJECTION, POWDER, LYOPHILIZED, FOR SOLUTION INTRAMUSCULAR; INTRAVENOUS
Status: CANCELLED
Start: 2024-04-19

## 2024-03-22 RX ORDER — MEPERIDINE HYDROCHLORIDE 25 MG/ML
25 INJECTION INTRAMUSCULAR; INTRAVENOUS; SUBCUTANEOUS EVERY 30 MIN PRN
Status: CANCELLED | OUTPATIENT
Start: 2024-03-22

## 2024-03-22 RX ORDER — ALBUTEROL SULFATE 0.83 MG/ML
2.5 SOLUTION RESPIRATORY (INHALATION)
Status: CANCELLED | OUTPATIENT
Start: 2024-03-22

## 2024-03-22 RX ORDER — EPINEPHRINE 1 MG/ML
0.3 INJECTION, SOLUTION, CONCENTRATE INTRAVENOUS EVERY 5 MIN PRN
Status: CANCELLED | OUTPATIENT
Start: 2024-04-19

## 2024-03-22 RX ORDER — DIPHENHYDRAMINE HYDROCHLORIDE 50 MG/ML
50 INJECTION INTRAMUSCULAR; INTRAVENOUS
Status: CANCELLED
Start: 2024-03-22

## 2024-03-22 RX ORDER — LANREOTIDE ACETATE 120 MG/.5ML
120 INJECTION SUBCUTANEOUS ONCE
Status: COMPLETED | OUTPATIENT
Start: 2024-03-22 | End: 2024-03-22

## 2024-03-22 RX ORDER — MEPERIDINE HYDROCHLORIDE 25 MG/ML
25 INJECTION INTRAMUSCULAR; INTRAVENOUS; SUBCUTANEOUS EVERY 30 MIN PRN
Status: CANCELLED | OUTPATIENT
Start: 2024-04-19

## 2024-03-22 RX ORDER — ALBUTEROL SULFATE 90 UG/1
1-2 AEROSOL, METERED RESPIRATORY (INHALATION)
Status: CANCELLED
Start: 2024-04-19

## 2024-03-22 RX ORDER — ALBUTEROL SULFATE 90 UG/1
1-2 AEROSOL, METERED RESPIRATORY (INHALATION)
Status: CANCELLED
Start: 2024-03-22

## 2024-03-22 RX ORDER — LANREOTIDE ACETATE 120 MG/.5ML
120 INJECTION SUBCUTANEOUS ONCE
Status: CANCELLED | OUTPATIENT
Start: 2024-03-22 | End: 2024-03-22

## 2024-03-22 RX ORDER — ALBUTEROL SULFATE 0.83 MG/ML
2.5 SOLUTION RESPIRATORY (INHALATION)
Status: CANCELLED | OUTPATIENT
Start: 2024-04-19

## 2024-03-22 RX ORDER — EPINEPHRINE 1 MG/ML
0.3 INJECTION, SOLUTION, CONCENTRATE INTRAVENOUS EVERY 5 MIN PRN
Status: CANCELLED | OUTPATIENT
Start: 2024-03-22

## 2024-03-22 RX ORDER — METHYLPREDNISOLONE SODIUM SUCCINATE 125 MG/2ML
125 INJECTION, POWDER, LYOPHILIZED, FOR SOLUTION INTRAMUSCULAR; INTRAVENOUS
Status: CANCELLED
Start: 2024-03-22

## 2024-03-22 RX ADMIN — LANREOTIDE ACETATE 120 MG: 120 INJECTION SUBCUTANEOUS at 15:48

## 2024-03-22 RX ADMIN — LIDOCAINE HYDROCHLORIDE 10 ML: 10 INJECTION, SOLUTION EPIDURAL; INFILTRATION; INTRACAUDAL; PERINEURAL at 09:27

## 2024-03-22 ASSESSMENT — ACTIVITIES OF DAILY LIVING (ADL)
ADLS_ACUITY_SCORE: 38

## 2024-03-22 ASSESSMENT — PAIN SCALES - GENERAL: PAINLEVEL: NO PAIN (0)

## 2024-03-22 NOTE — PATIENT INSTRUCTIONS
1. Lanreotide injection today  2. Dr. Meredith will call with preliminary and final biopsy results when available; and what to do next  3. Follow-up 4/19/2024 for next lanreotide injection  4. Provider follow-up at [facility] to be determined

## 2024-03-22 NOTE — LETTER
3/22/2024         RE: Marissa Guadarrama  [address] [facility]  [place] [address]        Dear Colleague,    Thank you for referring your patient, Marissa Guadarrama, to the [facility]. Please see a copy of my visit note below.    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) on lanreotide and breast cancer on exemestane    Impression:  Asymptomatic metastatic low-grade neuroendocrine cancer on lanreotide, s/p 12/15/2023 left sacral bone radiation  History of bilateral 2016 ER/AZ positive bilateral breast cancer on exemestane with new right supraclavicular adenopathy suspicious for breast cancer recurrence  History of obesity and postpolio syndrome  Osteoporosis due to aromatase inhibitor    Recommendation, plan, instructions:  Lanreotide 120 mg as before  I will call patient back with preliminary and final biopsy results and then what to do next  Follow-up 4/19/2024 for next lanreotide injection although I will not have a provider see her at that time.  Provider follow-up at [facility] to be determined based on her biopsy results and any further staging.  If her biopsy shows breast cancer, will obtain additional studies and probable a staging PET scan.    Time with patient including review, documentation, history, examination, coordination of care and counseling was 20 minutes.    Recent oncology review:  When last seen on 2/23/2024, new right supraclavicular lymphadenopathy was noted.  The CEA was 56.6 compared to a level 12.8 on 7/14/2023. The CA 27.29 was 57.7 compared to 22 when it was last obtained on 1/23/2022.  3/8/2024 CT soft tissue of neck and chest revealed multiple right cervical level 3-5  lymphadenopathy.  Chest imaging revealed mild enlarged mediastinal and hilar lymphadenopathy which could be related to her neuroendocrine tumor.    She underwent IR biopsy of her right cervical lymph nodes today which was well-tolerated.    History of present illness:  In 2003, she underwent a right colonic and terminal ileal resection for a well-differentiated, pT2, pN2 neuroendocrine tumor history of metastases causing left ureteral obstruction in 2020 with subsequent 2022 left nephrectomy and stable liver lesion previously followed by Dr. Elfego Lawrence and then Dr. Hosea King.    However, a follow-up 2/10/2022 Octreoscan scan confirmed evidence of progressive disease for which she was started on lanreotide every 4 weeks with a baseline chromogranin A level of 824.  The chromogranin A joslyn was 445 on 1/6/2023 and 562 on 7/28/2023.     Review of her 10/13/2023 DEXA scan revealed 14% decrease in bone density with left hip T-score -3.1.  Left femoral neck T-score was -2.6.  She is on annual zoledronic acid.    In December, 2023 she received 5000 cGy/5 fractions by 12/15/2023 with complete resolution of left sacral pain referable to a presumptive metastatic neuroendocrine metastasis.  A July, 2023 dotatate PET scan had revealed stable disease except for the increased size and gluco-avidity of this left sclerotic sacral lesion.  She continues on monthly lanreotide.    Past medical history:  History of 2016 bilateral ER positive breast cancer on exemestane.  Both tumors were 100% ER/DC+, HER2 neg and patient declined Oncotype DX testing since she indicated she would never want to receive adjuvant systemic chemotherapy.  She had been initially started on anastrozole but developed a skin rash and was switched to exemestane    Past Medical History:   Diagnosis Date     Arthritis     knee     Benign breast biopsy     benign     Breast cancer (H)      Carcinoid tumor (H28) 12/2003     Cervical cancer (H) 2007     H/O  colposcopy with cervical biopsy 12/23/2013    vaginal cuff biopsy- VAIN III. referred back to gyn/onc     HTN      Hyperlipidemia      Malignant neoplasm of ovary (H)      Obesity      Pap smear of vagina with ASC-H 11/01/2013     Post-polio syndromes      Trigeminal neuralgias         Family history:  I have reviewed this patient's family history and updated it with pertinent information if needed.  Family History   Problem Relation Age of Onset     Cancer Mother         bone / liver     Breast Cancer Mother      Cancer Sister         vulvar ca, cervical ca, squamous cell cancer     Breast Cancer Sister      Cancer Brother         Rectal- Stage 4     Rectal Cancer Brother      Cancer Maternal Grandmother      Cancer Maternal Grandfather      Cancer Paternal Grandmother      Cancer Paternal Grandfather      Breast Cancer Maternal Aunt      Breast Cancer Paternal Aunt      Colon Cancer Paternal Aunt      Pancreatic Cancer Nephew      Anesthesia Reaction No family hx of      Venous thrombosis No family hx of      Bleeding Disorder No family hx of          Medications:  Current Outpatient Medications   Medication     Calcium Carbonate (CALCIUM 500 PO)     exemestane (AROMASIN) 25 MG tablet     hydroCHLOROthiazide 12.5 MG tablet     bisacodyl (DULCOLAX) 5 MG EC tablet     polyethylene glycol (GOLYTELY) 236 g suspension     SENNA-docusate sodium (SENNA S) 8.6-50 MG tablet     No current facility-administered medications for this visit.     Facility-Administered Medications Ordered in Other Visits   Medication     lanreotide acetate (SOMATULINE) injection 120 mg       Allergies:  No Known Allergies    Review of systems:  Except as noted in the note above, the patient denies headaches, diplopia, hearing loss or dizziness; dyspnea, cough, hemoptysis, pleurisy; chest pain/pressure, palpitations, lightheadedness; anorexia, nausea, vomiting, abdominal pain, diarrhea, constipation, melena or rectal bleeding; dysuria, frequency,  " blood in the urine; vaginal bleeding or discharge; fever, chills, sweats, hot flashes; tingling, numbness, loss of balance; insomnia, depression, anxiety.    Physical examination:  BP (!) 140/72 (BP Location: Right arm, Patient Position: Sitting, Cuff Size: Adult Regular)   Pulse 62   Temp 98.8  F (37.1  C) (Tympanic)   Resp 12   Ht 1.727 m (5' 8\")   Wt 95.7 kg (211 lb)   SpO2 95%   BMI 32.08 kg/m      She has apparent new right supraclavicular lymphadenopathy. There is a single 1 x 1 cm supraclavicular lymph node that is firm but movable.  There may be others that are further more caudal and perhaps smaller in size.    The patient is alert and oriented.         Josue Meredith MD    Oncology Rooming Note    March 22, 2024 3:02 PM   Marissa Guadarrama is a 73 year old female who presents for:    Chief Complaint   Patient presents with     Oncology Clinic Visit     Malignant carcinoid tumor of cecum - Labs provider and infusion     Initial Vitals: BP (!) 140/72 (BP Location: Right arm, Patient Position: Sitting, Cuff Size: Adult Regular)   Pulse 62   Temp 98.8  F (37.1  C) (Tympanic)   Resp 12   Ht 1.727 m (5' 8\")   Wt 95.7 kg (211 lb)   SpO2 95%   BMI 32.08 kg/m   Estimated body mass index is 32.08 kg/m  as calculated from the following:    Height as of this encounter: 1.727 m (5' 8\").    Weight as of this encounter: 95.7 kg (211 lb). Body surface area is 2.14 meters squared.  No Pain (0) Comment: Data Unavailable   No LMP recorded. Patient has had a hysterectomy.  Allergies reviewed: Yes  Medications reviewed: Yes    Medications: Medication refills not needed today.  Pharmacy name entered into [employer]: [id number] - [place], [place] - [address] [place] [address]    Frailty Screening:   Is the patient here for a new oncology consult visit in cancer care? 2. No      Clinical concerns:  None      Rhea Bloom, CMA                Again, thank you for allowing me to participate in the care of your patient. "        Sincerely,        Josue Meredith MD

## 2024-03-22 NOTE — PROGRESS NOTES
Infusion Nursing Note:  Marissa Guadarrama presents today for Somatuline.    Patient seen by provider today: Yes: Dr. Meredith   present during visit today: Not Applicable.    Note: Right Glute.      Intravenous Access:  No Intravenous access/labs at this visit.    Treatment Conditions:  Not Applicable.      Post Infusion Assessment:  Patient tolerated injection without incident.  Site patent and intact, free from redness, edema or discomfort.  No evidence of extravasations.       Discharge Plan:   Discharge instructions reviewed with: Patient.  Patient and/or family verbalized understanding of discharge instructions and all questions answered.  Patient discharged in stable condition accompanied by: self.  Departure Mode: Ambulatory.      Erica Be RN

## 2024-03-22 NOTE — PROGRESS NOTES
"Oncology Rooming Note    March 22, 2024 3:02 PM   Marissa Guadarrama is a 73 year old female who presents for:    Chief Complaint   Patient presents with    Oncology Clinic Visit     Malignant carcinoid tumor of cecum - Labs provider and infusion     Initial Vitals: BP (!) 140/72 (BP Location: Right arm, Patient Position: Sitting, Cuff Size: Adult Regular)   Pulse 62   Temp 98.8  F (37.1  C) (Tympanic)   Resp 12   Ht 1.727 m (5' 8\")   Wt 95.7 kg (211 lb)   SpO2 95%   BMI 32.08 kg/m   Estimated body mass index is 32.08 kg/m  as calculated from the following:    Height as of this encounter: 1.727 m (5' 8\").    Weight as of this encounter: 95.7 kg (211 lb). Body surface area is 2.14 meters squared.  No Pain (0) Comment: Data Unavailable   No LMP recorded. Patient has had a hysterectomy.  Allergies reviewed: Yes  Medications reviewed: Yes    Medications: Medication refills not needed today.  Pharmacy name entered into [facility]: [facility][id number] - [place], [place] - [address] [place] [address]    Frailty Screening:   Is the patient here for a new oncology consult visit in cancer care? 2. No      Clinical concerns:  None      Rhea Bloom CMA              "

## 2024-03-26 LAB — CGA SERPL-MCNC: 562 NG/ML

## 2024-03-28 LAB
PATH REPORT.COMMENTS IMP SPEC: ABNORMAL
PATH REPORT.COMMENTS IMP SPEC: ABNORMAL
PATH REPORT.COMMENTS IMP SPEC: YES
PATH REPORT.FINAL DX SPEC: ABNORMAL
PATH REPORT.GROSS SPEC: ABNORMAL
PATH REPORT.MICROSCOPIC SPEC OTHER STN: ABNORMAL
PATH REPORT.RELEVANT HX SPEC: ABNORMAL
PHOTO IMAGE: ABNORMAL

## 2024-03-28 PROCEDURE — 88305 TISSUE EXAM BY PATHOLOGIST: CPT | Mod: 26 | Performed by: PATHOLOGY

## 2024-03-28 PROCEDURE — 88341 IMHCHEM/IMCYTCHM EA ADD ANTB: CPT | Mod: 26 | Performed by: PATHOLOGY

## 2024-03-28 PROCEDURE — 88342 IMHCHEM/IMCYTCHM 1ST ANTB: CPT | Mod: 26 | Performed by: PATHOLOGY

## 2024-04-03 NOTE — ADDENDUM NOTE
[facility] is requesting an external PT order from 2/21/24.  Please review/revise the pending order.  Thank you!   Addended by: MAR ROSS on: 4/3/2018 03:49 PM     Modules accepted: Orders

## 2024-04-19 ENCOUNTER — LAB REQUISITION (OUTPATIENT)
Dept: LAB | Facility: CLINIC | Age: 74
End: 2024-04-19
Payer: MEDICARE

## 2024-04-19 ENCOUNTER — INFUSION THERAPY VISIT (OUTPATIENT)
Dept: INFUSION THERAPY | Facility: CLINIC | Age: 74
End: 2024-04-19
Attending: NURSE PRACTITIONER
Payer: MEDICARE

## 2024-04-19 ENCOUNTER — APPOINTMENT (OUTPATIENT)
Dept: LAB | Facility: CLINIC | Age: 74
End: 2024-04-19
Attending: NURSE PRACTITIONER
Payer: MEDICARE

## 2024-04-19 VITALS
RESPIRATION RATE: 18 BRPM | HEART RATE: 65 BPM | SYSTOLIC BLOOD PRESSURE: 160 MMHG | DIASTOLIC BLOOD PRESSURE: 94 MMHG | TEMPERATURE: 98.4 F

## 2024-04-19 DIAGNOSIS — C7A.021 MALIGNANT CARCINOID TUMOR OF CECUM (H): Primary | ICD-10-CM

## 2024-04-19 LAB — LDH SERPL L TO P-CCNC: 179 U/L (ref 0–250)

## 2024-04-19 PROCEDURE — 36415 COLL VENOUS BLD VENIPUNCTURE: CPT

## 2024-04-19 PROCEDURE — 83615 LACTATE (LD) (LDH) ENZYME: CPT | Performed by: INTERNAL MEDICINE

## 2024-04-19 PROCEDURE — 86316 IMMUNOASSAY TUMOR OTHER: CPT | Mod: GA | Performed by: INTERNAL MEDICINE

## 2024-04-19 PROCEDURE — 96372 THER/PROPH/DIAG INJ SC/IM: CPT | Performed by: INTERNAL MEDICINE

## 2024-04-19 PROCEDURE — 250N000011 HC RX IP 250 OP 636: Performed by: INTERNAL MEDICINE

## 2024-04-19 RX ORDER — LANREOTIDE ACETATE 120 MG/.5ML
120 INJECTION SUBCUTANEOUS ONCE
Qty: 120 MG | Refills: 0 | Status: COMPLETED | OUTPATIENT
Start: 2024-04-19 | End: 2024-04-19

## 2024-04-19 RX ADMIN — LANREOTIDE ACETATE 120 MG: 120 INJECTION SUBCUTANEOUS at 13:46

## 2024-04-19 NOTE — PROGRESS NOTES
Infusion Nursing Note:  Marissa A Chiara presents today for Somatuline.    Patient seen by provider today: No   present during visit today: Not Applicable.    Note: N/A.    Intravenous Access:  Labs drawn peripherally by .    Treatment Conditions:  Not Applicable.    Post Infusion Assessment:  Patient tolerated injection without incident.  Site patent and intact, free from redness, edema or discomfort.  No evidence of extravasations.  Access discontinued per protocol.     Discharge Plan:   Discharge instructions reviewed with: Patient.  Patient and/or family verbalized understanding of discharge instructions and all questions answered.  Copy of AVS reviewed with patient and/or family.  Patient will return 5/17/2024 for next appointment.  Patient discharged in stable condition accompanied by: self and .  Departure Mode: Ambulatory.    Crista Jaramillo RN

## 2024-04-21 LAB — CGA SERPL-MCNC: 552 NG/ML

## 2024-04-26 ENCOUNTER — HOSPITAL ENCOUNTER (OUTPATIENT)
Dept: PET IMAGING | Facility: HOSPITAL | Age: 74
Discharge: HOME | End: 2024-04-26
Attending: INTERNAL MEDICINE | Admitting: INTERNAL MEDICINE
Payer: MEDICARE

## 2024-04-26 DIAGNOSIS — C50.411 MALIGNANT NEOPLASM OF UPPER-OUTER QUADRANT OF BOTH BREASTS IN FEMALE, ESTROGEN RECEPTOR POSITIVE (H): ICD-10-CM

## 2024-04-26 DIAGNOSIS — C50.412 MALIGNANT NEOPLASM OF UPPER-OUTER QUADRANT OF BOTH BREASTS IN FEMALE, ESTROGEN RECEPTOR POSITIVE (H): ICD-10-CM

## 2024-04-26 DIAGNOSIS — Z17.0 MALIGNANT NEOPLASM OF UPPER-OUTER QUADRANT OF BOTH BREASTS IN FEMALE, ESTROGEN RECEPTOR POSITIVE (H): ICD-10-CM

## 2024-04-26 LAB — GLUCOSE BLDC GLUCOMTR-MCNC: 135 MG/DL (ref 70–99)

## 2024-04-26 PROCEDURE — 82962 GLUCOSE BLOOD TEST: CPT

## 2024-04-26 PROCEDURE — A9552 F18 FDG: HCPCS | Performed by: INTERNAL MEDICINE

## 2024-04-26 PROCEDURE — 78815 PET IMAGE W/CT SKULL-THIGH: CPT | Mod: MG,PS

## 2024-04-26 PROCEDURE — 343N000001 HC RX 343: Performed by: INTERNAL MEDICINE

## 2024-04-26 RX ORDER — FLUDEOXYGLUCOSE F 18 200 MCI/ML
7-16 INJECTION, SOLUTION INTRAVENOUS ONCE
Status: COMPLETED | OUTPATIENT
Start: 2024-04-26 | End: 2024-04-26

## 2024-04-26 RX ADMIN — FLUDEOXYGLUCOSE F 18 12.43 MILLICURIE: 200 INJECTION, SOLUTION INTRAVENOUS at 09:14

## 2024-05-01 LAB
BKR LAB AP ADD'L TEST STATUS: NORMAL
BKR PATH ADDL TEST FINAL COMMENTS: NORMAL

## 2024-05-03 ENCOUNTER — ONCOLOGY VISIT (OUTPATIENT)
Dept: ONCOLOGY | Facility: CLINIC | Age: 74
End: 2024-05-03
Attending: INTERNAL MEDICINE
Payer: COMMERCIAL

## 2024-05-03 VITALS
DIASTOLIC BLOOD PRESSURE: 80 MMHG | TEMPERATURE: 98 F | OXYGEN SATURATION: 95 % | WEIGHT: 209.7 LBS | BODY MASS INDEX: 31.78 KG/M2 | SYSTOLIC BLOOD PRESSURE: 204 MMHG | HEIGHT: 68 IN | HEART RATE: 64 BPM

## 2024-05-03 DIAGNOSIS — C77.8 CARCINOMA METASTATIC TO LYMPH NODES OF MULTIPLE SITES WITH UNKNOWN PRIMARY SITE (H): Primary | ICD-10-CM

## 2024-05-03 DIAGNOSIS — C80.1 CARCINOMA METASTATIC TO LYMPH NODES OF MULTIPLE SITES WITH UNKNOWN PRIMARY SITE (H): Primary | ICD-10-CM

## 2024-05-03 PROCEDURE — G0463 HOSPITAL OUTPT CLINIC VISIT: HCPCS | Performed by: INTERNAL MEDICINE

## 2024-05-03 PROCEDURE — 99215 OFFICE O/P EST HI 40 MIN: CPT | Performed by: INTERNAL MEDICINE

## 2024-05-03 PROCEDURE — 99417 PROLNG OP E/M EACH 15 MIN: CPT | Performed by: INTERNAL MEDICINE

## 2024-05-03 ASSESSMENT — PAIN SCALES - GENERAL: PAINLEVEL: NO PAIN (0)

## 2024-05-03 NOTE — PROGRESS NOTES
[facility] Follow-up Note    Patient: Marissa Guadarrama  MRN: [id number]  Date of Service: May 3, 2024       Reason for Visit    Cancer with unknown primary site.      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  A nodule in the left upper lobe of the lung may be the primary site.  Unfortunately there was insufficient tissue to allow for additional tests.  I will discuss with interventional radiology as to the best site to obtain additional tissue.      ______________________________________________________________________________    ECOG Performance = 1    History of Present Illness  Disease history per Dr. 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 Elfego Lawrence and then Dr. Hosea King.     However, a follow-up 2/10/2022 Octreoscan scan confirmed evidence of progressive disease for which she was started on lanreotide every 4 weeks with a baseline chromogranin A level of 824.  The chromogranin A joslyn was 445 on 1/6/2023 and 562 on 7/28/2023.      Review of her 10/13/2023 DEXA scan revealed 14% decrease in bone density with left hip T-score -3.1.  Left femoral neck T-score was -2.6.  She is on annual zoledronic acid.     In December, 2023 she received 5000 cGy/5 fractions by 12/15/2023 with complete resolution of left sacral pain referable to a presumptive metastatic neuroendocrine metastasis.  A July, 2023 dotatate PET scan had revealed stable disease except for the increased  "size and gluco-avidity of this left sclerotic sacral lesion.  She continues on monthly lanreotide.     Past medical history:  History of 2016 bilateral ER positive breast cancer on exemestane.  Both tumors were 100% ER/NM+, HER2 neg and patient declined Oncotype DX testing since she indicated she would never want to receive adjuvant systemic chemotherapy.  She had been initially started on anastrozole but developed a skin rash and was switched to exemestane    When last seen on 2/23/2024, new right supraclavicular lymphadenopathy was noted.  The CEA was 56.6 compared to a level 12.8 on 7/14/2023. The CA 27.29 was 57.7 compared to 22 when it was last obtained on 1/23/2022.  3/8/2024 CT soft tissue of neck and chest revealed multiple right cervical level 3-5 lymphadenopathy.  Chest imaging revealed mild enlarged mediastinal and hilar lymphadenopathy which could be related to her neuroendocrine tumor.     3/8/2024 she underwent IR biopsy of her right cervical lymph nodes today which was well-tolerated.  Pathology showed a poorly differentiated carcinoma.  It was CK7 and CK20 positive.  There was insufficient tissue to make further identification of the primary or to determine treatment options.    At this visit patient remains asymptomatic.  She is maintaining her weight.  Her appetite and digestion are normal.  She does not have chills, fevers or sweats.  She does not have cough, chest pain or shortness of breath at rest.  She has no change in bowel or bladder habit.  Her activity is limited from polio in childhood she uses a walker to help with ambulation.  She quit smoking in 2006 after 29 pack years of cigarettes    Review of systems.  Pertinent Findings are included in the History of Present Illness    Physical Exam    BP (!) 204/80 (BP Location: Right arm, Patient Position: Sitting, Cuff Size: Adult Regular)   Pulse 64   Temp 98  F (36.7  C) (Tympanic)   Ht 1.727 m (5' 8\")   Wt 95.1 kg (209 lb 11.2 oz)   " SpO2 95%   BMI 31.88 kg/m       GENERAL APPEARANCE: 73-year-old woman in no apparent distress.  HEAD: Atraumatic; normocephalic; without lesions.  EYES: Conjunctiva, corneas and eyelids normal; pupils equal, round, reactive to light; No Icterus.  MOUTH/OROPHARYNX: Oral mucosa intact  NECK: Supple with no nodes.  LUNGS:  Clear to auscultation and percussion with no extra sounds.  HEART: Regular rhythm and rate; S1 and S2 normal; no murmurs noted.  ABDOMEN: Soft; no masses or tenderness, no hepatosplenomegaly.  NEUROLOGIC: Alert and oriented.  No obvious focal findings.  EXTREMITIES: No cyanosis, or edema.  SKIN: No abnormal bruising or bleeding. No suspicious lesions noted on exposed skin.  PSYCHIATRIC: Mental status normal; no apparent psychiatric issues    Medications:    Current Outpatient Medications   Medication Sig Dispense Refill    Calcium Carbonate (CALCIUM 500 PO) 2 tablets daily      exemestane (AROMASIN) 25 MG tablet Take 1 tablet (25 mg) by mouth every morning 90 tablet 1    hydroCHLOROthiazide 12.5 MG tablet Take 1 tablet (12.5 mg) by mouth every morning Please make a clinic visit to be seen in person for medication refills.  No virtual visits.  Thank you. 90 tablet 0    bisacodyl (DULCOLAX) 5 MG EC tablet Take 2 tablets at 3 pm the day before your procedure. If your procedure is before 11 am, take 2 additional tablets at 11 pm. If your procedure is after 11 am, take 2 additional tablets at 6 am. For additional instructions refer to your colonoscopy prep instructions. (Patient not taking: Reported on 12/29/2023) 4 tablet 0    polyethylene glycol (GOLYTELY) 236 g suspension The night before the exam at 6 pm drink an 8-ounce glass every 15 minutes until the jug is half empty. If you arrive before 11 AM: Drink the other half of the Golytely jug at 11 PM night before procedure. If you arrive after 11 AM: Drink the other half of the Golytely jug at 6 AM day of procedure. For additional instructions refer to  your colonoscopy prep instructions. (Patient not taking: Reported on 12/29/2023) 4000 mL 0    SENNA-docusate sodium (SENNA S) 8.6-50 MG tablet Take 1 tablet by mouth 2 times daily as needed (prevent opioid induced constipation) (Patient not taking: Reported on 7/14/2023) 30 tablet 0     No current facility-administered medications for this visit.           Past History    Past Medical History:   Diagnosis Date    Arthritis     knee    Benign breast biopsy     benign    Breast cancer (H)     Carcinoid tumor (H28) 12/2003    Cervical cancer (H) 2007    H/O colposcopy with cervical biopsy 12/23/2013    vaginal cuff biopsy- VAIN III. referred back to gyn/onc    HTN     Hyperlipidemia     Malignant neoplasm of ovary (H)     Obesity     Pap smear of vagina with ASC-H 11/01/2013    Post-polio syndromes     Trigeminal neuralgias      Past Surgical History:   Procedure Laterality Date    APPENDECTOMY  01/01/1983    BIOPSY BREAST      BIOPSY NODE SENTINEL Bilateral 06/01/2016    Procedure: BIOPSY NODE SENTINEL;  Surgeon: Brent Arana MD;  Location: [facility]    [facility] SURGICAL PATHOLOGY      COLONOSCOPY N/A 06/23/2017    Procedure: COMBINED COLONOSCOPY, SINGLE OR MULTIPLE BIOPSY/POLYPECTOMY BY BIOPSY;  Colonoscopy Dx:Carcinoid tumor of colon prep mailed golytely;  Surgeon: Talisha Greco MD;  Location:  GI    COLPOSCOPY, BIOPSY, COMBINED  03/13/2014    Procedure: COMBINED COLPOSCOPY, BIOPSY;;  Surgeon: Lara Pack MD;  Location:  OR    COMBINED CYSTOSCOPY, RETROGRADES, EXCHANGE STENT URETER(S) Left 02/07/2019    Procedure: COMBINED CYSTOSCOPY, RETROGRADES, EXCHANGE STENT URETER--left;  Surgeon: Zhao La MD;  Location: [facility]    COMBINED CYSTOSCOPY, RETROGRADES, EXCHANGE STENT URETER(S) Left 02/20/2020    Procedure: CYSTOSCOPY, WITH RETROGRADE PYELOGRAM AND Left URETERAL STENT exchange;  Surgeon: Zhao La MD;  Location: [facility]    COMBINED CYSTOSCOPY, RETROGRADES, EXCHANGE  STENT URETER(S) Left 05/09/2021    Procedure: CYSTOSCOPY, WITH RETROGRADE PYELOGRAM AND LEFT URETERAL STENT REPLACEMENT;  Surgeon: Fred wOens MD;  [facility]    COMBINED CYSTOSCOPY, RETROGRADES, EXCHANGE STENT URETER(S) Left 09/16/2021    Procedure: CYSTOSCOPY, WITH left RETROGRADE PYELOGRAM AND left  URETERAL STENT REPLACEMENT;  Surgeon: Zhao La MD;  Location: [facility]    COMBINED CYSTOSCOPY, RETROGRADES, EXCHANGE STENT URETER(S) Left 03/24/2022    Procedure: CYSTOSCOPY, WITH RETROGRADE PYELOGRAM AND URETERAL STENT EXCHANGE, LEFT;  Surgeon: Zhao La MD;  Location: [facility]    COMBINED CYSTOSCOPY, RETROGRADES, URETEROSCOPY, INSERT STENT Left 02/02/2017    Procedure: COMBINED CYSTOSCOPY, RETROGRADES, URETEROSCOPY, INSERT STENT;  Surgeon: Zhao La MD;  Location: [facility]    CYSTOSCOPY, REMOVE STENT(S), COMBINED N/A 11/4/2022    Procedure: CYSTOSCOPY, LEFT STENT REMOVAL;  Surgeon: Zhao La MD;  [facility]: [facility]    CYSTOSCOPY, RETROGRADES, INSERT STENT URETER(S), COMBINED Left 09/07/2017    Procedure: COMBINED CYSTOSCOPY, RETROGRADES, INSERT STENT URETER(S);  Cystoscopy,Left Stent Exchange;  Surgeon: Zhao La MD;  Location: [facility]    CYSTOSCOPY, RETROGRADES, INSERT STENT URETER(S), COMBINED  12/12/2017    Procedure: COMBINED CYSTOSCOPY, RETROGRADES, INSERT STENT URETER(S);;  Surgeon: Zhao La MD;  [facility]    CYSTOSCOPY, RETROGRADES, INSERT STENT URETER(S), COMBINED Left 07/05/2018    Procedure: COMBINED CYSTOSCOPY, RETROGRADES, INSERT STENT URETER(S);  Cystoscopy, Left Stent Exchange;  Surgeon: Zhao La MD;  Location: [facility]    DAVINCI NEPHRECTOMY Left 11/4/2022    Procedure: , LEFT NEPHRECTOMY, ROBOT-ASSISTED;  Surgeon: Zhao La MD;  Location: [facility]    EXAM UNDER ANESTHESIA PELVIC  03/13/2014    Procedure: EXAM UNDER ANESTHESIA PELVIC;  Exam Under  Anestheisa, Colposcopy, Vaginal Biopsies, Co2 Laser of the Upper Vagina;  Surgeon: Lara Pack MD;  [facility]    EXAM UNDER ANESTHESIA PELVIC N/A 07/01/2020    Procedure: Examination under anesthesia, vaginal biopsies;  Surgeon: Karli Gao MD;  [facility]    HERNIORRHAPHY INCISIONAL (LOCATION)      IR RHIZOTOMY  08/14/2020    LASER CO2 VAGINA  03/13/2014    VAIN 1/2    LASER CO2 VAGINA N/A 12/12/2017    Procedure: LASER CO2 VAGINA;  Exam Under Anesthesia, CO2 Laser Ablation Of Vagina, Cystoscopy, Left Retrograde Pyelogram with Left Stent Placement;  Surgeon: Nic Segundo MD;  [facility]    LUMPECTOMY BREAST      LUMPECTOMY BREAST WITH SEED LOCALIZATION Bilateral 06/01/2016    Procedure: LUMPECTOMY BREAST WITH SEED LOCALIZATION;  Surgeon: Brent Arana MD;  Location: [facility]    SURGICAL HISTORY OF -       ovarian cystectomy    SURGICAL HISTORY OF -   01/01/2003    right colon resection secondary to carcinoid tumor    TUBAL LIGATION      ZC BSO, OMENTECTOMY W/OSMAN  05/01/2007    ZZ TOTAL ABDOM HYSTERECTOMY  05/01/2007     No Known Allergies  Family History   Problem Relation Age of Onset    Cancer Mother         bone / liver    Breast Cancer Mother     Cancer Sister         vulvar ca, cervical ca, squamous cell cancer    Breast Cancer Sister     Cancer Brother         Rectal- Stage 4    Rectal Cancer Brother     Cancer Maternal Grandmother     Cancer Maternal Grandfather     Cancer Paternal Grandmother     Cancer Paternal Grandfather     Breast Cancer Maternal Aunt     Breast Cancer Paternal Aunt     Colon Cancer Paternal Aunt     Pancreatic Cancer Nephew     Anesthesia Reaction No family hx of     Venous thrombosis No family hx of     Bleeding Disorder No family hx of      Social History     Socioeconomic History    Marital status:      Spouse name: None    Number of children: None    Years of education: None    Highest education level: None   Tobacco Use    Smoking  status: Former     Current packs/day: 0.00     Average packs/day: 1 pack/day for 29.0 years (29.0 ttl pk-yrs)     Types: Cigarettes     Start date: 10/30/1977     Quit date: 10/30/2006     Years since quittin.5    Smokeless tobacco: Never   Vaping Use    Vaping status: Never Used   Substance and Sexual Activity    Alcohol use: No     Alcohol/week: 0.0 standard drinks of alcohol     Comment: 1 drink per year    Drug use: No    Sexual activity: Yes     Partners: Male   Other Topics Concern     Service No    Blood Transfusions No    Caffeine Concern Yes     Comment: occasional coffee    Occupational Exposure No    Hobby Hazards Yes     Comment: [place],    Sleep Concern Yes     Comment: not sleeping well    Stress Concern Yes     Comment: Grandson in the     Weight Concern Yes    Special Diet No    Back Care No    Exercise No    Seat Belt Yes    Self-Exams Yes    Parent/sibling w/ CABG, MI or angioplasty before 65F 55M? No     Social Determinants of Health      [facility]    Social Connections   Interpersonal Safety: Not At Risk (2021)    Humiliation, Afraid, Rape, and Kick questionnaire     Fear of Current or Ex-Partner: No     Emotionally Abused: No     Physically Abused: No     Sexually Abused: No           Lab Results    Recent Results (from the past 240 hour(s))   Glucose by meter    Collection Time: 24  9:09 AM   Result Value Ref Range    GLUCOSE BY METER POCT 135 (H) 70 - 99 mg/dL       Imaging Results    PET Oncology (Eyes to Thighs)    Result Date: 2024  EXAM: PET ONCOLOGY (EYES TO THIGHS) LOCATION: [facility] DATE: 2024 INDICATION: Subsequent treatment planning and restaging for malignant neoplasm of overlapping sites of right female breast and malignant neoplasm of overlapping sites of left female breast. Remote history of bilateral breast cancer. Now  with right cervical adenopathy. Additional history of metastatic small bowel low-grade neuroendocrine tumor. COMPARISON: CT chest 03/20/2024. CT neck 03/20/2024. DOTATATE PET/CT 07/21/2023. CONTRAST: None. TECHNIQUE: Serum glucose level 135 mg/dL. One hour post intravenous administration of 12.43 mCi F-18 FDG, PET imaging was performed from the skull vertex to mid thighs, utilizing attenuation correction with concurrent axial CT and PET/CT image fusion. PET/CT FINDINGS: FDG avid right cervical, right supraclavicular, and right axillary adenopathy compatible with biopsy-proven metastatic disease. Index right lower level 5 cervical node measures 1.3 x 1.3 cm with SUV max of 6.3. Irregular nodular pulmonary opacity in the left upper lobe measures 3.9 x 1.2 cm and demonstrates focal hypermetabolic uptake medially with SUV max of 8.1. There is asymmetric interlobular septal thickening of the left lung which demonstrates mild FDG avidity. FDG uptake within right lower paratracheal and subcarinal lymph nodes with right lower paratracheal node measuring 1.2 x 2.0 cm with SUV max of 6.4. Mildly FDG avid lytic lesion of the left lateral third rib with SUV max of 4.1. The DOTATATE metastatic lesion of the left S1 vertebral body is also FDG avid with SUV max of 11.1 with sclerosis appearing increased in extent compared to prior. Neuroendocrine metastases to the liver do not demonstrate significant FDG avidity. Mildly FDG avid right greater than left inguinal lymph nodes do not appear significantly changed in size from prior and are favored reactive. Chondroid lesion of the right proximal tibial metaphysis with mild FDG uptake. Mild inflammatory uptake associated with injection granulomas of the lower back subcutaneous fat. Right knee synovitis. CT FINDINGS: Mild intracranial senescent changes. Moderate coronary artery calcifications. Mild centrilobular emphysema. Cholecystectomy. Non-FDG avid mass in the right hepatic lobe  appears similar in size and does not demonstrate definite FDG avidity, compatible with known metastatic neuroendocrine tumor. Right hemicolectomy. Hysterectomy. Asymmetric muscular atrophy of the left lower extremity. Degenerative changes of the spine.     IMPRESSION: 1.  FDG avid right cervical, supraclavicular, and axillary lymph nodes compatible with biopsy-proven metastatic disease. There is also FDG avid disease involving right lower paratracheal and subcarinal lymph nodes, left upper lobe of the lung, and left lateral third rib. Note is made of the patient's history of multiple prior malignancies and the inconclusive pathology results. The appearance of the lesion in the left lung is concerning for a possible primary lung malignancy arising at a site of previous radiation change. There is also asymmetric interlobular septal thickening of the left lung which is mildly FDG avid concerning for lymphangitic spread. Findings could represent metastatic lung cancer. Metastatic disease from previous malignancy remains possible. Recommend correlation with pathology results. 2.  Patient also has a history of metastatic neuroendocrine tumor. The metastatic lesion of the left sacrum demonstrates increased FDG uptake which can be seen with more aggressive transformation of neuroendocrine tumor metastases. The remaining sites of  previously seen DOTATATE avid metastatic disease do not demonstrate significant FDG uptake. 3.  Chondroid lesion of the right proximal tibia with mild FDG uptake. Recommend MRI of the right leg for further evaluation.     Pathology report of 3/22/2024  Final Diagnosis   Lymph node, right neck, core biopsy:  -Scant tissue sample with metastatic poorly differentiated carcinoma, please see description      Electronically signed by Amber Meléndez MD on 3/28/2024 at 11:39 AM   Clinical Information    Chart review identifies prior clinical history as: Breast cancer (Diana grade 2 left  "side, Luda grade 3 right side), cervical cancer and carcinoid tumor of large intestine        Most recent oncology note:     March 22, 2024     Reason for visit:  Marissa Guadarrama is a 73 year old from [place] accompanied by her , Gaudencio, who presents for oncologic reevaluation with a history of a metastatic low-grade small bowel neuroendocrine tumor (carcinoid tumor) on lanreotide and breast cancer on exemestane     Impression:  Asymptomatic metastatic low-grade neuroendocrine cancer on lanreotide, s/p 12/15/2023 left sacral bone radiation  History of bilateral 2016 ER/RI positive (left, Er positive/RI negative right) bilateral breast cancer on exemestane with new right supraclavicular adenopathy suspicious for breast cancer recurrence   Gross Description    A(1). Lymph Node(s), Neck, Right, :  The specimen is received in formalin and RPMI, labeled with the patient's name, medical record number and other identifying information designated \"right neck lymph node\". It consists of a 0.1 cm aggregate of extensively scanty probable disrupted needle cores.  The specimen is filtered and entirely submitted in 1 cassette     Note: The clinical diagnosis is malignant neoplasm of upper outer breast.  Per Dr. Meléndez, the specimen is combined and submitted for routine processing. (TIMOTHY Borja)   Microscopic Description    The tissue sample is quite scanty in the histology lab was notified of this to apply measures to conserve it  carefully for special studies/stains.       The sample shows lymph node with metastatic carcinoma which is pleomorphic.  The cells are discohesive.  The history of prior squamous cell carcinoma of the cervix, low-grade neuroendocrine tumor of the intestine (metastatic) and breast cancer are noted.  The findings do not suggest a neuroendocrine neoplasm and the nuclear grade is high.  A limited battery of immunohistochemical stains is performed due to the scanty nature of tissue present " for review.  Those studies include CK7 (positive), CK20(positive), S100,(negative) GATA3(negative), p40(negative) and estrogen receptor(negative).  Appropriately staining control tissues were reviewed concurrently and the results are as described.       The findings are those of poorly differentiated carcinoma and the site of origin is not clearly identified.  Breast cancer appears unlikely as that tumor is negative for CK20.  This patient had an estrogen receptor positive tumor and that stain and the GATA3 are also negative.  The right sided breast cancer is noted to have been high nuclear grade and slides from that excision ([id number] D) are reviewed in comparison and the tumors are dissimilar histologically.  This is not metastatic neuroendocrine carcinoma.  The patient has a remote history of cervical cancer and the p40 stain which would be positive in a squamous lesion is negative.  The sample is quite tiny without additional sample available for more special studies.  Intradepartmental consultation obtained with agreement.     The possibility of a new primary may be considered.  Clinical correlation is needed.  The current biopsy sample has no residual tumor for more assessment.       I spent 57 minutes on the patient's visit today.  This included preparation for the visit, face-to-face time with the patient and documentation following the visit.  It did not include teaching or procedure time.    Signed by: Peter E. Friedell, MD

## 2024-05-03 NOTE — LETTER
"    5/3/2024         RE: Marissa Guadarrama  [address] [place] [id number]        Dear Colleague,    Thank you for referring your patient, Marissa Guadarrama, to the [facility] [place]. Please see a copy of my visit note below.    Oncology Rooming Note    May 3, 2024 3:20 PM   Marissa Guadarrama is a 73 year old female who presents for:    Chief Complaint   Patient presents with     Oncology Clinic Visit     Malignant carcinoid tumor of cecum - provider visit only     Initial Vitals: There were no vitals taken for this visit. Estimated body mass index is 32.08 kg/m  as calculated from the following:    Height as of 3/22/24: 1.727 m (5' 8\").    Weight as of 3/22/24: 95.7 kg (211 lb). There is no height or weight on file to calculate BSA.  Data Unavailable Comment: Data Unavailable   No LMP recorded. Patient has had a hysterectomy.  Allergies reviewed: Yes  Medications reviewed: Yes    Medications: Medication refills not needed today.  Pharmacy name entered into [employer]: [id number] - [place], [place] - [address] [place] [address]    Frailty Screening:   Is the patient here for a new oncology consult visit in cancer care? 2. No      Clinical concerns: Questions about lower extremity swelling - specifically her left ankle.        Nicole Mascorro [facility] Hematology and Oncology Follow-up Note    Patient: Marissa Guadarrama  MRN: [id number]  Date of Service: May 3, 2024       Reason for Visit    Cancer with unknown primary site.      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  A nodule in the left upper lobe of the lung may be the primary site.  Unfortunately there was insufficient tissue to allow for " additional tests.  I will discuss with interventional radiology as to the best site to obtain additional tissue.      ______________________________________________________________________________    ECOG Performance = 1    History of Present Illness  Disease history per Dr. 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 Elfego Lawrence and then Dr. Hosea King.     However, a follow-up 2/10/2022 Octreoscan scan confirmed evidence of progressive disease for which she was started on lanreotide every 4 weeks with a baseline chromogranin A level of 824.  The chromogranin A joslyn was 445 on 1/6/2023 and 562 on 7/28/2023.      Review of her 10/13/2023 DEXA scan revealed 14% decrease in bone density with left hip T-score -3.1.  Left femoral neck T-score was -2.6.  She is on annual zoledronic acid.     In December, 2023 she received 5000 cGy/5 fractions by 12/15/2023 with complete resolution of left sacral pain referable to a presumptive metastatic neuroendocrine metastasis.  A July, 2023 dotatate PET scan had revealed stable disease except for the increased size and gluco-avidity of this left sclerotic sacral lesion.  She continues on monthly lanreotide.     Past medical history:  History of 2016 bilateral ER positive breast cancer on exemestane.  Both tumors were 100% ER/ME+, HER2 neg and patient declined Oncotype DX testing since she indicated she would never want to receive adjuvant systemic chemotherapy.  She had been initially started on anastrozole but developed a skin rash and was switched to exemestane    When last seen on 2/23/2024, new right supraclavicular lymphadenopathy was noted.  The CEA was 56.6 compared to a level 12.8 on 7/14/2023. The CA 27.29 was 57.7 compared to 22 when  "it was last obtained on 1/23/2022.  3/8/2024 CT soft tissue of neck and chest revealed multiple right cervical level 3-5 lymphadenopathy.  Chest imaging revealed mild enlarged mediastinal and hilar lymphadenopathy which could be related to her neuroendocrine tumor.     3/8/2024 she underwent IR biopsy of her right cervical lymph nodes today which was well-tolerated.  Pathology showed a poorly differentiated carcinoma.  It was CK7 and CK20 positive.  There was insufficient tissue to make further identification of the primary or to determine treatment options.    At this visit patient remains asymptomatic.  She is maintaining her weight.  Her appetite and digestion are normal.  She does not have chills, fevers or sweats.  She does not have cough, chest pain or shortness of breath at rest.  She has no change in bowel or bladder habit.  Her activity is limited from polio in childhood she uses a walker to help with ambulation.  She quit smoking in 2006 after 29 pack years of cigarettes    Review of systems.  Pertinent Findings are included in the History of Present Illness    Physical Exam    BP (!) 204/80 (BP Location: Right arm, Patient Position: Sitting, Cuff Size: Adult Regular)   Pulse 64   Temp 98  F (36.7  C) (Tympanic)   Ht 1.727 m (5' 8\")   Wt 95.1 kg (209 lb 11.2 oz)   SpO2 95%   BMI 31.88 kg/m       GENERAL APPEARANCE: 73-year-old woman in no apparent distress.  HEAD: Atraumatic; normocephalic; without lesions.  EYES: Conjunctiva, corneas and eyelids normal; pupils equal, round, reactive to light; No Icterus.  MOUTH/OROPHARYNX: Oral mucosa intact  NECK: Supple with no nodes.  LUNGS:  Clear to auscultation and percussion with no extra sounds.  HEART: Regular rhythm and rate; S1 and S2 normal; no murmurs noted.  ABDOMEN: Soft; no masses or tenderness, no hepatosplenomegaly.  NEUROLOGIC: Alert and oriented.  No obvious focal findings.  EXTREMITIES: No cyanosis, or edema.  SKIN: No abnormal bruising or " bleeding. No suspicious lesions noted on exposed skin.  PSYCHIATRIC: Mental status normal; no apparent psychiatric issues    Medications:    Current Outpatient Medications   Medication Sig Dispense Refill     Calcium Carbonate (CALCIUM 500 PO) 2 tablets daily       exemestane (AROMASIN) 25 MG tablet Take 1 tablet (25 mg) by mouth every morning 90 tablet 1     hydroCHLOROthiazide 12.5 MG tablet Take 1 tablet (12.5 mg) by mouth every morning Please make a clinic visit to be seen in person for medication refills.  No virtual visits.  Thank you. 90 tablet 0     bisacodyl (DULCOLAX) 5 MG EC tablet Take 2 tablets at 3 pm the day before your procedure. If your procedure is before 11 am, take 2 additional tablets at 11 pm. If your procedure is after 11 am, take 2 additional tablets at 6 am. For additional instructions refer to your colonoscopy prep instructions. (Patient not taking: Reported on 12/29/2023) 4 tablet 0     polyethylene glycol (GOLYTELY) 236 g suspension The night before the exam at 6 pm drink an 8-ounce glass every 15 minutes until the jug is half empty. If you arrive before 11 AM: Drink the other half of the Golytely jug at 11 PM night before procedure. If you arrive after 11 AM: Drink the other half of the Golytely jug at 6 AM day of procedure. For additional instructions refer to your colonoscopy prep instructions. (Patient not taking: Reported on 12/29/2023) 4000 mL 0     SENNA-docusate sodium (SENNA S) 8.6-50 MG tablet Take 1 tablet by mouth 2 times daily as needed (prevent opioid induced constipation) (Patient not taking: Reported on 7/14/2023) 30 tablet 0     No current facility-administered medications for this visit.           Past History    Past Medical History:   Diagnosis Date     Arthritis     knee     Benign breast biopsy     benign     Breast cancer (H)      Carcinoid tumor (H28) 12/2003     Cervical cancer (H) 2007     H/O colposcopy with cervical biopsy 12/23/2013    vaginal cuff biopsy-  VAIN III. referred back to gyn/onc     HTN      Hyperlipidemia      Malignant neoplasm of ovary (H)      Obesity      Pap smear of vagina with ASC-H 11/01/2013     Post-polio syndromes      Trigeminal neuralgias      Past Surgical History:   Procedure Laterality Date     APPENDECTOMY  01/01/1983     BIOPSY BREAST       BIOPSY NODE SENTINEL Bilateral 06/01/2016    Procedure: BIOPSY NODE SENTINEL;  Surgeon: Brent Arana MD;  Location: [facility]     [facility] SURGICAL PATHOLOGY       COLONOSCOPY N/A 06/23/2017    Procedure: COMBINED COLONOSCOPY, SINGLE OR MULTIPLE BIOPSY/POLYPECTOMY BY BIOPSY;  Colonoscopy Dx:Carcinoid tumor [facility];  Surgeon: Talisha Greco MD;  Location: [facility]     COLPOSCOPY, BIOPSY, COMBINED  03/13/2014    Procedure: COMBINED COLPOSCOPY, BIOPSY;;  Surgeon: Lara Pack MD;  [facility]: [facility]     COMBINED CYSTOSCOPY, RETROGRADES, EXCHANGE STENT URETER(S) Left 02/07/2019    Procedure: COMBINED CYSTOSCOPY, RETROGRADES, EXCHANGE STENT URETER--left;  Surgeon: Zhao La MD;  Location: [facility]     COMBINED CYSTOSCOPY, RETROGRADES, EXCHANGE STENT URETER(S) Left 02/20/2020    Procedure: CYSTOSCOPY, WITH RETROGRADE PYELOGRAM AND Left URETERAL STENT exchange;  Surgeon: Zhao La MD;  Location: [facility]     COMBINED CYSTOSCOPY, RETROGRADES, EXCHANGE STENT URETER(S) Left 05/09/2021    Procedure: CYSTOSCOPY, WITH RETROGRADE PYELOGRAM AND LEFT URETERAL STENT REPLACEMENT;  Surgeon: Fred Owens MD;  [facility]     COMBINED CYSTOSCOPY, RETROGRADES, EXCHANGE STENT URETER(S) Left 09/16/2021    Procedure: CYSTOSCOPY, WITH left RETROGRADE PYELOGRAM AND left  URETERAL STENT REPLACEMENT;  Surgeon: Zhao La MD;  Location: [facility]     COMBINED CYSTOSCOPY, RETROGRADES, EXCHANGE STENT URETER(S) Left 03/24/2022    Procedure: CYSTOSCOPY, WITH RETROGRADE PYELOGRAM AND URETERAL STENT EXCHANGE, LEFT;  Surgeon: Zhao La,  MD;  Location: [facility]     COMBINED CYSTOSCOPY, RETROGRADES, URETEROSCOPY, INSERT STENT Left 02/02/2017    Procedure: COMBINED CYSTOSCOPY, RETROGRADES, URETEROSCOPY, INSERT STENT;  Surgeon: Zhao La MD;  Location: [facility]     CYSTOSCOPY, REMOVE STENT(S), COMBINED N/A 11/4/2022    Procedure: CYSTOSCOPY, LEFT STENT REMOVAL;  Surgeon: Zhao La MD;  Location: [facility]     CYSTOSCOPY, RETROGRADES, INSERT STENT URETER(S), COMBINED Left 09/07/2017    Procedure: COMBINED CYSTOSCOPY, RETROGRADES, INSERT STENT URETER(S);  Cystoscopy,Left Stent Exchange;  Surgeon: Zhao La MD;  Location: [facility]     CYSTOSCOPY, RETROGRADES, INSERT STENT URETER(S), COMBINED  12/12/2017    Procedure: COMBINED CYSTOSCOPY, RETROGRADES, INSERT STENT URETER(S);;  Surgeon: Zhao La MD;  [facility]     CYSTOSCOPY, RETROGRADES, INSERT STENT URETER(S), COMBINED Left 07/05/2018    Procedure: COMBINED CYSTOSCOPY, RETROGRADES, INSERT STENT URETER(S);  Cystoscopy, Left Stent Exchange;  Surgeon: Zhao La MD;  Location: [facility]     DAVINCI NEPHRECTOMY Left 11/4/2022    Procedure: , LEFT NEPHRECTOMY, ROBOT-ASSISTED;  Surgeon: Zhao La MD;  [facility]: [facility]     EXAM UNDER ANESTHESIA PELVIC  03/13/2014    Procedure: EXAM UNDER ANESTHESIA PELVIC;  Exam Under Anestheisa, Colposcopy, Vaginal Biopsies, Co2 Laser of the Upper Vagina;  Surgeon: Lara Pack MD;  [facility]     EXAM UNDER ANESTHESIA PELVIC N/A 07/01/2020    Procedure: Examination under anesthesia, vaginal biopsies;  Surgeon: Karli Gao MD;  [facility]     HERNIORRHAPHY INCISIONAL (LOCATION)       IR RHIZOTOMY  08/14/2020     LASER CO2 VAGINA  03/13/2014    VAIN 1/2     LASER CO2 VAGINA N/A 12/12/2017    Procedure: LASER CO2 VAGINA;  Exam Under Anesthesia, CO2 Laser Ablation Of Vagina, Cystoscopy, Left Retrograde Pyelogram with Left Stent Placement;  Surgeon: Nic Segundo  MD;  Location[facility]     LUMPECTOMY BREAST       LUMPECTOMY BREAST WITH SEED LOCALIZATION Bilateral 2016    Procedure: LUMPECTOMY BREAST WITH SEED LOCALIZATION;  Surgeon: Brent Arana MD;  Location: [facility]     SURGICAL HISTORY OF -       ovarian cystectomy     SURGICAL HISTORY OF -   2003    right colon resection secondary to carcinoid tumor     TUBAL LIGATION       ZZC BSO, OMENTECTOMY W/OSMAN  2007     ZZC TOTAL ABDOM HYSTERECTOMY  2007     No Known Allergies  Family History   Problem Relation Age of Onset     Cancer Mother         bone / liver     Breast Cancer Mother      Cancer Sister         vulvar ca, cervical ca, squamous cell cancer     Breast Cancer Sister      Cancer Brother         Rectal- Stage 4     Rectal Cancer Brother      Cancer Maternal Grandmother      Cancer Maternal Grandfather      Cancer Paternal Grandmother      Cancer Paternal Grandfather      Breast Cancer Maternal Aunt      Breast Cancer Paternal Aunt      Colon Cancer Paternal Aunt      Pancreatic Cancer Nephew      Anesthesia Reaction No family hx of      Venous thrombosis No family hx of      Bleeding Disorder No family hx of      Social History     Socioeconomic History     Marital status:      Spouse name: None     Number of children: None     Years of education: None     Highest education level: None   Tobacco Use     Smoking status: Former     Current packs/day: 0.00     Average packs/day: 1 pack/day for 29.0 years (29.0 ttl pk-yrs)     Types: Cigarettes     Start date: 10/30/1977     Quit date: 10/30/2006     Years since quittin.5     Smokeless tobacco: Never   Vaping Use     Vaping status: Never Used   Substance and Sexual Activity     Alcohol use: No     Alcohol/week: 0.0 standard drinks of alcohol     Comment: 1 drink per year     Drug use: No     Sexual activity: Yes     Partners: Male   Other Topics Concern      Service No     Blood Transfusions No     Caffeine Concern Yes      Comment: occasional coffee     Occupational Exposure No     Hobby Hazards Yes     Comment: [place],     Sleep Concern Yes     Comment: not sleeping well     Stress Concern Yes     Comment: Grandson in the      Weight Concern Yes     Special Diet No     Back Care No     Exercise No     Seat Belt Yes     Self-Exams Yes     Parent/sibling w/ CABG, MI or angioplasty before 65F 55M? No     Social Determinants of Health      Received from [facility], [facility]    Social Connections   Interpersonal Safety: Not At Risk (8/17/2021)    Humiliation, Afraid, Rape, and Kick questionnaire      Fear of Current or Ex-Partner: No      Emotionally Abused: No      Physically Abused: No      Sexually Abused: No           Lab Results    Recent Results (from the past 240 hour(s))   Glucose by meter    Collection Time: 04/26/24  9:09 AM   Result Value Ref Range    GLUCOSE BY METER POCT 135 (H) 70 - 99 mg/dL       Imaging Results    PET Oncology (Eyes to Thighs)    Result Date: 4/26/2024  EXAM: PET ONCOLOGY (EYES TO THIGHS) LOCATION: [facility] DATE: 4/26/2024 INDICATION: Subsequent treatment planning and restaging for malignant neoplasm of overlapping sites of right female breast and malignant neoplasm of overlapping sites of left female breast. Remote history of bilateral breast cancer. Now with right cervical adenopathy. Additional history of metastatic small bowel low-grade neuroendocrine tumor. COMPARISON: CT chest 03/20/2024. CT neck 03/20/2024. DOTATATE PET/CT 07/21/2023. CONTRAST: None. TECHNIQUE: Serum glucose level 135 mg/dL. One hour post intravenous administration of 12.43 mCi F-18 FDG, PET imaging was performed from the skull vertex to mid thighs, utilizing attenuation correction with concurrent axial CT and PET/CT image fusion. PET/CT FINDINGS: FDG avid right cervical, right supraclavicular, and right axillary adenopathy  compatible with biopsy-proven metastatic disease. Index right lower level 5 cervical node measures 1.3 x 1.3 cm with SUV max of 6.3. Irregular nodular pulmonary opacity in the left upper lobe measures 3.9 x 1.2 cm and demonstrates focal hypermetabolic uptake medially with SUV max of 8.1. There is asymmetric interlobular septal thickening of the left lung which demonstrates mild FDG avidity. FDG uptake within right lower paratracheal and subcarinal lymph nodes with right lower paratracheal node measuring 1.2 x 2.0 cm with SUV max of 6.4. Mildly FDG avid lytic lesion of the left lateral third rib with SUV max of 4.1. The DOTATATE metastatic lesion of the left S1 vertebral body is also FDG avid with SUV max of 11.1 with sclerosis appearing increased in extent compared to prior. Neuroendocrine metastases to the liver do not demonstrate significant FDG avidity. Mildly FDG avid right greater than left inguinal lymph nodes do not appear significantly changed in size from prior and are favored reactive. Chondroid lesion of the right proximal tibial metaphysis with mild FDG uptake. Mild inflammatory uptake associated with injection granulomas of the lower back subcutaneous fat. Right knee synovitis. CT FINDINGS: Mild intracranial senescent changes. Moderate coronary artery calcifications. Mild centrilobular emphysema. Cholecystectomy. Non-FDG avid mass in the right hepatic lobe appears similar in size and does not demonstrate definite FDG avidity, compatible with known metastatic neuroendocrine tumor. Right hemicolectomy. Hysterectomy. Asymmetric muscular atrophy of the left lower extremity. Degenerative changes of the spine.     IMPRESSION: 1.  FDG avid right cervical, supraclavicular, and axillary lymph nodes compatible with biopsy-proven metastatic disease. There is also FDG avid disease involving right lower paratracheal and subcarinal lymph nodes, left upper lobe of the lung, and left lateral third rib. Note is made of  the patient's history of multiple prior malignancies and the inconclusive pathology results. The appearance of the lesion in the left lung is concerning for a possible primary lung malignancy arising at a site of previous radiation change. There is also asymmetric interlobular septal thickening of the left lung which is mildly FDG avid concerning for lymphangitic spread. Findings could represent metastatic lung cancer. Metastatic disease from previous malignancy remains possible. Recommend correlation with pathology results. 2.  Patient also has a history of metastatic neuroendocrine tumor. The metastatic lesion of the left sacrum demonstrates increased FDG uptake which can be seen with more aggressive transformation of neuroendocrine tumor metastases. The remaining sites of  previously seen DOTATATE avid metastatic disease do not demonstrate significant FDG uptake. 3.  Chondroid lesion of the right proximal tibia with mild FDG uptake. Recommend MRI of the right leg for further evaluation.     Pathology report of 3/22/2024  Final Diagnosis   Lymph node, right neck, core biopsy:  -Scant tissue sample with metastatic poorly differentiated carcinoma, please see description      Electronically signed by Amber Meléndez MD on 3/28/2024 at 11:39 AM   Clinical Information    Chart review identifies prior clinical history as: Breast cancer (Luda grade 2 left side, Henrietta grade 3 right side), cervical cancer and carcinoid tumor of large intestine        Most recent oncology note:     March 22, 2024     Reason for visit:  Marissa Guadarrama is a 73 year old from [place] accompanied by her , Gaudencio, who presents for oncologic reevaluation with a history of a metastatic low-grade small bowel neuroendocrine tumor (carcinoid tumor) on lanreotide and breast cancer on exemestane     Impression:  Asymptomatic metastatic low-grade neuroendocrine cancer on lanreotide, s/p 12/15/2023 left sacral bone  "radiation  History of bilateral 2016 ER/WV positive (left, Er positive/WV negative right) bilateral breast cancer on exemestane with new right supraclavicular adenopathy suspicious for breast cancer recurrence   Gross Description    A(1). Lymph Node(s), Neck, Right, :  The specimen is received in formalin and RPMI, labeled with the patient's name, medical record number and other identifying information designated \"right neck lymph node\". It consists of a 0.1 cm aggregate of extensively scanty probable disrupted needle cores.  The specimen is filtered and entirely submitted in 1 cassette     Note: The clinical diagnosis is malignant neoplasm of upper outer breast.  Per Dr. Meléndez, the specimen is combined and submitted for routine processing. (TIMOTHY Borja)   Microscopic Description    The tissue sample is quite scanty in the histology lab was notified of this to apply measures to conserve it  carefully for special studies/stains.       The sample shows lymph node with metastatic carcinoma which is pleomorphic.  The cells are discohesive.  The history of prior squamous cell carcinoma of the cervix, low-grade neuroendocrine tumor of the intestine (metastatic) and breast cancer are noted.  The findings do not suggest a neuroendocrine neoplasm and the nuclear grade is high.  A limited battery of immunohistochemical stains is performed due to the scanty nature of tissue present for review.  Those studies include CK7 (positive), CK20(positive), S100,(negative) GATA3(negative), p40(negative) and estrogen receptor(negative).  Appropriately staining control tissues were reviewed concurrently and the results are as described.       The findings are those of poorly differentiated carcinoma and the site of origin is not clearly identified.  Breast cancer appears unlikely as that tumor is negative for CK20.  This patient had an estrogen receptor positive tumor and that stain and the GATA3 are also negative.  The right sided " breast cancer is noted to have been high nuclear grade and slides from that excision ([id number] D) are reviewed in comparison and the tumors are dissimilar histologically.  This is not metastatic neuroendocrine carcinoma.  The patient has a remote history of cervical cancer and the p40 stain which would be positive in a squamous lesion is negative.  The sample is quite tiny without additional sample available for more special studies.  Intradepartmental consultation obtained with agreement.     The possibility of a new primary may be considered.  Clinical correlation is needed.  The current biopsy sample has no residual tumor for more assessment.       I spent 57 minutes on the patient's visit today.  This included preparation for the visit, face-to-face time with the patient and documentation following the visit.  It did not include teaching or procedure time.    Signed by: Peter E. Friedell, MD          Again, thank you for allowing me to participate in the care of your patient.        Sincerely,        Peter E. Friedell, MD

## 2024-05-03 NOTE — PROGRESS NOTES
"Oncology Rooming Note    May 3, 2024 3:20 PM   Marissa Guadarrama is a 73 year old female who presents for:    Chief Complaint   Patient presents with    Oncology Clinic Visit     Malignant carcinoid tumor of cecum - provider visit only     Initial Vitals: There were no vitals taken for this visit. Estimated body mass index is 32.08 kg/m  as calculated from the following:    Height as of 3/22/24: 1.727 m (5' 8\").    Weight as of 3/22/24: 95.7 kg (211 lb). There is no height or weight on file to calculate BSA.  Data Unavailable Comment: Data Unavailable   No LMP recorded. Patient has had a hysterectomy.  Allergies reviewed: Yes  Medications reviewed: Yes    Medications: Medication refills not needed today.  Pharmacy name entered into [employer]: [facility] #[id number] - [address]    Frailty Screening:   Is the patient here for a new oncology consult visit in cancer care? 2. No      Clinical concerns: Questions about lower extremity swelling - specifically her left ankle.        Nicole Mascorro MA            "

## 2024-05-10 ENCOUNTER — TELEPHONE (OUTPATIENT)
Dept: OTOLARYNGOLOGY | Facility: CLINIC | Age: 74
End: 2024-05-10
Payer: COMMERCIAL

## 2024-05-10 NOTE — TELEPHONE ENCOUNTER
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  Based on the availability of our provider(s), we are unable to accommodate this request.    Were all sites offered this patient?  [facility] due to lymph nodes      Needs excisional biopsy of right cervical LN due to unknown primary. Previous FNA biopsy was insufficient for diagnosis     Does scheduling algorithm request to schedule next available?  Patient appointment has not been scheduled.  Please review the referral request for accommodation and contact the patient.  If unable to accommodate, please resubmit a referral and indicate a preferred partner or affiliate location using Provider Finder or Scheduling Instructions field.

## 2024-05-13 NOTE — TELEPHONE ENCOUNTER
Patient confirmed scheduled appointment:  Date: 5/20  Time: 3:20  Visit type: New ENT   Provider: Dr. Rogers  Location: [facility] [place]  Testing/imaging:   Additional notes:

## 2024-05-13 NOTE — TELEPHONE ENCOUNTER
FUTURE VISIT INFORMATION      FUTURE VISIT INFORMATION:  Date: 5/20/24  Time: 3:20 PM  Location: [facility] [place] - ENT  REFERRAL INFORMATION:  Referring provider:    Referring providers clinic:    Reason for visit/diagnosis:  C77.8, C80.1 (ICD-10-CM) - Carcinoma metastatic to lymph nodes     Needs excisional biopsy of right cervical LN due to unknown primary. Previous FNA biopsy was insufficient for diagnosis     RECORDS REQUESTED FROM      Clinic name Comments Records Status Imaging Status   [facility] [place] -Oncology 5/3/24 referral/ note- Friedell, Peter E, MD  [facility] 2/16/24 note- Kahlil Manuel MD  [facility] 4/26/24 PET  3/22/24 US biopsy core lymph  3/8/24 CT neck + CT chest  *additional in pacs [facility] PACS   [facility]  [address] 1st floor, Room [address]  [facility] [address]  ph: [phone]  fax: [phone] 3/22/2024 Surgical Pathology Exam: [id number]   Specimen:    Lymph Node(s), Neck, Right  Final Diagnosis   Lymph node, right neck, core biopsy:  -Scant tissue sample with metastatic poorly differentiated carcinoma, please see description   [employer]: [id number] Epic    Path req 5/13/24    Received path 5/15/24            May 13, 2024 12:13 PM - req for path slides - Lizette    Action May 15, 2024 2:04 PM Madhuri Vivar Taken Slides from [facility] received and taken to 5th floor path lab for review.

## 2024-05-15 ENCOUNTER — PATIENT OUTREACH (OUTPATIENT)
Dept: ONCOLOGY | Facility: CLINIC | Age: 74
End: 2024-05-15
Payer: COMMERCIAL

## 2024-05-15 DIAGNOSIS — C80.1 CARCINOMA METASTATIC TO LYMPH NODES OF MULTIPLE SITES WITH UNKNOWN PRIMARY SITE (H): Primary | ICD-10-CM

## 2024-05-15 DIAGNOSIS — C77.8 CARCINOMA METASTATIC TO LYMPH NODES OF MULTIPLE SITES WITH UNKNOWN PRIMARY SITE (H): Primary | ICD-10-CM

## 2024-05-16 ENCOUNTER — LAB REQUISITION (OUTPATIENT)
Dept: LAB | Facility: CLINIC | Age: 74
End: 2024-05-16
Payer: MEDICARE

## 2024-05-17 ENCOUNTER — INFUSION THERAPY VISIT (OUTPATIENT)
Dept: INFUSION THERAPY | Facility: CLINIC | Age: 74
End: 2024-05-17
Attending: INTERNAL MEDICINE
Payer: MEDICARE

## 2024-05-17 ENCOUNTER — LAB (OUTPATIENT)
Dept: LAB | Facility: CLINIC | Age: 74
End: 2024-05-17
Payer: MEDICARE

## 2024-05-17 VITALS
TEMPERATURE: 98.8 F | DIASTOLIC BLOOD PRESSURE: 70 MMHG | SYSTOLIC BLOOD PRESSURE: 151 MMHG | RESPIRATION RATE: 16 BRPM | HEART RATE: 75 BPM

## 2024-05-17 DIAGNOSIS — C77.8 CARCINOMA METASTATIC TO LYMPH NODES OF MULTIPLE SITES WITH UNKNOWN PRIMARY SITE (H): ICD-10-CM

## 2024-05-17 DIAGNOSIS — D3A.021: Primary | ICD-10-CM

## 2024-05-17 DIAGNOSIS — C80.1 CARCINOMA METASTATIC TO LYMPH NODES OF MULTIPLE SITES WITH UNKNOWN PRIMARY SITE (H): ICD-10-CM

## 2024-05-17 DIAGNOSIS — C78.6 MALIGNANT NEOPLASM METASTATIC TO PERITONEUM (H): ICD-10-CM

## 2024-05-17 DIAGNOSIS — C7A.021 MALIGNANT CARCINOID TUMOR OF CECUM (H): Primary | ICD-10-CM

## 2024-05-17 DIAGNOSIS — C7A.021 MALIGNANT CARCINOID TUMOR OF CECUM (H): ICD-10-CM

## 2024-05-17 LAB
HOLD SPECIMEN: NORMAL
LDH SERPL L TO P-CCNC: 183 U/L (ref 0–250)

## 2024-05-17 PROCEDURE — 99207 PR NO CHARGE NURSE ONLY: CPT

## 2024-05-17 PROCEDURE — 250N000011 HC RX IP 250 OP 636: Performed by: INTERNAL MEDICINE

## 2024-05-17 PROCEDURE — 86316 IMMUNOASSAY TUMOR OTHER: CPT | Mod: GA

## 2024-05-17 PROCEDURE — 36415 COLL VENOUS BLD VENIPUNCTURE: CPT | Mod: GA

## 2024-05-17 PROCEDURE — 83615 LACTATE (LD) (LDH) ENZYME: CPT

## 2024-05-17 PROCEDURE — 96372 THER/PROPH/DIAG INJ SC/IM: CPT | Performed by: INTERNAL MEDICINE

## 2024-05-17 RX ORDER — LANREOTIDE ACETATE 120 MG/.5ML
120 INJECTION SUBCUTANEOUS ONCE
Status: COMPLETED | OUTPATIENT
Start: 2024-05-17 | End: 2024-05-17

## 2024-05-17 RX ORDER — NALOXONE HYDROCHLORIDE 0.4 MG/ML
0.2 INJECTION, SOLUTION INTRAMUSCULAR; INTRAVENOUS; SUBCUTANEOUS
Status: CANCELLED | OUTPATIENT
Start: 2024-05-17

## 2024-05-17 RX ORDER — ALBUTEROL SULFATE 90 UG/1
1-2 AEROSOL, METERED RESPIRATORY (INHALATION)
Status: CANCELLED
Start: 2024-05-17

## 2024-05-17 RX ORDER — DIPHENHYDRAMINE HYDROCHLORIDE 50 MG/ML
50 INJECTION INTRAMUSCULAR; INTRAVENOUS
Status: CANCELLED
Start: 2024-05-17

## 2024-05-17 RX ORDER — EPINEPHRINE 1 MG/ML
0.3 INJECTION, SOLUTION, CONCENTRATE INTRAVENOUS EVERY 5 MIN PRN
Status: CANCELLED | OUTPATIENT
Start: 2024-05-17

## 2024-05-17 RX ORDER — METHYLPREDNISOLONE SODIUM SUCCINATE 125 MG/2ML
125 INJECTION, POWDER, LYOPHILIZED, FOR SOLUTION INTRAMUSCULAR; INTRAVENOUS
Status: CANCELLED
Start: 2024-05-17

## 2024-05-17 RX ORDER — MEPERIDINE HYDROCHLORIDE 25 MG/ML
25 INJECTION INTRAMUSCULAR; INTRAVENOUS; SUBCUTANEOUS EVERY 30 MIN PRN
Status: CANCELLED | OUTPATIENT
Start: 2024-05-17

## 2024-05-17 RX ORDER — ALBUTEROL SULFATE 0.83 MG/ML
2.5 SOLUTION RESPIRATORY (INHALATION)
Status: CANCELLED | OUTPATIENT
Start: 2024-05-17

## 2024-05-17 RX ADMIN — LANREOTIDE ACETATE 120 MG: 120 INJECTION SUBCUTANEOUS at 15:15

## 2024-05-17 NOTE — CONSULTS
Outpatient Neuroradiology/IR Biopsy Referral    Patient is a 73 y/o female with a PMH of tobacco use, a right colonic and terminal ileal resection for a well-differentiated, pT2, pN2 neuroendocrine tumor history of metastases causing left ureteral obstruction in 2020 with subsequent 2022 left nephrectomy and stable liver lesion previously followed by Dr. Elfego Lawrence and then Dr. Hosea King, breast cancer, ER [place] + HER2 neg, recent imaging notes concern for disease progression, s/p right neck core biopsy 3/22/24 not enough tissue completed at [facility]. Dr. Friedell discussed with Dr. Rogers who is recommending Right cervical Lymph node core biopsy. Dr. Friedell notes in his referral core biopsy needed for diagnosis, note states best site to obtain additional tissue.     Dr. Friedell is requesting a supraclavicular LN biopsy, will proceed with NR biopsy that was approved by Dr. Boles.    Neuroradiology has been asked to repeat core biopsy of right cervical lymph node as suggested by DR. Rogers via epic messaging for diagnosis. Previous biopsy 3/22/24 inconclusive for primary cancer.    Pathology report of 3/22/2024      Final Diagnosis   Lymph node, right neck, core biopsy:  -Scant tissue sample with metastatic poorly differentiated carcinoma, please see description     Current Outpatient Medications   Medication Sig Dispense Refill    bisacodyl (DULCOLAX) 5 MG EC tablet Take 2 tablets at 3 pm the day before your procedure. If your procedure is before 11 am, take 2 additional tablets at 11 pm. If your procedure is after 11 am, take 2 additional tablets at 6 am. For additional instructions refer to your colonoscopy prep instructions. (Patient not taking: Reported on 12/29/2023) 4 tablet 0    Calcium Carbonate (CALCIUM 500 PO) 2 tablets daily      exemestane (AROMASIN) 25 MG tablet Take 1 tablet (25 mg) by mouth every morning 90 tablet 1    hydroCHLOROthiazide 12.5 MG tablet Take 1 tablet (12.5 mg) by  mouth every morning Please make a clinic visit to be seen in person for medication refills.  No virtual visits.  Thank you. 90 tablet 0    polyethylene glycol (GOLYTELY) 236 g suspension The night before the exam at 6 pm drink an 8-ounce glass every 15 minutes until the jug is half empty. If you arrive before 11 AM: Drink the other half of the Golytely jug at 11 PM night before procedure. If you arrive after 11 AM: Drink the other half of the Golytely jug at 6 AM day of procedure. For additional instructions refer to your colonoscopy prep instructions. (Patient not taking: Reported on 12/29/2023) 4000 mL 0    SENNA-docusate sodium (SENNA S) 8.6-50 MG tablet Take 1 tablet by mouth 2 times daily as needed (prevent opioid induced constipation) (Patient not taking: Reported on 7/14/2023) 30 tablet 0     No current facility-administered medications for this visit.      Case and imaging was reviewed with Dr. Boles from Neuroradiology and biopsy of a right cervical lymph node level 5 is approved if Cervical LN is the request.    Case and images also reviewed with Dr. Guevara and Dr. Fitch from IR who approve a right axillary or right clavicular lymph node core biopsy.             Imaging CT PE 4/26/24 IMPRESSION:     1.  FDG avid right cervical, supraclavicular, and axillary lymph nodes compatible with biopsy-proven metastatic disease. There is also FDG avid disease involving right lower paratracheal and subcarinal lymph nodes, left upper lobe of the lung, and left   lateral third rib. Note is made of the patient's history of multiple prior malignancies and the inconclusive pathology results. The appearance of the lesion in the left lung is concerning for a possible primary lung malignancy arising at a site of previous radiation change. There is also asymmetric interlobular septal thickening of the left lung which is mildly FDG avid concerning for lymphangitic spread. Findings could represent metastatic lung cancer.  Metastatic disease from previous malignancy remains possible. Recommend correlation with pathology results.  2.  Patient also has a history of metastatic neuroendocrine tumor. The metastatic lesion of the left sacrum demonstrates increased FDG uptake which can be seen with more aggressive transformation of neuroendocrine tumor metastases. The remaining sites of   previously seen DOTATATE avid metastatic disease do not demonstrate significant FDG uptake.  3.  Chondroid lesion of the right proximal tibia with mild FDG uptake. Recommend MRI of the right leg for further evaluation.    Procedure order, surgical pathology and leukemia lymphoma orders placed.    If requesting team would like samples sent for anything else please enter them or notify Neuroradiology prior to scheduled procedure.    Primary team Dr. Friedell and Dr. Rogers made aware of Neuroradiology and IR recommendations via epic messaging.    KAREN Charles CNP  Interventional Radiology   IR on-call pager: [phone]

## 2024-05-19 LAB — CGA SERPL-MCNC: 613 NG/ML

## 2024-05-20 ENCOUNTER — PRE VISIT (OUTPATIENT)
Dept: OTOLARYNGOLOGY | Facility: CLINIC | Age: 74
End: 2024-05-20

## 2024-05-20 LAB
PATH REPORT.COMMENTS IMP SPEC: ABNORMAL
PATH REPORT.COMMENTS IMP SPEC: YES
PATH REPORT.FINAL DX SPEC: ABNORMAL
PATH REPORT.GROSS SPEC: ABNORMAL
PATH REPORT.MICROSCOPIC SPEC OTHER STN: ABNORMAL
PATH REPORT.RELEVANT HX SPEC: ABNORMAL
PATH REPORT.RELEVANT HX SPEC: ABNORMAL
PATH REPORT.SITE OF ORIGIN SPEC: ABNORMAL

## 2024-05-20 NOTE — TELEPHONE ENCOUNTER
LUIS ALFREDO:   Dr. La for leaving a Urine ASAP and ok to start Bactrim DS= AFTER left the urine.    Pt advised and stated understanding / agreement to the specified instructions.    Pauline Farr RN  [facility] [place]   Report from Sending RN:    Report From: Alivia Graves RN Via phone  Recent Surgery of Procedure: No  Baseline Level of Consciousness (LOC): A&Ox3  Oxygen Use: Yes  Type: 2L NC  Diabetic: Yes  Last BP Med Given Day of Dialysis: See MAR  Last Pain Med Given: See MAR  Lab Tests to be Obtained with Dialysis: No  Blood Transfusion to be Given During Dialysis: Yes  Available IV Access: Yes  Medications to be Administered During Dialysis: No  Continuous IV Infusion Running: No  Restraints on Currently or in the Last 24 Hours: No  Hand-Off Communication: DNR DNI; Pt okay to come to the dialysis room; Pt to get blood today, RN says consent still needs obtained.

## 2024-05-23 ENCOUNTER — PATIENT OUTREACH (OUTPATIENT)
Dept: ONCOLOGY | Facility: CLINIC | Age: 74
End: 2024-05-23
Payer: COMMERCIAL

## 2024-05-23 NOTE — CONFIDENTIAL NOTE
"[facility]: Cancer Care                                                                                        Called patients  to try to get patients Lymph node biopsy at the [place] moved up from 6/28 to 6/4 per their first availability, however  declined due to he states he can only do Mondays and Fridays. Then offered to have the  see if she could get her a ride down there on 6/4 and he again declined stating \"she wont ride with anyone else.\" SO patient is now still scheduled for the biopsy on 6/28.  is aware of the plan.      Signature:  Magaly Alex RN   "

## 2024-06-12 DIAGNOSIS — C7A.021 MALIGNANT CARCINOID TUMOR OF CECUM (H): Primary | ICD-10-CM

## 2024-06-12 RX ORDER — LANREOTIDE ACETATE 120 MG/.5ML
120 INJECTION SUBCUTANEOUS ONCE
Status: CANCELLED | OUTPATIENT
Start: 2024-06-14 | End: 2024-06-14

## 2024-06-12 RX ORDER — DIPHENHYDRAMINE HYDROCHLORIDE 50 MG/ML
50 INJECTION INTRAMUSCULAR; INTRAVENOUS
Status: CANCELLED
Start: 2024-06-14

## 2024-06-12 RX ORDER — ALBUTEROL SULFATE 0.83 MG/ML
2.5 SOLUTION RESPIRATORY (INHALATION)
Status: CANCELLED | OUTPATIENT
Start: 2024-06-14

## 2024-06-12 RX ORDER — NALOXONE HYDROCHLORIDE 0.4 MG/ML
0.2 INJECTION, SOLUTION INTRAMUSCULAR; INTRAVENOUS; SUBCUTANEOUS
Status: CANCELLED | OUTPATIENT
Start: 2024-06-14

## 2024-06-12 RX ORDER — EPINEPHRINE 1 MG/ML
0.3 INJECTION, SOLUTION, CONCENTRATE INTRAVENOUS EVERY 5 MIN PRN
Status: CANCELLED | OUTPATIENT
Start: 2024-06-14

## 2024-06-12 RX ORDER — ALBUTEROL SULFATE 90 UG/1
1-2 AEROSOL, METERED RESPIRATORY (INHALATION)
Status: CANCELLED
Start: 2024-06-14

## 2024-06-12 RX ORDER — METHYLPREDNISOLONE SODIUM SUCCINATE 125 MG/2ML
125 INJECTION, POWDER, LYOPHILIZED, FOR SOLUTION INTRAMUSCULAR; INTRAVENOUS
Status: CANCELLED
Start: 2024-06-14

## 2024-06-12 RX ORDER — MEPERIDINE HYDROCHLORIDE 25 MG/ML
25 INJECTION INTRAMUSCULAR; INTRAVENOUS; SUBCUTANEOUS EVERY 30 MIN PRN
Status: CANCELLED | OUTPATIENT
Start: 2024-06-14

## 2024-06-14 ENCOUNTER — LAB (OUTPATIENT)
Dept: LAB | Facility: CLINIC | Age: 74
End: 2024-06-14
Attending: INTERNAL MEDICINE
Payer: MEDICARE

## 2024-06-14 ENCOUNTER — INFUSION THERAPY VISIT (OUTPATIENT)
Dept: INFUSION THERAPY | Facility: CLINIC | Age: 74
End: 2024-06-14
Attending: INTERNAL MEDICINE
Payer: MEDICARE

## 2024-06-14 VITALS
RESPIRATION RATE: 16 BRPM | SYSTOLIC BLOOD PRESSURE: 165 MMHG | HEART RATE: 71 BPM | TEMPERATURE: 98.5 F | DIASTOLIC BLOOD PRESSURE: 81 MMHG

## 2024-06-14 DIAGNOSIS — C7A.021 MALIGNANT CARCINOID TUMOR OF CECUM (H): Primary | ICD-10-CM

## 2024-06-14 DIAGNOSIS — C7A.021 MALIGNANT CARCINOID TUMOR OF CECUM (H): ICD-10-CM

## 2024-06-14 LAB — LDH SERPL L TO P-CCNC: 185 U/L (ref 0–250)

## 2024-06-14 PROCEDURE — 86316 IMMUNOASSAY TUMOR OTHER: CPT | Mod: GA

## 2024-06-14 PROCEDURE — 96372 THER/PROPH/DIAG INJ SC/IM: CPT | Performed by: INTERNAL MEDICINE

## 2024-06-14 PROCEDURE — 36415 COLL VENOUS BLD VENIPUNCTURE: CPT | Mod: GA

## 2024-06-14 PROCEDURE — 250N000011 HC RX IP 250 OP 636: Mod: JZ | Performed by: INTERNAL MEDICINE

## 2024-06-14 PROCEDURE — 83615 LACTATE (LD) (LDH) ENZYME: CPT

## 2024-06-14 RX ORDER — LANREOTIDE ACETATE 120 MG/.5ML
120 INJECTION SUBCUTANEOUS ONCE
Status: COMPLETED | OUTPATIENT
Start: 2024-06-14 | End: 2024-06-14

## 2024-06-14 RX ADMIN — LANREOTIDE ACETATE 120 MG: 120 INJECTION SUBCUTANEOUS at 14:34

## 2024-06-14 ASSESSMENT — PAIN SCALES - GENERAL: PAINLEVEL: NO PAIN (0)

## 2024-06-14 NOTE — PROGRESS NOTES
Infusion Nursing Note:  Marissa Guadarrama presents today for Somatuline.    Patient seen by provider today: No   present during visit today: Not Applicable.    Note: N/A.      Intravenous Access:  N/A.    Treatment Conditions:  Not Applicable.      Post Infusion Assessment:  Patient tolerated injection without incident.       Discharge Plan:   Patient discharged in stable condition accompanied by: self.  Departure Mode: Ambulatory w/ walker.      Caitlyn Mensah RN

## 2024-06-16 LAB — CGA SERPL-MCNC: 555 NG/ML

## 2024-06-19 DIAGNOSIS — C7A.021 MALIGNANT CARCINOID TUMOR OF CECUM (H): Primary | ICD-10-CM

## 2024-06-19 RX ORDER — MEPERIDINE HYDROCHLORIDE 25 MG/ML
25 INJECTION INTRAMUSCULAR; INTRAVENOUS; SUBCUTANEOUS EVERY 30 MIN PRN
Status: CANCELLED | OUTPATIENT
Start: 2024-07-12

## 2024-06-19 RX ORDER — METHYLPREDNISOLONE SODIUM SUCCINATE 125 MG/2ML
125 INJECTION, POWDER, LYOPHILIZED, FOR SOLUTION INTRAMUSCULAR; INTRAVENOUS
Status: CANCELLED
Start: 2024-07-12

## 2024-06-19 RX ORDER — ALBUTEROL SULFATE 0.83 MG/ML
2.5 SOLUTION RESPIRATORY (INHALATION)
Status: CANCELLED | OUTPATIENT
Start: 2024-07-12

## 2024-06-19 RX ORDER — NALOXONE HYDROCHLORIDE 0.4 MG/ML
0.2 INJECTION, SOLUTION INTRAMUSCULAR; INTRAVENOUS; SUBCUTANEOUS
Status: CANCELLED | OUTPATIENT
Start: 2024-07-12

## 2024-06-19 RX ORDER — LANREOTIDE ACETATE 120 MG/.5ML
120 INJECTION SUBCUTANEOUS ONCE
Status: CANCELLED | OUTPATIENT
Start: 2024-07-12 | End: 2024-07-12

## 2024-06-19 RX ORDER — EPINEPHRINE 1 MG/ML
0.3 INJECTION, SOLUTION, CONCENTRATE INTRAVENOUS EVERY 5 MIN PRN
Status: CANCELLED | OUTPATIENT
Start: 2024-07-12

## 2024-06-19 RX ORDER — ALBUTEROL SULFATE 90 UG/1
1-2 AEROSOL, METERED RESPIRATORY (INHALATION)
Status: CANCELLED
Start: 2024-07-12

## 2024-06-19 RX ORDER — DIPHENHYDRAMINE HYDROCHLORIDE 50 MG/ML
50 INJECTION INTRAMUSCULAR; INTRAVENOUS
Status: CANCELLED
Start: 2024-07-12

## 2024-06-27 ENCOUNTER — TELEPHONE (OUTPATIENT)
Dept: INTERVENTIONAL RADIOLOGY/VASCULAR | Facility: CLINIC | Age: 74
End: 2024-06-27
Payer: COMMERCIAL

## 2024-06-28 ENCOUNTER — HOSPITAL ENCOUNTER (OUTPATIENT)
Facility: CLINIC | Age: 74
Discharge: HOME | End: 2024-06-28
Attending: RADIOLOGY | Admitting: RADIOLOGY
Payer: MEDICARE

## 2024-06-28 ENCOUNTER — APPOINTMENT (OUTPATIENT)
Dept: INTERVENTIONAL RADIOLOGY/VASCULAR | Facility: CLINIC | Age: 74
End: 2024-06-28
Attending: INTERNAL MEDICINE
Payer: MEDICARE

## 2024-06-28 ENCOUNTER — APPOINTMENT (OUTPATIENT)
Dept: MEDSURG UNIT | Facility: CLINIC | Age: 74
End: 2024-06-28
Attending: RADIOLOGY
Payer: MEDICARE

## 2024-06-28 VITALS
OXYGEN SATURATION: 94 % | HEIGHT: 68 IN | BODY MASS INDEX: 32.36 KG/M2 | SYSTOLIC BLOOD PRESSURE: 120 MMHG | HEART RATE: 61 BPM | RESPIRATION RATE: 16 BRPM | DIASTOLIC BLOOD PRESSURE: 89 MMHG | TEMPERATURE: 98.6 F

## 2024-06-28 DIAGNOSIS — C77.8 CARCINOMA METASTATIC TO LYMPH NODES OF MULTIPLE SITES WITH UNKNOWN PRIMARY SITE (H): ICD-10-CM

## 2024-06-28 DIAGNOSIS — I10 HYPERTENSION GOAL BP (BLOOD PRESSURE) < 140/90: ICD-10-CM

## 2024-06-28 DIAGNOSIS — C80.1 CARCINOMA METASTATIC TO LYMPH NODES OF MULTIPLE SITES WITH UNKNOWN PRIMARY SITE (H): ICD-10-CM

## 2024-06-28 LAB
ERYTHROCYTE [DISTWIDTH] IN BLOOD BY AUTOMATED COUNT: 13.7 % (ref 10–15)
HCT VFR BLD AUTO: 43.3 % (ref 35–47)
HGB BLD-MCNC: 14.2 G/DL (ref 11.7–15.7)
INR PPP: 1.5 (ref 0.85–1.15)
MCH RBC QN AUTO: 30.3 PG (ref 26.5–33)
MCHC RBC AUTO-ENTMCNC: 32.8 G/DL (ref 31.5–36.5)
MCV RBC AUTO: 92 FL (ref 78–100)
PLATELET # BLD AUTO: 173 10E3/UL (ref 150–450)
RBC # BLD AUTO: 4.69 10E6/UL (ref 3.8–5.2)
WBC # BLD AUTO: 8.2 10E3/UL (ref 4–11)

## 2024-06-28 PROCEDURE — 250N000011 HC RX IP 250 OP 636: Performed by: STUDENT IN AN ORGANIZED HEALTH CARE EDUCATION/TRAINING PROGRAM

## 2024-06-28 PROCEDURE — 999N000132 HC STATISTIC PP CARE STAGE 1

## 2024-06-28 PROCEDURE — 85610 PROTHROMBIN TIME: CPT | Performed by: NURSE PRACTITIONER

## 2024-06-28 PROCEDURE — 36415 COLL VENOUS BLD VENIPUNCTURE: CPT | Performed by: NURSE PRACTITIONER

## 2024-06-28 PROCEDURE — 88305 TISSUE EXAM BY PATHOLOGIST: CPT | Mod: TC | Performed by: STUDENT IN AN ORGANIZED HEALTH CARE EDUCATION/TRAINING PROGRAM

## 2024-06-28 PROCEDURE — 38505 NEEDLE BIOPSY LYMPH NODES: CPT | Mod: RT | Performed by: RADIOLOGY

## 2024-06-28 PROCEDURE — 99153 MOD SED SAME PHYS/QHP EA: CPT

## 2024-06-28 PROCEDURE — 88172 CYTP DX EVAL FNA 1ST EA SITE: CPT | Mod: 26 | Performed by: PATHOLOGY

## 2024-06-28 PROCEDURE — 88173 CYTOPATH EVAL FNA REPORT: CPT | Mod: 26 | Performed by: PATHOLOGY

## 2024-06-28 PROCEDURE — 88305 TISSUE EXAM BY PATHOLOGIST: CPT | Mod: 26 | Performed by: PATHOLOGY

## 2024-06-28 PROCEDURE — 250N000009 HC RX 250: Performed by: STUDENT IN AN ORGANIZED HEALTH CARE EDUCATION/TRAINING PROGRAM

## 2024-06-28 PROCEDURE — 999N000142 HC STATISTIC PROCEDURE PREP ONLY

## 2024-06-28 PROCEDURE — 85027 COMPLETE CBC AUTOMATED: CPT | Performed by: NURSE PRACTITIONER

## 2024-06-28 PROCEDURE — 258N000003 HC RX IP 258 OP 636: Performed by: NURSE PRACTITIONER

## 2024-06-28 PROCEDURE — 999N000054 HC STATISTIC EKG NON-CHARGEABLE

## 2024-06-28 PROCEDURE — 99152 MOD SED SAME PHYS/QHP 5/>YRS: CPT

## 2024-06-28 PROCEDURE — 76942 ECHO GUIDE FOR BIOPSY: CPT | Mod: 26 | Performed by: RADIOLOGY

## 2024-06-28 RX ORDER — ACETAMINOPHEN 325 MG/1
650 TABLET ORAL EVERY 4 HOURS PRN
Status: DISCONTINUED | OUTPATIENT
Start: 2024-06-28 | End: 2024-06-28 | Stop reason: HOSPADM

## 2024-06-28 RX ORDER — LIDOCAINE 40 MG/G
CREAM TOPICAL
Status: DISCONTINUED | OUTPATIENT
Start: 2024-06-28 | End: 2024-06-28 | Stop reason: HOSPADM

## 2024-06-28 RX ORDER — NALOXONE HYDROCHLORIDE 0.4 MG/ML
0.4 INJECTION, SOLUTION INTRAMUSCULAR; INTRAVENOUS; SUBCUTANEOUS
Status: DISCONTINUED | OUTPATIENT
Start: 2024-06-28 | End: 2024-06-28 | Stop reason: HOSPADM

## 2024-06-28 RX ORDER — FENTANYL CITRATE 50 UG/ML
25-50 INJECTION, SOLUTION INTRAMUSCULAR; INTRAVENOUS EVERY 5 MIN PRN
Status: DISCONTINUED | OUTPATIENT
Start: 2024-06-28 | End: 2024-06-28 | Stop reason: HOSPADM

## 2024-06-28 RX ORDER — FLUMAZENIL 0.1 MG/ML
0.2 INJECTION, SOLUTION INTRAVENOUS
Status: DISCONTINUED | OUTPATIENT
Start: 2024-06-28 | End: 2024-06-28 | Stop reason: HOSPADM

## 2024-06-28 RX ORDER — SODIUM CHLORIDE 9 MG/ML
INJECTION, SOLUTION INTRAVENOUS CONTINUOUS
Status: DISCONTINUED | OUTPATIENT
Start: 2024-06-28 | End: 2024-06-28 | Stop reason: HOSPADM

## 2024-06-28 RX ORDER — ONDANSETRON 2 MG/ML
4 INJECTION INTRAMUSCULAR; INTRAVENOUS
Status: DISCONTINUED | OUTPATIENT
Start: 2024-06-28 | End: 2024-06-28 | Stop reason: HOSPADM

## 2024-06-28 RX ORDER — NALOXONE HYDROCHLORIDE 0.4 MG/ML
0.2 INJECTION, SOLUTION INTRAMUSCULAR; INTRAVENOUS; SUBCUTANEOUS
Status: DISCONTINUED | OUTPATIENT
Start: 2024-06-28 | End: 2024-06-28 | Stop reason: HOSPADM

## 2024-06-28 RX ORDER — HYDROCHLOROTHIAZIDE 12.5 MG/1
12.5 TABLET ORAL EVERY MORNING
Qty: 90 TABLET | Refills: 0 | Status: SHIPPED | OUTPATIENT
Start: 2024-06-28 | End: 2024-07-26

## 2024-06-28 RX ADMIN — MIDAZOLAM 0.5 MG: 1 INJECTION INTRAMUSCULAR; INTRAVENOUS at 10:16

## 2024-06-28 RX ADMIN — FENTANYL CITRATE 25 MCG: 50 INJECTION, SOLUTION INTRAMUSCULAR; INTRAVENOUS at 09:52

## 2024-06-28 RX ADMIN — MIDAZOLAM 1 MG: 1 INJECTION INTRAMUSCULAR; INTRAVENOUS at 09:41

## 2024-06-28 RX ADMIN — MIDAZOLAM 0.5 MG: 1 INJECTION INTRAMUSCULAR; INTRAVENOUS at 09:52

## 2024-06-28 RX ADMIN — FENTANYL CITRATE 25 MCG: 50 INJECTION, SOLUTION INTRAMUSCULAR; INTRAVENOUS at 10:16

## 2024-06-28 RX ADMIN — FENTANYL CITRATE 50 MCG: 50 INJECTION, SOLUTION INTRAMUSCULAR; INTRAVENOUS at 09:41

## 2024-06-28 RX ADMIN — LIDOCAINE HYDROCHLORIDE 5 ML: 10 INJECTION, SOLUTION EPIDURAL; INFILTRATION; INTRACAUDAL; PERINEURAL at 10:19

## 2024-06-28 RX ADMIN — SODIUM CHLORIDE: 9 INJECTION, SOLUTION INTRAVENOUS at 08:39

## 2024-06-28 ASSESSMENT — ACTIVITIES OF DAILY LIVING (ADL)
ADLS_ACUITY_SCORE: 38

## 2024-06-28 NOTE — DISCHARGE INSTRUCTIONS
[facility]    Interventional Radiology  Patient Instructions Following Biopsy    AFTER YOU GO HOME  If you were given sedation, for the first 24 hours: we recommend that an adult stay with you, DO NOT drive or operate machinery at home or at work, DO NOT smoke, DO NOT make any important or legal decisions.  DO NOT drink alcoholic beverages the day of your procedure  Drink plenty of fluids   Resume your regular diet, unless otherwise instructed by your Primary Physician  Relax and take it easy for 48 hours  DO NOT do any strenuous exercise or lifting (> 10 lbs) for at least 7 days following your procedure  Keep the dressing dry and in place for 24 hours. Replace with Band aid for 2 days.  Never leave a wet dressing in place.  Do not take a shower for at least 12 hours following your procedure. No tub bath, hot tub, or swimming for 5 days.  There should be minimum drainage from the biopsy site    CALL THE PHYSICIAN IF:  You start bleeding from the procedure site.  If you do start to bleed from that site, lie down flat and hold pressure on the site for a minimum of 10 minutes.  Your physician will tell you if you need to return to the hospital  You develop nausea or vomiting  You have excessive swelling, redness, or tenderness at the site  You have drainage that looks like it is infected.  You experience severe pain  You develop hives or a rash or unexplained itching  You develop shortness of breath  You develop a temperature of 101 degrees F or greater    [facility] INTERVENTIONAL RADIOLOGY DEPARTMENT  Procedure Physician: Dr. Cabrera and Dr. Boles                         Date of procedure: June 28, 2024  Telephone Numbers: [phone]      Monday-Friday 7:30 am to 4:00 pm  [phone] After 4:00 pm Monday-Friday, Weekends & Holidays.   Ask for the Interventional Radiologist on call.  Someone is on call 24 hrs/day  [facility] toll free number: [phone] Monday-Friday 8:00 am to 4:30 pm  [facility] Emergency  Dept: [phone]

## 2024-06-28 NOTE — PRE-PROCEDURE
GENERAL PRE-PROCEDURE:   Procedure:  Right level 5 cervical lympjh node biopsy  Date/Time:  6/28/2024 9:18 AM    Written consent obtained?: Yes    Risks and benefits: Risks, benefits and alternatives were discussed    Consent given by:  Patient  Patient states understanding of procedure being performed: Yes    Patient's understanding of procedure matches consent: Yes    Procedure consent matches procedure scheduled: Yes    Expected level of sedation:  Moderate  Appropriately NPO:  Yes  ASA Class:  2  Mallampati  :  Grade 2- soft palate, base of uvula, tonsillar pillars, and portion of posterior pharyngeal wall visible  Lungs:  Lungs clear with good breath sounds bilaterally  Heart:  Normal heart sounds and rate  History & Physical reviewed:  History and physical reviewed and no updates needed  Statement of review:  I have reviewed the lab findings, diagnostic data, medications, and the plan for sedation

## 2024-06-28 NOTE — IR NOTE
Patient Name: Marissa Guadarrama  Medical Record Number: [id number]  Today's Date: 6/28/2024    Procedure: Right Cervical Lymph Node Biopsy  Proceduralist: Dr. Cabrera and Dr. Boles  Pathology present: Yes    Procedure Start: 0947  Procedure end: 1020  Sedation medications administered: Midazolam 2 mg, Fentanyl 100 mcg       Report given to:  RN  : NA    Other Notes: Pt arrived to IR room 6 from . Consent reviewed. Pt denies any questions or concerns regarding procedure. Pt positioned supine and monitored per protocol. Pt tolerated procedure without any noted complications. Pt transferred back to .    5 passes made and samples sent to lab as ordered. Unable to collect flow cytology; per MD. Direct Pressure held to procedure site.  Hemastatis achieved.  Patient to be on bedrest for 1 hour.

## 2024-06-28 NOTE — PROGRESS NOTES
D/I/A:  Patient is tolerating liquids and foods, ambulating and voiding. R neck puncture site is  stable ( no bleeding and no hematoma)   A+O x4 and making needs known.  IV access removed.  Education completed and outlined in AVS Questions answered prior to discharge.  Belongings returned to patient at discharge.    P: Discharged to self care.  to drive patient home.

## 2024-06-28 NOTE — PROGRESS NOTES
Pt returned to 2A following right neck lymph node biopsy. VSS, denies pain. Dressing covered with primapore, CDI, no hematoma. Pt given food and drink.   at bedside.

## 2024-06-28 NOTE — PROGRESS NOTES
Pt arrived to 2A for lymph node biopsy. BP elevated, baseline per pt report. Denies pain. Labs sent. Consent signed. Pt prepped.  Gaudencio, , at bedside.

## 2024-06-29 LAB
ATRIAL RATE - MUSE: 54 BPM
DIASTOLIC BLOOD PRESSURE - MUSE: NORMAL MMHG
INTERPRETATION ECG - MUSE: NORMAL
P AXIS - MUSE: 52 DEGREES
PR INTERVAL - MUSE: 180 MS
QRS DURATION - MUSE: 90 MS
QT - MUSE: 424 MS
QTC - MUSE: 402 MS
R AXIS - MUSE: -27 DEGREES
SYSTOLIC BLOOD PRESSURE - MUSE: NORMAL MMHG
T AXIS - MUSE: -2 DEGREES
VENTRICULAR RATE- MUSE: 54 BPM

## 2024-07-02 LAB
PATH REPORT.COMMENTS IMP SPEC: ABNORMAL
PATH REPORT.COMMENTS IMP SPEC: YES
PATH REPORT.FINAL DX SPEC: ABNORMAL
PATH REPORT.GROSS SPEC: ABNORMAL
PATH REPORT.MICROSCOPIC SPEC OTHER STN: ABNORMAL
PATH REPORT.RELEVANT HX SPEC: ABNORMAL

## 2024-07-05 LAB
INTERPRETATION: NORMAL
LAB DIRECTOR COMMENTS: NORMAL
LAB DIRECTOR DISCLAIMER: NORMAL
LAB DIRECTOR INTERPRETATION: NORMAL
LAB DIRECTOR METHODOLOGY: NORMAL
LAB DIRECTOR RESULTS: NORMAL
SIGNIFICANT RESULTS: NORMAL
SPECIMEN DESCRIPTION: NORMAL
SPECIMEN DESCRIPTION: NORMAL
TEST DETAILS, MDL: NORMAL

## 2024-07-05 PROCEDURE — G0452 MOLECULAR PATHOLOGY INTERPR: HCPCS | Mod: 26 | Performed by: PATHOLOGY

## 2024-07-05 PROCEDURE — G0452 MOLECULAR PATHOLOGY INTERPR: HCPCS | Mod: 26 | Performed by: STUDENT IN AN ORGANIZED HEALTH CARE EDUCATION/TRAINING PROGRAM

## 2024-07-05 PROCEDURE — 81455 SO/HL 51/>GSAP DNA/DNA&RNA: CPT | Performed by: STUDENT IN AN ORGANIZED HEALTH CARE EDUCATION/TRAINING PROGRAM

## 2024-07-05 PROCEDURE — 81456 SO/HL 51/>GSAP RNA ALYS: CPT | Performed by: STUDENT IN AN ORGANIZED HEALTH CARE EDUCATION/TRAINING PROGRAM

## 2024-07-11 ENCOUNTER — APPOINTMENT (OUTPATIENT)
Dept: LAB | Facility: CLINIC | Age: 74
End: 2024-07-11
Attending: INTERNAL MEDICINE
Payer: MEDICARE

## 2024-07-11 ENCOUNTER — INFUSION THERAPY VISIT (OUTPATIENT)
Dept: INFUSION THERAPY | Facility: CLINIC | Age: 74
End: 2024-07-11
Attending: INTERNAL MEDICINE
Payer: MEDICARE

## 2024-07-11 VITALS — SYSTOLIC BLOOD PRESSURE: 155 MMHG | DIASTOLIC BLOOD PRESSURE: 79 MMHG

## 2024-07-11 DIAGNOSIS — C77.8 CARCINOMA METASTATIC TO LYMPH NODES OF MULTIPLE SITES WITH UNKNOWN PRIMARY SITE (H): ICD-10-CM

## 2024-07-11 DIAGNOSIS — C53.9 MALIGNANT NEOPLASM OF CERVIX, UNSPECIFIED SITE (H): ICD-10-CM

## 2024-07-11 DIAGNOSIS — C7A.021 MALIGNANT CARCINOID TUMOR OF CECUM (H): Primary | ICD-10-CM

## 2024-07-11 DIAGNOSIS — Z85.00 PERSONAL HISTORY OF MALIGNANT NEOPLASM OF UNSPECIFIED DIGESTIVE ORGAN: ICD-10-CM

## 2024-07-11 DIAGNOSIS — C78.6 MALIGNANT NEOPLASM METASTATIC TO PERITONEUM (H): ICD-10-CM

## 2024-07-11 DIAGNOSIS — C80.1 CARCINOMA METASTATIC TO LYMPH NODES OF MULTIPLE SITES WITH UNKNOWN PRIMARY SITE (H): ICD-10-CM

## 2024-07-11 LAB — LDH SERPL L TO P-CCNC: 183 U/L (ref 0–250)

## 2024-07-11 PROCEDURE — 250N000011 HC RX IP 250 OP 636: Mod: JZ | Performed by: INTERNAL MEDICINE

## 2024-07-11 PROCEDURE — 36415 COLL VENOUS BLD VENIPUNCTURE: CPT

## 2024-07-11 PROCEDURE — 83615 LACTATE (LD) (LDH) ENZYME: CPT | Performed by: INTERNAL MEDICINE

## 2024-07-11 PROCEDURE — 86316 IMMUNOASSAY TUMOR OTHER: CPT | Performed by: INTERNAL MEDICINE

## 2024-07-11 PROCEDURE — 96372 THER/PROPH/DIAG INJ SC/IM: CPT | Performed by: INTERNAL MEDICINE

## 2024-07-11 RX ORDER — LANREOTIDE ACETATE 120 MG/.5ML
120 INJECTION SUBCUTANEOUS ONCE
Status: COMPLETED | OUTPATIENT
Start: 2024-07-11 | End: 2024-07-11

## 2024-07-11 RX ADMIN — LANREOTIDE ACETATE 120 MG: 120 INJECTION SUBCUTANEOUS at 15:00

## 2024-07-11 NOTE — PROGRESS NOTES
Infusion Nursing Note:  Marissa Guadarrama presents today for Somatuline.    Patient seen by provider today: No   present during visit today: Not Applicable.    Note: N/A.      Intravenous Access:  No Intravenous access/labs at this visit.    Treatment Conditions:  Not Applicable.      Post Infusion Assessment:  Patient tolerated injection without incident.  Site patent and intact, free from redness, edema or discomfort.  Access discontinued per protocol.       Discharge Plan:   Copy of AVS reviewed with patient and/or family.  Patient will return 8/9/24 for next appointment.  Patient discharged in stable condition accompanied by: self.  Departure Mode: Ambulatory.      JOSE BRAVO RN

## 2024-07-15 ENCOUNTER — PATIENT OUTREACH (OUTPATIENT)
Dept: ONCOLOGY | Facility: CLINIC | Age: 74
End: 2024-07-15
Payer: COMMERCIAL

## 2024-07-15 LAB — CGA SERPL-MCNC: 568 NG/ML

## 2024-07-15 NOTE — PROGRESS NOTES
[facility]: Cancer Care                                                                                          Enrollment status: enrolled  Follow up scheduled: 7/26/24    Pt was supposed to have biopsy done 6/28 but this was cancelled. Please follow up with patient prior to Dr. Friedell follow up.    Signature:  JOSE BRAVO RN

## 2024-07-17 LAB — SCANNED LAB RESULT: NORMAL

## 2024-07-26 ENCOUNTER — ONCOLOGY VISIT (OUTPATIENT)
Dept: ONCOLOGY | Facility: CLINIC | Age: 74
End: 2024-07-26
Attending: INTERNAL MEDICINE
Payer: COMMERCIAL

## 2024-07-26 VITALS
SYSTOLIC BLOOD PRESSURE: 139 MMHG | BODY MASS INDEX: 31.31 KG/M2 | HEART RATE: 72 BPM | HEIGHT: 68 IN | RESPIRATION RATE: 11 BRPM | WEIGHT: 206.6 LBS | OXYGEN SATURATION: 95 % | DIASTOLIC BLOOD PRESSURE: 62 MMHG | TEMPERATURE: 98.4 F

## 2024-07-26 DIAGNOSIS — Z17.0 MALIGNANT NEOPLASM OF UPPER-OUTER QUADRANT OF BOTH BREASTS IN FEMALE, ESTROGEN RECEPTOR POSITIVE (H): ICD-10-CM

## 2024-07-26 DIAGNOSIS — C50.412 MALIGNANT NEOPLASM OF UPPER-OUTER QUADRANT OF BOTH BREASTS IN FEMALE, ESTROGEN RECEPTOR POSITIVE (H): ICD-10-CM

## 2024-07-26 DIAGNOSIS — I10 HYPERTENSION GOAL BP (BLOOD PRESSURE) < 140/90: ICD-10-CM

## 2024-07-26 DIAGNOSIS — C7B.09 SECONDARY CARCINOID TUMORS OF OTHER SITES (H): ICD-10-CM

## 2024-07-26 DIAGNOSIS — C7A.021 MALIGNANT CARCINOID TUMOR OF CECUM (H): ICD-10-CM

## 2024-07-26 DIAGNOSIS — Z08 ENCOUNTER FOR FOLLOW-UP EXAMINATION AFTER COMPLETED TREATMENT FOR MALIGNANT NEOPLASM: ICD-10-CM

## 2024-07-26 DIAGNOSIS — M81.8 OSTEOPOROSIS DUE TO AROMATASE INHIBITOR: ICD-10-CM

## 2024-07-26 DIAGNOSIS — C80.1 CARCINOMA METASTATIC TO LYMPH NODES OF MULTIPLE SITES WITH UNKNOWN PRIMARY SITE (H): Primary | ICD-10-CM

## 2024-07-26 DIAGNOSIS — C50.411 MALIGNANT NEOPLASM OF UPPER-OUTER QUADRANT OF BOTH BREASTS IN FEMALE, ESTROGEN RECEPTOR POSITIVE (H): ICD-10-CM

## 2024-07-26 DIAGNOSIS — C77.8 CARCINOMA METASTATIC TO LYMPH NODES OF MULTIPLE SITES WITH UNKNOWN PRIMARY SITE (H): Primary | ICD-10-CM

## 2024-07-26 DIAGNOSIS — T38.6X5A OSTEOPOROSIS DUE TO AROMATASE INHIBITOR: ICD-10-CM

## 2024-07-26 PROCEDURE — 99214 OFFICE O/P EST MOD 30 MIN: CPT | Performed by: INTERNAL MEDICINE

## 2024-07-26 PROCEDURE — G0463 HOSPITAL OUTPT CLINIC VISIT: HCPCS | Performed by: INTERNAL MEDICINE

## 2024-07-26 RX ORDER — HYDROCHLOROTHIAZIDE 12.5 MG/1
12.5 TABLET ORAL EVERY MORNING
Qty: 90 TABLET | Refills: 0 | Status: SHIPPED | OUTPATIENT
Start: 2024-07-26 | End: 2024-09-20

## 2024-07-26 RX ORDER — EXEMESTANE 25 MG/1
25 TABLET ORAL EVERY MORNING
Qty: 90 TABLET | Refills: 1 | Status: SHIPPED | OUTPATIENT
Start: 2024-07-26 | End: 2024-09-20

## 2024-07-26 ASSESSMENT — PAIN SCALES - GENERAL: PAINLEVEL: MILD PAIN (2)

## 2024-07-26 NOTE — LETTER
"7/26/2024      Marissa Guadarrama  [address] [address]      Dear Colleague,    Thank you for referring your patient, Marissa Guadarrama, to the [facility] [place]. Please see a copy of my visit note below.    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    Weight as of this encounter: 93.7 kg (206 lb 9.6 oz). Body surface area is 2.11 meters squared.  Mild Pain (2) Comment: Data Unavailable   No LMP recorded. Patient has had a hysterectomy.  Allergies reviewed: Yes  Medications reviewed: Yes    Medications: MEDICATION REFILLS NEEDED TODAY. Provider was notified.  Pharmacy name entered into [employer]: [id number] - [place], [place] - [address]    Frailty Screening:   Is the patient here for a new oncology consult visit in cancer care? 2. No      Clinical concerns: Refills today, wants to know when next labs will be done.        Nicole Mascorro MA              [facility] Hematology and Oncology Follow-up Note    Patient: Marissa Guadarrama  MRN: [id number]  Date of Service: Jul 26, 2024       Reason for Visit    Metastatic cancer consistent with lung primary      Encounter Diagnoses Assessment and Plan:    Problem List Items Addressed This Visit       Hypertension goal BP (blood pressure) < 140/90    Relevant Medications    hydroCHLOROthiazide 12.5 MG " tablet    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)    Relevant Medications    exemestane (AROMASIN) 25 MG tablet    Carcinoma metastatic to lymph nodes of multiple sites consistent with lung primary (H) - Primary    Relevant Orders    CBC with Platelets & Differential    Comprehensive metabolic panel    TSH     Other Visit Diagnoses       Encounter for follow-up examination after completed treatment for malignant neoplasm        Relevant Orders    TSH          PET/CT scan shows metastatic disease in the chest and supraclavicular lymph nodes as well as the right axilla and left third rib.  An FDG avid nodule in the left upper lobe of the lung may be the primary site.  Unfortunately there was insufficient tissue to allow for additional tests.  I will discuss with interventional radiology as to the best site to obtain additional tissue.    Repeat biopsy of a right cervical lymph node shows TPS for pembrolizumab at 50%.  There are no actionable mutations by NGS.  Patient is a candidate for pembrolizumab mono therapy.  Treatment plan is entered and patient can receive pembrolizumab along with her aromatase inhibitor and lanreotide.         ______________________________________________________________________________    ECOG Performance = 1    History of Present Illness  Disease history per Dr. 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 Elfego Lawrence and then Dr. Hosea King.     However, a follow-up 2/10/2022 Octreoscan scan confirmed evidence of progressive disease for which she was started on lanreotide every 4 weeks with a baseline chromogranin A level of 824.  The chromogranin A joslyn was 445 on 1/6/2023 and 562 on 7/28/2023.      Review of her  10/13/2023 DEXA scan revealed 14% decrease in bone density with left hip T-score -3.1.  Left femoral neck T-score was -2.6.  She is on annual zoledronic acid.     In December, 2023 she received 5000 cGy/5 fractions by 12/15/2023 with complete resolution of left sacral pain referable to a presumptive metastatic neuroendocrine metastasis.  A July, 2023 dotatate PET scan had revealed stable disease except for the increased size and gluco-avidity of this left sclerotic sacral lesion.  She continues on monthly lanreotide.     Past medical history:  History of 2016 bilateral ER positive breast cancer on exemestane.  Both tumors were 100% ER/NH+, HER2 neg and patient declined Oncotype DX testing since she indicated she would never want to receive adjuvant systemic chemotherapy.  She had been initially started on anastrozole but developed a skin rash and was switched to exemestane    When last seen on 2/23/2024, new right supraclavicular lymphadenopathy was noted.  The CEA was 56.6 compared to a level 12.8 on 7/14/2023. The CA 27.29 was 57.7 compared to 22 when it was last obtained on 1/23/2022.  3/8/2024 CT soft tissue of neck and chest revealed multiple right cervical level 3-5 lymphadenopathy.  Chest imaging revealed mild enlarged mediastinal and hilar lymphadenopathy which could be related to her neuroendocrine tumor.     3/8/2024 she underwent IR biopsy of her right cervical lymph nodes today which was well-tolerated.  Pathology showed a poorly differentiated carcinoma.  It was CK7 and CK20 positive.  There was insufficient tissue to make further identification of the primary or to determine treatment options.    6/28/2024  A new biopsy of a right cervical lymph node was performed.  This showed no actionable mutations for first line therapy.  The Tumor Proportion Score for pembrolizumab was 50%.    At this visit patient remains asymptomatic.  She is maintaining her weight.  Her appetite and digestion are normal.  She  "does not have chills, fevers or sweats.  She does not have cough, chest pain or shortness of breath at rest.  She has no change in bowel or bladder habit.  Her activity is limited from polio in childhood she uses a walker to help with ambulation.  She quit smoking in 2006 after 29 pack years of cigarettes    Review of systems.  Pertinent Findings are included in the History of Present Illness    Physical Exam    /62 (BP Location: Right arm, Patient Position: Sitting, Cuff Size: Adult Regular)   Pulse 72   Temp 98.4  F (36.9  C) (Tympanic)   Resp 11   Ht 1.715 m (5' 7.5\")   Wt 93.7 kg (206 lb 9.6 oz)   SpO2 95%   BMI 31.88 kg/m       GENERAL APPEARANCE: 73-year-old woman in no apparent distress.  HEAD: Atraumatic; normocephalic; without lesions.  EYES: Conjunctiva, corneas and eyelids normal; pupils equal, round, reactive to light; No Icterus.  MOUTH/OROPHARYNX: Oral mucosa intact  NECK: Supple with palpable lymph nodes in the right and left neck, 1 to 2 cm in size..  LUNGS:  Clear to auscultation and percussion with no extra sounds.  HEART: Regular rhythm and rate; S1 and S2 normal; no murmurs noted.  ABDOMEN: Soft; no masses or tenderness, no hepatosplenomegaly.  NEUROLOGIC: Alert and oriented.  There is paralysis of the left leg secondary to polio.  EXTREMITIES: No cyanosis, or edema.  SKIN: No abnormal bruising or bleeding. No suspicious lesions noted on exposed skin.  PSYCHIATRIC: Mental status normal; no apparent psychiatric issues    Medications:    Current Outpatient Medications   Medication Sig Dispense Refill     Calcium Carbonate (CALCIUM 500 PO) 2 tablets daily       exemestane (AROMASIN) 25 MG tablet Take 1 tablet (25 mg) by mouth every morning 90 tablet 1     hydroCHLOROthiazide 12.5 MG tablet Take 1 tablet (12.5 mg) by mouth every morning Please make a clinic visit to be seen in person for medication refills.  No virtual visits.  Thank you. 90 tablet 0     bisacodyl (DULCOLAX) 5 MG EC " tablet Take 2 tablets at 3 pm the day before your procedure. If your procedure is before 11 am, take 2 additional tablets at 11 pm. If your procedure is after 11 am, take 2 additional tablets at 6 am. For additional instructions refer to your colonoscopy prep instructions. (Patient not taking: Reported on 12/29/2023) 4 tablet 0     polyethylene glycol (GOLYTELY) 236 g suspension The night before the exam at 6 pm drink an 8-ounce glass every 15 minutes until the jug is half empty. If you arrive before 11 AM: Drink the other half of the Golytely jug at 11 PM night before procedure. If you arrive after 11 AM: Drink the other half of the Golytely jug at 6 AM day of procedure. For additional instructions refer to your colonoscopy prep instructions. (Patient not taking: Reported on 12/29/2023) 4000 mL 0     SENNA-docusate sodium (SENNA S) 8.6-50 MG tablet Take 1 tablet by mouth 2 times daily as needed (prevent opioid induced constipation) (Patient not taking: Reported on 7/14/2023) 30 tablet 0     No current facility-administered medications for this visit.           Past History    Past Medical History:   Diagnosis Date     Arthritis     knee     Benign breast biopsy     benign     Breast cancer (H)      Carcinoid tumor (H28) 12/2003     Cervical cancer (H) 2007     H/O colposcopy with cervical biopsy 12/23/2013    vaginal cuff biopsy- VAIN III. referred back to gyn/onc     HTN      Hyperlipidemia      Malignant neoplasm of ovary (H)      Obesity      Pap smear of vagina with ASC-H 11/01/2013     Post-polio syndromes      Trigeminal neuralgias      Past Surgical History:   Procedure Laterality Date     APPENDECTOMY  01/01/1983     BIOPSY BREAST       BIOPSY NODE SENTINEL Bilateral 06/01/2016    Procedure: BIOPSY NODE SENTINEL;  Surgeon: Brent Arana MD;  Location: [facility]     [facility] SURGICAL PATHOLOGY       COLONOSCOPY N/A 06/23/2017    Procedure: COMBINED COLONOSCOPY, SINGLE OR MULTIPLE BIOPSY/POLYPECTOMY BY  BIOPSY;  Colonoscopy Dx:Carcinoid tumor of colon prep mailed malvin;  Surgeon: Talisha Greco MD;  Location: [facility]     COLPOSCOPY, BIOPSY, COMBINED  03/13/2014    Procedure: COMBINED COLPOSCOPY, BIOPSY;;  Surgeon: Lara Pack MD;  [facility]     COMBINED CYSTOSCOPY, RETROGRADES, EXCHANGE STENT URETER(S) Left 02/07/2019    Procedure: COMBINED CYSTOSCOPY, RETROGRADES, EXCHANGE STENT URETER--left;  Surgeon: Zhao La MD;  Location: [facility]     COMBINED CYSTOSCOPY, RETROGRADES, EXCHANGE STENT URETER(S) Left 02/20/2020    Procedure: CYSTOSCOPY, WITH RETROGRADE PYELOGRAM AND Left URETERAL STENT exchange;  Surgeon: Zhao La MD;  Location: [facility]     COMBINED CYSTOSCOPY, RETROGRADES, EXCHANGE STENT URETER(S) Left 05/09/2021    Procedure: CYSTOSCOPY, WITH RETROGRADE PYELOGRAM AND LEFT URETERAL STENT REPLACEMENT;  Surgeon: Fred Owens MD;  [facility]     COMBINED CYSTOSCOPY, RETROGRADES, EXCHANGE STENT URETER(S) Left 09/16/2021    Procedure: CYSTOSCOPY, WITH left RETROGRADE PYELOGRAM AND left  URETERAL STENT REPLACEMENT;  Surgeon: Zhao La MD;  Location: [facility]     COMBINED CYSTOSCOPY, RETROGRADES, EXCHANGE STENT URETER(S) Left 03/24/2022    Procedure: CYSTOSCOPY, WITH RETROGRADE PYELOGRAM AND URETERAL STENT EXCHANGE, LEFT;  Surgeon: Zhao La MD;  Location: [facility]     COMBINED CYSTOSCOPY, RETROGRADES, URETEROSCOPY, INSERT STENT Left 02/02/2017    Procedure: COMBINED CYSTOSCOPY, RETROGRADES, URETEROSCOPY, INSERT STENT;  Surgeon: Zhao La MD;  Location: [facility]     CYSTOSCOPY, REMOVE STENT(S), COMBINED N/A 11/4/2022    Procedure: CYSTOSCOPY, LEFT STENT REMOVAL;  Surgeon: Zhao La MD;  [facility]: [facility]     CYSTOSCOPY, RETROGRADES, INSERT STENT URETER(S), COMBINED Left 09/07/2017    Procedure: COMBINED CYSTOSCOPY, RETROGRADES, INSERT STENT URETER(S);  Cystoscopy,Left Stent Exchange;  Surgeon:  Zhao La MD;  Location: [facility]     CYSTOSCOPY, RETROGRADES, INSERT STENT URETER(S), COMBINED  12/12/2017    Procedure: COMBINED CYSTOSCOPY, RETROGRADES, INSERT STENT URETER(S);;  Surgeon: Zhao La MD;  [facility]     CYSTOSCOPY, RETROGRADES, INSERT STENT URETER(S), COMBINED Left 07/05/2018    Procedure: COMBINED CYSTOSCOPY, RETROGRADES, INSERT STENT URETER(S);  Cystoscopy, Left Stent Exchange;  Surgeon: Zhao La MD;  Location: [facility]     DAVINCI NEPHRECTOMY Left 11/4/2022    Procedure: , LEFT NEPHRECTOMY, ROBOT-ASSISTED;  Surgeon: Zhao La MD;  [facility]: [facility]     EXAM UNDER ANESTHESIA PELVIC  03/13/2014    Procedure: EXAM UNDER ANESTHESIA PELVIC;  Exam Under Anestheisa, Colposcopy, Vaginal Biopsies, Co2 Laser of the Upper Vagina;  Surgeon: Lara Pack MD;  [facility]     EXAM UNDER ANESTHESIA PELVIC N/A 07/01/2020    Procedure: Examination under anesthesia, vaginal biopsies;  Surgeon: Karli Gao MD;  [facility]     HERNIORRHAPHY INCISIONAL (LOCATION)       IR LYMPH NODE BIOPSY  6/28/2024     IR RHIZOTOMY  08/14/2020     LASER CO2 VAGINA  03/13/2014    VAIN 1/2     LASER CO2 VAGINA N/A 12/12/2017    Procedure: LASER CO2 VAGINA;  Exam Under Anesthesia, CO2 Laser Ablation Of Vagina, Cystoscopy, Left Retrograde Pyelogram with Left Stent Placement;  Surgeon: Nic Segundo MD;  [facility]     LUMPECTOMY BREAST       LUMPECTOMY BREAST WITH SEED LOCALIZATION Bilateral 06/01/2016    Procedure: LUMPECTOMY BREAST WITH SEED LOCALIZATION;  Surgeon: Brent Arana MD;  Location: [facility]     SURGICAL HISTORY OF -       ovarian cystectomy     SURGICAL HISTORY OF -   01/01/2003    right colon resection secondary to carcinoid tumor     TUBAL LIGATION       [facility] BSO, OMENTECTOMY W/OSMAN  05/01/2007     [facility] TOTAL ABDOM HYSTERECTOMY  05/01/2007     No Known Allergies  Family History   Problem Relation Age of Onset      Cancer Mother         bone / liver     Breast Cancer Mother      Cancer Sister         vulvar ca, cervical ca, squamous cell cancer     Breast Cancer Sister      Cancer Brother         Rectal- Stage 4     Rectal Cancer Brother      Cancer Maternal Grandmother      Cancer Maternal Grandfather      Cancer Paternal Grandmother      Cancer Paternal Grandfather      Breast Cancer Maternal Aunt      Breast Cancer Paternal Aunt      Colon Cancer Paternal Aunt      Pancreatic Cancer Nephew      Anesthesia Reaction No family hx of      Venous thrombosis No family hx of      Bleeding Disorder No family hx of      Social History     Socioeconomic History     Marital status:      Spouse name: None     Number of children: None     Years of education: None     Highest education level: None   Tobacco Use     Smoking status: Former     Current packs/day: 0.00     Average packs/day: 1 pack/day for 29.0 years (29.0 ttl pk-yrs)     Types: Cigarettes     Start date: 10/30/1977     Quit date: 10/30/2006     Years since quittin.5     Smokeless tobacco: Never   Vaping Use     Vaping status: Never Used   Substance and Sexual Activity     Alcohol use: No     Alcohol/week: 0.0 standard drinks of alcohol     Comment: 1 drink per year     Drug use: No     Sexual activity: Yes     Partners: Male   Other Topics Concern      Service No     Blood Transfusions No     Caffeine Concern Yes     Comment: occasional coffee     Occupational Exposure No     Hobby Hazards Yes     Comment: [employer],     Sleep Concern Yes     Comment: not sleeping well     Stress Concern Yes     Comment: Grandson in the      Weight Concern Yes     Special Diet No     Back Care No     Exercise No     Seat Belt Yes     Self-Exams Yes     Parent/sibling w/ CABG, MI or angioplasty before 65F 55M? No     Social Determinants of Health      [facility]   Interpersonal Safety: Not At Risk (8/17/2021)    Humiliation, Afraid, Rape, and Kick questionnaire      Fear of Current or Ex-Partner: No      Emotionally Abused: No      Physically Abused: No      Sexually Abused: No           Lab Results  Recent Results (from the past 720 hour(s))   EKG 12-lead, tracing only    Collection Time: 06/28/24  8:24 AM   Result Value Ref Range    Systolic Blood Pressure  mmHg    Diastolic Blood Pressure  mmHg    Ventricular Rate 54 BPM    Atrial Rate 54 BPM    MA Interval 180 ms    QRS Duration 90 ms     ms    QTc 402 ms    P Axis 52 degrees    R AXIS -27 degrees    T Axis -2 degrees    Interpretation ECG       Sinus bradycardia with PACs  Septal infarct , age undetermined  Inferior infarct , age undetermined  Abnormal ECG  No previous ECGs available  Confirmed by MD RENAN, ALPA ([id number]) on 6/29/2024 1:51:43 PM     CBC with platelets    Collection Time: 06/28/24  8:39 AM   Result Value Ref Range    WBC Count 8.2 4.0 - 11.0 10e3/uL    RBC Count 4.69 3.80 - 5.20 10e6/uL    Hemoglobin 14.2 11.7 - 15.7 g/dL    Hematocrit 43.3 35.0 - 47.0 %    MCV 92 78 - 100 fL    MCH 30.3 26.5 - 33.0 pg    MCHC 32.8 31.5 - 36.5 g/dL    RDW 13.7 10.0 - 15.0 %    Platelet Count 173 150 - 450 10e3/uL   INR    Collection Time: 06/28/24  8:39 AM   Result Value Ref Range    INR 1.50 (H) 0.85 - 1.15   Fine Needle Aspirate    Collection Time: 06/28/24 10:20 AM   Result Value Ref Range    Final Diagnosis       Specimen A     Interpretation:      Positive for malignancy  Metastatic carcinoma (see comment)      Adequacy:     Satisfactory for evaluation, but limited by scant material on cell block          Comment       The specimen shows presence of metastatic carcinoma.  However, the cellblock is paucicellular and may not have enough material for a detailed ancillary workup. Please refer to the prior surgical pathology consult report [id number] for the immunostain panel performed on the ipsilateral  neck node. Molecular studies, if desired, may be attempted on this specimen, but the lesional cellularity may not be sufficient for the said testing.       Clinical Information       73-year-old woman with history of bilateral ER/OH-positive breast cancer, cervical cancer and carcinoid tumor of large intestine       Rapid Onsite Evaluation       FNA Performance:   Fine needle aspiration was not performed by [facility] staff.    Aspirate immediate study/adequacy:  I, STEVE RUBIN MD, attest that I immediately examined smears while the procedure was underway and determined or confirmed the adequacy of the specimens via telepathology.    It is of note that the final assessment and report may be performed and signed by a different pathologist.    Onsite adequacy/interpretation:  A: adequate        Gross Description       A(A). Lymph Node(s), Rt supraclavicular LN:A. Lymph Node(s), Rt supraclavicular LN, Fine Needle Aspirate:  Received are 1 fixed slides, processed for Pap stain, 1 air dried slides, processed for Diff Quik stain, and material in formalin, processed for one hematoxylin stained cell block.               Microscopic Description       A microscopic examination was performed.     Case was reviewed by the following:  Pathology Fellow: Juwan Abrams MD  A resident or fellow in a training program was involved in the initial review, preparation, and/or interpretation of this case.  I, as the senior physician, attest that I have personally reviewed all specimens and or slides, including the listed special stains, and used them with my medical judgement to determine the final diagnosis.              Abnormal Result? Yes (A) No    Performing Labs       The technical component of this testing was completed at [facility].     Stain controls for all stains resulted within this report have been reviewed and show appropriate  reactivity.      PD-L1 by Immunohistochemistry (Formerly [facility])    Collection Time: 06/28/24 10:20 AM   Result Value Ref Range    See Scanned Result See Scanned Result    Oncology Single Gene NGS Tissue    Collection Time: 07/05/24 10:53 AM   Result Value Ref Range    Specimen Description       Tissue: Fixed slides-Collected 6/28/24, Lymph Node(s), [id number] A1  [id number] A1      Significant Results       Detected Alterations of Known or Potential Pathogenicity: BRAF G469A  TP53 splice mutation    TMB Score: 2.438 mut/Mb      Interpretation       An oncogenic, class 2 BRAF mutation (G469A 10%) and a TP53 splice mutation (6%) were detected.    Class 2 BRAF mutations are non-V600, kinase activating alterations that function as SARA-independent dimers and show intermediate to high kinase activity [1]. Importantly, class 2 BRAF mutant tumors are insensitive to BRAF kinase inhibitor therapy (e.g. verumafenib) [1,2]. However, the class 2 pathway can potentially be inhibited pharmacologically with MEK inhibitors (cobimetinib and trametinib), with pan-Daniel inhibitors such as sorafenib, or with paradox breaking BRAF dimerization inhibitors [1].  Current clinical trials are evaluating the efficacy of ERK inhibitors in non-V600 BRAF mutant advanced solid tumors [3,4].   Somatic mutations in TP53 are frequent in human cancer and are often, but not always, associated wtih a poorer prognosis    References:  1. Jovita M, et al. Classifying BRAF alterations in cancer: new rational therapeutic strategies for actionable mutations. Oncogene 37, 3255-1498 (2018).  2. Grover MARTIN, et al. BRAF mutants evade ERK-dependent feedback by different mechanisms that determine their sensitivity to pharmacologic inhibition. Cancer Cell. 2015; 28:370-83.  3. Ananth U, et al. Targeting aberrant SARA/DANIEL/MEK/ERK signaling for cancer therapy. Cells 2020; 9(1): 198.  4. Nikko CARRILLO, et al. First-in-class ERK1/2 Inhibitor Ulixertinib (BVD-523) in  patients with MAPK mutant advanced solid tumors: results of a phase I dose-escalation and expansion study. Cancer Discov 2018; 8(2):184-195.    ---------------------------------------------------------------------    Genes tested by GOAL Panel Lite ([employer]):  ABL1; AKT1; AKT2; AKT3; ALK; APC; ARID1A; ASXL1; ROZ; ATRX; BAP1; BARD1; BCOR; BCORL1; BIRC3; BRAF; BRCA1; BRCA2; BRIP1; BTK; CALR; CBL; CDK12; CDKN2A; CDKN2B; CEBPA; CHEK1; CHEK2; CREBBP; CSF3R; CTNNB1; CXCR4; DDX41; DNMT3A; EFNB2; EGFR; EPHB4; ERBB2; ESR1; ETV6; EZH2; FANCL; FBXW7; FGFR1; FGFR2; FGFR3; FLT3; GATA1; GATA2; GNA11; GNAQ; GNAS; H3F3A; BAEH7Q3I; HRAS; IDH1; IDH2; IKZF1; IL7R; JAK1; JAK2; JAK3; KDM6A; KEAP1; KIT; KLF2; KLF4; KMT2A; KRAS; MAP2K1; MAP3K3; MET; MPL; MTOR; MYD88; NF1; NF2; NOTCH1; NOTCH2; NPM1; NRAS; PALB2; PDGFRA; PDGFRB; PHF6; PIK3CA; PLCG2; POLD1; POLE; POLR2A; PPARG; PPM1D; PTCH1; PTEN; PTPN11; RAD21; RAD51B; RAD51C; RAD51D; RAF1; RASA1; RET; RHOA; ROS1; RUNX1; SETBP1; SETD2; SF3B1; SH2B3; SMAD4; SMO; SRSF2; STAG2; STAT3; STAT5B; STK11; SUFU; SUZI; TERT; TET2; UUKKWH05; TP53; TRAF7; U2AF1; U2AF2; WT1; ZRSR2      ---------------------------------------------------------------------    DRUG RESPONSE    ---------------------------------------------------------------------  Drugs Associated with Sensitivity for Other Tumor Types, Based on Genomic Analysis  ---------------------------------------------------------------------      Drug: Trametinib  Response to Drug Associated with Detected Alterations: Primary sensitivity  Alteration(s) Detected: BRAF Mutation (G469A)  Condition: Melanoma  Other Relevant Information: NCCN recommended for metastatic or unresectable melanoma with BRAF non-V600 mutations, as subsequent line therapy.  Line of Therapy: Metastatic  Source: NCCN        ---------------------------------------------------------------------  Sources: FDA: US Food and Drug Administration (www.fda.gov), NCCN = National  Comprehensive Cancer Network (www.nccn.org), ASCO = American Society of Clinical Oncology (www.asco.org), MCG: [place] Cancer Genome (www.CorTecancergenome.org)  ---------------------------------------------------------------------      CLINICAL TRIALS    ---------------------------------------------------------------------  Potentially Relevant Targeted Clinical Trials  ---------------------------------------------------------------------      Trial Title: JSI-1187-01 Monotherapy and in Combination With Dabrafenib for Advanced Solid Tumors With MAPK Pathway Mutations ([id number] [http://clinicaltrials.gov/ct2/show/ASY44637399])  Conditions: Solid Tumors  Relevant Biomarkers: BRAF G469A    Trial Title: A Dose-Escalation Study of SPYK04 in Patients With Locally Advanced or Metastatic Solid Tumors (With Expansion). ([id number] [http://clinicaltrials.gov/ct2/show/[id number]])  Conditions: Locally Advanced or Metastatic Solid Tumors  Relevant Biomarkers: BRAF G469A    Trial Title: Tumor-Agnostic Precision Immuno-Oncology and Somatic Targeting Rational for You (TAPISTRY) Platform Study ([id number] [http://clinicaltrials.gov/ct2/show/OVE82800618])  Conditions: Solid Tumors  Relevant Biomarkers: BRAF G469A    Trial Title: Targeted Therapy Directed by Genetic Testing in Treating Patients With Locally Advanced or Advanced Solid Tumors, The ComboMATCH Screening Trial ([id number] [http://clinicaltrials.gov/ct2/show/DAX21155036])  Conditions: Advanced Malignant Solid Neoplasm, Anatomic Stage III Breast Cancer AJCC v8, Anatomic Stage IV Breast Cancer AJCC v8, Locally Advanced Malignant Solid Neoplasm, Metastatic HER2-Negative Breast Carcinoma, Metastatic Malignant Solid Neoplasm, Recurrent Endometrial Carcinoma, Recurrent Fallopian Tube Carcinoma, Recurrent Ovarian Carcinoma, Recurrent Primary Peritoneal Carcinoma, Unresectable HER2-Negative Breast Carcinoma, Unresectable Malignant Solid Neoplasm  Relevant Biomarkers: BRAF  G469A          GENETIC ALTERATIONS    ---------------------------------------------------------------------  TMB STATUS  ---------------------------------------------------------------------      Biomarker: Tumor Mutation [place] (TMB)  Result: TMB-Low  Score: 2.438 mut/Mb  Therapeutic Implications*: None  Additional Information: Compared to whole exome sequencing (JINNY), gene panels may overestimate tumor mutational burden (TMB) [doi.org/10.1016/j.annonc.2021.09.016]. This [facility]ï  Û s gene panel shows an average of 2.5 mut/MB overestimation of TMB compared to JINNY (TCGA datasets, L=8904). This TMB bias should be taken into consideration when considering immune checkpoint targeted therapies.        ---------------------------------------------------------------------  Detected Alterations of Known or Potential Pathogenicity  ---------------------------------------------------------------------      Gene: BRAF  Alteration: G469A  c.1406G>C  Type of Alteration: Substitution - Missense  Significance: Pathogenic  Therapeutic Implications*: Associated with drug response; Potentially relevant clinical trials  Additional Information: [employer]: [id number],  [id number]   [facility] Hospital[facility]ol[facility] Allele Frequency: 0.0% dbSNP: [id number]   VAF: 0.1038 Variant Read Depth: 71 Total Read Depth: 684  References:  Jovita MCBRIDE, Veronica SAENZ, Armando S, Morenita PM, Andrew IR. Classifying BRAF alterations in cancer: new rational therapeutic strategies for actionable mutations. Oncogene. 2018 Jeffery;37(30):4236-6871. doi: 10.1038/f85413-874-4926-u. Epub 2018 Mar 15. PubMed PMID: 03432063.  Comment:The G469A missense mutation occurs within the kinase domain of the BRAF protein, resulting in increased activity and downstream signaling. This mutation is transforming in cell culture models, and has been reported numerous times in the COSMIC database. This mutation is classified as a Class 2 BRAF mutation which function as SARA-independent activated dimers. Class 2  mutations are not sensitive to vemurafenib and dabrafenib inhibition.    Gene: TP53  Alteration: (=), splice mutation  c.375G>T  Type of Alteration: Substitution - coding silent, splice mutation  Significance: Pathogenic  Therapeutic Implications*: None  Additional Information: COSMIC: [id number]   [employer] Allele Frequency: 0.0% dbSNP: [id number]   VAF: 0.0615 Variant Read Depth: 57 Total Read Depth: 989    Comment:The c.375G>T variant in TP53 alters the last nucleotide of exon 4, is predicted to disrupt canonical donor splice site for this exon, and has been classified as pathogenic/likely pathogenic by 3 submitters to ClinVar. Functional studies have demonstrated the disruption of splicing resulting in absent or non-functional protein. (PMID 87234011, 60044149)           ---------------------------------------------------------------------  Detected Alterations of Uncertain Significance  ---------------------------------------------------------------------  KEAW, SETD2 c.-18C>T      Due to incomplete characterization of the variants in existing databases or literature, it is difficult to interpret the biological and clinical significance of these variants. Detected VUS may represent rare/novel germline variation in the patient, and this assay is not designed to reliably distinguish somatic from germline variants. VUS should not be used for clinical management decisions without expert consultation from a molecular pathologist.          Gene: KEAP1  Alteration: G476W  c.1426G>T  Type of Alteration: Substitution - Missense  Significance: Uncertain Significance  Therapeutic Implications*: None  Additional Information: COSMIC: N/A [employer] Allele Frequency: 0.0% dbSNP: N/A   VAF: 0.0516 Variant Read Depth: 18 Total Read Depth: 376    Comment:The  variant is absent from cancer and population databases and ClinVar. In silico  tools predict a damaging effect; however, functional studies to confirm this  have not  been identified. Due to limited information, the significance of this variant  is uncertain      Gene: SETD2  Alteration: c.-18C>T  Type of Alteration: Promoter  Significance: Uncertain Significance  Therapeutic Implications*: None  Additional Information: COSMIC: N/A [employer] Allele Frequency: 0.1% dbSNP: [id number]   VAF: 0.4839 Variant Read Depth: 346 Total Read Depth: 716    Comment:This  variant is rare to absent in healthy controls (ExAC and gnomADv2), not commonly  identified in somatic databases (COSMIC and cBioPortal) and not definitively  classified in relevant databases including OncoKB, SUNSHINE-CKB and  ClinVar. Based on this insufficient evidence, this variant is interpreted  as a variant of uncertain significance.        SELECTED ALTERATION DETAILS  ---------------------------------------------------------------------    ---------------------------------------------------------------------  BRAF G469A  ---------------------------------------------------------------------  Nucleotide Change:  c.1406G>C  Type of Alteration:  Substitution - Missense  About this gene:  BRAF, serine/threonine-protein kinase B-gladys, is a member of the Gladys family of serine/threonine protein kinases, which signals through the MAP kinase pathway to regulate cell proliferation and cell growth (PMID: 27378582 [http://www.ncbi.nlm.nih.gov/pubmed/48943749], PMID: 41235533 [http://www.ncbi.nlm.nih.gov/pubmed/77207010]). BRAF mutations and fusions have been identified in a variety of cancers, including, colorectal (PMID: 00874349 [http://www.ncbi.nlm.nih.gov/pubmed/00258969]), lung (PMID: 04805525 [http://www.ncbi.nlm.nih.gov/pubmed/96324931]), thyroid (PMID: 96041204 [http://www.ncbi.nlm.nih.gov/pubmed/30029999]), and melanoma (PMID: 41070417 [http://www.ncbi.nlm.nih.gov/pubmed/63501623]), and a number of mutations have also been demonstrated to confer drug resistance (PMID: 31926041  [http://www.ncbi.nlm.nih.gov/pubmed/22816062]). (via [facility] Clinical Knowledgebase, ckb.[employer].org)  Pathways:  MAP kinase signaling  Mutation location in gene and/or protein:  Kinase domain (exon 11)  Effect of mutation:  BRAF G469A is a hotspot mutation within the protein kinase domain of the Braf protein (UniProt.org). G469A results in increased Braf kinase activity and downstream activation of Erk, and is transforming in cell culture (PMID: 75399355, PMID: 98968104, PMID: 61728817).    ---------------------------------------------------------------------  TP53 (=)  ---------------------------------------------------------------------  Nucleotide Change:  c.375G>T  Type of Alteration:  Substitution - coding silent  About this gene:  TP53, tumor protein p53, is a tumor suppressor (PMID: 43846809 [http://www.ncbi.nlm.nih.gov/pubmed/57855698]) and oncogene (PMID: 61947647 [http://www.ncbi.nlm.nih.gov/pubmed/69262033]) involved in cell cycle arrest and apoptosis, and is the most frequently mutated gene in cancer (PMID: 76448724 [http://www.ncbi.nlm.nih.gov/pubmed/79904468], PMID: 43541827 [http://www.ncbi.nlm.nih.gov/pubmed/54364896]). TP53 germline mutations are common in Li-Fraumeni syndrome (PMID: 87253541 [http://www.ncbi.nlm.nih.gov/pubmed/37492779]) and somatic missense mutations are frequent in almost all cancer types (PMID: 21419826 [http://www.ncbi.nlm.nih.gov/pubmed/72006793]) and are also implicated in chemoresistance (PMID: 1786195 [http://www.ncbi.nlm.nih.gov/pubmed/5989846], PMID: 52376190 [http://www.ncbi.nlm.nih.gov/pubmed/93964606], PMID: 46246592 [http://www.ncbi.nlm.nih.gov/pubmed/37865482]). (via [facility] Clinical Knowledgebase, [employer].org)  Pathways:  Cell cycle control          Test Details       ---------------------------------------------------------------------  Coverage  ---------------------------------------------------------------------  Unless otherwise  reported, >90% of coding regions in the genes ordered were sequenced at or greater than 125X depth. Genes with lower coverage are reported below, with the fraction of bases meeting minimum coverage indicated. Mutations present at low variant allele fractions (VAFs) in these low coverage regions cannot be completely excluded.      ---------------------------------------------------------------------  Methodology  ---------------------------------------------------------------------  Genomic DNA is extracted from the sample and sequencing libraries are prepared using a custom designed hybrid capture based assay ([employer]). The enriched DNA libraries are sequenced on an Illumina MiSeq or OmniForce instrument, and FASTQ files are processed through a custom developed bioinformatics pipeline to call sequence variants (single nucleotide variants and insertion-deletion variants) and calculate tumor mutation burden (TMB). Variant call files (vcf) are annotated with Sonos software and reviewed for data quality and clinical utility by genomic analysts and board certified molecular pathologists. The analytic accuracy of the assay is >99%.    Gene(NM Reference):  ABL1 (NM_005157.4); AKT1 (NM_001014432.1); AKT2 (NM_001626.4); AKT3 (NM_005465.4); ALK (NM_004304.4); APC (NM_000038.5); ARID1A (NM_006015.4); ASXL1 (NM_015338.5); ROZ (NM_000051.3); ATRX (NM_000489.3); BAP1 (NM_004656.3); BARD1 (NM_000465.2); BCOR (NM_001123385.1); BCORL1 (NM_021946.4); BIRC3 (NM_001165.4); BRAF (NM_004333.4); BRCA1 (NM_007294.3); BRCA2 (NM_000059.3); BRIP1 (NM_032043.2); BTK (NM_000061.2); CALR (NM_004343.3); CBL (NM_005188.3); CDK12 (NM_016507.2); CDKN2A (NM_000077.4); CDKN2B (NM_004936.3); CEBPA (NM_004364.3); CHEK1 (NM_001114122.2); CHEK2 (NM_007194.3); CREBBP (NM_004380.2); CSF3R (NM_156039.3); CTNNB1 (NM_001904.3); CXCR (NM_003467.2); DDX41 (NM_016222.2); DNMT3A (NM_022552.4); EFNB2 (NM_004093.3); EGFR (NM_005228.3);  EPHB4 (NM_004444.4); ERBB2 (NM_004448.2); ESR1 (NM_001122742.1); ETV6 (NM_001987.4); EZH2 (NM_004456.4); FANCL (NM_001114636.1); FBXW7 (NM_033632.3); FGFR1 (NM_023110.2); FGFR2 (NM_022970.3); FGFR3 (NM_000142.4); FLT3 (NM_004119.2); GATA1 (NM_002049.3); GATA2 (NM_032638.4); GNA11 (NM_002067.2); GNAQ (NM_002072.3); GNAS (NM_001077488.2); H3F3A (NM_002107.4); MJSG3P2W (NM_003537.3); HRAS (NM_005343.2); IDH1 (NM_005896.2); IDH2 (NM_002168.2); IKZF1 (NM_006060.4); IL7R (NM_002185.3); JAK1 (NM_002227.2); JAK2 (NM_004972.3); JAK3 (NM_000215.3); KDM6A (NM_021140.2); KEAP1 (NM_012289.3); KIT (NM_000222.2); KLF2 (NM_016270.2); KLF4 (NM_004235.4); KMT2A (NM_001197104.1); KRAS (NM_004985.3); MAP2K1 (NM_002755.3); MAP3K3 (NM_203351.1); MET (NM_001127500.1); MPL (NM_005373.2); MTOR (NM_004958.3); MYD88 (NM_002468.4); NF1 (NM_001042492.2); NF2 (NM_000268.3); NOTCH1 (NM_017617.3); NOTCH2 (NM_024408.3); NPM1 (NM_002520.6); NRAS (NM_002524.4); PALB2 (NM_024675.3); PDGFRA (NM_006206.4); PDGFRB (NM_002609.3); PHF6 (NM_032458.2); PIK3CA (NM_006218.2); PLCG2 (NM_002661.3); POLD1 (NM_001256849.1); POLE (NM_006231.2); POLR2A (NM_000937.4); PPARG (NM_138712.3); PPM1D (NM_003620.3); PTCH1 (NM_000264.3); PTEN (NM_000314.4); PTPN11 (NM_002834.3); RAD21 (NM_006265.2); RAD51B (NM_133509.3); RAD51C (NM_058216.2); RAD51D (NM_002878.3); RAF1 (NM_002880.3); RASA1 (NM_002890.2); RET (NM_020975.4); RHOA (NM_001664.2); ROS1 (NM_002944.2); RUNX1 (NM_001754.4); SETBP1 (NM_015559.2); SETD2 (NM_014159.6); SF3B1 (NM_012433.2); SH2B3 (NM_005475.2); SMAD4 (NM_005359.5); SMO (NM_005631.4); SRSF2 (NM_003016.4); STAG2 (NM_001042749.1); STAT3 (NM_139276.2); STAT5B (NM_012448.3); STK11 (NM_000455.4); SUFU (NM_016169.3); SUZI (NM_000459.3); TERT (NM_198253.2); TET2 (NM_001127208.2); VXWYOU04 (NM_003820.2); TP53 (NM_000546.5); TRAF7 (NM_032271.2); U2AF1 (NM_006758.2); U2AF2 (NM_007279.2); WT1 (NM_024426.4); ZRSR2  (NM_005089.3)    ---------------------------------------------------------------------  Limitations  ---------------------------------------------------------------------  This hybrid capture-based NGS assay is designed to detect single nucleotide and insertion- deletion (indel) mutations in the coding and splicing regions of the tested genes. The limit of detection sensitivity of the assay is validated to a minimum threshold of 5% variant allele fraction (VAF); however, variants with VAF below this threshold may be reported after clinical and data quality review by a molecular pathologist. Therefore the tumor cell population must comprise at least 10% of the submitted specimen to ensure sensitivity and specimens with borderline tumor cellularity may lead to false negative results. Accurate estimation of tumor mutation burden (TMB) requires tumor cellularity of at least 20%; tumor cellularity below this threshold may result in underestimation of the TMB value. Caution is advised for the interpretation of negative results in these situations, and correlation with morphology and other laboratory results is recommended. Indel mutations greater than 100 bp may not be reliably detected by this assay. Caution is indicated for clinical interpretation of reported VAF; numerous pre-analytic and analytic factors can impact the observed VAF. The tested sample has an estimated tumor percentage of 15%.  ---------------------------------------------------------------------  Disclaimer  ---------------------------------------------------------------------  This test was developed and its performance characteristics determined by the [facility], Molecular Diagnostics Laboratory. It has not been cleared or approved by the FDA. The laboratory is regulated under CLIA as qualified to perform high-complexity testing. This test is used for clinical purposes. It should not be regarded as investigational or for  research.    I, as the senior physician, attest that I: (i) confirmed appropriate testing, (ii) examined the relevant raw data for the specimen(s); and (iii) rendered or confirmed the interpretation(s).    ---------------------------------------------------------------------  [facility] Disclaimer  ---------------------------------------------------------------------  This report was produced using software licensed by [employer]. [employer] software is designed to be used in clinical applications solely as a tool to enhance medical utility and improve operational efficiency. The use of [employer] software is not a substitute for medical judgment and [employer] in no way holds itself out as having or providing independent medical judgment or diagnostic services. [employer] is not liable with respect to any treatment or diagnosis made in connection with this report.    [employer] Rules Version: [id number]  [employer] Application Version: [id number]    ---------------------------------------------------------------------  Electronic Signature  ---------------------------------------------------------------------  Electronically signed/cosigned by: Brooklyn Garcia MD  07/12/24     Oncology Fusion Gene Only NGS Panel    Collection Time: 07/05/24 10:53 AM   Result Value Ref Range    Specimen Description       Tissue: Fixed slides-Collected 6/28/24, Lymph Node(s), [id number] A1  [id number] A1      RESULTS       Fusion Event: NEGATIVE        INTERPRETATION       Gene fusion events were not detected in the analyzed sample. Therefore, no therapeutically or diagnostically relevant rearrangements were identified. See background below for the analyzed gene list.     Please also see results of separately performed gene mutation profiling by NGS.Correlation with separate mutation profiling, clinical information, histology and other diagnostic tests is indicated to determine the final  significance of these results.      (Electronically signed by: GWENDOLYN GREENE MD July 15, 2024 6:42 PM)      METHODOLOGY       Methodology:   Total nucleic acid extraction is extracted using the [employer] Instrument with Areshay FFPE Kit and quantified using a Qubit 2.0 Fluorometer. Library preparation is performed with anchored multiplex PCR(AMP) chemistry to generate target-enriched libraries for next generation sequencing on an Illumina instrument. AMP facilitates detection of known and unknown fusion partners as it utilizes unidirectional gene-specific primers anchored upstream or downstream of an exon-intron boundary frequently involved in an oncogenic fusion product and reverse primers that hybridize to the sequencing adapter.    The generated FASTQ files are processed using [employer] Analysis software for result generation. The generated FASTQ files are processed using [employer] Analysis software for result generation. In IA validation, the analytic sensitivity was 98.4% (95% CI: 0.9134-0.9996) and the analytic specificity was 100% (95% CI: 0.995-1).          Limitations:   This RNA-based NGS assay is designed to detect clinically relevant gene fusions/oncogenic isoforms across 137 genes targeted by this assay. Gene fusion events with clear or potential clinical relevance are reported; fusion events interpreted as variants of uncertain significance are not reported. Based on the validation study, recommended minimum tumor nuclei is 10% in the tested samples and minimum 1.43ng/uL of RNA quantified by Qubit.ï    Specimens with borderline tumor cellularity and limited copy input of RNA in NGS library preparation may lead to false negative results. Caution is advised for the interpretation of negative results, and correlation with morphology and other laboratory results is recommended to ensure adequate representation of the cell population of interest. This assay is intended for RNA extracted from  formalin fixed paraffin embedded tissue. This assay is not designed to detect point mutations, gene deletion, or gene amplification.    .          COMMENTS       Background:   The NGS Oncology (NGSO) Pan Tumor Fusion assay is a RNA-based Next Generation Sequencing assay validated for the detection of a wide spectrum of oncogenic fusions in solid tumors and sarcomas which predict therapeutic response or provide diagnostic information on tumor type. The assay comprehensively targets 137 genes and utilizes anchored multiplex PCR (AMP) chemistry to enable detection of known and unknown fusion partners with the following listed genes:    ACVR2A, AKT1, AKT2, AKT3, ALK, AR, YACJNY88, ARHGAP6, DIONY, BCOR, BRAF, BRD3, BRD4, CAMTA1, CCNB3, CCND1, , CIC, CRTC1, CSF1, CSF1R, CTNNB1, DNAJB1, EGF, EGFR, EPC1, ERBB2, ERBB4, ERG, ESR1, ESRRA, ETV1, ETV4, ETV5, ETV6, EWSR1, FGF1, FGFR1, FGFR2, FGFR3, FGR, FOS, FOSB, FOXO1, FOXO4, FOXR2, FUS, GLI1, GRB7, HMGA2, HRAS, IDH1, IDH2, IGF1R, INSR, JAK2, JAK3, JAZF1, KIT, KRAS, MAML2, MAP2K1, MAST1, MAST2, MBTD1, MDM2, MEAF6, MET, MGEA5, MKL2, MN1, MSMB, MUSK, MYB, MYBL1, MYC, MYOD1, NCOA1, NCOA2, NCOA3, NFATC2, NFE2L2, NFIB, NOTCH1, NOTCH2, NR4A3, NRAS, NRG1, NTRK1, NTRK2, NTRK3, NUMBL, NUTM1, PAX3, PAX8, PDGFB, PDGFD, PDGFRA, PDGFRB, PHF1, PHKB, PIK3CA, PKN1, PLAG1, PPARG, PRDM10, PRKACA, PRKACB, PRKCA, PRKCB, PRKCD, PRKD1, PRKD2, PRKD3, RAD51B, RAF1, RELA, RET, ROS1, RSPO2, RSPO3, SS18, SS18L1, STAT6, TAF15, TCF12, TERT, TFE3, TFEB, TFG, THADA, TMPRSS2, USP6, VGLL2, WWTR1, YAP1, YWHAE                DISCLAIMER       This test was developed, and its performance characteristics determined by the [facility], Molecular Diagnostics Laboratory. It has not been cleared or approved by the FDA. The laboratory is regulated under CLIA as qualified to perform high-complexity testing. This test is used for clinical purposes. It should not be regarded as investigational  or for research.    A resident/fellow in an accredited training program was involved in the selection of testing, review of laboratory data, and/or interpretation of this case.  I, as the senior physician, attest that I: (i) confirmed appropriate testing, (ii) examined the relevant raw data for the specimen(s); and (iii) rendered or confirmed the interpretation(s).         Lactate Dehydrogenase    Collection Time: 07/11/24  2:45 PM   Result Value Ref Range    Lactate Dehydrogenase 183 0 - 250 U/L   Chromogranin A    Collection Time: 07/11/24  2:45 PM   Result Value Ref Range    Chromogranin A 568 (H) 0 - 187 ng/mL           Imaging Results    IR Lymph Node Biopsy    Result Date: 6/28/2024  PROCEDURE: Ultrasound guided right supraclavicular lymph node biopsy. HISTORY: Image guided right level five lymph node biopsy core requested; Carcinoma metastatic to lymph nodes of multiple sites with unknown primary site (H); Carcinoma metastatic to lymph nodes of multiple sites with unknown primary site (H) COMPARISON: PET/CT 4/26/2024. STAFF NEURORADIOLOGIST: Dr. Harrison Boles FELLOW PHYSICIAN: Dr. Francisco Cabrera SEDATION: The patient was placed on continuous monitoring. Intravenous sedation was administered. Vital signs and sedation monitored by nursing staff under Interventional Radiologists supervision. The patient remained stable throughout the procedure. SEDATION TIME: 40 minutes. MEDICATIONS: 1. 2 mg midazolam IV 2. 100 mcg fentanyl IV 3. 5 mL 1% lidocaine for local anesthesia CONSENT: The patient understood the limitations, alternatives, and risks of the procedure and requested the procedure be performed. Both written and oral consent were obtained. PROCEDURE: The patient was positioned?lying left lateral decubitus. ?Previously seen right level 5 cervical lymph node on PET/CT is not well seen on today's exam. Correlating abnormal right supraclavicular lymph node was then chosen as the target lesion. The right neck was  prepped and draped in the usual sterile fashion. 5 mL of lidocaine 1% without epinephrine was administered at the biopsy entrance site. Under imaging guidance, 3.5 inch 22 gauge?gauge Quincke needles were advanced into the target lesion.?5?separate specimens were obtained. Pathology was on hand to confirm that the sampling was adequate for diagnosis After sampling needle was removed, manual compression was applied to the skin entrance. Good hemostasis was achieved.  A repeat imaging of the area revealed no significant bleeding. The procedure was well tolerated with no immediate complications noted.     IMPRESSION: Successful image guided fine-needle aspiration of a right supraclavicular lymph node. Pathology results are pending. PLAN: Patient discharged to patient care unit in stable condition for monitoring over the next 1 hour. I, KONSTANTIN FITZGERALD MD, attest that I was present in the procedure room for the entire procedure. I have personally reviewed the examination and initial interpretation and I agree with the findings. KONSTANTIN FITZGERALD MD   SYSTEM ID:  [id number]     Pathology report of 3/22/2024  Final Diagnosis   Lymph node, right neck, core biopsy:  -Scant tissue sample with metastatic poorly differentiated carcinoma, please see description      Electronically signed by Amber Meléndez MD on 3/28/2024 at 11:39 AM   Clinical Information    Chart review identifies prior clinical history as: Breast cancer ([place] grade 2 left side, [place] grade 3 right side), cervical cancer and carcinoid tumor of large intestine        Most recent oncology note:     March 22, 2024     Reason for visit:  Marissa Guadarrama is a 73 year old from [place] accompanied by her , Gaudencio, who presents for oncologic reevaluation with a history of a 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 Lymph Node(s), Neck, Right, :  The specimen is received in formalin and RPMI, labeled with the patient's name, medical record number and other identifying information designated \"right neck lymph node\". It consists of a 0.1 cm aggregate of extensively scanty probable disrupted needle cores.  The specimen is filtered and entirely submitted in 1 cassette     Note: The clinical diagnosis is malignant neoplasm of upper outer breast.  Per Dr. Meléndez, the specimen is combined and submitted for routine processing. (TIMOTHY Borja)   Microscopic Description    The tissue sample is quite scanty in the histology lab was notified of this to apply measures to conserve it  carefully for special studies/stains.       The sample shows lymph node with metastatic carcinoma which is pleomorphic.  The cells are discohesive.  The history of prior squamous cell carcinoma of the cervix, low-grade neuroendocrine tumor of the intestine (metastatic) and breast cancer are noted.  The findings do not suggest a neuroendocrine neoplasm and the nuclear grade is high.  A limited battery of immunohistochemical stains is performed due to the scanty nature of tissue present for review.  Those studies include CK7 (positive), CK20(positive), S100,(negative) GATA3(negative), p40(negative) and estrogen receptor(negative).  Appropriately staining control tissues were reviewed concurrently and the results are as described.       The findings are those of poorly differentiated carcinoma and the site of origin is not clearly identified.  Breast cancer appears unlikely as that tumor is negative for CK20.  This patient had an estrogen receptor positive " tumor and that stain and the GATA3 are also negative.  The right sided breast cancer is noted to have been high nuclear grade and slides from that excision ([id number] D) are reviewed in comparison and the tumors are dissimilar histologically.  This is not metastatic neuroendocrine carcinoma.  The patient has a remote history of cervical cancer and the p40 stain which would be positive in a squamous lesion is negative.  The sample is quite tiny without additional sample available for more special studies.  Intradepartmental consultation obtained with agreement.     The possibility of a new primary may be considered.  Clinical correlation is needed.  The current biopsy sample has no residual tumor for more assessment.       I spent 35  minutes on the patient's visit today.  This included preparation for the visit, face-to-face time with the patient and documentation following the visit.  It did not include teaching or procedure time.    Signed by: Peter E. Friedell, MD          Again, thank you for allowing me to participate in the care of your patient.        Sincerely,        Peter E. Friedell, MD

## 2024-07-26 NOTE — PROGRESS NOTES
"Oncology Rooming Note    July 26, 2024 2:02 PM   Marissa Guadarrama is a 74 year old female who presents for:    Chief Complaint   Patient presents with    Oncology Clinic Visit     Carcinoma metastatic to lymph nodes of multiple sites with unknown primary site - provider visit only     Initial Vitals: /62 (BP Location: Right arm, Patient Position: Sitting, Cuff Size: Adult Regular)   Pulse 72   Temp 98.4  F (36.9  C) (Tympanic)   Resp 11   Ht 1.715 m (5' 7.5\")   Wt 93.7 kg (206 lb 9.6 oz)   SpO2 95%   BMI 31.88 kg/m   Estimated body mass index is 31.88 kg/m  as calculated from the following:    Height as of this encounter: 1.715 m (5' 7.5\").    Weight as of this encounter: 93.7 kg (206 lb 9.6 oz). Body surface area is 2.11 meters squared.  Mild Pain (2) Comment: Data Unavailable   No LMP recorded. Patient has had a hysterectomy.  Allergies reviewed: Yes  Medications reviewed: Yes    Medications: MEDICATION REFILLS NEEDED TODAY. Provider was notified.  Pharmacy name entered into [facility]: [id number] - [place], [place] - [address] [place] [address]    Frailty Screening:   Is the patient here for a new oncology consult visit in cancer care? 2. No      Clinical concerns: Refills today, wants to know when next labs will be done.        iNcole Mascorro MA            "

## 2024-07-27 RX ORDER — MEPERIDINE HYDROCHLORIDE 25 MG/ML
25 INJECTION INTRAMUSCULAR; INTRAVENOUS; SUBCUTANEOUS EVERY 30 MIN PRN
Status: CANCELLED | OUTPATIENT
Start: 2024-08-09

## 2024-07-27 RX ORDER — ALBUTEROL SULFATE 90 UG/1
1-2 AEROSOL, METERED RESPIRATORY (INHALATION)
Status: CANCELLED
Start: 2024-08-09

## 2024-07-27 RX ORDER — HEPARIN SODIUM (PORCINE) LOCK FLUSH IV SOLN 100 UNIT/ML 100 UNIT/ML
5 SOLUTION INTRAVENOUS
Status: CANCELLED | OUTPATIENT
Start: 2024-08-09

## 2024-07-27 RX ORDER — EPINEPHRINE 1 MG/ML
0.3 INJECTION, SOLUTION, CONCENTRATE INTRAVENOUS EVERY 5 MIN PRN
Status: CANCELLED | OUTPATIENT
Start: 2024-08-09

## 2024-07-27 RX ORDER — METHYLPREDNISOLONE SODIUM SUCCINATE 125 MG/2ML
125 INJECTION, POWDER, LYOPHILIZED, FOR SOLUTION INTRAMUSCULAR; INTRAVENOUS
Status: CANCELLED
Start: 2024-08-09

## 2024-07-27 RX ORDER — DIPHENHYDRAMINE HYDROCHLORIDE 50 MG/ML
50 INJECTION INTRAMUSCULAR; INTRAVENOUS
Status: CANCELLED
Start: 2024-08-09

## 2024-07-27 RX ORDER — LANREOTIDE ACETATE 120 MG/.5ML
120 INJECTION SUBCUTANEOUS ONCE
Status: CANCELLED | OUTPATIENT
Start: 2024-08-09 | End: 2024-08-09

## 2024-07-27 RX ORDER — LORAZEPAM 2 MG/ML
0.5 INJECTION INTRAMUSCULAR EVERY 4 HOURS PRN
Status: CANCELLED | OUTPATIENT
Start: 2024-08-09

## 2024-07-27 RX ORDER — ALBUTEROL SULFATE 0.83 MG/ML
2.5 SOLUTION RESPIRATORY (INHALATION)
Status: CANCELLED | OUTPATIENT
Start: 2024-08-09

## 2024-07-27 RX ORDER — HEPARIN SODIUM,PORCINE 10 UNIT/ML
5-20 VIAL (ML) INTRAVENOUS DAILY PRN
Status: CANCELLED | OUTPATIENT
Start: 2024-08-09

## 2024-07-27 NOTE — PROGRESS NOTES
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  An FDG avid nodule in the left upper lobe of the lung may be the primary site.  Unfortunately there was insufficient tissue to allow for additional tests.  I will discuss with interventional radiology as to the best site to obtain additional tissue.    Repeat biopsy of a right cervical lymph node shows TPS for pembrolizumab at 50%.  There are no actionable mutations by NGS.  Patient is a candidate for pembrolizumab mono therapy.  Treatment plan is entered and patient can receive pembrolizumab along with her aromatase inhibitor and lanreotide.       MRI head ordered to complete staging.      ______________________________________________________________________________    ECOG Performance = 1    History of Present Illness  Disease history per Dr. Meredith  In 2003, she underwent a right colonic and terminal ileal resection for a well-differentiated, pT2, pN2 neuroendocrine tumor history of metastases  causing left ureteral obstruction in 2020 with subsequent 2022 left nephrectomy and stable liver lesion previously followed by Dr. Elfego Lawrence and then Dr. Hosea King.     However, a follow-up 2/10/2022 Octreoscan scan confirmed evidence of progressive disease for which she was started on lanreotide every 4 weeks with a baseline chromogranin A level of 824.  The chromogranin A joslyn was 445 on 1/6/2023 and 562 on 7/28/2023.      Review of her 10/13/2023 DEXA scan revealed 14% decrease in bone density with left hip T-score -3.1.  Left femoral neck T-score was -2.6.  She is on annual zoledronic acid.     In December, 2023 she received 5000 cGy/5 fractions by 12/15/2023 with complete resolution of left sacral pain referable to a presumptive metastatic neuroendocrine metastasis.  A July, 2023 dotatate PET scan had revealed stable disease except for the increased size and gluco-avidity of this left sclerotic sacral lesion.  She continues on monthly lanreotide.     Past medical history:  History of 2016 bilateral ER positive breast cancer on exemestane.  Both tumors were 100% ER/HI+, HER2 neg and patient declined Oncotype DX testing since she indicated she would never want to receive adjuvant systemic chemotherapy.  She had been initially started on anastrozole but developed a skin rash and was switched to exemestane    When last seen on 2/23/2024, new right supraclavicular lymphadenopathy was noted.  The CEA was 56.6 compared to a level 12.8 on 7/14/2023. The CA 27.29 was 57.7 compared to 22 when it was last obtained on 1/23/2022.  3/8/2024 CT soft tissue of neck and chest revealed multiple right cervical level 3-5 lymphadenopathy.  Chest imaging revealed mild enlarged mediastinal and hilar lymphadenopathy which could be related to her neuroendocrine tumor.     3/8/2024 she underwent IR biopsy of her right cervical lymph nodes today which was well-tolerated.  Pathology showed a poorly differentiated carcinoma.  It was  "CK7 and CK20 positive.  There was insufficient tissue to make further identification of the primary or to determine treatment options.    6/28/2024  A new biopsy of a right cervical lymph node was performed.  This showed no actionable mutations for first line therapy.  The Tumor Proportion Score for pembrolizumab was 50%.    At this visit patient remains asymptomatic.  She is maintaining her weight.  Her appetite and digestion are normal.  She does not have chills, fevers or sweats.  She does not have cough, chest pain or shortness of breath at rest.  She has no change in bowel or bladder habit.  Her activity is limited from polio in childhood she uses a walker to help with ambulation.  She quit smoking in 2006 after 29 pack years of cigarettes    Review of systems.  Pertinent Findings are included in the History of Present Illness    Physical Exam    /62 (BP Location: Right arm, Patient Position: Sitting, Cuff Size: Adult Regular)   Pulse 72   Temp 98.4  F (36.9  C) (Tympanic)   Resp 11   Ht 1.715 m (5' 7.5\")   Wt 93.7 kg (206 lb 9.6 oz)   SpO2 95%   BMI 31.88 kg/m       GENERAL APPEARANCE: 73-year-old woman in no apparent distress.  HEAD: Atraumatic; normocephalic; without lesions.  EYES: Conjunctiva, corneas and eyelids normal; pupils equal, round, reactive to light; No Icterus.  MOUTH/OROPHARYNX: Oral mucosa intact  NECK: Supple with palpable lymph nodes in the right and left neck, 1 to 2 cm in size..  LUNGS:  Clear to auscultation and percussion with no extra sounds.  HEART: Regular rhythm and rate; S1 and S2 normal; no murmurs noted.  ABDOMEN: Soft; no masses or tenderness, no hepatosplenomegaly.  NEUROLOGIC: Alert and oriented.  There is paralysis of the left leg secondary to polio.  EXTREMITIES: No cyanosis, or edema.  SKIN: No abnormal bruising or bleeding. No suspicious lesions noted on exposed skin.  PSYCHIATRIC: Mental status normal; no apparent psychiatric issues    Medications:    Current " Outpatient Medications   Medication Sig Dispense Refill    Calcium Carbonate (CALCIUM 500 PO) 2 tablets daily      exemestane (AROMASIN) 25 MG tablet Take 1 tablet (25 mg) by mouth every morning 90 tablet 1    hydroCHLOROthiazide 12.5 MG tablet Take 1 tablet (12.5 mg) by mouth every morning Please make a clinic visit to be seen in person for medication refills.  No virtual visits.  Thank you. 90 tablet 0    bisacodyl (DULCOLAX) 5 MG EC tablet Take 2 tablets at 3 pm the day before your procedure. If your procedure is before 11 am, take 2 additional tablets at 11 pm. If your procedure is after 11 am, take 2 additional tablets at 6 am. For additional instructions refer to your colonoscopy prep instructions. (Patient not taking: Reported on 12/29/2023) 4 tablet 0    polyethylene glycol (GOLYTELY) 236 g suspension The night before the exam at 6 pm drink an 8-ounce glass every 15 minutes until the jug is half empty. If you arrive before 11 AM: Drink the other half of the Golytely jug at 11 PM night before procedure. If you arrive after 11 AM: Drink the other half of the Golytely jug at 6 AM day of procedure. For additional instructions refer to your colonoscopy prep instructions. (Patient not taking: Reported on 12/29/2023) 4000 mL 0    SENNA-docusate sodium (SENNA S) 8.6-50 MG tablet Take 1 tablet by mouth 2 times daily as needed (prevent opioid induced constipation) (Patient not taking: Reported on 7/14/2023) 30 tablet 0     No current facility-administered medications for this visit.           Past History    Past Medical History:   Diagnosis Date    Arthritis     knee    Benign breast biopsy     benign    Breast cancer (H)     Carcinoid tumor (H28) 12/2003    Cervical cancer (H) 2007    H/O colposcopy with cervical biopsy 12/23/2013    vaginal cuff biopsy- VAIN III. referred back to gyn/onc    HTN     Hyperlipidemia     Malignant neoplasm of ovary (H)     Obesity     Pap smear of vagina with ASC-H 11/01/2013     Post-polio syndromes     Trigeminal neuralgias      Past Surgical History:   Procedure Laterality Date    APPENDECTOMY  01/01/1983    BIOPSY BREAST      BIOPSY NODE SENTINEL Bilateral 06/01/2016    Procedure: BIOPSY NODE SENTINEL;  Surgeon: Brent Arana MD;  Location: [facility] OR    [facility] SURGICAL PATHOLOGY      COLONOSCOPY N/A 06/23/2017    Procedure: COMBINED COLONOSCOPY, SINGLE OR MULTIPLE BIOPSY/POLYPECTOMY BY BIOPSY;  Colonoscopy Dx:Carcinoid tumor of colon prep mailed [facility];  Surgeon: Talisha Greco MD;  [facility]: [facility]    COLPOSCOPY, BIOPSY, COMBINED  03/13/2014    Procedure: COMBINED COLPOSCOPY, BIOPSY;;  Surgeon: Lara Pack MD;  [facility]    COMBINED CYSTOSCOPY, RETROGRADES, EXCHANGE STENT URETER(S) Left 02/07/2019    Procedure: COMBINED CYSTOSCOPY, RETROGRADES, EXCHANGE STENT URETER--left;  Surgeon: Zhao La MD;  Location: [facility] OR    COMBINED CYSTOSCOPY, RETROGRADES, EXCHANGE STENT URETER(S) Left 02/20/2020    Procedure: CYSTOSCOPY, WITH RETROGRADE PYELOGRAM AND Left URETERAL STENT exchange;  Surgeon: Zhao La MD;  Location: [facility]    COMBINED CYSTOSCOPY, RETROGRADES, EXCHANGE STENT URETER(S) Left 05/09/2021    Procedure: CYSTOSCOPY, WITH RETROGRADE PYELOGRAM AND LEFT URETERAL STENT REPLACEMENT;  Surgeon: Fred Owens MD;  [facility]    COMBINED CYSTOSCOPY, RETROGRADES, EXCHANGE STENT URETER(S) Left 09/16/2021    Procedure: CYSTOSCOPY, WITH left RETROGRADE PYELOGRAM AND left  URETERAL STENT REPLACEMENT;  Surgeon: Zhao La MD;  Location: [facility]    COMBINED CYSTOSCOPY, RETROGRADES, EXCHANGE STENT URETER(S) Left 03/24/2022    Procedure: CYSTOSCOPY, WITH RETROGRADE PYELOGRAM AND URETERAL STENT EXCHANGE, LEFT;  Surgeon: Zhao La MD;  Location: [facility]    COMBINED CYSTOSCOPY, RETROGRADES, URETEROSCOPY, INSERT STENT Left 02/02/2017    Procedure: COMBINED CYSTOSCOPY, RETROGRADES, URETEROSCOPY, INSERT STENT;   Surgeon: Zhao La MD;  Location: [facility]    CYSTOSCOPY, REMOVE STENT(S), COMBINED N/A 11/4/2022    Procedure: CYSTOSCOPY, LEFT STENT REMOVAL;  Surgeon: Zhao La MD;  [facility]    CYSTOSCOPY, RETROGRADES, INSERT STENT URETER(S), COMBINED Left 09/07/2017    Procedure: COMBINED CYSTOSCOPY, RETROGRADES, INSERT STENT URETER(S);  Cystoscopy,Left Stent Exchange;  Surgeon: Zhao La MD;  Location: [facility]    CYSTOSCOPY, RETROGRADES, INSERT STENT URETER(S), COMBINED  12/12/2017    Procedure: COMBINED CYSTOSCOPY, RETROGRADES, INSERT STENT URETER(S);;  Surgeon: Zhao La MD;  [facility]    CYSTOSCOPY, RETROGRADES, INSERT STENT URETER(S), COMBINED Left 07/05/2018    Procedure: COMBINED CYSTOSCOPY, RETROGRADES, INSERT STENT URETER(S);  Cystoscopy, Left Stent Exchange;  Surgeon: Zhao La MD;  Location: [facility]    DAVINCI NEPHRECTOMY Left 11/4/2022    Procedure: , LEFT NEPHRECTOMY, ROBOT-ASSISTED;  Surgeon: Zhao La MD;  [facility]    EXAM UNDER ANESTHESIA PELVIC  03/13/2014    Procedure: EXAM UNDER ANESTHESIA PELVIC;  Exam Under Anestheisa, Colposcopy, Vaginal Biopsies, Co2 Laser of the Upper Vagina;  Surgeon: Lara Pack MD;  [facility]    EXAM UNDER ANESTHESIA PELVIC N/A 07/01/2020    Procedure: Examination under anesthesia, vaginal biopsies;  Surgeon: Karli Gao MD;  [facility]    HERNIORRHAPHY INCISIONAL (LOCATION)      IR LYMPH NODE BIOPSY  6/28/2024    IR RHIZOTOMY  08/14/2020    LASER CO2 VAGINA  03/13/2014    VAIN 1/2    LASER CO2 VAGINA N/A 12/12/2017    Procedure: LASER CO2 VAGINA;  Exam Under Anesthesia, CO2 Laser Ablation Of Vagina, Cystoscopy, Left Retrograde Pyelogram with Left Stent Placement;  Surgeon: Nic Segundo MD;  [facility]    LUMPECTOMY BREAST      LUMPECTOMY BREAST WITH SEED LOCALIZATION Bilateral 06/01/2016    Procedure: LUMPECTOMY BREAST WITH SEED  LOCALIZATION;  Surgeon: Brent Arana MD;  Location: [facility]    SURGICAL HISTORY OF -       ovarian cystectomy    SURGICAL HISTORY OF -   2003    right colon resection secondary to carcinoid tumor    TUBAL LIGATION      ZZC BSO, OMENTECTOMY W/OSMAN  2007    ZZ TOTAL ABDOM HYSTERECTOMY  2007     No Known Allergies  Family History   Problem Relation Age of Onset    Cancer Mother         bone / liver    Breast Cancer Mother     Cancer Sister         vulvar ca, cervical ca, squamous cell cancer    Breast Cancer Sister     Cancer Brother         Rectal- Stage 4    Rectal Cancer Brother     Cancer Maternal Grandmother     Cancer Maternal Grandfather     Cancer Paternal Grandmother     Cancer Paternal Grandfather     Breast Cancer Maternal Aunt     Breast Cancer Paternal Aunt     Colon Cancer Paternal Aunt     Pancreatic Cancer Nephew     Anesthesia Reaction No family hx of     Venous thrombosis No family hx of     Bleeding Disorder No family hx of      Social History     Socioeconomic History    Marital status:      Spouse name: None    Number of children: None    Years of education: None    Highest education level: None   Tobacco Use    Smoking status: Former     Current packs/day: 0.00     Average packs/day: 1 pack/day for 29.0 years (29.0 ttl pk-yrs)     Types: Cigarettes     Start date: 10/30/1977     Quit date: 10/30/2006     Years since quittin.5    Smokeless tobacco: Never   Vaping Use    Vaping status: Never Used   Substance and Sexual Activity    Alcohol use: No     Alcohol/week: 0.0 standard drinks of alcohol     Comment: 1 drink per year    Drug use: No    Sexual activity: Yes     Partners: Male   Other Topics Concern     Service No    Blood Transfusions No    Caffeine Concern Yes     Comment: occasional coffee    Occupational Exposure No    Hobby Hazards Yes     Comment: [place],    Sleep Concern Yes     Comment: not sleeping well    Stress Concern Yes     Comment:  Grandson in the     Weight Concern Yes    Special Diet No    Back Care No    Exercise No    Seat Belt Yes    Self-Exams Yes    Parent/sibling w/ CABG, MI or angioplasty before 65F 55M? No     Social Determinants of Health      Received from [facility] [facility], [facility] [facility]    Social Connections   Interpersonal Safety: Not At Risk (8/17/2021)    Humiliation, Afraid, Rape, and Kick questionnaire     Fear of Current or Ex-Partner: No     Emotionally Abused: No     Physically Abused: No     Sexually Abused: No           Lab Results  Recent Results (from the past 720 hour(s))   EKG 12-lead, tracing only    Collection Time: 06/28/24  8:24 AM   Result Value Ref Range    Systolic Blood Pressure  mmHg    Diastolic Blood Pressure  mmHg    Ventricular Rate 54 BPM    Atrial Rate 54 BPM    CA Interval 180 ms    QRS Duration 90 ms     ms    QTc 402 ms    P Axis 52 degrees    R AXIS -27 degrees    T Axis -2 degrees    Interpretation ECG       Sinus bradycardia with PACs  Septal infarct , age undetermined  Inferior infarct , age undetermined  Abnormal ECG  No previous ECGs available  Confirmed by MD RENAN, ALPA ([id number]) on 6/29/2024 1:51:43 PM     CBC with platelets    Collection Time: 06/28/24  8:39 AM   Result Value Ref Range    WBC Count 8.2 4.0 - 11.0 10e3/uL    RBC Count 4.69 3.80 - 5.20 10e6/uL    Hemoglobin 14.2 11.7 - 15.7 g/dL    Hematocrit 43.3 35.0 - 47.0 %    MCV 92 78 - 100 fL    MCH 30.3 26.5 - 33.0 pg    MCHC 32.8 31.5 - 36.5 g/dL    RDW 13.7 10.0 - 15.0 %    Platelet Count 173 150 - 450 10e3/uL   INR    Collection Time: 06/28/24  8:39 AM   Result Value Ref Range    INR 1.50 (H) 0.85 - 1.15   Fine Needle Aspirate    Collection Time: 06/28/24 10:20 AM   Result Value Ref Range    Final Diagnosis       Specimen A     Interpretation:      Positive for malignancy  Metastatic carcinoma (see comment)      Adequacy:     Satisfactory for evaluation, but  limited by scant material on cell block          Comment       The specimen shows presence of metastatic carcinoma.  However, the cellblock is paucicellular and may not have enough material for a detailed ancillary workup. Please refer to the prior surgical pathology consult report [id number] for the immunostain panel performed on the ipsilateral neck node. Molecular studies, if desired, may be attempted on this specimen, but the lesional cellularity may not be sufficient for the said testing.       Clinical Information       73-year-old woman with history of bilateral ER/[place]-positive breast cancer, cervical cancer and carcinoid tumor of large intestine       Rapid Onsite Evaluation       FNA Performance:   Fine needle aspiration was not performed by [facility] staff.    Aspirate immediate study/adequacy:  I, STEVE RUBIN MD, attest that I immediately examined smears while the procedure was underway and determined or confirmed the adequacy of the specimens via telepathology.    It is of note that the final assessment and report may be performed and signed by a different pathologist.    Onsite adequacy/interpretation:  A: adequate        Gross Description       A(A). Lymph Node(s), Rt supraclavicular LN:A. Lymph Node(s), Rt supraclavicular LN, Fine Needle Aspirate:  Received are 1 fixed slides, processed for Pap stain, 1 air dried slides, processed for Diff Quik stain, and material in formalin, processed for one hematoxylin stained cell block.               Microscopic Description       A microscopic examination was performed.     Case was reviewed by the following:  Pathology Fellow: Juwan Abrams MD  A resident or fellow in a training program was involved in the initial review, preparation, and/or interpretation of this case.  I, as the senior physician, attest that I have personally reviewed all specimens and or slides, including the listed special stains, and used them with my medical  judgement to determine the final diagnosis.              Abnormal Result? Yes (A) No    Performing Labs       The technical component of this testing was completed at [facility].     Stain controls for all stains resulted within this report have been reviewed and show appropriate reactivity.      PD-L1 by Immunohistochemistry ([facility])    Collection Time: 06/28/24 10:20 AM   Result Value Ref Range    See Scanned Result See Scanned Result    Oncology Single Gene NGS Tissue    Collection Time: 07/05/24 10:53 AM   Result Value Ref Range    Specimen Description       Tissue: Fixed slides-Collected 6/28/24, Lymph Node(s), [id number] A1  [id number] A1      Significant Results       Detected Alterations of Known or Potential Pathogenicity: BRAF G469A  TP53 splice mutation    TMB Score: 2.438 mut/Mb      Interpretation       An oncogenic, class 2 BRAF mutation (G469A 10%) and a TP53 splice mutation (6%) were detected.    Class 2 BRAF mutations are non-V600, kinase activating alterations that function as SARA-independent dimers and show intermediate to high kinase activity [1]. Importantly, class 2 BRAF mutant tumors are insensitive to BRAF kinase inhibitor therapy (e.g. verumafenib) [1,2]. However, the class 2 pathway can potentially be inhibited pharmacologically with MEK inhibitors (cobimetinib and trametinib), with pan-Bereket inhibitors such as sorafenib, or with paradox breaking BRAF dimerization inhibitors [1].  Current clinical trials are evaluating the efficacy of ERK inhibitors in non-V600 BRAF mutant advanced solid tumors [3,4].   Somatic mutations in TP53 are frequent in human cancer and are often, but not always, associated wtih a poorer prognosis    References:  1. Jovita MCBRIDE, et al. Classifying BRAF alterations in cancer: new rational therapeutic strategies for actionable mutations. Oncogene 37, 3657-1717 (2018).  2. Grover MARTIN et al.  BRAF mutants evade ERK-dependent feedback by different mechanisms that determine their sensitivity to pharmacologic inhibition. Cancer Cell. 2015; 28:370-83.  3. Ananth U, et al. Targeting aberrant SARA/DANIEL/MEK/ERK signaling for cancer therapy. Cells 2020; 9(1): 198.  4. Nikko CARRILLO, et al. First-in-class ERK1/2 Inhibitor Ulixertinib (BVD-523) in patients with MAPK mutant advanced solid tumors: results of a phase I dose-escalation and expansion study. Cancer Discov 2018; 8(2):184-195.    ---------------------------------------------------------------------    Genes tested by GOAL Panel Lite ([employer]):  ABL1; AKT1; AKT2; AKT3; ALK; APC; ARID1A; ASXL1; ROZ; ATRX; BAP1; BARD1; BCOR; BCORL1; BIRC3; BRAF; BRCA1; BRCA2; BRIP1; BTK; CALR; CBL; CDK12; CDKN2A; CDKN2B; CEBPA; CHEK1; CHEK2; CREBBP; CSF3R; CTNNB1; CXCR4; DDX41; DNMT3A; EFNB2; EGFR; EPHB4; ERBB2; ESR1; ETV6; EZH2; FANCL; FBXW7; FGFR1; FGFR2; FGFR3; FLT3; GATA1; GATA2; GNA11; GNAQ; GNAS; H3F3A; KADH4Z3N; HRAS; IDH1; IDH2; IKZF1; IL7R; JAK1; JAK2; JAK3; KDM6A; KEAP1; KIT; KLF2; KLF4; KMT2A; KRAS; MAP2K1; MAP3K3; MET; MPL; MTOR; MYD88; NF1; NF2; NOTCH1; NOTCH2; NPM1; NRAS; PALB2; PDGFRA; PDGFRB; PHF6; PIK3CA; PLCG2; POLD1; POLE; POLR2A; PPARG; PPM1D; PTCH1; PTEN; PTPN11; RAD21; RAD51B; RAD51C; RAD51D; RAF1; RASA1; RET; RHOA; ROS1; RUNX1; SETBP1; SETD2; SF3B1; SH2B3; SMAD4; SMO; SRSF2; STAG2; STAT3; STAT5B; STK11; SUFU; SUZI; TERT; TET2; JJFUKX24; TP53; TRAF7; U2AF1; U2AF2; WT1; ZRSR2      ---------------------------------------------------------------------    DRUG RESPONSE    ---------------------------------------------------------------------  Drugs Associated with Sensitivity for Other Tumor Types, Based on Genomic Analysis  ---------------------------------------------------------------------      Drug: Trametinib  Response to Drug Associated with Detected Alterations: Primary sensitivity  Alteration(s) Detected: BRAF Mutation (G469A)  Condition:  Melanoma  Other Relevant Information: NCCN recommended for metastatic or unresectable melanoma with BRAF non-V600 mutations, as subsequent line therapy.  Line of Therapy: Metastatic  Source: NCCN        ---------------------------------------------------------------------  Sources: FDA: US Food and Drug Administration (www.fda.gov), NCCN = National Comprehensive Cancer Network (www.nccn.org), ASCO = American Society of Clinical Oncology (www.asco.org), MCG: [employer] (www.[employer].org)  ---------------------------------------------------------------------      CLINICAL TRIALS    ---------------------------------------------------------------------  Potentially Relevant Targeted Clinical Trials  ---------------------------------------------------------------------      Trial Title: JSI-1187-01 Monotherapy and in Combination With Dabrafenib for Advanced Solid Tumors With MAPK Pathway Mutations ([id number] [http://clinicaltrials.gov/ct2/show/PBK92762851])  Conditions: Solid Tumors  Relevant Biomarkers: BRAF G469A    Trial Title: A Dose-Escalation Study of SPYK04 in Patients With Locally Advanced or Metastatic Solid Tumors (With Expansion). ([id number] [http://clinicaltrials.gov/ct2/show/BUF49424728])  Conditions: Locally Advanced or Metastatic Solid Tumors  Relevant Biomarkers: BRAF G469A    Trial Title: Tumor-Agnostic Precision Immuno-Oncology and Somatic Targeting Rational for You (TAPISTRY) Platform Study ([id number] [http://clinicaltrials.gov/ct2/show/RUR18630518])  Conditions: Solid Tumors  Relevant Biomarkers: BRAF G469A    Trial Title: Targeted Therapy Directed by Genetic Testing in Treating Patients With Locally Advanced or Advanced Solid Tumors, The ComboMATCH Screening Trial ([id number] [http://clinicaltrials.gov/ct2/show/ELY58573921])  Conditions: Advanced Malignant Solid Neoplasm, Anatomic Stage III Breast Cancer AJCC v8, Anatomic Stage IV Breast Cancer AJCC v8, Locally Advanced Malignant  Solid Neoplasm, Metastatic HER2-Negative Breast Carcinoma, Metastatic Malignant Solid Neoplasm, Recurrent Endometrial Carcinoma, Recurrent Fallopian Tube Carcinoma, Recurrent Ovarian Carcinoma, Recurrent Primary Peritoneal Carcinoma, Unresectable HER2-Negative Breast Carcinoma, Unresectable Malignant Solid Neoplasm  Relevant Biomarkers: BRAF G469A          GENETIC ALTERATIONS    ---------------------------------------------------------------------  TMB STATUS  ---------------------------------------------------------------------      Biomarker: Tumor Mutation [place] (TMB)  Result: TMB-Low  Score: 2.438 mut/Mb  Therapeutic Implications*: None  Additional Information: Compared to whole exome sequencing (JINNY), gene panels may overestimate tumor mutational burden (TMB) [doi.org/10.1016/j.annonc.2021.09.016]. This [facility]  Û s gene panel shows an average of 2.5 mut/MB overestimation of TMB compared to JINNY (TCGA datasets, O=6294). This TMB bias should be taken into consideration when considering immune checkpoint targeted therapies.        ---------------------------------------------------------------------  Detected Alterations of Known or Potential Pathogenicity  ---------------------------------------------------------------------      Gene: BRAF  Alteration: G469A  c.1406G>C  Type of Alteration: Substitution - Missense  Significance: Pathogenic  Therapeutic Implications*: Associated with drug response; Potentially relevant clinical trials  Additional Information: JetSuite: [id number],  [id number]   1000StudioNow Allele Frequency: 0.0% dbSNP: [id number]   VAF: 0.1038 Variant Read Depth: 71 Total Read Depth: 684  References:  Jovita MCBRIDE, Veronica SAENZ, Armando S, Morenita PM, Andrew IR. Classifying BRAF alterations in cancer: new rational therapeutic strategies for actionable mutations. Oncogene. 2018 Jeffery;37(17):6655-3475. doi: 10.1038/d86224-226-0678-z. Epub 2018 Mar 15. PubMed PMID: 72564887.  Comment:The G469A missense  mutation occurs within the kinase domain of the BRAF protein, resulting in increased activity and downstream signaling. This mutation is transforming in cell culture models, and has been reported numerous times in the COSMIC database. This mutation is classified as a Class 2 BRAF mutation which function as SARA-independent activated dimers. Class 2 mutations are not sensitive to vemurafenib and dabrafenib inhibition.    Gene: TP53  Alteration: (=), splice mutation  c.375G>T  Type of Alteration: Substitution - coding silent, splice mutation  Significance: Pathogenic  Therapeutic Implications*: None  Additional Information: COSMIC: [id number]   1000[employer] Allele Frequency: 0.0% dbSNP: [id number]   VAF: 0.0615 Variant Read Depth: 57 Total Read Depth: 989    Comment:The c.375G>T variant in TP53 alters the last nucleotide of exon 4, is predicted to disrupt canonical donor splice site for this exon, and has been classified as pathogenic/likely pathogenic by 3 submitters to ClinVar. Functional studies have demonstrated the disruption of splicing resulting in absent or non-functional protein. (PMID 15902632, 32109672)           ---------------------------------------------------------------------  Detected Alterations of Uncertain Significance  ---------------------------------------------------------------------  KEAW, SETD2 c.-18C>T      Due to incomplete characterization of the variants in existing databases or literature, it is difficult to interpret the biological and clinical significance of these variants. Detected VUS may represent rare/novel germline variation in the patient, and this assay is not designed to reliably distinguish somatic from germline variants. VUS should not be used for clinical management decisions without expert consultation from a molecular pathologist.          Gene: KEAP1  Alteration: G476W  c.1426G>T  Type of Alteration: Substitution - Missense  Significance: Uncertain  Significance  Therapeutic Implications*: None  Additional Information: COSMIC: N/A [employer]te Allele Frequency: 0.0% dbSNP: N/A   VAF: 0.0516 Variant Read Depth: 18 Total Read Depth: 376    Comment:The  variant is absent from cancer and population databases and ClinVar. In silico  tools predict a damaging effect; however, functional studies to confirm this have not  been identified. Due to limited information, the significance of this variant  is uncertain      Gene: SETD2  Alteration: c.-18C>T  Type of Alteration: Promoter  Significance: Uncertain Significance  Therapeutic Implications*: None  Additional Information: COSMIC: N/A 1000[employer] Allele Frequency: 0.1% dbSNP: [id number]   VAF: 0.4839 Variant Read Depth: 346 Total Read Depth: 716    Comment:This  variant is rare to absent in healthy controls (ExAC and gnomADv2), not commonly  identified in somatic databases (COSMIC and cBioPortal) and not definitively  classified in relevant databases including OncoKB, [employer] and  ClinVar. Based on this insufficient evidence, this variant is interpreted  as a variant of uncertain significance.        SELECTED ALTERATION DETAILS  ---------------------------------------------------------------------    ---------------------------------------------------------------------  BRAF G469A  ---------------------------------------------------------------------  Nucleotide Change:  c.1406G>C  Type of Alteration:  Substitution - Missense  About this gene:  BRAF, serine/threonine-protein kinase B-gladys, is a member of the Gladys family of serine/threonine protein kinases, which signals through the MAP kinase pathway to regulate cell proliferation and cell growth (PMID: 95178495 [http://www.ncbi.nlm.nih.gov/pubmed/77908734], PMID: 97431844 [http://www.ncbi.nlm.nih.gov/pubmed/72021605]). BRAF mutations and fusions have been identified in a variety of cancers, including, colorectal (PMID: 49548948  [http://www.ncbi.nlm.nih.gov/pubmed/35820175]), lung (PMID: 72333285 [http://www.ncbi.nlm.nih.gov/pubmed/00223420]), thyroid (PMID: 55208663 [http://www.ncbi.nlm.nih.gov/pubmed/90385332]), and melanoma (PMID: 92091377 [http://www.ncbi.nlm.nih.gov/pubmed/23045427]), and a number of mutations have also been demonstrated to confer drug resistance (PMID: 84741727 [http://www.ncbi.nlm.nih.gov/pubmed/82768262]). (via [facility] Clinical Knowledgebase, ckb.[employer].org)  Pathways:  MAP kinase signaling  Mutation location in gene and/or protein:  Kinase domain (exon 11)  Effect of mutation:  BRAF G469A is a hotspot mutation within the protein kinase domain of the Braf protein (UniProt.org). G469A results in increased Braf kinase activity and downstream activation of Erk, and is transforming in cell culture (PMID: 54974536, PMID: 06038149, PMID: 46192522).    ---------------------------------------------------------------------  TP53 (=)  ---------------------------------------------------------------------  Nucleotide Change:  c.375G>T  Type of Alteration:  Substitution - coding silent  About this gene:  TP53, tumor protein p53, is a tumor suppressor (PMID: 50469036 [http://www.ncbi.nlm.nih.gov/pubmed/93478308]) and oncogene (PMID: 79177121 [http://www.ncbi.nlm.nih.gov/pubmed/05281015]) involved in cell cycle arrest and apoptosis, and is the most frequently mutated gene in cancer (PMID: 54869175 [http://www.ncbi.nlm.nih.gov/pubmed/34120898], PMID: 60656779 [http://www.ncbi.nlm.nih.gov/pubmed/57679259]). TP53 germline mutations are common in Li-Fraumeni syndrome (PMID: 91717254 [http://www.ncbi.nlm.nih.gov/pubmed/59979782]) and somatic missense mutations are frequent in almost all cancer types (PMID: 13553219 [http://www.ncbi.nlm.nih.gov/pubmed/83851052]) and are also implicated in chemoresistance (PMID: 7053860 [http://www.ncbi.nlm.nih.gov/pubmed/8217713], PMID: 58871376 [http://www.ncbi.nlm.nih.gov/pubmed/68636281],  PMID: 78253066 [http://www.ncbi.nlm.nih.gov/pubmed/85740340]). (via [facility] Clinical Knowledgebase, ckb.[employer].org)  Pathways:  Cell cycle control          Test Details       ---------------------------------------------------------------------  Coverage  ---------------------------------------------------------------------  Unless otherwise reported, >90% of coding regions in the genes ordered were sequenced at or greater than 125X depth. Genes with lower coverage are reported below, with the fraction of bases meeting minimum coverage indicated. Mutations present at low variant allele fractions (VAFs) in these low coverage regions cannot be completely excluded.      ---------------------------------------------------------------------  Methodology  ---------------------------------------------------------------------  Genomic DNA is extracted from the sample and sequencing libraries are prepared using a custom designed hybrid capture based assay ([employer]). The enriched DNA libraries are sequenced on an Illumina MiSeq or Wurl instrument, and FASTQ files are processed through a custom developed bioinformatics pipeline to call sequence variants (single nucleotide variants and insertion-deletion variants) and calculate tumor mutation burden (TMB). Variant call files (vcf) are annotated with nPicker software and reviewed for data quality and clinical utility by genomic analysts and board certified molecular pathologists. The analytic accuracy of the assay is >99%.    Gene(NM Reference):  ABL1 (NM_005157.4); AKT1 (NM_001014432.1); AKT2 (NM_001626.4); AKT3 (NM_005465.4); ALK (NM_004304.4); APC (NM_000038.5); ARID1A (NM_006015.4); ASXL1 (NM_015338.5); ROZ (NM_000051.3); ATRX (NM_000489.3); BAP1 (NM_004656.3); BARD1 (NM_000465.2); BCOR (NM_001123385.1); BCORL1 (NM_021946.4); BIRC3 (NM_001165.4); BRAF (NM_004333.4); BRCA1 (NM_007294.3); BRCA2 (NM_000059.3); BRIP1 (NM_032043.2);  BTK (NM_000061.2); CALR (NM_004343.3); CBL (NM_005188.3); CDK12 (NM_016507.2); CDKN2A (NM_000077.4); CDKN2B (NM_004936.3); CEBPA (NM_004364.3); CHEK1 (NM_001114122.2); CHEK2 (NM_007194.3); CREBBP (NM_004380.2); CSF3R (NM_156039.3); CTNNB1 (NM_001904.3); CXCR (NM_003467.2); DDX41 (NM_016222.2); DNMT3A (NM_022552.4); EFNB2 (NM_004093.3); EGFR (NM_005228.3); EPHB4 (NM_004444.4); ERBB2 (NM_004448.2); ESR1 (NM_001122742.1); ETV6 (NM_001987.4); EZH2 (NM_004456.4); FANCL (NM_001114636.1); FBXW7 (NM_033632.3); FGFR1 (NM_023110.2); FGFR2 (NM_022970.3); FGFR3 (NM_000142.4); FLT3 (NM_004119.2); GATA1 (NM_002049.3); GATA2 (NM_032638.4); GNA11 (NM_002067.2); GNAQ (NM_002072.3); GNAS (NM_001077488.2); H3F3A (NM_002107.4); IDYO8X2S (NM_003537.3); HRAS (NM_005343.2); IDH1 (NM_005896.2); IDH2 (NM_002168.2); IKZF1 (NM_006060.4); IL7R (NM_002185.3); JAK1 (NM_002227.2); JAK2 (NM_004972.3); JAK3 (NM_000215.3); KDM6A (NM_021140.2); KEAP1 (NM_012289.3); KIT (NM_000222.2); KLF2 (NM_016270.2); KLF4 (NM_004235.4); KMT2A (NM_001197104.1); KRAS (NM_004985.3); MAP2K1 (NM_002755.3); MAP3K3 (NM_203351.1); MET (NM_001127500.1); MPL (NM_005373.2); MTOR (NM_004958.3); MYD88 (NM_002468.4); NF1 (NM_001042492.2); NF2 (NM_000268.3); NOTCH1 (NM_017617.3); NOTCH2 (NM_024408.3); NPM1 (NM_002520.6); NRAS (NM_002524.4); PALB2 (NM_024675.3); PDGFRA (NM_006206.4); PDGFRB (NM_002609.3); PHF6 (NM_032458.2); PIK3CA (NM_006218.2); PLCG2 (NM_002661.3); POLD1 (NM_001256849.1); POLE (NM_006231.2); POLR2A (NM_000937.4); PPARG (NM_138712.3); PPM1D (NM_003620.3); PTCH1 (NM_000264.3); PTEN (NM_000314.4); PTPN11 (NM_002834.3); RAD21 (NM_006265.2); RAD51B (NM_133509.3); RAD51C (NM_058216.2); RAD51D (NM_002878.3); RAF1 (NM_002880.3); RASA1 (NM_002890.2); RET (NM_020975.4); RHOA (NM_001664.2); ROS1 (NM_002944.2); RUNX1 (NM_001754.4); SETBP1 (NM_015559.2); SETD2 (NM_014159.6); SF3B1 (NM_012433.2); SH2B3 (NM_005475.2); SMAD4 (NM_005359.5); SMO (NM_005631.4); SRSF2  (NM_003016.4); STAG2 (NM_001042749.1); STAT3 (NM_139276.2); STAT5B (NM_012448.3); STK11 (NM_000455.4); SUFU (NM_016169.3); SUZI (NM_000459.3); TERT (NM_198253.2); TET2 (NM_001127208.2); PYDDKH70 (NM_003820.2); TP53 (NM_000546.5); TRAF7 (NM_032271.2); U2AF1 (NM_006758.2); U2AF2 (NM_007279.2); WT1 (NM_024426.4); ZRSR2 (NM_005089.3)    ---------------------------------------------------------------------  Limitations  ---------------------------------------------------------------------  This hybrid capture-based NGS assay is designed to detect single nucleotide and insertion- deletion (indel) mutations in the coding and splicing regions of the tested genes. The limit of detection sensitivity of the assay is validated to a minimum threshold of 5% variant allele fraction (VAF); however, variants with VAF below this threshold may be reported after clinical and data quality review by a molecular pathologist. Therefore the tumor cell population must comprise at least 10% of the submitted specimen to ensure sensitivity and specimens with borderline tumor cellularity may lead to false negative results. Accurate estimation of tumor mutation burden (TMB) requires tumor cellularity of at least 20%; tumor cellularity below this threshold may result in underestimation of the TMB value. Caution is advised for the interpretation of negative results in these situations, and correlation with morphology and other laboratory results is recommended. Indel mutations greater than 100 bp may not be reliably detected by this assay. Caution is indicated for clinical interpretation of reported VAF; numerous pre-analytic and analytic factors can impact the observed VAF. The tested sample has an estimated tumor percentage of 15%.  ---------------------------------------------------------------------  Disclaimer  ---------------------------------------------------------------------  This test was developed and its performance characteristics  determined by the [facility], Molecular Diagnostics Laboratory. It has not been cleared or approved by the FDA. The laboratory is regulated under CLIA as qualified to perform high-complexity testing. This test is used for clinical purposes. It should not be regarded as investigational or for research.    I, as the senior physician, attest that I: (i) confirmed appropriate testing, (ii) examined the relevant raw data for the specimen(s); and (iii) rendered or confirmed the interpretation(s).    ---------------------------------------------------------------------  [facility] [facility] Disclaimer  ---------------------------------------------------------------------  This report was produced using software licensed by [employer]. [employer] software is designed to be used in clinical applications solely as a tool to enhance medical utility and improve operational efficiency. The use of [employer] software is not a substitute for medical judgment and [employer] in no way holds itself out as having or providing independent medical judgment or diagnostic services. [employer] is not liable with respect to any treatment or diagnosis made in connection with this report.    [facility] Rules Version: [id number]  [facility] Application Version: [id number]    ---------------------------------------------------------------------  Electronic Signature  ---------------------------------------------------------------------  Electronically signed/cosigned by: Brooklyn Garcia MD  07/12/24     Oncology Fusion Gene Only NGS Panel    Collection Time: 07/05/24 10:53 AM   Result Value Ref Range    Specimen Description       Tissue: Fixed slides-Collected 6/28/24, Lymph Node(s), [id number] A1  [id number] A1      RESULTS       Fusion Event: NEGATIVE        INTERPRETATION       Gene fusion events were not detected in the analyzed sample. Therefore, no therapeutically or  diagnostically relevant rearrangements were identified. See background below for the analyzed gene list.     Please also see results of separately performed gene mutation profiling by NGS.Correlation with separate mutation profiling, clinical information, histology and other diagnostic tests is indicated to determine the final significance of these results.      (Electronically signed by: GWENDOLYN GREENE MD July 15, 2024 6:42 PM)      METHODOLOGY       Methodology:   Total nucleic acid extraction is extracted using the [employer] Instrument with PromptCare FFPE Kit and quantified using a Qubit 2.0 Fluorometer. Library preparation is performed with anchored multiplex PCR(AMP) chemistry to generate target-enriched libraries for next generation sequencing on an Illumina instrument. AMP facilitates detection of known and unknown fusion partners as it utilizes unidirectional gene-specific primers anchored upstream or downstream of an exon-intron boundary frequently involved in an oncogenic fusion product and reverse primers that hybridize to the sequencing adapter.    The generated FASTQ files are processed using [employer] Analysis software for result generation. The generated FASTQ files are processed using [employer] Analysis software for result generation. In CLIA validation, the analytic sensitivity was 98.4% (95% CI: 0.9134-0.9996) and the analytic specificity was 100% (95% CI: 0.995-1).          Limitations:   This RNA-based NGS assay is designed to detect clinically relevant gene fusions/oncogenic isoforms across 137 genes targeted by this assay. Gene fusion events with clear or potential clinical relevance are reported; fusion events interpreted as variants of uncertain significance are not reported. Based on the validation study, recommended minimum tumor nuclei is 10% in the tested samples and minimum 1.43ng/uL of RNA quantified by Qubit.ï    Specimens with borderline tumor cellularity and limited copy input of  RNA in NGS library preparation may lead to false negative results. Caution is advised for the interpretation of negative results, and correlation with morphology and other laboratory results is recommended to ensure adequate representation of the cell population of interest. This assay is intended for RNA extracted from formalin fixed paraffin embedded tissue. This assay is not designed to detect point mutations, gene deletion, or gene amplification.    .          COMMENTS       Background:   The NGS Oncology (NGSO) Pan Tumor Fusion assay is a RNA-based Next Generation Sequencing assay validated for the detection of a wide spectrum of oncogenic fusions in solid tumors and sarcomas which predict therapeutic response or provide diagnostic information on tumor type. The assay comprehensively targets 137 genes and utilizes anchored multiplex PCR (AMP) chemistry to enable detection of known and unknown fusion partners with the following listed genes:    ACVR2A, AKT1, AKT2, AKT3, ALK, AR, UJJXFX63, ARHGAP6, DIONY, BCOR, BRAF, BRD3, BRD4, CAMTA1, CCNB3, CCND1, , CIC, CRTC1, CSF1, CSF1R, CTNNB1, DNAJB1, EGF, EGFR, EPC1, ERBB2, ERBB4, ERG, ESR1, ESRRA, ETV1, ETV4, ETV5, ETV6, EWSR1, FGF1, FGFR1, FGFR2, FGFR3, FGR, FOS, FOSB, FOXO1, FOXO4, FOXR2, FUS, GLI1, GRB7, HMGA2, HRAS, IDH1, IDH2, IGF1R, INSR, JAK2, JAK3, JAZF1, KIT, KRAS, MAML2, MAP2K1, MAST1, MAST2, MBTD1, MDM2, MEAF6, MET, MGEA5, MKL2, MN1, MSMB, MUSK, MYB, MYBL1, MYC, MYOD1, NCOA1, NCOA2, NCOA3, NFATC2, NFE2L2, NFIB, NOTCH1, NOTCH2, NR4A3, NRAS, NRG1, NTRK1, NTRK2, NTRK3, NUMBL, NUTM1, PAX3, PAX8, PDGFB, PDGFD, PDGFRA, PDGFRB, PHF1, PHKB, PIK3CA, PKN1, PLAG1, PPARG, PRDM10, PRKACA, PRKACB, PRKCA, PRKCB, PRKCD, PRKD1, PRKD2, PRKD3, RAD51B, RAF1, RELA, RET, ROS1, RSPO2, RSPO3, SS18, SS18L1, STAT6, TAF15, TCF12, TERT, TFE3, TFEB, TFG, THADA, TMPRSS2, USP6, VGLL2, WWTR1, YAP1, YWHAE                DISCLAIMER       This test was developed, and its performance  characteristics determined by the [facility], Molecular Diagnostics Laboratory. It has not been cleared or approved by the FDA. The laboratory is regulated under CLIA as qualified to perform high-complexity testing. This test is used for clinical purposes. It should not be regarded as investigational or for research.    A resident/fellow in an accredited training program was involved in the selection of testing, review of laboratory data, and/or interpretation of this case.  I, as the senior physician, attest that I: (i) confirmed appropriate testing, (ii) examined the relevant raw data for the specimen(s); and (iii) rendered or confirmed the interpretation(s).         Lactate Dehydrogenase    Collection Time: 07/11/24  2:45 PM   Result Value Ref Range    Lactate Dehydrogenase 183 0 - 250 U/L   Chromogranin A    Collection Time: 07/11/24  2:45 PM   Result Value Ref Range    Chromogranin A 568 (H) 0 - 187 ng/mL           Imaging Results    IR Lymph Node Biopsy    Result Date: 6/28/2024  PROCEDURE: Ultrasound guided right supraclavicular lymph node biopsy. HISTORY: Image guided right level five lymph node biopsy core requested; Carcinoma metastatic to lymph nodes of multiple sites with unknown primary site (H); Carcinoma metastatic to lymph nodes of multiple sites with unknown primary site (H) COMPARISON: PET/CT 4/26/2024. STAFF NEURORADIOLOGIST: Dr. Harrison Boles FELLOW PHYSICIAN: Dr. Francisco Cabrera SEDATION: The patient was placed on continuous monitoring. Intravenous sedation was administered. Vital signs and sedation monitored by nursing staff under Interventional Radiologists supervision. The patient remained stable throughout the procedure. SEDATION TIME: 40 minutes. MEDICATIONS: 1. 2 mg midazolam IV 2. 100 mcg fentanyl IV 3. 5 mL 1% lidocaine for local anesthesia CONSENT: The patient understood the limitations, alternatives, and risks of the procedure and requested the procedure be  performed. Both written and oral consent were obtained. PROCEDURE: The patient was positioned?lying left lateral decubitus. ?Previously seen right level 5 cervical lymph node on PET/CT is not well seen on today's exam. Correlating abnormal right supraclavicular lymph node was then chosen as the target lesion. The right neck was prepped and draped in the usual sterile fashion. 5 mL of lidocaine 1% without epinephrine was administered at the biopsy entrance site. Under imaging guidance, 3.5 inch 22 gauge?gauge Quincke needles were advanced into the target lesion.?5?separate specimens were obtained. Pathology was on hand to confirm that the sampling was adequate for diagnosis After sampling needle was removed, manual compression was applied to the skin entrance. Good hemostasis was achieved.  A repeat imaging of the area revealed no significant bleeding. The procedure was well tolerated with no immediate complications noted.     IMPRESSION: Successful image guided fine-needle aspiration of a right supraclavicular lymph node. Pathology results are pending. PLAN: Patient discharged to patient care unit in stable condition for monitoring over the next 1 hour. I, KONSTANTIN FITZGERALD MD, attest that I was present in the procedure room for the entire procedure. I have personally reviewed the examination and initial interpretation and I agree with the findings. KONSTANTIN FITZGERALD MD   SYSTEM ID:  [id number]     Pathology report of 3/22/2024  Final Diagnosis   Lymph node, right neck, core biopsy:  -Scant tissue sample with metastatic poorly differentiated carcinoma, please see description      Electronically signed by Amber Meléndez MD on 3/28/2024 at 11:39 AM   Clinical Information    Chart review identifies prior clinical history as: Breast cancer (Gay grade 2 left side, Luda grade 3 right side), cervical cancer and carcinoid tumor of large intestine        Most recent oncology note:     March 22, 2024     Reason  "for visit:  Marissa Guadarrama is a 73 year old from [place] accompanied by her , Gaudencio, who presents for oncologic reevaluation with a history of a 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 Lymph Node(s), Neck, Right, :  The specimen is received in formalin and RPMI, labeled with the patient's name, medical record number and other identifying information designated \"right neck lymph node\". It consists of a 0.1 cm aggregate of extensively scanty probable disrupted needle cores.  The specimen is filtered and entirely submitted in 1 cassette     Note: The clinical diagnosis is malignant neoplasm of upper outer breast.  Per Dr. Meléndez, the specimen is combined and submitted for routine processing. (TIMOTHY Borja)   Microscopic Description    The tissue sample is quite scanty in the histology lab was notified of this to apply measures to conserve it  carefully for special studies/stains.       The sample shows lymph node with metastatic carcinoma which is pleomorphic.  The cells are discohesive.  The history of prior squamous cell carcinoma of the cervix, low-grade neuroendocrine tumor of the intestine (metastatic) and breast cancer are noted.  The findings do not suggest a neuroendocrine neoplasm and the nuclear grade is high.  A limited battery of immunohistochemical stains is performed due to the scanty nature of tissue present for review.  Those studies include CK7 (positive), CK20(positive), S100,(negative) GATA3(negative), p40(negative) and estrogen receptor(negative).  " Appropriately staining control tissues were reviewed concurrently and the results are as described.       The findings are those of poorly differentiated carcinoma and the site of origin is not clearly identified.  Breast cancer appears unlikely as that tumor is negative for CK20.  This patient had an estrogen receptor positive tumor and that stain and the GATA3 are also negative.  The right sided breast cancer is noted to have been high nuclear grade and slides from that excision ([id number]) are reviewed in comparison and the tumors are dissimilar histologically.  This is not metastatic neuroendocrine carcinoma.  The patient has a remote history of cervical cancer and the p40 stain which would be positive in a squamous lesion is negative.  The sample is quite tiny without additional sample available for more special studies.  Intradepartmental consultation obtained with agreement.     The possibility of a new primary may be considered.  Clinical correlation is needed.  The current biopsy sample has no residual tumor for more assessment.       I spent 35  minutes on the patient's visit today.  This included preparation for the visit, face-to-face time with the patient and documentation following the visit.  It did not include teaching or procedure time.    Signed by: Peter E. Friedell, MD

## 2024-08-09 ENCOUNTER — ONCOLOGY VISIT (OUTPATIENT)
Dept: ONCOLOGY | Facility: CLINIC | Age: 74
End: 2024-08-09
Attending: INTERNAL MEDICINE
Payer: MEDICARE

## 2024-08-09 ENCOUNTER — INFUSION THERAPY VISIT (OUTPATIENT)
Dept: INFUSION THERAPY | Facility: CLINIC | Age: 74
End: 2024-08-09
Attending: INTERNAL MEDICINE
Payer: MEDICARE

## 2024-08-09 ENCOUNTER — APPOINTMENT (OUTPATIENT)
Dept: LAB | Facility: CLINIC | Age: 74
End: 2024-08-09
Attending: INTERNAL MEDICINE
Payer: MEDICARE

## 2024-08-09 VITALS
RESPIRATION RATE: 12 BRPM | BODY MASS INDEX: 30.77 KG/M2 | OXYGEN SATURATION: 94 % | WEIGHT: 203 LBS | DIASTOLIC BLOOD PRESSURE: 68 MMHG | HEIGHT: 68 IN | SYSTOLIC BLOOD PRESSURE: 158 MMHG | HEART RATE: 60 BPM | TEMPERATURE: 97.9 F

## 2024-08-09 VITALS — SYSTOLIC BLOOD PRESSURE: 125 MMHG | DIASTOLIC BLOOD PRESSURE: 61 MMHG | HEART RATE: 55 BPM

## 2024-08-09 DIAGNOSIS — C77.8 CARCINOMA METASTATIC TO LYMPH NODES OF MULTIPLE SITES WITH UNKNOWN PRIMARY SITE (H): Primary | ICD-10-CM

## 2024-08-09 DIAGNOSIS — C7B.09 SECONDARY CARCINOID TUMORS OF OTHER SITES (H): ICD-10-CM

## 2024-08-09 DIAGNOSIS — C7A.021 MALIGNANT CARCINOID TUMOR OF CECUM (H): ICD-10-CM

## 2024-08-09 DIAGNOSIS — C77.8 CARCINOMA METASTATIC TO LYMPH NODES OF MULTIPLE SITES WITH UNKNOWN PRIMARY SITE (H): ICD-10-CM

## 2024-08-09 DIAGNOSIS — C80.1 CARCINOMA METASTATIC TO LYMPH NODES OF MULTIPLE SITES WITH UNKNOWN PRIMARY SITE (H): Primary | ICD-10-CM

## 2024-08-09 DIAGNOSIS — M17.12 OSTEOARTHRITIS OF LEFT KNEE, UNSPECIFIED OSTEOARTHRITIS TYPE: Primary | ICD-10-CM

## 2024-08-09 DIAGNOSIS — C80.1 CARCINOMA METASTATIC TO LYMPH NODES OF MULTIPLE SITES WITH UNKNOWN PRIMARY SITE (H): ICD-10-CM

## 2024-08-09 LAB
ALBUMIN SERPL BCG-MCNC: 3.6 G/DL (ref 3.5–5.2)
ALP SERPL-CCNC: 108 U/L (ref 40–150)
ALT SERPL W P-5'-P-CCNC: 14 U/L (ref 0–50)
ANION GAP SERPL CALCULATED.3IONS-SCNC: 12 MMOL/L (ref 7–15)
AST SERPL W P-5'-P-CCNC: 22 U/L (ref 0–45)
BASOPHILS # BLD AUTO: 0 10E3/UL (ref 0–0.2)
BASOPHILS NFR BLD AUTO: 1 %
BILIRUB SERPL-MCNC: 0.5 MG/DL
BUN SERPL-MCNC: 24.4 MG/DL (ref 8–23)
CALCIUM SERPL-MCNC: 10 MG/DL (ref 8.8–10.4)
CHLORIDE SERPL-SCNC: 101 MMOL/L (ref 98–107)
CREAT SERPL-MCNC: 1.27 MG/DL (ref 0.51–0.95)
EGFRCR SERPLBLD CKD-EPI 2021: 44 ML/MIN/1.73M2
EOSINOPHIL # BLD AUTO: 0.6 10E3/UL (ref 0–0.7)
EOSINOPHIL NFR BLD AUTO: 7 %
ERYTHROCYTE [DISTWIDTH] IN BLOOD BY AUTOMATED COUNT: 13.3 % (ref 10–15)
GLUCOSE SERPL-MCNC: 206 MG/DL (ref 70–99)
HCO3 SERPL-SCNC: 29 MMOL/L (ref 22–29)
HCT VFR BLD AUTO: 45.2 % (ref 35–47)
HGB BLD-MCNC: 14.9 G/DL (ref 11.7–15.7)
IMM GRANULOCYTES # BLD: 0 10E3/UL
IMM GRANULOCYTES NFR BLD: 1 %
LYMPHOCYTES # BLD AUTO: 1.3 10E3/UL (ref 0.8–5.3)
LYMPHOCYTES NFR BLD AUTO: 17 %
MCH RBC QN AUTO: 30.3 PG (ref 26.5–33)
MCHC RBC AUTO-ENTMCNC: 33 G/DL (ref 31.5–36.5)
MCV RBC AUTO: 92 FL (ref 78–100)
MONOCYTES # BLD AUTO: 0.5 10E3/UL (ref 0–1.3)
MONOCYTES NFR BLD AUTO: 6 %
NEUTROPHILS # BLD AUTO: 5.3 10E3/UL (ref 1.6–8.3)
NEUTROPHILS NFR BLD AUTO: 69 %
NRBC # BLD AUTO: 0 10E3/UL
NRBC BLD AUTO-RTO: 0 /100
PLATELET # BLD AUTO: 187 10E3/UL (ref 150–450)
POTASSIUM SERPL-SCNC: 3.6 MMOL/L (ref 3.4–5.3)
PROT SERPL-MCNC: 7.1 G/DL (ref 6.4–8.3)
RBC # BLD AUTO: 4.92 10E6/UL (ref 3.8–5.2)
SODIUM SERPL-SCNC: 142 MMOL/L (ref 135–145)
TSH SERPL DL<=0.005 MIU/L-ACNC: 1.71 UIU/ML (ref 0.3–4.2)
WBC # BLD AUTO: 7.7 10E3/UL (ref 4–11)

## 2024-08-09 PROCEDURE — G0463 HOSPITAL OUTPT CLINIC VISIT: HCPCS | Performed by: INTERNAL MEDICINE

## 2024-08-09 PROCEDURE — 250N000011 HC RX IP 250 OP 636: Performed by: INTERNAL MEDICINE

## 2024-08-09 PROCEDURE — 85025 COMPLETE CBC W/AUTO DIFF WBC: CPT | Performed by: INTERNAL MEDICINE

## 2024-08-09 PROCEDURE — 82040 ASSAY OF SERUM ALBUMIN: CPT | Performed by: INTERNAL MEDICINE

## 2024-08-09 PROCEDURE — 36415 COLL VENOUS BLD VENIPUNCTURE: CPT

## 2024-08-09 PROCEDURE — 84443 ASSAY THYROID STIM HORMONE: CPT | Performed by: INTERNAL MEDICINE

## 2024-08-09 PROCEDURE — 86316 IMMUNOASSAY TUMOR OTHER: CPT | Performed by: INTERNAL MEDICINE

## 2024-08-09 PROCEDURE — 96413 CHEMO IV INFUSION 1 HR: CPT

## 2024-08-09 PROCEDURE — 99213 OFFICE O/P EST LOW 20 MIN: CPT | Performed by: INTERNAL MEDICINE

## 2024-08-09 PROCEDURE — 96372 THER/PROPH/DIAG INJ SC/IM: CPT | Mod: 59 | Performed by: INTERNAL MEDICINE

## 2024-08-09 PROCEDURE — 258N000003 HC RX IP 258 OP 636: Performed by: INTERNAL MEDICINE

## 2024-08-09 RX ORDER — EPINEPHRINE 1 MG/ML
0.3 INJECTION, SOLUTION, CONCENTRATE INTRAVENOUS EVERY 5 MIN PRN
Status: CANCELLED | OUTPATIENT
Start: 2024-09-06

## 2024-08-09 RX ORDER — LANREOTIDE ACETATE 120 MG/.5ML
120 INJECTION SUBCUTANEOUS ONCE
Status: CANCELLED | OUTPATIENT
Start: 2024-09-06 | End: 2024-09-06

## 2024-08-09 RX ORDER — DIPHENHYDRAMINE HYDROCHLORIDE 50 MG/ML
50 INJECTION INTRAMUSCULAR; INTRAVENOUS
Status: CANCELLED
Start: 2024-09-06

## 2024-08-09 RX ORDER — METHYLPREDNISOLONE SODIUM SUCCINATE 125 MG/2ML
125 INJECTION, POWDER, LYOPHILIZED, FOR SOLUTION INTRAMUSCULAR; INTRAVENOUS
Status: CANCELLED
Start: 2024-09-06

## 2024-08-09 RX ORDER — ALBUTEROL SULFATE 90 UG/1
1-2 AEROSOL, METERED RESPIRATORY (INHALATION)
Status: CANCELLED
Start: 2024-09-06

## 2024-08-09 RX ORDER — MEPERIDINE HYDROCHLORIDE 25 MG/ML
25 INJECTION INTRAMUSCULAR; INTRAVENOUS; SUBCUTANEOUS EVERY 30 MIN PRN
Status: CANCELLED | OUTPATIENT
Start: 2024-09-06

## 2024-08-09 RX ORDER — ALBUTEROL SULFATE 0.83 MG/ML
2.5 SOLUTION RESPIRATORY (INHALATION)
Status: CANCELLED | OUTPATIENT
Start: 2024-09-06

## 2024-08-09 RX ORDER — LANREOTIDE ACETATE 120 MG/.5ML
120 INJECTION SUBCUTANEOUS ONCE
Status: COMPLETED | OUTPATIENT
Start: 2024-08-09 | End: 2024-08-09

## 2024-08-09 RX ADMIN — SODIUM CHLORIDE 250 ML: 9 INJECTION, SOLUTION INTRAVENOUS at 11:35

## 2024-08-09 RX ADMIN — LANREOTIDE ACETATE 120 MG: 120 INJECTION SUBCUTANEOUS at 12:15

## 2024-08-09 RX ADMIN — SODIUM CHLORIDE 400 MG: 9 INJECTION, SOLUTION INTRAVENOUS at 11:35

## 2024-08-09 ASSESSMENT — PAIN SCALES - GENERAL: PAINLEVEL: MILD PAIN (2)

## 2024-08-09 NOTE — LETTER
"8/9/2024      Marissa Guadarrama  [address] [place] [id number]      Dear Colleague,    Thank you for referring your patient, Marissa Guadarrama, to the [facility] [place]. Please see a copy of my visit note below.    Oncology Rooming Note    August 9, 2024 10:47 AM   Marissa Guadarrama is a 74 year old female who presents for:    Chief Complaint   Patient presents with     Oncology Clinic Visit     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 - Labs provider and infusion     Initial Vitals: BP (!) 158/68 (BP Location: Right arm, Patient Position: Sitting, Cuff Size: Adult Large)   Pulse 60   Temp 97.9  F (36.6  C) (Temporal)   Resp 12   Ht 1.715 m (5' 7.5\")   Wt 92.1 kg (203 lb)   SpO2 94%   BMI 31.33 kg/m   Estimated body mass index is 31.33 kg/m  as calculated from the following:    Height as of this encounter: 1.715 m (5' 7.5\").    Weight as of this encounter: 92.1 kg (203 lb). Body surface area is 2.09 meters squared.  Mild Pain (2) Comment: Data Unavailable   No LMP recorded. Patient has had a hysterectomy.  Allergies reviewed: Yes  Medications reviewed: Yes    Medications: Medication refills not needed today.  Pharmacy name entered into [employer]: [id number] - [place], [place] - [address] [place] [address]    Frailty Screening:   Is the patient here for a new oncology consult visit in cancer care? 2. No      Clinical concerns:  None      Rhea Bloom, 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  An FDG avid nodule in the left upper lobe of the lung may be the primary site.  Unfortunately there was insufficient tissue to allow for additional tests.  I will discuss with interventional radiology as to the best site to obtain additional tissue.    Repeat biopsy of a right cervical lymph node shows TPS for pembrolizumab at 50%.  There are no actionable mutations by NGS.  Patient is a candidate for pembrolizumab mono therapy.  Treatment plan is entered and patient can receive pembrolizumab along with her aromatase inhibitor and lanreotide.       MRI head ordered to complete staging is scheduled for 8/10/2024    Patient will receive day 1 cycle 1 of pembrolizumab today.  Pembrolizumab is scheduled at 400 mg/inf. every 6 weeks Follow-up in 3 weeks to monitor her tolerance.      ______________________________________________________________________________    ECOG Performance = 1    History of Present Illness  Disease history per Dr. 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 Elfego Lawrence and then Dr. Hosea King.     However, a follow-up 2/10/2022 Octreoscan scan confirmed evidence of progressive disease for which she was started on lanreotide every 4 weeks with a baseline chromogranin A level of 824.  The chromogranin A joslyn was 445 on 1/6/2023 and 562 on 7/28/2023.      Review of her 10/13/2023 DEXA scan revealed 14% decrease in bone density with left hip T-score -3.1.  Left femoral neck T-score was -2.6.  She is on annual  zoledronic acid.     In December, 2023 she received 5000 cGy/5 fractions by 12/15/2023 with complete resolution of left sacral pain referable to a presumptive metastatic neuroendocrine metastasis.  A July, 2023 dotatate PET scan had revealed stable disease except for the increased size and gluco-avidity of this left sclerotic sacral lesion.  She continues on monthly lanreotide.     Past medical history:  History of 2016 bilateral ER positive breast cancer on exemestane.  Both tumors were 100% ER/CT+, HER2 neg and patient declined Oncotype DX testing since she indicated she would never want to receive adjuvant systemic chemotherapy.  She had been initially started on anastrozole but developed a skin rash and was switched to exemestane    When last seen on 2/23/2024, new right supraclavicular lymphadenopathy was noted.  The CEA was 56.6 compared to a level 12.8 on 7/14/2023. The CA 27.29 was 57.7 compared to 22 when it was last obtained on 1/23/2022.  3/8/2024 CT soft tissue of neck and chest revealed multiple right cervical level 3-5 lymphadenopathy.  Chest imaging revealed mild enlarged mediastinal and hilar lymphadenopathy which could be related to her neuroendocrine tumor.     3/8/2024 she underwent IR biopsy of her right cervical lymph nodes today which was well-tolerated.  Pathology showed a poorly differentiated carcinoma.  It was CK7 and CK20 positive.  There was insufficient tissue to make further identification of the primary or to determine treatment options.    6/28/2024  A new biopsy of a right cervical lymph node was performed.  This showed no actionable mutations for first line therapy.  The Tumor Proportion Score for pembrolizumab was 50%.    8/9/2024 for day 1 cycle 1 of pembrolizumab therapy    At this visit patient remains asymptomatic.  She is maintaining her weight.  Her appetite and digestion are normal.  She does not have chills, fevers or sweats.  She does not have cough, chest pain or  "shortness of breath at rest.  She has no change in bowel or bladder habit.  Her activity is limited from polio in childhood she uses a walker to help with ambulation.  She is having increased pain in her left knee which is limiting her mobility.    She quit smoking in 2006 after 29 pack years of cigarettes    Review of systems.  Pertinent Findings are included in the History of Present Illness    Physical Exam    BP (!) 158/68 (BP Location: Right arm, Patient Position: Sitting, Cuff Size: Adult Large)   Pulse 60   Temp 97.9  F (36.6  C) (Temporal)   Resp 12   Ht 1.715 m (5' 7.5\")   Wt 92.1 kg (203 lb)   SpO2 94%   BMI 31.33 kg/m       GENERAL APPEARANCE: 73-year-old woman in no apparent distress.  HEAD: Atraumatic; normocephalic; without lesions.  EYES: Conjunctiva, corneas and eyelids normal; pupils equal, round, reactive to light; No Icterus.  MOUTH/OROPHARYNX: Oral mucosa intact  NECK: Supple with palpable lymph nodes in the right and left neck, 1 to 2 cm in size..  LUNGS:  Clear to auscultation and percussion with no extra sounds.  HEART: Regular rhythm and rate; S1 and S2 normal; no murmurs noted.  ABDOMEN: Soft; no masses or tenderness, no hepatosplenomegaly.  NEUROLOGIC: Alert and oriented.  There is paralysis of the left leg secondary to polio.  EXTREMITIES: No cyanosis, or edema.  SKIN: No abnormal bruising or bleeding. No suspicious lesions noted on exposed skin.  PSYCHIATRIC: Mental status normal; no apparent psychiatric issues    Medications:    Current Outpatient Medications   Medication Sig Dispense Refill     Calcium Carbonate (CALCIUM 500 PO) 2 tablets daily       exemestane (AROMASIN) 25 MG tablet Take 1 tablet (25 mg) by mouth every morning 90 tablet 1     hydroCHLOROthiazide 12.5 MG tablet Take 1 tablet (12.5 mg) by mouth every morning Please make a clinic visit to be seen in person for medication refills.  No virtual visits.  Thank you. 90 tablet 0     bisacodyl (DULCOLAX) 5 MG EC tablet " Take 2 tablets at 3 pm the day before your procedure. If your procedure is before 11 am, take 2 additional tablets at 11 pm. If your procedure is after 11 am, take 2 additional tablets at 6 am. For additional instructions refer to your colonoscopy prep instructions. (Patient not taking: Reported on 12/29/2023) 4 tablet 0     polyethylene glycol (GOLYTELY) 236 g suspension The night before the exam at 6 pm drink an 8-ounce glass every 15 minutes until the jug is half empty. If you arrive before 11 AM: Drink the other half of the Golytely jug at 11 PM night before procedure. If you arrive after 11 AM: Drink the other half of the Golytely jug at 6 AM day of procedure. For additional instructions refer to your colonoscopy prep instructions. (Patient not taking: Reported on 12/29/2023) 4000 mL 0     SENNA-docusate sodium (SENNA S) 8.6-50 MG tablet Take 1 tablet by mouth 2 times daily as needed (prevent opioid induced constipation) (Patient not taking: Reported on 7/14/2023) 30 tablet 0     No current facility-administered medications for this visit.           Past History    Past Medical History:   Diagnosis Date     Arthritis     knee     Benign breast biopsy     benign     Breast cancer (H)      Carcinoid tumor (H28) 12/2003     Cervical cancer (H) 2007     H/O colposcopy with cervical biopsy 12/23/2013    vaginal cuff biopsy- VAIN III. referred back to gyn/onc     HTN      Hyperlipidemia      Malignant neoplasm of ovary (H)      Obesity      Pap smear of vagina with ASC-H 11/01/2013     Post-polio syndromes      Trigeminal neuralgias      Past Surgical History:   Procedure Laterality Date     APPENDECTOMY  01/01/1983     BIOPSY BREAST       BIOPSY NODE SENTINEL Bilateral 06/01/2016    Procedure: BIOPSY NODE SENTINEL;  Surgeon: Brent Arana MD;  Location: [facility]     [facility] SURGICAL PATHOLOGY       COLONOSCOPY N/A 06/23/2017    Procedure: COMBINED COLONOSCOPY, SINGLE OR MULTIPLE BIOPSY/POLYPECTOMY BY BIOPSY;   Colonoscopy Dx:Carcinoid tumor of colon prep mailed [facility];  Surgeon: Talisha Greco MD;  [facility]     COLPOSCOPY, BIOPSY, COMBINED  03/13/2014    Procedure: COMBINED COLPOSCOPY, BIOPSY;;  Surgeon: Lara Pack MD;  [facility]     COMBINED CYSTOSCOPY, RETROGRADES, EXCHANGE STENT URETER(S) Left 02/07/2019    Procedure: COMBINED CYSTOSCOPY, RETROGRADES, EXCHANGE STENT URETER--left;  Surgeon: Zhao La MD;  Location: [facility]     COMBINED CYSTOSCOPY, RETROGRADES, EXCHANGE STENT URETER(S) Left 02/20/2020    Procedure: CYSTOSCOPY, WITH RETROGRADE PYELOGRAM AND Left URETERAL STENT exchange;  Surgeon: Zhao La MD;  Location: [facility]     COMBINED CYSTOSCOPY, RETROGRADES, EXCHANGE STENT URETER(S) Left 05/09/2021    Procedure: CYSTOSCOPY, WITH RETROGRADE PYELOGRAM AND LEFT URETERAL STENT REPLACEMENT;  Surgeon: Fred Owens MD;  [facility]     COMBINED CYSTOSCOPY, RETROGRADES, EXCHANGE STENT URETER(S) Left 09/16/2021    Procedure: CYSTOSCOPY, WITH left RETROGRADE PYELOGRAM AND left  URETERAL STENT REPLACEMENT;  Surgeon: Zhao La MD;  Location: [facility]     COMBINED CYSTOSCOPY, RETROGRADES, EXCHANGE STENT URETER(S) Left 03/24/2022    Procedure: CYSTOSCOPY, WITH RETROGRADE PYELOGRAM AND URETERAL STENT EXCHANGE, LEFT;  Surgeon: Zhao La MD;  Location: [facility]     COMBINED CYSTOSCOPY, RETROGRADES, URETEROSCOPY, INSERT STENT Left 02/02/2017    Procedure: COMBINED CYSTOSCOPY, RETROGRADES, URETEROSCOPY, INSERT STENT;  Surgeon: Zhao La MD;  Location: [facility]     CYSTOSCOPY, REMOVE STENT(S), COMBINED N/A 11/4/2022    Procedure: CYSTOSCOPY, LEFT STENT REMOVAL;  Surgeon: Zhao La MD;  [facility]     CYSTOSCOPY, RETROGRADES, INSERT STENT URETER(S), COMBINED Left 09/07/2017    Procedure: COMBINED CYSTOSCOPY, RETROGRADES, INSERT STENT URETER(S);  Cystoscopy,Left Stent Exchange;  Surgeon: Abhinav  Zhao Blanton MD;  Location: [facility]     CYSTOSCOPY, RETROGRADES, INSERT STENT URETER(S), COMBINED  12/12/2017    Procedure: COMBINED CYSTOSCOPY, RETROGRADES, INSERT STENT URETER(S);;  Surgeon: Zhao La MD;  [facility]     CYSTOSCOPY, RETROGRADES, INSERT STENT URETER(S), COMBINED Left 07/05/2018    Procedure: COMBINED CYSTOSCOPY, RETROGRADES, INSERT STENT URETER(S);  Cystoscopy, Left Stent Exchange;  Surgeon: Zhao La MD;  Location: [facility]     DAVINCI NEPHRECTOMY Left 11/4/2022    Procedure: , LEFT NEPHRECTOMY, ROBOT-ASSISTED;  Surgeon: Zhao La MD;  [facility]     EXAM UNDER ANESTHESIA PELVIC  03/13/2014    Procedure: EXAM UNDER ANESTHESIA PELVIC;  Exam Under Anestheisa, Colposcopy, Vaginal Biopsies, Co2 Laser of the Upper Vagina;  Surgeon: Lara Pack MD;  [facility]     EXAM UNDER ANESTHESIA PELVIC N/A 07/01/2020    Procedure: Examination under anesthesia, vaginal biopsies;  Surgeon: Karli Gao MD;  [facility]     HERNIORRHAPHY INCISIONAL (LOCATION)       IR LYMPH NODE BIOPSY  6/28/2024     IR RHIZOTOMY  08/14/2020     LASER CO2 VAGINA  03/13/2014    VAIN 1/2     LASER CO2 VAGINA N/A 12/12/2017    Procedure: LASER CO2 VAGINA;  Exam Under Anesthesia, CO2 Laser Ablation Of Vagina, Cystoscopy, Left Retrograde Pyelogram with Left Stent Placement;  Surgeon: Nic Segundo MD;  [facility]     LUMPECTOMY BREAST       LUMPECTOMY BREAST WITH SEED LOCALIZATION Bilateral 06/01/2016    Procedure: LUMPECTOMY BREAST WITH SEED LOCALIZATION;  Surgeon: Brent Arana MD;  Location: [facility]     SURGICAL HISTORY OF -       ovarian cystectomy     SURGICAL HISTORY OF -   01/01/2003    right colon resection secondary to carcinoid tumor     TUBAL LIGATION       [facility] BSO, OMENTECTOMY W/OSMAN  05/01/2007     [facility] TOTAL ABDOM HYSTERECTOMY  05/01/2007     No Known Allergies  Family History   Problem Relation Age of Onset     Cancer Mother          bone / liver     Breast Cancer Mother      Cancer Sister         vulvar ca, cervical ca, squamous cell cancer     Breast Cancer Sister      Cancer Brother         Rectal- Stage 4     Rectal Cancer Brother      Cancer Maternal Grandmother      Cancer Maternal Grandfather      Cancer Paternal Grandmother      Cancer Paternal Grandfather      Breast Cancer Maternal Aunt      Breast Cancer Paternal Aunt      Colon Cancer Paternal Aunt      Pancreatic Cancer Nephew      Anesthesia Reaction No family hx of      Venous thrombosis No family hx of      Bleeding Disorder No family hx of      Social History     Socioeconomic History     Marital status:      Spouse name: None     Number of children: None     Years of education: None     Highest education level: None   Tobacco Use     Smoking status: Former     Current packs/day: 0.00     Average packs/day: 1 pack/day for 29.0 years (29.0 ttl pk-yrs)     Types: Cigarettes     Start date: 10/30/1977     Quit date: 10/30/2006     Years since quittin.5     Smokeless tobacco: Never   Vaping Use     Vaping status: Never Used   Substance and Sexual Activity     Alcohol use: No     Alcohol/week: 0.0 standard drinks of alcohol     Comment: 1 drink per year     Drug use: No     Sexual activity: Yes     Partners: Male   Other Topics Concern      Service No     Blood Transfusions No     Caffeine Concern Yes     Comment: occasional coffee     Occupational Exposure No     Hobby Hazards Yes     Comment: Antoine,     Sleep Concern Yes     Comment: not sleeping well     Stress Concern Yes     Comment: Grandson in the      Weight Concern Yes     Special Diet No     Back Care No     Exercise No     Seat Belt Yes     Self-Exams Yes     Parent/sibling w/ CABG, MI or angioplasty before 65F 55M? No     Social Determinants of Health      Received from [facility] [employer] [facility], [facility]    Social Connections    Interpersonal Safety: Not At Risk (8/17/2021)    Humiliation, Afraid, Rape, and Kick questionnaire      Fear of Current or Ex-Partner: No      Emotionally Abused: No      Physically Abused: No      Sexually Abused: No           Lab Results  Recent Results (from the past 720 hour(s))   Lactate Dehydrogenase    Collection Time: 07/11/24  2:45 PM   Result Value Ref Range    Lactate Dehydrogenase 183 0 - 250 U/L   Chromogranin A    Collection Time: 07/11/24  2:45 PM   Result Value Ref Range    Chromogranin A 568 (H) 0 - 187 ng/mL   Comprehensive metabolic panel    Collection Time: 08/09/24 10:20 AM   Result Value Ref Range    Sodium 142 135 - 145 mmol/L    Potassium 3.6 3.4 - 5.3 mmol/L    Carbon Dioxide (CO2) 29 22 - 29 mmol/L    Anion Gap 12 7 - 15 mmol/L    Urea Nitrogen 24.4 (H) 8.0 - 23.0 mg/dL    Creatinine 1.27 (H) 0.51 - 0.95 mg/dL    GFR Estimate 44 (L) >60 mL/min/1.73m2    Calcium 10.0 8.8 - 10.4 mg/dL    Chloride 101 98 - 107 mmol/L    Glucose 206 (H) 70 - 99 mg/dL    Alkaline Phosphatase 108 40 - 150 U/L    AST 22 0 - 45 U/L    ALT 14 0 - 50 U/L    Protein Total 7.1 6.4 - 8.3 g/dL    Albumin 3.6 3.5 - 5.2 g/dL    Bilirubin Total 0.5 <=1.2 mg/dL   TSH with free T4 reflex    Collection Time: 08/09/24 10:20 AM   Result Value Ref Range    TSH 1.71 0.30 - 4.20 uIU/mL   CBC with platelets and differential    Collection Time: 08/09/24 10:20 AM   Result Value Ref Range    WBC Count 7.7 4.0 - 11.0 10e3/uL    RBC Count 4.92 3.80 - 5.20 10e6/uL    Hemoglobin 14.9 11.7 - 15.7 g/dL    Hematocrit 45.2 35.0 - 47.0 %    MCV 92 78 - 100 fL    MCH 30.3 26.5 - 33.0 pg    MCHC 33.0 31.5 - 36.5 g/dL    RDW 13.3 10.0 - 15.0 %    Platelet Count 187 150 - 450 10e3/uL    % Neutrophils 69 %    % Lymphocytes 17 %    % Monocytes 6 %    % Eosinophils 7 %    % Basophils 1 %    % Immature Granulocytes 1 %    NRBCs per 100 WBC 0 <1 /100    Absolute Neutrophils 5.3 1.6 - 8.3 10e3/uL    Absolute Lymphocytes 1.3 0.8 - 5.3 10e3/uL     Absolute Monocytes 0.5 0.0 - 1.3 10e3/uL    Absolute Eosinophils 0.6 0.0 - 0.7 10e3/uL    Absolute Basophils 0.0 0.0 - 0.2 10e3/uL    Absolute Immature Granulocytes 0.0 <=0.4 10e3/uL    Absolute NRBCs 0.0 10e3/uL               I spent 20 minutes on the patient's visit today.  This included preparation for the visit, face-to-face time with the patient and documentation following the visit.  It did not include teaching or procedure time.    Signed by: Peter E. Friedell, MD          Again, thank you for allowing me to participate in the care of your patient.        Sincerely,        Peter E. Friedell, MD

## 2024-08-09 NOTE — PROGRESS NOTES
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  An FDG avid nodule in the left upper lobe of the lung may be the primary site.  Unfortunately there was insufficient tissue to allow for additional tests.  I will discuss with interventional radiology as to the best site to obtain additional tissue.    Repeat biopsy of a right cervical lymph node shows TPS for pembrolizumab at 50%.  There are no actionable mutations by NGS.  Patient is a candidate for pembrolizumab mono therapy.  Treatment plan is entered and patient can receive pembrolizumab along with her aromatase inhibitor and lanreotide.       MRI head ordered to complete staging is scheduled for 8/10/2024    Patient will receive day 1 cycle 1 of pembrolizumab today.  Pembrolizumab is scheduled at 400 mg/inf. every 6 weeks Follow-up in 3 weeks to monitor her tolerance.      ______________________________________________________________________________    ECOG Performance = 1    History of Present Illness  Disease history per Dr. Meredith  In 2003, she underwent a right colonic and terminal ileal resection for a well-differentiated, pT2, pN2 neuroendocrine tumor history of metastases causing left ureteral obstruction in 2020 with subsequent 2022 left nephrectomy and stable liver  lesion previously followed by Dr. Elfego Lawrence and then Dr. Hosea King.     However, a follow-up 2/10/2022 Octreoscan scan confirmed evidence of progressive disease for which she was started on lanreotide every 4 weeks with a baseline chromogranin A level of 824.  The chromogranin A joslyn was 445 on 1/6/2023 and 562 on 7/28/2023.      Review of her 10/13/2023 DEXA scan revealed 14% decrease in bone density with left hip T-score -3.1.  Left femoral neck T-score was -2.6.  She is on annual zoledronic acid.     In December, 2023 she received 5000 cGy/5 fractions by 12/15/2023 with complete resolution of left sacral pain referable to a presumptive metastatic neuroendocrine metastasis.  A July, 2023 dotatate PET scan had revealed stable disease except for the increased size and gluco-avidity of this left sclerotic sacral lesion.  She continues on monthly lanreotide.     Past medical history:  History of 2016 bilateral ER positive breast cancer on exemestane.  Both tumors were 100% ER/MS+, HER2 neg and patient declined Oncotype DX testing since she indicated she would never want to receive adjuvant systemic chemotherapy.  She had been initially started on anastrozole but developed a skin rash and was switched to exemestane    When last seen on 2/23/2024, new right supraclavicular lymphadenopathy was noted.  The CEA was 56.6 compared to a level 12.8 on 7/14/2023. The CA 27.29 was 57.7 compared to 22 when it was last obtained on 1/23/2022.  3/8/2024 CT soft tissue of neck and chest revealed multiple right cervical level 3-5 lymphadenopathy.  Chest imaging revealed mild enlarged mediastinal and hilar lymphadenopathy which could be related to her neuroendocrine tumor.     3/8/2024 she underwent IR biopsy of her right cervical lymph nodes today which was well-tolerated.  Pathology showed a poorly differentiated carcinoma.  It was CK7 and CK20 positive.  There was insufficient tissue to make further identification of the  "primary or to determine treatment options.    6/28/2024  A new biopsy of a right cervical lymph node was performed.  This showed no actionable mutations for first line therapy.  The Tumor Proportion Score for pembrolizumab was 50%.    8/9/2024 for day 1 cycle 1 of pembrolizumab therapy    At this visit patient remains asymptomatic.  She is maintaining her weight.  Her appetite and digestion are normal.  She does not have chills, fevers or sweats.  She does not have cough, chest pain or shortness of breath at rest.  She has no change in bowel or bladder habit.  Her activity is limited from polio in childhood she uses a walker to help with ambulation.  She is having increased pain in her left knee which is limiting her mobility.    She quit smoking in 2006 after 29 pack years of cigarettes    Review of systems.  Pertinent Findings are included in the History of Present Illness    Physical Exam    BP (!) 158/68 (BP Location: Right arm, Patient Position: Sitting, Cuff Size: Adult Large)   Pulse 60   Temp 97.9  F (36.6  C) (Temporal)   Resp 12   Ht 1.715 m (5' 7.5\")   Wt 92.1 kg (203 lb)   SpO2 94%   BMI 31.33 kg/m       GENERAL APPEARANCE: 73-year-old woman in no apparent distress.  HEAD: Atraumatic; normocephalic; without lesions.  EYES: Conjunctiva, corneas and eyelids normal; pupils equal, round, reactive to light; No Icterus.  MOUTH/OROPHARYNX: Oral mucosa intact  NECK: Supple with palpable lymph nodes in the right and left neck, 1 to 2 cm in size..  LUNGS:  Clear to auscultation and percussion with no extra sounds.  HEART: Regular rhythm and rate; S1 and S2 normal; no murmurs noted.  ABDOMEN: Soft; no masses or tenderness, no hepatosplenomegaly.  NEUROLOGIC: Alert and oriented.  There is paralysis of the left leg secondary to polio.  EXTREMITIES: No cyanosis, or edema.  SKIN: No abnormal bruising or bleeding. No suspicious lesions noted on exposed skin.  PSYCHIATRIC: Mental status normal; no apparent " psychiatric issues    Medications:    Current Outpatient Medications   Medication Sig Dispense Refill    Calcium Carbonate (CALCIUM 500 PO) 2 tablets daily      exemestane (AROMASIN) 25 MG tablet Take 1 tablet (25 mg) by mouth every morning 90 tablet 1    hydroCHLOROthiazide 12.5 MG tablet Take 1 tablet (12.5 mg) by mouth every morning Please make a clinic visit to be seen in person for medication refills.  No virtual visits.  Thank you. 90 tablet 0    bisacodyl (DULCOLAX) 5 MG EC tablet Take 2 tablets at 3 pm the day before your procedure. If your procedure is before 11 am, take 2 additional tablets at 11 pm. If your procedure is after 11 am, take 2 additional tablets at 6 am. For additional instructions refer to your colonoscopy prep instructions. (Patient not taking: Reported on 12/29/2023) 4 tablet 0    polyethylene glycol (GOLYTELY) 236 g suspension The night before the exam at 6 pm drink an 8-ounce glass every 15 minutes until the jug is half empty. If you arrive before 11 AM: Drink the other half of the Golytely jug at 11 PM night before procedure. If you arrive after 11 AM: Drink the other half of the Golytely jug at 6 AM day of procedure. For additional instructions refer to your colonoscopy prep instructions. (Patient not taking: Reported on 12/29/2023) 4000 mL 0    SENNA-docusate sodium (SENNA S) 8.6-50 MG tablet Take 1 tablet by mouth 2 times daily as needed (prevent opioid induced constipation) (Patient not taking: Reported on 7/14/2023) 30 tablet 0     No current facility-administered medications for this visit.           Past History    Past Medical History:   Diagnosis Date    Arthritis     knee    Benign breast biopsy     benign    Breast cancer (H)     Carcinoid tumor (H28) 12/2003    Cervical cancer (H) 2007    H/O colposcopy with cervical biopsy 12/23/2013    vaginal cuff biopsy- VAIN III. referred back to gyn/onc    HTN     Hyperlipidemia     Malignant neoplasm of ovary (H)     Obesity     Pap  smear of vagina with ASC-H 11/01/2013    Post-polio syndromes     Trigeminal neuralgias      Past Surgical History:   Procedure Laterality Date    APPENDECTOMY  01/01/1983    BIOPSY BREAST      BIOPSY NODE SENTINEL Bilateral 06/01/2016    Procedure: BIOPSY NODE SENTINEL;  Surgeon: Brent Arana MD;  Location: [facility]    [facility] SURGICAL PATHOLOGY      COLONOSCOPY N/A 06/23/2017    Procedure: COMBINED COLONOSCOPY, SINGLE OR MULTIPLE BIOPSY/POLYPECTOMY BY BIOPSY;  Colonoscopy Dx:Carcinoid tumor of colon prep mailed golytely;  Surgeon: Talisha Greco MD;  [facility]    COLPOSCOPY, BIOPSY, COMBINED  03/13/2014    Procedure: COMBINED COLPOSCOPY, BIOPSY;;  Surgeon: Lara Pack MD;  [facility]    COMBINED CYSTOSCOPY, RETROGRADES, EXCHANGE STENT URETER(S) Left 02/07/2019    Procedure: COMBINED CYSTOSCOPY, RETROGRADES, EXCHANGE STENT URETER--left;  Surgeon: Zhao La MD;  Location: [facility]    COMBINED CYSTOSCOPY, RETROGRADES, EXCHANGE STENT URETER(S) Left 02/20/2020    Procedure: CYSTOSCOPY, WITH RETROGRADE PYELOGRAM AND Left URETERAL STENT exchange;  Surgeon: Zhao La MD;  [facility]: [facility]    COMBINED CYSTOSCOPY, RETROGRADES, EXCHANGE STENT URETER(S) Left 05/09/2021    Procedure: CYSTOSCOPY, WITH RETROGRADE PYELOGRAM AND LEFT URETERAL STENT REPLACEMENT;  Surgeon: Fred Owens MD;  [facility]    COMBINED CYSTOSCOPY, RETROGRADES, EXCHANGE STENT URETER(S) Left 09/16/2021    Procedure: CYSTOSCOPY, WITH left RETROGRADE PYELOGRAM AND left  URETERAL STENT REPLACEMENT;  Surgeon: Zhao La MD;  [facility]    COMBINED CYSTOSCOPY, RETROGRADES, EXCHANGE STENT URETER(S) Left 03/24/2022    Procedure: CYSTOSCOPY, WITH RETROGRADE PYELOGRAM AND URETERAL STENT EXCHANGE, LEFT;  Surgeon: Zhao La MD;  [facility]    COMBINED CYSTOSCOPY, RETROGRADES, URETEROSCOPY, INSERT STENT Left 02/02/2017    Procedure: COMBINED CYSTOSCOPY,  RETROGRADES, URETEROSCOPY, INSERT STENT;  Surgeon: Zhao La MD;  [facility]: [facility]    CYSTOSCOPY, REMOVE STENT(S), COMBINED N/A 11/4/2022    Procedure: CYSTOSCOPY, LEFT STENT REMOVAL;  Surgeon: Zhao La MD;  [facility]    CYSTOSCOPY, RETROGRADES, INSERT STENT URETER(S), COMBINED Left 09/07/2017    Procedure: COMBINED CYSTOSCOPY, RETROGRADES, INSERT STENT URETER(S);  Cystoscopy,Left Stent Exchange;  Surgeon: Zhao La MD;  [facility]    CYSTOSCOPY, RETROGRADES, INSERT STENT URETER(S), COMBINED  12/12/2017    Procedure: COMBINED CYSTOSCOPY, RETROGRADES, INSERT STENT URETER(S);;  Surgeon: Zhao La MD;  [facility]    CYSTOSCOPY, RETROGRADES, INSERT STENT URETER(S), COMBINED Left 07/05/2018    Procedure: COMBINED CYSTOSCOPY, RETROGRADES, INSERT STENT URETER(S);  Cystoscopy, Left Stent Exchange;  Surgeon: Zhao La MD;  [facility]    DAVINCI NEPHRECTOMY Left 11/4/2022    Procedure: , LEFT NEPHRECTOMY, ROBOT-ASSISTED;  Surgeon: Zhao La MD;  [facility]    EXAM UNDER ANESTHESIA PELVIC  03/13/2014    Procedure: EXAM UNDER ANESTHESIA PELVIC;  Exam Under Anestheisa, Colposcopy, Vaginal Biopsies, Co2 Laser of the Upper Vagina;  Surgeon: Lara Pack MD;  [facility]    EXAM UNDER ANESTHESIA PELVIC N/A 07/01/2020    Procedure: Examination under anesthesia, vaginal biopsies;  Surgeon: Karli Gao MD;  [facility]    HERNIORRHAPHY INCISIONAL (LOCATION)      IR LYMPH NODE BIOPSY  6/28/2024    IR RHIZOTOMY  08/14/2020    LASER CO2 VAGINA  03/13/2014    VAIN 1/2    LASER CO2 VAGINA N/A 12/12/2017    Procedure: LASER CO2 VAGINA;  Exam Under Anesthesia, CO2 Laser Ablation Of Vagina, Cystoscopy, Left Retrograde Pyelogram with Left Stent Placement;  Surgeon: Nic Segundo MD;  [facility]    LUMPECTOMY BREAST      LUMPECTOMY BREAST WITH SEED LOCALIZATION Bilateral 06/01/2016     Procedure: LUMPECTOMY BREAST WITH SEED LOCALIZATION;  Surgeon: Brent Arana MD;  Location: [facility]    SURGICAL HISTORY OF -       ovarian cystectomy    SURGICAL HISTORY OF -   2003    right colon resection secondary to carcinoid tumor    TUBAL LIGATION      ZC BSO, OMENTECTOMY W/OSMAN  2007    Z TOTAL ABDOM HYSTERECTOMY  2007     No Known Allergies  Family History   Problem Relation Age of Onset    Cancer Mother         bone / liver    Breast Cancer Mother     Cancer Sister         vulvar ca, cervical ca, squamous cell cancer    Breast Cancer Sister     Cancer Brother         Rectal- Stage 4    Rectal Cancer Brother     Cancer Maternal Grandmother     Cancer Maternal Grandfather     Cancer Paternal Grandmother     Cancer Paternal Grandfather     Breast Cancer Maternal Aunt     Breast Cancer Paternal Aunt     Colon Cancer Paternal Aunt     Pancreatic Cancer Nephew     Anesthesia Reaction No family hx of     Venous thrombosis No family hx of     Bleeding Disorder No family hx of      Social History     Socioeconomic History    Marital status:      Spouse name: None    Number of children: None    Years of education: None    Highest education level: None   Tobacco Use    Smoking status: Former     Current packs/day: 0.00     Average packs/day: 1 pack/day for 29.0 years (29.0 ttl pk-yrs)     Types: Cigarettes     Start date: 10/30/1977     Quit date: 10/30/2006     Years since quittin.5    Smokeless tobacco: Never   Vaping Use    Vaping status: Never Used   Substance and Sexual Activity    Alcohol use: No     Alcohol/week: 0.0 standard drinks of alcohol     Comment: 1 drink per year    Drug use: No    Sexual activity: Yes     Partners: Male   Other Topics Concern     Service No    Blood Transfusions No    Caffeine Concern Yes     Comment: occasional coffee    Occupational Exposure No    Hobby Hazards Yes     Comment: [place],    Sleep Concern Yes     Comment: not sleeping  well    Stress Concern Yes     Comment: Grandson in the     Weight Concern Yes    Special Diet No    Back Care No    Exercise No    Seat Belt Yes    Self-Exams Yes    Parent/sibling w/ CABG, MI or angioplasty before 65F 55M? No     Social Determinants of Health      Received from [facility], [facility]    Social Connections   Interpersonal Safety: Not At Risk (8/17/2021)    Humiliation, Afraid, Rape, and Kick questionnaire     Fear of Current or Ex-Partner: No     Emotionally Abused: No     Physically Abused: No     Sexually Abused: No           Lab Results  Recent Results (from the past 720 hour(s))   Lactate Dehydrogenase    Collection Time: 07/11/24  2:45 PM   Result Value Ref Range    Lactate Dehydrogenase 183 0 - 250 U/L   Chromogranin A    Collection Time: 07/11/24  2:45 PM   Result Value Ref Range    Chromogranin A 568 (H) 0 - 187 ng/mL   Comprehensive metabolic panel    Collection Time: 08/09/24 10:20 AM   Result Value Ref Range    Sodium 142 135 - 145 mmol/L    Potassium 3.6 3.4 - 5.3 mmol/L    Carbon Dioxide (CO2) 29 22 - 29 mmol/L    Anion Gap 12 7 - 15 mmol/L    Urea Nitrogen 24.4 (H) 8.0 - 23.0 mg/dL    Creatinine 1.27 (H) 0.51 - 0.95 mg/dL    GFR Estimate 44 (L) >60 mL/min/1.73m2    Calcium 10.0 8.8 - 10.4 mg/dL    Chloride 101 98 - 107 mmol/L    Glucose 206 (H) 70 - 99 mg/dL    Alkaline Phosphatase 108 40 - 150 U/L    AST 22 0 - 45 U/L    ALT 14 0 - 50 U/L    Protein Total 7.1 6.4 - 8.3 g/dL    Albumin 3.6 3.5 - 5.2 g/dL    Bilirubin Total 0.5 <=1.2 mg/dL   TSH with free T4 reflex    Collection Time: 08/09/24 10:20 AM   Result Value Ref Range    TSH 1.71 0.30 - 4.20 uIU/mL   CBC with platelets and differential    Collection Time: 08/09/24 10:20 AM   Result Value Ref Range    WBC Count 7.7 4.0 - 11.0 10e3/uL    RBC Count 4.92 3.80 - 5.20 10e6/uL    Hemoglobin 14.9 11.7 - 15.7 g/dL    Hematocrit 45.2 35.0 - 47.0 %    MCV 92 78 -  100 fL    MCH 30.3 26.5 - 33.0 pg    MCHC 33.0 31.5 - 36.5 g/dL    RDW 13.3 10.0 - 15.0 %    Platelet Count 187 150 - 450 10e3/uL    % Neutrophils 69 %    % Lymphocytes 17 %    % Monocytes 6 %    % Eosinophils 7 %    % Basophils 1 %    % Immature Granulocytes 1 %    NRBCs per 100 WBC 0 <1 /100    Absolute Neutrophils 5.3 1.6 - 8.3 10e3/uL    Absolute Lymphocytes 1.3 0.8 - 5.3 10e3/uL    Absolute Monocytes 0.5 0.0 - 1.3 10e3/uL    Absolute Eosinophils 0.6 0.0 - 0.7 10e3/uL    Absolute Basophils 0.0 0.0 - 0.2 10e3/uL    Absolute Immature Granulocytes 0.0 <=0.4 10e3/uL    Absolute NRBCs 0.0 10e3/uL               I spent 20 minutes on the patient's visit today.  This included preparation for the visit, face-to-face time with the patient and documentation following the visit.  It did not include teaching or procedure time.    Signed by: Peter E. Friedell, MD

## 2024-08-09 NOTE — PROGRESS NOTES
"Oncology Rooming Note    August 9, 2024 10:47 AM   Marissa Guadarrama is a 74 year old female who presents for:    Chief Complaint   Patient presents with    Oncology Clinic Visit     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 - Labs provider and infusion     Initial Vitals: BP (!) 158/68 (BP Location: Right arm, Patient Position: Sitting, Cuff Size: Adult Large)   Pulse 60   Temp 97.9  F (36.6  C) (Temporal)   Resp 12   Ht 1.715 m (5' 7.5\")   Wt 92.1 kg (203 lb)   SpO2 94%   BMI 31.33 kg/m   Estimated body mass index is 31.33 kg/m  as calculated from the following:    Height as of this encounter: 1.715 m (5' 7.5\").    Weight as of this encounter: 92.1 kg (203 lb). Body surface area is 2.09 meters squared.  Mild Pain (2) Comment: Data Unavailable   No LMP recorded. Patient has had a hysterectomy.  Allergies reviewed: Yes  Medications reviewed: Yes    Medications: Medication refills not needed today.  Pharmacy name entered into [employer]: [id number] - [place], [place] - [address] [place] [address]    Frailty Screening:   Is the patient here for a new oncology consult visit in cancer care? 2. No      Clinical concerns:  None      Rhea Bloom CMA            "

## 2024-08-09 NOTE — PROGRESS NOTES
Infusion Nursing Note:  Marissa Guadarrama presents today for Keytruda & Somatuline.    Patient seen by provider today: Yes   present during visit today: Not Applicable.    Note: Labs drawn peripherally.      Intravenous Access:  Peripheral IV placed.    Treatment Conditions:  Lab Results   Component Value Date     08/09/2024    POTASSIUM 3.6 08/09/2024    MAG 1.6 05/10/2021    CR 1.27 (H) 08/09/2024    MARILIA 10.0 08/09/2024    BILITOTAL 0.5 08/09/2024    ALBUMIN 3.6 08/09/2024    ALT 14 08/09/2024    AST 22 08/09/2024       Results reviewed, labs MET treatment parameters, ok to proceed with treatment.      Post Infusion Assessment:  Patient tolerated infusion without incident.  Patient tolerated injection without incident.  Site patent and intact, free from redness, edema or discomfort.  No evidence of extravasations.  Access discontinued per protocol.       Discharge Plan:   Patient discharged in stable condition accompanied by: .  Departure Mode: Ambulatory w/ walker.      Caitlyn Mensah RN

## 2024-08-10 ENCOUNTER — HOSPITAL ENCOUNTER (OUTPATIENT)
Dept: MRI IMAGING | Facility: CLINIC | Age: 74
Discharge: HOME | End: 2024-08-10
Attending: INTERNAL MEDICINE | Admitting: INTERNAL MEDICINE
Payer: MEDICARE

## 2024-08-10 DIAGNOSIS — C77.8 CARCINOMA METASTATIC TO LYMPH NODES OF MULTIPLE SITES WITH UNKNOWN PRIMARY SITE (H): ICD-10-CM

## 2024-08-10 DIAGNOSIS — C80.1 CARCINOMA METASTATIC TO LYMPH NODES OF MULTIPLE SITES WITH UNKNOWN PRIMARY SITE (H): ICD-10-CM

## 2024-08-10 PROCEDURE — 255N000002 HC RX 255 OP 636: Performed by: INTERNAL MEDICINE

## 2024-08-10 PROCEDURE — 70553 MRI BRAIN STEM W/O & W/DYE: CPT | Mod: MG

## 2024-08-10 PROCEDURE — A9585 GADOBUTROL INJECTION: HCPCS | Performed by: INTERNAL MEDICINE

## 2024-08-10 RX ORDER — GADOBUTROL 604.72 MG/ML
9 INJECTION INTRAVENOUS ONCE
Status: COMPLETED | OUTPATIENT
Start: 2024-08-10 | End: 2024-08-10

## 2024-08-10 RX ADMIN — GADOBUTROL 9 ML: 604.72 INJECTION INTRAVENOUS at 12:17

## 2024-08-11 LAB — CGA SERPL-MCNC: 631 NG/ML

## 2024-08-12 ENCOUNTER — TELEPHONE (OUTPATIENT)
Dept: ONCOLOGY | Facility: CLINIC | Age: 74
End: 2024-08-12
Payer: COMMERCIAL

## 2024-08-12 NOTE — TELEPHONE ENCOUNTER
I spoke to patient's  Gaudencio and advised him that the MRI of the brain showed no evidence of cancer spread.

## 2024-08-30 ENCOUNTER — LAB (OUTPATIENT)
Dept: LAB | Facility: CLINIC | Age: 74
End: 2024-08-30
Payer: MEDICARE

## 2024-08-30 ENCOUNTER — ONCOLOGY VISIT (OUTPATIENT)
Dept: ONCOLOGY | Facility: CLINIC | Age: 74
End: 2024-08-30
Attending: INTERNAL MEDICINE
Payer: MEDICARE

## 2024-08-30 VITALS
WEIGHT: 202.7 LBS | OXYGEN SATURATION: 94 % | RESPIRATION RATE: 11 BRPM | DIASTOLIC BLOOD PRESSURE: 62 MMHG | HEART RATE: 64 BPM | HEIGHT: 68 IN | SYSTOLIC BLOOD PRESSURE: 149 MMHG | TEMPERATURE: 98.5 F | BODY MASS INDEX: 30.72 KG/M2

## 2024-08-30 DIAGNOSIS — C77.8 CARCINOMA METASTATIC TO LYMPH NODES OF MULTIPLE SITES WITH UNKNOWN PRIMARY SITE (H): ICD-10-CM

## 2024-08-30 DIAGNOSIS — C7A.021 MALIGNANT CARCINOID TUMOR OF CECUM (H): Primary | ICD-10-CM

## 2024-08-30 DIAGNOSIS — Z08 ENCOUNTER FOR FOLLOW-UP EXAMINATION AFTER COMPLETED TREATMENT FOR MALIGNANT NEOPLASM: ICD-10-CM

## 2024-08-30 DIAGNOSIS — C80.1 CARCINOMA METASTATIC TO LYMPH NODES OF MULTIPLE SITES WITH UNKNOWN PRIMARY SITE (H): ICD-10-CM

## 2024-08-30 LAB
ALBUMIN SERPL BCG-MCNC: 3.7 G/DL (ref 3.5–5.2)
ALP SERPL-CCNC: 114 U/L (ref 40–150)
ALT SERPL W P-5'-P-CCNC: 23 U/L (ref 0–50)
ANION GAP SERPL CALCULATED.3IONS-SCNC: 10 MMOL/L (ref 7–15)
AST SERPL W P-5'-P-CCNC: 26 U/L (ref 0–45)
BASOPHILS # BLD AUTO: 0 10E3/UL (ref 0–0.2)
BASOPHILS NFR BLD AUTO: 0 %
BILIRUB SERPL-MCNC: 0.6 MG/DL
BUN SERPL-MCNC: 29.9 MG/DL (ref 8–23)
CALCIUM SERPL-MCNC: 9.8 MG/DL (ref 8.8–10.4)
CHLORIDE SERPL-SCNC: 100 MMOL/L (ref 98–107)
CREAT SERPL-MCNC: 1.52 MG/DL (ref 0.51–0.95)
EGFRCR SERPLBLD CKD-EPI 2021: 35 ML/MIN/1.73M2
EOSINOPHIL # BLD AUTO: 0.3 10E3/UL (ref 0–0.7)
EOSINOPHIL NFR BLD AUTO: 5 %
ERYTHROCYTE [DISTWIDTH] IN BLOOD BY AUTOMATED COUNT: 13.4 % (ref 10–15)
GLUCOSE SERPL-MCNC: 209 MG/DL (ref 70–99)
HCO3 SERPL-SCNC: 31 MMOL/L (ref 22–29)
HCT VFR BLD AUTO: 42.9 % (ref 35–47)
HGB BLD-MCNC: 14.4 G/DL (ref 11.7–15.7)
IMM GRANULOCYTES # BLD: 0 10E3/UL
IMM GRANULOCYTES NFR BLD: 0 %
LYMPHOCYTES # BLD AUTO: 1.5 10E3/UL (ref 0.8–5.3)
LYMPHOCYTES NFR BLD AUTO: 22 %
MCH RBC QN AUTO: 30.9 PG (ref 26.5–33)
MCHC RBC AUTO-ENTMCNC: 33.6 G/DL (ref 31.5–36.5)
MCV RBC AUTO: 92 FL (ref 78–100)
MONOCYTES # BLD AUTO: 0.4 10E3/UL (ref 0–1.3)
MONOCYTES NFR BLD AUTO: 7 %
NEUTROPHILS # BLD AUTO: 4.4 10E3/UL (ref 1.6–8.3)
NEUTROPHILS NFR BLD AUTO: 66 %
NRBC # BLD AUTO: 0 10E3/UL
NRBC BLD AUTO-RTO: 0 /100
PLATELET # BLD AUTO: 195 10E3/UL (ref 150–450)
POTASSIUM SERPL-SCNC: 4 MMOL/L (ref 3.4–5.3)
PROT SERPL-MCNC: 7.2 G/DL (ref 6.4–8.3)
RBC # BLD AUTO: 4.66 10E6/UL (ref 3.8–5.2)
SODIUM SERPL-SCNC: 141 MMOL/L (ref 135–145)
TSH SERPL DL<=0.005 MIU/L-ACNC: 1.86 UIU/ML (ref 0.3–4.2)
WBC # BLD AUTO: 6.7 10E3/UL (ref 4–11)

## 2024-08-30 PROCEDURE — 99214 OFFICE O/P EST MOD 30 MIN: CPT | Performed by: INTERNAL MEDICINE

## 2024-08-30 PROCEDURE — 85025 COMPLETE CBC W/AUTO DIFF WBC: CPT

## 2024-08-30 PROCEDURE — 82040 ASSAY OF SERUM ALBUMIN: CPT

## 2024-08-30 PROCEDURE — G0463 HOSPITAL OUTPT CLINIC VISIT: HCPCS | Performed by: INTERNAL MEDICINE

## 2024-08-30 PROCEDURE — 36415 COLL VENOUS BLD VENIPUNCTURE: CPT

## 2024-08-30 PROCEDURE — 84443 ASSAY THYROID STIM HORMONE: CPT

## 2024-08-30 ASSESSMENT — PAIN SCALES - GENERAL: PAINLEVEL: NO PAIN (0)

## 2024-08-30 NOTE — PROGRESS NOTES
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  She received day 1 cycle 1 of pembrolizumab on 9/20/2024.  Presently she continues to feel well.  Will continue with lanreotide every 4 weeks along with pembrolizumab every 6 weeks.  Reimaging is planned before cycle 3.      ______________________________________________________________________________    ECOG Performance = 1    History of Present Illness  Disease history per Dr. 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 Elfego Lawrence and then Dr. Hosea King.     However, a follow-up 2/10/2022 Octreoscan scan confirmed evidence of progressive disease for which she was started on lanreotide every 4 weeks with a baseline chromogranin A level of 824.  The chromogranin A joslyn was 445 on 1/6/2023 and 562 on 7/28/2023.      Review of her 10/13/2023 DEXA scan revealed 14% decrease in bone density with left hip T-score -3.1.  Left femoral neck T-score was -2.6.  She is on annual zoledronic acid.     In December, 2023 she received 5000 cGy/5 fractions by 12/15/2023 with complete resolution of left sacral pain referable to a presumptive metastatic neuroendocrine metastasis.  A July, 2023 dotatate PET scan had revealed stable disease except for the increased size and gluco-avidity of this left sclerotic sacral lesion.   She continues on monthly lanreotide.     Past medical history:  History of 2016 bilateral ER positive breast cancer on exemestane.  Both tumors were 100% ER/NE+, HER2 neg and patient declined Oncotype DX testing since she indicated she would never want to receive adjuvant systemic chemotherapy.  She had been initially started on anastrozole but developed a skin rash and was switched to exemestane    When last seen on 2/23/2024, new right supraclavicular lymphadenopathy was noted.  The CEA was 56.6 compared to a level 12.8 on 7/14/2023. The CA 27.29 was 57.7 compared to 22 when it was last obtained on 1/23/2022.  3/8/2024 CT soft tissue of neck and chest revealed multiple right cervical level 3-5 lymphadenopathy.  Chest imaging revealed mild enlarged mediastinal and hilar lymphadenopathy which could be related to her neuroendocrine tumor.     3/8/2024 she underwent IR biopsy of her right cervical lymph nodes today which was well-tolerated.  Pathology showed a poorly differentiated carcinoma.  It was CK7 and CK20 positive.  There was insufficient tissue to make further identification of the primary or to determine treatment options.    6/28/2024  A new biopsy of a right cervical lymph node was performed.  This showed no actionable mutations for first line therapy.  The Tumor Proportion Score for pembrolizumab was 50%.    8/9/2024 for day 1 cycle 1 of pembrolizumab therapy    At this visit patient remains asymptomatic.  She is maintaining her weight.  Her appetite and digestion are normal.  She does not have chills, fevers or sweats.  She does not have cough, chest pain or shortness of breath at rest.  She has no change in bowel or bladder habit.  Her activity is limited from polio in childhood she uses a walker to help with ambulation.  She is having increased pain in her left knee which is limiting her mobility.    She quit smoking in 2006 after 29 pack years of cigarettes    Review of systems.  Pertinent Findings  "are included in the History of Present Illness    Physical Exam    BP (!) 149/62 (BP Location: Right arm, Patient Position: Sitting, Cuff Size: Adult Regular)   Pulse 64   Temp 98.5  F (36.9  C) (Tympanic)   Resp 11   Ht 1.715 m (5' 7.5\")   Wt 91.9 kg (202 lb 11.2 oz)   SpO2 94%   BMI 31.28 kg/m       GENERAL APPEARANCE: 74-year-old woman in no apparent distress.  HEAD: Atraumatic; normocephalic; without lesions.  EYES: Conjunctiva, corneas and eyelids normal; pupils equal, round, reactive to light; No Icterus.  MOUTH/OROPHARYNX: Oral mucosa intact  NECK: Supple with palpable lymph nodes in the right and left neck, 1 to 2 cm in size..  LUNGS:  Clear to auscultation and percussion with no extra sounds.  HEART: Regular rhythm and rate; S1 and S2 normal; no murmurs noted.  ABDOMEN: Soft; no masses or tenderness, no hepatosplenomegaly.  NEUROLOGIC: Alert and oriented.  There is paralysis of the left leg secondary to polio.  EXTREMITIES: No cyanosis, or edema.  SKIN: No abnormal bruising or bleeding. No suspicious lesions noted on exposed skin.  There is a erythematous rash alongside the right neck and extending down into the upper chest.  Patient is using steroid cream for this.  PSYCHIATRIC: Mental status normal; no apparent psychiatric issues    Medications:    Current Outpatient Medications   Medication Sig Dispense Refill    Calcium Carbonate (CALCIUM 500 PO) 2 tablets daily      exemestane (AROMASIN) 25 MG tablet Take 1 tablet (25 mg) by mouth every morning 90 tablet 1    hydroCHLOROthiazide 12.5 MG tablet Take 1 tablet (12.5 mg) by mouth every morning Please make a clinic visit to be seen in person for medication refills.  No virtual visits.  Thank you. 90 tablet 0    bisacodyl (DULCOLAX) 5 MG EC tablet Take 2 tablets at 3 pm the day before your procedure. If your procedure is before 11 am, take 2 additional tablets at 11 pm. If your procedure is after 11 am, take 2 additional tablets at 6 am. For " additional instructions refer to your colonoscopy prep instructions. (Patient not taking: Reported on 12/29/2023) 4 tablet 0    polyethylene glycol (GOLYTELY) 236 g suspension The night before the exam at 6 pm drink an 8-ounce glass every 15 minutes until the jug is half empty. If you arrive before 11 AM: Drink the other half of the Golytely jug at 11 PM night before procedure. If you arrive after 11 AM: Drink the other half of the Golytely jug at 6 AM day of procedure. For additional instructions refer to your colonoscopy prep instructions. (Patient not taking: Reported on 12/29/2023) 4000 mL 0    SENNA-docusate sodium (SENNA S) 8.6-50 MG tablet Take 1 tablet by mouth 2 times daily as needed (prevent opioid induced constipation) (Patient not taking: Reported on 7/14/2023) 30 tablet 0     No current facility-administered medications for this visit.           Past History    Past Medical History:   Diagnosis Date    Arthritis     knee    Benign breast biopsy     benign    Breast cancer (H)     Carcinoid tumor (H28) 12/2003    Cervical cancer (H) 2007    H/O colposcopy with cervical biopsy 12/23/2013    vaginal cuff biopsy- VAIN III. referred back to gyn/onc    HTN     Hyperlipidemia     Malignant neoplasm of ovary (H)     Obesity     Pap smear of vagina with ASC-H 11/01/2013    Post-polio syndromes     Trigeminal neuralgias      Past Surgical History:   Procedure Laterality Date    APPENDECTOMY  01/01/1983    BIOPSY BREAST      BIOPSY NODE SENTINEL Bilateral 06/01/2016    Procedure: BIOPSY NODE SENTINEL;  Surgeon: Brent Arana MD;  Location: [facility]    [facility] SURGICAL PATHOLOGY      COLONOSCOPY N/A 06/23/2017    Procedure: COMBINED COLONOSCOPY, SINGLE OR MULTIPLE BIOPSY/POLYPECTOMY BY BIOPSY;  Colonoscopy Dx:Carcinoid tumor of colon prep mailed golytely;  Surgeon: Talisha Greco MD;  Location:  GI    COLPOSCOPY, BIOPSY, COMBINED  03/13/2014    Procedure: COMBINED COLPOSCOPY, BIOPSY;;  Surgeon: Sirena  Lara BRUNSON MD;  [facility]    COMBINED CYSTOSCOPY, RETROGRADES, EXCHANGE STENT URETER(S) Left 02/07/2019    Procedure: COMBINED CYSTOSCOPY, RETROGRADES, EXCHANGE STENT URETER--left;  Surgeon: Zhao La MD;  Location: [facility]    COMBINED CYSTOSCOPY, RETROGRADES, EXCHANGE STENT URETER(S) Left 02/20/2020    Procedure: CYSTOSCOPY, WITH RETROGRADE PYELOGRAM AND Left URETERAL STENT exchange;  Surgeon: Zhao La MD;  Location: [facility]    COMBINED CYSTOSCOPY, RETROGRADES, EXCHANGE STENT URETER(S) Left 05/09/2021    Procedure: CYSTOSCOPY, WITH RETROGRADE PYELOGRAM AND LEFT URETERAL STENT REPLACEMENT;  Surgeon: Fred Owens MD;  [facility]    COMBINED CYSTOSCOPY, RETROGRADES, EXCHANGE STENT URETER(S) Left 09/16/2021    Procedure: CYSTOSCOPY, WITH left RETROGRADE PYELOGRAM AND left  URETERAL STENT REPLACEMENT;  Surgeon: Zhao La MD;  Location: [facility]    COMBINED CYSTOSCOPY, RETROGRADES, EXCHANGE STENT URETER(S) Left 03/24/2022    Procedure: CYSTOSCOPY, WITH RETROGRADE PYELOGRAM AND URETERAL STENT EXCHANGE, LEFT;  Surgeon: Zhao La MD;  Location: [facility]    COMBINED CYSTOSCOPY, RETROGRADES, URETEROSCOPY, INSERT STENT Left 02/02/2017    Procedure: COMBINED CYSTOSCOPY, RETROGRADES, URETEROSCOPY, INSERT STENT;  Surgeon: Zhao La MD;  Location: [facility]    CYSTOSCOPY, REMOVE STENT(S), COMBINED N/A 11/4/2022    Procedure: CYSTOSCOPY, LEFT STENT REMOVAL;  Surgeon: Zhao La MD;  [facility]    CYSTOSCOPY, RETROGRADES, INSERT STENT URETER(S), COMBINED Left 09/07/2017    Procedure: COMBINED CYSTOSCOPY, RETROGRADES, INSERT STENT URETER(S);  Cystoscopy,Left Stent Exchange;  Surgeon: Zhao La MD;  [facility]: [facility]    CYSTOSCOPY, RETROGRADES, INSERT STENT URETER(S), COMBINED  12/12/2017    Procedure: COMBINED CYSTOSCOPY, RETROGRADES, INSERT STENT URETER(S);;  Surgeon: Zhao La MD;   [facility]    CYSTOSCOPY, RETROGRADES, INSERT STENT URETER(S), COMBINED Left 07/05/2018    Procedure: COMBINED CYSTOSCOPY, RETROGRADES, INSERT STENT URETER(S);  Cystoscopy, Left Stent Exchange;  Surgeon: Zhao La MD;  Location: [facility]    DAVINCI NEPHRECTOMY Left 11/4/2022    Procedure: , LEFT NEPHRECTOMY, ROBOT-ASSISTED;  Surgeon: Zhao La MD;  [facility]: [facility]    EXAM UNDER ANESTHESIA PELVIC  03/13/2014    Procedure: EXAM UNDER ANESTHESIA PELVIC;  Exam Under Anestheisa, Colposcopy, Vaginal Biopsies, Co2 Laser of the Upper Vagina;  Surgeon: Lara Pack MD;  [facility]    EXAM UNDER ANESTHESIA PELVIC N/A 07/01/2020    Procedure: Examination under anesthesia, vaginal biopsies;  Surgeon: Karli Gao MD;  [facility]    HERNIORRHAPHY INCISIONAL (LOCATION)      IR LYMPH NODE BIOPSY  6/28/2024    IR RHIZOTOMY  08/14/2020    LASER CO2 VAGINA  03/13/2014    VAIN 1/2    LASER CO2 VAGINA N/A 12/12/2017    Procedure: LASER CO2 VAGINA;  Exam Under Anesthesia, CO2 Laser Ablation Of Vagina, Cystoscopy, Left Retrograde Pyelogram with Left Stent Placement;  Surgeon: Nic Segundo MD;  [facility]    LUMPECTOMY BREAST      LUMPECTOMY BREAST WITH SEED LOCALIZATION Bilateral 06/01/2016    Procedure: LUMPECTOMY BREAST WITH SEED LOCALIZATION;  Surgeon: Brent Arana MD;  Location: [facility]    SURGICAL HISTORY OF -       ovarian cystectomy    SURGICAL HISTORY OF -   01/01/2003    right colon resection secondary to carcinoid tumor    TUBAL LIGATION      [facility] BSO, OMENTECTOMY W/OMSAN  05/01/2007    [facility] TOTAL ABDOM HYSTERECTOMY  05/01/2007     No Known Allergies  Family History   Problem Relation Age of Onset    Cancer Mother         bone / liver    Breast Cancer Mother     Cancer Sister         vulvar ca, cervical ca, squamous cell cancer    Breast Cancer Sister     Cancer Brother         Rectal- Stage 4    Rectal Cancer Brother     Cancer Maternal Grandmother      Cancer Maternal Grandfather     Cancer Paternal Grandmother     Cancer Paternal Grandfather     Breast Cancer Maternal Aunt     Breast Cancer Paternal Aunt     Colon Cancer Paternal Aunt     Pancreatic Cancer Nephew     Anesthesia Reaction No family hx of     Venous thrombosis No family hx of     Bleeding Disorder No family hx of      Social History     Socioeconomic History    Marital status:      Spouse name: None    Number of children: None    Years of education: None    Highest education level: None   Tobacco Use    Smoking status: Former     Current packs/day: 0.00     Average packs/day: 1 pack/day for 29.0 years (29.0 ttl pk-yrs)     Types: Cigarettes     Start date: 10/30/1977     Quit date: 10/30/2006     Years since quittin.5    Smokeless tobacco: Never   Vaping Use    Vaping status: Never Used   Substance and Sexual Activity    Alcohol use: No     Alcohol/week: 0.0 standard drinks of alcohol     Comment: 1 drink per year    Drug use: No    Sexual activity: Yes     Partners: Male   Other Topics Concern     Service No    Blood Transfusions No    Caffeine Concern Yes     Comment: occasional coffee    Occupational Exposure No    Hobby Hazards Yes     Comment: [place],    Sleep Concern Yes     Comment: not sleeping well    Stress Concern Yes     Comment: Grandson in the     Weight Concern Yes    Special Diet No    Back Care No    Exercise No    Seat Belt Yes    Self-Exams Yes    Parent/sibling w/ CABG, MI or angioplasty before 65F 55M? No     Social Determinants of Health      [facility] [employer] [facility] [email or web address] [facility]    Social Connections   Interpersonal Safety: Not At Risk (2021)    Humiliation, Afraid, Rape, and Kick questionnaire     Fear of Current or Ex-Partner: No     Emotionally Abused: No     Physically Abused: No     Sexually Abused: No           Lab Results  Recent Results (from the past 720 hour(s))    Comprehensive metabolic panel    Collection Time: 08/09/24 10:20 AM   Result Value Ref Range    Sodium 142 135 - 145 mmol/L    Potassium 3.6 3.4 - 5.3 mmol/L    Carbon Dioxide (CO2) 29 22 - 29 mmol/L    Anion Gap 12 7 - 15 mmol/L    Urea Nitrogen 24.4 (H) 8.0 - 23.0 mg/dL    Creatinine 1.27 (H) 0.51 - 0.95 mg/dL    GFR Estimate 44 (L) >60 mL/min/1.73m2    Calcium 10.0 8.8 - 10.4 mg/dL    Chloride 101 98 - 107 mmol/L    Glucose 206 (H) 70 - 99 mg/dL    Alkaline Phosphatase 108 40 - 150 U/L    AST 22 0 - 45 U/L    ALT 14 0 - 50 U/L    Protein Total 7.1 6.4 - 8.3 g/dL    Albumin 3.6 3.5 - 5.2 g/dL    Bilirubin Total 0.5 <=1.2 mg/dL   TSH with free T4 reflex    Collection Time: 08/09/24 10:20 AM   Result Value Ref Range    TSH 1.71 0.30 - 4.20 uIU/mL   Chromogranin A    Collection Time: 08/09/24 10:20 AM   Result Value Ref Range    Chromogranin A 631 (H) 0 - 187 ng/mL   CBC with platelets and differential    Collection Time: 08/09/24 10:20 AM   Result Value Ref Range    WBC Count 7.7 4.0 - 11.0 10e3/uL    RBC Count 4.92 3.80 - 5.20 10e6/uL    Hemoglobin 14.9 11.7 - 15.7 g/dL    Hematocrit 45.2 35.0 - 47.0 %    MCV 92 78 - 100 fL    MCH 30.3 26.5 - 33.0 pg    MCHC 33.0 31.5 - 36.5 g/dL    RDW 13.3 10.0 - 15.0 %    Platelet Count 187 150 - 450 10e3/uL    % Neutrophils 69 %    % Lymphocytes 17 %    % Monocytes 6 %    % Eosinophils 7 %    % Basophils 1 %    % Immature Granulocytes 1 %    NRBCs per 100 WBC 0 <1 /100    Absolute Neutrophils 5.3 1.6 - 8.3 10e3/uL    Absolute Lymphocytes 1.3 0.8 - 5.3 10e3/uL    Absolute Monocytes 0.5 0.0 - 1.3 10e3/uL    Absolute Eosinophils 0.6 0.0 - 0.7 10e3/uL    Absolute Basophils 0.0 0.0 - 0.2 10e3/uL    Absolute Immature Granulocytes 0.0 <=0.4 10e3/uL    Absolute NRBCs 0.0 10e3/uL   TSH    Collection Time: 08/30/24 12:46 PM   Result Value Ref Range    TSH 1.86 0.30 - 4.20 uIU/mL   Comprehensive metabolic panel    Collection Time: 08/30/24 12:46 PM   Result Value Ref Range    Sodium 141  135 - 145 mmol/L    Potassium 4.0 3.4 - 5.3 mmol/L    Carbon Dioxide (CO2) 31 (H) 22 - 29 mmol/L    Anion Gap 10 7 - 15 mmol/L    Urea Nitrogen 29.9 (H) 8.0 - 23.0 mg/dL    Creatinine 1.52 (H) 0.51 - 0.95 mg/dL    GFR Estimate 35 (L) >60 mL/min/1.73m2    Calcium 9.8 8.8 - 10.4 mg/dL    Chloride 100 98 - 107 mmol/L    Glucose 209 (H) 70 - 99 mg/dL    Alkaline Phosphatase 114 40 - 150 U/L    AST 26 0 - 45 U/L    ALT 23 0 - 50 U/L    Protein Total 7.2 6.4 - 8.3 g/dL    Albumin 3.7 3.5 - 5.2 g/dL    Bilirubin Total 0.6 <=1.2 mg/dL   CBC with platelets and differential    Collection Time: 08/30/24 12:46 PM   Result Value Ref Range    WBC Count 6.7 4.0 - 11.0 10e3/uL    RBC Count 4.66 3.80 - 5.20 10e6/uL    Hemoglobin 14.4 11.7 - 15.7 g/dL    Hematocrit 42.9 35.0 - 47.0 %    MCV 92 78 - 100 fL    MCH 30.9 26.5 - 33.0 pg    MCHC 33.6 31.5 - 36.5 g/dL    RDW 13.4 10.0 - 15.0 %    Platelet Count 195 150 - 450 10e3/uL    % Neutrophils 66 %    % Lymphocytes 22 %    % Monocytes 7 %    % Eosinophils 5 %    % Basophils 0 %    % Immature Granulocytes 0 %    NRBCs per 100 WBC 0 <1 /100    Absolute Neutrophils 4.4 1.6 - 8.3 10e3/uL    Absolute Lymphocytes 1.5 0.8 - 5.3 10e3/uL    Absolute Monocytes 0.4 0.0 - 1.3 10e3/uL    Absolute Eosinophils 0.3 0.0 - 0.7 10e3/uL    Absolute Basophils 0.0 0.0 - 0.2 10e3/uL    Absolute Immature Granulocytes 0.0 <=0.4 10e3/uL    Absolute NRBCs 0.0 10e3/uL               I spent 22 minutes on the patient's visit today.  This included preparation for the visit, face-to-face time with the patient and documentation following the visit.  It did not include teaching or procedure time.    Signed by: Peter E. Friedell, MD

## 2024-08-30 NOTE — PROGRESS NOTES
"Oncology Rooming Note    August 30, 2024 1:27 PM   Marissa Guadarrama is a 74 year old female who presents for:    Chief Complaint   Patient presents with    Oncology Clinic Visit     Carcinoma metastatic to lymph nodes of multiple sites consistent with lung primary - labs and provider visit only     Initial Vitals: BP (!) 149/62 (BP Location: Right arm, Patient Position: Sitting, Cuff Size: Adult Regular)   Pulse 64   Temp 98.5  F (36.9  C) (Tympanic)   Resp 11   Ht 1.715 m (5' 7.5\")   Wt 91.9 kg (202 lb 11.2 oz)   SpO2 94%   BMI 31.28 kg/m   Estimated body mass index is 31.28 kg/m  as calculated from the following:    Height as of this encounter: 1.715 m (5' 7.5\").    Weight as of this encounter: 91.9 kg (202 lb 11.2 oz). Body surface area is 2.09 meters squared.  No Pain (0) Comment: Data Unavailable   No LMP recorded. Patient has had a hysterectomy.  Allergies reviewed: Yes  Medications reviewed: Yes    Medications: Medication refills not needed today.  Pharmacy name entered into My [employer]: [id number] - [place], [place] - [address] [place] [address]    Frailty Screening:   Is the patient here for a new oncology consult visit in cancer care? 2. No      Clinical concerns: Recent shoulder pain following an assisted lift at hospital.       Nicole Mascorro MA            "

## 2024-08-30 NOTE — LETTER
"8/30/2024      Marissa Guadarrama  [address] [id number]      Dear Colleague,    Thank you for referring your patient, Marissa Guadarrama, to the [facility] [place]. Please see a copy of my visit note below.    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    Weight as of this encounter: 91.9 kg (202 lb 11.2 oz). Body surface area is 2.09 meters squared.  No Pain (0) Comment: Data Unavailable   No LMP recorded. Patient has had a hysterectomy.  Allergies reviewed: Yes  Medications reviewed: Yes    Medications: Medication refills not needed today.  Pharmacy name entered into deltaDNA: [id number] - [place], [place] - [address] [place] [address]    Frailty Screening:   Is the patient here for a new oncology consult visit in cancer care? 2. No      Clinical concerns: Recent shoulder pain following an assisted lift at hospital.       Nicole Mascorro MA              [facility] Hematology and Oncology Follow-up Note    Patient: Marissa Guadarrama  MRN: [id number]  Date of Service: Aug 30, 2024       Reason for Visit    Metastatic cancer consistent with lung primary      Encounter Diagnoses Assessment and Plan:    Problem List Items Addressed This Visit       Malignant carcinoid tumor of cecum (H) - Primary    Carcinoma metastatic to lymph nodes of multiple " sites consistent with lung primary (H)     Patient returns for follow-up.  She received day 1 cycle 1 of pembrolizumab on 9/20/2024.  Presently she continues to feel well.  Will continue with lanreotide every 4 weeks along with pembrolizumab every 6 weeks.  Reimaging is planned before cycle 3.      ______________________________________________________________________________    ECOG Performance = 1    History of Present Illness  Disease history per Dr. 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 Elfego Lawrence and then Dr. Hosea King.     However, a follow-up 2/10/2022 Octreoscan scan confirmed evidence of progressive disease for which she was started on lanreotide every 4 weeks with a baseline chromogranin A level of 824.  The chromogranin A joslyn was 445 on 1/6/2023 and 562 on 7/28/2023.      Review of her 10/13/2023 DEXA scan revealed 14% decrease in bone density with left hip T-score -3.1.  Left femoral neck T-score was -2.6.  She is on annual zoledronic acid.     In December, 2023 she received 5000 cGy/5 fractions by 12/15/2023 with complete resolution of left sacral pain referable to a presumptive metastatic neuroendocrine metastasis.  A July, 2023 dotatate PET scan had revealed stable disease except for the increased size and gluco-avidity of this left sclerotic sacral lesion.  She continues on monthly lanreotide.     Past medical history:  History of 2016 bilateral ER positive breast cancer on exemestane.  Both tumors were 100% ER/AL+, HER2 neg and patient declined Oncotype DX testing since she indicated she would never want to receive adjuvant systemic chemotherapy.  She had been initially started on anastrozole but developed a skin rash and was switched to  "exemestane    When last seen on 2/23/2024, new right supraclavicular lymphadenopathy was noted.  The CEA was 56.6 compared to a level 12.8 on 7/14/2023. The CA 27.29 was 57.7 compared to 22 when it was last obtained on 1/23/2022.  3/8/2024 CT soft tissue of neck and chest revealed multiple right cervical level 3-5 lymphadenopathy.  Chest imaging revealed mild enlarged mediastinal and hilar lymphadenopathy which could be related to her neuroendocrine tumor.     3/8/2024 she underwent IR biopsy of her right cervical lymph nodes today which was well-tolerated.  Pathology showed a poorly differentiated carcinoma.  It was CK7 and CK20 positive.  There was insufficient tissue to make further identification of the primary or to determine treatment options.    6/28/2024  A new biopsy of a right cervical lymph node was performed.  This showed no actionable mutations for first line therapy.  The Tumor Proportion Score for pembrolizumab was 50%.    8/9/2024 for day 1 cycle 1 of pembrolizumab therapy    At this visit patient remains asymptomatic.  She is maintaining her weight.  Her appetite and digestion are normal.  She does not have chills, fevers or sweats.  She does not have cough, chest pain or shortness of breath at rest.  She has no change in bowel or bladder habit.  Her activity is limited from polio in childhood she uses a walker to help with ambulation.  She is having increased pain in her left knee which is limiting her mobility.    She quit smoking in 2006 after 29 pack years of cigarettes    Review of systems.  Pertinent Findings are included in the History of Present Illness    Physical Exam    BP (!) 149/62 (BP Location: Right arm, Patient Position: Sitting, Cuff Size: Adult Regular)   Pulse 64   Temp 98.5  F (36.9  C) (Tympanic)   Resp 11   Ht 1.715 m (5' 7.5\")   Wt 91.9 kg (202 lb 11.2 oz)   SpO2 94%   BMI 31.28 kg/m       GENERAL APPEARANCE: 74-year-old woman in no apparent distress.  HEAD: Atraumatic; " normocephalic; without lesions.  EYES: Conjunctiva, corneas and eyelids normal; pupils equal, round, reactive to light; No Icterus.  MOUTH/OROPHARYNX: Oral mucosa intact  NECK: Supple with palpable lymph nodes in the right and left neck, 1 to 2 cm in size..  LUNGS:  Clear to auscultation and percussion with no extra sounds.  HEART: Regular rhythm and rate; S1 and S2 normal; no murmurs noted.  ABDOMEN: Soft; no masses or tenderness, no hepatosplenomegaly.  NEUROLOGIC: Alert and oriented.  There is paralysis of the left leg secondary to polio.  EXTREMITIES: No cyanosis, or edema.  SKIN: No abnormal bruising or bleeding. No suspicious lesions noted on exposed skin.  There is a erythematous rash alongside the right neck and extending down into the upper chest.  Patient is using steroid cream for this.  PSYCHIATRIC: Mental status normal; no apparent psychiatric issues    Medications:    Current Outpatient Medications   Medication Sig Dispense Refill     Calcium Carbonate (CALCIUM 500 PO) 2 tablets daily       exemestane (AROMASIN) 25 MG tablet Take 1 tablet (25 mg) by mouth every morning 90 tablet 1     hydroCHLOROthiazide 12.5 MG tablet Take 1 tablet (12.5 mg) by mouth every morning Please make a clinic visit to be seen in person for medication refills.  No virtual visits.  Thank you. 90 tablet 0     bisacodyl (DULCOLAX) 5 MG EC tablet Take 2 tablets at 3 pm the day before your procedure. If your procedure is before 11 am, take 2 additional tablets at 11 pm. If your procedure is after 11 am, take 2 additional tablets at 6 am. For additional instructions refer to your colonoscopy prep instructions. (Patient not taking: Reported on 12/29/2023) 4 tablet 0     polyethylene glycol (GOLYTELY) 236 g suspension The night before the exam at 6 pm drink an 8-ounce glass every 15 minutes until the jug is half empty. If you arrive before 11 AM: Drink the other half of the [employer] jug at 11 PM night before procedure. If you arrive  after 11 AM: Drink the other half of the [employer] jug at 6 AM day of procedure. For additional instructions refer to your colonoscopy prep instructions. (Patient not taking: Reported on 12/29/2023) 4000 mL 0     SENNA-docusate sodium (SENNA S) 8.6-50 MG tablet Take 1 tablet by mouth 2 times daily as needed (prevent opioid induced constipation) (Patient not taking: Reported on 7/14/2023) 30 tablet 0     No current facility-administered medications for this visit.           Past History    Past Medical History:   Diagnosis Date     Arthritis     knee     Benign breast biopsy     benign     Breast cancer (H)      Carcinoid tumor (H28) 12/2003     Cervical cancer (H) 2007     H/O colposcopy with cervical biopsy 12/23/2013    vaginal cuff biopsy- VAIN III. referred back to gyn/onc     HTN      Hyperlipidemia      Malignant neoplasm of ovary (H)      Obesity      Pap smear of vagina with ASC-H 11/01/2013     Post-polio syndromes      Trigeminal neuralgias      Past Surgical History:   Procedure Laterality Date     APPENDECTOMY  01/01/1983     BIOPSY BREAST       BIOPSY NODE SENTINEL Bilateral 06/01/2016    Procedure: BIOPSY NODE SENTINEL;  Surgeon: Brent Arana MD;  Location: [facility]     [facility] SURGICAL PATHOLOGY       COLONOSCOPY N/A 06/23/2017    Procedure: COMBINED COLONOSCOPY, SINGLE OR MULTIPLE BIOPSY/POLYPECTOMY BY BIOPSY;  Colonoscopy Dx:Carcinoid tumor of colon prep mailed [facility];  Surgeon: Talisha Greco MD;  Location:  GI     COLPOSCOPY, BIOPSY, COMBINED  03/13/2014    Procedure: COMBINED COLPOSCOPY, BIOPSY;;  Surgeon: Lara Pack MD;  Location:  OR     COMBINED CYSTOSCOPY, RETROGRADES, EXCHANGE STENT URETER(S) Left 02/07/2019    Procedure: COMBINED CYSTOSCOPY, RETROGRADES, EXCHANGE STENT URETER--left;  Surgeon: Zhao La MD;  Location: [facility]     COMBINED CYSTOSCOPY, RETROGRADES, EXCHANGE STENT URETER(S) Left 02/20/2020    Procedure: CYSTOSCOPY, WITH RETROGRADE PYELOGRAM  AND Left URETERAL STENT exchange;  Surgeon: Zhao La MD;  Location: [facility]     COMBINED CYSTOSCOPY, RETROGRADES, EXCHANGE STENT URETER(S) Left 05/09/2021    Procedure: CYSTOSCOPY, WITH RETROGRADE PYELOGRAM AND LEFT URETERAL STENT REPLACEMENT;  Surgeon: Fred Owens MD;  [facility]     COMBINED CYSTOSCOPY, RETROGRADES, EXCHANGE STENT URETER(S) Left 09/16/2021    Procedure: CYSTOSCOPY, WITH left RETROGRADE PYELOGRAM AND left  URETERAL STENT REPLACEMENT;  Surgeon: Zhao La MD;  Location: [facility]     COMBINED CYSTOSCOPY, RETROGRADES, EXCHANGE STENT URETER(S) Left 03/24/2022    Procedure: CYSTOSCOPY, WITH RETROGRADE PYELOGRAM AND URETERAL STENT EXCHANGE, LEFT;  Surgeon: Zhao La MD;  Location: [facility]     COMBINED CYSTOSCOPY, RETROGRADES, URETEROSCOPY, INSERT STENT Left 02/02/2017    Procedure: COMBINED CYSTOSCOPY, RETROGRADES, URETEROSCOPY, INSERT STENT;  Surgeon: Zhao La MD;  Location: [facility]     CYSTOSCOPY, REMOVE STENT(S), COMBINED N/A 11/4/2022    Procedure: CYSTOSCOPY, LEFT STENT REMOVAL;  Surgeon: Zhao La MD;  [facility]: [facility]     CYSTOSCOPY, RETROGRADES, INSERT STENT URETER(S), COMBINED Left 09/07/2017    Procedure: COMBINED CYSTOSCOPY, RETROGRADES, INSERT STENT URETER(S);  Cystoscopy,Left Stent Exchange;  Surgeon: Zhao La MD;  Location: [facility]     CYSTOSCOPY, RETROGRADES, INSERT STENT URETER(S), COMBINED  12/12/2017    Procedure: COMBINED CYSTOSCOPY, RETROGRADES, INSERT STENT URETER(S);;  Surgeon: Zhao La MD;  [facility]     CYSTOSCOPY, RETROGRADES, INSERT STENT URETER(S), COMBINED Left 07/05/2018    Procedure: COMBINED CYSTOSCOPY, RETROGRADES, INSERT STENT URETER(S);  Cystoscopy, Left Stent Exchange;  Surgeon: Zhao La MD;  Location: [facility]     DAVINCI NEPHRECTOMY Left 11/4/2022    Procedure: , LEFT NEPHRECTOMY, ROBOT-ASSISTED;  Surgeon: Abhinav  Zhao Blanton MD;  Location: [facility]     EXAM UNDER ANESTHESIA PELVIC  03/13/2014    Procedure: EXAM UNDER ANESTHESIA PELVIC;  Exam Under Anestheisa, Colposcopy, Vaginal Biopsies, Co2 Laser of the Upper Vagina;  Surgeon: Lara Pack MD;  [facility]     EXAM UNDER ANESTHESIA PELVIC N/A 07/01/2020    Procedure: Examination under anesthesia, vaginal biopsies;  Surgeon: Karli Gao MD;  [facility]     HERNIORRHAPHY INCISIONAL (LOCATION)       IR LYMPH NODE BIOPSY  6/28/2024     IR RHIZOTOMY  08/14/2020     LASER CO2 VAGINA  03/13/2014    VAIN 1/2     LASER CO2 VAGINA N/A 12/12/2017    Procedure: LASER CO2 VAGINA;  Exam Under Anesthesia, CO2 Laser Ablation Of Vagina, Cystoscopy, Left Retrograde Pyelogram with Left Stent Placement;  Surgeon: Nic Segundo MD;  Location: [facility]     LUMPECTOMY BREAST       LUMPECTOMY BREAST WITH SEED LOCALIZATION Bilateral 06/01/2016    Procedure: LUMPECTOMY BREAST WITH SEED LOCALIZATION;  Surgeon: Brent Arana MD;  Location: [facility]     SURGICAL HISTORY OF -       ovarian cystectomy     SURGICAL HISTORY OF -   01/01/2003    right colon resection secondary to carcinoid tumor     TUBAL LIGATION       Z BSO, OMENTECTOMY W/OSMAN  05/01/2007     Z TOTAL ABDOM HYSTERECTOMY  05/01/2007     No Known Allergies  Family History   Problem Relation Age of Onset     Cancer Mother         bone / liver     Breast Cancer Mother      Cancer Sister         vulvar ca, cervical ca, squamous cell cancer     Breast Cancer Sister      Cancer Brother         Rectal- Stage 4     Rectal Cancer Brother      Cancer Maternal Grandmother      Cancer Maternal Grandfather      Cancer Paternal Grandmother      Cancer Paternal Grandfather      Breast Cancer Maternal Aunt      Breast Cancer Paternal Aunt      Colon Cancer Paternal Aunt      Pancreatic Cancer Nephew      Anesthesia Reaction No family hx of      Venous thrombosis No family hx of      Bleeding Disorder No family hx of       Social History     Socioeconomic History     Marital status:      Spouse name: None     Number of children: None     Years of education: None     Highest education level: None   Tobacco Use     Smoking status: Former     Current packs/day: 0.00     Average packs/day: 1 pack/day for 29.0 years (29.0 ttl pk-yrs)     Types: Cigarettes     Start date: 10/30/1977     Quit date: 10/30/2006     Years since quittin.5     Smokeless tobacco: Never   Vaping Use     Vaping status: Never Used   Substance and Sexual Activity     Alcohol use: No     Alcohol/week: 0.0 standard drinks of alcohol     Comment: 1 drink per year     Drug use: No     Sexual activity: Yes     Partners: Male   Other Topics Concern      Service No     Blood Transfusions No     Caffeine Concern Yes     Comment: occasional coffee     Occupational Exposure No     Hobby Hazards Yes     Comment: [employer],     Sleep Concern Yes     Comment: not sleeping well     Stress Concern Yes     Comment: Grandson in the      Weight Concern Yes     Special Diet No     Back Care No     Exercise No     Seat Belt Yes     Self-Exams Yes     Parent/sibling w/ CABG, MI or angioplasty before 65F 55M? No     Social Determinants of Health      [facility] [employer] [facility] [employer] [facility]    Social Connections   Interpersonal Safety: Not At Risk (2021)    Humiliation, Afraid, Rape, and Kick questionnaire      Fear of Current or Ex-Partner: No      Emotionally Abused: No      Physically Abused: No      Sexually Abused: No           Lab Results  Recent Results (from the past 720 hour(s))   Comprehensive metabolic panel    Collection Time: 24 10:20 AM   Result Value Ref Range    Sodium 142 135 - 145 mmol/L    Potassium 3.6 3.4 - 5.3 mmol/L    Carbon Dioxide (CO2) 29 22 - 29 mmol/L    Anion Gap 12 7 - 15 mmol/L    Urea Nitrogen 24.4 (H) 8.0 - 23.0 mg/dL    Creatinine 1.27 (H) 0.51 - 0.95  mg/dL    GFR Estimate 44 (L) >60 mL/min/1.73m2    Calcium 10.0 8.8 - 10.4 mg/dL    Chloride 101 98 - 107 mmol/L    Glucose 206 (H) 70 - 99 mg/dL    Alkaline Phosphatase 108 40 - 150 U/L    AST 22 0 - 45 U/L    ALT 14 0 - 50 U/L    Protein Total 7.1 6.4 - 8.3 g/dL    Albumin 3.6 3.5 - 5.2 g/dL    Bilirubin Total 0.5 <=1.2 mg/dL   TSH with free T4 reflex    Collection Time: 08/09/24 10:20 AM   Result Value Ref Range    TSH 1.71 0.30 - 4.20 uIU/mL   Chromogranin A    Collection Time: 08/09/24 10:20 AM   Result Value Ref Range    Chromogranin A 631 (H) 0 - 187 ng/mL   CBC with platelets and differential    Collection Time: 08/09/24 10:20 AM   Result Value Ref Range    WBC Count 7.7 4.0 - 11.0 10e3/uL    RBC Count 4.92 3.80 - 5.20 10e6/uL    Hemoglobin 14.9 11.7 - 15.7 g/dL    Hematocrit 45.2 35.0 - 47.0 %    MCV 92 78 - 100 fL    MCH 30.3 26.5 - 33.0 pg    MCHC 33.0 31.5 - 36.5 g/dL    RDW 13.3 10.0 - 15.0 %    Platelet Count 187 150 - 450 10e3/uL    % Neutrophils 69 %    % Lymphocytes 17 %    % Monocytes 6 %    % Eosinophils 7 %    % Basophils 1 %    % Immature Granulocytes 1 %    NRBCs per 100 WBC 0 <1 /100    Absolute Neutrophils 5.3 1.6 - 8.3 10e3/uL    Absolute Lymphocytes 1.3 0.8 - 5.3 10e3/uL    Absolute Monocytes 0.5 0.0 - 1.3 10e3/uL    Absolute Eosinophils 0.6 0.0 - 0.7 10e3/uL    Absolute Basophils 0.0 0.0 - 0.2 10e3/uL    Absolute Immature Granulocytes 0.0 <=0.4 10e3/uL    Absolute NRBCs 0.0 10e3/uL   TSH    Collection Time: 08/30/24 12:46 PM   Result Value Ref Range    TSH 1.86 0.30 - 4.20 uIU/mL   Comprehensive metabolic panel    Collection Time: 08/30/24 12:46 PM   Result Value Ref Range    Sodium 141 135 - 145 mmol/L    Potassium 4.0 3.4 - 5.3 mmol/L    Carbon Dioxide (CO2) 31 (H) 22 - 29 mmol/L    Anion Gap 10 7 - 15 mmol/L    Urea Nitrogen 29.9 (H) 8.0 - 23.0 mg/dL    Creatinine 1.52 (H) 0.51 - 0.95 mg/dL    GFR Estimate 35 (L) >60 mL/min/1.73m2    Calcium 9.8 8.8 - 10.4 mg/dL    Chloride 100 98 - 107  mmol/L    Glucose 209 (H) 70 - 99 mg/dL    Alkaline Phosphatase 114 40 - 150 U/L    AST 26 0 - 45 U/L    ALT 23 0 - 50 U/L    Protein Total 7.2 6.4 - 8.3 g/dL    Albumin 3.7 3.5 - 5.2 g/dL    Bilirubin Total 0.6 <=1.2 mg/dL   CBC with platelets and differential    Collection Time: 08/30/24 12:46 PM   Result Value Ref Range    WBC Count 6.7 4.0 - 11.0 10e3/uL    RBC Count 4.66 3.80 - 5.20 10e6/uL    Hemoglobin 14.4 11.7 - 15.7 g/dL    Hematocrit 42.9 35.0 - 47.0 %    MCV 92 78 - 100 fL    MCH 30.9 26.5 - 33.0 pg    MCHC 33.6 31.5 - 36.5 g/dL    RDW 13.4 10.0 - 15.0 %    Platelet Count 195 150 - 450 10e3/uL    % Neutrophils 66 %    % Lymphocytes 22 %    % Monocytes 7 %    % Eosinophils 5 %    % Basophils 0 %    % Immature Granulocytes 0 %    NRBCs per 100 WBC 0 <1 /100    Absolute Neutrophils 4.4 1.6 - 8.3 10e3/uL    Absolute Lymphocytes 1.5 0.8 - 5.3 10e3/uL    Absolute Monocytes 0.4 0.0 - 1.3 10e3/uL    Absolute Eosinophils 0.3 0.0 - 0.7 10e3/uL    Absolute Basophils 0.0 0.0 - 0.2 10e3/uL    Absolute Immature Granulocytes 0.0 <=0.4 10e3/uL    Absolute NRBCs 0.0 10e3/uL               I spent 22 minutes on the patient's visit today.  This included preparation for the visit, face-to-face time with the patient and documentation following the visit.  It did not include teaching or procedure time.    Signed by: Peter E. Friedell, MD          Again, thank you for allowing me to participate in the care of your patient.        Sincerely,        Peter E. Friedell, MD

## 2024-09-06 ENCOUNTER — INFUSION THERAPY VISIT (OUTPATIENT)
Dept: INFUSION THERAPY | Facility: CLINIC | Age: 74
End: 2024-09-06
Attending: INTERNAL MEDICINE
Payer: MEDICARE

## 2024-09-06 ENCOUNTER — ANCILLARY PROCEDURE (OUTPATIENT)
Dept: GENERAL RADIOLOGY | Facility: CLINIC | Age: 74
End: 2024-09-06
Attending: ORTHOPAEDIC SURGERY
Payer: COMMERCIAL

## 2024-09-06 ENCOUNTER — OFFICE VISIT (OUTPATIENT)
Dept: ORTHOPEDICS | Facility: CLINIC | Age: 74
End: 2024-09-06
Payer: COMMERCIAL

## 2024-09-06 ENCOUNTER — APPOINTMENT (OUTPATIENT)
Dept: LAB | Facility: CLINIC | Age: 74
End: 2024-09-06
Attending: INTERNAL MEDICINE
Payer: MEDICARE

## 2024-09-06 VITALS
SYSTOLIC BLOOD PRESSURE: 174 MMHG | TEMPERATURE: 98.3 F | DIASTOLIC BLOOD PRESSURE: 76 MMHG | RESPIRATION RATE: 16 BRPM | HEART RATE: 64 BPM

## 2024-09-06 VITALS
TEMPERATURE: 97.3 F | WEIGHT: 203 LBS | BODY MASS INDEX: 30.77 KG/M2 | HEIGHT: 68 IN | DIASTOLIC BLOOD PRESSURE: 83 MMHG | SYSTOLIC BLOOD PRESSURE: 161 MMHG

## 2024-09-06 DIAGNOSIS — C7A.021 MALIGNANT CARCINOID TUMOR OF CECUM (H): Primary | ICD-10-CM

## 2024-09-06 DIAGNOSIS — C7B.09 SECONDARY CARCINOID TUMORS OF OTHER SITES (H): ICD-10-CM

## 2024-09-06 DIAGNOSIS — M17.12 OSTEOARTHRITIS OF LEFT KNEE, UNSPECIFIED OSTEOARTHRITIS TYPE: ICD-10-CM

## 2024-09-06 DIAGNOSIS — M70.52 PES ANSERINUS BURSITIS OF LEFT KNEE: Primary | ICD-10-CM

## 2024-09-06 DIAGNOSIS — M17.12 PATELLOFEMORAL ARTHRITIS OF LEFT KNEE: ICD-10-CM

## 2024-09-06 PROCEDURE — 250N000011 HC RX IP 250 OP 636: Performed by: INTERNAL MEDICINE

## 2024-09-06 PROCEDURE — 36415 COLL VENOUS BLD VENIPUNCTURE: CPT

## 2024-09-06 PROCEDURE — 99203 OFFICE O/P NEW LOW 30 MIN: CPT | Mod: 25 | Performed by: ORTHOPAEDIC SURGERY

## 2024-09-06 PROCEDURE — 86316 IMMUNOASSAY TUMOR OTHER: CPT | Performed by: INTERNAL MEDICINE

## 2024-09-06 PROCEDURE — 73564 X-RAY EXAM KNEE 4 OR MORE: CPT | Mod: TC | Performed by: RADIOLOGY

## 2024-09-06 PROCEDURE — 96372 THER/PROPH/DIAG INJ SC/IM: CPT | Performed by: INTERNAL MEDICINE

## 2024-09-06 PROCEDURE — 20610 DRAIN/INJ JOINT/BURSA W/O US: CPT | Mod: 51 | Performed by: ORTHOPAEDIC SURGERY

## 2024-09-06 PROCEDURE — 20610 DRAIN/INJ JOINT/BURSA W/O US: CPT | Mod: LT | Performed by: ORTHOPAEDIC SURGERY

## 2024-09-06 RX ORDER — TRIAMCINOLONE ACETONIDE 40 MG/ML
40 INJECTION, SUSPENSION INTRA-ARTICULAR; INTRAMUSCULAR
Status: SHIPPED | OUTPATIENT
Start: 2024-09-06

## 2024-09-06 RX ORDER — ALBUTEROL SULFATE 0.83 MG/ML
2.5 SOLUTION RESPIRATORY (INHALATION)
Status: CANCELLED | OUTPATIENT
Start: 2024-10-04

## 2024-09-06 RX ORDER — DIPHENHYDRAMINE HYDROCHLORIDE 50 MG/ML
50 INJECTION INTRAMUSCULAR; INTRAVENOUS
Status: CANCELLED
Start: 2024-10-04

## 2024-09-06 RX ORDER — LANREOTIDE ACETATE 120 MG/.5ML
120 INJECTION SUBCUTANEOUS ONCE
Status: COMPLETED | OUTPATIENT
Start: 2024-09-06 | End: 2024-09-06

## 2024-09-06 RX ORDER — BUPIVACAINE HYDROCHLORIDE 5 MG/ML
3 INJECTION, SOLUTION PERINEURAL
Status: SHIPPED | OUTPATIENT
Start: 2024-09-06

## 2024-09-06 RX ORDER — LIDOCAINE HYDROCHLORIDE 10 MG/ML
1 INJECTION, SOLUTION INFILTRATION; PERINEURAL
Status: SHIPPED | OUTPATIENT
Start: 2024-09-06

## 2024-09-06 RX ORDER — LANREOTIDE ACETATE 120 MG/.5ML
120 INJECTION SUBCUTANEOUS ONCE
Status: CANCELLED | OUTPATIENT
Start: 2024-10-04 | End: 2024-10-04

## 2024-09-06 RX ORDER — METHYLPREDNISOLONE SODIUM SUCCINATE 125 MG/2ML
125 INJECTION, POWDER, LYOPHILIZED, FOR SOLUTION INTRAMUSCULAR; INTRAVENOUS
Status: CANCELLED
Start: 2024-10-04

## 2024-09-06 RX ORDER — MEPERIDINE HYDROCHLORIDE 25 MG/ML
25 INJECTION INTRAMUSCULAR; INTRAVENOUS; SUBCUTANEOUS EVERY 30 MIN PRN
Status: CANCELLED | OUTPATIENT
Start: 2024-10-04

## 2024-09-06 RX ORDER — LIDOCAINE HYDROCHLORIDE 10 MG/ML
3 INJECTION, SOLUTION INFILTRATION; PERINEURAL
Status: SHIPPED | OUTPATIENT
Start: 2024-09-06

## 2024-09-06 RX ORDER — EPINEPHRINE 1 MG/ML
0.3 INJECTION, SOLUTION, CONCENTRATE INTRAVENOUS EVERY 5 MIN PRN
Status: CANCELLED | OUTPATIENT
Start: 2024-10-04

## 2024-09-06 RX ORDER — ALBUTEROL SULFATE 90 UG/1
1-2 AEROSOL, METERED RESPIRATORY (INHALATION)
Status: CANCELLED
Start: 2024-10-04

## 2024-09-06 RX ORDER — BUPIVACAINE HYDROCHLORIDE 5 MG/ML
1 INJECTION, SOLUTION PERINEURAL
Status: SHIPPED | OUTPATIENT
Start: 2024-09-06

## 2024-09-06 RX ADMIN — TRIAMCINOLONE ACETONIDE 40 MG: 40 INJECTION, SUSPENSION INTRA-ARTICULAR; INTRAMUSCULAR at 10:25

## 2024-09-06 RX ADMIN — LIDOCAINE HYDROCHLORIDE 3 ML: 10 INJECTION, SOLUTION INFILTRATION; PERINEURAL at 10:25

## 2024-09-06 RX ADMIN — BUPIVACAINE HYDROCHLORIDE 1 ML: 5 INJECTION, SOLUTION PERINEURAL at 10:31

## 2024-09-06 RX ADMIN — LANREOTIDE ACETATE 120 MG: 120 INJECTION SUBCUTANEOUS at 14:33

## 2024-09-06 RX ADMIN — TRIAMCINOLONE ACETONIDE 40 MG: 40 INJECTION, SUSPENSION INTRA-ARTICULAR; INTRAMUSCULAR at 10:31

## 2024-09-06 RX ADMIN — LIDOCAINE HYDROCHLORIDE 1 ML: 10 INJECTION, SOLUTION INFILTRATION; PERINEURAL at 10:31

## 2024-09-06 RX ADMIN — BUPIVACAINE HYDROCHLORIDE 3 ML: 5 INJECTION, SOLUTION PERINEURAL at 10:25

## 2024-09-06 NOTE — PROGRESS NOTES
S:  Hx post polio LLE, now with L knee pain. No inciting event.  Uses a walker.           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)    Serum calcium elevated    Peritoneal metastases    Ureteral obstruction    Rheumatoid arthritis with positive rheumatoid factor, involving unspecified site (H)    Osteopenia of hip    Need for prophylactic chemotherapy    Obstruction of left ureter    HSIL (high grade squamous intraepithelial lesion) on Pap smear of cervix    Ureteral obstruction, left    Other hydronephrosis    Malignant carcinoid tumor of cecum (H)    Other osteoporosis without current pathological fracture    Osteoporosis due to aromatase inhibitor    Carcinoma metastatic to lymph nodes of multiple sites consistent with lung primary (H)            Past Medical History:   Diagnosis Date    Arthritis     knee    Benign breast biopsy     benign    Breast cancer (H)     Carcinoid tumor (H28) 12/2003    Cervical cancer (H) 2007    H/O colposcopy with cervical biopsy 12/23/2013    vaginal cuff biopsy- VAIN III. referred back to gyn/onc    HTN     Hyperlipidemia     Malignant neoplasm of ovary (H)     Obesity     Pap smear of vagina with ASC-H 11/01/2013    Post-polio syndromes     Trigeminal neuralgias             Past Surgical History:   Procedure Laterality Date    APPENDECTOMY  01/01/1983    BIOPSY BREAST      BIOPSY NODE SENTINEL Bilateral 06/01/2016    Procedure: BIOPSY NODE SENTINEL;  Surgeon: Brent Arana MD;  Location: [facility]    [facility] SURGICAL PATHOLOGY      COLONOSCOPY N/A 06/23/2017    Procedure:  COMBINED COLONOSCOPY, SINGLE OR MULTIPLE BIOPSY/POLYPECTOMY BY BIOPSY;  Colonoscopy Dx:Carcinoid tumor of colon prep mailed golytely;  Surgeon: Talisha Greco MD;  [facility]    COLPOSCOPY, BIOPSY, COMBINED  03/13/2014    Procedure: COMBINED COLPOSCOPY, BIOPSY;;  Surgeon: Lara Pack MD;  [facility]    COMBINED CYSTOSCOPY, RETROGRADES, EXCHANGE STENT URETER(S) Left 02/07/2019    Procedure: COMBINED CYSTOSCOPY, RETROGRADES, EXCHANGE STENT URETER--left;  Surgeon: Zhao La MD;  [facility]: [facility]    COMBINED CYSTOSCOPY, RETROGRADES, EXCHANGE STENT URETER(S) Left 02/20/2020    Procedure: CYSTOSCOPY, WITH RETROGRADE PYELOGRAM AND Left URETERAL STENT exchange;  Surgeon: Zhao La MD;  [facility]    COMBINED CYSTOSCOPY, RETROGRADES, EXCHANGE STENT URETER(S) Left 05/09/2021    Procedure: CYSTOSCOPY, WITH RETROGRADE PYELOGRAM AND LEFT URETERAL STENT REPLACEMENT;  Surgeon: Fred Owens MD;  [facility]    COMBINED CYSTOSCOPY, RETROGRADES, EXCHANGE STENT URETER(S) Left 09/16/2021    Procedure: CYSTOSCOPY, WITH left RETROGRADE PYELOGRAM AND left  URETERAL STENT REPLACEMENT;  Surgeon: Zhao La MD;  [facility]    COMBINED CYSTOSCOPY, RETROGRADES, EXCHANGE STENT URETER(S) Left 03/24/2022    Procedure: CYSTOSCOPY, WITH RETROGRADE PYELOGRAM AND URETERAL STENT EXCHANGE, LEFT;  Surgeon: Zhao La MD;  [facility]    COMBINED CYSTOSCOPY, RETROGRADES, URETEROSCOPY, INSERT STENT Left 02/02/2017    Procedure: COMBINED CYSTOSCOPY, RETROGRADES, URETEROSCOPY, INSERT STENT;  Surgeon: Zhao La MD;  [facility]    CYSTOSCOPY, REMOVE STENT(S), COMBINED N/A 11/4/2022    Procedure: CYSTOSCOPY, LEFT STENT REMOVAL;  Surgeon: Zhao La MD;  [facility]    CYSTOSCOPY, RETROGRADES, INSERT STENT URETER(S), COMBINED Left 09/07/2017    Procedure: COMBINED CYSTOSCOPY, RETROGRADES, INSERT STENT  URETER(S);  Cystoscopy,Left Stent Exchange;  Surgeon: Zhao La MD;  [facility]    CYSTOSCOPY, RETROGRADES, INSERT STENT URETER(S), COMBINED  12/12/2017    Procedure: COMBINED CYSTOSCOPY, RETROGRADES, INSERT STENT URETER(S);;  Surgeon: Zhao La MD;  [facility]    CYSTOSCOPY, RETROGRADES, INSERT STENT URETER(S), COMBINED Left 07/05/2018    Procedure: COMBINED CYSTOSCOPY, RETROGRADES, INSERT STENT URETER(S);  Cystoscopy, Left Stent Exchange;  Surgeon: Zhao La MD;  [facility]    DAVINCI NEPHRECTOMY Left 11/4/2022    Procedure: , LEFT NEPHRECTOMY, ROBOT-ASSISTED;  Surgeon: Zhao La MD;  [facility]    EXAM UNDER ANESTHESIA PELVIC  03/13/2014    Procedure: EXAM UNDER ANESTHESIA PELVIC;  Exam Under Anestheisa, Colposcopy, Vaginal Biopsies, Co2 Laser of the Upper Vagina;  Surgeon: Lara Pack MD;  [facility]    EXAM UNDER ANESTHESIA PELVIC N/A 07/01/2020    Procedure: Examination under anesthesia, vaginal biopsies;  Surgeon: Karli Gao MD;  [facility]    HERNIORRHAPHY INCISIONAL (LOCATION)      IR LYMPH NODE BIOPSY  6/28/2024    IR RHIZOTOMY  08/14/2020    LASER CO2 VAGINA  03/13/2014    VAIN 1/2    LASER CO2 VAGINA N/A 12/12/2017    Procedure: LASER CO2 VAGINA;  Exam Under Anesthesia, CO2 Laser Ablation Of Vagina, Cystoscopy, Left Retrograde Pyelogram with Left Stent Placement;  Surgeon: Nic Segundo MD;  [facility]    LUMPECTOMY BREAST      LUMPECTOMY BREAST WITH SEED LOCALIZATION Bilateral 06/01/2016    Procedure: LUMPECTOMY BREAST WITH SEED LOCALIZATION;  Surgeon: Brent Arana MD;  Location: [facility]    SURGICAL HISTORY OF -       ovarian cystectomy    SURGICAL HISTORY OF -   01/01/2003    right colon resection secondary to carcinoid tumor    TUBAL LIGATION      [facility] BSO, OMENTECTOMY W/OSMAN  05/01/2007    [facility] TOTAL ABDOM HYSTERECTOMY  05/01/2007            Social History     Tobacco Use     Smoking status: Former     Current packs/day: 0.00     Average packs/day: 1 pack/day for 29.0 years (29.0 ttl pk-yrs)     Types: Cigarettes     Start date: 10/30/1977     Quit date: 10/30/2006     Years since quittin.8    Smokeless tobacco: Never   Substance Use Topics    Alcohol use: No     Alcohol/week: 0.0 standard drinks of alcohol     Comment: 1 drink per year            Family History   Problem Relation Age of Onset    Cancer Mother         bone / liver    Breast Cancer Mother     Cancer Sister         vulvar ca, cervical ca, squamous cell cancer    Breast Cancer Sister     Cancer Brother         Rectal- Stage 4    Rectal Cancer Brother     Cancer Maternal Grandmother     Cancer Maternal Grandfather     Cancer Paternal Grandmother     Cancer Paternal Grandfather     Breast Cancer Maternal Aunt     Breast Cancer Paternal Aunt     Colon Cancer Paternal Aunt     Pancreatic Cancer Nephew     Anesthesia Reaction No family hx of     Venous thrombosis No family hx of     Bleeding Disorder No family hx of              No Known Allergies         Current Outpatient Medications   Medication Sig Dispense Refill    bisacodyl (DULCOLAX) 5 MG EC tablet Take 2 tablets at 3 pm the day before your procedure. If your procedure is before 11 am, take 2 additional tablets at 11 pm. If your procedure is after 11 am, take 2 additional tablets at 6 am. For additional instructions refer to your colonoscopy prep instructions. (Patient not taking: Reported on 2023) 4 tablet 0    Calcium Carbonate (CALCIUM 500 PO) 2 tablets daily      exemestane (AROMASIN) 25 MG tablet Take 1 tablet (25 mg) by mouth every morning 90 tablet 1    hydroCHLOROthiazide 12.5 MG tablet Take 1 tablet (12.5 mg) by mouth every morning Please make a clinic visit to be seen in person for medication refills.  No virtual visits.  Thank you. 90 tablet 0    polyethylene glycol (GOLYTELY) 236 g suspension The night before the exam at 6 pm drink an 8-ounce  glass every 15 minutes until the jug is half empty. If you arrive before 11 AM: Drink the other half of the MovieLaLaytely jug at 11 PM night before procedure. If you arrive after 11 AM: Drink the other half of the [place] jug at 6 AM day of procedure. For additional instructions refer to your colonoscopy prep instructions. (Patient not taking: Reported on 12/29/2023) 4000 mL 0    SENNA-docusate sodium (SENNA S) 8.6-50 MG tablet Take 1 tablet by mouth 2 times daily as needed (prevent opioid induced constipation) (Patient not taking: Reported on 7/14/2023) 30 tablet 0          Review Of Systems  Skin: negative  Eyes: negative  Ears/Nose/Throat: negative  Respiratory: No shortness of breath, dyspnea on exertion, cough, or hemoptysis    O: Physical exam:  No effusion either knee.  Not much pain to palpation parapatellar or medial/lateral joint line.  Adequate knee flexion/extension R knee.  L knee parapatellar tenderness to palpation, Nearly full extension, flexion to 90 degrees.  Hypoplastic LLE compared to RLE.  No effusion or synovial complex.  Tender L knee medial joint line and to pes bursa.      Lab:Cr 1.52    Images:  R knee arthrosis with metaphyseal lesion tibia.  L knee patellofemoral arthrosis.           A:  Patellofemoral arthrosis L knee with pes bursitis in a patient undergoing chemotherapy for metastatic disease including cecal carcinoid.      P:  Inject L knee joint and L knee pes bursa after verbal and written consent, ethyl chloride/ chloraprep  Follow outcomes:  immediately relieved of her pain when ambulating  Notify if exacerbation symptoms  See back 4-6 weeks  Discussed vitamin D supplementation              In addition to the above assessment and plan each active problem on Marissa's problem list was evaluated today. This included the questioning of Marissa for any medication problems. We will continue the current treatment plan for these active problems except as noted.

## 2024-09-06 NOTE — PROGRESS NOTES
Large Joint Injection/Arthocentesis: L knee joint    Date/Time: 9/6/2024 10:25 AM    Performed by: Samuel Yip MD  Authorized by: Samuel Yip MD    Indications:  Pain  Needle Size:  22 G  Guidance: landmark guided    Approach:  Anterolateral  Location:  Knee      Medications:  40 mg triamcinolone 40 MG/ML; 3 mL BUPivacaine 0.5 %; 3 mL lidocaine 1 %  Medications comment:  4ml 0.5% bupivicaine  NDC:95043-206-08  Lot: [id number]  3/30/20    Outcome:  Tolerated well, no immediate complications  Procedure discussed: discussed risks, benefits, and alternatives    Consent Given by:  Patient  Timeout: timeout called immediately prior to procedure    Prep: patient was prepped and draped in usual sterile fashion    Large Joint Injection/Arthocentesis: L anserine bursa    Date/Time: 9/6/2024 10:31 AM    Performed by: Samuel Yip MD  Authorized by: Samuel Yip MD    Indications:  Pain  Needle Size:  22 G  Guidance: landmark guided    Approach:  Anterolateral  Location:  Knee      Medications:  40 mg triamcinolone 40 MG/ML; 1 mL lidocaine 1 %; 1 mL BUPivacaine 0.5 %  Medications comment:  4ml 0.5% bupivicaine  NDC:27355-956-76  Lot: [id number]  3/30/20    Outcome:  Tolerated well, no immediate complications  Procedure discussed: discussed risks, benefits, and alternatives    Consent Given by:  Patient  Timeout: timeout called immediately prior to procedure    Prep: patient was prepped and draped in usual sterile fashion

## 2024-09-06 NOTE — PROGRESS NOTES
Infusion Nursing Note:  Marissa Guadarrama presents today for Somatuline.    Patient seen by provider today: No   present during visit today: Not Applicable.    Note: N/A.      Intravenous Access:  No Intravenous access/labs at this visit.    Treatment Conditions:  Not Applicable.      Post Infusion Assessment:  Patient tolerated injection without incident.  Site patent and intact, free from redness, edema or discomfort.  No evidence of extravasations.       Discharge Plan:   Discharge instructions reviewed with: Patient.  Patient discharged in stable condition accompanied by: self.  Departure Mode: Ambulatory.      Olga Lidia Roman RN

## 2024-09-06 NOTE — LETTER
9/6/2024      Marissa Guadarrama  [address] [facility] [id number]      Dear Colleague,    Thank you for referring your patient, Marissa Guadarrama, to the [facility]. Please see a copy of my visit note below.    S:  Hx post polio LLE, now with L knee pain. No inciting event.  Uses a walker.           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)     Serum calcium elevated     Peritoneal metastases     Ureteral obstruction     Rheumatoid arthritis with positive rheumatoid factor, involving unspecified site (H)     Osteopenia of hip     Need for prophylactic chemotherapy     Obstruction of left ureter     HSIL (high grade squamous intraepithelial lesion) on Pap smear of cervix     Ureteral obstruction, left     Other hydronephrosis     Malignant carcinoid tumor of cecum (H)     Other osteoporosis without current pathological fracture     Osteoporosis due to aromatase inhibitor     Carcinoma metastatic to lymph nodes of multiple sites consistent with lung primary (H)            Past Medical History:   Diagnosis Date     Arthritis     knee     Benign breast biopsy     benign     Breast cancer (H)      Carcinoid tumor (H28) 12/2003     Cervical cancer (H) 2007     H/O colposcopy with cervical biopsy 12/23/2013    vaginal cuff biopsy- VAIN III. referred back to gyn/onc     HTN      Hyperlipidemia      Malignant neoplasm of ovary (H)      Obesity      Pap smear of vagina with ASC-H 11/01/2013     Post-polio syndromes      Trigeminal neuralgias             Past Surgical History:   Procedure  Laterality Date     APPENDECTOMY  01/01/1983     BIOPSY BREAST       BIOPSY NODE SENTINEL Bilateral 06/01/2016    Procedure: BIOPSY NODE SENTINEL;  Surgeon: Brent Arana MD;  Location: [facility]     [facility] SURGICAL PATHOLOGY       COLONOSCOPY N/A 06/23/2017    Procedure: COMBINED COLONOSCOPY, SINGLE OR MULTIPLE BIOPSY/POLYPECTOMY BY BIOPSY;  Colonoscopy Dx:Carcinoid tumor of colon prep mailed golytely;  Surgeon: Talisha Greco MD;  [facility]     COLPOSCOPY, BIOPSY, COMBINED  03/13/2014    Procedure: COMBINED COLPOSCOPY, BIOPSY;;  Surgeon: Lara Pack MD;  [facility]     COMBINED CYSTOSCOPY, RETROGRADES, EXCHANGE STENT URETER(S) Left 02/07/2019    Procedure: COMBINED CYSTOSCOPY, RETROGRADES, EXCHANGE STENT URETER--left;  Surgeon: Zhao La MD;  [facility]: [facility]     COMBINED CYSTOSCOPY, RETROGRADES, EXCHANGE STENT URETER(S) Left 02/20/2020    Procedure: CYSTOSCOPY, WITH RETROGRADE PYELOGRAM AND Left URETERAL STENT exchange;  Surgeon: Zhao La MD;  [facility]     COMBINED CYSTOSCOPY, RETROGRADES, EXCHANGE STENT URETER(S) Left 05/09/2021    Procedure: CYSTOSCOPY, WITH RETROGRADE PYELOGRAM AND LEFT URETERAL STENT REPLACEMENT;  Surgeon: Fred Owens MD;  [facility]     COMBINED CYSTOSCOPY, RETROGRADES, EXCHANGE STENT URETER(S) Left 09/16/2021    Procedure: CYSTOSCOPY, WITH left RETROGRADE PYELOGRAM AND left  URETERAL STENT REPLACEMENT;  Surgeon: Zhao La MD;  [facility]: [facility]     COMBINED CYSTOSCOPY, RETROGRADES, EXCHANGE STENT URETER(S) Left 03/24/2022    Procedure: CYSTOSCOPY, WITH RETROGRADE PYELOGRAM AND URETERAL STENT EXCHANGE, LEFT;  Surgeon: Zhao La MD;  [facility]: [facility]     COMBINED CYSTOSCOPY, RETROGRADES, URETEROSCOPY, INSERT STENT Left 02/02/2017    Procedure: COMBINED CYSTOSCOPY, RETROGRADES, URETEROSCOPY, INSERT STENT;  Surgeon: Zhao La MD;  [facility]     CYSTOSCOPY,  REMOVE STENT(S), COMBINED N/A 11/4/2022    Procedure: CYSTOSCOPY, LEFT STENT REMOVAL;  Surgeon: Zhao La MD;  [facility]     CYSTOSCOPY, RETROGRADES, INSERT STENT URETER(S), COMBINED Left 09/07/2017    Procedure: COMBINED CYSTOSCOPY, RETROGRADES, INSERT STENT URETER(S);  Cystoscopy,Left Stent Exchange;  Surgeon: Zhao La MD;  [facility]     CYSTOSCOPY, RETROGRADES, INSERT STENT URETER(S), COMBINED  12/12/2017    Procedure: COMBINED CYSTOSCOPY, RETROGRADES, INSERT STENT URETER(S);;  Surgeon: Zhao La MD;  [facility]     CYSTOSCOPY, RETROGRADES, INSERT STENT URETER(S), COMBINED Left 07/05/2018    Procedure: COMBINED CYSTOSCOPY, RETROGRADES, INSERT STENT URETER(S);  Cystoscopy, Left Stent Exchange;  Surgeon: Zhao La MD;  [facility]     DAVINCI NEPHRECTOMY Left 11/4/2022    Procedure: , LEFT NEPHRECTOMY, ROBOT-ASSISTED;  Surgeon: Zhao La MD;  [facility]     EXAM UNDER ANESTHESIA PELVIC  03/13/2014    Procedure: EXAM UNDER ANESTHESIA PELVIC;  Exam Under Anestheisa, Colposcopy, Vaginal Biopsies, Co2 Laser of the Upper Vagina;  Surgeon: Lara Pack MD;  [facility]     EXAM UNDER ANESTHESIA PELVIC N/A 07/01/2020    Procedure: Examination under anesthesia, vaginal biopsies;  Surgeon: Karli Gao MD;  [facility]     HERNIORRHAPHY INCISIONAL (LOCATION)       IR LYMPH NODE BIOPSY  6/28/2024     IR RHIZOTOMY  08/14/2020     LASER CO2 VAGINA  03/13/2014    VAIN 1/2     LASER CO2 VAGINA N/A 12/12/2017    Procedure: LASER CO2 VAGINA;  Exam Under Anesthesia, CO2 Laser Ablation Of Vagina, Cystoscopy, Left Retrograde Pyelogram with Left Stent Placement;  Surgeon: Nic Segundo MD;  [facility]     LUMPECTOMY BREAST       LUMPECTOMY BREAST WITH SEED LOCALIZATION Bilateral 06/01/2016    Procedure: LUMPECTOMY BREAST WITH SEED LOCALIZATION;  Surgeon: Brent Arana MD;  Location:  [facility]     SURGICAL HISTORY OF -       ovarian cystectomy     SURGICAL HISTORY OF -   2003    right colon resection secondary to carcinoid tumor     TUBAL LIGATION       [facility] BSO, OMENTECTOMY W/OSMAN  2007     [facility] TOTAL ABDOM HYSTERECTOMY  2007            Social History     Tobacco Use     Smoking status: Former     Current packs/day: 0.00     Average packs/day: 1 pack/day for 29.0 years (29.0 ttl pk-yrs)     Types: Cigarettes     Start date: 10/30/1977     Quit date: 10/30/2006     Years since quittin.8     Smokeless tobacco: Never   Substance Use Topics     Alcohol use: No     Alcohol/week: 0.0 standard drinks of alcohol     Comment: 1 drink per year            Family History   Problem Relation Age of Onset     Cancer Mother         bone / liver     Breast Cancer Mother      Cancer Sister         vulvar ca, cervical ca, squamous cell cancer     Breast Cancer Sister      Cancer Brother         Rectal- Stage 4     Rectal Cancer Brother      Cancer Maternal Grandmother      Cancer Maternal Grandfather      Cancer Paternal Grandmother      Cancer Paternal Grandfather      Breast Cancer Maternal Aunt      Breast Cancer Paternal Aunt      Colon Cancer Paternal Aunt      Pancreatic Cancer Nephew      Anesthesia Reaction No family hx of      Venous thrombosis No family hx of      Bleeding Disorder No family hx of              No Known Allergies         Current Outpatient Medications   Medication Sig Dispense Refill     bisacodyl (DULCOLAX) 5 MG EC tablet Take 2 tablets at 3 pm the day before your procedure. If your procedure is before 11 am, take 2 additional tablets at 11 pm. If your procedure is after 11 am, take 2 additional tablets at 6 am. For additional instructions refer to your colonoscopy prep instructions. (Patient not taking: Reported on 2023) 4 tablet 0     Calcium Carbonate (CALCIUM 500 PO) 2 tablets daily       exemestane (AROMASIN) 25 MG tablet Take 1 tablet (25 mg) by mouth every  morning 90 tablet 1     hydroCHLOROthiazide 12.5 MG tablet Take 1 tablet (12.5 mg) by mouth every morning Please make a clinic visit to be seen in person for medication refills.  No virtual visits.  Thank you. 90 tablet 0     polyethylene glycol (GOLYTELY) 236 g suspension The night before the exam at 6 pm drink an 8-ounce glass every 15 minutes until the jug is half empty. If you arrive before 11 AM: Drink the other half of the Golytely jug at 11 PM night before procedure. If you arrive after 11 AM: Drink the other half of the Golytely jug at 6 AM day of procedure. For additional instructions refer to your colonoscopy prep instructions. (Patient not taking: Reported on 12/29/2023) 4000 mL 0     SENNA-docusate sodium (SENNA S) 8.6-50 MG tablet Take 1 tablet by mouth 2 times daily as needed (prevent opioid induced constipation) (Patient not taking: Reported on 7/14/2023) 30 tablet 0          Review Of Systems  Skin: negative  Eyes: negative  Ears/Nose/Throat: negative  Respiratory: No shortness of breath, dyspnea on exertion, cough, or hemoptysis    O: Physical exam:  No effusion either knee.  Not much pain to palpation parapatellar or medial/lateral joint line.  Adequate knee flexion/extension R knee.  L knee parapatellar tenderness to palpation, Nearly full extension, flexion to 90 degrees.  Hypoplastic LLE compared to RLE.  No effusion or synovial complex.  Tender L knee medial joint line and to pes bursa.      Lab:Cr 1.52    Images:  R knee arthrosis with metaphyseal lesion tibia.  L knee patellofemoral arthrosis.           A:  Patellofemoral arthrosis L knee with pes bursitis in a patient undergoing chemotherapy for metastatic disease including cecal carcinoid.      P:  Inject L knee joint and L knee pes bursa after verbal and written consent, ethyl chloride/ chloraprep  Follow outcomes:  immediately relieved of her pain when ambulating  Notify if exacerbation symptoms  See back 4-6 weeks  Discussed vitamin D  supplementation              In addition to the above assessment and plan each active problem on Marissa's problem list was evaluated today. This included the questioning of Marissa for any medication problems. We will continue the current treatment plan for these active problems except as noted.        Large Joint Injection/Arthocentesis: L knee joint    Date/Time: 9/6/2024 10:25 AM    Performed by: Samuel Yip MD  Authorized by: Samuel Yip MD    Indications:  Pain  Needle Size:  22 G  Guidance: landmark guided    Approach:  Anterolateral  Location:  Knee      Medications:  40 mg triamcinolone 40 MG/ML; 3 mL BUPivacaine 0.5 %; 3 mL lidocaine 1 %  Medications comment:  4ml 0.5% bupivicaine  NDC:22508-224-59  Lot: [id number]  3/30/20    Outcome:  Tolerated well, no immediate complications  Procedure discussed: discussed risks, benefits, and alternatives    Consent Given by:  Patient  Timeout: timeout called immediately prior to procedure    Prep: patient was prepped and draped in usual sterile fashion    Large Joint Injection/Arthocentesis: L anserine bursa    Date/Time: 9/6/2024 10:31 AM    Performed by: Samuel Yip MD  Authorized by: Samuel Yip MD    Indications:  Pain  Needle Size:  22 G  Guidance: landmark guided    Approach:  Anterolateral  Location:  Knee      Medications:  40 mg triamcinolone 40 MG/ML; 1 mL lidocaine 1 %; 1 mL BUPivacaine 0.5 %  Medications comment:  4ml 0.5% bupivicaine  NDC:31310-960-08  Lot: [id number]  3/30/20    Outcome:  Tolerated well, no immediate complications  Procedure discussed: discussed risks, benefits, and alternatives    Consent Given by:  Patient  Timeout: timeout called immediately prior to procedure    Prep: patient was prepped and draped in usual sterile fashion          Again, thank you for allowing me to participate in the care of your patient.        Sincerely,        Samuel Yip MD

## 2024-09-10 LAB — CGA SERPL-MCNC: 610 NG/ML

## 2024-09-20 ENCOUNTER — INFUSION THERAPY VISIT (OUTPATIENT)
Dept: INFUSION THERAPY | Facility: CLINIC | Age: 74
End: 2024-09-20
Attending: INTERNAL MEDICINE
Payer: MEDICARE

## 2024-09-20 ENCOUNTER — LAB (OUTPATIENT)
Dept: LAB | Facility: CLINIC | Age: 74
End: 2024-09-20
Attending: INTERNAL MEDICINE
Payer: MEDICARE

## 2024-09-20 ENCOUNTER — ONCOLOGY VISIT (OUTPATIENT)
Dept: ONCOLOGY | Facility: CLINIC | Age: 74
End: 2024-09-20
Attending: INTERNAL MEDICINE
Payer: MEDICARE

## 2024-09-20 VITALS
OXYGEN SATURATION: 94 % | HEIGHT: 68 IN | DIASTOLIC BLOOD PRESSURE: 66 MMHG | HEART RATE: 51 BPM | SYSTOLIC BLOOD PRESSURE: 151 MMHG | WEIGHT: 200.9 LBS | RESPIRATION RATE: 10 BRPM | TEMPERATURE: 97.5 F | BODY MASS INDEX: 30.45 KG/M2

## 2024-09-20 VITALS — HEART RATE: 63 BPM | SYSTOLIC BLOOD PRESSURE: 135 MMHG | DIASTOLIC BLOOD PRESSURE: 77 MMHG

## 2024-09-20 DIAGNOSIS — C80.1 CARCINOMA METASTATIC TO LYMPH NODES OF MULTIPLE SITES WITH UNKNOWN PRIMARY SITE (H): ICD-10-CM

## 2024-09-20 DIAGNOSIS — T38.6X5A OSTEOPOROSIS DUE TO AROMATASE INHIBITOR: Primary | ICD-10-CM

## 2024-09-20 DIAGNOSIS — M81.8 OSTEOPOROSIS DUE TO AROMATASE INHIBITOR: Primary | ICD-10-CM

## 2024-09-20 DIAGNOSIS — Z79.899 HIGH RISK MEDICATION USE: Primary | ICD-10-CM

## 2024-09-20 DIAGNOSIS — C77.8 CARCINOMA METASTATIC TO LYMPH NODES OF MULTIPLE SITES WITH UNKNOWN PRIMARY SITE (H): ICD-10-CM

## 2024-09-20 DIAGNOSIS — C7A.021 MALIGNANT CARCINOID TUMOR OF CECUM (H): Primary | ICD-10-CM

## 2024-09-20 DIAGNOSIS — C7A.021 MALIGNANT CARCINOID TUMOR OF CECUM (H): ICD-10-CM

## 2024-09-20 DIAGNOSIS — C50.412 MALIGNANT NEOPLASM OF UPPER-OUTER QUADRANT OF BOTH BREASTS IN FEMALE, ESTROGEN RECEPTOR POSITIVE (H): ICD-10-CM

## 2024-09-20 DIAGNOSIS — Z17.0 MALIGNANT NEOPLASM OF UPPER-OUTER QUADRANT OF BOTH BREASTS IN FEMALE, ESTROGEN RECEPTOR POSITIVE (H): ICD-10-CM

## 2024-09-20 DIAGNOSIS — C50.411 MALIGNANT NEOPLASM OF UPPER-OUTER QUADRANT OF BOTH BREASTS IN FEMALE, ESTROGEN RECEPTOR POSITIVE (H): ICD-10-CM

## 2024-09-20 LAB
ALBUMIN SERPL BCG-MCNC: 3.8 G/DL (ref 3.5–5.2)
ALP SERPL-CCNC: 85 U/L (ref 40–150)
ALT SERPL W P-5'-P-CCNC: 12 U/L (ref 0–50)
ANION GAP SERPL CALCULATED.3IONS-SCNC: 9 MMOL/L (ref 7–15)
AST SERPL W P-5'-P-CCNC: 17 U/L (ref 0–45)
BASOPHILS # BLD AUTO: 0 10E3/UL (ref 0–0.2)
BASOPHILS NFR BLD AUTO: 0 %
BILIRUB SERPL-MCNC: 0.9 MG/DL
BUN SERPL-MCNC: 34.8 MG/DL (ref 8–23)
CALCIUM SERPL-MCNC: 9.9 MG/DL (ref 8.8–10.4)
CANCER AG15-3 SERPL-ACNC: 25 U/ML
CEA SERPL-MCNC: 52.1 NG/ML
CHLORIDE SERPL-SCNC: 103 MMOL/L (ref 98–107)
CREAT SERPL-MCNC: 1.29 MG/DL (ref 0.51–0.95)
EGFRCR SERPLBLD CKD-EPI 2021: 43 ML/MIN/1.73M2
EOSINOPHIL # BLD AUTO: 0.2 10E3/UL (ref 0–0.7)
EOSINOPHIL NFR BLD AUTO: 2 %
ERYTHROCYTE [DISTWIDTH] IN BLOOD BY AUTOMATED COUNT: 14 % (ref 10–15)
GLUCOSE SERPL-MCNC: 112 MG/DL (ref 70–99)
HCO3 SERPL-SCNC: 30 MMOL/L (ref 22–29)
HCT VFR BLD AUTO: 45.3 % (ref 35–47)
HGB BLD-MCNC: 15.2 G/DL (ref 11.7–15.7)
HOLD SPECIMEN: NORMAL
IMM GRANULOCYTES # BLD: 0.1 10E3/UL
IMM GRANULOCYTES NFR BLD: 1 %
LYMPHOCYTES # BLD AUTO: 1.3 10E3/UL (ref 0.8–5.3)
LYMPHOCYTES NFR BLD AUTO: 14 %
MCH RBC QN AUTO: 30.6 PG (ref 26.5–33)
MCHC RBC AUTO-ENTMCNC: 33.6 G/DL (ref 31.5–36.5)
MCV RBC AUTO: 91 FL (ref 78–100)
MONOCYTES # BLD AUTO: 0.6 10E3/UL (ref 0–1.3)
MONOCYTES NFR BLD AUTO: 6 %
NEUTROPHILS # BLD AUTO: 7.2 10E3/UL (ref 1.6–8.3)
NEUTROPHILS NFR BLD AUTO: 77 %
NRBC # BLD AUTO: 0 10E3/UL
NRBC BLD AUTO-RTO: 0 /100
PLATELET # BLD AUTO: 177 10E3/UL (ref 150–450)
POTASSIUM SERPL-SCNC: 3.8 MMOL/L (ref 3.4–5.3)
PROT SERPL-MCNC: 7.3 G/DL (ref 6.4–8.3)
RBC # BLD AUTO: 4.96 10E6/UL (ref 3.8–5.2)
SODIUM SERPL-SCNC: 142 MMOL/L (ref 135–145)
TSH SERPL DL<=0.005 MIU/L-ACNC: 1.54 UIU/ML (ref 0.3–4.2)
WBC # BLD AUTO: 9.3 10E3/UL (ref 4–11)

## 2024-09-20 PROCEDURE — 258N000003 HC RX IP 258 OP 636: Performed by: NURSE PRACTITIONER

## 2024-09-20 PROCEDURE — 36415 COLL VENOUS BLD VENIPUNCTURE: CPT | Performed by: INTERNAL MEDICINE

## 2024-09-20 PROCEDURE — 85041 AUTOMATED RBC COUNT: CPT | Performed by: INTERNAL MEDICINE

## 2024-09-20 PROCEDURE — 82378 CARCINOEMBRYONIC ANTIGEN: CPT | Performed by: NURSE PRACTITIONER

## 2024-09-20 PROCEDURE — 96413 CHEMO IV INFUSION 1 HR: CPT

## 2024-09-20 PROCEDURE — G0463 HOSPITAL OUTPT CLINIC VISIT: HCPCS | Mod: 25 | Performed by: NURSE PRACTITIONER

## 2024-09-20 PROCEDURE — 99214 OFFICE O/P EST MOD 30 MIN: CPT | Performed by: NURSE PRACTITIONER

## 2024-09-20 PROCEDURE — 250N000011 HC RX IP 250 OP 636: Performed by: NURSE PRACTITIONER

## 2024-09-20 PROCEDURE — G2211 COMPLEX E/M VISIT ADD ON: HCPCS | Performed by: NURSE PRACTITIONER

## 2024-09-20 PROCEDURE — 84443 ASSAY THYROID STIM HORMONE: CPT | Performed by: INTERNAL MEDICINE

## 2024-09-20 PROCEDURE — 96372 THER/PROPH/DIAG INJ SC/IM: CPT | Performed by: INTERNAL MEDICINE

## 2024-09-20 PROCEDURE — 250N000011 HC RX IP 250 OP 636: Performed by: INTERNAL MEDICINE

## 2024-09-20 PROCEDURE — 86300 IMMUNOASSAY TUMOR CA 15-3: CPT | Performed by: INTERNAL MEDICINE

## 2024-09-20 PROCEDURE — 82040 ASSAY OF SERUM ALBUMIN: CPT | Performed by: INTERNAL MEDICINE

## 2024-09-20 RX ORDER — DIPHENHYDRAMINE HYDROCHLORIDE 50 MG/ML
50 INJECTION INTRAMUSCULAR; INTRAVENOUS
Status: CANCELLED
Start: 2024-09-20

## 2024-09-20 RX ORDER — MEPERIDINE HYDROCHLORIDE 25 MG/ML
25 INJECTION INTRAMUSCULAR; INTRAVENOUS; SUBCUTANEOUS EVERY 30 MIN PRN
OUTPATIENT
Start: 2024-10-04

## 2024-09-20 RX ORDER — HEPARIN SODIUM,PORCINE 10 UNIT/ML
5-20 VIAL (ML) INTRAVENOUS DAILY PRN
Status: CANCELLED | OUTPATIENT
Start: 2024-09-20

## 2024-09-20 RX ORDER — EPINEPHRINE 1 MG/ML
0.3 INJECTION, SOLUTION, CONCENTRATE INTRAVENOUS EVERY 5 MIN PRN
OUTPATIENT
Start: 2024-10-04

## 2024-09-20 RX ORDER — METHYLPREDNISOLONE SODIUM SUCCINATE 125 MG/2ML
125 INJECTION, POWDER, LYOPHILIZED, FOR SOLUTION INTRAMUSCULAR; INTRAVENOUS
Status: CANCELLED
Start: 2024-09-20

## 2024-09-20 RX ORDER — MEPERIDINE HYDROCHLORIDE 25 MG/ML
25 INJECTION INTRAMUSCULAR; INTRAVENOUS; SUBCUTANEOUS EVERY 30 MIN PRN
Status: CANCELLED | OUTPATIENT
Start: 2024-09-20

## 2024-09-20 RX ORDER — ALBUTEROL SULFATE 0.83 MG/ML
2.5 SOLUTION RESPIRATORY (INHALATION)
Status: CANCELLED | OUTPATIENT
Start: 2024-09-20

## 2024-09-20 RX ORDER — EXEMESTANE 25 MG/1
25 TABLET ORAL EVERY MORNING
Qty: 90 TABLET | Refills: 3 | Status: SHIPPED | OUTPATIENT
Start: 2024-09-20

## 2024-09-20 RX ORDER — ALBUTEROL SULFATE 0.83 MG/ML
2.5 SOLUTION RESPIRATORY (INHALATION)
OUTPATIENT
Start: 2024-10-04

## 2024-09-20 RX ORDER — ALBUTEROL SULFATE 90 UG/1
1-2 AEROSOL, METERED RESPIRATORY (INHALATION)
Status: CANCELLED
Start: 2024-09-20

## 2024-09-20 RX ORDER — EPINEPHRINE 1 MG/ML
0.3 INJECTION, SOLUTION, CONCENTRATE INTRAVENOUS EVERY 5 MIN PRN
Status: CANCELLED | OUTPATIENT
Start: 2024-09-20

## 2024-09-20 RX ORDER — METHYLPREDNISOLONE SODIUM SUCCINATE 125 MG/2ML
125 INJECTION, POWDER, LYOPHILIZED, FOR SOLUTION INTRAMUSCULAR; INTRAVENOUS
Start: 2024-10-04

## 2024-09-20 RX ORDER — ALBUTEROL SULFATE 90 UG/1
1-2 AEROSOL, METERED RESPIRATORY (INHALATION)
Start: 2024-10-04

## 2024-09-20 RX ORDER — LORAZEPAM 2 MG/ML
0.5 INJECTION INTRAMUSCULAR EVERY 4 HOURS PRN
Status: CANCELLED | OUTPATIENT
Start: 2024-09-20

## 2024-09-20 RX ORDER — LANREOTIDE ACETATE 120 MG/.5ML
120 INJECTION SUBCUTANEOUS ONCE
OUTPATIENT
Start: 2024-10-04 | End: 2024-10-04

## 2024-09-20 RX ORDER — HEPARIN SODIUM (PORCINE) LOCK FLUSH IV SOLN 100 UNIT/ML 100 UNIT/ML
5 SOLUTION INTRAVENOUS
Status: CANCELLED | OUTPATIENT
Start: 2024-09-20

## 2024-09-20 RX ORDER — HYDROCHLOROTHIAZIDE 12.5 MG/1
12.5 TABLET ORAL EVERY MORNING
Qty: 90 TABLET | Refills: 0 | Status: SHIPPED | OUTPATIENT
Start: 2024-09-20

## 2024-09-20 RX ORDER — LANREOTIDE ACETATE 120 MG/.5ML
120 INJECTION SUBCUTANEOUS ONCE
Status: COMPLETED | OUTPATIENT
Start: 2024-09-20 | End: 2024-09-20

## 2024-09-20 RX ORDER — DIPHENHYDRAMINE HYDROCHLORIDE 50 MG/ML
50 INJECTION INTRAMUSCULAR; INTRAVENOUS
Start: 2024-10-04

## 2024-09-20 RX ADMIN — SODIUM CHLORIDE 250 ML: 9 INJECTION, SOLUTION INTRAVENOUS at 11:33

## 2024-09-20 RX ADMIN — LANREOTIDE ACETATE 120 MG: 120 INJECTION SUBCUTANEOUS at 12:10

## 2024-09-20 RX ADMIN — SODIUM CHLORIDE 400 MG: 9 INJECTION, SOLUTION INTRAVENOUS at 11:34

## 2024-09-20 ASSESSMENT — PAIN SCALES - GENERAL: PAINLEVEL: NO PAIN (0)

## 2024-09-20 NOTE — PROGRESS NOTES
"Oncology Rooming Note    September 20, 2024 10:24 AM   Marissa Guadarrama is a 74 year old female who presents for:    Chief Complaint   Patient presents with    Oncology Clinic Visit     Malignant carcinoid tumor of cecum - labs, provider, infusion     Initial Vitals: BP (!) 151/66 (BP Location: Right arm, Patient Position: Sitting, Cuff Size: Adult Regular)   Pulse 51   Temp 97.5  F (36.4  C) (Tympanic)   Resp 10   Ht 1.715 m (5' 7.5\")   Wt 91.1 kg (200 lb 14.4 oz)   SpO2 94%   BMI 31.00 kg/m   Estimated body mass index is 31 kg/m  as calculated from the following:    Height as of this encounter: 1.715 m (5' 7.5\").    Weight as of this encounter: 91.1 kg (200 lb 14.4 oz). Body surface area is 2.08 meters squared.  No Pain (0) Comment: Data Unavailable   No LMP recorded. Patient has had a hysterectomy.  Allergies reviewed: Yes  Medications reviewed: Yes    Medications: Medication refills not needed today.  Pharmacy name entered into [facility]: [id number] [place], [place] - [address] [place] [address]    Frailty Screening:   Is the patient here for a new oncology consult visit in cancer care? 2. No      Clinical concerns: None today.        Nicole Mascorro MA              "

## 2024-09-20 NOTE — PROGRESS NOTES
[facility] Hematology and Oncology Outpatient Progress Note    Patient: Marissa Guadarrama  MRN: [id number]  Date of Service: Sep 20, 2024          Reason for Visit    Metastatic poorly differentiated carcinoma, uncertain primary  Recurrent/metastatic small bowel carcinoid  History of breast cancer    Primary Oncologist: Dr. 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 Two biopsies could not confirm primary source. CA 27.29 + CEA both elevated. NGS showed no targetable mutations, but PDL1 TPS score 50%.    Started palliative pembrolizumab (every 6 week dosing). Tolerated cycle 1 very well, no side effects.  Labs: CBC WNL. CMP WNL. TSH pending.     Palpable right cervical nodes are resolved, consistent with response.  CEA and CA 27.29 pending.     Plan:  -Proceed with C2 pembrolizumab 400 mg   -Return in 6 weeks for C3  -Trend CA 27.29 and CEA  -Plan to repeat CT neck/CAP (without contrast due to hx nephrectomy, CKD) in 12 weeks, after 3 cycles    Recurrent/metastatic small bowel carcinoid (liver/peritoneum, bone)  Patient has continued on monthly lanreotide since 2022.  Chromogranin nadired to 445 and has been stable 550-630 range over the last 1.5 yrs.    Tolerating lanreotide well.    Chromogranin A level: 610 (stable)    Plan:  -Continue monthly lanreotide   -Trend Chromogranin A  -If any significant progression in Chromogranin A, repeat Dotatate PET    History of ER/NC positive bilateral breast cancer  Has been on exemestane 8 years without evidence for recurrent disease.  Tolerates this well.    Last mammogram was 10/2023 (benign).  No clinical concern for recurrence.    Plan:  -Planning for AI up to 10 years, pending tolerance  -Annual mammograms every October    3.  Osteoporosis  DEXA 3/2023 showed significant decline in BMD (since 2019, on AI). Tscore -3.1 left  hip.  Started Reclast annually 1/2024.   On calcium/vit D.    Plan:  -Reclast due 1/2025  -Continue Calcium and vitamin D supplementation  -Repeat DEXA 3/2025    4.  CKD  5.  Hx left nephrectomy (met carcinoid resection)  Creatinine stable 1.2-1.4 range.     Plan:  -Hydrate well  -Avoid nephrotoxic medications and minimize IV contrast dye with scans, as able  ______________________________________________________________________________    History of Present Illness/ Interval History    Ms. Marissa Guadarrama is a 74 year old with a complex cancer history.   -She has longstanding history of low-grade neuroendocrine (carcinoid) small bowel cancer with recurrent/metastatic disease in 2022, currently on maintenance lanreotide monthly.    -She also has a remote history of bilateral ER/MS positive breast cancer treated with lumpectomies and has been on exemestane for 8 years.    -Most recently, developed PDL1+ met carcinoma of unclear primary (work-up could not confirm related to her prior breast cancer) involving cervical nodes +/- med/hilar nodes. She initiated pembro every 6 weeks in August.    Returns ahead of Cycle 2 pembrolizumab. Continues monthly lanreotide and exemestane.     Tolerated her first cycle of immunotherapy very well, no side effects. No diarrhea, dyspnea, skin rashes.  No new arthralgias. Chronic bilateral pain r/t to bursitis and arthritis. Recent L knee steroid injection was very helpful.     Continues to tolerate lanreotide + exemestane well.    Right cervical nodes are resolved. No new nodes/masses noted. No new sites of pain.  No abdominal symptoms, diarrhea, flushing. Stable weight.    Baseline activity is limited from polio in childhood she uses a walker to help with ambulation.     ECOG Performance    1      Oncology History/Treatment  Diagnosis/Stage:   2003: stage III (pT2-pN2) well-differentiated neuroendocrine cancer (carcinoid) small bowel cancer  ---2020: met stage IV recurrence causing  left ureteral obstruction + liver met   ---2/2022: progressive disease confirmed by Octeoscan (dotatate+ liver, intra-abd and bone mets). Chromogranin A level 824  ---7/2023: progression in L sclerotic sacral bone lesion, rest of sites stable. Chromogranin A slightly elevated (562). Presumed met neuroendocrine (not biopsied).    2016: bilateral ER/MA+ breast cancer  -both tumors 100% ER/MA+, HER2 neg  -Oncotype DX score not done (pt opted against chemo, regardless)    2024: met poorly differentiated carcinoma, uncertain origin (PDL1+)  -2/2024: new R supraclav adenopathy. CEA 56.6 (up from 12.8) +  CA 27.29 57.7 (up from 22), chromogranin A 560 (stable)  -3/8/2024 CT neck/chest: multiple R cervical level 3-5 nodes, mildly enlarged mediastinal + hilar nodes  -3/8/2024 IR biopsy R cervical node: poorly differentiated carcinoma, CK7 + CK20 positive. Insufficient tissue to make further identification of the primary or to determine treatment options.   -6/28/204 additional biopsy R cervical node for NGS testing: no actionable mutations. TPS 50%.    Treatment:  Small bowel carcinoid  -2003: right colonic and terminal ileal resection  -2022: left nephrectomy (met causing ureteral obstruction)  -2022 - present: lanreotide every 4 weeks  ---joslyn chromogranin 445 (1/2023)  -12/2023: palliative RT (5000 cGy/5) to L sacral met with improved pain.    Breast cancer  -bilateral lumpectomies  -2016 - present: exemestane (planning 10 yrs)  -1/2024: annual Reclast for osteoporosis    Met poorly differentiated cancer, unclear primary  -8/9/2024 - present: pembrolizumab 400 mg every 6 weeks      Physical Exam    GENERAL: Alert and oriented to time place and person. Seated comfortably. In no distress.  HEAD: Atraumatic and normocephalic. No alopecia.  EYES: NASIR, EOMI. No erythema. No icterus.  LYMPH: No palpable cervical, supraclavicular nor axillary adenopathy (previously palpable cervical nodes resolved)  BREASTS: Not  examined  CHEST: Regular respiratory effort.  CVS: RRR  ABDOMEN: Soft. Not tender. Not distended. No palpable hepatomegaly or splenomegaly. No other mass palpable. Bowel sounds present.  EXTREMITIES: Warm. No peripheral edema.  SKIN: no rash, or bruising or purpura.   NEURO: No gross deficit noted. Non-antalgic gait.      Lab Results    Recent Results (from the past 168 hour(s))   Comprehensive metabolic panel   Result Value Ref Range    Sodium 142 135 - 145 mmol/L    Potassium 3.8 3.4 - 5.3 mmol/L    Carbon Dioxide (CO2) 30 (H) 22 - 29 mmol/L    Anion Gap 9 7 - 15 mmol/L    Urea Nitrogen 34.8 (H) 8.0 - 23.0 mg/dL    Creatinine 1.29 (H) 0.51 - 0.95 mg/dL    GFR Estimate 43 (L) >60 mL/min/1.73m2    Calcium 9.9 8.8 - 10.4 mg/dL    Chloride 103 98 - 107 mmol/L    Glucose 112 (H) 70 - 99 mg/dL    Alkaline Phosphatase 85 40 - 150 U/L    AST 17 0 - 45 U/L    ALT 12 0 - 50 U/L    Protein Total 7.3 6.4 - 8.3 g/dL    Albumin 3.8 3.5 - 5.2 g/dL    Bilirubin Total 0.9 <=1.2 mg/dL   CBC with platelets and differential   Result Value Ref Range    WBC Count 9.3 4.0 - 11.0 10e3/uL    RBC Count 4.96 3.80 - 5.20 10e6/uL    Hemoglobin 15.2 11.7 - 15.7 g/dL    Hematocrit 45.3 35.0 - 47.0 %    MCV 91 78 - 100 fL    MCH 30.6 26.5 - 33.0 pg    MCHC 33.6 31.5 - 36.5 g/dL    RDW 14.0 10.0 - 15.0 %    Platelet Count 177 150 - 450 10e3/uL    % Neutrophils 77 %    % Lymphocytes 14 %    % Monocytes 6 %    % Eosinophils 2 %    % Basophils 0 %    % Immature Granulocytes 1 %    NRBCs per 100 WBC 0 <1 /100    Absolute Neutrophils 7.2 1.6 - 8.3 10e3/uL    Absolute Lymphocytes 1.3 0.8 - 5.3 10e3/uL    Absolute Monocytes 0.6 0.0 - 1.3 10e3/uL    Absolute Eosinophils 0.2 0.0 - 0.7 10e3/uL    Absolute Basophils 0.0 0.0 - 0.2 10e3/uL    Absolute Immature Granulocytes 0.1 <=0.4 10e3/uL    Absolute NRBCs 0.0 10e3/uL         Imaging    XR Knee Left G/E 4 Views    Result Date: 9/6/2024  XR KNEE LEFT G/E 4 VIEWS 9/6/2024 9:55 AM HISTORY: Osteoarthritis of  left knee, unspecified osteoarthritis type COMPARISON: None.     IMPRESSION: Osteopenia. No fracture or effusion. Moderate medial and mild lateral compartment narrowing. Advanced degenerative changes in the medial compartment of the right knee. Lobulated intramedullary lesion proximal right tibia likely a benign enchondroma. SANTANA ESPINO MD   SYSTEM ID:  [id number]     Billing  Total time 35 minutes, to include face to face visit, review of EMR, ordering, documentation and coordination of care on date of service.    complexity modifier for longitudinal care.     Signed by: Jennifer Allen NP

## 2024-09-20 NOTE — PROGRESS NOTES
Infusion Nursing Note:  Marissa Guadarrama presents today for Keytruda & Somatuline.    Patient seen by provider today: Yes   present during visit today: Not Applicable.    Note: Labs drawn peripherally.      Intravenous Access:  Peripheral IV placed.    Treatment Conditions:  Lab Results   Component Value Date     09/20/2024    POTASSIUM 3.8 09/20/2024    MAG 1.6 05/10/2021    CR 1.29 (H) 09/20/2024    MARILIA 9.9 09/20/2024    BILITOTAL 0.9 09/20/2024    ALBUMIN 3.8 09/20/2024    ALT 12 09/20/2024    AST 17 09/20/2024       Results reviewed, labs MET treatment parameters, ok to proceed with treatment.      Post Infusion Assessment:  Patient tolerated infusion without incident.  Patient tolerated injection without incident.  Site patent and intact, free from redness, edema or discomfort.  No evidence of extravasations.  Access discontinued per protocol.       Discharge Plan:   Patient discharged in stable condition accompanied by: spouse  Departure Mode: Ambulatory w/ walker.      Caitlyn Mensah RN

## 2024-09-20 NOTE — LETTER
9/20/2024      Marissa Guadarrama  [address] [place] [address]  [place] [address]      Dear Colleague,    Thank you for referring your patient, Marissa Guadarrama, to the [facility]. Please see a copy of my visit note below.    [facility] Hematology and Oncology Outpatient Progress Note    Patient: Marissa Guadarrama  MRN: [id number]  Date of Service: Sep 20, 2024          Reason for Visit    Metastatic poorly differentiated carcinoma, uncertain primary  Recurrent/metastatic small bowel carcinoid  History of breast cancer    Primary Oncologist: Dr. 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 Two biopsies could not confirm primary source. CA 27.29 + CEA both elevated. NGS showed no targetable mutations, but PDL1 TPS score 50%.    Started palliative pembrolizumab (every 6 week dosing). Tolerated cycle 1 very well, no side effects.  Labs: CBC WNL. CMP WNL. TSH pending.     Palpable right cervical nodes are resolved, consistent with response.  CEA and CA 27.29 pending.     Plan:  -Proceed with C2 pembrolizumab 400 mg   -Return in 6 weeks for C3  -Trend CA 27.29 and CEA  -Plan to repeat CT neck/CAP (without contrast due to hx nephrectomy, CKD) in 12 weeks, after 3 cycles    Recurrent/metastatic small bowel carcinoid (liver/peritoneum, bone)  Patient has continued on monthly lanreotide since 2022.  Chromogranin nadired to 445 and has been stable 550-630 range over the last 1.5 yrs.    Tolerating lanreotide well.    Chromogranin A level: 610 (stable)    Plan:  -Continue monthly lanreotide   -Trend Chromogranin A  -If any significant progression in Chromogranin A, repeat Dotatate PET    History of ER/UT positive bilateral breast cancer  Has been on exemestane 8 years without evidence for recurrent disease.  Tolerates this well.    Last mammogram was 10/2023  (benign).  No clinical concern for recurrence.    Plan:  -Planning for AI up to 10 years, pending tolerance  -Annual mammograms every October    3.  Osteoporosis  DEXA 3/2023 showed significant decline in BMD (since 2019, on AI). Tscore -3.1 left hip.  Started Reclast annually 1/2024.   On calcium/vit D.    Plan:  -Reclast due 1/2025  -Continue Calcium and vitamin D supplementation  -Repeat DEXA 3/2025    4.  CKD  5.  Hx left nephrectomy (met carcinoid resection)  Creatinine stable 1.2-1.4 range.     Plan:  -Hydrate well  -Avoid nephrotoxic medications and minimize IV contrast dye with scans, as able  ______________________________________________________________________________    History of Present Illness/ Interval History    Ms. Marissa Guadarrama is a 74 year old with a complex cancer history.   -She has longstanding history of low-grade neuroendocrine (carcinoid) small bowel cancer with recurrent/metastatic disease in 2022, currently on maintenance lanreotide monthly.    -She also has a remote history of bilateral ER/IA positive breast cancer treated with lumpectomies and has been on exemestane for 8 years.    -Most recently, developed PDL1+ met carcinoma of unclear primary (work-up could not confirm related to her prior breast cancer) involving cervical nodes +/- med/hilar nodes. She initiated pembro every 6 weeks in August.    Returns ahead of Cycle 2 pembrolizumab. Continues monthly lanreotide and exemestane.     Tolerated her first cycle of immunotherapy very well, no side effects. No diarrhea, dyspnea, skin rashes.  No new arthralgias. Chronic bilateral pain r/t to bursitis and arthritis. Recent L knee steroid injection was very helpful.     Continues to tolerate lanreotide + exemestane well.    Right cervical nodes are resolved. No new nodes/masses noted. No new sites of pain.  No abdominal symptoms, diarrhea, flushing. Stable weight.    Baseline activity is limited from polio in childhood she uses a  walker to help with ambulation.     ECOG Performance    1      Oncology History/Treatment  Diagnosis/Stage:   2003: stage III (pT2-pN2) well-differentiated neuroendocrine cancer (carcinoid) small bowel cancer  ---2020: met stage IV recurrence causing left ureteral obstruction + liver met   ---2/2022: progressive disease confirmed by Octeoscan (dotatate+ liver, intra-abd and bone mets). Chromogranin A level 824  ---7/2023: progression in L sclerotic sacral bone lesion, rest of sites stable. Chromogranin A slightly elevated (562). Presumed met neuroendocrine (not biopsied).    2016: bilateral ER/NH+ breast cancer  -both tumors 100% ER/NH+, HER2 neg  -Oncotype DX score not done (pt opted against chemo, regardless)    2024: met poorly differentiated carcinoma, uncertain origin (PDL1+)  -2/2024: new R supraclav adenopathy. CEA 56.6 (up from 12.8) +  CA 27.29 57.7 (up from 22), chromogranin A 560 (stable)  -3/8/2024 CT neck/chest: multiple R cervical level 3-5 nodes, mildly enlarged mediastinal + hilar nodes  -3/8/2024 IR biopsy R cervical node: poorly differentiated carcinoma, CK7 + CK20 positive. Insufficient tissue to make further identification of the primary or to determine treatment options.   -6/28/204 additional biopsy R cervical node for NGS testing: no actionable mutations. TPS 50%.    Treatment:  Small bowel carcinoid  -2003: right colonic and terminal ileal resection  -2022: left nephrectomy (met causing ureteral obstruction)  -2022 - present: lanreotide every 4 weeks  ---joslyn chromogranin 445 (1/2023)  -12/2023: palliative RT (5000 cGy/5) to L sacral met with improved pain.    Breast cancer  -bilateral lumpectomies  -2016 - present: exemestane (planning 10 yrs)  -1/2024: annual Reclast for osteoporosis    Met poorly differentiated cancer, unclear primary  -8/9/2024 - present: pembrolizumab 400 mg every 6 weeks      Physical Exam    GENERAL: Alert and oriented to time place and person. Seated comfortably. In  no distress.  HEAD: Atraumatic and normocephalic. No alopecia.  EYES: NASIR, EOMI. No erythema. No icterus.  LYMPH: No palpable cervical, supraclavicular nor axillary adenopathy (previously palpable cervical nodes resolved)  BREASTS: Not examined  CHEST: Regular respiratory effort.  CVS: RRR  ABDOMEN: Soft. Not tender. Not distended. No palpable hepatomegaly or splenomegaly. No other mass palpable. Bowel sounds present.  EXTREMITIES: Warm. No peripheral edema.  SKIN: no rash, or bruising or purpura.   NEURO: No gross deficit noted. Non-antalgic gait.      Lab Results    Recent Results (from the past 168 hour(s))   Comprehensive metabolic panel   Result Value Ref Range    Sodium 142 135 - 145 mmol/L    Potassium 3.8 3.4 - 5.3 mmol/L    Carbon Dioxide (CO2) 30 (H) 22 - 29 mmol/L    Anion Gap 9 7 - 15 mmol/L    Urea Nitrogen 34.8 (H) 8.0 - 23.0 mg/dL    Creatinine 1.29 (H) 0.51 - 0.95 mg/dL    GFR Estimate 43 (L) >60 mL/min/1.73m2    Calcium 9.9 8.8 - 10.4 mg/dL    Chloride 103 98 - 107 mmol/L    Glucose 112 (H) 70 - 99 mg/dL    Alkaline Phosphatase 85 40 - 150 U/L    AST 17 0 - 45 U/L    ALT 12 0 - 50 U/L    Protein Total 7.3 6.4 - 8.3 g/dL    Albumin 3.8 3.5 - 5.2 g/dL    Bilirubin Total 0.9 <=1.2 mg/dL   CBC with platelets and differential   Result Value Ref Range    WBC Count 9.3 4.0 - 11.0 10e3/uL    RBC Count 4.96 3.80 - 5.20 10e6/uL    Hemoglobin 15.2 11.7 - 15.7 g/dL    Hematocrit 45.3 35.0 - 47.0 %    MCV 91 78 - 100 fL    MCH 30.6 26.5 - 33.0 pg    MCHC 33.6 31.5 - 36.5 g/dL    RDW 14.0 10.0 - 15.0 %    Platelet Count 177 150 - 450 10e3/uL    % Neutrophils 77 %    % Lymphocytes 14 %    % Monocytes 6 %    % Eosinophils 2 %    % Basophils 0 %    % Immature Granulocytes 1 %    NRBCs per 100 WBC 0 <1 /100    Absolute Neutrophils 7.2 1.6 - 8.3 10e3/uL    Absolute Lymphocytes 1.3 0.8 - 5.3 10e3/uL    Absolute Monocytes 0.6 0.0 - 1.3 10e3/uL    Absolute Eosinophils 0.2 0.0 - 0.7 10e3/uL    Absolute Basophils 0.0 0.0  "- 0.2 10e3/uL    Absolute Immature Granulocytes 0.1 <=0.4 10e3/uL    Absolute NRBCs 0.0 10e3/uL         Imaging    XR Knee Left G/E 4 Views    Result Date: 9/6/2024  XR KNEE LEFT G/E 4 VIEWS 9/6/2024 9:55 AM HISTORY: Osteoarthritis of left knee, unspecified osteoarthritis type COMPARISON: None.     IMPRESSION: Osteopenia. No fracture or effusion. Moderate medial and mild lateral compartment narrowing. Advanced degenerative changes in the medial compartment of the right knee. Lobulated intramedullary lesion proximal right tibia likely a benign enchondroma. SANTANA ESPINO MD   SYSTEM ID:  [id number]     Billing  Total time 35 minutes, to include face to face visit, review of EMR, ordering, documentation and coordination of care on date of service.    complexity modifier for longitudinal care.     Signed by: Jennifer Allen NP      Oncology Rooming Note    September 20, 2024 10:24 AM   Marissa Guadarrama is a 74 year old female who presents for:    Chief Complaint   Patient presents with     Oncology Clinic Visit     Malignant carcinoid tumor of cecum - labs, provider, infusion     Initial Vitals: BP (!) 151/66 (BP Location: Right arm, Patient Position: Sitting, Cuff Size: Adult Regular)   Pulse 51   Temp 97.5  F (36.4  C) (Tympanic)   Resp 10   Ht 1.715 m (5' 7.5\")   Wt 91.1 kg (200 lb 14.4 oz)   SpO2 94%   BMI 31.00 kg/m   Estimated body mass index is 31 kg/m  as calculated from the following:    Height as of this encounter: 1.715 m (5' 7.5\").    Weight as of this encounter: 91.1 kg (200 lb 14.4 oz). Body surface area is 2.08 meters squared.  No Pain (0) Comment: Data Unavailable   No LMP recorded. Patient has had a hysterectomy.  Allergies reviewed: Yes  Medications reviewed: Yes    Medications: Medication refills not needed today.  Pharmacy name entered into [facility]: [id number] [place], [place] - [address] [place] [address]    Frailty Screening:   Is the patient here for a new oncology consult visit in " cancer care? 2. No      Clinical concerns: None today.        Nicole Mascorro MA                Again, thank you for allowing me to participate in the care of your patient.        Sincerely,        Jennifer Allen NP

## 2024-09-29 NOTE — PROGRESS NOTES
Infusion Nursing Note:  Marissa Guadarrama presents today for Somatuline.    Patient seen by provider today: No   present during visit today: Not Applicable.    Note: N/A.      Intravenous Access:  N/A.    Treatment Conditions:  Results reviewed, labs MET treatment parameters, ok to proceed with treatment.      Post Infusion Assessment:  Patient tolerated injection without incident.       Discharge Plan:   Patient discharged in stable condition accompanied by: self.  Departure Mode: Ambulatory w/ walker.      Caitlyn Mensah RN    
154

## 2024-10-11 ENCOUNTER — OFFICE VISIT (OUTPATIENT)
Dept: ORTHOPEDICS | Facility: CLINIC | Age: 74
End: 2024-10-11
Payer: COMMERCIAL

## 2024-10-18 ENCOUNTER — HOSPITAL ENCOUNTER (OUTPATIENT)
Dept: MAMMOGRAPHY | Facility: CLINIC | Age: 74
Discharge: HOME | End: 2024-10-18
Attending: NURSE PRACTITIONER | Admitting: NURSE PRACTITIONER
Payer: MEDICARE

## 2024-10-18 ENCOUNTER — INFUSION THERAPY VISIT (OUTPATIENT)
Dept: INFUSION THERAPY | Facility: CLINIC | Age: 74
End: 2024-10-18
Attending: INTERNAL MEDICINE
Payer: MEDICARE

## 2024-10-18 ENCOUNTER — LAB (OUTPATIENT)
Dept: LAB | Facility: CLINIC | Age: 74
End: 2024-10-18
Payer: MEDICARE

## 2024-10-18 DIAGNOSIS — C77.8 CARCINOMA METASTATIC TO LYMPH NODES OF MULTIPLE SITES WITH UNKNOWN PRIMARY SITE (H): ICD-10-CM

## 2024-10-18 DIAGNOSIS — C7A.021 MALIGNANT CARCINOID TUMOR OF CECUM (H): Primary | ICD-10-CM

## 2024-10-18 DIAGNOSIS — C80.1 CARCINOMA METASTATIC TO LYMPH NODES OF MULTIPLE SITES WITH UNKNOWN PRIMARY SITE (H): ICD-10-CM

## 2024-10-18 LAB
ALBUMIN SERPL BCG-MCNC: 3.7 G/DL (ref 3.5–5.2)
ALP SERPL-CCNC: 74 U/L (ref 40–150)
ALT SERPL W P-5'-P-CCNC: 13 U/L (ref 0–50)
ANION GAP SERPL CALCULATED.3IONS-SCNC: 8 MMOL/L (ref 7–15)
AST SERPL W P-5'-P-CCNC: 17 U/L (ref 0–45)
BASOPHILS # BLD AUTO: 0 10E3/UL (ref 0–0.2)
BASOPHILS NFR BLD AUTO: 1 %
BILIRUB SERPL-MCNC: 0.5 MG/DL
BUN SERPL-MCNC: 35 MG/DL (ref 8–23)
CALCIUM SERPL-MCNC: 10.2 MG/DL (ref 8.8–10.4)
CHLORIDE SERPL-SCNC: 103 MMOL/L (ref 98–107)
CREAT SERPL-MCNC: 1.72 MG/DL (ref 0.51–0.95)
EGFRCR SERPLBLD CKD-EPI 2021: 30 ML/MIN/1.73M2
EOSINOPHIL # BLD AUTO: 0.2 10E3/UL (ref 0–0.7)
EOSINOPHIL NFR BLD AUTO: 2 %
ERYTHROCYTE [DISTWIDTH] IN BLOOD BY AUTOMATED COUNT: 13.9 % (ref 10–15)
GLUCOSE SERPL-MCNC: 130 MG/DL (ref 70–99)
HCO3 SERPL-SCNC: 32 MMOL/L (ref 22–29)
HCT VFR BLD AUTO: 44.5 % (ref 35–47)
HGB BLD-MCNC: 15 G/DL (ref 11.7–15.7)
IMM GRANULOCYTES # BLD: 0.1 10E3/UL
IMM GRANULOCYTES NFR BLD: 1 %
LYMPHOCYTES # BLD AUTO: 1.6 10E3/UL (ref 0.8–5.3)
LYMPHOCYTES NFR BLD AUTO: 18 %
MCH RBC QN AUTO: 30.8 PG (ref 26.5–33)
MCHC RBC AUTO-ENTMCNC: 33.7 G/DL (ref 31.5–36.5)
MCV RBC AUTO: 91 FL (ref 78–100)
MONOCYTES # BLD AUTO: 0.5 10E3/UL (ref 0–1.3)
MONOCYTES NFR BLD AUTO: 6 %
NEUTROPHILS # BLD AUTO: 6.4 10E3/UL (ref 1.6–8.3)
NEUTROPHILS NFR BLD AUTO: 73 %
NRBC # BLD AUTO: 0 10E3/UL
NRBC BLD AUTO-RTO: 0 /100
PLATELET # BLD AUTO: 183 10E3/UL (ref 150–450)
POTASSIUM SERPL-SCNC: 4.1 MMOL/L (ref 3.4–5.3)
PROT SERPL-MCNC: 7 G/DL (ref 6.4–8.3)
RBC # BLD AUTO: 4.87 10E6/UL (ref 3.8–5.2)
SODIUM SERPL-SCNC: 143 MMOL/L (ref 135–145)
WBC # BLD AUTO: 8.8 10E3/UL (ref 4–11)

## 2024-10-18 PROCEDURE — 85025 COMPLETE CBC W/AUTO DIFF WBC: CPT

## 2024-10-18 PROCEDURE — 82040 ASSAY OF SERUM ALBUMIN: CPT

## 2024-10-18 PROCEDURE — 77063 BREAST TOMOSYNTHESIS BI: CPT

## 2024-10-18 PROCEDURE — 96372 THER/PROPH/DIAG INJ SC/IM: CPT | Performed by: INTERNAL MEDICINE

## 2024-10-18 PROCEDURE — 36415 COLL VENOUS BLD VENIPUNCTURE: CPT

## 2024-10-18 PROCEDURE — 250N000011 HC RX IP 250 OP 636: Performed by: INTERNAL MEDICINE

## 2024-10-18 RX ORDER — ALBUTEROL SULFATE 90 UG/1
1-2 INHALANT RESPIRATORY (INHALATION)
Start: 2024-11-01

## 2024-10-18 RX ORDER — METHYLPREDNISOLONE SODIUM SUCCINATE 125 MG/2ML
125 INJECTION INTRAMUSCULAR; INTRAVENOUS
Start: 2024-11-01

## 2024-10-18 RX ORDER — DIPHENHYDRAMINE HYDROCHLORIDE 50 MG/ML
50 INJECTION INTRAMUSCULAR; INTRAVENOUS
Start: 2024-11-01

## 2024-10-18 RX ORDER — EPINEPHRINE 1 MG/ML
0.3 INJECTION, SOLUTION, CONCENTRATE INTRAVENOUS EVERY 5 MIN PRN
OUTPATIENT
Start: 2024-11-01

## 2024-10-18 RX ORDER — LANREOTIDE ACETATE 120 MG/.5ML
120 INJECTION SUBCUTANEOUS ONCE
OUTPATIENT
Start: 2024-11-01 | End: 2024-11-01

## 2024-10-18 RX ORDER — ALBUTEROL SULFATE 0.83 MG/ML
2.5 SOLUTION RESPIRATORY (INHALATION)
OUTPATIENT
Start: 2024-11-01

## 2024-10-18 RX ORDER — LANREOTIDE ACETATE 120 MG/.5ML
120 INJECTION SUBCUTANEOUS ONCE
Status: COMPLETED | OUTPATIENT
Start: 2024-10-18 | End: 2024-10-18

## 2024-10-18 RX ORDER — MEPERIDINE HYDROCHLORIDE 25 MG/ML
25 INJECTION INTRAMUSCULAR; INTRAVENOUS; SUBCUTANEOUS EVERY 30 MIN PRN
OUTPATIENT
Start: 2024-11-01

## 2024-10-18 RX ADMIN — LANREOTIDE ACETATE 120 MG: 120 INJECTION SUBCUTANEOUS at 15:34

## 2024-10-18 NOTE — PROGRESS NOTES
Infusion Nursing Note:  Marissa Guadarrama presents today for Somatuline.    Patient seen by provider today: No   present during visit today: Not Applicable.    Note: N/A.      Intravenous Access:  No Intravenous access/labs at this visit.    Treatment Conditions:  Not Applicable.      Post Infusion Assessment:  Patient tolerated injection in left gluteus enrike without incident.  Site patent and intact, free from redness, edema or discomfort.  No evidence of extravasations.       Discharge Plan:   Discharge instructions reviewed with: Patient.  Patient discharged in stable condition accompanied by: self.  Departure Mode: Ambulatory.      Olga Lidia Roman RN

## 2024-10-18 NOTE — PROGRESS NOTES
Appropriate assistive devices provided during their visit. B/A (Yes, No, N/A) N/A (list device)    Exam table and/or cart  placed in the lowest position. YES (Yes, No, N/A)    Brakes on tables/carts/wheelchairs used at all times. YES (Yes, No, N/A)    Non slip footwear applied. N/A (Yes, No, NA)    Patient was accompanied by staff throughout visit. YES (Yes, No, N/A)    Equipment safety straps used. N/A (Yes, No, N/A)    Assist with toileting. N/A (Yes, No, N/A)

## 2024-11-01 ENCOUNTER — INFUSION THERAPY VISIT (OUTPATIENT)
Dept: INFUSION THERAPY | Facility: CLINIC | Age: 74
End: 2024-11-01
Attending: INTERNAL MEDICINE
Payer: MEDICARE

## 2024-11-01 ENCOUNTER — LAB (OUTPATIENT)
Dept: LAB | Facility: CLINIC | Age: 74
End: 2024-11-01
Attending: INTERNAL MEDICINE
Payer: MEDICARE

## 2024-11-01 ENCOUNTER — ONCOLOGY VISIT (OUTPATIENT)
Dept: ONCOLOGY | Facility: CLINIC | Age: 74
End: 2024-11-01
Attending: INTERNAL MEDICINE
Payer: MEDICARE

## 2024-11-01 VITALS — SYSTOLIC BLOOD PRESSURE: 137 MMHG | DIASTOLIC BLOOD PRESSURE: 87 MMHG | HEART RATE: 51 BPM

## 2024-11-01 VITALS
TEMPERATURE: 98.1 F | WEIGHT: 202.5 LBS | BODY MASS INDEX: 30.69 KG/M2 | HEIGHT: 68 IN | DIASTOLIC BLOOD PRESSURE: 60 MMHG | HEART RATE: 57 BPM | OXYGEN SATURATION: 95 % | RESPIRATION RATE: 12 BRPM | SYSTOLIC BLOOD PRESSURE: 152 MMHG

## 2024-11-01 DIAGNOSIS — C77.8 CARCINOMA METASTATIC TO LYMPH NODES OF MULTIPLE SITES WITH UNKNOWN PRIMARY SITE (H): ICD-10-CM

## 2024-11-01 DIAGNOSIS — C77.8 CARCINOMA METASTATIC TO LYMPH NODES OF MULTIPLE SITES WITH UNKNOWN PRIMARY SITE (H): Primary | ICD-10-CM

## 2024-11-01 DIAGNOSIS — C7A.021 MALIGNANT CARCINOID TUMOR OF CECUM (H): Primary | ICD-10-CM

## 2024-11-01 DIAGNOSIS — C80.1 CARCINOMA METASTATIC TO LYMPH NODES OF MULTIPLE SITES WITH UNKNOWN PRIMARY SITE (H): ICD-10-CM

## 2024-11-01 DIAGNOSIS — C50.412 BILATERAL MALIGNANT NEOPLASM OF UPPER OUTER QUADRANT OF BREAST IN FEMALE (H): ICD-10-CM

## 2024-11-01 DIAGNOSIS — C50.411 BILATERAL MALIGNANT NEOPLASM OF UPPER OUTER QUADRANT OF BREAST IN FEMALE (H): ICD-10-CM

## 2024-11-01 DIAGNOSIS — C53.9 CERVICAL CANCER (H): ICD-10-CM

## 2024-11-01 DIAGNOSIS — C80.1 CARCINOMA METASTATIC TO LYMPH NODES OF MULTIPLE SITES WITH UNKNOWN PRIMARY SITE (H): Primary | ICD-10-CM

## 2024-11-01 DIAGNOSIS — C7A.021 MALIGNANT CARCINOID TUMOR OF CECUM (H): ICD-10-CM

## 2024-11-01 DIAGNOSIS — C7B.09 SECONDARY CARCINOID TUMORS OF OTHER SITES (H): ICD-10-CM

## 2024-11-01 LAB
ALBUMIN SERPL BCG-MCNC: 3.6 G/DL (ref 3.5–5.2)
ALP SERPL-CCNC: 65 U/L (ref 40–150)
ALT SERPL W P-5'-P-CCNC: 11 U/L (ref 0–50)
ANION GAP SERPL CALCULATED.3IONS-SCNC: 15 MMOL/L (ref 7–15)
AST SERPL W P-5'-P-CCNC: 17 U/L (ref 0–45)
BASOPHILS # BLD AUTO: 0 10E3/UL (ref 0–0.2)
BASOPHILS NFR BLD AUTO: 0 %
BILIRUB SERPL-MCNC: 0.6 MG/DL
BUN SERPL-MCNC: 22.4 MG/DL (ref 8–23)
CALCIUM SERPL-MCNC: 10.2 MG/DL (ref 8.8–10.4)
CEA SERPL-MCNC: 24.5 NG/ML
CHLORIDE SERPL-SCNC: 104 MMOL/L (ref 98–107)
CREAT SERPL-MCNC: 1.11 MG/DL (ref 0.51–0.95)
EGFRCR SERPLBLD CKD-EPI 2021: 51 ML/MIN/1.73M2
EOSINOPHIL # BLD AUTO: 0.2 10E3/UL (ref 0–0.7)
EOSINOPHIL NFR BLD AUTO: 3 %
ERYTHROCYTE [DISTWIDTH] IN BLOOD BY AUTOMATED COUNT: 14.1 % (ref 10–15)
GLUCOSE SERPL-MCNC: 190 MG/DL (ref 70–99)
HCO3 SERPL-SCNC: 26 MMOL/L (ref 22–29)
HCT VFR BLD AUTO: 43.6 % (ref 35–47)
HGB BLD-MCNC: 14.7 G/DL (ref 11.7–15.7)
IMM GRANULOCYTES # BLD: 0 10E3/UL
IMM GRANULOCYTES NFR BLD: 0 %
LYMPHOCYTES # BLD AUTO: 1.4 10E3/UL (ref 0.8–5.3)
LYMPHOCYTES NFR BLD AUTO: 20 %
MCH RBC QN AUTO: 31.2 PG (ref 26.5–33)
MCHC RBC AUTO-ENTMCNC: 33.7 G/DL (ref 31.5–36.5)
MCV RBC AUTO: 93 FL (ref 78–100)
MONOCYTES # BLD AUTO: 0.4 10E3/UL (ref 0–1.3)
MONOCYTES NFR BLD AUTO: 5 %
NEUTROPHILS # BLD AUTO: 4.8 10E3/UL (ref 1.6–8.3)
NEUTROPHILS NFR BLD AUTO: 70 %
NRBC # BLD AUTO: 0 10E3/UL
NRBC BLD AUTO-RTO: 0 /100
PLATELET # BLD AUTO: 181 10E3/UL (ref 150–450)
POTASSIUM SERPL-SCNC: 3.6 MMOL/L (ref 3.4–5.3)
PROT SERPL-MCNC: 6.8 G/DL (ref 6.4–8.3)
RBC # BLD AUTO: 4.71 10E6/UL (ref 3.8–5.2)
SODIUM SERPL-SCNC: 145 MMOL/L (ref 135–145)
TSH SERPL DL<=0.005 MIU/L-ACNC: 1.39 UIU/ML (ref 0.3–4.2)
WBC # BLD AUTO: 6.8 10E3/UL (ref 4–11)

## 2024-11-01 PROCEDURE — 36415 COLL VENOUS BLD VENIPUNCTURE: CPT

## 2024-11-01 PROCEDURE — 250N000011 HC RX IP 250 OP 636: Mod: JZ | Performed by: INTERNAL MEDICINE

## 2024-11-01 PROCEDURE — G2211 COMPLEX E/M VISIT ADD ON: HCPCS | Performed by: INTERNAL MEDICINE

## 2024-11-01 PROCEDURE — 82310 ASSAY OF CALCIUM: CPT | Performed by: INTERNAL MEDICINE

## 2024-11-01 PROCEDURE — 85025 COMPLETE CBC W/AUTO DIFF WBC: CPT | Performed by: INTERNAL MEDICINE

## 2024-11-01 PROCEDURE — 84443 ASSAY THYROID STIM HORMONE: CPT | Performed by: INTERNAL MEDICINE

## 2024-11-01 PROCEDURE — 99214 OFFICE O/P EST MOD 30 MIN: CPT | Performed by: INTERNAL MEDICINE

## 2024-11-01 PROCEDURE — 86300 IMMUNOASSAY TUMOR CA 15-3: CPT | Performed by: INTERNAL MEDICINE

## 2024-11-01 PROCEDURE — 82378 CARCINOEMBRYONIC ANTIGEN: CPT | Performed by: INTERNAL MEDICINE

## 2024-11-01 PROCEDURE — 96413 CHEMO IV INFUSION 1 HR: CPT

## 2024-11-01 PROCEDURE — G0463 HOSPITAL OUTPT CLINIC VISIT: HCPCS | Performed by: INTERNAL MEDICINE

## 2024-11-01 PROCEDURE — 258N000003 HC RX IP 258 OP 636: Performed by: INTERNAL MEDICINE

## 2024-11-01 PROCEDURE — 86316 IMMUNOASSAY TUMOR OTHER: CPT | Performed by: INTERNAL MEDICINE

## 2024-11-01 RX ORDER — EPINEPHRINE 1 MG/ML
0.3 INJECTION, SOLUTION, CONCENTRATE INTRAVENOUS EVERY 5 MIN PRN
Status: CANCELLED | OUTPATIENT
Start: 2024-11-01

## 2024-11-01 RX ORDER — ALBUTEROL SULFATE 90 UG/1
1-2 INHALANT RESPIRATORY (INHALATION)
Status: CANCELLED
Start: 2024-11-01

## 2024-11-01 RX ORDER — HEPARIN SODIUM (PORCINE) LOCK FLUSH IV SOLN 100 UNIT/ML 100 UNIT/ML
5 SOLUTION INTRAVENOUS
Status: CANCELLED | OUTPATIENT
Start: 2024-11-01

## 2024-11-01 RX ORDER — MEPERIDINE HYDROCHLORIDE 25 MG/ML
25 INJECTION INTRAMUSCULAR; INTRAVENOUS; SUBCUTANEOUS
Status: CANCELLED | OUTPATIENT
Start: 2024-11-01

## 2024-11-01 RX ORDER — ALBUTEROL SULFATE 0.83 MG/ML
2.5 SOLUTION RESPIRATORY (INHALATION)
Status: CANCELLED | OUTPATIENT
Start: 2024-11-01

## 2024-11-01 RX ORDER — DIPHENHYDRAMINE HYDROCHLORIDE 50 MG/ML
25 INJECTION INTRAMUSCULAR; INTRAVENOUS
Status: CANCELLED
Start: 2024-11-01

## 2024-11-01 RX ORDER — METHYLPREDNISOLONE SODIUM SUCCINATE 40 MG/ML
40 INJECTION INTRAMUSCULAR; INTRAVENOUS
Status: CANCELLED
Start: 2024-11-01

## 2024-11-01 RX ORDER — DIPHENHYDRAMINE HYDROCHLORIDE 50 MG/ML
50 INJECTION INTRAMUSCULAR; INTRAVENOUS
Status: CANCELLED
Start: 2024-11-01

## 2024-11-01 RX ORDER — HEPARIN SODIUM,PORCINE 10 UNIT/ML
5-20 VIAL (ML) INTRAVENOUS DAILY PRN
Status: CANCELLED | OUTPATIENT
Start: 2024-11-01

## 2024-11-01 RX ORDER — LORAZEPAM 2 MG/ML
0.5 INJECTION INTRAMUSCULAR EVERY 4 HOURS PRN
Status: CANCELLED | OUTPATIENT
Start: 2024-11-01

## 2024-11-01 RX ADMIN — SODIUM CHLORIDE 50 ML: 9 INJECTION, SOLUTION INTRAVENOUS at 14:12

## 2024-11-01 RX ADMIN — SODIUM CHLORIDE 400 MG: 9 INJECTION, SOLUTION INTRAVENOUS at 14:12

## 2024-11-01 ASSESSMENT — PAIN SCALES - GENERAL: PAINLEVEL_OUTOF10: NO PAIN (0)

## 2024-11-01 NOTE — PROGRESS NOTES
Infusion Nursing Note:  Marissa A Jennifergian presents today for Keytruda.    Patient seen by provider today: Yes: Dr. Friedell   present during visit today: Not Applicable.    Note: N/A.    Intravenous Access:  Peripheral IV placed.    Treatment Conditions:  Lab Results   Component Value Date     11/01/2024    POTASSIUM 3.6 11/01/2024    MAG 1.6 05/10/2021    CR 1.11 (H) 11/01/2024    MARILIA 10.2 11/01/2024    BILITOTAL 0.6 11/01/2024    ALBUMIN 3.6 11/01/2024    ALT 11 11/01/2024    AST 17 11/01/2024       Results reviewed, labs MET treatment parameters, ok to proceed with treatment.    Post Infusion Assessment:  Patient tolerated infusion without incident.  Blood return noted pre and post infusion.  Site patent and intact, free from redness, edema or discomfort.  No evidence of extravasations.  Access discontinued per protocol.     Discharge Plan:   Patient discharged in stable condition accompanied by: .  Departure Mode: Ambulatory.    Destin Aragon RN

## 2024-11-01 NOTE — LETTER
"11/1/2024      Marissa Guadarrama  [address] [place] [id number]      Dear Colleague,    Thank you for referring your patient, Marissa Guadarrama, to the [facility] [place]. Please see a copy of my visit note below.    Oncology Rooming Note    November 1, 2024 1:01 PM   Marissa Guadarrama is a 74 year old female who presents for:    Chief Complaint   Patient presents with     Oncology Clinic Visit     Malignant carcinoid tumor of cecum - labs, provider, infusion      Initial Vitals: BP (!) 152/60 (BP Location: Right arm, Patient Position: Sitting, Cuff Size: Adult Regular)   Pulse 57   Temp 98.1  F (36.7  C) (Oral)   Resp 12   Ht 1.715 m (5' 7.5\")   Wt 91.9 kg (202 lb 8 oz)   SpO2 95%   BMI 31.25 kg/m   Estimated body mass index is 31.25 kg/m  as calculated from the following:    Height as of this encounter: 1.715 m (5' 7.5\").    Weight as of this encounter: 91.9 kg (202 lb 8 oz). Body surface area is 2.09 meters squared.  No Pain (0) Comment: Data Unavailable   No LMP recorded. Patient has had a hysterectomy.  Allergies reviewed: Yes  Medications reviewed: Yes    Medications: Medication refills not needed today.  Pharmacy name entered into ROIÂ²: [id number] - [place], [place] - [address] [place] [address]    Frailty Screening:   Is the patient here for a new oncology consult visit in cancer care? 2. No      Clinical concerns: None today.        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  She received day 1 cycle 1 of pembrolizumab on 9/20/2024.  Presently she continues to feel well.  Will continue with lanreotide every 4 weeks along with pembrolizumab every 6 weeks.  Reimaging is planned before cycle 4.    A recent mammogram shows asymmetry.  Will follow upby phone when results are available.         ______________________________________________________________________________    ECOG Performance = 1    History of Present Illness  Disease history per Dr. 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 Elfego Lawrence and then Dr. Hosea King.     However, a follow-up 2/10/2022 Octreoscan scan confirmed evidence of progressive disease for which she was started on lanreotide every 4 weeks with a baseline chromogranin A level of 824.  The chromogranin A joslyn was 445 on 1/6/2023 and 562 on 7/28/2023.      Review of her 10/13/2023 DEXA scan revealed 14% decrease in bone density with left hip T-score -3.1.  Left femoral neck T-score was -2.6.  She is on annual zoledronic acid.     In December, 2023 she received 5000 cGy/5 fractions by 12/15/2023 with complete resolution of left sacral pain referable to a presumptive metastatic neuroendocrine metastasis.  A July, 2023 dotatate PET scan had revealed stable disease except for the increased size and gluco-avidity of this left sclerotic sacral lesion.  She continues on monthly lanreotide.     Past medical history:  History of 2016 bilateral ER positive breast cancer on exemestane.  Both tumors were 100% ER/KS+, HER2 neg and patient declined Oncotype DX testing since she indicated she would never want to receive adjuvant systemic chemotherapy.  She  "had been initially started on anastrozole but developed a skin rash and was switched to exemestane    When last seen on 2/23/2024, new right supraclavicular lymphadenopathy was noted.  The CEA was 56.6 compared to a level 12.8 on 7/14/2023. The CA 27.29 was 57.7 compared to 22 when it was last obtained on 1/23/2022.  3/8/2024 CT soft tissue of neck and chest revealed multiple right cervical level 3-5 lymphadenopathy.  Chest imaging revealed mild enlarged mediastinal and hilar lymphadenopathy which could be related to her neuroendocrine tumor.     3/8/2024 she underwent IR biopsy of her right cervical lymph nodes today which was well-tolerated.  Pathology showed a poorly differentiated carcinoma.  It was CK7 and CK20 positive.  There was insufficient tissue to make further identification of the primary or to determine treatment options.    6/28/2024  A new biopsy of a right cervical lymph node was performed.  This showed no actionable mutations for first line therapy.  The Tumor Proportion Score for pembrolizumab was 50%.    8/9/2024 for day 1 cycle 1 of pembrolizumab therapy    At this visit patient remains asymptomatic.  She is maintaining her weight.  Her appetite and digestion are normal.  She does not have chills, fevers or sweats.  She does not have cough, chest pain or shortness of breath at rest.  She has no change in bowel or bladder habit.  Her activity is limited from polio in childhood she uses a walker to help with ambulation.  She is having increased pain in her left knee which is limiting her mobility.    She quit smoking in 2006 after 29 pack years of cigarettes    Review of systems.  Pertinent Findings are included in the History of Present Illness    Physical Exam    BP (!) 152/60 (BP Location: Right arm, Patient Position: Sitting, Cuff Size: Adult Regular)   Pulse 57   Temp 98.1  F (36.7  C) (Oral)   Resp 12   Ht 1.715 m (5' 7.5\")   Wt 91.9 kg (202 lb 8 oz)   SpO2 95%   BMI 31.25 kg/m   "     GENERAL APPEARANCE: 74-year-old woman in no apparent distress.  HEAD: Atraumatic; normocephalic; without lesions.  EYES: Conjunctiva, corneas and eyelids normal; pupils equal, round, reactive to light; No Icterus.  MOUTH/OROPHARYNX: Oral mucosa intact  NECK: Supple with palpable lymph nodes in the right and left neck, 1 to 2 cm in size..  LUNGS:  Clear to auscultation and percussion with no extra sounds.  HEART: Regular rhythm and rate; S1 and S2 normal; no murmurs noted.  ABDOMEN: Soft; no masses or tenderness, no hepatosplenomegaly.  NEUROLOGIC: Alert and oriented.  There is paralysis of the left leg secondary to polio.  EXTREMITIES: No cyanosis, or edema.  SKIN: No abnormal bruising or bleeding. No suspicious lesions noted on exposed skin.  There is a erythematous rash alongside the right neck and extending down into the upper chest.  Patient is using steroid cream for this.  PSYCHIATRIC: Mental status normal; no apparent psychiatric issues    Medications:    Current Outpatient Medications   Medication Sig Dispense Refill     bisacodyl (DULCOLAX) 5 MG EC tablet Take 2 tablets at 3 pm the day before your procedure. If your procedure is before 11 am, take 2 additional tablets at 11 pm. If your procedure is after 11 am, take 2 additional tablets at 6 am. For additional instructions refer to your colonoscopy prep instructions. 4 tablet 0     Calcium Carbonate (CALCIUM 500 PO) 2 tablets daily       cholecalciferol (VITAMIN D3) 25 mcg (1000 units) capsule Take 1 capsule by mouth daily.       exemestane (AROMASIN) 25 MG tablet Take 1 tablet (25 mg) by mouth every morning. 90 tablet 3     hydroCHLOROthiazide 12.5 MG tablet Take 1 tablet (12.5 mg) by mouth every morning. Please make a clinic visit to be seen in person for medication refills.  No virtual visits.  Thank you. 90 tablet 0     polyethylene glycol (GOLYTELY) 236 g suspension The night before the exam at 6 pm drink an 8-ounce glass every 15 minutes until the  jug is half empty. If you arrive before 11 AM: Drink the other half of the Golytely jug at 11 PM night before procedure. If you arrive after 11 AM: Drink the other half of the Golytely jug at 6 AM day of procedure. For additional instructions refer to your colonoscopy prep instructions. 4000 mL 0     SENNA-docusate sodium (SENNA S) 8.6-50 MG tablet Take 1 tablet by mouth 2 times daily as needed (prevent opioid induced constipation) 30 tablet 0     Current Facility-Administered Medications   Medication Dose Route Frequency Provider Last Rate Last Admin     1.0 mL bupivacaine (MARCAINE) 0.5% injection (50 mL vial)  1 mL      1 mL at 09/06/24 1031     3.0 mL bupivacaine (MARCAINE) 0.5% injection (50 mL vial)  3 mL      3 mL at 09/06/24 1025     lidocaine 1 % injection 1 mL  1 mL      1 mL at 09/06/24 1031     lidocaine 1 % injection 3 mL  3 mL      3 mL at 09/06/24 1025     triamcinolone (KENALOG-40) injection 40 mg  40 mg      40 mg at 09/06/24 1025     triamcinolone (KENALOG-40) injection 40 mg  40 mg      40 mg at 09/06/24 1031     Facility-Administered Medications Ordered in Other Visits   Medication Dose Route Frequency Provider Last Rate Last Admin     pembrolizumab (KEYTRUDA) 400 mg in sodium chloride 0.9 % 71 mL infusion  400 mg Intravenous Once Friedell, Peter E,  mL/hr at 11/01/24 1412 400 mg at 11/01/24 1412           Past History    Past Medical History:   Diagnosis Date     Arthritis     knee     Benign breast biopsy     benign     Breast cancer (H)      Carcinoid tumor (H) 12/2003     Cervical cancer (H) 2007     H/O colposcopy with cervical biopsy 12/23/2013    vaginal cuff biopsy- VAIN III. referred back to gyn/onc     HTN      Hyperlipidemia      Malignant neoplasm of ovary (H)      Obesity      Pap smear of vagina with ASC-H 11/01/2013     Post-polio syndromes      Trigeminal neuralgias      Past Surgical History:   Procedure Laterality Date     APPENDECTOMY  01/01/1983     BIOPSY BREAST        BIOPSY NODE SENTINEL Bilateral 06/01/2016    Procedure: BIOPSY NODE SENTINEL;  Surgeon: Brent Arana MD;  Location: [facility]     [facility] SURGICAL PATHOLOGY       COLONOSCOPY N/A 06/23/2017    Procedure: COMBINED COLONOSCOPY, SINGLE OR MULTIPLE BIOPSY/POLYPECTOMY BY BIOPSY;  Colonoscopy Dx:Carcinoid tumor of colon prep mailed golytely;  Surgeon: Talisha Greco MD;  Location: [facility]     COLPOSCOPY, BIOPSY, COMBINED  03/13/2014    Procedure: COMBINED COLPOSCOPY, BIOPSY;;  Surgeon: Lara Pack MD;  Location: [facility]     COMBINED CYSTOSCOPY, RETROGRADES, EXCHANGE STENT URETER(S) Left 02/07/2019    Procedure: COMBINED CYSTOSCOPY, RETROGRADES, EXCHANGE STENT URETER--left;  Surgeon: Zhao La MD;  Location: [facility]     COMBINED CYSTOSCOPY, RETROGRADES, EXCHANGE STENT URETER(S) Left 02/20/2020    Procedure: CYSTOSCOPY, WITH RETROGRADE PYELOGRAM AND Left URETERAL STENT exchange;  Surgeon: Zhao La MD;  Location: [facility]     COMBINED CYSTOSCOPY, RETROGRADES, EXCHANGE STENT URETER(S) Left 05/09/2021    Procedure: CYSTOSCOPY, WITH RETROGRADE PYELOGRAM AND LEFT URETERAL STENT REPLACEMENT;  Surgeon: Fred Owens MD;  [facility]     COMBINED CYSTOSCOPY, RETROGRADES, EXCHANGE STENT URETER(S) Left 09/16/2021    Procedure: CYSTOSCOPY, WITH left RETROGRADE PYELOGRAM AND left  URETERAL STENT REPLACEMENT;  Surgeon: Zhao La MD;  Location: [facility]     COMBINED CYSTOSCOPY, RETROGRADES, EXCHANGE STENT URETER(S) Left 03/24/2022    Procedure: CYSTOSCOPY, WITH RETROGRADE PYELOGRAM AND URETERAL STENT EXCHANGE, LEFT;  Surgeon: Zhao La MD;  Location: [facility]     COMBINED CYSTOSCOPY, RETROGRADES, URETEROSCOPY, INSERT STENT Left 02/02/2017    Procedure: COMBINED CYSTOSCOPY, RETROGRADES, URETEROSCOPY, INSERT STENT;  Surgeon: Zhao La MD;  Location: [facility]     CYSTOSCOPY, REMOVE STENT(S), COMBINED N/A 11/4/2022    Procedure: CYSTOSCOPY, LEFT  STENT REMOVAL;  Surgeon: Zhao La MD;  [facility]: [facility]     CYSTOSCOPY, RETROGRADES, INSERT STENT URETER(S), COMBINED Left 09/07/2017    Procedure: COMBINED CYSTOSCOPY, RETROGRADES, INSERT STENT URETER(S);  Cystoscopy,Left Stent Exchange;  Surgeon: Zhao La MD;  Location: [facility]     CYSTOSCOPY, RETROGRADES, INSERT STENT URETER(S), COMBINED  12/12/2017    Procedure: COMBINED CYSTOSCOPY, RETROGRADES, INSERT STENT URETER(S);;  Surgeon: Zhao La MD;  [facility]     CYSTOSCOPY, RETROGRADES, INSERT STENT URETER(S), COMBINED Left 07/05/2018    Procedure: COMBINED CYSTOSCOPY, RETROGRADES, INSERT STENT URETER(S);  Cystoscopy, Left Stent Exchange;  Surgeon: Zhao La MD;  Location: [facility]     DAVINCI NEPHRECTOMY Left 11/4/2022    Procedure: , LEFT NEPHRECTOMY, ROBOT-ASSISTED;  Surgeon: Zhao La MD;  Location: [facility]     EXAM UNDER ANESTHESIA PELVIC  03/13/2014    Procedure: EXAM UNDER ANESTHESIA PELVIC;  Exam Under Anestheisa, Colposcopy, Vaginal Biopsies, Co2 Laser of the Upper Vagina;  Surgeon: Lara Pack MD;  [facility]     EXAM UNDER ANESTHESIA PELVIC N/A 07/01/2020    Procedure: Examination under anesthesia, vaginal biopsies;  Surgeon: Karli Gao MD;  [facility]     HERNIORRHAPHY INCISIONAL (LOCATION)       IR LYMPH NODE BIOPSY  6/28/2024     IR RHIZOTOMY  08/14/2020     LASER CO2 VAGINA  03/13/2014    VAIN 1/2     LASER CO2 VAGINA N/A 12/12/2017    Procedure: LASER CO2 VAGINA;  Exam Under Anesthesia, CO2 Laser Ablation Of Vagina, Cystoscopy, Left Retrograde Pyelogram with Left Stent Placement;  Surgeon: Nic Segundo MD;  [facility]     LUMPECTOMY BREAST       LUMPECTOMY BREAST WITH SEED LOCALIZATION Bilateral 06/01/2016    Procedure: LUMPECTOMY BREAST WITH SEED LOCALIZATION;  Surgeon: Brent Arana MD;  Location: [facility]     SURGICAL HISTORY OF -       ovarian cystectomy     SURGICAL  HISTORY OF -   2003    right colon resection secondary to carcinoid tumor     TUBAL LIGATION       ZZC BSO, OMENTECTOMY W/OSMAN  2007     ZZC TOTAL ABDOM HYSTERECTOMY  2007     No Known Allergies  Family History   Problem Relation Age of Onset     Cancer Mother         bone / liver     Breast Cancer Mother      Cancer Sister         vulvar ca, cervical ca, squamous cell cancer     Breast Cancer Sister      Cancer Brother         Rectal- Stage 4     Rectal Cancer Brother      Cancer Maternal Grandmother      Cancer Maternal Grandfather      Cancer Paternal Grandmother      Cancer Paternal Grandfather      Breast Cancer Maternal Aunt      Breast Cancer Paternal Aunt      Colon Cancer Paternal Aunt      Pancreatic Cancer Nephew      Anesthesia Reaction No family hx of      Venous thrombosis No family hx of      Bleeding Disorder No family hx of      Social History     Socioeconomic History     Marital status:      Spouse name: None     Number of children: None     Years of education: None     Highest education level: None   Tobacco Use     Smoking status: Former     Current packs/day: 0.00     Average packs/day: 1 pack/day for 29.0 years (29.0 ttl pk-yrs)     Types: Cigarettes     Start date: 10/30/1977     Quit date: 10/30/2006     Years since quittin.5     Smokeless tobacco: Never   Vaping Use     Vaping status: Never Used   Substance and Sexual Activity     Alcohol use: No     Alcohol/week: 0.0 standard drinks of alcohol     Comment: 1 drink per year     Drug use: No     Sexual activity: Yes     Partners: Male   Other Topics Concern      Service No     Blood Transfusions No     Caffeine Concern Yes     Comment: occasional coffee     Occupational Exposure No     Hobby Hazards Yes     Comment: [place],     Sleep Concern Yes     Comment: not sleeping well     Stress Concern Yes     Comment: Grandson in the      Weight Concern Yes     Special Diet No     Back Care No      Exercise No     Seat Belt Yes     Self-Exams Yes     Parent/sibling w/ CABG, MI or angioplasty before 65F 55M? No     Social Determinants of Health      Received from [facility] [employer] [facility], [facility] [employer] [facility]    Social Connections   Interpersonal Safety: Not At Risk (8/17/2021)    Humiliation, Afraid, Rape, and Kick questionnaire      Fear of Current or Ex-Partner: No      Emotionally Abused: No      Physically Abused: No      Sexually Abused: No           Lab Results  Recent Results (from the past 720 hours)   Comprehensive metabolic panel    Collection Time: 10/18/24  3:00 PM   Result Value Ref Range    Sodium 143 135 - 145 mmol/L    Potassium 4.1 3.4 - 5.3 mmol/L    Carbon Dioxide (CO2) 32 (H) 22 - 29 mmol/L    Anion Gap 8 7 - 15 mmol/L    Urea Nitrogen 35.0 (H) 8.0 - 23.0 mg/dL    Creatinine 1.72 (H) 0.51 - 0.95 mg/dL    GFR Estimate 30 (L) >60 mL/min/1.73m2    Calcium 10.2 8.8 - 10.4 mg/dL    Chloride 103 98 - 107 mmol/L    Glucose 130 (H) 70 - 99 mg/dL    Alkaline Phosphatase 74 40 - 150 U/L    AST 17 0 - 45 U/L    ALT 13 0 - 50 U/L    Protein Total 7.0 6.4 - 8.3 g/dL    Albumin 3.7 3.5 - 5.2 g/dL    Bilirubin Total 0.5 <=1.2 mg/dL   CBC with platelets and differential    Collection Time: 10/18/24  3:00 PM   Result Value Ref Range    WBC Count 8.8 4.0 - 11.0 10e3/uL    RBC Count 4.87 3.80 - 5.20 10e6/uL    Hemoglobin 15.0 11.7 - 15.7 g/dL    Hematocrit 44.5 35.0 - 47.0 %    MCV 91 78 - 100 fL    MCH 30.8 26.5 - 33.0 pg    MCHC 33.7 31.5 - 36.5 g/dL    RDW 13.9 10.0 - 15.0 %    Platelet Count 183 150 - 450 10e3/uL    % Neutrophils 73 %    % Lymphocytes 18 %    % Monocytes 6 %    % Eosinophils 2 %    % Basophils 1 %    % Immature Granulocytes 1 %    NRBCs per 100 WBC 0 <1 /100    Absolute Neutrophils 6.4 1.6 - 8.3 10e3/uL    Absolute Lymphocytes 1.6 0.8 - 5.3 10e3/uL    Absolute Monocytes 0.5 0.0 - 1.3 10e3/uL    Absolute Eosinophils 0.2 0.0 - 0.7 10e3/uL     Absolute Basophils 0.0 0.0 - 0.2 10e3/uL    Absolute Immature Granulocytes 0.1 <=0.4 10e3/uL    Absolute NRBCs 0.0 10e3/uL   Comprehensive metabolic panel    Collection Time: 11/01/24 12:25 PM   Result Value Ref Range    Sodium 145 135 - 145 mmol/L    Potassium 3.6 3.4 - 5.3 mmol/L    Carbon Dioxide (CO2) 26 22 - 29 mmol/L    Anion Gap 15 7 - 15 mmol/L    Urea Nitrogen 22.4 8.0 - 23.0 mg/dL    Creatinine 1.11 (H) 0.51 - 0.95 mg/dL    GFR Estimate 51 (L) >60 mL/min/1.73m2    Calcium 10.2 8.8 - 10.4 mg/dL    Chloride 104 98 - 107 mmol/L    Glucose 190 (H) 70 - 99 mg/dL    Alkaline Phosphatase 65 40 - 150 U/L    AST 17 0 - 45 U/L    ALT 11 0 - 50 U/L    Protein Total 6.8 6.4 - 8.3 g/dL    Albumin 3.6 3.5 - 5.2 g/dL    Bilirubin Total 0.6 <=1.2 mg/dL   TSH with free T4 reflex    Collection Time: 11/01/24 12:25 PM   Result Value Ref Range    TSH 1.39 0.30 - 4.20 uIU/mL   CBC with platelets and differential    Collection Time: 11/01/24 12:25 PM   Result Value Ref Range    WBC Count 6.8 4.0 - 11.0 10e3/uL    RBC Count 4.71 3.80 - 5.20 10e6/uL    Hemoglobin 14.7 11.7 - 15.7 g/dL    Hematocrit 43.6 35.0 - 47.0 %    MCV 93 78 - 100 fL    MCH 31.2 26.5 - 33.0 pg    MCHC 33.7 31.5 - 36.5 g/dL    RDW 14.1 10.0 - 15.0 %    Platelet Count 181 150 - 450 10e3/uL    % Neutrophils 70 %    % Lymphocytes 20 %    % Monocytes 5 %    % Eosinophils 3 %    % Basophils 0 %    % Immature Granulocytes 0 %    NRBCs per 100 WBC 0 <1 /100    Absolute Neutrophils 4.8 1.6 - 8.3 10e3/uL    Absolute Lymphocytes 1.4 0.8 - 5.3 10e3/uL    Absolute Monocytes 0.4 0.0 - 1.3 10e3/uL    Absolute Eosinophils 0.2 0.0 - 0.7 10e3/uL    Absolute Basophils 0.0 0.0 - 0.2 10e3/uL    Absolute Immature Granulocytes 0.0 <=0.4 10e3/uL    Absolute NRBCs 0.0 10e3/uL               I spent 22 minutes on the patient's visit today.  This included preparation for the visit, face-to-face time with the patient and documentation following the visit.  It did not include teaching or  procedure time.    Signed by: Peter E. Friedell, MD          Again, thank you for allowing me to participate in the care of your patient.        Sincerely,        Peter E. Friedell, MD

## 2024-11-01 NOTE — PROGRESS NOTES
"Oncology Rooming Note    November 1, 2024 1:01 PM   Marissa Guadarrama is a 74 year old female who presents for:    Chief Complaint   Patient presents with    Oncology Clinic Visit     Malignant carcinoid tumor of cecum - labs, provider, infusion      Initial Vitals: BP (!) 152/60 (BP Location: Right arm, Patient Position: Sitting, Cuff Size: Adult Regular)   Pulse 57   Temp 98.1  F (36.7  C) (Oral)   Resp 12   Ht 1.715 m (5' 7.5\")   Wt 91.9 kg (202 lb 8 oz)   SpO2 95%   BMI 31.25 kg/m   Estimated body mass index is 31.25 kg/m  as calculated from the following:    Height as of this encounter: 1.715 m (5' 7.5\").    Weight as of this encounter: 91.9 kg (202 lb 8 oz). Body surface area is 2.09 meters squared.  No Pain (0) Comment: Data Unavailable   No LMP recorded. Patient has had a hysterectomy.  Allergies reviewed: Yes  Medications reviewed: Yes    Medications: Medication refills not needed today.  Pharmacy name entered into [facility]: [id number] - [place], [place] - [address] [place] [address]    Frailty Screening:   Is the patient here for a new oncology consult visit in cancer care? 2. No      Clinical concerns: None today.        Nicole Mascorro MA            "

## 2024-11-01 NOTE — PROGRESS NOTES
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  She received day 1 cycle 1 of pembrolizumab on 9/20/2024.  Presently she continues to feel well.  Will continue with lanreotide every 4 weeks along with pembrolizumab every 6 weeks.  Reimaging is planned before cycle 4.    A recent mammogram shows asymmetry.  Will follow upby phone when results are available.         ______________________________________________________________________________    ECOG Performance = 1    History of Present Illness  Disease history per Dr. 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 Elfego Lawrence and then Dr. Hosea King.     However, a follow-up 2/10/2022 Octreoscan scan confirmed evidence of progressive disease for which she was started on lanreotide every 4 weeks with a baseline chromogranin A level of 824.  The chromogranin A joslyn was 445 on 1/6/2023 and 562 on 7/28/2023.      Review of her 10/13/2023 DEXA scan revealed 14% decrease in bone density with left hip T-score -3.1.  Left femoral neck T-score was -2.6.  She is on annual zoledronic acid.     In December, 2023 she received 5000 cGy/5 fractions by 12/15/2023 with complete resolution of left sacral pain  referable to a presumptive metastatic neuroendocrine metastasis.  A July, 2023 dotatate PET scan had revealed stable disease except for the increased size and gluco-avidity of this left sclerotic sacral lesion.  She continues on monthly lanreotide.     Past medical history:  History of 2016 bilateral ER positive breast cancer on exemestane.  Both tumors were 100% ER/UT+, HER2 neg and patient declined Oncotype DX testing since she indicated she would never want to receive adjuvant systemic chemotherapy.  She had been initially started on anastrozole but developed a skin rash and was switched to exemestane    When last seen on 2/23/2024, new right supraclavicular lymphadenopathy was noted.  The CEA was 56.6 compared to a level 12.8 on 7/14/2023. The CA 27.29 was 57.7 compared to 22 when it was last obtained on 1/23/2022.  3/8/2024 CT soft tissue of neck and chest revealed multiple right cervical level 3-5 lymphadenopathy.  Chest imaging revealed mild enlarged mediastinal and hilar lymphadenopathy which could be related to her neuroendocrine tumor.     3/8/2024 she underwent IR biopsy of her right cervical lymph nodes today which was well-tolerated.  Pathology showed a poorly differentiated carcinoma.  It was CK7 and CK20 positive.  There was insufficient tissue to make further identification of the primary or to determine treatment options.    6/28/2024  A new biopsy of a right cervical lymph node was performed.  This showed no actionable mutations for first line therapy.  The Tumor Proportion Score for pembrolizumab was 50%.    8/9/2024 for day 1 cycle 1 of pembrolizumab therapy    At this visit patient remains asymptomatic.  She is maintaining her weight.  Her appetite and digestion are normal.  She does not have chills, fevers or sweats.  She does not have cough, chest pain or shortness of breath at rest.  She has no change in bowel or bladder habit.  Her activity is limited from polio in childhood she uses a  "walker to help with ambulation.  She is having increased pain in her left knee which is limiting her mobility.    She quit smoking in 2006 after 29 pack years of cigarettes    Review of systems.  Pertinent Findings are included in the History of Present Illness    Physical Exam    BP (!) 152/60 (BP Location: Right arm, Patient Position: Sitting, Cuff Size: Adult Regular)   Pulse 57   Temp 98.1  F (36.7  C) (Oral)   Resp 12   Ht 1.715 m (5' 7.5\")   Wt 91.9 kg (202 lb 8 oz)   SpO2 95%   BMI 31.25 kg/m       GENERAL APPEARANCE: 74-year-old woman in no apparent distress.  HEAD: Atraumatic; normocephalic; without lesions.  EYES: Conjunctiva, corneas and eyelids normal; pupils equal, round, reactive to light; No Icterus.  MOUTH/OROPHARYNX: Oral mucosa intact  NECK: Supple with palpable lymph nodes in the right and left neck, 1 to 2 cm in size..  LUNGS:  Clear to auscultation and percussion with no extra sounds.  HEART: Regular rhythm and rate; S1 and S2 normal; no murmurs noted.  ABDOMEN: Soft; no masses or tenderness, no hepatosplenomegaly.  NEUROLOGIC: Alert and oriented.  There is paralysis of the left leg secondary to polio.  EXTREMITIES: No cyanosis, or edema.  SKIN: No abnormal bruising or bleeding. No suspicious lesions noted on exposed skin.  There is a erythematous rash alongside the right neck and extending down into the upper chest.  Patient is using steroid cream for this.  PSYCHIATRIC: Mental status normal; no apparent psychiatric issues    Medications:    Current Outpatient Medications   Medication Sig Dispense Refill    bisacodyl (DULCOLAX) 5 MG EC tablet Take 2 tablets at 3 pm the day before your procedure. If your procedure is before 11 am, take 2 additional tablets at 11 pm. If your procedure is after 11 am, take 2 additional tablets at 6 am. For additional instructions refer to your colonoscopy prep instructions. 4 tablet 0    Calcium Carbonate (CALCIUM 500 PO) 2 tablets daily      " cholecalciferol (VITAMIN D3) 25 mcg (1000 units) capsule Take 1 capsule by mouth daily.      exemestane (AROMASIN) 25 MG tablet Take 1 tablet (25 mg) by mouth every morning. 90 tablet 3    hydroCHLOROthiazide 12.5 MG tablet Take 1 tablet (12.5 mg) by mouth every morning. Please make a clinic visit to be seen in person for medication refills.  No virtual visits.  Thank you. 90 tablet 0    polyethylene glycol (GOLYTELY) 236 g suspension The night before the exam at 6 pm drink an 8-ounce glass every 15 minutes until the jug is half empty. If you arrive before 11 AM: Drink the other half of the Golytely jug at 11 PM night before procedure. If you arrive after 11 AM: Drink the other half of the Golytely jug at 6 AM day of procedure. For additional instructions refer to your colonoscopy prep instructions. 4000 mL 0    SENNA-docusate sodium (SENNA S) 8.6-50 MG tablet Take 1 tablet by mouth 2 times daily as needed (prevent opioid induced constipation) 30 tablet 0     Current Facility-Administered Medications   Medication Dose Route Frequency Provider Last Rate Last Admin    1.0 mL bupivacaine (MARCAINE) 0.5% injection (50 mL vial)  1 mL      1 mL at 09/06/24 1031    3.0 mL bupivacaine (MARCAINE) 0.5% injection (50 mL vial)  3 mL      3 mL at 09/06/24 1025    lidocaine 1 % injection 1 mL  1 mL      1 mL at 09/06/24 1031    lidocaine 1 % injection 3 mL  3 mL      3 mL at 09/06/24 1025    triamcinolone (KENALOG-40) injection 40 mg  40 mg      40 mg at 09/06/24 1025    triamcinolone (KENALOG-40) injection 40 mg  40 mg      40 mg at 09/06/24 1031     Facility-Administered Medications Ordered in Other Visits   Medication Dose Route Frequency Provider Last Rate Last Admin    pembrolizumab (KEYTRUDA) 400 mg in sodium chloride 0.9 % 71 mL infusion  400 mg Intravenous Once Friedell, Peter E,  mL/hr at 11/01/24 1412 400 mg at 11/01/24 1412           Past History    Past Medical History:   Diagnosis Date    Arthritis     knee     Benign breast biopsy     benign    Breast cancer (H)     Carcinoid tumor (H) 12/2003    Cervical cancer (H) 2007    H/O colposcopy with cervical biopsy 12/23/2013    vaginal cuff biopsy- VAIN III. referred back to gyn/onc    HTN     Hyperlipidemia     Malignant neoplasm of ovary (H)     Obesity     Pap smear of vagina with ASC-H 11/01/2013    Post-polio syndromes     Trigeminal neuralgias      Past Surgical History:   Procedure Laterality Date    APPENDECTOMY  01/01/1983    BIOPSY BREAST      BIOPSY NODE SENTINEL Bilateral 06/01/2016    Procedure: BIOPSY NODE SENTINEL;  Surgeon: Brent Arana MD;  Location: [facility]    [facility] SURGICAL PATHOLOGY      COLONOSCOPY N/A 06/23/2017    Procedure: COMBINED COLONOSCOPY, SINGLE OR MULTIPLE BIOPSY/POLYPECTOMY BY BIOPSY;  Col[facility];  Surgeon: Talisha Greco MD;  [facility]    COLPOSCOPY, BIOPSY, COMBINED  03/13/2014    Procedure: COMBINED COLPOSCOPY, BIOPSY;;  Surgeon: Lara Pack MD;  [facility]    COMBINED CYSTOSCOPY, RETROGRADES, EXCHANGE STENT URETER(S) Left 02/07/2019    Procedure: COMBINED CYSTOSCOPY, RETROGRADES, EXCHANGE STENT URETER--left;  Surgeon: Zhao La MD;  Location: [facility]    COMBINED CYSTOSCOPY, RETROGRADES, EXCHANGE STENT URETER(S) Left 02/20/2020    Procedure: CYSTOSCOPY, WITH RETROGRADE PYELOGRAM AND Left URETERAL STENT exchange;  Surgeon: Zhao La MD;  [facility]: [facility]    COMBINED CYSTOSCOPY, RETROGRADES, EXCHANGE STENT URETER(S) Left 05/09/2021    Procedure: CYSTOSCOPY, WITH RETROGRADE PYELOGRAM AND LEFT URETERAL STENT REPLACEMENT;  Surgeon: Fred Owens MD;  [facility]    COMBINED CYSTOSCOPY, RETROGRADES, EXCHANGE STENT URETER(S) Left 09/16/2021    Procedure: CYSTOSCOPY, WITH left RETROGRADE PYELOGRAM AND left  URETERAL STENT REPLACEMENT;  Surgeon: Zhao La MD;  Location: [facility]    COMBINED CYSTOSCOPY, RETROGRADES,  EXCHANGE STENT URETER(S) Left 03/24/2022    Procedure: CYSTOSCOPY, WITH RETROGRADE PYELOGRAM AND URETERAL STENT EXCHANGE, LEFT;  Surgeon: Zhao La MD;  [facility]: [facility]    COMBINED CYSTOSCOPY, RETROGRADES, URETEROSCOPY, INSERT STENT Left 02/02/2017    Procedure: COMBINED CYSTOSCOPY, RETROGRADES, URETEROSCOPY, INSERT STENT;  Surgeon: Zhao La MD;  [facility]: [facility]    CYSTOSCOPY, REMOVE STENT(S), COMBINED N/A 11/4/2022    Procedure: CYSTOSCOPY, LEFT STENT REMOVAL;  Surgeon: Zhao La MD;  [facility]    CYSTOSCOPY, RETROGRADES, INSERT STENT URETER(S), COMBINED Left 09/07/2017    Procedure: COMBINED CYSTOSCOPY, RETROGRADES, INSERT STENT URETER(S);  Cystoscopy,Left Stent Exchange;  Surgeon: Zhao La MD;  [facility]: [facility]    CYSTOSCOPY, RETROGRADES, INSERT STENT URETER(S), COMBINED  12/12/2017    Procedure: COMBINED CYSTOSCOPY, RETROGRADES, INSERT STENT URETER(S);;  Surgeon: Zhao La MD;  [facility]    CYSTOSCOPY, RETROGRADES, INSERT STENT URETER(S), COMBINED Left 07/05/2018    Procedure: COMBINED CYSTOSCOPY, RETROGRADES, INSERT STENT URETER(S);  Cystoscopy, Left Stent Exchange;  Surgeon: Zhao La MD;  Location: [facility]    DAVINCI NEPHRECTOMY Left 11/4/2022    Procedure: , LEFT NEPHRECTOMY, ROBOT-ASSISTED;  Surgeon: Zhao La MD;  [facility]    EXAM UNDER ANESTHESIA PELVIC  03/13/2014    Procedure: EXAM UNDER ANESTHESIA PELVIC;  Exam Under Anestheisa, Colposcopy, Vaginal Biopsies, Co2 Laser of the Upper Vagina;  Surgeon: Lara Pack MD;  [facility]    EXAM UNDER ANESTHESIA PELVIC N/A 07/01/2020    Procedure: Examination under anesthesia, vaginal biopsies;  Surgeon: Karli Gao MD;  [facility]    HERNIORRHAPHY INCISIONAL (LOCATION)      IR LYMPH NODE BIOPSY  6/28/2024    IR RHIZOTOMY  08/14/2020    LASER CO2 VAGINA  03/13/2014    VAIN 1/2    LASER CO2 VAGINA N/A  2017    Procedure: LASER CO2 VAGINA;  Exam Under Anesthesia, CO2 Laser Ablation Of Vagina, Cystoscopy, Left Retrograde Pyelogram with Left Stent Placement;  Surgeon: Nic Segundo MD;  [facility]    LUMPECTOMY BREAST      LUMPECTOMY BREAST WITH SEED LOCALIZATION Bilateral 2016    Procedure: LUMPECTOMY BREAST WITH SEED LOCALIZATION;  Surgeon: Brent Arana MD;  Location: [facility]    SURGICAL HISTORY OF -       ovarian cystectomy    SURGICAL HISTORY OF -   2003    right colon resection secondary to carcinoid tumor    TUBAL LIGATION      ZZC BSO, OMENTECTOMY W/OSMAN  2007    ZZC TOTAL ABDOM HYSTERECTOMY  2007     No Known Allergies  Family History   Problem Relation Age of Onset    Cancer Mother         bone / liver    Breast Cancer Mother     Cancer Sister         vulvar ca, cervical ca, squamous cell cancer    Breast Cancer Sister     Cancer Brother         Rectal- Stage 4    Rectal Cancer Brother     Cancer Maternal Grandmother     Cancer Maternal Grandfather     Cancer Paternal Grandmother     Cancer Paternal Grandfather     Breast Cancer Maternal Aunt     Breast Cancer Paternal Aunt     Colon Cancer Paternal Aunt     Pancreatic Cancer Nephew     Anesthesia Reaction No family hx of     Venous thrombosis No family hx of     Bleeding Disorder No family hx of      Social History     Socioeconomic History    Marital status:      Spouse name: None    Number of children: None    Years of education: None    Highest education level: None   Tobacco Use    Smoking status: Former     Current packs/day: 0.00     Average packs/day: 1 pack/day for 29.0 years (29.0 ttl pk-yrs)     Types: Cigarettes     Start date: 10/30/1977     Quit date: 10/30/2006     Years since quittin.5    Smokeless tobacco: Never   Vaping Use    Vaping status: Never Used   Substance and Sexual Activity    Alcohol use: No     Alcohol/week: 0.0 standard drinks of alcohol     Comment: 1 drink per year     Drug use: No    Sexual activity: Yes     Partners: Male   Other Topics Concern     Service No    Blood Transfusions No    Caffeine Concern Yes     Comment: occasional coffee    Occupational Exposure No    Hobby Hazards Yes     Comment: [place],    Sleep Concern Yes     Comment: not sleeping well    Stress Concern Yes     Comment: Grandson in the     Weight Concern Yes    Special Diet No    Back Care No    Exercise No    Seat Belt Yes    Self-Exams Yes    Parent/sibling w/ CABG, MI or angioplasty before 65F 55M? No     Social Determinants of Health      Received from [facility], [facility]    Social Connections   Interpersonal Safety: Not At Risk (8/17/2021)    Humiliation, Afraid, Rape, and Kick questionnaire     Fear of Current or Ex-Partner: No     Emotionally Abused: No     Physically Abused: No     Sexually Abused: No           Lab Results  Recent Results (from the past 720 hours)   Comprehensive metabolic panel    Collection Time: 10/18/24  3:00 PM   Result Value Ref Range    Sodium 143 135 - 145 mmol/L    Potassium 4.1 3.4 - 5.3 mmol/L    Carbon Dioxide (CO2) 32 (H) 22 - 29 mmol/L    Anion Gap 8 7 - 15 mmol/L    Urea Nitrogen 35.0 (H) 8.0 - 23.0 mg/dL    Creatinine 1.72 (H) 0.51 - 0.95 mg/dL    GFR Estimate 30 (L) >60 mL/min/1.73m2    Calcium 10.2 8.8 - 10.4 mg/dL    Chloride 103 98 - 107 mmol/L    Glucose 130 (H) 70 - 99 mg/dL    Alkaline Phosphatase 74 40 - 150 U/L    AST 17 0 - 45 U/L    ALT 13 0 - 50 U/L    Protein Total 7.0 6.4 - 8.3 g/dL    Albumin 3.7 3.5 - 5.2 g/dL    Bilirubin Total 0.5 <=1.2 mg/dL   CBC with platelets and differential    Collection Time: 10/18/24  3:00 PM   Result Value Ref Range    WBC Count 8.8 4.0 - 11.0 10e3/uL    RBC Count 4.87 3.80 - 5.20 10e6/uL    Hemoglobin 15.0 11.7 - 15.7 g/dL    Hematocrit 44.5 35.0 - 47.0 %    MCV 91 78 - 100 fL    MCH 30.8 26.5 - 33.0 pg    MCHC 33.7 31.5 - 36.5 g/dL     RDW 13.9 10.0 - 15.0 %    Platelet Count 183 150 - 450 10e3/uL    % Neutrophils 73 %    % Lymphocytes 18 %    % Monocytes 6 %    % Eosinophils 2 %    % Basophils 1 %    % Immature Granulocytes 1 %    NRBCs per 100 WBC 0 <1 /100    Absolute Neutrophils 6.4 1.6 - 8.3 10e3/uL    Absolute Lymphocytes 1.6 0.8 - 5.3 10e3/uL    Absolute Monocytes 0.5 0.0 - 1.3 10e3/uL    Absolute Eosinophils 0.2 0.0 - 0.7 10e3/uL    Absolute Basophils 0.0 0.0 - 0.2 10e3/uL    Absolute Immature Granulocytes 0.1 <=0.4 10e3/uL    Absolute NRBCs 0.0 10e3/uL   Comprehensive metabolic panel    Collection Time: 11/01/24 12:25 PM   Result Value Ref Range    Sodium 145 135 - 145 mmol/L    Potassium 3.6 3.4 - 5.3 mmol/L    Carbon Dioxide (CO2) 26 22 - 29 mmol/L    Anion Gap 15 7 - 15 mmol/L    Urea Nitrogen 22.4 8.0 - 23.0 mg/dL    Creatinine 1.11 (H) 0.51 - 0.95 mg/dL    GFR Estimate 51 (L) >60 mL/min/1.73m2    Calcium 10.2 8.8 - 10.4 mg/dL    Chloride 104 98 - 107 mmol/L    Glucose 190 (H) 70 - 99 mg/dL    Alkaline Phosphatase 65 40 - 150 U/L    AST 17 0 - 45 U/L    ALT 11 0 - 50 U/L    Protein Total 6.8 6.4 - 8.3 g/dL    Albumin 3.6 3.5 - 5.2 g/dL    Bilirubin Total 0.6 <=1.2 mg/dL   TSH with free T4 reflex    Collection Time: 11/01/24 12:25 PM   Result Value Ref Range    TSH 1.39 0.30 - 4.20 uIU/mL   CBC with platelets and differential    Collection Time: 11/01/24 12:25 PM   Result Value Ref Range    WBC Count 6.8 4.0 - 11.0 10e3/uL    RBC Count 4.71 3.80 - 5.20 10e6/uL    Hemoglobin 14.7 11.7 - 15.7 g/dL    Hematocrit 43.6 35.0 - 47.0 %    MCV 93 78 - 100 fL    MCH 31.2 26.5 - 33.0 pg    MCHC 33.7 31.5 - 36.5 g/dL    RDW 14.1 10.0 - 15.0 %    Platelet Count 181 150 - 450 10e3/uL    % Neutrophils 70 %    % Lymphocytes 20 %    % Monocytes 5 %    % Eosinophils 3 %    % Basophils 0 %    % Immature Granulocytes 0 %    NRBCs per 100 WBC 0 <1 /100    Absolute Neutrophils 4.8 1.6 - 8.3 10e3/uL    Absolute Lymphocytes 1.4 0.8 - 5.3 10e3/uL    Absolute  Monocytes 0.4 0.0 - 1.3 10e3/uL    Absolute Eosinophils 0.2 0.0 - 0.7 10e3/uL    Absolute Basophils 0.0 0.0 - 0.2 10e3/uL    Absolute Immature Granulocytes 0.0 <=0.4 10e3/uL    Absolute NRBCs 0.0 10e3/uL               I spent 22 minutes on the patient's visit today.  This included preparation for the visit, face-to-face time with the patient and documentation following the visit.  It did not include teaching or procedure time.    Signed by: Peter E. Friedell, MD

## 2024-11-03 LAB — CANCER AG27-29 SERPL-ACNC: 35.9 U/ML

## 2024-11-04 LAB — CGA SERPL-MCNC: 546 NG/ML

## 2024-11-05 NOTE — RESULT ENCOUNTER NOTE
Called and spoke with patients  Gaudencio to let her know her tumor markers are stable. He stated he would let her know.Magaly Alex RN on 11/5/2024 at 11:52 AM

## 2024-11-08 ENCOUNTER — ANCILLARY PROCEDURE (OUTPATIENT)
Dept: MAMMOGRAPHY | Facility: CLINIC | Age: 74
End: 2024-11-08
Attending: NURSE PRACTITIONER
Payer: COMMERCIAL

## 2024-11-08 ENCOUNTER — ANCILLARY PROCEDURE (OUTPATIENT)
Dept: ULTRASOUND IMAGING | Facility: CLINIC | Age: 74
End: 2024-11-08
Attending: NURSE PRACTITIONER
Payer: COMMERCIAL

## 2024-11-08 DIAGNOSIS — R92.8 ABNORMAL MAMMOGRAM: ICD-10-CM

## 2024-11-08 PROCEDURE — 76642 ULTRASOUND BREAST LIMITED: CPT | Mod: RT

## 2024-11-08 PROCEDURE — G0279 TOMOSYNTHESIS, MAMMO: HCPCS

## 2024-11-08 PROCEDURE — 77065 DX MAMMO INCL CAD UNI: CPT | Mod: RT

## 2024-11-15 ENCOUNTER — APPOINTMENT (OUTPATIENT)
Dept: LAB | Facility: CLINIC | Age: 74
End: 2024-11-15
Payer: MEDICARE

## 2024-11-15 ENCOUNTER — INFUSION THERAPY VISIT (OUTPATIENT)
Dept: INFUSION THERAPY | Facility: CLINIC | Age: 74
End: 2024-11-15
Attending: INTERNAL MEDICINE
Payer: MEDICARE

## 2024-11-15 VITALS — DIASTOLIC BLOOD PRESSURE: 85 MMHG | TEMPERATURE: 98.8 F | SYSTOLIC BLOOD PRESSURE: 150 MMHG | HEART RATE: 75 BPM

## 2024-11-15 DIAGNOSIS — D3A.021: Primary | ICD-10-CM

## 2024-11-15 DIAGNOSIS — D3A.8 NEUROENDOCRINE TUMOR (H): ICD-10-CM

## 2024-11-15 DIAGNOSIS — C7A.021 MALIGNANT CARCINOID TUMOR OF CECUM (H): ICD-10-CM

## 2024-11-15 LAB
ALBUMIN SERPL BCG-MCNC: 3.7 G/DL (ref 3.5–5.2)
ALP SERPL-CCNC: 71 U/L (ref 40–150)
ALT SERPL W P-5'-P-CCNC: 12 U/L (ref 0–50)
ANION GAP SERPL CALCULATED.3IONS-SCNC: 6 MMOL/L (ref 7–15)
AST SERPL W P-5'-P-CCNC: 15 U/L (ref 0–45)
BASOPHILS # BLD AUTO: 0 10E3/UL (ref 0–0.2)
BASOPHILS NFR BLD AUTO: 1 %
BILIRUB SERPL-MCNC: 0.4 MG/DL
BUN SERPL-MCNC: 29.5 MG/DL (ref 8–23)
CALCIUM SERPL-MCNC: 10 MG/DL (ref 8.8–10.4)
CHLORIDE SERPL-SCNC: 103 MMOL/L (ref 98–107)
CREAT SERPL-MCNC: 1.31 MG/DL (ref 0.51–0.95)
EGFRCR SERPLBLD CKD-EPI 2021: 42 ML/MIN/1.73M2
EOSINOPHIL # BLD AUTO: 0.3 10E3/UL (ref 0–0.7)
EOSINOPHIL NFR BLD AUTO: 4 %
ERYTHROCYTE [DISTWIDTH] IN BLOOD BY AUTOMATED COUNT: 13.9 % (ref 10–15)
GLUCOSE SERPL-MCNC: 162 MG/DL (ref 70–99)
HCO3 SERPL-SCNC: 33 MMOL/L (ref 22–29)
HCT VFR BLD AUTO: 44.6 % (ref 35–47)
HGB BLD-MCNC: 14.8 G/DL (ref 11.7–15.7)
IMM GRANULOCYTES # BLD: 0 10E3/UL
IMM GRANULOCYTES NFR BLD: 1 %
LYMPHOCYTES # BLD AUTO: 1.4 10E3/UL (ref 0.8–5.3)
LYMPHOCYTES NFR BLD AUTO: 19 %
MCH RBC QN AUTO: 31.4 PG (ref 26.5–33)
MCHC RBC AUTO-ENTMCNC: 33.2 G/DL (ref 31.5–36.5)
MCV RBC AUTO: 95 FL (ref 78–100)
MONOCYTES # BLD AUTO: 0.4 10E3/UL (ref 0–1.3)
MONOCYTES NFR BLD AUTO: 6 %
NEUTROPHILS # BLD AUTO: 5.2 10E3/UL (ref 1.6–8.3)
NEUTROPHILS NFR BLD AUTO: 70 %
NRBC # BLD AUTO: 0 10E3/UL
NRBC BLD AUTO-RTO: 0 /100
PLATELET # BLD AUTO: 181 10E3/UL (ref 150–450)
POTASSIUM SERPL-SCNC: 3.5 MMOL/L (ref 3.4–5.3)
PROT SERPL-MCNC: 6.9 G/DL (ref 6.4–8.3)
RBC # BLD AUTO: 4.71 10E6/UL (ref 3.8–5.2)
SODIUM SERPL-SCNC: 142 MMOL/L (ref 135–145)
WBC # BLD AUTO: 7.4 10E3/UL (ref 4–11)

## 2024-11-15 PROCEDURE — 84155 ASSAY OF PROTEIN SERUM: CPT | Performed by: INTERNAL MEDICINE

## 2024-11-15 PROCEDURE — 96372 THER/PROPH/DIAG INJ SC/IM: CPT | Performed by: INTERNAL MEDICINE

## 2024-11-15 PROCEDURE — 85025 COMPLETE CBC W/AUTO DIFF WBC: CPT | Performed by: INTERNAL MEDICINE

## 2024-11-15 PROCEDURE — 85014 HEMATOCRIT: CPT | Performed by: INTERNAL MEDICINE

## 2024-11-15 PROCEDURE — 84075 ASSAY ALKALINE PHOSPHATASE: CPT | Performed by: INTERNAL MEDICINE

## 2024-11-15 PROCEDURE — 36415 COLL VENOUS BLD VENIPUNCTURE: CPT | Performed by: INTERNAL MEDICINE

## 2024-11-15 PROCEDURE — 80051 ELECTROLYTE PANEL: CPT | Performed by: INTERNAL MEDICINE

## 2024-11-15 RX ORDER — LANREOTIDE ACETATE 120 MG/.5ML
120 INJECTION SUBCUTANEOUS ONCE
Status: COMPLETED | OUTPATIENT
Start: 2024-11-15 | End: 2024-11-15

## 2024-11-15 RX ORDER — ALBUTEROL SULFATE 0.83 MG/ML
2.5 SOLUTION RESPIRATORY (INHALATION)
OUTPATIENT
Start: 2024-11-29

## 2024-11-15 RX ORDER — METHYLPREDNISOLONE SODIUM SUCCINATE 125 MG/2ML
125 INJECTION INTRAMUSCULAR; INTRAVENOUS
Start: 2024-11-29

## 2024-11-15 RX ORDER — EPINEPHRINE 1 MG/ML
0.3 INJECTION, SOLUTION, CONCENTRATE INTRAVENOUS EVERY 5 MIN PRN
OUTPATIENT
Start: 2024-11-29

## 2024-11-15 RX ORDER — ALBUTEROL SULFATE 90 UG/1
1-2 INHALANT RESPIRATORY (INHALATION)
Start: 2024-11-29

## 2024-11-15 RX ORDER — MEPERIDINE HYDROCHLORIDE 25 MG/ML
25 INJECTION INTRAMUSCULAR; INTRAVENOUS; SUBCUTANEOUS EVERY 30 MIN PRN
OUTPATIENT
Start: 2024-11-29

## 2024-11-15 RX ORDER — LANREOTIDE ACETATE 120 MG/.5ML
120 INJECTION SUBCUTANEOUS ONCE
OUTPATIENT
Start: 2024-11-29 | End: 2024-11-29

## 2024-11-15 RX ORDER — DIPHENHYDRAMINE HYDROCHLORIDE 50 MG/ML
50 INJECTION INTRAMUSCULAR; INTRAVENOUS
Start: 2024-11-29

## 2024-11-15 RX ADMIN — LANREOTIDE ACETATE 120 MG: 120 INJECTION SUBCUTANEOUS at 15:13

## 2024-11-15 NOTE — PROGRESS NOTES
Infusion Nursing Note:  Marissa Guadarrama presents today for Somatuline.    Patient seen by provider today: No   present during visit today: Not Applicable.    Note: N/A.    Intravenous Access:  Labs drawn peripherally.    Treatment Conditions:  Not Applicable.    Post Infusion Assessment:  Patient tolerated injection without incident.     Discharge Plan:   Patient and/or family verbalized understanding of discharge instructions and all questions answered.  Patient discharged in stable condition accompanied by: self.  Departure Mode: Ambulatory.    Destin Aragon RN

## 2024-11-21 ENCOUNTER — PATIENT OUTREACH (OUTPATIENT)
Dept: ONCOLOGY | Facility: CLINIC | Age: 74
End: 2024-11-21
Payer: COMMERCIAL

## 2024-11-22 ENCOUNTER — ANCILLARY PROCEDURE (OUTPATIENT)
Dept: ULTRASOUND IMAGING | Facility: CLINIC | Age: 74
End: 2024-11-22
Attending: NURSE PRACTITIONER
Payer: COMMERCIAL

## 2024-11-22 ENCOUNTER — ANCILLARY PROCEDURE (OUTPATIENT)
Dept: MAMMOGRAPHY | Facility: CLINIC | Age: 74
End: 2024-11-22
Attending: NURSE PRACTITIONER
Payer: COMMERCIAL

## 2024-11-22 DIAGNOSIS — R92.8 ABNORMAL MAMMOGRAM: ICD-10-CM

## 2024-11-22 PROCEDURE — 19083 BX BREAST 1ST LESION US IMAG: CPT | Mod: RT

## 2024-11-22 PROCEDURE — 77066 DX MAMMO INCL CAD BI: CPT

## 2024-11-22 PROCEDURE — G0279 TOMOSYNTHESIS, MAMMO: HCPCS

## 2024-11-22 RX ORDER — IOPAMIDOL 755 MG/ML
110 INJECTION, SOLUTION INTRAVASCULAR ONCE
Status: COMPLETED | OUTPATIENT
Start: 2024-11-22 | End: 2024-11-22

## 2024-11-22 RX ADMIN — IOPAMIDOL 110 ML: 755 INJECTION, SOLUTION INTRAVASCULAR at 11:45

## 2024-11-27 ENCOUNTER — TELEPHONE (OUTPATIENT)
Dept: GENERAL RADIOLOGY | Facility: CLINIC | Age: 74
End: 2024-11-27
Payer: COMMERCIAL

## 2024-11-27 DIAGNOSIS — C50.911 INVASIVE DUCTAL CARCINOMA OF BREAST, FEMALE, RIGHT (H): Primary | ICD-10-CM

## 2024-11-27 LAB
PATH REPORT.ADDENDUM SPEC: ABNORMAL
PATH REPORT.COMMENTS IMP SPEC: ABNORMAL
PATH REPORT.COMMENTS IMP SPEC: YES
PATH REPORT.FINAL DX SPEC: ABNORMAL
PATH REPORT.GROSS SPEC: ABNORMAL
PATH REPORT.MICROSCOPIC SPEC OTHER STN: ABNORMAL
PATH REPORT.RELEVANT HX SPEC: ABNORMAL
PATHOLOGY SYNOPTIC REPORT: ABNORMAL
PHOTO IMAGE: ABNORMAL

## 2024-11-27 NOTE — TELEPHONE ENCOUNTER
Spoke with  Gaudencio, at the request of the patient when she was here for her biopsy, regarding right breast biopsy results, which indicate invasive ductal carcinoma. Notified patient that the radiologist's recommendation is oncologic and surgical consultation.   Referral placed.  is requesting that wife be seen in [place] if possible. Patient's  verbalized understanding of these results and all questions answered to his satisfaction.

## 2024-12-03 LAB
CREAT BLD-MCNC: 1.4 MG/DL (ref 0.5–1)
EGFRCR SERPLBLD CKD-EPI 2021: 39 ML/MIN/1.73M2

## 2024-12-06 ENCOUNTER — HOSPITAL ENCOUNTER (OUTPATIENT)
Dept: CT IMAGING | Facility: CLINIC | Age: 74
Discharge: HOME | End: 2024-12-06
Attending: NURSE PRACTITIONER
Payer: MEDICARE

## 2024-12-06 DIAGNOSIS — C80.1 CARCINOMA METASTATIC TO LYMPH NODES OF MULTIPLE SITES WITH UNKNOWN PRIMARY SITE (H): ICD-10-CM

## 2024-12-06 DIAGNOSIS — C7A.021 MALIGNANT CARCINOID TUMOR OF CECUM (H): ICD-10-CM

## 2024-12-06 DIAGNOSIS — C77.8 CARCINOMA METASTATIC TO LYMPH NODES OF MULTIPLE SITES WITH UNKNOWN PRIMARY SITE (H): ICD-10-CM

## 2024-12-06 LAB
CREAT BLD-MCNC: 1.5 MG/DL (ref 0.5–1)
EGFRCR SERPLBLD CKD-EPI 2021: 36 ML/MIN/1.73M2

## 2024-12-06 PROCEDURE — 74177 CT ABD & PELVIS W/CONTRAST: CPT | Mod: MG

## 2024-12-06 PROCEDURE — 82565 ASSAY OF CREATININE: CPT

## 2024-12-06 PROCEDURE — 71260 CT THORAX DX C+: CPT | Mod: MG

## 2024-12-06 PROCEDURE — 250N000009 HC RX 250: Performed by: RADIOLOGY

## 2024-12-06 PROCEDURE — 70491 CT SOFT TISSUE NECK W/DYE: CPT | Mod: MG

## 2024-12-06 PROCEDURE — 250N000011 HC RX IP 250 OP 636: Performed by: RADIOLOGY

## 2024-12-06 RX ORDER — IOPAMIDOL 755 MG/ML
115 INJECTION, SOLUTION INTRAVASCULAR ONCE
Status: COMPLETED | OUTPATIENT
Start: 2024-12-06 | End: 2024-12-06

## 2024-12-06 RX ADMIN — SODIUM CHLORIDE 80 ML: 9 INJECTION, SOLUTION INTRAVENOUS at 14:57

## 2024-12-06 RX ADMIN — IOPAMIDOL 115 ML: 755 INJECTION, SOLUTION INTRAVENOUS at 14:57

## 2024-12-13 ENCOUNTER — ONCOLOGY VISIT (OUTPATIENT)
Dept: ONCOLOGY | Facility: CLINIC | Age: 74
End: 2024-12-13
Attending: INTERNAL MEDICINE
Payer: MEDICARE

## 2024-12-13 VITALS
RESPIRATION RATE: 14 BRPM | HEART RATE: 56 BPM | WEIGHT: 203.6 LBS | OXYGEN SATURATION: 96 % | DIASTOLIC BLOOD PRESSURE: 73 MMHG | SYSTOLIC BLOOD PRESSURE: 157 MMHG | HEIGHT: 68 IN | TEMPERATURE: 98.2 F | BODY MASS INDEX: 30.86 KG/M2

## 2024-12-13 DIAGNOSIS — C77.8 CARCINOMA METASTATIC TO LYMPH NODES OF MULTIPLE SITES WITH UNKNOWN PRIMARY SITE (H): ICD-10-CM

## 2024-12-13 DIAGNOSIS — C80.1 CARCINOMA METASTATIC TO LYMPH NODES OF MULTIPLE SITES WITH UNKNOWN PRIMARY SITE (H): ICD-10-CM

## 2024-12-13 DIAGNOSIS — Z17.1 MALIGNANT NEOPLASM OF LOWER-OUTER QUADRANT OF RIGHT BREAST OF FEMALE, ESTROGEN RECEPTOR NEGATIVE (H): ICD-10-CM

## 2024-12-13 DIAGNOSIS — C7A.021 MALIGNANT CARCINOID TUMOR OF CECUM (H): Primary | ICD-10-CM

## 2024-12-13 DIAGNOSIS — C50.511 MALIGNANT NEOPLASM OF LOWER-OUTER QUADRANT OF RIGHT BREAST OF FEMALE, ESTROGEN RECEPTOR NEGATIVE (H): ICD-10-CM

## 2024-12-13 PROBLEM — C50.919 BREAST CANCER IN FEMALE (H): Status: ACTIVE | Noted: 2024-12-13

## 2024-12-13 PROCEDURE — 84443 ASSAY THYROID STIM HORMONE: CPT | Performed by: INTERNAL MEDICINE

## 2024-12-13 PROCEDURE — 82565 ASSAY OF CREATININE: CPT | Performed by: INTERNAL MEDICINE

## 2024-12-13 PROCEDURE — 82947 ASSAY GLUCOSE BLOOD QUANT: CPT | Performed by: INTERNAL MEDICINE

## 2024-12-13 PROCEDURE — G0463 HOSPITAL OUTPT CLINIC VISIT: HCPCS | Performed by: INTERNAL MEDICINE

## 2024-12-13 PROCEDURE — 99215 OFFICE O/P EST HI 40 MIN: CPT | Performed by: INTERNAL MEDICINE

## 2024-12-13 PROCEDURE — 85004 AUTOMATED DIFF WBC COUNT: CPT | Performed by: INTERNAL MEDICINE

## 2024-12-13 PROCEDURE — 82378 CARCINOEMBRYONIC ANTIGEN: CPT | Performed by: INTERNAL MEDICINE

## 2024-12-13 RX ORDER — ALBUTEROL SULFATE 90 UG/1
1-2 INHALANT RESPIRATORY (INHALATION)
Status: CANCELLED
Start: 2024-12-13

## 2024-12-13 RX ORDER — DIPHENHYDRAMINE HYDROCHLORIDE 50 MG/ML
50 INJECTION INTRAMUSCULAR; INTRAVENOUS
Status: CANCELLED
Start: 2024-12-13

## 2024-12-13 RX ORDER — ALBUTEROL SULFATE 0.83 MG/ML
2.5 SOLUTION RESPIRATORY (INHALATION)
Status: CANCELLED | OUTPATIENT
Start: 2024-12-13

## 2024-12-13 RX ORDER — HEPARIN SODIUM (PORCINE) LOCK FLUSH IV SOLN 100 UNIT/ML 100 UNIT/ML
5 SOLUTION INTRAVENOUS
Status: CANCELLED | OUTPATIENT
Start: 2024-12-13

## 2024-12-13 RX ORDER — METHYLPREDNISOLONE SODIUM SUCCINATE 40 MG/ML
40 INJECTION INTRAMUSCULAR; INTRAVENOUS
Status: CANCELLED
Start: 2024-12-13

## 2024-12-13 RX ORDER — MEPERIDINE HYDROCHLORIDE 25 MG/ML
25 INJECTION INTRAMUSCULAR; INTRAVENOUS; SUBCUTANEOUS
Status: CANCELLED | OUTPATIENT
Start: 2024-12-13

## 2024-12-13 RX ORDER — DIPHENHYDRAMINE HYDROCHLORIDE 50 MG/ML
25 INJECTION INTRAMUSCULAR; INTRAVENOUS
Status: CANCELLED
Start: 2024-12-13

## 2024-12-13 RX ORDER — EPINEPHRINE 1 MG/ML
0.3 INJECTION, SOLUTION, CONCENTRATE INTRAVENOUS EVERY 5 MIN PRN
Status: CANCELLED | OUTPATIENT
Start: 2024-12-13

## 2024-12-13 RX ORDER — LORAZEPAM 2 MG/ML
0.5 INJECTION INTRAMUSCULAR EVERY 4 HOURS PRN
Status: CANCELLED | OUTPATIENT
Start: 2024-12-13

## 2024-12-13 RX ORDER — HEPARIN SODIUM,PORCINE 10 UNIT/ML
5-20 VIAL (ML) INTRAVENOUS DAILY PRN
Status: CANCELLED | OUTPATIENT
Start: 2024-12-13

## 2024-12-13 ASSESSMENT — PAIN SCALES - GENERAL: PAINLEVEL_OUTOF10: NO PAIN (0)

## 2024-12-13 NOTE — LETTER
"12/13/2024      Marissa Guadarrama  [address] [place] [id number]      Dear Colleague,    Thank you for referring your patient, Marissa Guadarrama, to the [facility] [place]. Please see a copy of my visit note below.    Oncology Rooming Note    December 13, 2024 1:02 PM   Marissa Guadarrama is a 74 year old female who presents for:    Chief Complaint   Patient presents with     Oncology Clinic Visit     Initial Vitals: BP (!) 157/73 (BP Location: Right arm, Patient Position: Sitting, Cuff Size: Adult Regular)   Pulse 56   Temp 98.2  F (36.8  C) (Oral)   Resp 14   Ht 1.715 m (5' 7.5\")   Wt 92.4 kg (203 lb 9.6 oz)   SpO2 96%   BMI 31.42 kg/m   Estimated body mass index is 31.42 kg/m  as calculated from the following:    Height as of this encounter: 1.715 m (5' 7.5\").    Weight as of this encounter: 92.4 kg (203 lb 9.6 oz). Body surface area is 2.1 meters squared.  No Pain (0) Comment: Data Unavailable   No LMP recorded. Patient has had a hysterectomy.  Allergies reviewed: Yes  Medications reviewed: Yes    Medications: Medication refills not needed today.  Pharmacy name entered into [employer]: [id number] - [place], [place] - [address] [place] [address]    Frailty Screening:   Is the patient here for a new oncology consult visit in cancer care? 2. No      Clinical concerns: Discuss medications. Insurance will no longer be covering.        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  Mammogram of 11/8/2024 showed.  A spiculated mass in the right breast measuring 1.2 cm in greatest dimension.  A nearby additional mass showed 1.7 cm mass.  Biopsy showed a grade 3 triple negative breast cancer.  Surgical consultation is pending on 12/17/2024.     Patient has previously declined  chemotherapy.  I would advise mastectomy with sentinel lymph node exam to manage 3 no cancer in the right breast.  She presently on lanreotide for typical neuroendocrine tumor and pembrolizumab for metastatic cancer in mediastinal and cervical lymph nodes   with cancer was believed to have originated in the right upper lung.            ______________________________________________________________________________    ECOG Performance = 1    History of Present Illness  Disease history per Dr. 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 Elfego Lawrence and then Dr. Hosea King.     However, a follow-up 2/10/2022 Octreoscan scan confirmed evidence of progressive disease for which she was started on lanreotide every 4 weeks with a baseline chromogranin A level of 824.  The chromogranin A joslyn was 445 on 1/6/2023 and 562 on 7/28/2023.      Review of her 10/13/2023 DEXA scan revealed 14% decrease in bone density with left hip T-score -3.1.  Left femoral neck T-score was -2.6.  She is on annual zoledronic acid.     In December, 2023 she received 5000 cGy/5  fractions by 12/15/2023 with complete resolution of left sacral pain referable to a presumptive metastatic neuroendocrine metastasis.  A July, 2023 dotatate PET scan had revealed stable disease except for the increased size and gluco-avidity of this left sclerotic sacral lesion.  She continues on monthly lanreotide.     Past medical history:  History of 2016 bilateral ER positive breast cancer on exemestane.  Both tumors were 100% ER/MI+, HER2 neg and patient declined Oncotype DX testing since she indicated she would never want to receive adjuvant systemic chemotherapy.  She had been initially started on anastrozole but developed a skin rash and was switched to exemestane    When last seen on 2/23/2024, new right supraclavicular lymphadenopathy was noted.  The CEA was 56.6 compared to a level 12.8 on 7/14/2023. The CA 27.29 was 57.7 compared to 22 when it was last obtained on 1/23/2022.  3/8/2024 CT soft tissue of neck and chest revealed multiple right cervical level 3-5 lymphadenopathy.  Chest imaging revealed mild enlarged mediastinal and hilar lymphadenopathy which could be related to her neuroendocrine tumor.     3/8/2024 she underwent IR biopsy of her right cervical lymph nodes today which was well-tolerated.  Pathology showed a poorly differentiated carcinoma.  It was CK7 and CK20 positive.  There was insufficient tissue to make further identification of the primary or to determine treatment options.    6/28/2024  A new biopsy of a right cervical lymph node was performed.  This showed no actionable mutations for first line therapy.  The Tumor Proportion Score for pembrolizumab was 50%.    8/9/2024 for day 1 cycle 1 of pembrolizumab therapy    At this visit patient remains asymptomatic.  She is maintaining her weight.  Her appetite and digestion are normal.  She does not have chills, fevers or sweats.  She does not have cough, chest pain or shortness of breath at rest.  She has no change in bowel or bladder  "habit.  Her activity is limited from polio in childhood she uses a walker to help with ambulation.  She is having increased pain in her left knee which is limiting her mobility.    She quit smoking in 2006 after 29 pack years of cigarettes    Review of systems.  Pertinent Findings are included in the History of Present Illness    Physical Exam    BP (!) 157/73 (BP Location: Right arm, Patient Position: Sitting, Cuff Size: Adult Regular)   Pulse 56   Temp 98.2  F (36.8  C) (Oral)   Resp 14   Ht 1.715 m (5' 7.5\")   Wt 92.4 kg (203 lb 9.6 oz)   SpO2 96%   BMI 31.42 kg/m       GENERAL APPEARANCE: 74-year-old woman in no apparent distress.  HEAD: Atraumatic; normocephalic; without lesions.  EYES: Conjunctiva, corneas and eyelids normal; pupils equal, round, reactive to light; No Icterus.  MOUTH/OROPHARYNX: Oral mucosa intact  NECK: Supple with palpable lymph nodes in the right and left neck, 1 to 2 cm in size..  LUNGS:  Clear to auscultation and percussion with no extra sounds.  HEART: Regular rhythm and rate; S1 and S2 normal; no murmurs noted.  ABDOMEN: Soft; no masses or tenderness, no hepatosplenomegaly.  NEUROLOGIC: Alert and oriented.  There is paralysis of the left leg secondary to polio.  EXTREMITIES: No cyanosis, or edema.  SKIN: No abnormal bruising or bleeding. No suspicious lesions noted on exposed skin.  There is a erythematous rash alongside the right neck and extending down into the upper chest.  Patient is using steroid cream for this.  PSYCHIATRIC: Mental status normal; no apparent psychiatric issues    Medications:    Current Outpatient Medications   Medication Sig Dispense Refill     bisacodyl (DULCOLAX) 5 MG EC tablet Take 2 tablets at 3 pm the day before your procedure. If your procedure is before 11 am, take 2 additional tablets at 11 pm. If your procedure is after 11 am, take 2 additional tablets at 6 am. For additional instructions refer to your colonoscopy prep instructions. 4 tablet 0     " Calcium Carbonate (CALCIUM 500 PO) 2 tablets daily       cholecalciferol (VITAMIN D3) 25 mcg (1000 units) capsule Take 1 capsule by mouth daily.       exemestane (AROMASIN) 25 MG tablet Take 1 tablet (25 mg) by mouth every morning. 90 tablet 3     hydroCHLOROthiazide 12.5 MG tablet Take 1 tablet (12.5 mg) by mouth every morning. Please make a clinic visit to be seen in person for medication refills.  No virtual visits.  Thank you. 90 tablet 0     polyethylene glycol (GOLYTELY) 236 g suspension The night before the exam at 6 pm drink an 8-ounce glass every 15 minutes until the jug is half empty. If you arrive before 11 AM: Drink the other half of the Golytely jug at 11 PM night before procedure. If you arrive after 11 AM: Drink the other half of the Golytely jug at 6 AM day of procedure. For additional instructions refer to your colonoscopy prep instructions. 4000 mL 0     SENNA-docusate sodium (SENNA S) 8.6-50 MG tablet Take 1 tablet by mouth 2 times daily as needed (prevent opioid induced constipation) 30 tablet 0     Current Facility-Administered Medications   Medication Dose Route Frequency Provider Last Rate Last Admin     1.0 mL bupivacaine (MARCAINE) 0.5% injection (50 mL vial)  1 mL      1 mL at 09/06/24 1031     3.0 mL bupivacaine (MARCAINE) 0.5% injection (50 mL vial)  3 mL      3 mL at 09/06/24 1025     lidocaine 1 % injection 1 mL  1 mL      1 mL at 09/06/24 1031     lidocaine 1 % injection 3 mL  3 mL      3 mL at 09/06/24 1025     triamcinolone (KENALOG-40) injection 40 mg  40 mg      40 mg at 09/06/24 1025     triamcinolone (KENALOG-40) injection 40 mg  40 mg      40 mg at 09/06/24 1031     Facility-Administered Medications Ordered in Other Visits   Medication Dose Route Frequency Provider Last Rate Last Admin     lanreotide acetate (SOMATULINE) injection 120 mg  120 mg Subcutaneous Once Friedell, Peter E, MD         pembrolizumab (KEYTRUDA) 400 mg in sodium chloride 0.9 % 71 mL infusion  400 mg  Intravenous Once Friedell, Peter E, MD         sodium chloride 0.9% BOLUS 250 mL  250 mL Intravenous Once Friedell, Peter E, MD               Past History    Past Medical History:   Diagnosis Date     Arthritis     knee     Benign breast biopsy     benign     Breast cancer (H)      Carcinoid tumor (H) 12/2003     Cervical cancer (H) 2007     H/O colposcopy with cervical biopsy 12/23/2013    vaginal cuff biopsy- VAIN III. referred back to gyn/onc     HTN      Hyperlipidemia      Malignant neoplasm of ovary (H)      Obesity      Pap smear of vagina with ASC-H 11/01/2013     Post-polio syndromes      Trigeminal neuralgias      Past Surgical History:   Procedure Laterality Date     APPENDECTOMY  01/01/1983     BIOPSY BREAST       BIOPSY NODE SENTINEL Bilateral 06/01/2016    Procedure: BIOPSY NODE SENTINEL;  Surgeon: Brent Arana MD;  Location: [facility]     [facility] SURGICAL PATHOLOGY       COLONOSCOPY N/A 06/23/2017    Procedure: COMBINED COLONOSCOPY, SINGLE OR MULTIPLE BIOPSY/POLYPECTOMY BY BIOPSY;  Colonoscopy Dx:Carcinoid tumor of colon prep mailed golytely;  Surgeon: Talisha Greco MD;  Location:  GI     COLPOSCOPY, BIOPSY, COMBINED  03/13/2014    Procedure: COMBINED COLPOSCOPY, BIOPSY;;  Surgeon: Lara Pack MD;  Location:  OR     COMBINED CYSTOSCOPY, RETROGRADES, EXCHANGE STENT URETER(S) Left 02/07/2019    Procedure: COMBINED CYSTOSCOPY, RETROGRADES, EXCHANGE STENT URETER--left;  Surgeon: Zhao La MD;  Location: [facility] OR     COMBINED CYSTOSCOPY, RETROGRADES, EXCHANGE STENT URETER(S) Left 02/20/2020    Procedure: CYSTOSCOPY, WITH RETROGRADE PYELOGRAM AND Left URETERAL STENT exchange;  Surgeon: Zhao La MD;  Location: [facility] OR     COMBINED CYSTOSCOPY, RETROGRADES, EXCHANGE STENT URETER(S) Left 05/09/2021    Procedure: CYSTOSCOPY, WITH RETROGRADE PYELOGRAM AND LEFT URETERAL STENT REPLACEMENT;  Surgeon: Fred Owens MD;  Location:  OR     COMBINED CYSTOSCOPY,  RETROGRADES, EXCHANGE STENT URETER(S) Left 09/16/2021    Procedure: CYSTOSCOPY, WITH left RETROGRADE PYELOGRAM AND left  URETERAL STENT REPLACEMENT;  Surgeon: Zhao La MD;  Location: [facility] OR     COMBINED CYSTOSCOPY, RETROGRADES, EXCHANGE STENT URETER(S) Left 03/24/2022    Procedure: CYSTOSCOPY, WITH RETROGRADE PYELOGRAM AND URETERAL STENT EXCHANGE, LEFT;  Surgeon: Zhao La MD;  Location: [facility] OR     COMBINED CYSTOSCOPY, RETROGRADES, URETEROSCOPY, INSERT STENT Left 02/02/2017    Procedure: COMBINED CYSTOSCOPY, RETROGRADES, URETEROSCOPY, INSERT STENT;  Surgeon: Zhao La MD;  Location: [facility] OR     CYSTOSCOPY, REMOVE STENT(S), COMBINED N/A 11/4/2022    Procedure: CYSTOSCOPY, LEFT STENT REMOVAL;  Surgeon: Zhao La MD;  [facility]: [facility]     CYSTOSCOPY, RETROGRADES, INSERT STENT URETER(S), COMBINED Left 09/07/2017    Procedure: COMBINED CYSTOSCOPY, RETROGRADES, INSERT STENT URETER(S);  Cystoscopy,Left Stent Exchange;  Surgeon: Zhao La MD;  Location: [facility]     CYSTOSCOPY, RETROGRADES, INSERT STENT URETER(S), COMBINED  12/12/2017    Procedure: COMBINED CYSTOSCOPY, RETROGRADES, INSERT STENT URETER(S);;  Surgeon: Zhao La MD;  [facility]     CYSTOSCOPY, RETROGRADES, INSERT STENT URETER(S), COMBINED Left 07/05/2018    Procedure: COMBINED CYSTOSCOPY, RETROGRADES, INSERT STENT URETER(S);  Cystoscopy, Left Stent Exchange;  Surgeon: Zhao La MD;  Location: [facility] OR     DAVINCI NEPHRECTOMY Left 11/4/2022    Procedure: , LEFT NEPHRECTOMY, ROBOT-ASSISTED;  Surgeon: Zhao La MD;  Location: [facility]     EXAM UNDER ANESTHESIA PELVIC  03/13/2014    Procedure: EXAM UNDER ANESTHESIA PELVIC;  Exam Under Anestheisa, Colposcopy, Vaginal Biopsies, Co2 Laser of the Upper Vagina;  Surgeon: Lara Pack MD;  [facility]     EXAM UNDER ANESTHESIA PELVIC N/A 07/01/2020    Procedure: Examination  under anesthesia, vaginal biopsies;  Surgeon: Karli Gao MD;  [facility]: [facility]     HERNIORRHAPHY INCISIONAL (LOCATION)       IR LYMPH NODE BIOPSY  6/28/2024     IR RHIZOTOMY  08/14/2020     LASER CO2 VAGINA  03/13/2014    VAIN 1/2     LASER CO2 VAGINA N/A 12/12/2017    Procedure: LASER CO2 VAGINA;  Exam Under Anesthesia, CO2 Laser Ablation Of Vagina, Cystoscopy, Left Retrograde Pyelogram with Left Stent Placement;  Surgeon: Nic Segundo MD;  [facility]     LUMPECTOMY BREAST       LUMPECTOMY BREAST WITH SEED LOCALIZATION Bilateral 06/01/2016    Procedure: LUMPECTOMY BREAST WITH SEED LOCALIZATION;  Surgeon: Brent Arana MD;  Location: [facility] OR     SURGICAL HISTORY OF -       ovarian cystectomy     SURGICAL HISTORY OF -   01/01/2003    right colon resection secondary to carcinoid tumor     TUBAL LIGATION       ZC BSO, OMENTECTOMY W/OSMAN  05/01/2007     Z TOTAL ABDOM HYSTERECTOMY  05/01/2007     No Known Allergies  Family History   Problem Relation Age of Onset     Cancer Mother         bone / liver     Breast Cancer Mother      Cancer Sister         vulvar ca, cervical ca, squamous cell cancer     Breast Cancer Sister      Cancer Brother         Rectal- Stage 4     Rectal Cancer Brother      Cancer Maternal Grandmother      Cancer Maternal Grandfather      Cancer Paternal Grandmother      Cancer Paternal Grandfather      Breast Cancer Maternal Aunt      Breast Cancer Paternal Aunt      Colon Cancer Paternal Aunt      Pancreatic Cancer Nephew      Anesthesia Reaction No family hx of      Venous thrombosis No family hx of      Bleeding Disorder No family hx of      Social History     Socioeconomic History     Marital status:      Spouse name: None     Number of children: None     Years of education: None     Highest education level: None   Tobacco Use     Smoking status: Former     Current packs/day: 0.00     Average packs/day: 1 pack/day for 29.0 years (29.0 ttl pk-yrs)     Types:  Cigarettes     Start date: 10/30/1977     Quit date: 10/30/2006     Years since quittin.5     Smokeless tobacco: Never   Vaping Use     Vaping status: Never Used   Substance and Sexual Activity     Alcohol use: No     Alcohol/week: 0.0 standard drinks of alcohol     Comment: 1 drink per year     Drug use: No     Sexual activity: Yes     Partners: Male   Other Topics Concern      Service No     Blood Transfusions No     Caffeine Concern Yes     Comment: occasional coffee     Occupational Exposure No     Hobby Hazards Yes     Comment: [place],     Sleep Concern Yes     Comment: not sleeping well     Stress Concern Yes     Comment: Grandson in the      Weight Concern Yes     Special Diet No     Back Care No     Exercise No     Seat Belt Yes     Self-Exams Yes     Parent/sibling w/ CABG, MI or angioplasty before 65F 55M? No     Social Determinants of Health      [facility]    Social Connections   Interpersonal Safety: Not At Risk (2021)    Humiliation, Afraid, Rape, and Kick questionnaire      Fear of Current or Ex-Partner: No      Emotionally Abused: No      Physically Abused: No      Sexually Abused: No           Lab Results  Recent Results (from the past 720 hours)   Comprehensive metabolic panel    Collection Time: 11/15/24  3:05 PM   Result Value Ref Range    Sodium 142 135 - 145 mmol/L    Potassium 3.5 3.4 - 5.3 mmol/L    Carbon Dioxide (CO2) 33 (H) 22 - 29 mmol/L    Anion Gap 6 (L) 7 - 15 mmol/L    Urea Nitrogen 29.5 (H) 8.0 - 23.0 mg/dL    Creatinine 1.31 (H) 0.51 - 0.95 mg/dL    GFR Estimate 42 (L) >60 mL/min/1.73m2    Calcium 10.0 8.8 - 10.4 mg/dL    Chloride 103 98 - 107 mmol/L    Glucose 162 (H) 70 - 99 mg/dL    Alkaline Phosphatase 71 40 - 150 U/L    AST 15 0 - 45 U/L    ALT 12 0 - 50 U/L    Protein Total 6.9 6.4 - 8.3 g/dL    Albumin 3.7 3.5 - 5.2 g/dL    Bilirubin Total 0.4 <=1.2 mg/dL    CBC with platelets and differential    Collection Time: 11/15/24  3:05 PM   Result Value Ref Range    WBC Count 7.4 4.0 - 11.0 10e3/uL    RBC Count 4.71 3.80 - 5.20 10e6/uL    Hemoglobin 14.8 11.7 - 15.7 g/dL    Hematocrit 44.6 35.0 - 47.0 %    MCV 95 78 - 100 fL    MCH 31.4 26.5 - 33.0 pg    MCHC 33.2 31.5 - 36.5 g/dL    RDW 13.9 10.0 - 15.0 %    Platelet Count 181 150 - 450 10e3/uL    % Neutrophils 70 %    % Lymphocytes 19 %    % Monocytes 6 %    % Eosinophils 4 %    % Basophils 1 %    % Immature Granulocytes 1 %    NRBCs per 100 WBC 0 <1 /100    Absolute Neutrophils 5.2 1.6 - 8.3 10e3/uL    Absolute Lymphocytes 1.4 0.8 - 5.3 10e3/uL    Absolute Monocytes 0.4 0.0 - 1.3 10e3/uL    Absolute Eosinophils 0.3 0.0 - 0.7 10e3/uL    Absolute Basophils 0.0 0.0 - 0.2 10e3/uL    Absolute Immature Granulocytes 0.0 <=0.4 10e3/uL    Absolute NRBCs 0.0 10e3/uL   Creatinine POCT    Collection Time: 11/22/24 10:31 AM   Result Value Ref Range    Creatinine POCT 1.4 (H) 0.5 - 1.0 mg/dL    GFR, ESTIMATED POCT 39 (L) >60 mL/min/1.73m2   Surgical Pathology Exam    Collection Time: 11/22/24 11:20 AM   Result Value Ref Range    Case Report       Surgical Pathology Report                         Case: [id number]                                  Authorizing Provider:  Jennifer Allen NP            Collected:           11/22/2024 11:20 AM          Ordering Location:     [facility]   Received:            11/22/2024 12:01 PM                                 [place]                                                                  Pathologist:           Amita Hanna MD                                                   Specimen:    Breast, Right, right breast 9 o'clock 5 cm fn                                              Addendum       Carcinoma is NOE-3 positive (strong, diffuse) and p53 positive (strong, diffuse over-expression).    There is no change in the diagnosis.    Immunostains are examined with appropriate  positive controls (block A1).    The following assays; ALK (D5F3), ER, HER2, PD-L1, BRAF, CD20, CD30 AZF, CD30 Formalin, MLH-1, MSH-2, MSH-6 and PMS-2, have not been validated on decalcified tissues. Results should be interpreted with caution given the possibility of false negative results on decalcified specimens.          Final Diagnosis       RIGHT breast, 9:00, 5 cm from nipple, ultrasound guided core biopsy:  -INVASIVE BREAST CARCINOMA OF NO SPECIAL TYPE (INVASIVE DUCTAL CARCINOMA), Luda grade 3  -Ductal carcinoma in situ (DCIS), nuclear grade 3, solid and cribriform types, with focal necrosis  -Invasive carcinoma is estrogen receptor negative, progesterone receptor negative and HER2 negative (score 1+) by immunohistochemistry (see biomarker reporting template below)  -See comment      Comment       Invasive carcinoma involves multiple tissue cores, and is 8 in greatest dimension in a single core.  The Mitchells grade is 3 (tubule formation 3 + nuclear pleomorphism 3 + mitotic activity 3 = Mitchells score 9).  There are up to 20 mitotic figures in 10 high power fields (field diameter 0.5 mm).  The presence of carcinoma in situ supports a diagnosis of a new primary breast carcinoma. We will request slides from the patient's 2024 right neck core biopsy and right supraclavicular lymph node FNA for comparison to the current case.      Synoptic Checklist       Breast Biomarker Reporting Template   BREAST BIOMARKER REPORTING TEMPLATE - All Specimens   Protocol posted: 12/13/2023         Test(s) Performed:            Estrogen Receptor (ER) Status:    Negative (less than 1%)           :    Internal control cells present and stain as expected         Test Type:    Food and Drug Administration (FDA) cleared (test / vendor): [employer]         Primary Antibody:    SP1         Scoring System:    No separate scoring system used       Test(s) Performed:            Progesterone Receptor (PgR) Status:    Negative (less  than 1%)           :    Internal control cells present and stain as expected         Test Type:    Food and Drug Administration (FDA) cleared (test / vendor): [employer]         Primary Antibody:    1E2         Scoring System:    No separate scoring system used       Test(s) Performed:            HER2 by Immunohistochemistry:    Negative (Score 1+)         Test Type:    Food and Drug Administration (FDA) cleared (test / vendor): [employer]         Primary Antibody:    4B5       Cold Ischemia and Fixation Times:    Meet requirements specified in latest version of the ASCO / CAP Guidelines       Cold Ischemia Time (minutes):    0 min      Fixation Time (hours):    10.2 hours      Testing Performed on Block Number(s):    A1       METHODS      Fixative:    Formalin       Image Analysis:    Not performed       Clinical Information       The patient is a 74-year-old woman, with a history of BILATERAL breast carcinomas in 2016, treated with BILATERAL lumpectomies and sentinel lymph node biopsies and adjuvant radiation (RIGHT breast invasive ductal carcinoma, Lilly grade 3, ER positive/PgR negative/HER2 not amplified, pT1aN0(sn); LEFT breast invasive ductal carcinoma, Luda grade 2, ER positive/PgR positive/HER2 not amplified, pT1cN0(sn)). She also has a history of ileal carcinoid tumor, metastatic to regional lymph nodes at time of resection in 2003 (and later metastatic to liver (2019) and sacrum (2023)). She also has a history of cervical squamous cell carcinoma (radical hysterectomy in 2007) and vaginal high grade and low grade squamous intraepithelial lesions. RIGHT neck core biopsy in March 2024 and RIGHT supraclavicular FNA in June 2024 were interpreted as metastatic poorly differentiated carcinoma (clinically thought to be metastatic from a lung primary). A recent screening mammogram showed a RIGHT breast focal asymmetry. Diagnostic mammogram and ultrasound show a 1.2 cm spiculated RIGHT breast mass at 9-10:00,  "middle depth, 5 cm from nipple (prior RIGHT breast lumpectomy scar is posterior to this mass). Contrast enhanced mammogram shows a corresponding RIGHT breast 2.4 cm area of mass enhancement with surrounding nonmass enhancement at 9:00, 5 cm from the nipple.  Procedure: RIGHT breast ultrasound guided core biopsy (globe-shaped biopsy marker placed).      Gross Description       A(1). Breast, Right, right breast 9 o'clock 5 cm fn:  The specimen is received in formalin with proper patient identification, labeled \"right breast 9:00 5 cm fn\".  It consists of 3 yellow-tan fibroadipose tissue cores ranging in length from 1.1 to 1.2 cm and averaging 0.1 cm in diameter, which are wrapped and entirely submitted in cassette A1.    The specimen is collected and placed in formalin at 11:49 AM on 11/22/24.       Microscopic Description       Microscopic examination is performed. Immunostains are examined with appropriate positive and negative controls. Smooth muscle myosin (SMM) and p63 highlight myoepithelial cells (MECs) at the periphery of ducts involved by DCIS (and at the periphery of benign ducts and acini), and show no MECs associated with invasive carcinoma.    Case was reviewed by the following:  Resident Pathologist: Gómez Wang MD  A resident or fellow in a training program was involved in the initial review, preparation, and/or interpretation of this case.  I, as the senior physician, attest that I have personally reviewed all specimens and or slides, including the listed special stains, and used them with my medical judgement to determine the final diagnosis.              Disclaimer         The following assays; ALK (D5F3), ER, HER2, PD-L1, BRAF, CD20, CD30 AZF, CD30 Formalin, MLH-1, MSH-2, MSH-6 and PMS-2, have not been validated on decalcified tissues. Results should be interpreted with caution given the possibility of false negative results on decalcified specimens.          MCRS Yes (A) N/A    Performing " Labs       The technical component of this testing was completed at [facility].    Stain controls for all stains resulted within this report have been reviewed and show appropriate reactivity.       Case Images     Creatinine POCT    Collection Time: 12/06/24  2:45 PM   Result Value Ref Range    Creatinine POCT 1.5 (H) 0.5 - 1.0 mg/dL    GFR, ESTIMATED POCT 36 (L) >60 mL/min/1.73m2   Comprehensive metabolic panel    Collection Time: 12/13/24 12:46 PM   Result Value Ref Range    Sodium 142 135 - 145 mmol/L    Potassium 3.4 3.4 - 5.3 mmol/L    Carbon Dioxide (CO2) 29 22 - 29 mmol/L    Anion Gap 10 7 - 15 mmol/L    Urea Nitrogen 14.4 8.0 - 23.0 mg/dL    Creatinine 1.29 (H) 0.51 - 0.95 mg/dL    GFR Estimate 43 (L) >60 mL/min/1.73m2    Calcium 9.6 8.8 - 10.4 mg/dL    Chloride 103 98 - 107 mmol/L    Glucose 79 70 - 99 mg/dL    Alkaline Phosphatase 66 40 - 150 U/L    AST 17 0 - 45 U/L    ALT 11 0 - 50 U/L    Protein Total 6.7 6.4 - 8.3 g/dL    Albumin 3.7 3.5 - 5.2 g/dL    Bilirubin Total 0.7 <=1.2 mg/dL   TSH with free T4 reflex    Collection Time: 12/13/24 12:46 PM   Result Value Ref Range    TSH 1.96 0.30 - 4.20 uIU/mL   CBC with platelets and differential    Collection Time: 12/13/24 12:46 PM   Result Value Ref Range    WBC Count 7.5 4.0 - 11.0 10e3/uL    RBC Count 4.40 3.80 - 5.20 10e6/uL    Hemoglobin 13.9 11.7 - 15.7 g/dL    Hematocrit 40.1 35.0 - 47.0 %    MCV 91 78 - 100 fL    MCH 31.6 26.5 - 33.0 pg    MCHC 34.7 31.5 - 36.5 g/dL    RDW 13.4 10.0 - 15.0 %    Platelet Count 190 150 - 450 10e3/uL    % Neutrophils 74 %    % Lymphocytes 16 %    % Monocytes 6 %    % Eosinophils 4 %    % Basophils 1 %    % Immature Granulocytes 0 %    NRBCs per 100 WBC 0 <1 /100    Absolute Neutrophils 5.5 1.6 - 8.3 10e3/uL    Absolute Lymphocytes 1.2 0.8 - 5.3 10e3/uL    Absolute Monocytes 0.4 0.0 - 1.3 10e3/uL    Absolute Eosinophils 0.3 0.0 - 0.7 10e3/uL    Absolute  Basophils 0.0 0.0 - 0.2 10e3/uL    Absolute Immature Granulocytes 0.0 <=0.4 10e3/uL    Absolute NRBCs 0.0 10e3/uL               I spent 45 minutes on the patient's visit today.  This included preparation for the visit, face-to-face time with the patient and documentation following the visit.  It did not include teaching or procedure time.    Signed by: Peter E. Friedell, MD          Again, thank you for allowing me to participate in the care of your patient.        Sincerely,        Peter E. Friedell, MD

## 2024-12-13 NOTE — PROGRESS NOTES
"Oncology Rooming Note    December 13, 2024 1:02 PM   Marissa Guadarrama is a 74 year old female who presents for:    Chief Complaint   Patient presents with    Oncology Clinic Visit     Initial Vitals: BP (!) 157/73 (BP Location: Right arm, Patient Position: Sitting, Cuff Size: Adult Regular)   Pulse 56   Temp 98.2  F (36.8  C) (Oral)   Resp 14   Ht 1.715 m (5' 7.5\")   Wt 92.4 kg (203 lb 9.6 oz)   SpO2 96%   BMI 31.42 kg/m   Estimated body mass index is 31.42 kg/m  as calculated from the following:    Height as of this encounter: 1.715 m (5' 7.5\").    Weight as of this encounter: 92.4 kg (203 lb 9.6 oz). Body surface area is 2.1 meters squared.  No Pain (0) Comment: Data Unavailable   No LMP recorded. Patient has had a hysterectomy.  Allergies reviewed: Yes  Medications reviewed: Yes    Medications: Medication refills not needed today.  Pharmacy name entered into [facility]: [id number] - [address]    Frailty Screening:   Is the patient here for a new oncology consult visit in cancer care? 2. No      Clinical concerns: Discuss medications. Insurance will no longer be covering.        Nicloe Mascorro MA            "

## 2024-12-13 NOTE — PROGRESS NOTES
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  Mammogram of 11/8/2024 showed.  A spiculated mass in the right breast measuring 1.2 cm in greatest dimension.  A nearby additional mass showed 1.7 cm mass.  Biopsy showed a grade 3 triple negative breast cancer.  Surgical consultation is pending on 12/17/2024.     Patient has previously declined  chemotherapy.  I would advise mastectomy with sentinel lymph node exam to manage 3 no cancer in the right breast.  She presently on lanreotide for typical neuroendocrine tumor and pembrolizumab for metastatic cancer in mediastinal and cervical lymph nodes   with cancer was believed to have originated in the right upper lung.            ______________________________________________________________________________    ECOG Performance = 1    History of Present Illness  Disease history per Dr. 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 Telles  Melinda and then Dr. Hosea King.     However, a follow-up 2/10/2022 Octreoscan scan confirmed evidence of progressive disease for which she was started on lanreotide every 4 weeks with a baseline chromogranin A level of 824.  The chromogranin A joslyn was 445 on 1/6/2023 and 562 on 7/28/2023.      Review of her 10/13/2023 DEXA scan revealed 14% decrease in bone density with left hip T-score -3.1.  Left femoral neck T-score was -2.6.  She is on annual zoledronic acid.     In December, 2023 she received 5000 cGy/5 fractions by 12/15/2023 with complete resolution of left sacral pain referable to a presumptive metastatic neuroendocrine metastasis.  A July, 2023 dotatate PET scan had revealed stable disease except for the increased size and gluco-avidity of this left sclerotic sacral lesion.  She continues on monthly lanreotide.     Past medical history:  History of 2016 bilateral ER positive breast cancer on exemestane.  Both tumors were 100% ER/HI+, HER2 neg and patient declined Oncotype DX testing since she indicated she would never want to receive adjuvant systemic chemotherapy.  She had been initially started on anastrozole but developed a skin rash and was switched to exemestane    When last seen on 2/23/2024, new right supraclavicular lymphadenopathy was noted.  The CEA was 56.6 compared to a level 12.8 on 7/14/2023. The CA 27.29 was 57.7 compared to 22 when it was last obtained on 1/23/2022.  3/8/2024 CT soft tissue of neck and chest revealed multiple right cervical level 3-5 lymphadenopathy.  Chest imaging revealed mild enlarged mediastinal and hilar lymphadenopathy which could be related to her neuroendocrine tumor.     3/8/2024 she underwent IR biopsy of her right cervical lymph nodes today which was well-tolerated.  Pathology showed a poorly differentiated carcinoma.  It was CK7 and CK20 positive.  There was insufficient tissue to make further identification of the primary or to determine treatment  "options.    6/28/2024  A new biopsy of a right cervical lymph node was performed.  This showed no actionable mutations for first line therapy.  The Tumor Proportion Score for pembrolizumab was 50%.    8/9/2024 for day 1 cycle 1 of pembrolizumab therapy    At this visit patient remains asymptomatic.  She is maintaining her weight.  Her appetite and digestion are normal.  She does not have chills, fevers or sweats.  She does not have cough, chest pain or shortness of breath at rest.  She has no change in bowel or bladder habit.  Her activity is limited from polio in childhood she uses a walker to help with ambulation.  She is having increased pain in her left knee which is limiting her mobility.    She quit smoking in 2006 after 29 pack years of cigarettes    Review of systems.  Pertinent Findings are included in the History of Present Illness    Physical Exam    BP (!) 157/73 (BP Location: Right arm, Patient Position: Sitting, Cuff Size: Adult Regular)   Pulse 56   Temp 98.2  F (36.8  C) (Oral)   Resp 14   Ht 1.715 m (5' 7.5\")   Wt 92.4 kg (203 lb 9.6 oz)   SpO2 96%   BMI 31.42 kg/m       GENERAL APPEARANCE: 74-year-old woman in no apparent distress.  HEAD: Atraumatic; normocephalic; without lesions.  EYES: Conjunctiva, corneas and eyelids normal; pupils equal, round, reactive to light; No Icterus.  MOUTH/OROPHARYNX: Oral mucosa intact  NECK: Supple with palpable lymph nodes in the right and left neck, 1 to 2 cm in size..  LUNGS:  Clear to auscultation and percussion with no extra sounds.  HEART: Regular rhythm and rate; S1 and S2 normal; no murmurs noted.  ABDOMEN: Soft; no masses or tenderness, no hepatosplenomegaly.  NEUROLOGIC: Alert and oriented.  There is paralysis of the left leg secondary to polio.  EXTREMITIES: No cyanosis, or edema.  SKIN: No abnormal bruising or bleeding. No suspicious lesions noted on exposed skin.  There is a erythematous rash alongside the right neck and extending down into the " upper chest.  Patient is using steroid cream for this.  PSYCHIATRIC: Mental status normal; no apparent psychiatric issues    Medications:    Current Outpatient Medications   Medication Sig Dispense Refill    bisacodyl (DULCOLAX) 5 MG EC tablet Take 2 tablets at 3 pm the day before your procedure. If your procedure is before 11 am, take 2 additional tablets at 11 pm. If your procedure is after 11 am, take 2 additional tablets at 6 am. For additional instructions refer to your colonoscopy prep instructions. 4 tablet 0    Calcium Carbonate (CALCIUM 500 PO) 2 tablets daily      cholecalciferol (VITAMIN D3) 25 mcg (1000 units) capsule Take 1 capsule by mouth daily.      exemestane (AROMASIN) 25 MG tablet Take 1 tablet (25 mg) by mouth every morning. 90 tablet 3    hydroCHLOROthiazide 12.5 MG tablet Take 1 tablet (12.5 mg) by mouth every morning. Please make a clinic visit to be seen in person for medication refills.  No virtual visits.  Thank you. 90 tablet 0    polyethylene glycol (GOLYTELY) 236 g suspension The night before the exam at 6 pm drink an 8-ounce glass every 15 minutes until the jug is half empty. If you arrive before 11 AM: Drink the other half of the Golytely jug at 11 PM night before procedure. If you arrive after 11 AM: Drink the other half of the Golytely jug at 6 AM day of procedure. For additional instructions refer to your colonoscopy prep instructions. 4000 mL 0    SENNA-docusate sodium (SENNA S) 8.6-50 MG tablet Take 1 tablet by mouth 2 times daily as needed (prevent opioid induced constipation) 30 tablet 0     Current Facility-Administered Medications   Medication Dose Route Frequency Provider Last Rate Last Admin    1.0 mL bupivacaine (MARCAINE) 0.5% injection (50 mL vial)  1 mL      1 mL at 09/06/24 1031    3.0 mL bupivacaine (MARCAINE) 0.5% injection (50 mL vial)  3 mL      3 mL at 09/06/24 1025    lidocaine 1 % injection 1 mL  1 mL      1 mL at 09/06/24 1031    lidocaine 1 % injection 3 mL  3  mL      3 mL at 09/06/24 1025    triamcinolone (KENALOG-40) injection 40 mg  40 mg      40 mg at 09/06/24 1025    triamcinolone (KENALOG-40) injection 40 mg  40 mg      40 mg at 09/06/24 1031     Facility-Administered Medications Ordered in Other Visits   Medication Dose Route Frequency Provider Last Rate Last Admin    lanreotide acetate (SOMATULINE) injection 120 mg  120 mg Subcutaneous Once Friedell, Peter E, MD        pembrolizumab (KEYTRUDA) 400 mg in sodium chloride 0.9 % 71 mL infusion  400 mg Intravenous Once Friedell, Peter E, MD        sodium chloride 0.9% BOLUS 250 mL  250 mL Intravenous Once Friedell, Peter E, MD               Past History    Past Medical History:   Diagnosis Date    Arthritis     knee    Benign breast biopsy     benign    Breast cancer (H)     Carcinoid tumor (H) 12/2003    Cervical cancer (H) 2007    H/O colposcopy with cervical biopsy 12/23/2013    vaginal cuff biopsy- VAIN III. referred back to gyn/onc    HTN     Hyperlipidemia     Malignant neoplasm of ovary (H)     Obesity     Pap smear of vagina with ASC-H 11/01/2013    Post-polio syndromes     Trigeminal neuralgias      Past Surgical History:   Procedure Laterality Date    APPENDECTOMY  01/01/1983    BIOPSY BREAST      BIOPSY NODE SENTINEL Bilateral 06/01/2016    Procedure: BIOPSY NODE SENTINEL;  Surgeon: Brent Arana MD;  Location: [facility]    [facility] SURGICAL PATHOLOGY      COLONOSCOPY N/A 06/23/2017    Procedure: COMBINED COLONOSCOPY, SINGLE OR MULTIPLE BIOPSY/POLYPECTOMY BY BIOPSY;  Colonoscopy Dx:Carcinoid tumor of colon prep mailed golytely;  Surgeon: Talisha Grceo MD;  [facility]    COLPOSCOPY, BIOPSY, COMBINED  03/13/2014    Procedure: COMBINED COLPOSCOPY, BIOPSY;;  Surgeon: Lara Pack MD;  [facility]    COMBINED CYSTOSCOPY, RETROGRADES, EXCHANGE STENT URETER(S) Left 02/07/2019    Procedure: COMBINED CYSTOSCOPY, RETROGRADES, EXCHANGE STENT URETER--left;  Surgeon: Zhao La MD;   Location: [facility]    COMBINED CYSTOSCOPY, RETROGRADES, EXCHANGE STENT URETER(S) Left 02/20/2020    Procedure: CYSTOSCOPY, WITH RETROGRADE PYELOGRAM AND Left URETERAL STENT exchange;  Surgeon: Zhao La MD;  Location: [facility]    COMBINED CYSTOSCOPY, RETROGRADES, EXCHANGE STENT URETER(S) Left 05/09/2021    Procedure: CYSTOSCOPY, WITH RETROGRADE PYELOGRAM AND LEFT URETERAL STENT REPLACEMENT;  Surgeon: Fred Owens MD;  [facility]    COMBINED CYSTOSCOPY, RETROGRADES, EXCHANGE STENT URETER(S) Left 09/16/2021    Procedure: CYSTOSCOPY, WITH left RETROGRADE PYELOGRAM AND left  URETERAL STENT REPLACEMENT;  Surgeon: Zhao La MD;  Location: [facility]    COMBINED CYSTOSCOPY, RETROGRADES, EXCHANGE STENT URETER(S) Left 03/24/2022    Procedure: CYSTOSCOPY, WITH RETROGRADE PYELOGRAM AND URETERAL STENT EXCHANGE, LEFT;  Surgeon: Zhao La MD;  Location: [facility]    COMBINED CYSTOSCOPY, RETROGRADES, URETEROSCOPY, INSERT STENT Left 02/02/2017    Procedure: COMBINED CYSTOSCOPY, RETROGRADES, URETEROSCOPY, INSERT STENT;  Surgeon: Zhao La MD;  Location: [facility]    CYSTOSCOPY, REMOVE STENT(S), COMBINED N/A 11/4/2022    Procedure: CYSTOSCOPY, LEFT STENT REMOVAL;  Surgeon: Zhao La MD;  [facility]    CYSTOSCOPY, RETROGRADES, INSERT STENT URETER(S), COMBINED Left 09/07/2017    Procedure: COMBINED CYSTOSCOPY, RETROGRADES, INSERT STENT URETER(S);  Cystoscopy,Left Stent Exchange;  Surgeon: Zhao La MD;  Location: [facility]    CYSTOSCOPY, RETROGRADES, INSERT STENT URETER(S), COMBINED  12/12/2017    Procedure: COMBINED CYSTOSCOPY, RETROGRADES, INSERT STENT URETER(S);;  Surgeon: Zhao La MD;  [facility]    CYSTOSCOPY, RETROGRADES, INSERT STENT URETER(S), COMBINED Left 07/05/2018    Procedure: COMBINED CYSTOSCOPY, RETROGRADES, INSERT STENT URETER(S);  Cystoscopy, Left Stent Exchange;  Surgeon: Zhao La  MD Harvinder;  Location: [facility]    DAVINCI NEPHRECTOMY Left 11/4/2022    Procedure: , LEFT NEPHRECTOMY, ROBOT-ASSISTED;  Surgeon: Zhao La MD;  [facility]    EXAM UNDER ANESTHESIA PELVIC  03/13/2014    Procedure: EXAM UNDER ANESTHESIA PELVIC;  Exam Under Anestheisa, Colposcopy, Vaginal Biopsies, Co2 Laser of the Upper Vagina;  Surgeon: Lara Pack MD;  [facility]    EXAM UNDER ANESTHESIA PELVIC N/A 07/01/2020    Procedure: Examination under anesthesia, vaginal biopsies;  Surgeon: Karli Gao MD;  [facility]    HERNIORRHAPHY INCISIONAL (LOCATION)      IR LYMPH NODE BIOPSY  6/28/2024    IR RHIZOTOMY  08/14/2020    LASER CO2 VAGINA  03/13/2014    VAIN 1/2    LASER CO2 VAGINA N/A 12/12/2017    Procedure: LASER CO2 VAGINA;  Exam Under Anesthesia, CO2 Laser Ablation Of Vagina, Cystoscopy, Left Retrograde Pyelogram with Left Stent Placement;  Surgeon: Nic Segundo MD;  [facility]    LUMPECTOMY BREAST      LUMPECTOMY BREAST WITH SEED LOCALIZATION Bilateral 06/01/2016    Procedure: LUMPECTOMY BREAST WITH SEED LOCALIZATION;  Surgeon: Brent Arana MD;  Location: [facility]    SURGICAL HISTORY OF -       ovarian cystectomy    SURGICAL HISTORY OF -   01/01/2003    right colon resection secondary to carcinoid tumor    TUBAL LIGATION      [facility] BSO, OMENTECTOMY W/OSMAN  05/01/2007    [facility] TOTAL ABDOM HYSTERECTOMY  05/01/2007     No Known Allergies  Family History   Problem Relation Age of Onset    Cancer Mother         bone / liver    Breast Cancer Mother     Cancer Sister         vulvar ca, cervical ca, squamous cell cancer    Breast Cancer Sister     Cancer Brother         Rectal- Stage 4    Rectal Cancer Brother     Cancer Maternal Grandmother     Cancer Maternal Grandfather     Cancer Paternal Grandmother     Cancer Paternal Grandfather     Breast Cancer Maternal Aunt     Breast Cancer Paternal Aunt     Colon Cancer Paternal Aunt     Pancreatic Cancer Nephew     Anesthesia  Reaction No family hx of     Venous thrombosis No family hx of     Bleeding Disorder No family hx of      Social History     Socioeconomic History    Marital status:      Spouse name: None    Number of children: None    Years of education: None    Highest education level: None   Tobacco Use    Smoking status: Former     Current packs/day: 0.00     Average packs/day: 1 pack/day for 29.0 years (29.0 ttl pk-yrs)     Types: Cigarettes     Start date: 10/30/1977     Quit date: 10/30/2006     Years since quittin.5    Smokeless tobacco: Never   Vaping Use    Vaping status: Never Used   Substance and Sexual Activity    Alcohol use: No     Alcohol/week: 0.0 standard drinks of alcohol     Comment: 1 drink per year    Drug use: No    Sexual activity: Yes     Partners: Male   Other Topics Concern     Service No    Blood Transfusions No    Caffeine Concern Yes     Comment: occasional coffee    Occupational Exposure No    Hobby Hazards Yes     Comment: [place],    Sleep Concern Yes     Comment: not sleeping well    Stress Concern Yes     Comment: Grandson in the     Weight Concern Yes    Special Diet No    Back Care No    Exercise No    Seat Belt Yes    Self-Exams Yes    Parent/sibling w/ CABG, MI or angioplasty before 65F 55M? No     Social Determinants of Health      [facility]    Social Connections   Interpersonal Safety: Not At Risk (2021)    Humiliation, Afraid, Rape, and Kick questionnaire     Fear of Current or Ex-Partner: No     Emotionally Abused: No     Physically Abused: No     Sexually Abused: No           Lab Results  Recent Results (from the past 720 hours)   Comprehensive metabolic panel    Collection Time: 11/15/24  3:05 PM   Result Value Ref Range    Sodium 142 135 - 145 mmol/L    Potassium 3.5 3.4 - 5.3 mmol/L    Carbon Dioxide (CO2) 33 (H) 22 - 29 mmol/L    Anion Gap 6 (L) 7 - 15 mmol/L     Urea Nitrogen 29.5 (H) 8.0 - 23.0 mg/dL    Creatinine 1.31 (H) 0.51 - 0.95 mg/dL    GFR Estimate 42 (L) >60 mL/min/1.73m2    Calcium 10.0 8.8 - 10.4 mg/dL    Chloride 103 98 - 107 mmol/L    Glucose 162 (H) 70 - 99 mg/dL    Alkaline Phosphatase 71 40 - 150 U/L    AST 15 0 - 45 U/L    ALT 12 0 - 50 U/L    Protein Total 6.9 6.4 - 8.3 g/dL    Albumin 3.7 3.5 - 5.2 g/dL    Bilirubin Total 0.4 <=1.2 mg/dL   CBC with platelets and differential    Collection Time: 11/15/24  3:05 PM   Result Value Ref Range    WBC Count 7.4 4.0 - 11.0 10e3/uL    RBC Count 4.71 3.80 - 5.20 10e6/uL    Hemoglobin 14.8 11.7 - 15.7 g/dL    Hematocrit 44.6 35.0 - 47.0 %    MCV 95 78 - 100 fL    MCH 31.4 26.5 - 33.0 pg    MCHC 33.2 31.5 - 36.5 g/dL    RDW 13.9 10.0 - 15.0 %    Platelet Count 181 150 - 450 10e3/uL    % Neutrophils 70 %    % Lymphocytes 19 %    % Monocytes 6 %    % Eosinophils 4 %    % Basophils 1 %    % Immature Granulocytes 1 %    NRBCs per 100 WBC 0 <1 /100    Absolute Neutrophils 5.2 1.6 - 8.3 10e3/uL    Absolute Lymphocytes 1.4 0.8 - 5.3 10e3/uL    Absolute Monocytes 0.4 0.0 - 1.3 10e3/uL    Absolute Eosinophils 0.3 0.0 - 0.7 10e3/uL    Absolute Basophils 0.0 0.0 - 0.2 10e3/uL    Absolute Immature Granulocytes 0.0 <=0.4 10e3/uL    Absolute NRBCs 0.0 10e3/uL   Creatinine POCT    Collection Time: 11/22/24 10:31 AM   Result Value Ref Range    Creatinine POCT 1.4 (H) 0.5 - 1.0 mg/dL    GFR, ESTIMATED POCT 39 (L) >60 mL/min/1.73m2   Surgical Pathology Exam    Collection Time: 11/22/24 11:20 AM   Result Value Ref Range    Case Report       Surgical Pathology Report                         Case: [id number]                                  Authorizing Provider:  Jennifer Allen NP            Collected:           11/22/2024 11:20 AM          Ordering Location:     [facility]   Received:            11/22/2024 12:01 PM                                 Edel Razo                                                                   Pathologist:           Amita Hanna MD                                                   Specimen:    Breast, Right, right breast 9 o'clock 5 cm fn                                              Addendum       Carcinoma is NOE-3 positive (strong, diffuse) and p53 positive (strong, diffuse over-expression).    There is no change in the diagnosis.    Immunostains are examined with appropriate positive controls (block A1).    The following assays; ALK (D5F3), ER, HER2, PD-L1, BRAF, CD20, CD30 AZF, CD30 Formalin, MLH-1, MSH-2, MSH-6 and PMS-2, have not been validated on decalcified tissues. Results should be interpreted with caution given the possibility of false negative results on decalcified specimens.          Final Diagnosis       RIGHT breast, 9:00, 5 cm from nipple, ultrasound guided core biopsy:  -INVASIVE BREAST CARCINOMA OF NO SPECIAL TYPE (INVASIVE DUCTAL CARCINOMA), Ranier grade 3  -Ductal carcinoma in situ (DCIS), nuclear grade 3, solid and cribriform types, with focal necrosis  -Invasive carcinoma is estrogen receptor negative, progesterone receptor negative and HER2 negative (score 1+) by immunohistochemistry (see biomarker reporting template below)  -See comment      Comment       Invasive carcinoma involves multiple tissue cores, and is 8 in greatest dimension in a single core.  The Luda grade is 3 (tubule formation 3 + nuclear pleomorphism 3 + mitotic activity 3 = Luda score 9).  There are up to 20 mitotic figures in 10 high power fields (field diameter 0.5 mm).  The presence of carcinoma in situ supports a diagnosis of a new primary breast carcinoma. We will request slides from the patient's 2024 right neck core biopsy and right supraclavicular lymph node FNA for comparison to the current case.      Synoptic Checklist       Breast Biomarker Reporting Template   BREAST BIOMARKER REPORTING TEMPLATE - All Specimens   Protocol posted: 12/13/2023         Test(s) Performed:             Estrogen Receptor (ER) Status:    Negative (less than 1%)           :    Internal control cells present and stain as expected         Test Type:    Food and Drug Administration (FDA) cleared (test / vendor): [place]         Primary Antibody:    SP1         Scoring System:    No separate scoring system used       Test(s) Performed:            Progesterone Receptor (PgR) Status:    Negative (less than 1%)           :    Internal control cells present and stain as expected         Test Type:    Food and Drug Administration (FDA) cleared (test / vendor): [place]         Primary Antibody:    1E2         Scoring System:    No separate scoring system used       Test(s) Performed:            HER2 by Immunohistochemistry:    Negative (Score 1+)         Test Type:    Food and Drug Administration (FDA) cleared (test / vendor): [employer]         Primary Antibody:    4B5       Cold Ischemia and Fixation Times:    Meet requirements specified in latest version of the ASCO / CAP Guidelines       Cold Ischemia Time (minutes):    0 min      Fixation Time (hours):    10.2 hours      Testing Performed on Block Number(s):    A1       METHODS      Fixative:    Formalin       Image Analysis:    Not performed       Clinical Information       The patient is a 74-year-old woman, with a history of BILATERAL breast carcinomas in 2016, treated with BILATERAL lumpectomies and sentinel lymph node biopsies and adjuvant radiation (RIGHT breast invasive ductal carcinoma, [place] grade 3, ER positive/PgR negative/HER2 not amplified, pT1aN0(sn); LEFT breast invasive ductal carcinoma, [place] grade 2, ER positive/PgR positive/HER2 not amplified, pT1cN0(sn)). She also has a history of ileal carcinoid tumor, metastatic to regional lymph nodes at time of resection in 2003 (and later metastatic to liver (2019) and sacrum (2023)). She also has a history of cervical squamous cell carcinoma (radical hysterectomy in 2007) and vaginal high grade and  "low grade squamous intraepithelial lesions. RIGHT neck core biopsy in March 2024 and RIGHT supraclavicular FNA in June 2024 were interpreted as metastatic poorly differentiated carcinoma (clinically thought to be metastatic from a lung primary). A recent screening mammogram showed a RIGHT breast focal asymmetry. Diagnostic mammogram and ultrasound show a 1.2 cm spiculated RIGHT breast mass at 9-10:00, middle depth, 5 cm from nipple (prior RIGHT breast lumpectomy scar is posterior to this mass). Contrast enhanced mammogram shows a corresponding RIGHT breast 2.4 cm area of mass enhancement with surrounding nonmass enhancement at 9:00, 5 cm from the nipple.  Procedure: RIGHT breast ultrasound guided core biopsy (globe-shaped biopsy marker placed).      Gross Description       A(1). Breast, Right, right breast 9 o'clock 5 cm fn:  The specimen is received in formalin with proper patient identification, labeled \"right breast 9:00 5 cm fn\".  It consists of 3 yellow-tan fibroadipose tissue cores ranging in length from 1.1 to 1.2 cm and averaging 0.1 cm in diameter, which are wrapped and entirely submitted in cassette A1.    The specimen is collected and placed in formalin at 11:49 AM on 11/22/24.       Microscopic Description       Microscopic examination is performed. Immunostains are examined with appropriate positive and negative controls. Smooth muscle myosin (SMM) and p63 highlight myoepithelial cells (MECs) at the periphery of ducts involved by DCIS (and at the periphery of benign ducts and acini), and show no MECs associated with invasive carcinoma.    Case was reviewed by the following:  Resident Pathologist: Gómez Wang MD  A resident or fellow in a training program was involved in the initial review, preparation, and/or interpretation of this case.  I, as the senior physician, attest that I have personally reviewed all specimens and or slides, including the listed special stains, and used them with my " medical judgement to determine the final diagnosis.              Disclaimer         The following assays; ALK (D5F3), ER, HER2, PD-L1, BRAF, CD20, CD30 AZF, CD30 Formalin, MLH-1, MSH-2, MSH-6 and PMS-2, have not been validated on decalcified tissues. Results should be interpreted with caution given the possibility of false negative results on decalcified specimens.          MCRS Yes (A) N/A    Performing Labs       The technical component of this testing was completed at [facility].    Stain controls for all stains resulted within this report have been reviewed and show appropriate reactivity.       Case Images     Creatinine POCT    Collection Time: 12/06/24  2:45 PM   Result Value Ref Range    Creatinine POCT 1.5 (H) 0.5 - 1.0 mg/dL    GFR, ESTIMATED POCT 36 (L) >60 mL/min/1.73m2   Comprehensive metabolic panel    Collection Time: 12/13/24 12:46 PM   Result Value Ref Range    Sodium 142 135 - 145 mmol/L    Potassium 3.4 3.4 - 5.3 mmol/L    Carbon Dioxide (CO2) 29 22 - 29 mmol/L    Anion Gap 10 7 - 15 mmol/L    Urea Nitrogen 14.4 8.0 - 23.0 mg/dL    Creatinine 1.29 (H) 0.51 - 0.95 mg/dL    GFR Estimate 43 (L) >60 mL/min/1.73m2    Calcium 9.6 8.8 - 10.4 mg/dL    Chloride 103 98 - 107 mmol/L    Glucose 79 70 - 99 mg/dL    Alkaline Phosphatase 66 40 - 150 U/L    AST 17 0 - 45 U/L    ALT 11 0 - 50 U/L    Protein Total 6.7 6.4 - 8.3 g/dL    Albumin 3.7 3.5 - 5.2 g/dL    Bilirubin Total 0.7 <=1.2 mg/dL   TSH with free T4 reflex    Collection Time: 12/13/24 12:46 PM   Result Value Ref Range    TSH 1.96 0.30 - 4.20 uIU/mL   CBC with platelets and differential    Collection Time: 12/13/24 12:46 PM   Result Value Ref Range    WBC Count 7.5 4.0 - 11.0 10e3/uL    RBC Count 4.40 3.80 - 5.20 10e6/uL    Hemoglobin 13.9 11.7 - 15.7 g/dL    Hematocrit 40.1 35.0 - 47.0 %    MCV 91 78 - 100 fL    MCH 31.6 26.5 - 33.0 pg    MCHC 34.7 31.5 - 36.5 g/dL    RDW 13.4 10.0 - 15.0  %    Platelet Count 190 150 - 450 10e3/uL    % Neutrophils 74 %    % Lymphocytes 16 %    % Monocytes 6 %    % Eosinophils 4 %    % Basophils 1 %    % Immature Granulocytes 0 %    NRBCs per 100 WBC 0 <1 /100    Absolute Neutrophils 5.5 1.6 - 8.3 10e3/uL    Absolute Lymphocytes 1.2 0.8 - 5.3 10e3/uL    Absolute Monocytes 0.4 0.0 - 1.3 10e3/uL    Absolute Eosinophils 0.3 0.0 - 0.7 10e3/uL    Absolute Basophils 0.0 0.0 - 0.2 10e3/uL    Absolute Immature Granulocytes 0.0 <=0.4 10e3/uL    Absolute NRBCs 0.0 10e3/uL               I spent 45 minutes on the patient's visit today.  This included preparation for the visit, face-to-face time with the patient and documentation following the visit.  It did not include teaching or procedure time.    Signed by: Peter E. Friedell, MD

## 2024-12-17 ENCOUNTER — OFFICE VISIT (OUTPATIENT)
Dept: SURGERY | Facility: CLINIC | Age: 74
End: 2024-12-17
Attending: NURSE PRACTITIONER
Payer: COMMERCIAL

## 2024-12-17 VITALS
SYSTOLIC BLOOD PRESSURE: 160 MMHG | HEIGHT: 68 IN | TEMPERATURE: 98.3 F | BODY MASS INDEX: 30.87 KG/M2 | DIASTOLIC BLOOD PRESSURE: 71 MMHG | HEART RATE: 54 BPM | WEIGHT: 203.71 LBS

## 2024-12-17 DIAGNOSIS — C50.911 INVASIVE DUCTAL CARCINOMA OF BREAST, FEMALE, RIGHT (H): Primary | ICD-10-CM

## 2024-12-17 DIAGNOSIS — R59.0 AXILLARY LYMPHADENOPATHY: ICD-10-CM

## 2024-12-17 PROCEDURE — 99204 OFFICE O/P NEW MOD 45 MIN: CPT | Performed by: SURGERY

## 2024-12-17 NOTE — NURSING NOTE
"Initial BP (!) 160/71 (BP Location: Right arm, Patient Position: Sitting, Cuff Size: Adult Large)   Pulse 54   Temp 98.3  F (36.8  C) (Tympanic)   Ht 1.715 m (5' 7.52\")   Wt 92.4 kg (203 lb 11.3 oz)   BMI 31.42 kg/m   Estimated body mass index is 31.42 kg/m  as calculated from the following:    Height as of this encounter: 1.715 m (5' 7.52\").    Weight as of this encounter: 92.4 kg (203 lb 11.3 oz). .  Cherise Bedoya MA    "

## 2024-12-17 NOTE — PROGRESS NOTES
Assessment:    Marissa Guadarrama is seen in consultation for right breast cancer, at the request of Physician No Ref-Primary.    Marissa is a 74 year old female with right breast invasive ductal carcinoma, grade 3, ER -, SD -, [id number] - measuring 2.9 cm at 9:00 and 4 cm from the nipple.    Plan:  Patient has a bilobed right breast cancer.  She also has other malignancy including melanoma, lung carcinoma, and metastatic carcinoid.  She had prior biopsy of right cervical node which showed poorly differentiated carcinoma.  I am uncertain if this is related to her right breast cancer which appears to have been there since prior PET earlier this year with PET positive nodes.  Her most recent CT showed diminished size of lymph nodes.  This may represent metastatic disease.  At present time, I recommend ultrasound of axilla with possible biopsy of any abnormal lymph nodes.  Patient has extremely complex oncologic history and likely needs discussion at tumor board to tailor a plan specific to her and consistent with her goals.    Will discuss further when ultrasound is complete    HPI:  Marissa Guadarrama is a 74 year old female who presents to discuss her recently diagnosed invasive ductal carcinoma.  This was diagnosed via right mammography and core needle biopsy.     Breast mass noted:  none   Skin rashes, dimpling or nipple changes:  none  Nipple discharge:  none  Breast pain:   No  Perform self breast exams: No  Previous breast biopsies: No  Previous cyst aspiration: No  Previous other breast surgery: Yes - Bilateral lumpectomies in 2016.  History of radiation.      Family History:  Family history of breast cancer: Yes - Mother, Sister, Maternal aunt; Paternal aunt  Family history of ovarian cancer:  Mother  Family history of colon cancer: Yes - Brother  Family history of prostate cancer: No    Imaging:   All imaging studies reviewed by me.    Recent Results (from the past 744 hours)   US Breast Biopsy Core Needle  Right    Addendum: 11/27/2024    Addendum: Pathology results are concordant with imaging as follows:    RIGHT breast, 9:00, 5 cm from nipple, ultrasound guided core biopsy:  -INVASIVE BREAST CARCINOMA OF NO SPECIAL TYPE (INVASIVE DUCTAL  CARCINOMA), Luda grade 3  -Ductal carcinoma in situ (DCIS), nuclear grade 3, solid and  cribriform types, with focal necrosis  -Invasive carcinoma is estrogen receptor negative, progesterone  receptor negative and HER2 negative (score 1+) by immunohistochemistry  (see biomarker reporting template below)    Recommendations: Oncologic and surgical consultation    NACHO WEAVER MD         SYSTEM ID:  [id number]      Narrative    Ultrasound guided right breast biopsy.    Comparisons: 11/8/2024, 10/18/2024    FINDINGS: Procedure, risks, benefits and alternatives were discussed  with the patient and the patient gave written and verbal consent.  Aseptic technique was utilized. Approximately 10 cc of 1% lidocaine  were utilized for local anesthesia. Under ultrasound guidance, a 14g  core needle biopsy system was utilized to sample lesion located at the  9:00 position right breast. A globe shaped clip was placed. Pressure  was held over this area for approximately 15 minutes. A dressing was  placed and care instructions were discussed with the patient.    Post biopsy mammogram demonstrates clip deployment.      Impression    IMPRESSION:    Uncomplicated ultrasound guided core needle biopsy  right breast.    NACHO WEAVER MD         SYSTEM ID:  [id number]   MA Post Procedure Right    Addendum: 11/27/2024    Addendum: Pathology results are concordant with imaging as follows:    RIGHT breast, 9:00, 5 cm from nipple, ultrasound guided core biopsy:  -INVASIVE BREAST CARCINOMA OF NO SPECIAL TYPE (INVASIVE DUCTAL  CARCINOMA), Luda grade 3  -Ductal carcinoma in situ (DCIS), nuclear grade 3, solid and  cribriform types, with focal necrosis  -Invasive carcinoma is estrogen  receptor negative, progesterone  receptor negative and HER2 negative (score 1+) by immunohistochemistry  (see biomarker reporting template below)    Recommendations: Oncologic and surgical consultation    NACHO WEAVER MD         SYSTEM ID:  [id number]      Narrative    Ultrasound guided right breast biopsy.    Comparisons: 11/8/2024, 10/18/2024    FINDINGS: Procedure, risks, benefits and alternatives were discussed  with the patient and the patient gave written and verbal consent.  Aseptic technique was utilized. Approximately 10 cc of 1% lidocaine  were utilized for local anesthesia. Under ultrasound guidance, a 14g  core needle biopsy system was utilized to sample lesion located at the  9:00 position right breast. A globe shaped clip was placed. Pressure  was held over this area for approximately 15 minutes. A dressing was  placed and care instructions were discussed with the patient.    Post biopsy mammogram demonstrates clip deployment.      Impression    IMPRESSION:    Uncomplicated ultrasound guided core needle biopsy  right breast.    NACHO WEAVER MD         SYSTEM ID:  [id number]   MA Contrast Enhanced Mammogram w Herrera    Narrative    Examination: Bilateral dual-energy contrast-enhanced digital  diagnostic mammography with computer aided detection    Comparison: Mammogram and ultrasound 11/8/2024, 10/18/2024. PET CT  4/26/2024. Ultrasound-guided biopsy of neck lymph node 3/22/2024    History/Family History: Remote of breast conservation therapy for  bilateral breast cancer. History of neuroendocrine tumor. Biopsy of  cervical lymphadenopathy last spring was remarkable for poorly  differentiated carcinoma. New right breast mass on screening mammogram  and subsequent diagnostic. Prebiopsy.    Technique: Following the uneventful administration of intravenous  contrast, dual energy digital mammography was performed.    BREAST DENSITY: The breasts are heterogeneously dense, which may  obscure small  masses.    Findings: There is minimal background enhancement. In the right breast  corresponding to the mass at the 9:00 position, 5 cm from the nipple,  is an area of mass enhancement with surrounding nonmass enhancement  measuring 2.4 x 1.3 x 1.0 cm. No other suspicious enhancement in  either breast, including at the 9:30 position 4 cm from the nipple at  the site of questionable second lesion on prior ultrasound.    Repeat ultrasound at the 9:30 position, 4 cm from the nipple  demonstrates no definite suspicious finding.      Impression    IMPRESSION: BI-RADS CATEGORY: 4 - Suspicious.    RECOMMENDED FOLLOW-UP: Biopsy. Ultrasound-guided core needle biopsy of  the right breast to follow.    NACHO WEAVER MD         SYSTEM ID:  [id number]   CT Soft Tissue Neck w Contrast    Narrative    CT SCAN OF THE NECK WITH CONTRAST  12/6/2024 3:16 PM     HISTORY: Metastatic cancer to cervical nodes question primary. Assess  response to immunotherapy. Malignant carcinoid tumor of cecum (H).  Carcinoma metastatic to lymph nodes of multiple sites with unknown  primary site (H).     TECHNIQUE: Axial CT images of the neck following administration of  intravenous contrast with reformations. Radiation dose for this scan  was reduced using automated exposure control, adjustment of the mA  and/or kV according to patient size, or iterative reconstruction  technique. 80mL Isovue-370 IV.     COMPARISON: CT of the neck dated 3/8/2024.    FINDINGS: Overall, there appears to be decreased right-sided cervical  lymphadenopathy compared to prior exam of 3/8/2024. For example, the  following lymph nodes have decreased in size:    -A right level Va lymph node measures 9 mm (series 3 image 56)  compared to 15 mm previously.   -A right level IV lymph node now measures 8 mm (series 3 image 66)  compared to 10 mm.  -A right supraclavicular lymph node measures 10 mm (series 3 image 80)  compared to 19 mm.    Multiple additional clustered lymph  nodes in the right mid to lower  neck have similarly decreased in size. No obvious new/increasing  cervical lymphadenopathy is identified.    Bilateral lens implants. Mild polypoid mucosal thickening in the left  sphenoid sinus. Otherwise, the visualized paranasal sinuses are clear.  The mastoid and middle ear cavities are clear. Visualized intracranial  contents are grossly unremarkable. Normal appearance of the   and parapharyngeal spaces. The mucosal spaces of the upper  aerodigestive tract appear grossly unremarkable, accounting for  technique.    Normal CT appearance of the bilateral submandibular and parotid  glands. No dominant thyroid mass or nodule. The cervical carotid and  vertebral arteries and the internal jugular veins appear grossly  patent. Mild emphysematous changes of the upper lung zones with  unchanged bandlike soft tissue opacity in the left upper pulmonary  lobe. Multilevel degenerative changes are noted in the cervical spine.  Please refer to CT chest, abdomen and pelvis report of same day for  additional details.      Impression    IMPRESSION:  1. Decreased right cervical/supraclavicular lymphadenopathy compared  to the prior soft tissue neck CT of 3/8/2024.  2. Otherwise similar chronic findings, as described.  3. Please refer to separate report from CT of the chest, abdomen and  pelvis of same day for additional details.     TESSA MICHEL MD         SYSTEM ID:  [id number]   CT Chest/Abdomen/Pelvis w Contrast    Narrative    CT CHEST/ABDOMEN/PELVIS WITH CONTRAST 12/6/2024 3:17 PM    CLINICAL HISTORY: Metastatic cancer to cervical nodes question  primary. Med/hilar nodes. 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 History of breast cancer. Assess  response to immunotherapy. Malignant carcinoid tumor of cecum (H).  Carcinoma metastatic to lymph nodes of multiple sites with unknown  primary site (H).    TECHNIQUE: CT scan of the chest, abdomen, and pelvis was  performed  following injection of IV contrast. Multiplanar reformats were  obtained. Dose reduction techniques were used.   CONTRAST: 75 mL Isovue-370    COMPARISON: 3/8/2024, 7/28/2023, 7/21/2023    FINDINGS:   LUNGS AND PLEURA: A previously described irregular spiculated  consolidation in the left upper lobe is unchanged, in keeping with  history of post radiation change. Upper lobe predominant pulmonary  emphysema. Mild biapical pleural-parenchymal scarring. A noncalcified  pulmonary nodule in the left lung base with mean diameter of 5 mm  (3-239) is similar to comparison. A few additional scattered 1 to 2 mm  pulmonary nodules are unchanged as well. No new or enlarging pulmonary  nodules. No pleural fluid. Large airways are patent. Previously seen  intralobular septal thickening on the left has markedly improved.    MEDIASTINUM/AXILLAE: Heart size is normal. No pericardial fluid.  Thoracic aorta is normal in course and caliber. Moderate coronary  artery atherosclerosis. There has been interval decrease in size and  number of previously seen hypermetabolic right lower cervical and  right axillary lymph nodes. Several prominent and a few mildly  enlarged hilar lymph nodes are unchanged. Previously described  hypermetabolic precarinal lymph node is unchanged. Benign densely  calcified distal right paratracheal lymph nodes are noted. No new or  enlarging thoracic lymph nodes.    HEPATOBILIARY: Gallbladder is absent. Similar-appearing mild  intrahepatic bile duct dilation, likely secondary to reservoir effect.  An ill-defined heterogenous hypodense mass in the posteromedial right  hepatic lobe is similar to comparison measuring 6 x 4.4 cm (4-143),  previously 5.9 x 4.5 cm.    PANCREAS: There is global atrophy of the pancreas. No pancreatic mass  or inflammatory changes. No dilation of the pancreatic duct.    SPLEEN: Spleen is normal in size. No splenic mass. A perisplenic  mesenteric nodule in the left upper quadrant  measuring 18 x 10 mm is  similar to comparison, previously 19 x 9 mm, and was not previously  hypermetabolic.    ADRENAL GLANDS: Right adrenal gland is not well seen. Left adrenal  gland is normal-appearing.    KIDNEYS/BLADDER: The left kidney is absent. Right kidney is normal in  appearance. No nephrolithiasis or hydronephrosis. No urinary bladder  wall thickening.    BOWEL: Patient is status post right hemicolectomy with ileocolonic  anastomosis. Minimal colonic diverticulosis present without evidence  of diverticulitis. No signs of bowel obstruction or inflammation.    PELVIC ORGANS: Uterus is absent. No free pelvic fluid.  Similar-appearing nodularity in the pelvis, particularly the left  adnexa with the largest component measuring 2.4 x 2.4 cm (4-261),  previously 2.2 cm. No new or significantly enlarging peritoneal  nodularity in the pelvis.    ADDITIONAL FINDINGS: None.    MUSCULOSKELETAL: Increased sclerotic changes involving the S1 sacral  lesion on the left correspond to an area of previously described  hypermetabolic activity. Increased sclerotic changes involving the  left lateral third rib (4-41). There are also several new sclerotic  osseous lesions involving the sacrum and spine (S2-3, L4, L2, T11,  right fourth rib). Multilevel hypertrophic degenerative changes of the  spine. Multiple small round subcutaneous nodules are seen in the low  back and gluteal region, bilaterally. Asymmetric muscle atrophy of the  imaged left lower extremity.      Impression    IMPRESSION:  1.  Interval improvement in previously seen hypermetabolic right lower  cervical and right axillary lymphadenopathy. Distal right  paraesophageal lymph node is unchanged.  2.  Unchanged irregular consolidation in the left upper lobe,  corresponding to post radiation change. Cannot exclude residual or  recurrent malignancy.  3.  Resolution of previously seen intralobular septal thickening of  the left lung.  4.  Interval increase in  sclerotic changes of the left sacral lesion  and left third rib lesion. Several new sclerotic osseous lesions.  5.  Similar appearing right hepatic lobe lesion.  6.  Similar-appearing peritoneal nodularity in the pelvis.  7.  Unchanged subcentimeter pulmonary nodules.  8.  Additional nonacute findings as above.     SHEREE MEJIA MD         SYSTEM ID:  [id number]       Percutaneous core needle biopsy:   Final Diagnosis   RIGHT breast, 9:00, 5 cm from nipple, ultrasound guided core biopsy:  -INVASIVE BREAST CARCINOMA OF NO SPECIAL TYPE (INVASIVE DUCTAL CARCINOMA), Luda grade 3  -Ductal carcinoma in situ (DCIS), nuclear grade 3, solid and cribriform types, with focal necrosis  -Invasive carcinoma is estrogen receptor negative, progesterone receptor negative and HER2 negative (score 1+) by immunohistochemistry (see biomarker reporting template below)       Past Medical History:   has a past medical history of Arthritis, Benign breast biopsy, Breast cancer (H), Carcinoid tumor (H) (12/2003), Cervical cancer (H) (2007), H/O colposcopy with cervical biopsy (12/23/2013), HTN, Hyperlipidemia, Malignant neoplasm of ovary (H), Obesity, Pap smear of vagina with ASC-H (11/01/2013), Post-polio syndromes, and Trigeminal neuralgias.    Past Surgical History:  Past Surgical History:   Procedure Laterality Date    APPENDECTOMY  01/01/1983    BIOPSY BREAST      BIOPSY NODE SENTINEL Bilateral 06/01/2016    Procedure: BIOPSY NODE SENTINEL;  Surgeon: Brent Arana MD;  Location: [facility]    [facility] SURGICAL PATHOLOGY      COLONOSCOPY N/A 06/23/2017    Procedure: COMBINED COLONOSCOPY, SINGLE OR MULTIPLE BIOPSY/POLYPECTOMY BY BIOPSY;  Colonoscopy Dx:Carcinoid tumor of colon prep mailed golytely;  Surgeon: Talisha Greco MD;  Location:  GI    COLPOSCOPY, BIOPSY, COMBINED  03/13/2014    Procedure: COMBINED COLPOSCOPY, BIOPSY;;  Surgeon: Lara Pack MD;  Location:  OR    COMBINED CYSTOSCOPY, RETROGRADES, EXCHANGE  STENT URETER(S) Left 02/07/2019    Procedure: COMBINED CYSTOSCOPY, RETROGRADES, EXCHANGE STENT URETER--left;  Surgeon: Zhao La MD;  Location: [facility]    COMBINED CYSTOSCOPY, RETROGRADES, EXCHANGE STENT URETER(S) Left 02/20/2020    Procedure: CYSTOSCOPY, WITH RETROGRADE PYELOGRAM AND Left URETERAL STENT exchange;  Surgeon: Zhao La MD;  Location: [facility]    COMBINED CYSTOSCOPY, RETROGRADES, EXCHANGE STENT URETER(S) Left 05/09/2021    Procedure: CYSTOSCOPY, WITH RETROGRADE PYELOGRAM AND LEFT URETERAL STENT REPLACEMENT;  Surgeon: Fred Owens MD;  [facility]    COMBINED CYSTOSCOPY, RETROGRADES, EXCHANGE STENT URETER(S) Left 09/16/2021    Procedure: CYSTOSCOPY, WITH left RETROGRADE PYELOGRAM AND left  URETERAL STENT REPLACEMENT;  Surgeon: Zhao La MD;  Location: [facility]    COMBINED CYSTOSCOPY, RETROGRADES, EXCHANGE STENT URETER(S) Left 03/24/2022    Procedure: CYSTOSCOPY, WITH RETROGRADE PYELOGRAM AND URETERAL STENT EXCHANGE, LEFT;  Surgeon: Zhao La MD;  Location: [facility]    COMBINED CYSTOSCOPY, RETROGRADES, URETEROSCOPY, INSERT STENT Left 02/02/2017    Procedure: COMBINED CYSTOSCOPY, RETROGRADES, URETEROSCOPY, INSERT STENT;  Surgeon: Zhao La MD;  Location: [facility]    CYSTOSCOPY, REMOVE STENT(S), COMBINED N/A 11/4/2022    Procedure: CYSTOSCOPY, LEFT STENT REMOVAL;  Surgeon: Zhao La MD;  [facility]: [facility]    CYSTOSCOPY, RETROGRADES, INSERT STENT URETER(S), COMBINED Left 09/07/2017    Procedure: COMBINED CYSTOSCOPY, RETROGRADES, INSERT STENT URETER(S);  Cystoscopy,Left Stent Exchange;  Surgeon: Zhao La MD;  Location: [facility]    CYSTOSCOPY, RETROGRADES, INSERT STENT URETER(S), COMBINED  12/12/2017    Procedure: COMBINED CYSTOSCOPY, RETROGRADES, INSERT STENT URETER(S);;  Surgeon: Zhao La MD;  [facility]    CYSTOSCOPY, RETROGRADES, INSERT STENT URETER(S), COMBINED  Left 07/05/2018    Procedure: COMBINED CYSTOSCOPY, RETROGRADES, INSERT STENT URETER(S);  Cystoscopy, Left Stent Exchange;  Surgeon: Zhao La MD;  Location: [facility]    DAVINCI NEPHRECTOMY Left 11/4/2022    Procedure: , LEFT NEPHRECTOMY, ROBOT-ASSISTED;  Surgeon: Zhao La MD;  Location: [facility]    EXAM UNDER ANESTHESIA PELVIC  03/13/2014    Procedure: EXAM UNDER ANESTHESIA PELVIC;  Exam Under Anestheisa, Colposcopy, Vaginal Biopsies, Co2 Laser of the Upper Vagina;  Surgeon: Lara Pack MD;  [facility]    EXAM UNDER ANESTHESIA PELVIC N/A 07/01/2020    Procedure: Examination under anesthesia, vaginal biopsies;  Surgeon: Karli Gao MD;  [facility]    HERNIORRHAPHY INCISIONAL (LOCATION)      IR LYMPH NODE BIOPSY  6/28/2024    IR RHIZOTOMY  08/14/2020    LASER CO2 VAGINA  03/13/2014    VAIN 1/2    LASER CO2 VAGINA N/A 12/12/2017    Procedure: LASER CO2 VAGINA;  Exam Under Anesthesia, CO2 Laser Ablation Of Vagina, Cystoscopy, Left Retrograde Pyelogram with Left Stent Placement;  Surgeon: Nic Segundo MD;  [facility]    LUMPECTOMY BREAST      LUMPECTOMY BREAST WITH SEED LOCALIZATION Bilateral 06/01/2016    Procedure: LUMPECTOMY BREAST WITH SEED LOCALIZATION;  Surgeon: Brent Arana MD;  Location: [facility]    SURGICAL HISTORY OF -       ovarian cystectomy    SURGICAL HISTORY OF -   01/01/2003    right colon resection secondary to carcinoid tumor    TUBAL LIGATION      [facility] BSO, OMENTECTOMY W/OSMAN  05/01/2007    [facility] TOTAL ABDOM HYSTERECTOMY  05/01/2007        Social History:  Social History     Socioeconomic History    Marital status:      Spouse name: Not on file    Number of children: Not on file    Years of education: Not on file    Highest education level: Not on file   Occupational History    Not on file   Tobacco Use    Smoking status: Former     Current packs/day: 0.00     Average packs/day: 1 pack/day for 29.0 years (29.0 ttl pk-yrs)      "Types: Cigarettes     Start date: 10/30/1977     Quit date: 10/30/2006     Years since quittin.1    Smokeless tobacco: Never   Vaping Use    Vaping status: Never Used   Substance and Sexual Activity    Alcohol use: No     Alcohol/week: 0.0 standard drinks of alcohol     Comment: 1 drink per year    Drug use: No    Sexual activity: Yes     Partners: Male   Other Topics Concern     Service No    Blood Transfusions No    Caffeine Concern Yes     Comment: occasional coffee    Occupational Exposure No    Hobby Hazards Yes     Comment: [place],    Sleep Concern Yes     Comment: not sleeping well    Stress Concern Yes     Comment: Grandson in the     Weight Concern Yes    Special Diet No    Back Care No    Exercise No    Bike Helmet Not Asked    Seat Belt Yes    Self-Exams Yes    Parent/sibling w/ CABG, MI or angioplasty before 65F 55M? No   Social History Narrative    Not on file     Social Drivers of Health     Financial Resource Strain: Not on file   Food Insecurity: Not on file   Transportation Needs: Not on file   Physical Activity: Not on file   Stress: Not on file   Social Connections: Unknown (4/10/2023)    Received from [facility], [facility]    Social Connections     Frequency of Communication with Friends and Family: Not on file   Interpersonal Safety: Not At Risk (2021)    Humiliation, Afraid, Rape, and Kick questionnaire     Fear of Current or Ex-Partner: No     Emotionally Abused: No     Physically Abused: No     Sexually Abused: No   Housing Stability: Not on file      ROS:  The 10 point review of systems is negative other than noted in the HPI and above.    PE:  Vitals: BP (!) 160/71 (BP Location: Right arm, Patient Position: Sitting, Cuff Size: Adult Large)   Pulse 54   Temp 98.3  F (36.8  C) (Tympanic)   Ht 1.715 m (5' 7.52\")   Wt 92.4 kg (203 lb 11.3 oz)   BMI 31.42 kg/m    General appearance: well-nourished, " sitting comfortably, no apparent distress  HEENT:  Head normocephalic and atraumatic, pupils equal and round, conjunctivae clear, mucous membranes moist, external ears and nose normal  Neck: Supple without thyromegaly or masses  Lungs: Respirations unlabored  Lymphatic: No cervical, or supraclavicular lymphadenopathy  Extremities: Without edema  Musculoskeletal:  Normal station and gait  Neurologic: alert, speech is clear, moves all extremities with good strength  Psychiatric: Mood and affect are appropriate  Skin: Without lesions, rashes, or jaundice  Breast: Examined with Kim Allen   Symmetrical with no skin or nipple changes.     Contour is normal.   Parenchyma is soft.   Masses- none        Lymph:       Right cervical lymphadenopathy      This note was created using voice recognition software. Undetected word substitutions or other errors may have occurred.     Time spent with the patient with greater that 50% of the time in discussion was 55 minutes.     Rahat Marroquin, DO      Please route or send letter to:  Primary Care Provider (PCP)

## 2024-12-17 NOTE — LETTER
12/17/2024      Marissa Guadarrama  [address] [place] [id number]      Dear Colleague,    Thank you for referring your patient, Marissa Guadarrama, to the [facility]. Please see a copy of my visit note below.    Assessment:    Marissa Guadarrama is seen in consultation for right breast cancer, at the request of Physician No Ref-Primary.    Marissa is a 74 year old female with right breast invasive ductal carcinoma, grade 3, ER -, MA -, [id number] - measuring 2.9 cm at 9:00 and 4 cm from the nipple.    Plan:  Patient has a bilobed right breast cancer.  She also has other malignancy including melanoma, lung carcinoma, and metastatic carcinoid.  She had prior biopsy of right cervical node which showed poorly differentiated carcinoma.  I am uncertain if this is related to her right breast cancer which appears to have been there since prior PET earlier this year with PET positive nodes.  Her most recent CT showed diminished size of lymph nodes.  This may represent metastatic disease.  At present time, I recommend ultrasound of axilla with possible biopsy of any abnormal lymph nodes.  Patient has extremely complex oncologic history and likely needs discussion at tumor board to tailor a plan specific to her and consistent with her goals.    Will discuss further when ultrasound is complete    HPI:  Marissa Guadarrama is a 74 year old female who presents to discuss her recently diagnosed invasive ductal carcinoma.  This was diagnosed via right mammography and core needle biopsy.     Breast mass noted:  none   Skin rashes, dimpling or nipple changes:  none  Nipple discharge:  none  Breast pain:   No  Perform self breast exams: No  Previous breast biopsies: No  Previous cyst aspiration: No  Previous other breast surgery: Yes - Bilateral lumpectomies in 2016.  History of radiation.      Family History:  Family history of breast cancer: Yes - Mother, Sister, Maternal aunt; Paternal aunt  Family history of  ovarian cancer:  Mother  Family history of colon cancer: Yes - Brother  Family history of prostate cancer: No    Imaging:   All imaging studies reviewed by me.    Recent Results (from the past 744 hours)   US Breast Biopsy Core Needle Right    Addendum: 11/27/2024    Addendum: Pathology results are concordant with imaging as follows:    RIGHT breast, 9:00, 5 cm from nipple, ultrasound guided core biopsy:  -INVASIVE BREAST CARCINOMA OF NO SPECIAL TYPE (INVASIVE DUCTAL  CARCINOMA), [place] grade 3  -Ductal carcinoma in situ (DCIS), nuclear grade 3, solid and  cribriform types, with focal necrosis  -Invasive carcinoma is estrogen receptor negative, progesterone  receptor negative and HER2 negative (score 1+) by immunohistochemistry  (see biomarker reporting template below)    Recommendations: Oncologic and surgical consultation    NACHO WEAVER MD         SYSTEM ID:  [id number]      Narrative    Ultrasound guided right breast biopsy.    Comparisons: 11/8/2024, 10/18/2024    FINDINGS: Procedure, risks, benefits and alternatives were discussed  with the patient and the patient gave written and verbal consent.  Aseptic technique was utilized. Approximately 10 cc of 1% lidocaine  were utilized for local anesthesia. Under ultrasound guidance, a 14g  core needle biopsy system was utilized to sample lesion located at the  9:00 position right breast. A globe shaped clip was placed. Pressure  was held over this area for approximately 15 minutes. A dressing was  placed and care instructions were discussed with the patient.    Post biopsy mammogram demonstrates clip deployment.      Impression    IMPRESSION:    Uncomplicated ultrasound guided core needle biopsy  right breast.    NACHO WEAVER MD         SYSTEM ID:  [id number]   MA Post Procedure Right    Addendum: 11/27/2024    Addendum: Pathology results are concordant with imaging as follows:    RIGHT breast, 9:00, 5 cm from nipple, ultrasound guided core  biopsy:  -INVASIVE BREAST CARCINOMA OF NO SPECIAL TYPE (INVASIVE DUCTAL  CARCINOMA), [place] grade 3  -Ductal carcinoma in situ (DCIS), nuclear grade 3, solid and  cribriform types, with focal necrosis  -Invasive carcinoma is estrogen receptor negative, progesterone  receptor negative and HER2 negative (score 1+) by immunohistochemistry  (see biomarker reporting template below)    Recommendations: Oncologic and surgical consultation    NACHO WEAVER MD         SYSTEM ID:  [id number]      Narrative    Ultrasound guided right breast biopsy.    Comparisons: 11/8/2024, 10/18/2024    FINDINGS: Procedure, risks, benefits and alternatives were discussed  with the patient and the patient gave written and verbal consent.  Aseptic technique was utilized. Approximately 10 cc of 1% lidocaine  were utilized for local anesthesia. Under ultrasound guidance, a 14g  core needle biopsy system was utilized to sample lesion located at the  9:00 position right breast. A globe shaped clip was placed. Pressure  was held over this area for approximately 15 minutes. A dressing was  placed and care instructions were discussed with the patient.    Post biopsy mammogram demonstrates clip deployment.      Impression    IMPRESSION:    Uncomplicated ultrasound guided core needle biopsy  right breast.    NACHO WEAVER MD         SYSTEM ID:  [id number]   MA Contrast Enhanced Mammogram w Herrera    Narrative    Examination: Bilateral dual-energy contrast-enhanced digital  diagnostic mammography with computer aided detection    Comparison: Mammogram and ultrasound 11/8/2024, 10/18/2024. PET CT  4/26/2024. Ultrasound-guided biopsy of neck lymph node 3/22/2024    History/Family History: Remote of breast conservation therapy for  bilateral breast cancer. History of neuroendocrine tumor. Biopsy of  cervical lymphadenopathy last spring was remarkable for poorly  differentiated carcinoma. New right breast mass on screening mammogram  and  subsequent diagnostic. Prebiopsy.    Technique: Following the uneventful administration of intravenous  contrast, dual energy digital mammography was performed.    BREAST DENSITY: The breasts are heterogeneously dense, which may  obscure small masses.    Findings: There is minimal background enhancement. In the right breast  corresponding to the mass at the 9:00 position, 5 cm from the nipple,  is an area of mass enhancement with surrounding nonmass enhancement  measuring 2.4 x 1.3 x 1.0 cm. No other suspicious enhancement in  either breast, including at the 9:30 position 4 cm from the nipple at  the site of questionable second lesion on prior ultrasound.    Repeat ultrasound at the 9:30 position, 4 cm from the nipple  demonstrates no definite suspicious finding.      Impression    IMPRESSION: BI-RADS CATEGORY: 4 - Suspicious.    RECOMMENDED FOLLOW-UP: Biopsy. Ultrasound-guided core needle biopsy of  the right breast to follow.    NACHO WEAVER MD         SYSTEM ID:  [id number]   CT Soft Tissue Neck w Contrast    Narrative    CT SCAN OF THE NECK WITH CONTRAST  12/6/2024 3:16 PM     HISTORY: Metastatic cancer to cervical nodes question primary. Assess  response to immunotherapy. Malignant carcinoid tumor of cecum (H).  Carcinoma metastatic to lymph nodes of multiple sites with unknown  primary site (H).     TECHNIQUE: Axial CT images of the neck following administration of  intravenous contrast with reformations. Radiation dose for this scan  was reduced using automated exposure control, adjustment of the mA  and/or kV according to patient size, or iterative reconstruction  technique. 80mL Isovue-370 IV.     COMPARISON: CT of the neck dated 3/8/2024.    FINDINGS: Overall, there appears to be decreased right-sided cervical  lymphadenopathy compared to prior exam of 3/8/2024. For example, the  following lymph nodes have decreased in size:    -A right level Va lymph node measures 9 mm (series 3 image 56)  compared  to 15 mm previously.   -A right level IV lymph node now measures 8 mm (series 3 image 66)  compared to 10 mm.  -A right supraclavicular lymph node measures 10 mm (series 3 image 80)  compared to 19 mm.    Multiple additional clustered lymph nodes in the right mid to lower  neck have similarly decreased in size. No obvious new/increasing  cervical lymphadenopathy is identified.    Bilateral lens implants. Mild polypoid mucosal thickening in the left  sphenoid sinus. Otherwise, the visualized paranasal sinuses are clear.  The mastoid and middle ear cavities are clear. Visualized intracranial  contents are grossly unremarkable. Normal appearance of the   and parapharyngeal spaces. The mucosal spaces of the upper  aerodigestive tract appear grossly unremarkable, accounting for  technique.    Normal CT appearance of the bilateral submandibular and parotid  glands. No dominant thyroid mass or nodule. The cervical carotid and  vertebral arteries and the internal jugular veins appear grossly  patent. Mild emphysematous changes of the upper lung zones with  unchanged bandlike soft tissue opacity in the left upper pulmonary  lobe. Multilevel degenerative changes are noted in the cervical spine.  Please refer to CT chest, abdomen and pelvis report of same day for  additional details.      Impression    IMPRESSION:  1. Decreased right cervical/supraclavicular lymphadenopathy compared  to the prior soft tissue neck CT of 3/8/2024.  2. Otherwise similar chronic findings, as described.  3. Please refer to separate report from CT of the chest, abdomen and  pelvis of same day for additional details.     TESSA MICHEL MD         SYSTEM ID:  [id number]   CT Chest/Abdomen/Pelvis w Contrast    Narrative    CT CHEST/ABDOMEN/PELVIS WITH CONTRAST 12/6/2024 3:17 PM    CLINICAL HISTORY: Metastatic cancer to cervical nodes question  primary. Med/hilar nodes. 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 History  of breast cancer. Assess  response to immunotherapy. Malignant carcinoid tumor of cecum (H).  Carcinoma metastatic to lymph nodes of multiple sites with unknown  primary site (H).    TECHNIQUE: CT scan of the chest, abdomen, and pelvis was performed  following injection of IV contrast. Multiplanar reformats were  obtained. Dose reduction techniques were used.   CONTRAST: 75 mL Isovue-370    COMPARISON: 3/8/2024, 7/28/2023, 7/21/2023    FINDINGS:   LUNGS AND PLEURA: A previously described irregular spiculated  consolidation in the left upper lobe is unchanged, in keeping with  history of post radiation change. Upper lobe predominant pulmonary  emphysema. Mild biapical pleural-parenchymal scarring. A noncalcified  pulmonary nodule in the left lung base with mean diameter of 5 mm  (3-239) is similar to comparison. A few additional scattered 1 to 2 mm  pulmonary nodules are unchanged as well. No new or enlarging pulmonary  nodules. No pleural fluid. Large airways are patent. Previously seen  intralobular septal thickening on the left has markedly improved.    MEDIASTINUM/AXILLAE: Heart size is normal. No pericardial fluid.  Thoracic aorta is normal in course and caliber. Moderate coronary  artery atherosclerosis. There has been interval decrease in size and  number of previously seen hypermetabolic right lower cervical and  right axillary lymph nodes. Several prominent and a few mildly  enlarged hilar lymph nodes are unchanged. Previously described  hypermetabolic precarinal lymph node is unchanged. Benign densely  calcified distal right paratracheal lymph nodes are noted. No new or  enlarging thoracic lymph nodes.    HEPATOBILIARY: Gallbladder is absent. Similar-appearing mild  intrahepatic bile duct dilation, likely secondary to reservoir effect.  An ill-defined heterogenous hypodense mass in the posteromedial right  hepatic lobe is similar to comparison measuring 6 x 4.4 cm (4-143),  previously 5.9 x 4.5  cm.    PANCREAS: There is global atrophy of the pancreas. No pancreatic mass  or inflammatory changes. No dilation of the pancreatic duct.    SPLEEN: Spleen is normal in size. No splenic mass. A perisplenic  mesenteric nodule in the left upper quadrant measuring 18 x 10 mm is  similar to comparison, previously 19 x 9 mm, and was not previously  hypermetabolic.    ADRENAL GLANDS: Right adrenal gland is not well seen. Left adrenal  gland is normal-appearing.    KIDNEYS/BLADDER: The left kidney is absent. Right kidney is normal in  appearance. No nephrolithiasis or hydronephrosis. No urinary bladder  wall thickening.    BOWEL: Patient is status post right hemicolectomy with ileocolonic  anastomosis. Minimal colonic diverticulosis present without evidence  of diverticulitis. No signs of bowel obstruction or inflammation.    PELVIC ORGANS: Uterus is absent. No free pelvic fluid.  Similar-appearing nodularity in the pelvis, particularly the left  adnexa with the largest component measuring 2.4 x 2.4 cm (4-261),  previously 2.2 cm. No new or significantly enlarging peritoneal  nodularity in the pelvis.    ADDITIONAL FINDINGS: None.    MUSCULOSKELETAL: Increased sclerotic changes involving the S1 sacral  lesion on the left correspond to an area of previously described  hypermetabolic activity. Increased sclerotic changes involving the  left lateral third rib (4-41). There are also several new sclerotic  osseous lesions involving the sacrum and spine (S2-3, L4, L2, T11,  right fourth rib). Multilevel hypertrophic degenerative changes of the  spine. Multiple small round subcutaneous nodules are seen in the low  back and gluteal region, bilaterally. Asymmetric muscle atrophy of the  imaged left lower extremity.      Impression    IMPRESSION:  1.  Interval improvement in previously seen hypermetabolic right lower  cervical and right axillary lymphadenopathy. Distal right  paraesophageal lymph node is unchanged.  2.  Unchanged  irregular consolidation in the left upper lobe,  corresponding to post radiation change. Cannot exclude residual or  recurrent malignancy.  3.  Resolution of previously seen intralobular septal thickening of  the left lung.  4.  Interval increase in sclerotic changes of the left sacral lesion  and left third rib lesion. Several new sclerotic osseous lesions.  5.  Similar appearing right hepatic lobe lesion.  6.  Similar-appearing peritoneal nodularity in the pelvis.  7.  Unchanged subcentimeter pulmonary nodules.  8.  Additional nonacute findings as above.     SHEREE MEJIA MD         SYSTEM ID:  [id number]       Percutaneous core needle biopsy:   Final Diagnosis   RIGHT breast, 9:00, 5 cm from nipple, ultrasound guided core biopsy:  -INVASIVE BREAST CARCINOMA OF NO SPECIAL TYPE (INVASIVE DUCTAL CARCINOMA), [place] grade 3  -Ductal carcinoma in situ (DCIS), nuclear grade 3, solid and cribriform types, with focal necrosis  -Invasive carcinoma is estrogen receptor negative, progesterone receptor negative and HER2 negative (score 1+) by immunohistochemistry (see biomarker reporting template below)       Past Medical History:   has a past medical history of Arthritis, Benign breast biopsy, Breast cancer (H), Carcinoid tumor (H) (12/2003), Cervical cancer (H) (2007), H/O colposcopy with cervical biopsy (12/23/2013), HTN, Hyperlipidemia, Malignant neoplasm of ovary (H), Obesity, Pap smear of vagina with ASC-H (11/01/2013), Post-polio syndromes, and Trigeminal neuralgias.    Past Surgical History:  Past Surgical History:   Procedure Laterality Date     APPENDECTOMY  01/01/1983     BIOPSY BREAST       BIOPSY NODE SENTINEL Bilateral 06/01/2016    Procedure: BIOPSY NODE SENTINEL;  Surgeon: Brent Arana MD;  Location: [facility]     [facility] SURGICAL PATHOLOGY       COLONOSCOPY N/A 06/23/2017    Procedure: COMBINED COLONOSCOPY, SINGLE OR MULTIPLE BIOPSY/POLYPECTOMY BY BIOPSY;  Colonoscopy Dx:Carcinoid tumor of colon  prep mailed malvin;  Surgeon: Talisha Greco MD;  [facility]: [facility]     COLPOSCOPY, BIOPSY, COMBINED  03/13/2014    Procedure: COMBINED COLPOSCOPY, BIOPSY;;  Surgeon: Lara Pack MD;  [facility]     COMBINED CYSTOSCOPY, RETROGRADES, EXCHANGE STENT URETER(S) Left 02/07/2019    Procedure: COMBINED CYSTOSCOPY, RETROGRADES, EXCHANGE STENT URETER--left;  Surgeon: Zhao La MD;  Location: [facility]     COMBINED CYSTOSCOPY, RETROGRADES, EXCHANGE STENT URETER(S) Left 02/20/2020    Procedure: CYSTOSCOPY, WITH RETROGRADE PYELOGRAM AND Left URETERAL STENT exchange;  Surgeon: Zhao La MD;  Location: [facility]     COMBINED CYSTOSCOPY, RETROGRADES, EXCHANGE STENT URETER(S) Left 05/09/2021    Procedure: CYSTOSCOPY, WITH RETROGRADE PYELOGRAM AND LEFT URETERAL STENT REPLACEMENT;  Surgeon: Fred Owens MD;  [facility]     COMBINED CYSTOSCOPY, RETROGRADES, EXCHANGE STENT URETER(S) Left 09/16/2021    Procedure: CYSTOSCOPY, WITH left RETROGRADE PYELOGRAM AND left  URETERAL STENT REPLACEMENT;  Surgeon: Zhao La MD;  Location: [facility]     COMBINED CYSTOSCOPY, RETROGRADES, EXCHANGE STENT URETER(S) Left 03/24/2022    Procedure: CYSTOSCOPY, WITH RETROGRADE PYELOGRAM AND URETERAL STENT EXCHANGE, LEFT;  Surgeon: Zhao La MD;  Location: [facility]     COMBINED CYSTOSCOPY, RETROGRADES, URETEROSCOPY, INSERT STENT Left 02/02/2017    Procedure: COMBINED CYSTOSCOPY, RETROGRADES, URETEROSCOPY, INSERT STENT;  Surgeon: Zhao La MD;  Location: [facility]     CYSTOSCOPY, REMOVE STENT(S), COMBINED N/A 11/4/2022    Procedure: CYSTOSCOPY, LEFT STENT REMOVAL;  Surgeon: Zhao La MD;  [facility]     CYSTOSCOPY, RETROGRADES, INSERT STENT URETER(S), COMBINED Left 09/07/2017    Procedure: COMBINED CYSTOSCOPY, RETROGRADES, INSERT STENT URETER(S);  Cystoscopy,Left Stent Exchange;  Surgeon: Zhao La MD;  Location: [facility]      CYSTOSCOPY, RETROGRADES, INSERT STENT URETER(S), COMBINED  12/12/2017    Procedure: COMBINED CYSTOSCOPY, RETROGRADES, INSERT STENT URETER(S);;  Surgeon: Zhao La MD;  [facility]     CYSTOSCOPY, RETROGRADES, INSERT STENT URETER(S), COMBINED Left 07/05/2018    Procedure: COMBINED CYSTOSCOPY, RETROGRADES, INSERT STENT URETER(S);  Cystoscopy, Left Stent Exchange;  Surgeon: Zhao La MD;  Location: [facility]     DAVINCI NEPHRECTOMY Left 11/4/2022    Procedure: , LEFT NEPHRECTOMY, ROBOT-ASSISTED;  Surgeon: Zhao La MD;  [facility]     EXAM UNDER ANESTHESIA PELVIC  03/13/2014    Procedure: EXAM UNDER ANESTHESIA PELVIC;  Exam Under Anestheisa, Colposcopy, Vaginal Biopsies, Co2 Laser of the Upper Vagina;  Surgeon: Lara Pack MD;  [facility]     EXAM UNDER ANESTHESIA PELVIC N/A 07/01/2020    Procedure: Examination under anesthesia, vaginal biopsies;  Surgeon: Karli Gao MD;  [facility]     HERNIORRHAPHY INCISIONAL (LOCATION)       IR LYMPH NODE BIOPSY  6/28/2024     IR RHIZOTOMY  08/14/2020     LASER CO2 VAGINA  03/13/2014    VAIN 1/2     LASER CO2 VAGINA N/A 12/12/2017    Procedure: LASER CO2 VAGINA;  Exam Under Anesthesia, CO2 Laser Ablation Of Vagina, Cystoscopy, Left Retrograde Pyelogram with Left Stent Placement;  Surgeon: Nic Segundo MD;  [facility]     LUMPECTOMY BREAST       LUMPECTOMY BREAST WITH SEED LOCALIZATION Bilateral 06/01/2016    Procedure: LUMPECTOMY BREAST WITH SEED LOCALIZATION;  Surgeon: Brent Arana MD;  Location: [facility]     SURGICAL HISTORY OF -       ovarian cystectomy     SURGICAL HISTORY OF -   01/01/2003    right colon resection secondary to carcinoid tumor     TUBAL LIGATION       [facility] BSO, OMENTECTOMY W/OSMAN  05/01/2007     [facility] TOTAL ABDOM HYSTERECTOMY  05/01/2007        Social History:  Social History     Socioeconomic History     Marital status:      Spouse name: Not on file     Number  of children: Not on file     Years of education: Not on file     Highest education level: Not on file   Occupational History     Not on file   Tobacco Use     Smoking status: Former     Current packs/day: 0.00     Average packs/day: 1 pack/day for 29.0 years (29.0 ttl pk-yrs)     Types: Cigarettes     Start date: 10/30/1977     Quit date: 10/30/2006     Years since quittin.1     Smokeless tobacco: Never   Vaping Use     Vaping status: Never Used   Substance and Sexual Activity     Alcohol use: No     Alcohol/week: 0.0 standard drinks of alcohol     Comment: 1 drink per year     Drug use: No     Sexual activity: Yes     Partners: Male   Other Topics Concern      Service No     Blood Transfusions No     Caffeine Concern Yes     Comment: occasional coffee     Occupational Exposure No     Hobby Hazards Yes     Comment: [employer],     Sleep Concern Yes     Comment: not sleeping well     Stress Concern Yes     Comment: Grandson in the      Weight Concern Yes     Special Diet No     Back Care No     Exercise No     Bike Helmet Not Asked     Seat Belt Yes     Self-Exams Yes     Parent/sibling w/ CABG, MI or angioplasty before 65F 55M? No   Social History Narrative     Not on file     Social Drivers of Health     Financial Resource Strain: Not on file   Food Insecurity: Not on file   Transportation Needs: Not on file   Physical Activity: Not on file   Stress: Not on file   Social Connections: Unknown (4/10/2023)    [facility] [employer] [facility] [employer] [facility]    Social Connections      Frequency of Communication with Friends and Family: Not on file   Interpersonal Safety: Not At Risk (2021)    Humiliation, Afraid, Rape, and Kick questionnaire      Fear of Current or Ex-Partner: No      Emotionally Abused: No      Physically Abused: No      Sexually Abused: No   Housing Stability: Not on file      ROS:  The 10 point review of systems is negative  "other than noted in the HPI and above.    PE:  Vitals: BP (!) 160/71 (BP Location: Right arm, Patient Position: Sitting, Cuff Size: Adult Large)   Pulse 54   Temp 98.3  F (36.8  C) (Tympanic)   Ht 1.715 m (5' 7.52\")   Wt 92.4 kg (203 lb 11.3 oz)   BMI 31.42 kg/m    General appearance: well-nourished, sitting comfortably, no apparent distress  HEENT:  Head normocephalic and atraumatic, pupils equal and round, conjunctivae clear, mucous membranes moist, external ears and nose normal  Neck: Supple without thyromegaly or masses  Lungs: Respirations unlabored  Lymphatic: No cervical, or supraclavicular lymphadenopathy  Extremities: Without edema  Musculoskeletal:  Normal station and gait  Neurologic: alert, speech is clear, moves all extremities with good strength  Psychiatric: Mood and affect are appropriate  Skin: Without lesions, rashes, or jaundice  Breast: Examined with Kim Allen   Symmetrical with no skin or nipple changes.     Contour is normal.   Parenchyma is soft.   Masses- none        Lymph:       Right cervical lymphadenopathy      This note was created using voice recognition software. Undetected word substitutions or other errors may have occurred.     Time spent with the patient with greater that 50% of the time in discussion was 55 minutes.     Rahat Marroquin, DO      Please route or send letter to:  Primary Care Provider (PCP)                 Again, thank you for allowing me to participate in the care of your patient.        Sincerely,        Rahat Marroquin, DO    Electronically signed"

## 2024-12-26 DIAGNOSIS — C7A.021 MALIGNANT CARCINOID TUMOR OF CECUM (H): Primary | ICD-10-CM

## 2024-12-26 RX ORDER — HYDROCHLOROTHIAZIDE 12.5 MG/1
12.5 TABLET ORAL EVERY MORNING
Qty: 90 TABLET | Refills: 0 | Status: SHIPPED | OUTPATIENT
Start: 2024-12-26

## 2025-01-07 ENCOUNTER — TUMOR CONFERENCE (OUTPATIENT)
Dept: ONCOLOGY | Facility: CLINIC | Age: 75
End: 2025-01-07
Payer: COMMERCIAL

## 2025-01-09 NOTE — TUMOR CONFERENCE
Tumor Conference: Breast  Specialties Present: Medical oncology, Radiation oncology, Pathology, Radiology, Surgery, Research  Patient Status: Retrospective  Stage: Triple negative breast  Treatment to Date: Biopsy, Immunotherapy  Clinical Trials: Not discussed  Genetic Testing Discussed/Recommended?: No  Supportive Care Services Discussed/Recommended?: No  Recommended Plan: Outside of evidence-based guidelines (comment required) (Comment: Pt is being treatment for metastatic NSCLC with Pembro - plan to continue with immunotherapy treatment for that hoping is provides some coverage. Pt does not want chemotherapy)  Did the review exceed 30 minutes?: did not         Maricruz Crowder RN on 1/9/2025 at 3:28 PM

## 2025-01-10 ENCOUNTER — APPOINTMENT (OUTPATIENT)
Dept: LAB | Facility: CLINIC | Age: 75
End: 2025-01-10
Payer: MEDICARE

## 2025-01-10 PROBLEM — Z17.421 TRIPLE NEGATIVE BREAST CANCER (H): Status: ACTIVE | Noted: 2025-01-10

## 2025-01-10 PROBLEM — C50.919 TRIPLE NEGATIVE BREAST CANCER (H): Status: ACTIVE | Noted: 2025-01-10

## 2025-01-10 PROCEDURE — 86316 IMMUNOASSAY TUMOR OTHER: CPT | Performed by: INTERNAL MEDICINE

## 2025-01-16 ENCOUNTER — OFFICE VISIT (OUTPATIENT)
Dept: SURGERY | Facility: CLINIC | Age: 75
End: 2025-01-16
Payer: COMMERCIAL

## 2025-01-16 ENCOUNTER — TELEPHONE (OUTPATIENT)
Dept: SURGERY | Facility: CLINIC | Age: 75
End: 2025-01-16

## 2025-01-16 VITALS
WEIGHT: 202 LBS | HEART RATE: 53 BPM | HEIGHT: 68 IN | BODY MASS INDEX: 30.62 KG/M2 | DIASTOLIC BLOOD PRESSURE: 89 MMHG | OXYGEN SATURATION: 97 % | SYSTOLIC BLOOD PRESSURE: 149 MMHG

## 2025-01-16 DIAGNOSIS — C50.911 INVASIVE DUCTAL CARCINOMA OF BREAST, FEMALE, RIGHT (H): Primary | ICD-10-CM

## 2025-01-16 PROCEDURE — 99215 OFFICE O/P EST HI 40 MIN: CPT | Performed by: SURGERY

## 2025-01-16 ASSESSMENT — PAIN SCALES - GENERAL: PAINLEVEL_OUTOF10: NO PAIN (0)

## 2025-01-16 NOTE — TELEPHONE ENCOUNTER
Type of surgery: MASTECTOMY, SIMPLE, WITH SENTINEL LYMPH NODE DISSECTION (Right)   Location of surgery: [place] [facility]  Date and time of surgery: 01/24/2025  Surgeon: Lopez  Pre-Op Appt Date: 01/22/2025  Post-Op Appt Date: 02/06/2025   Packet sent out: Yes  Pre-cert/Authorization completed:  No  Date:

## 2025-01-16 NOTE — LETTER
1/16/2025      Marissa Guadarrama  [address] [place] [id number]      Dear Colleague,    Thank you for referring your patient, Marissa Guadarrama, to the [facility]. Please see a copy of my visit note below.    Breast Cancer Follow-up Visit    Marissa Guadarrama is a 74 year old female who presents to discuss her recently diagnosed right breast invasive ductal carcinoma, grade 3, ER -, OR -, [id number] - measuring 2.9 cm at 9:00 and 4 cm from the nipple.    She had been discussed at tumor board extensively given her multiple malignancies and complicated oncologic history.  Recommendation was made for addressing primary tumor via mastectomy (prior radiation) and lymph node sampling.  She had declined chemotherapy and continues to make informed refusal of chemotherapy at this time.  She is on Keytruda per oncology.    We have discussed the indication, alternatives, risks and expected recovery.  Specifically, we have discussed local recurrence of cancer, risk of future second primary breast cancer, incisions, scarring, breast deformity, volume loss, postoperative infection, anesthesia, bleeding, blood transfusion, DVT, PE, postoperative restrictions and physical limitations.  We have discussed the recommended interventions and treatments for these complications.      All questions have been answered to the best of my ability.    Oncology consult:  yes-Done  Plastic surgery consult:  no    Marissa elects mastectomy and sentinel lymph node biopsy, given chemotherapy refusal, aggressive axillary management will be obviated should she fail to localize during surgery.       Imaging:  All imaging studies reviewed by me.  See imaging reports below.      This note was created using voice recognition software. Undetected word substitutions or other errors may have occurred.     Time spent, greater than 50% of which was counseling and coordining care: 45 minutes.    Rahat Marroquin, DO    Please route  or send letter to:  Primary Care Provider (PCP)      IMAGING:  All imaging studies reviewed by me.    Recent Results (from the past 744 hours)   US Axillary Right    Narrative    Right axillary ultrasound    Comparisons: 12/6/2024 CT, 4/26/2024 PET CT, right breast and axillary  ultrasound 11/8/2024    History/Family History: Biopsy-proven right-sided breast cancer with a  known history of melanoma, lung cancer and neuroendocrine cancer.  Biopsy-proven right cervical lymphadenopathy. Right cervical and right  axillary lymphadenopathy and PET CT from 4/26/2024 which showed  improvement on CT dated 12/6/2024. Right axillary ultrasound to guide  treatment.    FINDINGS:     There are multiple small lymph nodes with smooth  cortices measuring up to 3 mm thick. No definite lymphadenopathy.      Impression    IMPRESSION: BI-RADS CATEGORY: 3 - Probably Benign.  Right axillary  lymph nodes nodes with cortices at the upper limit of normal in this  medically complex patient with previously biopsied cervical metastatic  lymphadenopathy.    RECOMMENDED FOLLOW-UP: Short Interval Follow-up. If clinically  indicated, ultrasound-guided core needle biopsy of one of the  equivocal right axillary lymph nodes could be scheduled. Otherwise,  attention on follow-up exams/sentinal node biopsy at the time of  surgery would be recommended.      The patient was given the results of the examination.    NACHO WEAVER MD         SYSTEM ID:  [id number]             Again, thank you for allowing me to participate in the care of your patient.        Sincerely,        Rahat Marroquin DO    Electronically signed

## 2025-01-20 NOTE — PROGRESS NOTES
Breast Cancer Follow-up Visit    Marissa Guadarrama is a 74 year old female who presents to discuss her recently diagnosed right breast invasive ductal carcinoma, grade 3, ER -, MS -, [id number] - measuring 2.9 cm at 9:00 and 4 cm from the nipple.    She had been discussed at tumor board extensively given her multiple malignancies and complicated oncologic history.  Recommendation was made for addressing primary tumor via mastectomy (prior radiation) and lymph node sampling.  She had declined chemotherapy and continues to make informed refusal of chemotherapy at this time.  She is on Keytruda per oncology.    We have discussed the indication, alternatives, risks and expected recovery.  Specifically, we have discussed local recurrence of cancer, risk of future second primary breast cancer, incisions, scarring, breast deformity, volume loss, postoperative infection, anesthesia, bleeding, blood transfusion, DVT, PE, postoperative restrictions and physical limitations.  We have discussed the recommended interventions and treatments for these complications.      All questions have been answered to the best of my ability.    Oncology consult:  yes-Done  Plastic surgery consult:  no    Marissa elects mastectomy and sentinel lymph node biopsy, given chemotherapy refusal, aggressive axillary management will be obviated should she fail to localize during surgery.       Imaging:  All imaging studies reviewed by me.  See imaging reports below.      This note was created using voice recognition software. Undetected word substitutions or other errors may have occurred.     Time spent, greater than 50% of which was counseling and coordining care: 45 minutes.    Rahat Marroquin, DO    Please route or send letter to:  Primary Care Provider (PCP)      IMAGING:  All imaging studies reviewed by me.    Recent Results (from the past 744 hours)   US Axillary Right    Narrative    Right axillary ultrasound    Comparisons: 12/6/2024 CT,  4/26/2024 PET CT, right breast and axillary  ultrasound 11/8/2024    History/Family History: Biopsy-proven right-sided breast cancer with a  known history of melanoma, lung cancer and neuroendocrine cancer.  Biopsy-proven right cervical lymphadenopathy. Right cervical and right  axillary lymphadenopathy and PET CT from 4/26/2024 which showed  improvement on CT dated 12/6/2024. Right axillary ultrasound to guide  treatment.    FINDINGS:     There are multiple small lymph nodes with smooth  cortices measuring up to 3 mm thick. No definite lymphadenopathy.      Impression    IMPRESSION: BI-RADS CATEGORY: 3 - Probably Benign.  Right axillary  lymph nodes nodes with cortices at the upper limit of normal in this  medically complex patient with previously biopsied cervical metastatic  lymphadenopathy.    RECOMMENDED FOLLOW-UP: Short Interval Follow-up. If clinically  indicated, ultrasound-guided core needle biopsy of one of the  equivocal right axillary lymph nodes could be scheduled. Otherwise,  attention on follow-up exams/sentinal node biopsy at the time of  surgery would be recommended.      The patient was given the results of the examination.    NACHO WEAVER MD         SYSTEM ID:  [id number]

## 2025-01-22 ENCOUNTER — OFFICE VISIT (OUTPATIENT)
Dept: FAMILY MEDICINE | Facility: CLINIC | Age: 75
End: 2025-01-22
Payer: COMMERCIAL

## 2025-01-22 VITALS
HEIGHT: 67 IN | WEIGHT: 207 LBS | DIASTOLIC BLOOD PRESSURE: 78 MMHG | HEART RATE: 67 BPM | BODY MASS INDEX: 32.49 KG/M2 | RESPIRATION RATE: 14 BRPM | SYSTOLIC BLOOD PRESSURE: 136 MMHG | TEMPERATURE: 98.7 F | OXYGEN SATURATION: 94 %

## 2025-01-22 DIAGNOSIS — Z17.1 MALIGNANT NEOPLASM OF LOWER-OUTER QUADRANT OF RIGHT BREAST OF FEMALE, ESTROGEN RECEPTOR NEGATIVE (H): ICD-10-CM

## 2025-01-22 DIAGNOSIS — N18.32 CHRONIC KIDNEY DISEASE, STAGE 3B (H): ICD-10-CM

## 2025-01-22 DIAGNOSIS — G14 POST-POLIO SYNDROME (H): ICD-10-CM

## 2025-01-22 DIAGNOSIS — M06.9 RHEUMATOID ARTHRITIS, INVOLVING UNSPECIFIED SITE, UNSPECIFIED WHETHER RHEUMATOID FACTOR PRESENT (H): ICD-10-CM

## 2025-01-22 DIAGNOSIS — G81.90: ICD-10-CM

## 2025-01-22 DIAGNOSIS — C50.511 MALIGNANT NEOPLASM OF LOWER-OUTER QUADRANT OF RIGHT BREAST OF FEMALE, ESTROGEN RECEPTOR NEGATIVE (H): ICD-10-CM

## 2025-01-22 DIAGNOSIS — Z01.818 PRE-OP EVALUATION: Primary | ICD-10-CM

## 2025-01-22 LAB
ANION GAP SERPL CALCULATED.3IONS-SCNC: 9 MMOL/L (ref 7–15)
BUN SERPL-MCNC: 20.7 MG/DL (ref 8–23)
CALCIUM SERPL-MCNC: 10.3 MG/DL (ref 8.8–10.4)
CHLORIDE SERPL-SCNC: 103 MMOL/L (ref 98–107)
CREAT SERPL-MCNC: 1.12 MG/DL (ref 0.51–0.95)
EGFRCR SERPLBLD CKD-EPI 2021: 51 ML/MIN/1.73M2
ERYTHROCYTE [DISTWIDTH] IN BLOOD BY AUTOMATED COUNT: 12.8 % (ref 10–15)
GLUCOSE SERPL-MCNC: 96 MG/DL (ref 70–99)
HCO3 SERPL-SCNC: 31 MMOL/L (ref 22–29)
HCT VFR BLD AUTO: 41.6 % (ref 35–47)
HGB BLD-MCNC: 13.9 G/DL (ref 11.7–15.7)
MCH RBC QN AUTO: 31.4 PG (ref 26.5–33)
MCHC RBC AUTO-ENTMCNC: 33.4 G/DL (ref 31.5–36.5)
MCV RBC AUTO: 94 FL (ref 78–100)
PLATELET # BLD AUTO: 189 10E3/UL (ref 150–450)
POTASSIUM SERPL-SCNC: 4.6 MMOL/L (ref 3.4–5.3)
RBC # BLD AUTO: 4.43 10E6/UL (ref 3.8–5.2)
SODIUM SERPL-SCNC: 143 MMOL/L (ref 135–145)
WBC # BLD AUTO: 9.1 10E3/UL (ref 4–11)

## 2025-01-22 PROCEDURE — 85027 COMPLETE CBC AUTOMATED: CPT | Performed by: NURSE PRACTITIONER

## 2025-01-22 PROCEDURE — 99214 OFFICE O/P EST MOD 30 MIN: CPT | Performed by: NURSE PRACTITIONER

## 2025-01-22 PROCEDURE — 36415 COLL VENOUS BLD VENIPUNCTURE: CPT | Performed by: NURSE PRACTITIONER

## 2025-01-22 PROCEDURE — 80048 BASIC METABOLIC PNL TOTAL CA: CPT | Performed by: NURSE PRACTITIONER

## 2025-01-22 ASSESSMENT — PAIN SCALES - GENERAL: PAINLEVEL_OUTOF10: NO PAIN (0)

## 2025-01-22 NOTE — PROGRESS NOTES
Preoperative Evaluation  [facility]  [address] [facility] [address]  Phone: [phone]  Primary Provider: Physician No Ref-Primary  Pre-op Performing Provider: KAREN Ulrich CNP  Jan 22, 2025 1/22/2025   Surgical Information   What procedure is being done? Mastectomy simple, with sentinel lymph node dissection-  RIGHT   Facility or Hospital where procedure/surgery will be performed: [facility]   Who is doing the procedure / surgery? lisa Marroquin   Date of surgery / procedure: 01/24/25   Time of surgery / procedure: 2:30pm OR TIme   Where do you plan to recover after surgery? at home alone     Fax number for surgical facility: Note does not need to be faxed, will be available electronically in Epic.      Assessment & Plan     The proposed surgical procedure is considered INTERMEDIATE risk.    Pre-op evaluation  - CBC with platelets; Future  - Basic metabolic panel  (Ca, Cl, CO2, Creat, Gluc, K, Na, BUN); Future    Malignant neoplasm of lower-outer quadrant of right breast of female, estrogen receptor negative (H)  - CBC with platelets; Future  - Basic metabolic panel  (Ca, Cl, CO2, Creat, Gluc, K, Na, BUN); Future    Hemiplegia due to noncerebrovascular etiology, unspecified hemiplegia type, unspecified laterality (H)  Known issue that I take into account for their medical decisions, no current exacerbations or new concerns  - CBC with platelets; Future  - Basic metabolic panel  (Ca, Cl, CO2, Creat, Gluc, K, Na, BUN); Future    Rheumatoid arthritis, involving unspecified site, unspecified whether rheumatoid factor present (H)  Known issue that I take into account for their medical decisions, no current exacerbations or new concerns  - CBC with platelets; Future  - Basic metabolic panel  (Ca, Cl, CO2, Creat, Gluc, K, Na, BUN); Future    Post-polio syndrome (H)  Known issue that I take into account for their medical decisions, no current  exacerbations or new concerns  - CBC with platelets; Future  - Basic metabolic panel  (Ca, Cl, CO2, Creat, Gluc, K, Na, BUN); Future    Chronic kidney disease, stage 3b (H)  Known issue that I take into account for their medical decisions, no current exacerbations or new concerns  - CBC with platelets; Future  - Basic metabolic panel  (Ca, Cl, CO2, Creat, Gluc, K, Na, BUN); Future            - No identified additional risk factors other than previously addressed    Antiplatelet or Anticoagulation Medication Instructions   - Patient is on no antiplatelet or anticoagulation medications.    Additional Medication Instructions  Take all scheduled medications on the day of surgery EXCEPT for modifications listed below:   - Diuretics (furosemide, hydrochlorothiazide, chlorothalidone): DO NOT TAKE on the day of surgery.    Recommendation  Approval given to proceed with proposed procedure, without further diagnostic evaluation.          Grace Ann is a 74 year old, presenting for the following:  Pre-Op Exam          1/22/2025    12:49 PM   Additional Questions   Roomed by Pati CRAMER CMA   Accompanied by daughter in zena Sanders         1/22/2025    12:49 PM   Patient Reported Additional Medications   Patient reports taking the following new medications none     Via the Health Maintenance questionnaire, the patient has reported the following services have been completed -Colonscopy: [facility] 2023-02-01, this information has not been sent to the abstraction team.      HPI related to upcoming procedure: new diagnosis breast cancer        1/22/2025   Pre-Op Questionnaire   Have you ever had a heart attack or stroke? No   Have you ever had surgery on your heart or blood vessels, such as a stent placement, a coronary artery bypass, or surgery on an artery in your head, neck, heart, or legs? No   Do you have chest pain with activity? No   Do you have a history of heart failure? No   Do you currently have a cold,  bronchitis or symptoms of other infection? No   Do you have a cough, shortness of breath, or wheezing? No   Do you or anyone in your family have previous history of blood clots? (!) UNKNOWN    Do you or does anyone in your family have a serious bleeding problem such as prolonged bleeding following surgeries or cuts? No   Have you ever had problems with anemia or been told to take iron pills? No   Have you had any abnormal blood loss such as black, tarry or bloody stools, or abnormal vaginal bleeding? No   Have you ever had a blood transfusion? No   Are you willing to have a blood transfusion if it is medically needed before, during, or after your surgery? Yes   Have you or any of your relatives ever had problems with anesthesia? No   Do you have sleep apnea, excessive snoring or daytime drowsiness? No   Do you have any artifical heart valves or other implanted medical devices like a pacemaker, defibrillator, or continuous glucose monitor? No   Do you have artificial joints? No   Are you allergic to latex? No     Health Care Directive  Patient does not have a Health Care Directive: Discussed advance care planning with patient; however, patient declined at this time.    Preoperative Review of    reviewed - no record of controlled substances prescribed.          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 multiple sites consistent with lung primary (H) 05/03/2024     Priority: Medium    Osteoporosis due to aromatase inhibitor 12/29/2023     Priority: Medium    Malignant carcinoid tumor of cecum (H) 02/15/2022     Priority: Medium    Other hydronephrosis 05/09/2021     Priority: Medium     Added  "automatically from request for surgery [id number]      HSIL (high grade squamous intraepithelial lesion) on Pap smear of cervix 06/15/2020     Priority: Medium     Added automatically from request for surgery [id number]      Obstruction of left ureter 02/11/2020     Priority: Medium     Added automatically from request for surgery [id number]      Osteopenia of hip 05/15/2019     Priority: Medium    Need for prophylactic chemotherapy 05/15/2019     Priority: Medium    Rheumatoid arthritis with positive rheumatoid factor, involving unspecified site (H) 12/31/2018     Priority: Medium    Peritoneal metastases 10/30/2017     Priority: Medium    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) 06/28/2016     Priority: Medium     Rt upper outer, L lower outer ER+IL+ Her 2-      Urinary incontinence 09/12/2014     Priority: Medium    Vaginal dysplasia 03/10/2014     Priority: Medium    Chronic kidney disease, stage 3b (H) 09/23/2012     Priority: Medium    OA (osteoarthritis) of knee 10/05/2011     Priority: Medium    Hypertension goal BP (blood pressure) < 140/90 11/01/2010     Priority: Medium    HYPERLIPIDEMIA LDL GOAL <130 10/31/2010     Priority: Medium    Post-polio syndrome (H)      Priority: Medium     Left knee, diagnosed by ortho in 1976, had left leg/toe surgeries as child  (Problem list name updated by automated process. Provider to review and confirm.)      Cervical cancer (H)      Priority: Medium     5/2007.  Dr. Alonso, [facility] gyn/onc,  Now needs routine paps, patient not always compliant  3/26/09 pap NIL  9/24/09 pap ASCUS  11/1/13 pap ASC-H. Plan-- colposcopy   12/23/13 colposcopy scheduled. Reminder placed.  colpscopy 12/23/2013-Vaginal cuff (submitted as \"cervix\"), biopsy:  - High grade squamous intraepithelial lesion (vaginal  intraepithelial neoplasia grade III, VaIN III, severe dysplasia and  carcinoma in situ).  - No invasive malignancy identified.  - Cervical transformation zone " not identified in biopsies.  - Please see comment.  Referral back to gynecology/oncology  2/5/14 gyn/onc appt   3/13/14 colposcopy and CO2 laser vagina, path:VAIN 1/2.  3/19/14 follow up in 4 months  7/30/14 vaginal biopsy: VAIN 1. Plan: repeat colp in 6 months.(9/17/14)   9/17/14 colp. No staining seen. No biopsy taken. Pap- ASCUS + HR HPV. Plan- repeat pap at next visit.  2/9/15 colposcopy. bx- LSIL/koilocytosis. Pap- NIL/+ HR HPV 16.  Plan: 3/16/15 treatment planning.   3/16/15 repeat colposcopy in 4 months (due 7/16/15)  7/6/15 scheduled. Tracking.            Trigeminal neuralgia      Priority: Medium    Carcinoid tumor of cecum (H) 12/01/2003     Priority: Medium     Cecal carcinoid cancer, followed by Dr. Dallas, oncology.  Patient has not been complaint with follow up.        Past Medical History:   Diagnosis Date    Arthritis     knee    Benign breast biopsy     benign    Breast cancer (H)     Carcinoid tumor (H) 12/2003    Cervical cancer (H) 2007    H/O colposcopy with cervical biopsy 12/23/2013    vaginal cuff biopsy- VAIN III. referred back to gyn/onc    HTN     Hyperlipidemia     Malignant neoplasm of ovary (H)     Obesity     Pap smear of vagina with ASC-H 11/01/2013    Post-polio syndromes     Trigeminal neuralgias      Past Surgical History:   Procedure Laterality Date    APPENDECTOMY  01/01/1983    BIOPSY BREAST      BIOPSY NODE SENTINEL Bilateral 06/01/2016    Procedure: BIOPSY NODE SENTINEL;  Surgeon: Brent Arana MD;  Location: [facility]    [facility] SURGICAL PATHOLOGY      COLONOSCOPY N/A 06/23/2017    Procedure: COMBINED COLONOSCOPY, SINGLE OR MULTIPLE BIOPSY/POLYPECTOMY BY BIOPSY;  Colonoscopy Dx:Carcinoid tumor of colon prep mailed golytely;  Surgeon: Talisha Greco MD;  Location: [facility]    COLPOSCOPY, BIOPSY, COMBINED  03/13/2014    Procedure: COMBINED COLPOSCOPY, BIOPSY;;  Surgeon: Lara Pack MD;  [facility]: [facility]    COMBINED CYSTOSCOPY, RETROGRADES, EXCHANGE STENT URETER(S) Left  02/07/2019    Procedure: COMBINED CYSTOSCOPY, RETROGRADES, EXCHANGE STENT URETER--left;  Surgeon: Zhao La MD;  Location: [facility]    COMBINED CYSTOSCOPY, RETROGRADES, EXCHANGE STENT URETER(S) Left 02/20/2020    Procedure: CYSTOSCOPY, WITH RETROGRADE PYELOGRAM AND Left URETERAL STENT exchange;  Surgeon: Zhao La MD;  Location: [facility]    COMBINED CYSTOSCOPY, RETROGRADES, EXCHANGE STENT URETER(S) Left 05/09/2021    Procedure: CYSTOSCOPY, WITH RETROGRADE PYELOGRAM AND LEFT URETERAL STENT REPLACEMENT;  Surgeon: Fred Owens MD;  [facility]    COMBINED CYSTOSCOPY, RETROGRADES, EXCHANGE STENT URETER(S) Left 09/16/2021    Procedure: CYSTOSCOPY, WITH left RETROGRADE PYELOGRAM AND left  URETERAL STENT REPLACEMENT;  Surgeon: Zhao La MD;  Location: [facility]    COMBINED CYSTOSCOPY, RETROGRADES, EXCHANGE STENT URETER(S) Left 03/24/2022    Procedure: CYSTOSCOPY, WITH RETROGRADE PYELOGRAM AND URETERAL STENT EXCHANGE, LEFT;  Surgeon: Zhao La MD;  Location: [facility]    COMBINED CYSTOSCOPY, RETROGRADES, URETEROSCOPY, INSERT STENT Left 02/02/2017    Procedure: COMBINED CYSTOSCOPY, RETROGRADES, URETEROSCOPY, INSERT STENT;  Surgeon: Zhao La MD;  Location: [facility]    CYSTOSCOPY, REMOVE STENT(S), COMBINED N/A 11/4/2022    Procedure: CYSTOSCOPY, LEFT STENT REMOVAL;  Surgeon: Zhao La MD;  [facility]: [facility]    CYSTOSCOPY, RETROGRADES, INSERT STENT URETER(S), COMBINED Left 09/07/2017    Procedure: COMBINED CYSTOSCOPY, RETROGRADES, INSERT STENT URETER(S);  Cystoscopy,Left Stent Exchange;  Surgeon: Zhao La MD;  Location: [facility]    CYSTOSCOPY, RETROGRADES, INSERT STENT URETER(S), COMBINED  12/12/2017    Procedure: COMBINED CYSTOSCOPY, RETROGRADES, INSERT STENT URETER(S);;  Surgeon: Zhao La MD;  [facility]    CYSTOSCOPY, RETROGRADES, INSERT STENT URETER(S), COMBINED Left 07/05/2018     Procedure: COMBINED CYSTOSCOPY, RETROGRADES, INSERT STENT URETER(S);  Cystoscopy, Left Stent Exchange;  Surgeon: Zhao La MD;  Location: [facility]    DAVINCI NEPHRECTOMY Left 11/4/2022    Procedure: , LEFT NEPHRECTOMY, ROBOT-ASSISTED;  Surgeon: Zhao La MD;  Location: [facility]    EXAM UNDER ANESTHESIA PELVIC  03/13/2014    Procedure: EXAM UNDER ANESTHESIA PELVIC;  Exam Under Anestheisa, Colposcopy, Vaginal Biopsies, Co2 Laser of the Upper Vagina;  Surgeon: Lara Pack MD;  [facility]    EXAM UNDER ANESTHESIA PELVIC N/A 07/01/2020    Procedure: Examination under anesthesia, vaginal biopsies;  Surgeon: Karli Gao MD;  [facility]    HERNIORRHAPHY INCISIONAL (LOCATION)      IR LYMPH NODE BIOPSY  6/28/2024    IR RHIZOTOMY  08/14/2020    LASER CO2 VAGINA  03/13/2014    VAIN 1/2    LASER CO2 VAGINA N/A 12/12/2017    Procedure: LASER CO2 VAGINA;  Exam Under Anesthesia, CO2 Laser Ablation Of Vagina, Cystoscopy, Left Retrograde Pyelogram with Left Stent Placement;  Surgeon: Nic Segundo MD;  [facility]    LUMPECTOMY BREAST      LUMPECTOMY BREAST WITH SEED LOCALIZATION Bilateral 06/01/2016    Procedure: LUMPECTOMY BREAST WITH SEED LOCALIZATION;  Surgeon: Brent Arana MD;  Location: [facility]    SURGICAL HISTORY OF -       ovarian cystectomy    SURGICAL HISTORY OF -   01/01/2003    right colon resection secondary to carcinoid tumor    TUBAL LIGATION      [facility] BSO, OMENTECTOMY W/OSMAN  05/01/2007    [facility] TOTAL ABDOM HYSTERECTOMY  05/01/2007     Current Outpatient Medications   Medication Sig Dispense Refill    Calcium Carbonate (CALCIUM 500 PO) 2 tablets daily      Calcium-Vitamin D-Vitamin K 500-100-40 MG-UNT-MCG CHEW       exemestane (AROMASIN) 25 MG tablet Take 1 tablet (25 mg) by mouth every morning. 90 tablet 3    hydroCHLOROthiazide 12.5 MG tablet Take 1 tablet (12.5 mg) by mouth every morning. Please make a clinic visit to be seen in person for medication  "refills.  No virtual visits.  Thank you. 90 tablet 3    SENNA-docusate sodium (SENNA S) 8.6-50 MG tablet Take 1 tablet by mouth 2 times daily as needed (prevent opioid induced constipation) 30 tablet 0       No Known Allergies     Social History     Tobacco Use    Smoking status: Former     Current packs/day: 0.00     Average packs/day: 1 pack/day for 29.0 years (29.0 ttl pk-yrs)     Types: Cigarettes     Start date: 10/30/1977     Quit date: 10/30/2006     Years since quittin.2    Smokeless tobacco: Never   Substance Use Topics    Alcohol use: No     Alcohol/week: 0.0 standard drinks of alcohol     Comment: 1 drink per year       History   Drug Use No             Review of Systems  Constitutional, neuro, ENT, endocrine, pulmonary, cardiac, gastrointestinal, genitourinary, musculoskeletal, integument and psychiatric systems are negative, except as otherwise noted.    Objective    /78   Pulse 67   Temp 98.7  F (37.1  C) (Tympanic)   Resp 14   Ht 1.702 m (5' 7\")   Wt 93.9 kg (207 lb)   SpO2 94%   BMI 32.42 kg/m     Estimated body mass index is 32.42 kg/m  as calculated from the following:    Height as of this encounter: 1.702 m (5' 7\").    Weight as of this encounter: 93.9 kg (207 lb).  Physical Exam  GENERAL: alert and no distress  EYES: Eyes grossly normal to inspection, PERRL and conjunctivae and sclerae normal  HENT: ear canals and TM's normal, nose and mouth without ulcers or lesions  NECK: no adenopathy, no asymmetry, masses, or scars  RESP: lungs clear to auscultation - no rales, rhonchi or wheezes  CV: regular rate and rhythm, normal S1 S2, no S3 or S4, no murmur, click or rub, no peripheral edema  ABDOMEN: soft, nontender, no hepatosplenomegaly, no masses and bowel sounds normal  MS: no gross musculoskeletal defects noted, no edema  PSYCH: mentation appears normal, affect normal/bright    Recent Labs   Lab Test 24  1246 24  1445 24  1031 11/15/24  1505 24  1020 " 06/28/24  0839   HGB 13.9  --   --  14.8   < > 14.2     --   --  181   < > 173   INR  --   --   --   --   --  1.50*     --   --  142   < >  --    POTASSIUM 3.4  --   --  3.5   < >  --    CR 1.29* 1.5*   < > 1.31*   < >  --     < > = values in this interval not displayed.        Diagnostics  Recent Results (from the past 24 hours)   CBC with platelets    Collection Time: 01/22/25  1:17 PM   Result Value Ref Range    WBC Count 9.1 4.0 - 11.0 10e3/uL    RBC Count 4.43 3.80 - 5.20 10e6/uL    Hemoglobin 13.9 11.7 - 15.7 g/dL    Hematocrit 41.6 35.0 - 47.0 %    MCV 94 78 - 100 fL    MCH 31.4 26.5 - 33.0 pg    MCHC 33.4 31.5 - 36.5 g/dL    RDW 12.8 10.0 - 15.0 %    Platelet Count 189 150 - 450 10e3/uL   Basic metabolic panel  (Ca, Cl, CO2, Creat, Gluc, K, Na, BUN)    Collection Time: 01/22/25  1:17 PM   Result Value Ref Range    Sodium 143 135 - 145 mmol/L    Potassium 4.6 3.4 - 5.3 mmol/L    Chloride 103 98 - 107 mmol/L    Carbon Dioxide (CO2) 31 (H) 22 - 29 mmol/L    Anion Gap 9 7 - 15 mmol/L    Urea Nitrogen 20.7 8.0 - 23.0 mg/dL    Creatinine 1.12 (H) 0.51 - 0.95 mg/dL    GFR Estimate 51 (L) >60 mL/min/1.73m2    Calcium 10.3 8.8 - 10.4 mg/dL    Glucose 96 70 - 99 mg/dL        No EKG required, no history of coronary heart disease, significant arrhythmia, peripheral arterial disease or other structural heart disease.    Revised Cardiac Risk Index (RCRI)  The patient has the following serious cardiovascular risks for perioperative complications:   - No serious cardiac risks = 0 points     RCRI Interpretation: 0 points: Class I (very low risk - 0.4% complication rate)         Signed Electronically by: KAREN Ulrich CNP  A copy of this evaluation report is provided to the requesting physician.

## 2025-01-22 NOTE — PATIENT INSTRUCTIONS
For one week prior to surgery, do not take aspirin, ibuprofen or naproxen.  May take Tylenol if needed for pain      The day of surgery, do not take hydrochlorothiazide.

## 2025-01-24 ENCOUNTER — HOSPITAL ENCOUNTER (INPATIENT)
Facility: CLINIC | Age: 75
Setting detail: SURGERY ADMIT
LOS: 1 days | Discharge: HOME | DRG: 583 | End: 2025-01-25
Attending: SURGERY | Admitting: SURGERY
Payer: MEDICARE

## 2025-01-24 ENCOUNTER — HOSPITAL ENCOUNTER (OUTPATIENT)
Dept: NUCLEAR MEDICINE | Facility: CLINIC | Age: 75
Setting detail: OBSERVATION
Discharge: HOME | End: 2025-01-24
Attending: SURGERY | Admitting: SURGERY
Payer: MEDICARE

## 2025-01-24 DIAGNOSIS — C50.911 INVASIVE DUCTAL CARCINOMA OF RIGHT BREAST (H): ICD-10-CM

## 2025-01-24 DIAGNOSIS — C80.1 CARCINOMA METASTATIC TO LYMPH NODES OF MULTIPLE SITES WITH UNKNOWN PRIMARY SITE (H): ICD-10-CM

## 2025-01-24 DIAGNOSIS — C50.511 MALIGNANT NEOPLASM OF LOWER-OUTER QUADRANT OF RIGHT BREAST OF FEMALE, ESTROGEN RECEPTOR NEGATIVE (H): Primary | ICD-10-CM

## 2025-01-24 DIAGNOSIS — Z17.421 TRIPLE NEGATIVE BREAST CANCER (H): ICD-10-CM

## 2025-01-24 DIAGNOSIS — C50.919 TRIPLE NEGATIVE BREAST CANCER (H): ICD-10-CM

## 2025-01-24 DIAGNOSIS — Z17.1 MALIGNANT NEOPLASM OF LOWER-OUTER QUADRANT OF RIGHT BREAST OF FEMALE, ESTROGEN RECEPTOR NEGATIVE (H): Primary | ICD-10-CM

## 2025-01-24 DIAGNOSIS — C78.6 MALIGNANT NEOPLASM METASTATIC TO PERITONEUM (H): ICD-10-CM

## 2025-01-24 DIAGNOSIS — C77.8 CARCINOMA METASTATIC TO LYMPH NODES OF MULTIPLE SITES WITH UNKNOWN PRIMARY SITE (H): ICD-10-CM

## 2025-01-24 LAB — GLUCOSE BLDC GLUCOMTR-MCNC: 99 MG/DL (ref 70–99)

## 2025-01-24 PROCEDURE — 999N000141 HC STATISTIC PRE-PROCEDURE NURSING ASSESSMENT: Performed by: SURGERY

## 2025-01-24 PROCEDURE — 250N000009 HC RX 250: Performed by: SURGERY

## 2025-01-24 PROCEDURE — 19303 MAST SIMPLE COMPLETE: CPT | Mod: RT | Performed by: SURGERY

## 2025-01-24 PROCEDURE — 64467 THRC FASCIAL PLN BLK UNI NFS: CPT

## 2025-01-24 PROCEDURE — 272N000001 HC OR GENERAL SUPPLY STERILE: Performed by: SURGERY

## 2025-01-24 PROCEDURE — 120N000001 HC R&B MED SURG/OB

## 2025-01-24 PROCEDURE — 78195 LYMPH SYSTEM IMAGING: CPT | Mod: 26 | Performed by: SURGERY

## 2025-01-24 PROCEDURE — 710N000009 HC RECOVERY PHASE 1, LEVEL 1, PER MIN: Performed by: SURGERY

## 2025-01-24 PROCEDURE — A9520 TC99 TILMANOCEPT DIAG 0.5MCI: HCPCS | Performed by: SURGERY

## 2025-01-24 PROCEDURE — 250N000013 HC RX MED GY IP 250 OP 250 PS 637: Performed by: NURSE ANESTHETIST, CERTIFIED REGISTERED

## 2025-01-24 PROCEDURE — 250N000011 HC RX IP 250 OP 636: Performed by: SURGERY

## 2025-01-24 PROCEDURE — 360N000076 HC SURGERY LEVEL 3, PER MIN: Performed by: SURGERY

## 2025-01-24 PROCEDURE — 88305 TISSUE EXAM BY PATHOLOGIST: CPT | Mod: 26 | Performed by: STUDENT IN AN ORGANIZED HEALTH CARE EDUCATION/TRAINING PROGRAM

## 2025-01-24 PROCEDURE — 258N000003 HC RX IP 258 OP 636: Performed by: NURSE ANESTHETIST, CERTIFIED REGISTERED

## 2025-01-24 PROCEDURE — 250N000009 HC RX 250: Performed by: NURSE ANESTHETIST, CERTIFIED REGISTERED

## 2025-01-24 PROCEDURE — 38792 RA TRACER ID OF SENTINL NODE: CPT

## 2025-01-24 PROCEDURE — 88307 TISSUE EXAM BY PATHOLOGIST: CPT | Mod: 26 | Performed by: STUDENT IN AN ORGANIZED HEALTH CARE EDUCATION/TRAINING PROGRAM

## 2025-01-24 PROCEDURE — 88307 TISSUE EXAM BY PATHOLOGIST: CPT | Mod: TC | Performed by: SURGERY

## 2025-01-24 PROCEDURE — 343N000001 HC RX 343 MED OP 636: Performed by: SURGERY

## 2025-01-24 PROCEDURE — 38900 IO MAP OF SENT LYMPH NODE: CPT | Performed by: SURGERY

## 2025-01-24 PROCEDURE — 0HTT0ZZ RESECTION OF RIGHT BREAST, OPEN APPROACH: ICD-10-PCS | Performed by: SURGERY

## 2025-01-24 PROCEDURE — 370N000017 HC ANESTHESIA TECHNICAL FEE, PER MIN: Performed by: SURGERY

## 2025-01-24 RX ORDER — HYDROMORPHONE HCL IN WATER/PF 6 MG/30 ML
0.2 PATIENT CONTROLLED ANALGESIA SYRINGE INTRAVENOUS EVERY 5 MIN PRN
Status: DISCONTINUED | OUTPATIENT
Start: 2025-01-24 | End: 2025-01-24 | Stop reason: HOSPADM

## 2025-01-24 RX ORDER — FENTANYL CITRATE 50 UG/ML
25 INJECTION, SOLUTION INTRAMUSCULAR; INTRAVENOUS EVERY 5 MIN PRN
Status: DISCONTINUED | OUTPATIENT
Start: 2025-01-24 | End: 2025-01-24 | Stop reason: HOSPADM

## 2025-01-24 RX ORDER — SODIUM CHLORIDE, SODIUM LACTATE, POTASSIUM CHLORIDE, CALCIUM CHLORIDE 600; 310; 30; 20 MG/100ML; MG/100ML; MG/100ML; MG/100ML
INJECTION, SOLUTION INTRAVENOUS CONTINUOUS
Status: DISCONTINUED | OUTPATIENT
Start: 2025-01-24 | End: 2025-01-24 | Stop reason: HOSPADM

## 2025-01-24 RX ORDER — ACETAMINOPHEN 325 MG/1
650 TABLET ORAL EVERY 6 HOURS PRN
Status: DISCONTINUED | OUTPATIENT
Start: 2025-01-24 | End: 2025-01-25 | Stop reason: HOSPADM

## 2025-01-24 RX ORDER — HYDROXYZINE HYDROCHLORIDE 25 MG/1
25 TABLET, FILM COATED ORAL EVERY 6 HOURS PRN
Status: DISCONTINUED | OUTPATIENT
Start: 2025-01-24 | End: 2025-01-24 | Stop reason: HOSPADM

## 2025-01-24 RX ORDER — ACETAMINOPHEN 325 MG/1
975 TABLET ORAL ONCE
Status: COMPLETED | OUTPATIENT
Start: 2025-01-24 | End: 2025-01-24

## 2025-01-24 RX ORDER — HYDROXYZINE HYDROCHLORIDE 50 MG/1
50 TABLET, FILM COATED ORAL EVERY 6 HOURS PRN
Status: DISCONTINUED | OUTPATIENT
Start: 2025-01-24 | End: 2025-01-24 | Stop reason: HOSPADM

## 2025-01-24 RX ORDER — DEXAMETHASONE SODIUM PHOSPHATE 4 MG/ML
4 INJECTION, SOLUTION INTRA-ARTICULAR; INTRALESIONAL; INTRAMUSCULAR; INTRAVENOUS; SOFT TISSUE
Status: DISCONTINUED | OUTPATIENT
Start: 2025-01-24 | End: 2025-01-24 | Stop reason: HOSPADM

## 2025-01-24 RX ORDER — OXYCODONE HYDROCHLORIDE 5 MG/1
5 TABLET ORAL EVERY 4 HOURS PRN
Status: DISCONTINUED | OUTPATIENT
Start: 2025-01-24 | End: 2025-01-25 | Stop reason: HOSPADM

## 2025-01-24 RX ORDER — CEFAZOLIN SODIUM/WATER 2 G/20 ML
2 SYRINGE (ML) INTRAVENOUS
Status: COMPLETED | OUTPATIENT
Start: 2025-01-24 | End: 2025-01-24

## 2025-01-24 RX ORDER — METHOCARBAMOL 500 MG/1
500 TABLET, FILM COATED ORAL 4 TIMES DAILY PRN
Qty: 12 TABLET | Refills: 0 | Status: SHIPPED | OUTPATIENT
Start: 2025-01-24

## 2025-01-24 RX ORDER — NALOXONE HYDROCHLORIDE 0.4 MG/ML
0.4 INJECTION, SOLUTION INTRAMUSCULAR; INTRAVENOUS; SUBCUTANEOUS
Status: DISCONTINUED | OUTPATIENT
Start: 2025-01-24 | End: 2025-01-25 | Stop reason: HOSPADM

## 2025-01-24 RX ORDER — LIDOCAINE 40 MG/G
CREAM TOPICAL
Status: DISCONTINUED | OUTPATIENT
Start: 2025-01-24 | End: 2025-01-25 | Stop reason: HOSPADM

## 2025-01-24 RX ORDER — OXYCODONE HYDROCHLORIDE 5 MG/1
10 TABLET ORAL EVERY 4 HOURS PRN
Status: DISCONTINUED | OUTPATIENT
Start: 2025-01-24 | End: 2025-01-25 | Stop reason: HOSPADM

## 2025-01-24 RX ORDER — ONDANSETRON 4 MG/1
4 TABLET, ORALLY DISINTEGRATING ORAL EVERY 30 MIN PRN
Status: DISCONTINUED | OUTPATIENT
Start: 2025-01-24 | End: 2025-01-24 | Stop reason: HOSPADM

## 2025-01-24 RX ORDER — NALOXONE HYDROCHLORIDE 0.4 MG/ML
0.1 INJECTION, SOLUTION INTRAMUSCULAR; INTRAVENOUS; SUBCUTANEOUS
Status: DISCONTINUED | OUTPATIENT
Start: 2025-01-24 | End: 2025-01-24 | Stop reason: HOSPADM

## 2025-01-24 RX ORDER — OXYCODONE HYDROCHLORIDE 5 MG/1
5 TABLET ORAL EVERY 6 HOURS PRN
Qty: 12 TABLET | Refills: 0 | Status: SHIPPED | OUTPATIENT
Start: 2025-01-24 | End: 2025-01-27

## 2025-01-24 RX ORDER — FENTANYL CITRATE 50 UG/ML
50 INJECTION, SOLUTION INTRAMUSCULAR; INTRAVENOUS EVERY 5 MIN PRN
Status: DISCONTINUED | OUTPATIENT
Start: 2025-01-24 | End: 2025-01-24 | Stop reason: HOSPADM

## 2025-01-24 RX ORDER — IBUPROFEN 600 MG/1
600 TABLET, FILM COATED ORAL EVERY 6 HOURS PRN
Status: DISCONTINUED | OUTPATIENT
Start: 2025-01-24 | End: 2025-01-25 | Stop reason: HOSPADM

## 2025-01-24 RX ORDER — LIDOCAINE 40 MG/G
CREAM TOPICAL
Status: DISCONTINUED | OUTPATIENT
Start: 2025-01-24 | End: 2025-01-24 | Stop reason: HOSPADM

## 2025-01-24 RX ORDER — NALOXONE HYDROCHLORIDE 0.4 MG/ML
0.2 INJECTION, SOLUTION INTRAMUSCULAR; INTRAVENOUS; SUBCUTANEOUS
Status: DISCONTINUED | OUTPATIENT
Start: 2025-01-24 | End: 2025-01-25 | Stop reason: HOSPADM

## 2025-01-24 RX ORDER — ONDANSETRON 2 MG/ML
4 INJECTION INTRAMUSCULAR; INTRAVENOUS EVERY 30 MIN PRN
Status: DISCONTINUED | OUTPATIENT
Start: 2025-01-24 | End: 2025-01-24 | Stop reason: HOSPADM

## 2025-01-24 RX ORDER — HYDROMORPHONE HCL IN WATER/PF 6 MG/30 ML
0.4 PATIENT CONTROLLED ANALGESIA SYRINGE INTRAVENOUS
Status: DISCONTINUED | OUTPATIENT
Start: 2025-01-24 | End: 2025-01-25 | Stop reason: HOSPADM

## 2025-01-24 RX ORDER — DOCUSATE SODIUM 100 MG/1
100 CAPSULE, LIQUID FILLED ORAL 2 TIMES DAILY PRN
Qty: 24 CAPSULE | Refills: 0 | Status: SHIPPED | OUTPATIENT
Start: 2025-01-24

## 2025-01-24 RX ORDER — HYDROMORPHONE HCL IN WATER/PF 6 MG/30 ML
0.4 PATIENT CONTROLLED ANALGESIA SYRINGE INTRAVENOUS EVERY 5 MIN PRN
Status: DISCONTINUED | OUTPATIENT
Start: 2025-01-24 | End: 2025-01-24 | Stop reason: HOSPADM

## 2025-01-24 RX ORDER — HEPARIN SODIUM 5000 [USP'U]/.5ML
5000 INJECTION, SOLUTION INTRAVENOUS; SUBCUTANEOUS
Status: COMPLETED | OUTPATIENT
Start: 2025-01-24 | End: 2025-01-24

## 2025-01-24 RX ORDER — ACETAMINOPHEN 325 MG/1
650 TABLET ORAL EVERY 4 HOURS PRN
Qty: 100 TABLET | Refills: 0 | Status: SHIPPED | OUTPATIENT
Start: 2025-01-24

## 2025-01-24 RX ORDER — MAGNESIUM HYDROXIDE 1200 MG/15ML
LIQUID ORAL PRN
Status: DISCONTINUED | OUTPATIENT
Start: 2025-01-24 | End: 2025-01-24 | Stop reason: HOSPADM

## 2025-01-24 RX ORDER — HYDROMORPHONE HCL IN WATER/PF 6 MG/30 ML
0.2 PATIENT CONTROLLED ANALGESIA SYRINGE INTRAVENOUS
Status: DISCONTINUED | OUTPATIENT
Start: 2025-01-24 | End: 2025-01-25 | Stop reason: HOSPADM

## 2025-01-24 RX ORDER — CEFAZOLIN SODIUM/WATER 2 G/20 ML
2 SYRINGE (ML) INTRAVENOUS SEE ADMIN INSTRUCTIONS
Status: DISCONTINUED | OUTPATIENT
Start: 2025-01-24 | End: 2025-01-24 | Stop reason: HOSPADM

## 2025-01-24 RX ORDER — MEPERIDINE HYDROCHLORIDE 25 MG/ML
12.5 INJECTION INTRAMUSCULAR; INTRAVENOUS; SUBCUTANEOUS EVERY 5 MIN PRN
Status: DISCONTINUED | OUTPATIENT
Start: 2025-01-24 | End: 2025-01-24 | Stop reason: HOSPADM

## 2025-01-24 RX ADMIN — TILMANOCEPT 0.53 MILLICURIE: KIT at 13:24

## 2025-01-24 RX ADMIN — SODIUM CHLORIDE, POTASSIUM CHLORIDE, SODIUM LACTATE AND CALCIUM CHLORIDE: 600; 310; 30; 20 INJECTION, SOLUTION INTRAVENOUS at 13:29

## 2025-01-24 RX ADMIN — ACETAMINOPHEN 975 MG: 325 TABLET, FILM COATED ORAL at 13:15

## 2025-01-24 RX ADMIN — LIDOCAINE HYDROCHLORIDE 0.2 ML: 10 INJECTION, SOLUTION EPIDURAL; INFILTRATION; INTRACAUDAL; PERINEURAL at 13:30

## 2025-01-24 ASSESSMENT — ACTIVITIES OF DAILY LIVING (ADL)
ADLS_ACUITY_SCORE: 38
ADLS_ACUITY_SCORE: 35
ADLS_ACUITY_SCORE: 39
ADLS_ACUITY_SCORE: 38
ADLS_ACUITY_SCORE: 35
ADLS_ACUITY_SCORE: 35

## 2025-01-25 VITALS
WEIGHT: 207.45 LBS | HEART RATE: 56 BPM | OXYGEN SATURATION: 94 % | BODY MASS INDEX: 32.56 KG/M2 | HEIGHT: 67 IN | TEMPERATURE: 97.7 F | DIASTOLIC BLOOD PRESSURE: 49 MMHG | SYSTOLIC BLOOD PRESSURE: 152 MMHG | RESPIRATION RATE: 16 BRPM

## 2025-01-25 PROCEDURE — 250N000013 HC RX MED GY IP 250 OP 250 PS 637: Performed by: PHYSICIAN ASSISTANT

## 2025-01-25 RX ADMIN — OXYCODONE 5 MG: 5 TABLET ORAL at 12:02

## 2025-01-25 RX ADMIN — ACETAMINOPHEN 650 MG: 325 TABLET, FILM COATED ORAL at 12:02

## 2025-01-25 ASSESSMENT — ACTIVITIES OF DAILY LIVING (ADL)
ADLS_ACUITY_SCORE: 42
ADLS_ACUITY_SCORE: 42
ADLS_ACUITY_SCORE: 40
ADLS_ACUITY_SCORE: 42
ADLS_ACUITY_SCORE: 35
ADLS_ACUITY_SCORE: 42
ADLS_ACUITY_SCORE: 40
ADLS_ACUITY_SCORE: 42

## 2025-01-25 NOTE — PROGRESS NOTES
Skin affirmation note    Admitting nurse completed full skin assessment, Jae score and Jae interventions. This writer agrees with the initial skin assessment findings.

## 2025-01-25 NOTE — PLAN OF CARE
Problem: Adult Inpatient Plan of Care  Goal: Plan of Care Review  Description: The Plan of Care Review/Shift note should be completed every shift.  The Outcome Evaluation is a brief statement about your assessment that the patient is improving, declining, or no change.  This information will be displayed automatically on your shift  note.  Flowsheets (Taken 1/25/2025 0353)  Outcome Evaluation: Pt A/Ox4,  pt has binder in place, CDI, BERNADETTE drain to bulb suction noted. Pt denies pain throughout shift, per surgery report block placed.  diet advanced to reg diet, pt tolerated well. Pt's IV SL. Pt ambulated to BR and back with A1/walker without difficulties. Will continue with plan of care, and will monitor pt throughout shift.  Plan of Care Reviewed With: patient  Overall Patient Progress: improving  Goal: Absence of Hospital-Acquired Illness or Injury  Intervention: Identify and Manage Fall Risk  Recent Flowsheet Documentation  Taken 1/25/2025 0115 by Skyla Davies RN  Safety Promotion/Fall Prevention:   activity supervised   assistive device/personal items within reach   clutter free environment maintained   mobility aid in reach   nonskid shoes/slippers when out of bed   patient and family education   safety round/check completed  Taken 1/24/2025 2000 by Skyla Davies RN  Safety Promotion/Fall Prevention:   activity supervised   assistive device/personal items within reach   clutter free environment maintained   mobility aid in reach   nonskid shoes/slippers when out of bed   patient and family education   safety round/check completed  Intervention: Prevent Skin Injury  Recent Flowsheet Documentation  Taken 1/25/2025 0115 by Skyla Davies RN  Body Position: supine, head elevated  Taken 1/24/2025 2000 by Skyla Davies RN  Body Position: supine, head elevated  Intervention: Prevent and Manage VTE (Venous Thromboembolism) Risk  Recent Flowsheet Documentation  Taken 1/25/2025 0115 by Skyla Davies RN  VTE  Prevention/Management: SCDs on (sequential compression devices)  Taken 1/24/2025 2000 by Skyla Davies RN  VTE Prevention/Management: SCDs on (sequential compression devices)  Intervention: Prevent Infection  Recent Flowsheet Documentation  Taken 1/25/2025 0115 by Skyla Davies RN  Infection Prevention:   hand hygiene promoted   rest/sleep promoted   single patient room provided  Taken 1/24/2025 2000 by Skyla Davies RN  Infection Prevention:   hand hygiene promoted   rest/sleep promoted   single patient room provided  Goal: Readiness for Transition of Care  Recent Flowsheet Documentation  Taken 1/24/2025 2000 by Skyla Davies RN  Transportation Anticipated: family or friend will provide  Intervention: Mutually Develop Transition Plan  Recent Flowsheet Documentation  Taken 1/24/2025 2000 by Skyla Davies RN  Transportation Anticipated: family or friend will provide  Patient/Family Anticipated Services at Transition: none  Patient/Family Anticipates Transition to: home with family  Equipment Currently Used at Home: walker, rolling     Problem: Pain Acute  Goal: Optimal Pain Control and Function  Intervention: Prevent or Manage Pain  Recent Flowsheet Documentation  Taken 1/25/2025 0115 by Skyla Davies RN  Medication Review/Management: medications reviewed  Taken 1/24/2025 2000 by Skyla Davies RN  Medication Review/Management: medications reviewed   Goal Outcome Evaluation:      Plan of Care Reviewed With: patient    Overall Patient Progress: improvingOverall Patient Progress: improving    Outcome Evaluation: Pt A/Ox4,  pt has binder in place, CDI, BERNADETTE drain to bulb suction noted. Pt denies pain throughout shift, per surgery report block placed.  diet advanced to reg diet, pt tolerated well. Pt's IV SL. Pt ambulated to BR and back with A1/walker without difficulties. Will continue with plan of care, and will monitor pt throughout shift.  BP (!) 143/56 (BP Location: Left arm)   Pulse 54   Temp  "97.9  F (36.6  C) (Oral)   Resp 16   Ht 1.702 m (5' 7\")   Wt 94.1 kg (207 lb 7.3 oz)   SpO2 93%   BMI 32.49 kg/m    Skyla Davies RN on 1/25/2025 at 3:57 AM          "

## 2025-01-25 NOTE — OP NOTE
OPERATIVE NOTE  [facility]    DATE:  1/24/2025    SURGEON:  Rahat Marroquin DO    ASSISTANT:  Maricruz Sykes PA-C (Needed for retraction, suction, assistance with closure)    PREOPERATIVE DIAGNOSIS:  Right Invasive ductal carcinoma    POSTOPERATIVE DIAGNOSIS:  Same    OPERATION:  1.  Injection of technetium 99  2.  Intraoperative lymphoscintigraphy  3.  Injection of Methylene blue  4.  Right Simple mastectomy      Anesthesia: General    Estimated blood loss: 30 mL  * No blood loss amount entered *        Blood transfusion: No transfusion was given during surgery   Total urine output: (See anesthesia record)  Not measured   Drains: Martín-Wu   Specimens: ID Type Source Tests Collected by Time Destination   1 : Right Breast (Short Suture Superior, Long Suture Lateral) Mastectomy, Simple Breast, Right SURGICAL PATHOLOGY EXAM Rahat Marroquin,  1/24/2025  5:27 PM    2 : right breast, additional anterior margin, suture marks tumor Tissue Breast, Right SURGICAL PATHOLOGY EXAM Rahat Marroquin, DO 1/24/2025  5:36 PM    3 : Right Breast, Additional Anterior Margin, Clip marked tumor side Tissue Breast, Right SURGICAL PATHOLOGY EXAM Rahat Marroquin, DO 1/24/2025  5:42 PM       Implants: None   Findings: Non localization of right axilla   Complications: None.   Condition: Stable       INDICATIONS FOR PROCEDURE:  Marissa Guadarrama is a 74 year old who developed an abnormal mammogram that on workup with core-needle biopsy was found to be invasive ductal carcinoma.  After discussion of alternatives, the patient elected to undergo mastectomy with sentinel node biopsy.  She had refused chemotherapy and had been on Keytruda for other malignancies.  She was discussed at tumor board extensively, mastectomy with attempted sentinel node was discussed. The patient expressed understanding of risks, benefits, and alternatives of the above procedures and wishes to  proceed.    FINDINGS:  None    PROCEDURE:   Consent was signed and approximately an hour later the patient was then brought back to the operative suite.  The patient was then placed in a supine position.  Anesthesia was induced per anesthesia protocol.  A pre-injection pause was carried out.  We started our procedure by Methylene blue into the right breast intradermally at the nipple areolar complex. Technetium 99 was injected similarly. This was massaged for several minutes.  The patient was then prepped and draped in the usual sterile fashion.  A timeout was then done to confirm the correct patient, positioning and procedure.        A skin incision was made that encompassed the nipple-areola complex passed in a generally transverse direction across the breast/other. Flaps were raised in the avascular plane between subcutaneous tissue and breast tissue from clavicle superiorly, the sternum medially, the anterior rectus sheath inferiorly, and the anterior border of the latissimus dorsi muscle laterally. Hemostasis was achieved in the flaps.  During dissection, an area of firmness was encountered just under the flap.  The overlying flap was marked with clips during dissection. The breast was excised and marked with a short suture superior and long suture laterally.      Attention was then turned to the axilla.  The neoprobe was used and no areas of activity were noted.  The clavipectoral fascia was incised and axilla opened.  No blue was evident despite extensive search.  Additional use of neoprobe yielded no activity.  The breast was checked and other than the nipple areolar complex, no localization occurred.  Given her refusal of chemotherapy, ongoing treatment and extensive comorbid conditions, additional attempts or full axillary dissection was not pursued.      The area overlying the firm area noted during dissection was excised with cautery and suture used to leeann the tumor side, this was sent to pathology.  An  additional area medial to this appeared thicker than the rest of the flap and was excised with clips marking the tumor side.      The wound was irrigated and hemostasis was achieved.   A #15 Thai round Martín-Wu drain were placed through a separate incision laterally and sewn in in the standard drain stitch incision utilizing a 2-0 Nylon.   The chest incision was then closed with 3-0 Vicryl followed by 3-0 Strattifix, followed by a layer of sterile glue.  Patient tolerated the procedure well.    Rahat Marroquin DO on 1/24/2025 at 6:07 PM  [facility]

## 2025-01-25 NOTE — DISCHARGE INSTRUCTIONS
HOME CARE FOLLOWING BREAST SURGERY      APPOINTMENT WITH YOUR SURGEON:  You will be scheduled to see your surgeon in 1 week to discuss your pathology results and for a wound check.    SUPPORT:  Wear a bra for support and comfort for 3-7 days, day and night.    DRAIN:  If you have a drain in place, you will need a separate appointment with a nurse in the office to have this removed.  You will have a form to keep track of the output of your drain.  When the output reaches a point where the total for a 24 hour period is less than 30cc s, you are ready to have the drain removed.  At that point you can call the office and talk to the nurse to arrange coming into the office to have this done.  If you have more than one drain in place, they may not all be removed at the same time, as the outputs can be very different from each.  The nurse will help you to manage this and help decide when the remaining drains will be taken out.    INCISIONAL CARE:  If you have a dressing in place, keep clean and dry for 48 hours; you may replace the gauze if it becomes soiled.  After 48 hours you may remove the dressing and shower.  Do not submerse incision in water for 1 week.  If you have a Dermabond dressing (a type of skin glue), you may shower immediately.  Sutures will absorb and do not need to be removed.  If present, leave the steri-strips (white paper tapes) in place for 14 days after surgery.  If present, leave Dermabond glue in place until it wears/flakes off.  You may expect a small amount of drainage from your incision.  A lump/ridge under the incision is normal and will gradually resolve.    BATHING:  You are allowed to bath (shallow water in a tub only) or shower 24-48 hours after your surgery.  Mild soap is OK to use near these sites.  When bathing, do not allow the incision or drain site to become submersed in water as this may increase the risk of infection.    ACTIVITY:  Cautiously resume exercise and strenuous activities  such as jogging, tennis, aerobics, etc. Also, be careful of stretching activities with operative side for two weeks.    If you had a  sentinel node biopsy  at the time of your surgery:  You have no restrictions in addition to those noted above.    If you had an  axillary node dissection  at the time of your surgery:  You are recommended to restrict the activity of the arm on the side the dissection was done on.  This means:  no reaching overhead until cleared to do so by your surgeon, no carrying weight on that side greater than a couple pounds, no injections/vaccinations/ blood samples from that side, and no blood pressure measurements on that side.  It is also recommended to elevate the arm on pillows several times a day to decrease/minimize swelling, and avoid sleeping on that arm.    DIET:  No restrictions.  Increased fluid intake is recommended. While taking pain medications, increase dietary fiber or add a fiber supplementation like Metamucil or Citrucel to help prevent constipation - a possible side effect of pain medications.    DISCOMFORT:  Local anesthetic placed at surgery should provide relief for 4-8 hours.  Begin taking pain pills before discomfort is severe.  Take the pain medication with some food, when possible, to minimize side effects.  Intermittent use of ice packs may help during the first 48 hours.  Expect gradual improvement.    RETURN APPOINTMENT:  Schedule a follow-up visit 2-3 weeks post-op.  Office Phone:  [phone]     CONTACT US IF THE FOLLOWING DEVELOPS:   1. A fever that is above 101     2. If there is a large amount of drainage, bleeding, or swelling.   3. Severe pain that is not relieved by your prescription.   4. Drainage that is thick, cloudy, yellow, green or white.   5. Any other questions not answered by  Frequently Asked Questions  sheet.      FREQUENTLY ASKED QUESTIONS:    Q:  How should my incision look?    A:  Normally your incision will appear slightly swollen with light  redness directly along the incision itself as it heals.  It may feel like a bump or ridge as the healing/scarring happens, and over time (3-4 months) this bump or ridge feeling should slowly go away.  In general, clear or pink watery drainage can be normal at first as your incision heals, but should decrease over time.    Q:  How do I know if my incision is infected?  A:  Look at your incision for signs of infection, like redness around the incision spreading to surrounding skin, or drainage of cloudy or foul-smelling drainage.  If you feel warm, check your temperature to see if you are running a fever.    **If any of these things occur, please notify the nurse at our office.  We may need you to come into the office for an incision check.      Q:  How do I take care of my incision?  A:  If you have a dressing in place - Starting the day after surgery, replace the dressing 1-2 times a day until there is no further drainage from the incision.  At that time, a dressing is no longer needed.  Try to minimize tape on the skin if irritation is occurring at the tape sites.  If you have significant irritation from tape on the skin, please call the office to discuss other method of dressing your incision.    Small pieces of tape called  steri-strips  may be present directly overlying your incision; these may be removed 10 days after surgery unless otherwise specified by your surgeon.  If these tapes start to loosen at the ends, you may trim them back until they fall off or are removed.    A:  If you had  Dermabond  tissue glue used as a dressing (this causes your incision to look shiny with a clear covering over it) - This type of dressing wears off with time and does not require more dressings over the top unless it is draining around the glue as it wears off.  Do not apply ointments or lotions over the incisions until the glue has completely worn off.    Q:  There is a piece of tape or a sticky  lead  still on my skin.  Can  I remove this?  A:  Sometimes the sticky  leads  used for monitoring during surgery or for evaluation in the emergency department are not all removed while you are in the hospital.  These sometimes have a tab or metal dot on them.  You can easily remove these on your own, like taking off a band-aid.  If there is a gel substance under the  lead , simply wipe/clean it off with a washcloth or paper towel.      Q:  What can I do to minimize constipation (very hard stools, or lack of stools)?  A:  Stay well hydrated.  Increase your dietary fiber intake or take a fiber supplement -with plenty of water.  Walk around frequently.  You may consider an over-the-counter stool-softener.  Your Pharmacist can assist you with choosing one that is stocked at your pharmacy.  Constipation is also one of the most common side effects of pain medication.  If you are using pain medication, be pro-active and try to PREVENT problems with constipation by taking the steps above BEFORE constipation becomes a problem.    Q:  What do I do if I need more pain medications?  A:  Call the office to receive refills.  Be aware that certain pain meds cannot be called into a pharmacy and actually require a paper prescription.  A change may be made in your pain med as you progress thru your recovery period or if you have side effects to certain meds.    --Pain meds are NOT refilled after 5pm on weekdays, and NOT AT ALL on the weekends, so please look ahead to prevent problems.      Q:  Why am I having a hard time sleeping now that I am at home?  A:  Many medications you receive while you are in the hospital can impact your sleep for a number of days after your surgery/hospitalization.  Decreased level of activity and naps during the day may also make sleeping at night difficult.  Try to minimize day-time naps, and get up frequently during the day to walk around your home during your recovery time.  Sleep aides may be of some help, but are not recommended  for long-term use.      Q:  I am having some back discomfort.  What should I do?  A:  This may be related to certain positioning that was required for your surgery, extended periods of time in bed, or other changes in your overall activity level.  You may try ice, heat, acetaminophen, or ibuprofen to treat this temporarily.  Note that many pain medications have acetaminophen in them and would state this on the prescription bottle.  Be sure not to exceed the maximum of 4000mg per day of acetaminophen.     **If the pain you are having does not resolve, is severe, or is a flare of back pain you have had on other occasions prior to surgery, please contact your primary physician for further recommendations or for an appointment to be examined at their office.    Q:  Why am I having headaches?  A:  Headaches can be caused by many things:  caffeine withdrawal, use of pain meds, dehydration, high blood pressure, lack of sleep, over-activity/exhaustion, flare-up of usual migraine headaches.  If you feel this is related to muscle tension (a band-like feeling around the head, or a pressure at the low-back of the head) you may try ice or heat to this area.  You may need to drink more fluids (try electrolyte drink like Gatorade), rest, or take your usual migraine medications.   **If your headaches do not resolve, worsen, are accompanied by other symptoms, or if your blood pressure is high, please call your primary physician for recommendation and/or examination.    Q:  I am unable to urinate.  What do I do?  A:  A small percentage of people can have difficulty urinating initially after surgery.  This includes being able to urinate only a very small amount at a time and feeling discomfort or pressure in the very low abdomen.  This is called  urinary retention , and is actually an urgent situation.  Proceed to your nearest Emergency department for evaluation (not an Urgent Care Center).  Sometimes the bladder does not work  correctly after certain medications you receive during surgery, or related to certain procedures.  You may need to have a catheter placed until your bladder recovers.  When planning to go to an Emergency department, it may help to call the ER to let them know you are coming in for this problem after a surgery.  This may help you get in quicker to be evaluated.  **If you have symptoms of a urinary tract infection, please contact your primary physician for the proper evaluation and treatment.          If you have other questions, please call the office Monday thru Friday between 8am and 5pm to discuss with the nurse.  # [phone]    There is a surgeon ON CALL on weekday evenings and over the weekend in case of urgent need only, and may be contacted at the same number.    If you are having an emergency, call 911 or proceed to your nearest emergency department.      Dressing  Leave your outer dressing on for 48 hours. The incision has been sealed with a skin sealant.  This will dissolve on its own over the next few weeks.  After 48 hours you should remove the dressing and may shower. For the first week avoid running water directly on the incision, pat it dry with a clean towel, and keep a clean, dry dressing over the incision.   Do not soak in the bathtub while your drain is in place. You may take a bath, but do not get the drain site wet.  Do not apply any creams or ointments to the incision. Do not shave or use deodorant or antiperspirant under the affected arm.    Pain  You can expect to experience a moderate amount of pain after surgery. Your pain will be the worst during the first few days after surgery and then get progressively better.  You have been given a prescription for pain medications. Follow the instructions carefully. Do not drive or operate machinery while taking narcotic pain medication.    Cautions  Do not drive until you have been seen in the office and cleared. Your reflexes are slower than you  think, and any pain from the incision is distracting. Also, you will be taking pain medications which will further slow your response time.  Do not drink alcohol while you are taking pain medications.  You may notice some mild swelling of the involved arm. This will usually resolve on its own. If it is getting worse, you should notify the [facility]. You will also probably notice some numbness on the skin of the inner arm. This is expected and often gets better very gradually.  You should call if you have any signs of an infection. Signs of infection include: marked increase in the swelling or redness near the area of the surgery, temperature of 101.0o or greater, green or yellow drainage, a foul odor, or the skin around the incision becomes hot to the touch.  You should also call if you have bleeding from the incision which is difficult to control with light pressure or leakage around your dressing tube and the gauze dressing is soaked.    Follow-up  We will need to see you back in the office when the fluid in the collection bulb is 30ml per day for two consecutive days. Call  so arrangements can be made to remove the drain. At the time of your follow-up appointment, be sure to bring the record of how much fluid came out of your drain ( Volume of Drainage From the Wound Drainage System ).    Your surgeon will contact you with your pathology report, or discuss it with you when they see you in the office. Depending on that report, you may need additional surgery, a referral to another physician (such as a medical oncologist or radiation oncologist), or routine follow-up. We will make these arrangements when we know the final pathology.  If you have not heard from our office regarding your pathology after 10 days, call the office to check on your results.    Remember that healing from surgery, even if that surgery seems small, takes time. If you have any questions about your procedure, your follow-up  instructions or plan of care, do not hesitate to call [phone].    Care of Your Drain  After your surgery you will go home with a bulb drain in place. The drain will remove fluid that builds up under your wound in order to promote healing. The drain generally does not cause pain.    Apply a clean drain sponge around the insertion site daily. You may it more often if it becomes heavily soiled.   After two days you may shower or gently wash the area where the drain tubing enters your body.  You may use non-perfumed soaps (Ivory or Neutrogena).  Always pat dry, never rub.  Reapply gauze after cleansing.  Women should continue to wear their bra.    Notify your doctor or the breast care center if:  The reservoir cannot be reactivated (it quickly re-expands).  The drain falls out or the stitch holding the drain tube comes out  The drainage fluid in the reservoir becomes foul smelling  You have a fever or there is any increased redness, swelling, or drainage from the site.  There is an air leak, fluid leak or malfunction of the drain bulb.  Clots form in the tubing and block drainage and cannot be cleared by  milking  the drain tubing.        Emptying the Drain (reservoir)  You will need to empty and reactivate the drain bulb (reservoir). You will also need to record the amount of fluid collected in the reservoir. Empty the reservoir as many times a day as directed by your doctor or nurse, or if full. Wash your hands before and after handling the reservoir. Empty the reservoir into the measuring container when the fluid collected reaches the 100cc leeann or before. Do not let the reservoir completely fill because the drainage will stop.   Keep a record of the amount of fluid collected in the reservoir. There is a chart you can use on the last page of this handout. Record the date, time, and amount of fluid that has accumulated from each reservoir. Flush the drainage down the toilet and clean the measuring container with  soap and water so it is ready for the next time. If the drainage stops within the first few days after your surgery there may be a clot in the drain tubing.  Try  milking  or  stripping  the drain if this is the case (see below).   Attach the reservoir (using the plastic strap) to your bra or shirt, usually with a safety pin. Do not disconnect, kink or puncture the tubing that is connected to the reservoir. You will notice the amount of drainage decreasing over time. The color of the drainage will also lighten over time.     Milking  or  Stripping  the drain tubing  To keep the drain working well you will be shown how to  milk  the drain tubing three times per day. You should always wash your hands before handling the drain.  Grasp the tubing close to your body with one hand and pull toward your body.  With your other hand, grasp the tubing below the first hand.  Using an alcohol swab, pinch tubing tightly, sliding your fingers down the tubing and away from your body, repeat this 2 or 3 times.  Be sure that the drainage is flowing into the bulb. It is okay if the tube becomes flat from the suction.  Never disconnect the tubing from the bulb at any time.                                              Volume of Drainage from the Wound Drainage System    Please record the date/time, the amount of drainage, and your temperature under the appropriate column. It is important to know the amount of drainage from each bulb drain (if you have more than one). Write these down separately.    Bring this completed chart with you on each postoperative visit.  Date Morning Mid-Day Evening Total

## 2025-01-25 NOTE — PROGRESS NOTES
"[facility] [place]    Patient admitted to room 2405 at approximately 1950 via cart from surgery. Patient was accompanied by spouse and nurse.     Verbal SBAR report received from Lizz FRANCO prior to patient arrival.     Patient trasferred to bed via air yolie. Patient alert and oriented X 3. The patient is not having any pain.  . Admission vital signs: Blood pressure 136/58, pulse 52, temperature 97.5  F (36.4  C), temperature source Oral, resp. rate 17, height 1.702 m (5' 7\"), weight 94.1 kg (207 lb 7.3 oz), SpO2 95%, not currently breastfeeding. Patient was oriented to plan of care, call light, bed controls, tv, telephone, bathroom, and visiting hours.     Risk Assessment    The following safety risks were identified during admission: fall and skin. Yellow risk band applied: YES.     Skin Initial Assessment    This writer admitted this patient and completed a full skin assessment and Jae score in the Adult PCS flowsheet.   Photo documentation of skin problem and/or wound competed via inEarth application (located under Media):  N/A    Appropriate interventions initiated as needed.     Secondary skin check completed by Sherrie FRANCO.         Education    Patient has a [place] to Observation order: No  Observation education completed and documented: N/A      Skyla Davies RN      "

## 2025-01-25 NOTE — PLAN OF CARE
"Goal Outcome Evaluation:      Plan of Care Reviewed With: patient, spouse    Overall Patient Progress: improvingOverall Patient Progress: improving     Pt instructed on \"stripping\" or \"milking\" BERNADETTE drain and Pt demonstrated that she could empty her own drain.    [facility] DISCHARGE NOTE    Patient discharged to home at 12:14 PM via wheel chair. Accompanied by spouse and staff. Discharge instructions reviewed with patient and spouse, opportunity offered to ask questions. Prescriptions filled and sent with patient upon discharge. All belongings sent with patient.    Stefano Kellogg RN on 1/25/2025 at 12:14 PM        "

## 2025-01-27 ENCOUNTER — HOSPITAL ENCOUNTER (OUTPATIENT)
Dept: GENERAL RADIOLOGY | Facility: CLINIC | Age: 75
Discharge: HOME | End: 2025-01-27
Attending: STUDENT IN AN ORGANIZED HEALTH CARE EDUCATION/TRAINING PROGRAM
Payer: MEDICARE

## 2025-01-27 DIAGNOSIS — C50.919 BREAST CANCER (H): ICD-10-CM

## 2025-01-27 PROCEDURE — 999N000104 MA BREAST SPECIMEN RIGHT OR

## 2025-01-29 LAB
PATH REPORT.COMMENTS IMP SPEC: ABNORMAL
PATH REPORT.COMMENTS IMP SPEC: ABNORMAL
PATH REPORT.COMMENTS IMP SPEC: YES
PATH REPORT.FINAL DX SPEC: ABNORMAL
PATH REPORT.GROSS SPEC: ABNORMAL
PATH REPORT.MICROSCOPIC SPEC OTHER STN: ABNORMAL
PATH REPORT.RELEVANT HX SPEC: ABNORMAL
PATHOLOGY SYNOPTIC REPORT: ABNORMAL
PHOTO IMAGE: ABNORMAL

## 2025-01-31 ENCOUNTER — LAB (OUTPATIENT)
Dept: LAB | Facility: CLINIC | Age: 75
End: 2025-01-31
Attending: NURSE PRACTITIONER
Payer: COMMERCIAL

## 2025-01-31 DIAGNOSIS — C80.1 CARCINOMA METASTATIC TO LYMPH NODES OF MULTIPLE SITES WITH UNKNOWN PRIMARY SITE (H): ICD-10-CM

## 2025-01-31 DIAGNOSIS — C7A.021 MALIGNANT CARCINOID TUMOR OF CECUM (H): Primary | ICD-10-CM

## 2025-01-31 DIAGNOSIS — C77.8 CARCINOMA METASTATIC TO LYMPH NODES OF MULTIPLE SITES WITH UNKNOWN PRIMARY SITE (H): ICD-10-CM

## 2025-01-31 PROBLEM — Z79.899 HIGH RISK MEDICATION USE: Status: ACTIVE | Noted: 2025-01-31

## 2025-01-31 LAB
ALBUMIN SERPL BCG-MCNC: 3.3 G/DL (ref 3.5–5.2)
ALP SERPL-CCNC: 72 U/L (ref 40–150)
ALT SERPL W P-5'-P-CCNC: 13 U/L (ref 0–50)
ANION GAP SERPL CALCULATED.3IONS-SCNC: 5 MMOL/L (ref 7–15)
AST SERPL W P-5'-P-CCNC: 21 U/L (ref 0–45)
BASOPHILS # BLD AUTO: 0 10E3/UL (ref 0–0.2)
BASOPHILS NFR BLD AUTO: 0 %
BILIRUB SERPL-MCNC: 0.5 MG/DL
BUN SERPL-MCNC: 21.1 MG/DL (ref 8–23)
CALCIUM SERPL-MCNC: 9.3 MG/DL (ref 8.8–10.4)
CEA SERPL-MCNC: 29.1 NG/ML
CHLORIDE SERPL-SCNC: 104 MMOL/L (ref 98–107)
CREAT SERPL-MCNC: 1.09 MG/DL (ref 0.51–0.95)
EGFRCR SERPLBLD CKD-EPI 2021: 52 ML/MIN/1.73M2
EOSINOPHIL # BLD AUTO: 0.8 10E3/UL (ref 0–0.7)
EOSINOPHIL NFR BLD AUTO: 8 %
ERYTHROCYTE [DISTWIDTH] IN BLOOD BY AUTOMATED COUNT: 13.3 % (ref 10–15)
GLUCOSE SERPL-MCNC: 167 MG/DL (ref 70–99)
HCO3 SERPL-SCNC: 33 MMOL/L (ref 22–29)
HCT VFR BLD AUTO: 37.9 % (ref 35–47)
HGB BLD-MCNC: 12.3 G/DL (ref 11.7–15.7)
HOLD SPECIMEN: NORMAL
IMM GRANULOCYTES # BLD: 0.1 10E3/UL
IMM GRANULOCYTES NFR BLD: 1 %
LYMPHOCYTES # BLD AUTO: 1.3 10E3/UL (ref 0.8–5.3)
LYMPHOCYTES NFR BLD AUTO: 14 %
MCH RBC QN AUTO: 31.9 PG (ref 26.5–33)
MCHC RBC AUTO-ENTMCNC: 32.5 G/DL (ref 31.5–36.5)
MCV RBC AUTO: 98 FL (ref 78–100)
MONOCYTES # BLD AUTO: 0.4 10E3/UL (ref 0–1.3)
MONOCYTES NFR BLD AUTO: 5 %
NEUTROPHILS # BLD AUTO: 6.4 10E3/UL (ref 1.6–8.3)
NEUTROPHILS NFR BLD AUTO: 72 %
NRBC # BLD AUTO: 0 10E3/UL
NRBC BLD AUTO-RTO: 0 /100
PLATELET # BLD AUTO: 224 10E3/UL (ref 150–450)
POTASSIUM SERPL-SCNC: 4 MMOL/L (ref 3.4–5.3)
PROT SERPL-MCNC: 6.4 G/DL (ref 6.4–8.3)
RBC # BLD AUTO: 3.85 10E6/UL (ref 3.8–5.2)
SODIUM SERPL-SCNC: 142 MMOL/L (ref 135–145)
TSH SERPL DL<=0.005 MIU/L-ACNC: 1.55 UIU/ML (ref 0.3–4.2)
WBC # BLD AUTO: 8.9 10E3/UL (ref 4–11)

## 2025-01-31 PROCEDURE — 82947 ASSAY GLUCOSE BLOOD QUANT: CPT | Performed by: NURSE PRACTITIONER

## 2025-01-31 PROCEDURE — 82247 BILIRUBIN TOTAL: CPT | Performed by: NURSE PRACTITIONER

## 2025-01-31 PROCEDURE — 36415 COLL VENOUS BLD VENIPUNCTURE: CPT

## 2025-01-31 PROCEDURE — 85018 HEMOGLOBIN: CPT | Performed by: NURSE PRACTITIONER

## 2025-01-31 PROCEDURE — 84443 ASSAY THYROID STIM HORMONE: CPT | Performed by: NURSE PRACTITIONER

## 2025-01-31 PROCEDURE — 85004 AUTOMATED DIFF WBC COUNT: CPT | Performed by: NURSE PRACTITIONER

## 2025-01-31 PROCEDURE — 86300 IMMUNOASSAY TUMOR CA 15-3: CPT | Performed by: NURSE PRACTITIONER

## 2025-01-31 PROCEDURE — 82378 CARCINOEMBRYONIC ANTIGEN: CPT

## 2025-01-31 PROCEDURE — 36415 COLL VENOUS BLD VENIPUNCTURE: CPT | Performed by: NURSE PRACTITIONER

## 2025-01-31 PROCEDURE — 82435 ASSAY OF BLOOD CHLORIDE: CPT | Performed by: NURSE PRACTITIONER

## 2025-02-03 ENCOUNTER — PATIENT OUTREACH (OUTPATIENT)
Dept: CARE COORDINATION | Facility: CLINIC | Age: 75
End: 2025-02-03
Payer: COMMERCIAL

## 2025-02-03 NOTE — DISCHARGE SUMMARY
[facility]    Discharge Summary  General Surgery    Date of Admission:  1/24/2025  Date of Discharge:  1/25/2025 12:15 PM  Discharging Provider: Rahat Marroquin DO    Discharge Diagnoses   Patient Active Problem List   Diagnosis    Post-polio syndrome (H)    Carcinoid tumor of cecum (H)    Cervical cancer (H)    Trigeminal neuralgia    HYPERLIPIDEMIA LDL GOAL <130    Hypertension goal BP (blood pressure) < 140/90    OA (osteoarthritis) of knee    Chronic kidney disease, stage 3b (H)    Vaginal dysplasia    Urinary incontinence    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)    Peritoneal metastases    Rheumatoid arthritis with positive rheumatoid factor, involving unspecified site (H)    Osteopenia of hip    Need for prophylactic chemotherapy    Obstruction of left ureter    HSIL (high grade squamous intraepithelial lesion) on Pap smear of cervix    Other hydronephrosis    Malignant carcinoid tumor of cecum (H)    Osteoporosis due to aromatase inhibitor    Carcinoma metastatic to lymph nodes of multiple sites consistent with lung primary (H)    Breast cancer in female (H)    Triple negative breast cancer (H)    Hemiplegia due to noncerebrovascular etiology, unspecified hemiplegia type, unspecified laterality (H)    High risk medication use       Procedure/Surgery Information   Procedure: Procedure(s):  MASTECTOMY, SIMPLE RIGHT   Surgeon(s): Surgeons and Role:     * Rahat Marroquin,  - Primary     * Maricruz Sykes PA-C   Specimens: ID Type Source Tests Collected by Time Destination   1 : Right Breast (Short Suture Superior, Long Suture Lateral) Mastectomy, Simple Breast, Right SURGICAL PATHOLOGY EXAM Rahat Marroquin DO 1/24/2025  5:27 PM    2 : right breast, additional anterior margin, suture marks tumor Tissue Breast, Right SURGICAL PATHOLOGY EXAM Rahat Marroquin DO 1/24/2025  5:36 PM    3 : Right Breast, Additional Anterior  Margin, Clip marked tumor side Tissue Breast, Right SURGICAL PATHOLOGY EXAM Rahat Marroquin DO 1/24/2025  5:42 PM       Non-operative procedures None performed     History of Present Illness   Marissa Guadarrama is a 74 year old female who presented with breast cancer    Hospital Course   Marissa Guadarrama was admitted on 1/24/2025.  The following problems were addressed during her hospitalization:  Breast cancer: Patient underwent right mastectomy for recurrent breast cancer.  She was admitted for drain care and observation overnight.  She was stable for discharge on POD 1.      Post-operative pain control: included Hydromorphone (dilaudid) IV and will be Oxycodone on discharge.   # Discharge Pain Plan:   - During her hospitalization, Marissa experienced pain due to Surgery.  The pain plan for discharge was discussed with Marissa and the plan was created in a collaborative fashion.    - Opioids prescribed on discharge: Roxicodone  - Duration of opioids after discharge: Per [facility] opioid prescribing guidelines, a 3 day prescription of opioids was provided.  - Bowel regimen: docusate      Medications discontinued or adjusted during this hospitalization: No change     Antibiotics prescribed at discharge: None prescribed     Imaging study follow up needs:   -None performed    Significant Findings: Right breast cancer    Rahat Marroquin,     Discharge Disposition   Discharged to home   Condition at discharge: Stable    Pending Results   Final pathology results:   These results will be followed up by   Final Diagnosis   A.  Breast, right, mastectomy:  -INVASIVE DUCTAL CARCINOMA,  grade 3, size 22 mm.  -Ductal carcinoma in situ (DCIS), high nuclear grade, solid and cribriform types with comedonecrosis, extent approximately 24 mm.  -Margins are uninvolved by invasive and in situ carcinoma.  -See tumor synoptic report.     B.  Breast, right, additional anterior margin, excision:  -Benign breast tissue.     C.   Breast, right, additional anterior margin, excision:  -Benign breast tissue.     Unresulted Labs Ordered in the Past 30 Days of this Admission       No orders found from 12/25/2024 to 1/25/2025.            Primary Care Physician   Physician No Ref-Primary    See progress note from date of discharge for physical exam.    Consultations This Hospital Stay   None    Time Spent on this Encounter   I have spent less than 30 minutes on this discharge.    Discharge Orders      When to call - Contact Surgeon Team    You may experience symptoms that require follow-up before your scheduled appointment. Contact your Surgeon Team if you are concerned about pain control, large amount of bleeding, blood clots, constipation, or if you experience signs of infection (fever, growing tenderness at the surgery site, a large amount of drainage, severe pain, foul-smelling drainage, redness or swelling.     When to call - Reach out to Urgent Care    If you are experiencing uncontrolled Nausea and Vomiting, uncontrolled pain, inability to urinate and uncomfortable, and in need of immediate care, and you are NOT able to reach your Surgeon Team, go to an Urgent Care clinic. Do NOT go to the Emergency Room unless you have shortness of breath, chest pain, or other signs of a medical emergency.     When to call - Reasons to Call 911    Call 911 immediately if you experience sudden-onset chest pain, arm weakness/numbness, slurred speech, or shortness of breath     Symptoms - Fever Management    A low grade fever can be expected after surgery. Your Provider many have prescribed an Opioid pain medication that also contains acetaminophen (TYLENOL) that may help with Fever management.  Do NOT take additional acetaminophen (TYLENOL) in combination with an Opioid/acetaminophen (TYLENOL) product. Read the labels on your Over The Counter (OTC) medications with care.     Symptoms - Reduced Urine Output    If it has been greater than 8 hours since you have  urinated despite drinking plenty of water, call your Surgeon Team.     No driving or operating machinery    Do NOT drive any vehicle or operate mechanical equipment for 24 hours following the end of your surgery.  Even though you may feel normal, your reactions may be affected by Anesthesia medication you received.     No Alcohol    Do NOT drink alcoholic beverages for 24 hours following your surgery and while taking pain medications.     Diet Instructions    Follow your surgeon's orders for any diet restrictions.  If you did not receive any diet restrictions, you may drink clear liquids (apple juice, ginger ale, 7-up, broth, etc.), and progress to your regular diet as you feel able. It is important to stay well-hydrated after surgery and drink plenty of water.     Discharge Instructions - Comfort and Pain Management    Pain after surgery is normal and expected. You will have some amount of pain after surgery. Your pain will improve with time. There are several things you can do to help reduce your pain including: rest, ice, and using pain medications as needed. Use pain interventions and don't wait until pain level is out of control. Contact your Surgeon Team if you have pain that persists or worsens after surgery despite rest, ice, and taking your medication(s) as prescribed. You may have a dry mouth, a sore throat, muscles aches or trouble sleeping, and these symptoms should go away after 24 hours.     Discharge Instructions - Rest    Rest and relax for the next 24 hours. Make arrangements to have someone stay with you overnight, and avoid hazardous and strenuous activities.  Do NOT make any important decisions for the next 24 hours.     Shower/Bathing - No restrictions, may shower after 24 hours    Shower/Bathing - No restrictions, may shower after 24 hours.     Incision Care    Keep dressing clean and dry, change as instructed by Provider or RN. Wash your hands with soap and warm water before you touch the site  of surgery. If you have skin tapes (steri-strips) on your surgical site, leave them on the surgical site until they fall off on their own (typically 7-10 days). If you have stitches under the skin, they will dissolve, or your doctor will give you instructions on when to return to their office for removal. Unless directed otherwise, remove dressing, if present, the day after surgery and leave open to air. If Dermabond (a type of skin glue) is present, leave in place until it wears/flakes off (2-3 weeks).     No lifting    No lifting over 15 pounds and no strenuous physical activity for 6 weeks.     Reason for your hospital stay    Mastectomy, right     Follow-up and recommended labs and tests     Follow up with Dr. Marroquin , [facility] in 1 week     Activity    Your activity upon discharge: activity as tolerated, no lifting more than 15lbs for 6 weeks, no repetitive overhead reaching     Discharge Instructions    HOME CARE FOLLOWING MASTECTOMY      APPOINTMENT WITH YOUR SURGEON:  You will be scheduled to see your surgeon in 1 week to discuss your pathology results and for a wound check.    SUPPORT:  Wear a bra for support and comfort for 3-7 days, day and night.    DRAIN:  If you have a drain in place, you will need a separate appointment with a nurse in the office to have this removed.  You will have a form to keep track of the output of your drain.  When the output reaches a point where the total for a 24 hour period is less than 30cc's, you are ready to have the drain removed.  At that point you can call the office and talk to the nurse to arrange coming into the office to have this done.  If you have more than one drain in place, they may not all be removed at the same time, as the outputs can be very different from each.  The nurse will help you to manage this and help decide when the remaining drains will be taken out.    INCISIONAL CARE:  If you have a dressing in place, keep clean and dry for 48  "hours; you may replace the gauze if it becomes soiled.  After 48 hours you may remove the dressing and shower.  Do not submerse incision in water for 1 week.  If you have a Dermabond dressing (a type of skin glue), you may shower immediately.  Sutures will absorb and do not need to be removed.  If present, leave the steri-strips (white paper tapes) in place for 14 days after surgery.  If present, leave Dermabond glue in place until it wears/flakes off.  You may expect a small amount of drainage from your incision.  A lump/ridge under the incision is normal and will gradually resolve.    BATHING:  You are allowed to bath (shallow water in a tub only) or shower 24-48 hours after your surgery.  Mild soap is OK to use near these sites.  When bathing, do not allow the incision or drain site to become submersed in water as this may increase the risk of infection.    ACTIVITY:  Cautiously resume exercise and strenuous activities such as jogging, tennis, aerobics, etc. Also, be careful of stretching activities with operative side for two weeks.    If you had a \"sentinel node biopsy\" at the time of your surgery:  You have no restrictions in addition to those noted above.    If you had an \"axillary node dissection\" at the time of your surgery:  You are recommended to restrict the activity of the arm on the side the dissection was done on.  This means:  no reaching overhead until cleared to do so by your surgeon, no carrying weight on that side greater than a couple pounds, no injections/vaccinations/ blood samples from that side, and no blood pressure measurements on that side.  It is also recommended to elevate the arm on pillows several times a day to decrease/minimize swelling, and avoid sleeping on that arm.    DIET:  No restrictions.  Increased fluid intake is recommended. While taking pain medications, increase dietary fiber or add a fiber supplementation like Metamucil or Citrucel to help prevent constipation - a " "possible side effect of pain medications.    DISCOMFORT:  Local anesthetic placed at surgery should provide relief for 4-8 hours.  Begin taking pain pills before discomfort is severe.  Take the pain medication with some food, when possible, to minimize side effects.  Intermittent use of ice packs may help during the first 48 hours.  Expect gradual improvement.    RETURN APPOINTMENT:  Schedule a follow-up visit 2-3 weeks post-op.  Office Phone:  [phone]     CONTACT US IF THE FOLLOWING DEVELOPS:   1. A fever that is above 101     2. If there is a large amount of drainage, bleeding, or swelling.   3. Severe pain that is not relieved by your prescription.   4. Drainage that is thick, cloudy, yellow, green or white.   5. Any other questions not answered by \"Frequently Asked Questions\" sheet.      FREQUENTLY ASKED QUESTIONS:    Q:  How should my incision look?    A:  Normally your incision will appear slightly swollen with light redness directly along the incision itself as it heals.  It may feel like a bump or ridge as the healing/scarring happens, and over time (3-4 months) this bump or ridge feeling should slowly go away.  In general, clear or pink watery drainage can be normal at first as your incision heals, but should decrease over time.    Q:  How do I know if my incision is infected?  A:  Look at your incision for signs of infection, like redness around the incision spreading to surrounding skin, or drainage of cloudy or foul-smelling drainage.  If you feel warm, check your temperature to see if you are running a fever.    **If any of these things occur, please notify the nurse at our office.  We may need you to come into the office for an incision check.      Q:  How do I take care of my incision?  A:  If you have a dressing in place - Starting the day after surgery, replace the dressing 1-2 times a day until there is no further drainage from the incision.  At that time, a dressing is no longer needed.  Try " "to minimize tape on the skin if irritation is occurring at the tape sites.  If you have significant irritation from tape on the skin, please call the office to discuss other method of dressing your incision.    Small pieces of tape called \"steri-strips\" may be present directly overlying your incision; these may be removed 10 days after surgery unless otherwise specified by your surgeon.  If these tapes start to loosen at the ends, you may trim them back until they fall off or are removed.    A:  If you had \"Dermabond\" tissue glue used as a dressing (this causes your incision to look shiny with a clear covering over it) - This type of dressing wears off with time and does not require more dressings over the top unless it is draining around the glue as it wears off.  Do not apply ointments or lotions over the incisions until the glue has completely worn off.    Q:  There is a piece of tape or a sticky \"lead\" still on my skin.  Can I remove this?  A:  Sometimes the sticky \"leads\" used for monitoring during surgery or for evaluation in the emergency department are not all removed while you are in the hospital.  These sometimes have a tab or metal dot on them.  You can easily remove these on your own, like taking off a band-aid.  If there is a gel substance under the \"lead\", simply wipe/clean it off with a washcloth or paper towel.      Q:  What can I do to minimize constipation (very hard stools, or lack of stools)?  A:  Stay well hydrated.  Increase your dietary fiber intake or take a fiber supplement -with plenty of water.  Walk around frequently.  You may consider an over-the-counter stool-softener.  Your Pharmacist can assist you with choosing one that is stocked at your pharmacy.  Constipation is also one of the most common side effects of pain medication.  If you are using pain medication, be pro-active and try to PREVENT problems with constipation by taking the steps above BEFORE constipation becomes a " problem.    Q:  What do I do if I need more pain medications?  A:  Call the office to receive refills.  Be aware that certain pain meds cannot be called into a pharmacy and actually require a paper prescription.  A change may be made in your pain med as you progress thru your recovery period or if you have side effects to certain meds.    --Pain meds are NOT refilled after 5pm on weekdays, and NOT AT ALL on the weekends, so please look ahead to prevent problems.      Q:  Why am I having a hard time sleeping now that I am at home?  A:  Many medications you receive while you are in the hospital can impact your sleep for a number of days after your surgery/hospitalization.  Decreased level of activity and naps during the day may also make sleeping at night difficult.  Try to minimize day-time naps, and get up frequently during the day to walk around your home during your recovery time.  Sleep aides may be of some help, but are not recommended for long-term use.      Q:  I am having some back discomfort.  What should I do?  A:  This may be related to certain positioning that was required for your surgery, extended periods of time in bed, or other changes in your overall activity level.  You may try ice, heat, acetaminophen, or ibuprofen to treat this temporarily.  Note that many pain medications have acetaminophen in them and would state this on the prescription bottle.  Be sure not to exceed the maximum of 4000mg per day of acetaminophen.     **If the pain you are having does not resolve, is severe, or is a flare of back pain you have had on other occasions prior to surgery, please contact your primary physician for further recommendations or for an appointment to be examined at their office.    Q:  Why am I having headaches?  A:  Headaches can be caused by many things:  caffeine withdrawal, use of pain meds, dehydration, high blood pressure, lack of sleep, over-activity/exhaustion, flare-up of usual migraine  "headaches.  If you feel this is related to muscle tension (a band-like feeling around the head, or a pressure at the low-back of the head) you may try ice or heat to this area.  You may need to drink more fluids (try electrolyte drink like Gatorade), rest, or take your usual migraine medications.   **If your headaches do not resolve, worsen, are accompanied by other symptoms, or if your blood pressure is high, please call your primary physician for recommendation and/or examination.    Q:  I am unable to urinate.  What do I do?  A:  A small percentage of people can have difficulty urinating initially after surgery.  This includes being able to urinate only a very small amount at a time and feeling discomfort or pressure in the very low abdomen.  This is called \"urinary retention\", and is actually an urgent situation.  Proceed to your nearest Emergency department for evaluation (not an Urgent Care Center).  Sometimes the bladder does not work correctly after certain medications you receive during surgery, or related to certain procedures.  You may need to have a catheter placed until your bladder recovers.  When planning to go to an Emergency department, it may help to call the ER to let them know you are coming in for this problem after a surgery.  This may help you get in quicker to be evaluated.  **If you have symptoms of a urinary tract infection, please contact your primary physician for the proper evaluation and treatment.          If you have other questions, please call the office Monday thru Friday between 8am and 5pm to discuss with the nurse.  # [phone]    There is a surgeon ON CALL on weekday evenings and over the weekend in case of urgent need only, and may be contacted at the same number.    If you are having an emergency, call 911 or proceed to your nearest emergency department.     Reason for your hospital stay    Breast cancer     Diet    Follow this diet upon discharge: regular     Discharge " Medications   Discharge Medication List as of 1/25/2025 11:25 AM        START taking these medications    Details   acetaminophen (TYLENOL) 325 MG tablet Take 2 tablets (650 mg) by mouth every 4 hours as needed for other (mild pain)., Disp-100 tablet, R-0, E-Prescribe      docusate sodium (COLACE) 100 MG capsule Take 1 capsule (100 mg) by mouth 2 times daily as needed for constipation., Disp-24 capsule, R-0, E-Prescribe      methocarbamol (ROBAXIN) 500 MG tablet Take 1 tablet (500 mg) by mouth 4 times daily as needed for muscle spasms., Disp-12 tablet, R-0, E-Prescribe      oxyCODONE (ROXICODONE) 5 MG tablet Take 1 tablet (5 mg) by mouth every 6 hours as needed for pain., Disp-12 tablet, R-0, E-Prescribe           CONTINUE these medications which have NOT CHANGED    Details   Calcium Carbonate (CALCIUM 500 PO) Take 2 tablets by mouth daily., Historical      Calcium-Vitamin D-Vitamin K 500-100-40 MG-UNT-MCG CHEW Take 1 tablet by mouth daily., Historical      exemestane (AROMASIN) 25 MG tablet Take 1 tablet (25 mg) by mouth every morning., Disp-90 tablet, R-3, E-Prescribe      hydroCHLOROthiazide 12.5 MG tablet Take 1 tablet (12.5 mg) by mouth every morning. Please make a clinic visit to be seen in person for medication refills.  No virtual visits.  Thank you., Disp-90 tablet, R-3, E-Prescribe      SENNA-docusate sodium (SENNA S) 8.6-50 MG tablet Take 1 tablet by mouth 2 times daily as needed (prevent opioid induced constipation), Disp-30 tablet, R-0, E-PrescribeTake 1-2 times daily as needed to prevent constipation. Always take if using oxycodone           Allergies   No Known Allergies  Data   Most Recent 3 CBC's:  Recent Labs   Lab Test 01/31/25  1200 01/22/25  1317 12/13/24  1246   WBC 8.9 9.1 7.5   HGB 12.3 13.9 13.9   MCV 98 94 91    189 190      Most Recent 3 BMP's:  Recent Labs   Lab Test 01/31/25  1200 01/24/25  1326 01/22/25  1317 12/13/24  1246     --  143 142   POTASSIUM 4.0  --  4.6 3.4    CHLORIDE 104  --  103 103   CO2 33*  --  31* 29   BUN 21.1  --  20.7 14.4   CR 1.09*  --  1.12* 1.29*   ANIONGAP 5*  --  9 10   MARILIA 9.3  --  10.3 9.6   * 99 96 79     Most Recent 2 LFT's:  Recent Labs   Lab Test 01/31/25  1200 12/13/24  1246   AST 21 17   ALT 13 11   ALKPHOS 72 66   BILITOTAL 0.5 0.7     Most Recent INR's and Anticoagulation Dosing History:  Anticoagulation Dose History          Latest Ref Rng & Units 6/12/2019 8/11/2020 5/8/2021 5/9/2021 6/28/2024   Recent Dosing and Labs   INR 0.85 - 1.15 1.05  1.02  1.32  1.34  1.50      Most Recent 3 Troponin's:  Recent Labs   Lab Test 05/08/21  1719   TROPI 0.069*     Most Recent Cholesterol Panel:  Recent Labs   Lab Test 03/18/22  0850   CHOL 161   LDL 89   HDL 50   TRIG 110     Most Recent 6 Bacteria Isolates From Any Culture (See EPIC Reports for Culture Details):  Recent Labs   Lab Test 05/08/21  1805 05/08/21  1756 05/08/21  1755 12/26/20  1234 02/17/20  0932 01/28/19  0918   CULT No growth >100,000 colonies/mL  Klebsiella pneumoniae  *  50,000 to 100,000 colonies/mL  Strain 2  Klebsiella pneumoniae  * No growth >100,000 colonies/mL  mixed urogenital escobar  Susceptibility testing not routinely done  Multiple morphotypes present with no predominant organism.  Growth consistent with   probable contamination during collection.  Suggest repeat specimen if clinically   indicated.   >100,000 colonies/mL  Streptococcus agalactiae sero group B  *  50,000 to 100,000 colonies/mL  Strain 2  Streptococcus agalactiae sero group B  *  Susceptibility testing not routinely done on this organism from the genitourinary tract.   Our antibiogram indicates that Group B streptococci are susceptible to ampicillin,   penicillin, vancomycin and the cephalosporins. Susceptibility testing must be requested   within 5 days.   10,000 to 50,000 colonies/mL  mixed urogenital escobar  Susceptibility testing not routinely done       Most Recent TSH, T4 and A1c Labs:  Recent Labs    Lab Test 01/31/25  1200   TSH 1.55

## 2025-02-06 ENCOUNTER — OFFICE VISIT (OUTPATIENT)
Dept: SURGERY | Facility: CLINIC | Age: 75
End: 2025-02-06
Payer: COMMERCIAL

## 2025-02-06 VITALS
DIASTOLIC BLOOD PRESSURE: 81 MMHG | HEIGHT: 68 IN | WEIGHT: 211 LBS | BODY MASS INDEX: 31.98 KG/M2 | OXYGEN SATURATION: 94 % | HEART RATE: 68 BPM | SYSTOLIC BLOOD PRESSURE: 172 MMHG

## 2025-02-06 DIAGNOSIS — C50.911 INVASIVE DUCTAL CARCINOMA OF RIGHT BREAST (H): Primary | ICD-10-CM

## 2025-02-06 ASSESSMENT — PAIN SCALES - GENERAL: PAINLEVEL_OUTOF10: MILD PAIN (2)

## 2025-02-06 NOTE — LETTER
"2/6/2025      Marissa Guadarrama  [address] [facility] [address]      Dear Colleague,    Thank you for referring your patient, Marissa Guadarrama, to the [facility]. Please see a copy of my visit note below.    General Surgery Post Op    Pt returns for follow up visit s/p right mastectomy on 1/24/2025.    Patient has been doing well, tolerating diet. Bowels moving well. Pain controlled. No issues with wound healing/redness/drainage. No fevers.      Physical exam: Vitals: BP (!) 172/81   Pulse 68   Ht 1.715 m (5' 7.5\")   Wt 95.7 kg (211 lb)   SpO2 94%   BMI 32.56 kg/m    BMI= Body mass index is 32.56 kg/m .    Exam:  Constitutional: healthy, alert, and no distress  Skin: Large seroma/hematoma.  Drain was plugged, unplugged, copious serosanguinous drainage    Path:  Reviewed, margins negative    Assessment:     ICD-10-CM    1. Invasive ductal carcinoma of right breast (H)  C50.911           Plan: Marissa Guadarrama was seen for follow-up after right mastectomy.  Patient is doing well and recovering without issue at this time.  We reviewed the pathology showing negative margins.  Her drain was plugged, this was freed.  We need drain to be serous with <30 mL a day, showed stripping of drain.  RTC 2 weeks for potential pull.      Rahat Marroquin DO on 2/6/2025 at 2:38 PM        Again, thank you for allowing me to participate in the care of your patient.        Sincerely,        Rahat Marroquin DO    Electronically signed"

## 2025-02-06 NOTE — PROGRESS NOTES
"General Surgery Post Op    Pt returns for follow up visit s/p right mastectomy on 1/24/2025.    Patient has been doing well, tolerating diet. Bowels moving well. Pain controlled. No issues with wound healing/redness/drainage. No fevers.      Physical exam: Vitals: BP (!) 172/81   Pulse 68   Ht 1.715 m (5' 7.5\")   Wt 95.7 kg (211 lb)   SpO2 94%   BMI 32.56 kg/m    BMI= Body mass index is 32.56 kg/m .    Exam:  Constitutional: healthy, alert, and no distress  Skin: Large seroma/hematoma.  Drain was plugged, unplugged, copious serosanguinous drainage    Path:  Reviewed, margins negative    Assessment:     ICD-10-CM    1. Invasive ductal carcinoma of right breast (H)  C50.911           Plan: Marissa Guadarrama was seen for follow-up after right mastectomy.  Patient is doing well and recovering without issue at this time.  We reviewed the pathology showing negative margins.  Her drain was plugged, this was freed.  We need drain to be serous with <30 mL a day, showed stripping of drain.  RTC 2 weeks for potential pull.      Rahat Marroquin, DO on 2/6/2025 at 2:38 PM      "

## 2025-02-14 ENCOUNTER — LAB (OUTPATIENT)
Dept: LAB | Facility: CLINIC | Age: 75
End: 2025-02-14
Attending: INTERNAL MEDICINE
Payer: MEDICARE

## 2025-02-14 DIAGNOSIS — C50.411 BILATERAL MALIGNANT NEOPLASM OF UPPER OUTER QUADRANT OF BREAST IN FEMALE (H): ICD-10-CM

## 2025-02-14 DIAGNOSIS — C7B.09 SECONDARY CARCINOID TUMORS OF OTHER SITES (H): ICD-10-CM

## 2025-02-14 DIAGNOSIS — C7A.021 MALIGNANT CARCINOID TUMOR OF CECUM (H): ICD-10-CM

## 2025-02-14 DIAGNOSIS — D3A.021: Primary | ICD-10-CM

## 2025-02-14 DIAGNOSIS — C50.412 BILATERAL MALIGNANT NEOPLASM OF UPPER OUTER QUADRANT OF BREAST IN FEMALE (H): ICD-10-CM

## 2025-02-14 PROCEDURE — 36415 COLL VENOUS BLD VENIPUNCTURE: CPT

## 2025-02-14 PROCEDURE — 86316 IMMUNOASSAY TUMOR OTHER: CPT

## 2025-02-18 LAB — CGA SERPL-MCNC: 728 NG/ML

## 2025-02-22 ENCOUNTER — HOSPITAL ENCOUNTER (EMERGENCY)
Facility: CLINIC | Age: 75
Discharge: HOME | End: 2025-02-22
Attending: PHYSICIAN ASSISTANT
Payer: MEDICARE

## 2025-02-22 VITALS
DIASTOLIC BLOOD PRESSURE: 125 MMHG | OXYGEN SATURATION: 97 % | TEMPERATURE: 98 F | RESPIRATION RATE: 18 BRPM | SYSTOLIC BLOOD PRESSURE: 149 MMHG | HEART RATE: 76 BPM

## 2025-02-22 DIAGNOSIS — R21 RASH AND NONSPECIFIC SKIN ERUPTION: ICD-10-CM

## 2025-02-22 PROCEDURE — G0463 HOSPITAL OUTPT CLINIC VISIT: HCPCS | Performed by: PHYSICIAN ASSISTANT

## 2025-02-22 PROCEDURE — 99213 OFFICE O/P EST LOW 20 MIN: CPT | Performed by: PHYSICIAN ASSISTANT

## 2025-02-22 RX ORDER — PREDNISONE 20 MG/1
TABLET ORAL
Qty: 10 TABLET | Refills: 0 | Status: SHIPPED | OUTPATIENT
Start: 2025-02-22

## 2025-02-22 ASSESSMENT — COLUMBIA-SUICIDE SEVERITY RATING SCALE - C-SSRS
6. HAVE YOU EVER DONE ANYTHING, STARTED TO DO ANYTHING, OR PREPARED TO DO ANYTHING TO END YOUR LIFE?: NO
2. HAVE YOU ACTUALLY HAD ANY THOUGHTS OF KILLING YOURSELF IN THE PAST MONTH?: NO
1. IN THE PAST MONTH, HAVE YOU WISHED YOU WERE DEAD OR WISHED YOU COULD GO TO SLEEP AND NOT WAKE UP?: NO

## 2025-02-22 NOTE — ED PROVIDER NOTES
History     Chief Complaint   Patient presents with    Derm Problem     Patient states she had a mastectomy about 1 month ago   She is unsure if she is reacting to her procedure   Rash has spread throughout entire body with intense itching and inflammation , onset Monday of this week   No new medications , tried cortisone cream with no relief      HPI  Marissa Guadarrama is a 74 year old female who presents to urgent care with concern over generalized rash which developed within the last 1 week.  She describes rash as intensely pruritic with some associated swelling on the dorsal aspect of her hands.  She denies any associated fever, chills, nauseous, sore throat, cough, dyspnea, wheezing, nausea, vomiting, abdominal pain.  No recent changes in soaps, detergents, lotions, foods, medications, insect bites or stings or plant exposures however notes that she did have mastectomy for new onset breast cancer last month.  Does have surgical drain in place.      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 (H) 05/03/2024     Priority: Medium    Osteoporosis due to aromatase inhibitor 12/29/2023     Priority: Medium    Malignant carcinoid tumor of cecum (H) 02/15/2022     Priority: Medium    Other hydronephrosis 05/09/2021     Priority: Medium     Added automatically from request for surgery [id number]      HSIL (high grade squamous intraepithelial lesion) on Pap smear of cervix 06/15/2020     Priority: Medium     Added automatically from request for surgery  "[id number]      Obstruction of left ureter 02/11/2020     Priority: Medium     Added automatically from request for surgery [id number]      Osteopenia of hip 05/15/2019     Priority: Medium    Need for prophylactic chemotherapy 05/15/2019     Priority: Medium    Rheumatoid arthritis with positive rheumatoid factor, involving unspecified site (H) 12/31/2018     Priority: Medium    Peritoneal metastases 10/30/2017     Priority: Medium    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) 06/28/2016     Priority: Medium     Rt upper outer, L lower outer ER+MD+ Her 2-      Urinary incontinence 09/12/2014     Priority: Medium    Vaginal dysplasia 03/10/2014     Priority: Medium    Chronic kidney disease, stage 3b (H) 09/23/2012     Priority: Medium    OA (osteoarthritis) of knee 10/05/2011     Priority: Medium    Hypertension goal BP (blood pressure) < 140/90 11/01/2010     Priority: Medium    HYPERLIPIDEMIA LDL GOAL <130 10/31/2010     Priority: Medium    Post-polio syndrome (H)      Priority: Medium     Left knee, diagnosed by ortho in 1976, had left leg/toe surgeries as child  (Problem list name updated by automated process. Provider to review and confirm.)      Cervical cancer (H)      Priority: Medium     5/2007.  Dr. Alonso, [facility] gyn/onc,  Now needs routine paps, patient not always compliant  3/26/09 pap NIL  9/24/09 pap ASCUS  11/1/13 pap ASC-H. Plan-- colposcopy   12/23/13 colposcopy scheduled. Reminder placed.  colpscopy 12/23/2013-Vaginal cuff (submitted as \"cervix\"), biopsy:  - High grade squamous intraepithelial lesion (vaginal  intraepithelial neoplasia grade III, VaIN III, severe dysplasia and  carcinoma in situ).  - No invasive malignancy identified.  - Cervical transformation zone not identified in biopsies.  - Please see comment.  Referral back to gynecology/oncology  2/5/14 gyn/onc appt   3/13/14 colposcopy and CO2 laser vagina, path:VAIN 1/2.  3/19/14 follow up in 4 " months  7/30/14 vaginal biopsy: VAIN 1. Plan: repeat colp in 6 months.(9/17/14)   9/17/14 colp. No staining seen. No biopsy taken. Pap- ASCUS + HR HPV. Plan- repeat pap at next visit.  2/9/15 colposcopy. bx- LSIL/koilocytosis. Pap- NIL/+ HR HPV 16.  Plan: 3/16/15 treatment planning.   3/16/15 repeat colposcopy in 4 months (due 7/16/15)  7/6/15 scheduled. Tracking.            Trigeminal neuralgia      Priority: Medium    Carcinoid tumor of cecum (H) 12/01/2003     Priority: Medium     Cecal carcinoid cancer, followed by Dr. Dallas, oncology.  Patient has not been complaint with follow up.          Past Medical History:    Past Medical History:   Diagnosis Date    Arthritis     Benign breast biopsy     Breast cancer (H)     Carcinoid tumor (H) 12/2003    Cervical cancer (H) 2007    H/O colposcopy with cervical biopsy 12/23/2013    HTN     Hyperlipidemia     Malignant neoplasm of ovary (H)     Obesity     Pap smear of vagina with ASC-H 11/01/2013    Post-polio syndromes     Trigeminal neuralgias        Past Surgical History:    Past Surgical History:   Procedure Laterality Date    APPENDECTOMY  01/01/1983    BIOPSY BREAST      BIOPSY NODE SENTINEL Bilateral 06/01/2016    Procedure: BIOPSY NODE SENTINEL;  Surgeon: Brent Arana MD;  Location: [facility]    [facility] SURGICAL PATHOLOGY      COLONOSCOPY N/A 06/23/2017    Procedure: COMBINED COLONOSCOPY, SINGLE OR MULTIPLE BIOPSY/POLYPECTOMY BY BIOPSY;  Colonoscopy Dx:Carcinoid tumor of colon prep mailed golytely;  Surgeon: Talisha Greco MD;  [facility]    COLPOSCOPY, BIOPSY, COMBINED  03/13/2014    Procedure: COMBINED COLPOSCOPY, BIOPSY;;  Surgeon: Lara Pack MD;  [facility]    COMBINED CYSTOSCOPY, RETROGRADES, EXCHANGE STENT URETER(S) Left 02/07/2019    Procedure: COMBINED CYSTOSCOPY, RETROGRADES, EXCHANGE STENT URETER--left;  Surgeon: Zhao La MD;  Location: [facility]    COMBINED CYSTOSCOPY, RETROGRADES, EXCHANGE STENT URETER(S) Left  02/20/2020    Procedure: CYSTOSCOPY, WITH RETROGRADE PYELOGRAM AND Left URETERAL STENT exchange;  Surgeon: Zhao La MD;  Location: [facility]    COMBINED CYSTOSCOPY, RETROGRADES, EXCHANGE STENT URETER(S) Left 05/09/2021    Procedure: CYSTOSCOPY, WITH RETROGRADE PYELOGRAM AND LEFT URETERAL STENT REPLACEMENT;  Surgeon: Fred Owens MD;  [facility]    COMBINED CYSTOSCOPY, RETROGRADES, EXCHANGE STENT URETER(S) Left 09/16/2021    Procedure: CYSTOSCOPY, WITH left RETROGRADE PYELOGRAM AND left  URETERAL STENT REPLACEMENT;  Surgeon: Zhao La MD;  Location: [facility]    COMBINED CYSTOSCOPY, RETROGRADES, EXCHANGE STENT URETER(S) Left 03/24/2022    Procedure: CYSTOSCOPY, WITH RETROGRADE PYELOGRAM AND URETERAL STENT EXCHANGE, LEFT;  Surgeon: Zhao La MD;  Location: [facility]    COMBINED CYSTOSCOPY, RETROGRADES, URETEROSCOPY, INSERT STENT Left 02/02/2017    Procedure: COMBINED CYSTOSCOPY, RETROGRADES, URETEROSCOPY, INSERT STENT;  Surgeon: Zhao La MD;  Location: [facility]    CYSTOSCOPY, REMOVE STENT(S), COMBINED N/A 11/4/2022    Procedure: CYSTOSCOPY, LEFT STENT REMOVAL;  Surgeon: Zhao La MD;  [facility]    CYSTOSCOPY, RETROGRADES, INSERT STENT URETER(S), COMBINED Left 09/07/2017    Procedure: COMBINED CYSTOSCOPY, RETROGRADES, INSERT STENT URETER(S);  Cystoscopy,Left Stent Exchange;  Surgeon: Zhao La MD;  [facility]: [facility]    CYSTOSCOPY, RETROGRADES, INSERT STENT URETER(S), COMBINED  12/12/2017    Procedure: COMBINED CYSTOSCOPY, RETROGRADES, INSERT STENT URETER(S);;  Surgeon: Zhao La MD;  [facility]    CYSTOSCOPY, RETROGRADES, INSERT STENT URETER(S), COMBINED Left 07/05/2018    Procedure: COMBINED CYSTOSCOPY, RETROGRADES, INSERT STENT URETER(S);  Cystoscopy, Left Stent Exchange;  Surgeon: Zhao La MD;  Location: [facility]    DAVINCI NEPHRECTOMY Left 11/4/2022    Procedure: ,  LEFT NEPHRECTOMY, ROBOT-ASSISTED;  Surgeon: Zhao La MD;  [facility]    EXAM UNDER ANESTHESIA PELVIC  03/13/2014    Procedure: EXAM UNDER ANESTHESIA PELVIC;  Exam Under Anestheisa, Colposcopy, Vaginal Biopsies, Co2 Laser of the Upper Vagina;  Surgeon: Lara Pack MD;  [facility]    EXAM UNDER ANESTHESIA PELVIC N/A 07/01/2020    Procedure: Examination under anesthesia, vaginal biopsies;  Surgeon: Karli Gao MD;  [facility]    HERNIORRHAPHY INCISIONAL (LOCATION)      IR LYMPH NODE BIOPSY  6/28/2024    IR RHIZOTOMY  08/14/2020    LASER CO2 VAGINA  03/13/2014    VAIN 1/2    LASER CO2 VAGINA N/A 12/12/2017    Procedure: LASER CO2 VAGINA;  Exam Under Anesthesia, CO2 Laser Ablation Of Vagina, Cystoscopy, Left Retrograde Pyelogram with Left Stent Placement;  Surgeon: Nic Segundo MD;  [facility]    LUMPECTOMY BREAST      LUMPECTOMY BREAST WITH SEED LOCALIZATION Bilateral 06/01/2016    Procedure: LUMPECTOMY BREAST WITH SEED LOCALIZATION;  Surgeon: Brent Arana MD;  Location: [facility]    MASTECTOMY SIMPLE, SENTINEL NODE, COMBINED Right 1/24/2025    Procedure: MASTECTOMY, SIMPLE RIGHT;  Surgeon: Rahat Marroquin DO;  Location: [facility]    SURGICAL HISTORY OF -       ovarian cystectomy    SURGICAL HISTORY OF -   01/01/2003    right colon resection secondary to carcinoid tumor    TUBAL LIGATION      Z BSO, OMENTECTOMY W/OSMAN  05/01/2007    Z TOTAL ABDOM HYSTERECTOMY  05/01/2007       Family History:    Family History   Problem Relation Age of Onset    Cancer Mother         bone / liver    Breast Cancer Mother     Cancer Sister         vulvar ca, cervical ca, squamous cell cancer    Breast Cancer Sister     Cancer Brother         Rectal- Stage 4    Rectal Cancer Brother     Cancer Maternal Grandmother     Cancer Maternal Grandfather     Cancer Paternal Grandmother     Cancer Paternal Grandfather     Breast Cancer Maternal Aunt     Breast Cancer Paternal Aunt  "    Colon Cancer Paternal Aunt     Pancreatic Cancer Nephew     Anesthesia Reaction No family hx of     Venous thrombosis No family hx of     Bleeding Disorder No family hx of        Social History:  Marital Status:   [2]  Social History     Tobacco Use    Smoking status: Former     Current packs/day: 0.00     Average packs/day: 1 pack/day for 29.0 years (29.0 ttl pk-yrs)     Types: Cigarettes     Start date: 10/30/1977     Quit date: 10/30/2006     Years since quittin.3    Smokeless tobacco: Never   Vaping Use    Vaping status: Never Used   Substance Use Topics    Alcohol use: No     Alcohol/week: 0.0 standard drinks of alcohol     Comment: 1 drink per year    Drug use: No        Medications:    acetaminophen (TYLENOL) 325 MG tablet  Calcium Carbonate (CALCIUM 500 PO)  Calcium-Vitamin D-Vitamin K 500-100-40 MG-UNT-MCG CHEW  docusate sodium (COLACE) 100 MG capsule  exemestane (AROMASIN) 25 MG tablet  hydroCHLOROthiazide 12.5 MG tablet  methocarbamol (ROBAXIN) 500 MG tablet  SENNA-docusate sodium (SENNA S) 8.6-50 MG tablet          Review of Systems    Physical Exam   BP: (!) 149/125  Pulse: 76  Temp: 98  F (36.7  C)  Resp: 18  SpO2: 97 %      Physical Exam    ED Course        Procedures         {EKG done?:538422}    Critical Care time:  {none or minutes:152193}  {Trauma Activation or Fall?:893228}  {Sepsis/Septic Shock/Stemi/Stroke:330067::\"None\"}         No results found. However, due to the size of the patient record, not all encounters were searched. Please check Results Review for a complete set of results.    Medications - No data to display    Assessments & Plan (with Medical Decision Making)     I have reviewed the nursing notes.    I have reviewed the findings, diagnosis, plan and need for follow up with the patient.  {ED Addendum:838212::\" \"}        Medical Decision Making  The patient's presentation was of {[facility] Problem:400456}.    The patient's evaluation involved:  {[facility] Data:397986}    The " patient's management necessitated {[facility] Management:340979}.        New Prescriptions    No medications on file       Final diagnoses:   Rash and nonspecific skin eruption       2/22/2025   [facility] EMERGENCY DEPT

## 2025-02-27 ENCOUNTER — OFFICE VISIT (OUTPATIENT)
Dept: SURGERY | Facility: CLINIC | Age: 75
End: 2025-02-27
Payer: COMMERCIAL

## 2025-02-27 VITALS
OXYGEN SATURATION: 94 % | BODY MASS INDEX: 31.98 KG/M2 | HEIGHT: 68 IN | DIASTOLIC BLOOD PRESSURE: 74 MMHG | HEART RATE: 74 BPM | SYSTOLIC BLOOD PRESSURE: 139 MMHG | WEIGHT: 211 LBS

## 2025-02-27 DIAGNOSIS — C50.911 INVASIVE DUCTAL CARCINOMA OF RIGHT BREAST (H): Primary | ICD-10-CM

## 2025-02-27 ASSESSMENT — PAIN SCALES - GENERAL: PAINLEVEL_OUTOF10: MILD PAIN (3)

## 2025-02-27 NOTE — LETTER
2/27/2025      Marissa Guadarrama  [address] [place] [phone]      Dear Colleague,    Thank you for referring your patient, Marissa Guadarrama, to the [facility]. Please see a copy of my visit note below.    Patient returns for drain removal.  Removed intact without issue.  No issues with wound healing.  Patient developed rash 2 weeks ago, pruritic.  No other concerning features, on steroids per UC.    RTC 3 months for recheck.    Rahat Marroquin DO on 2/27/2025 at 12:31 PM      Again, thank you for allowing me to participate in the care of your patient.        Sincerely,        Rahat Marroquin DO    Electronically signed

## 2025-02-27 NOTE — PROGRESS NOTES
Patient returns for drain removal.  Removed intact without issue.  No issues with wound healing.  Patient developed rash 2 weeks ago, pruritic.  No other concerning features, on steroids per UC.    RTC 3 months for recheck.    Rahat Marroquin DO on 2/27/2025 at 12:31 PM

## 2025-03-06 NOTE — PROGRESS NOTES
Infusion Nursing Note:  Marissa KUMAR Jennifergian presents today for C7D1 Somatuline.    Patient seen by provider today: No   present during visit today: Not Applicable.    Note: Pt denies any new health concerns.    Intravenous Access:  N/A.    Treatment Conditions:  Not Applicable.    Post Infusion Assessment:  Patient tolerated injection without incident.     Discharge Plan:   Discharge instructions reviewed with: Patient.  Patient and/or family verbalized understanding of discharge instructions and all questions answered.  Patient discharged in stable condition accompanied by: self.  Departure Mode: Ambulatory.      Barbara Corrales RN                     To get better

## 2025-03-07 ENCOUNTER — HOSPITAL ENCOUNTER (OUTPATIENT)
Dept: PET IMAGING | Facility: HOSPITAL | Age: 75
Discharge: HOME | End: 2025-03-07
Attending: NURSE PRACTITIONER | Admitting: RADIOLOGY
Payer: MEDICARE

## 2025-03-07 DIAGNOSIS — C77.8 CARCINOMA METASTATIC TO LYMPH NODES OF MULTIPLE SITES WITH UNKNOWN PRIMARY SITE (H): ICD-10-CM

## 2025-03-07 DIAGNOSIS — C80.1 CARCINOMA METASTATIC TO LYMPH NODES OF MULTIPLE SITES WITH UNKNOWN PRIMARY SITE (H): ICD-10-CM

## 2025-03-07 DIAGNOSIS — Z17.421 TRIPLE NEGATIVE BREAST CANCER (H): ICD-10-CM

## 2025-03-07 DIAGNOSIS — C50.919 TRIPLE NEGATIVE BREAST CANCER (H): ICD-10-CM

## 2025-03-07 LAB — GLUCOSE BLDC GLUCOMTR-MCNC: 121 MG/DL (ref 70–99)

## 2025-03-07 PROCEDURE — A9552 F18 FDG: HCPCS | Performed by: NURSE PRACTITIONER

## 2025-03-07 PROCEDURE — 82962 GLUCOSE BLOOD TEST: CPT

## 2025-03-07 PROCEDURE — 78816 PET IMAGE W/CT FULL BODY: CPT | Mod: PS

## 2025-03-07 PROCEDURE — 343N000001 HC RX 343 MED OP 636: Performed by: NURSE PRACTITIONER

## 2025-03-07 RX ORDER — FLUDEOXYGLUCOSE F 18 200 MCI/ML
8-18 INJECTION, SOLUTION INTRAVENOUS ONCE
Status: COMPLETED | OUTPATIENT
Start: 2025-03-07 | End: 2025-03-07

## 2025-03-07 RX ADMIN — FLUDEOXYGLUCOSE F 18 12.62 MILLICURIE: 200 INJECTION, SOLUTION INTRAVENOUS at 10:47

## 2025-03-14 ENCOUNTER — ONCOLOGY VISIT (OUTPATIENT)
Dept: ONCOLOGY | Facility: CLINIC | Age: 75
End: 2025-03-14
Attending: INTERNAL MEDICINE
Payer: MEDICARE

## 2025-03-14 ENCOUNTER — APPOINTMENT (OUTPATIENT)
Dept: LAB | Facility: CLINIC | Age: 75
End: 2025-03-14
Attending: INTERNAL MEDICINE
Payer: MEDICARE

## 2025-03-14 VITALS
HEIGHT: 68 IN | SYSTOLIC BLOOD PRESSURE: 156 MMHG | WEIGHT: 204.7 LBS | BODY MASS INDEX: 31.02 KG/M2 | RESPIRATION RATE: 16 BRPM | DIASTOLIC BLOOD PRESSURE: 64 MMHG | HEART RATE: 66 BPM | TEMPERATURE: 98.5 F | OXYGEN SATURATION: 95 %

## 2025-03-14 DIAGNOSIS — T38.6X5A OSTEOPOROSIS DUE TO AROMATASE INHIBITOR: Primary | ICD-10-CM

## 2025-03-14 DIAGNOSIS — L29.9 ITCHING: ICD-10-CM

## 2025-03-14 DIAGNOSIS — M81.8 OSTEOPOROSIS DUE TO AROMATASE INHIBITOR: Primary | ICD-10-CM

## 2025-03-14 PROCEDURE — 82247 BILIRUBIN TOTAL: CPT | Performed by: INTERNAL MEDICINE

## 2025-03-14 PROCEDURE — 85041 AUTOMATED RBC COUNT: CPT | Performed by: INTERNAL MEDICINE

## 2025-03-14 PROCEDURE — 84450 TRANSFERASE (AST) (SGOT): CPT | Performed by: INTERNAL MEDICINE

## 2025-03-14 PROCEDURE — 85004 AUTOMATED DIFF WBC COUNT: CPT | Performed by: INTERNAL MEDICINE

## 2025-03-14 PROCEDURE — 84155 ASSAY OF PROTEIN SERUM: CPT | Performed by: INTERNAL MEDICINE

## 2025-03-14 PROCEDURE — 82378 CARCINOEMBRYONIC ANTIGEN: CPT | Performed by: INTERNAL MEDICINE

## 2025-03-14 PROCEDURE — 86300 IMMUNOASSAY TUMOR CA 15-3: CPT | Performed by: INTERNAL MEDICINE

## 2025-03-14 PROCEDURE — 84443 ASSAY THYROID STIM HORMONE: CPT | Performed by: INTERNAL MEDICINE

## 2025-03-14 RX ORDER — DIPHENHYDRAMINE HYDROCHLORIDE 50 MG/ML
25 INJECTION, SOLUTION INTRAMUSCULAR; INTRAVENOUS
Status: CANCELLED
Start: 2025-03-14

## 2025-03-14 RX ORDER — HEPARIN SODIUM,PORCINE 10 UNIT/ML
5-20 VIAL (ML) INTRAVENOUS DAILY PRN
Status: CANCELLED | OUTPATIENT
Start: 2025-03-14

## 2025-03-14 RX ORDER — HEPARIN SODIUM (PORCINE) LOCK FLUSH IV SOLN 100 UNIT/ML 100 UNIT/ML
5 SOLUTION INTRAVENOUS
Status: CANCELLED | OUTPATIENT
Start: 2025-03-14

## 2025-03-14 RX ORDER — ALBUTEROL SULFATE 90 UG/1
1-2 INHALANT RESPIRATORY (INHALATION)
Status: CANCELLED
Start: 2025-03-14

## 2025-03-14 RX ORDER — DIPHENHYDRAMINE HYDROCHLORIDE 50 MG/ML
50 INJECTION, SOLUTION INTRAMUSCULAR; INTRAVENOUS
Status: CANCELLED
Start: 2025-03-14

## 2025-03-14 RX ORDER — LORAZEPAM 2 MG/ML
0.5 INJECTION INTRAMUSCULAR EVERY 4 HOURS PRN
Status: CANCELLED | OUTPATIENT
Start: 2025-03-14

## 2025-03-14 RX ORDER — ZOLEDRONIC ACID 0.05 MG/ML
5 INJECTION, SOLUTION INTRAVENOUS ONCE
Status: CANCELLED | OUTPATIENT
Start: 2025-03-14 | End: 2026-12-31

## 2025-03-14 RX ORDER — EPINEPHRINE 1 MG/ML
0.3 INJECTION, SOLUTION, CONCENTRATE INTRAVENOUS EVERY 5 MIN PRN
Status: CANCELLED | OUTPATIENT
Start: 2025-03-14

## 2025-03-14 RX ORDER — CYPROHEPTADINE HYDROCHLORIDE 4 MG/1
4 TABLET ORAL 3 TIMES DAILY PRN
Qty: 90 TABLET | Refills: 1 | Status: SHIPPED | OUTPATIENT
Start: 2025-03-14

## 2025-03-14 RX ORDER — METHYLPREDNISOLONE SODIUM SUCCINATE 40 MG/ML
40 INJECTION INTRAMUSCULAR; INTRAVENOUS
Status: CANCELLED
Start: 2025-03-14

## 2025-03-14 RX ORDER — MEPERIDINE HYDROCHLORIDE 25 MG/ML
25 INJECTION INTRAMUSCULAR; INTRAVENOUS; SUBCUTANEOUS
Status: CANCELLED | OUTPATIENT
Start: 2025-03-14

## 2025-03-14 RX ORDER — ALBUTEROL SULFATE 0.83 MG/ML
2.5 SOLUTION RESPIRATORY (INHALATION)
Status: CANCELLED | OUTPATIENT
Start: 2025-03-14

## 2025-03-14 ASSESSMENT — PAIN SCALES - GENERAL: PAINLEVEL_OUTOF10: NO PAIN (0)

## 2025-03-14 NOTE — PROGRESS NOTES
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  Trial of periactin  May have to pause pembro.    Primary Oncologist: Dr. Friedell      Assessment/Plan  Stage IV poorly differentiated carcinoma of unknown primary (right cervical nodes, thoracic nodes, bone), PDL1 50%  Diagnosed 2024. Two biopsies of cervical nodes could not confirm primary source - was NOE-3 negative. CA 27.29 + CEA both elevated. NGS showed no targetable mutations, but PDL1 TPS score 50%.    Started palliative pembrolizumab (every 6 week dosing) 8/2024. Tolerating well.     She has had a good response in the cervical, mediastinal and axillary nodes on immunotherapy. Left lung lesion stable. New sclerotic bone lesions are likely mets responding to immunotherapy    Plan:  -Proceed with C5 pembrolizumab 400 mg today   -Trend CA 27.29 and CEA (responding since on immunotherapy)  -Plan to repeat CT neck/CAP (without contrast due to hx nephrectomy, CKD) 6 weeks, after 5 cycles     Recently diagnosed pT2-cNX-cMX triple negative R breast cancer, NOE-3+ (s/p mastectomy)  Noted on routine mammogram 10/2024. Biopsy confirmed triple negative breast cancer. Different primary than her prior hormone+ breast cancer treated 8+ yrs ago.     There's raised suspicion that perhaps the poorly defined carcinoma involving cervical LN of unclear primary origin diagnosed Spring, 2024, may be actually related to this new breast cancer, however, that was NOE-3 negative whereas this breast tumor is NOE-3 positive. At the time of staging, she had right axillary, cervical,  mediastinal nodes and left lung mass so met lung origin was clinically favored but not confirmed by path. The right axillary nodes were never biopsied and have since resolved in response to immunotherapy.    On reimaging last month,  all sites of cheryl disease (cervical, R axillary, mediastinal) have all responded to immunotherapy. The left lung abnormality is stable. New sclerotic bone lesions are likely bone mets responding.     CA 27.29 was elevated last Spring and has responded to immunotherapy, but could still be dealing with a primary R breast cancer and a different metastatic cancer both responding to immunotherapy.     After Tumor Board review, it was decided to proceed with resection of the new right breast cancer and she underwent R mastectomy last week. No R axillary LN were assessed and node dissection was not pursued since she is not interested in chemotherapy.  Doing well post-mastectomy.    Pt does not desire neoadjuvant/adjuvant chemo, which would typically be considered in resectable triple neg breast cancer.     Plan:  -Surveillance  -If she develops new sites of progressive disease, rebiopsy given multiple cancers    Recurrent/metastatic small bowel carcinoid (liver/peritoneum, bone)  Patient has continued on monthly lanreotide since 2022.  Chromogranin nadired to 445 and has been stable 550-630 range over the last 1.5 yrs.    Tolerating lanreotide well.    Plan:  -Continue monthly lanreotide,    -Trend Chromogranin A  Component      Latest Ref Rng 2/23/2024  2:26 PM 3/22/2024  2:51 PM 4/19/2024  1:08 PM 5/17/2024  2:20 PM 6/14/2024  2:22 PM 7/11/2024  2:45 PM   Chromogranin A      0 - 187 ng/mL 564 (H)  562 (H)  552 (H)  613 (H)  555 (H)  568 (H)      Component      Latest Ref Rng 8/9/2024  10:20 AM 9/6/2024  2:22 PM 11/1/2024  12:27 PM 1/10/2025  2:12 PM 2/14/2025  2:32 PM   Chromogranin A      0 - 187 ng/mL 631 (H)  610 (H)  546 (H)  21  728 (H)       Legend:  (H) High  Increased abdominal  adenopathy on PET scan of 3/7/2025  Suspect this represents progression of the carcinoid tumor.  Continue to monitor for now.      -If any significant progression in Chromogranin A, repeat Dotatate PET    History of ER/IN positive bilateral breast cancer  Has been on exemestane 8 years without evidence for recurrent disease.  Tolerates this well.    Last mammogram was 10/2023 (benign).  No clinical concern for recurrence.    Plan:  -Planning for AI up to 10 years, pending tolerance. Although, in context of her metastatic cancer, not sure value of continuing especially with osteoporosis risk    3.  Osteoporosis  DEXA 3/2023 showed significant decline in BMD (since 2019, on AI). Tscore -3.1 left hip.  Started Reclast annually 1/2024. Last dose 1/31/2025  On calcium/vit D.    Plan:  -Reclast due today   -Continue Calcium and vitamin D supplementation  -Repeat DEXA 3/2025    4.  CKD  5.  Hx left nephrectomy (met carcinoid resection)  Creatinine stable 1.2-1.4 range.     Plan:  -Hydrate well  -Avoid nephrotoxic medications and minimize IV contrast dye with scans, as able  ______________________________________________________________________________    History of Present Illness/ Interval History    Ms. Marissa Guadarrama is a 74 year old with a complex cancer history.   -She has longstanding history of low-grade neuroendocrine (carcinoid) small bowel cancer with recurrent/metastatic disease in 2022, currently on maintenance lanreotide monthly.    -She also has a remote history of bilateral ER/IN positive breast cancer treated with lumpectomies and has been on exemestane for 8 years.    -8/2024, Developed PDL1+ met carcinoma of unclear primary (work-up could not confirm related to her prior breast cancer vs separate new primary, such as lung) involving cervical nodes +/- med/hilar nodes. She initiated pembro every 6 weeks with response.  -11/2024, was noted to have bi-lobed R breast cancer on routine mammogram. Breast  biopsy: triple neg gr 3 breast cancer (NOE-3 positive, p53 positive - of note, cervical node biopsy was NOE-3 negative). In retrospect, breast lesion was present on her prior PET scan earlier in year + CA 27.29 had been elevated (57; then later responded on immunotherapy); so possible the metastatic cancer in cervical/med nodes all related to this. US-biopsy R axilla was considered, but no abnormal nodes to biopsy were detected. CT showed response in cervical, mediastinal and right axillary LN and new sclerotic lesions (all indicated interval response to immunotherapy). Underwent mastectomy with resection of triple negative IDC.     Marissa is 1 week s/p R mastectomy. She is recovering well with moderate pain and fatigue. She has her drain in place until next week.     Returns ahead of next cycle of pembrolizumab.   Continues lanreotide every 4 weeks.   Continues exemestane for her more remote hx of hormone+ breast cancer.    Tolerating immunotherapy very well, no side effects. No diarrhea, dyspnea, skin rashes.  No new arthralgias. Chronic bilateral pain r/t to bursitis and arthritis. Recent L knee steroid injection was very helpful.     Continues to tolerate lanreotide + exemestane well.    Right cervical nodes resolved shortly after starting immunotherapy.    No new sites of pain.  No abdominal symptoms, diarrhea, flushing. Stable weight.    Baseline activity is limited from polio in childhood she uses a walker to help with ambulation.     ECOG Performance    1      Oncology History/Treatment  Diagnosis/Stage:   2003: stage III (pT2-pN2) well-differentiated neuroendocrine cancer (carcinoid) small bowel cancer  ---2020: met stage IV recurrence causing left ureteral obstruction + liver met   ---2/2022: progressive disease confirmed by Octeoscan (dotatate+ liver, intra-abd and bone mets). Chromogranin A level 824  ---7/2023: progression in L sclerotic sacral bone lesion, rest of sites stable. Chromogranin A  slightly elevated (562). Presumed met neuroendocrine (not biopsied).    2016: bilateral ER/NM+ breast cancer  -both tumors 100% ER/NM+, HER2 neg  -Oncotype DX score not done (pt opted against chemo, regardless)    2024: met poorly differentiated carcinoma, uncertain origin (PDL1+)  -2/2024: new R supraclav adenopathy. CEA 56.6 (up from 12.8) +  CA 27.29 57.7 (up from 22), chromogranin A 560 (stable)  -3/8/2024 CT neck/chest: multiple R cervical level 3-5 nodes, mildly enlarged mediastinal + hilar nodes  -3/8/2024 IR biopsy R cervical node: poorly differentiated carcinoma, CK7 + CK20 positive. Insufficient tissue to make further identification of the primary or to determine treatment options.   -6/28/204 additional biopsy R cervical node for NGS testing: no actionable mutations. TPS 50%.    1/2025: pT2-pNX-cMX right breast IDC with DCIS, triple negative  -11/2024 routine screening mammogram: bi-lobed R breast cancer.  -Breast biopsy: triple neg gr 3 breast cancer (NOE-3 positive, p53 positive - of note, cervical node biopsy was NOE-3 negative).   -In retrospect, breast lesion was present on her prior PET scan earlier in year + CA 27.29 had been elevated (57; then later responded on immunotherapy); so possible the metastatic cancer in cervical/med nodes all related to this.   -US-biopsy R axilla was attempted but no abnormal nodes to biopsy were detected (possibility these had responded to immunotherapy). CT showed response in cervical, mediastinal and right axillary LN and new sclerotic lesions (all indicated interval response to immunotherapy).   -Discussed at Tumor Board; recommendation was to proceed to mastectomy. Pt declined option of neoadjuvant/adjuvant chemo  -1/24/2025 mastectomy pathology: 2.2 cm grade 3 invasive with DCIS involvement 24 mm. Negative margins. ER neg, NM neg, HER2 neg (1+)    Treatment:  Small bowel carcinoid  -2003: right colonic and terminal ileal resection  -2022: left nephrectomy (met  causing ureteral obstruction)  -2022 - present: lanreotide every 4 weeks  ---joslyn chromogranin 445 (1/2023)  -12/2023: palliative RT (5000 cGy/5) to L sacral met with improved pain.    Bilateral breast cancer, ER/NV+  -bilateral lumpectomies  -2016 - present: exemestane (planning 10 yrs)  -1/2024: annual Reclast for osteoporosis    Met poorly differentiated cancer, unclear primary  -8/9/2024 - present: pembrolizumab 400 mg every 6 weeks    R breast cancer, triple negative  -1/24/2025: R simple mastectomy (no evident detection of axillary LN and pt refusal of chemo, so axillary LND not pursued)      Physical Exam    GENERAL: Alert and oriented to time place and person. Seated comfortably. In no distress.  accompanies  HEAD: Atraumatic and normocephalic. No alopecia.  EYES: NASIR, EOMI. No erythema. No icterus.  LYMPH: No palpable cervical, supraclavicular nor axillary adenopathy (previously palpable cervical nodes resolved)  BREASTS Recently seen by surgeon  drain removed exam deferred  CHEST: Regular respiratory effort.  CVS: RRR  ABDOMEN: Soft. Not tender. Not distended. No palpable hepatomegaly or splenomegaly. No other mass palpable. Bowel sounds present.  EXTREMITIES: Warm. No peripheral edema.  SKIN: no rash, or bruising or purpura.   NEURO: No gross deficit noted. Non-antalgic gait.      Lab Results    Recent Results (from the past week)   Comprehensive metabolic panel   Result Value Ref Range    Sodium 145 135 - 145 mmol/L    Potassium 3.7 3.4 - 5.3 mmol/L    Carbon Dioxide (CO2) 27 22 - 29 mmol/L    Anion Gap 11 7 - 15 mmol/L    Urea Nitrogen 17.5 8.0 - 23.0 mg/dL    Creatinine 1.15 (H) 0.51 - 0.95 mg/dL    GFR Estimate 49 (L) >60 mL/min/1.73m2    Calcium 9.9 8.8 - 10.4 mg/dL    Chloride 107 98 - 107 mmol/L    Glucose 129 (H) 70 - 99 mg/dL    Alkaline Phosphatase 70 40 - 150 U/L    AST 10 0 - 45 U/L    ALT 15 0 - 50 U/L    Protein Total 6.7 6.4 - 8.3 g/dL    Albumin 3.5 3.5 - 5.2 g/dL    Bilirubin  Total 0.5 <=1.2 mg/dL   TSH with free T4 reflex   Result Value Ref Range    TSH 1.51 0.30 - 4.20 uIU/mL   CBC with platelets and differential   Result Value Ref Range    WBC Count 6.6 4.0 - 11.0 10e3/uL    RBC Count 4.38 3.80 - 5.20 10e6/uL    Hemoglobin 13.6 11.7 - 15.7 g/dL    Hematocrit 41.6 35.0 - 47.0 %    MCV 95 78 - 100 fL    MCH 31.1 26.5 - 33.0 pg    MCHC 32.7 31.5 - 36.5 g/dL    RDW 13.3 10.0 - 15.0 %    Platelet Count 173 150 - 450 10e3/uL    % Neutrophils 68 %    % Lymphocytes 15 %    % Monocytes 6 %    % Eosinophils 10 %    % Basophils 1 %    % Immature Granulocytes 1 %    NRBCs per 100 WBC 0 <1 /100    Absolute Neutrophils 4.5 1.6 - 8.3 10e3/uL    Absolute Lymphocytes 1.0 0.8 - 5.3 10e3/uL    Absolute Monocytes 0.4 0.0 - 1.3 10e3/uL    Absolute Eosinophils 0.6 0.0 - 0.7 10e3/uL    Absolute Basophils 0.0 0.0 - 0.2 10e3/uL    Absolute Immature Granulocytes 0.0 <=0.4 10e3/uL    Absolute NRBCs 0.0 10e3/uL         Imaging    PET Oncology Whole Body    Result Date: 3/9/2025  EXAM: PET ONCOLOGY WHOLE BODY LOCATION: [facility] DATE: 3/7/2025 INDICATION: Subsequent treatment planning and restaging for malignant neoplasm of overlapping sites of right female breast and malignant neoplasm of overlapping sites of left female breast. Status post right mastectomy in January 2025, currently receiving immunotherapy. Monitor treatment response COMPARISON: FDG PET/CT dated 4/26/2024 CONTRAST: None TECHNIQUE: Serum glucose level 121 mg/dL. One hour post intravenous administration of 12.62mCi F18 FDG, PET imaging was performed from the skull vertex to feet, utilizing attenuation correction with concurrent axial CT and PET/CT image fusion. Dose reduction techniques were used. PET/CT FINDINGS: While there is resolution of the lesion in the left lateral third rib with residual residual right cervical chain (Max SUV 3.0, previously 6.3), right supraclavicular (Max SUV 4.5, previously 6.0), right  subpectoral (Max SUV 5.9, previously 8.0) right lower paratracheal station 4R (Max SUV 5.6. Previously 6.4) lymph nodes, residual lesion in the left upper lobe (Max SUV 2.7, previously 8.1), and residual lesion in the left sacrum (Max SUV 3.1, previously 11.1), there is broadly stable FDG avid soft tissue nodularity involving the left greater than right pelvis (Max SUV 4.8, previously 4.4), increasing metabolic activity of lesions scattered throughout the liver parenchyma (Max SUV 6.1, previously 4.9 posteromedial right hepatic  lobe), and development of a FDG avid lesion along the anterior aspect of left psoas muscle at the level of the inferior mesenteric artery takeoff (Max SUV 12.5) suspicious for progression of disease. Mild FDG avid soft tissue stranding in the right mastectomy bed likely inflammatory in nature. Mildly FDG avid mineralized lesion in the proximal right tibia (Max SUV 2.8, previously 3.0), likely represents a benign fibro-osseous lesion such as an enchondroma. CT FINDINGS: Mild senescent intercranial changes. Postoperative change of bilateral lenses. Mild carotid artery bifurcation calcification. Moderate coronary artery calcium. Scattered areas of lung scarring/atelectatic change. Cholecystectomy. Left upper quadrant splenule. Bowel resection changes. Pelvic phleboliths. Multilevel degenerative changes of the spine.     IMPRESSION: While there is resolution of the lesion in the left lateral third rib with residual residual lymph nodes above the diaphragm, residual lesion in the left upper lobe, and residual lesion in the left sacrum, there is broadly stable FDG avid soft tissue nodularity involving the left greater than right pelvis, increasing metabolic activity of lesions scattered throughout the liver parenchyma, and development of a FDG avid lesion along the anterior aspect of left psoas muscle suspicious for progression of  disease.       I spent 51 minutes on the patient's visit today.   This included preparation for the visit, face-to-face time with the patient and documentation following the visit.  It did not include teaching or procedure time.    Signed by: Peter E. Friedell, MD

## 2025-03-14 NOTE — PROGRESS NOTES
"Oncology Rooming Note    March 14, 2025 1:24 PM   Marissa Guadarrama is a 74 year old female who presents for:    Chief Complaint   Patient presents with    Oncology Clinic Visit     Metastatic cancer consistent with lung primary - labs, provider, infusion      Initial Vitals: BP (!) 156/64 (BP Location: Right arm, Patient Position: Sitting, Cuff Size: Adult Regular)   Pulse 66   Temp 98.5  F (36.9  C) (Tympanic)   Resp 16   Ht 1.715 m (5' 7.5\")   Wt 92.9 kg (204 lb 11.2 oz)   SpO2 95%   BMI 31.59 kg/m   Estimated body mass index is 31.59 kg/m  as calculated from the following:    Height as of this encounter: 1.715 m (5' 7.5\").    Weight as of this encounter: 92.9 kg (204 lb 11.2 oz). Body surface area is 2.1 meters squared.  No Pain (0) Comment: Data Unavailable   No LMP recorded. Patient has had a hysterectomy.  Allergies reviewed: Yes  Medications reviewed: Yes    Medications: Medication refills not needed today.  Pharmacy name entered into [email or web address]: [id number] [place], [place] - [address] [place] [address]    Frailty Screening:   Is the patient here for a new oncology consult visit in cancer care? 2. No    PHQ9:  Did this patient require a PHQ9?: No      Clinical concerns: Rash that started a few weeks ago,  recent ED visit told them that the rash could be due to a medication.        Nicole Mascorro MA            "

## 2025-03-14 NOTE — LETTER
"3/14/2025      Marissa Guadarrama  [address] [address]      Dear Colleague,    Thank you for referring your patient, Marissa Guadarrama, to the [facility]. Please see a copy of my visit note below.    Oncology Rooming Note    March 14, 2025 1:24 PM   Marissa Guadarrama is a 74 year old female who presents for:    Chief Complaint   Patient presents with     Oncology Clinic Visit     Metastatic cancer consistent with lung primary - labs, provider, infusion      Initial Vitals: BP (!) 156/64 (BP Location: Right arm, Patient Position: Sitting, Cuff Size: Adult Regular)   Pulse 66   Temp 98.5  F (36.9  C) (Tympanic)   Resp 16   Ht 1.715 m (5' 7.5\")   Wt 92.9 kg (204 lb 11.2 oz)   SpO2 95%   BMI 31.59 kg/m   Estimated body mass index is 31.59 kg/m  as calculated from the following:    Height as of this encounter: 1.715 m (5' 7.5\").    Weight as of this encounter: 92.9 kg (204 lb 11.2 oz). Body surface area is 2.1 meters squared.  No Pain (0) Comment: Data Unavailable   No LMP recorded. Patient has had a hysterectomy.  Allergies reviewed: Yes  Medications reviewed: Yes    Medications: Medication refills not needed today.  Pharmacy name entered into [employer]: [id number] - [place], [place] - [address] [place] [address]    Frailty Screening:   Is the patient here for a new oncology consult visit in cancer care? 2. No    PHQ9:  Did this patient require a PHQ9?: No      Clinical concerns: Rash that started a few weeks ago,  recent ED visit told them that the rash could be due to a medication.        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  Trial of periactin  May have to pause pembro.    Primary Oncologist: Dr. Friedell      Assessment/Plan  Stage IV poorly differentiated carcinoma of unknown primary (right cervical nodes, thoracic nodes, bone), PDL1 50%  Diagnosed 2024. Two biopsies of cervical nodes could not confirm primary source - was NOE-3 negative. CA 27.29 + CEA both elevated. NGS showed no targetable mutations, but PDL1 TPS score 50%.    Started palliative pembrolizumab (every 6 week dosing) 8/2024. Tolerating well.     She has had a good response in the cervical, mediastinal and axillary nodes on immunotherapy. Left lung lesion stable. New sclerotic bone lesions are likely mets responding to immunotherapy    Plan:  -Proceed with C5 pembrolizumab 400 mg today   -Trend CA 27.29 and CEA (responding since on immunotherapy)  -Plan to repeat CT neck/CAP (without contrast due to hx nephrectomy, CKD) 6 weeks, after 5 cycles     Recently diagnosed pT2-cNX-cMX triple negative R breast cancer, NOE-3+ (s/p mastectomy)  Noted on routine mammogram 10/2024. Biopsy confirmed triple negative breast cancer. Different primary than her prior hormone+ breast cancer treated 8+ yrs ago.     There's raised suspicion that perhaps the poorly defined carcinoma involving cervical LN of unclear primary origin diagnosed Spring, 2024, may be actually related to this new breast cancer, however, that was NOE-3 negative whereas this breast tumor is NOE-3 positive. At the time of staging, she had right axillary, cervical, mediastinal nodes and left lung mass so met lung origin was clinically favored but not confirmed by path. The right axillary nodes were never biopsied and have since resolved in response to immunotherapy.    On reimaging last month,  all sites of cheryl disease (cervical, R axillary, mediastinal) have all responded to immunotherapy. The left lung abnormality is  stable. New sclerotic bone lesions are likely bone mets responding.     CA 27.29 was elevated last Spring and has responded to immunotherapy, but could still be dealing with a primary R breast cancer and a different metastatic cancer both responding to immunotherapy.     After Tumor Board review, it was decided to proceed with resection of the new right breast cancer and she underwent R mastectomy last week. No R axillary LN were assessed and node dissection was not pursued since she is not interested in chemotherapy.  Doing well post-mastectomy.    Pt does not desire neoadjuvant/adjuvant chemo, which would typically be considered in resectable triple neg breast cancer.     Plan:  -Surveillance  -If she develops new sites of progressive disease, rebiopsy given multiple cancers    Recurrent/metastatic small bowel carcinoid (liver/peritoneum, bone)  Patient has continued on monthly lanreotide since 2022.  Chromogranin nadired to 445 and has been stable 550-630 range over the last 1.5 yrs.    Tolerating lanreotide well.    Plan:  -Continue monthly lanreotide,    -Trend Chromogranin A  Component      Latest Ref Rng 2/23/2024  2:26 PM 3/22/2024  2:51 PM 4/19/2024  1:08 PM 5/17/2024  2:20 PM 6/14/2024  2:22 PM 7/11/2024  2:45 PM   Chromogranin A      0 - 187 ng/mL 564 (H)  562 (H)  552 (H)  613 (H)  555 (H)  568 (H)      Component      Latest Ref Rng 8/9/2024  10:20 AM 9/6/2024  2:22 PM 11/1/2024  12:27 PM 1/10/2025  2:12 PM 2/14/2025  2:32 PM   Chromogranin A      0 - 187 ng/mL 631 (H)  610 (H)  546 (H)  21  728 (H)       Legend:  (H) High  Increased abdominal adenopathy on PET scan of 3/7/2025  Suspect this represents progression of the carcinoid tumor.  Continue to monitor for now.      -If any significant progression in Chromogranin A, repeat Dotatate PET    History of ER/[place] positive bilateral breast cancer  Has been on exemestane 8 years without evidence for recurrent disease.  Tolerates this well.    Last mammogram  was 10/2023 (benign).  No clinical concern for recurrence.    Plan:  -Planning for AI up to 10 years, pending tolerance. Although, in context of her metastatic cancer, not sure value of continuing especially with osteoporosis risk    3.  Osteoporosis  DEXA 3/2023 showed significant decline in BMD (since 2019, on AI). Tscore -3.1 left hip.  Started Reclast annually 1/2024. Last dose 1/31/2025  On calcium/vit D.    Plan:  -Reclast due today   -Continue Calcium and vitamin D supplementation  -Repeat DEXA 3/2025    4.  CKD  5.  Hx left nephrectomy (met carcinoid resection)  Creatinine stable 1.2-1.4 range.     Plan:  -Hydrate well  -Avoid nephrotoxic medications and minimize IV contrast dye with scans, as able  ______________________________________________________________________________    History of Present Illness/ Interval History    Ms. Marissa Guadarrama is a 74 year old with a complex cancer history.   -She has longstanding history of low-grade neuroendocrine (carcinoid) small bowel cancer with recurrent/metastatic disease in 2022, currently on maintenance lanreotide monthly.    -She also has a remote history of bilateral ER/[place] positive breast cancer treated with lumpectomies and has been on exemestane for 8 years.    -8/2024, Developed PDL1+ met carcinoma of unclear primary (work-up could not confirm related to her prior breast cancer vs separate new primary, such as lung) involving cervical nodes +/- med/hilar nodes. She initiated pembro every 6 weeks with response.  -11/2024, was noted to have bi-lobed R breast cancer on routine mammogram. Breast biopsy: triple neg gr 3 breast cancer (NOE-3 positive, p53 positive - of note, cervical node biopsy was NOE-3 negative). In retrospect, breast lesion was present on her prior PET scan earlier in year + CA 27.29 had been elevated (57; then later responded on immunotherapy); so possible the metastatic cancer in cervical/med nodes all related to this. US-biopsy R  axilla was considered, but no abnormal nodes to biopsy were detected. CT showed response in cervical, mediastinal and right axillary LN and new sclerotic lesions (all indicated interval response to immunotherapy). Underwent mastectomy with resection of triple negative IDC.     Marissa is 1 week s/p R mastectomy. She is recovering well with moderate pain and fatigue. She has her drain in place until next week.     Returns ahead of next cycle of pembrolizumab.   Continues lanreotide every 4 weeks.   Continues exemestane for her more remote hx of hormone+ breast cancer.    Tolerating immunotherapy very well, no side effects. No diarrhea, dyspnea, skin rashes.  No new arthralgias. Chronic bilateral pain r/t to bursitis and arthritis. Recent L knee steroid injection was very helpful.     Continues to tolerate lanreotide + exemestane well.    Right cervical nodes resolved shortly after starting immunotherapy.    No new sites of pain.  No abdominal symptoms, diarrhea, flushing. Stable weight.    Baseline activity is limited from polio in childhood she uses a walker to help with ambulation.     ECOG Performance    1      Oncology History/Treatment  Diagnosis/Stage:   2003: stage III (pT2-pN2) well-differentiated neuroendocrine cancer (carcinoid) small bowel cancer  ---2020: met stage IV recurrence causing left ureteral obstruction + liver met   ---2/2022: progressive disease confirmed by Octeoscan (dotatate+ liver, intra-abd and bone mets). Chromogranin A level 824  ---7/2023: progression in L sclerotic sacral bone lesion, rest of sites stable. Chromogranin A slightly elevated (562). Presumed met neuroendocrine (not biopsied).    2016: bilateral ER/[place]+ breast cancer  -both tumors 100% ER/[place]+, HER2 neg  -Oncotype DX score not done (pt opted against chemo, regardless)    2024: met poorly differentiated carcinoma, uncertain origin (PDL1+)  -2/2024: new R supraclav adenopathy. CEA 56.6 (up from 12.8) +  CA 27.29 57.7 (up from  22), chromogranin A 560 (stable)  -3/8/2024 CT neck/chest: multiple R cervical level 3-5 nodes, mildly enlarged mediastinal + hilar nodes  -3/8/2024 IR biopsy R cervical node: poorly differentiated carcinoma, CK7 + CK20 positive. Insufficient tissue to make further identification of the primary or to determine treatment options.   -6/28/204 additional biopsy R cervical node for NGS testing: no actionable mutations. TPS 50%.    1/2025: pT2-pNX-cMX right breast IDC with DCIS, triple negative  -11/2024 routine screening mammogram: bi-lobed R breast cancer.  -Breast biopsy: triple neg gr 3 breast cancer (NOE-3 positive, p53 positive - of note, cervical node biopsy was NOE-3 negative).   -In retrospect, breast lesion was present on her prior PET scan earlier in year + CA 27.29 had been elevated (57; then later responded on immunotherapy); so possible the metastatic cancer in cervical/med nodes all related to this.   -US-biopsy R axilla was attempted but no abnormal nodes to biopsy were detected (possibility these had responded to immunotherapy). CT showed response in cervical, mediastinal and right axillary LN and new sclerotic lesions (all indicated interval response to immunotherapy).   -Discussed at Tumor Board; recommendation was to proceed to mastectomy. Pt declined option of neoadjuvant/adjuvant chemo  -1/24/2025 mastectomy pathology: 2.2 cm grade 3 invasive with DCIS involvement 24 mm. Negative margins. ER neg, VT neg, HER2 neg (1+)    Treatment:  Small bowel carcinoid  -2003: right colonic and terminal ileal resection  -2022: left nephrectomy (met causing ureteral obstruction)  -2022 - present: lanreotide every 4 weeks  ---joslyn chromogranin 445 (1/2023)  -12/2023: palliative RT (5000 cGy/5) to L sacral met with improved pain.    Bilateral breast cancer, ER/[place]+  -bilateral lumpectomies  -2016 - present: exemestane (planning 10 yrs)  -1/2024: annual Reclast for osteoporosis    Met poorly differentiated cancer,  unclear primary  -8/9/2024 - present: pembrolizumab 400 mg every 6 weeks    R breast cancer, triple negative  -1/24/2025: R simple mastectomy (no evident detection of axillary LN and pt refusal of chemo, so axillary LND not pursued)      Physical Exam    GENERAL: Alert and oriented to time place and person. Seated comfortably. In no distress.  accompanies  HEAD: Atraumatic and normocephalic. No alopecia.  EYES: NASIR, EOMI. No erythema. No icterus.  LYMPH: No palpable cervical, supraclavicular nor axillary adenopathy (previously palpable cervical nodes resolved)  BREASTS Recently seen by surgeon  drain removed exam deferred  CHEST: Regular respiratory effort.  CVS: RRR  ABDOMEN: Soft. Not tender. Not distended. No palpable hepatomegaly or splenomegaly. No other mass palpable. Bowel sounds present.  EXTREMITIES: Warm. No peripheral edema.  SKIN: no rash, or bruising or purpura.   NEURO: No gross deficit noted. Non-antalgic gait.      Lab Results    Recent Results (from the past week)   Comprehensive metabolic panel   Result Value Ref Range    Sodium 145 135 - 145 mmol/L    Potassium 3.7 3.4 - 5.3 mmol/L    Carbon Dioxide (CO2) 27 22 - 29 mmol/L    Anion Gap 11 7 - 15 mmol/L    Urea Nitrogen 17.5 8.0 - 23.0 mg/dL    Creatinine 1.15 (H) 0.51 - 0.95 mg/dL    GFR Estimate 49 (L) >60 mL/min/1.73m2    Calcium 9.9 8.8 - 10.4 mg/dL    Chloride 107 98 - 107 mmol/L    Glucose 129 (H) 70 - 99 mg/dL    Alkaline Phosphatase 70 40 - 150 U/L    AST 10 0 - 45 U/L    ALT 15 0 - 50 U/L    Protein Total 6.7 6.4 - 8.3 g/dL    Albumin 3.5 3.5 - 5.2 g/dL    Bilirubin Total 0.5 <=1.2 mg/dL   TSH with free T4 reflex   Result Value Ref Range    TSH 1.51 0.30 - 4.20 uIU/mL   CBC with platelets and differential   Result Value Ref Range    WBC Count 6.6 4.0 - 11.0 10e3/uL    RBC Count 4.38 3.80 - 5.20 10e6/uL    Hemoglobin 13.6 11.7 - 15.7 g/dL    Hematocrit 41.6 35.0 - 47.0 %    MCV 95 78 - 100 fL    MCH 31.1 26.5 - 33.0 pg    MCHC 32.7  31.5 - 36.5 g/dL    RDW 13.3 10.0 - 15.0 %    Platelet Count 173 150 - 450 10e3/uL    % Neutrophils 68 %    % Lymphocytes 15 %    % Monocytes 6 %    % Eosinophils 10 %    % Basophils 1 %    % Immature Granulocytes 1 %    NRBCs per 100 WBC 0 <1 /100    Absolute Neutrophils 4.5 1.6 - 8.3 10e3/uL    Absolute Lymphocytes 1.0 0.8 - 5.3 10e3/uL    Absolute Monocytes 0.4 0.0 - 1.3 10e3/uL    Absolute Eosinophils 0.6 0.0 - 0.7 10e3/uL    Absolute Basophils 0.0 0.0 - 0.2 10e3/uL    Absolute Immature Granulocytes 0.0 <=0.4 10e3/uL    Absolute NRBCs 0.0 10e3/uL         Imaging    PET Oncology Whole Body    Result Date: 3/9/2025  EXAM: PET ONCOLOGY WHOLE BODY LOCATION: [facility] DATE: 3/7/2025 INDICATION: Subsequent treatment planning and restaging for malignant neoplasm of overlapping sites of right female breast and malignant neoplasm of overlapping sites of left female breast. Status post right mastectomy in January 2025, currently receiving immunotherapy. Monitor treatment response COMPARISON: FDG PET/CT dated 4/26/2024 CONTRAST: None TECHNIQUE: Serum glucose level 121 mg/dL. One hour post intravenous administration of 12.62mCi F18 FDG, PET imaging was performed from the skull vertex to feet, utilizing attenuation correction with concurrent axial CT and PET/CT image fusion. Dose reduction techniques were used. PET/CT FINDINGS: While there is resolution of the lesion in the left lateral third rib with residual residual right cervical chain (Max SUV 3.0, previously 6.3), right supraclavicular (Max SUV 4.5, previously 6.0), right subpectoral (Max SUV 5.9, previously 8.0) right lower paratracheal station 4R (Max SUV 5.6. Previously 6.4) lymph nodes, residual lesion in the left upper lobe (Max SUV 2.7, previously 8.1), and residual lesion in the left sacrum (Max SUV 3.1, previously 11.1), there is broadly stable FDG avid soft tissue nodularity involving the left greater than right pelvis (Max SUV 4.8,  previously 4.4), increasing metabolic activity of lesions scattered throughout the liver parenchyma (Max SUV 6.1, previously 4.9 posteromedial right hepatic  lobe), and development of a FDG avid lesion along the anterior aspect of left psoas muscle at the level of the inferior mesenteric artery takeoff (Max SUV 12.5) suspicious for progression of disease. Mild FDG avid soft tissue stranding in the right mastectomy bed likely inflammatory in nature. Mildly FDG avid mineralized lesion in the proximal right tibia (Max SUV 2.8, previously 3.0), likely represents a benign fibro-osseous lesion such as an enchondroma. CT FINDINGS: Mild senescent intercranial changes. Postoperative change of bilateral lenses. Mild carotid artery bifurcation calcification. Moderate coronary artery calcium. Scattered areas of lung scarring/atelectatic change. Cholecystectomy. Left upper quadrant splenule. Bowel resection changes. Pelvic phleboliths. Multilevel degenerative changes of the spine.     IMPRESSION: While there is resolution of the lesion in the left lateral third rib with residual residual lymph nodes above the diaphragm, residual lesion in the left upper lobe, and residual lesion in the left sacrum, there is broadly stable FDG avid soft tissue nodularity involving the left greater than right pelvis, increasing metabolic activity of lesions scattered throughout the liver parenchyma, and development of a FDG avid lesion along the anterior aspect of left psoas muscle suspicious for progression of  disease.       I spent 51 minutes on the patient's visit today.  This included preparation for the visit, face-to-face time with the patient and documentation following the visit.  It did not include teaching or procedure time.    Signed by: Peter E. Friedell, MD        Again, thank you for allowing me to participate in the care of your patient.        Sincerely,        Peter E. Friedell, MD    Electronically signed

## 2025-04-11 ENCOUNTER — LAB (OUTPATIENT)
Dept: LAB | Facility: CLINIC | Age: 75
End: 2025-04-11
Payer: MEDICARE

## 2025-04-11 DIAGNOSIS — C50.411 BILATERAL MALIGNANT NEOPLASM OF UPPER OUTER QUADRANT OF BREAST IN FEMALE (H): ICD-10-CM

## 2025-04-11 DIAGNOSIS — D3A.021: ICD-10-CM

## 2025-04-11 DIAGNOSIS — C7B.09 SECONDARY CARCINOID TUMORS OF OTHER SITES (H): ICD-10-CM

## 2025-04-11 DIAGNOSIS — C50.412 BILATERAL MALIGNANT NEOPLASM OF UPPER OUTER QUADRANT OF BREAST IN FEMALE (H): ICD-10-CM

## 2025-04-11 DIAGNOSIS — C7A.021 MALIGNANT CARCINOID TUMOR OF CECUM (H): ICD-10-CM

## 2025-04-11 LAB
HOLD SPECIMEN: NORMAL
HOLD SPECIMEN: NORMAL

## 2025-04-11 PROCEDURE — 86316 IMMUNOASSAY TUMOR OTHER: CPT

## 2025-04-11 PROCEDURE — 36415 COLL VENOUS BLD VENIPUNCTURE: CPT

## 2025-04-12 LAB — CGA SERPL-MCNC: 575 NG/ML

## 2025-04-15 ENCOUNTER — TELEPHONE (OUTPATIENT)
Dept: ONCOLOGY | Facility: CLINIC | Age: 75
End: 2025-04-15
Payer: COMMERCIAL

## 2025-04-15 NOTE — TELEPHONE ENCOUNTER
----- Message from Jennifer Allen sent at 4/14/2025  8:42 AM CDT -----  Regarding: FW: Review chromogranin A  Her chromogranin A looks good (back down to her usual range), so won't need Dotatate PET at this time. We will keep CT cap for her imaging at her 8-week follow-up, as planned.  ----- Message -----  From: Jennifer Allen NP  Sent: 4/14/2025   7:00 AM CDT  To: Jennifer Allen NP  Subject: Review chromogranin A                            If further elevated, get Dotatate PET scan (instead of CT scan) in 4-8 weeks

## 2025-04-15 NOTE — CONFIDENTIAL NOTE
[facility]: Cancer Care                                                                                        Patient notified of Chromagranin A results- stable and per Jennifer No need for PET dotatate scan at this time. Will continue with next CT scan as planned. Spoke with patients  Gaudencio who verbalized understanding.      Signature:  Magaly Alex RN

## 2025-04-25 ENCOUNTER — LAB (OUTPATIENT)
Dept: LAB | Facility: CLINIC | Age: 75
End: 2025-04-25
Payer: MEDICARE

## 2025-04-25 DIAGNOSIS — C80.1 CARCINOMA METASTATIC TO LYMPH NODES OF MULTIPLE SITES WITH UNKNOWN PRIMARY SITE (H): ICD-10-CM

## 2025-04-25 DIAGNOSIS — C7A.021 MALIGNANT CARCINOID TUMOR OF CECUM (H): Primary | ICD-10-CM

## 2025-04-25 DIAGNOSIS — C77.8 CARCINOMA METASTATIC TO LYMPH NODES OF MULTIPLE SITES WITH UNKNOWN PRIMARY SITE (H): ICD-10-CM

## 2025-04-25 LAB
ALBUMIN SERPL BCG-MCNC: 3.6 G/DL (ref 3.5–5.2)
ALP SERPL-CCNC: 81 U/L (ref 40–150)
ALT SERPL W P-5'-P-CCNC: 7 U/L (ref 0–50)
ANION GAP SERPL CALCULATED.3IONS-SCNC: 14 MMOL/L (ref 7–15)
AST SERPL W P-5'-P-CCNC: 22 U/L (ref 0–45)
BASOPHILS # BLD AUTO: 0 10E3/UL (ref 0–0.2)
BASOPHILS NFR BLD AUTO: 0 %
BILIRUB SERPL-MCNC: 0.5 MG/DL
BUN SERPL-MCNC: 19.4 MG/DL (ref 8–23)
CALCIUM SERPL-MCNC: 9.7 MG/DL (ref 8.8–10.4)
CHLORIDE SERPL-SCNC: 102 MMOL/L (ref 98–107)
CREAT SERPL-MCNC: 1.11 MG/DL (ref 0.51–0.95)
EGFRCR SERPLBLD CKD-EPI 2021: 51 ML/MIN/1.73M2
EOSINOPHIL # BLD AUTO: 0.5 10E3/UL (ref 0–0.7)
EOSINOPHIL NFR BLD AUTO: 7 %
ERYTHROCYTE [DISTWIDTH] IN BLOOD BY AUTOMATED COUNT: 13.1 % (ref 10–15)
GLUCOSE SERPL-MCNC: 173 MG/DL (ref 70–99)
HCO3 SERPL-SCNC: 25 MMOL/L (ref 22–29)
HCT VFR BLD AUTO: 41.9 % (ref 35–47)
HGB BLD-MCNC: 13.5 G/DL (ref 11.7–15.7)
IMM GRANULOCYTES # BLD: 0 10E3/UL
IMM GRANULOCYTES NFR BLD: 0 %
LYMPHOCYTES # BLD AUTO: 1.4 10E3/UL (ref 0.8–5.3)
LYMPHOCYTES NFR BLD AUTO: 20 %
MCH RBC QN AUTO: 30.4 PG (ref 26.5–33)
MCHC RBC AUTO-ENTMCNC: 32.2 G/DL (ref 31.5–36.5)
MCV RBC AUTO: 94 FL (ref 78–100)
MONOCYTES # BLD AUTO: 0.4 10E3/UL (ref 0–1.3)
MONOCYTES NFR BLD AUTO: 6 %
NEUTROPHILS # BLD AUTO: 4.7 10E3/UL (ref 1.6–8.3)
NEUTROPHILS NFR BLD AUTO: 67 %
NRBC # BLD AUTO: 0 10E3/UL
NRBC BLD AUTO-RTO: 0 /100
PLATELET # BLD AUTO: 194 10E3/UL (ref 150–450)
POTASSIUM SERPL-SCNC: 4.4 MMOL/L (ref 3.4–5.3)
PROT SERPL-MCNC: 7 G/DL (ref 6.4–8.3)
RBC # BLD AUTO: 4.44 10E6/UL (ref 3.8–5.2)
SODIUM SERPL-SCNC: 141 MMOL/L (ref 135–145)
WBC # BLD AUTO: 7 10E3/UL (ref 4–11)

## 2025-04-25 PROCEDURE — 36415 COLL VENOUS BLD VENIPUNCTURE: CPT

## 2025-04-25 PROCEDURE — 82310 ASSAY OF CALCIUM: CPT | Performed by: NURSE PRACTITIONER

## 2025-04-25 PROCEDURE — 82378 CARCINOEMBRYONIC ANTIGEN: CPT | Performed by: NURSE PRACTITIONER

## 2025-04-25 PROCEDURE — 84443 ASSAY THYROID STIM HORMONE: CPT | Performed by: INTERNAL MEDICINE

## 2025-04-25 PROCEDURE — 85025 COMPLETE CBC W/AUTO DIFF WBC: CPT | Performed by: NURSE PRACTITIONER

## 2025-04-25 PROCEDURE — 86300 IMMUNOASSAY TUMOR CA 15-3: CPT | Performed by: INTERNAL MEDICINE

## 2025-04-26 LAB — CEA SERPL-MCNC: 34.3 NG/ML

## 2025-05-09 ENCOUNTER — LAB (OUTPATIENT)
Dept: LAB | Facility: CLINIC | Age: 75
End: 2025-05-09
Payer: MEDICARE

## 2025-05-09 DIAGNOSIS — C50.411 BILATERAL MALIGNANT NEOPLASM OF UPPER OUTER QUADRANT OF BREAST IN FEMALE (H): ICD-10-CM

## 2025-05-09 DIAGNOSIS — C7A.021 MALIGNANT CARCINOID TUMOR OF CECUM (H): ICD-10-CM

## 2025-05-09 DIAGNOSIS — D3A.021: ICD-10-CM

## 2025-05-09 DIAGNOSIS — C7B.09 SECONDARY CARCINOID TUMORS OF OTHER SITES (H): ICD-10-CM

## 2025-05-09 DIAGNOSIS — C50.412 BILATERAL MALIGNANT NEOPLASM OF UPPER OUTER QUADRANT OF BREAST IN FEMALE (H): ICD-10-CM

## 2025-05-09 PROCEDURE — 36415 COLL VENOUS BLD VENIPUNCTURE: CPT

## 2025-05-09 PROCEDURE — 86316 IMMUNOASSAY TUMOR OTHER: CPT

## 2025-05-12 NOTE — LETTER
11/16/2022         RE: Marissa Guadarrama  [address] [address]        Dear Colleague,    Thank you for referring your patient, Marissa Guadarrama, to the [facility]. Please see a copy of my visit note below.    Marissa is a 72 year old who is being evaluated via a billable telephone visit.      What phone number would you like to be contacted at? [phone]  How would you like to obtain your AVS? Mail a copy     Rhea Bloom CMA on 11/16/2022 at 2:41 PM          The patient was called for a billable telephone visit regarding the following issue/s:  1. Malignant carcinoid tumor of cecum (H)      She started lanreotide in March.  Her serum chromogranin A level was in the 800s and has gradually decreased since then and reached a plateau in the 400s in the last 6 months.    She just has her nonfunctioning left kidney resected.  She is recovering well from her surgery.    She has no acute complaints and specifically denies any problems of bowel movements or pain.    ASSESSMENT/PLAN:    1. Malignant carcinoid tumor of cecum (H)      Recent Dotatate PET from November 4, 2022 is consistent with stable disease when compared to her July 2022 imaging.      Chromogranin a levels also remained stable and I recommend that you continue with lanreotide injections.    You were in agreement and my clinic staff will schedule you to come in for your next lanreotide injection next Wednesday.    I recommend to subsequently continue with lanreotide injections every 4 weeks and return for your next office visit with me on February 15, 2022.    I spent a total of 11 minutes on the telephone medical discussion with the patient today.  The patient was at her home in [place] and I was onsite at [facility] in [place], [place].    The patient provided verbal consent for telephone visit.      .  ..            Again, thank you for allowing me to participate in the care  Pt ambulatory to ED c/o shortness of breath on exertion x 1 month. Reports worse today. Denies chest pain. Hx bypass. Reports hx of CHF - compliant with Lasix.   of your patient.        Sincerely,        Chad Hayden MD

## 2025-05-13 LAB — CGA SERPL-MCNC: 617 NG/ML

## 2025-05-29 ENCOUNTER — OFFICE VISIT (OUTPATIENT)
Dept: SURGERY | Facility: CLINIC | Age: 75
End: 2025-05-29
Payer: COMMERCIAL

## 2025-05-29 VITALS
OXYGEN SATURATION: 94 % | WEIGHT: 214 LBS | SYSTOLIC BLOOD PRESSURE: 150 MMHG | HEART RATE: 69 BPM | BODY MASS INDEX: 32.43 KG/M2 | HEIGHT: 68 IN | DIASTOLIC BLOOD PRESSURE: 81 MMHG

## 2025-05-29 DIAGNOSIS — C50.911 INVASIVE DUCTAL CARCINOMA OF RIGHT BREAST (H): Primary | ICD-10-CM

## 2025-05-29 PROCEDURE — 3077F SYST BP >= 140 MM HG: CPT | Performed by: SURGERY

## 2025-05-29 PROCEDURE — 3079F DIAST BP 80-89 MM HG: CPT | Performed by: SURGERY

## 2025-05-29 PROCEDURE — 1125F AMNT PAIN NOTED PAIN PRSNT: CPT | Performed by: SURGERY

## 2025-05-29 PROCEDURE — 99024 POSTOP FOLLOW-UP VISIT: CPT | Performed by: SURGERY

## 2025-05-29 ASSESSMENT — PAIN SCALES - GENERAL: PAINLEVEL_OUTOF10: MODERATE PAIN (6)

## 2025-05-29 NOTE — Clinical Note
"5/29/2025      Marissa Guadarrama  [address] [address]      Dear Colleague,    Thank you for referring your patient, Marissa Guadarrama, to the [facility]. Please see a copy of my visit note below.    General Surgery Post Op    Pt returns for follow up visit s/p *** on ***.    Patient has been doing well, tolerating diet. Bowels moving well. Pain controlled. No issues with wound healing/redness/drainage. No fevers.  ***    Physical exam: Vitals: BP (!) 150/81   Pulse 69   Ht 1.715 m (5' 7.5\")   Wt 97.1 kg (214 lb)   SpO2 94%   BMI 33.02 kg/m    BMI= Body mass index is 33.02 kg/m .    Exam:  Constitutional: {GENERAL APPEARANCE:639915::\"healthy\",\"alert\",\"no distress\"}  Cardiovascular: {HEART EXAM:501::\"negative\",\"PMI normal. No lifts, heaves, or thrills. RRR. No murmurs, clicks gallops or rub\"}  Respiratory: {LUNG EXAM:401::\"negative\",\"Percussion normal. Good diaphragmatic excursion. Lungs clear\"}  Gastrointestinal: {ABDOMEN EXAM:601::\"Abdomen soft, non-tender. BS normal. No masses, organomegaly\"}  ***    Path:  ***    Assessment: No diagnosis found.  Plan: ***    Rahat Marroquin DO on 5/29/2025 at 12:46 PM          Again, thank you for allowing me to participate in the care of your patient.        Sincerely,        Rahat Marroquin DO    Electronically signed"

## 2025-05-29 NOTE — PROGRESS NOTES
"General Surgery Post Op    Pt returns for follow up visit for breast cancer    She is doing well overall.  She has continued immunotherapy.  No new breast specific complaints.      Physical exam: Vitals: BP (!) 150/81   Pulse 69   Ht 1.715 m (5' 7.5\")   Wt 97.1 kg (214 lb)   SpO2 94%   BMI 33.02 kg/m    BMI= Body mass index is 33.02 kg/m .    Exam:  Constitutional: alert and no distress  Cardiovascular: negative  Respiratory: negative  Examined with Kim Villa MA.  No new scar changes or focal masses.      Assessment:     ICD-10-CM    1. Invasive ductal carcinoma of right breast (H)  C50.911         Plan: Patient is doing well 4 months post mastectomy.  She is still followed by oncology for her complex oncologic care.  She has no signs of local recurrence at this time.  Recommend return in 3 months for clinical breast exam.  Imaging pending by oncology next week.    Rahat Marroquin, DO on 5/29/2025 at 12:46 PM      "

## 2025-05-30 ENCOUNTER — HOSPITAL ENCOUNTER (OUTPATIENT)
Dept: CT IMAGING | Facility: CLINIC | Age: 75
Discharge: HOME | End: 2025-05-30
Attending: NURSE PRACTITIONER | Admitting: NURSE PRACTITIONER
Payer: MEDICARE

## 2025-05-30 DIAGNOSIS — Z17.421 TRIPLE NEGATIVE BREAST CANCER (H): ICD-10-CM

## 2025-05-30 DIAGNOSIS — C77.8 CARCINOMA METASTATIC TO LYMPH NODES OF MULTIPLE SITES WITH UNKNOWN PRIMARY SITE (H): ICD-10-CM

## 2025-05-30 DIAGNOSIS — C50.919 TRIPLE NEGATIVE BREAST CANCER (H): ICD-10-CM

## 2025-05-30 DIAGNOSIS — C7A.021 MALIGNANT CARCINOID TUMOR OF CECUM (H): ICD-10-CM

## 2025-05-30 DIAGNOSIS — C80.1 CARCINOMA METASTATIC TO LYMPH NODES OF MULTIPLE SITES WITH UNKNOWN PRIMARY SITE (H): ICD-10-CM

## 2025-05-30 LAB
CREAT BLD-MCNC: 1.3 MG/DL (ref 0.5–1)
EGFRCR SERPLBLD CKD-EPI 2021: 42 ML/MIN/1.73M2

## 2025-05-30 PROCEDURE — 250N000009 HC RX 250: Performed by: NURSE PRACTITIONER

## 2025-05-30 PROCEDURE — 250N000011 HC RX IP 250 OP 636: Performed by: NURSE PRACTITIONER

## 2025-05-30 PROCEDURE — 82565 ASSAY OF CREATININE: CPT

## 2025-05-30 PROCEDURE — 74177 CT ABD & PELVIS W/CONTRAST: CPT

## 2025-05-30 RX ORDER — IOPAMIDOL 755 MG/ML
105 INJECTION, SOLUTION INTRAVASCULAR ONCE
Status: COMPLETED | OUTPATIENT
Start: 2025-05-30 | End: 2025-05-30

## 2025-05-30 RX ADMIN — IOPAMIDOL 105 ML: 755 INJECTION, SOLUTION INTRAVENOUS at 14:41

## 2025-05-30 RX ADMIN — SODIUM CHLORIDE 66 ML: 9 INJECTION, SOLUTION INTRAVENOUS at 14:41

## 2025-06-06 ENCOUNTER — LAB (OUTPATIENT)
Dept: LAB | Facility: CLINIC | Age: 75
End: 2025-06-06
Payer: MEDICARE

## 2025-06-06 DIAGNOSIS — R97.8 OTHER ABNORMAL TUMOR MARKERS: Primary | ICD-10-CM

## 2025-06-06 DIAGNOSIS — D3A.021: ICD-10-CM

## 2025-06-06 DIAGNOSIS — C50.919 TRIPLE NEGATIVE BREAST CANCER (H): ICD-10-CM

## 2025-06-06 DIAGNOSIS — Z17.421 TRIPLE NEGATIVE BREAST CANCER (H): ICD-10-CM

## 2025-06-06 DIAGNOSIS — C50.919 BREAST CANCER IN FEMALE (H): ICD-10-CM

## 2025-06-06 LAB — HOLD SPECIMEN: NORMAL

## 2025-06-06 PROCEDURE — 85025 COMPLETE CBC W/AUTO DIFF WBC: CPT | Performed by: INTERNAL MEDICINE

## 2025-06-06 PROCEDURE — 86300 IMMUNOASSAY TUMOR CA 15-3: CPT

## 2025-06-06 PROCEDURE — 84443 ASSAY THYROID STIM HORMONE: CPT | Performed by: INTERNAL MEDICINE

## 2025-06-06 PROCEDURE — 82378 CARCINOEMBRYONIC ANTIGEN: CPT | Performed by: INTERNAL MEDICINE

## 2025-06-06 PROCEDURE — 82040 ASSAY OF SERUM ALBUMIN: CPT | Performed by: INTERNAL MEDICINE

## 2025-06-07 LAB — CANCER AG27-29 SERPL-ACNC: 45.4 U/ML

## 2025-07-03 ENCOUNTER — INFUSION THERAPY VISIT (OUTPATIENT)
Dept: INFUSION THERAPY | Facility: CLINIC | Age: 75
End: 2025-07-03
Attending: INTERNAL MEDICINE
Payer: COMMERCIAL

## 2025-07-03 ENCOUNTER — LAB (OUTPATIENT)
Dept: LAB | Facility: CLINIC | Age: 75
End: 2025-07-03
Attending: INTERNAL MEDICINE
Payer: MEDICARE

## 2025-07-03 VITALS
RESPIRATION RATE: 18 BRPM | SYSTOLIC BLOOD PRESSURE: 140 MMHG | BODY MASS INDEX: 32.78 KG/M2 | WEIGHT: 212.4 LBS | DIASTOLIC BLOOD PRESSURE: 86 MMHG | TEMPERATURE: 98.2 F | OXYGEN SATURATION: 95 % | HEART RATE: 66 BPM

## 2025-07-03 DIAGNOSIS — C53.9 CERVICAL CANCER (H): ICD-10-CM

## 2025-07-03 DIAGNOSIS — C50.412 BILATERAL MALIGNANT NEOPLASM OF UPPER OUTER QUADRANT OF BREAST IN FEMALE (H): ICD-10-CM

## 2025-07-03 DIAGNOSIS — C50.411 BILATERAL MALIGNANT NEOPLASM OF UPPER OUTER QUADRANT OF BREAST IN FEMALE (H): ICD-10-CM

## 2025-07-03 DIAGNOSIS — Z17.421 TRIPLE NEGATIVE BREAST CANCER (H): ICD-10-CM

## 2025-07-03 DIAGNOSIS — C7B.09 SECONDARY CARCINOID TUMORS OF OTHER SITES (H): ICD-10-CM

## 2025-07-03 DIAGNOSIS — C77.8 CARCINOMA METASTATIC TO LYMPH NODES OF MULTIPLE SITES WITH UNKNOWN PRIMARY SITE (H): Primary | ICD-10-CM

## 2025-07-03 DIAGNOSIS — C7A.021 MALIGNANT CARCINOID TUMOR OF CECUM (H): ICD-10-CM

## 2025-07-03 DIAGNOSIS — Z79.899 HIGH RISK MEDICATION USE: ICD-10-CM

## 2025-07-03 DIAGNOSIS — C50.919 TRIPLE NEGATIVE BREAST CANCER (H): ICD-10-CM

## 2025-07-03 DIAGNOSIS — R97.8 OTHER ABNORMAL TUMOR MARKERS: Primary | ICD-10-CM

## 2025-07-03 DIAGNOSIS — C80.1 CARCINOMA METASTATIC TO LYMPH NODES OF MULTIPLE SITES WITH UNKNOWN PRIMARY SITE (H): Primary | ICD-10-CM

## 2025-07-03 DIAGNOSIS — D3A.021: ICD-10-CM

## 2025-07-03 LAB
CEA SERPL-MCNC: 35.6 NG/ML
HOLD SPECIMEN: NORMAL

## 2025-07-03 PROCEDURE — 86300 IMMUNOASSAY TUMOR CA 15-3: CPT | Performed by: INTERNAL MEDICINE

## 2025-07-03 PROCEDURE — 86316 IMMUNOASSAY TUMOR OTHER: CPT | Performed by: INTERNAL MEDICINE

## 2025-07-03 PROCEDURE — 82378 CARCINOEMBRYONIC ANTIGEN: CPT | Performed by: INTERNAL MEDICINE

## 2025-07-03 RX ORDER — LANREOTIDE ACETATE 120 MG/.5ML
120 INJECTION SUBCUTANEOUS ONCE
Status: COMPLETED | OUTPATIENT
Start: 2025-07-03 | End: 2025-07-03

## 2025-07-03 RX ORDER — MEPERIDINE HYDROCHLORIDE 25 MG/ML
25 INJECTION INTRAMUSCULAR; INTRAVENOUS; SUBCUTANEOUS EVERY 30 MIN PRN
OUTPATIENT
Start: 2025-07-04

## 2025-07-03 RX ORDER — EPINEPHRINE 1 MG/ML
0.3 INJECTION, SOLUTION, CONCENTRATE INTRAVENOUS EVERY 5 MIN PRN
OUTPATIENT
Start: 2025-07-04

## 2025-07-03 RX ORDER — ALBUTEROL SULFATE 0.83 MG/ML
2.5 SOLUTION RESPIRATORY (INHALATION)
OUTPATIENT
Start: 2025-07-04

## 2025-07-03 RX ORDER — ALBUTEROL SULFATE 90 UG/1
1-2 INHALANT RESPIRATORY (INHALATION)
Start: 2025-07-04

## 2025-07-03 RX ORDER — LANREOTIDE ACETATE 120 MG/.5ML
120 INJECTION SUBCUTANEOUS ONCE
OUTPATIENT
Start: 2025-07-04 | End: 2025-07-04

## 2025-07-03 RX ORDER — METHYLPREDNISOLONE SODIUM SUCCINATE 125 MG/2ML
125 INJECTION INTRAMUSCULAR; INTRAVENOUS
Start: 2025-07-04

## 2025-07-03 RX ORDER — DIPHENHYDRAMINE HYDROCHLORIDE 50 MG/ML
50 INJECTION, SOLUTION INTRAMUSCULAR; INTRAVENOUS
Start: 2025-07-04

## 2025-07-03 RX ADMIN — LANREOTIDE ACETATE 120 MG: 120 INJECTION SUBCUTANEOUS at 12:10

## 2025-07-03 NOTE — PROGRESS NOTES
Infusion Nursing Note:  Marissa A Chiara presents today for Lanreotide.    Patient seen by provider today: No   present during visit today: Not Applicable.    Note: N/A.      Intravenous Access:  No Intravenous access/labs at this visit.    Treatment Conditions:  Not Applicable.      Post Infusion Assessment:  Patient tolerated injection without incident.  Site patent and intact, free from redness, edema or discomfort.  No evidence of extravasations.       Discharge Plan:   Discharge instructions reviewed with: Patient.  Patient and/or family verbalized understanding of discharge instructions and all questions answered.  Patient discharged in stable condition accompanied by: self and .  Departure Mode: Ambulatory.      Olga Lidia Roman RN

## 2025-07-16 DIAGNOSIS — C7A.021 MALIGNANT CARCINOID TUMOR OF CECUM (H): Primary | ICD-10-CM

## 2025-07-18 ENCOUNTER — APPOINTMENT (OUTPATIENT)
Dept: LAB | Facility: CLINIC | Age: 75
End: 2025-07-18
Payer: MEDICARE

## 2025-07-18 PROCEDURE — 80053 COMPREHEN METABOLIC PANEL: CPT | Performed by: INTERNAL MEDICINE

## 2025-07-18 PROCEDURE — 85025 COMPLETE CBC W/AUTO DIFF WBC: CPT | Performed by: INTERNAL MEDICINE

## 2025-07-18 PROCEDURE — 84443 ASSAY THYROID STIM HORMONE: CPT | Performed by: INTERNAL MEDICINE

## 2025-07-23 DIAGNOSIS — C7A.021 MALIGNANT CARCINOID TUMOR OF CECUM (H): Primary | ICD-10-CM

## 2025-08-01 ENCOUNTER — APPOINTMENT (OUTPATIENT)
Dept: LAB | Facility: CLINIC | Age: 75
End: 2025-08-01
Payer: MEDICARE

## 2025-08-01 PROCEDURE — 86316 IMMUNOASSAY TUMOR OTHER: CPT | Performed by: INTERNAL MEDICINE

## 2025-08-22 ENCOUNTER — HOSPITAL ENCOUNTER (OUTPATIENT)
Dept: CT IMAGING | Facility: CLINIC | Age: 75
Discharge: HOME | End: 2025-08-22
Attending: INTERNAL MEDICINE
Payer: MEDICARE

## 2025-08-22 DIAGNOSIS — C80.1 CARCINOMA METASTATIC TO LYMPH NODES OF MULTIPLE SITES WITH UNKNOWN PRIMARY SITE (H): ICD-10-CM

## 2025-08-22 DIAGNOSIS — C7A.021 MALIGNANT CARCINOID TUMOR OF CECUM (H): ICD-10-CM

## 2025-08-22 DIAGNOSIS — Z17.0 MALIGNANT NEOPLASM OF UPPER-OUTER QUADRANT OF BOTH BREASTS IN FEMALE, ESTROGEN RECEPTOR POSITIVE (H): ICD-10-CM

## 2025-08-22 DIAGNOSIS — C77.8 CARCINOMA METASTATIC TO LYMPH NODES OF MULTIPLE SITES WITH UNKNOWN PRIMARY SITE (H): ICD-10-CM

## 2025-08-22 DIAGNOSIS — C50.412 MALIGNANT NEOPLASM OF UPPER-OUTER QUADRANT OF BOTH BREASTS IN FEMALE, ESTROGEN RECEPTOR POSITIVE (H): ICD-10-CM

## 2025-08-22 DIAGNOSIS — C50.411 MALIGNANT NEOPLASM OF UPPER-OUTER QUADRANT OF BOTH BREASTS IN FEMALE, ESTROGEN RECEPTOR POSITIVE (H): ICD-10-CM

## 2025-08-22 LAB
CREAT BLD-MCNC: 1.4 MG/DL (ref 0.5–1)
EGFRCR SERPLBLD CKD-EPI 2021: 39 ML/MIN/1.73M2

## 2025-08-22 PROCEDURE — 250N000009 HC RX 250: Performed by: RADIOLOGY

## 2025-08-22 PROCEDURE — 74177 CT ABD & PELVIS W/CONTRAST: CPT

## 2025-08-22 PROCEDURE — 82565 ASSAY OF CREATININE: CPT

## 2025-08-22 PROCEDURE — 255N000002 HC RX 255 OP 636: Performed by: RADIOLOGY

## 2025-08-22 RX ADMIN — SODIUM CHLORIDE 72 ML: 9 INJECTION, SOLUTION INTRAVENOUS at 14:37

## 2025-08-22 RX ADMIN — IOHEXOL 112 ML: 350 INJECTION, SOLUTION INTRAVENOUS at 14:37

## 2025-08-28 ENCOUNTER — OFFICE VISIT (OUTPATIENT)
Dept: SURGERY | Facility: CLINIC | Age: 75
End: 2025-08-28
Payer: COMMERCIAL

## 2025-08-28 VITALS
HEART RATE: 54 BPM | OXYGEN SATURATION: 96 % | WEIGHT: 220 LBS | BODY MASS INDEX: 33.95 KG/M2 | SYSTOLIC BLOOD PRESSURE: 129 MMHG | DIASTOLIC BLOOD PRESSURE: 85 MMHG

## 2025-08-28 DIAGNOSIS — C50.911 INVASIVE DUCTAL CARCINOMA OF RIGHT BREAST (H): Primary | ICD-10-CM

## 2025-08-29 ENCOUNTER — APPOINTMENT (OUTPATIENT)
Dept: LAB | Facility: CLINIC | Age: 75
End: 2025-08-29
Payer: MEDICARE

## 2025-08-29 PROCEDURE — 80053 COMPREHEN METABOLIC PANEL: CPT | Performed by: INTERNAL MEDICINE

## 2025-08-29 PROCEDURE — 85014 HEMATOCRIT: CPT | Performed by: INTERNAL MEDICINE

## 2025-08-29 PROCEDURE — 84443 ASSAY THYROID STIM HORMONE: CPT | Performed by: INTERNAL MEDICINE

## (undated) DEVICE — BLADE KNIFE SURG 15 371115

## (undated) DEVICE — CATH URETERAL OPEN END 05FR 70CM 020015

## (undated) DEVICE — CATH URETERAL OPEN END 5FRX70CM M0064002010

## (undated) DEVICE — GUIDEWIRE SENSOR DUAL FLEX STR 0.035"X150CM M0066703080

## (undated) DEVICE — GOWN XLG DISP 9545

## (undated) DEVICE — DAVINCI XI OBTURATOR BLADELESS 8MM 470359

## (undated) DEVICE — PACK VAG HYST

## (undated) DEVICE — SU MONOCRYL 4-0 PS-2 18" UND Y496G

## (undated) DEVICE — NDL INSUFFLATION 13GA 120MM C2201

## (undated) DEVICE — SPECIMEN CONTAINER 5OZ STERILE 2600SA

## (undated) DEVICE — CONNECTOR WATER VALVE PERFUSION PACK STR 020272801

## (undated) DEVICE — PAD FLOOR SURGISAFE

## (undated) DEVICE — DAVINCI XI DRAPE ARM 470015

## (undated) DEVICE — GUIDEWIRE AMPLATZ 0.035"X145CM  SUPER STIFF 640-104

## (undated) DEVICE — SOL NACL 0.9% IRRIG 3000ML BAG 2B7477

## (undated) DEVICE — GLOVE PROTEXIS BLUE W/NEU-THERA 6.0  2D73EB60

## (undated) DEVICE — CATH URETERAL DUAL LUMEN 10FRX54CM M0064051000

## (undated) DEVICE — Device

## (undated) DEVICE — ADAPTER SCOPE UROLOK II LF M0067301400

## (undated) DEVICE — ENDO POUCH UNIVERSAL RETRIEVAL SYSTEM INZII 12/15MM CD004

## (undated) DEVICE — SU ETHILON 2-0 FS 18" 664G

## (undated) DEVICE — LINEN TOWEL PACK X5 5464

## (undated) DEVICE — PROBE COVER ULTRASOUND 6X96"

## (undated) DEVICE — PUMP SYSTEM SINGLE ACTION M0067201000

## (undated) DEVICE — SOL NACL 0.9% IRRIG 3000ML BAG 07972-08

## (undated) DEVICE — SYR 20ML LL W/O NDL

## (undated) DEVICE — PREP POVIDONE IODINE SOLUTION 10% 120ML

## (undated) DEVICE — DAVINCI XI ESU FORCE BIPOLAR 8MM 471405

## (undated) DEVICE — LABEL MEDICATION SYSTEM  3304

## (undated) DEVICE — GLOVE PROTEXIS W/NEU-THERA 7.5  2D73TE75

## (undated) DEVICE — LUBRICATING JELLY 4.25OZ

## (undated) DEVICE — SYR BULB IRRIG DOVER 60 ML LATEX FREE 67000

## (undated) DEVICE — GLOVE PROTEXIS BLUE W/NEU-THERA 8.0  2D73EB80

## (undated) DEVICE — SOL WATER IRRIG 1000ML BOTTLE 2F7114

## (undated) DEVICE — SYSTEM LAPAROVUE VISIBILITY LAPVUE10

## (undated) DEVICE — NDL 25GA 1.5" 305127

## (undated) DEVICE — DRAPE C-ARM 60X42" 1013

## (undated) DEVICE — GLOVE BIOGEL PI MICRO SZ 7.5 48575

## (undated) DEVICE — SPONGE LAP 18X18" 1515

## (undated) DEVICE — GLOVE PROTEXIS MICRO 7.0  2D73PM70

## (undated) DEVICE — BNDG COBAN 4"X5YDS STERILE

## (undated) DEVICE — LINEN TOWEL PACK X6 WHITE 5487

## (undated) DEVICE — PREP POVIDONE IODINE SCRUB 7.5% 120ML

## (undated) DEVICE — DRAPE U SPLIT 74X120" 29440

## (undated) DEVICE — SU VICRYL 3-0 RB-1 27" J305H

## (undated) DEVICE — PREP CHLORAPREP 26ML TINTED ORANGE  260815

## (undated) DEVICE — SYR 10ML LL W/O NDL 302995

## (undated) DEVICE — SU VICRYL+ 3-0 27IN SH UND VCP416H

## (undated) DEVICE — DRAPE BREAST/CHEST 29420

## (undated) DEVICE — SU STRATAFIX 3-0 MH 12" PS-2 SXMD1B103

## (undated) DEVICE — TUBING SUCTION 10'X3/16" N510

## (undated) DEVICE — RAD RX ISOVUE 300 (50ML) 61% IOPAMIDOL CHARGE PER ML

## (undated) DEVICE — DAVINCI XI SEAL UNIVERSAL 5-8MM 470361

## (undated) DEVICE — PAD CHUX UNDERPAD 30X36" P3036C

## (undated) DEVICE — TUBING CONMED AIRSEAL SMOKE EVAC INSUFFLATION ASM-EVAC

## (undated) DEVICE — SUCTION MANIFOLD DORNOCH ULTRA CART UL-CL500

## (undated) DEVICE — TUBING SUCTION 12"X1/4" N612

## (undated) DEVICE — DRSG DRAIN 4X4" 7086

## (undated) DEVICE — ENDO TROCAR CONMED AIRSEAL BLADELESS 12X120MM IAS12-120LP

## (undated) DEVICE — SURGICEL HEMOSTAT 4X8" 1952

## (undated) DEVICE — PAD CHUX UNDERPAD 23X24" 7136

## (undated) DEVICE — LINEN ORTHO PACK 5446

## (undated) DEVICE — SUCTION MANIFOLD NEPTUNE 2 SYS 4 PORT 0702-020-000

## (undated) DEVICE — LINEN DRAPE 54X72" 5467

## (undated) DEVICE — ADH SKIN CLOSURE PREMIERPRO EXOFIN 1.0ML 3470

## (undated) DEVICE — BLADE KNIFE SURG 11 371111

## (undated) DEVICE — DRSG TELFA 3X8" 1238

## (undated) DEVICE — DRSG TELFA 2X3"

## (undated) DEVICE — COVER CAMERA IN-LIGHT DISP LT-C02

## (undated) DEVICE — CATH URETERAL CONE 08FR 70CM 138008LT / 138008RT

## (undated) DEVICE — GLOVE PROTEXIS W/NEU-THERA 8.0  2D73TE80

## (undated) DEVICE — PREP CHLORAPREP 26ML TINTED HI-LITE ORANGE 930815

## (undated) DEVICE — SU SILK 2-0 SH 30" K833H

## (undated) DEVICE — TUBING SMOKE EVAC .635CMX3M SEA3705

## (undated) DEVICE — SUCTION MANIFOLD NEPTUNE 2 SYS 1 PORT 702-025-000

## (undated) DEVICE — SUCTION TIP YANKAUER STR K87

## (undated) DEVICE — TUBING SMOKE EVAC 2.2CMX3M SEA3715

## (undated) DEVICE — BLADE SWITCH SCISSORS TIP 5MM 89-5100B

## (undated) DEVICE — ENDO SEAL BX PORT BPS-A

## (undated) DEVICE — SU DERMABOND ADVANCED .7ML DNX12

## (undated) DEVICE — TUBING IRRIG CYSTO/BLADDER SET 81" LF 2C4040

## (undated) DEVICE — DRAPE SHEET REV FOLD 3/4 9349

## (undated) DEVICE — GLOVE PROTEXIS MICRO 6.0  2D73PM60

## (undated) DEVICE — LINEN GOWN XLG 5407

## (undated) DEVICE — DRAIN JACKSON PRATT CHANNEL 19FR ROUND HUBLESS SIL JP-2230

## (undated) DEVICE — KIT PATIENT POSITIONING PINK PAD EXT 40601

## (undated) DEVICE — GOWN IMPERVIOUS SPECIALTY XLG/XLONG 32474

## (undated) DEVICE — ESU PENCIL W/SMOKE EVAC ROCKER E2350HS

## (undated) DEVICE — PREP POVIDONE IODINE SOLUTION 10% 4OZ BOTTLE 29906-004

## (undated) DEVICE — GLOVE BIOGEL PI MICRO INDICATOR UNDERGLOVE SZ 8.0 48980

## (undated) DEVICE — TUBING IRRIG TUR Y TYPE 96" LF 6543-01

## (undated) DEVICE — DRAPE IOBAN INCISE 23X17" 6650EZ

## (undated) DEVICE — DAVINCI XI MONOPOLAR SCISSORS HOT SHEARS 8MM 470179

## (undated) DEVICE — SU VICRYL 0 UR-6 27" J603H

## (undated) DEVICE — PACK SET-UP STD 9102

## (undated) DEVICE — DRAPE C-ARM W/STRAPS 42X72" 07-CA104

## (undated) DEVICE — DRAPE MAYO STAND 23X54 8337

## (undated) DEVICE — LIGHT HANDLE X2

## (undated) DEVICE — BASIN SET MINOR DISP

## (undated) DEVICE — GLOVE PROTEXIS BLUE W/NEU-THERA 7.0  2D73EB70

## (undated) DEVICE — DRAPE IOBAN LG .375X23.5" 6648EZ

## (undated) DEVICE — SOL NACL 0.9% IRRIG 500ML BOTTLE 2F7123

## (undated) DEVICE — LINEN GOWN X4 5410

## (undated) DEVICE — SPONGE RAY-TEC 4X8" 7318

## (undated) DEVICE — DRAPE STOCKINETTE IMPERVIOUS 08" LATEX 1586

## (undated) DEVICE — TUBING SMOKE EVAC 2.2CM WAND SEA3725

## (undated) DEVICE — PACK LAP TRANSVERSE STD

## (undated) DEVICE — SUCTION IRR STRYKERFLOW II W/TIP 250-070-520

## (undated) DEVICE — CATH TRAY FOLEY SURESTEP 16FR W/URNE MTR STLK LATEX A303316A

## (undated) DEVICE — DAVINCI XI HANDPIECE ESU VESSEL SEALER 8MM EXT 480422

## (undated) DEVICE — PACK CYSTO UMMC CUSTOM

## (undated) DEVICE — GLOVE PROTEXIS MICRO 7.5  2D73PM75

## (undated) DEVICE — DRAIN JACKSON PRATT RESERVOIR 100ML SU130-1305

## (undated) DEVICE — ESU PENCIL SMOKE EVAC W/ROCKER SWITCH 0703-047-000

## (undated) DEVICE — TUBING SUCTION MEDI-VAC 1/4"X20' N620A

## (undated) DEVICE — TAPE DURAPORE 3" SILK 1538-3

## (undated) DEVICE — GOWN REINFORCED XXLG 9071

## (undated) DEVICE — GLOVE PROTEXIS W/NEU-THERA 6.5  2D73TE65

## (undated) DEVICE — ESU GROUND PAD ADULT W/CORD E7507

## (undated) DEVICE — STRAP UNIVERSAL POSITIONING 2-PIECE 4X47X76" 91-287

## (undated) DEVICE — JELLY LUBRICATING SURGILUBE 2OZ TUBE

## (undated) DEVICE — NEEDLE HYPO 23GA 1.5IN BD REG BVL STRL 305194

## (undated) DEVICE — DAVINCI XI DRAPE COLUMN 470341

## (undated) DEVICE — SOL NACL 0.9% IRRIG 1000ML BOTTLE 2F7124

## (undated) DEVICE — ENDO TROCAR FIRST ENTRY KII FIOS Z-THRD 05X100MM CTF03

## (undated) DEVICE — SWAB PROCTO OR

## (undated) DEVICE — DAVINCI HOT SHEARS TIP COVER  400180

## (undated) DEVICE — TUBING SMOKE EVAC 1CMX3M SEA3710

## (undated) DEVICE — SYR 10ML FINGER CONTROL W/O NDL 309695

## (undated) DEVICE — SOL WATER IRRIG 500ML BOTTLE 2F7113

## (undated) DEVICE — GOWN LG DISP 9515

## (undated) DEVICE — PITCHER STERILE 1000ML  SSK9004A

## (undated) DEVICE — CLIP ENDO HEMO-LOC PURPLE LG 544240

## (undated) DEVICE — SOL NACL 0.9% INJ 1000ML BAG 2B1324X

## (undated) RX ORDER — SODIUM CHLORIDE, SODIUM LACTATE, POTASSIUM CHLORIDE, CALCIUM CHLORIDE 600; 310; 30; 20 MG/100ML; MG/100ML; MG/100ML; MG/100ML
INJECTION, SOLUTION INTRAVENOUS
Status: DISPENSED
Start: 2022-11-04

## (undated) RX ORDER — ONDANSETRON 2 MG/ML
INJECTION INTRAMUSCULAR; INTRAVENOUS
Status: DISPENSED
Start: 2020-07-01

## (undated) RX ORDER — PROPOFOL 10 MG/ML
INJECTION, EMULSION INTRAVENOUS
Status: DISPENSED
Start: 2022-11-04

## (undated) RX ORDER — ACETAMINOPHEN 325 MG/1
TABLET ORAL
Status: DISPENSED
Start: 2020-07-01

## (undated) RX ORDER — PROPOFOL 10 MG/ML
INJECTION, EMULSION INTRAVENOUS
Status: DISPENSED
Start: 2018-07-05

## (undated) RX ORDER — FENTANYL CITRATE 50 UG/ML
INJECTION, SOLUTION INTRAMUSCULAR; INTRAVENOUS
Status: DISPENSED
Start: 2017-02-02

## (undated) RX ORDER — LIDOCAINE HYDROCHLORIDE 10 MG/ML
INJECTION, SOLUTION EPIDURAL; INFILTRATION; INTRACAUDAL; PERINEURAL
Status: DISPENSED
Start: 2024-06-28

## (undated) RX ORDER — CEFAZOLIN SODIUM 1 G/3ML
INJECTION, POWDER, FOR SOLUTION INTRAMUSCULAR; INTRAVENOUS
Status: DISPENSED
Start: 2025-01-24

## (undated) RX ORDER — FENTANYL CITRATE 50 UG/ML
INJECTION, SOLUTION INTRAMUSCULAR; INTRAVENOUS
Status: DISPENSED
Start: 2020-07-01

## (undated) RX ORDER — FENTANYL CITRATE-0.9 % NACL/PF 10 MCG/ML
PLASTIC BAG, INJECTION (ML) INTRAVENOUS
Status: DISPENSED
Start: 2021-09-16

## (undated) RX ORDER — FENTANYL CITRATE 50 UG/ML
INJECTION, SOLUTION INTRAMUSCULAR; INTRAVENOUS
Status: DISPENSED
Start: 2017-09-07

## (undated) RX ORDER — SODIUM CHLORIDE 9 MG/ML
INJECTION, SOLUTION INTRAVENOUS
Status: DISPENSED
Start: 2024-06-28

## (undated) RX ORDER — LIDOCAINE HYDROCHLORIDE 10 MG/ML
INJECTION, SOLUTION EPIDURAL; INFILTRATION; INTRACAUDAL; PERINEURAL
Status: DISPENSED
Start: 2018-07-05

## (undated) RX ORDER — ONDANSETRON 2 MG/ML
INJECTION INTRAMUSCULAR; INTRAVENOUS
Status: DISPENSED
Start: 2017-02-02

## (undated) RX ORDER — GABAPENTIN 100 MG/1
CAPSULE ORAL
Status: DISPENSED
Start: 2020-07-01

## (undated) RX ORDER — ACETAMINOPHEN 325 MG/1
TABLET ORAL
Status: DISPENSED
Start: 2025-01-24

## (undated) RX ORDER — KETAMINE HYDROCHLORIDE 10 MG/ML
INJECTION, SOLUTION INTRAMUSCULAR; INTRAVENOUS
Status: DISPENSED
Start: 2017-09-07

## (undated) RX ORDER — HEPARIN SODIUM 5000 [USP'U]/.5ML
INJECTION, SOLUTION INTRAVENOUS; SUBCUTANEOUS
Status: DISPENSED
Start: 2025-01-24

## (undated) RX ORDER — OXYCODONE HYDROCHLORIDE 5 MG/1
TABLET ORAL
Status: DISPENSED
Start: 2022-11-04

## (undated) RX ORDER — LIDOCAINE HYDROCHLORIDE 10 MG/ML
INJECTION, SOLUTION EPIDURAL; INFILTRATION; INTRACAUDAL; PERINEURAL
Status: DISPENSED
Start: 2019-02-07

## (undated) RX ORDER — LIDOCAINE HYDROCHLORIDE 20 MG/ML
INJECTION, SOLUTION EPIDURAL; INFILTRATION; INTRACAUDAL; PERINEURAL
Status: DISPENSED
Start: 2022-11-04

## (undated) RX ORDER — IODINE AND POTASSIUM IODIDE 50; 100 MG/ML; MG/ML
LIQUID ORAL
Status: DISPENSED
Start: 2020-07-01

## (undated) RX ORDER — PHENYLEPHRINE HCL IN 0.9% NACL 1 MG/10 ML
SYRINGE (ML) INTRAVENOUS
Status: DISPENSED
Start: 2017-12-12

## (undated) RX ORDER — CEFAZOLIN SODIUM 2 G/100ML
INJECTION, SOLUTION INTRAVENOUS
Status: DISPENSED
Start: 2019-02-07

## (undated) RX ORDER — PROPOFOL 10 MG/ML
INJECTION, EMULSION INTRAVENOUS
Status: DISPENSED
Start: 2020-02-20

## (undated) RX ORDER — MAGNESIUM SULFATE HEPTAHYDRATE 40 MG/ML
INJECTION, SOLUTION INTRAVENOUS
Status: DISPENSED
Start: 2022-03-24

## (undated) RX ORDER — FENTANYL CITRATE 50 UG/ML
INJECTION, SOLUTION INTRAMUSCULAR; INTRAVENOUS
Status: DISPENSED
Start: 2021-05-09

## (undated) RX ORDER — FENTANYL CITRATE 50 UG/ML
INJECTION, SOLUTION INTRAMUSCULAR; INTRAVENOUS
Status: DISPENSED
Start: 2017-12-12

## (undated) RX ORDER — OXYCODONE HYDROCHLORIDE 5 MG/1
TABLET ORAL
Status: DISPENSED
Start: 2020-08-14

## (undated) RX ORDER — FENTANYL CITRATE 50 UG/ML
INJECTION, SOLUTION INTRAMUSCULAR; INTRAVENOUS
Status: DISPENSED
Start: 2022-11-04

## (undated) RX ORDER — FERRIC SUBSULFATE 0.21 G/G
LIQUID TOPICAL
Status: DISPENSED
Start: 2020-07-01

## (undated) RX ORDER — PHENYLEPHRINE HCL IN 0.9% NACL 1 MG/10 ML
SYRINGE (ML) INTRAVENOUS
Status: DISPENSED
Start: 2017-02-02

## (undated) RX ORDER — LIDOCAINE HYDROCHLORIDE 10 MG/ML
INJECTION, SOLUTION EPIDURAL; INFILTRATION; INTRACAUDAL; PERINEURAL
Status: DISPENSED
Start: 2017-02-02

## (undated) RX ORDER — KETAMINE HCL IN 0.9 % NACL 50 MG/5 ML
SYRINGE (ML) INTRAVENOUS
Status: DISPENSED
Start: 2020-02-20

## (undated) RX ORDER — FENTANYL CITRATE 50 UG/ML
INJECTION, SOLUTION INTRAMUSCULAR; INTRAVENOUS
Status: DISPENSED
Start: 2024-06-28

## (undated) RX ORDER — ONDANSETRON 2 MG/ML
INJECTION INTRAMUSCULAR; INTRAVENOUS
Status: DISPENSED
Start: 2018-07-05

## (undated) RX ORDER — FENTANYL CITRATE 50 UG/ML
INJECTION, SOLUTION INTRAMUSCULAR; INTRAVENOUS
Status: DISPENSED
Start: 2018-07-05

## (undated) RX ORDER — DEXAMETHASONE SODIUM PHOSPHATE 4 MG/ML
INJECTION, SOLUTION INTRA-ARTICULAR; INTRALESIONAL; INTRAMUSCULAR; INTRAVENOUS; SOFT TISSUE
Status: DISPENSED
Start: 2019-02-07

## (undated) RX ORDER — DIPHENHYDRAMINE HYDROCHLORIDE 50 MG/ML
INJECTION INTRAMUSCULAR; INTRAVENOUS
Status: DISPENSED
Start: 2017-06-23

## (undated) RX ORDER — FENTANYL CITRATE 50 UG/ML
INJECTION, SOLUTION INTRAMUSCULAR; INTRAVENOUS
Status: DISPENSED
Start: 2019-02-07

## (undated) RX ORDER — GABAPENTIN 100 MG/1
CAPSULE ORAL
Status: DISPENSED
Start: 2022-11-04

## (undated) RX ORDER — FENTANYL CITRATE 50 UG/ML
INJECTION, SOLUTION INTRAMUSCULAR; INTRAVENOUS
Status: DISPENSED
Start: 2025-01-24

## (undated) RX ORDER — ONDANSETRON 2 MG/ML
INJECTION INTRAMUSCULAR; INTRAVENOUS
Status: DISPENSED
Start: 2022-11-04

## (undated) RX ORDER — ONDANSETRON 2 MG/ML
INJECTION INTRAMUSCULAR; INTRAVENOUS
Status: DISPENSED
Start: 2019-02-07

## (undated) RX ORDER — GLYCOPYRROLATE 0.2 MG/ML
INJECTION, SOLUTION INTRAMUSCULAR; INTRAVENOUS
Status: DISPENSED
Start: 2017-02-02

## (undated) RX ORDER — KETOROLAC TROMETHAMINE 30 MG/ML
INJECTION, SOLUTION INTRAMUSCULAR; INTRAVENOUS
Status: DISPENSED
Start: 2025-01-24

## (undated) RX ORDER — CEFAZOLIN SODIUM 2 G/100ML
INJECTION, SOLUTION INTRAVENOUS
Status: DISPENSED
Start: 2017-12-12

## (undated) RX ORDER — FENTANYL CITRATE 50 UG/ML
INJECTION, SOLUTION INTRAMUSCULAR; INTRAVENOUS
Status: DISPENSED
Start: 2022-03-24

## (undated) RX ORDER — CEFAZOLIN SODIUM 2 G/100ML
INJECTION, SOLUTION INTRAVENOUS
Status: DISPENSED
Start: 2020-02-20

## (undated) RX ORDER — BUPIVACAINE HYDROCHLORIDE 2.5 MG/ML
INJECTION, SOLUTION EPIDURAL; INFILTRATION; INTRACAUDAL
Status: DISPENSED
Start: 2025-01-24

## (undated) RX ORDER — DEXAMETHASONE SODIUM PHOSPHATE 4 MG/ML
INJECTION, SOLUTION INTRA-ARTICULAR; INTRALESIONAL; INTRAMUSCULAR; INTRAVENOUS; SOFT TISSUE
Status: DISPENSED
Start: 2017-02-02

## (undated) RX ORDER — CEFAZOLIN SODIUM/WATER 2 G/20 ML
SYRINGE (ML) INTRAVENOUS
Status: DISPENSED
Start: 2022-11-04

## (undated) RX ORDER — ACETAMINOPHEN 325 MG/1
TABLET ORAL
Status: DISPENSED
Start: 2020-02-20

## (undated) RX ORDER — FENTANYL CITRATE-0.9 % NACL/PF 10 MCG/ML
PLASTIC BAG, INJECTION (ML) INTRAVENOUS
Status: DISPENSED
Start: 2025-01-24

## (undated) RX ORDER — DEXAMETHASONE SODIUM PHOSPHATE 4 MG/ML
INJECTION, SOLUTION INTRA-ARTICULAR; INTRALESIONAL; INTRAMUSCULAR; INTRAVENOUS; SOFT TISSUE
Status: DISPENSED
Start: 2018-07-05

## (undated) RX ORDER — DEXAMETHASONE SODIUM PHOSPHATE 4 MG/ML
INJECTION, SOLUTION INTRA-ARTICULAR; INTRALESIONAL; INTRAMUSCULAR; INTRAVENOUS; SOFT TISSUE
Status: DISPENSED
Start: 2017-12-12

## (undated) RX ORDER — FERRIC SUBSULFATE 0.21 G/G
LIQUID TOPICAL
Status: DISPENSED
Start: 2017-12-12

## (undated) RX ORDER — CELECOXIB 200 MG/1
CAPSULE ORAL
Status: DISPENSED
Start: 2021-09-16

## (undated) RX ORDER — FENTANYL CITRATE 50 UG/ML
INJECTION, SOLUTION INTRAMUSCULAR; INTRAVENOUS
Status: DISPENSED
Start: 2017-06-23

## (undated) RX ORDER — PROPOFOL 10 MG/ML
INJECTION, EMULSION INTRAVENOUS
Status: DISPENSED
Start: 2020-07-01

## (undated) RX ORDER — LIDOCAINE HYDROCHLORIDE 10 MG/ML
INJECTION, SOLUTION EPIDURAL; INFILTRATION; INTRACAUDAL; PERINEURAL
Status: DISPENSED
Start: 2025-01-24

## (undated) RX ORDER — LIDOCAINE HYDROCHLORIDE 10 MG/ML
INJECTION, SOLUTION EPIDURAL; INFILTRATION; INTRACAUDAL; PERINEURAL
Status: DISPENSED
Start: 2020-08-14

## (undated) RX ORDER — ACETAMINOPHEN 325 MG/1
TABLET ORAL
Status: DISPENSED
Start: 2022-11-04

## (undated) RX ORDER — FENTANYL CITRATE 50 UG/ML
INJECTION, SOLUTION INTRAMUSCULAR; INTRAVENOUS
Status: DISPENSED
Start: 2019-06-12

## (undated) RX ORDER — PROPOFOL 10 MG/ML
INJECTION, EMULSION INTRAVENOUS
Status: DISPENSED
Start: 2017-09-07

## (undated) RX ORDER — KETOROLAC TROMETHAMINE 30 MG/ML
INJECTION, SOLUTION INTRAMUSCULAR; INTRAVENOUS
Status: DISPENSED
Start: 2022-11-04

## (undated) RX ORDER — EPHEDRINE SULFATE 50 MG/ML
INJECTION, SOLUTION INTRAMUSCULAR; INTRAVENOUS; SUBCUTANEOUS
Status: DISPENSED
Start: 2022-11-04

## (undated) RX ORDER — GABAPENTIN 100 MG/1
CAPSULE ORAL
Status: DISPENSED
Start: 2021-09-16

## (undated) RX ORDER — ONDANSETRON 2 MG/ML
INJECTION INTRAMUSCULAR; INTRAVENOUS
Status: DISPENSED
Start: 2017-12-12

## (undated) RX ORDER — LIDOCAINE HYDROCHLORIDE 20 MG/ML
JELLY TOPICAL
Status: DISPENSED
Start: 2021-05-09

## (undated) RX ORDER — CEFAZOLIN SODIUM 1 G/3ML
INJECTION, POWDER, FOR SOLUTION INTRAMUSCULAR; INTRAVENOUS
Status: DISPENSED
Start: 2022-11-04

## (undated) RX ORDER — CEFAZOLIN SODIUM 2 G/100ML
INJECTION, SOLUTION INTRAVENOUS
Status: DISPENSED
Start: 2021-09-16

## (undated) RX ORDER — PROPOFOL 10 MG/ML
INJECTION, EMULSION INTRAVENOUS
Status: DISPENSED
Start: 2020-08-14

## (undated) RX ORDER — LIDOCAINE HYDROCHLORIDE 10 MG/ML
INJECTION, SOLUTION EPIDURAL; INFILTRATION; INTRACAUDAL; PERINEURAL
Status: DISPENSED
Start: 2019-06-12

## (undated) RX ORDER — LIDOCAINE HYDROCHLORIDE 20 MG/ML
INJECTION, SOLUTION EPIDURAL; INFILTRATION; INTRACAUDAL; PERINEURAL
Status: DISPENSED
Start: 2017-12-12

## (undated) RX ORDER — SIMETHICONE 40MG/0.6ML
SUSPENSION, DROPS(FINAL DOSAGE FORM)(ML) ORAL
Status: DISPENSED
Start: 2017-06-23

## (undated) RX ORDER — PROPOFOL 10 MG/ML
INJECTION, EMULSION INTRAVENOUS
Status: DISPENSED
Start: 2019-02-07

## (undated) RX ORDER — LIDOCAINE HYDROCHLORIDE 20 MG/ML
JELLY TOPICAL
Status: DISPENSED
Start: 2022-03-24

## (undated) RX ORDER — DEXAMETHASONE SODIUM PHOSPHATE 4 MG/ML
INJECTION, SOLUTION INTRA-ARTICULAR; INTRALESIONAL; INTRAMUSCULAR; INTRAVENOUS; SOFT TISSUE
Status: DISPENSED
Start: 2020-07-01

## (undated) RX ORDER — BUPIVACAINE HYDROCHLORIDE 2.5 MG/ML
INJECTION, SOLUTION EPIDURAL; INFILTRATION; INTRACAUDAL
Status: DISPENSED
Start: 2022-11-04

## (undated) RX ORDER — LIDOCAINE HYDROCHLORIDE 20 MG/ML
INJECTION, SOLUTION EPIDURAL; INFILTRATION; INTRACAUDAL; PERINEURAL
Status: DISPENSED
Start: 2020-07-01

## (undated) RX ORDER — CEFAZOLIN SODIUM/WATER 2 G/20 ML
SYRINGE (ML) INTRAVENOUS
Status: DISPENSED
Start: 2022-03-24

## (undated) RX ORDER — SODIUM CHLORIDE 9 MG/ML
INJECTION, SOLUTION INTRAVENOUS
Status: DISPENSED
Start: 2019-06-12

## (undated) RX ORDER — MAGNESIUM SULFATE HEPTAHYDRATE 40 MG/ML
INJECTION, SOLUTION INTRAVENOUS
Status: DISPENSED
Start: 2021-09-16

## (undated) RX ORDER — HEPARIN SODIUM 5000 [USP'U]/.5ML
INJECTION, SOLUTION INTRAVENOUS; SUBCUTANEOUS
Status: DISPENSED
Start: 2017-12-12

## (undated) RX ORDER — GABAPENTIN 300 MG/1
CAPSULE ORAL
Status: DISPENSED
Start: 2020-02-20

## (undated) RX ORDER — EPHEDRINE SULFATE 50 MG/ML
INJECTION, SOLUTION INTRAMUSCULAR; INTRAVENOUS; SUBCUTANEOUS
Status: DISPENSED
Start: 2017-12-12

## (undated) RX ORDER — DEXAMETHASONE SODIUM PHOSPHATE 4 MG/ML
INJECTION, SOLUTION INTRA-ARTICULAR; INTRALESIONAL; INTRAMUSCULAR; INTRAVENOUS; SOFT TISSUE
Status: DISPENSED
Start: 2022-11-04

## (undated) RX ORDER — ACETAMINOPHEN 325 MG/1
TABLET ORAL
Status: DISPENSED
Start: 2021-09-16

## (undated) RX ORDER — OXYCODONE HYDROCHLORIDE 5 MG/1
TABLET ORAL
Status: DISPENSED
Start: 2022-03-24

## (undated) RX ORDER — BUPIVACAINE HYDROCHLORIDE 5 MG/ML
INJECTION, SOLUTION EPIDURAL; INTRACAUDAL
Status: DISPENSED
Start: 2020-07-01

## (undated) RX ORDER — ONDANSETRON 2 MG/ML
INJECTION INTRAMUSCULAR; INTRAVENOUS
Status: DISPENSED
Start: 2021-09-16

## (undated) RX ORDER — FENTANYL CITRATE 50 UG/ML
INJECTION, SOLUTION INTRAMUSCULAR; INTRAVENOUS
Status: DISPENSED
Start: 2020-08-14

## (undated) RX ORDER — PROPOFOL 10 MG/ML
INJECTION, EMULSION INTRAVENOUS
Status: DISPENSED
Start: 2021-09-16

## (undated) RX ORDER — LIDOCAINE HYDROCHLORIDE 10 MG/ML
INJECTION, SOLUTION EPIDURAL; INFILTRATION; INTRACAUDAL; PERINEURAL
Status: DISPENSED
Start: 2020-02-20

## (undated) RX ORDER — FENTANYL CITRATE 50 UG/ML
INJECTION, SOLUTION INTRAMUSCULAR; INTRAVENOUS
Status: DISPENSED
Start: 2021-09-16

## (undated) RX ORDER — ACETAMINOPHEN 325 MG/1
TABLET ORAL
Status: DISPENSED
Start: 2022-03-24

## (undated) RX ORDER — ACETIC ACID 5 %
LIQUID (ML) MISCELLANEOUS
Status: DISPENSED
Start: 2020-07-01